# Patient Record
Sex: MALE | Race: BLACK OR AFRICAN AMERICAN | NOT HISPANIC OR LATINO
[De-identification: names, ages, dates, MRNs, and addresses within clinical notes are randomized per-mention and may not be internally consistent; named-entity substitution may affect disease eponyms.]

---

## 2017-06-12 ENCOUNTER — RX RENEWAL (OUTPATIENT)
Age: 72
End: 2017-06-12

## 2017-10-25 ENCOUNTER — RX RENEWAL (OUTPATIENT)
Age: 72
End: 2017-10-25

## 2019-08-26 ENCOUNTER — INPATIENT (INPATIENT)
Facility: HOSPITAL | Age: 74
LOS: 28 days | Discharge: HOME | DRG: 698 | End: 2019-09-24
Attending: GENERAL ACUTE CARE HOSPITAL | Admitting: GENERAL ACUTE CARE HOSPITAL
Payer: MEDICARE

## 2019-08-26 VITALS
HEIGHT: 74 IN | SYSTOLIC BLOOD PRESSURE: 220 MMHG | HEART RATE: 76 BPM | DIASTOLIC BLOOD PRESSURE: 94 MMHG | WEIGHT: 240.08 LBS | RESPIRATION RATE: 16 BRPM | TEMPERATURE: 98 F | OXYGEN SATURATION: 99 %

## 2019-08-26 DIAGNOSIS — Z94.0 KIDNEY TRANSPLANT STATUS: ICD-10-CM

## 2019-08-26 DIAGNOSIS — I10 ESSENTIAL (PRIMARY) HYPERTENSION: ICD-10-CM

## 2019-08-26 DIAGNOSIS — N17.9 ACUTE KIDNEY FAILURE, UNSPECIFIED: ICD-10-CM

## 2019-08-26 LAB
ALBUMIN SERPL ELPH-MCNC: 2.8 G/DL — LOW (ref 3.3–5)
ALP SERPL-CCNC: 80 U/L — SIGNIFICANT CHANGE UP (ref 40–120)
ALT FLD-CCNC: 10 U/L — SIGNIFICANT CHANGE UP (ref 10–45)
ANION GAP SERPL CALC-SCNC: 12 MMOL/L — SIGNIFICANT CHANGE UP (ref 5–17)
APPEARANCE UR: CLEAR — SIGNIFICANT CHANGE UP
APTT BLD: 32.6 SEC — SIGNIFICANT CHANGE UP (ref 27.5–36.3)
AST SERPL-CCNC: 14 U/L — SIGNIFICANT CHANGE UP (ref 10–40)
BACTERIA # UR AUTO: NEGATIVE — SIGNIFICANT CHANGE UP
BASE EXCESS BLDV CALC-SCNC: -5.8 MMOL/L — LOW (ref -2–2)
BASOPHILS # BLD AUTO: 0 K/UL — SIGNIFICANT CHANGE UP (ref 0–0.2)
BASOPHILS NFR BLD AUTO: 0.3 % — SIGNIFICANT CHANGE UP (ref 0–2)
BILIRUB SERPL-MCNC: 0.2 MG/DL — SIGNIFICANT CHANGE UP (ref 0.2–1.2)
BILIRUB UR-MCNC: NEGATIVE — SIGNIFICANT CHANGE UP
BUN SERPL-MCNC: 59 MG/DL — HIGH (ref 7–23)
CA-I SERPL-SCNC: 1.34 MMOL/L — HIGH (ref 1.12–1.3)
CALCIUM SERPL-MCNC: 9.7 MG/DL — SIGNIFICANT CHANGE UP (ref 8.4–10.5)
CHLORIDE BLDV-SCNC: 115 MMOL/L — HIGH (ref 96–108)
CHLORIDE SERPL-SCNC: 112 MMOL/L — HIGH (ref 96–108)
CO2 BLDV-SCNC: 21 MMOL/L — LOW (ref 22–30)
CO2 SERPL-SCNC: 18 MMOL/L — LOW (ref 22–31)
COLOR SPEC: SIGNIFICANT CHANGE UP
CREAT SERPL-MCNC: 7.02 MG/DL — HIGH (ref 0.5–1.3)
DIFF PNL FLD: ABNORMAL
EOSINOPHIL # BLD AUTO: 0.1 K/UL — SIGNIFICANT CHANGE UP (ref 0–0.5)
EOSINOPHIL NFR BLD AUTO: 2.3 % — SIGNIFICANT CHANGE UP (ref 0–6)
EPI CELLS # UR: 5 /HPF — SIGNIFICANT CHANGE UP
GAS PNL BLDV: 140 MMOL/L — SIGNIFICANT CHANGE UP (ref 135–145)
GAS PNL BLDV: SIGNIFICANT CHANGE UP
GAS PNL BLDV: SIGNIFICANT CHANGE UP
GLUCOSE BLDC GLUCOMTR-MCNC: 164 MG/DL — HIGH (ref 70–99)
GLUCOSE BLDC GLUCOMTR-MCNC: 184 MG/DL — HIGH (ref 70–99)
GLUCOSE BLDV-MCNC: 122 MG/DL — HIGH (ref 70–99)
GLUCOSE SERPL-MCNC: 126 MG/DL — HIGH (ref 70–99)
GLUCOSE UR QL: ABNORMAL
HCO3 BLDV-SCNC: 20 MMOL/L — LOW (ref 21–29)
HCT VFR BLD CALC: 27 % — LOW (ref 39–50)
HCT VFR BLDA CALC: 26 % — LOW (ref 39–50)
HGB BLD CALC-MCNC: 8.3 G/DL — LOW (ref 13–17)
HGB BLD-MCNC: 8.8 G/DL — LOW (ref 13–17)
HYALINE CASTS # UR AUTO: 4 /LPF — HIGH (ref 0–2)
INR BLD: 2.2 RATIO — HIGH (ref 0.88–1.16)
KETONES UR-MCNC: NEGATIVE — SIGNIFICANT CHANGE UP
LACTATE BLDV-MCNC: 1.1 MMOL/L — SIGNIFICANT CHANGE UP (ref 0.7–2)
LEUKOCYTE ESTERASE UR-ACNC: NEGATIVE — SIGNIFICANT CHANGE UP
LYMPHOCYTES # BLD AUTO: 0.8 K/UL — LOW (ref 1–3.3)
LYMPHOCYTES # BLD AUTO: 19 % — SIGNIFICANT CHANGE UP (ref 13–44)
MCHC RBC-ENTMCNC: 27.2 PG — SIGNIFICANT CHANGE UP (ref 27–34)
MCHC RBC-ENTMCNC: 32.6 GM/DL — SIGNIFICANT CHANGE UP (ref 32–36)
MCV RBC AUTO: 83.4 FL — SIGNIFICANT CHANGE UP (ref 80–100)
MONOCYTES # BLD AUTO: 0.4 K/UL — SIGNIFICANT CHANGE UP (ref 0–0.9)
MONOCYTES NFR BLD AUTO: 11 % — SIGNIFICANT CHANGE UP (ref 2–14)
NEUTROPHILS # BLD AUTO: 2.7 K/UL — SIGNIFICANT CHANGE UP (ref 1.8–7.4)
NEUTROPHILS NFR BLD AUTO: 67.3 % — SIGNIFICANT CHANGE UP (ref 43–77)
NITRITE UR-MCNC: NEGATIVE — SIGNIFICANT CHANGE UP
OB PNL STL: NEGATIVE — SIGNIFICANT CHANGE UP
PCO2 BLDV: 43 MMHG — SIGNIFICANT CHANGE UP (ref 35–50)
PH BLDV: 7.29 — LOW (ref 7.35–7.45)
PH UR: 6.5 — SIGNIFICANT CHANGE UP (ref 5–8)
PLATELET # BLD AUTO: 115 K/UL — LOW (ref 150–400)
PO2 BLDV: 33 MMHG — SIGNIFICANT CHANGE UP (ref 25–45)
POTASSIUM BLDV-SCNC: 4.7 MMOL/L — SIGNIFICANT CHANGE UP (ref 3.5–5.3)
POTASSIUM SERPL-MCNC: 4.8 MMOL/L — SIGNIFICANT CHANGE UP (ref 3.5–5.3)
POTASSIUM SERPL-SCNC: 4.8 MMOL/L — SIGNIFICANT CHANGE UP (ref 3.5–5.3)
PROT SERPL-MCNC: 6.2 G/DL — SIGNIFICANT CHANGE UP (ref 6–8.3)
PROT UR-MCNC: >600
PROTHROM AB SERPL-ACNC: 25.7 SEC — HIGH (ref 10–12.9)
RBC # BLD: 3.24 M/UL — LOW (ref 4.2–5.8)
RBC # FLD: 13.8 % — SIGNIFICANT CHANGE UP (ref 10.3–14.5)
RBC CASTS # UR COMP ASSIST: 13 /HPF — HIGH (ref 0–4)
SAO2 % BLDV: 54 % — LOW (ref 67–88)
SODIUM SERPL-SCNC: 142 MMOL/L — SIGNIFICANT CHANGE UP (ref 135–145)
SP GR SPEC: 1.02 — SIGNIFICANT CHANGE UP (ref 1.01–1.02)
TROPONIN T, HIGH SENSITIVITY RESULT: 195 NG/L — HIGH (ref 0–51)
TROPONIN T, HIGH SENSITIVITY RESULT: 206 NG/L — HIGH (ref 0–51)
UROBILINOGEN FLD QL: NEGATIVE — SIGNIFICANT CHANGE UP
WBC # BLD: 4 K/UL — SIGNIFICANT CHANGE UP (ref 3.8–10.5)
WBC # FLD AUTO: 4 K/UL — SIGNIFICANT CHANGE UP (ref 3.8–10.5)
WBC UR QL: 13 /HPF — HIGH (ref 0–5)

## 2019-08-26 PROCEDURE — 71045 X-RAY EXAM CHEST 1 VIEW: CPT | Mod: 26

## 2019-08-26 PROCEDURE — 76776 US EXAM K TRANSPL W/DOPPLER: CPT | Mod: 26,LT

## 2019-08-26 PROCEDURE — 99284 EMERGENCY DEPT VISIT MOD MDM: CPT

## 2019-08-26 PROCEDURE — 99222 1ST HOSP IP/OBS MODERATE 55: CPT | Mod: GC

## 2019-08-26 PROCEDURE — 93010 ELECTROCARDIOGRAM REPORT: CPT

## 2019-08-26 RX ORDER — SODIUM BICARBONATE 1 MEQ/ML
1 SYRINGE (ML) INTRAVENOUS
Qty: 0 | Refills: 0 | DISCHARGE

## 2019-08-26 RX ORDER — SODIUM BICARBONATE 1 MEQ/ML
650 SYRINGE (ML) INTRAVENOUS
Refills: 0 | Status: DISCONTINUED | OUTPATIENT
Start: 2019-08-26 | End: 2019-09-24

## 2019-08-26 RX ORDER — FUROSEMIDE 40 MG
80 TABLET ORAL ONCE
Refills: 0 | Status: COMPLETED | OUTPATIENT
Start: 2019-08-26 | End: 2019-08-26

## 2019-08-26 RX ORDER — LABETALOL HCL 100 MG
300 TABLET ORAL
Refills: 0 | Status: DISCONTINUED | OUTPATIENT
Start: 2019-08-26 | End: 2019-09-06

## 2019-08-26 RX ORDER — HYDRALAZINE HCL 50 MG
25 TABLET ORAL ONCE
Refills: 0 | Status: COMPLETED | OUTPATIENT
Start: 2019-08-26 | End: 2019-08-26

## 2019-08-26 RX ORDER — DEXTROSE 50 % IN WATER 50 %
25 SYRINGE (ML) INTRAVENOUS ONCE
Refills: 0 | Status: DISCONTINUED | OUTPATIENT
Start: 2019-08-26 | End: 2019-09-24

## 2019-08-26 RX ORDER — DEXTROSE 50 % IN WATER 50 %
12.5 SYRINGE (ML) INTRAVENOUS ONCE
Refills: 0 | Status: DISCONTINUED | OUTPATIENT
Start: 2019-08-26 | End: 2019-09-24

## 2019-08-26 RX ORDER — DEXTROSE 50 % IN WATER 50 %
15 SYRINGE (ML) INTRAVENOUS ONCE
Refills: 0 | Status: DISCONTINUED | OUTPATIENT
Start: 2019-08-26 | End: 2019-09-24

## 2019-08-26 RX ORDER — INSULIN GLARGINE 100 [IU]/ML
10 INJECTION, SOLUTION SUBCUTANEOUS AT BEDTIME
Refills: 0 | Status: DISCONTINUED | OUTPATIENT
Start: 2019-08-26 | End: 2019-09-24

## 2019-08-26 RX ORDER — TAMSULOSIN HYDROCHLORIDE 0.4 MG/1
0.4 CAPSULE ORAL AT BEDTIME
Refills: 0 | Status: DISCONTINUED | OUTPATIENT
Start: 2019-08-26 | End: 2019-09-24

## 2019-08-26 RX ORDER — OMEPRAZOLE 10 MG/1
1 CAPSULE, DELAYED RELEASE ORAL
Qty: 0 | Refills: 0 | DISCHARGE

## 2019-08-26 RX ORDER — DILTIAZEM HCL 120 MG
30 CAPSULE, EXT RELEASE 24 HR ORAL ONCE
Refills: 0 | Status: COMPLETED | OUTPATIENT
Start: 2019-08-26 | End: 2019-08-26

## 2019-08-26 RX ORDER — ATORVASTATIN CALCIUM 80 MG/1
40 TABLET, FILM COATED ORAL AT BEDTIME
Refills: 0 | Status: DISCONTINUED | OUTPATIENT
Start: 2019-08-26 | End: 2019-09-24

## 2019-08-26 RX ORDER — GLUCAGON INJECTION, SOLUTION 0.5 MG/.1ML
1 INJECTION, SOLUTION SUBCUTANEOUS ONCE
Refills: 0 | Status: DISCONTINUED | OUTPATIENT
Start: 2019-08-26 | End: 2019-09-24

## 2019-08-26 RX ORDER — SODIUM CHLORIDE 9 MG/ML
1000 INJECTION, SOLUTION INTRAVENOUS
Refills: 0 | Status: DISCONTINUED | OUTPATIENT
Start: 2019-08-26 | End: 2019-09-24

## 2019-08-26 RX ORDER — MAGNESIUM OXIDE 400 MG ORAL TABLET 241.3 MG
1 TABLET ORAL
Qty: 0 | Refills: 0 | DISCHARGE

## 2019-08-26 RX ORDER — TAMSULOSIN HYDROCHLORIDE 0.4 MG/1
1 CAPSULE ORAL
Qty: 0 | Refills: 0 | DISCHARGE

## 2019-08-26 RX ORDER — MYCOPHENOLIC ACID 180 MG/1
360 TABLET, DELAYED RELEASE ORAL
Refills: 0 | Status: DISCONTINUED | OUTPATIENT
Start: 2019-08-26 | End: 2019-09-06

## 2019-08-26 RX ORDER — INSULIN LISPRO 100/ML
VIAL (ML) SUBCUTANEOUS AT BEDTIME
Refills: 0 | Status: DISCONTINUED | OUTPATIENT
Start: 2019-08-26 | End: 2019-09-05

## 2019-08-26 RX ORDER — ALLOPURINOL 300 MG
100 TABLET ORAL DAILY
Refills: 0 | Status: DISCONTINUED | OUTPATIENT
Start: 2019-08-26 | End: 2019-09-24

## 2019-08-26 RX ORDER — LEVETIRACETAM 250 MG/1
1000 TABLET, FILM COATED ORAL
Refills: 0 | Status: DISCONTINUED | OUTPATIENT
Start: 2019-08-26 | End: 2019-09-03

## 2019-08-26 RX ORDER — INSULIN LISPRO 100/ML
VIAL (ML) SUBCUTANEOUS
Refills: 0 | Status: DISCONTINUED | OUTPATIENT
Start: 2019-08-26 | End: 2019-09-05

## 2019-08-26 RX ORDER — GABAPENTIN 400 MG/1
100 CAPSULE ORAL
Refills: 0 | Status: DISCONTINUED | OUTPATIENT
Start: 2019-08-26 | End: 2019-09-05

## 2019-08-26 RX ORDER — MIRABEGRON 50 MG/1
1 TABLET, EXTENDED RELEASE ORAL
Qty: 0 | Refills: 0 | DISCHARGE

## 2019-08-26 RX ADMIN — Medication 80 MILLIGRAM(S): at 20:22

## 2019-08-26 RX ADMIN — Medication 30 MILLIGRAM(S): at 20:45

## 2019-08-26 RX ADMIN — Medication 300 MILLIGRAM(S): at 22:38

## 2019-08-26 RX ADMIN — Medication 25 MILLIGRAM(S): at 23:42

## 2019-08-26 NOTE — ED PROVIDER NOTE - PMH
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  From iv drug abuse  HTN (hypertension)    IV drug abuse  former, cocaine. In recovery since ' 2000  Kidney transplant failure  dx: 2/2013  Kidney transplant recipient    Rectal abscess  2009  Renal transplant failure and rejection

## 2019-08-26 NOTE — ED ADULT NURSE NOTE - CHPI ED NUR SYMPTOMS NEG
no chills/no burning urination/no dysuria/no fever/no hematuria/no nausea/no vomiting/no abdominal distension

## 2019-08-26 NOTE — ED PROVIDER NOTE - CLINICAL SUMMARY MEDICAL DECISION MAKING FREE TEXT BOX
73 y/o male morbidly obese, multiple medical issues s/p renal transplant 1 year ago. presented with dyspnea and increased pedal edema. possibly worsening renal function tests. EKG normal sinus rhythm with nonspecific st changes and LVH. will obtain bloodwork, CXR, FOBT and possible admission to hospital with nephrology consult. ZR

## 2019-08-26 NOTE — ED ADULT NURSE NOTE - PSH
A-V fistula  Left, implanted (?)  H/O kidney transplant  may 2015  S/p cadaver renal transplant  2008  S/P partial colectomy  due to diverticulitis

## 2019-08-26 NOTE — ED PROVIDER NOTE - PROGRESS NOTE DETAILS
lab result Cr 7.02 (baseline ~2.0). paged nephrology for consult on admission to MICU. lab result Cr 7.02 (baseline ~2.0). paged nephrology for consult and they advised contacting Dr Reina transplant surgeon. spoke to nephrology fellow - was advised to call transplant surgeon on call. spoke to surgeon Dr Monty Boykin who advised calling back the nephrology fellow since the transplant was over 6 months ago. she advised calling Dr Reina. Then called Dr Reina and left message. spoke to nephrology fellow - was advised to call transplant surgeon on call. spoke to surgeon Dr Monty Boykin who advised calling back the nephrology fellow since the transplant was over 6 months ago. she advised calling Dr Reina. Then called Dr Reina and left message - no response. spoke to nephro fellow Dr Villa - advised diuretics will see patient. seen by Dr Villa - suspects KAMRON vs ESRD. recommended diuresis with 80mg lasix and urine output monitor with indwelling gutierrez.

## 2019-08-26 NOTE — H&P ADULT - ASSESSMENT
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   Pt just moved back to [place] from [place] .. reports that he has not been taking his meds for the past few days since " he might have misplaed them"     pt denies chest pain, syncope,fever, chills, cough, urinary symptoms.    in ED found  to have anemia   Nno acute changes on EKG   elevated CR and Trop 73 y/o male with PMHx of  ESRD s/p /p failed renal transplant 4 year ago and successful renal transplant 1 year ago,DM, HTN, cardiomyopathy 2/2 cocaine abuse, Hepatitis C, meningococcal meningitiss presenting with concerns about his kidney function, worsening SOB over the past 2 weeks and leg edema.   Pt just moved back to [place] from [place] .. reports that he has not been taking his meds for the past few days since " he might have misplaed them"     pt denies chest pain, syncope,fever, chills, cough, urinary symptoms.    in ED found  to have anemia   Nno acute changes on EKG   elevated CR and Trop    1- HTN urgency : sec to non compliance   restart meds and monitor BP     2- Renal failure : ? baseline   cont meds for renal transplant  : Can hold tacrolimus for now however can continue Myfortic/Prednisone for now as there is no sign of infection. Check Tacrolimus level in the AM 30 minutes before AM dose would be given.    3- DM:   check A1c   Fs     4- CM:  will check echo  and call cardio   pt reports he had a recent stress which was negative ?   lasix 80 for a possible element of CHF     5-  difficult ambulating :  no focal neuro deficits   neuro eval   consider imaging if no improvement 75 y/o male with PMHx of  ESRD s/p /p failed renal transplant 4 year ago and successful renal transplant 1 year ago,DM, HTN, cardiomyopathy 2/2 cocaine abuse, Hepatitis C, meningococcal meningitiss presenting with concerns about his kidney function, worsening SOB over the past 2 weeks and leg edema.   Pt just moved back to [place] from [place] .. reports that he has not been taking his meds for the past few days since " he might have misplaed them"     pt denies chest pain, syncope,fever, chills, cough, urinary symptoms.    in ED found  to have anemia   Nno acute changes on EKG   elevated CR and Trop    1- HTN urgency : sec to non compliance   restart meds and monitor BP     2- Renal failure : ? baseline   cont meds for renal transplant  : Can hold tacrolimus for now however can continue Myfortic/Prednisone for now as there is no sign of infection. Check Tacrolimus level in the AM 30 minutes before AM dose would be given.    3- DM:   check A1c   Fs     4- CM:  will check echo  and call cardio   pt reports he had a recent stress which was negative ?   lasix 80 for a possible element of CHF     5-  difficult ambulating :  no focal neuro deficits   neuro eval   consider imaging if no improvement       6- afib : hold AC   monitor H/H    7- hematochezia : per sister : on and off small amount of fresh blood in stool and some amount in urine but only small amounts   monitor H/H   transfuse if worsening Hb

## 2019-08-26 NOTE — H&P ADULT - NSICDXPASTSURGICALHX_GEN_ALL_CORE_FT
PAST SURGICAL HISTORY:  A-V fistula Left, implanted (?)    H/O kidney transplant may 2015    S/p cadaver renal transplant 2008    S/P partial colectomy due to diverticulitis

## 2019-08-26 NOTE — ED PROVIDER NOTE - OBJECTIVE STATEMENT
73 y/o male with PMHx of CKD, A fib, diabetes and CHF s/p failed renal transplant 4 year ago and successful renal transplant 1 year ago, presenting with concerns about his kidney function, worsening SOB over the past 2 weeks and leg edema. Pts wife also endorses pt has BRBPR and he is on coumadin. Pt has a left arm fistula. He denies chest pain, syncope, confusion, fever, chills, cough, urinary symptoms. 73 y/o male with PMHx of CKD, A fib, diabetes and CHF s/p failed renal transplant 4 year ago and successful renal transplant 1 year ago, presenting with concerns about his kidney function, worsening SOB over the past 2 weeks and leg edema. Last dialysis "years ago" and pt had transplant surgery at [facility].Pts wife also endorses pt has BRBPR and he is on coumadin. Pt has a left arm fistula. He denies chest pain, syncope, confusion, fever, chills, cough, urinary symptoms. 75 y/o male with PMHx of CKD, A fib, diabetes and CHF s/p failed renal transplant 4 year ago and successful renal transplant 1 year ago, presenting with concerns about his kidney function, worsening SOB over the past 2 weeks and leg edema. Last dialysis "years ago" and pt had transplant surgery at [facility].Pts wife also endorses pt has BRBPR and he is on coumadin. Pt has a left arm fistula. He denies chest pain, syncope, confusion, fever, chills, cough, urinary symptoms.Pt urinates 3-4 times a day "normal"

## 2019-08-26 NOTE — H&P ADULT - HISTORY OF PRESENT ILLNESS
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   Pt just moved back to [place] from [place] .. reports that he has not been taking his meds for the past few days since " he might have misplaed them"     pt denies chest pain, syncope,fever, chills, cough, urinary symptoms.    in ED found  to have anemia   Nno acute changes on EKG   elevated CR and Trop 75 y/o male with PMHx of  ESRD s/p /p failed renal transplant 4 year ago and successful renal transplant 1 year ago,DM, HTN, cardiomyopathy 2/2 cocaine abuse, Hepatitis C, meningococcal meningitiss presenting with concerns about his kidney function, worsening SOB over the past 2 weeks and leg edema.   Pt just moved back to [place] from [place] .. reports that he has not been taking his meds for the past few days since " he might have misplaed them"     pt denies chest pain, syncope,fever, chills, cough, urinary symptoms.    in ED found  to have anemia : ( per sister : on and off small amount of fresh blood in stool and some amount in urine but only small amounts)   Nno acute changes on EKG   elevated CR and Trop

## 2019-08-26 NOTE — H&P ADULT - NSICDXPASTMEDICALHX_GEN_ALL_CORE_FT
PAST MEDICAL HISTORY:  Anuria     BPH (Benign Prostatic Hyperplasia)     Cardiomyopathy likely cocaine related, no known MIs    Diabetes mellitus     Diverticulitis severe case leading to hemicolectomy, early 2000s    ESRD on dialysis patient is not sure what caused renal failure, likely diabetes related; had been on dialysis prior to transplant in 2008, recently failed, restarted on HD early 2013, through implanted Left arm fistula (Safa)    GERD (gastroesophageal reflux disease)     GERD (gastroesophageal reflux disease)     Hepatitis C dx: 90's.  From iv drug abuse    HTN (hypertension)     IV drug abuse former, cocaine. In recovery since ' 2000    Kidney transplant failure dx: 2/2013    Kidney transplant recipient     Rectal abscess 2009    Renal transplant failure and rejection

## 2019-08-26 NOTE — ED ADULT NURSE REASSESSMENT NOTE - NS ED NURSE REASSESS COMMENT FT1
placed indwelling urethral  cath using sterile technique. Second RN present to confirm sterility. Pt tolerated procedure well. Sterile specimen collected . Will cont to monitor.

## 2019-08-26 NOTE — ED PROVIDER NOTE - CARE PLAN
Principal Discharge DX:	Edema Principal Discharge DX:	KAMRON (acute kidney injury)  Secondary Diagnosis:	Renal transplant recipient  Secondary Diagnosis:	Troponin level elevated

## 2019-08-26 NOTE — ED ADULT NURSE NOTE - NSIMPLEMENTINTERV_GEN_ALL_ED
Implemented All Fall Risk Interventions:  [place] to call system. Call bell, personal items and telephone within reach. Instruct patient to call for assistance. Room bathroom lighting operational. Non-slip footwear when patient is off stretcher. Physically safe environment: no spills, clutter or unnecessary equipment. Stretcher in lowest position, wheels locked, appropriate side rails in place. Provide visual cue, wrist band, yellow gown, etc. Monitor gait and stability. Monitor for mental status changes and reorient to person, place, and time. Review medications for side effects contributing to fall risk. Reinforce activity limits and safety measures with patient and family.

## 2019-08-26 NOTE — CONSULT NOTE ADULT - SUBJECTIVE AND OBJECTIVE BOX
[facility] Division of Kidney Diseases & Hypertension  INITIAL CONSULT NOTE  [phone]--------------------------------------------------------------------------------  HPI: 74 year old male with history of ESRD s/p DDRT x2 (2008, 2015?)        PAST HISTORY  --------------------------------------------------------------------------------  PAST MEDICAL & SURGICAL HISTORY:  Kidney transplant recipient  Anuria  GERD (gastroesophageal reflux disease)  Kidney transplant failure: dx: 2/2013  Hepatitis C: dx: 90&#x27;s.  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, no known MIs  H/O kidney transplant: may 2015  A-V fistula: Left, implanted (?)  S/p cadaver renal transplant: 2008  S/P partial colectomy: due to diverticulitis    FAMILY HISTORY:  Family history of breast cancer in sister (Sibling): living at 92 yo  Family history of prostate cancer in father  Family history of lung cancer  Family history of hypertension in mother  Family history of diabetes mellitus: mother    PAST SOCIAL HISTORY:    ALLERGIES & MEDICATIONS  --------------------------------------------------------------------------------  Allergies    No Known Allergies    Intolerances      Standing Inpatient Medications  furosemide   Injectable 80 milliGRAM(s) IV Push once    PRN Inpatient Medications      REVIEW OF SYSTEMS  --------------------------------------------------------------------------------  Gen: No  fevers/chills, weakness  Skin: No rashes  Head/Eyes/Ears/Mouth: No headache; Normal hearing; Normal vision w/o blurriness;   Respiratory: No dyspnea, cough, wheezing, hemoptysis  CV: No chest pain,   GI: No abdominal pain, diarrhea, constipation, nausea, vomiting  : No increased frequency, dysuria, hematuria, nocturia  MSK: No joint pain/swelling;   Neuro: No dizziness/lightheadedness, weakness, seizures    All other systems were reviewed and are negative, except as noted.    VITALS/PHYSICAL EXAM  --------------------------------------------------------------------------------  T(C): 36.9 (08-26-19 @ 19:58), Max: 37 (08-26-19 @ 14:50)  HR: 81 (08-26-19 @ 19:58) (76 - 81)  BP: 184/109 (08-26-19 @ 19:58) (184/109 - 220/94)  RR: 18 (08-26-19 @ 19:58) (16 - 18)  SpO2: 98% (08-26-19 @ 19:58) (98% - 100%)  Wt(kg): --  Height (cm): 187.96 (08-26-19 @ 14:29)  Weight (kg): 108.9 (08-26-19 @ 14:29)  BMI (kg/m2): 30.8 (08-26-19 @ 14:29)  BSA (m2): 2.35 (08-26-19 @ 14:29)      Physical Exam:  	Gen: NAD, well-appearing  	HEENT: PERRL, supple neck  	Pulm: CTA B/L  	CV:  S1S2  	Abd: +BS, soft   	Ext: No B/L Lower ext edema  	Neuro: No focal deficits  	Skin: Warm, without rashes  	Vascular access:     LABS/STUDIES  --------------------------------------------------------------------------------              8.8    4.0   >-----------<  115      [08-26-19 @ 15:19]              27.0     142  |  112  |  59  ----------------------------<  126      [08-26-19 @ 15:19]  4.8   |  18  |  7.02        Ca     9.7     [08-26-19 @ 15:19]    TPro  6.2  /  Alb  2.8  /  TBili  0.2  /  DBili  x   /  AST  14  /  ALT  10  /  AlkPhos  80  [08-26-19 @ 15:19]    PT/INR: PT 25.7 , INR 2.20       [08-26-19 @ 15:19]  PTT: 32.6       [08-26-19 @ 15:19]      Creatinine Trend:  SCr 7.02 [08-26 @ 15:19]            ' [facility] Division of Kidney Diseases & Hypertension  INITIAL CONSULT NOTE  [phone]--------------------------------------------------------------------------------  HPI: 74 year old male with history of ESRD s/p DDRT x2 (2008, 2015?), DM, HTN, cardiomyopathy 2/2 cocaine abuse, Hepatitis C, 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 He states he is not sure what his baseline creatinine is but the last number he remembers is "9." He states he has been having some dyspnea with exertion but no chest pain, fevers, chills, N/V/D/C, abdominal pain. No other symptoms or complaints that the patient is complaining of. Of note, patient states that since he came up from [place] one week ago he has not been taking his medications appropriately and has been missing doses. Nephrology consulted for elevated creatinine.    Nephrologist from [place]:  Rashid Arredondo - [phone]        PAST HISTORY  --------------------------------------------------------------------------------  PAST MEDICAL & SURGICAL HISTORY:  Kidney transplant recipient  Anuria  GERD (gastroesophageal reflux disease)  Kidney transplant failure: dx: 2/2013  Hepatitis C: dx: 90&#x27;s.  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, no known MIs  H/O kidney transplant: may 2015  A-V fistula: Left, implanted (?)  S/p cadaver renal transplant: 2008  S/P partial colectomy: due to diverticulitis    FAMILY HISTORY:  Family history of breast cancer in sister (Sibling): living at 94 yo  Family history of prostate cancer in father  Family history of lung cancer  Family history of hypertension in mother  Family history of diabetes mellitus: mother    PAST SOCIAL HISTORY: No noted recent drug use.    ALLERGIES & MEDICATIONS  --------------------------------------------------------------------------------  Allergies    No Known Allergies    Intolerances      Standing Inpatient Medications  furosemide   Injectable 80 milliGRAM(s) IV Push once    PRN Inpatient Medications      REVIEW OF SYSTEMS  --------------------------------------------------------------------------------  Gen: No  fevers/chills, weakness  Skin: No rashes  Head/Eyes/Ears/Mouth: No headache; Normal hearing; Normal vision w/o blurriness;   Respiratory: Dyspnea on exertion.  CV: No chest pain,   GI: No abdominal pain, diarrhea, constipation, nausea, vomiting  : No increased frequency, dysuria, hematuria, nocturia  MSK: No joint pain/swelling;   Neuro: No dizziness/lightheadedness, weakness, seizures    All other systems were reviewed and are negative, except as noted.    VITALS/PHYSICAL EXAM  --------------------------------------------------------------------------------  T(C): 36.9 (08-26-19 @ 19:58), Max: 37 (08-26-19 @ 14:50)  HR: 81 (08-26-19 @ 19:58) (76 - 81)  BP: 184/109 (08-26-19 @ 19:58) (184/109 - 220/94)  RR: 18 (08-26-19 @ 19:58) (16 - 18)  SpO2: 98% (08-26-19 @ 19:58) (98% - 100%)  Wt(kg): --  Height (cm): 187.96 (08-26-19 @ 14:29)  Weight (kg): 108.9 (08-26-19 @ 14:29)  BMI (kg/m2): 30.8 (08-26-19 @ 14:29)  BSA (m2): 2.35 (08-26-19 @ 14:29)      Physical Exam:  	Gen: NAD, well-appearing  	HEENT: PERRL, supple neck  	Pulm: CTA B/L  	CV:  S1S2, no friction rub  	Abd: +BS, soft   	Ext: 3/4+ edema of LE  	Neuro: No focal deficits, no asterixis  	Skin: Warm, without rashes  	Vascular: No cyanosis              Access: LUE AVF +thrill +bruit    LABS/STUDIES  --------------------------------------------------------------------------------              8.8    4.0   >-----------<  115      [08-26-19 @ 15:19]              27.0     142  |  112  |  59  ----------------------------<  126      [08-26-19 @ 15:19]  4.8   |  18  |  7.02        Ca     9.7     [08-26-19 @ 15:19]    TPro  6.2  /  Alb  2.8  /  TBili  0.2  /  DBili  x   /  AST  14  /  ALT  10  /  AlkPhos  80  [08-26-19 @ 15:19]    PT/INR: PT 25.7 , INR 2.20       [08-26-19 @ 15:19]  PTT: 32.6       [08-26-19 @ 15:19]      Creatinine Trend:  SCr 7.02 [08-26 @ 15:19]            '

## 2019-08-26 NOTE — H&P ADULT - NSHPSOCIALHISTORY_GEN_ALL_CORE
remote smoking /ETOH and drug use ..last per pt 15 yrs ago   single , no partner at this time   has kids

## 2019-08-26 NOTE — H&P ADULT - NSICDXFAMILYHX_GEN_ALL_CORE_FT
FAMILY HISTORY:  Family history of diabetes mellitus, mother  Family history of hypertension in mother  Family history of lung cancer  Family history of prostate cancer in father    Sibling  Still living? Unknown  Family history of breast cancer in sister, Age at diagnosis: Age Unknown

## 2019-08-26 NOTE — CONSULT NOTE ADULT - ASSESSMENT
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 on CKD and LE swelling.

## 2019-08-27 LAB
ALBUMIN SERPL ELPH-MCNC: 2.6 G/DL — LOW (ref 3.3–5)
ALP SERPL-CCNC: 72 U/L — SIGNIFICANT CHANGE UP (ref 40–120)
ALT FLD-CCNC: 10 U/L — SIGNIFICANT CHANGE UP (ref 10–45)
ANION GAP SERPL CALC-SCNC: 12 MMOL/L — SIGNIFICANT CHANGE UP (ref 5–17)
AST SERPL-CCNC: 10 U/L — SIGNIFICANT CHANGE UP (ref 10–40)
BASOPHILS # BLD AUTO: 0.02 K/UL — SIGNIFICANT CHANGE UP (ref 0–0.2)
BASOPHILS NFR BLD AUTO: 0.5 % — SIGNIFICANT CHANGE UP (ref 0–2)
BILIRUB SERPL-MCNC: 0.1 MG/DL — LOW (ref 0.2–1.2)
BLD GP AB SCN SERPL QL: NEGATIVE — SIGNIFICANT CHANGE UP
BUN SERPL-MCNC: 59 MG/DL — HIGH (ref 7–23)
CALCIUM SERPL-MCNC: 9.5 MG/DL — SIGNIFICANT CHANGE UP (ref 8.4–10.5)
CHLORIDE SERPL-SCNC: 113 MMOL/L — HIGH (ref 96–108)
CO2 SERPL-SCNC: 17 MMOL/L — LOW (ref 22–31)
CREAT SERPL-MCNC: 7.34 MG/DL — HIGH (ref 0.5–1.3)
EOSINOPHIL # BLD AUTO: 0.09 K/UL — SIGNIFICANT CHANGE UP (ref 0–0.5)
EOSINOPHIL NFR BLD AUTO: 2.3 % — SIGNIFICANT CHANGE UP (ref 0–6)
FERRITIN SERPL-MCNC: 294 NG/ML — SIGNIFICANT CHANGE UP (ref 30–400)
GLUCOSE BLDC GLUCOMTR-MCNC: 137 MG/DL — HIGH (ref 70–99)
GLUCOSE BLDC GLUCOMTR-MCNC: 152 MG/DL — HIGH (ref 70–99)
GLUCOSE BLDC GLUCOMTR-MCNC: 159 MG/DL — HIGH (ref 70–99)
GLUCOSE BLDC GLUCOMTR-MCNC: 175 MG/DL — HIGH (ref 70–99)
GLUCOSE SERPL-MCNC: 147 MG/DL — HIGH (ref 70–99)
HBA1C BLD-MCNC: 5.6 % — SIGNIFICANT CHANGE UP (ref 4–5.6)
HBV SURFACE AB SER-ACNC: 19.4 MIU/ML — SIGNIFICANT CHANGE UP
HBV SURFACE AG SER-ACNC: SIGNIFICANT CHANGE UP
HCT VFR BLD CALC: 24.8 % — LOW (ref 39–50)
HCT VFR BLD CALC: 26.5 % — LOW (ref 39–50)
HCV AB S/CO SERPL IA: 10.44 S/CO — HIGH (ref 0–0.99)
HCV AB SERPL-IMP: REACTIVE
HGB BLD-MCNC: 7.5 G/DL — LOW (ref 13–17)
HGB BLD-MCNC: 8.4 G/DL — LOW (ref 13–17)
IMM GRANULOCYTES NFR BLD AUTO: 0.5 % — SIGNIFICANT CHANGE UP (ref 0–1.5)
IRON SATN MFR SERPL: 26 % — SIGNIFICANT CHANGE UP (ref 16–55)
IRON SATN MFR SERPL: 31 UG/DL — LOW (ref 45–165)
LYMPHOCYTES # BLD AUTO: 0.84 K/UL — LOW (ref 1–3.3)
LYMPHOCYTES # BLD AUTO: 21.9 % — SIGNIFICANT CHANGE UP (ref 13–44)
MCHC RBC-ENTMCNC: 25.9 PG — LOW (ref 27–34)
MCHC RBC-ENTMCNC: 26.6 PG — LOW (ref 27–34)
MCHC RBC-ENTMCNC: 30.2 GM/DL — LOW (ref 32–36)
MCHC RBC-ENTMCNC: 31.7 GM/DL — LOW (ref 32–36)
MCV RBC AUTO: 84 FL — SIGNIFICANT CHANGE UP (ref 80–100)
MCV RBC AUTO: 85.5 FL — SIGNIFICANT CHANGE UP (ref 80–100)
MONOCYTES # BLD AUTO: 0.49 K/UL — SIGNIFICANT CHANGE UP (ref 0–0.9)
MONOCYTES NFR BLD AUTO: 12.8 % — SIGNIFICANT CHANGE UP (ref 2–14)
NEUTROPHILS # BLD AUTO: 2.37 K/UL — SIGNIFICANT CHANGE UP (ref 1.8–7.4)
NEUTROPHILS NFR BLD AUTO: 62 % — SIGNIFICANT CHANGE UP (ref 43–77)
PHOSPHATE SERPL-MCNC: 5.3 MG/DL — HIGH (ref 2.5–4.5)
PHOSPHATE SERPL-MCNC: 5.4 MG/DL — HIGH (ref 2.5–4.5)
PLATELET # BLD AUTO: 111 K/UL — LOW (ref 150–400)
PLATELET # BLD AUTO: 119 K/UL — LOW (ref 150–400)
POTASSIUM SERPL-MCNC: 4.7 MMOL/L — SIGNIFICANT CHANGE UP (ref 3.5–5.3)
POTASSIUM SERPL-SCNC: 4.7 MMOL/L — SIGNIFICANT CHANGE UP (ref 3.5–5.3)
PROT SERPL-MCNC: 5.6 G/DL — LOW (ref 6–8.3)
PTH-INTACT FLD-MCNC: 1896 PG/ML — HIGH (ref 15–65)
RBC # BLD: 2.9 M/UL — LOW (ref 4.2–5.8)
RBC # BLD: 3.16 M/UL — LOW (ref 4.2–5.8)
RBC # FLD: 13.6 % — SIGNIFICANT CHANGE UP (ref 10.3–14.5)
RBC # FLD: 14.3 % — SIGNIFICANT CHANGE UP (ref 10.3–14.5)
RH IG SCN BLD-IMP: POSITIVE — SIGNIFICANT CHANGE UP
SODIUM SERPL-SCNC: 142 MMOL/L — SIGNIFICANT CHANGE UP (ref 135–145)
TACROLIMUS SERPL-MCNC: <2 NG/ML — SIGNIFICANT CHANGE UP
TIBC SERPL-MCNC: 121 UG/DL — LOW (ref 220–430)
UIBC SERPL-MCNC: 90 UG/DL — LOW (ref 110–370)
WBC # BLD: 3.83 K/UL — SIGNIFICANT CHANGE UP (ref 3.8–10.5)
WBC # BLD: 4.6 K/UL — SIGNIFICANT CHANGE UP (ref 3.8–10.5)
WBC # FLD AUTO: 3.83 K/UL — SIGNIFICANT CHANGE UP (ref 3.8–10.5)
WBC # FLD AUTO: 4.6 K/UL — SIGNIFICANT CHANGE UP (ref 3.8–10.5)

## 2019-08-27 PROCEDURE — 90935 HEMODIALYSIS ONE EVALUATION: CPT

## 2019-08-27 RX ORDER — DARBEPOETIN ALFA IN POLYSORBAT 200MCG/0.4
40 PEN INJECTOR (ML) SUBCUTANEOUS
Refills: 0 | Status: DISCONTINUED | OUTPATIENT
Start: 2019-08-27 | End: 2019-09-06

## 2019-08-27 RX ORDER — CHLORHEXIDINE GLUCONATE 213 G/1000ML
1 SOLUTION TOPICAL DAILY
Refills: 0 | Status: DISCONTINUED | OUTPATIENT
Start: 2019-08-27 | End: 2019-09-24

## 2019-08-27 RX ORDER — HYDRALAZINE HCL 50 MG
10 TABLET ORAL ONCE
Refills: 0 | Status: COMPLETED | OUTPATIENT
Start: 2019-08-27 | End: 2019-08-27

## 2019-08-27 RX ORDER — HYDRALAZINE HCL 50 MG
25 TABLET ORAL THREE TIMES A DAY
Refills: 0 | Status: DISCONTINUED | OUTPATIENT
Start: 2019-08-27 | End: 2019-08-28

## 2019-08-27 RX ADMIN — INSULIN GLARGINE 10 UNIT(S): 100 INJECTION, SOLUTION SUBCUTANEOUS at 00:06

## 2019-08-27 RX ADMIN — Medication 40 MICROGRAM(S): at 19:20

## 2019-08-27 RX ADMIN — INSULIN GLARGINE 10 UNIT(S): 100 INJECTION, SOLUTION SUBCUTANEOUS at 22:10

## 2019-08-27 RX ADMIN — Medication 100 MILLIGRAM(S): at 13:38

## 2019-08-27 RX ADMIN — GABAPENTIN 100 MILLIGRAM(S): 400 CAPSULE ORAL at 20:50

## 2019-08-27 RX ADMIN — Medication 300 MILLIGRAM(S): at 20:50

## 2019-08-27 RX ADMIN — Medication 1: at 13:39

## 2019-08-27 RX ADMIN — Medication 1: at 17:15

## 2019-08-27 RX ADMIN — MYCOPHENOLIC ACID 360 MILLIGRAM(S): 180 TABLET, DELAYED RELEASE ORAL at 05:34

## 2019-08-27 RX ADMIN — Medication 25 MILLIGRAM(S): at 21:11

## 2019-08-27 RX ADMIN — GABAPENTIN 100 MILLIGRAM(S): 400 CAPSULE ORAL at 05:34

## 2019-08-27 RX ADMIN — Medication 650 MILLIGRAM(S): at 05:34

## 2019-08-27 RX ADMIN — Medication 650 MILLIGRAM(S): at 20:50

## 2019-08-27 RX ADMIN — Medication 300 MILLIGRAM(S): at 05:34

## 2019-08-27 RX ADMIN — MYCOPHENOLIC ACID 360 MILLIGRAM(S): 180 TABLET, DELAYED RELEASE ORAL at 20:50

## 2019-08-27 RX ADMIN — LEVETIRACETAM 1000 MILLIGRAM(S): 250 TABLET, FILM COATED ORAL at 05:34

## 2019-08-27 RX ADMIN — Medication 5 MILLIGRAM(S): at 05:34

## 2019-08-27 RX ADMIN — CHLORHEXIDINE GLUCONATE 1 APPLICATION(S): 213 SOLUTION TOPICAL at 13:42

## 2019-08-27 RX ADMIN — Medication 1: at 00:06

## 2019-08-27 RX ADMIN — TAMSULOSIN HYDROCHLORIDE 0.4 MILLIGRAM(S): 0.4 CAPSULE ORAL at 20:53

## 2019-08-27 RX ADMIN — Medication 10 MILLIGRAM(S): at 14:19

## 2019-08-27 RX ADMIN — ATORVASTATIN CALCIUM 40 MILLIGRAM(S): 80 TABLET, FILM COATED ORAL at 21:12

## 2019-08-27 NOTE — PHYSICAL THERAPY INITIAL EVALUATION ADULT - PHYSICAL ASSIST/NONPHYSICAL ASSIST: STAND/SIT, REHAB EVAL
verbal cues/nonverbal cues (demo/gestures)/1 person assist Ears: no ear pain and no hearing problems.Nose: no nasal congestion and no nasal drainage.Mouth/Throat: no dysphagia, no hoarseness and no throat pain.Neck: no lumps, no pain, no stiffness and no swollen glands.

## 2019-08-27 NOTE — PROGRESS NOTE ADULT - ASSESSMENT
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   Pt just moved back to [place] from [place] .. reports that he has not been taking his meds for the past few days since " he might have misplaed them"     pt denies chest pain, syncope,fever, chills, cough, urinary symptoms.    in ED found  to have anemia   Nno acute changes on EKG   elevated CR and Trop    1- HTN urgency : sec to non compliance   restart meds and monitor BP     2- Renal failure : ? baseline   cont meds for renal transplant  : Can hold tacrolimus for now however can continue Myfortic/Prednisone for now as there is no sign of infection. Check Tacrolimus level in the AM 30 minutes before AM dose would be given.    3- DM:   check A1c   Fs     4- CM:  will check echo  and call cardio   pt reports he had a recent stress which was negative ?   lasix 80 for a possible element of CHF     5-  difficult ambulating :  no focal neuro deficits   neuro eval   consider imaging if no improvement       6- afib : hold AC   monitor H/H    7- hematochezia : per sister : on and off small amount of fresh blood in stool and some amount in urine but only small amounts   monitor H/H   transfuse if worsening Hb 75 y/o male with PMHx of  ESRD s/p /p failed renal transplant 4 year ago and successful renal transplant 1 year ago,DM, HTN, cardiomyopathy 2/2 cocaine abuse, Hepatitis C, meningococcal meningitiss presenting with concerns about his kidney function, worsening SOB over the past 2 weeks and leg edema.   Pt just moved back to [place] from [place] .. reports that he has not been taking his meds for the past few days since " he might have misplaed them"     pt denies chest pain, syncope,fever, chills, cough, urinary symptoms.    in ED found  to have anemia   Nno acute changes on EKG   elevated CR and Trop    1- HTN urgency :uncontrolled   adjust meds and monitor     2- Renal failure : ? baseline   cont meds for renal transplant  : Can hold tacrolimus for now however can continue Myfortic/Prednisone for now as there is no sign of infection. Check Tacrolimus level in the AM 30 minutes before AM dose would be given.  Plan for HD   cont gutierrez ..possible TOV in 24 -48 hours     3- DM:   A1c  5.6  Fs     4- CM: f/u  echo  and consult  cardio   pt reports he had a recent stress which was negative ?   cont lasix     5-  difficult ambulating :  no focal neuro deficits   neuro eval   consider imaging if no improvement       6- afib : hold AC .. consdier restarting   monitor H/H    7- hematochezia : per sister : on and off small amount of fresh blood in stool and some amount in urine but only small amounts   monitor H/H   transfuse if worsening Hb   consult GI

## 2019-08-27 NOTE — PHYSICAL THERAPY INITIAL EVALUATION ADULT - ADDITIONAL COMMENTS
As per pt, pt was recently staying w/ family in [place] lives in a private house and 5 steps to enter w/ UHR. PTA pt was independent w/ all mobility w/ RW/cane and ADLs.

## 2019-08-27 NOTE — PHYSICAL THERAPY INITIAL EVALUATION ADULT - PERTINENT HX OF CURRENT PROBLEM, REHAB EVAL
Pt is a 75 y/o male with PMHx of  ESRD s/p /p failed renal transplant 4 year ago and successful renal transplant 1 year ago,DM, HTN, cardiomyopathy 2/2 cocaine abuse, Hepatitis C, meningococcal meningitiss presenting with concerns about his kidney function, worsening SOB over the past 2 weeks and leg edema.

## 2019-08-27 NOTE — CHART NOTE - NSCHARTNOTEFT_GEN_A_CORE
I have seen the patient and reviewed dialysis prescription . Dialysis access is left forearm AV graft and has bruit. Dialysis prescription has been adjusted for optimized control of volume status, uremia and electrolytes. Management of additional metabolic abnormalities/anemia will continue to be addressed on follow up. HepB panel, phosphorus level and iPTH being sent.  Patient is explained the process of hemodialysis, indications and consent obtained.  Plan of care discussed with patient, nurse, from pr team and with hemodialysis team  Will follow

## 2019-08-27 NOTE — PROGRESS NOTE ADULT - SUBJECTIVE AND OBJECTIVE BOX
Patient is a 74y old  Male who presents with a chief complaint of                                                              INTERVAL HPI/OVERNIGHT EVENTS:    REVIEW OF SYSTEMS:     CONSTITUTIONAL: No weakness, fevers or chills  EYES/ENT: No visual changes , no ear ache   NECK: No pain or stiffness  RESPIRATORY: No cough, wheezing,  No shortness of breath  CARDIOVASCULAR: No chest pain or palpitations  GASTROINTESTINAL: No abdominal pain  . No nausea, vomiting, or hematemesis; No diarrhea or constipation. No melena or hematochezia.  GENITOURINARY: No dysuria, frequency or hematuria  NEUROLOGICAL: No numbness or weakness  SKIN: No itching, burning, rashes, or lesions                                                                                                                                                                                                                                                                                 Medications:  MEDICATIONS  (STANDING):  allopurinol 100 milliGRAM(s) Oral daily  atorvastatin 40 milliGRAM(s) Oral at bedtime  chlorhexidine 2% Cloths 1 Application(s) Topical daily  darbepoetin Injectable ViaL 40 MICROGram(s) IV Push every week  dextrose 5%. 1000 milliLiter(s) (50 mL/Hr) IV Continuous <Continuous>  dextrose 50% Injectable 12.5 Gram(s) IV Push once  dextrose 50% Injectable 25 Gram(s) IV Push once  dextrose 50% Injectable 25 Gram(s) IV Push once  gabapentin 100 milliGRAM(s) Oral two times a day  hydrALAZINE 25 milliGRAM(s) Oral three times a day  insulin glargine Injectable (LANTUS) 10 Unit(s) SubCutaneous at bedtime  insulin lispro (HumaLOG) corrective regimen sliding scale   SubCutaneous three times a day before meals  insulin lispro (HumaLOG) corrective regimen sliding scale   SubCutaneous at bedtime  labetalol 300 milliGRAM(s) Oral two times a day  levETIRAcetam 1000 milliGRAM(s) Oral two times a day  mycophenolic acid  milliGRAM(s) Oral two times a day  predniSONE   Tablet 5 milliGRAM(s) Oral daily  sodium bicarbonate 650 milliGRAM(s) Oral two times a day  tamsulosin 0.4 milliGRAM(s) Oral at bedtime    MEDICATIONS  (PRN):  dextrose 40% Gel 15 Gram(s) Oral once PRN Blood Glucose LESS THAN 70 milliGRAM(s)/deciliter  glucagon  Injectable 1 milliGRAM(s) IntraMuscular once PRN Glucose LESS THAN 70 milligrams/deciliter       Allergies    No Known Allergies    Intolerances      Vital Signs Last 24 Hrs  T(C): 36.6 (27 Aug 2019 18:15), Max: 36.8 (26 Aug 2019 21:51)  T(F): 97.9 (27 Aug 2019 18:15), Max: 98.3 (26 Aug 2019 21:51)  HR: 88 (27 Aug 2019 18:15) (75 - 88)  BP: 198/99 (27 Aug 2019 18:15) (182/87 - 222/91)  BP(mean): --  RR: 19 (27 Aug 2019 18:15) (17 - 20)  SpO2: 97% (27 Aug 2019 18:15) (96% - 100%)  CAPILLARY BLOOD GLUCOSE      POCT Blood Glucose.: 152 mg/dL (27 Aug 2019 17:11)  POCT Blood Glucose.: 159 mg/dL (27 Aug 2019 13:37)  POCT Blood Glucose.: 137 mg/dL (27 Aug 2019 08:13)  POCT Blood Glucose.: 184 mg/dL (26 Aug 2019 23:54)  POCT Blood Glucose.: 164 mg/dL (26 Aug 2019 22:08)       @ : @ 07:00  --------------------------------------------------------  IN: 480 mL / OUT: 1200 mL / NET: -720 mL     @ 07: @ 20:28  --------------------------------------------------------  IN: 875 mL / OUT: 675 mL / NET: 200 mL      Physical Exam:    Daily     Daily Weight in k.3 (27 Aug 2019 18:15)  General:  Well appearing, NAD, not cachetic  HEENT:  Nonicteric, PERRLA  CV:  RRR, S1S2   Lungs:  CTA B/L, no wheezes, rales, rhonchi  Abdomen:  Soft, non-tender, no distended, positive BS  Extremities:  2+ pulses, no c/c, no edema  Skin:  Warm and dry, no rashes  :  No gutierrez  Neuro:  AAOx3, non-focal, grossly intact                                                                                                                                                                                                                                                                                                LABS:                               8.4    4.6   )-----------( 111      ( 27 Aug 2019 12:17 )             26.5                          142  |  113<H>  |  59<H>  ----------------------------<  147<H>  4.7   |  17<L>  |  7.34<H>    Ca    9.5      27 Aug 2019 06:58  Phos  5.3         TPro  5.6<L>  /  Alb  2.6<L>  /  TBili  0.1<L>  /  DBili  x   /  AST  10  /  ALT  10  /  AlkPhos  72                         RADIOLOGY & ADDITIONAL TESTS         I personally reviewed: [  ]EKG   [  ]CXR    [  ] CT      A/P:         Discussed with :     Marga consultants' Notes   Time spent : Patient is a 74y old  Male who presents with a chief complaint of                                                              INTERVAL HPI/OVERNIGHT EVENTS:    REVIEW OF SYSTEMS:     CONSTITUTIONAL: No weakness, fevers or chills  EYES/ENT: No visual changes , no ear ache   NECK: No pain or stiffness  RESPIRATORY: No cough, wheezing,  No shortness of breath  CARDIOVASCULAR: No chest pain or palpitations  GASTROINTESTINAL: No abdominal pain  . No nausea, vomiting, or hematemesis; No diarrhea or constipation. No melena or hematochezia.  GENITOURINARY: No dysuria, frequency or hematuria  NEUROLOGICAL: No numbness or weakness                                                                                                                                                                                                                                                                                Medications:  MEDICATIONS  (STANDING):  allopurinol 100 milliGRAM(s) Oral daily  atorvastatin 40 milliGRAM(s) Oral at bedtime  chlorhexidine 2% Cloths 1 Application(s) Topical daily  darbepoetin Injectable ViaL 40 MICROGram(s) IV Push every week  dextrose 5%. 1000 milliLiter(s) (50 mL/Hr) IV Continuous <Continuous>  dextrose 50% Injectable 12.5 Gram(s) IV Push once  dextrose 50% Injectable 25 Gram(s) IV Push once  dextrose 50% Injectable 25 Gram(s) IV Push once  gabapentin 100 milliGRAM(s) Oral two times a day  hydrALAZINE 25 milliGRAM(s) Oral three times a day  insulin glargine Injectable (LANTUS) 10 Unit(s) SubCutaneous at bedtime  insulin lispro (HumaLOG) corrective regimen sliding scale   SubCutaneous three times a day before meals  insulin lispro (HumaLOG) corrective regimen sliding scale   SubCutaneous at bedtime  labetalol 300 milliGRAM(s) Oral two times a day  levETIRAcetam 1000 milliGRAM(s) Oral two times a day  mycophenolic acid  milliGRAM(s) Oral two times a day  predniSONE   Tablet 5 milliGRAM(s) Oral daily  sodium bicarbonate 650 milliGRAM(s) Oral two times a day  tamsulosin 0.4 milliGRAM(s) Oral at bedtime    MEDICATIONS  (PRN):  dextrose 40% Gel 15 Gram(s) Oral once PRN Blood Glucose LESS THAN 70 milliGRAM(s)/deciliter  glucagon  Injectable 1 milliGRAM(s) IntraMuscular once PRN Glucose LESS THAN 70 milligrams/deciliter       Allergies    No Known Allergies    Intolerances      Vital Signs Last 24 Hrs  T(C): 36.6 (27 Aug 2019 18:15), Max: 36.8 (26 Aug 2019 21:51)  T(F): 97.9 (27 Aug 2019 18:15), Max: 98.3 (26 Aug 2019 21:51)  HR: 88 (27 Aug 2019 18:15) (75 - 88)  BP: 198/99 (27 Aug 2019 18:15) (182/87 - 222/91)  BP(mean): --  RR: 19 (27 Aug 2019 18:15) (17 - 20)  SpO2: 97% (27 Aug 2019 18:15) (96% - 100%)  CAPILLARY BLOOD GLUCOSE      POCT Blood Glucose.: 152 mg/dL (27 Aug 2019 17:11)  POCT Blood Glucose.: 159 mg/dL (27 Aug 2019 13:37)  POCT Blood Glucose.: 137 mg/dL (27 Aug 2019 08:13)  POCT Blood Glucose.: 184 mg/dL (26 Aug 2019 23:54)  POCT Blood Glucose.: 164 mg/dL (26 Aug 2019 22:08)       @ 07: @ 07:00  --------------------------------------------------------  IN: 480 mL / OUT: 1200 mL / NET: -720 mL     @ 07: @ 20:28  --------------------------------------------------------  IN: 875 mL / OUT: 675 mL / NET: 200 mL      Physical Exam:    Daily     Daily Weight in k.3 (27 Aug 2019 18:15)  General:  NAD   HEENT:  Nonicteric, PERRLA  CV:  RRR, S1S2   Lungs: crackles   Abdomen:  Soft, non-tender, no distended, positive BS  Extremities:  edema  Neuro:  AAOx3, non-focal, grossly intact                                                                                                                                                                                                                                                                                                LABS:                               8.4    4.6   )-----------( 111      ( 27 Aug 2019 12:17 )             26.5                      08-27    142  |  113<H>  |  59<H>  ----------------------------<  147<H>  4.7   |  17<L>  |  7.34<H>    Ca    9.5      27 Aug 2019 06:58  Phos  5.3         TPro  5.6<L>  /  Alb  2.6<L>  /  TBili  0.1<L>  /  DBili  x   /  AST  10  /  ALT  10  /  AlkPhos  72

## 2019-08-27 NOTE — PHYSICAL THERAPY INITIAL EVALUATION ADULT - PLANNED THERAPY INTERVENTIONS, PT EVAL
balance training/bed mobility training/transfer training/GOAL: Pt will perform 5 stairs with or without U HR as needed within 3-4weeks./gait training

## 2019-08-28 DIAGNOSIS — K62.5 HEMORRHAGE OF ANUS AND RECTUM: ICD-10-CM

## 2019-08-28 DIAGNOSIS — D89.9 DISORDER INVOLVING THE IMMUNE MECHANISM, UNSPECIFIED: ICD-10-CM

## 2019-08-28 LAB
ANION GAP SERPL CALC-SCNC: 10 MMOL/L — SIGNIFICANT CHANGE UP (ref 5–17)
ANION GAP SERPL CALC-SCNC: 12 MMOL/L — SIGNIFICANT CHANGE UP (ref 5–17)
BUN SERPL-MCNC: 35 MG/DL — HIGH (ref 7–23)
BUN SERPL-MCNC: 46 MG/DL — HIGH (ref 7–23)
CALCIUM SERPL-MCNC: 9.1 MG/DL — SIGNIFICANT CHANGE UP (ref 8.4–10.5)
CALCIUM SERPL-MCNC: 9.5 MG/DL — SIGNIFICANT CHANGE UP (ref 8.4–10.5)
CALCIUM SERPL-MCNC: 9.8 MG/DL — SIGNIFICANT CHANGE UP (ref 8.4–10.5)
CHLORIDE SERPL-SCNC: 102 MMOL/L — SIGNIFICANT CHANGE UP (ref 96–108)
CHLORIDE SERPL-SCNC: 110 MMOL/L — HIGH (ref 96–108)
CO2 SERPL-SCNC: 21 MMOL/L — LOW (ref 22–31)
CO2 SERPL-SCNC: 25 MMOL/L — SIGNIFICANT CHANGE UP (ref 22–31)
CREAT SERPL-MCNC: 4.67 MG/DL — HIGH (ref 0.5–1.3)
CREAT SERPL-MCNC: 5.63 MG/DL — HIGH (ref 0.5–1.3)
GLUCOSE BLDC GLUCOMTR-MCNC: 111 MG/DL — HIGH (ref 70–99)
GLUCOSE BLDC GLUCOMTR-MCNC: 123 MG/DL — HIGH (ref 70–99)
GLUCOSE BLDC GLUCOMTR-MCNC: 140 MG/DL — HIGH (ref 70–99)
GLUCOSE BLDC GLUCOMTR-MCNC: 145 MG/DL — HIGH (ref 70–99)
GLUCOSE BLDC GLUCOMTR-MCNC: 155 MG/DL — HIGH (ref 70–99)
GLUCOSE SERPL-MCNC: 127 MG/DL — HIGH (ref 70–99)
GLUCOSE SERPL-MCNC: 128 MG/DL — HIGH (ref 70–99)
HCT VFR BLD CALC: 24.7 % — LOW (ref 39–50)
HCV RNA FLD QL NAA+PROBE: SIGNIFICANT CHANGE UP
HCV RNA SPEC QL PROBE+SIG AMP: SIGNIFICANT CHANGE UP
HGB BLD-MCNC: 8.2 G/DL — LOW (ref 13–17)
MAGNESIUM SERPL-MCNC: 1.4 MG/DL — LOW (ref 1.6–2.6)
MAGNESIUM SERPL-MCNC: 1.4 MG/DL — LOW (ref 1.6–2.6)
MCHC RBC-ENTMCNC: 27.4 PG — SIGNIFICANT CHANGE UP (ref 27–34)
MCHC RBC-ENTMCNC: 33 GM/DL — SIGNIFICANT CHANGE UP (ref 32–36)
MCV RBC AUTO: 83.2 FL — SIGNIFICANT CHANGE UP (ref 80–100)
PHOSPHATE SERPL-MCNC: 3.5 MG/DL — SIGNIFICANT CHANGE UP (ref 2.5–4.5)
PHOSPHATE SERPL-MCNC: 4 MG/DL — SIGNIFICANT CHANGE UP (ref 2.5–4.5)
PLATELET # BLD AUTO: 118 K/UL — LOW (ref 150–400)
POTASSIUM SERPL-MCNC: 3.9 MMOL/L — SIGNIFICANT CHANGE UP (ref 3.5–5.3)
POTASSIUM SERPL-MCNC: 4.3 MMOL/L — SIGNIFICANT CHANGE UP (ref 3.5–5.3)
POTASSIUM SERPL-SCNC: 3.9 MMOL/L — SIGNIFICANT CHANGE UP (ref 3.5–5.3)
POTASSIUM SERPL-SCNC: 4.3 MMOL/L — SIGNIFICANT CHANGE UP (ref 3.5–5.3)
RBC # BLD: 2.97 M/UL — LOW (ref 4.2–5.8)
RBC # FLD: 13.7 % — SIGNIFICANT CHANGE UP (ref 10.3–14.5)
SODIUM SERPL-SCNC: 137 MMOL/L — SIGNIFICANT CHANGE UP (ref 135–145)
SODIUM SERPL-SCNC: 143 MMOL/L — SIGNIFICANT CHANGE UP (ref 135–145)
TACROLIMUS SERPL-MCNC: <2 NG/ML — SIGNIFICANT CHANGE UP
WBC # BLD: 5.1 K/UL — SIGNIFICANT CHANGE UP (ref 3.8–10.5)
WBC # FLD AUTO: 5.1 K/UL — SIGNIFICANT CHANGE UP (ref 3.8–10.5)

## 2019-08-28 PROCEDURE — 93306 TTE W/DOPPLER COMPLETE: CPT | Mod: 26

## 2019-08-28 PROCEDURE — 93010 ELECTROCARDIOGRAM REPORT: CPT

## 2019-08-28 PROCEDURE — 99232 SBSQ HOSP IP/OBS MODERATE 35: CPT

## 2019-08-28 RX ORDER — HYDROCORTISONE 1 %
1 OINTMENT (GRAM) TOPICAL AT BEDTIME
Refills: 0 | Status: DISCONTINUED | OUTPATIENT
Start: 2019-08-28 | End: 2019-09-24

## 2019-08-28 RX ORDER — HYDRALAZINE HCL 50 MG
10 TABLET ORAL ONCE
Refills: 0 | Status: COMPLETED | OUTPATIENT
Start: 2019-08-28 | End: 2019-08-28

## 2019-08-28 RX ORDER — HYDRALAZINE HCL 50 MG
75 TABLET ORAL THREE TIMES A DAY
Refills: 0 | Status: DISCONTINUED | OUTPATIENT
Start: 2019-08-28 | End: 2019-08-29

## 2019-08-28 RX ORDER — MAGNESIUM SULFATE 500 MG/ML
2 VIAL (ML) INJECTION ONCE
Refills: 0 | Status: COMPLETED | OUTPATIENT
Start: 2019-08-28 | End: 2019-08-29

## 2019-08-28 RX ADMIN — Medication 300 MILLIGRAM(S): at 05:14

## 2019-08-28 RX ADMIN — Medication 10 MILLIGRAM(S): at 21:07

## 2019-08-28 RX ADMIN — Medication 25 MILLIGRAM(S): at 05:14

## 2019-08-28 RX ADMIN — Medication 100 MILLIGRAM(S): at 12:16

## 2019-08-28 RX ADMIN — ATORVASTATIN CALCIUM 40 MILLIGRAM(S): 80 TABLET, FILM COATED ORAL at 22:52

## 2019-08-28 RX ADMIN — Medication 10 MILLIGRAM(S): at 02:38

## 2019-08-28 RX ADMIN — INSULIN GLARGINE 10 UNIT(S): 100 INJECTION, SOLUTION SUBCUTANEOUS at 22:52

## 2019-08-28 RX ADMIN — TAMSULOSIN HYDROCHLORIDE 0.4 MILLIGRAM(S): 0.4 CAPSULE ORAL at 22:51

## 2019-08-28 RX ADMIN — Medication 5 MILLIGRAM(S): at 05:14

## 2019-08-28 RX ADMIN — Medication 300 MILLIGRAM(S): at 18:39

## 2019-08-28 RX ADMIN — Medication 1: at 12:15

## 2019-08-28 RX ADMIN — GABAPENTIN 100 MILLIGRAM(S): 400 CAPSULE ORAL at 18:39

## 2019-08-28 RX ADMIN — Medication 650 MILLIGRAM(S): at 18:39

## 2019-08-28 RX ADMIN — Medication 25 MILLIGRAM(S): at 15:00

## 2019-08-28 RX ADMIN — LEVETIRACETAM 1000 MILLIGRAM(S): 250 TABLET, FILM COATED ORAL at 18:39

## 2019-08-28 RX ADMIN — Medication 30 MILLILITER(S): at 20:24

## 2019-08-28 RX ADMIN — CHLORHEXIDINE GLUCONATE 1 APPLICATION(S): 213 SOLUTION TOPICAL at 12:16

## 2019-08-28 RX ADMIN — Medication 75 MILLIGRAM(S): at 22:52

## 2019-08-28 RX ADMIN — GABAPENTIN 100 MILLIGRAM(S): 400 CAPSULE ORAL at 05:14

## 2019-08-28 RX ADMIN — MYCOPHENOLIC ACID 360 MILLIGRAM(S): 180 TABLET, DELAYED RELEASE ORAL at 18:39

## 2019-08-28 RX ADMIN — LEVETIRACETAM 1000 MILLIGRAM(S): 250 TABLET, FILM COATED ORAL at 05:13

## 2019-08-28 RX ADMIN — MYCOPHENOLIC ACID 360 MILLIGRAM(S): 180 TABLET, DELAYED RELEASE ORAL at 05:14

## 2019-08-28 RX ADMIN — Medication 650 MILLIGRAM(S): at 05:15

## 2019-08-28 NOTE — CONSULT NOTE ADULT - SUBJECTIVE AND OBJECTIVE BOX
[facility]  Rishi Levin PA-C  [address] [place] [address]  [phone]      Chief Complaint:  Patient is a 74y old  Male who presents with a chief complaint of KAMRON over CKD in DDRT recipient, HTN urgency, fluid overload (28 Aug 2019 12:49)      HPI: 74 year old male with history of ESRD s/p DDRT x2 (, ?), DM, HTN, cardiomyopathy 2/2 cocaine abuse, Hepatitis C, 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 He states he has been having some dyspnea with exertion but no chest pain, fevers, chills, N/V/D/C, abdominal pain.  hes been having loose stool and family witnessed small amount of red blood within it  he states he had a colonoscopy 5 years ago (approximately) but does not recall where, he remembers having hemorrhoids     Allergies:  No Known Allergies      Medications:  allopurinol 100 milliGRAM(s) Oral daily  atorvastatin 40 milliGRAM(s) Oral at bedtime  chlorhexidine 2% Cloths 1 Application(s) Topical daily  darbepoetin Injectable ViaL 40 MICROGram(s) IV Push every week  dextrose 40% Gel 15 Gram(s) Oral once PRN  dextrose 5%. 1000 milliLiter(s) IV Continuous <Continuous>  dextrose 50% Injectable 12.5 Gram(s) IV Push once  dextrose 50% Injectable 25 Gram(s) IV Push once  dextrose 50% Injectable 25 Gram(s) IV Push once  gabapentin 100 milliGRAM(s) Oral two times a day  glucagon  Injectable 1 milliGRAM(s) IntraMuscular once PRN  hydrALAZINE 75 milliGRAM(s) Oral three times a day  insulin glargine Injectable (LANTUS) 10 Unit(s) SubCutaneous at bedtime  insulin lispro (HumaLOG) corrective regimen sliding scale   SubCutaneous three times a day before meals  insulin lispro (HumaLOG) corrective regimen sliding scale   SubCutaneous at bedtime  labetalol 300 milliGRAM(s) Oral two times a day  levETIRAcetam 1000 milliGRAM(s) Oral two times a day  mycophenolic acid  milliGRAM(s) Oral two times a day  predniSONE   Tablet 5 milliGRAM(s) Oral daily  sodium bicarbonate 650 milliGRAM(s) Oral two times a day  tamsulosin 0.4 milliGRAM(s) Oral at bedtime      PMHX/PSHX:  Kidney transplant recipient  Anuria  GERD (gastroesophageal reflux disease)  Kidney transplant failure  Hepatitis C  GERD (gastroesophageal reflux disease)  Renal transplant failure and rejection  Rectal abscess  IV drug abuse  HTN (hypertension)  Diverticulitis  ESRD on dialysis  Diabetes mellitus  BPH (Benign Prostatic Hyperplasia)  Cardiomyopathy  H/O kidney transplant  A-V fistula  S/p cadaver renal transplant  S/P partial colectomy      Family history:  Family history of breast cancer in sister (Sibling)  Family history of prostate cancer in father  Family history of lung cancer  Family history of hypertension in mother  Family history of diabetes mellitus      Social History:     ROS:     General:  No wt loss, fevers, chills, night sweats, fatigue,   Eyes:  Good vision, no reported pain  ENT:  No sore throat, pain, runny nose, dysphagia  CV:  No pain, palpitations, hypo/hypertension  Resp:  No dyspnea, cough, tachypnea, wheezing  GI:  No pain, No nausea, No vomiting, No diarrhea, No constipation, No weight loss, No fever, No pruritis, + rectal bleeding, No tarry stools, No dysphagia,  :  No pain, bleeding, incontinence, nocturia  Muscle:  No pain, weakness  Neuro:  No weakness, tingling, memory problems  Psych:  No fatigue, insomnia, mood problems, depression  Endocrine:  No polyuria, polydipsia, cold/heat intolerance  Heme:  No petechiae, ecchymosis, easy bruisability  Skin:  No rash, tattoos, scars, edema      PHYSICAL EXAM:   Vital Signs:  Vital Signs Last 24 Hrs  T(C): 36.8 (28 Aug 2019 15:00), Max: 37.4 (28 Aug 2019 05:00)  T(F): 98.2 (28 Aug 2019 15:00), Max: 99.3 (28 Aug 2019 05:00)  HR: 89 (28 Aug 2019 15:00) (77 - 97)  BP: 186/84 (28 Aug 2019 16:00) (169/83 - 198/99)  BP(mean): --  RR: 18 (28 Aug 2019 15:00) (18 - 20)  SpO2: 97% (28 Aug 2019 15:00) (93% - 98%)  Daily     Daily Weight in k.6 (28 Aug 2019 15:00)    GENERAL:  Appears stated age, well-groomed, well-nourished, no distress  HEENT:  NC/AT,  conjunctivae clear and pink, no thyromegaly, nodules, adenopathy, no JVD, sclera -anicteric  CHEST:  Full & symmetric excursion, no increased effort, breath sounds clear  HEART:  Regular rhythm, S1, S2, no murmur/rub/S3/S4, no abdominal bruit, no edema  ABDOMEN:  Soft, non-tender, non-distended, normoactive bowel sounds,  no masses ,no hepato-splenomegaly, no signs of chronic liver disease  EXTEREMITIES:  no cyanosis,clubbing or edema  SKIN:  No rash/erythema/ecchymoses/petechiae/wounds/abscess/warm/dry  NEURO:  Alert, oriented, no asterixis, no tremor, no encephalopathy    LABS:                        8.2    5.1   )-----------( 118      ( 28 Aug 2019 12:16 )             24.7         143  |  110<H>  |  46<H>  ----------------------------<  127<H>  4.3   |  21<L>  |  5.63<H>    Ca    9.5      28 Aug 2019 06:03  Phos  4.0       Mg     1.4         TPro  5.6<L>  /  Alb  2.6<L>  /  TBili  0.1<L>  /  DBili  x   /  AST  10  /  ALT  10  /  AlkPhos  72      LIVER FUNCTIONS - ( 27 Aug 2019 06:58 )  Alb: 2.6 g/dL / Pro: 5.6 g/dL / ALK PHOS: 72 U/L / ALT: 10 U/L / AST: 10 U/L / GGT: x             Urinalysis Basic - ( 26 Aug 2019 20:28 )    Color: Light Yellow / Appearance: Clear / S.017 / pH: x  Gluc: x / Ketone: Negative  / Bili: Negative / Urobili: Negative   Blood: x / Protein: >600 / Nitrite: Negative   Leuk Esterase: Negative / RBC: 13 /hpf / WBC 13 /HPF   Sq Epi: x / Non Sq Epi: 5 /hpf / Bacteria: Negative          Imaging:

## 2019-08-28 NOTE — PROGRESS NOTE ADULT - PROBLEM SELECTOR PLAN 1
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 admitted with fluid overload and HTN urgency . started on IHD on 8/27/19 . will do IHD again today - Qb/Qd 400/500, F160, 2K bath and target UF 1.5L

## 2019-08-28 NOTE — PROGRESS NOTE ADULT - PROBLEM SELECTOR PLAN 3
On Labetalol 300 mg po bid and Hydralazine 25 mg po tiD. If BP remains high , can increase Hydralazine to 50 mg po tid.

## 2019-08-28 NOTE — PROGRESS NOTE ADULT - SUBJECTIVE AND OBJECTIVE BOX
Patient is a 74y old  Male who presents with a chief complaint of KAMRON over CKD in DDRT recipient, HTN urgency, fluid overload (28 Aug 2019 12:49)                                                               INTERVAL HPI/OVERNIGHT EVENTS:    REVIEW OF SYSTEMS:     CONSTITUTIONAL: No weakness, fevers or chills  EYES/ENT: No visual changes , no ear ache   NECK: No pain or stiffness  RESPIRATORY: No cough, wheezing,  No shortness of breath  CARDIOVASCULAR: No chest pain or palpitations  GASTROINTESTINAL: No abdominal pain  . No nausea, vomiting, or hematemesis; No diarrhea or constipation. No melena or hematochezia.  GENITOURINARY: No dysuria, frequency or hematuria  NEUROLOGICAL: No numbness or weakness                                                                                                                                                                                                                                                                              Medications:  MEDICATIONS  (STANDING):  allopurinol 100 milliGRAM(s) Oral daily  atorvastatin 40 milliGRAM(s) Oral at bedtime  chlorhexidine 2% Cloths 1 Application(s) Topical daily  darbepoetin Injectable ViaL 40 MICROGram(s) IV Push every week  dextrose 5%. 1000 milliLiter(s) (50 mL/Hr) IV Continuous <Continuous>  dextrose 50% Injectable 12.5 Gram(s) IV Push once  dextrose 50% Injectable 25 Gram(s) IV Push once  dextrose 50% Injectable 25 Gram(s) IV Push once  gabapentin 100 milliGRAM(s) Oral two times a day  hydrALAZINE 25 milliGRAM(s) Oral three times a day  insulin glargine Injectable (LANTUS) 10 Unit(s) SubCutaneous at bedtime  insulin lispro (HumaLOG) corrective regimen sliding scale   SubCutaneous three times a day before meals  insulin lispro (HumaLOG) corrective regimen sliding scale   SubCutaneous at bedtime  labetalol 300 milliGRAM(s) Oral two times a day  levETIRAcetam 1000 milliGRAM(s) Oral two times a day  mycophenolic acid  milliGRAM(s) Oral two times a day  predniSONE   Tablet 5 milliGRAM(s) Oral daily  sodium bicarbonate 650 milliGRAM(s) Oral two times a day  tamsulosin 0.4 milliGRAM(s) Oral at bedtime    MEDICATIONS  (PRN):  dextrose 40% Gel 15 Gram(s) Oral once PRN Blood Glucose LESS THAN 70 milliGRAM(s)/deciliter  glucagon  Injectable 1 milliGRAM(s) IntraMuscular once PRN Glucose LESS THAN 70 milligrams/deciliter       Allergies    No Known Allergies    Intolerances      Vital Signs Last 24 Hrs  T(C): 36.8 (28 Aug 2019 15:00), Max: 37.4 (28 Aug 2019 05:00)  T(F): 98.2 (28 Aug 2019 15:00), Max: 99.3 (28 Aug 2019 05:00)  HR: 89 (28 Aug 2019 15:00) (77 - 97)  BP: 186/84 (28 Aug 2019 16:00) (169/83 - 198/99)  BP(mean): --  RR: 18 (28 Aug 2019 15:00) (18 - 20)  SpO2: 97% (28 Aug 2019 15:00) (93% - 98%)  CAPILLARY BLOOD GLUCOSE      POCT Blood Glucose.: 155 mg/dL (28 Aug 2019 12:01)  POCT Blood Glucose.: 123 mg/dL (28 Aug 2019 08:53)  POCT Blood Glucose.: 175 mg/dL (27 Aug 2019 22:03)  POCT Blood Glucose.: 152 mg/dL (27 Aug 2019 17:11)       @ : @ 07:00  --------------------------------------------------------  IN: 1575 mL / OUT: 3075 mL / NET: -1500 mL     @ 07: @ 17:00  --------------------------------------------------------  IN: 670 mL / OUT: 1050 mL / NET: -380 mL      Physical Exam:    Daily     Daily Weight in k.6 (28 Aug 2019 15:00)  General:  NAD   HEENT:  Nonicteric, PERRLA  CV:  RRR, S1S2   Lungs:  crackles at bases   Abdomen:  Soft, non-tender, no distended, positive BS  Extremities: edema  :  gutierrez  Neuro:  AAOx3, non-focal, grossly intact                                                                                                                                                                                                                                                                                                LABS:                               8.2    5.1   )-----------( 118      ( 28 Aug 2019 12:16 )             24.7                      08-28    143  |  110<H>  |  46<H>  ----------------------------<  127<H>  4.3   |  21<L>  |  5.63<H>    Ca    9.5      28 Aug 2019 06:03  Phos  4.0       Mg     1.4         TPro  5.6<L>  /  Alb  2.6<L>  /  TBili  0.1<L>  /  DBili  x   /  AST  10  /  ALT  10  /  AlkPhos  72

## 2019-08-28 NOTE — PROGRESS NOTE ADULT - ASSESSMENT
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   Pt just moved back to [place] from [place] .. reports that he has not been taking his meds for the past few days since " he might have misplaed them"     pt denies chest pain, syncope,fever, chills, cough, urinary symptoms.    in ED found  to have anemia   Nno acute changes on EKG   elevated CR and Trop    1- HTN urgency :uncontrolled   incaease hydralazine     2- KAMRON  :   cont matt  underwent HD  second session today    f/u with renal     3- DM:   A1c  5.6  Fs     4- CM: f/u  echo  and consult  cardio   pt reports he had a recent stress which was negative ?   lasix     5-  difficult ambulating :  no focal neuro deficits   neuro eval   consider imaging if no improvement       6- afib : hold AC .. consdier restarting   monitor H/H    7- hematochezia : per sister : on and off small amount of fresh blood in stool and some amount in urine but only small amounts   monitor H/H   consult GI ... heme input.      8-  renal transplant : f/u with Transplant team   cont meds with MMF and steroids   off Tac

## 2019-08-28 NOTE — PROGRESS NOTE ADULT - SUBJECTIVE AND OBJECTIVE BOX
[facility] DIVISION OF KIDNEY DISEASES AND HYPERTENSION -- FOLLOW UP NOTE  --------------------------------------------------------------------------------  Chief Complaint: fluid overlaod    24 hour events/subjective:  Patient was seen and examined at bedside  Underwent dialysis yesterday with no issues.   Scheduled for another session today     PAST HISTORY  --------------------------------------------------------------------------------  No significant changes to PMH, PSH, FHx, SHx, unless otherwise noted    ALLERGIES & MEDICATIONS  --------------------------------------------------------------------------------  Allergies    No Known Allergies    Intolerances      Standing Inpatient Medications  allopurinol 100 milliGRAM(s) Oral daily  atorvastatin 40 milliGRAM(s) Oral at bedtime  chlorhexidine 2% Cloths 1 Application(s) Topical daily  darbepoetin Injectable ViaL 40 MICROGram(s) IV Push every week  dextrose 5%. 1000 milliLiter(s) IV Continuous <Continuous>  dextrose 50% Injectable 12.5 Gram(s) IV Push once  dextrose 50% Injectable 25 Gram(s) IV Push once  dextrose 50% Injectable 25 Gram(s) IV Push once  gabapentin 100 milliGRAM(s) Oral two times a day  hydrALAZINE 25 milliGRAM(s) Oral three times a day  insulin glargine Injectable (LANTUS) 10 Unit(s) SubCutaneous at bedtime  insulin lispro (HumaLOG) corrective regimen sliding scale   SubCutaneous three times a day before meals  insulin lispro (HumaLOG) corrective regimen sliding scale   SubCutaneous at bedtime  labetalol 300 milliGRAM(s) Oral two times a day  levETIRAcetam 1000 milliGRAM(s) Oral two times a day  mycophenolic acid  milliGRAM(s) Oral two times a day  predniSONE   Tablet 5 milliGRAM(s) Oral daily  sodium bicarbonate 650 milliGRAM(s) Oral two times a day  tamsulosin 0.4 milliGRAM(s) Oral at bedtime    PRN Inpatient Medications  dextrose 40% Gel 15 Gram(s) Oral once PRN  glucagon  Injectable 1 milliGRAM(s) IntraMuscular once PRN      REVIEW OF SYSTEMS  --------------------------------------------------------------------------------  Gen: No fatigue, fevers/chills, weakness  Skin: No rashes  Head/Eyes/Ears/Mouth: No headache;No sore throat  Respiratory: No dyspnea, cough,   CV: No chest pain, PND, orthopnea  GI: No abdominal pain, diarrhea, constipation, nausea, vomiting  Transplant: No pain  : No increased frequency, dysuria, hematuria, nocturia  MSK: No joint pain/swelling; no back pain; no edema  Neuro: No dizziness/lightheadedness, weakness, seizures, numbness, tingling  Psych: No significant nervousness, anxiety, stress, depression    All other systems were reviewed and are negative, except as noted.    VITALS/PHYSICAL EXAM  --------------------------------------------------------------------------------  T(C): 37.4 (08-28-19 @ 05:00), Max: 37.4 (08-28-19 @ 05:00)  HR: 84 (08-28-19 @ 07:31) (77 - 97)  BP: 169/90 (08-28-19 @ 07:31) (169/83 - 219/98)  RR: 20 (08-28-19 @ 07:31) (18 - 20)  SpO2: 93% (08-28-19 @ 07:31) (93% - 98%)  Wt(kg): --  Height (cm): 187.96 (08-26-19 @ 14:29)  Weight (kg): 108.9 (08-26-19 @ 14:29)  BMI (kg/m2): 30.8 (08-26-19 @ 14:29)  BSA (m2): 2.35 (08-26-19 @ 14:29)      08-27-19 @ 07:01  -  08-28-19 @ 07:00  --------------------------------------------------------  IN: 1575 mL / OUT: 3075 mL / NET: -1500 mL    08-28-19 @ 07:01  -  08-28-19 @ 12:50  --------------------------------------------------------  IN: 395 mL / OUT: 350 mL / NET: 45 mL      Physical Exam:  	Gen: NAD  	HEENT: PERRL, supple neck, clear oropharynx  	Pulm: CTA B/L  	CV: RRR, S1S2; no rub  	Back: No spinal or CVA tenderness; no sacral edema  	Abd: +BS, soft, nontender/nondistended                      Transplant: No tenderness, swelling  	: No suprapubic tenderness  	UE: Warm, FROM; no edema; no asterixis  	LE: Warm, FROM; 1-2+ edema  	Neuro: No focal deficits  	Psych: Normal affect and mood  	Skin: Warm, without rashes      LABS/STUDIES  --------------------------------------------------------------------------------              8.4    4.6   >-----------<  111      [08-27-19 @ 12:17]              26.5     143  |  110  |  46  ----------------------------<  127      [08-28-19 @ 06:03]  4.3   |  21  |  5.63        Ca     9.5     [08-28-19 @ 06:03]      Mg     1.4     [08-28-19 @ 06:03]      Phos  4.0     [08-28-19 @ 06:03]    TPro  5.6  /  Alb  2.6  /  TBili  0.1  /  DBili  x   /  AST  10  /  ALT  10  /  AlkPhos  72  [08-27-19 @ 06:58]    PT/INR: PT 25.7 , INR 2.20       [08-26-19 @ 15:19]  PTT: 32.6       [08-26-19 @ 15:19]      Creatinine Trend:  SCr 5.63 [08-28 @ 06:03]  SCr 7.34 [08-27 @ 06:58]  SCr 7.02 [08-26 @ 15:19]    Tacrolimus (), Serum: <2.0 ng/mL (08-28 @ 08:32)  Tacrolimus (), Serum: <2.0 ng/mL (08-27 @ 08:01)            Urinalysis - [08-26-19 @ 20:28]      Color Light Yellow / Appearance Clear / SG 1.017 / pH 6.5      Gluc 200 mg/dL / Ketone Negative  / Bili Negative / Urobili Negative       Blood Moderate / Protein >600 / Leuk Est Negative / Nitrite Negative      RBC 13 / WBC 13 / Hyaline 4 / Gran  / Sq Epi  / Non Sq Epi 5 / Bacteria Negative      Iron 31, TIBC 121, %sat 26      [08-27-19 @ 15:07]  Ferritin 294      [08-27-19 @ 15:07]  PTH -- (Ca 9.1)      [08-27-19 @ 15:07]   1896  HbA1c 5.6      [08-27-19 @ 09:03]    HBsAb 19.4      [08-27-19 @ 15:26]  HBsAg Nonreact      [08-27-19 @ 15:26]  HCV 10.44, Reactive      [08-27-19 @ 08:29]

## 2019-08-28 NOTE — PROGRESS NOTE ADULT - REASON FOR ADMISSION
KAMRON over CKD in DDRT recipient, HTN urgency, fluid overload LE swelling and HTN urgency. Nephrology consulted for  KAMRON in a DDRT recipient, now with failed allograft and CKD stage 5

## 2019-08-28 NOTE — CHART NOTE - NSCHARTNOTEFT_GEN_A_CORE
__ late note entry___ __ late note entry___  Upon chard review noticed Provider contact note from this morning: Pt with 6 beats WCT on tele Hydralazine given, I had not been notified of the arrhythmias on tele, I was notified of the BP of 194/86 raffy hydralazine IVP was ordered and administered,  Tele reviewed with tele tech pt with 6 beats WCT at 2:55 am, non- sustained.   BMP/ Mag/Phose ordered  Bedside EKG preformed: no acute changes   case d/w             ** night summary events**   pt with SBP of 204/ __  around HR 20:00, rest of VSS.   Pt examined at a bedside, NAD, non-toxic appearing        General: AA&Ox3  Mood and affect appropriate for situation; Judgment and insight normal; Normal attention span and concentration                                                                                                                                                                                                                     HEENT: Head normocephalic and atraumatic;                                                                                                                                                                                                                       Neck: Trachea midline;                        Cardiovascular: RRR, no murmurs, rubs, gallops, or clicks; No lifts, heaves, or thrills felt on palpation; Carotid artery pulsations normal; No jugular vein distention; Abdominal aorta without bruit; Abdominal aorta normal on palpation; Renal arteries without bruits;                                                         Respiratory: Respiratory effort unremarkable; Respiratory rate and pattern normal; Lungs clear to auscultation bilaterally;                                                                                                                                                                                         Gastrointestinal: Flat; Normal bowel sounds; Abdomen soft and non- tenderness;                                              Neurologic: grossly intact     73 y/o male with PMHx of  ESRD s/p /p failed renal transplant 4 year ago and successful renal transplant 1 year ago,DM, HTN, cardiomyopathy 2/2 cocaine abuse, Hepatitis C, meningococcal meningitiss presenting with concerns about his kidney function, worsening SOB over the past 2 weeks and leg edema.   - 10 mg hydralazine IVP ordered and administered  - cont to monitor  - will endorse to primary team in AM __ late note entry___  Upon chard review noticed Provider contact note from this morning: Pt with 6 beats WCT on tele Hydralazine given, I had not been notified of the arrhythmias on tele, I was notified of the BP of 194/86 raffy hydralazine IVP was ordered and administered,  Tele reviewed with tele tech pt with 6 beats WCT at 2:55 am, non- sustained.   BMP/ Mag/Phose ordered: mag at 1.4 supplementation ordered over night will re-draw in AM   Bedside EKG preformed: no acute changes, NSR HR at 90 bmp significant for L heart hypertrophy    case d/w  RN manager             ** night summary events**   pt with SBP of 204/ 92  around Hr 20:00, rest of VSS.   Pt examined at a bedside, NAD, non-toxic appearing        General: AA&Ox3  Mood and affect appropriate for situation; Judgment and insight normal; Normal attention span and concentration                                                                                                                                                                                                                     HEENT: Head normocephalic and atraumatic;                                                                                                                                                                                                                       Neck: Trachea midline;                        Cardiovascular: RRR, no murmurs, rubs, gallops, or clicks; No lifts, heaves, or thrills felt on palpation; Carotid artery pulsations normal; No jugular vein distention; Abdominal aorta without bruit; Abdominal aorta normal on palpation; Renal arteries without bruits;                                                           Respiratory: Respiratory effort unremarkable; Respiratory rate and pattern normal; Lungs clear to auscultation bilaterally;                                                                                                                                                                                     Gastrointestinal: Flat; Normal bowel sounds; Abdomen soft and non- tenderness;                                              Neurologic: grossly intact     73 y/o male with PMHx of  ESRD s/p /p failed renal transplant 4 year ago and successful renal transplant 1 year ago,DM, HTN, cardiomyopathy 2/2 cocaine abuse, Hepatitis C, meningococcal meningitiss presenting with concerns about his kidney function, worsening SOB over the past 2 weeks and leg edema.   - 10 mg hydralazine IVP ordered and administered BP s/p hydralazine at 171/82   - cont to monitor  - will endorse to primary team in AM __ late note entry___  Upon chard review noticed Provider contact note from this morning: Pt with 6 beats WCT on tele Hydralazine given, I had not been notified of the arrhythmias on tele, I was notified of the BP of 194/86 raffy hydralazine IVP was ordered and administered,  Tele reviewed with tele tech pt with 6 beats WCT at 2:55 am, non- sustained.   BMP/ Mag/Phose ordered: mag at 1.4 supplementation ordered over night will re-draw in AM   Bedside EKG preformed: no acute changes, NSR HR at 90 bmp significant for L heart hypertrophy   Cardiology eval pending    case d/w  RN manager             ** night summary events**   pt with SBP of 204/ 92  around Hr 20:00, rest of VSS.   Pt examined at a bedside, NAD, non-toxic appearing        General: AA&Ox3  Mood and affect appropriate for situation; Judgment and insight normal; Normal attention span and concentration                                                                                                                                                                                                                     HEENT: Head normocephalic and atraumatic;                                                                                                                                                                                                                       Neck: Trachea midline;                        Cardiovascular: RRR, no murmurs, rubs, gallops, or clicks; No lifts, heaves, or thrills felt on palpation; Carotid artery pulsations normal; No jugular vein distention; Abdominal aorta without bruit; Abdominal aorta normal on palpation; Renal arteries without bruits;                                                           Respiratory: Respiratory effort unremarkable; Respiratory rate and pattern normal; Lungs clear to auscultation bilaterally;                                                                                                                                                                                     Gastrointestinal: Flat; Normal bowel sounds; Abdomen soft and non- tenderness;                                              Neurologic: grossly intact     75 y/o male with PMHx of  ESRD s/p /p failed renal transplant 4 year ago and successful renal transplant 1 year ago,DM, HTN, cardiomyopathy 2/2 cocaine abuse, Hepatitis C, meningococcal meningitiss presenting with concerns about his kidney function, worsening SOB over the past 2 weeks and leg edema.   - 10 mg hydralazine IVP ordered and administered BP s/p hydralazine at 171/82   - cont to monitor  - will endorse to primary team in AM

## 2019-08-28 NOTE — PROGRESS NOTE ADULT - PROBLEM SELECTOR PLAN 2
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 now with KAMRON over advanced CKD , started on dialysis since 8/27/19 failed allograft likely secondary to non compliance. now with KAMRON over CKD stage 4 now started on dialysis since 8/27/19

## 2019-08-28 NOTE — PROGRESS NOTE ADULT - ASSESSMENT
74 year old male with history of ESRD s/p DDRT x2 (2008, 2015?), DM, HTN, cardiomyopathy 2/2 cocaine abuse, Hepatitis C, meningococcal meningitis 1 year ago, who presents to the hospital with 1 week of worsening LE edema and "kidney failure." Nephrology consulted for KAMRON on CKD, DDRT recipient , LE swelling and HTN urgency 74 year old male with history of ESRD s/p DDRT x2 (2008, 2015?), DM, HTN, cardiomyopathy 2/2 cocaine abuse, Hepatitis C, meningococcal meningitis 1 year ago, who presents to the hospital with 1 week of worsening LE edema and "kidney failure." Nephrology consulted for KAMRON in a DDRT recipient, now with failed allograft and CKD stage 5 who presents with LE swelling and HTN urgency

## 2019-08-29 DIAGNOSIS — Z71.89 OTHER SPECIFIED COUNSELING: ICD-10-CM

## 2019-08-29 DIAGNOSIS — I11.9 HYPERTENSIVE HEART DISEASE WITHOUT HEART FAILURE: ICD-10-CM

## 2019-08-29 DIAGNOSIS — I50.31 ACUTE DIASTOLIC (CONGESTIVE) HEART FAILURE: ICD-10-CM

## 2019-08-29 DIAGNOSIS — N19 UNSPECIFIED KIDNEY FAILURE: ICD-10-CM

## 2019-08-29 LAB
ANION GAP SERPL CALC-SCNC: 12 MMOL/L — SIGNIFICANT CHANGE UP (ref 5–17)
BUN SERPL-MCNC: 36 MG/DL — HIGH (ref 7–23)
CALCIUM SERPL-MCNC: 10.3 MG/DL — SIGNIFICANT CHANGE UP (ref 8.4–10.5)
CHLORIDE SERPL-SCNC: 103 MMOL/L — SIGNIFICANT CHANGE UP (ref 96–108)
CO2 SERPL-SCNC: 24 MMOL/L — SIGNIFICANT CHANGE UP (ref 22–31)
CREAT SERPL-MCNC: 5.08 MG/DL — HIGH (ref 0.5–1.3)
GLUCOSE BLDC GLUCOMTR-MCNC: 131 MG/DL — HIGH (ref 70–99)
GLUCOSE BLDC GLUCOMTR-MCNC: 145 MG/DL — HIGH (ref 70–99)
GLUCOSE BLDC GLUCOMTR-MCNC: 162 MG/DL — HIGH (ref 70–99)
GLUCOSE SERPL-MCNC: 120 MG/DL — HIGH (ref 70–99)
HCT VFR BLD CALC: 25.1 % — LOW (ref 39–50)
HGB BLD-MCNC: 8.3 G/DL — LOW (ref 13–17)
INR BLD: 1.27 RATIO — HIGH (ref 0.88–1.16)
MAGNESIUM SERPL-MCNC: 1.9 MG/DL — SIGNIFICANT CHANGE UP (ref 1.6–2.6)
MCHC RBC-ENTMCNC: 27.6 PG — SIGNIFICANT CHANGE UP (ref 27–34)
MCHC RBC-ENTMCNC: 33.1 GM/DL — SIGNIFICANT CHANGE UP (ref 32–36)
MCV RBC AUTO: 83.5 FL — SIGNIFICANT CHANGE UP (ref 80–100)
PHOSPHATE SERPL-MCNC: 4.3 MG/DL — SIGNIFICANT CHANGE UP (ref 2.5–4.5)
PLATELET # BLD AUTO: 130 K/UL — LOW (ref 150–400)
POTASSIUM SERPL-MCNC: 3.8 MMOL/L — SIGNIFICANT CHANGE UP (ref 3.5–5.3)
POTASSIUM SERPL-SCNC: 3.8 MMOL/L — SIGNIFICANT CHANGE UP (ref 3.5–5.3)
PROTHROM AB SERPL-ACNC: 14.4 SEC — HIGH (ref 10–13.1)
RBC # BLD: 3.01 M/UL — LOW (ref 4.2–5.8)
RBC # FLD: 13.9 % — SIGNIFICANT CHANGE UP (ref 10.3–14.5)
SODIUM SERPL-SCNC: 139 MMOL/L — SIGNIFICANT CHANGE UP (ref 135–145)
TACROLIMUS SERPL-MCNC: <2 NG/ML — SIGNIFICANT CHANGE UP
WBC # BLD: 5.1 K/UL — SIGNIFICANT CHANGE UP (ref 3.8–10.5)
WBC # FLD AUTO: 5.1 K/UL — SIGNIFICANT CHANGE UP (ref 3.8–10.5)

## 2019-08-29 RX ORDER — CARVEDILOL PHOSPHATE 80 MG/1
12.5 CAPSULE, EXTENDED RELEASE ORAL EVERY 12 HOURS
Refills: 0 | Status: DISCONTINUED | OUTPATIENT
Start: 2019-08-29 | End: 2019-09-01

## 2019-08-29 RX ORDER — HYDRALAZINE HCL 50 MG
100 TABLET ORAL THREE TIMES A DAY
Refills: 0 | Status: DISCONTINUED | OUTPATIENT
Start: 2019-08-29 | End: 2019-09-06

## 2019-08-29 RX ORDER — FUROSEMIDE 40 MG
60 TABLET ORAL
Refills: 0 | Status: DISCONTINUED | OUTPATIENT
Start: 2019-08-29 | End: 2019-09-16

## 2019-08-29 RX ADMIN — Medication 5 MILLIGRAM(S): at 05:57

## 2019-08-29 RX ADMIN — Medication 60 MILLIGRAM(S): at 17:14

## 2019-08-29 RX ADMIN — GABAPENTIN 100 MILLIGRAM(S): 400 CAPSULE ORAL at 17:15

## 2019-08-29 RX ADMIN — Medication 100 MILLIGRAM(S): at 21:25

## 2019-08-29 RX ADMIN — Medication 100 MILLIGRAM(S): at 15:13

## 2019-08-29 RX ADMIN — MYCOPHENOLIC ACID 360 MILLIGRAM(S): 180 TABLET, DELAYED RELEASE ORAL at 18:06

## 2019-08-29 RX ADMIN — LEVETIRACETAM 1000 MILLIGRAM(S): 250 TABLET, FILM COATED ORAL at 05:57

## 2019-08-29 RX ADMIN — Medication 300 MILLIGRAM(S): at 17:15

## 2019-08-29 RX ADMIN — INSULIN GLARGINE 10 UNIT(S): 100 INJECTION, SOLUTION SUBCUTANEOUS at 21:25

## 2019-08-29 RX ADMIN — Medication 650 MILLIGRAM(S): at 17:15

## 2019-08-29 RX ADMIN — GABAPENTIN 100 MILLIGRAM(S): 400 CAPSULE ORAL at 05:57

## 2019-08-29 RX ADMIN — TAMSULOSIN HYDROCHLORIDE 0.4 MILLIGRAM(S): 0.4 CAPSULE ORAL at 21:25

## 2019-08-29 RX ADMIN — CARVEDILOL PHOSPHATE 12.5 MILLIGRAM(S): 80 CAPSULE, EXTENDED RELEASE ORAL at 18:06

## 2019-08-29 RX ADMIN — Medication 1: at 15:12

## 2019-08-29 RX ADMIN — ATORVASTATIN CALCIUM 40 MILLIGRAM(S): 80 TABLET, FILM COATED ORAL at 21:25

## 2019-08-29 RX ADMIN — Medication 300 MILLIGRAM(S): at 05:57

## 2019-08-29 RX ADMIN — Medication 650 MILLIGRAM(S): at 05:57

## 2019-08-29 RX ADMIN — LEVETIRACETAM 1000 MILLIGRAM(S): 250 TABLET, FILM COATED ORAL at 17:15

## 2019-08-29 RX ADMIN — MYCOPHENOLIC ACID 360 MILLIGRAM(S): 180 TABLET, DELAYED RELEASE ORAL at 05:57

## 2019-08-29 RX ADMIN — CHLORHEXIDINE GLUCONATE 1 APPLICATION(S): 213 SOLUTION TOPICAL at 12:28

## 2019-08-29 RX ADMIN — Medication 100 MILLIGRAM(S): at 12:28

## 2019-08-29 RX ADMIN — Medication 75 MILLIGRAM(S): at 05:59

## 2019-08-29 RX ADMIN — Medication 50 GRAM(S): at 00:42

## 2019-08-29 NOTE — PROGRESS NOTE ADULT - SUBJECTIVE AND OBJECTIVE BOX
INTERVAL HPI/OVERNIGHT EVENTS:    Pt seen and examined. No new overnight event.  No N/V/D.  Tolerating diet.  last bm yesterday was brown  no further brbpr     MEDICATIONS  (STANDING):  allopurinol 100 milliGRAM(s) Oral daily  atorvastatin 40 milliGRAM(s) Oral at bedtime  chlorhexidine 2% Cloths 1 Application(s) Topical daily  darbepoetin Injectable ViaL 40 MICROGram(s) IV Push every week  dextrose 5%. 1000 milliLiter(s) (50 mL/Hr) IV Continuous <Continuous>  dextrose 50% Injectable 12.5 Gram(s) IV Push once  dextrose 50% Injectable 25 Gram(s) IV Push once  dextrose 50% Injectable 25 Gram(s) IV Push once  gabapentin 100 milliGRAM(s) Oral two times a day  hydrALAZINE 75 milliGRAM(s) Oral three times a day  hydrocortisone hemorrhoidal Suppository 1 Suppository(s) Rectal at bedtime  insulin glargine Injectable (LANTUS) 10 Unit(s) SubCutaneous at bedtime  insulin lispro (HumaLOG) corrective regimen sliding scale   SubCutaneous three times a day before meals  insulin lispro (HumaLOG) corrective regimen sliding scale   SubCutaneous at bedtime  labetalol 300 milliGRAM(s) Oral two times a day  levETIRAcetam 1000 milliGRAM(s) Oral two times a day  mycophenolic acid  milliGRAM(s) Oral two times a day  predniSONE   Tablet 5 milliGRAM(s) Oral daily  sodium bicarbonate 650 milliGRAM(s) Oral two times a day  tamsulosin 0.4 milliGRAM(s) Oral at bedtime    MEDICATIONS  (PRN):  aluminum hydroxide/magnesium hydroxide/simethicone Suspension 30 milliLiter(s) Oral every 6 hours PRN Dyspepsia  dextrose 40% Gel 15 Gram(s) Oral once PRN Blood Glucose LESS THAN 70 milliGRAM(s)/deciliter  glucagon  Injectable 1 milliGRAM(s) IntraMuscular once PRN Glucose LESS THAN 70 milligrams/deciliter      Allergies    No Known Allergies    Intolerances        Review of Systems:    General:  No wt loss, fevers, chills, night sweats,fatigue,   Eyes:  Good vision, no reported pain  ENT:  No sore throat, pain, runny nose, dysphagia  CV:  No pain, palpitations, hypo/hypertension  Resp:  No dyspnea, cough, tachypnea, wheezing  GI:  No pain, No nausea, No vomiting, No diarrhea, No constipation, No weight loss, No fever, No pruritis, No rectal bleeding, No melena, No dysphagia  :  No pain, bleeding, incontinence, nocturia  Muscle:  No pain, weakness  Neuro:  No weakness, tingling, memory problems  Psych:  No fatigue, insomnia, mood problems, depression  Endocrine:  No polyuria, polydypsia, cold/heat intolerance  Heme:  No petechiae, ecchymosis, easy bruisability  Skin:  No rash, tattoos, scars, edema      Vital Signs Last 24 Hrs  T(C): 37.2 (29 Aug 2019 05:01), Max: 37.2 (29 Aug 2019 05:01)  T(F): 98.9 (29 Aug 2019 05:01), Max: 98.9 (29 Aug 2019 05:01)  HR: 87 (29 Aug 2019 05:01) (86 - 97)  BP: 165/73 (29 Aug 2019 05:01) (165/73 - 204/92)  BP(mean): --  RR: 18 (29 Aug 2019 05:01) (18 - 18)  SpO2: 97% (29 Aug 2019 05:01) (96% - 98%)    PHYSICAL EXAM:    Constitutional: NAD, well-developed  HEENT: EOMI, throat clear  Neck: No LAD, supple  Respiratory: CTA and P  Cardiovascular: S1 and S2, RRR, no M  Gastrointestinal: BS+, soft, NT/ND, neg HSM,  Extremities: No peripheral edema, neg clubing, cyanosis  Vascular: 2+ peripheral pulses  Neurological: A/O x 3, no focal deficits  Psychiatric: Normal mood, normal affect  Skin: No rashes      LABS:                        8.3    5.1   )-----------( 130      ( 29 Aug 2019 06:50 )             25.1     08-29    139  |  103  |  36<H>  ----------------------------<  120<H>  3.8   |  24  |  5.08<H>    Ca    10.3      29 Aug 2019 06:50  Phos  4.3     08-29  Mg     1.9     08-29      PT/INR - ( 29 Aug 2019 09:19 )   PT: 14.4 sec;   INR: 1.27 ratio               RADIOLOGY & ADDITIONAL TESTS:

## 2019-08-29 NOTE — CONSULT NOTE ADULT - SUBJECTIVE AND OBJECTIVE BOX
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 He was also told by his sister that he had bleeding in the past. States that he had a colonoscopy within last 5 yrs. No significant bleeding, though with rectal bleeding for a long time. Hgb noted to be 8s, plts 110-130s. Denies seeing heme in the past.     PAST MEDICAL & SURGICAL HISTORY:  Kidney transplant recipient  Anuria  GERD (gastroesophageal reflux disease)  Kidney transplant failure: dx: 2/2013  Hepatitis C: dx: 90&#x27;s.  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, no known MIs  H/O kidney transplant: may 2015  A-V fistula: Left, implanted (?)  S/p cadaver renal transplant: 2008  S/P partial colectomy: due to diverticulitis      FAMILY HISTORY:  Family history of breast cancer in sister (Sibling): living at 92 yo  Family history of prostate cancer in father  Family history of lung cancer  Family history of hypertension in mother  Family history of diabetes mellitus: mother      Alochol: Denied  Smoking: Nonsmoker  Drug Use: Denied  Marital Status:         Allergies    No Known Allergies    Intolerances        MEDICATIONS  (STANDING):  allopurinol 100 milliGRAM(s) Oral daily  atorvastatin 40 milliGRAM(s) Oral at bedtime  carvedilol 12.5 milliGRAM(s) Oral every 12 hours  chlorhexidine 2% Cloths 1 Application(s) Topical daily  darbepoetin Injectable ViaL 40 MICROGram(s) IV Push every week  dextrose 5%. 1000 milliLiter(s) (50 mL/Hr) IV Continuous <Continuous>  dextrose 50% Injectable 12.5 Gram(s) IV Push once  dextrose 50% Injectable 25 Gram(s) IV Push once  dextrose 50% Injectable 25 Gram(s) IV Push once  furosemide   Injectable 60 milliGRAM(s) IV Push two times a day  gabapentin 100 milliGRAM(s) Oral two times a day  hydrALAZINE 100 milliGRAM(s) Oral three times a day  hydrocortisone hemorrhoidal Suppository 1 Suppository(s) Rectal at bedtime  insulin glargine Injectable (LANTUS) 10 Unit(s) SubCutaneous at bedtime  insulin lispro (HumaLOG) corrective regimen sliding scale   SubCutaneous three times a day before meals  insulin lispro (HumaLOG) corrective regimen sliding scale   SubCutaneous at bedtime  labetalol 300 milliGRAM(s) Oral two times a day  levETIRAcetam 1000 milliGRAM(s) Oral two times a day  mycophenolic acid  milliGRAM(s) Oral two times a day  predniSONE   Tablet 5 milliGRAM(s) Oral daily  sodium bicarbonate 650 milliGRAM(s) Oral two times a day  tamsulosin 0.4 milliGRAM(s) Oral at bedtime    MEDICATIONS  (PRN):  aluminum hydroxide/magnesium hydroxide/simethicone Suspension 30 milliLiter(s) Oral every 6 hours PRN Dyspepsia  dextrose 40% Gel 15 Gram(s) Oral once PRN Blood Glucose LESS THAN 70 milliGRAM(s)/deciliter  glucagon  Injectable 1 milliGRAM(s) IntraMuscular once PRN Glucose LESS THAN 70 milligrams/deciliter      ROS  No fever, sweats, chills  No epistaxis, HA, sore throat  No CP, SOB, cough, sputum  No n/v/d, abd pain, melena, hematochezia  + edema  No rash  No anxiety  No back pain, joint pain  No bleeding, bruising  No dysuria, hematuria    T(C): 37.2 (08-29-19 @ 05:01), Max: 37.2 (08-29-19 @ 05:01)  HR: 82 (08-29-19 @ 17:23) (82 - 97)  BP: 168/81 (08-29-19 @ 17:23) (165/73 - 204/92)  RR: 18 (08-29-19 @ 05:01) (18 - 18)  SpO2: 97% (08-29-19 @ 05:01) (96% - 97%)  Wt(kg): --    PE  NAD  Awake, alert  Anicteric, MMM  RRR  CTAB  Abd soft, NT, ND  1+ pitting edema b/l LE  No rash grossly  FROM                          8.3    5.1   )-----------( 130      ( 29 Aug 2019 06:50 )             25.1       08-29    139  |  103  |  36<H>  ----------------------------<  120<H>  3.8   |  24  |  5.08<H>    Ca    10.3      29 Aug 2019 06:50  Phos  4.3     08-29  Mg     1.9     08-29

## 2019-08-29 NOTE — PROGRESS NOTE ADULT - SUBJECTIVE AND OBJECTIVE BOX
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 No nausea, vomiting, or hematemesis; No diarrhea or constipation. No melena or hematochezia.  GENITOURINARY: No dysuria, frequency or hematuria  NEUROLOGICAL: No numbness or weakness                                                                                                                                                                                                                                                                                 Medications:  MEDICATIONS  (STANDING):  allopurinol 100 milliGRAM(s) Oral daily  atorvastatin 40 milliGRAM(s) Oral at bedtime  carvedilol 12.5 milliGRAM(s) Oral every 12 hours  chlorhexidine 2% Cloths 1 Application(s) Topical daily  darbepoetin Injectable ViaL 40 MICROGram(s) IV Push every week  dextrose 5%. 1000 milliLiter(s) (50 mL/Hr) IV Continuous <Continuous>  dextrose 50% Injectable 12.5 Gram(s) IV Push once  dextrose 50% Injectable 25 Gram(s) IV Push once  dextrose 50% Injectable 25 Gram(s) IV Push once  furosemide   Injectable 60 milliGRAM(s) IV Push two times a day  gabapentin 100 milliGRAM(s) Oral two times a day  hydrALAZINE 100 milliGRAM(s) Oral three times a day  hydrocortisone hemorrhoidal Suppository 1 Suppository(s) Rectal at bedtime  insulin glargine Injectable (LANTUS) 10 Unit(s) SubCutaneous at bedtime  insulin lispro (HumaLOG) corrective regimen sliding scale   SubCutaneous three times a day before meals  insulin lispro (HumaLOG) corrective regimen sliding scale   SubCutaneous at bedtime  labetalol 300 milliGRAM(s) Oral two times a day  levETIRAcetam 1000 milliGRAM(s) Oral two times a day  mycophenolic acid  milliGRAM(s) Oral two times a day  predniSONE   Tablet 5 milliGRAM(s) Oral daily  sodium bicarbonate 650 milliGRAM(s) Oral two times a day  tamsulosin 0.4 milliGRAM(s) Oral at bedtime    MEDICATIONS  (PRN):  aluminum hydroxide/magnesium hydroxide/simethicone Suspension 30 milliLiter(s) Oral every 6 hours PRN Dyspepsia  dextrose 40% Gel 15 Gram(s) Oral once PRN Blood Glucose LESS THAN 70 milliGRAM(s)/deciliter  glucagon  Injectable 1 milliGRAM(s) IntraMuscular once PRN Glucose LESS THAN 70 milligrams/deciliter       Allergies    No Known Allergies    Intolerances      Vital Signs Last 24 Hrs  T(C): 37.2 (29 Aug 2019 05:01), Max: 37.2 (29 Aug 2019 05:01)  T(F): 98.9 (29 Aug 2019 05:01), Max: 98.9 (29 Aug 2019 05:01)  HR: 83 (29 Aug 2019 18:08) (82 - 87)  BP: 146/80 (29 Aug 2019 18:08) (146/80 - 204/92)  BP(mean): --  RR: 18 (29 Aug 2019 05:01) (18 - 18)  SpO2: 97% (29 Aug 2019 05:01) (96% - 97%)  CAPILLARY BLOOD GLUCOSE      POCT Blood Glucose.: 162 mg/dL (29 Aug 2019 15:10)  POCT Blood Glucose.: 145 mg/dL (29 Aug 2019 10:38)  POCT Blood Glucose.: 111 mg/dL (28 Aug 2019 22:18)  POCT Blood Glucose.: 140 mg/dL (28 Aug 2019 20:21)      08-28 @ 07:01 - 08-29 @ 07:00  --------------------------------------------------------  IN: 1390 mL / OUT: 1725 mL / NET: -335 mL    08-29 @ 07:01 - 08-29 @ 20:02  --------------------------------------------------------  IN: 920 mL / OUT: 950 mL / NET: -30 mL      Physical Exam:    General: NAD   HEENT:  Nonicteric, PERRLA  CV:  RRR, S1S2   Lungs: crackles at bases   Abdomen:  Soft, non-tender, no distended, positive BS  Extremities:  edema  :  gutierrez  Neuro:  AAOx3, non-focal, grossly intact                                                                                                                                                                                                                                                                                                LABS:                               8.3    5.1   )-----------( 130      ( 29 Aug 2019 06:50 )             25.1                      08-29    139  |  103  |  36<H>  ----------------------------<  120<H>  3.8   |  24  |  5.08<H>    Ca    10.3      29 Aug 2019 06:50  Phos  4.3     08-29  Mg     1.9     08-29

## 2019-08-29 NOTE — PROGRESS NOTE ADULT - PROBLEM SELECTOR PLAN 1
diet as tolerated  cbc daily  suspect bleeding to be hemorrhoidal, now resolved    will do trial of anusol supp  if bleeding persist would consider colonoscopy however patient is reluctant.

## 2019-08-29 NOTE — CONSULT NOTE ADULT - PROBLEM SELECTOR RECOMMENDATION 9
diet as tolerated  cbc daily  suspect bleeding to be hemorrhoidal   will do trial of anusol supp  if bleeding persist would consider colonoscopy however patient is reluctant
Add lasix 60 mg IV BID   Cont with HD  Needs much better HR control. Add Coreg 12.5 mg bid
Patient with renal transplant. Can hold tacrolimus for now however can continue Myfortic/Prednisone for now as there is no sign of infection. Check Tacrolimus level in the AM 30 minutes before AM dose would be given. Renal transplant shows elevated resistive indices which could be some amount of rejection or chronic kidney disease. No hydronephrosis present. Will need to contact nephrology from [facility] in AM for collateral information.

## 2019-08-29 NOTE — CONSULT NOTE ADULT - ASSESSMENT
73 y/o male with PMHx of  ESRD s/p /p failed renal transplant 4 year ago and successful renal transplant 1 year ago,DM, HTN, cardiomyopathy 2/2 cocaine abuse, Hepatitis C, meningococcal meningitiss presenting with concerns about his kidney function, worsening SOB over the past 2 weeks and leg edema, consulted for anemia and thrombocytopenia    1. anemia -- ddx wide, may be ACD at least. Fe adequate  -- check B12, folate, SPEP, LEONARDO, hapto  -- FOBT neg  -- monitor for now, goal hgb >7  -- rectal bleeding, GI eval noted    2. thrombocytopenia -- most likely related to h/o HCV, s/p transplant  -- adequate  -- labs as above, also check TSH    3. h/o HCV, HCV ab pos but PCR neg    4. renal -- transplant eval noted, renal eval noted  -- started on HD    Plan and impression d/w pt and primary team, will follow, [phone]

## 2019-08-29 NOTE — CONSULT NOTE ADULT - ASSESSMENT
75 yo male with hypertensive heart disease, ESRD s/p renal transplant admitted with shortness of breath secondary to acute diastolic heart failure and renal failure.

## 2019-08-29 NOTE — CONSULT NOTE ADULT - PROBLEM SELECTOR RECOMMENDATION 2
KAMRON possibly CKD from fluid overload and possibly from medication non-compliance. Would give 80 mg Lasix to help stimulate urine output and help LE swelling. Place Mclaughlin catheter in patient. Would also obtain Echo, send BNP and urinalysis, urine lytes. Hold spironolactone, hold tacrolimus for now and obtain level in the AM, hold PPI. Avoid nephrotoxic agents, renally dose all medications, maintain MAP > 70 however avoid hypertension as well.
ON HD   nephrology follow up
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
## 2019-08-29 NOTE — CONSULT NOTE ADULT - PROBLEM SELECTOR RECOMMENDATION 3
Patient with hypertensive urgency. Can restart home BP medications, however would hold spironolactone for now. Continue to monitor.
Increase hydralazine 100 mg TID   cont with rest of meds

## 2019-08-29 NOTE — CONSULT NOTE ADULT - SUBJECTIVE AND OBJECTIVE BOX
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       PAST MEDICAL & SURGICAL HISTORY:  Kidney transplant recipient  Anuria  GERD (gastroesophageal reflux disease)  Kidney transplant failure: dx: 2/2013  Hepatitis C: dx: 90&#x27;s.  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, no known MIs  H/O kidney transplant: may 2015  A-V fistula: Left, implanted (?)  S/p cadaver renal transplant: 2008  S/P partial colectomy: due to diverticulitis          MEDICATIONS:  hydrALAZINE 75 milliGRAM(s) Oral three times a day  labetalol 300 milliGRAM(s) Oral two times a day  tamsulosin 0.4 milliGRAM(s) Oral at bedtime        gabapentin 100 milliGRAM(s) Oral two times a day  levETIRAcetam 1000 milliGRAM(s) Oral two times a day    aluminum hydroxide/magnesium hydroxide/simethicone Suspension 30 milliLiter(s) Oral every 6 hours PRN    allopurinol 100 milliGRAM(s) Oral daily  atorvastatin 40 milliGRAM(s) Oral at bedtime  dextrose 40% Gel 15 Gram(s) Oral once PRN  dextrose 50% Injectable 12.5 Gram(s) IV Push once  dextrose 50% Injectable 25 Gram(s) IV Push once  dextrose 50% Injectable 25 Gram(s) IV Push once  glucagon  Injectable 1 milliGRAM(s) IntraMuscular once PRN  insulin glargine Injectable (LANTUS) 10 Unit(s) SubCutaneous at bedtime  insulin lispro (HumaLOG) corrective regimen sliding scale   SubCutaneous three times a day before meals  insulin lispro (HumaLOG) corrective regimen sliding scale   SubCutaneous at bedtime  predniSONE   Tablet 5 milliGRAM(s) Oral daily    chlorhexidine 2% Cloths 1 Application(s) Topical daily  darbepoetin Injectable ViaL 40 MICROGram(s) IV Push every week  dextrose 5%. 1000 milliLiter(s) IV Continuous <Continuous>  hydrocortisone hemorrhoidal Suppository 1 Suppository(s) Rectal at bedtime  mycophenolic acid  milliGRAM(s) Oral two times a day  sodium bicarbonate 650 milliGRAM(s) Oral two times a day      FAMILY HISTORY:  Family history of breast cancer in sister (Sibling): living at 94 yo  Family history of prostate cancer in father  Family history of lung cancer  Family history of hypertension in mother  Family history of diabetes mellitus: mother      SOCIAL HISTORY:    [ ] Non-smoker  [ ] Smoker  [ ] Alcohol    Allergies    No Known Allergies    Intolerances    	    REVIEW OF SYSTEMS:  CONSTITUTIONAL: No fever, weight loss,+ fatigue  EYES: No eye pain, visual disturbances, or discharge  ENMT:  No difficulty hearing, tinnitus, vertigo; No sinus or throat pain  NECK: No pain or stiffness  RESPIRATORY: No cough, wheezing, chills or hemoptysis; +Shortness of Breath  CARDIOVASCULAR: No chest pain, palpitations, passing out, dizziness, or leg swelling  GASTROINTESTINAL: No abdominal or epigastric pain. No nausea, vomiting, or hematemesis; No diarrhea or constipation. No melena or hematochezia.  GENITOURINARY: No dysuria, frequency, hematuria, or incontinence  NEUROLOGICAL: No headaches, memory loss, loss of strength, numbness, or tremors  SKIN: No itching, burning, rashes, or lesions   LYMPH Nodes: No enlarged glands  ENDOCRINE: No heat or cold intolerance; No hair loss  MUSCULOSKELETAL: No joint pain or swelling; No muscle, back, or extremity pain  PSYCHIATRIC: No depression, anxiety, mood swings, or difficulty sleeping  HEME/LYMPH: No easy bruising, or bleeding gums  ALLERY AND IMMUNOLOGIC: No hives or eczema	    [ ] All others negative	  [ ] Unable to obtain    PHYSICAL EXAM:  T(C): 37.2 (08-29-19 @ 05:01), Max: 37.2 (08-29-19 @ 05:01)  HR: 87 (08-29-19 @ 05:01) (86 - 97)  BP: 165/73 (08-29-19 @ 05:01) (165/73 - 204/92)  RR: 18 (08-29-19 @ 05:01) (18 - 18)  SpO2: 97% (08-29-19 @ 05:01) (96% - 98%)  Wt(kg): --  I&O's Summary    28 Aug 2019 07:01  -  29 Aug 2019 07:00  --------------------------------------------------------  IN: 1390 mL / OUT: 1725 mL / NET: -335 mL        Appearance: Normal	  HEENT:   Normal oral mucosa, PERRL, EOMI	  Lymphatic: No lymphadenopathy  Cardiovascular: Normal S1 S2, No JVD,+ murmurs  Respiratory: Lungs clear to auscultation	  Psychiatry: A & O x 3, Mood & affect appropriate  Gastrointestinal:  Soft, Non-tender, + BS	  Skin: No rashes, No ecchymoses, No cyanosis	  Neurologic: Non-focal  Extremities: Normal range of motion, No clubbing, cyanosis + edema  Vascular: Peripheral pulses palpable 2+ bilaterally    TELEMETRY: 	    ECG:  	NSR, LVH, non specific stt changes     < from: Transthoracic Echocardiogram (08.28.19 @ 16:12) >    Patient name: YONI INGRAM  YOB: 1945   Age: 74 (M)   MR#: [id number]  Study Date: 8/28/2019  [facility]  Study quality: Technically fair  Referring Physician: Odalys Hwang MD  Blood Pressure:168/84 mmHg  Height: 188 cm  Weight: 109 kg  BSA: 2.4 m2  Heart Rate: 91 mmHg  ------------------------------------------------------------------------  PROCEDURE: Transthoracic echocardiogram with 2-D, M-Mode  and complete spectral and color flow Doppler.  INDICATION: Dyspnea, unspecified (R06.00)  ------------------------------------------------------------------------  Dimensions:    Normal Values:  LA:     4.7    2.0 - 4.0 cm  Ao:     3.2    2.0 - 3.8 cm  SEPTUM: 1.4    0.6 - 1.2 cm  PWT:    1.4    0.6 - 1.1 cm  LVIDd:  4.5    3.0 - 5.6 cm  LVIDs:  2.8    1.8 - 4.0 cm  Derived variables:  LVMI: 106 g/m2  RWT: 0.62  Fractional short: 38 %  EF (Visual Estimate): 65-70 %  EF (Teicholtz): 68 %  Doppler Peak Velocity (m/sec): PV=1.6  ------------------------------------------------------------------------  Observations:  Mitral Valve: Mitral annular calcification and calcified  mitral leaflets with decreased diastolic opening. Mild  mitral regurgitation.  Aortic Valve/Aorta:  Aortic Root: 3.2 cm.  Left Atrium: Normal left atrium.  LA volume index = 20  cc/m2.  Left Ventricle: Normal left ventricular systolic function  with mid-cavitary obliteration.  Moderate to severe  concentric left ventricular hypertrophy. Normal diastolic  function  Right Heart: Normal right atrium. Normal right ventricular  size and systolic function. Tricuspid valve not well  visualized. Minimal tricuspid regurgitation. Pulmonic valve  not well visualized. Turbulkent blood flow across the  pulmonary valve but no signficant gradient.  Pericardium/Pleura: Small pericardial effusion.  Hemodynamic: Estimated right atrial pressure is 8 mm Hg.  Estimated right ventricular systolic pressure equals 21 mm  Hg, assuming right atrial pressure equals 8 mm Hg,  consistent with normal pulmonary pressures.  ------------------------------------------------------------------------  Conclusions:  1. Mitral annular calcification and calcified mitral  leaflets with decreased diastolic opening.  2. Moderate to severe concentric left ventricular  hypertrophy.  3. Normal left ventricular systolic function with  mid-cavitary obliteration.  4. Normal right ventricular size and systolic function.  5. Small pericardial effusion.  *** Compared with echocardiogram of 9/11/2015, no  significant changes noted.  ------------------------------------------------------------------------  Confirmed on  8/29/2019 - 01:39:20 by Rosmery Botello M.D.  ------------------------------------------------------------------------    < end of copied text >    RADIOLOGY:  OTHER: 	  	  LABS:	 	    CARDIAC MARKERS:    Troponin T, High Sensitivity (08.26.19 @ 21:09)    Troponin T, High Sensitivity Result: 195: Rapid upward or downward changes in high-sensitivity troponin levels  suggest acute myocardial injury. Renal impairment may cause sustained  troponin elevations.  Normal: <6 - 14 ng/L  Indeterminate: 15-51 ng/L  Elevated: > 51 ng/L  See http://labs/test/TROPTHS on the [employer] for more  information ng/L                                  8.3    5.1   )-----------( 130      ( 29 Aug 2019 06:50 )             25.1     08-29    139  |  103  |  36<H>  ----------------------------<  120<H>  3.8   |  24  |  5.08<H>    Ca    10.3      29 Aug 2019 06:50  Phos  4.3     08-29  Mg     1.9     08-29      proBNP:   Lipid Profile:   HgA1c:   TSH: CHIEF COMPLAINT:    SOB     HISTORY OF PRESENT ILLNESS:  73 y/o male with PMHx of  ESRD s/p /p failed renal transplant 4 year ago and successful renal transplant 1 year ago, DM, HTN, cardiomyopathy 2/2 cocaine abuse, Hepatitis C, meningococcal meningitis initially presented with concerns about his kidney function, worsening SOB over the past 2 weeks and leg edema.       PAST MEDICAL & SURGICAL HISTORY:  Kidney transplant recipient  Anuria  GERD (gastroesophageal reflux disease)  Kidney transplant failure: dx: 2/2013  Hepatitis C: dx: 90&#x27;s.  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, no known MIs  H/O kidney transplant: may 2015  A-V fistula: Left, implanted (?)  S/p cadaver renal transplant: 2008  S/P partial colectomy: due to diverticulitis          MEDICATIONS:  hydrALAZINE 75 milliGRAM(s) Oral three times a day  labetalol 300 milliGRAM(s) Oral two times a day  tamsulosin 0.4 milliGRAM(s) Oral at bedtime        gabapentin 100 milliGRAM(s) Oral two times a day  levETIRAcetam 1000 milliGRAM(s) Oral two times a day    aluminum hydroxide/magnesium hydroxide/simethicone Suspension 30 milliLiter(s) Oral every 6 hours PRN    allopurinol 100 milliGRAM(s) Oral daily  atorvastatin 40 milliGRAM(s) Oral at bedtime  dextrose 40% Gel 15 Gram(s) Oral once PRN  dextrose 50% Injectable 12.5 Gram(s) IV Push once  dextrose 50% Injectable 25 Gram(s) IV Push once  dextrose 50% Injectable 25 Gram(s) IV Push once  glucagon  Injectable 1 milliGRAM(s) IntraMuscular once PRN  insulin glargine Injectable (LANTUS) 10 Unit(s) SubCutaneous at bedtime  insulin lispro (HumaLOG) corrective regimen sliding scale   SubCutaneous three times a day before meals  insulin lispro (HumaLOG) corrective regimen sliding scale   SubCutaneous at bedtime  predniSONE   Tablet 5 milliGRAM(s) Oral daily    chlorhexidine 2% Cloths 1 Application(s) Topical daily  darbepoetin Injectable ViaL 40 MICROGram(s) IV Push every week  dextrose 5%. 1000 milliLiter(s) IV Continuous <Continuous>  hydrocortisone hemorrhoidal Suppository 1 Suppository(s) Rectal at bedtime  mycophenolic acid  milliGRAM(s) Oral two times a day  sodium bicarbonate 650 milliGRAM(s) Oral two times a day      FAMILY HISTORY:  Family history of breast cancer in sister (Sibling): living at 92 yo  Family history of prostate cancer in father  Family history of lung cancer  Family history of hypertension in mother  Family history of diabetes mellitus: mother      SOCIAL HISTORY:    [ ] Non-smoker  [ ] Smoker  [ ] Alcohol    Allergies    No Known Allergies    Intolerances    	    REVIEW OF SYSTEMS:  CONSTITUTIONAL: No fever, weight loss,+ fatigue  EYES: No eye pain, visual disturbances, or discharge  ENMT:  No difficulty hearing, tinnitus, vertigo; No sinus or throat pain  NECK: No pain or stiffness  RESPIRATORY: No cough, wheezing, chills or hemoptysis; +Shortness of Breath  CARDIOVASCULAR: No chest pain, palpitations, passing out, dizziness, or leg swelling  GASTROINTESTINAL: No abdominal or epigastric pain. No nausea, vomiting, or hematemesis; No diarrhea or constipation. No melena or hematochezia.  GENITOURINARY: No dysuria, frequency, hematuria, or incontinence  NEUROLOGICAL: No headaches, memory loss, loss of strength, numbness, or tremors  SKIN: No itching, burning, rashes, or lesions   LYMPH Nodes: No enlarged glands  ENDOCRINE: No heat or cold intolerance; No hair loss  MUSCULOSKELETAL: No joint pain or swelling; No muscle, back, or extremity pain  PSYCHIATRIC: No depression, anxiety, mood swings, or difficulty sleeping  HEME/LYMPH: No easy bruising, or bleeding gums  ALLERY AND IMMUNOLOGIC: No hives or eczema	    [ ] All others negative	  [ ] Unable to obtain    PHYSICAL EXAM:  T(C): 37.2 (08-29-19 @ 05:01), Max: 37.2 (08-29-19 @ 05:01)  HR: 87 (08-29-19 @ 05:01) (86 - 97)  BP: 165/73 (08-29-19 @ 05:01) (165/73 - 204/92)  RR: 18 (08-29-19 @ 05:01) (18 - 18)  SpO2: 97% (08-29-19 @ 05:01) (96% - 98%)  Wt(kg): --  I&O's Summary    28 Aug 2019 07:01  -  29 Aug 2019 07:00  --------------------------------------------------------  IN: 1390 mL / OUT: 1725 mL / NET: -335 mL        Appearance: NAD  HEENT:   Normal oral mucosa, PERRL, EOMI	  Lymphatic: No lymphadenopathy  Cardiovascular: Normal S1 S2, No JVD,+ murmurs  Respiratory: +crackles   Psychiatry: A & O x 3, Mood & affect appropriate  Gastrointestinal:  Soft, Non-tender, + BS	  Skin: No rashes, No ecchymoses, No cyanosis	  Neurologic: Non-focal  Extremities: Normal range of motion, No clubbing, cyanosis + edema  Vascular: Peripheral pulses palpable 2+ bilaterally    TELEMETRY: 	    ECG:  	NSR, LVH, non specific stt changes     < from: Transthoracic Echocardiogram (08.28.19 @ 16:12) >    Patient name: YONI INGRAM  YOB: 1945   Age: 74 (M)   MR#: [id number]  Study Date: 8/28/2019  [facility] Di Varg[facility] VIC  Study quality: Technically fair  Referring Physician: Odalys Hwang MD  Blood Pressure:168/84 mmHg  Height: 188 cm  Weight: 109 kg  BSA: 2.4 m2  Heart Rate: 91 mmHg  ------------------------------------------------------------------------  PROCEDURE: Transthoracic echocardiogram with 2-D, M-Mode  and complete spectral and color flow Doppler.  INDICATION: Dyspnea, unspecified (R06.00)  ------------------------------------------------------------------------  Dimensions:    Normal Values:  LA:     4.7    2.0 - 4.0 cm  Ao:     3.2    2.0 - 3.8 cm  SEPTUM: 1.4    0.6 - 1.2 cm  PWT:    1.4    0.6 - 1.1 cm  LVIDd:  4.5    3.0 - 5.6 cm  LVIDs:  2.8    1.8 - 4.0 cm  Derived variables:  LVMI: 106 g/m2  RWT: 0.62  Fractional short: 38 %  EF (Visual Estimate): 65-70 %  EF (Teicholtz): 68 %  Doppler Peak Velocity (m/sec): PV=1.6  ------------------------------------------------------------------------  Observations:  Mitral Valve: Mitral annular calcification and calcified  mitral leaflets with decreased diastolic opening. Mild  mitral regurgitation.  Aortic Valve/Aorta:  Aortic Root: 3.2 cm.  Left Atrium: Normal left atrium.  LA volume index = 20  cc/m2.  Left Ventricle: Normal left ventricular systolic function  with mid-cavitary obliteration.  Moderate to severe  concentric left ventricular hypertrophy. Normal diastolic  function  Right Heart: Normal right atrium. Normal right ventricular  size and systolic function. Tricuspid valve not well  visualized. Minimal tricuspid regurgitation. Pulmonic valve  not well visualized. Turbulkent blood flow across the  pulmonary valve but no signficant gradient.  Pericardium/Pleura: Small pericardial effusion.  Hemodynamic: Estimated right atrial pressure is 8 mm Hg.  Estimated right ventricular systolic pressure equals 21 mm  Hg, assuming right atrial pressure equals 8 mm Hg,  consistent with normal pulmonary pressures.  ------------------------------------------------------------------------  Conclusions:  1. Mitral annular calcification and calcified mitral  leaflets with decreased diastolic opening.  2. Moderate to severe concentric left ventricular  hypertrophy.  3. Normal left ventricular systolic function with  mid-cavitary obliteration.  4. Normal right ventricular size and systolic function.  5. Small pericardial effusion.  *** Compared with echocardiogram of 9/11/2015, no  significant changes noted.  ------------------------------------------------------------------------  Confirmed on  8/29/2019 - 01:39:20 by Rosmery Botello M.D.  ------------------------------------------------------------------------    < end of copied text >    RADIOLOGY:  < from: Xray Chest 1 View AP/PA (08.26.19 @ 16:28) >    EXAM:  XR CHEST AP OR PA 1V                            PROCEDURE DATE:  08/26/2019            INTERPRETATION:  CLINICAL INFORMATION: Shortness of breath.    TECHNIQUE: Frontal radiograph of the chest.    COMPARISON: Chest x-ray from 9/10/2015, 2/4/2015    FINDINGS:    Clear lungs No pleural effusions. Cardiomediastinal silhouette is   unremarkable. No acute osseous pathology.      IMPRESSION:     Clear lungs.                RICKEY SNYDER M.D., RADIOLOGY RESIDENT  This document has been electronically signed.  DEVORAH HOLM M.D., ATTENDING RADIOLOGIST  This document has been electronically signed. Aug 26 2019  4:42PM                < end of copied text >    OTHER: 	  	  LABS:	 	    CARDIAC MARKERS:    Troponin T, High Sensitivity (08.26.19 @ 21:09)    Troponin T, High Sensitivity Result: 195: Rapid upward or downward changes in high-sensitivity troponin levels  suggest acute myocardial injury. Renal impairment may cause sustained  troponin elevations.  Normal: <6 - 14 ng/L  Indeterminate: 15-51 ng/L  Elevated: > 51 ng/L  See http://labs/test/[email or web address] on the [facility] intranet for more  information ng/L                                  8.3    5.1   )-----------( 130      ( 29 Aug 2019 06:50 )             25.1     08-29    139  |  103  |  36<H>  ----------------------------<  120<H>  3.8   |  24  |  5.08<H>    Ca    10.3      29 Aug 2019 06:50  Phos  4.3     08-29  Mg     1.9     08-29      proBNP:   Lipid Profile:   HgA1c:   TSH:

## 2019-08-29 NOTE — PROGRESS NOTE ADULT - ASSESSMENT
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   Pt just moved back to [place] from [place] .. reports that he has not been taking his meds for the past few days since " he might have misplaed them"     pt denies chest pain, syncope,fever, chills, cough, urinary symptoms.    in ED found  to have anemia   Nno acute changes on EKG   elevated CR and Trop    1- HTN urgency :uncontrolled   now improving      2- KAMRON  :   cont matt  underwent HD  second session today    f/u with renal     3- DM:   A1c  5.6  Fs     4- CM:   Echo :  < from: Transthoracic Echocardiogram (08.28.19 @ 16:12) >  1. Mitral annular calcification and calcified mitral  leaflets with decreased diastolic opening.  2. Moderate to severe concentric left ventricular  hypertrophy.  3. Normal left ventricular systolic function with  mid-cavitary obliteration.  4. Normal right ventricular size and systolic function.  5. Small pericardial effusion.      fluid overload seems to be more sec to renal failure and less cardiac ?  cont lasix for now   cont fluid removal with HD per renal      5-  difficult ambulating :  no focal neuro deficits   neuro eval appreciated   will check CT T/L   discussed with Dr. Lindsey       6- afib : consider  restarting  coumadin   f/u with cardio     7- hematochezia : per sister : on and off small amount of fresh blood in stool and some amount in urine but only small amounts   monitor H/H   consult GI .: appreciate input :  suspect bleeding to be hemorrhoidal, now resolved    will do trial of anusol supp  if bleeding persist would consider colonoscopy however patient is reluctant.    heme input: appreciated  : f/u w/u     8-  renal transplant : f/u with Transplant team   cont meds with MMF and steroids   off Tac

## 2019-08-30 LAB
ANION GAP SERPL CALC-SCNC: 14 MMOL/L — SIGNIFICANT CHANGE UP (ref 5–17)
ANION GAP SERPL CALC-SCNC: 14 MMOL/L — SIGNIFICANT CHANGE UP (ref 5–17)
BUN SERPL-MCNC: 42 MG/DL — HIGH (ref 7–23)
BUN SERPL-MCNC: 42 MG/DL — HIGH (ref 7–23)
CALCIUM SERPL-MCNC: 10 MG/DL — SIGNIFICANT CHANGE UP (ref 8.4–10.5)
CALCIUM SERPL-MCNC: 10.1 MG/DL — SIGNIFICANT CHANGE UP (ref 8.4–10.5)
CHLORIDE SERPL-SCNC: 105 MMOL/L — SIGNIFICANT CHANGE UP (ref 96–108)
CHLORIDE SERPL-SCNC: 105 MMOL/L — SIGNIFICANT CHANGE UP (ref 96–108)
CO2 SERPL-SCNC: 22 MMOL/L — SIGNIFICANT CHANGE UP (ref 22–31)
CO2 SERPL-SCNC: 23 MMOL/L — SIGNIFICANT CHANGE UP (ref 22–31)
CREAT SERPL-MCNC: 5.64 MG/DL — HIGH (ref 0.5–1.3)
CREAT SERPL-MCNC: 5.65 MG/DL — HIGH (ref 0.5–1.3)
FOLATE SERPL-MCNC: 5.1 NG/ML — SIGNIFICANT CHANGE UP
GLUCOSE BLDC GLUCOMTR-MCNC: 107 MG/DL — HIGH (ref 70–99)
GLUCOSE BLDC GLUCOMTR-MCNC: 143 MG/DL — HIGH (ref 70–99)
GLUCOSE BLDC GLUCOMTR-MCNC: 147 MG/DL — HIGH (ref 70–99)
GLUCOSE BLDC GLUCOMTR-MCNC: 185 MG/DL — HIGH (ref 70–99)
GLUCOSE SERPL-MCNC: 108 MG/DL — HIGH (ref 70–99)
GLUCOSE SERPL-MCNC: 119 MG/DL — HIGH (ref 70–99)
HAPTOGLOB SERPL-MCNC: 173 MG/DL — SIGNIFICANT CHANGE UP (ref 34–200)
HCT VFR BLD CALC: 26.4 % — LOW (ref 39–50)
HGB BLD-MCNC: 8.1 G/DL — LOW (ref 13–17)
INR BLD: 1.13 RATIO — SIGNIFICANT CHANGE UP (ref 0.88–1.16)
MAGNESIUM SERPL-MCNC: 1.8 MG/DL — SIGNIFICANT CHANGE UP (ref 1.6–2.6)
MAGNESIUM SERPL-MCNC: 2.2 MG/DL — SIGNIFICANT CHANGE UP (ref 1.6–2.6)
MCHC RBC-ENTMCNC: 25.9 PG — LOW (ref 27–34)
MCHC RBC-ENTMCNC: 30.7 GM/DL — LOW (ref 32–36)
MCV RBC AUTO: 84.4 FL — SIGNIFICANT CHANGE UP (ref 80–100)
PHOSPHATE SERPL-MCNC: 4.7 MG/DL — HIGH (ref 2.5–4.5)
PHOSPHATE SERPL-MCNC: 5 MG/DL — HIGH (ref 2.5–4.5)
PLATELET # BLD AUTO: 137 K/UL — LOW (ref 150–400)
POTASSIUM SERPL-MCNC: 4 MMOL/L — SIGNIFICANT CHANGE UP (ref 3.5–5.3)
POTASSIUM SERPL-MCNC: 4.3 MMOL/L — SIGNIFICANT CHANGE UP (ref 3.5–5.3)
POTASSIUM SERPL-SCNC: 4 MMOL/L — SIGNIFICANT CHANGE UP (ref 3.5–5.3)
POTASSIUM SERPL-SCNC: 4.3 MMOL/L — SIGNIFICANT CHANGE UP (ref 3.5–5.3)
PROT SERPL-MCNC: 5.4 G/DL — LOW (ref 6–8.3)
PROT SERPL-MCNC: 5.4 G/DL — LOW (ref 6–8.3)
PROTHROM AB SERPL-ACNC: 13.1 SEC — SIGNIFICANT CHANGE UP (ref 10–13.1)
RBC # BLD: 3.13 M/UL — LOW (ref 4.2–5.8)
RBC # FLD: 13.9 % — SIGNIFICANT CHANGE UP (ref 10.3–14.5)
SODIUM SERPL-SCNC: 141 MMOL/L — SIGNIFICANT CHANGE UP (ref 135–145)
SODIUM SERPL-SCNC: 142 MMOL/L — SIGNIFICANT CHANGE UP (ref 135–145)
TSH SERPL-MCNC: 5.33 UIU/ML — HIGH (ref 0.27–4.2)
TSH SERPL-MCNC: 5.45 UIU/ML — HIGH (ref 0.27–4.2)
VIT B12 SERPL-MCNC: 330 PG/ML — SIGNIFICANT CHANGE UP (ref 232–1245)
WBC # BLD: 4.6 K/UL — SIGNIFICANT CHANGE UP (ref 3.8–10.5)
WBC # FLD AUTO: 4.6 K/UL — SIGNIFICANT CHANGE UP (ref 3.8–10.5)

## 2019-08-30 PROCEDURE — 72131 CT LUMBAR SPINE W/O DYE: CPT | Mod: 26

## 2019-08-30 PROCEDURE — 72128 CT CHEST SPINE W/O DYE: CPT | Mod: 26

## 2019-08-30 PROCEDURE — 99232 SBSQ HOSP IP/OBS MODERATE 35: CPT

## 2019-08-30 PROCEDURE — 93010 ELECTROCARDIOGRAM REPORT: CPT

## 2019-08-30 RX ORDER — WARFARIN SODIUM 2.5 MG/1
7.5 TABLET ORAL ONCE
Refills: 0 | Status: COMPLETED | OUTPATIENT
Start: 2019-08-30 | End: 2019-08-30

## 2019-08-30 RX ORDER — MAGNESIUM SULFATE 500 MG/ML
2 VIAL (ML) INJECTION ONCE
Refills: 0 | Status: COMPLETED | OUTPATIENT
Start: 2019-08-30 | End: 2019-08-30

## 2019-08-30 RX ORDER — ACETAMINOPHEN 500 MG
650 TABLET ORAL ONCE
Refills: 0 | Status: COMPLETED | OUTPATIENT
Start: 2019-08-30 | End: 2019-08-30

## 2019-08-30 RX ADMIN — Medication 650 MILLIGRAM(S): at 22:30

## 2019-08-30 RX ADMIN — GABAPENTIN 100 MILLIGRAM(S): 400 CAPSULE ORAL at 05:05

## 2019-08-30 RX ADMIN — Medication 1: at 10:52

## 2019-08-30 RX ADMIN — ATORVASTATIN CALCIUM 40 MILLIGRAM(S): 80 TABLET, FILM COATED ORAL at 21:51

## 2019-08-30 RX ADMIN — Medication 60 MILLIGRAM(S): at 18:51

## 2019-08-30 RX ADMIN — Medication 300 MILLIGRAM(S): at 05:05

## 2019-08-30 RX ADMIN — Medication 100 MILLIGRAM(S): at 14:02

## 2019-08-30 RX ADMIN — CHLORHEXIDINE GLUCONATE 1 APPLICATION(S): 213 SOLUTION TOPICAL at 14:04

## 2019-08-30 RX ADMIN — Medication 650 MILLIGRAM(S): at 21:52

## 2019-08-30 RX ADMIN — Medication 300 MILLIGRAM(S): at 18:52

## 2019-08-30 RX ADMIN — GABAPENTIN 100 MILLIGRAM(S): 400 CAPSULE ORAL at 18:51

## 2019-08-30 RX ADMIN — INSULIN GLARGINE 10 UNIT(S): 100 INJECTION, SOLUTION SUBCUTANEOUS at 21:51

## 2019-08-30 RX ADMIN — MYCOPHENOLIC ACID 360 MILLIGRAM(S): 180 TABLET, DELAYED RELEASE ORAL at 18:52

## 2019-08-30 RX ADMIN — Medication 100 MILLIGRAM(S): at 05:05

## 2019-08-30 RX ADMIN — Medication 60 MILLIGRAM(S): at 05:05

## 2019-08-30 RX ADMIN — MYCOPHENOLIC ACID 360 MILLIGRAM(S): 180 TABLET, DELAYED RELEASE ORAL at 05:05

## 2019-08-30 RX ADMIN — LEVETIRACETAM 1000 MILLIGRAM(S): 250 TABLET, FILM COATED ORAL at 18:52

## 2019-08-30 RX ADMIN — Medication 650 MILLIGRAM(S): at 05:05

## 2019-08-30 RX ADMIN — Medication 50 GRAM(S): at 00:59

## 2019-08-30 RX ADMIN — TAMSULOSIN HYDROCHLORIDE 0.4 MILLIGRAM(S): 0.4 CAPSULE ORAL at 21:51

## 2019-08-30 RX ADMIN — LEVETIRACETAM 1000 MILLIGRAM(S): 250 TABLET, FILM COATED ORAL at 05:05

## 2019-08-30 RX ADMIN — Medication 100 MILLIGRAM(S): at 21:51

## 2019-08-30 RX ADMIN — CARVEDILOL PHOSPHATE 12.5 MILLIGRAM(S): 80 CAPSULE, EXTENDED RELEASE ORAL at 05:05

## 2019-08-30 RX ADMIN — CARVEDILOL PHOSPHATE 12.5 MILLIGRAM(S): 80 CAPSULE, EXTENDED RELEASE ORAL at 18:52

## 2019-08-30 RX ADMIN — WARFARIN SODIUM 7.5 MILLIGRAM(S): 2.5 TABLET ORAL at 21:51

## 2019-08-30 RX ADMIN — Medication 650 MILLIGRAM(S): at 18:52

## 2019-08-30 RX ADMIN — Medication 5 MILLIGRAM(S): at 05:05

## 2019-08-30 NOTE — PROGRESS NOTE ADULT - SUBJECTIVE AND OBJECTIVE BOX
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 No nausea, vomiting, or hematemesis; No diarrhea or constipation. No melena or hematochezia.  GENITOURINARY: No dysuria, frequency or hematuria  NEUROLOGICAL: No numbness or weakness                                                                                                                                                                                                                                                                          Medications:  MEDICATIONS  (STANDING):  allopurinol 100 milliGRAM(s) Oral daily  atorvastatin 40 milliGRAM(s) Oral at bedtime  carvedilol 12.5 milliGRAM(s) Oral every 12 hours  chlorhexidine 2% Cloths 1 Application(s) Topical daily  darbepoetin Injectable ViaL 40 MICROGram(s) IV Push every week  dextrose 5%. 1000 milliLiter(s) (50 mL/Hr) IV Continuous <Continuous>  dextrose 50% Injectable 12.5 Gram(s) IV Push once  dextrose 50% Injectable 25 Gram(s) IV Push once  dextrose 50% Injectable 25 Gram(s) IV Push once  furosemide   Injectable 60 milliGRAM(s) IV Push two times a day  gabapentin 100 milliGRAM(s) Oral two times a day  hydrALAZINE 100 milliGRAM(s) Oral three times a day  hydrocortisone hemorrhoidal Suppository 1 Suppository(s) Rectal at bedtime  insulin glargine Injectable (LANTUS) 10 Unit(s) SubCutaneous at bedtime  insulin lispro (HumaLOG) corrective regimen sliding scale   SubCutaneous three times a day before meals  insulin lispro (HumaLOG) corrective regimen sliding scale   SubCutaneous at bedtime  labetalol 300 milliGRAM(s) Oral two times a day  levETIRAcetam 1000 milliGRAM(s) Oral two times a day  mycophenolic acid  milliGRAM(s) Oral two times a day  predniSONE   Tablet 5 milliGRAM(s) Oral daily  sodium bicarbonate 650 milliGRAM(s) Oral two times a day  tamsulosin 0.4 milliGRAM(s) Oral at bedtime    MEDICATIONS  (PRN):  aluminum hydroxide/magnesium hydroxide/simethicone Suspension 30 milliLiter(s) Oral every 6 hours PRN Dyspepsia  dextrose 40% Gel 15 Gram(s) Oral once PRN Blood Glucose LESS THAN 70 milliGRAM(s)/deciliter  glucagon  Injectable 1 milliGRAM(s) IntraMuscular once PRN Glucose LESS THAN 70 milligrams/deciliter       Allergies    No Known Allergies    Intolerances      Vital Signs Last 24 Hrs  T(C): 37 (30 Aug 2019 04:59), Max: 37.2 (29 Aug 2019 20:45)  T(F): 98.6 (30 Aug 2019 04:59), Max: 98.9 (29 Aug 2019 20:45)  HR: 79 (30 Aug 2019 04:59) (78 - 84)  BP: 165/75 (30 Aug 2019 04:59) (146/80 - 170/83)  BP(mean): --  RR: 18 (30 Aug 2019 04:59) (18 - 18)  SpO2: 94% (30 Aug 2019 04:59) (94% - 96%)  CAPILLARY BLOOD GLUCOSE      POCT Blood Glucose.: 147 mg/dL (30 Aug 2019 14:01)  POCT Blood Glucose.: 185 mg/dL (30 Aug 2019 10:51)  POCT Blood Glucose.: 131 mg/dL (29 Aug 2019 20:55)  POCT Blood Glucose.: 162 mg/dL (29 Aug 2019 15:10)      08-29 @ 07:01  -  08-30 @ 07:00  --------------------------------------------------------  IN: 1760 mL / OUT: 2250 mL / NET: -490 mL      Physical Exam:    Daily     Daily   General:  NAD   HEENT:  Nonicteric, PERRLA  CV:  RRR, S1S2   Lungs:  CTA  Abdomen:  Soft, non-tender, no distended, positive BS  Extremities:  edema  :  gutierrez  Neuro:  AAOx3, non-focal, grossly intact                                                                                                                                                                                                                                                                                                LABS:                               8.1    4.6   )-----------( 137      ( 30 Aug 2019 06:52 )             26.4                      08-30    141  |  105  |  42<H>  ----------------------------<  108<H>  4.0   |  22  |  5.64<H>    Ca    10.1      30 Aug 2019 06:52  Phos  5.0     08-30  Mg     2.2     08-30    TPro  5.4<L>  /  Alb  x   /  TBili  x   /  DBili  x   /  AST  x   /  ALT  x   /  AlkPhos  x   08-30

## 2019-08-30 NOTE — PROGRESS NOTE ADULT - SUBJECTIVE AND OBJECTIVE BOX
INTERVAL HPI/OVERNIGHT EVENTS:    Pt seen and examined. No new overnight event.  No N/V/D.  Tolerating diet.  last bm yesterday was brown  no further brbpr     MEDICATIONS  (STANDING):  allopurinol 100 milliGRAM(s) Oral daily  atorvastatin 40 milliGRAM(s) Oral at bedtime  carvedilol 12.5 milliGRAM(s) Oral every 12 hours  chlorhexidine 2% Cloths 1 Application(s) Topical daily  darbepoetin Injectable ViaL 40 MICROGram(s) IV Push every week  dextrose 5%. 1000 milliLiter(s) (50 mL/Hr) IV Continuous <Continuous>  dextrose 50% Injectable 12.5 Gram(s) IV Push once  dextrose 50% Injectable 25 Gram(s) IV Push once  dextrose 50% Injectable 25 Gram(s) IV Push once  furosemide   Injectable 60 milliGRAM(s) IV Push two times a day  gabapentin 100 milliGRAM(s) Oral two times a day  hydrALAZINE 100 milliGRAM(s) Oral three times a day  hydrocortisone hemorrhoidal Suppository 1 Suppository(s) Rectal at bedtime  insulin glargine Injectable (LANTUS) 10 Unit(s) SubCutaneous at bedtime  insulin lispro (HumaLOG) corrective regimen sliding scale   SubCutaneous three times a day before meals  insulin lispro (HumaLOG) corrective regimen sliding scale   SubCutaneous at bedtime  labetalol 300 milliGRAM(s) Oral two times a day  levETIRAcetam 1000 milliGRAM(s) Oral two times a day  mycophenolic acid  milliGRAM(s) Oral two times a day  predniSONE   Tablet 5 milliGRAM(s) Oral daily  sodium bicarbonate 650 milliGRAM(s) Oral two times a day  tamsulosin 0.4 milliGRAM(s) Oral at bedtime    MEDICATIONS  (PRN):  aluminum hydroxide/magnesium hydroxide/simethicone Suspension 30 milliLiter(s) Oral every 6 hours PRN Dyspepsia  dextrose 40% Gel 15 Gram(s) Oral once PRN Blood Glucose LESS THAN 70 milliGRAM(s)/deciliter  glucagon  Injectable 1 milliGRAM(s) IntraMuscular once PRN Glucose LESS THAN 70 milligrams/deciliter      Allergies    No Known Allergies    Intolerances        Review of Systems:    General:  No wt loss, fevers, chills, night sweats,fatigue,   Eyes:  Good vision, no reported pain  ENT:  No sore throat, pain, runny nose, dysphagia  CV:  No pain, palpitations, hypo/hypertension  Resp:  No dyspnea, cough, tachypnea, wheezing  GI:  No pain, No nausea, No vomiting, No diarrhea, No constipation, No weight loss, No fever, No pruritis, No rectal bleeding, No melena, No dysphagia  :  No pain, bleeding, incontinence, nocturia  Muscle:  No pain, weakness  Neuro:  No weakness, tingling, memory problems  Psych:  No fatigue, insomnia, mood problems, depression  Endocrine:  No polyuria, polydypsia, cold/heat intolerance  Heme:  No petechiae, ecchymosis, easy bruisability  Skin:  No rash, tattoos, scars, edema      Vital Signs Last 24 Hrs  T(C): 37 (30 Aug 2019 04:59), Max: 37.2 (29 Aug 2019 20:45)  T(F): 98.6 (30 Aug 2019 04:59), Max: 98.9 (29 Aug 2019 20:45)  HR: 79 (30 Aug 2019 04:59) (78 - 84)  BP: 165/75 (30 Aug 2019 04:59) (146/80 - 170/83)  BP(mean): --  RR: 18 (30 Aug 2019 04:59) (18 - 18)  SpO2: 94% (30 Aug 2019 04:59) (94% - 96%)    PHYSICAL EXAM:    Constitutional: NAD, well-developed  HEENT: EOMI, throat clear  Neck: No LAD, supple  Respiratory: CTA and P  Cardiovascular: S1 and S2, RRR, no M  Gastrointestinal: BS+, soft, NT/ND, neg HSM,  Extremities: No peripheral edema, neg clubing, cyanosis  Vascular: 2+ peripheral pulses  Neurological: A/O x 3, no focal deficits  Psychiatric: Normal mood, normal affect  Skin: No rashes      LABS:                        8.1    4.6   )-----------( 137      ( 30 Aug 2019 06:52 )             26.4     08-30    141  |  105  |  42<H>  ----------------------------<  108<H>  4.0   |  22  |  5.64<H>    Ca    10.1      30 Aug 2019 06:52  Phos  5.0     08-30  Mg     2.2     08-30      PT/INR - ( 30 Aug 2019 08:31 )   PT: 13.1 sec;   INR: 1.13 ratio               RADIOLOGY & ADDITIONAL TESTS:

## 2019-08-30 NOTE — PROGRESS NOTE ADULT - SUBJECTIVE AND OBJECTIVE BOX
[facility] DIVISION OF KIDNEY DISEASES AND HYPERTENSION -- FOLLOW UP NOTE  --------------------------------------------------------------------------------  Chief Complaint: fluid overload, HTN urgency     24 hour events/subjective:  Patient was seen and examined at bedside  c/o of swelling in both LE   Scheduled for IHD today     PAST HISTORY  --------------------------------------------------------------------------------  No significant changes to PMH, PSH, FHx, SHx, unless otherwise noted    ALLERGIES & MEDICATIONS  --------------------------------------------------------------------------------  Allergies    No Known Allergies    Intolerances  Standing Inpatient Medications  allopurinol 100 milliGRAM(s) Oral daily  atorvastatin 40 milliGRAM(s) Oral at bedtime  carvedilol 12.5 milliGRAM(s) Oral every 12 hours  chlorhexidine 2% Cloths 1 Application(s) Topical daily  darbepoetin Injectable ViaL 40 MICROGram(s) IV Push every week  dextrose 5%. 1000 milliLiter(s) IV Continuous <Continuous>  dextrose 50% Injectable 12.5 Gram(s) IV Push once  dextrose 50% Injectable 25 Gram(s) IV Push once  dextrose 50% Injectable 25 Gram(s) IV Push once  furosemide   Injectable 60 milliGRAM(s) IV Push two times a day  gabapentin 100 milliGRAM(s) Oral two times a day  hydrALAZINE 100 milliGRAM(s) Oral three times a day  hydrocortisone hemorrhoidal Suppository 1 Suppository(s) Rectal at bedtime  insulin glargine Injectable (LANTUS) 10 Unit(s) SubCutaneous at bedtime  insulin lispro (HumaLOG) corrective regimen sliding scale   SubCutaneous three times a day before meals  insulin lispro (HumaLOG) corrective regimen sliding scale   SubCutaneous at bedtime  labetalol 300 milliGRAM(s) Oral two times a day  levETIRAcetam 1000 milliGRAM(s) Oral two times a day  mycophenolic acid  milliGRAM(s) Oral two times a day  predniSONE   Tablet 5 milliGRAM(s) Oral daily  sodium bicarbonate 650 milliGRAM(s) Oral two times a day  tamsulosin 0.4 milliGRAM(s) Oral at bedtime    PRN Inpatient Medications  aluminum hydroxide/magnesium hydroxide/simethicone Suspension 30 milliLiter(s) Oral every 6 hours PRN  dextrose 40% Gel 15 Gram(s) Oral once PRN  glucagon  Injectable 1 milliGRAM(s) IntraMuscular once PRN      REVIEW OF SYSTEMS  --------------------------------------------------------------------------------  Gen: No fatigue, fevers/chills, weakness  Skin: No rashes  Head/Eyes/Ears/Mouth: No headache;No sore throat  Respiratory: No dyspnea, cough,   CV: No chest pain, PND, orthopnea  GI: No abdominal pain, diarrhea, constipation, nausea, vomiting  Transplant: No pain  : No increased frequency, dysuria, hematuria, nocturia  MSK: No joint pain/swelling; no back pain; positive for edema in b/l LE   Neuro: No dizziness/lightheadedness, weakness, seizures, numbness, tingling  Psych: No significant nervousness, anxiety, stress, depression    All other systems were reviewed and are negative, except as noted.    VITALS/PHYSICAL EXAM  --------------------------------------------------------------------------------  T(C): 37 (08-30-19 @ 04:59), Max: 37.2 (08-29-19 @ 20:45)  HR: 79 (08-30-19 @ 04:59) (78 - 84)  BP: 165/75 (08-30-19 @ 04:59) (146/80 - 170/83)  RR: 18 (08-30-19 @ 04:59) (18 - 18)  SpO2: 94% (08-30-19 @ 04:59) (94% - 96%)  Wt(kg): --      08-29-19 @ 07:01  -  08-30-19 @ 07:00  --------------------------------------------------------  IN: 1760 mL / OUT: 2250 mL / NET: -490 mL      Physical Exam:  	Gen: NAD  	HEENT: PERRL, supple neck, clear oropharynx  	Pulm: CTA B/L  	CV: RRR, S1S2; no rub  	Back: No spinal or CVA tenderness; no sacral edema  	Abd: +BS, soft, nontender/nondistended                      Transplant: No tenderness, swelling  	: No suprapubic tenderness  	UE: Warm, FROM; no edema; no asterixis  	LE: Warm, FROM; no edema  	Neuro: No focal deficits  	Psych: Normal affect and mood  	Skin: Warm, without rashes      LABS/STUDIES  --------------------------------------------------------------------------------              8.1    4.6   >-----------<  137      [08-30-19 @ 06:52]              26.4     141  |  105  |  42  ----------------------------<  108      [08-30-19 @ 06:52]  4.0   |  22  |  5.64        Ca     10.1     [08-30-19 @ 06:52]      Mg     2.2     [08-30-19 @ 06:52]      Phos  5.0     [08-30-19 @ 06:52]    TPro  5.4  /  Alb  x   /  TBili  x   /  DBili  x   /  AST  x   /  ALT  x   /  AlkPhos  x   [08-30-19 @ 09:37]    PT/INR: PT 13.1 , INR 1.13       [08-30-19 @ 08:31]      Creatinine Trend:  SCr 5.64 [08-30 @ 06:52]  SCr 5.65 [08-29 @ 23:36]  SCr 5.08 [08-29 @ 06:50]  SCr 4.67 [08-28 @ 21:17]  SCr 5.63 [08-28 @ 06:03]    Tacrolimus (), Serum: <2.0 ng/mL (08-29 @ 07:53)  Tacrolimus (), Serum: <2.0 ng/mL (08-28 @ 08:32)  Tacrolimus (), Serum: <2.0 ng/mL (08-27 @ 08:01)          Urinalysis - [08-26-19 @ 20:28]      Color Light Yellow / Appearance Clear / SG 1.017 / pH 6.5      Gluc 200 mg/dL / Ketone Negative  / Bili Negative / Urobili Negative       Blood Moderate / Protein >600 / Leuk Est Negative / Nitrite Negative      RBC 13 / WBC 13 / Hyaline 4 / Gran  / Sq Epi  / Non Sq Epi 5 / Bacteria Negative      Iron 31, TIBC 121, %sat 26      [08-27-19 @ 15:07]  Ferritin 294      [08-27-19 @ 15:07]  PTH -- (Ca 9.1)      [08-27-19 @ 15:07]   1896  HbA1c 5.6      [08-27-19 @ 09:03]  TSH 5.45      [08-30-19 @ 09:41]    HBsAb 19.4      [08-27-19 @ 15:26]  HBsAg Nonreact      [08-27-19 @ 15:26]  HCV 10.44, Reactive      [08-27-19 @ 08:29]

## 2019-08-30 NOTE — PROGRESS NOTE ADULT - ASSESSMENT
73 yo male with hypertensive heart disease, ESRD s/p renal transplant admitted with shortness of breath secondary to acute diastolic heart failure and renal failure.

## 2019-08-30 NOTE — PROGRESS NOTE ADULT - PROBLEM SELECTOR PLAN 3
On Labetalol 300 mg po bid and Hydralazine 100 mg po tid. Med list reviewed. He is also on Coreg 12.5 mg po bid , not sure why he is on 2 beta blockers ?

## 2019-08-30 NOTE — PROGRESS NOTE ADULT - PROBLEM SELECTOR PLAN 2
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 now with KAMRON over advanced CKD , started on dialysis since 8/27/19. will do IHD today. Continue Lasix 80 mg IV bid for optimization of volume status. 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 now with KAMRON over CKD stage 5  , now started on dialysis since 8/27/19. will do IHD today. Continue Lasix 80 mg IV bid for optimization of volume status.

## 2019-08-30 NOTE — PROGRESS NOTE ADULT - PROBLEM SELECTOR PLAN 1
diet as tolerated  cbc daily  suspect bleeding to be hemorrhoidal, now resolved    will do trial of anusol supp  if bleeding persist would consider colonoscopy however patient is reluctant

## 2019-08-30 NOTE — PROGRESS NOTE ADULT - ASSESSMENT
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   Pt just moved back to [place] from [place] .. reports that he has not been taking his meds for the past few days since " he might have misplaed them"     pt denies chest pain, syncope,fever, chills, cough, urinary symptoms.    in ED found  to have anemia   Nno acute changes on EKG   elevated CR and Trop    1- HTN urgency : now improving  bp    2- KAMRON  :   cont gutierrez  d/w   : pt will likely end up on HD in intermediate and likely long term given transplant graft failure   off tacro,  cont cellcept and sterioods       3- DM:   A1c  5.6  Fs     4- acute diastolic CHF sec to  HTN urgency and renal failure   Echo :  < from: Transthoracic Echocardiogram (08.28.19 @ 16:12) >  1. Mitral annular calcification and calcified mitral  leaflets with decreased diastolic opening.  2. Moderate to severe concentric left ventricular  hypertrophy.  3. Normal left ventricular systolic function with  mid-cavitary obliteration.  4. Normal right ventricular size and systolic function.  5. Small pericardial effusion.    cont lasix for now   cont fluid removal with HD per renal      5-  difficult ambulating :  no focal neuro deficits   neuro eval appreciated   will check CT T/L   discussed with Dr. Lindsey       6- afib : consider  restarting  coumadin  d/w  : will start and mnitor H/h   7- hematochezia : per sister : on and off small amount of fresh blood in stool and some amount in urine but only small amounts   monitor H/H   consult GI .: appreciate input :  suspect bleeding to be hemorrhoidal, now resolved     trial of anusol supp  if bleeding persist would consider colonoscopy however patient is reluctant.    heme input: appreciated    8-  renal transplant : f/u with Transplant team   cont meds with MMF and steroids   off Tac

## 2019-08-30 NOTE — PROGRESS NOTE ADULT - SUBJECTIVE AND OBJECTIVE BOX
Subjective: Patient seen and examined. No new events except as noted.     REVIEW OF SYSTEMS:    CONSTITUTIONAL: No weakness, fevers or chills  EYES/ENT: No visual changes;  No vertigo or throat pain   NECK: No pain or stiffness  RESPIRATORY: No cough, wheezing, hemoptysis; No shortness of breath  CARDIOVASCULAR: No chest pain or palpitations  GASTROINTESTINAL: No abdominal or epigastric pain. No nausea, vomiting, or hematemesis; No diarrhea or constipation. No melena or hematochezia.  GENITOURINARY: No dysuria, frequency or hematuria  NEUROLOGICAL: No numbness or weakness  SKIN: No itching, burning, rashes, or lesions   All other review of systems is negative unless indicated above.    MEDICATIONS:  MEDICATIONS  (STANDING):  allopurinol 100 milliGRAM(s) Oral daily  atorvastatin 40 milliGRAM(s) Oral at bedtime  carvedilol 12.5 milliGRAM(s) Oral every 12 hours  chlorhexidine 2% Cloths 1 Application(s) Topical daily  darbepoetin Injectable ViaL 40 MICROGram(s) IV Push every week  dextrose 5%. 1000 milliLiter(s) (50 mL/Hr) IV Continuous <Continuous>  dextrose 50% Injectable 12.5 Gram(s) IV Push once  dextrose 50% Injectable 25 Gram(s) IV Push once  dextrose 50% Injectable 25 Gram(s) IV Push once  furosemide   Injectable 60 milliGRAM(s) IV Push two times a day  gabapentin 100 milliGRAM(s) Oral two times a day  hydrALAZINE 100 milliGRAM(s) Oral three times a day  hydrocortisone hemorrhoidal Suppository 1 Suppository(s) Rectal at bedtime  insulin glargine Injectable (LANTUS) 10 Unit(s) SubCutaneous at bedtime  insulin lispro (HumaLOG) corrective regimen sliding scale   SubCutaneous three times a day before meals  insulin lispro (HumaLOG) corrective regimen sliding scale   SubCutaneous at bedtime  labetalol 300 milliGRAM(s) Oral two times a day  levETIRAcetam 1000 milliGRAM(s) Oral two times a day  mycophenolic acid  milliGRAM(s) Oral two times a day  predniSONE   Tablet 5 milliGRAM(s) Oral daily  sodium bicarbonate 650 milliGRAM(s) Oral two times a day  tamsulosin 0.4 milliGRAM(s) Oral at bedtime      PHYSICAL EXAM:  T(C): 37 (08-30-19 @ 04:59), Max: 37.2 (08-29-19 @ 20:45)  HR: 79 (08-30-19 @ 04:59) (78 - 84)  BP: 165/75 (08-30-19 @ 04:59) (146/80 - 170/83)  RR: 18 (08-30-19 @ 04:59) (18 - 18)  SpO2: 94% (08-30-19 @ 04:59) (94% - 96%)  Wt(kg): --  I&O's Summary    29 Aug 2019 07:01  -  30 Aug 2019 07:00  --------------------------------------------------------  IN: 1760 mL / OUT: 2250 mL / NET: -490 mL          Appearance: Normal	  HEENT:   Normal oral mucosa, PERRL, EOMI	  Lymphatic: No lymphadenopathy , no edema  Cardiovascular: Normal S1 S2, No JVD, No murmurs , Peripheral pulses palpable 2+ bilaterally  Respiratory: Lungs clear to auscultation, normal effort 	  Gastrointestinal:  Soft, Non-tender, + BS	  Skin: No rashes, No ecchymoses, No cyanosis, warm to touch  Musculoskeletal: Normal range of motion, normal strength  Psychiatry:  Mood & affect appropriate  Ext: No edema      LABS:    CARDIAC MARKERS:                                8.1    4.6   )-----------( 137      ( 30 Aug 2019 06:52 )             26.4     08-30    141  |  105  |  42<H>  ----------------------------<  108<H>  4.0   |  22  |  5.64<H>    Ca    10.1      30 Aug 2019 06:52  Phos  5.0     08-30  Mg     2.2     08-30    TPro  5.4<L>  /  Alb  x   /  TBili  x   /  DBili  x   /  AST  x   /  ALT  x   /  AlkPhos  x   08-30    proBNP:   Lipid Profile:   HgA1c:   TSH: Thyroid Stimulating Hormone, Serum: 5.45 uIU/mL (08-30 @ 09:41)  Thyroid Stimulating Hormone, Serum: 5.33 uIU/mL (08-30 @ 09:39)              TELEMETRY: 	    ECG:  	  RADIOLOGY:   DIAGNOSTIC TESTING:  [ ] Echocardiogram:  [ ]  Catheterization:  [ ] Stress Test:    OTHER:

## 2019-08-30 NOTE — PROGRESS NOTE ADULT - SUBJECTIVE AND OBJECTIVE BOX
Pt is seen and examined in dialysis  pt is awake and lying in bed   no complaints  no bleeding  platelets slightly up      PAST MEDICAL & SURGICAL HISTORY:  Kidney transplant recipient  Anuria  GERD (gastroesophageal reflux disease)  Kidney transplant failure: dx: 2/2013  Hepatitis C: dx: 90&#x27;s.  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 1000 milliLiter(s) (50 mL/Hr) IV Continuous <Continuous>  dextrose 50% Injectable 12.5 Gram(s) IV Push once  dextrose 50% Injectable 25 Gram(s) IV Push once  dextrose 50% Injectable 25 Gram(s) IV Push once  furosemide   Injectable 60 milliGRAM(s) IV Push two times a day  gabapentin 100 milliGRAM(s) Oral two times a day  hydrALAZINE 100 milliGRAM(s) Oral three times a day  hydrocortisone hemorrhoidal Suppository 1 Suppository(s) Rectal at bedtime  insulin glargine Injectable (LANTUS) 10 Unit(s) SubCutaneous at bedtime  insulin lispro (HumaLOG) corrective regimen sliding scale   SubCutaneous three times a day before meals  insulin lispro (HumaLOG) corrective regimen sliding scale   SubCutaneous at bedtime  labetalol 300 milliGRAM(s) Oral two times a day  levETIRAcetam 1000 milliGRAM(s) Oral two times a day  mycophenolic acid  milliGRAM(s) Oral two times a day  predniSONE   Tablet 5 milliGRAM(s) Oral daily  sodium bicarbonate 650 milliGRAM(s) Oral two times a day  tamsulosin 0.4 milliGRAM(s) Oral at bedtime  warfarin 7.5 milliGRAM(s) Oral once    MEDICATIONS  (PRN):  aluminum hydroxide/magnesium hydroxide/simethicone Suspension 30 milliLiter(s) Oral every 6 hours PRN Dyspepsia  dextrose 40% Gel 15 Gram(s) Oral once PRN Blood Glucose LESS THAN 70 milliGRAM(s)/deciliter  glucagon  Injectable 1 milliGRAM(s) IntraMuscular once PRN Glucose LESS THAN 70 milligrams/deciliter      Allergies    No Known Allergies    Intolerances        Vital Signs Last 24 Hrs  T(C): 37.2 (30 Aug 2019 14:46), Max: 37.2 (29 Aug 2019 20:45)  T(F): 98.9 (30 Aug 2019 14:46), Max: 98.9 (29 Aug 2019 20:45)  HR: 73 (30 Aug 2019 14:46) (73 - 83)  BP: 156/78 (30 Aug 2019 14:46) (146/80 - 168/81)  BP(mean): --  RR: 18 (30 Aug 2019 14:46) (18 - 18)  SpO2: 96% (30 Aug 2019 14:46) (94% - 96%)    PHYSICAL EXAM  General: adult in NAD  HEENT: clear oropharynx, anicteric sclera, pink conjunctiva  Neck: supple  CV: normal S1/S2 with no murmur rubs or gallops  Lungs: positive air movement b/l ant lungs,clear to auscultation, no wheezes, no rales  Abdomen: soft non-tender non-distended, no hepatosplenomegaly  Ext: no clubbing cyanosis or edema     LABS:                          8.1    4.6   )-----------( 137      ( 30 Aug 2019 06:52 )             26.4     Serial CBC's  08-30 @ 06:52  Hct-26.4 / Hgb-8.1 / Plat-137 / RBC-3.13 / WBC-4.6          Serial CBC's  08-29 @ 06:50  Hct-25.1 / Hgb-8.3 / Plat-130 / RBC-3.01 / WBC-5.1            08-30    141  |  105  |  42<H>  ----------------------------<  108<H>  4.0   |  22  |  5.64<H>    Ca    10.1      30 Aug 2019 06:52  Phos  5.0     08-30  Mg     2.2     08-30    TPro  5.4<L>  /  Alb  x   /  TBili  x   /  DBili  x   /  AST  x   /  ALT  x   /  AlkPhos  x   08-30      PT/INR - ( 30 Aug 2019 08:31 )   PT: 13.1 sec;   INR: 1.13 ratio             Vitamin B12, Serum: 330 pg/mL (08-30 @ 09:41)  Folate, Serum: 5.1 ng/mL (08-30 @ 09:41)            RADIOLOGY & ADDITIONAL STUDIES:

## 2019-08-30 NOTE — PROGRESS NOTE ADULT - ASSESSMENT
1. Anemia sec to CKD    -- H/H Stable, Low 8s  -- low normal  B12, folate. Would give B12 1000 mcg IM x 1 and PO FA 1mg QD  -- No hemolysis  -- Iron studies c/w Anemia of Chronic Inflammation  -- FOBT neg  -- transfuse prn hgb <7  -- rectal bleeding, GI suspects hemrhds  -- darbepoietin w HD    2. thrombocytopenia     -- platelets slightly higher  -- most likely related to h/o HCV, ? Mycophenolate contributing  -- adequate counts would not change meds   -- monitor for now    Gloria Yu MD  [phone]

## 2019-08-30 NOTE — PROGRESS NOTE ADULT - PROBLEM SELECTOR PLAN 1
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 admitted with fluid overload and HTN urgency . started on IHD on 8/27/19 .last session was 8/28/19 .  will do IHD again today - Qb/Qd 400/500, F160, 2K bath and target UF 2L.

## 2019-08-30 NOTE — CHART NOTE - NSCHARTNOTEFT_GEN_A_CORE
** night summary events**  Pt with 5 beats PAT with  on tele, not sustained, pt back to NSR  HR win 70s-80s on tele. VSS  pt examined at a bedside, NAD, nontoxic appearing,   pt denies cp, palpitations, weakness, dizziness, abd pain n/v/c/d, HA, back pain     GEN: NAD, non-toxic appearing  CV: RRR  resp: good air entry b/l  abd: soft, ND, NT,bs +  MS: + peripheral edema  neuro: grossly intact     73 y/o male with PMHx of  ESRD s/p /p failed renal transplant 4 year ago and successful renal transplant 1 year ago,DM, HTN, cardiomyopathy 2/2 cocaine abuse, Hepatitis C, meningococcal meningitiss presenting with concerns about his kidney function, worsening SOB over the past 2 weeks and leg edema.   pt now with 5 beats PTA over night pt asymptomatic, VSS, pt asymptomatic. back to NSR on tele   - BMP, Mag, phose drawn, Mag at 1.7 supplemented overnight  - bedside EKG: no acute changes   # cont to monitor  # cont tele  # will endorse to primary team in AM

## 2019-08-30 NOTE — PROGRESS NOTE ADULT - REASON FOR ADMISSION
KAMRON over CKD in DDRT recipient, HTN urgency, fluid overload LE swelling and HTN urgency  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

## 2019-08-31 LAB
ALBUMIN SERPL ELPH-MCNC: 3.1 G/DL — LOW (ref 3.3–5)
ALP SERPL-CCNC: 72 U/L — SIGNIFICANT CHANGE UP (ref 40–120)
ALT FLD-CCNC: 9 U/L — LOW (ref 10–45)
ANION GAP SERPL CALC-SCNC: 13 MMOL/L — SIGNIFICANT CHANGE UP (ref 5–17)
ANION GAP SERPL CALC-SCNC: 15 MMOL/L — SIGNIFICANT CHANGE UP (ref 5–17)
APTT BLD: 27.5 SEC — SIGNIFICANT CHANGE UP (ref 27.5–36.3)
APTT BLD: 27.7 SEC — SIGNIFICANT CHANGE UP (ref 27.5–36.3)
APTT BLD: 84.6 SEC — HIGH (ref 27.5–36.3)
AST SERPL-CCNC: 10 U/L — SIGNIFICANT CHANGE UP (ref 10–40)
BILIRUB SERPL-MCNC: 0.3 MG/DL — SIGNIFICANT CHANGE UP (ref 0.2–1.2)
BUN SERPL-MCNC: 31 MG/DL — HIGH (ref 7–23)
BUN SERPL-MCNC: 33 MG/DL — HIGH (ref 7–23)
CALCIUM SERPL-MCNC: 9.3 MG/DL — SIGNIFICANT CHANGE UP (ref 8.4–10.5)
CALCIUM SERPL-MCNC: 9.9 MG/DL — SIGNIFICANT CHANGE UP (ref 8.4–10.5)
CHLORIDE SERPL-SCNC: 100 MMOL/L — SIGNIFICANT CHANGE UP (ref 96–108)
CHLORIDE SERPL-SCNC: 99 MMOL/L — SIGNIFICANT CHANGE UP (ref 96–108)
CO2 SERPL-SCNC: 23 MMOL/L — SIGNIFICANT CHANGE UP (ref 22–31)
CO2 SERPL-SCNC: 25 MMOL/L — SIGNIFICANT CHANGE UP (ref 22–31)
CREAT SERPL-MCNC: 4.7 MG/DL — HIGH (ref 0.5–1.3)
CREAT SERPL-MCNC: 4.95 MG/DL — HIGH (ref 0.5–1.3)
GLUCOSE BLDC GLUCOMTR-MCNC: 115 MG/DL — HIGH (ref 70–99)
GLUCOSE BLDC GLUCOMTR-MCNC: 128 MG/DL — HIGH (ref 70–99)
GLUCOSE BLDC GLUCOMTR-MCNC: 152 MG/DL — HIGH (ref 70–99)
GLUCOSE BLDC GLUCOMTR-MCNC: 185 MG/DL — HIGH (ref 70–99)
GLUCOSE SERPL-MCNC: 106 MG/DL — HIGH (ref 70–99)
GLUCOSE SERPL-MCNC: 149 MG/DL — HIGH (ref 70–99)
HCT VFR BLD CALC: 25.2 % — LOW (ref 39–50)
HCT VFR BLD CALC: 25.8 % — LOW (ref 39–50)
HCT VFR BLD CALC: 27.7 % — LOW (ref 39–50)
HGB BLD-MCNC: 7.7 G/DL — LOW (ref 13–17)
HGB BLD-MCNC: 8.5 G/DL — LOW (ref 13–17)
HGB BLD-MCNC: 8.9 G/DL — LOW (ref 13–17)
INR BLD: 1.1 RATIO — SIGNIFICANT CHANGE UP (ref 0.88–1.16)
INR BLD: 1.13 RATIO — SIGNIFICANT CHANGE UP (ref 0.88–1.16)
MCHC RBC-ENTMCNC: 26.2 PG — LOW (ref 27–34)
MCHC RBC-ENTMCNC: 26.8 PG — LOW (ref 27–34)
MCHC RBC-ENTMCNC: 27.8 PG — SIGNIFICANT CHANGE UP (ref 27–34)
MCHC RBC-ENTMCNC: 30.6 GM/DL — LOW (ref 32–36)
MCHC RBC-ENTMCNC: 32 GM/DL — SIGNIFICANT CHANGE UP (ref 32–36)
MCHC RBC-ENTMCNC: 32.9 GM/DL — SIGNIFICANT CHANGE UP (ref 32–36)
MCV RBC AUTO: 84 FL — SIGNIFICANT CHANGE UP (ref 80–100)
MCV RBC AUTO: 84.3 FL — SIGNIFICANT CHANGE UP (ref 80–100)
MCV RBC AUTO: 85.7 FL — SIGNIFICANT CHANGE UP (ref 80–100)
PLATELET # BLD AUTO: 146 K/UL — LOW (ref 150–400)
PLATELET # BLD AUTO: 156 K/UL — SIGNIFICANT CHANGE UP (ref 150–400)
PLATELET # BLD AUTO: 160 K/UL — SIGNIFICANT CHANGE UP (ref 150–400)
POTASSIUM SERPL-MCNC: 3.6 MMOL/L — SIGNIFICANT CHANGE UP (ref 3.5–5.3)
POTASSIUM SERPL-MCNC: 4 MMOL/L — SIGNIFICANT CHANGE UP (ref 3.5–5.3)
POTASSIUM SERPL-SCNC: 3.6 MMOL/L — SIGNIFICANT CHANGE UP (ref 3.5–5.3)
POTASSIUM SERPL-SCNC: 4 MMOL/L — SIGNIFICANT CHANGE UP (ref 3.5–5.3)
PROT SERPL-MCNC: 6.1 G/DL — SIGNIFICANT CHANGE UP (ref 6–8.3)
PROTHROM AB SERPL-ACNC: 12.6 SEC — SIGNIFICANT CHANGE UP (ref 10–13.1)
PROTHROM AB SERPL-ACNC: 12.9 SEC — SIGNIFICANT CHANGE UP (ref 10–12.9)
RBC # BLD: 2.94 M/UL — LOW (ref 4.2–5.8)
RBC # BLD: 3.06 M/UL — LOW (ref 4.2–5.8)
RBC # BLD: 3.3 M/UL — LOW (ref 4.2–5.8)
RBC # FLD: 13.7 % — SIGNIFICANT CHANGE UP (ref 10.3–14.5)
RBC # FLD: 13.7 % — SIGNIFICANT CHANGE UP (ref 10.3–14.5)
RBC # FLD: 14.6 % — HIGH (ref 10.3–14.5)
SODIUM SERPL-SCNC: 136 MMOL/L — SIGNIFICANT CHANGE UP (ref 135–145)
SODIUM SERPL-SCNC: 139 MMOL/L — SIGNIFICANT CHANGE UP (ref 135–145)
WBC # BLD: 3.93 K/UL — SIGNIFICANT CHANGE UP (ref 3.8–10.5)
WBC # BLD: 5 K/UL — SIGNIFICANT CHANGE UP (ref 3.8–10.5)
WBC # BLD: 6.8 K/UL — SIGNIFICANT CHANGE UP (ref 3.8–10.5)
WBC # FLD AUTO: 3.93 K/UL — SIGNIFICANT CHANGE UP (ref 3.8–10.5)
WBC # FLD AUTO: 5 K/UL — SIGNIFICANT CHANGE UP (ref 3.8–10.5)
WBC # FLD AUTO: 6.8 K/UL — SIGNIFICANT CHANGE UP (ref 3.8–10.5)

## 2019-08-31 PROCEDURE — 70450 CT HEAD/BRAIN W/O DYE: CPT | Mod: 26

## 2019-08-31 RX ORDER — HEPARIN SODIUM 5000 [USP'U]/ML
4000 INJECTION INTRAVENOUS; SUBCUTANEOUS EVERY 6 HOURS
Refills: 0 | Status: DISCONTINUED | OUTPATIENT
Start: 2019-08-31 | End: 2019-09-04

## 2019-08-31 RX ORDER — FOLIC ACID 0.8 MG
1 TABLET ORAL DAILY
Refills: 0 | Status: DISCONTINUED | OUTPATIENT
Start: 2019-08-31 | End: 2019-09-24

## 2019-08-31 RX ORDER — HEPARIN SODIUM 5000 [USP'U]/ML
9000 INJECTION INTRAVENOUS; SUBCUTANEOUS ONCE
Refills: 0 | Status: COMPLETED | OUTPATIENT
Start: 2019-08-31 | End: 2019-08-31

## 2019-08-31 RX ORDER — HEPARIN SODIUM 5000 [USP'U]/ML
9000 INJECTION INTRAVENOUS; SUBCUTANEOUS EVERY 6 HOURS
Refills: 0 | Status: DISCONTINUED | OUTPATIENT
Start: 2019-08-31 | End: 2019-09-04

## 2019-08-31 RX ORDER — HEPARIN SODIUM 5000 [USP'U]/ML
INJECTION INTRAVENOUS; SUBCUTANEOUS
Qty: 25000 | Refills: 0 | Status: DISCONTINUED | OUTPATIENT
Start: 2019-08-31 | End: 2019-09-04

## 2019-08-31 RX ORDER — PREGABALIN 225 MG/1
1000 CAPSULE ORAL ONCE
Refills: 0 | Status: COMPLETED | OUTPATIENT
Start: 2019-08-31 | End: 2019-09-01

## 2019-08-31 RX ORDER — WARFARIN SODIUM 2.5 MG/1
7.5 TABLET ORAL ONCE
Refills: 0 | Status: COMPLETED | OUTPATIENT
Start: 2019-08-31 | End: 2019-08-31

## 2019-08-31 RX ADMIN — CARVEDILOL PHOSPHATE 12.5 MILLIGRAM(S): 80 CAPSULE, EXTENDED RELEASE ORAL at 06:33

## 2019-08-31 RX ADMIN — LEVETIRACETAM 1000 MILLIGRAM(S): 250 TABLET, FILM COATED ORAL at 06:34

## 2019-08-31 RX ADMIN — Medication 100 MILLIGRAM(S): at 06:33

## 2019-08-31 RX ADMIN — TAMSULOSIN HYDROCHLORIDE 0.4 MILLIGRAM(S): 0.4 CAPSULE ORAL at 22:19

## 2019-08-31 RX ADMIN — Medication 60 MILLIGRAM(S): at 06:33

## 2019-08-31 RX ADMIN — LEVETIRACETAM 1000 MILLIGRAM(S): 250 TABLET, FILM COATED ORAL at 17:37

## 2019-08-31 RX ADMIN — Medication 100 MILLIGRAM(S): at 14:32

## 2019-08-31 RX ADMIN — Medication 300 MILLIGRAM(S): at 06:34

## 2019-08-31 RX ADMIN — GABAPENTIN 100 MILLIGRAM(S): 400 CAPSULE ORAL at 17:38

## 2019-08-31 RX ADMIN — Medication 650 MILLIGRAM(S): at 17:36

## 2019-08-31 RX ADMIN — Medication 1: at 09:26

## 2019-08-31 RX ADMIN — Medication 100 MILLIGRAM(S): at 14:33

## 2019-08-31 RX ADMIN — GABAPENTIN 100 MILLIGRAM(S): 400 CAPSULE ORAL at 06:34

## 2019-08-31 RX ADMIN — MYCOPHENOLIC ACID 360 MILLIGRAM(S): 180 TABLET, DELAYED RELEASE ORAL at 17:39

## 2019-08-31 RX ADMIN — Medication 60 MILLIGRAM(S): at 17:35

## 2019-08-31 RX ADMIN — MYCOPHENOLIC ACID 360 MILLIGRAM(S): 180 TABLET, DELAYED RELEASE ORAL at 06:34

## 2019-08-31 RX ADMIN — CHLORHEXIDINE GLUCONATE 1 APPLICATION(S): 213 SOLUTION TOPICAL at 12:17

## 2019-08-31 RX ADMIN — CARVEDILOL PHOSPHATE 12.5 MILLIGRAM(S): 80 CAPSULE, EXTENDED RELEASE ORAL at 17:38

## 2019-08-31 RX ADMIN — HEPARIN SODIUM 1900 UNIT(S)/HR: 5000 INJECTION INTRAVENOUS; SUBCUTANEOUS at 18:35

## 2019-08-31 RX ADMIN — Medication 300 MILLIGRAM(S): at 17:38

## 2019-08-31 RX ADMIN — WARFARIN SODIUM 7.5 MILLIGRAM(S): 2.5 TABLET ORAL at 22:19

## 2019-08-31 RX ADMIN — Medication 30 MILLILITER(S): at 20:47

## 2019-08-31 RX ADMIN — Medication 100 MILLIGRAM(S): at 22:19

## 2019-08-31 RX ADMIN — Medication 5 MILLIGRAM(S): at 06:34

## 2019-08-31 RX ADMIN — HEPARIN SODIUM 9000 UNIT(S): 5000 INJECTION INTRAVENOUS; SUBCUTANEOUS at 11:00

## 2019-08-31 RX ADMIN — Medication 1: at 14:32

## 2019-08-31 RX ADMIN — HEPARIN SODIUM 1900 UNIT(S)/HR: 5000 INJECTION INTRAVENOUS; SUBCUTANEOUS at 10:56

## 2019-08-31 RX ADMIN — INSULIN GLARGINE 10 UNIT(S): 100 INJECTION, SOLUTION SUBCUTANEOUS at 22:20

## 2019-08-31 RX ADMIN — Medication 650 MILLIGRAM(S): at 06:34

## 2019-08-31 RX ADMIN — Medication 1 MILLIGRAM(S): at 17:38

## 2019-08-31 RX ADMIN — ATORVASTATIN CALCIUM 40 MILLIGRAM(S): 80 TABLET, FILM COATED ORAL at 22:19

## 2019-08-31 NOTE — PROGRESS NOTE ADULT - ASSESSMENT
1. Anemia sec to CKD    -- H/H in low 8s, slightly lower today  -- low normal  B12, folate. Would give B12 1000 mcg IM x 1 and PO FA 1mg QD  -- f/u MMA  -- No hemolysis  -- Iron studies c/w Anemia of Chronic Inflammation  -- FOBT neg  -- transfuse prn hgb <7  -- rectal bleeding, GI suspects hemrhds  -- darbepoietin w HD    2. thrombocytopenia     -- platelets nml today  -- most likely related to h/o HCV, ? Mycophenolate contributing  -- adequate counts would not change meds   -- monitor for now    3. LE edema -- pt's family was concerned about this but he is already on AC for afib, may be related to fluid overload and ESRD   -- mgmt per primary team and specialists, dopplers would not change mgmt    4. ? TIA -- per primary team and neuro    D/w pt, family by phone, and NP. Will follow, [phone]

## 2019-08-31 NOTE — PROVIDER CONTACT NOTE (CHANGE IN STATUS NOTIFICATION) - RECOMMENDATIONS
CODE STROKE called; Neurology came ordered CT Head w/o contrast. CBCs drawn. BMP drawn. Will continue to monitor.

## 2019-08-31 NOTE — PROGRESS NOTE ADULT - ASSESSMENT
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   Pt just moved back to [place] from [place] .. reports that he has not been taking his meds for the past few days since " he might have misplaed them"     pt denies chest pain, syncope,fever, chills, cough, urinary symptoms.    in ED found  to have anemia   Nno acute changes on EKG   elevated CR and Trop    1- HTN urgency : now improving  bp    2- KAMRON  :   cont gutierrez  d/w   : pt will likely end up on HD in intermediate and likely long term given transplant graft failure   off tacro,  cont cellcept and sterioods       3- DM:   A1c  5.6  Fs     4- acute diastolic CHF sec to  HTN urgency and renal failure   Echo :  < from: Transthoracic Echocardiogram (08.28.19 @ 16:12) >  1. Mitral annular calcification and calcified mitral  leaflets with decreased diastolic opening.  2. Moderate to severe concentric left ventricular  hypertrophy.  3. Normal left ventricular systolic function with  mid-cavitary obliteration.  4. Normal right ventricular size and systolic function.  5. Small pericardial effusion.    cont lasix for now   cont fluid removal with HD per renal      5-  difficult ambulating :  no focal neuro deficits   neuro eval appreciated   will check CT T/L   discussed with Dr. Lindsey       6- afib : cont AC with heaprin and coumadin     7- hematochezia : per sister : on and off small amount of fresh blood in stool and some amount in urine but only small amounts   monitor H/H   consult GI .: appreciate input :  suspect bleeding to be hemorrhoidal, now resolved     trial of anusol supp  if bleeding persist would consider colonoscopy however patient is reluctant.    heme input: appreciated        8-  renal transplant : f/u with Transplant team   cont meds with MMF and steroids   off Tac     9- TIA :  unable to perform MRI   CT no acute changes on CT   repeat   fu with neuro   AC restarted given hx of afib

## 2019-08-31 NOTE — PROVIDER CONTACT NOTE (CHANGE IN STATUS NOTIFICATION) - SITUATION
Patient became very lethargic and started to demonstrated slurred speech. Patient cannot express his thoughts properly.

## 2019-08-31 NOTE — CHART NOTE - NSCHARTNOTEFT_GEN_A_CORE
Pt S/P code stroke with Negative CT head and Neuro Dx of TIA with spontaneous resolution of neurological deficits back to patient's baseline over the course of about 1 hour.  Pt with PAF (currently in NSR on tele monitoring) Received Coumadin 7.5mg last night for an INR of 1.13. Today's INR result pending. No previous Heparin gtt to coumadin bridge due to recent GIB which GI-Dr Mitchell indicates is likely from hemorrhoidal bleeding. Pt declining colonoscopy at this time.  D/W Dr Hwang today's event with TIA in addition to PAF and yesterdays subtherapeutic INR. With consideration of risks vs benefits with recent GIB will start Heparin gtt for full anticoagulation bridge until therapeutic INR of 2-3 is achieved for stroke prevention.   D/W Dr Patricio Clancy from the [facility] team plan to start full A/C bridge with Heparin gtt, cleared for full A/C with Heparin bridge from Neurology standpoint.

## 2019-08-31 NOTE — PROGRESS NOTE ADULT - SUBJECTIVE AND OBJECTIVE BOX
Pt seen, no new complaints. Events today noted    MEDICATIONS  (STANDING):  allopurinol 100 milliGRAM(s) Oral daily  atorvastatin 40 milliGRAM(s) Oral at bedtime  carvedilol 12.5 milliGRAM(s) Oral every 12 hours  chlorhexidine 2% Cloths 1 Application(s) Topical daily  cyanocobalamin Injectable 1000 MICROGram(s) IntraMuscular once  darbepoetin Injectable ViaL 40 MICROGram(s) IV Push every week  dextrose 5%. 1000 milliLiter(s) (50 mL/Hr) IV Continuous <Continuous>  dextrose 50% Injectable 12.5 Gram(s) IV Push once  dextrose 50% Injectable 25 Gram(s) IV Push once  dextrose 50% Injectable 25 Gram(s) IV Push once  folic acid 1 milliGRAM(s) Oral daily  furosemide   Injectable 60 milliGRAM(s) IV Push two times a day  gabapentin 100 milliGRAM(s) Oral two times a day  heparin  Infusion.  Unit(s)/Hr (19 mL/Hr) IV Continuous <Continuous>  hydrALAZINE 100 milliGRAM(s) Oral three times a day  hydrocortisone hemorrhoidal Suppository 1 Suppository(s) Rectal at bedtime  insulin glargine Injectable (LANTUS) 10 Unit(s) SubCutaneous at bedtime  insulin lispro (HumaLOG) corrective regimen sliding scale   SubCutaneous three times a day before meals  insulin lispro (HumaLOG) corrective regimen sliding scale   SubCutaneous at bedtime  labetalol 300 milliGRAM(s) Oral two times a day  levETIRAcetam 1000 milliGRAM(s) Oral two times a day  mycophenolic acid  milliGRAM(s) Oral two times a day  predniSONE   Tablet 5 milliGRAM(s) Oral daily  sodium bicarbonate 650 milliGRAM(s) Oral two times a day  tamsulosin 0.4 milliGRAM(s) Oral at bedtime    MEDICATIONS  (PRN):  aluminum hydroxide/magnesium hydroxide/simethicone Suspension 30 milliLiter(s) Oral every 6 hours PRN Dyspepsia  dextrose 40% Gel 15 Gram(s) Oral once PRN Blood Glucose LESS THAN 70 milliGRAM(s)/deciliter  glucagon  Injectable 1 milliGRAM(s) IntraMuscular once PRN Glucose LESS THAN 70 milligrams/deciliter  heparin  Injectable 9000 Unit(s) IV Push every 6 hours PRN For aPTT less than 40  heparin  Injectable 4000 Unit(s) IV Push every 6 hours PRN For aPTT between 40 - 57      ROS  No fever, sweats, chills  No epistaxis, HA, sore throat  No CP, SOB, cough, sputum  No n/v/d, abd pain, melena, hematochezia  + edema  No rash  No anxiety  No back pain, joint pain  No bleeding, bruising  No dysuria, hematuria    Vital Signs Last 24 Hrs  T(C): 36.6 (31 Aug 2019 13:55), Max: 37.1 (30 Aug 2019 18:22)  T(F): 97.8 (31 Aug 2019 13:55), Max: 98.8 (30 Aug 2019 21:07)  HR: 84 (31 Aug 2019 13:55) (67 - 84)  BP: 152/65 (31 Aug 2019 13:55) (110/63 - 174/90)  BP(mean): --  RR: 20 (31 Aug 2019 13:55) (18 - 20)  SpO2: 97% (31 Aug 2019 13:55) (95% - 98%)    PE  NAD  Awake, alert  Anicteric, MMM  RRR  CTAB ant chest  Abd soft, NT, ND  2+ pitting edema b/l LE  No rash grossly  FROM                          7.7    3.93  )-----------( 160      ( 31 Aug 2019 10:17 )             25.2       08-31    139  |  99  |  33<H>  ----------------------------<  149<H>  4.0   |  25  |  4.95<H>    Ca    9.9      31 Aug 2019 10:17  Phos  5.0     08-30  Mg     2.2     08-30    TPro  6.1  /  Alb  3.1<L>  /  TBili  0.3  /  DBili  x   /  AST  10  /  ALT  9<L>  /  AlkPhos  72  08-31

## 2019-08-31 NOTE — PROGRESS NOTE ADULT - SUBJECTIVE AND OBJECTIVE BOX
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 (30 Aug 2019 12:22)                                                               INTERVAL HPI/OVERNIGHT EVENTS:    REVIEW OF SYSTEMS:     CONSTITUTIONAL: No weakness, fevers or chills  EYES/ENT: No visual changes , no ear ache   NECK: No pain or stiffness  RESPIRATORY: No cough, wheezing,  No shortness of breath  CARDIOVASCULAR: No chest pain or palpitations  GASTROINTESTINAL: No abdominal pain  . No nausea, vomiting, or hematemesis; No diarrhea or constipation. No melena or hematochezia.  GENITOURINARY: No dysuria, frequency or hematuria  NEUROLOGICAL: No numbness or weakness  SKIN: No itching, burning, rashes, or lesions                                                                                                                                                                                                                                                                                 Medications:  MEDICATIONS  (STANDING):  allopurinol 100 milliGRAM(s) Oral daily  atorvastatin 40 milliGRAM(s) Oral at bedtime  carvedilol 12.5 milliGRAM(s) Oral every 12 hours  chlorhexidine 2% Cloths 1 Application(s) Topical daily  cyanocobalamin Injectable 1000 MICROGram(s) IntraMuscular once  darbepoetin Injectable ViaL 40 MICROGram(s) IV Push every week  dextrose 5%. 1000 milliLiter(s) (50 mL/Hr) IV Continuous <Continuous>  dextrose 50% Injectable 12.5 Gram(s) IV Push once  dextrose 50% Injectable 25 Gram(s) IV Push once  dextrose 50% Injectable 25 Gram(s) IV Push once  folic acid 1 milliGRAM(s) Oral daily  furosemide   Injectable 60 milliGRAM(s) IV Push two times a day  gabapentin 100 milliGRAM(s) Oral two times a day  heparin  Infusion.  Unit(s)/Hr (19 mL/Hr) IV Continuous <Continuous>  hydrALAZINE 100 milliGRAM(s) Oral three times a day  hydrocortisone hemorrhoidal Suppository 1 Suppository(s) Rectal at bedtime  insulin glargine Injectable (LANTUS) 10 Unit(s) SubCutaneous at bedtime  insulin lispro (HumaLOG) corrective regimen sliding scale   SubCutaneous three times a day before meals  insulin lispro (HumaLOG) corrective regimen sliding scale   SubCutaneous at bedtime  labetalol 300 milliGRAM(s) Oral two times a day  levETIRAcetam 1000 milliGRAM(s) Oral two times a day  mycophenolic acid  milliGRAM(s) Oral two times a day  predniSONE   Tablet 5 milliGRAM(s) Oral daily  sodium bicarbonate 650 milliGRAM(s) Oral two times a day  tamsulosin 0.4 milliGRAM(s) Oral at bedtime    MEDICATIONS  (PRN):  aluminum hydroxide/magnesium hydroxide/simethicone Suspension 30 milliLiter(s) Oral every 6 hours PRN Dyspepsia  dextrose 40% Gel 15 Gram(s) Oral once PRN Blood Glucose LESS THAN 70 milliGRAM(s)/deciliter  glucagon  Injectable 1 milliGRAM(s) IntraMuscular once PRN Glucose LESS THAN 70 milligrams/deciliter  heparin  Injectable 9000 Unit(s) IV Push every 6 hours PRN For aPTT less than 40  heparin  Injectable 4000 Unit(s) IV Push every 6 hours PRN For aPTT between 40 - 57       Allergies    No Known Allergies    Intolerances      Vital Signs Last 24 Hrs  T(C): 37.3 (31 Aug 2019 20:47), Max: 37.3 (31 Aug 2019 20:47)  T(F): 99.1 (31 Aug 2019 20:47), Max: 99.1 (31 Aug 2019 20:47)  HR: 80 (31 Aug 2019 20:47) (67 - 85)  BP: 158/72 (31 Aug 2019 20:47) (110/63 - 158/72)  BP(mean): --  RR: 19 (31 Aug 2019 20:47) (18 - 20)  SpO2: 93% (31 Aug 2019 20:47) (93% - 97%)  CAPILLARY BLOOD GLUCOSE      POCT Blood Glucose.: 115 mg/dL (31 Aug 2019 21:37)  POCT Blood Glucose.: 128 mg/dL (31 Aug 2019 18:30)  POCT Blood Glucose.: 152 mg/dL (31 Aug 2019 14:19)  POCT Blood Glucose.: 185 mg/dL (31 Aug 2019 08:28)      08-30 @ 07:01 - 08-31 @ 07:00  --------------------------------------------------------  IN: 0 mL / OUT: 3100 mL / NET: -3100 mL    08-31 @ 07:01 - 08-31 @ 22:40  --------------------------------------------------------  IN: 120 mL / OUT: 1750 mL / NET: -1630 mL      Physical Exam:    Daily     Daily   General:  Well appearing, NAD, not cachetic  HEENT:  Nonicteric, PERRLA  CV:  RRR, S1S2   Lungs:  CTA B/L, no wheezes, rales, rhonchi  Abdomen:  Soft, non-tender, no distended, positive BS  Extremities:  2+ pulses, no c/c, no edema  Skin:  Warm and dry, no rashes  :  No gutierrez  Neuro:  AAOx3, non-focal, grossly intact                                                                                                                                                                                                                                                                                                LABS:                               8.5    5.0   )-----------( 146      ( 31 Aug 2019 17:24 )             25.8                      08-31    139  |  99  |  33<H>  ----------------------------<  149<H>  4.0   |  25  |  4.95<H>    Ca    9.9      31 Aug 2019 10:17  Phos  5.0     08-30  Mg     2.2     08-30    TPro  6.1  /  Alb  3.1<L>  /  TBili  0.3  /  DBili  x   /  AST  10  /  ALT  9<L>  /  AlkPhos  72  08-31                       RADIOLOGY & ADDITIONAL TESTS         I personally reviewed: [  ]EKG   [  ]CXR    [  ] CT      A/P:         Discussed with :     Marga consultants' Notes   Time spent : Patient is a 74y old  Male who presents with a chief complaint of LE swelling and HTN urgency  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 (30 Aug 2019 12:22)                                                               INTERVAL HPI/OVERNIGHT EVENTS:  events noted   code emeterio called   now he is back at hisbaseline     REVIEW OF SYSTEMS:     CONSTITUTIONAL: generalized weakness   RESPIRATORY: No cough, wheezing,  No shortness of breath  CARDIOVASCULAR: No chest pain or palpitations  GASTROINTESTINAL: No abdominal pain  . No nausea, vomiting, or hematemesis; No diarrhea or constipation. No melena or hematochezia.  GENITOURINARY: No dysuria, frequency or hematuria  NEUROLOGICAL: No numbness or weakness  no dysarthria                                                                                                                                                                                                                                                                                Medications:  MEDICATIONS  (STANDING):  allopurinol 100 milliGRAM(s) Oral daily  atorvastatin 40 milliGRAM(s) Oral at bedtime  carvedilol 12.5 milliGRAM(s) Oral every 12 hours  chlorhexidine 2% Cloths 1 Application(s) Topical daily  cyanocobalamin Injectable 1000 MICROGram(s) IntraMuscular once  darbepoetin Injectable ViaL 40 MICROGram(s) IV Push every week  dextrose 5%. 1000 milliLiter(s) (50 mL/Hr) IV Continuous <Continuous>  dextrose 50% Injectable 12.5 Gram(s) IV Push once  dextrose 50% Injectable 25 Gram(s) IV Push once  dextrose 50% Injectable 25 Gram(s) IV Push once  folic acid 1 milliGRAM(s) Oral daily  furosemide   Injectable 60 milliGRAM(s) IV Push two times a day  gabapentin 100 milliGRAM(s) Oral two times a day  heparin  Infusion.  Unit(s)/Hr (19 mL/Hr) IV Continuous <Continuous>  hydrALAZINE 100 milliGRAM(s) Oral three times a day  hydrocortisone hemorrhoidal Suppository 1 Suppository(s) Rectal at bedtime  insulin glargine Injectable (LANTUS) 10 Unit(s) SubCutaneous at bedtime  insulin lispro (HumaLOG) corrective regimen sliding scale   SubCutaneous three times a day before meals  insulin lispro (HumaLOG) corrective regimen sliding scale   SubCutaneous at bedtime  labetalol 300 milliGRAM(s) Oral two times a day  levETIRAcetam 1000 milliGRAM(s) Oral two times a day  mycophenolic acid  milliGRAM(s) Oral two times a day  predniSONE   Tablet 5 milliGRAM(s) Oral daily  sodium bicarbonate 650 milliGRAM(s) Oral two times a day  tamsulosin 0.4 milliGRAM(s) Oral at bedtime    MEDICATIONS  (PRN):  aluminum hydroxide/magnesium hydroxide/simethicone Suspension 30 milliLiter(s) Oral every 6 hours PRN Dyspepsia  dextrose 40% Gel 15 Gram(s) Oral once PRN Blood Glucose LESS THAN 70 milliGRAM(s)/deciliter  glucagon  Injectable 1 milliGRAM(s) IntraMuscular once PRN Glucose LESS THAN 70 milligrams/deciliter  heparin  Injectable 9000 Unit(s) IV Push every 6 hours PRN For aPTT less than 40  heparin  Injectable 4000 Unit(s) IV Push every 6 hours PRN For aPTT between 40 - 57       Allergies    No Known Allergies    Intolerances      Vital Signs Last 24 Hrs  T(C): 37.3 (31 Aug 2019 20:47), Max: 37.3 (31 Aug 2019 20:47)  T(F): 99.1 (31 Aug 2019 20:47), Max: 99.1 (31 Aug 2019 20:47)  HR: 80 (31 Aug 2019 20:47) (67 - 85)  BP: 158/72 (31 Aug 2019 20:47) (110/63 - 158/72)  BP(mean): --  RR: 19 (31 Aug 2019 20:47) (18 - 20)  SpO2: 93% (31 Aug 2019 20:47) (93% - 97%)  CAPILLARY BLOOD GLUCOSE      POCT Blood Glucose.: 115 mg/dL (31 Aug 2019 21:37)  POCT Blood Glucose.: 128 mg/dL (31 Aug 2019 18:30)  POCT Blood Glucose.: 152 mg/dL (31 Aug 2019 14:19)  POCT Blood Glucose.: 185 mg/dL (31 Aug 2019 08:28)      08-30 @ 07:01 - 08-31 @ 07:00  --------------------------------------------------------  IN: 0 mL / OUT: 3100 mL / NET: -3100 mL    08-31 @ 07:01 - 08-31 @ 22:40  --------------------------------------------------------  IN: 120 mL / OUT: 1750 mL / NET: -1630 mL      Physical Exam:   General:  NAD   HEENT:  Nonicteric, PERRLA  CV:  RRR, S1S2   Lungs:  decreased BS at abses   Abdomen:  Soft, non-tender, no distended, positive BS  Extremities:  edema  Skin:  Warm and dry, no rashes  :   matt  Neuro:  alert and oriented   no appreciable facial droop   4/5 all         LABS:                               8.5    5.0   )-----------( 146      ( 31 Aug 2019 17:24 )             25.8                      08-31    139  |  99  |  33<H>  ----------------------------<  149<H>  4.0   |  25  |  4.95<H>    Ca    9.9      31 Aug 2019 10:17  Phos  5.0     08-30  Mg     2.2     08-30    TPro  6.1  /  Alb  3.1<L>  /  TBili  0.3  /  DBili  x   /  AST  10  /  ALT  9<L>  /  AlkPhos  72  08-31

## 2019-09-01 DIAGNOSIS — I48.91 UNSPECIFIED ATRIAL FIBRILLATION: ICD-10-CM

## 2019-09-01 LAB
% ALBUMIN: 49.8 % — SIGNIFICANT CHANGE UP
% ALPHA 1: 5.6 % — SIGNIFICANT CHANGE UP
% ALPHA 2: 16.9 % — SIGNIFICANT CHANGE UP
% BETA: 8.3 % — SIGNIFICANT CHANGE UP
% GAMMA: 19.4 % — SIGNIFICANT CHANGE UP
% M SPIKE: 14.8 % — SIGNIFICANT CHANGE UP
ALBUMIN SERPL ELPH-MCNC: 2.7 G/DL — LOW (ref 3.6–5.5)
ALBUMIN/GLOB SERPL ELPH: 1 RATIO — SIGNIFICANT CHANGE UP
ALPHA1 GLOB SERPL ELPH-MCNC: 0.3 G/DL — SIGNIFICANT CHANGE UP (ref 0.1–0.4)
ALPHA2 GLOB SERPL ELPH-MCNC: 0.9 G/DL — SIGNIFICANT CHANGE UP (ref 0.5–1)
ANION GAP SERPL CALC-SCNC: 15 MMOL/L — SIGNIFICANT CHANGE UP (ref 5–17)
APTT BLD: 63.8 SEC — HIGH (ref 27.5–36.3)
APTT BLD: 66.3 SEC — HIGH (ref 27.5–36.3)
B-GLOBULIN SERPL ELPH-MCNC: 0.4 G/DL — LOW (ref 0.5–1)
BUN SERPL-MCNC: 43 MG/DL — HIGH (ref 7–23)
CALCIUM SERPL-MCNC: 9.9 MG/DL — SIGNIFICANT CHANGE UP (ref 8.4–10.5)
CHLORIDE SERPL-SCNC: 99 MMOL/L — SIGNIFICANT CHANGE UP (ref 96–108)
CO2 SERPL-SCNC: 23 MMOL/L — SIGNIFICANT CHANGE UP (ref 22–31)
CREAT SERPL-MCNC: 5.73 MG/DL — HIGH (ref 0.5–1.3)
GAMMA GLOBULIN: 1 G/DL — SIGNIFICANT CHANGE UP (ref 0.6–1.6)
GLUCOSE BLDC GLUCOMTR-MCNC: 125 MG/DL — HIGH (ref 70–99)
GLUCOSE BLDC GLUCOMTR-MCNC: 129 MG/DL — HIGH (ref 70–99)
GLUCOSE BLDC GLUCOMTR-MCNC: 139 MG/DL — HIGH (ref 70–99)
GLUCOSE BLDC GLUCOMTR-MCNC: 146 MG/DL — HIGH (ref 70–99)
GLUCOSE SERPL-MCNC: 155 MG/DL — HIGH (ref 70–99)
HCT VFR BLD CALC: 26.2 % — LOW (ref 39–50)
HGB BLD-MCNC: 8 G/DL — LOW (ref 13–17)
INR BLD: 1.26 RATIO — HIGH (ref 0.88–1.16)
INTERPRETATION SERPL IFE-IMP: SIGNIFICANT CHANGE UP
M-SPIKE: 0.8 G/DL — HIGH (ref 0–0)
MAGNESIUM SERPL-MCNC: 2 MG/DL — SIGNIFICANT CHANGE UP (ref 1.6–2.6)
MCHC RBC-ENTMCNC: 26.5 PG — LOW (ref 27–34)
MCHC RBC-ENTMCNC: 30.5 GM/DL — LOW (ref 32–36)
MCV RBC AUTO: 86.8 FL — SIGNIFICANT CHANGE UP (ref 80–100)
PLATELET # BLD AUTO: 146 K/UL — LOW (ref 150–400)
POTASSIUM SERPL-MCNC: 3.6 MMOL/L — SIGNIFICANT CHANGE UP (ref 3.5–5.3)
POTASSIUM SERPL-SCNC: 3.6 MMOL/L — SIGNIFICANT CHANGE UP (ref 3.5–5.3)
PROT PATTERN SERPL ELPH-IMP: SIGNIFICANT CHANGE UP
PROTHROM AB SERPL-ACNC: 14.6 SEC — HIGH (ref 10–13.1)
RBC # BLD: 3.02 M/UL — LOW (ref 4.2–5.8)
RBC # FLD: 14.4 % — SIGNIFICANT CHANGE UP (ref 10.3–14.5)
SODIUM SERPL-SCNC: 137 MMOL/L — SIGNIFICANT CHANGE UP (ref 135–145)
WBC # BLD: 4.21 K/UL — SIGNIFICANT CHANGE UP (ref 3.8–10.5)
WBC # FLD AUTO: 4.21 K/UL — SIGNIFICANT CHANGE UP (ref 3.8–10.5)

## 2019-09-01 PROCEDURE — 99233 SBSQ HOSP IP/OBS HIGH 50: CPT | Mod: GC

## 2019-09-01 PROCEDURE — 99232 SBSQ HOSP IP/OBS MODERATE 35: CPT

## 2019-09-01 PROCEDURE — 93970 EXTREMITY STUDY: CPT | Mod: 26

## 2019-09-01 RX ORDER — WARFARIN SODIUM 2.5 MG/1
7.5 TABLET ORAL ONCE
Refills: 0 | Status: COMPLETED | OUTPATIENT
Start: 2019-09-01 | End: 2019-09-01

## 2019-09-01 RX ORDER — CARVEDILOL PHOSPHATE 80 MG/1
25 CAPSULE, EXTENDED RELEASE ORAL EVERY 12 HOURS
Refills: 0 | Status: DISCONTINUED | OUTPATIENT
Start: 2019-09-01 | End: 2019-09-06

## 2019-09-01 RX ADMIN — Medication 60 MILLIGRAM(S): at 06:23

## 2019-09-01 RX ADMIN — WARFARIN SODIUM 7.5 MILLIGRAM(S): 2.5 TABLET ORAL at 21:11

## 2019-09-01 RX ADMIN — Medication 650 MILLIGRAM(S): at 06:23

## 2019-09-01 RX ADMIN — CHLORHEXIDINE GLUCONATE 1 APPLICATION(S): 213 SOLUTION TOPICAL at 11:31

## 2019-09-01 RX ADMIN — HEPARIN SODIUM 1900 UNIT(S)/HR: 5000 INJECTION INTRAVENOUS; SUBCUTANEOUS at 00:59

## 2019-09-01 RX ADMIN — Medication 300 MILLIGRAM(S): at 06:23

## 2019-09-01 RX ADMIN — Medication 60 MILLIGRAM(S): at 17:38

## 2019-09-01 RX ADMIN — GABAPENTIN 100 MILLIGRAM(S): 400 CAPSULE ORAL at 06:23

## 2019-09-01 RX ADMIN — PREGABALIN 1000 MICROGRAM(S): 225 CAPSULE ORAL at 06:25

## 2019-09-01 RX ADMIN — Medication 300 MILLIGRAM(S): at 17:41

## 2019-09-01 RX ADMIN — MYCOPHENOLIC ACID 360 MILLIGRAM(S): 180 TABLET, DELAYED RELEASE ORAL at 17:43

## 2019-09-01 RX ADMIN — Medication 100 MILLIGRAM(S): at 11:28

## 2019-09-01 RX ADMIN — Medication 100 MILLIGRAM(S): at 13:16

## 2019-09-01 RX ADMIN — Medication 100 MILLIGRAM(S): at 21:12

## 2019-09-01 RX ADMIN — TAMSULOSIN HYDROCHLORIDE 0.4 MILLIGRAM(S): 0.4 CAPSULE ORAL at 21:11

## 2019-09-01 RX ADMIN — Medication 650 MILLIGRAM(S): at 17:44

## 2019-09-01 RX ADMIN — CARVEDILOL PHOSPHATE 25 MILLIGRAM(S): 80 CAPSULE, EXTENDED RELEASE ORAL at 17:48

## 2019-09-01 RX ADMIN — LEVETIRACETAM 1000 MILLIGRAM(S): 250 TABLET, FILM COATED ORAL at 06:24

## 2019-09-01 RX ADMIN — Medication 100 MILLIGRAM(S): at 06:23

## 2019-09-01 RX ADMIN — ATORVASTATIN CALCIUM 40 MILLIGRAM(S): 80 TABLET, FILM COATED ORAL at 21:11

## 2019-09-01 RX ADMIN — INSULIN GLARGINE 10 UNIT(S): 100 INJECTION, SOLUTION SUBCUTANEOUS at 22:09

## 2019-09-01 RX ADMIN — Medication 1 MILLIGRAM(S): at 11:28

## 2019-09-01 RX ADMIN — MYCOPHENOLIC ACID 360 MILLIGRAM(S): 180 TABLET, DELAYED RELEASE ORAL at 06:23

## 2019-09-01 RX ADMIN — GABAPENTIN 100 MILLIGRAM(S): 400 CAPSULE ORAL at 17:39

## 2019-09-01 RX ADMIN — Medication 5 MILLIGRAM(S): at 06:23

## 2019-09-01 RX ADMIN — CARVEDILOL PHOSPHATE 12.5 MILLIGRAM(S): 80 CAPSULE, EXTENDED RELEASE ORAL at 06:23

## 2019-09-01 RX ADMIN — LEVETIRACETAM 1000 MILLIGRAM(S): 250 TABLET, FILM COATED ORAL at 17:42

## 2019-09-01 NOTE — PROGRESS NOTE ADULT - ASSESSMENT
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

## 2019-09-01 NOTE — PROGRESS NOTE ADULT - ATTENDING COMMENTS
Feeling improved.  no recurrence of neurologic symptoms  1.  Renal failure--failed transplant.  HD requiring.  Minimize any HD hypotension to avoid exacerbating TIA  2.  Immunosuppression, renal transplant--decreasing overall regimen and eliminating CNI.  No fever, graft tenderness  3.  TIA--resolved.  Acknowledged some vague symptoms preceding event by several weeks.  Full neuro w/u, anticoagulation heparin and hold GI evaluation

## 2019-09-01 NOTE — PROGRESS NOTE ADULT - ASSESSMENT
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   Pt just moved back to [place] from [place] .. reports that he has not been taking his meds for the past few days since " he might have misplaed them"     pt denies chest pain, syncope,fever, chills, cough, urinary symptoms.    in ED found  to have anemia   Nno acute changes on EKG   elevated CR and Trop    1- HTN urgency : now improving  bp    2- KAMRON  :   cont gutierrez  d/w   : pt will likely end up on HD in intermediate and likely long term given transplant graft failure   off tacro,  cont cellcept and sterioods       3- DM:   A1c  5.6  Fs     4- acute diastolic CHF sec to  HTN urgency and renal failure   Echo :  < from: Transthoracic Echocardiogram (08.28.19 @ 16:12) >  1. Mitral annular calcification and calcified mitral  leaflets with decreased diastolic opening.  2. Moderate to severe concentric left ventricular  hypertrophy.  3. Normal left ventricular systolic function with  mid-cavitary obliteration.  4. Normal right ventricular size and systolic function.  5. Small pericardial effusion.    cont lasix for now   cont fluid removal with HD per renal      5-  difficult ambulating :  no focal neuro deficits   neuro eval appreciated   will check CT T/L   discussed with Dr. Lindsey       6- afib : consider  restarting  coumadin  d/w  : will start and mnitor H/h   7- hematochezia : per sister : on and off small amount of fresh blood in stool and some amount in urine but only small amounts   monitor H/H   consult GI .: appreciate input :  suspect bleeding to be hemorrhoidal, now resolved     trial of anusol supp  if bleeding persist would consider colonoscopy however patient is reluctant.    heme input: appreciated    8-  renal transplant : f/u with Transplant team   cont meds with MMF and steroids   off Tac 73 y/o male with PMHx of  ESRD s/p /p failed renal transplant 4 year ago and successful renal transplant 1 year ago,DM, HTN, cardiomyopathy 2/2 cocaine abuse, Hepatitis C, meningococcal meningitiss presenting with concerns about his kidney function, worsening SOB over the past 2 weeks and leg edema.   Pt just moved back to [place] from [place] .. reports that he has not been taking his meds for the past few days since " he might have misplaed them"     pt denies chest pain, syncope,fever, chills, cough, urinary symptoms.    in ED found  to have anemia   Nno acute changes on EKG   elevated CR and Trop    1- HTN urgency : now improving  bp    2- KAMRON  :   cont gutierrez  d/w   : pt will likely end up on HD in intermediate and likely long term given transplant graft failure   off tacro,  cont cellcept and sterioods       3- DM:   A1c  5.6  Fs     4- acute diastolic CHF sec to  HTN urgency and renal failure   Echo :  < from: Transthoracic Echocardiogram (08.28.19 @ 16:12) >  1. Mitral annular calcification and calcified mitral  leaflets with decreased diastolic opening.  2. Moderate to severe concentric left ventricular  hypertrophy.  3. Normal left ventricular systolic function with  mid-cavitary obliteration.  4. Normal right ventricular size and systolic function.  5. Small pericardial effusion.    cont lasix for now   cont fluid removal with HD per renal      5-  difficult ambulating :  no focal neuro deficits   neuro eval appreciated    CT T/L   < from: CT Thoracic Spine No Cont (08.30.19 @ 10:06) >    Old right L1 transverse process fracture. Bilateral lysis   defects at L5 as seen on the prior 5/26/2013      6- afib : cont AC with heaprin and coumadin     7- hematochezia : per sister : on and off small amount of fresh blood in stool and some amount in urine but only small amounts   monitor H/H   consult GI .: appreciate input :  suspect bleeding to be hemorrhoidal, now resolved     trial of anusol supp  if bleeding persist would consider colonoscopy however patient is reluctant.    heme input: appreciated        8-  renal transplant : f/u with Transplant team   cont meds with MMF and steroids   off Tac     9- TIA :  unable to perform MRI   CT no acute changes on CT   repeat   fu with neuro   AC restarted given hx of afib

## 2019-09-01 NOTE — PROGRESS NOTE ADULT - PROBLEM SELECTOR PLAN 1
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 admitted with fluid overload and HTN urgency . started on IHD on 8/27/19 .last session was 8/28/19 . Pan for HD tomorrow.- Qb/Qd 400/500, F160, 2K bath and target UF 2L.

## 2019-09-01 NOTE — PROGRESS NOTE ADULT - SUBJECTIVE AND OBJECTIVE BOX
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 (30 Aug 2019 12:22)                                                               INTERVAL HPI/OVERNIGHT EVENTS:    REVIEW OF SYSTEMS:     CONSTITUTIONAL: No weakness, fevers or chills  EYES/ENT: No visual changes , no ear ache   NECK: No pain or stiffness  RESPIRATORY: No cough, wheezing,  No shortness of breath  CARDIOVASCULAR: No chest pain or palpitations  GASTROINTESTINAL: No abdominal pain  . No nausea, vomiting, or hematemesis; No diarrhea or constipation. No melena or hematochezia.  GENITOURINARY: No dysuria, frequency or hematuria  NEUROLOGICAL: No numbness or weakness  SKIN: No itching, burning, rashes, or lesions                                                                                                                                                                                                                                                                                 Medications:  MEDICATIONS  (STANDING):  allopurinol 100 milliGRAM(s) Oral daily  atorvastatin 40 milliGRAM(s) Oral at bedtime  carvedilol 25 milliGRAM(s) Oral every 12 hours  chlorhexidine 2% Cloths 1 Application(s) Topical daily  darbepoetin Injectable ViaL 40 MICROGram(s) IV Push every week  dextrose 5%. 1000 milliLiter(s) (50 mL/Hr) IV Continuous <Continuous>  dextrose 50% Injectable 12.5 Gram(s) IV Push once  dextrose 50% Injectable 25 Gram(s) IV Push once  dextrose 50% Injectable 25 Gram(s) IV Push once  folic acid 1 milliGRAM(s) Oral daily  furosemide   Injectable 60 milliGRAM(s) IV Push two times a day  gabapentin 100 milliGRAM(s) Oral two times a day  heparin  Infusion.  Unit(s)/Hr (19 mL/Hr) IV Continuous <Continuous>  hydrALAZINE 100 milliGRAM(s) Oral three times a day  hydrocortisone hemorrhoidal Suppository 1 Suppository(s) Rectal at bedtime  insulin glargine Injectable (LANTUS) 10 Unit(s) SubCutaneous at bedtime  insulin lispro (HumaLOG) corrective regimen sliding scale   SubCutaneous three times a day before meals  insulin lispro (HumaLOG) corrective regimen sliding scale   SubCutaneous at bedtime  labetalol 300 milliGRAM(s) Oral two times a day  levETIRAcetam 1000 milliGRAM(s) Oral two times a day  mycophenolic acid  milliGRAM(s) Oral two times a day  predniSONE   Tablet 5 milliGRAM(s) Oral daily  sodium bicarbonate 650 milliGRAM(s) Oral two times a day  tamsulosin 0.4 milliGRAM(s) Oral at bedtime    MEDICATIONS  (PRN):  aluminum hydroxide/magnesium hydroxide/simethicone Suspension 30 milliLiter(s) Oral every 6 hours PRN Dyspepsia  dextrose 40% Gel 15 Gram(s) Oral once PRN Blood Glucose LESS THAN 70 milliGRAM(s)/deciliter  glucagon  Injectable 1 milliGRAM(s) IntraMuscular once PRN Glucose LESS THAN 70 milligrams/deciliter  heparin  Injectable 9000 Unit(s) IV Push every 6 hours PRN For aPTT less than 40  heparin  Injectable 4000 Unit(s) IV Push every 6 hours PRN For aPTT between 40 - 57       Allergies    No Known Allergies    Intolerances      Vital Signs Last 24 Hrs  T(C): 36.9 (01 Sep 2019 21:02), Max: 37.1 (01 Sep 2019 13:09)  T(F): 98.4 (01 Sep 2019 21:02), Max: 98.7 (01 Sep 2019 13:09)  HR: 70 (01 Sep 2019 21:02) (70 - 80)  BP: 158/75 (01 Sep 2019 21:02) (152/68 - 167/79)  BP(mean): --  RR: 18 (01 Sep 2019 21:02) (16 - 18)  SpO2: 99% (01 Sep 2019 21:02) (95% - 99%)  CAPILLARY BLOOD GLUCOSE      POCT Blood Glucose.: 129 mg/dL (01 Sep 2019 21:50)  POCT Blood Glucose.: 125 mg/dL (01 Sep 2019 17:48)  POCT Blood Glucose.: 146 mg/dL (01 Sep 2019 13:08)  POCT Blood Glucose.: 139 mg/dL (01 Sep 2019 09:01)      08-31 @ 07:01 - 09-01 @ 07:00  --------------------------------------------------------  IN: 120 mL / OUT: 2325 mL / NET: -2205 mL    09-01 @ 07:01 - 09-01 @ 23:39  --------------------------------------------------------  IN: 480 mL / OUT: 950 mL / NET: -470 mL      Physical Exam:    Daily     Daily   General:  Well appearing, NAD, not cachetic  HEENT:  Nonicteric, PERRLA  CV:  RRR, S1S2   Lungs:  CTA B/L, no wheezes, rales, rhonchi  Abdomen:  Soft, non-tender, no distended, positive BS  Extremities:  2+ pulses, no c/c, no edema  Skin:  Warm and dry, no rashes  :  No gutierrez  Neuro:  AAOx3, non-focal, grossly intact                                                                                                                                                                                                                                                                                                LABS:                               8.0    4.21  )-----------( 146      ( 01 Sep 2019 11:21 )             26.2                      09-01    137  |  99  |  43<H>  ----------------------------<  155<H>  3.6   |  23  |  5.73<H>    Ca    9.9      01 Sep 2019 07:08  Mg     2.0     09-01    TPro  6.1  /  Alb  3.1<L>  /  TBili  0.3  /  DBili  x   /  AST  10  /  ALT  9<L>  /  AlkPhos  72  08-31                       RADIOLOGY & ADDITIONAL TESTS         I personally reviewed: [  ]EKG   [  ]CXR    [  ] CT      A/P:         Discussed with :     Marga consultants' Notes   Time spent : Patient is a 74y old  Male who presents with a chief complaint of LE swelling and HTN urgency  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 (30 Aug 2019 12:22)                                                               INTERVAL HPI/OVERNIGHT EVENTS:    REVIEW OF SYSTEMS:     CONSTITUTIONAL: No weakness, fevers or chills  RESPIRATORY: No cough, wheezing,  No shortness of breath  CARDIOVASCULAR: No chest pain or palpitations  GASTROINTESTINAL: No abdominal pain  . No nausea, vomiting, or hematemesis; No diarrhea or constipation. No melena or hematochezia.  GENITOURINARY: No dysuria, frequency or hematuria  NEUROLOGICAL: No numbness or weakness                                                                                                                                                                                                                                                                                Medications:  MEDICATIONS  (STANDING):  allopurinol 100 milliGRAM(s) Oral daily  atorvastatin 40 milliGRAM(s) Oral at bedtime  carvedilol 25 milliGRAM(s) Oral every 12 hours  chlorhexidine 2% Cloths 1 Application(s) Topical daily  darbepoetin Injectable ViaL 40 MICROGram(s) IV Push every week  dextrose 5%. 1000 milliLiter(s) (50 mL/Hr) IV Continuous <Continuous>  dextrose 50% Injectable 12.5 Gram(s) IV Push once  dextrose 50% Injectable 25 Gram(s) IV Push once  dextrose 50% Injectable 25 Gram(s) IV Push once  folic acid 1 milliGRAM(s) Oral daily  furosemide   Injectable 60 milliGRAM(s) IV Push two times a day  gabapentin 100 milliGRAM(s) Oral two times a day  heparin  Infusion.  Unit(s)/Hr (19 mL/Hr) IV Continuous <Continuous>  hydrALAZINE 100 milliGRAM(s) Oral three times a day  hydrocortisone hemorrhoidal Suppository 1 Suppository(s) Rectal at bedtime  insulin glargine Injectable (LANTUS) 10 Unit(s) SubCutaneous at bedtime  insulin lispro (HumaLOG) corrective regimen sliding scale   SubCutaneous three times a day before meals  insulin lispro (HumaLOG) corrective regimen sliding scale   SubCutaneous at bedtime  labetalol 300 milliGRAM(s) Oral two times a day  levETIRAcetam 1000 milliGRAM(s) Oral two times a day  mycophenolic acid  milliGRAM(s) Oral two times a day  predniSONE   Tablet 5 milliGRAM(s) Oral daily  sodium bicarbonate 650 milliGRAM(s) Oral two times a day  tamsulosin 0.4 milliGRAM(s) Oral at bedtime    MEDICATIONS  (PRN):  aluminum hydroxide/magnesium hydroxide/simethicone Suspension 30 milliLiter(s) Oral every 6 hours PRN Dyspepsia  dextrose 40% Gel 15 Gram(s) Oral once PRN Blood Glucose LESS THAN 70 milliGRAM(s)/deciliter  glucagon  Injectable 1 milliGRAM(s) IntraMuscular once PRN Glucose LESS THAN 70 milligrams/deciliter  heparin  Injectable 9000 Unit(s) IV Push every 6 hours PRN For aPTT less than 40  heparin  Injectable 4000 Unit(s) IV Push every 6 hours PRN For aPTT between 40 - 57       Allergies    No Known Allergies    Intolerances      Vital Signs Last 24 Hrs  T(C): 36.9 (01 Sep 2019 21:02), Max: 37.1 (01 Sep 2019 13:09)  T(F): 98.4 (01 Sep 2019 21:02), Max: 98.7 (01 Sep 2019 13:09)  HR: 70 (01 Sep 2019 21:02) (70 - 80)  BP: 158/75 (01 Sep 2019 21:02) (152/68 - 167/79)  BP(mean): --  RR: 18 (01 Sep 2019 21:02) (16 - 18)  SpO2: 99% (01 Sep 2019 21:02) (95% - 99%)  CAPILLARY BLOOD GLUCOSE      POCT Blood Glucose.: 129 mg/dL (01 Sep 2019 21:50)  POCT Blood Glucose.: 125 mg/dL (01 Sep 2019 17:48)  POCT Blood Glucose.: 146 mg/dL (01 Sep 2019 13:08)  POCT Blood Glucose.: 139 mg/dL (01 Sep 2019 09:01)      08-31 @ 07:01 - 09-01 @ 07:00  --------------------------------------------------------  IN: 120 mL / OUT: 2325 mL / NET: -2205 mL    09-01 @ 07:01 - 09-01 @ 23:39  --------------------------------------------------------  IN: 480 mL / OUT: 950 mL / NET: -470 mL      Physical Exam:     General:NAD   HEENT:  Nonicteric, PERRLA  CV:  RRR, S1S2   Lungs:  poor effort   Abdomen:  Soft, non-tender, no distended, positive BS  Extremities:  edema  :   gutierrez  Neuro:  AAOx3, non-focal, grossly intact                                                                                                                                                                                                                                                                                                LABS:                               8.0    4.21  )-----------( 146      ( 01 Sep 2019 11:21 )             26.2                      09-01    137  |  99  |  43<H>  ----------------------------<  155<H>  3.6   |  23  |  5.73<H>    Ca    9.9      01 Sep 2019 07:08  Mg     2.0     09-01    TPro  6.1  /  Alb  3.1<L>  /  TBili  0.3  /  DBili  x   /  AST  10  /  ALT  9<L>  /  AlkPhos  72  08-31

## 2019-09-01 NOTE — PROGRESS NOTE ADULT - SUBJECTIVE AND OBJECTIVE BOX
[facility] Division of Kidney Diseases & Hypertension  FOLLOW UP NOTE  [phone]--------------------------------------------------------------------------------  Chief Complaint:Acute renal failure      24 hour events/subjective: Patient seen at bedside. Patient had code Stroke called yesterday and CT scan showed no ischemia or hemorrhage and focal deficits resolved. Patient after that stated he felt better and has not had any other issues since then. Patients labs and vital signs reviewed. Vital signs stable. Labs grossly stable.         PAST HISTORY  --------------------------------------------------------------------------------  No significant changes to PMH, PSH, FHx, SHx, unless otherwise noted    ALLERGIES & MEDICATIONS  --------------------------------------------------------------------------------  Allergies    No Known Allergies    Intolerances      Standing Inpatient Medications  allopurinol 100 milliGRAM(s) Oral daily  atorvastatin 40 milliGRAM(s) Oral at bedtime  carvedilol 25 milliGRAM(s) Oral every 12 hours  chlorhexidine 2% Cloths 1 Application(s) Topical daily  darbepoetin Injectable ViaL 40 MICROGram(s) IV Push every week  dextrose 5%. 1000 milliLiter(s) IV Continuous <Continuous>  dextrose 50% Injectable 12.5 Gram(s) IV Push once  dextrose 50% Injectable 25 Gram(s) IV Push once  dextrose 50% Injectable 25 Gram(s) IV Push once  folic acid 1 milliGRAM(s) Oral daily  furosemide   Injectable 60 milliGRAM(s) IV Push two times a day  gabapentin 100 milliGRAM(s) Oral two times a day  heparin  Infusion.  Unit(s)/Hr IV Continuous <Continuous>  hydrALAZINE 100 milliGRAM(s) Oral three times a day  hydrocortisone hemorrhoidal Suppository 1 Suppository(s) Rectal at bedtime  insulin glargine Injectable (LANTUS) 10 Unit(s) SubCutaneous at bedtime  insulin lispro (HumaLOG) corrective regimen sliding scale   SubCutaneous three times a day before meals  insulin lispro (HumaLOG) corrective regimen sliding scale   SubCutaneous at bedtime  labetalol 300 milliGRAM(s) Oral two times a day  levETIRAcetam 1000 milliGRAM(s) Oral two times a day  mycophenolic acid  milliGRAM(s) Oral two times a day  predniSONE   Tablet 5 milliGRAM(s) Oral daily  sodium bicarbonate 650 milliGRAM(s) Oral two times a day  tamsulosin 0.4 milliGRAM(s) Oral at bedtime    PRN Inpatient Medications  aluminum hydroxide/magnesium hydroxide/simethicone Suspension 30 milliLiter(s) Oral every 6 hours PRN  dextrose 40% Gel 15 Gram(s) Oral once PRN  glucagon  Injectable 1 milliGRAM(s) IntraMuscular once PRN  heparin  Injectable 9000 Unit(s) IV Push every 6 hours PRN  heparin  Injectable 4000 Unit(s) IV Push every 6 hours PRN      REVIEW OF SYSTEMS  --------------------------------------------------------------------------------  Gen: No  fevers/chills  Skin: No rashes  Head/Eyes/Ears/Mouth: No headache; Normal hearing; Normal vision w/o blurriness  Respiratory: No dyspnea, cough, wheezing, hemoptysis  CV: No chest pain, PND, orthopnea  GI: No abdominal pain, diarrhea, constipation, nausea, vomiting  : No increased frequency, dysuria, hematuria, nocturia  MSK: No joint pain/swelling; no back pain; no edema  Neuro: No dizziness/lightheadedness, weakness, seizures, numbness, tingling      All other systems were reviewed and are negative, except as noted.    VITALS/PHYSICAL EXAM  --------------------------------------------------------------------------------  T(C): 36.9 (09-01-19 @ 21:02), Max: 37.1 (09-01-19 @ 13:09)  HR: 70 (09-01-19 @ 21:02) (70 - 80)  BP: 158/75 (09-01-19 @ 21:02) (152/68 - 167/79)  RR: 18 (09-01-19 @ 21:02) (16 - 18)  SpO2: 99% (09-01-19 @ 21:02) (95% - 99%)  Wt(kg): --        08-31-19 @ 07:01  -  09-01-19 @ 07:00  --------------------------------------------------------  IN: 120 mL / OUT: 2325 mL / NET: -2205 mL    09-01-19 @ 07:01  -  09-02-19 @ 01:03  --------------------------------------------------------  IN: 480 mL / OUT: 950 mL / NET: -470 mL      Physical Exam:  	Gen: NAD  	HEENT: PERRL, supple neck, clear oropharynx  	Pulm: CTA B/L  	CV: RRR, S1S2; no rub  	Back: No spinal or CVA tenderness; no sacral edema  	Abd: +BS, soft, nontender/nondistended                      Transplant: No tenderness, swelling  	: No suprapubic tenderness  	UE: Warm, FROM; no edema; no asterixis  	LE: Warm, FROM; no edema  	Neuro: No focal deficits  	Psych: Normal affect and mood  	Skin: Warm, without rashes    LABS/STUDIES  --------------------------------------------------------------------------------              8.0    4.21  >-----------<  146      [09-01-19 @ 11:21]              26.2     137  |  99  |  43  ----------------------------<  155      [09-01-19 @ 07:08]  3.6   |  23  |  5.73        Ca     9.9     [09-01-19 @ 07:08]      Mg     2.0     [09-01-19 @ 07:08]    TPro  6.1  /  Alb  3.1  /  TBili  0.3  /  DBili  x   /  AST  10  /  ALT  9   /  AlkPhos  72  [08-31-19 @ 10:17]    PT/INR: PT 14.6 , INR 1.26       [09-01-19 @ 11:18]  PTT: 66.3       [09-01-19 @ 11:18]      Creatinine Trend:  SCr 5.73 [09-01 @ 07:08]  SCr 4.95 [08-31 @ 10:17]  SCr 4.70 [08-31 @ 06:26]  SCr 5.64 [08-30 @ 06:52]  SCr 5.65 [08-29 @ 23:36]    Urinalysis - [08-26-19 @ 20:28]      Color Light Yellow / Appearance Clear / SG 1.017 / pH 6.5      Gluc 200 mg/dL / Ketone Negative  / Bili Negative / Urobili Negative       Blood Moderate / Protein >600 / Leuk Est Negative / Nitrite Negative      RBC 13 / WBC 13 / Hyaline 4 / Gran  / Sq Epi  / Non Sq Epi 5 / Bacteria Negative      Iron 31, TIBC 121, %sat 26      [08-27-19 @ 15:07]  Ferritin 294      [08-27-19 @ 15:07]  PTH -- (Ca 9.1)      [08-27-19 @ 15:07]   1896  HbA1c 5.6      [08-27-19 @ 09:03]  TSH 5.45      [08-30-19 @ 09:41]    HBsAb 19.4      [08-27-19 @ 15:26]  HBsAg Nonreact      [08-27-19 @ 15:26]  HCV 10.44, Reactive      [08-27-19 @ 08:29]    Immunofixation Serum:   IgG Lambda Band Identified    Reference Range: None Detected      [08-30-19 @ 09:37]  SPEP Interpretation: Gamma-Migrating Paraprotein Identified      [08-30-19 @ 09:37]

## 2019-09-01 NOTE — PROGRESS NOTE ADULT - PROBLEM SELECTOR PLAN 2
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 now with KAMRON over CKD stage 5  , now started on dialysis since 8/27/19. Plan for HD tomorrow. Continue Lasix 80 mg IV bid for optimization of volume status.

## 2019-09-01 NOTE — PROGRESS NOTE ADULT - SUBJECTIVE AND OBJECTIVE BOX
Subjective: Patient seen and examined. No new events except as noted.   episode of PAT with HR up to 135 x 11sec this AM; he was sleeping at the time of the episode and asymptomatic.      REVIEW OF SYSTEMS:    CONSTITUTIONAL:+ weakness, fevers or chills  EYES/ENT: No visual changes;  No vertigo or throat pain   NECK: No pain or stiffness  RESPIRATORY: No cough, wheezing, hemoptysis; No shortness of breath  CARDIOVASCULAR: No chest pain or palpitations  GASTROINTESTINAL: No abdominal or epigastric pain. No nausea, vomiting, or hematemesis; No diarrhea or constipation. No melena or hematochezia.  GENITOURINARY: No dysuria, frequency or hematuria  NEUROLOGICAL: No numbness or weakness  SKIN: No itching, burning, rashes, or lesions   All other review of systems is negative unless indicated above.    MEDICATIONS:  MEDICATIONS  (STANDING):  allopurinol 100 milliGRAM(s) Oral daily  atorvastatin 40 milliGRAM(s) Oral at bedtime  carvedilol 12.5 milliGRAM(s) Oral every 12 hours  chlorhexidine 2% Cloths 1 Application(s) Topical daily  darbepoetin Injectable ViaL 40 MICROGram(s) IV Push every week  dextrose 5%. 1000 milliLiter(s) (50 mL/Hr) IV Continuous <Continuous>  dextrose 50% Injectable 12.5 Gram(s) IV Push once  dextrose 50% Injectable 25 Gram(s) IV Push once  dextrose 50% Injectable 25 Gram(s) IV Push once  folic acid 1 milliGRAM(s) Oral daily  furosemide   Injectable 60 milliGRAM(s) IV Push two times a day  gabapentin 100 milliGRAM(s) Oral two times a day  heparin  Infusion.  Unit(s)/Hr (19 mL/Hr) IV Continuous <Continuous>  hydrALAZINE 100 milliGRAM(s) Oral three times a day  hydrocortisone hemorrhoidal Suppository 1 Suppository(s) Rectal at bedtime  insulin glargine Injectable (LANTUS) 10 Unit(s) SubCutaneous at bedtime  insulin lispro (HumaLOG) corrective regimen sliding scale   SubCutaneous three times a day before meals  insulin lispro (HumaLOG) corrective regimen sliding scale   SubCutaneous at bedtime  labetalol 300 milliGRAM(s) Oral two times a day  levETIRAcetam 1000 milliGRAM(s) Oral two times a day  mycophenolic acid  milliGRAM(s) Oral two times a day  predniSONE   Tablet 5 milliGRAM(s) Oral daily  sodium bicarbonate 650 milliGRAM(s) Oral two times a day  tamsulosin 0.4 milliGRAM(s) Oral at bedtime      PHYSICAL EXAM:  T(C): 36.8 (09-01-19 @ 05:37), Max: 37.3 (08-31-19 @ 20:47)  HR: 78 (09-01-19 @ 05:37) (78 - 85)  BP: 152/68 (09-01-19 @ 05:37) (152/65 - 158/72)  RR: 18 (09-01-19 @ 05:37) (18 - 20)  SpO2: 95% (09-01-19 @ 05:37) (93% - 97%)  Wt(kg): --  I&O's Summary    31 Aug 2019 07:01  -  01 Sep 2019 07:00  --------------------------------------------------------  IN: 120 mL / OUT: 2325 mL / NET: -2205 mL    01 Sep 2019 07:01  -  01 Sep 2019 11:45  --------------------------------------------------------  IN: 120 mL / OUT: 0 mL / NET: 120 mL          Appearance: NAD  HEENT:   Dry oral mucosa, PERRL, EOMI	  Lymphatic: No lymphadenopathy , no edema  Cardiovascular: Normal S1 S2, No JVD, No murmurs , Peripheral pulses palpable 2+ bilaterally  Respiratory: Lungs clear to auscultation, normal effort 	  Gastrointestinal:  Soft, Non-tender, + BS	  Skin: No rashes, No ecchymoses, No cyanosis, warm to touch  Musculoskeletal: Normal range of motion, normal strength  Psychiatry:  Mood & affect appropriate  Ext: No edema      LABS:    CARDIAC MARKERS:        Prothrombin Time and INR, Plasma in AM (08.31.19 @ 10:17)    Prothrombin Time, Plasma: 12.9: Effective October 30th, 2018 the reference range for PT has changed. sec    INR: 1.13: RECOMMENDED RANGES FOR THERAPEUTIC INR:    2.0-3.0 for most medical and surgical thromboembolic states    2.0-3.0 for atrial fibrillation    2.0-3.0 for bileaflet mechanical valve in aortic position    2.5-3.5 for mechanical heart valves   Chest 2004;[id number]  The presence of direct thrombin inhibitors (argatroban, refludan)  may falsely increase results. ratio                            8.0    4.21  )-----------( 146      ( 01 Sep 2019 11:21 )             26.2     09-01    137  |  99  |  43<H>  ----------------------------<  155<H>  3.6   |  23  |  5.73<H>    Ca    9.9      01 Sep 2019 07:08  Mg     2.0     09-01    TPro  6.1  /  Alb  3.1<L>  /  TBili  0.3  /  DBili  x   /  AST  10  /  ALT  9<L>  /  AlkPhos  72  08-31    proBNP:   Lipid Profile:   HgA1c:   TSH:             TELEMETRY: 	    ECG:  	  RADIOLOGY:   DIAGNOSTIC TESTING:  [ ] Echocardiogram:  [ ]  Catheterization:  [ ] Stress Test:    OTHER:

## 2019-09-01 NOTE — CHART NOTE - NSCHARTNOTEFT_GEN_A_CORE
CC: PAT on telemetry      HPI:  Called by RN to evaluate patient for episode of PAT with HR up to 135 x 11sec this AM; he was sleeping at the time of the episode and asymptomatic. Patient seen and assessed at bedside, NAD, alert and awake. He denied headache, dizziness, CP, SOB, palpitations, abdominal  pain, or N/V/D.        ROS:  CONSTITUTIONAL:  No fever, chills, rigors  CARDIOVASCULAR:  No chest pain or palpitations  RESPIRATORY:   No SOB, cough, wheezing  GASTROINTESTINAL:  No abd pain, N/V/D  NEUROLOGIC:  No HA, visual disturbances          PAST MEDICAL & SURGICAL HISTORY:  Kidney transplant recipient  Anuria  GERD (gastroesophageal reflux disease)  Kidney transplant failure: dx: 2/2013  Hepatitis C: dx: 90&#x27;s.  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, no known MIs  H/O kidney transplant: may 2015  A-V fistula: Left, implanted (?)  S/p cadaver renal transplant: 2008  S/P partial colectomy: due to diverticulitis  Troponin level elevated  Renal transplant recipient        Vital Signs Last 24 Hrs  T(C): 36.8 (01 Sep 2019 05:37), Max: 37.3 (31 Aug 2019 20:47)  T(F): 98.3 (01 Sep 2019 05:37), Max: 99.1 (31 Aug 2019 20:47)  HR: 78 (01 Sep 2019 05:37) (67 - 85)  BP: 152/68 (01 Sep 2019 05:37) (110/63 - 158/72)  RR: 18 (01 Sep 2019 05:37) (18 - 20)  SpO2: 95% (01 Sep 2019 05:37) (93% - 97%)      Physical Exam:  General: WN/WD, NAD, AOx3, nontoxic appearing  Head:  NC/AT  CV: RRR, S1S2   Respiratory: CTA B/L, nonlabored  MSK: (+) 1+ BLLE edema, + peripheral pulses, FROM all 4 extremity  Skin: (+) warm, dry   Psych: Appropriate affect       Labs:                        8.5    5.0   )-----------( 146      ( 31 Aug 2019 17:24 )             25.8     08-31    139  |  99  |  33<H>  ----------------------------<  149<H>  4.0   |  25  |  4.95<H>    Ca    9.9      31 Aug 2019 10:17  Phos  5.0     08-30  Mg     2.2     08-30    TPro  6.1  /  Alb  3.1<L>  /  TBili  0.3  /  DBili  x   /  AST  10  /  ALT  9<L>  /  AlkPhos  72  08-31              Radiology:  < from: Xray Chest 1 View AP/PA (08.26.19 @ 16:28) >  IMPRESSION:   Clear lungs.  < end of copied text >          Assessment & Plan:  73 y/o male with PMHx of  ESRD s/p /p failed renal transplant 4 year ago and successful renal transplant 1 year ago,DM, HTN, cardiomyopathy 2/2 cocaine abuse, Hepatitis C, meningococcal meningitiss presenting with concerns about his kidney function, worsening SOB over the past 2 weeks and leg edema.   Patient now noted with episode of PAT x11 seconds on telemetry while sleeping; he was asymptomatic when awoken. Patient noted with prior episode of PAT on 8/30.       #PAT  -Patient asymptomatic and without complaints  -BMP and Mg ordered for AM, vital signs hemodynamically stable  -Keep Mg >2.0, K >4.0  -Continue with Coreg 12.5mg PO BID; consider uptitration if persistent ectopy  -Continue to monitor on telemetry  -Cardiology is following patient  -Will continue to closely monitor patient/vitals  -Primary Team to follow up in AM, attending to follow       Mihai Cleaning PA-C  Dept of Medicine  [id number]

## 2019-09-02 DIAGNOSIS — N18.6 END STAGE RENAL DISEASE: ICD-10-CM

## 2019-09-02 DIAGNOSIS — N25.81 SECONDARY HYPERPARATHYROIDISM OF RENAL ORIGIN: ICD-10-CM

## 2019-09-02 LAB
24R-OH-CALCIDIOL SERPL-MCNC: <4 NG/ML — LOW (ref 30–80)
ANION GAP SERPL CALC-SCNC: 16 MMOL/L — SIGNIFICANT CHANGE UP (ref 5–17)
APTT BLD: 66.3 SEC — HIGH (ref 27.5–36.3)
BUN SERPL-MCNC: 51 MG/DL — HIGH (ref 7–23)
CALCIUM SERPL-MCNC: 10.2 MG/DL — SIGNIFICANT CHANGE UP (ref 8.4–10.5)
CALCIUM SERPL-MCNC: 9.7 MG/DL — SIGNIFICANT CHANGE UP (ref 8.4–10.5)
CHLORIDE SERPL-SCNC: 99 MMOL/L — SIGNIFICANT CHANGE UP (ref 96–108)
CO2 SERPL-SCNC: 22 MMOL/L — SIGNIFICANT CHANGE UP (ref 22–31)
CREAT SERPL-MCNC: 6.69 MG/DL — HIGH (ref 0.5–1.3)
FERRITIN SERPL-MCNC: 260 NG/ML — SIGNIFICANT CHANGE UP (ref 30–400)
GLUCOSE BLDC GLUCOMTR-MCNC: 122 MG/DL — HIGH (ref 70–99)
GLUCOSE BLDC GLUCOMTR-MCNC: 123 MG/DL — HIGH (ref 70–99)
GLUCOSE BLDC GLUCOMTR-MCNC: 124 MG/DL — HIGH (ref 70–99)
GLUCOSE BLDC GLUCOMTR-MCNC: 131 MG/DL — HIGH (ref 70–99)
GLUCOSE SERPL-MCNC: 129 MG/DL — HIGH (ref 70–99)
HCT VFR BLD CALC: 25.9 % — LOW (ref 39–50)
HGB BLD-MCNC: 7.8 G/DL — LOW (ref 13–17)
INR BLD: 1.4 RATIO — HIGH (ref 0.88–1.16)
IRON SATN MFR SERPL: 28 % — SIGNIFICANT CHANGE UP (ref 16–55)
IRON SATN MFR SERPL: 45 UG/DL — SIGNIFICANT CHANGE UP (ref 45–165)
MCHC RBC-ENTMCNC: 25.8 PG — LOW (ref 27–34)
MCHC RBC-ENTMCNC: 30.1 GM/DL — LOW (ref 32–36)
MCV RBC AUTO: 85.8 FL — SIGNIFICANT CHANGE UP (ref 80–100)
PLATELET # BLD AUTO: 163 K/UL — SIGNIFICANT CHANGE UP (ref 150–400)
POTASSIUM SERPL-MCNC: 3.9 MMOL/L — SIGNIFICANT CHANGE UP (ref 3.5–5.3)
POTASSIUM SERPL-SCNC: 3.9 MMOL/L — SIGNIFICANT CHANGE UP (ref 3.5–5.3)
PROTHROM AB SERPL-ACNC: 16.1 SEC — HIGH (ref 10–13.1)
PTH-INTACT FLD-MCNC: 1231 PG/ML — HIGH (ref 15–65)
RBC # BLD: 3.02 M/UL — LOW (ref 4.2–5.8)
RBC # FLD: 14.6 % — HIGH (ref 10.3–14.5)
SODIUM SERPL-SCNC: 137 MMOL/L — SIGNIFICANT CHANGE UP (ref 135–145)
TIBC SERPL-MCNC: 159 UG/DL — LOW (ref 220–430)
UIBC SERPL-MCNC: 114 UG/DL — SIGNIFICANT CHANGE UP (ref 110–370)
WBC # BLD: 4.64 K/UL — SIGNIFICANT CHANGE UP (ref 3.8–10.5)
WBC # FLD AUTO: 4.64 K/UL — SIGNIFICANT CHANGE UP (ref 3.8–10.5)

## 2019-09-02 PROCEDURE — 90935 HEMODIALYSIS ONE EVALUATION: CPT | Mod: GC

## 2019-09-02 RX ORDER — ACETAMINOPHEN 500 MG
650 TABLET ORAL ONCE
Refills: 0 | Status: COMPLETED | OUTPATIENT
Start: 2019-09-02 | End: 2019-09-02

## 2019-09-02 RX ORDER — CINACALCET 30 MG/1
30 TABLET, FILM COATED ORAL DAILY
Refills: 0 | Status: DISCONTINUED | OUTPATIENT
Start: 2019-09-02 | End: 2019-09-24

## 2019-09-02 RX ORDER — WARFARIN SODIUM 2.5 MG/1
7.5 TABLET ORAL ONCE
Refills: 0 | Status: COMPLETED | OUTPATIENT
Start: 2019-09-02 | End: 2019-09-02

## 2019-09-02 RX ADMIN — Medication 100 MILLIGRAM(S): at 15:23

## 2019-09-02 RX ADMIN — Medication 100 MILLIGRAM(S): at 05:28

## 2019-09-02 RX ADMIN — Medication 100 MILLIGRAM(S): at 15:31

## 2019-09-02 RX ADMIN — Medication 5 MILLIGRAM(S): at 05:28

## 2019-09-02 RX ADMIN — Medication 650 MILLIGRAM(S): at 05:28

## 2019-09-02 RX ADMIN — GABAPENTIN 100 MILLIGRAM(S): 400 CAPSULE ORAL at 18:44

## 2019-09-02 RX ADMIN — Medication 300 MILLIGRAM(S): at 18:45

## 2019-09-02 RX ADMIN — CHLORHEXIDINE GLUCONATE 1 APPLICATION(S): 213 SOLUTION TOPICAL at 10:31

## 2019-09-02 RX ADMIN — CARVEDILOL PHOSPHATE 25 MILLIGRAM(S): 80 CAPSULE, EXTENDED RELEASE ORAL at 18:44

## 2019-09-02 RX ADMIN — LEVETIRACETAM 1000 MILLIGRAM(S): 250 TABLET, FILM COATED ORAL at 18:44

## 2019-09-02 RX ADMIN — ATORVASTATIN CALCIUM 40 MILLIGRAM(S): 80 TABLET, FILM COATED ORAL at 21:56

## 2019-09-02 RX ADMIN — Medication 60 MILLIGRAM(S): at 18:45

## 2019-09-02 RX ADMIN — Medication 650 MILLIGRAM(S): at 18:44

## 2019-09-02 RX ADMIN — Medication 100 MILLIGRAM(S): at 21:57

## 2019-09-02 RX ADMIN — Medication 650 MILLIGRAM(S): at 18:46

## 2019-09-02 RX ADMIN — CINACALCET 30 MILLIGRAM(S): 30 TABLET, FILM COATED ORAL at 15:24

## 2019-09-02 RX ADMIN — WARFARIN SODIUM 7.5 MILLIGRAM(S): 2.5 TABLET ORAL at 21:57

## 2019-09-02 RX ADMIN — Medication 1 MILLIGRAM(S): at 15:23

## 2019-09-02 RX ADMIN — Medication 60 MILLIGRAM(S): at 05:29

## 2019-09-02 RX ADMIN — INSULIN GLARGINE 10 UNIT(S): 100 INJECTION, SOLUTION SUBCUTANEOUS at 21:59

## 2019-09-02 RX ADMIN — Medication 300 MILLIGRAM(S): at 06:46

## 2019-09-02 RX ADMIN — TAMSULOSIN HYDROCHLORIDE 0.4 MILLIGRAM(S): 0.4 CAPSULE ORAL at 21:57

## 2019-09-02 RX ADMIN — CARVEDILOL PHOSPHATE 25 MILLIGRAM(S): 80 CAPSULE, EXTENDED RELEASE ORAL at 05:28

## 2019-09-02 RX ADMIN — HEPARIN SODIUM 1900 UNIT(S)/HR: 5000 INJECTION INTRAVENOUS; SUBCUTANEOUS at 10:04

## 2019-09-02 RX ADMIN — LEVETIRACETAM 1000 MILLIGRAM(S): 250 TABLET, FILM COATED ORAL at 06:46

## 2019-09-02 RX ADMIN — MYCOPHENOLIC ACID 360 MILLIGRAM(S): 180 TABLET, DELAYED RELEASE ORAL at 18:43

## 2019-09-02 RX ADMIN — MYCOPHENOLIC ACID 360 MILLIGRAM(S): 180 TABLET, DELAYED RELEASE ORAL at 05:28

## 2019-09-02 RX ADMIN — GABAPENTIN 100 MILLIGRAM(S): 400 CAPSULE ORAL at 05:28

## 2019-09-02 NOTE — PROGRESS NOTE ADULT - ASSESSMENT
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   Pt just moved back to [place] from [place] .. reports that he has not been taking his meds for the past few days since " he might have misplaed them"     pt denies chest pain, syncope,fever, chills, cough, urinary symptoms.    in ED found  to have anemia   Nno acute changes on EKG   elevated CR and Trop    1- HTN urgency : now improving  bp    2- KAMRON  :   attempt TOV   d/w   : pt will likely end up on HD in intermediate and likely long term given transplant graft failure   off tacro,  cont cellcept and sterioods       3- DM:   A1c  5.6  Fs     4- acute diastolic CHF sec to  HTN urgency and renal failure   Echo :  < from: Transthoracic Echocardiogram (08.28.19 @ 16:12) >  1. Mitral annular calcification and calcified mitral  leaflets with decreased diastolic opening.  2. Moderate to severe concentric left ventricular  hypertrophy.  3. Normal left ventricular systolic function with  mid-cavitary obliteration.  4. Normal right ventricular size and systolic function.  5. Small pericardial effusion.    cont lasix for now   cont fluid removal with HD per renal      5-  difficult ambulating :  no focal neuro deficits   neuro eval appreciated    CT T/L   < from: CT Thoracic Spine No Cont (08.30.19 @ 10:06) >    Old right L1 transverse process fracture. Bilateral lysis   defects at L5 as seen on the prior 5/26/2013      6- afib : cont AC with heaprin and coumadin     7- hematochezia : per sister : on and off small amount of fresh blood in stool and some amount in urine but only small amounts   monitor H/H   consult GI .: appreciate input :  suspect bleeding to be hemorrhoidal, now resolved     trial of anusol supp  if bleeding persist would consider colonoscopy however patient is reluctant.    heme input: appreciated        8-  renal transplant : f/u with Transplant team   cont meds with MMF and steroids   off Tac     9- TIA :  unable to perform MRI   CT no acute changes on CT   repeat   fu with neuro   AC restarted given hx of afib

## 2019-09-02 NOTE — PROGRESS NOTE ADULT - SUBJECTIVE AND OBJECTIVE BOX
Subjective: Patient seen and examined. No new events except as noted.     REVIEW OF SYSTEMS:    CONSTITUTIONAL:+ weakness, fevers or chills  EYES/ENT: No visual changes;  No vertigo or throat pain   NECK: No pain or stiffness  RESPIRATORY: No cough, wheezing, hemoptysis; No shortness of breath  CARDIOVASCULAR: No chest pain or palpitations  GASTROINTESTINAL: No abdominal or epigastric pain. No nausea, vomiting, or hematemesis; No diarrhea or constipation. No melena or hematochezia.  GENITOURINARY: No dysuria, frequency or hematuria  NEUROLOGICAL: No numbness or weakness  SKIN: No itching, burning, rashes, or lesions   All other review of systems is negative unless indicated above.    MEDICATIONS:  MEDICATIONS  (STANDING):  allopurinol 100 milliGRAM(s) Oral daily  atorvastatin 40 milliGRAM(s) Oral at bedtime  carvedilol 25 milliGRAM(s) Oral every 12 hours  chlorhexidine 2% Cloths 1 Application(s) Topical daily  cinacalcet 30 milliGRAM(s) Oral daily  darbepoetin Injectable ViaL 40 MICROGram(s) IV Push every week  dextrose 5%. 1000 milliLiter(s) (50 mL/Hr) IV Continuous <Continuous>  dextrose 50% Injectable 12.5 Gram(s) IV Push once  dextrose 50% Injectable 25 Gram(s) IV Push once  dextrose 50% Injectable 25 Gram(s) IV Push once  folic acid 1 milliGRAM(s) Oral daily  furosemide   Injectable 60 milliGRAM(s) IV Push two times a day  gabapentin 100 milliGRAM(s) Oral two times a day  heparin  Infusion.  Unit(s)/Hr (19 mL/Hr) IV Continuous <Continuous>  hydrALAZINE 100 milliGRAM(s) Oral three times a day  hydrocortisone hemorrhoidal Suppository 1 Suppository(s) Rectal at bedtime  insulin glargine Injectable (LANTUS) 10 Unit(s) SubCutaneous at bedtime  insulin lispro (HumaLOG) corrective regimen sliding scale   SubCutaneous three times a day before meals  insulin lispro (HumaLOG) corrective regimen sliding scale   SubCutaneous at bedtime  labetalol 300 milliGRAM(s) Oral two times a day  levETIRAcetam 1000 milliGRAM(s) Oral two times a day  mycophenolic acid  milliGRAM(s) Oral two times a day  predniSONE   Tablet 5 milliGRAM(s) Oral daily  sodium bicarbonate 650 milliGRAM(s) Oral two times a day  tamsulosin 0.4 milliGRAM(s) Oral at bedtime      PHYSICAL EXAM:  T(C): 36.6 (09-02-19 @ 11:00), Max: 37.1 (09-01-19 @ 13:09)  HR: 80 (09-02-19 @ 11:00) (70 - 80)  BP: 131/68 (09-02-19 @ 11:00) (131/68 - 167/79)  RR: 18 (09-02-19 @ 11:00) (16 - 18)  SpO2: 96% (09-02-19 @ 11:00) (95% - 99%)  Wt(kg): --  I&O's Summary    01 Sep 2019 07:01  -  02 Sep 2019 07:00  --------------------------------------------------------  IN: 840 mL / OUT: 1550 mL / NET: -710 mL        Appearance: NAD  HEENT:   Dry oral mucosa, PERRL, EOMI	  Lymphatic: No lymphadenopathy , no edema  Cardiovascular: Normal S1 S2, No JVD, No murmurs , Peripheral pulses palpable 2+ bilaterally  Respiratory: Lungs clear to auscultation, normal effort 	  Gastrointestinal:  Soft, Non-tender, + BS	  Skin: No rashes, No ecchymoses, No cyanosis, warm to touch  Musculoskeletal: Normal range of motion, normal strength  Psychiatry:  Mood & affect appropriate  Ext: No edema  +gutierrez     LABS:    CARDIAC MARKERS:                                7.8    4.64  )-----------( 163      ( 02 Sep 2019 09:01 )             25.9     09-02    137  |  99  |  51<H>  ----------------------------<  129<H>  3.9   |  22  |  6.69<H>    Ca    10.2      02 Sep 2019 07:02  Mg     2.0     09-01      proBNP:   Lipid Profile:   HgA1c:   TSH:             TELEMETRY: 	    ECG:  	  RADIOLOGY:   DIAGNOSTIC TESTING:  [ ] Echocardiogram:  [ ]  Catheterization:  [ ] Stress Test:    OTHER:

## 2019-09-02 NOTE — PROGRESS NOTE ADULT - PROBLEM SELECTOR PLAN 6
On aranesp 40mcg but might need to increase to 60mcg if no change in hgb by tomorrow  Check iron studies as might require IV venofer if Fe sats <20% for darbo to work well

## 2019-09-02 NOTE — PROGRESS NOTE ADULT - SUBJECTIVE AND OBJECTIVE BOX
seen while in HD earlier today.    Has no new complaints    allopurinol 100 milliGRAM(s) Oral daily  aluminum hydroxide/magnesium hydroxide/simethicone Suspension 30 milliLiter(s) Oral every 6 hours PRN  atorvastatin 40 milliGRAM(s) Oral at bedtime  carvedilol 25 milliGRAM(s) Oral every 12 hours  chlorhexidine 2% Cloths 1 Application(s) Topical daily  cinacalcet 30 milliGRAM(s) Oral daily  darbepoetin Injectable ViaL 40 MICROGram(s) IV Push every week  dextrose 40% Gel 15 Gram(s) Oral once PRN  dextrose 5%. 1000 milliLiter(s) IV Continuous <Continuous>  dextrose 50% Injectable 12.5 Gram(s) IV Push once  dextrose 50% Injectable 25 Gram(s) IV Push once  dextrose 50% Injectable 25 Gram(s) IV Push once  folic acid 1 milliGRAM(s) Oral daily  furosemide   Injectable 60 milliGRAM(s) IV Push two times a day  gabapentin 100 milliGRAM(s) Oral two times a day  glucagon  Injectable 1 milliGRAM(s) IntraMuscular once PRN  heparin  Infusion.  Unit(s)/Hr IV Continuous <Continuous>  heparin  Injectable 9000 Unit(s) IV Push every 6 hours PRN  heparin  Injectable 4000 Unit(s) IV Push every 6 hours PRN  hydrALAZINE 100 milliGRAM(s) Oral three times a day  hydrocortisone hemorrhoidal Suppository 1 Suppository(s) Rectal at bedtime  insulin glargine Injectable (LANTUS) 10 Unit(s) SubCutaneous at bedtime  insulin lispro (HumaLOG) corrective regimen sliding scale   SubCutaneous three times a day before meals  insulin lispro (HumaLOG) corrective regimen sliding scale   SubCutaneous at bedtime  labetalol 300 milliGRAM(s) Oral two times a day  levETIRAcetam 1000 milliGRAM(s) Oral two times a day  mycophenolic acid  milliGRAM(s) Oral two times a day  predniSONE   Tablet 5 milliGRAM(s) Oral daily  sodium bicarbonate 650 milliGRAM(s) Oral two times a day  tamsulosin 0.4 milliGRAM(s) Oral at bedtime  warfarin 7.5 milliGRAM(s) Oral once      No Known Allergies      ROS otherwise negative     T(C): 37.9 (09-02-19 @ 21:06), Max: 39.5 (09-02-19 @ 18:06)  HR: 93 (09-02-19 @ 21:06) (71 - 105)  BP: 163/67 (09-02-19 @ 21:06) (131/68 - 184/79)  RR: 18 (09-02-19 @ 21:06) (18 - 18)  SpO2: 94% (09-02-19 @ 21:06) (93% - 98%)  PHYSICAL EXAM  Gen:  laying in HD chair, nad  H:  anicteric, eomi  CV:  RRR, S1, S2  Lungs:  CTA b/l  Ab soft/nt/nd  Ext:  mild LE edema b/l                          7.8    4.64  )-----------( 163      ( 02 Sep 2019 09:01 )             25.9                         8.0    4.21  )-----------( 146      ( 01 Sep 2019 11:21 )             26.2                         8.5    5.0   )-----------( 146      ( 31 Aug 2019 17:24 )             25.8     09-02    137  |  99  |  51<H>  ----------------------------<  129<H>  3.9   |  22  |  6.69<H>    Ca    10.2      02 Sep 2019 07:02  Mg     2.0     09-01 9/1/19 US doppler   IMPRESSION:     Age-indeterminate bilateral deep vein thrombosis above the knee.    Immunofixation, Serum:   IgG Lambda Band Identified    Reference Range: None Detected (08.30.19 @ 09:37)    Protein Electrophoresis, Serum (08.30.19 @ 09:37)    Protein Total, Serum: 5.4 g/dL    Total Protein, Serum: 5.4 g/dL    Albumin, Serum: 2.7 g/dL    Alpha 1: 0.3 g/dL    Alpha 2: 0.9 g/dL    Beta Globulin: 0.4 g/dL    Gamma Globulin: 1.0 g/dL    M-Bishop: 0.8 g/dL    % Albumin: 49.8 %    % Alpha 1: 5.6 %    % Alpha 2: 16.9 %    % Beta: 8.3 %    % Gamma: 19.4 %    % M Bishop: 14.8 %    Albumin/Globulin Ratio: 1.0 Ratio    Serum Protein Electrophoresis Interp: Gamma-Migrating Paraprotein Identified

## 2019-09-02 NOTE — PROGRESS NOTE ADULT - ASSESSMENT
74 year old male with history of ESRD s/p DDRT x2 (2008, 2015?), DM, HTN, cardiomyopathy 2/2 cocaine abuse, Hepatitis C, meningococcal meningitis 1 year ago, who presents to the hospital with 1 week of worsening LE edema and "kidney failure." Nephrology consulted for  KAMRON in a DDRT recipient, now with failed allograft and CKD stage 5 who presents with LE swelling and HTN urgency, now ESRD

## 2019-09-02 NOTE — PROGRESS NOTE ADULT - PROBLEM SELECTOR PLAN 2
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 now with KAMRON over CKD stage 5  , now started on dialysis since 8/27/19. Plan for HD tomorrow. Continue Lasix 60 mg IV bid for optimization of volume status.

## 2019-09-02 NOTE — PROGRESS NOTE ADULT - ASSESSMENT
1. Anemia sec to CKD    -- H/H in 7-8s, slightly lower today, likely hemodilutional prior to scheduled HD  -- low normal  B12, folate. 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  SPEP showing Gamma-Migrating Paraprotein.   Will check serum free light chains to further evaluate.      2. thrombocytopenia     -- platelets nml today  -- most likely related to h/o HCV, ? Mycophenolate contributing  -- adequate counts would not change meds   -- monitor for now    3. LE edema --   -noted age indeterminate DVT b/l LE above knee; provoked by acute illness and immobilizaiton;  no indication for hypercoag w/u   --pt had been on and off AC in past for Afib  --would maintain heparin drip and bridge to coumadin for goal INR 2-3      Philippe Burks MD  Hematology/Oncology  Cell:  [phone]  Office Phone: [phone]  Office Fax:  [phone] [address] [id number]

## 2019-09-02 NOTE — PROGRESS NOTE ADULT - SUBJECTIVE AND OBJECTIVE BOX
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 (30 Aug 2019 12:22)                                                               INTERVAL HPI/OVERNIGHT EVENTS:    REVIEW OF SYSTEMS:     CONSTITUTIONAL: No weakness, fevers or chills  EYES/ENT: No visual changes , no ear ache   NECK: No pain or stiffness  RESPIRATORY: No cough, wheezing,  No shortness of breath  CARDIOVASCULAR: No chest pain or palpitations  GASTROINTESTINAL: No abdominal pain  . No nausea, vomiting, or hematemesis; No diarrhea or constipation. No melena or hematochezia.  GENITOURINARY: No dysuria, frequency or hematuria  NEUROLOGICAL: No numbness or weakness  SKIN: No itching, burning, rashes, or lesions                                                                                                                                                                                                                                                                                 Medications:  MEDICATIONS  (STANDING):  allopurinol 100 milliGRAM(s) Oral daily  atorvastatin 40 milliGRAM(s) Oral at bedtime  carvedilol 25 milliGRAM(s) Oral every 12 hours  chlorhexidine 2% Cloths 1 Application(s) Topical daily  cinacalcet 30 milliGRAM(s) Oral daily  darbepoetin Injectable ViaL 40 MICROGram(s) IV Push every week  dextrose 5%. 1000 milliLiter(s) (50 mL/Hr) IV Continuous <Continuous>  dextrose 50% Injectable 12.5 Gram(s) IV Push once  dextrose 50% Injectable 25 Gram(s) IV Push once  dextrose 50% Injectable 25 Gram(s) IV Push once  folic acid 1 milliGRAM(s) Oral daily  furosemide   Injectable 60 milliGRAM(s) IV Push two times a day  gabapentin 100 milliGRAM(s) Oral two times a day  heparin  Infusion.  Unit(s)/Hr (19 mL/Hr) IV Continuous <Continuous>  hydrALAZINE 100 milliGRAM(s) Oral three times a day  hydrocortisone hemorrhoidal Suppository 1 Suppository(s) Rectal at bedtime  insulin glargine Injectable (LANTUS) 10 Unit(s) SubCutaneous at bedtime  insulin lispro (HumaLOG) corrective regimen sliding scale   SubCutaneous three times a day before meals  insulin lispro (HumaLOG) corrective regimen sliding scale   SubCutaneous at bedtime  labetalol 300 milliGRAM(s) Oral two times a day  levETIRAcetam 1000 milliGRAM(s) Oral two times a day  mycophenolic acid  milliGRAM(s) Oral two times a day  predniSONE   Tablet 5 milliGRAM(s) Oral daily  sodium bicarbonate 650 milliGRAM(s) Oral two times a day  tamsulosin 0.4 milliGRAM(s) Oral at bedtime    MEDICATIONS  (PRN):  aluminum hydroxide/magnesium hydroxide/simethicone Suspension 30 milliLiter(s) Oral every 6 hours PRN Dyspepsia  dextrose 40% Gel 15 Gram(s) Oral once PRN Blood Glucose LESS THAN 70 milliGRAM(s)/deciliter  glucagon  Injectable 1 milliGRAM(s) IntraMuscular once PRN Glucose LESS THAN 70 milligrams/deciliter  heparin  Injectable 9000 Unit(s) IV Push every 6 hours PRN For aPTT less than 40  heparin  Injectable 4000 Unit(s) IV Push every 6 hours PRN For aPTT between 40 - 57       Allergies    No Known Allergies    Intolerances      Vital Signs Last 24 Hrs  T(C): 37.4 (02 Sep 2019 22:18), Max: 39.5 (02 Sep 2019 18:06)  T(F): 99.3 (02 Sep 2019 22:18), Max: 103.1 (02 Sep 2019 18:06)  HR: 93 (02 Sep 2019 21:06) (71 - 105)  BP: 163/67 (02 Sep 2019 21:06) (131/68 - 184/79)  BP(mean): --  RR: 18 (02 Sep 2019 21:06) (18 - 18)  SpO2: 94% (02 Sep 2019 21:06) (93% - 98%)  CAPILLARY BLOOD GLUCOSE      POCT Blood Glucose.: 122 mg/dL (02 Sep 2019 21:40)  POCT Blood Glucose.: 124 mg/dL (02 Sep 2019 18:01)  POCT Blood Glucose.: 123 mg/dL (02 Sep 2019 14:32)  POCT Blood Glucose.: 131 mg/dL (02 Sep 2019 09:58)       @ 07:  -   @ 07:00  --------------------------------------------------------  IN: 840 mL / OUT: 1550 mL / NET: -710 mL     @ 07: @ 22:20  --------------------------------------------------------  IN: 808 mL / OUT: 1950 mL / NET: -1142 mL      Physical Exam:    Daily     Daily Weight in k (02 Sep 2019 14:30)  General:  Well appearing, NAD, not cachetic  HEENT:  Nonicteric, PERRLA  CV:  RRR, S1S2   Lungs:  CTA B/L, no wheezes, rales, rhonchi  Abdomen:  Soft, non-tender, no distended, positive BS  Extremities:  2+ pulses, no c/c, no edema  Skin:  Warm and dry, no rashes  :  No gutierrez  Neuro:  AAOx3, non-focal, grossly intact                                                                                                                                                                                                                                                                                                LABS:                               7.8    4.64  )-----------( 163      ( 02 Sep 2019 09:01 )             25.9                      -02    137  |  99  |  51<H>  ----------------------------<  129<H>  3.9   |  22  |  6.69<H>    Ca    10.2      02 Sep 2019 07:02  Mg     2.0                              RADIOLOGY & ADDITIONAL TESTS         I personally reviewed: [  ]EKG   [  ]CXR    [  ] CT      A/P:         Discussed with :     Marga consultants' Notes   Time spent : Patient is a 74y old  Male who presents with a chief complaint of LE swelling and HTN urgency  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 (30 Aug 2019 12:22)                                                               INTERVAL HPI/OVERNIGHT EVENTS:  seen earlier today   no complaints  seems at baseline     REVIEW OF SYSTEMS:     CONSTITUTIONAL: No weakness, fevers or chills  EYES/ENT: No visual changes , no ear ache   NECK: No pain or stiffness  RESPIRATORY: No cough, wheezing,  No shortness of breath  CARDIOVASCULAR: No chest pain or palpitations  GASTROINTESTINAL: No abdominal pain  . No nausea, vomiting, or hematemesis; No diarrhea or constipation. No melena or hematochezia.  GENITOURINARY: No dysuria, frequency or hematuria  NEUROLOGICAL: No numbness or weakness                                                                                                                                                                                                                                                                                   Medications:  MEDICATIONS  (STANDING):  allopurinol 100 milliGRAM(s) Oral daily  atorvastatin 40 milliGRAM(s) Oral at bedtime  carvedilol 25 milliGRAM(s) Oral every 12 hours  chlorhexidine 2% Cloths 1 Application(s) Topical daily  cinacalcet 30 milliGRAM(s) Oral daily  darbepoetin Injectable ViaL 40 MICROGram(s) IV Push every week  dextrose 5%. 1000 milliLiter(s) (50 mL/Hr) IV Continuous <Continuous>  dextrose 50% Injectable 12.5 Gram(s) IV Push once  dextrose 50% Injectable 25 Gram(s) IV Push once  dextrose 50% Injectable 25 Gram(s) IV Push once  folic acid 1 milliGRAM(s) Oral daily  furosemide   Injectable 60 milliGRAM(s) IV Push two times a day  gabapentin 100 milliGRAM(s) Oral two times a day  heparin  Infusion.  Unit(s)/Hr (19 mL/Hr) IV Continuous <Continuous>  hydrALAZINE 100 milliGRAM(s) Oral three times a day  hydrocortisone hemorrhoidal Suppository 1 Suppository(s) Rectal at bedtime  insulin glargine Injectable (LANTUS) 10 Unit(s) SubCutaneous at bedtime  insulin lispro (HumaLOG) corrective regimen sliding scale   SubCutaneous three times a day before meals  insulin lispro (HumaLOG) corrective regimen sliding scale   SubCutaneous at bedtime  labetalol 300 milliGRAM(s) Oral two times a day  levETIRAcetam 1000 milliGRAM(s) Oral two times a day  mycophenolic acid  milliGRAM(s) Oral two times a day  predniSONE   Tablet 5 milliGRAM(s) Oral daily  sodium bicarbonate 650 milliGRAM(s) Oral two times a day  tamsulosin 0.4 milliGRAM(s) Oral at bedtime    MEDICATIONS  (PRN):  aluminum hydroxide/magnesium hydroxide/simethicone Suspension 30 milliLiter(s) Oral every 6 hours PRN Dyspepsia  dextrose 40% Gel 15 Gram(s) Oral once PRN Blood Glucose LESS THAN 70 milliGRAM(s)/deciliter  glucagon  Injectable 1 milliGRAM(s) IntraMuscular once PRN Glucose LESS THAN 70 milligrams/deciliter  heparin  Injectable 9000 Unit(s) IV Push every 6 hours PRN For aPTT less than 40  heparin  Injectable 4000 Unit(s) IV Push every 6 hours PRN For aPTT between 40 - 57       Allergies    No Known Allergies    Intolerances      Vital Signs Last 24 Hrs  T(C): 37.4 (02 Sep 2019 22:18), Max: 39.5 (02 Sep 2019 18:06)  T(F): 99.3 (02 Sep 2019 22:18), Max: 103.1 (02 Sep 2019 18:06)  HR: 93 (02 Sep 2019 21:06) (71 - 105)  BP: 163/67 (02 Sep 2019 21:06) (131/68 - 184/79)  BP(mean): --  RR: 18 (02 Sep 2019 21:06) (18 - 18)  SpO2: 94% (02 Sep 2019 21:06) (93% - 98%)  CAPILLARY BLOOD GLUCOSE      POCT Blood Glucose.: 122 mg/dL (02 Sep 2019 21:40)  POCT Blood Glucose.: 124 mg/dL (02 Sep 2019 18:01)  POCT Blood Glucose.: 123 mg/dL (02 Sep 2019 14:32)  POCT Blood Glucose.: 131 mg/dL (02 Sep 2019 09:58)       @ 07:  -   @ 07:00  --------------------------------------------------------  IN: 840 mL / OUT: 1550 mL / NET: -710 mL     @ 07:  -   @ 22:20  --------------------------------------------------------  IN: 808 mL / OUT: 1950 mL / NET: -1142 mL      Physical Exam:    Daily     Daily Weight in k (02 Sep 2019 14:30)  General:  NAD  HEENT:  Nonicteric, PERRLA  CV:  RRR, S1S2   Lungs:  CTA B/L, no wheezes, rales, rhonchi  Abdomen:  Soft, non-tender, no distended, positive BS  Extremities:  edema    :  matt  Neuro:  grossly NF                                                                                                                                                                                                     LABS:                               7.8    4.64  )-----------( 163      ( 02 Sep 2019 09:01 )             25.9                      09-02    137  |  99  |  51<H>  ----------------------------<  129<H>  3.9   |  22  |  6.69<H>    Ca    10.2      02 Sep 2019 07:02  Mg     2.0     09-01

## 2019-09-02 NOTE — PROGRESS NOTE ADULT - PROBLEM SELECTOR PLAN 1
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 admitted with fluid overload and HTN urgency . started on IHD on 8/27/19 .last session was 8/28/19 . Plan for HD today, 3 hours- Qb/Qd 400/500, F160, 2K bath and target UF 2L.  Cont diuresis as well

## 2019-09-02 NOTE — PROGRESS NOTE ADULT - SUBJECTIVE AND OBJECTIVE BOX
[facility] DIVISION OF KIDNEY DISEASES AND HYPERTENSION -- FOLLOW UP NOTE  --------------------------------------------------------------------------------  Chief Complaint:  ESRD now post DDRTx  24 hour events/subjective:        PAST HISTORY  --------------------------------------------------------------------------------  No significant changes to PMH, PSH, FHx, SHx, unless otherwise noted    ALLERGIES & MEDICATIONS  --------------------------------------------------------------------------------  Allergies    No Known Allergies    Intolerances      Standing Inpatient Medications  allopurinol 100 milliGRAM(s) Oral daily  atorvastatin 40 milliGRAM(s) Oral at bedtime  carvedilol 25 milliGRAM(s) Oral every 12 hours  chlorhexidine 2% Cloths 1 Application(s) Topical daily  darbepoetin Injectable ViaL 40 MICROGram(s) IV Push every week  dextrose 5%. 1000 milliLiter(s) IV Continuous <Continuous>  dextrose 50% Injectable 12.5 Gram(s) IV Push once  dextrose 50% Injectable 25 Gram(s) IV Push once  dextrose 50% Injectable 25 Gram(s) IV Push once  folic acid 1 milliGRAM(s) Oral daily  furosemide   Injectable 60 milliGRAM(s) IV Push two times a day  gabapentin 100 milliGRAM(s) Oral two times a day  heparin  Infusion.  Unit(s)/Hr IV Continuous <Continuous>  hydrALAZINE 100 milliGRAM(s) Oral three times a day  hydrocortisone hemorrhoidal Suppository 1 Suppository(s) Rectal at bedtime  insulin glargine Injectable (LANTUS) 10 Unit(s) SubCutaneous at bedtime  insulin lispro (HumaLOG) corrective regimen sliding scale   SubCutaneous three times a day before meals  insulin lispro (HumaLOG) corrective regimen sliding scale   SubCutaneous at bedtime  labetalol 300 milliGRAM(s) Oral two times a day  levETIRAcetam 1000 milliGRAM(s) Oral two times a day  mycophenolic acid  milliGRAM(s) Oral two times a day  predniSONE   Tablet 5 milliGRAM(s) Oral daily  sodium bicarbonate 650 milliGRAM(s) Oral two times a day  tamsulosin 0.4 milliGRAM(s) Oral at bedtime    PRN Inpatient Medications  aluminum hydroxide/magnesium hydroxide/simethicone Suspension 30 milliLiter(s) Oral every 6 hours PRN  dextrose 40% Gel 15 Gram(s) Oral once PRN  glucagon  Injectable 1 milliGRAM(s) IntraMuscular once PRN  heparin  Injectable 9000 Unit(s) IV Push every 6 hours PRN  heparin  Injectable 4000 Unit(s) IV Push every 6 hours PRN      Gen: No weight changes, fatigue, fevers/chills, weakness  Head/Eyes/Ears/Mouth: No headache; Normal hearing; Normal vision    Respiratory: No dyspnea, cough, wheezing, hemoptysis  CV: No chest pain, PND, orthopnea  GI: No abdominal pain, diarrhea, constipation, nausea, vomiting, melena, hematochezia  : No increased frequency, dysuria, hematuria, nocturia  MSK: No joint pain/swelling; no back pain; no edema   Heme: No easy bruising or bleeding  All other systems were reviewed and are negative, except as noted.      VITALS/PHYSICAL EXAM  --------------------------------------------------------------------------------  T(C): 36.8 (09-02-19 @ 04:42), Max: 37.1 (09-01-19 @ 13:09)  HR: 72 (09-02-19 @ 06:45) (70 - 80)  BP: 141/61 (09-02-19 @ 06:45) (136/83 - 167/79)  RR: 18 (09-02-19 @ 04:42) (16 - 18)  SpO2: 96% (09-02-19 @ 04:42) (95% - 99%)  Wt(kg): --        09-01-19 @ 07:01  -  09-02-19 @ 07:00  --------------------------------------------------------  IN: 840 mL / OUT: 1550 mL / NET: -710 mL    PHYSICAL EXAM: vital signs as above  in no apparent distress  Neck: Supple, no JVD,    Lungs: no rhonchi, no wheeze, no crackles  CVS: S1 S2 no M/R/G  Abdomen: no tenderness, no organomegaly, BS present  Neuro: Grossly intact  Skin: warm, dry  Ext: no cyanosis or clubbing, no edema  Access: L VF + thrill + bruit      LABS/STUDIES  --------------------------------------------------------------------------------              7.8    4.64  >-----------<  163      [09-02-19 @ 09:01]              25.9     137  |  99  |  51  ----------------------------<  129      [09-02-19 @ 07:02]  3.9   |  22  |  6.69        Ca     10.2     [09-02-19 @ 07:02]      Mg     2.0     [09-01-19 @ 07:08]    TPro  6.1  /  Alb  3.1  /  TBili  0.3  /  DBili  x   /  AST  10  /  ALT  9   /  AlkPhos  72  [08-31-19 @ 10:17]    PT/INR: PT 16.1 , INR 1.40       [09-02-19 @ 09:05]  PTT: 66.3       [09-02-19 @ 09:05]      Creatinine Trend:  SCr 6.69 [09-02 @ 07:02]  SCr 5.73 [09-01 @ 07:08]  SCr 4.95 [08-31 @ 10:17]  SCr 4.70 [08-31 @ 06:26]  SCr 5.64 [08-30 @ 06:52]    Urinalysis - [08-26-19 @ 20:28]      Color Light Yellow / Appearance Clear / SG 1.017 / pH 6.5      Gluc 200 mg/dL / Ketone Negative  / Bili Negative / Urobili Negative       Blood Moderate / Protein >600 / Leuk Est Negative / Nitrite Negative      RBC 13 / WBC 13 / Hyaline 4 / Gran  / Sq Epi  / Non Sq Epi 5 / Bacteria Negative      Iron 31, TIBC 121, %sat 26      [08-27-19 @ 15:07]  Ferritin 294      [08-27-19 @ 15:07]  PTH -- (Ca 9.1)      [08-27-19 @ 15:07]   1896  HbA1c 5.6      [08-27-19 @ 09:03]  TSH 5.45      [08-30-19 @ 09:41]    HBsAb 19.4      [08-27-19 @ 15:26]  HBsAg Nonreact      [08-27-19 @ 15:26]  HCV 10.44, Reactive      [08-27-19 @ 08:29]    Immunofixation Serum:   IgG Lambda Band Identified    Reference Range: None Detected      [08-30-19 @ 09:37]  SPEP Interpretation: Gamma-Migrating Paraprotein Identified      [08-30-19 @ 09:37]

## 2019-09-03 LAB
ANION GAP SERPL CALC-SCNC: 15 MMOL/L — SIGNIFICANT CHANGE UP (ref 5–17)
APPEARANCE UR: ABNORMAL
APTT BLD: 129.9 SEC — CRITICAL HIGH (ref 27.5–36.3)
APTT BLD: 51.1 SEC — HIGH (ref 27.5–36.3)
BACTERIA # UR AUTO: ABNORMAL
BILIRUB UR-MCNC: NEGATIVE — SIGNIFICANT CHANGE UP
BUN SERPL-MCNC: 36 MG/DL — HIGH (ref 7–23)
C DIFF BY PCR RESULT: SIGNIFICANT CHANGE UP
C DIFF GDH STL QL: SIGNIFICANT CHANGE UP
C DIFF GDH STL QL: SIGNIFICANT CHANGE UP
C DIFF TOX GENS STL QL NAA+PROBE: SIGNIFICANT CHANGE UP
CALCIUM SERPL-MCNC: 9.4 MG/DL — SIGNIFICANT CHANGE UP (ref 8.4–10.5)
CHLORIDE SERPL-SCNC: 99 MMOL/L — SIGNIFICANT CHANGE UP (ref 96–108)
CO2 SERPL-SCNC: 25 MMOL/L — SIGNIFICANT CHANGE UP (ref 22–31)
COLOR SPEC: ABNORMAL
CREAT SERPL-MCNC: 5.3 MG/DL — HIGH (ref 0.5–1.3)
DIFF PNL FLD: ABNORMAL
E COLI DNA BLD POS QL NAA+NON-PROBE: SIGNIFICANT CHANGE UP
EPI CELLS # UR: 10 /HPF — HIGH (ref 0–5)
GLUCOSE BLDC GLUCOMTR-MCNC: 123 MG/DL — HIGH (ref 70–99)
GLUCOSE BLDC GLUCOMTR-MCNC: 129 MG/DL — HIGH (ref 70–99)
GLUCOSE BLDC GLUCOMTR-MCNC: 140 MG/DL — HIGH (ref 70–99)
GLUCOSE BLDC GLUCOMTR-MCNC: 89 MG/DL — SIGNIFICANT CHANGE UP (ref 70–99)
GLUCOSE SERPL-MCNC: 124 MG/DL — HIGH (ref 70–99)
GLUCOSE UR QL: NEGATIVE — SIGNIFICANT CHANGE UP
GRAM STN FLD: SIGNIFICANT CHANGE UP
HCT VFR BLD CALC: 27.2 % — LOW (ref 39–50)
HGB BLD-MCNC: 8.6 G/DL — LOW (ref 13–17)
HYALINE CASTS # UR AUTO: 3 /LPF — SIGNIFICANT CHANGE UP (ref 0–7)
INR BLD: 1.59 RATIO — HIGH (ref 0.88–1.16)
KAPPA LC SER QL IFE: 5.67 MG/DL — HIGH (ref 0.33–1.94)
KAPPA/LAMBDA FREE LIGHT CHAIN RATIO, SERUM: 0.36 RATIO — SIGNIFICANT CHANGE UP (ref 0.26–1.65)
KETONES UR-MCNC: NEGATIVE — SIGNIFICANT CHANGE UP
LAMBDA LC SER QL IFE: 15.56 MG/DL — HIGH (ref 0.57–2.63)
LEUKOCYTE ESTERASE UR-ACNC: ABNORMAL
MCHC RBC-ENTMCNC: 26.7 PG — LOW (ref 27–34)
MCHC RBC-ENTMCNC: 31.6 GM/DL — LOW (ref 32–36)
MCV RBC AUTO: 84.5 FL — SIGNIFICANT CHANGE UP (ref 80–100)
METHOD TYPE: SIGNIFICANT CHANGE UP
NITRITE UR-MCNC: NEGATIVE — SIGNIFICANT CHANGE UP
PH UR: 8 — SIGNIFICANT CHANGE UP (ref 5–8)
PLATELET # BLD AUTO: 165 K/UL — SIGNIFICANT CHANGE UP (ref 150–400)
POTASSIUM SERPL-MCNC: 4 MMOL/L — SIGNIFICANT CHANGE UP (ref 3.5–5.3)
POTASSIUM SERPL-SCNC: 4 MMOL/L — SIGNIFICANT CHANGE UP (ref 3.5–5.3)
PROT UR-MCNC: ABNORMAL
PROTHROM AB SERPL-ACNC: 18.6 SEC — HIGH (ref 10–13.1)
RAPID RVP RESULT: SIGNIFICANT CHANGE UP
RBC # BLD: 3.22 M/UL — LOW (ref 4.2–5.8)
RBC # FLD: 14.8 % — HIGH (ref 10.3–14.5)
RBC CASTS # UR COMP ASSIST: 277 /HPF — HIGH (ref 0–4)
SODIUM SERPL-SCNC: 139 MMOL/L — SIGNIFICANT CHANGE UP (ref 135–145)
SP GR SPEC: 1.01 — SIGNIFICANT CHANGE UP (ref 1.01–1.02)
SPECIMEN SOURCE: SIGNIFICANT CHANGE UP
UROBILINOGEN FLD QL: SIGNIFICANT CHANGE UP
VIT D25+D1,25 OH+D1,25 PNL SERPL-MCNC: <5 PG/ML — LOW (ref 19.9–79.3)
WBC # BLD: 8.37 K/UL — SIGNIFICANT CHANGE UP (ref 3.8–10.5)
WBC # FLD AUTO: 8.37 K/UL — SIGNIFICANT CHANGE UP (ref 3.8–10.5)
WBC UR QL: >720 /HPF — HIGH (ref 0–5)

## 2019-09-03 PROCEDURE — 70450 CT HEAD/BRAIN W/O DYE: CPT | Mod: 26

## 2019-09-03 PROCEDURE — 71045 X-RAY EXAM CHEST 1 VIEW: CPT | Mod: 26

## 2019-09-03 RX ORDER — ACETAMINOPHEN 500 MG
650 TABLET ORAL ONCE
Refills: 0 | Status: COMPLETED | OUTPATIENT
Start: 2019-09-03 | End: 2019-09-03

## 2019-09-03 RX ORDER — INSULIN GLARGINE 100 [IU]/ML
5 INJECTION, SOLUTION SUBCUTANEOUS ONCE
Refills: 0 | Status: COMPLETED | OUTPATIENT
Start: 2019-09-03 | End: 2019-09-03

## 2019-09-03 RX ORDER — WARFARIN SODIUM 2.5 MG/1
7.5 TABLET ORAL ONCE
Refills: 0 | Status: DISCONTINUED | OUTPATIENT
Start: 2019-09-03 | End: 2019-09-03

## 2019-09-03 RX ORDER — ACETAMINOPHEN 500 MG
1000 TABLET ORAL ONCE
Refills: 0 | Status: COMPLETED | OUTPATIENT
Start: 2019-09-03 | End: 2019-09-03

## 2019-09-03 RX ORDER — CEFEPIME 1 G/1
INJECTION, POWDER, FOR SOLUTION INTRAMUSCULAR; INTRAVENOUS
Refills: 0 | Status: DISCONTINUED | OUTPATIENT
Start: 2019-09-03 | End: 2019-09-04

## 2019-09-03 RX ORDER — VANCOMYCIN HCL 1 G
1000 VIAL (EA) INTRAVENOUS ONCE
Refills: 0 | Status: COMPLETED | OUTPATIENT
Start: 2019-09-03 | End: 2019-09-03

## 2019-09-03 RX ORDER — THIAMINE MONONITRATE (VIT B1) 100 MG
100 TABLET ORAL DAILY
Refills: 0 | Status: DISCONTINUED | OUTPATIENT
Start: 2019-09-03 | End: 2019-09-08

## 2019-09-03 RX ORDER — LEVETIRACETAM 250 MG/1
500 TABLET, FILM COATED ORAL
Refills: 0 | Status: DISCONTINUED | OUTPATIENT
Start: 2019-09-04 | End: 2019-09-08

## 2019-09-03 RX ORDER — CEFEPIME 1 G/1
1000 INJECTION, POWDER, FOR SOLUTION INTRAMUSCULAR; INTRAVENOUS ONCE
Refills: 0 | Status: COMPLETED | OUTPATIENT
Start: 2019-09-03 | End: 2019-09-03

## 2019-09-03 RX ORDER — ERGOCALCIFEROL 1.25 MG/1
50000 CAPSULE ORAL
Refills: 0 | Status: DISCONTINUED | OUTPATIENT
Start: 2019-09-03 | End: 2019-09-24

## 2019-09-03 RX ORDER — CEFEPIME 1 G/1
1000 INJECTION, POWDER, FOR SOLUTION INTRAMUSCULAR; INTRAVENOUS EVERY 24 HOURS
Refills: 0 | Status: DISCONTINUED | OUTPATIENT
Start: 2019-09-04 | End: 2019-09-04

## 2019-09-03 RX ORDER — ACETAMINOPHEN 500 MG
650 TABLET ORAL ONCE
Refills: 0 | Status: DISCONTINUED | OUTPATIENT
Start: 2019-09-03 | End: 2019-09-03

## 2019-09-03 RX ORDER — ACETAMINOPHEN 500 MG
650 TABLET ORAL EVERY 6 HOURS
Refills: 0 | Status: DISCONTINUED | OUTPATIENT
Start: 2019-09-03 | End: 2019-09-24

## 2019-09-03 RX ADMIN — Medication 300 MILLIGRAM(S): at 05:19

## 2019-09-03 RX ADMIN — Medication 650 MILLIGRAM(S): at 10:35

## 2019-09-03 RX ADMIN — CARVEDILOL PHOSPHATE 25 MILLIGRAM(S): 80 CAPSULE, EXTENDED RELEASE ORAL at 05:20

## 2019-09-03 RX ADMIN — CEFEPIME 100 MILLIGRAM(S): 1 INJECTION, POWDER, FOR SOLUTION INTRAMUSCULAR; INTRAVENOUS at 16:58

## 2019-09-03 RX ADMIN — Medication 100 MILLIGRAM(S): at 22:48

## 2019-09-03 RX ADMIN — HEPARIN SODIUM 4000 UNIT(S): 5000 INJECTION INTRAVENOUS; SUBCUTANEOUS at 10:22

## 2019-09-03 RX ADMIN — Medication 250 MILLIGRAM(S): at 18:12

## 2019-09-03 RX ADMIN — Medication 100 MILLIGRAM(S): at 05:20

## 2019-09-03 RX ADMIN — MYCOPHENOLIC ACID 360 MILLIGRAM(S): 180 TABLET, DELAYED RELEASE ORAL at 05:19

## 2019-09-03 RX ADMIN — Medication 60 MILLIGRAM(S): at 05:26

## 2019-09-03 RX ADMIN — GABAPENTIN 100 MILLIGRAM(S): 400 CAPSULE ORAL at 05:19

## 2019-09-03 RX ADMIN — Medication 650 MILLIGRAM(S): at 16:45

## 2019-09-03 RX ADMIN — Medication 650 MILLIGRAM(S): at 05:40

## 2019-09-03 RX ADMIN — Medication 650 MILLIGRAM(S): at 05:19

## 2019-09-03 RX ADMIN — Medication 400 MILLIGRAM(S): at 23:41

## 2019-09-03 RX ADMIN — HEPARIN SODIUM 1800 UNIT(S)/HR: 5000 INJECTION INTRAVENOUS; SUBCUTANEOUS at 18:13

## 2019-09-03 RX ADMIN — Medication 5 MILLIGRAM(S): at 05:19

## 2019-09-03 RX ADMIN — Medication 650 MILLIGRAM(S): at 04:40

## 2019-09-03 RX ADMIN — HEPARIN SODIUM 2100 UNIT(S)/HR: 5000 INJECTION INTRAVENOUS; SUBCUTANEOUS at 10:20

## 2019-09-03 RX ADMIN — HEPARIN SODIUM 0 UNIT(S)/HR: 5000 INJECTION INTRAVENOUS; SUBCUTANEOUS at 17:09

## 2019-09-03 RX ADMIN — Medication 60 MILLIGRAM(S): at 18:25

## 2019-09-03 RX ADMIN — TAMSULOSIN HYDROCHLORIDE 0.4 MILLIGRAM(S): 0.4 CAPSULE ORAL at 22:48

## 2019-09-03 RX ADMIN — INSULIN GLARGINE 10 UNIT(S): 100 INJECTION, SOLUTION SUBCUTANEOUS at 22:41

## 2019-09-03 RX ADMIN — LEVETIRACETAM 1000 MILLIGRAM(S): 250 TABLET, FILM COATED ORAL at 05:20

## 2019-09-03 RX ADMIN — ATORVASTATIN CALCIUM 40 MILLIGRAM(S): 80 TABLET, FILM COATED ORAL at 22:48

## 2019-09-03 NOTE — PROGRESS NOTE ADULT - SUBJECTIVE AND OBJECTIVE BOX
pt more sleepy today    had CT head this AM as reported below.    acetaminophen   Tablet .. 650 milliGRAM(s) Oral every 6 hours PRN  allopurinol 100 milliGRAM(s) Oral daily  aluminum hydroxide/magnesium hydroxide/simethicone Suspension 30 milliLiter(s) Oral every 6 hours PRN  atorvastatin 40 milliGRAM(s) Oral at bedtime  carvedilol 25 milliGRAM(s) Oral every 12 hours  chlorhexidine 2% Cloths 1 Application(s) Topical daily  cinacalcet 30 milliGRAM(s) Oral daily  darbepoetin Injectable ViaL 40 MICROGram(s) IV Push every week  dextrose 40% Gel 15 Gram(s) Oral once PRN  dextrose 5%. 1000 milliLiter(s) IV Continuous <Continuous>  dextrose 50% Injectable 12.5 Gram(s) IV Push once  dextrose 50% Injectable 25 Gram(s) IV Push once  dextrose 50% Injectable 25 Gram(s) IV Push once  ergocalciferol 61983 Unit(s) Oral every week  folic acid 1 milliGRAM(s) Oral daily  furosemide   Injectable 60 milliGRAM(s) IV Push two times a day  gabapentin 100 milliGRAM(s) Oral two times a day  glucagon  Injectable 1 milliGRAM(s) IntraMuscular once PRN  heparin  Infusion.  Unit(s)/Hr IV Continuous <Continuous>  heparin  Injectable 9000 Unit(s) IV Push every 6 hours PRN  heparin  Injectable 4000 Unit(s) IV Push every 6 hours PRN  hydrALAZINE 100 milliGRAM(s) Oral three times a day  hydrocortisone hemorrhoidal Suppository 1 Suppository(s) Rectal at bedtime  insulin glargine Injectable (LANTUS) 10 Unit(s) SubCutaneous at bedtime  insulin lispro (HumaLOG) corrective regimen sliding scale   SubCutaneous three times a day before meals  insulin lispro (HumaLOG) corrective regimen sliding scale   SubCutaneous at bedtime  labetalol 300 milliGRAM(s) Oral two times a day  levETIRAcetam 1000 milliGRAM(s) Oral two times a day  mycophenolic acid  milliGRAM(s) Oral two times a day  predniSONE   Tablet 5 milliGRAM(s) Oral daily  sodium bicarbonate 650 milliGRAM(s) Oral two times a day  tamsulosin 0.4 milliGRAM(s) Oral at bedtime  vancomycin  IVPB 1000 milliGRAM(s) IV Intermittent once  warfarin 7.5 milliGRAM(s) Oral once      No Known Allergies      ROS otherwise negative     T(C): 38.1 (09-03-19 @ 10:36), Max: 39.5 (09-02-19 @ 18:06)  HR: 81 (09-03-19 @ 10:36) (77 - 105)  BP: 124/61 (09-03-19 @ 10:36) (124/61 - 184/79)  RR: 20 (09-03-19 @ 10:36) (18 - 20)  SpO2: 94% (09-03-19 @ 10:36) (92% - 98%)  PHYSICAL EXAM  Gen:  laying in bed, lethargic, minimally responsive to verbal stimuli  CV:  RRR, S1, S2  Lungs:  CTA b/l  Ab soft/nt/nd  Ext:  no edema    Immunoglobulin Free Light Chains, Serum (09.03.19 @ 10:11)    Wyatt/Lambda Free Light Chain Ratio, Serum: 0.36 Ratio    LEONARDO Kappa: 5.67 mg/dL    LEONARDO Lambda: 15.56 mg/dL                          8.6    8.37  )-----------( 165      ( 03 Sep 2019 09:04 )             27.2                         7.8    4.64  )-----------( 163      ( 02 Sep 2019 09:01 )             25.9                         8.0    4.21  )-----------( 146      ( 01 Sep 2019 11:21 )             26.2   09-03    139  |  99  |  36<H>  ----------------------------<  124<H>  4.0   |  25  |  5.30<H>    Ca    9.4      03 Sep 2019 06:53    PT/INR - ( 03 Sep 2019 09:35 )   PT: 18.6 sec;   INR: 1.59 ratio         PTT - ( 03 Sep 2019 09:35 )  PTT:51.1 sec
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
## 2019-09-03 NOTE — DIETITIAN INITIAL EVALUATION ADULT. - PERTINENT LABORATORY DATA
Na 139 [135 - 145], K+ 4.0 [3.5 - 5.3], BUN 36 [7 - 23], Cr 5.30 [0.50 - 1.30],  [70 - 99], Phos --, Alk Phos --, AST --, ALT --, Mg --, Ca 9.4 [8.4 - 10.5], HbA1c 5.6% (8/27), POCT blood glucose (9/1-3) 122-146

## 2019-09-03 NOTE — DIETITIAN INITIAL EVALUATION ADULT. - PERTINENT MEDS FT
MEDICATIONS  (STANDING):  allopurinol 100 milliGRAM(s) Oral daily  atorvastatin 40 milliGRAM(s) Oral at bedtime  carvedilol 25 milliGRAM(s) Oral every 12 hours  cefepime   IVPB      cefepime   IVPB 1000 milliGRAM(s) IV Intermittent once  chlorhexidine 2% Cloths 1 Application(s) Topical daily  cinacalcet 30 milliGRAM(s) Oral daily  darbepoetin Injectable ViaL 40 MICROGram(s) IV Push every week  dextrose 5%. 1000 milliLiter(s) (50 mL/Hr) IV Continuous <Continuous>  dextrose 50% Injectable 12.5 Gram(s) IV Push once  dextrose 50% Injectable 25 Gram(s) IV Push once  dextrose 50% Injectable 25 Gram(s) IV Push once  ergocalciferol 08276 Unit(s) Oral every week  folic acid 1 milliGRAM(s) Oral daily  furosemide   Injectable 60 milliGRAM(s) IV Push two times a day  gabapentin 100 milliGRAM(s) Oral two times a day  heparin  Infusion.  Unit(s)/Hr (19 mL/Hr) IV Continuous <Continuous>  hydrALAZINE 100 milliGRAM(s) Oral three times a day  hydrocortisone hemorrhoidal Suppository 1 Suppository(s) Rectal at bedtime  insulin glargine Injectable (LANTUS) 10 Unit(s) SubCutaneous at bedtime  insulin lispro (HumaLOG) corrective regimen sliding scale   SubCutaneous three times a day before meals  insulin lispro (HumaLOG) corrective regimen sliding scale   SubCutaneous at bedtime  labetalol 300 milliGRAM(s) Oral two times a day  levETIRAcetam 1000 milliGRAM(s) Oral two times a day  mycophenolic acid  milliGRAM(s) Oral two times a day  predniSONE   Tablet 5 milliGRAM(s) Oral daily  sodium bicarbonate 650 milliGRAM(s) Oral two times a day  tamsulosin 0.4 milliGRAM(s) Oral at bedtime  vancomycin  IVPB 1000 milliGRAM(s) IV Intermittent once    MEDICATIONS  (PRN):  acetaminophen   Tablet .. 650 milliGRAM(s) Oral every 6 hours PRN Temp greater or equal to 38C (100.4F)  aluminum hydroxide/magnesium hydroxide/simethicone Suspension 30 milliLiter(s) Oral every 6 hours PRN Dyspepsia  dextrose 40% Gel 15 Gram(s) Oral once PRN Blood Glucose LESS THAN 70 milliGRAM(s)/deciliter  glucagon  Injectable 1 milliGRAM(s) IntraMuscular once PRN Glucose LESS THAN 70 milligrams/deciliter  heparin  Injectable 9000 Unit(s) IV Push every 6 hours PRN For aPTT less than 40  heparin  Injectable 4000 Unit(s) IV Push every 6 hours PRN For aPTT between 40 - 57

## 2019-09-03 NOTE — DIETITIAN INITIAL EVALUATION ADULT. - ENERGY NEEDS
Height: 74 inches, Weight: 218.2 pounds  BMI: 28 kg/m2 IBW: 190 pounds (+/-10%), %IBW: 115%  Pertinent Info: Per chart, 73 y/o male with PMH of T2DM, ESRD s/p DDRT 1 year ago, HTN, admitted with SOB x 2 weeks and not taking medication x 2 weeks, ADHF, HTN urgency and renal failure requiring HD, bilateral DVT started on Coumadin. Per NP, no SLP swallow evaluation for now, pending spinal tap tomorrow morning and plan to keep NPO for today. +3 bilateral ankles/feet edema, no pressure ulcers noted at this time.

## 2019-09-03 NOTE — DIETITIAN INITIAL EVALUATION ADULT. - OTHER INFO
Pt was eating well with % po intakes documented per nursing flowsheet. Pt became lethargic today, no responding to questions, made NPO per attending MD concern for aspiration pending ID clearance. No GI distress noted, +BM today per chart. NKFA per previous RD note. Pt's wt was documented as 200 pounds (7/21/2013), current wt is 218.2 pounds (9/2/2019). Per chart pt with diabetes, well controlled with insulin regimen and HD with HgbA1C @ 5.6%.

## 2019-09-03 NOTE — CONSULT NOTE ADULT - ASSESSMENT
73 yo male with history of A Fib, Hep C, DM, ESRD s/p renal transplant x 2, admitted 8/26 with progressive CKD and rectal bleeding.  He was on tacrolimus, myfortic, and 5 mg of prednisone along with prophylactic valtrex.  He has required HD and now is febrile.  No obvious source of infection.His CMV viral loads were negative as per prior transplant team.  He recently moved up from [place].  Suggest:  1.Vanco/cefepime-prefer CNS penetrating agents  2.Blood cultures  3.Consider CT of C/A/P  4.Serum cryptococcal antigen, Histo antigen of urine and blood,   5.Favor LP routine studies, ME PCR panel, routine , AFB, and fungal culture  6.Hep C viral load and Hep B serologies

## 2019-09-03 NOTE — CONSULT NOTE ADULT - SUBJECTIVE AND OBJECTIVE BOX
HPI:   Patient is a 74y male with a past history of DM,CKD,ex cocaine use,A fib,s/p renal transplant x 2, treated for pneumococcal  meningitis 2 years ago at [facility] who than lived in [place] x 1 year and is now in [place] with need for HD.He was admitted to [facility] on  with rectal bleeding, he also has required HD.Rectal bleed felt secondary to hemmorhoids.He has developed confusion along with fever, neurology has seen, head CT is negative.He is on valtrex as part of his regimen, he is not on any antifungals and the details of his meningitis  treatment not known.He was started on vanco and zosyn .He has DVT and has been on heparin, was going to be converted to coumadin.I have called his sister, she is not aware of details other than he had meningitis 2  years ago in [place].I have now received a phone call from transplant team in [place] and they report 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    REVIEW OF SYSTEMS:  All other review of systems negative (Comprehensive ROS): lethargic and poorly interactive    PAST MEDICAL & SURGICAL HISTORY:  Kidney transplant recipient  Anuria  GERD (gastroesophageal reflux disease)  Kidney transplant failure: dx: 2013  Hepatitis C: dx: 90&#x27;s.  From iv drug abuse  GERD (gastroesophageal reflux disease)  Renal transplant failure and rejection  Rectal abscess:   IV drug abuse: former, cocaine. 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 650 milliGRAM(s) Oral every 6 hours PRN  allopurinol 100 milliGRAM(s) Oral daily  aluminum hydroxide/magnesium hydroxide/simethicone Suspension 30 milliLiter(s) Oral every 6 hours PRN  atorvastatin 40 milliGRAM(s) Oral at bedtime  carvedilol 25 milliGRAM(s) Oral every 12 hours  chlorhexidine 2% Cloths 1 Application(s) Topical daily  cinacalcet 30 milliGRAM(s) Oral daily  darbepoetin Injectable ViaL 40 MICROGram(s) IV Push every week  dextrose 40% Gel 15 Gram(s) Oral once PRN  dextrose 5%. 1000 milliLiter(s) IV Continuous <Continuous>  dextrose 50% Injectable 12.5 Gram(s) IV Push once  dextrose 50% Injectable 25 Gram(s) IV Push once  dextrose 50% Injectable 25 Gram(s) IV Push once  ergocalciferol 12525 Unit(s) Oral every week  folic acid 1 milliGRAM(s) Oral daily  furosemide   Injectable 60 milliGRAM(s) IV Push two times a day  gabapentin 100 milliGRAM(s) Oral two times a day  glucagon  Injectable 1 milliGRAM(s) IntraMuscular once PRN  heparin  Infusion.  Unit(s)/Hr IV Continuous <Continuous>  heparin  Injectable 9000 Unit(s) IV Push every 6 hours PRN  heparin  Injectable 4000 Unit(s) IV Push every 6 hours PRN  hydrALAZINE 100 milliGRAM(s) Oral three times a day  hydrocortisone hemorrhoidal Suppository 1 Suppository(s) Rectal at bedtime  insulin glargine Injectable (LANTUS) 10 Unit(s) SubCutaneous at bedtime  insulin lispro (HumaLOG) corrective regimen sliding scale   SubCutaneous three times a day before meals  insulin lispro (HumaLOG) corrective regimen sliding scale   SubCutaneous at bedtime  labetalol 300 milliGRAM(s) Oral two times a day  levETIRAcetam 1000 milliGRAM(s) Oral two times a day  mycophenolic acid  milliGRAM(s) Oral two times a day  predniSONE   Tablet 5 milliGRAM(s) Oral daily  sodium bicarbonate 650 milliGRAM(s) Oral two times a day  tamsulosin 0.4 milliGRAM(s) Oral at bedtime  warfarin 7.5 milliGRAM(s) Oral once      FAMILY HISTORY:  Family history of breast cancer in sister (Sibling): living at 92 yo  Family history of prostate cancer in father  Family history of lung cancer  Family history of hypertension in mother  Family history of diabetes mellitus: mother      SOCIAL HISTORY:  Smoking:?     ETOH: ?    Drug Use: ex cocaine   Single     T(F): 100.6 (19 @ 10:36), Max: 103.1 (19 @ 18:06)  HR: 81 (19 @ 10:36)  BP: 124/61 (19 @ 10:36)  RR: 20 (19 @ 10:36)  SpO2: 94% (19 @ 10:36)  Wt(kg): --    PHYSICAL EXAM:  General: lethargic, no acute distress  Eyes:  anicteric, no conjunctival injection, no discharge  Oropharynx: no lesions or injection 	  Neck: supple, without adenopathy  Lungs: clear to auscultation  Heart: irregular rate and rhythm; no murmur, rubs or gallops  Abdomen: soft, nondistended, nontender, without mass or organomegaly  Skin: no lesions  Extremities: no clubbing, cyanosis, or edema  Neurologic: lethargic and poorly interactive, moves all extremities    LAB RESULTS:                        8.6    8.37  )-----------( 165      ( 03 Sep 2019 09:04 )             27.2         139  |  99  |  36<H>  ----------------------------<  124<H>  4.0   |  25  |  5.30<H>    Ca    9.4      03 Sep 2019 06:53        Urinalysis Basic - ( 02 Sep 2019 23:57 )    Color: Orange / Appearance: Turbid / S.014 / pH: x  Gluc: x / Ketone: Negative  / Bili: Negative / Urobili: <2 mg/dL   Blood: x / Protein: >600 mg/dL / Nitrite: Negative   Leuk Esterase: Large / RBC: 277 /HPF / WBC >720 /HPF   Sq Epi: x / Non Sq Epi: 10 /HPF / Bacteria: TNTC        MICROBIOLOGY:  RECENT CULTURES:        RADIOLOGY REVIEWED:  < from: CT Head No Cont (19 @ 12:01) >  INTERPRETATION:  INDICATIONS:  ESRD patient with A. fib, evaluate for   stroke    TECHNIQUE:  Serial axial images were obtained from the skull base to the   vertex without intravenous contrast.    COMPARISON EXAMINATION: 2019    FINDINGS:    VENTRICLES AND SULCI: Age-appropriate involutional changes  INTRA-AXIAL:  Microvascular ischemic changes involving the   periventricular and subcortical white matter.  EXTRA-AXIAL:  No mass or collection is seen.  VISUALIZED SINUSES: Left maxillary sinus mucosal disease  VISUALIZED MASTOIDS:  Clear.  CALVARIUM:  Normal.  MISCELLANEOUS: Metallic densities in the left posterior neck as described   on the prior study as well unchanged    IMPRESSION:  No change 2019. Involutional change and microvascular   ischemic disease    < end of copied text >  < from: [facility] Duplex Lower Ext Vein Scan, Bilat (19 @ 11:19) >  INTERPRETATION:  CLINICAL INFORMATION: Lower extremity edema and   swelling. New onset right leg heaviness for 2 weeks. Prior known DVT in   theleft leg.    COMPARISON: None available.    TECHNIQUE: Duplex sonography of the BILATERAL LOWER extremity veins with   color and spectral Doppler, with and without compression.      FINDINGS:    Partially occlusive DVT within the right femoral vein, popliteal vein and   PT trunk with decreased compressibility and decreased or absent flow.   Postthrombotic changes within the left distal common femoral vein,   femoral vein and popliteal vein with decreased compressibility and   decreased or absent flow.    IMPRESSION:     Age-indeterminate bilateral deep vein thrombosis above the knee.    These findings were discussed with JAVIER Payne on 2019 12:06 PM   with read back.    < end of copied text >  < from: CT Lumbar Spine No Cont (19 @ 10:13) >    IMPRESSION:  Old right L1 transverse process fracture. Bilateral lysis   defects at L5 as seen on the prior 2013    < end of copied text >  < from: Xray Chest 1 View AP/PA (19 @ 16:28) >  Clear lungs.    < end of copied text >

## 2019-09-03 NOTE — PROGRESS NOTE ADULT - SUBJECTIVE AND OBJECTIVE BOX
Patient is a 74y old  Male who presents with a chief complaint of CKD (03 Sep 2019 13:09)                                                               INTERVAL HPI/OVERNIGHT EVENTS:    REVIEW OF SYSTEMS:     CONSTITUTIONAL: No weakness, fevers or chills  RESPIRATORY: No cough, wheezing,  No shortness of breath  CARDIOVASCULAR: No chest pain or palpitations  GASTROINTESTINAL: No abdominal pain  . No nausea, vomiting, or hematemesis; No diarrhea or constipation. No melena or hematochezia.  GENITOURINARY: No dysuria, frequency or hematuria  NEUROLOGICAL: No numbness or weakness                                                                                                                                                                                                                                                                                 Medications:  MEDICATIONS  (STANDING):  allopurinol 100 milliGRAM(s) Oral daily  atorvastatin 40 milliGRAM(s) Oral at bedtime  carvedilol 25 milliGRAM(s) Oral every 12 hours  cefepime   IVPB      cefepime   IVPB 1000 milliGRAM(s) IV Intermittent once  chlorhexidine 2% Cloths 1 Application(s) Topical daily  cinacalcet 30 milliGRAM(s) Oral daily  darbepoetin Injectable ViaL 40 MICROGram(s) IV Push every week  dextrose 5%. 1000 milliLiter(s) (50 mL/Hr) IV Continuous <Continuous>  dextrose 50% Injectable 12.5 Gram(s) IV Push once  dextrose 50% Injectable 25 Gram(s) IV Push once  dextrose 50% Injectable 25 Gram(s) IV Push once  ergocalciferol 85552 Unit(s) Oral every week  folic acid 1 milliGRAM(s) Oral daily  furosemide   Injectable 60 milliGRAM(s) IV Push two times a day  gabapentin 100 milliGRAM(s) Oral two times a day  heparin  Infusion.  Unit(s)/Hr (19 mL/Hr) IV Continuous <Continuous>  hydrALAZINE 100 milliGRAM(s) Oral three times a day  hydrocortisone hemorrhoidal Suppository 1 Suppository(s) Rectal at bedtime  insulin glargine Injectable (LANTUS) 10 Unit(s) SubCutaneous at bedtime  insulin lispro (HumaLOG) corrective regimen sliding scale   SubCutaneous three times a day before meals  insulin lispro (HumaLOG) corrective regimen sliding scale   SubCutaneous at bedtime  labetalol 300 milliGRAM(s) Oral two times a day  levETIRAcetam 1000 milliGRAM(s) Oral two times a day  mycophenolic acid  milliGRAM(s) Oral two times a day  predniSONE   Tablet 5 milliGRAM(s) Oral daily  sodium bicarbonate 650 milliGRAM(s) Oral two times a day  tamsulosin 0.4 milliGRAM(s) Oral at bedtime  vancomycin  IVPB 1000 milliGRAM(s) IV Intermittent once    MEDICATIONS  (PRN):  acetaminophen   Tablet .. 650 milliGRAM(s) Oral every 6 hours PRN Temp greater or equal to 38C (100.4F)  aluminum hydroxide/magnesium hydroxide/simethicone Suspension 30 milliLiter(s) Oral every 6 hours PRN Dyspepsia  dextrose 40% Gel 15 Gram(s) Oral once PRN Blood Glucose LESS THAN 70 milliGRAM(s)/deciliter  glucagon  Injectable 1 milliGRAM(s) IntraMuscular once PRN Glucose LESS THAN 70 milligrams/deciliter  heparin  Injectable 9000 Unit(s) IV Push every 6 hours PRN For aPTT less than 40  heparin  Injectable 4000 Unit(s) IV Push every 6 hours PRN For aPTT between 40 - 57       Allergies    No Known Allergies    Intolerances      Vital Signs Last 24 Hrs  T(C): 36.9 (03 Sep 2019 14:11), Max: 39.5 (02 Sep 2019 18:06)  T(F): 98.5 (03 Sep 2019 14:11), Max: 103.1 (02 Sep 2019 18:06)  HR: 79 (03 Sep 2019 14:11) (79 - 105)  BP: 144/69 (03 Sep 2019 14:11) (124/61 - 184/79)  BP(mean): --  RR: 20 (03 Sep 2019 14:11) (18 - 20)  SpO2: 96% (03 Sep 2019 14:11) (92% - 96%)  CAPILLARY BLOOD GLUCOSE      POCT Blood Glucose.: 140 mg/dL (03 Sep 2019 14:22)  POCT Blood Glucose.: 123 mg/dL (03 Sep 2019 08:56)  POCT Blood Glucose.: 122 mg/dL (02 Sep 2019 21:40)  POCT Blood Glucose.: 124 mg/dL (02 Sep 2019 18:01)       @ 07:01  -   @ 07:00  --------------------------------------------------------  IN: 1048 mL / OUT: 1950 mL / NET: -902 mL      Physical Exam:    Daily     Daily Weight in k.7 (03 Sep 2019 14:12)  General: lethargic   HEENT:  Nonicteric, PERRLA  CV:  RRR, S1S2   Lungs:  poor effort otherwise CTAB  Abdomen:  Soft, non-tender, no distended, positive BS  Extremities:   edema  Neuro:  lethargic but arousable and follows commands           LABS:                               8.6    8.37  )-----------( 165      ( 03 Sep 2019 09:04 )             27.2                          139  |  99  |  36<H>  ----------------------------<  124<H>  4.0   |  25  |  5.30<H>    Ca    9.4      03 Sep 2019 06:53

## 2019-09-03 NOTE — PROGRESS NOTE ADULT - ASSESSMENT
1. Anemia sec to CKD    -- H/H in 7-8s, slightly lower today, likely hemodilutional prior to scheduled HD  -- low normal  B12, folate. 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  SPEP showing Gamma-Migrating Paraprotein.   free light chains showing elevated kappa level with normal kappa/lamda ratio.  This may be reflective of MGUS.  doubt clinically signficant myeloma given relatively low lambda level.  will need to monitor over time.  no indicated for BM bx at this time.      2. thrombocytopenia     -- platelets nml today  -- most likely related to h/o HCV, ? Mycophenolate contributing  -- adequate counts would not change meds   -- monitor for now    3. LE edema --   -noted age indeterminate DVT b/l LE above knee; provoked by acute illness and immobilizaiton;  no indication for hypercoag w/u   --pt had been on and off AC in past for Afib  --on heparin drip with bridge to coumadin for goal INR 2-3;  ID currently considering LP for new onset fevers, lethargy with history of cryptococcal meningitis.  Based on this, I would recommend holding Coumadin for now.  Ok to continue heparin drip  and hold 2 hours prior to lumbar puncture if this is planned.  If INR is > 1.6 and LP is urgently needed  we can administer Vitamin K 10 mg IV x 1 followed by repeat INR 4 hours later to assess whether repeat dosage is needed.    4.  Fevers  --ID w/u;  considering LP to r/o meningitis.      Philippe Burks MD  Hematology/Oncology  Cell:  [phone]  Office Phone: [phone]  Office Fax:  [phone] [address] [id number]

## 2019-09-03 NOTE — PROGRESS NOTE ADULT - ASSESSMENT
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   Pt just moved back to [place] from [place] .. reports that he has not been taking his meds for the past few days since " he might have misplaed them"     pt denies chest pain, syncope,fever, chills, cough, urinary symptoms.    in ED found  to have anemia   Nno acute changes on EKG   elevated CR and Trop    1- HTN urgency : now improving  bp    2- KAMRON  :   attempt TOV   d/w   : pt will likely end up on HD in intermediate and likely long term given transplant graft failure   off tacro,  cont cellcept and sterioods       3- DM:   A1c  5.6  Fs     4- acute diastolic CHF sec to  HTN urgency and renal failure   Echo :  < from: Transthoracic Echocardiogram (08.28.19 @ 16:12) >  1. Mitral annular calcification and calcified mitral  leaflets with decreased diastolic opening.  2. Moderate to severe concentric left ventricular  hypertrophy.  3. Normal left ventricular systolic function with  mid-cavitary obliteration.  4. Normal right ventricular size and systolic function.  5. Small pericardial effusion.    cont lasix for now   cont fluid removal with HD per renal      5-  difficult ambulating :  no focal neuro deficits   neuro eval appreciated    CT T/L   < from: CT Thoracic Spine No Cont (08.30.19 @ 10:06) >    Old right L1 transverse process fracture. Bilateral lysis   defects at L5 as seen on the prior 5/26/2013      6- afib :hold AC ..pt will need LP to r/o meningitis     7- hematochezia : per sister : on and off small amount of fresh blood in stool and some amount in urine but only small amounts   monitor H/H   consult GI .: appreciate input :  suspect bleeding to be hemorrhoidal, now resolved     trial of anusol supp  if bleeding persist would consider colonoscopy however patient is reluctant.    heme input: appreciated        8-  renal transplant : f/u with Transplant team   cont meds with MMF and steroids   off Tac     9- TIA :  unable to perform MRI   CT no acute changes on CT   repeat bnegative   fu with neuro     10 encephalopathy : pt had fever of 103 max within the past 24 hours   discussed with neuro/ ID and neuro radiology : will plan LP   hold AC   cont empiric Abx   will likely need CT C/A/P however will hold off for now

## 2019-09-03 NOTE — DIETITIAN INITIAL EVALUATION ADULT. - ADD RECOMMEND
1) Continue to monitor weight, lab values, and diet advancement. 2) Consider official SLP swallow evaluation if aspiration concern persists - discussed with NP. 3) RD remains available for diet education (renal/ Consistent Carbohydrate /Coumadin diet education) when medically feasible.

## 2019-09-03 NOTE — PROGRESS NOTE ADULT - SUBJECTIVE AND OBJECTIVE BOX
INTERVAL HPI/OVERNIGHT EVENTS:    pt seen and examined. He denies abdominal pain, n/v. Tolerating PO well.  no rectal bleeding     MEDICATIONS  (STANDING):  allopurinol 100 milliGRAM(s) Oral daily  atorvastatin 40 milliGRAM(s) Oral at bedtime  carvedilol 25 milliGRAM(s) Oral every 12 hours  chlorhexidine 2% Cloths 1 Application(s) Topical daily  cinacalcet 30 milliGRAM(s) Oral daily  darbepoetin Injectable ViaL 40 MICROGram(s) IV Push every week  dextrose 5%. 1000 milliLiter(s) (50 mL/Hr) IV Continuous <Continuous>  dextrose 50% Injectable 12.5 Gram(s) IV Push once  dextrose 50% Injectable 25 Gram(s) IV Push once  dextrose 50% Injectable 25 Gram(s) IV Push once  folic acid 1 milliGRAM(s) Oral daily  furosemide   Injectable 60 milliGRAM(s) IV Push two times a day  gabapentin 100 milliGRAM(s) Oral two times a day  heparin  Infusion.  Unit(s)/Hr (19 mL/Hr) IV Continuous <Continuous>  hydrALAZINE 100 milliGRAM(s) Oral three times a day  hydrocortisone hemorrhoidal Suppository 1 Suppository(s) Rectal at bedtime  insulin glargine Injectable (LANTUS) 10 Unit(s) SubCutaneous at bedtime  insulin lispro (HumaLOG) corrective regimen sliding scale   SubCutaneous three times a day before meals  insulin lispro (HumaLOG) corrective regimen sliding scale   SubCutaneous at bedtime  labetalol 300 milliGRAM(s) Oral two times a day  levETIRAcetam 1000 milliGRAM(s) Oral two times a day  mycophenolic acid  milliGRAM(s) Oral two times a day  predniSONE   Tablet 5 milliGRAM(s) Oral daily  sodium bicarbonate 650 milliGRAM(s) Oral two times a day  tamsulosin 0.4 milliGRAM(s) Oral at bedtime    MEDICATIONS  (PRN):  aluminum hydroxide/magnesium hydroxide/simethicone Suspension 30 milliLiter(s) Oral every 6 hours PRN Dyspepsia  dextrose 40% Gel 15 Gram(s) Oral once PRN Blood Glucose LESS THAN 70 milliGRAM(s)/deciliter  glucagon  Injectable 1 milliGRAM(s) IntraMuscular once PRN Glucose LESS THAN 70 milligrams/deciliter  heparin  Injectable 9000 Unit(s) IV Push every 6 hours PRN For aPTT less than 40  heparin  Injectable 4000 Unit(s) IV Push every 6 hours PRN For aPTT between 40 - 57      Allergies    No Known Allergies    Intolerances        Review of Systems:    General:  No wt loss, fevers, chills, night sweats,fatigue,   Eyes:  Good vision, no reported pain  ENT:  No sore throat, pain, runny nose, dysphagia  CV:  No pain, palpitations, hypo/hypertension  Resp:  No dyspnea, cough, tachypnea, wheezing  GI:  No pain, No nausea, No vomiting, No diarrhea, No constipation, No weight loss, No fever, No pruritis, No rectal bleeding, No melena, No dysphagia  :  No pain, bleeding, incontinence, nocturia  Muscle:  No pain, weakness  Neuro:  No weakness, tingling, memory problems  Psych:  No fatigue, insomnia, mood problems, depression  Endocrine:  No polyuria, polydypsia, cold/heat intolerance  Heme:  No petechiae, ecchymosis, easy bruisability  Skin:  No rash, tattoos, scars, edema      Vital Signs Last 24 Hrs  T(C): 37.6 (03 Sep 2019 06:34), Max: 39.5 (02 Sep 2019 18:06)  T(F): 99.6 (03 Sep 2019 06:34), Max: 103.1 (02 Sep 2019 18:06)  HR: 95 (03 Sep 2019 04:28) (77 - 105)  BP: 162/66 (03 Sep 2019 04:28) (131/68 - 184/79)  BP(mean): --  RR: 18 (03 Sep 2019 04:28) (18 - 18)  SpO2: 92% (03 Sep 2019 04:28) (92% - 98%)    PHYSICAL EXAM:    Constitutional: NAD, well-developed  HEENT: EOMI, throat clear  Neck: No LAD, supple  Respiratory: CTA and P  Cardiovascular: S1 and S2, RRR, no M  Gastrointestinal: BS+, soft, NT/ND, neg HSM,  Extremities: No peripheral edema, neg clubing, cyanosis  Vascular: 2+ peripheral pulses  Neurological: A/O x 3, no focal deficits  Psychiatric: Normal mood, normal affect  Skin: No rashes      LABS:                        8.6    8.37  )-----------( 165      ( 03 Sep 2019 09:04 )             27.2     09-03    139  |  99  |  36<H>  ----------------------------<  124<H>  4.0   |  25  |  5.30<H>    Ca    9.4      03 Sep 2019 06:53      PT/INR - ( 02 Sep 2019 09:05 )   PT: 16.1 sec;   INR: 1.40 ratio         PTT - ( 02 Sep 2019 09:05 )  PTT:66.3 sec  Urinalysis Basic - ( 02 Sep 2019 23:57 )    Color: Orange / Appearance: Turbid / S.014 / pH: x  Gluc: x / Ketone: Negative  / Bili: Negative / Urobili: <2 mg/dL   Blood: x / Protein: >600 mg/dL / Nitrite: Negative   Leuk Esterase: Large / RBC: 277 /HPF / WBC >720 /HPF   Sq Epi: x / Non Sq Epi: 10 /HPF / Bacteria: TNTC        RADIOLOGY & ADDITIONAL TESTS:

## 2019-09-03 NOTE — PROGRESS NOTE ADULT - SUBJECTIVE AND OBJECTIVE BOX
Subjective: Patient seen and examined. No new events except as noted.   resting comfortably     REVIEW OF SYSTEMS:    CONSTITUTIONAL:+ weakness, fevers or chills  EYES/ENT: No visual changes;  No vertigo or throat pain   NECK: No pain or stiffness  RESPIRATORY: No cough, wheezing, hemoptysis; No shortness of breath  CARDIOVASCULAR: No chest pain or palpitations  GASTROINTESTINAL: No abdominal or epigastric pain. No nausea, vomiting, or hematemesis; No diarrhea or constipation. No melena or hematochezia.  GENITOURINARY: No dysuria, frequency or hematuria  NEUROLOGICAL: No numbness or weakness  SKIN: No itching, burning, rashes, or lesions   All other review of systems is negative unless indicated above.    MEDICATIONS:  MEDICATIONS  (STANDING):  allopurinol 100 milliGRAM(s) Oral daily  atorvastatin 40 milliGRAM(s) Oral at bedtime  carvedilol 25 milliGRAM(s) Oral every 12 hours  chlorhexidine 2% Cloths 1 Application(s) Topical daily  cinacalcet 30 milliGRAM(s) Oral daily  darbepoetin Injectable ViaL 40 MICROGram(s) IV Push every week  dextrose 5%. 1000 milliLiter(s) (50 mL/Hr) IV Continuous <Continuous>  dextrose 50% Injectable 12.5 Gram(s) IV Push once  dextrose 50% Injectable 25 Gram(s) IV Push once  dextrose 50% Injectable 25 Gram(s) IV Push once  folic acid 1 milliGRAM(s) Oral daily  furosemide   Injectable 60 milliGRAM(s) IV Push two times a day  gabapentin 100 milliGRAM(s) Oral two times a day  heparin  Infusion.  Unit(s)/Hr (19 mL/Hr) IV Continuous <Continuous>  hydrALAZINE 100 milliGRAM(s) Oral three times a day  hydrocortisone hemorrhoidal Suppository 1 Suppository(s) Rectal at bedtime  insulin glargine Injectable (LANTUS) 10 Unit(s) SubCutaneous at bedtime  insulin lispro (HumaLOG) corrective regimen sliding scale   SubCutaneous three times a day before meals  insulin lispro (HumaLOG) corrective regimen sliding scale   SubCutaneous at bedtime  labetalol 300 milliGRAM(s) Oral two times a day  levETIRAcetam 1000 milliGRAM(s) Oral two times a day  mycophenolic acid  milliGRAM(s) Oral two times a day  predniSONE   Tablet 5 milliGRAM(s) Oral daily  sodium bicarbonate 650 milliGRAM(s) Oral two times a day  tamsulosin 0.4 milliGRAM(s) Oral at bedtime      PHYSICAL EXAM:  T(C): 37.6 (09-03-19 @ 06:34), Max: 39.5 (09-02-19 @ 18:06)  HR: 95 (09-03-19 @ 04:28) (77 - 105)  BP: 162/66 (09-03-19 @ 04:28) (131/68 - 184/79)  RR: 18 (09-03-19 @ 04:28) (18 - 18)  SpO2: 92% (09-03-19 @ 04:28) (92% - 98%)  Wt(kg): --  I&O's Summary    02 Sep 2019 07:01  -  03 Sep 2019 07:00  --------------------------------------------------------  IN: 1048 mL / OUT: 1950 mL / NET: -902 mL          Appearance: Normal	  HEENT:   Normal oral mucosa, PERRL, EOMI	  Lymphatic: No lymphadenopathy , no edema  Cardiovascular: Normal S1 S2, No JVD, No murmurs , Peripheral pulses palpable 2+ bilaterally  Respiratory: Lungs clear to auscultation, normal effort 	  Gastrointestinal:  Soft, Non-tender, + BS	  Skin: No rashes, No ecchymoses, No cyanosis, warm to touch  Musculoskeletal: Normal range of motion, normal strength  Psychiatry:  Mood & affect appropriate  Ext: No edema      LABS:    CARDIAC MARKERS:                                8.6    8.37  )-----------( 165      ( 03 Sep 2019 09:04 )             27.2     09-03    139  |  99  |  36<H>  ----------------------------<  124<H>  4.0   |  25  |  5.30<H>    Ca    9.4      03 Sep 2019 06:53      proBNP:   Lipid Profile:   HgA1c:   TSH:               TELEMETRY: 	    ECG:  	  RADIOLOGY:   DIAGNOSTIC TESTING:  [ ] Echocardiogram:  [ ]  Catheterization:  [ ] Stress Test:    OTHER:

## 2019-09-04 DIAGNOSIS — R41.82 ALTERED MENTAL STATUS, UNSPECIFIED: ICD-10-CM

## 2019-09-04 LAB
AMMONIA BLD-MCNC: 18 UMOL/L — SIGNIFICANT CHANGE UP (ref 11–55)
ANION GAP SERPL CALC-SCNC: 17 MMOL/L — SIGNIFICANT CHANGE UP (ref 5–17)
APTT BLD: 30.5 SEC — SIGNIFICANT CHANGE UP (ref 27.5–36.3)
APTT BLD: 61.4 SEC — HIGH (ref 27.5–36.3)
APTT BLD: 75.4 SEC — HIGH (ref 27.5–36.3)
BASOPHILS # BLD AUTO: 0.01 K/UL — SIGNIFICANT CHANGE UP (ref 0–0.2)
BASOPHILS NFR BLD AUTO: 0.1 % — SIGNIFICANT CHANGE UP (ref 0–2)
BUN SERPL-MCNC: 47 MG/DL — HIGH (ref 7–23)
CALCIUM SERPL-MCNC: 9.1 MG/DL — SIGNIFICANT CHANGE UP (ref 8.4–10.5)
CHLORIDE SERPL-SCNC: 96 MMOL/L — SIGNIFICANT CHANGE UP (ref 96–108)
CO2 SERPL-SCNC: 21 MMOL/L — LOW (ref 22–31)
CREAT SERPL-MCNC: 6.63 MG/DL — HIGH (ref 0.5–1.3)
CRYPTOC AG FLD QL: NEGATIVE — SIGNIFICANT CHANGE UP
EOSINOPHIL # BLD AUTO: 0.01 K/UL — SIGNIFICANT CHANGE UP (ref 0–0.5)
EOSINOPHIL NFR BLD AUTO: 0.1 % — SIGNIFICANT CHANGE UP (ref 0–6)
GAS PNL BLDA: SIGNIFICANT CHANGE UP
GLUCOSE BLDC GLUCOMTR-MCNC: 100 MG/DL — HIGH (ref 70–99)
GLUCOSE BLDC GLUCOMTR-MCNC: 111 MG/DL — HIGH (ref 70–99)
GLUCOSE BLDC GLUCOMTR-MCNC: 114 MG/DL — HIGH (ref 70–99)
GLUCOSE BLDC GLUCOMTR-MCNC: 118 MG/DL — HIGH (ref 70–99)
GLUCOSE BLDC GLUCOMTR-MCNC: 128 MG/DL — HIGH (ref 70–99)
GLUCOSE BLDC GLUCOMTR-MCNC: 132 MG/DL — HIGH (ref 70–99)
GLUCOSE BLDC GLUCOMTR-MCNC: 85 MG/DL — SIGNIFICANT CHANGE UP (ref 70–99)
GLUCOSE BLDC GLUCOMTR-MCNC: 95 MG/DL — SIGNIFICANT CHANGE UP (ref 70–99)
GLUCOSE SERPL-MCNC: 116 MG/DL — HIGH (ref 70–99)
HCT VFR BLD CALC: 27.9 % — LOW (ref 39–50)
HGB BLD-MCNC: 8.5 G/DL — LOW (ref 13–17)
IMM GRANULOCYTES NFR BLD AUTO: 1.3 % — SIGNIFICANT CHANGE UP (ref 0–1.5)
INR BLD: 1.62 RATIO — HIGH (ref 0.88–1.16)
INR BLD: 2.17 RATIO — HIGH (ref 0.88–1.16)
INR BLD: 2.2 RATIO — HIGH (ref 0.88–1.16)
LYMPHOCYTES # BLD AUTO: 0.76 K/UL — LOW (ref 1–3.3)
LYMPHOCYTES # BLD AUTO: 8.4 % — LOW (ref 13–44)
MCHC RBC-ENTMCNC: 25.7 PG — LOW (ref 27–34)
MCHC RBC-ENTMCNC: 30.5 GM/DL — LOW (ref 32–36)
MCV RBC AUTO: 84.3 FL — SIGNIFICANT CHANGE UP (ref 80–100)
MONOCYTES # BLD AUTO: 1.21 K/UL — HIGH (ref 0–0.9)
MONOCYTES NFR BLD AUTO: 13.4 % — SIGNIFICANT CHANGE UP (ref 2–14)
NEUTROPHILS # BLD AUTO: 6.91 K/UL — SIGNIFICANT CHANGE UP (ref 1.8–7.4)
NEUTROPHILS NFR BLD AUTO: 76.7 % — SIGNIFICANT CHANGE UP (ref 43–77)
PLATELET # BLD AUTO: 148 K/UL — LOW (ref 150–400)
POTASSIUM SERPL-MCNC: 4.2 MMOL/L — SIGNIFICANT CHANGE UP (ref 3.5–5.3)
POTASSIUM SERPL-SCNC: 4.2 MMOL/L — SIGNIFICANT CHANGE UP (ref 3.5–5.3)
PROTHROM AB SERPL-ACNC: 18.9 SEC — HIGH (ref 10–12.9)
PROTHROM AB SERPL-ACNC: 25.6 SEC — HIGH (ref 10–12.9)
PROTHROM AB SERPL-ACNC: 25.7 SEC — HIGH (ref 10–12.9)
RBC # BLD: 3.31 M/UL — LOW (ref 4.2–5.8)
RBC # FLD: 15 % — HIGH (ref 10.3–14.5)
SODIUM SERPL-SCNC: 134 MMOL/L — LOW (ref 135–145)
VANCOMYCIN TROUGH SERPL-MCNC: 11.2 UG/ML — SIGNIFICANT CHANGE UP (ref 10–20)
WBC # BLD: 9.02 K/UL — SIGNIFICANT CHANGE UP (ref 3.8–10.5)
WBC # FLD AUTO: 9.02 K/UL — SIGNIFICANT CHANGE UP (ref 3.8–10.5)

## 2019-09-04 PROCEDURE — 88108 CYTOPATH CONCENTRATE TECH: CPT | Mod: 26

## 2019-09-04 PROCEDURE — 99233 SBSQ HOSP IP/OBS HIGH 50: CPT | Mod: GC

## 2019-09-04 PROCEDURE — 95816 EEG AWAKE AND DROWSY: CPT | Mod: 26

## 2019-09-04 RX ORDER — ACETAMINOPHEN 500 MG
1000 TABLET ORAL ONCE
Refills: 0 | Status: COMPLETED | OUTPATIENT
Start: 2019-09-04 | End: 2019-09-04

## 2019-09-04 RX ORDER — PHYTONADIONE (VIT K1) 5 MG
5 TABLET ORAL ONCE
Refills: 0 | Status: COMPLETED | OUTPATIENT
Start: 2019-09-04 | End: 2019-09-04

## 2019-09-04 RX ORDER — MEROPENEM 1 G/30ML
500 INJECTION INTRAVENOUS EVERY 12 HOURS
Refills: 0 | Status: DISCONTINUED | OUTPATIENT
Start: 2019-09-04 | End: 2019-09-05

## 2019-09-04 RX ORDER — SODIUM CHLORIDE 9 MG/ML
1000 INJECTION, SOLUTION INTRAVENOUS
Refills: 0 | Status: DISCONTINUED | OUTPATIENT
Start: 2019-09-04 | End: 2019-09-11

## 2019-09-04 RX ADMIN — Medication 650 MILLIGRAM(S): at 05:08

## 2019-09-04 RX ADMIN — Medication 400 MILLIGRAM(S): at 05:21

## 2019-09-04 RX ADMIN — Medication 300 MILLIGRAM(S): at 05:09

## 2019-09-04 RX ADMIN — SODIUM CHLORIDE 50 MILLILITER(S): 9 INJECTION, SOLUTION INTRAVENOUS at 15:17

## 2019-09-04 RX ADMIN — Medication 101 MILLIGRAM(S): at 15:17

## 2019-09-04 RX ADMIN — LEVETIRACETAM 400 MILLIGRAM(S): 250 TABLET, FILM COATED ORAL at 05:09

## 2019-09-04 RX ADMIN — Medication 5 MILLIGRAM(S): at 05:08

## 2019-09-04 RX ADMIN — CHLORHEXIDINE GLUCONATE 1 APPLICATION(S): 213 SOLUTION TOPICAL at 14:42

## 2019-09-04 RX ADMIN — MEROPENEM 100 MILLIGRAM(S): 1 INJECTION INTRAVENOUS at 14:40

## 2019-09-04 RX ADMIN — Medication 1000 MILLIGRAM(S): at 06:37

## 2019-09-04 RX ADMIN — GABAPENTIN 100 MILLIGRAM(S): 400 CAPSULE ORAL at 05:08

## 2019-09-04 RX ADMIN — Medication 100 MILLIGRAM(S): at 05:09

## 2019-09-04 RX ADMIN — MYCOPHENOLIC ACID 360 MILLIGRAM(S): 180 TABLET, DELAYED RELEASE ORAL at 05:08

## 2019-09-04 RX ADMIN — Medication 101 MILLIGRAM(S): at 06:15

## 2019-09-04 RX ADMIN — Medication 40 MICROGRAM(S): at 17:29

## 2019-09-04 RX ADMIN — Medication 60 MILLIGRAM(S): at 05:10

## 2019-09-04 RX ADMIN — HEPARIN SODIUM 1800 UNIT(S)/HR: 5000 INJECTION INTRAVENOUS; SUBCUTANEOUS at 01:39

## 2019-09-04 RX ADMIN — Medication 100 MILLIGRAM(S): at 14:43

## 2019-09-04 RX ADMIN — CARVEDILOL PHOSPHATE 25 MILLIGRAM(S): 80 CAPSULE, EXTENDED RELEASE ORAL at 05:09

## 2019-09-04 RX ADMIN — Medication 1000 MILLIGRAM(S): at 01:00

## 2019-09-04 RX ADMIN — Medication 400 MILLIGRAM(S): at 22:57

## 2019-09-04 NOTE — PROGRESS NOTE ADULT - SUBJECTIVE AND OBJECTIVE BOX
[facility] Division of Kidney Diseases & Hypertension  FOLLOW UP NOTE  [phone]--------------------------------------------------------------------------------  Chief Complaint:Acute renal failure      24 hour events/subjective: No acute events overnight. Patient continues to have waxing and waning sensorium. Labs and vital signs reviewed. Vital signs show patient continues to be febrile. BP stable however patient slightly tachycardic.        PAST HISTORY  --------------------------------------------------------------------------------  No significant changes to PMH, PSH, FHx, SHx, unless otherwise noted    ALLERGIES & MEDICATIONS  --------------------------------------------------------------------------------  Allergies    No Known Allergies    Intolerances      Standing Inpatient Medications  allopurinol 100 milliGRAM(s) Oral daily  atorvastatin 40 milliGRAM(s) Oral at bedtime  carvedilol 25 milliGRAM(s) Oral every 12 hours  cinacalcet 30 milliGRAM(s) Oral daily  darbepoetin Injectable ViaL 40 MICROGram(s) IV Push every week  ergocalciferol 01995 Unit(s) Oral every week  folic acid 1 milliGRAM(s) Oral daily  furosemide   Injectable 60 milliGRAM(s) IV Push two times a day  gabapentin 100 milliGRAM(s) Oral two times a day  hydrALAZINE 100 milliGRAM(s) Oral three times a day  hydrocortisone hemorrhoidal Suppository 1 Suppository(s) Rectal at bedtime  insulin glargine Injectable (LANTUS) 10 Unit(s) SubCutaneous at bedtime  insulin lispro (HumaLOG) corrective regimen sliding scale   SubCutaneous three times a day before meals  insulin lispro (HumaLOG) corrective regimen sliding scale   SubCutaneous at bedtime  labetalol 300 milliGRAM(s) Oral two times a day  levETIRAcetam  IVPB 500 milliGRAM(s) IV Intermittent two times a day  meropenem  IVPB 500 milliGRAM(s) IV Intermittent every 12 hours  mycophenolic acid  milliGRAM(s) Oral two times a day  predniSONE   Tablet 5 milliGRAM(s) Oral daily  sodium bicarbonate 650 milliGRAM(s) Oral two times a day  tamsulosin 0.4 milliGRAM(s) Oral at bedtime  thiamine Injectable 100 milliGRAM(s) IV Push daily    PRN Inpatient Medications  acetaminophen   Tablet .. 650 milliGRAM(s) Oral every 6 hours PRN  aluminum hydroxide/magnesium hydroxide/simethicone Suspension 30 milliLiter(s) Oral every 6 hours PRN  dextrose 40% Gel 15 Gram(s) Oral once PRN  glucagon  Injectable 1 milliGRAM(s) IntraMuscular once PRN      REVIEW OF SYSTEMS: Unable to obtain 2/2 clinical condition.      VITALS/PHYSICAL EXAM  --------------------------------------------------------------------------------  T(C): 37.4 (09-04-19 @ 06:37), Max: 38.9 (09-03-19 @ 21:29)  HR: 103 (09-04-19 @ 04:38) (79 - 105)  BP: 144/66 (09-04-19 @ 04:38) (142/64 - 151/53)  RR: 18 (09-04-19 @ 04:38) (18 - 20)  SpO2: 93% (09-04-19 @ 04:38) (92% - 96%)  Wt(kg): --        09-03-19 @ 07:01  -  09-04-19 @ 07:00  --------------------------------------------------------  IN: 521 mL / OUT: 125 mL / NET: 396 mL      Physical Exam:  	Gen: NAD, waxing/waning sensorium  	HEENT: Anicteric  	Pulm: CTA B/L  	CV: RRR, S1S2;  	Abd: +BS, soft, nondistended              Extremities: no bilateral LE edema noted.               Neuro: Altered, lethargic  	Skin: Warm  	Vascular: No cyanosis              Access: AVF    LABS/STUDIES  --------------------------------------------------------------------------------              8.5    9.02  >-----------<  148      [09-04-19 @ 09:36]              27.9     134  |  96  |  47  ----------------------------<  116      [09-04-19 @ 04:16]  4.2   |  21  |  6.63        Ca     9.1     [09-04-19 @ 04:16]      PT/INR: PT 25.7 , INR 2.20       [09-04-19 @ 04:16]  PTT: 61.4       [09-04-19 @ 04:16]      Creatinine Trend:  SCr 6.63 [09-04 @ 04:16]  SCr 5.30 [09-03 @ 06:53]  SCr 6.69 [09-02 @ 07:02]  SCr 5.73 [09-01 @ 07:08]  SCr 4.95 [08-31 @ 10:17]    Urinalysis - [09-02-19 @ 23:57]      Color Orange / Appearance Turbid / SG 1.014 / pH 8.0      Gluc Negative / Ketone Negative  / Bili Negative / Urobili <2 mg/dL       Blood Moderate / Protein >600 mg/dL / Leuk Est Large / Nitrite Negative       / WBC >720 / Hyaline 3 / Gran  / Sq Epi  / Non Sq Epi 10 / Bacteria TNTC      Iron 45, TIBC 159, %sat 28      [09-02-19 @ 17:29]  Ferritin 260      [09-02-19 @ 17:29]  PTH -- (Ca 9.7)      [09-02-19 @ 17:29]   1231  Vitamin D (25OH) <4.0      [09-02-19 @ 17:28]  TSH 5.45      [08-30-19 @ 09:41]      Free Light Chains: kappa 5.67, lambda 15.56, ratio = 0.36      [09-03 @ 10:11]  Immunofixation Serum:   IgG Lambda Band Identified    Reference Range: None Detected      [08-30-19 @ 09:37]  SPEP Interpretation: Gamma-Migrating Paraprotein Identified      [08-30-19 @ 09:37]

## 2019-09-04 NOTE — PROGRESS NOTE ADULT - PROBLEM SELECTOR PLAN 6
With some hypercalcemia in 10 range despite dialysis and diuretics. check pth again. Start sensipar 30mg po qhs With some hypercalcemia in 10 range despite dialysis and diuretics. check pth again. Started sensipar 30mg po qhs

## 2019-09-04 NOTE — PROGRESS NOTE ADULT - ATTENDING COMMENTS
I have seen this patient with the fellow and agree with their assessment and plan. 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 now with KAMRON over CKD stage 5  , now started on dialysis since 8/27/19. Plan for HD today. Continue Lasix 60 mg IV bid for optimization of volume status.  But overall, change in MS is concerning, in addition, has asterexis and myoclonus, his BUN is not that high, ammonia should be checked( given rising INR despite being off anticoag) and liver function as well. Awaiting LP based ID workup. Post transplant can get opportunitistic infections such as AMARIS virus, and EBV associated PTLD on top of others sent by ID( crypto is a concern). Consider stopping gabapentin if not needed.  Meanwhile, continue MMF for now unless an opportunistic infection found.    Plan for HD today depending on timing of LP    Jennifer Orellana MD  Cell   Pager   Office  I have seen this patient with the fellow and agree with their assessment and plan. 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 now with KAMRON over CKD stage 5  , now started on dialysis since 8/27/19. Plan for HD today. Continue Lasix 60 mg IV bid for optimization of volume status.  But overall, change in MS is concerning, in addition, has asterexis and myoclonus, his BUN is not that high, ammonia should be checked( given rising INR despite being off anticoag) and liver function as well. Awaiting LP based ID workup. Post transplant can get opportunitistic infections such as AMARIS virus, and EBV associated PTLD on top of others sent by ID( crypto is a concern). Consider stopping gabapentin if not needed.  Meanwhile, continue MMF for now unless an opportunistic infection found.    In addition, his calcium was mildly high, sensipar started due to high PTH but he also has a high lambda and low kappa( usually in CKD and ESRD- the ratio is higher and now low)- the M spike is 0.8 and it's IgG lambda- could this be AL Amyloidosis systemic     Plan for HD today depending on timing of LP    Jennifer Orellana MD  Cell   Pager   Office  I have seen this patient with the fellow and agree with their assessment and plan. 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 now with KAMRON over CKD stage 5  , now started on dialysis since 8/27/19. Plan for HD today. Continue Lasix 60 mg IV bid for optimization of volume status.  But overall, change in MS is concerning, in addition, has asterexis and myoclonus, his BUN is not that high, ammonia should be checked( given rising INR despite being off anticoag) and liver function as well. Awaiting LP based ID workup. Post transplant can get opportunitistic infections such as AMARIS virus, and EBV associated PTLD on top of others sent by ID( crypto is a concern). Consider stopping gabapentin if not needed.  Meanwhile, continue MMF for now unless an opportunistic infection found.    In addition, his calcium was mildly high, sensipar started due to high PTH but he also has a high lambda and low kappa( usually in CKD and ESRD- the ratio is higher and now low)- the M spike is 0.8 and it's IgG lambda- could this be AL Amyloidosis systemic. Also, it was assumed that he lost his kidney due to rejection but could this be a paraprotein process?- may need a bone marrow and perhaps a renal transplant bx    Plan for HD today depending on timing of LP    Jennifer Orellana MD  Cell   Pager   Office

## 2019-09-04 NOTE — PROGRESS NOTE ADULT - PROBLEM SELECTOR PLAN 1
diet as tolerated  cbc daily  suspect bleeding to be hemorrhoidal, now resolved    will do trial of anusol supp  if bleeding resumes would consider colonoscopy however patient is reluctant

## 2019-09-04 NOTE — PROGRESS NOTE ADULT - ASSESSMENT
1. Anemia sec to CKD    -- H/H in 7-8s, stable  -- low normal  B12, folate. B12 1000 mcg IM x 1 given 8/31;  and c/w  PO FA 1mg QD  -- f/u MMA  -- No hemolysis  -- Iron studies c/w Anemia of Chronic Inflammation  -- FOBT neg  -- transfuse prn hgb <7  -- rectal bleeding, GI suspects hemrhds  -- darbepoietin w HD    2. thrombocytopenia     -- platelets almost nml today  -- most likely related to h/o HCV, ? Mycophenolate contributing  -- adequate counts would not change meds   -- monitor for now    3. LE edema   -noted age indeterminate DVT b/l LE above knee; provoked by acute illness and immobilizaiton;  no indication for hypercoag w/u   --pt had been on and off AC in past for Afib  --on heparin drip with bridge to coumadin for goal INR 2-3;  ID currently considering LP for new onset fevers, lethargy with history of cryptococcal meningitis.  Based on this, I would recommend holding Coumadin for now.  Ok to continue heparin drip  and hold 2 hours prior to lumbar puncture if this is planned.    -- reversing PT with vit K 10mg IV total today  -- holding hep and coumadin    4.  Fevers  --ID w/u;  considering LP to r/o meningitis.    -- shaking/shivering. Repeat vitals no fever. D/w PA who will come and evaluate pt    5. MGUS -- reviewed previous records from [place] from 3/2017. Noted to have 5% plasma cells. No congo red staining done. Immunoglobulins were nml, free kappa 43, free lambda 85, free k/l ratio nml. Overall, low suspicion that he has progressed to MM given low M spike and nml k/l ratio.   -- check immunoglobulins    6. ? amyloidosis -- not sure if BMBx would be helpful though it's possible. Can also consider a fat pad bx. Renal bx is possible but may be invasive.   -- will defer decision to bx kidney to renal  -- consider fat pad bx  -- hold off on BMBx for now with coagulopathy and sepsis    Plan and impression d/w renal and Dr Hwang earlier before noting pt to shiver/shake. Called PA to eval pt for shaking and shivering. Pls clal with questions, [phone]

## 2019-09-04 NOTE — PROGRESS NOTE ADULT - ASSESSMENT
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   Pt just moved back to [place] from [place] .. reports that he has not been taking his meds for the past few days since " he might have misplaed them"     pt denies chest pain, syncope,fever, chills, cough, urinary symptoms.    in ED found  to have anemia   Nno acute changes on EKG   elevated CR and Trop    1- HTN urgency : now improving  bp    2- KAMRON  :   attempt TOV   d/w   : pt will likely end up on HD in intermediate and likely long term given transplant graft failure   off tacro,  cont cellcept and sterioods       3- DM:   A1c  5.6  Fs     4- acute diastolic CHF sec to  HTN urgency and renal failure   Echo :  < from: Transthoracic Echocardiogram (08.28.19 @ 16:12) >  1. Mitral annular calcification and calcified mitral  leaflets with decreased diastolic opening.  2. Moderate to severe concentric left ventricular  hypertrophy.  3. Normal left ventricular systolic function with  mid-cavitary obliteration.  4. Normal right ventricular size and systolic function.  5. Small pericardial effusion.    cont fluid removal with HD per renal      5-  difficult ambulating :  no focal neuro deficits   neuro eval appreciated    CT T/L   < from: CT Thoracic Spine No Cont (08.30.19 @ 10:06) >    Old right L1 transverse process fracture. Bilateral lysis   defects at L5 as seen on the prior 5/26/2013      6- afib :hold AC ..holding for now for bx /LP     7- hematochezia : per sister : on and off small amount of fresh blood in stool and some amount in urine but only small amounts   monitor H/H   consult GI .: appreciate input :  suspect bleeding to be hemorrhoidal, now resolved     trial of anusol supp  if bleeding persist would consider colonoscopy however patient is reluctant.    heme input: appreciated        8-  renal transplant : f/u with Transplant team   cont meds with MMF and steroids   off Tac     9- TIA :  unable to perform MRI   CT no acute changes on CT   repeat bnegative   fu with neuro     10 encephalopathy :  etiology unclear however pt with fever and will need to r/o CNA infection   LP today   blood culture positive for GNR /Ecoli ..  ? source :  urine was not sent   will likely need CT C/A/P however will hold off for now   CTH with contrast   low threshold for ICU consult     11- paraprotienmeia : d/w heme and renal   will need to consider amyloidosis .. doubt MM   possible kidney bx       12 hypercalcemia : monitor for now 73 y/o male with PMHx of  ESRD s/p /p failed renal transplant 4 year ago and successful renal transplant 1 year ago,DM, HTN, cardiomyopathy 2/2 cocaine abuse, Hepatitis C, meningococcal meningitiss presenting with concerns about his kidney function, worsening SOB over the past 2 weeks and leg edema.   Pt just moved back to [place] from [place] .. reports that he has not been taking his meds for the past few days since " he might have misplaed them"     pt denies chest pain, syncope,fever, chills, cough, urinary symptoms.    in ED found  to have anemia   Nno acute changes on EKG   elevated CR and Trop    1- HTN urgency : now improving  bp    2- KAMRON  :   attempt TOV   d/w   : pt will likely end up on HD in intermediate and likely long term given transplant graft failure   off tacro,  cont cellcept and sterioods       3- DM:   A1c  5.6  Fs     4- acute diastolic CHF sec to  HTN urgency and renal failure   Echo :  < from: Transthoracic Echocardiogram (08.28.19 @ 16:12) >  1. Mitral annular calcification and calcified mitral  leaflets with decreased diastolic opening.  2. Moderate to severe concentric left ventricular  hypertrophy.  3. Normal left ventricular systolic function with  mid-cavitary obliteration.  4. Normal right ventricular size and systolic function.  5. Small pericardial effusion.    cont fluid removal with HD per renal      5-  difficult ambulating :  no focal neuro deficits   neuro eval appreciated    CT T/L   < from: CT Thoracic Spine No Cont (08.30.19 @ 10:06) >    Old right L1 transverse process fracture. Bilateral lysis   defects at L5 as seen on the prior 5/26/2013      6- afib :hold AC ..pt will need LP to r/o meningitis     7- hematochezia : per sister : on and off small amount of fresh blood in stool and some amount in urine but only small amounts   monitor H/H   consult GI .: appreciate input :  suspect bleeding to be hemorrhoidal, now resolved     trial of anusol supp  if bleeding persist would consider colonoscopy however patient is reluctant.    heme input: appreciated        8-  renal transplant : f/u with Transplant team   cont meds with MMF and steroids   off Tac     9- TIA :  unable to perform MRI   CT no acute changes on CT   repeat bnegative   fu with neuro     10 encephalopathy :  etiology unclear however pt with fever and will need to r/o CNA infection   d/w with DR. Brief : cont abx  awaiting LP   will likely need CT C/A/P however will hold off for now   CTH with contrast   low threshold for ICU consult     11- paraprotienmeia : d/w heme and renal   will need to consider amyloidosis .. doubt MM   possible kidney bx       12 hypercalcemia : monitor for now

## 2019-09-04 NOTE — CHART NOTE - NSCHARTNOTEFT_GEN_A_CORE
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   Pt just moved back to [place] from [place] .. reports that he has not been taking his meds for the past few days since " he might have misplaed them      Chart reviewed with JAVIER Camara     Patient has RRT this am for AMS with following recommendations:   Upon arrival to RRT, patient hemodynamically stable and saturating well on RA. Not significantly withdrawing to pain. ABG drawn and unremarkable. Advised team to obtain CT head. Patient has been AOx1 per primary RN at bedside. Given that patient is stable, endorsed to primary team to obtain CT head and follow up with Neurology.    PER HEME/ONC NOTE 16:01  Pt seen, not very interactive, shaking/shivering, eyes closed. Asked how he is and what's going on, said he felt cold. He was able to open his eyes half way, not following instructions much though otherwise.    ICU Vital Signs Last 24 Hrs  T(C): 37.4 (04 Sep 2019 16:26), Max: 38.9 (03 Sep 2019 21:29)  T(F): 99.4 (04 Sep 2019 16:26), Max: 102 (03 Sep 2019 21:29)  HR: 89 (04 Sep 2019 16:07) (83 - 105)  BP: 155/70 (04 Sep 2019 16:07) (138/68 - 155/70)  RR: 18 (04 Sep 2019 16:07) (18 - 18)  SpO2: 95% (04 Sep 2019 16:07) (92% - 95%)    Reviewed case with Jennifer Orellana MD  Cell   Patient awaiting HD with concerns for possible fever   Requesting recommendations on if HD appropriate    Per Dr. Orellana patient is ok for HD today       GIL Camara seen and examined patient.         Will continue to monitor with internal medicine team       Shira Burger NP HPI  73 y/o male with PMHx of  ESRD s/p /p failed renal transplant 4 year ago and successful renal transplant 1 year ago,DM, HTN, cardiomyopathy 2/2 cocaine abuse, Hepatitis C, meningococcal meningitiss presenting with concerns about his kidney function, worsening SOB over the past 2 weeks and leg edema.   Pt just moved back to [place] from [place] .. reports that he has not been taking his meds for the past few days since " he might have misplaed them      Chart reviewed with JAVIER Camara     Patient has RRT this am for AMS with following recommendations:   Upon arrival to RRT, patient hemodynamically stable and saturating well on RA. Not significantly withdrawing to pain. ABG drawn and unremarkable. Advised team to obtain CT head. Patient has been AOx1 per primary RN at bedside. Given that patient is stable, endorsed to primary team to obtain CT head and follow up with Neurology.    PER HEME/ONC NOTE 16:01  Pt seen, not very interactive, shaking/shivering, eyes closed. Asked how he is and what's going on, said he felt cold. He was able to open his eyes half way, not following instructions much though otherwise.    ICU Vital Signs Last 24 Hrs  T(C): 37.4 (04 Sep 2019 16:26), Max: 38.9 (03 Sep 2019 21:29)  T(F): 99.4 (04 Sep 2019 16:26), Max: 102 (03 Sep 2019 21:29)  HR: 89 (04 Sep 2019 16:07) (83 - 105)  BP: 155/70 (04 Sep 2019 16:07) (138/68 - 155/70)  RR: 18 (04 Sep 2019 16:07) (18 - 18)  SpO2: 95% (04 Sep 2019 16:07) (92% - 95%)    Reviewed case with Jennifer Orellana MD  Cell   Patient awaiting HD with concerns for possible fever   Requesting recommendations on if HD appropriate    Per Dr. Orellana patient is ok for HD today       GIL Camara seen and examined patient.     Patient alert, cachetic. Able to follow very simple commands, like "open your eyes".   Shivering noted, repeated vitals with rectal temp with no fever as of yet, but temp is up-trending  Otherwise benign physical exam, soft abd +BS, no tenderness, lungs: CTA, heart: RRR  Dr. Hwang made aware of finding, recommending to order urgent Head CT with IV contrast  Dr. Orellana, Nephrologist, made aware of the plan for CT head with contrast and okay to proceed    Pt to have HD today and CT head with IV contrast ordered as urgent    Will continue to monitor with internal medicine team       Shira Burger NP HPI  73 y/o male with PMHx of  ESRD s/p /p failed renal transplant 4 year ago and successful renal transplant 1 year ago,DM, HTN, cardiomyopathy 2/2 cocaine abuse, Hepatitis C, meningococcal meningitiss presenting with concerns about his kidney function, worsening SOB over the past 2 weeks and leg edema.   Pt just moved back to [place] .. reports that he has not been taking his meds for the past few days since " he might have misplaed them      Chart reviewed with GIL Camara     Patient has RRT this am for AMS with following recommendations:   Upon arrival to RRT, patient hemodynamically stable and saturating well on RA. Not significantly withdrawing to pain. ABG drawn and unremarkable. Advised team to obtain CT head. Patient has been AOx1 per primary RN at bedside. Given that patient is stable, endorsed to primary team to obtain CT head and follow up with Neurology.    PER HEME/ONC NOTE 16:01  Pt seen, not very interactive, shaking/shivering, eyes closed. Asked how he is and what's going on, said he felt cold. He was able to open his eyes half way, not following instructions much though otherwise.    ICU Vital Signs Last 24 Hrs  T(C): 37.4 (04 Sep 2019 16:26), Max: 38.9 (03 Sep 2019 21:29)  T(F): 99.4 (04 Sep 2019 16:26), Max: 102 (03 Sep 2019 21:29)  HR: 89 (04 Sep 2019 16:07) (83 - 105)  BP: 155/70 (04 Sep 2019 16:07) (138/68 - 155/70)  RR: 18 (04 Sep 2019 16:07) (18 - 18)  SpO2: 95% (04 Sep 2019 16:07) (92% - 95%)    Reviewed case with Jennifer Orellana MD  Cell   Patient awaiting HD with concerns for possible fever   Requesting recommendations on if HD appropriate    Per Dr. Orellana patient is ok for HD today       GIL Camara seen and examined patient.     Patient alert, cachetic. Able to follow very simple commands, like "open your eyes".   Shivering noted, repeated vitals with rectal temp with no fever as of yet, but temp is up-trending  Otherwise benign physical exam, soft abd +BS, no tenderness, lungs: CTA, heart: RRR  Dr. Hwang made aware of finding, recommending to order urgent Head CT with IV contrast  Dr. Orellana, Nephrologist, made aware of the plan for CT head with contrast and okay to proceed    Pt to have HD today and CT head with IV contrast ordered as urgent    Will continue to monitor with internal medicine team       Shira Burger NP and Hoa Frazier PA-C HPI  75 y/o male with PMHx of  ESRD s/p /p failed renal transplant 4 year ago and successful renal transplant 1 year ago,DM, HTN, cardiomyopathy 2/2 cocaine abuse, Hepatitis C, meningococcal meningitiss presenting with concerns about his kidney function, worsening SOB over the past 2 weeks and leg edema.   Pt just moved back to [place] from [place] .. reports that he has not been taking his meds for the past few days since " he might have misplaed them      Chart reviewed with GIL Camara     Patient has RRT this am for AMS with following recommendations:   Upon arrival to RRT, patient hemodynamically stable and saturating well on RA. Not significantly withdrawing to pain. ABG drawn and unremarkable. Advised team to obtain CT head. Patient has been AOx1 per primary RN at bedside. Given that patient is stable, endorsed to primary team to obtain CT head and follow up with Neurology.    PER HEME/ONC NOTE 16:01  Pt seen, not very interactive, shaking/shivering, eyes closed. Asked how he is and what's going on, said he felt cold. He was able to open his eyes half way, not following instructions much though otherwise.    ICU Vital Signs Last 24 Hrs  T(C): 37.4 (04 Sep 2019 16:26), Max: 38.9 (03 Sep 2019 21:29)  T(F): 99.4 (04 Sep 2019 16:26), Max: 102 (03 Sep 2019 21:29)  HR: 89 (04 Sep 2019 16:07) (83 - 105)  BP: 155/70 (04 Sep 2019 16:07) (138/68 - 155/70)  RR: 18 (04 Sep 2019 16:07) (18 - 18)  SpO2: 95% (04 Sep 2019 16:07) (92% - 95%)    Reviewed case with Jennifer Orellana MD  Cell   Patient awaiting HD with concerns for possible fever   Requesting recommendations on if HD appropriate    Per Dr. Orellana patient is ok for HD today       GIL Camara seen and examined patient.     Patient alert, cachetic. Able to follow very simple commands, like "open your eyes".   Shivering noted, repeated vitals with rectal temp with no fever as of yet, but temp is up-trending  Physical exam: soft abd +BS, no tenderness, lungs: CTA, heart: RRR, Unilateral edema on RUE  Dr. Hwang made aware of finding, recommending to order urgent Head CT with IV contrast  Dr. Orellana, Nephrologist, made aware of the plan for CT head with contrast and okay to proceed    Pt to have HD today and CT head with IV contrast ordered as urgent  US RUE to r/o DVT    Will continue to monitor with internal medicine team     Shira Burger NP and Hoa Frazier PA-C

## 2019-09-04 NOTE — PROGRESS NOTE ADULT - SUBJECTIVE AND OBJECTIVE BOX
INTERVAL HPI/OVERNIGHT EVENTS:    rapid response called earlier for AMS    pt seen and examined  lethargic  no reported rectal bleeding as per RN    MEDICATIONS  (STANDING):  allopurinol 100 milliGRAM(s) Oral daily  atorvastatin 40 milliGRAM(s) Oral at bedtime  carvedilol 25 milliGRAM(s) Oral every 12 hours  chlorhexidine 2% Cloths 1 Application(s) Topical daily  cinacalcet 30 milliGRAM(s) Oral daily  darbepoetin Injectable ViaL 40 MICROGram(s) IV Push every week  dextrose 5%. 1000 milliLiter(s) (50 mL/Hr) IV Continuous <Continuous>  dextrose 50% Injectable 12.5 Gram(s) IV Push once  dextrose 50% Injectable 25 Gram(s) IV Push once  dextrose 50% Injectable 25 Gram(s) IV Push once  ergocalciferol 46947 Unit(s) Oral every week  folic acid 1 milliGRAM(s) Oral daily  furosemide   Injectable 60 milliGRAM(s) IV Push two times a day  gabapentin 100 milliGRAM(s) Oral two times a day  hydrALAZINE 100 milliGRAM(s) Oral three times a day  hydrocortisone hemorrhoidal Suppository 1 Suppository(s) Rectal at bedtime  insulin glargine Injectable (LANTUS) 10 Unit(s) SubCutaneous at bedtime  insulin lispro (HumaLOG) corrective regimen sliding scale   SubCutaneous three times a day before meals  insulin lispro (HumaLOG) corrective regimen sliding scale   SubCutaneous at bedtime  labetalol 300 milliGRAM(s) Oral two times a day  levETIRAcetam  IVPB 500 milliGRAM(s) IV Intermittent two times a day  meropenem  IVPB 500 milliGRAM(s) IV Intermittent every 12 hours  mycophenolic acid  milliGRAM(s) Oral two times a day  predniSONE   Tablet 5 milliGRAM(s) Oral daily  sodium bicarbonate 650 milliGRAM(s) Oral two times a day  tamsulosin 0.4 milliGRAM(s) Oral at bedtime  thiamine Injectable 100 milliGRAM(s) IV Push daily    MEDICATIONS  (PRN):  acetaminophen   Tablet .. 650 milliGRAM(s) Oral every 6 hours PRN Temp greater or equal to 38C (100.4F)  aluminum hydroxide/magnesium hydroxide/simethicone Suspension 30 milliLiter(s) Oral every 6 hours PRN Dyspepsia  dextrose 40% Gel 15 Gram(s) Oral once PRN Blood Glucose LESS THAN 70 milliGRAM(s)/deciliter  glucagon  Injectable 1 milliGRAM(s) IntraMuscular once PRN Glucose LESS THAN 70 milligrams/deciliter      Allergies    No Known Allergies    Intolerances        Review of Systems:    unable to obtain    Vital Signs Last 24 Hrs  T(C): 37.4 (04 Sep 2019 06:37), Max: 38.9 (03 Sep 2019 21:29)  T(F): 99.4 (04 Sep 2019 06:37), Max: 102 (03 Sep 2019 21:29)  HR: 103 (04 Sep 2019 04:38) (79 - 105)  BP: 144/66 (04 Sep 2019 04:38) (142/64 - 151/53)  BP(mean): --  RR: 18 (04 Sep 2019 04:38) (18 - 20)  SpO2: 93% (04 Sep 2019 04:38) (92% - 96%)    PHYSICAL EXAM:    Constitutional: NAD, lethargic  HEENT: EOMI, throat clear  Neck: No LAD, supple  Respiratory: CTA and P  Cardiovascular: S1 and S2, RRR, no M  Gastrointestinal: BS+, soft, NT/ND, neg HSM,  Extremities: No peripheral edema, neg clubing, cyanosis  Vascular: 2+ peripheral pulses  Neurological: A/O x O, lethargic   Psychiatric: Normal mood, normal affect  Skin: No rashes      LABS:                        8.5    9.02  )-----------( 148      ( 04 Sep 2019 09:36 )             27.9     09-04    134<L>  |  96  |  47<H>  ----------------------------<  116<H>  4.2   |  21<L>  |  6.63<H>    Ca    9.1      04 Sep 2019 04:16      PT/INR - ( 04 Sep 2019 04:16 )   PT: 25.7 sec;   INR: 2.20 ratio         PTT - ( 04 Sep 2019 04:16 )  PTT:61.4 sec  Urinalysis Basic - ( 02 Sep 2019 23:57 )    Color: Orange / Appearance: Turbid / S.014 / pH: x  Gluc: x / Ketone: Negative  / Bili: Negative / Urobili: <2 mg/dL   Blood: x / Protein: >600 mg/dL / Nitrite: Negative   Leuk Esterase: Large / RBC: 277 /HPF / WBC >720 /HPF   Sq Epi: x / Non Sq Epi: 10 /HPF / Bacteria: TNTC        RADIOLOGY & ADDITIONAL TESTS:
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
## 2019-09-04 NOTE — CONSULT NOTE ADULT - SUBJECTIVE AND OBJECTIVE BOX
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 Pt w/ complicated hospital course. Of note, pt w/ rapid response today due to altered mental status, labs and vitals at the time wnl. Pt still altered at beside per RN. General surgery consulted for fat pad biopsy for possible amyloidosis.         ***      PAST MEDICAL & SURGICAL HISTORY:  Kidney transplant recipient  Anuria  GERD (gastroesophageal reflux disease)  Kidney transplant failure: dx: 2013  Hepatitis C: dx: 90&#x27;s.  From iv drug abuse  GERD (gastroesophageal reflux disease)  Renal transplant failure and rejection  Rectal abscess:   IV drug abuse: former, cocaine. 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, no known MIs  H/O kidney transplant: may 2015  A-V fistula: Left, implanted (?)  S/p cadaver renal transplant:   S/P partial colectomy: due to diverticulitis    FAMILY HISTORY:  Family history of breast cancer in sister (Sibling): living at 92 yo  Family history of prostate cancer in father  Family history of lung cancer  Family history of hypertension in mother  Family history of diabetes mellitus: mother  [] Family history not pertinent as reviewed with the patient and family    SOCIAL HISTORY:  ***    ALLERGIES: No Known Allergies      HOME MEDICATIONS: ***    CURRENT MEDICATIONS  MEDICATIONS (STANDING): allopurinol 100 milliGRAM(s) Oral daily  atorvastatin 40 milliGRAM(s) Oral at bedtime  carvedilol 25 milliGRAM(s) Oral every 12 hours  cinacalcet 30 milliGRAM(s) Oral daily  darbepoetin Injectable ViaL 40 MICROGram(s) IV Push every week  dextrose 5%. 1000 milliLiter(s) IV Continuous <Continuous>  dextrose 5%. 1000 milliLiter(s) IV Continuous <Continuous>  dextrose 50% Injectable 12.5 Gram(s) IV Push once  dextrose 50% Injectable 25 Gram(s) IV Push once  dextrose 50% Injectable 25 Gram(s) IV Push once  ergocalciferol 68591 Unit(s) Oral every week  folic acid 1 milliGRAM(s) Oral daily  furosemide   Injectable 60 milliGRAM(s) IV Push two times a day  gabapentin 100 milliGRAM(s) Oral two times a day  hydrALAZINE 100 milliGRAM(s) Oral three times a day  insulin glargine Injectable (LANTUS) 10 Unit(s) SubCutaneous at bedtime  insulin lispro (HumaLOG) corrective regimen sliding scale   SubCutaneous three times a day before meals  insulin lispro (HumaLOG) corrective regimen sliding scale   SubCutaneous at bedtime  labetalol 300 milliGRAM(s) Oral two times a day  levETIRAcetam  IVPB 500 milliGRAM(s) IV Intermittent two times a day  meropenem  IVPB 500 milliGRAM(s) IV Intermittent every 12 hours  mycophenolic acid  milliGRAM(s) Oral two times a day  predniSONE   Tablet 5 milliGRAM(s) Oral daily  sodium bicarbonate 650 milliGRAM(s) Oral two times a day  tamsulosin 0.4 milliGRAM(s) Oral at bedtime  thiamine Injectable 100 milliGRAM(s) IV Push daily    MEDICATIONS (PRN):acetaminophen   Tablet .. 650 milliGRAM(s) Oral every 6 hours PRN Temp greater or equal to 38C (100.4F)  aluminum hydroxide/magnesium hydroxide/simethicone Suspension 30 milliLiter(s) Oral every 6 hours PRN Dyspepsia  dextrose 40% Gel 15 Gram(s) Oral once PRN Blood Glucose LESS THAN 70 milliGRAM(s)/deciliter  glucagon  Injectable 1 milliGRAM(s) IntraMuscular once PRN Glucose LESS THAN 70 milligrams/deciliter    --------------------------------------------------------------------------------------------    Vitals:   T(C): 37.4 (19 @ 16:26), Max: 38.9 (19 @ 21:29)  HR: 89 (19 @ 16:07) (83 - 105)  BP: 155/70 (19 @ 16:07) (138/68 - 155/70)  RR: 18 (19 @ 16:07) (18 - 18)  SpO2: 95% (19 @ 16:07) (92% - 95%)  CAPILLARY BLOOD GLUCOSE      POCT Blood Glucose.: 132 mg/dL (04 Sep 2019 12:19)  POCT Blood Glucose.: 118 mg/dL (04 Sep 2019 06:56)  POCT Blood Glucose.: 114 mg/dL (04 Sep 2019 06:25)  POCT Blood Glucose.: 128 mg/dL (04 Sep 2019 00:16)  POCT Blood Glucose.: 129 mg/dL (03 Sep 2019 21:44)       @ 07:01  -  09 @ 07:00  --------------------------------------------------------  IN:    heparin  Infusion.: 221 mL    IV PiggyBack: 300 mL  Total IN: 521 mL    OUT:    Voided: 125 mL  Total OUT: 125 mL    Total NET: 396 mL              PHYSICAL EXAM: ***  General: NAD, lying in bed, confused  Neuro: no focal deficits, confused, altered  HEENT: NC/AT, EOMI  Neck: Soft, supple  Cardio: RRR, nml S1/S2  Resp: Good effort, CTA b/l  Thorax: No chest wall tenderness  GI/Abd: Soft, NT/ND, no rebound/guarding, no masses palpated, no hernias, lower midline scar  Vascular: All 4 extremities warm.  Skin: Intact, no breakdown  Musculoskeletal: All 4 extremities moving spontaneously, no limitations  --------------------------------------------------------------------------------------------    LABS  CBC ( @ 09:36)                              8.5<L>                         9.02    )----------------(  148<L>     --    % Neutrophils, --    % Lymphocytes, ANC: --                                  27.9<L>  CBC ( @ 09:04)                              8.6<L>                         8.37    )----------------(  165        --    % Neutrophils, --    % Lymphocytes, ANC: --                                  27.2<L>    BMP ( @ 04:16)             134<L>  |  96      |  47<H> 		Ca++ --      Ca 9.1                ---------------------------------( 116<H>		Mg --                 4.2     |  21<L>   |  6.63<H>			Ph --      BMP ( @ 06:53)             139     |  99      |  36<H> 		Ca++ --      Ca 9.4                ---------------------------------( 124<H>		Mg --                 4.0     |  25      |  5.30<H>			Ph --          Coags ( @ 13:42)  aPTT -- / INR 2.17<H> / PT 25.6<H>  Coags ( 04:16)  aPTT 61.4<H> / INR 2.20<H> / PT 25.7<H>      ABG (09-04 @ 07:30)     7.48<H> / 32 / 78 / 24 / 1.1 / 96%     Lactate:        --------------------------------------------------------------------------------------------    MICROBIOLOGY  Urinalysis ( @ 23:57):     Color: Orange<!> / Appearance: Turbid<!> / S.014 / pH: 8.0 / Gluc: Negative / Ketones: Negative / Bili: Negative / Urobili: <2 mg/dL / Protein :>600 mg/dL<!> / Nitrites: Negative / Leuk.Est: Large<!> / RBC: 277<H> / WBC: >720<H> / Sq Epi:  / Non Sq Epi: 10<H> / Bacteria TNTC<!>       -> .Blood Culture ( @ 10:14)       Growth in aerobic bottle: Gram Negative Rods    Blood Culture PCR    Growth in aerobic bottle: Gram Negative Rods  "Due to technical problems, Proteus sp. will Not be reported as part of  the BCID panel until further notice"  ***Blood Panel PCR results on this specimen are available  approximately 3 hours after the Gram stain result.***  Gram stain, PCR, and/or culture results may not always  correspond due to difference in methodologies.  ************************************************************  This PCR assay was performed using [employer].  The following targets are tested for: Enterococcus,  vancomycin resistant enterococci, Listeria monocytogenes,  coagulase negative staphylococci, S. aureus,  methicillin resistant S. aureus, Streptococcus agalactiae  (Group B), S. pneumoniae, S. pyogenes (Group A),  Acinetobacter baumannii, Enterobacter cloacae, E. coli,  Klebsiella oxytoca, K. pneumoniae, Proteus sp.,  Serratia marcescens, Haemophilus influenzae,  Neisseria meningitidis, Pseudomonas aeruginosa, Candida  albicans, C. glabrata, C krusei, C parapsilosis,  C. tropicalis and the KPC resistance gene.      --------------------------------------------------------------------------------------------    IMAGING    < from: CT Head No Cont (19 @ 12:01) >  COMPARISON EXAMINATION: 2019    FINDINGS:    VENTRICLES AND SULCI: Age-appropriate involutional changes  INTRA-AXIAL:  Microvascular ischemic changes involving the   periventricular and subcortical white matter.  EXTRA-AXIAL:  No mass or collection is seen.  VISUALIZED SINUSES: Left maxillary sinus mucosal disease  VISUALIZED MASTOIDS:  Clear.  CALVARIUM:  Normal.  MISCELLANEOUS: Metallic densities in the left posterior neck as described   on the prior study as well unchanged    IMPRESSION:  No change 2019. Involutional change and microvascular   ischemic disease    < end of copied text >      --------------------------------------------------------------------------------------------

## 2019-09-04 NOTE — PROGRESS NOTE ADULT - ASSESSMENT
73 yo male with history of A Fib, Hep C, DM, ESRD s/p renal transplant x 2, admitted 8/26 with progressive CKD and rectal bleeding.  He was on tacrolimus, myfortic, and 5 mg of prednisone along with prophylactic valtrex.  He has required HD and now is febrile.  No obvious source of infection.His CMV viral loads were negative as per prior transplant team.  He recently moved up from [place].  His Hep B surface antibody is positive, Hep C RNA is negative  He now is known to have GNR in blood, primary focus not clear, typically GI or  origin  While I would still consider LP the bacteremia in addition to worsening renal failure likely producing a toxic metabolic encephalopathy  Suggest:  1.Await crypt and histo serologies  2.CMV viral load, after resulted will decide on starting CMV prophylaxis  3.Will substitute  meropenem for cefepime for ESBL coverage pending ID of isolate  4.Consider LP, routine studies, ME PCR,crypt antigen, routine, fungal, and AFB cultures  5.Consider CT of A/P ,  "GNR" bacteremia  6.Will hold vanco and antifungal coverage with GNR in blood  7.GI PCR panel and culture if diarrhea returns

## 2019-09-04 NOTE — PROGRESS NOTE ADULT - SUBJECTIVE AND OBJECTIVE BOX
Patient is a 74y old  Male who presents with a chief complaint of r/o meningitis (05 Sep 2019 16:57)                                                               INTERVAL HPI/OVERNIGHT EVENTS:    REVIEW OF SYSTEMS:    lethaargic   does respond to some questions and knows where he is however does not fully open eyes                                                                                                                                                                                                                                                              Medications:  MEDICATIONS  (STANDING):  allopurinol 100 milliGRAM(s) Oral daily  atorvastatin 40 milliGRAM(s) Oral at bedtime  carvedilol 25 milliGRAM(s) Oral every 12 hours  cefTRIAXone   IVPB 1000 milliGRAM(s) IV Intermittent every 24 hours  chlorhexidine 2% Cloths 1 Application(s) Topical daily  cinacalcet 30 milliGRAM(s) Oral daily  darbepoetin Injectable ViaL 40 MICROGram(s) IV Push every week  desmopressin IVPB 20 MICROGram(s) IV Intermittent once  dextrose 5%. 1000 milliLiter(s) (50 mL/Hr) IV Continuous <Continuous>  dextrose 5%. 1000 milliLiter(s) (50 mL/Hr) IV Continuous <Continuous>  dextrose 50% Injectable 12.5 Gram(s) IV Push once  dextrose 50% Injectable 25 Gram(s) IV Push once  dextrose 50% Injectable 25 Gram(s) IV Push once  ergocalciferol 41188 Unit(s) Oral every week  folic acid 1 milliGRAM(s) Oral daily  furosemide   Injectable 60 milliGRAM(s) IV Push two times a day  hydrALAZINE 100 milliGRAM(s) Oral three times a day  hydrocortisone hemorrhoidal Suppository 1 Suppository(s) Rectal at bedtime  insulin glargine Injectable (LANTUS) 10 Unit(s) SubCutaneous at bedtime  insulin lispro (HumaLOG) corrective regimen sliding scale   SubCutaneous every 6 hours  labetalol 300 milliGRAM(s) Oral two times a day  levETIRAcetam  IVPB 500 milliGRAM(s) IV Intermittent two times a day  mycophenolic acid  milliGRAM(s) Oral two times a day  predniSONE   Tablet 5 milliGRAM(s) Oral daily  sodium bicarbonate 650 milliGRAM(s) Oral two times a day  tamsulosin 0.4 milliGRAM(s) Oral at bedtime  thiamine Injectable 100 milliGRAM(s) IV Push daily    MEDICATIONS  (PRN):  acetaminophen   Tablet .. 650 milliGRAM(s) Oral every 6 hours PRN Temp greater or equal to 38C (100.4F)  aluminum hydroxide/magnesium hydroxide/simethicone Suspension 30 milliLiter(s) Oral every 6 hours PRN Dyspepsia  dextrose 40% Gel 15 Gram(s) Oral once PRN Blood Glucose LESS THAN 70 milliGRAM(s)/deciliter  glucagon  Injectable 1 milliGRAM(s) IntraMuscular once PRN Glucose LESS THAN 70 milligrams/deciliter       Allergies    No Known Allergies    Intolerances      Vital Signs Last 24 Hrs  T(C): 37.6 (05 Sep 2019 17:12), Max: 39.2 (04 Sep 2019 22:14)  T(F): 99.6 (05 Sep 2019 17:12), Max: 102.6 (04 Sep 2019 22:14)  HR: 88 (05 Sep 2019 17:11) (72 - 112)  BP: 170/75 (05 Sep 2019 17:11) (155/78 - 170/75)  BP(mean): --  RR: 18 (05 Sep 2019 17:11) (18 - 20)  SpO2: 98% (05 Sep 2019 17:11) (93% - 98%)  CAPILLARY BLOOD GLUCOSE      POCT Blood Glucose.: 133 mg/dL (05 Sep 2019 14:29)  POCT Blood Glucose.: 117 mg/dL (05 Sep 2019 05:52)  POCT Blood Glucose.: 104 mg/dL (05 Sep 2019 01:47)  POCT Blood Glucose.: 100 mg/dL (04 Sep 2019 23:05)  POCT Blood Glucose.: 95 mg/dL (04 Sep 2019 22:06)  POCT Blood Glucose.: 85 mg/dL (04 Sep 2019 20:41)       @ 07:01  -   @ 07:00  --------------------------------------------------------  IN: 1750 mL / OUT: 1500 mL / NET: 250 mL      Physical Exam:    Daily Weight in k.3 (04 Sep 2019 19:50)  General: NAD   HEENT:  Nonicteric, PERRLA  CV:  RRR, S1S2   Lungs:  CTA B/L, no wheezes, rales, rhonchi  Abdomen:  Soft, non-tender, no distended, positive BS  Extremities:  , no edema  Skin:  Warm and dry, no rashes  :  No matt  Neuro:  lethargic  arousbale however falls right back to sleep         LABS:                               8.5    7.62  )-----------( 122      ( 05 Sep 2019 08:20 )             27.9                          136  |  95<L>  |  37<H>  ----------------------------<  117<H>  4.4   |  21<L>  |  5.38<H>    Ca    10.2      05 Sep 2019 05:50    TPro  6.1  /  Alb  2.7<L>  /  TBili  x   /  DBili  x   /  AST  x   /  ALT  x   /  AlkPhos  x    Patient is a 74y old  Male who presents with a chief complaint of r/o meningitis (04 Sep 2019 16:57)                                                               INTERVAL HPI/OVERNIGHT EVENTS:    REVIEW OF SYSTEMS:    lethargic  arousable but falls back to sleep                                                                                                                                                                                                                                                                            Medications:  MEDICATIONS  (STANDING):  allopurinol 100 milliGRAM(s) Oral daily  atorvastatin 40 milliGRAM(s) Oral at bedtime  carvedilol 25 milliGRAM(s) Oral every 12 hours  chlorhexidine 2% Cloths 1 Application(s) Topical daily  cinacalcet 30 milliGRAM(s) Oral daily  darbepoetin Injectable ViaL 40 MICROGram(s) IV Push every week  dextrose 5%. 1000 milliLiter(s) (50 mL/Hr) IV Continuous <Continuous>  dextrose 5%. 1000 milliLiter(s) (50 mL/Hr) IV Continuous <Continuous>  dextrose 50% Injectable 12.5 Gram(s) IV Push once  dextrose 50% Injectable 25 Gram(s) IV Push once  dextrose 50% Injectable 25 Gram(s) IV Push once  ergocalciferol 93353 Unit(s) Oral every week  folic acid 1 milliGRAM(s) Oral daily  furosemide   Injectable 60 milliGRAM(s) IV Push two times a day  gabapentin 100 milliGRAM(s) Oral two times a day  hydrALAZINE 100 milliGRAM(s) Oral three times a day  hydrocortisone hemorrhoidal Suppository 1 Suppository(s) Rectal at bedtime  insulin glargine Injectable (LANTUS) 10 Unit(s) SubCutaneous at bedtime  insulin lispro (HumaLOG) corrective regimen sliding scale   SubCutaneous three times a day before meals  insulin lispro (HumaLOG) corrective regimen sliding scale   SubCutaneous at bedtime  labetalol 300 milliGRAM(s) Oral two times a day  levETIRAcetam  IVPB 500 milliGRAM(s) IV Intermittent two times a day  meropenem  IVPB 500 milliGRAM(s) IV Intermittent every 12 hours  mycophenolic acid  milliGRAM(s) Oral two times a day  predniSONE   Tablet 5 milliGRAM(s) Oral daily  sodium bicarbonate 650 milliGRAM(s) Oral two times a day  tamsulosin 0.4 milliGRAM(s) Oral at bedtime  thiamine Injectable 100 milliGRAM(s) IV Push daily    MEDICATIONS  (PRN):  acetaminophen   Tablet .. 650 milliGRAM(s) Oral every 6 hours PRN Temp greater or equal to 38C (100.4F)  aluminum hydroxide/magnesium hydroxide/simethicone Suspension 30 milliLiter(s) Oral every 6 hours PRN Dyspepsia  dextrose 40% Gel 15 Gram(s) Oral once PRN Blood Glucose LESS THAN 70 milliGRAM(s)/deciliter  glucagon  Injectable 1 milliGRAM(s) IntraMuscular once PRN Glucose LESS THAN 70 milligrams/deciliter       Allergies    No Known Allergies    Intolerances      Vital Signs Last 24 Hrs  T(C): 37.1 (04 Sep 2019 13:00), Max: 38.9 (03 Sep 2019 21:29)  T(F): 98.7 (04 Sep 2019 13:00), Max: 102 (03 Sep 2019 21:29)  HR: 83 (04 Sep 2019 13:00) (83 - 105)  BP: 138/68 (04 Sep 2019 13:00) (138/68 - 151/53)  BP(mean): --  RR: 18 (04 Sep 2019 13:00) (18 - 18)  SpO2: 95% (04 Sep 2019 13:00) (92% - 95%)  Physical Exam:    Daily     Daily Weight in k.3 (04 Sep 2019 19:50)      General:  lethargic nad   HEENT:  Nonicteric, PERRLA  CV:  RRR, S1S2   Lungs:  CTA   Abdomen:  Soft, non-tender, no distended, positive BS  Extremities:  edema  Neuro: open eyes partiually and answers few questions and falls right back to sleep   moving all extremities                                                                                                                                                                                                                                                                                 LABS:                                 8.5    9.02  )-----------( 148      ( 04 Sep 2019 09:36 )             27.9       09-04    134<L>  |  96  |  47<H>  ----------------------------<  116<H>  4.2   |  21<L>  |  6.63<H>

## 2019-09-04 NOTE — PROGRESS NOTE ADULT - PROBLEM SELECTOR PLAN 1
Patient with waxing/waning sensorium concerning for central nervous process. Will have LP done today with send outs for AMARIS/EBV/CMV/PB19 as well as regular workup. Will continue to dialyze with minimal UF. Patient with waxing/waning sensorium concerning for central nervous process. Will have LP done today with send outs for AMARIS/EBV/CMV/PB19 as well as regular workup. Will continue to dialyze with minimal UF given on immunosuppresion meds.

## 2019-09-04 NOTE — PROGRESS NOTE ADULT - PROBLEM SELECTOR PLAN 2
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 now with KAMRON over CKD stage 5  , now started on dialysis since 8/27/19. Plan for HD today. Continue Lasix 60 mg IV bid for optimization of volume status.

## 2019-09-04 NOTE — PROGRESS NOTE ADULT - SUBJECTIVE AND OBJECTIVE BOX
Subjective: Patient seen and examined.   pt was seen this am   Events were noted     REVIEW OF SYSTEMS:    CONSTITUTIONAL: + weakness, fevers or chills  EYES/ENT: No visual changes;  No vertigo or throat pain   NECK: No pain or stiffness  RESPIRATORY: No cough, wheezing, hemoptysis; No shortness of breath  CARDIOVASCULAR: No chest pain or palpitations  GASTROINTESTINAL: No abdominal or epigastric pain. No nausea, vomiting, or hematemesis; No diarrhea or constipation. No melena or hematochezia.  GENITOURINARY: No dysuria, frequency or hematuria  NEUROLOGICAL: No numbness or weakness  SKIN: No itching, burning, rashes, or lesions   All other review of systems is negative unless indicated above.    MEDICATIONS:  MEDICATIONS  (STANDING):  allopurinol 100 milliGRAM(s) Oral daily  atorvastatin 40 milliGRAM(s) Oral at bedtime  carvedilol 25 milliGRAM(s) Oral every 12 hours  chlorhexidine 2% Cloths 1 Application(s) Topical daily  cinacalcet 30 milliGRAM(s) Oral daily  darbepoetin Injectable ViaL 40 MICROGram(s) IV Push every week  dextrose 5%. 1000 milliLiter(s) (50 mL/Hr) IV Continuous <Continuous>  dextrose 5%. 1000 milliLiter(s) (50 mL/Hr) IV Continuous <Continuous>  dextrose 50% Injectable 12.5 Gram(s) IV Push once  dextrose 50% Injectable 25 Gram(s) IV Push once  dextrose 50% Injectable 25 Gram(s) IV Push once  ergocalciferol 54005 Unit(s) Oral every week  folic acid 1 milliGRAM(s) Oral daily  furosemide   Injectable 60 milliGRAM(s) IV Push two times a day  gabapentin 100 milliGRAM(s) Oral two times a day  hydrALAZINE 100 milliGRAM(s) Oral three times a day  hydrocortisone hemorrhoidal Suppository 1 Suppository(s) Rectal at bedtime  insulin glargine Injectable (LANTUS) 10 Unit(s) SubCutaneous at bedtime  insulin lispro (HumaLOG) corrective regimen sliding scale   SubCutaneous three times a day before meals  insulin lispro (HumaLOG) corrective regimen sliding scale   SubCutaneous at bedtime  labetalol 300 milliGRAM(s) Oral two times a day  levETIRAcetam  IVPB 500 milliGRAM(s) IV Intermittent two times a day  meropenem  IVPB 500 milliGRAM(s) IV Intermittent every 12 hours  mycophenolic acid  milliGRAM(s) Oral two times a day  predniSONE   Tablet 5 milliGRAM(s) Oral daily  sodium bicarbonate 650 milliGRAM(s) Oral two times a day  tamsulosin 0.4 milliGRAM(s) Oral at bedtime  thiamine Injectable 100 milliGRAM(s) IV Push daily      PHYSICAL EXAM:  T(C): 37.7 (09-04-19 @ 17:00), Max: 38.9 (09-03-19 @ 21:29)  HR: 92 (09-04-19 @ 17:00) (83 - 105)  BP: 156/70 (09-04-19 @ 17:00) (138/68 - 156/70)  RR: 20 (09-04-19 @ 17:00) (18 - 20)  SpO2: 99% (09-04-19 @ 17:00) (92% - 99%)  Wt(kg): --  I&O's Summary    03 Sep 2019 07:01  -  04 Sep 2019 07:00  --------------------------------------------------------  IN: 521 mL / OUT: 125 mL / NET: 396 mL        Appearance: NAD lethargic at times confused   HEENT:   Normal oral mucosa, PERRL, EOMI	  Lymphatic: No lymphadenopathy , no edema  Cardiovascular: Normal S1 S2, No JVD, No murmurs , Peripheral pulses palpable 2+ bilaterally  Respiratory: Lungs clear to auscultation, normal effort 	  Gastrointestinal:  Soft, Non-tender, + BS	  Skin: No rashes, No ecchymoses, No cyanosis, warm to touch  Musculoskeletal: Normal range of motion, normal strength  Psychiatry:  at times confused   Ext: No edema      LABS:    CARDIAC MARKERS:                                8.5    9.02  )-----------( 148      ( 04 Sep 2019 09:36 )             27.9     09-04    134<L>  |  96  |  47<H>  ----------------------------<  116<H>  4.2   |  21<L>  |  6.63<H>    Ca    9.1      04 Sep 2019 04:16      proBNP:   Lipid Profile:   HgA1c:   TSH:       Prothrombin Time and INR, Plasma (09.04.19 @ 13:42)    Prothrombin Time, Plasma: 25.6: Effective October 30th, 2018 the reference range for PT has changed. sec    INR: 2.17: RECOMMENDED RANGES FOR THERAPEUTIC INR:    2.0-3.0 for most medical and surgical thromboembolic states    2.0-3.0 for atrial fibrillation    2.0-3.0 for bileaflet mechanical valve in aortic position    2.5-3.5 for mechanical heart valves   Chest 2004;[id number]  The presence of direct thrombin inhibitors (argatroban, refludan)  may falsely increase results. ratio          TELEMETRY: 	    ECG:  	  RADIOLOGY: < from: CT Head No Cont (09.03.19 @ 12:01) >    EXAM:  CT BRAIN                            PROCEDURE DATE:  09/03/2019            INTERPRETATION:  INDICATIONS:  ESRD patient with A. fib, evaluate for   stroke    TECHNIQUE:  Serial axial images were obtained from the skull base to the   vertex without intravenous contrast.    COMPARISON EXAMINATION: 8/31/2019    FINDINGS:    VENTRICLES AND SULCI: Age-appropriate involutional changes  INTRA-AXIAL:  Microvascular ischemic changes involving the   periventricular and subcortical white matter.  EXTRA-AXIAL:  No mass or collection is seen.  VISUALIZED SINUSES: Left maxillary sinus mucosal disease  VISUALIZED MASTOIDS:  Clear.  CALVARIUM:  Normal.  MISCELLANEOUS: Metallic densities in the left posterior neck as described   on the prior study as well unchanged    IMPRESSION:  No change 8/31/2019. Involutional change and microvascular   ischemic disease                    ONDINA JOY M.D., ATTENDING RADIOLOGIST  This document has been electronically signed. Sep  3 2019 12:18PM                < end of copied text >    DIAGNOSTIC TESTING:  [ ] Echocardiogram:  [ ]  Catheterization:  [ ] Stress Test:    OTHER:

## 2019-09-04 NOTE — CHART NOTE - NSCHARTNOTEFT_GEN_A_CORE
D/w the patient case with his long time Nephrologist in [place].  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  He also had M spike as high as 1.3gm and he has had 10gm of proteinuria.   His hypercalcemia was worked up there and thought to be mostly related to PTH disease and he was not a parathyroidectomy candidate.    Given above information, still concern for systemic amyloidosis as can be seen with MGUS  Will d/w with heme and see if any other testing is needed further or if repeat marrow is required    Jennifer Orellana MD  Cell   Pager   Office

## 2019-09-04 NOTE — CONSULT NOTE ADULT - ASSESSMENT
ASSESSMENT: 73 y/o male with PMHx of  ESRD s/p /p failed renal transplant 4 year ago and successful renal transplant 1 year ago,DM, HTN, cardiomyopathy 2/2 cocaine abuse, Hepatitis C, meningococcal meningitiss presenting with concerns about his kidney function, worsening SOB over the past 2 weeks and leg edema. Surgery consulted for fat pad biopsy to r/o amyloidosis.     PLAN:  ***  - tentatively added to schedule for Friday - if pt not optimized will reschedule  - will need medical, cardiac optimization prior to surgery  - f/u results of rapid response this am  - care per primary team    Discussed w/ Dr. Clancy  [facility], [id number] ASSESSMENT: 75 y/o male with PMHx of  ESRD s/p /p failed renal transplant 4 year ago and successful renal transplant 1 year ago,DM, HTN, cardiomyopathy 2/2 cocaine abuse, Hepatitis C, meningococcal meningitiss presenting with concerns about his kidney function, worsening SOB over the past 2 weeks and leg edema. Surgery consulted for fat pad biopsy to r/o amyloidosis.     PLAN:  ***  - tentatively added to schedule for Friday - if pt not optimized will reschedule  - will need medical, cardiac optimization prior to surgery  - f/u results of rapid response this am - plan for repeat head CT  - care per primary team    Discussed w/ Dr. Clancy  [facility], [id number] ASSESSMENT: 73 y/o male with PMHx of  ESRD s/p /p failed renal transplant 4 year ago and successful renal transplant 1 year ago,DM, HTN, cardiomyopathy 2/2 cocaine abuse, Hepatitis C, meningococcal meningitiss presenting with concerns about his kidney function, worsening SOB over the past 2 weeks and leg edema. Surgery consulted for fat pad biopsy to r/o amyloidosis.     PLAN:  ***  - tentatively added to schedule for Friday - if pt not optimized will reschedule  - will need medical, cardiac optimization prior to surgery  - would hold hep gtt/coumadin until after bx if possible  - f/u results of rapid response this am - plan for repeat head CT  - care per primary team    Discussed w/ Dr. ALLYSSA Clancy  [facility], [id number]

## 2019-09-04 NOTE — RAPID RESPONSE TEAM SUMMARY - NSSITUATIONBACKGROUNDRRT_GEN_ALL_CORE
73 y/o male with PMHx of  ESRD s/p /p failed renal transplant 4 year ago and successful renal transplant 1 year ago,DM, HTN, cardiomyopathy 2/2 cocaine abuse, Hepatitis C, meningococcal meningitis presenting with concerns about his kidney function, worsening SOB over the past 2 weeks and leg edema. RRT called for AMS. Upon arrival to RRT, patient hemodynamically stable and saturating well on RA. Not significantly withdrawing to pain. ABG drawn and unremarkable. Advised team to obtain CT head. Patient has been AOx1 per primary RN at bedside. Given that patient is stable, endorsed to primary team to obtain CT head and follow up with Neurology. Unlikely seizure activity as patient with no lactate, and on hand drop test, patient did not allow arm to hit his face. 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

## 2019-09-04 NOTE — PROGRESS NOTE ADULT - SUBJECTIVE AND OBJECTIVE BOX
Pt seen, not very interactive, shaking/shivering, eyes closed. Asked how he is and what's going on, said he felt cold. He was able to open his eyes half way, not following instructions much though otherwise.    MEDICATIONS  (STANDING):  allopurinol 100 milliGRAM(s) Oral daily  atorvastatin 40 milliGRAM(s) Oral at bedtime  carvedilol 25 milliGRAM(s) Oral every 12 hours  chlorhexidine 2% Cloths 1 Application(s) Topical daily  cinacalcet 30 milliGRAM(s) Oral daily  darbepoetin Injectable ViaL 40 MICROGram(s) IV Push every week  dextrose 5%. 1000 milliLiter(s) (50 mL/Hr) IV Continuous <Continuous>  dextrose 5%. 1000 milliLiter(s) (50 mL/Hr) IV Continuous <Continuous>  dextrose 50% Injectable 12.5 Gram(s) IV Push once  dextrose 50% Injectable 25 Gram(s) IV Push once  dextrose 50% Injectable 25 Gram(s) IV Push once  ergocalciferol 53513 Unit(s) Oral every week  folic acid 1 milliGRAM(s) Oral daily  furosemide   Injectable 60 milliGRAM(s) IV Push two times a day  gabapentin 100 milliGRAM(s) Oral two times a day  hydrALAZINE 100 milliGRAM(s) Oral three times a day  hydrocortisone hemorrhoidal Suppository 1 Suppository(s) Rectal at bedtime  insulin glargine Injectable (LANTUS) 10 Unit(s) SubCutaneous at bedtime  insulin lispro (HumaLOG) corrective regimen sliding scale   SubCutaneous three times a day before meals  insulin lispro (HumaLOG) corrective regimen sliding scale   SubCutaneous at bedtime  labetalol 300 milliGRAM(s) Oral two times a day  levETIRAcetam  IVPB 500 milliGRAM(s) IV Intermittent two times a day  meropenem  IVPB 500 milliGRAM(s) IV Intermittent every 12 hours  mycophenolic acid  milliGRAM(s) Oral two times a day  predniSONE   Tablet 5 milliGRAM(s) Oral daily  sodium bicarbonate 650 milliGRAM(s) Oral two times a day  tamsulosin 0.4 milliGRAM(s) Oral at bedtime  thiamine Injectable 100 milliGRAM(s) IV Push daily    MEDICATIONS  (PRN):  acetaminophen   Tablet .. 650 milliGRAM(s) Oral every 6 hours PRN Temp greater or equal to 38C (100.4F)  aluminum hydroxide/magnesium hydroxide/simethicone Suspension 30 milliLiter(s) Oral every 6 hours PRN Dyspepsia  dextrose 40% Gel 15 Gram(s) Oral once PRN Blood Glucose LESS THAN 70 milliGRAM(s)/deciliter  glucagon  Injectable 1 milliGRAM(s) IntraMuscular once PRN Glucose LESS THAN 70 milligrams/deciliter      ROS  as above, UTO otherwise    Vital Signs Last 24 Hrs  T(C): 37.1 (04 Sep 2019 13:00), Max: 38.9 (03 Sep 2019 21:29)  T(F): 98.7 (04 Sep 2019 13:00), Max: 102 (03 Sep 2019 21:29)  HR: 83 (04 Sep 2019 13:00) (83 - 105)  BP: 138/68 (04 Sep 2019 13:00) (138/68 - 151/53)  BP(mean): --  RR: 18 (04 Sep 2019 13:00) (18 - 18)  SpO2: 95% (04 Sep 2019 13:00) (92% - 95%)    PE  NAD  as above, limited 2/2 participation                          8.5    9.02  )-----------( 148      ( 04 Sep 2019 09:36 )             27.9       09-04    134<L>  |  96  |  47<H>  ----------------------------<  116<H>  4.2   |  21<L>  |  6.63<H>    Ca    9.1      04 Sep 2019 04:16

## 2019-09-05 ENCOUNTER — RESULT REVIEW (OUTPATIENT)
Age: 74
End: 2019-09-05

## 2019-09-05 DIAGNOSIS — R78.81 BACTEREMIA: ICD-10-CM

## 2019-09-05 LAB
% ALBUMIN: 45 % — SIGNIFICANT CHANGE UP
% ALPHA 1: 8.7 % — SIGNIFICANT CHANGE UP
% ALPHA 2: 17.6 % — SIGNIFICANT CHANGE UP
% BETA: 10 % — SIGNIFICANT CHANGE UP
% GAMMA: 18.7 % — SIGNIFICANT CHANGE UP
% M SPIKE: 14.3 % — SIGNIFICANT CHANGE UP
-  AMIKACIN: SIGNIFICANT CHANGE UP
-  AMPICILLIN/SULBACTAM: SIGNIFICANT CHANGE UP
-  AMPICILLIN: SIGNIFICANT CHANGE UP
-  AZTREONAM: SIGNIFICANT CHANGE UP
-  CEFAZOLIN: SIGNIFICANT CHANGE UP
-  CEFEPIME: SIGNIFICANT CHANGE UP
-  CEFOXITIN: SIGNIFICANT CHANGE UP
-  CEFTRIAXONE: SIGNIFICANT CHANGE UP
-  CIPROFLOXACIN: SIGNIFICANT CHANGE UP
-  ERTAPENEM: SIGNIFICANT CHANGE UP
-  GENTAMICIN: SIGNIFICANT CHANGE UP
-  IMIPENEM: SIGNIFICANT CHANGE UP
-  LEVOFLOXACIN: SIGNIFICANT CHANGE UP
-  MEROPENEM: SIGNIFICANT CHANGE UP
-  PIPERACILLIN/TAZOBACTAM: SIGNIFICANT CHANGE UP
-  TOBRAMYCIN: SIGNIFICANT CHANGE UP
-  TRIMETHOPRIM/SULFAMETHOXAZOLE: SIGNIFICANT CHANGE UP
ALBUMIN SERPL ELPH-MCNC: 2.7 G/DL — LOW (ref 3.6–5.5)
ALBUMIN/GLOB SERPL ELPH: 0.8 RATIO — SIGNIFICANT CHANGE UP
ALPHA1 GLOB SERPL ELPH-MCNC: 0.5 G/DL — HIGH (ref 0.1–0.4)
ALPHA2 GLOB SERPL ELPH-MCNC: 1.1 G/DL — HIGH (ref 0.5–1)
ANION GAP SERPL CALC-SCNC: 20 MMOL/L — HIGH (ref 5–17)
APPEARANCE CSF: CLEAR — SIGNIFICANT CHANGE UP
APPEARANCE CSF: CLEAR — SIGNIFICANT CHANGE UP
APPEARANCE SPUN FLD: COLORLESS — SIGNIFICANT CHANGE UP
APPEARANCE SPUN FLD: COLORLESS — SIGNIFICANT CHANGE UP
APTT BLD: 25.5 SEC — LOW (ref 27.5–36.3)
B-GLOBULIN SERPL ELPH-MCNC: 0.6 G/DL — SIGNIFICANT CHANGE UP (ref 0.5–1)
BUN SERPL-MCNC: 37 MG/DL — HIGH (ref 7–23)
CALCIUM SERPL-MCNC: 10.2 MG/DL — SIGNIFICANT CHANGE UP (ref 8.4–10.5)
CHLORIDE SERPL-SCNC: 95 MMOL/L — LOW (ref 96–108)
CO2 SERPL-SCNC: 21 MMOL/L — LOW (ref 22–31)
COLOR CSF: SIGNIFICANT CHANGE UP
COLOR CSF: SIGNIFICANT CHANGE UP
CREAT SERPL-MCNC: 5.38 MG/DL — HIGH (ref 0.5–1.3)
CSF PCR RESULT: SIGNIFICANT CHANGE UP
CULTURE RESULTS: SIGNIFICANT CHANGE UP
GAMMA GLOBULIN: 1.1 G/DL — SIGNIFICANT CHANGE UP (ref 0.6–1.6)
GLUCOSE BLDC GLUCOMTR-MCNC: 104 MG/DL — HIGH (ref 70–99)
GLUCOSE BLDC GLUCOMTR-MCNC: 117 MG/DL — HIGH (ref 70–99)
GLUCOSE BLDC GLUCOMTR-MCNC: 117 MG/DL — HIGH (ref 70–99)
GLUCOSE BLDC GLUCOMTR-MCNC: 125 MG/DL — HIGH (ref 70–99)
GLUCOSE BLDC GLUCOMTR-MCNC: 133 MG/DL — HIGH (ref 70–99)
GLUCOSE CSF-MCNC: 67 MG/DL — SIGNIFICANT CHANGE UP (ref 40–70)
GLUCOSE SERPL-MCNC: 117 MG/DL — HIGH (ref 70–99)
GRAM STN FLD: SIGNIFICANT CHANGE UP
HCT VFR BLD CALC: 27.9 % — LOW (ref 39–50)
HGB BLD-MCNC: 8.5 G/DL — LOW (ref 13–17)
HOMOCYSTEINE LEVEL: 20.1 UMOL/L — HIGH
IGA FLD-MCNC: 91 MG/DL — SIGNIFICANT CHANGE UP (ref 84–499)
IGG FLD-MCNC: 1333 MG/DL — SIGNIFICANT CHANGE UP (ref 610–1660)
IGM SERPL-MCNC: 33 MG/DL — LOW (ref 35–242)
INR BLD: 1.41 RATIO — HIGH (ref 0.88–1.16)
INTERPRETATION SERPL IFE-IMP: SIGNIFICANT CHANGE UP
INTERPRETATION SERPL IFE-IMP: SIGNIFICANT CHANGE UP
KAPPA LC SER QL IFE: 5.95 MG/DL — HIGH (ref 0.33–1.94)
KAPPA/LAMBDA FREE LIGHT CHAIN RATIO, SERUM: 0.37 RATIO — SIGNIFICANT CHANGE UP (ref 0.26–1.65)
LAMBDA LC SER QL IFE: 16.19 MG/DL — HIGH (ref 0.57–2.63)
LDH CSF L TO P-CCNC: 29 U/L — SIGNIFICANT CHANGE UP
LDH FLD-CCNC: 29 U/L — SIGNIFICANT CHANGE UP
LYMPHOCYTES # CSF: 80 % — SIGNIFICANT CHANGE UP (ref 40–80)
M-SPIKE: 0.9 G/DL — HIGH (ref 0–0)
MCHC RBC-ENTMCNC: 26.3 PG — LOW (ref 27–34)
MCHC RBC-ENTMCNC: 30.5 GM/DL — LOW (ref 32–36)
MCV RBC AUTO: 86.4 FL — SIGNIFICANT CHANGE UP (ref 80–100)
METHOD TYPE: SIGNIFICANT CHANGE UP
METHYLMALONIC ACID LEVEL: 438 NMOL/L — HIGH (ref 87–318)
MONOS+MACROS NFR CSF: 20 % — SIGNIFICANT CHANGE UP (ref 15–45)
NEUTROPHILS # CSF: 0 % — SIGNIFICANT CHANGE UP (ref 0–6)
NEUTROPHILS # CSF: SIGNIFICANT CHANGE UP (ref 0–6)
NRBC NFR CSF: 1 /UL — SIGNIFICANT CHANGE UP (ref 0–5)
NRBC NFR CSF: <1 — SIGNIFICANT CHANGE UP (ref 0–5)
ORGANISM # SPEC MICROSCOPIC CNT: SIGNIFICANT CHANGE UP
PLATELET # BLD AUTO: 122 K/UL — LOW (ref 150–400)
POTASSIUM SERPL-MCNC: 4.4 MMOL/L — SIGNIFICANT CHANGE UP (ref 3.5–5.3)
POTASSIUM SERPL-SCNC: 4.4 MMOL/L — SIGNIFICANT CHANGE UP (ref 3.5–5.3)
PROT CSF-MCNC: 126 MG/DL — HIGH (ref 15–45)
PROT PATTERN SERPL ELPH-IMP: SIGNIFICANT CHANGE UP
PROT SERPL-MCNC: 6.1 G/DL — SIGNIFICANT CHANGE UP (ref 6–8.3)
PROT SERPL-MCNC: 6.1 G/DL — SIGNIFICANT CHANGE UP (ref 6–8.3)
PROTHROM AB SERPL-ACNC: 16.4 SEC — HIGH (ref 10–12.9)
RBC # BLD: 3.23 M/UL — LOW (ref 4.2–5.8)
RBC # CSF: 0 /UL — SIGNIFICANT CHANGE UP (ref 0–0)
RBC # CSF: 1 /UL — HIGH (ref 0–0)
RBC # FLD: 14.6 % — HIGH (ref 10.3–14.5)
SODIUM SERPL-SCNC: 136 MMOL/L — SIGNIFICANT CHANGE UP (ref 135–145)
SPECIMEN SOURCE: SIGNIFICANT CHANGE UP
SPECIMEN SOURCE: SIGNIFICANT CHANGE UP
TUBE TYPE: SIGNIFICANT CHANGE UP
TUBE TYPE: SIGNIFICANT CHANGE UP
WBC # BLD: 7.62 K/UL — SIGNIFICANT CHANGE UP (ref 3.8–10.5)
WBC # FLD AUTO: 7.62 K/UL — SIGNIFICANT CHANGE UP (ref 3.8–10.5)

## 2019-09-05 PROCEDURE — 77003 FLUOROGUIDE FOR SPINE INJECT: CPT | Mod: 26

## 2019-09-05 PROCEDURE — 99232 SBSQ HOSP IP/OBS MODERATE 35: CPT

## 2019-09-05 PROCEDURE — 62270 DX LMBR SPI PNXR: CPT

## 2019-09-05 PROCEDURE — 70460 CT HEAD/BRAIN W/DYE: CPT | Mod: 26

## 2019-09-05 PROCEDURE — 95951: CPT | Mod: 26

## 2019-09-05 PROCEDURE — 88108 CYTOPATH CONCENTRATE TECH: CPT | Mod: 26

## 2019-09-05 RX ORDER — DESMOPRESSIN ACETATE 0.1 MG/1
20 TABLET ORAL ONCE
Refills: 0 | Status: COMPLETED | OUTPATIENT
Start: 2019-09-05 | End: 2019-09-05

## 2019-09-05 RX ORDER — CEFTRIAXONE 500 MG/1
1000 INJECTION, POWDER, FOR SOLUTION INTRAMUSCULAR; INTRAVENOUS EVERY 24 HOURS
Refills: 0 | Status: DISCONTINUED | OUTPATIENT
Start: 2019-09-05 | End: 2019-09-11

## 2019-09-05 RX ORDER — INSULIN LISPRO 100/ML
VIAL (ML) SUBCUTANEOUS EVERY 6 HOURS
Refills: 0 | Status: DISCONTINUED | OUTPATIENT
Start: 2019-09-05 | End: 2019-09-14

## 2019-09-05 RX ORDER — DESMOPRESSIN ACETATE 0.1 MG/1
20 TABLET ORAL ONCE
Refills: 0 | Status: DISCONTINUED | OUTPATIENT
Start: 2019-09-05 | End: 2019-09-05

## 2019-09-05 RX ADMIN — Medication 60 MILLIGRAM(S): at 14:19

## 2019-09-05 RX ADMIN — ATORVASTATIN CALCIUM 40 MILLIGRAM(S): 80 TABLET, FILM COATED ORAL at 22:59

## 2019-09-05 RX ADMIN — LEVETIRACETAM 400 MILLIGRAM(S): 250 TABLET, FILM COATED ORAL at 00:54

## 2019-09-05 RX ADMIN — LEVETIRACETAM 400 MILLIGRAM(S): 250 TABLET, FILM COATED ORAL at 14:17

## 2019-09-05 RX ADMIN — SODIUM CHLORIDE 50 MILLILITER(S): 9 INJECTION, SOLUTION INTRAVENOUS at 05:57

## 2019-09-05 RX ADMIN — INSULIN GLARGINE 10 UNIT(S): 100 INJECTION, SOLUTION SUBCUTANEOUS at 23:00

## 2019-09-05 RX ADMIN — Medication 100 MILLIGRAM(S): at 22:59

## 2019-09-05 RX ADMIN — MEROPENEM 100 MILLIGRAM(S): 1 INJECTION INTRAVENOUS at 01:21

## 2019-09-05 RX ADMIN — Medication 1 SUPPOSITORY(S): at 23:00

## 2019-09-05 RX ADMIN — Medication 100 MILLIGRAM(S): at 14:18

## 2019-09-05 RX ADMIN — DESMOPRESSIN ACETATE 220 MICROGRAM(S): 0.1 TABLET ORAL at 11:00

## 2019-09-05 RX ADMIN — CEFTRIAXONE 100 MILLIGRAM(S): 500 INJECTION, POWDER, FOR SOLUTION INTRAMUSCULAR; INTRAVENOUS at 17:58

## 2019-09-05 RX ADMIN — Medication 1 SUPPOSITORY(S): at 00:55

## 2019-09-05 RX ADMIN — TAMSULOSIN HYDROCHLORIDE 0.4 MILLIGRAM(S): 0.4 CAPSULE ORAL at 23:00

## 2019-09-05 RX ADMIN — Medication 60 MILLIGRAM(S): at 00:54

## 2019-09-05 NOTE — PROGRESS NOTE ADULT - SUBJECTIVE AND OBJECTIVE BOX
Status post lumbar puncture at the _L3-L4__  level utilizing a _20 G__ spinal needle.      _16__cc of _clear__ cerebral spinal fluid was obtained.      No immediate complications.

## 2019-09-05 NOTE — PROGRESS NOTE ADULT - SUBJECTIVE AND OBJECTIVE BOX
CC: f/u for E Coli bacteremia    Patient reports: he remains confused, not following commands,poorly interactive and not cooperative    REVIEW OF SYSTEMS:  All other review of systems negative (Comprehensive ROS)    Antimicrobials Day #  :day 3  meropenem  IVPB 500 milliGRAM(s) IV Intermittent every 12 hours    Other Medications Reviewed    T(F): 99.6 (09-05-19 @ 14:35), Max: 102.6 (09-04-19 @ 22:14)  HR: 91 (09-05-19 @ 14:35)  BP: 160/81 (09-05-19 @ 14:35)  RR: 20 (09-05-19 @ 14:35)  SpO2: 93% (09-05-19 @ 14:35)  Wt(kg): --    PHYSICAL EXAM:  General: lethargic,, no acute distress.  Eyes:  anicteric, no conjunctival injection, no discharge  Oropharynx: no lesions or injection 	  Neck: supple, without adenopathy  Lungs: clear to auscultation, poor inspiratory effert  Heart: regular rate and rhythm; no murmur, rubs or gallops  Abdomen: soft, nondistended, nontender, without mass or organomegaly  Skin: no lesions  Extremities: no clubbing, cyanosis, + edema  Neurologic: confused but moves extremities    LAB RESULTS:                        8.5    7.62  )-----------( 122      ( 05 Sep 2019 08:20 )             27.9     09-05    136  |  95<L>  |  37<H>  ----------------------------<  117<H>  4.4   |  21<L>  |  5.38<H>    Ca    10.2      05 Sep 2019 05:50    TPro  6.1  /  Alb  2.7<L>  /  TBili  x   /  DBili  x   /  AST  x   /  ALT  x   /  AlkPhos  x   09-04    LIVER FUNCTIONS - ( 04 Sep 2019 23:44 )  Alb: 2.7 g/dL / Pro: 6.1 g/dL / ALK PHOS: x     / ALT: x     / AST: x     / GGT: x             MICROBIOLOGY:  RECENT CULTURES:  09-03 @ 10:14 .Blood Blood Culture PCR  Escherichia coli    Growth in aerobic bottle: Escherichia coli        Growth in aerobic bottle: Gram Negative Rods        RADIOLOGY REVIEWED:  < from: Xray Chest 1 View- PORTABLE-Urgent (09.03.19 @ 12:36) >  IMPRESSION:  Clear lungs    < end of copied text >

## 2019-09-05 NOTE — PROGRESS NOTE ADULT - SUBJECTIVE AND OBJECTIVE BOX
Status post lumbar puncture at the L3/L4 level utilizing a 20g spinal needle.      20cc of clear  cerebral spinal fluid was obtained.    No immediate complications.

## 2019-09-05 NOTE — PROGRESS NOTE ADULT - PROBLEM SELECTOR PLAN 2
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 now with KAMRON over CKD stage 5  ,now started on dialysis since 8/27/19. Plan for HD today. Continue Lasix 60 mg IV bid for optimization of volume status. Cont dailysis and may even need it daily.  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   Plan for renal bx soon ( after his LP today perhaps or tomorrow)  Plan for fat pad on friday  Awaiting repeat marrow.

## 2019-09-05 NOTE — PROGRESS NOTE ADULT - SUBJECTIVE AND OBJECTIVE BOX
INTERVAL HPI/OVERNIGHT EVENTS:    pt seen and examined  remains lethargic/confused   no reported rectal bleeding as per RN    MEDICATIONS  (STANDING):  allopurinol 100 milliGRAM(s) Oral daily  atorvastatin 40 milliGRAM(s) Oral at bedtime  carvedilol 25 milliGRAM(s) Oral every 12 hours  chlorhexidine 2% Cloths 1 Application(s) Topical daily  cinacalcet 30 milliGRAM(s) Oral daily  darbepoetin Injectable ViaL 40 MICROGram(s) IV Push every week  dextrose 5%. 1000 milliLiter(s) (50 mL/Hr) IV Continuous <Continuous>  dextrose 5%. 1000 milliLiter(s) (50 mL/Hr) IV Continuous <Continuous>  dextrose 50% Injectable 12.5 Gram(s) IV Push once  dextrose 50% Injectable 25 Gram(s) IV Push once  dextrose 50% Injectable 25 Gram(s) IV Push once  ergocalciferol 91904 Unit(s) Oral every week  folic acid 1 milliGRAM(s) Oral daily  furosemide   Injectable 60 milliGRAM(s) IV Push two times a day  hydrALAZINE 100 milliGRAM(s) Oral three times a day  hydrocortisone hemorrhoidal Suppository 1 Suppository(s) Rectal at bedtime  insulin glargine Injectable (LANTUS) 10 Unit(s) SubCutaneous at bedtime  insulin lispro (HumaLOG) corrective regimen sliding scale   SubCutaneous every 6 hours  labetalol 300 milliGRAM(s) Oral two times a day  levETIRAcetam  IVPB 500 milliGRAM(s) IV Intermittent two times a day  meropenem  IVPB 500 milliGRAM(s) IV Intermittent every 12 hours  mycophenolic acid  milliGRAM(s) Oral two times a day  predniSONE   Tablet 5 milliGRAM(s) Oral daily  sodium bicarbonate 650 milliGRAM(s) Oral two times a day  tamsulosin 0.4 milliGRAM(s) Oral at bedtime  thiamine Injectable 100 milliGRAM(s) IV Push daily    MEDICATIONS  (PRN):  acetaminophen   Tablet .. 650 milliGRAM(s) Oral every 6 hours PRN Temp greater or equal to 38C (100.4F)  aluminum hydroxide/magnesium hydroxide/simethicone Suspension 30 milliLiter(s) Oral every 6 hours PRN Dyspepsia  dextrose 40% Gel 15 Gram(s) Oral once PRN Blood Glucose LESS THAN 70 milliGRAM(s)/deciliter  glucagon  Injectable 1 milliGRAM(s) IntraMuscular once PRN Glucose LESS THAN 70 milligrams/deciliter      Allergies    No Known Allergies    Intolerances          Review of Systems:    unable to obtain    Vital Signs Last 24 Hrs  T(C): 36.8 (05 Sep 2019 10:00), Max: 39.2 (04 Sep 2019 22:14)  T(F): 98.2 (05 Sep 2019 10:00), Max: 102.6 (04 Sep 2019 22:14)  HR: 72 (05 Sep 2019 10:00) (72 - 112)  BP: 166/66 (05 Sep 2019 10:00) (138/68 - 166/66)  BP(mean): --  RR: 18 (05 Sep 2019 10:00) (18 - 20)  SpO2: 97% (05 Sep 2019 10:00) (95% - 99%)    PHYSICAL EXAM:    Constitutional: NAD, lethargic  HEENT: EOMI, throat clear  Neck: No LAD, supple  Respiratory: CTA and P  Cardiovascular: S1 and S2, RRR, no M  Gastrointestinal: BS+, soft, NT/ND, neg HSM,  Extremities: No peripheral edema, neg clubbing, cyanosis  Vascular: 2+ peripheral pulses  Neurological: A/O x O, lethargic   Psychiatric: Normal mood, normal affect  Skin: No rashes      LABS:                        8.5    7.62  )-----------( 122      ( 05 Sep 2019 08:20 )             27.9     09-05    136  |  95<L>  |  37<H>  ----------------------------<  117<H>  4.4   |  21<L>  |  5.38<H>    Ca    10.2      05 Sep 2019 05:50    TPro  6.1  /  Alb  2.7<L>  /  TBili  x   /  DBili  x   /  AST  x   /  ALT  x   /  AlkPhos  x   09-04    PT/INR - ( 05 Sep 2019 05:50 )   PT: 16.4 sec;   INR: 1.41 ratio         PTT - ( 05 Sep 2019 05:50 )  PTT:25.5 sec      RADIOLOGY & ADDITIONAL TESTS:

## 2019-09-05 NOTE — PROGRESS NOTE ADULT - PROBLEM SELECTOR PLAN 3
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 admitted with fluid overload and HTN urgency . Last renal bx was 2017, presumed rejection, [place] red was not done. Repeating renal transplant bx for ruling out amyloidosis as proteinuria is significant at 10gm and not sure if a true cause was found in d.w with his primary nephr at [place].
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
## 2019-09-05 NOTE — CHART NOTE - NSCHARTNOTEFT_GEN_A_CORE
Per Surgery team sister wants to hold off with fat pad Bx - Sx rescheduled for next week  Head CT with contrast on hold due to no #20 IV access - IV team to attempt IV access.  Dr. Hwang aware and OK to hold off Head CT for now    [id number]

## 2019-09-05 NOTE — CHART NOTE - NSCHARTNOTEFT_GEN_A_CORE
Discussed w/ pt sister regarding fat pad biopsy tomorrow.   Pt sister does not wish to have fat pad biopsy done at this time - she would like to wait for further results to return before proceeding with this biopsy. Will attempt to schedule for early next week and discuss w/ sister.     [facility] [id number]

## 2019-09-05 NOTE — PROGRESS NOTE ADULT - ASSESSMENT
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   Pt just moved back to [place] from [place] .. reports that he has not been taking his meds for the past few days since " he might have misplaed them"     pt denies chest pain, syncope,fever, chills, cough, urinary symptoms.    in ED found  to have anemia   Nno acute changes on EKG   elevated CR and Trop    1- HTN urgency : now improving  bp    2- KAMRON  :   attempt TOV   d/w   : pt will likely end up on HD in intermediate and likely long term given transplant graft failure   off tacro,  cont cellcept and sterioods       3- DM:   A1c  5.6  Fs     4- acute diastolic CHF sec to  HTN urgency and renal failure   Echo :  < from: Transthoracic Echocardiogram (08.28.19 @ 16:12) >  1. Mitral annular calcification and calcified mitral  leaflets with decreased diastolic opening.  2. Moderate to severe concentric left ventricular  hypertrophy.  3. Normal left ventricular systolic function with  mid-cavitary obliteration.  4. Normal right ventricular size and systolic function.  5. Small pericardial effusion.    cont fluid removal with HD per renal      5-  difficult ambulating :  no focal neuro deficits   neuro eval appreciated    CT T/L   < from: CT Thoracic Spine No Cont (08.30.19 @ 10:06) >    Old right L1 transverse process fracture. Bilateral lysis   defects at L5 as seen on the prior 5/26/2013      6- afib :hold AC ..holding for now for bx /LP     7- hematochezia : per sister : on and off small amount of fresh blood in stool and some amount in urine but only small amounts   monitor H/H   consult GI .: appreciate input :  suspect bleeding to be hemorrhoidal, now resolved     trial of anusol supp  if bleeding persist would consider colonoscopy however patient is reluctant.    heme input: appreciated        8-  renal transplant : f/u with Transplant team   cont meds with MMF and steroids   off Tac     9- TIA :  unable to perform MRI   CT no acute changes on CT   repeat bnegative   fu with neuro     10 encephalopathy :  etiology unclear however pt with fever and will need to r/o CNA infection   LP today   blood culture positive for GNR /Ecoli ..  ? source :  urine was not sent   will likely need CT C/A/P however will hold off for now   CTH with contrast   low threshold for ICU consult     11- paraprotienmeia : d/w heme and renal   will need to consider amyloidosis .. doubt MM   possible kidney bx       12 hypercalcemia : monitor for now

## 2019-09-05 NOTE — PROGRESS NOTE ADULT - ASSESSMENT
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   Pt just moved back to [place] from [place] .. reports that he has not been taking his meds for the past few days since " he might have misplaed them"     pt denies chest pain, syncope,fever, chills, cough, urinary symptoms.    in ED found  to have anemia   Nno acute changes on EKG   elevated CR and Trop    1- HTN urgency : now improving  bp    2- KAMRON  :   attempt TOV   d/w   : pt will likely end up on HD in intermediate and likely long term given transplant graft failure   off tacro,  cont cellcept and sterioods       3- DM:   A1c  5.6  Fs     4- acute diastolic CHF sec to  HTN urgency and renal failure   Echo :  < from: Transthoracic Echocardiogram (08.28.19 @ 16:12) >  1. Mitral annular calcification and calcified mitral  leaflets with decreased diastolic opening.  2. Moderate to severe concentric left ventricular  hypertrophy.  3. Normal left ventricular systolic function with  mid-cavitary obliteration.  4. Normal right ventricular size and systolic function.  5. Small pericardial effusion.    cont fluid removal with HD per renal      5-  difficult ambulating :  no focal neuro deficits   neuro eval appreciated    CT T/L   < from: CT Thoracic Spine No Cont (08.30.19 @ 10:06) >    Old right L1 transverse process fracture. Bilateral lysis   defects at L5 as seen on the prior 5/26/2013      6- afib :hold AC ..pt will need LP to r/o meningitis     7- hematochezia : per sister : on and off small amount of fresh blood in stool and some amount in urine but only small amounts   monitor H/H   consult GI .: appreciate input :  suspect bleeding to be hemorrhoidal, now resolved     trial of anusol supp  if bleeding persist would consider colonoscopy however patient is reluctant.    heme input: appreciated        8-  renal transplant : f/u with Transplant team   cont meds with MMF and steroids   off Tac     9- TIA :  unable to perform MRI   CT no acute changes on CT   repeat bnegative   fu with neuro     10 encephalopathy :  etiology unclear however pt with fever and will need to r/o CNA infection   d/w with DR. Brief : cont abx  awaiting LP   will likely need CT C/A/P however will hold off for now   CTH with contrast   low threshold for ICU consult     11- paraprotienmeia : d/w heme and renal   will need to consider amyloidosis .. doubt MM   possible kidney bx       12 hypercalcemia : monitor for now

## 2019-09-05 NOTE — PROGRESS NOTE ADULT - SUBJECTIVE AND OBJECTIVE BOX
Patient is a 74y old  Male who presents with a chief complaint of r/o meningitis (04 Sep 2019 16:57)                                                               INTERVAL HPI/OVERNIGHT EVENTS:    REVIEW OF SYSTEMS:    lethargic  arousable but falls back to sleep                                                                                                                                                                                                                                                                            Medications:  MEDICATIONS  (STANDING):  allopurinol 100 milliGRAM(s) Oral daily  atorvastatin 40 milliGRAM(s) Oral at bedtime  carvedilol 25 milliGRAM(s) Oral every 12 hours  chlorhexidine 2% Cloths 1 Application(s) Topical daily  cinacalcet 30 milliGRAM(s) Oral daily  darbepoetin Injectable ViaL 40 MICROGram(s) IV Push every week  dextrose 5%. 1000 milliLiter(s) (50 mL/Hr) IV Continuous <Continuous>  dextrose 5%. 1000 milliLiter(s) (50 mL/Hr) IV Continuous <Continuous>  dextrose 50% Injectable 12.5 Gram(s) IV Push once  dextrose 50% Injectable 25 Gram(s) IV Push once  dextrose 50% Injectable 25 Gram(s) IV Push once  ergocalciferol 47370 Unit(s) Oral every week  folic acid 1 milliGRAM(s) Oral daily  furosemide   Injectable 60 milliGRAM(s) IV Push two times a day  gabapentin 100 milliGRAM(s) Oral two times a day  hydrALAZINE 100 milliGRAM(s) Oral three times a day  hydrocortisone hemorrhoidal Suppository 1 Suppository(s) Rectal at bedtime  insulin glargine Injectable (LANTUS) 10 Unit(s) SubCutaneous at bedtime  insulin lispro (HumaLOG) corrective regimen sliding scale   SubCutaneous three times a day before meals  insulin lispro (HumaLOG) corrective regimen sliding scale   SubCutaneous at bedtime  labetalol 300 milliGRAM(s) Oral two times a day  levETIRAcetam  IVPB 500 milliGRAM(s) IV Intermittent two times a day  meropenem  IVPB 500 milliGRAM(s) IV Intermittent every 12 hours  mycophenolic acid  milliGRAM(s) Oral two times a day  predniSONE   Tablet 5 milliGRAM(s) Oral daily  sodium bicarbonate 650 milliGRAM(s) Oral two times a day  tamsulosin 0.4 milliGRAM(s) Oral at bedtime  thiamine Injectable 100 milliGRAM(s) IV Push daily    MEDICATIONS  (PRN):  acetaminophen   Tablet .. 650 milliGRAM(s) Oral every 6 hours PRN Temp greater or equal to 38C (100.4F)  aluminum hydroxide/magnesium hydroxide/simethicone Suspension 30 milliLiter(s) Oral every 6 hours PRN Dyspepsia  dextrose 40% Gel 15 Gram(s) Oral once PRN Blood Glucose LESS THAN 70 milliGRAM(s)/deciliter  glucagon  Injectable 1 milliGRAM(s) IntraMuscular once PRN Glucose LESS THAN 70 milligrams/deciliter       Allergies    No Known Allergies    Intolerances      Vital Signs Last 24 Hrs  T(C): 37.1 (04 Sep 2019 13:00), Max: 38.9 (03 Sep 2019 21:29)  T(F): 98.7 (04 Sep 2019 13:00), Max: 102 (03 Sep 2019 21:29)  HR: 83 (04 Sep 2019 13:00) (83 - 105)  BP: 138/68 (04 Sep 2019 13:00) (138/68 - 151/53)  BP(mean): --  RR: 18 (04 Sep 2019 13:00) (18 - 18)  SpO2: 95% (04 Sep 2019 13:00) (92% - 95%)  Physical Exam:    Daily     Daily Weight in k.3 (04 Sep 2019 19:50)      General:  lethargic nad   HEENT:  Nonicteric, PERRLA  CV:  RRR, S1S2   Lungs:  CTA   Abdomen:  Soft, non-tender, no distended, positive BS  Extremities:  edema  Neuro: open eyes partiually and answers few questions and falls right back to sleep   moving all extremities                                                                                                                                                                                                                                                                                 LABS:                                 8.5    9.02  )-----------( 148      ( 04 Sep 2019 09:36 )             27.9       09-04    134<L>  |  96  |  47<H>  ----------------------------<  116<H>  4.2   |  21<L>  |  6.63<H>    Ca    9.1      04 Sep 2019 04:16      Discussed with :     Marga consultants' Notes   Time spent :

## 2019-09-05 NOTE — PROGRESS NOTE ADULT - SUBJECTIVE AND OBJECTIVE BOX
Patient is a 74y old  Male who presents with a chief complaint of r/o meningitis (05 Sep 2019 16:57)                                                               INTERVAL HPI/OVERNIGHT EVENTS:    REVIEW OF SYSTEMS:    lethaargic   does respond to some questions and knows where he is however does not fully open eyes                                                                                                                                                                                                                                                              Medications:  MEDICATIONS  (STANDING):  allopurinol 100 milliGRAM(s) Oral daily  atorvastatin 40 milliGRAM(s) Oral at bedtime  carvedilol 25 milliGRAM(s) Oral every 12 hours  cefTRIAXone   IVPB 1000 milliGRAM(s) IV Intermittent every 24 hours  chlorhexidine 2% Cloths 1 Application(s) Topical daily  cinacalcet 30 milliGRAM(s) Oral daily  darbepoetin Injectable ViaL 40 MICROGram(s) IV Push every week  desmopressin IVPB 20 MICROGram(s) IV Intermittent once  dextrose 5%. 1000 milliLiter(s) (50 mL/Hr) IV Continuous <Continuous>  dextrose 5%. 1000 milliLiter(s) (50 mL/Hr) IV Continuous <Continuous>  dextrose 50% Injectable 12.5 Gram(s) IV Push once  dextrose 50% Injectable 25 Gram(s) IV Push once  dextrose 50% Injectable 25 Gram(s) IV Push once  ergocalciferol 33449 Unit(s) Oral every week  folic acid 1 milliGRAM(s) Oral daily  furosemide   Injectable 60 milliGRAM(s) IV Push two times a day  hydrALAZINE 100 milliGRAM(s) Oral three times a day  hydrocortisone hemorrhoidal Suppository 1 Suppository(s) Rectal at bedtime  insulin glargine Injectable (LANTUS) 10 Unit(s) SubCutaneous at bedtime  insulin lispro (HumaLOG) corrective regimen sliding scale   SubCutaneous every 6 hours  labetalol 300 milliGRAM(s) Oral two times a day  levETIRAcetam  IVPB 500 milliGRAM(s) IV Intermittent two times a day  mycophenolic acid  milliGRAM(s) Oral two times a day  predniSONE   Tablet 5 milliGRAM(s) Oral daily  sodium bicarbonate 650 milliGRAM(s) Oral two times a day  tamsulosin 0.4 milliGRAM(s) Oral at bedtime  thiamine Injectable 100 milliGRAM(s) IV Push daily    MEDICATIONS  (PRN):  acetaminophen   Tablet .. 650 milliGRAM(s) Oral every 6 hours PRN Temp greater or equal to 38C (100.4F)  aluminum hydroxide/magnesium hydroxide/simethicone Suspension 30 milliLiter(s) Oral every 6 hours PRN Dyspepsia  dextrose 40% Gel 15 Gram(s) Oral once PRN Blood Glucose LESS THAN 70 milliGRAM(s)/deciliter  glucagon  Injectable 1 milliGRAM(s) IntraMuscular once PRN Glucose LESS THAN 70 milligrams/deciliter       Allergies    No Known Allergies    Intolerances      Vital Signs Last 24 Hrs  T(C): 37.6 (05 Sep 2019 17:12), Max: 39.2 (04 Sep 2019 22:14)  T(F): 99.6 (05 Sep 2019 17:12), Max: 102.6 (04 Sep 2019 22:14)  HR: 88 (05 Sep 2019 17:11) (72 - 112)  BP: 170/75 (05 Sep 2019 17:11) (155/78 - 170/75)  BP(mean): --  RR: 18 (05 Sep 2019 17:11) (18 - 20)  SpO2: 98% (05 Sep 2019 17:11) (93% - 98%)  CAPILLARY BLOOD GLUCOSE      POCT Blood Glucose.: 133 mg/dL (05 Sep 2019 14:29)  POCT Blood Glucose.: 117 mg/dL (05 Sep 2019 05:52)  POCT Blood Glucose.: 104 mg/dL (05 Sep 2019 01:47)  POCT Blood Glucose.: 100 mg/dL (04 Sep 2019 23:05)  POCT Blood Glucose.: 95 mg/dL (04 Sep 2019 22:06)  POCT Blood Glucose.: 85 mg/dL (04 Sep 2019 20:41)       @ 07:01  -   @ 07:00  --------------------------------------------------------  IN: 1750 mL / OUT: 1500 mL / NET: 250 mL      Physical Exam:    Daily Weight in k.3 (04 Sep 2019 19:50)  General: NAD   HEENT:  Nonicteric, PERRLA  CV:  RRR, S1S2   Lungs:  CTA B/L, no wheezes, rales, rhonchi  Abdomen:  Soft, non-tender, no distended, positive BS  Extremities:  , no edema  Skin:  Warm and dry, no rashes  :  No matt  Neuro:  lethargic  arousbale however falls right back to sleep         LABS:                               8.5    7.62  )-----------( 122      ( 05 Sep 2019 08:20 )             27.9                          136  |  95<L>  |  37<H>  ----------------------------<  117<H>  4.4   |  21<L>  |  5.38<H>    Ca    10.2      05 Sep 2019 05:50    TPro  6.1  /  Alb  2.7<L>  /  TBili  x   /  DBili  x   /  AST  x   /  ALT  x   /  AlkPhos  x

## 2019-09-05 NOTE — PROGRESS NOTE ADULT - SUBJECTIVE AND OBJECTIVE BOX
GENERAL SURGERY DAILY PROGRESS NOTE:     Subjective:  Pt seen and examined. No acute events overnight. Pt to have LP today and kidney biopsy per medicine team. Team still would like fat pad biopsy done. Pt still w/ AMS this am.     Objective:    MEDICATIONS  (STANDING):  allopurinol 100 milliGRAM(s) Oral daily  atorvastatin 40 milliGRAM(s) Oral at bedtime  carvedilol 25 milliGRAM(s) Oral every 12 hours  chlorhexidine 2% Cloths 1 Application(s) Topical daily  cinacalcet 30 milliGRAM(s) Oral daily  darbepoetin Injectable ViaL 40 MICROGram(s) IV Push every week  desmopressin IVPB 20 MICROGram(s) IV Intermittent once  dextrose 5%. 1000 milliLiter(s) (50 mL/Hr) IV Continuous <Continuous>  dextrose 5%. 1000 milliLiter(s) (50 mL/Hr) IV Continuous <Continuous>  dextrose 50% Injectable 12.5 Gram(s) IV Push once  dextrose 50% Injectable 25 Gram(s) IV Push once  dextrose 50% Injectable 25 Gram(s) IV Push once  ergocalciferol 02373 Unit(s) Oral every week  folic acid 1 milliGRAM(s) Oral daily  furosemide   Injectable 60 milliGRAM(s) IV Push two times a day  gabapentin 100 milliGRAM(s) Oral two times a day  hydrALAZINE 100 milliGRAM(s) Oral three times a day  hydrocortisone hemorrhoidal Suppository 1 Suppository(s) Rectal at bedtime  insulin glargine Injectable (LANTUS) 10 Unit(s) SubCutaneous at bedtime  insulin lispro (HumaLOG) corrective regimen sliding scale   SubCutaneous every 6 hours  labetalol 300 milliGRAM(s) Oral two times a day  levETIRAcetam  IVPB 500 milliGRAM(s) IV Intermittent two times a day  meropenem  IVPB 500 milliGRAM(s) IV Intermittent every 12 hours  mycophenolic acid  milliGRAM(s) Oral two times a day  predniSONE   Tablet 5 milliGRAM(s) Oral daily  sodium bicarbonate 650 milliGRAM(s) Oral two times a day  tamsulosin 0.4 milliGRAM(s) Oral at bedtime  thiamine Injectable 100 milliGRAM(s) IV Push daily    MEDICATIONS  (PRN):  acetaminophen   Tablet .. 650 milliGRAM(s) Oral every 6 hours PRN Temp greater or equal to 38C (100.4F)  aluminum hydroxide/magnesium hydroxide/simethicone Suspension 30 milliLiter(s) Oral every 6 hours PRN Dyspepsia  dextrose 40% Gel 15 Gram(s) Oral once PRN Blood Glucose LESS THAN 70 milliGRAM(s)/deciliter  glucagon  Injectable 1 milliGRAM(s) IntraMuscular once PRN Glucose LESS THAN 70 milligrams/deciliter      Vital Signs Last 24 Hrs  T(C): 37.3 (05 Sep 2019 00:50), Max: 39.2 (04 Sep 2019 22:14)  T(F): 99.2 (05 Sep 2019 00:50), Max: 102.6 (04 Sep 2019 22:14)  HR: 90 (05 Sep 2019 07:45) (83 - 112)  BP: 155/78 (05 Sep 2019 00:50) (138/68 - 161/73)  BP(mean): --  RR: 20 (05 Sep 2019 00:50) (18 - 20)  SpO2: 95% (05 Sep 2019 00:50) (95% - 99%)    I&O's Detail    04 Sep 2019 07:01  -  05 Sep 2019 07:00  --------------------------------------------------------  IN:    dextrose 5%.: 600 mL    Other: 800 mL    Solution: 300 mL    Solution: 50 mL  Total IN: 1750 mL    OUT:    Other: 1500 mL  Total OUT: 1500 mL    Total NET: 250 mL    PHYSICAL EXAM  NAD, altered, unresponsive, w/ EEG   Respirations nonlabored  Abdomen soft, nontender, nondistended  No guarding or rebound tenderness        Daily     Daily Weight in k.3 (04 Sep 2019 19:50)    LABS:                        8.5    7.62  )-----------( 122      ( 05 Sep 2019 08:20 )             27.9     09-05    136  |  95<L>  |  37<H>  ----------------------------<  117<H>  4.4   |  21<L>  |  5.38<H>    Ca    10.2      05 Sep 2019 05:50    TPro  6.1  /  Alb  x   /  TBili  x   /  DBili  x   /  AST  x   /  ALT  x   /  AlkPhos  x   09-04    PT/INR - ( 05 Sep 2019 05:50 )   PT: 16.4 sec;   INR: 1.41 ratio         PTT - ( 05 Sep 2019 05:50 )  PTT:25.5 sec      RADIOLOGY & ADDITIONAL STUDIES: GENERAL SURGERY DAILY PROGRESS NOTE:     Subjective:  Pt seen and examined. No acute events overnight. Pt to have LP today and kidney biopsy per medicine team. Team still would like fat pad biopsy done. Pt still w/ AMS this am. INR improved    Objective:    MEDICATIONS  (STANDING):  allopurinol 100 milliGRAM(s) Oral daily  atorvastatin 40 milliGRAM(s) Oral at bedtime  carvedilol 25 milliGRAM(s) Oral every 12 hours  chlorhexidine 2% Cloths 1 Application(s) Topical daily  cinacalcet 30 milliGRAM(s) Oral daily  darbepoetin Injectable ViaL 40 MICROGram(s) IV Push every week  desmopressin IVPB 20 MICROGram(s) IV Intermittent once  dextrose 5%. 1000 milliLiter(s) (50 mL/Hr) IV Continuous <Continuous>  dextrose 5%. 1000 milliLiter(s) (50 mL/Hr) IV Continuous <Continuous>  dextrose 50% Injectable 12.5 Gram(s) IV Push once  dextrose 50% Injectable 25 Gram(s) IV Push once  dextrose 50% Injectable 25 Gram(s) IV Push once  ergocalciferol 33150 Unit(s) Oral every week  folic acid 1 milliGRAM(s) Oral daily  furosemide   Injectable 60 milliGRAM(s) IV Push two times a day  gabapentin 100 milliGRAM(s) Oral two times a day  hydrALAZINE 100 milliGRAM(s) Oral three times a day  hydrocortisone hemorrhoidal Suppository 1 Suppository(s) Rectal at bedtime  insulin glargine Injectable (LANTUS) 10 Unit(s) SubCutaneous at bedtime  insulin lispro (HumaLOG) corrective regimen sliding scale   SubCutaneous every 6 hours  labetalol 300 milliGRAM(s) Oral two times a day  levETIRAcetam  IVPB 500 milliGRAM(s) IV Intermittent two times a day  meropenem  IVPB 500 milliGRAM(s) IV Intermittent every 12 hours  mycophenolic acid  milliGRAM(s) Oral two times a day  predniSONE   Tablet 5 milliGRAM(s) Oral daily  sodium bicarbonate 650 milliGRAM(s) Oral two times a day  tamsulosin 0.4 milliGRAM(s) Oral at bedtime  thiamine Injectable 100 milliGRAM(s) IV Push daily    MEDICATIONS  (PRN):  acetaminophen   Tablet .. 650 milliGRAM(s) Oral every 6 hours PRN Temp greater or equal to 38C (100.4F)  aluminum hydroxide/magnesium hydroxide/simethicone Suspension 30 milliLiter(s) Oral every 6 hours PRN Dyspepsia  dextrose 40% Gel 15 Gram(s) Oral once PRN Blood Glucose LESS THAN 70 milliGRAM(s)/deciliter  glucagon  Injectable 1 milliGRAM(s) IntraMuscular once PRN Glucose LESS THAN 70 milligrams/deciliter      Vital Signs Last 24 Hrs  T(C): 37.3 (05 Sep 2019 00:50), Max: 39.2 (04 Sep 2019 22:14)  T(F): 99.2 (05 Sep 2019 00:50), Max: 102.6 (04 Sep 2019 22:14)  HR: 90 (05 Sep 2019 07:45) (83 - 112)  BP: 155/78 (05 Sep 2019 00:50) (138/68 - 161/73)  BP(mean): --  RR: 20 (05 Sep 2019 00:50) (18 - 20)  SpO2: 95% (05 Sep 2019 00:50) (95% - 99%)    I&O's Detail    04 Sep 2019 07:01  -  05 Sep 2019 07:00  --------------------------------------------------------  IN:    dextrose 5%.: 600 mL    Other: 800 mL    Solution: 300 mL    Solution: 50 mL  Total IN: 1750 mL    OUT:    Other: 1500 mL  Total OUT: 1500 mL    Total NET: 250 mL    PHYSICAL EXAM  NAD, altered, unresponsive, w/ EEG   Respirations nonlabored  Abdomen soft, nontender, nondistended  No guarding or rebound tenderness        Daily     Daily Weight in k.3 (04 Sep 2019 19:50)    LABS:                        8.5    7.62  )-----------( 122      ( 05 Sep 2019 08:20 )             27.9     09-05    136  |  95<L>  |  37<H>  ----------------------------<  117<H>  4.4   |  21<L>  |  5.38<H>    Ca    10.2      05 Sep 2019 05:50    TPro  6.1  /  Alb  x   /  TBili  x   /  DBili  x   /  AST  x   /  ALT  x   /  AlkPhos  x   09-04    PT/INR - ( 05 Sep 2019 05:50 )   PT: 16.4 sec;   INR: 1.41 ratio         PTT - ( 05 Sep 2019 05:50 )  PTT:25.5 sec      RADIOLOGY & ADDITIONAL STUDIES:

## 2019-09-05 NOTE — PROGRESS NOTE ADULT - SUBJECTIVE AND OBJECTIVE BOX
Subjective: Patient seen and examined. No new events except as noted.   very lethargic and sleepy     REVIEW OF SYSTEMS:    CONSTITUTIONAL:+ weakness, fevers or chills  EYES/ENT: No visual changes;  No vertigo or throat pain   NECK: No pain or stiffness  RESPIRATORY: No cough, wheezing, hemoptysis; No shortness of breath  CARDIOVASCULAR: No chest pain or palpitations  GASTROINTESTINAL: No abdominal or epigastric pain. No nausea, vomiting, or hematemesis; No diarrhea or constipation. No melena or hematochezia.  GENITOURINARY: No dysuria, frequency or hematuria  NEUROLOGICAL: No numbness or weakness  SKIN: No itching, burning, rashes, or lesions   All other review of systems is negative unless indicated above.    MEDICATIONS:  MEDICATIONS  (STANDING):  allopurinol 100 milliGRAM(s) Oral daily  atorvastatin 40 milliGRAM(s) Oral at bedtime  carvedilol 25 milliGRAM(s) Oral every 12 hours  chlorhexidine 2% Cloths 1 Application(s) Topical daily  cinacalcet 30 milliGRAM(s) Oral daily  darbepoetin Injectable ViaL 40 MICROGram(s) IV Push every week  dextrose 5%. 1000 milliLiter(s) (50 mL/Hr) IV Continuous <Continuous>  dextrose 5%. 1000 milliLiter(s) (50 mL/Hr) IV Continuous <Continuous>  dextrose 50% Injectable 12.5 Gram(s) IV Push once  dextrose 50% Injectable 25 Gram(s) IV Push once  dextrose 50% Injectable 25 Gram(s) IV Push once  ergocalciferol 47566 Unit(s) Oral every week  folic acid 1 milliGRAM(s) Oral daily  furosemide   Injectable 60 milliGRAM(s) IV Push two times a day  gabapentin 100 milliGRAM(s) Oral two times a day  hydrALAZINE 100 milliGRAM(s) Oral three times a day  hydrocortisone hemorrhoidal Suppository 1 Suppository(s) Rectal at bedtime  insulin glargine Injectable (LANTUS) 10 Unit(s) SubCutaneous at bedtime  insulin lispro (HumaLOG) corrective regimen sliding scale   SubCutaneous every 6 hours  labetalol 300 milliGRAM(s) Oral two times a day  levETIRAcetam  IVPB 500 milliGRAM(s) IV Intermittent two times a day  meropenem  IVPB 500 milliGRAM(s) IV Intermittent every 12 hours  mycophenolic acid  milliGRAM(s) Oral two times a day  predniSONE   Tablet 5 milliGRAM(s) Oral daily  sodium bicarbonate 650 milliGRAM(s) Oral two times a day  tamsulosin 0.4 milliGRAM(s) Oral at bedtime  thiamine Injectable 100 milliGRAM(s) IV Push daily      PHYSICAL EXAM:  T(C): 37.3 (19 @ 00:50), Max: 39.2 (19 @ 22:14)  HR: 90 (19 @ 07:45) (83 - 112)  BP: 155/78 (19 @ 00:50) (138/68 - 161/73)  RR: 20 (19 @ 00:50) (18 - 20)  SpO2: 95% (19 @ 00:50) (95% - 99%)  Wt(kg): --  I&O's Summary    04 Sep 2019 07:01  -  05 Sep 2019 07:00  --------------------------------------------------------  IN: 1750 mL / OUT: 1500 mL / NET: 250 mL          Appearance: NAD lethargic at times confused   HEENT:   Normal oral mucosa, PERRL, EOMI	  Lymphatic: No lymphadenopathy , no edema  Cardiovascular: Normal S1 S2, No JVD, No murmurs , Peripheral pulses palpable 2+ bilaterally  Respiratory: Lungs clear to auscultation, normal effort 	  Gastrointestinal:  Soft, Non-tender, + BS	  Skin: No rashes, No ecchymoses, No cyanosis, warm to touch  Musculoskeletal: Normal range of motion, normal strength  Psychiatry:  at times confused   Ext: No edema          LABS:    CARDIAC MARKERS:                                8.5    7.62  )-----------( 122      ( 05 Sep 2019 08:20 )             27.9         136  |  95<L>  |  37<H>  ----------------------------<  117<H>  4.4   |  21<L>  |  5.38<H>    Ca    10.2      05 Sep 2019 05:50    TPro  6.1  /  Alb  x   /  TBili  x   /  DBili  x   /  AST  x   /  ALT  x   /  AlkPhos  x       proBNP:   Lipid Profile:   HgA1c:   TSH:       Prothrombin Time and INR, Plasma in AM (19 @ 05:50)    Prothrombin Time, Plasma: 16.4: Effective 2018 the reference range for PT has changed. sec    INR: 1.41: RECOMMENDED RANGES FOR THERAPEUTIC INR:    2.0-3.0 for most medical and surgical thromboembolic states    2.0-3.0 for atrial fibrillation    2.0-3.0 for bileaflet mechanical valve in aortic position    2.5-3.5 for mechanical heart valves   Chest 2004;126:S685-428  The presence of direct thrombin inhibitors (argatroban, refludan)  may falsely increase results. ratio      Culture - Blood (19 @ 10:14)    Specimen Source: .Blood    Culture Results:   No growth to date.    Culture - Blood (19 @ 10:14)    -  Escherichia coli: Detec    Gram Stain:   Growth in aerobic bottle: Gram Negative Rods    -  Amikacin: S <=16    -  Ampicillin: S <=8 These ampicillin results predict results for amoxicillin    -  Ampicillin/Sulbactam: S <=4/2 Enterobacter, Citrobacter, and Serratia may develop resistance during prolonged therapy (3-4 days)    -  Aztreonam: S <=4    -  Cefazolin: S <=2 Enterobacter, Citrobacter, and Serratia may develop resistance during prolonged therapy (3-4 days)    -  Cefepime: S <=2    -  Cefoxitin: S <=8    -  Ceftriaxone: S <=1 Enterobacter, Citrobacter, and Serratia may develop resistance during prolonged therapy    -  Ciprofloxacin: S <=1    -  Ertapenem: S <=0.5    -  Gentamicin: S <=2    -  Imipenem: S <=1    -  Levofloxacin: S <=2    -  Meropenem: S <=1    -  Piperacillin/Tazobactam: S <=8    -  Tobramycin: S <=2    -  Trimethoprim/Sulfamethoxazole: S <=2/38    Specimen Source: .Blood    Organism: Blood Culture PCR    Organism: Escherichia coli    Culture Results:   Growth in aerobic bottle: Escherichia coli  "Due to technical problems, Proteus sp. will Not be reported as part of  the BCID panel until further notice"  ***Blood Panel PCR results on this specimen are available  approximately 3 hours after the Gram stain result.***  Gram stain, PCR, and/or culture results may not always  correspond due to difference in methodologies.  ************************************************************  This PCR assay was performed using [employer].  The following targets are tested for: Enterococcus,  vancomycin resistant enterococci, Listeria monocytogenes,  coagulase negative staphylococci, S. aureus,  methicillin resistant S. aureus, Streptococcus agalactiae  (Group B), S. pneumoniae, S. pyogenes (Group A),  Acinetobacter baumannii, Enterobacter cloacae, E. coli,  Klebsiella oxytoca, K. pneumoniae, Proteus sp.,  Serratia marcescens, Haemophilus influenzae,  Neisseria meningitidis, Pseudomonas aeruginosa, Candida  albicans, C. glabrata, C krusei, C parapsilosis,  C. tropicalis and the KPC resistance gene.    Organism Identification: Blood Culture PCR  Escherichia coli    Method Type: PCR    Method Type: NANCY          TELEMETRY: 	    ECG:  	  RADIOLOGY:   DIAGNOSTIC TESTING:  [ ] Echocardiogram:  [ ]  Catheterization:  [ ] Stress Test:    OTHER:  EEG REPORT:   EEG Report:  · EEG Report		  [facility]PILEPS[facility] [place]   REPORT OF PROLONGED VIDEO EEG     [facility]: [address] [place] [address] , [place] [address], Ph#: [phone]  [facility]: [phone] [place] [address], Ph#: [phone]  Office: [address] [place] [address] Ph#: [phone]    Patient Name: YONI INGRAM  Age and : 74y ([birth year])  MRN #: [id number]  Location: [address]   Referring Physician: Odalys Hwang    Start Time:  19 23:40    End Time:  19 07:14   _____________________________________________________________  STUDY INFORMATION    EEG Recording Technique:  The patient underwent continuous Video-EEG monitoring, using Telemetry System hardware on the [employer] Digital System. EEG and video data were stored on a computer hard drive with important events saved in digital archive files. The material was reviewed by a physician (electroencephalographer / epileptologist) on a daily basis. Bishop and seizure detection algorithms were utilized and reviewed. An EEG Technician attended to the patient, and was available throughout daytime work hours.  The epilepsy center neurologist was available in person or on call 24-hours per day.    EEG Placement and Labeling of Electrodes:  The EEG was performed utilizing 20 channel referential EEG connections (coronal over temporal over parasagittal montage) using all standard 10-20 electrode placements with EKG, with additional electrodes placed in the inferior temporal region using the modified 10-10 montage electrode placements for elective admissions, or if deemed necessary. Recording was at a sampling rate of 256 samples per second per channel. Time synchronized digital video recording was done simultaneously with EEG recording. A low light infrared camera was used for low light recording.     _____________________________________________________________  HISTORY    Patient is a 74y old  Male who presents with a chief complaint of dialysis (04 Sep 2019 10:22)      PERTINENT MEDICATION:  MEDICATIONS  (STANDING):  allopurinol 100 milliGRAM(s) Oral daily  atorvastatin 40 milliGRAM(s) Oral at bedtime  carvedilol 25 milliGRAM(s) Oral every 12 hours  chlorhexidine 2% Cloths 1 Application(s) Topical daily  cinacalcet 30 milliGRAM(s) Oral daily  darbepoetin Injectable ViaL 40 MICROGram(s) IV Push every week  desmopressin IVPB 20 MICROGram(s) IV Intermittent once  dextrose 5%. 1000 milliLiter(s) (50 mL/Hr) IV Continuous <Continuous>  dextrose 5%. 1000 milliLiter(s) (50 mL/Hr) IV Continuous <Continuous>  dextrose 50% Injectable 12.5 Gram(s) IV Push once  dextrose 50% Injectable 25 Gram(s) IV Push once  dextrose 50% Injectable 25 Gram(s) IV Push once  ergocalciferol 79963 Unit(s) Oral every week  folic acid 1 milliGRAM(s) Oral daily  furosemide   Injectable 60 milliGRAM(s) IV Push two times a day  gabapentin 100 milliGRAM(s) Oral two times a day  hydrALAZINE 100 milliGRAM(s) Oral three times a day  hydrocortisone hemorrhoidal Suppository 1 Suppository(s) Rectal at bedtime  insulin glargine Injectable (LANTUS) 10 Unit(s) SubCutaneous at bedtime  insulin lispro (HumaLOG) corrective regimen sliding scale   SubCutaneous every 6 hours  labetalol 300 milliGRAM(s) Oral two times a day  levETIRAcetam  IVPB 500 milliGRAM(s) IV Intermittent two times a day  meropenem  IVPB 500 milliGRAM(s) IV Intermittent every 12 hours  mycophenolic acid  milliGRAM(s) Oral two times a day  predniSONE   Tablet 5 milliGRAM(s) Oral daily  sodium bicarbonate 650 milliGRAM(s) Oral two times a day  tamsulosin 0.4 milliGRAM(s) Oral at bedtime  thiamine Injectable 100 milliGRAM(s) IV Push daily    _____________________________________________________________  INTERPRETATION    Findings: The background was continuous, spontaneously variable and reactive. No posterior dominant rhythm seen.    Background Slowing:  Diffuse theta and polymorphic delta slowing.    Focal Slowing:   None were present.    Sleep Background:  Stage II sleep transients were not recorded.    Other Non-Epileptiform Findings:  None were present.    Interictal Epileptiform Activity:   None were present.    Events:  Clinical events: None recorded.  Seizures: None recorded.    Activation Procedures:   Hyperventilation was not performed.    Photic stimulation was not performed.     Artifacts:  Intermittent myogenic and movement artifacts were noted.    _____________________________________________________________  EEG SUMMARY/CLASSIFICATION  Abnormal EEG in an unresponsive patient.  - Moderate generalized slowing.    _____________________________________________________________  EEG IMPRESSION/CLINICAL CORRELATE  ***PRELIM REPORT  Abnormal EEG study.  1. Moderate nonspecific diffuse or multifocal cerebral dysfunction.   2. No epileptiform pattern or seizure seen.    _____________________________________________________________  Jovanny Dumont DO  Epilepsy Fellow, [facility]    China Maier MD  Attending Physician, [facility] [place]          Electronic Signatures:  China Maier)  (Signature Pending)  	Co-Signer: EEG REPORT  Jovanny Dumont)  (Signed 05-Sep-2019 10:53)  	Authored: EEG REPORT      Last Updated: 05-Sep-2019 10:53 by Jovanny Dumont)

## 2019-09-05 NOTE — PROGRESS NOTE ADULT - PROBLEM SELECTOR PLAN 1
Patient with waxing/waning sensorium concerning for central nervous process. Will have LP done today with send outs for AMARIS/EBV/CMV/PB19 as well as regular workup. Will continue to dialyze with minimal UF given on immunosuppresion meds.  Had HD yesterday, may need again today  Rule out CNS heme malignancy as well given elevated lambda chains

## 2019-09-05 NOTE — PROGRESS NOTE ADULT - SUBJECTIVE AND OBJECTIVE BOX
Pt seen, slightly more responsive today but still shivering when removing blanket for exam, on EEG monitoring    MEDICATIONS  (STANDING):  allopurinol 100 milliGRAM(s) Oral daily  atorvastatin 40 milliGRAM(s) Oral at bedtime  carvedilol 25 milliGRAM(s) Oral every 12 hours  cefTRIAXone   IVPB 1000 milliGRAM(s) IV Intermittent every 24 hours  chlorhexidine 2% Cloths 1 Application(s) Topical daily  cinacalcet 30 milliGRAM(s) Oral daily  darbepoetin Injectable ViaL 40 MICROGram(s) IV Push every week  desmopressin IVPB 20 MICROGram(s) IV Intermittent once  dextrose 5%. 1000 milliLiter(s) (50 mL/Hr) IV Continuous <Continuous>  dextrose 5%. 1000 milliLiter(s) (50 mL/Hr) IV Continuous <Continuous>  dextrose 50% Injectable 12.5 Gram(s) IV Push once  dextrose 50% Injectable 25 Gram(s) IV Push once  dextrose 50% Injectable 25 Gram(s) IV Push once  ergocalciferol 09830 Unit(s) Oral every week  folic acid 1 milliGRAM(s) Oral daily  furosemide   Injectable 60 milliGRAM(s) IV Push two times a day  hydrALAZINE 100 milliGRAM(s) Oral three times a day  hydrocortisone hemorrhoidal Suppository 1 Suppository(s) Rectal at bedtime  insulin glargine Injectable (LANTUS) 10 Unit(s) SubCutaneous at bedtime  insulin lispro (HumaLOG) corrective regimen sliding scale   SubCutaneous every 6 hours  labetalol 300 milliGRAM(s) Oral two times a day  levETIRAcetam  IVPB 500 milliGRAM(s) IV Intermittent two times a day  mycophenolic acid  milliGRAM(s) Oral two times a day  predniSONE   Tablet 5 milliGRAM(s) Oral daily  sodium bicarbonate 650 milliGRAM(s) Oral two times a day  tamsulosin 0.4 milliGRAM(s) Oral at bedtime  thiamine Injectable 100 milliGRAM(s) IV Push daily    MEDICATIONS  (PRN):  acetaminophen   Tablet .. 650 milliGRAM(s) Oral every 6 hours PRN Temp greater or equal to 38C (100.4F)  aluminum hydroxide/magnesium hydroxide/simethicone Suspension 30 milliLiter(s) Oral every 6 hours PRN Dyspepsia  dextrose 40% Gel 15 Gram(s) Oral once PRN Blood Glucose LESS THAN 70 milliGRAM(s)/deciliter  glucagon  Injectable 1 milliGRAM(s) IntraMuscular once PRN Glucose LESS THAN 70 milligrams/deciliter      ROS  feels cold, limited otherwise 2/2 participation    Vital Signs Last 24 Hrs  T(C): 37.6 (05 Sep 2019 14:35), Max: 39.2 (04 Sep 2019 22:14)  T(F): 99.6 (05 Sep 2019 14:35), Max: 102.6 (04 Sep 2019 22:14)  HR: 91 (05 Sep 2019 14:35) (72 - 112)  BP: 160/81 (05 Sep 2019 14:35) (155/70 - 166/66)  BP(mean): --  RR: 20 (05 Sep 2019 14:35) (18 - 20)  SpO2: 93% (05 Sep 2019 14:35) (93% - 99%)    PE  NAD  asleep, opens eyes when asked, does not interact much otherwise  RRR  CTAB ant chest, limited effort  abd benign  no edema  remainder of exam limited 2/2 participation                          8.5    7.62  )-----------( 122      ( 05 Sep 2019 08:20 )             27.9       09-05    136  |  95<L>  |  37<H>  ----------------------------<  117<H>  4.4   |  21<L>  |  5.38<H>    Ca    10.2      05 Sep 2019 05:50    TPro  6.1  /  Alb  2.7<L>  /  TBili  x   /  DBili  x   /  AST  x   /  ALT  x   /  AlkPhos  x   09-04

## 2019-09-05 NOTE — PROGRESS NOTE ADULT - ASSESSMENT
73 yo male with history of A Fib, Hep C, DM, ESRD s/p renal transplant x 2, admitted 8/26 with progressive CKD and rectal bleeding.  He was on tacrolimus, myfortic, and 5 mg of prednisone along with prophylactic valtrex.  He has required HD and now is febrile.  No obvious source of infection.His CMV viral loads were negative as per prior transplant team.  He recently moved up from [place].  His Hep B surface antibody is positive, Hep C RNA is negative  He now is known to have GNR in blood, primary focus not clear, typically GI or  origin  GNR is a sensitive E Coli  He is s/p LP, awaiting results  serum crypt antigen is negative  ? Toxic-Metabolic encephalopathy  Suggest:  1.will change to CTX given E Coli, full doses in renal failure  2.CMV viral load, after resulted will decide on starting CMV prophylaxis  3.Repeat blood cultures given daily fever  4S/P LP, routine studies, [place] PCR,crypt antigen, routine, fungal, and AFB cultures  5.Consider CT of A/P ,  "GNR" bacteremia of unclear primary focus  6.Will hold vanco and antifungal coverage with GNR in blood  7.No diarrhea today, concerns of gastroenteritis lessened

## 2019-09-05 NOTE — PROGRESS NOTE ADULT - SUBJECTIVE AND OBJECTIVE BOX
[facility] DIVISION OF KIDNEY DISEASES AND HYPERTENSION -- FOLLOW UP NOTE  --------------------------------------------------------------------------------  Chief Complaint: ESRD now    24 hour events/subjective:  change in MS worse  RRT called, for shivers, blood clx sent        PAST HISTORY  --------------------------------------------------------------------------------  No significant changes to PMH, PSH, FHx, SHx, unless otherwise noted    ALLERGIES & MEDICATIONS  --------------------------------------------------------------------------------  Allergies    No Known Allergies    Intolerances      Standing Inpatient Medications  allopurinol 100 milliGRAM(s) Oral daily  atorvastatin 40 milliGRAM(s) Oral at bedtime  carvedilol 25 milliGRAM(s) Oral every 12 hours  chlorhexidine 2% Cloths 1 Application(s) Topical daily  cinacalcet 30 milliGRAM(s) Oral daily  darbepoetin Injectable ViaL 40 MICROGram(s) IV Push every week  dextrose 5%. 1000 milliLiter(s) IV Continuous <Continuous>  dextrose 5%. 1000 milliLiter(s) IV Continuous <Continuous>  dextrose 50% Injectable 12.5 Gram(s) IV Push once  dextrose 50% Injectable 25 Gram(s) IV Push once  dextrose 50% Injectable 25 Gram(s) IV Push once  ergocalciferol 78649 Unit(s) Oral every week  folic acid 1 milliGRAM(s) Oral daily  furosemide   Injectable 60 milliGRAM(s) IV Push two times a day  gabapentin 100 milliGRAM(s) Oral two times a day  hydrALAZINE 100 milliGRAM(s) Oral three times a day  hydrocortisone hemorrhoidal Suppository 1 Suppository(s) Rectal at bedtime  insulin glargine Injectable (LANTUS) 10 Unit(s) SubCutaneous at bedtime  insulin lispro (HumaLOG) corrective regimen sliding scale   SubCutaneous every 6 hours  labetalol 300 milliGRAM(s) Oral two times a day  levETIRAcetam  IVPB 500 milliGRAM(s) IV Intermittent two times a day  meropenem  IVPB 500 milliGRAM(s) IV Intermittent every 12 hours  mycophenolic acid  milliGRAM(s) Oral two times a day  predniSONE   Tablet 5 milliGRAM(s) Oral daily  sodium bicarbonate 650 milliGRAM(s) Oral two times a day  tamsulosin 0.4 milliGRAM(s) Oral at bedtime  thiamine Injectable 100 milliGRAM(s) IV Push daily    PRN Inpatient Medications  acetaminophen   Tablet .. 650 milliGRAM(s) Oral every 6 hours PRN  aluminum hydroxide/magnesium hydroxide/simethicone Suspension 30 milliLiter(s) Oral every 6 hours PRN  dextrose 40% Gel 15 Gram(s) Oral once PRN  glucagon  Injectable 1 milliGRAM(s) IntraMuscular once PRN    ROS: unable to do    VITALS/PHYSICAL EXAM  --------------------------------------------------------------------------------  T(C): 36.8 (09-05-19 @ 10:00), Max: 39.2 (09-04-19 @ 22:14)  HR: 72 (09-05-19 @ 10:00) (72 - 112)  BP: 166/66 (09-05-19 @ 10:00) (138/68 - 166/66)  RR: 18 (09-05-19 @ 10:00) (18 - 20)  SpO2: 97% (09-05-19 @ 10:00) (95% - 99%)  Wt(kg): --        09-04-19 @ 07:01  -  09-05-19 @ 07:00  --------------------------------------------------------  IN: 1750 mL / OUT: 1500 mL / NET: 250 mL      PHYSICAL EXAM: vital signs as above  mod distress, mumbling, confused. altered.  Neck: Supple, no JVD,    Lungs: no rhonchi, no wheeze, no crackles  CVS: S1 S2 no M/R/G  Abdomen: distended  Neuro: not following commands  Skin: warm, dry  Ext: trace edema noted    LABS/STUDIES  --------------------------------------------------------------------------------              8.5    7.62  >-----------<  122      [09-05-19 @ 08:20]              27.9     136  |  95  |  37  ----------------------------<  117      [09-05-19 @ 05:50]  4.4   |  21  |  5.38        Ca     10.2     [09-05-19 @ 05:50]    TPro  6.1  /  Alb  x   /  TBili  x   /  DBili  x   /  AST  x   /  ALT  x   /  AlkPhos  x   [09-04-19 @ 23:44]    PT/INR: PT 16.4 , INR 1.41       [09-05-19 @ 05:50]  PTT: 25.5       [09-05-19 @ 05:50]      Creatinine Trend:  SCr 5.38 [09-05 @ 05:50]  SCr 6.63 [09-04 @ 04:16]  SCr 5.30 [09-03 @ 06:53]  SCr 6.69 [09-02 @ 07:02]  SCr 5.73 [09-01 @ 07:08]    Urinalysis - [09-02-19 @ 23:57]      Color Orange / Appearance Turbid / SG 1.014 / pH 8.0      Gluc Negative / Ketone Negative  / Bili Negative / Urobili <2 mg/dL       Blood Moderate / Protein >600 mg/dL / Leuk Est Large / Nitrite Negative       / WBC >720 / Hyaline 3 / Gran  / Sq Epi  / Non Sq Epi 10 / Bacteria TNTC      Iron 45, TIBC 159, %sat 28      [09-02-19 @ 17:29]  Ferritin 260      [09-02-19 @ 17:29]  PTH -- (Ca 9.7)      [09-02-19 @ 17:29]   1231  PTH -- (Ca 9.1)      [08-27-19 @ 15:07]   1896  Vitamin D (25OH) <4.0      [09-02-19 @ 17:28]  HbA1c 5.6      [08-27-19 @ 09:03]  TSH 5.45      [08-30-19 @ 09:41]    HBsAb 19.4      [08-27-19 @ 15:26]  HBsAg Nonreact      [08-27-19 @ 15:26]  HCV 10.44, Reactive      [08-27-19 @ 08:29]    Free Light Chains: kappa 5.67, lambda 15.56, ratio = 0.36      [09-03 @ 10:11]  Immunofixation Serum:   IgG Lambda Band Identified    Reference Range: None Detected      [08-30-19 @ 09:37]  SPEP Interpretation: Gamma-Migrating Paraprotein Identified      [08-30-19 @ 09:37]

## 2019-09-05 NOTE — PROGRESS NOTE ADULT - ASSESSMENT
1. Anemia sec to CKD    -- H/H in 7-8s, stable  -- low normal  B12, folate. B12 1000 mcg IM x 1 given 8/31;  and c/w  PO FA 1mg QD  -- f/u MMA  -- No hemolysis  -- Iron studies c/w Anemia of Chronic Inflammation  -- FOBT neg  -- transfuse prn hgb <7  -- rectal bleeding, GI suspects hemrhds  -- darbepoietin w HD    2. thrombocytopenia     -- platelets adequate, lower today, reactive and consumptive, abx   -- most likely related to h/o HCV, ? Mycophenolate contributing  -- adequate counts would not change meds   -- monitor for now    3. LE edema   --noted age indeterminate DVT b/l LE above knee; provoked by acute illness and immobilizaiton;  no indication for hypercoag w/u   --pt had been on and off AC in past for Afib  --on heparin drip with bridge to coumadin for goal INR 2-3;  ID currently considering LP for new onset fevers, lethargy with history of cryptococcal meningitis.  Restart AC after procedures are completed, would keep pt on heparin gtt for now given acute illness  -- s/p INR reversal with IV vit K    4.  Fevers  --ID w/u;  for LP to r/o meningitis.      5. MGUS -- reviewed previous records from [place] from 3/2017. Noted to have 5% plasma cells. No congo red staining done. Immunoglobulins were nml, free kappa 43, free lambda 85, free k/l ratio nml. Overall, low suspicion that he has progressed to MM given low M spike and nml k/l ratio.   -- immunoglobulins not elevated    6. ? amyloidosis -- not sure if BMBx would be helpful though it's possible. Can also consider a fat pad bx. Renal bx is possible but may be invasive.   -- will defer decision to bx kidney to renal  -- consider fat pad bx,, surg eval appreciated  -- hold off on BMBx for now with sepsis    Will follow, pls call with questions, [phone]

## 2019-09-05 NOTE — PROGRESS NOTE ADULT - ASSESSMENT
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  D/w the patient case with his long time Nephrologist in [place]. 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  He also had M spike as high as 1.3gm and he has had 10gm of proteinuria.   His hypercalcemia was worked up there and thought to be mostly related to PTH disease and he was not a parathyroidectomy candidate.  Since been here, worsening MS and overall confusion that is getting worse.

## 2019-09-05 NOTE — PROGRESS NOTE ADULT - PROBLEM SELECTOR PLAN 2
- unknown etiology  - ? related multifactorial metabolic encephalopathy   - plan for LP today and kidney biopsy per medicine team.   - surgery team called for fat pad biopsy

## 2019-09-05 NOTE — PROGRESS NOTE ADULT - PROBLEM SELECTOR PLAN 6
With some hypercalcemia in 10 range despite dialysis and diuretics. check pth again. Started sensipar 30mg po qhs

## 2019-09-05 NOTE — EEG REPORT - NS EEG TEXT BOX
[facility]   COMPREHENSIVE EPILEPSY CENTER   REPORT OF PROLONGED VIDEO EEG     [facility]: [address], [place] [address], Ph#: [phone]  [facility]: [id number], [place] [address], Ph#: [phone]  Office: [address] [place] [address], [facility] [address], [place] [address] Ph#: [phone]    Patient Name: YONI INGRAM  Age and : 74y ([birth year])  MRN #: [id number]  Location: [facility] 6MON 609   Referring Physician: Odalys Hwang    Start Time:  19 23:40    End Time:  19 07:14   _____________________________________________________________  STUDY INFORMATION    EEG Recording Technique:  The patient underwent continuous Video-EEG monitoring, using Telemetry System hardware on the XLTek Digital System. EEG and video data were stored on a computer hard drive with important events saved in digital archive files. The material was reviewed by a physician (electroencephalographer / epileptologist) on a daily basis. Bishop and seizure detection algorithms were utilized and reviewed. An EEG Technician attended to the patient, and was available throughout daytime work hours.  The epilepsy center neurologist was available in person or on call 24-hours per day.    EEG Placement and Labeling of Electrodes:  The EEG was performed utilizing 20 channel referential EEG connections (coronal over temporal over parasagittal montage) using all standard 10-20 electrode placements with EKG, with additional electrodes placed in the inferior temporal region using the modified 10-10 montage electrode placements for elective admissions, or if deemed necessary. Recording was at a sampling rate of 256 samples per second per channel. Time synchronized digital video recording was done simultaneously with EEG recording. A low light infrared camera was used for low light recording.     _____________________________________________________________  HISTORY    Patient is a 74y old  Male who presents with a chief complaint of dialysis (04 Sep 2019 10:22)      PERTINENT MEDICATION:  MEDICATIONS  (STANDING):  allopurinol 100 milliGRAM(s) Oral daily  atorvastatin 40 milliGRAM(s) Oral at bedtime  carvedilol 25 milliGRAM(s) Oral every 12 hours  chlorhexidine 2% Cloths 1 Application(s) Topical daily  cinacalcet 30 milliGRAM(s) Oral daily  darbepoetin Injectable ViaL 40 MICROGram(s) IV Push every week  desmopressin IVPB 20 MICROGram(s) IV Intermittent once  dextrose 5%. 1000 milliLiter(s) (50 mL/Hr) IV Continuous <Continuous>  dextrose 5%. 1000 milliLiter(s) (50 mL/Hr) IV Continuous <Continuous>  dextrose 50% Injectable 12.5 Gram(s) IV Push once  dextrose 50% Injectable 25 Gram(s) IV Push once  dextrose 50% Injectable 25 Gram(s) IV Push once  ergocalciferol 35740 Unit(s) Oral every week  folic acid 1 milliGRAM(s) Oral daily  furosemide   Injectable 60 milliGRAM(s) IV Push two times a day  gabapentin 100 milliGRAM(s) Oral two times a day  hydrALAZINE 100 milliGRAM(s) Oral three times a day  hydrocortisone hemorrhoidal Suppository 1 Suppository(s) Rectal at bedtime  insulin glargine Injectable (LANTUS) 10 Unit(s) SubCutaneous at bedtime  insulin lispro (HumaLOG) corrective regimen sliding scale   SubCutaneous every 6 hours  labetalol 300 milliGRAM(s) Oral two times a day  levETIRAcetam  IVPB 500 milliGRAM(s) IV Intermittent two times a day  meropenem  IVPB 500 milliGRAM(s) IV Intermittent every 12 hours  mycophenolic acid  milliGRAM(s) Oral two times a day  predniSONE   Tablet 5 milliGRAM(s) Oral daily  sodium bicarbonate 650 milliGRAM(s) Oral two times a day  tamsulosin 0.4 milliGRAM(s) Oral at bedtime  thiamine Injectable 100 milliGRAM(s) IV Push daily    _____________________________________________________________  INTERPRETATION    Findings: The background was continuous, spontaneously variable and reactive. No posterior dominant rhythm seen.    Background Slowing:  Diffuse theta and polymorphic delta slowing.    Focal Slowing:   None were present.    Sleep Background:  Stage II sleep transients were not recorded.    Other Non-Epileptiform Findings:  None were present.    Interictal Epileptiform Activity:   None were present.    Events:  Clinical events: None recorded.  Seizures: None recorded.    Activation Procedures:   Hyperventilation was not performed.    Photic stimulation was not performed.     Artifacts:  Intermittent myogenic and movement artifacts were noted.    _____________________________________________________________  EEG SUMMARY/CLASSIFICATION  Abnormal EEG in an unresponsive patient.  - Moderate generalized slowing.    _____________________________________________________________  EEG IMPRESSION/CLINICAL CORRELATE  ***PRELIM REPORT  Abnormal EEG study.  1. Moderate nonspecific diffuse or multifocal cerebral dysfunction.   2. No epileptiform pattern or seizure seen.    _____________________________________________________________  Jovanny Dumont DO  Epilepsy Fellow, [facility] [place]    China Maier MD  Attending Physician, [facility] [place] [facility]   COMPREHENSIVE EPILEPSY CENTER   REPORT OF PROLONGED VIDEO EEG     [facility]: [address], [place] [address], Ph#: [phone]  [facility]: [id number], [place] [address], Ph#: [phone]  Office: [address] [place] [address], [facility] [address], [place] [address] Ph#: [phone]    Patient Name: YONI INGRAM  Age and : 74y (49)  MRN #: [id number]  Location: [facility] 6MON 609   Referring Physician: Odalys Hwang    Start Time:  19 23:40  End Time:  19 07:14   _____________________________________________________________  STUDY INFORMATION    EEG Recording Technique:  The patient underwent continuous Video-EEG monitoring, using Telemetry System hardware on the XLTek Digital System. EEG and video data were stored on a computer hard drive with important events saved in digital archive files. The material was reviewed by a physician (electroencephalographer / epileptologist) on a daily basis. Bishop and seizure detection algorithms were utilized and reviewed. An EEG Technician attended to the patient, and was available throughout daytime work hours.  The epilepsy center neurologist was available in person or on call 24-hours per day.    EEG Placement and Labeling of Electrodes:  The EEG was performed utilizing 20 channel referential EEG connections (coronal over temporal over parasagittal montage) using all standard 10-20 electrode placements with EKG, with additional electrodes placed in the inferior temporal region using the modified 10-10 montage electrode placements for elective admissions, or if deemed necessary. Recording was at a sampling rate of 256 samples per second per channel. Time synchronized digital video recording was done simultaneously with EEG recording. A low light infrared camera was used for low light recording.     _____________________________________________________________  HISTORY    Patient is a 74y old  Male who presents with a chief complaint of dialysis (04 Sep 2019 10:22)      PERTINENT MEDICATION:  gabapentin 100 milliGRAM(s) Oral two times a day  levETIRAcetam  IVPB 500 milliGRAM(s) IV Intermittent two times a day    _____________________________________________________________  INTERPRETATION    Findings: The background was continuous, spontaneously variable and reactive. No posterior dominant rhythm seen.    Background Slowing:  Diffuse theta and polymorphic delta slowing.    Focal Slowing:   None were present.    Sleep Background:  Stage II sleep transients were not recorded.    Other Non-Epileptiform Findings:  None were present.    Interictal Epileptiform Activity:   None were present.    Events:  Clinical events: None recorded.  Seizures: None recorded.    Activation Procedures:   Hyperventilation was not performed.    Photic stimulation was not performed.     Artifacts:  Intermittent myogenic and movement artifacts were noted.    _____________________________________________________________  EEG SUMMARY/CLASSIFICATION  Abnormal EEG in an altered patient.  - Moderate generalized slowing.    _____________________________________________________________  EEG IMPRESSION/CLINICAL CORRELATE    Abnormal EEG study.  1. Moderate nonspecific diffuse or multifocal cerebral dysfunction.   2. No epileptiform pattern or seizure seen.    _____________________________________________________________  Jovanny Dumont DO  Epilepsy Fellow, [facility] [place]    China Maier MD  Attending Physician, [facility] [place]

## 2019-09-05 NOTE — PROGRESS NOTE ADULT - ASSESSMENT
ASSESSMENT: 75 y/o male with PMHx of  ESRD s/p /p failed renal transplant 4 year ago and successful renal transplant 1 year ago,DM, HTN, cardiomyopathy 2/2 cocaine abuse, Hepatitis C, meningococcal meningitiss presenting with concerns about his kidney function, worsening SOB over the past 2 weeks and leg edema. Surgery consulted for fat pad biopsy to r/o amyloidosis.     PLAN:    - discussed need for biopsy with primary team - would like done - informed team that pt will need to be optimized prior to procedure, NP to discuss w/ attending  - would hold hep gtt/coumadin until after bx if possible  - Pt to get LP today and kidney biopsy - will f/u results  - care per primary team    Discussed w/ Dr. ALLYSSA Clancy  [facility], [phone]

## 2019-09-06 LAB
ANION GAP SERPL CALC-SCNC: 18 MMOL/L — HIGH (ref 5–17)
APTT BLD: 27.7 SEC — SIGNIFICANT CHANGE UP (ref 27.5–36.3)
BLD GP AB SCN SERPL QL: NEGATIVE — SIGNIFICANT CHANGE UP
BUN SERPL-MCNC: 52 MG/DL — HIGH (ref 7–23)
CALCIUM SERPL-MCNC: 10.3 MG/DL — SIGNIFICANT CHANGE UP (ref 8.4–10.5)
CHLORIDE SERPL-SCNC: 94 MMOL/L — LOW (ref 96–108)
CO2 SERPL-SCNC: 23 MMOL/L — SIGNIFICANT CHANGE UP (ref 22–31)
CREAT SERPL-MCNC: 6.77 MG/DL — HIGH (ref 0.5–1.3)
CRYPTOC AG CSF-ACNC: NEGATIVE — SIGNIFICANT CHANGE UP
GLUCOSE BLDC GLUCOMTR-MCNC: 107 MG/DL — HIGH (ref 70–99)
GLUCOSE BLDC GLUCOMTR-MCNC: 117 MG/DL — HIGH (ref 70–99)
GLUCOSE BLDC GLUCOMTR-MCNC: 119 MG/DL — HIGH (ref 70–99)
GLUCOSE BLDC GLUCOMTR-MCNC: 119 MG/DL — HIGH (ref 70–99)
GLUCOSE BLDC GLUCOMTR-MCNC: 126 MG/DL — HIGH (ref 70–99)
GLUCOSE SERPL-MCNC: 124 MG/DL — HIGH (ref 70–99)
HCT VFR BLD CALC: 24.1 % — LOW (ref 39–50)
HCT VFR BLD CALC: 24.2 % — LOW (ref 39–50)
HGB BLD-MCNC: 7.4 G/DL — LOW (ref 13–17)
HGB BLD-MCNC: 7.6 G/DL — LOW (ref 13–17)
INR BLD: 1.15 RATIO — SIGNIFICANT CHANGE UP (ref 0.88–1.16)
MCHC RBC-ENTMCNC: 26.3 PG — LOW (ref 27–34)
MCHC RBC-ENTMCNC: 26.7 PG — LOW (ref 27–34)
MCHC RBC-ENTMCNC: 30.7 GM/DL — LOW (ref 32–36)
MCHC RBC-ENTMCNC: 31.4 GM/DL — LOW (ref 32–36)
MCV RBC AUTO: 85 FL — SIGNIFICANT CHANGE UP (ref 80–100)
MCV RBC AUTO: 85.8 FL — SIGNIFICANT CHANGE UP (ref 80–100)
NON-GYNECOLOGICAL CYTOLOGY STUDY: SIGNIFICANT CHANGE UP
PLATELET # BLD AUTO: 125 K/UL — LOW (ref 150–400)
PLATELET # BLD AUTO: 130 K/UL — LOW (ref 150–400)
POTASSIUM SERPL-MCNC: 3.7 MMOL/L — SIGNIFICANT CHANGE UP (ref 3.5–5.3)
POTASSIUM SERPL-SCNC: 3.7 MMOL/L — SIGNIFICANT CHANGE UP (ref 3.5–5.3)
PROTHROM AB SERPL-ACNC: 13.1 SEC — SIGNIFICANT CHANGE UP (ref 10–13.1)
RBC # BLD: 2.81 M/UL — LOW (ref 4.2–5.8)
RBC # BLD: 2.85 M/UL — LOW (ref 4.2–5.8)
RBC # FLD: 13.9 % — SIGNIFICANT CHANGE UP (ref 10.3–14.5)
RBC # FLD: 14.7 % — HIGH (ref 10.3–14.5)
RH IG SCN BLD-IMP: POSITIVE — SIGNIFICANT CHANGE UP
SODIUM SERPL-SCNC: 135 MMOL/L — SIGNIFICANT CHANGE UP (ref 135–145)
TM INTERPRETATION: SIGNIFICANT CHANGE UP
WBC # BLD: 6.54 K/UL — SIGNIFICANT CHANGE UP (ref 3.8–10.5)
WBC # BLD: 7 K/UL — SIGNIFICANT CHANGE UP (ref 3.8–10.5)
WBC # FLD AUTO: 6.54 K/UL — SIGNIFICANT CHANGE UP (ref 3.8–10.5)
WBC # FLD AUTO: 7 K/UL — SIGNIFICANT CHANGE UP (ref 3.8–10.5)

## 2019-09-06 PROCEDURE — 99232 SBSQ HOSP IP/OBS MODERATE 35: CPT | Mod: GC

## 2019-09-06 RX ORDER — HEPARIN SODIUM 5000 [USP'U]/ML
INJECTION INTRAVENOUS; SUBCUTANEOUS
Qty: 25000 | Refills: 0 | Status: DISCONTINUED | OUTPATIENT
Start: 2019-09-06 | End: 2019-09-10

## 2019-09-06 RX ORDER — HEPARIN SODIUM 5000 [USP'U]/ML
9000 INJECTION INTRAVENOUS; SUBCUTANEOUS EVERY 6 HOURS
Refills: 0 | Status: DISCONTINUED | OUTPATIENT
Start: 2019-09-06 | End: 2019-09-10

## 2019-09-06 RX ORDER — HEPARIN SODIUM 5000 [USP'U]/ML
4000 INJECTION INTRAVENOUS; SUBCUTANEOUS EVERY 6 HOURS
Refills: 0 | Status: DISCONTINUED | OUTPATIENT
Start: 2019-09-06 | End: 2019-09-10

## 2019-09-06 RX ORDER — HEPARIN SODIUM 5000 [USP'U]/ML
9000 INJECTION INTRAVENOUS; SUBCUTANEOUS ONCE
Refills: 0 | Status: COMPLETED | OUTPATIENT
Start: 2019-09-06 | End: 2019-09-07

## 2019-09-06 RX ORDER — MYCOPHENOLATE MOFETIL 250 MG/1
250 CAPSULE ORAL EVERY 12 HOURS
Refills: 0 | Status: DISCONTINUED | OUTPATIENT
Start: 2019-09-06 | End: 2019-09-08

## 2019-09-06 RX ORDER — LABETALOL HCL 100 MG
20 TABLET ORAL EVERY 6 HOURS
Refills: 0 | Status: DISCONTINUED | OUTPATIENT
Start: 2019-09-06 | End: 2019-09-08

## 2019-09-06 RX ORDER — HYDRALAZINE HCL 50 MG
10 TABLET ORAL EVERY 4 HOURS
Refills: 0 | Status: DISCONTINUED | OUTPATIENT
Start: 2019-09-06 | End: 2019-09-09

## 2019-09-06 RX ORDER — HYDROCORTISONE 20 MG
10 TABLET ORAL EVERY 12 HOURS
Refills: 0 | Status: DISCONTINUED | OUTPATIENT
Start: 2019-09-06 | End: 2019-09-09

## 2019-09-06 RX ORDER — DARBEPOETIN ALFA IN POLYSORBAT 200MCG/0.4
60 PEN INJECTOR (ML) SUBCUTANEOUS
Refills: 0 | Status: DISCONTINUED | OUTPATIENT
Start: 2019-09-11 | End: 2019-09-24

## 2019-09-06 RX ORDER — DARBEPOETIN ALFA IN POLYSORBAT 200MCG/0.4
40 PEN INJECTOR (ML) SUBCUTANEOUS
Refills: 0 | Status: DISCONTINUED | OUTPATIENT
Start: 2019-09-06 | End: 2019-09-06

## 2019-09-06 RX ADMIN — Medication 100 MILLIGRAM(S): at 05:29

## 2019-09-06 RX ADMIN — Medication 10 MILLIGRAM(S): at 14:50

## 2019-09-06 RX ADMIN — Medication 100 MILLIGRAM(S): at 14:50

## 2019-09-06 RX ADMIN — LEVETIRACETAM 400 MILLIGRAM(S): 250 TABLET, FILM COATED ORAL at 00:45

## 2019-09-06 RX ADMIN — Medication 300 MILLIGRAM(S): at 05:29

## 2019-09-06 RX ADMIN — Medication 60 MILLIGRAM(S): at 00:45

## 2019-09-06 RX ADMIN — Medication 20 MILLIGRAM(S): at 14:50

## 2019-09-06 RX ADMIN — INSULIN GLARGINE 10 UNIT(S): 100 INJECTION, SOLUTION SUBCUTANEOUS at 21:50

## 2019-09-06 RX ADMIN — LEVETIRACETAM 400 MILLIGRAM(S): 250 TABLET, FILM COATED ORAL at 16:45

## 2019-09-06 RX ADMIN — Medication 0: at 05:27

## 2019-09-06 RX ADMIN — CARVEDILOL PHOSPHATE 25 MILLIGRAM(S): 80 CAPSULE, EXTENDED RELEASE ORAL at 05:29

## 2019-09-06 RX ADMIN — Medication 60 MILLIGRAM(S): at 14:49

## 2019-09-06 RX ADMIN — Medication 650 MILLIGRAM(S): at 05:28

## 2019-09-06 RX ADMIN — Medication 5 MILLIGRAM(S): at 05:29

## 2019-09-06 NOTE — PROGRESS NOTE ADULT - PROBLEM SELECTOR PLAN 1
Patient with waxing/waning sensorium concerning for central nervous process. Will have LP done today with send outs for AMARIS/EBV/CMV/PB19 as well as regular workup. HD today with minimum fluid removal.  Rule out CNS heme malignancy as well given elevated lambda chains Patient with waxing/waning sensorium concerning for central nervous process. Will have LP done  on 9/5 with send outs for AMARIS/EBV/CMV/PB19 as well as regular workup. HD today with minimum fluid removal.  Rule out CNS heme malignancy as well given elevated lambda chains  So far Protein is high in CSF_ suggestive perhaps of viral, TB or malignancy.   Can decrease MMF dose by 50%

## 2019-09-06 NOTE — PROGRESS NOTE ADULT - SUBJECTIVE AND OBJECTIVE BOX
Pt seen, more alert today but still not very interactive    MEDICATIONS  (STANDING):  allopurinol 100 milliGRAM(s) Oral daily  atorvastatin 40 milliGRAM(s) Oral at bedtime  cefTRIAXone   IVPB 1000 milliGRAM(s) IV Intermittent every 24 hours  chlorhexidine 2% Cloths 1 Application(s) Topical daily  cinacalcet 30 milliGRAM(s) Oral daily  dextrose 5%. 1000 milliLiter(s) (50 mL/Hr) IV Continuous <Continuous>  dextrose 5%. 1000 milliLiter(s) (50 mL/Hr) IV Continuous <Continuous>  dextrose 50% Injectable 12.5 Gram(s) IV Push once  dextrose 50% Injectable 25 Gram(s) IV Push once  dextrose 50% Injectable 25 Gram(s) IV Push once  ergocalciferol 66308 Unit(s) Oral every week  folic acid 1 milliGRAM(s) Oral daily  furosemide   Injectable 60 milliGRAM(s) IV Push two times a day  heparin  Infusion.  Unit(s)/Hr (19 mL/Hr) IV Continuous <Continuous>  heparin  Injectable 9000 Unit(s) IV Push once  hydrocortisone hemorrhoidal Suppository 1 Suppository(s) Rectal at bedtime  hydrocortisone sodium succinate Injectable 10 milliGRAM(s) IV Push every 12 hours  insulin glargine Injectable (LANTUS) 10 Unit(s) SubCutaneous at bedtime  insulin lispro (HumaLOG) corrective regimen sliding scale   SubCutaneous every 6 hours  labetalol Injectable 20 milliGRAM(s) IV Push every 6 hours  levETIRAcetam  IVPB 500 milliGRAM(s) IV Intermittent two times a day  mycophenolate mofetil IVPB 250 milliGRAM(s) IV Intermittent every 12 hours  sodium bicarbonate 650 milliGRAM(s) Oral two times a day  tamsulosin 0.4 milliGRAM(s) Oral at bedtime  thiamine Injectable 100 milliGRAM(s) IV Push daily    MEDICATIONS  (PRN):  acetaminophen   Tablet .. 650 milliGRAM(s) Oral every 6 hours PRN Temp greater or equal to 38C (100.4F)  aluminum hydroxide/magnesium hydroxide/simethicone Suspension 30 milliLiter(s) Oral every 6 hours PRN Dyspepsia  dextrose 40% Gel 15 Gram(s) Oral once PRN Blood Glucose LESS THAN 70 milliGRAM(s)/deciliter  glucagon  Injectable 1 milliGRAM(s) IntraMuscular once PRN Glucose LESS THAN 70 milligrams/deciliter  heparin  Injectable 9000 Unit(s) IV Push every 6 hours PRN For aPTT less than 40  heparin  Injectable 4000 Unit(s) IV Push every 6 hours PRN For aPTT between 40 - 57  hydrALAZINE Injectable 10 milliGRAM(s) IV Push every 4 hours PRN For SBP > 160      ROS  no pain, limited 2/2 participation    Vital Signs Last 24 Hrs  T(C): 37.1 (06 Sep 2019 17:12), Max: 37.8 (05 Sep 2019 22:21)  T(F): 98.8 (06 Sep 2019 17:12), Max: 100 (05 Sep 2019 22:21)  HR: 79 (06 Sep 2019 17:12) (76 - 88)  BP: 192/77 (06 Sep 2019 17:12) (147/73 - 192/77)  BP(mean): --  RR: 18 (06 Sep 2019 17:12) (18 - 20)  SpO2: 93% (06 Sep 2019 17:12) (93% - 100%)    PE  NAD  asleep, easily arousable  RRR  CTAB but limited effort, ant chest  abd benign  no edema  remainder of exam limited 2/2 participation                          7.6    7.0   )-----------( 125      ( 06 Sep 2019 10:00 )             24.2       09-06    135  |  94<L>  |  52<H>  ----------------------------<  124<H>  3.7   |  23  |  6.77<H>    Ca    10.3      06 Sep 2019 05:13    TPro  6.1  /  Alb  2.7<L>  /  TBili  x   /  DBili  x   /  AST  x   /  ALT  x   /  AlkPhos  x   09-04

## 2019-09-06 NOTE — PROGRESS NOTE ADULT - ASSESSMENT
ASSESSMENT: 73 y/o male with PMHx of  ESRD s/p /p failed renal transplant 4 year ago and successful renal transplant 1 year ago,DM, HTN, cardiomyopathy 2/2 cocaine abuse, Hepatitis C, meningococcal meningitiss presenting with concerns about his kidney function, worsening SOB over the past 2 weeks and leg edema. Surgery consulted for fat pad biopsy to r/o amyloidosis.     PLAN:    - discussed fat pad biopsy with sister - sister would like to hold off for now. Will tentatively reschedule for early next week  - would hold hep gtt/coumadin until after bx if possible  - care per primary team    Discussed w/ Dr. ALLYSSA Clancy  [facility], [phone]

## 2019-09-06 NOTE — PROGRESS NOTE ADULT - ASSESSMENT
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   Pt just moved back to [place] from [place] .. reports that he has not been taking his meds for the past few days since " he might have misplaed them"     pt denies chest pain, syncope,fever, chills, cough, urinary symptoms.    in ED found  to have anemia   Nno acute changes on EKG   elevated CR and Trop    1- HTN urgency : now improving  bp    2- KAMRON  :   d/w   : pt will likely end up on HD in intermediate and likely long term given transplant graft failure   off tacro,  cont cellcept and sterioods   pt refused kidney bx       3- DM:   A1c  5.6  Fs     4- acute diastolic CHF sec to  HTN urgency and renal failure   Echo :  < from: Transthoracic Echocardiogram (08.28.19 @ 16:12) >  1. Mitral annular calcification and calcified mitral  leaflets with decreased diastolic opening.  2. Moderate to severe concentric left ventricular  hypertrophy.  3. Normal left ventricular systolic function with  mid-cavitary obliteration.  4. Normal right ventricular size and systolic function.  5. Small pericardial effusion.    cont fluid removal with HD per renal      5-  difficult ambulating :  no focal neuro deficits   neuro eval appreciated    CT T/L   < from: CT Thoracic Spine No Cont (08.30.19 @ 10:06) >    Old right L1 transverse process fracture. Bilateral lysis   defects at L5 as seen on the prior 5/26/2013      6- afib :hold AC ..holding for now for bx /LP     7- hematochezia : per sister : on and off small amount of fresh blood in stool and some amount in urine but only small amounts   monitor H/H   consult GI .: appreciate input :  suspect bleeding to be hemorrhoidal, now resolved     trial of anusol supp  if bleeding persist would consider colonoscopy however patient is reluctant.    heme input: appreciated        8-  renal transplant : f/u with Transplant team   cont meds with MMF and steroids   off Tac     9- TIA :  unable to perform MRI   CT no acute changes on CT   repeat bnegative   fu with neuro     10 encephalopathy : LP : huigh protien   f/u cultures /pending studies   blood culture positive for GNR /Ecoli ..  ? source :  urine was not sent   will likely need CT C/A/P however will hold off for now   CTH with contrast   low threshold for ICU consult     11- paraprotienmeia : d/w heme and renal   will need to consider amyloidosis .. doubt MM   holding on Fat pad bx for now       12 hypercalcemia : monitor for now

## 2019-09-06 NOTE — CHART NOTE - NSCHARTNOTEFT_GEN_A_CORE
Informed by RN of no current IV access  Reportedly, multiple attempts made at placement - unable  Dr Hwang informed - directed to order Midline  Once placed - to resume current IV medications including Heparin Gtt

## 2019-09-06 NOTE — PROGRESS NOTE ADULT - PROBLEM SELECTOR PLAN 4
cont meds, off labetalol now and just on coreg and hydralazine cont meds, off labetalol now and just on coreg and hydralazine  Might have to change all meds to IV

## 2019-09-06 NOTE — PROGRESS NOTE ADULT - ASSESSMENT
1. Anemia sec to CKD    -- H/H in 7-8s, stable  -- low normal  B12, folate. B12 1000 mcg IM x 1 given 8/31;  and c/w  PO FA 1mg QD  -- f/u MMA  -- No hemolysis  -- Iron studies c/w Anemia of Chronic Inflammation  -- FOBT neg  -- transfuse prn hgb <7  -- rectal bleeding, GI suspects hemrhds  -- darbepoietin w HD per renal    2. thrombocytopenia     -- platelets adequate, stable, reactive and consumptive, abx   -- most likely related to h/o HCV, ? Mycophenolate contributing  -- adequate counts would not change meds   -- monitor for now    3. LE edema   --noted age indeterminate DVT b/l LE above knee; provoked by acute illness and immobilizaiton;  no indication for hypercoag w/u   --pt had been on and off AC in past for Afib  --on heparin drip with bridge to coumadin for goal INR 2-3;  ID currently considering LP for new onset fevers, lethargy with history of cryptococcal meningitis.  Restart AC after procedures are completed, would keep pt on heparin gtt for now given acute illness  -- s/p INR reversal with IV vit K for LP    4.  Fevers  --ID w/u, s/p LP    5. MGUS -- reviewed previous records from [place] from 3/2017. Noted to have 5% plasma cells. No congo red staining done. Immunoglobulins were nml, free kappa 43, free lambda 85, free k/l ratio nml. Overall, low suspicion that he has progressed to MM given low M spike and nml k/l ratio.   -- immunoglobulins not elevated  -- hold BMBx for now    6. ? amyloidosis -- not sure if BMBx would be helpful though it's possible. Can also consider a fat pad bx. Renal bx is possible but may be invasive.   -- will defer decision to bx kidney to renal  -- consider fat pad bx,, surg eval appreciated, sister wanted to hold off for now   -- hold off on BMBx for now with sepsis    Will follow, pls call with questions, [phone]

## 2019-09-06 NOTE — PROGRESS NOTE ADULT - SUBJECTIVE AND OBJECTIVE BOX
Patient is a 74y old  Male who presents with a chief complaint of KAMRON (06 Sep 2019 12:13)                                                               INTERVAL HPI/OVERNIGHT EVENTS:    REVIEW OF SYSTEMS:    more alert today however still disoriented   otherwise denies CP/SOB                                                                                                                                                                                                                                                                                    Medications:  MEDICATIONS  (STANDING):  allopurinol 100 milliGRAM(s) Oral daily  atorvastatin 40 milliGRAM(s) Oral at bedtime  cefTRIAXone   IVPB 1000 milliGRAM(s) IV Intermittent every 24 hours  chlorhexidine 2% Cloths 1 Application(s) Topical daily  cinacalcet 30 milliGRAM(s) Oral daily  dextrose 5%. 1000 milliLiter(s) (50 mL/Hr) IV Continuous <Continuous>  dextrose 5%. 1000 milliLiter(s) (50 mL/Hr) IV Continuous <Continuous>  dextrose 50% Injectable 12.5 Gram(s) IV Push once  dextrose 50% Injectable 25 Gram(s) IV Push once  dextrose 50% Injectable 25 Gram(s) IV Push once  ergocalciferol 17313 Unit(s) Oral every week  folic acid 1 milliGRAM(s) Oral daily  furosemide   Injectable 60 milliGRAM(s) IV Push two times a day  heparin  Infusion.  Unit(s)/Hr (19 mL/Hr) IV Continuous <Continuous>  heparin  Injectable 9000 Unit(s) IV Push once  hydrocortisone hemorrhoidal Suppository 1 Suppository(s) Rectal at bedtime  hydrocortisone sodium succinate Injectable 10 milliGRAM(s) IV Push every 12 hours  insulin glargine Injectable (LANTUS) 10 Unit(s) SubCutaneous at bedtime  insulin lispro (HumaLOG) corrective regimen sliding scale   SubCutaneous every 6 hours  labetalol Injectable 20 milliGRAM(s) IV Push every 6 hours  levETIRAcetam  IVPB 500 milliGRAM(s) IV Intermittent two times a day  mycophenolate mofetil IVPB 250 milliGRAM(s) IV Intermittent every 12 hours  sodium bicarbonate 650 milliGRAM(s) Oral two times a day  tamsulosin 0.4 milliGRAM(s) Oral at bedtime  thiamine Injectable 100 milliGRAM(s) IV Push daily    MEDICATIONS  (PRN):  acetaminophen   Tablet .. 650 milliGRAM(s) Oral every 6 hours PRN Temp greater or equal to 38C (100.4F)  aluminum hydroxide/magnesium hydroxide/simethicone Suspension 30 milliLiter(s) Oral every 6 hours PRN Dyspepsia  dextrose 40% Gel 15 Gram(s) Oral once PRN Blood Glucose LESS THAN 70 milliGRAM(s)/deciliter  glucagon  Injectable 1 milliGRAM(s) IntraMuscular once PRN Glucose LESS THAN 70 milligrams/deciliter  heparin  Injectable 9000 Unit(s) IV Push every 6 hours PRN For aPTT less than 40  heparin  Injectable 4000 Unit(s) IV Push every 6 hours PRN For aPTT between 40 - 57  hydrALAZINE Injectable 10 milliGRAM(s) IV Push every 4 hours PRN For SBP > 160       Allergies    No Known Allergies    Intolerances      Vital Signs Last 24 Hrs  T(C): 36.7 (06 Sep 2019 09:15), Max: 37.8 (05 Sep 2019 22:21)  T(F): 98.1 (06 Sep 2019 09:15), Max: 100 (05 Sep 2019 22:21)  HR: 88 (06 Sep 2019 09:15) (85 - 91)  BP: 147/73 (06 Sep 2019 09:15) (147/73 - 172/73)  BP(mean): --  RR: 20 (06 Sep 2019 09:15) (18 - 20)  SpO2: 97% (06 Sep 2019 09:15) (93% - 100%)  CAPILLARY BLOOD GLUCOSE      POCT Blood Glucose.: 119 mg/dL (06 Sep 2019 05:24)  POCT Blood Glucose.: 126 mg/dL (06 Sep 2019 00:24)  POCT Blood Glucose.: 117 mg/dL (05 Sep 2019 22:14)  POCT Blood Glucose.: 125 mg/dL (05 Sep 2019 18:06)  POCT Blood Glucose.: 133 mg/dL (05 Sep 2019 14:29)       @ 07:01  -   @ 07:00  --------------------------------------------------------  IN: 700 mL / OUT: 0 mL / NET: 700 mL      Physical Exam:    Daily Weight in k (06 Sep 2019 09:15)  General: NAD   HEENT:  Nonicteric, PERRLA  CV:  RRR, S1S2   Lungs:  CTA  Abdomen:  Soft, non-tender, no distended, positive BS  Extremities: ankle  edema  Neuro:  more alert    disoriented but NF otherwise                                                                                                                                                                                                                                                                                           LABS:                               7.6    7.0   )-----------( 125      ( 06 Sep 2019 10:00 )             24.2                          135  |  94<L>  |  52<H>  ----------------------------<  124<H>  3.7   |  23  |  6.77<H>    Ca    10.3      06 Sep 2019 05:13    TPro  6.1  /  Alb  2.7<L>  /  TBili  x   /  DBili  x   /  AST  x   /  ALT  x   /  AlkPhos  x

## 2019-09-06 NOTE — PROGRESS NOTE ADULT - SUBJECTIVE AND OBJECTIVE BOX
Subjective: Patient seen and examined. No new events except as noted.   resting comfortably   sleepy   Fat pad biopsy rescheduled till next week     REVIEW OF SYSTEMS:    CONSTITUTIONAL:+ weakness, fevers or chills  EYES/ENT: No visual changes;  No vertigo or throat pain   NECK: No pain or stiffness  RESPIRATORY: No cough, wheezing, hemoptysis; No shortness of breath  CARDIOVASCULAR: No chest pain or palpitations  GASTROINTESTINAL: No abdominal or epigastric pain. No nausea, vomiting, or hematemesis; No diarrhea or constipation. No melena or hematochezia.  GENITOURINARY: No dysuria, frequency or hematuria  NEUROLOGICAL: No numbness or weakness  SKIN: No itching, burning, rashes, or lesions   All other review of systems is negative unless indicated above.    MEDICATIONS:  MEDICATIONS  (STANDING):  allopurinol 100 milliGRAM(s) Oral daily  atorvastatin 40 milliGRAM(s) Oral at bedtime  carvedilol 25 milliGRAM(s) Oral every 12 hours  cefTRIAXone   IVPB 1000 milliGRAM(s) IV Intermittent every 24 hours  chlorhexidine 2% Cloths 1 Application(s) Topical daily  cinacalcet 30 milliGRAM(s) Oral daily  darbepoetin Injectable ViaL 40 MICROGram(s) IV Push every week  dextrose 5%. 1000 milliLiter(s) (50 mL/Hr) IV Continuous <Continuous>  dextrose 5%. 1000 milliLiter(s) (50 mL/Hr) IV Continuous <Continuous>  dextrose 50% Injectable 12.5 Gram(s) IV Push once  dextrose 50% Injectable 25 Gram(s) IV Push once  dextrose 50% Injectable 25 Gram(s) IV Push once  ergocalciferol 19425 Unit(s) Oral every week  folic acid 1 milliGRAM(s) Oral daily  furosemide   Injectable 60 milliGRAM(s) IV Push two times a day  hydrALAZINE 100 milliGRAM(s) Oral three times a day  hydrocortisone hemorrhoidal Suppository 1 Suppository(s) Rectal at bedtime  insulin glargine Injectable (LANTUS) 10 Unit(s) SubCutaneous at bedtime  insulin lispro (HumaLOG) corrective regimen sliding scale   SubCutaneous every 6 hours  labetalol 300 milliGRAM(s) Oral two times a day  levETIRAcetam  IVPB 500 milliGRAM(s) IV Intermittent two times a day  mycophenolic acid  milliGRAM(s) Oral two times a day  predniSONE   Tablet 5 milliGRAM(s) Oral daily  sodium bicarbonate 650 milliGRAM(s) Oral two times a day  tamsulosin 0.4 milliGRAM(s) Oral at bedtime  thiamine Injectable 100 milliGRAM(s) IV Push daily      PHYSICAL EXAM:  T(C): 37.5 (09-06-19 @ 04:45), Max: 37.8 (09-05-19 @ 22:21)  HR: 87 (09-06-19 @ 04:45) (72 - 91)  BP: 172/73 (09-06-19 @ 04:45) (160/81 - 172/73)  RR: 18 (09-06-19 @ 04:45) (18 - 20)  SpO2: 94% (09-06-19 @ 04:45) (93% - 100%)  Wt(kg): --  I&O's Summary    05 Sep 2019 07:01  -  06 Sep 2019 07:00  --------------------------------------------------------  IN: 700 mL / OUT: 0 mL / NET: 700 mL          Appearance: Normal	  HEENT:   Normal oral mucosa, PERRL, EOMI	  Lymphatic: No lymphadenopathy , no edema  Cardiovascular: Normal S1 S2, No JVD, No murmurs , Peripheral pulses palpable 2+ bilaterally  Respiratory: Lungs clear to auscultation, normal effort 	  Gastrointestinal:  Soft, Non-tender, + BS	  Skin: No rashes, No ecchymoses, No cyanosis, warm to touch  Musculoskeletal: Normal range of motion, normal strength  Psychiatry:  Mood & affect appropriate  Ext: No edema      LABS:    CARDIAC MARKERS:      Prothrombin Time and INR, Plasma in AM (09.05.19 @ 05:50)    Prothrombin Time, Plasma: 16.4: Effective October 30th, 2018 the reference range for PT has changed. sec    INR: 1.41: RECOMMENDED RANGES FOR THERAPEUTIC INR:    2.0-3.0 for most medical and surgical thromboembolic states    2.0-3.0 for atrial fibrillation    2.0-3.0 for bileaflet mechanical valve in aortic position    2.5-3.5 for mechanical heart valves   Chest 2004;[id number]  The presence of direct thrombin inhibitors (argatroban, refludan)  may falsely increase results. ratio                              7.4    6.54  )-----------( 130      ( 06 Sep 2019 08:43 )             24.1     09-06    135  |  94<L>  |  52<H>  ----------------------------<  124<H>  3.7   |  23  |  6.77<H>    Ca    10.3      06 Sep 2019 05:13    TPro  6.1  /  Alb  2.7<L>  /  TBili  x   /  DBili  x   /  AST  x   /  ALT  x   /  AlkPhos  x   09-04    proBNP:   Lipid Profile:   HgA1c:   TSH:             TELEMETRY: 	    ECG:  	  RADIOLOGY:   DIAGNOSTIC TESTING:  [ ] Echocardiogram:  [ ]  Catheterization:  [ ] Stress Test:    OTHER:
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
## 2019-09-06 NOTE — PROGRESS NOTE ADULT - SUBJECTIVE AND OBJECTIVE BOX
CC: f/u for E Coli bacteremia    Patient reports: he is confused, he cannot give a history.He is receiving dialysis.He denies any headache or abdominal  pain.    REVIEW OF SYSTEMS:  All other review of systems negative (Comprehensive ROS): limited by encephalopathy    Antimicrobials Day #  :day 4  cefTRIAXone   IVPB 1000 milliGRAM(s) IV Intermittent every 24 hours    Other Medications Reviewed    T(F): 98.1 (09-06-19 @ 09:15), Max: 100 (09-05-19 @ 22:21)  HR: 88 (09-06-19 @ 09:15)  BP: 147/73 (09-06-19 @ 09:15)  RR: 20 (09-06-19 @ 09:15)  SpO2: 97% (09-06-19 @ 09:15)  Wt(kg): --    PHYSICAL EXAM:  General: lethargic, no acute distress  Eyes:  anicteric, no conjunctival injection, no discharge  Oropharynx: no lesions or injection 	  Neck: supple, without adenopathy  Lungs: clear to auscultation  Heart: irregular rate and rhythm; no murmur, rubs or gallops  Abdomen: soft, nondistended, nontender, without mass or organomegaly  Skin: no lesions  Extremities: no clubbing, cyanosis, +edema  Neurologic: confused  Let arm AVF    LAB RESULTS:                        7.6    7.0   )-----------( 125      ( 06 Sep 2019 10:00 )             24.2     09-06    135  |  94<L>  |  52<H>  ----------------------------<  124<H>  3.7   |  23  |  6.77<H>    Ca    10.3      06 Sep 2019 05:13    TPro  6.1  /  Alb  2.7<L>  /  TBili  x   /  DBili  x   /  AST  x   /  ALT  x   /  AlkPhos  x   09-04    LIVER FUNCTIONS - ( 04 Sep 2019 23:44 )  Alb: 2.7 g/dL / Pro: 6.1 g/dL / ALK PHOS: x     / ALT: x     / AST: x     / GGT: x             MICROBIOLOGY:  RECENT CULTURES:  09-05 @ 17:06 .CSF     Testing in progress    No polymorphonuclear cells seen  No organisms seen  by cytocentrifuge    09-03 @ 10:14 .Blood Blood Culture PCR  Escherichia coli    Growth in aerobic bottle: Escherichia coli      Growth in aerobic bottle: Gram Negative Rods    CSF protein 126, glucosr 67, no wbc     RADIOLOGY REVIEWED:  < from: CT Head w/ IV Cont (09.05.19 @ 21:56) >  IMPRESSION:    No interval change. No acute intracranial bleeding or shift. Mild chronic   microvascular ischemic changes. No abnormal intracranial enhancement.    < end of copied text >

## 2019-09-06 NOTE — PROGRESS NOTE ADULT - ATTENDING COMMENTS
I have seen this patient with the fellow and agree with their assessment and plan. In addition, d/w at length with NP Denise and plan of care  Overall, not improving much, seen on HD and still waxing and waning MS. Not sure if can protect airway. In addition, not sure he has capacity to refuse procedures. Consider ICU consult     Jennifer Orellana MD  Cell   Pager   Office     For weekend coverage, please call Dr. Amy Zamora( fellow) and Attending Dr Chas Musa

## 2019-09-06 NOTE — PROGRESS NOTE ADULT - PROBLEM SELECTOR PLAN 2
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 now with KAMRON over CKD stage 5  ,now started on dialysis since 8/27/19. Plan for HD today. Can HOLD Lasix. Cont dialysis and may even need it daily.  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 Patient refused renal biopsy and family would like to not pursue fat pad biopsy. 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 now with KAMRON over CKD stage 5  ,now started on dialysis since 8/27/19. Plan for HD today. Can HOLD Lasix. Cont dialysis and may even need it daily.  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 Patient refused transplant Renal biopsy and family would like to not pursue fat pad biopsy.  Will try to discuss with sister the importance of both

## 2019-09-06 NOTE — PROGRESS NOTE ADULT - SUBJECTIVE AND OBJECTIVE BOX
INTERVAL HPI/OVERNIGHT EVENTS:    pt seen and examined.  more alert today, responding to questions  not oriented   no reported rectal bleeding     MEDICATIONS  (STANDING):  allopurinol 100 milliGRAM(s) Oral daily  atorvastatin 40 milliGRAM(s) Oral at bedtime  cefTRIAXone   IVPB 1000 milliGRAM(s) IV Intermittent every 24 hours  chlorhexidine 2% Cloths 1 Application(s) Topical daily  cinacalcet 30 milliGRAM(s) Oral daily  dextrose 5%. 1000 milliLiter(s) (50 mL/Hr) IV Continuous <Continuous>  dextrose 5%. 1000 milliLiter(s) (50 mL/Hr) IV Continuous <Continuous>  dextrose 50% Injectable 12.5 Gram(s) IV Push once  dextrose 50% Injectable 25 Gram(s) IV Push once  dextrose 50% Injectable 25 Gram(s) IV Push once  ergocalciferol 27447 Unit(s) Oral every week  folic acid 1 milliGRAM(s) Oral daily  furosemide   Injectable 60 milliGRAM(s) IV Push two times a day  heparin  Infusion.  Unit(s)/Hr (19 mL/Hr) IV Continuous <Continuous>  heparin  Injectable 9000 Unit(s) IV Push once  hydrocortisone hemorrhoidal Suppository 1 Suppository(s) Rectal at bedtime  hydrocortisone sodium succinate Injectable 10 milliGRAM(s) IV Push every 12 hours  insulin glargine Injectable (LANTUS) 10 Unit(s) SubCutaneous at bedtime  insulin lispro (HumaLOG) corrective regimen sliding scale   SubCutaneous every 6 hours  labetalol Injectable 20 milliGRAM(s) IV Push every 6 hours  levETIRAcetam  IVPB 500 milliGRAM(s) IV Intermittent two times a day  mycophenolate mofetil IVPB 250 milliGRAM(s) IV Intermittent every 12 hours  sodium bicarbonate 650 milliGRAM(s) Oral two times a day  tamsulosin 0.4 milliGRAM(s) Oral at bedtime  thiamine Injectable 100 milliGRAM(s) IV Push daily    MEDICATIONS  (PRN):  acetaminophen   Tablet .. 650 milliGRAM(s) Oral every 6 hours PRN Temp greater or equal to 38C (100.4F)  aluminum hydroxide/magnesium hydroxide/simethicone Suspension 30 milliLiter(s) Oral every 6 hours PRN Dyspepsia  dextrose 40% Gel 15 Gram(s) Oral once PRN Blood Glucose LESS THAN 70 milliGRAM(s)/deciliter  glucagon  Injectable 1 milliGRAM(s) IntraMuscular once PRN Glucose LESS THAN 70 milligrams/deciliter  heparin  Injectable 9000 Unit(s) IV Push every 6 hours PRN For aPTT less than 40  heparin  Injectable 4000 Unit(s) IV Push every 6 hours PRN For aPTT between 40 - 57  hydrALAZINE Injectable 10 milliGRAM(s) IV Push every 4 hours PRN For SBP > 160      Allergies    No Known Allergies    Intolerances        Review of Systems:    General:  No wt loss, fevers, chills, night sweats,fatigue,   Eyes:  Good vision, no reported pain  ENT:  No sore throat, pain, runny nose, dysphagia  CV:  No pain, palpitations, hypo/hypertension  Resp:  No dyspnea, cough, tachypnea, wheezing  GI:  No pain, No nausea, No vomiting, No diarrhea, No constipation, No weight loss, No fever, No pruritis, No rectal bleeding, No melena, No dysphagia  :  No pain, bleeding, incontinence, nocturia  Muscle:  No pain, weakness  Neuro:  No weakness, tingling, memory problems  Psych:  No fatigue, insomnia, mood problems, depression  Endocrine:  No polyuria, polydypsia, cold/heat intolerance  Heme:  No petechiae, ecchymosis, easy bruisability  Skin:  No rash, tattoos, scars, edema      Vital Signs Last 24 Hrs  T(C): 36.7 (06 Sep 2019 14:29), Max: 37.8 (05 Sep 2019 22:21)  T(F): 98 (06 Sep 2019 14:29), Max: 100 (05 Sep 2019 22:21)  HR: 76 (06 Sep 2019 14:29) (76 - 88)  BP: 183/74 (06 Sep 2019 14:29) (147/73 - 183/74)  BP(mean): --  RR: 20 (06 Sep 2019 14:29) (18 - 20)  SpO2: 96% (06 Sep 2019 14:29) (93% - 100%)    PHYSICAL EXAM:    Constitutional: NAD, well-developed  HEENT: EOMI, throat clear  Neck: No LAD, supple  Respiratory: CTA and P  Cardiovascular: S1 and S2, RRR, no M  Gastrointestinal: BS+, soft, NT/ND, neg HSM,  Extremities: No peripheral edema, neg clubing, cyanosis  Vascular: 2+ peripheral pulses  Neurological: A/O x 1  Psychiatric: Normal mood, normal affect  Skin: No rashes      LABS:                        7.6    7.0   )-----------( 125      ( 06 Sep 2019 10:00 )             24.2     09-06    135  |  94<L>  |  52<H>  ----------------------------<  124<H>  3.7   |  23  |  6.77<H>    Ca    10.3      06 Sep 2019 05:13    TPro  6.1  /  Alb  2.7<L>  /  TBili  x   /  DBili  x   /  AST  x   /  ALT  x   /  AlkPhos  x   09-04    PT/INR - ( 06 Sep 2019 10:03 )   PT: 13.1 sec;   INR: 1.15 ratio         PTT - ( 06 Sep 2019 10:03 )  PTT:27.7 sec      RADIOLOGY & ADDITIONAL TESTS:

## 2019-09-06 NOTE — PROGRESS NOTE ADULT - PROBLEM SELECTOR PLAN 3
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 admitted with fluid overload and HTN urgency . Last renal bx was 2017, presumed rejection, congo red was not done. Repeating renal transplant bx for ruling out amyloidosis as proteinuria is significant at 10gm and not sure if a true cause was found in d.w with [place] primary nephr at [place]. 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 admitted with fluid overload and HTN urgency . Last renal bx was 2017, presumed rejection, congo red was not done. Repeating renal transplant bx for ruling out amyloidosis as proteinuria is significant at 10gm and not sure if a true cause was found in d.w with his primary nephr at [place].  Change all meds to IV given not able to take PO  Change Myfortic 360mg BID to cellcept 250mg IV bid( lowered dose)  Change prednisone to IV form for 5mg dose

## 2019-09-06 NOTE — PROGRESS NOTE ADULT - ASSESSMENT
73 yo male with history of A Fib, Hep C, DM, ESRD s/p renal transplant x 2, admitted 8/26 with progressive CKD and rectal bleeding.  He was on tacrolimus, myfortic, and 5 mg of prednisone along with prophylactic valtrex.  He has required HD and now is febrile.  No obvious source of infection.His CMV viral loads were negative as per prior transplant team.  He recently moved up from [place].  His Hep B surface antibody is positive, Hep C RNA is negative  He now is known to have GNR in blood, primary focus not clear, typically GI or  origin  GNR is a sensitive E Coli  He is s/p LP, no cells,protein 126, glucose 67, ME PCR panel is negative  serum crypt antigen is negative, histo antigens are pending  ? Toxic-Metabolic encephalopathy  Fever is moderating, no evidence of acute CNS infection although AMARIS virus PCR ordered by nephrology  Repeat blood cultures are pending  Suggest:  1.will continue CTX given E Coli in blood ,full doses in renal failure  2.CMV viral load pending,  after resulted will decide on starting CMV prophylaxis  5.Consider CT of A/P ,  "GNR" bacteremia of unclear primary focus

## 2019-09-06 NOTE — PROGRESS NOTE ADULT - SUBJECTIVE AND OBJECTIVE BOX
GENERAL SURGERY DAILY PROGRESS NOTE:     Subjective:  Pt seen and examined. No acute events overnight. After discussion with sister will hold off on fat pad biopsy, will tentatively reschedule for next week.     Objective:    MEDICATIONS  (STANDING):  allopurinol 100 milliGRAM(s) Oral daily  atorvastatin 40 milliGRAM(s) Oral at bedtime  carvedilol 25 milliGRAM(s) Oral every 12 hours  cefTRIAXone   IVPB 1000 milliGRAM(s) IV Intermittent every 24 hours  chlorhexidine 2% Cloths 1 Application(s) Topical daily  cinacalcet 30 milliGRAM(s) Oral daily  darbepoetin Injectable ViaL 40 MICROGram(s) IV Push every week  dextrose 5%. 1000 milliLiter(s) (50 mL/Hr) IV Continuous <Continuous>  dextrose 5%. 1000 milliLiter(s) (50 mL/Hr) IV Continuous <Continuous>  dextrose 50% Injectable 12.5 Gram(s) IV Push once  dextrose 50% Injectable 25 Gram(s) IV Push once  dextrose 50% Injectable 25 Gram(s) IV Push once  ergocalciferol 67362 Unit(s) Oral every week  folic acid 1 milliGRAM(s) Oral daily  furosemide   Injectable 60 milliGRAM(s) IV Push two times a day  hydrALAZINE 100 milliGRAM(s) Oral three times a day  hydrocortisone hemorrhoidal Suppository 1 Suppository(s) Rectal at bedtime  insulin glargine Injectable (LANTUS) 10 Unit(s) SubCutaneous at bedtime  insulin lispro (HumaLOG) corrective regimen sliding scale   SubCutaneous every 6 hours  labetalol 300 milliGRAM(s) Oral two times a day  levETIRAcetam  IVPB 500 milliGRAM(s) IV Intermittent two times a day  mycophenolic acid  milliGRAM(s) Oral two times a day  predniSONE   Tablet 5 milliGRAM(s) Oral daily  sodium bicarbonate 650 milliGRAM(s) Oral two times a day  tamsulosin 0.4 milliGRAM(s) Oral at bedtime  thiamine Injectable 100 milliGRAM(s) IV Push daily    MEDICATIONS  (PRN):  acetaminophen   Tablet .. 650 milliGRAM(s) Oral every 6 hours PRN Temp greater or equal to 38C (100.4F)  aluminum hydroxide/magnesium hydroxide/simethicone Suspension 30 milliLiter(s) Oral every 6 hours PRN Dyspepsia  dextrose 40% Gel 15 Gram(s) Oral once PRN Blood Glucose LESS THAN 70 milliGRAM(s)/deciliter  glucagon  Injectable 1 milliGRAM(s) IntraMuscular once PRN Glucose LESS THAN 70 milligrams/deciliter      Vital Signs Last 24 Hrs  T(C): 37.5 (06 Sep 2019 04:45), Max: 37.8 (05 Sep 2019 22:21)  T(F): 99.5 (06 Sep 2019 04:45), Max: 100 (05 Sep 2019 22:21)  HR: 87 (06 Sep 2019 04:45) (72 - 91)  BP: 172/73 (06 Sep 2019 04:45) (160/81 - 172/73)  BP(mean): --  RR: 18 (06 Sep 2019 04:45) (18 - 20)  SpO2: 94% (06 Sep 2019 04:45) (93% - 100%)    I&O's Detail    05 Sep 2019 07:01  -  06 Sep 2019 07:00  --------------------------------------------------------  IN:    dextrose 5%.: 600 mL    Solution: 100 mL  Total IN: 700 mL    OUT:  Total OUT: 0 mL    Total NET: 700 mL    PHYSICAL EXAM  NAD  Respirations nonlabored  Abdomen soft, nontender, nondistended  No guarding or rebound tenderness        Daily     Daily     LABS:                        8.5    7.62  )-----------( 122      ( 05 Sep 2019 08:20 )             27.9     09-06    135  |  94<L>  |  52<H>  ----------------------------<  124<H>  3.7   |  23  |  6.77<H>    Ca    10.3      06 Sep 2019 05:13    TPro  6.1  /  Alb  2.7<L>  /  TBili  x   /  DBili  x   /  AST  x   /  ALT  x   /  AlkPhos  x   09-04    PT/INR - ( 05 Sep 2019 05:50 )   PT: 16.4 sec;   INR: 1.41 ratio         PTT - ( 05 Sep 2019 05:50 )  PTT:25.5 sec      RADIOLOGY & ADDITIONAL STUDIES:

## 2019-09-07 LAB
ANION GAP SERPL CALC-SCNC: 18 MMOL/L — HIGH (ref 5–17)
APTT BLD: 45.3 SEC — HIGH (ref 27.5–36.3)
APTT BLD: 57.7 SEC — HIGH (ref 27.5–36.3)
BUN SERPL-MCNC: 35 MG/DL — HIGH (ref 7–23)
CALCIUM SERPL-MCNC: 9.6 MG/DL — SIGNIFICANT CHANGE UP (ref 8.4–10.5)
CHLORIDE SERPL-SCNC: 99 MMOL/L — SIGNIFICANT CHANGE UP (ref 96–108)
CMV DNA # UR NAA DL=200: SIGNIFICANT CHANGE UP IU/ML
CO2 SERPL-SCNC: 22 MMOL/L — SIGNIFICANT CHANGE UP (ref 22–31)
CREAT SERPL-MCNC: 4.79 MG/DL — HIGH (ref 0.5–1.3)
GLUCOSE BLDC GLUCOMTR-MCNC: 107 MG/DL — HIGH (ref 70–99)
GLUCOSE BLDC GLUCOMTR-MCNC: 110 MG/DL — HIGH (ref 70–99)
GLUCOSE BLDC GLUCOMTR-MCNC: 117 MG/DL — HIGH (ref 70–99)
GLUCOSE BLDC GLUCOMTR-MCNC: 130 MG/DL — HIGH (ref 70–99)
GLUCOSE BLDC GLUCOMTR-MCNC: 133 MG/DL — HIGH (ref 70–99)
GLUCOSE BLDC GLUCOMTR-MCNC: 160 MG/DL — HIGH (ref 70–99)
GLUCOSE SERPL-MCNC: 158 MG/DL — HIGH (ref 70–99)
HCT VFR BLD CALC: 26.1 % — LOW (ref 39–50)
HGB BLD-MCNC: 8.7 G/DL — LOW (ref 13–17)
JCPYV DNA # CSF NAA+PROBE: SIGNIFICANT CHANGE UP COPIES/ML
MCHC RBC-ENTMCNC: 28.6 PG — SIGNIFICANT CHANGE UP (ref 27–34)
MCHC RBC-ENTMCNC: 33.4 GM/DL — SIGNIFICANT CHANGE UP (ref 32–36)
MCV RBC AUTO: 85.6 FL — SIGNIFICANT CHANGE UP (ref 80–100)
PLATELET # BLD AUTO: 129 K/UL — LOW (ref 150–400)
POTASSIUM SERPL-MCNC: 3.4 MMOL/L — LOW (ref 3.5–5.3)
POTASSIUM SERPL-SCNC: 3.4 MMOL/L — LOW (ref 3.5–5.3)
RBC # BLD: 3.05 M/UL — LOW (ref 4.2–5.8)
RBC # FLD: 13.9 % — SIGNIFICANT CHANGE UP (ref 10.3–14.5)
SODIUM SERPL-SCNC: 139 MMOL/L — SIGNIFICANT CHANGE UP (ref 135–145)
VDRL CSF-TITR: NEGATIVE — SIGNIFICANT CHANGE UP
WBC # BLD: 6.2 K/UL — SIGNIFICANT CHANGE UP (ref 3.8–10.5)
WBC # FLD AUTO: 6.2 K/UL — SIGNIFICANT CHANGE UP (ref 3.8–10.5)

## 2019-09-07 PROCEDURE — 93010 ELECTROCARDIOGRAM REPORT: CPT

## 2019-09-07 PROCEDURE — 93971 EXTREMITY STUDY: CPT | Mod: 26

## 2019-09-07 PROCEDURE — 74019 RADEX ABDOMEN 2 VIEWS: CPT | Mod: 26

## 2019-09-07 RX ORDER — METOPROLOL TARTRATE 50 MG
5 TABLET ORAL ONCE
Refills: 0 | Status: DISCONTINUED | OUTPATIENT
Start: 2019-09-07 | End: 2019-09-07

## 2019-09-07 RX ORDER — METOPROLOL TARTRATE 50 MG
5 TABLET ORAL ONCE
Refills: 0 | Status: COMPLETED | OUTPATIENT
Start: 2019-09-07 | End: 2019-09-07

## 2019-09-07 RX ADMIN — Medication 100 MILLIGRAM(S): at 11:47

## 2019-09-07 RX ADMIN — Medication 20 MILLIGRAM(S): at 11:46

## 2019-09-07 RX ADMIN — Medication 10 MILLIGRAM(S): at 17:11

## 2019-09-07 RX ADMIN — LEVETIRACETAM 400 MILLIGRAM(S): 250 TABLET, FILM COATED ORAL at 11:45

## 2019-09-07 RX ADMIN — Medication 10 MILLIGRAM(S): at 06:32

## 2019-09-07 RX ADMIN — Medication 10 MILLIGRAM(S): at 01:13

## 2019-09-07 RX ADMIN — HEPARIN SODIUM 4000 UNIT(S): 5000 INJECTION INTRAVENOUS; SUBCUTANEOUS at 12:13

## 2019-09-07 RX ADMIN — Medication 10 MILLIGRAM(S): at 11:46

## 2019-09-07 RX ADMIN — CEFTRIAXONE 100 MILLIGRAM(S): 500 INJECTION, POWDER, FOR SOLUTION INTRAMUSCULAR; INTRAVENOUS at 17:10

## 2019-09-07 RX ADMIN — Medication 20 MILLIGRAM(S): at 01:14

## 2019-09-07 RX ADMIN — HEPARIN SODIUM 2100 UNIT(S)/HR: 5000 INJECTION INTRAVENOUS; SUBCUTANEOUS at 12:12

## 2019-09-07 RX ADMIN — HEPARIN SODIUM 2100 UNIT(S)/HR: 5000 INJECTION INTRAVENOUS; SUBCUTANEOUS at 18:42

## 2019-09-07 RX ADMIN — SODIUM CHLORIDE 50 MILLILITER(S): 9 INJECTION, SOLUTION INTRAVENOUS at 22:25

## 2019-09-07 RX ADMIN — LEVETIRACETAM 400 MILLIGRAM(S): 250 TABLET, FILM COATED ORAL at 01:31

## 2019-09-07 RX ADMIN — HEPARIN SODIUM 9000 UNIT(S): 5000 INJECTION INTRAVENOUS; SUBCUTANEOUS at 05:18

## 2019-09-07 RX ADMIN — Medication 10 MILLIGRAM(S): at 15:50

## 2019-09-07 RX ADMIN — Medication 60 MILLIGRAM(S): at 11:46

## 2019-09-07 RX ADMIN — SODIUM CHLORIDE 50 MILLILITER(S): 9 INJECTION, SOLUTION INTRAVENOUS at 11:45

## 2019-09-07 RX ADMIN — CEFTRIAXONE 100 MILLIGRAM(S): 500 INJECTION, POWDER, FOR SOLUTION INTRAMUSCULAR; INTRAVENOUS at 02:36

## 2019-09-07 RX ADMIN — HEPARIN SODIUM 1900 UNIT(S)/HR: 5000 INJECTION INTRAVENOUS; SUBCUTANEOUS at 05:16

## 2019-09-07 RX ADMIN — Medication 20 MILLIGRAM(S): at 06:33

## 2019-09-07 RX ADMIN — Medication 1: at 12:28

## 2019-09-07 RX ADMIN — INSULIN GLARGINE 10 UNIT(S): 100 INJECTION, SOLUTION SUBCUTANEOUS at 22:25

## 2019-09-07 RX ADMIN — MYCOPHENOLATE MOFETIL 20.84 MILLIGRAM(S): 250 CAPSULE ORAL at 18:05

## 2019-09-07 RX ADMIN — Medication 20 MILLIGRAM(S): at 17:18

## 2019-09-07 RX ADMIN — MYCOPHENOLATE MOFETIL 20.84 MILLIGRAM(S): 250 CAPSULE ORAL at 03:29

## 2019-09-07 RX ADMIN — Medication 5 MILLIGRAM(S): at 18:34

## 2019-09-07 RX ADMIN — Medication 60 MILLIGRAM(S): at 01:15

## 2019-09-07 NOTE — PROGRESS NOTE ADULT - SUBJECTIVE AND OBJECTIVE BOX
Mr. Liao is comfortable in bed  Denies any nausea, vomiting, fever or chills  Denies chest pain, or shortness of breath  Denies abdominal pain.    Vital Signs Last 24 Hrs  T(C): 37.2 (07 Sep 2019 04:14), Max: 37.4 (06 Sep 2019 21:26)  T(F): 98.9 (07 Sep 2019 04:14), Max: 99.4 (06 Sep 2019 21:26)  HR: 85 (07 Sep 2019 04:14) (76 - 88)  BP: 178/79 (07 Sep 2019 04:14) (147/81 - 192/77)  BP(mean): --  RR: 18 (07 Sep 2019 04:14) (18 - 20)  SpO2: 93% (07 Sep 2019 04:14) (90% - 99%)      On exam he is awake, and alert  He is breathing comfortably on room air  There is no scleral icterus  He is grossly moving extremities  His abdomen is soft, not tender and not distended. He has well healed surgical scars.    74 year old man with worsening kidney function, SOB, and lower extremity edema. Surgery consulted for fat pad bx to rule out amyloidosis  1. Continue supportive care per primary team  2. Patient was initially scheduled for fat pad biopsy two days ago (Thursday 9/5), but family wanted to hold off, will tentatively plan to reschedule fat pad biopsy next week.
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
## 2019-09-07 NOTE — RAPID RESPONSE TEAM SUMMARY - NSADDTLFINDINGSRRT_GEN_ALL_CORE
Initially AMS ; as BP better controlled pt MS improved. Pt communicating with staff and answering questions appropriately.   Post tx 's.   Lungs clear on ascultation .   Nausea resolved.  d/w primary team . to follow with cardiology  regarding BP management.

## 2019-09-07 NOTE — PROGRESS NOTE ADULT - ASSESSMENT
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   Pt just moved back to [place] from [place] .. reports that he has not been taking his meds for the past few days since " he might have misplaed them"     pt denies chest pain, syncope,fever, chills, cough, urinary symptoms.    in ED found  to have anemia   Nno acute changes on EKG   elevated CR and Trop    1- HTN urgency : now improving  bp    2- KAMRON  :   d/w   : pt will likely end up on HD in intermediate and likely long term given transplant graft failure   off tacro,  cont cellcept and sterioods   pt refused kidney bx       3- DM:   A1c  5.6  Fs     4- acute diastolic CHF sec to  HTN urgency and renal failure   Echo :  < from: Transthoracic Echocardiogram (08.28.19 @ 16:12) >  1. Mitral annular calcification and calcified mitral  leaflets with decreased diastolic opening.  2. Moderate to severe concentric left ventricular  hypertrophy.  3. Normal left ventricular systolic function with  mid-cavitary obliteration.  4. Normal right ventricular size and systolic function.  5. Small pericardial effusion.    cont fluid removal with HD per renal      5-  difficult ambulating :  no focal neuro deficits   neuro eval appreciated    CT T/L   < from: CT Thoracic Spine No Cont (08.30.19 @ 10:06) >    Old right L1 transverse process fracture. Bilateral lysis   defects at L5 as seen on the prior 5/26/2013      6- afib :hold AC ..holding for now for bx /LP     7- hematochezia : per sister : on and off small amount of fresh blood in stool and some amount in urine but only small amounts   monitor H/H   consult GI .: appreciate input :  suspect bleeding to be hemorrhoidal, now resolved     trial of anusol supp  if bleeding persist would consider colonoscopy however patient is reluctant.    heme input: appreciated        8-  renal transplant : f/u with Transplant team   cont meds with MMF and steroids   off Tac     9- TIA :  unable to perform MRI   CT no acute changes on CT   repeat bnegative   fu with neuro     10 encephalopathy : LP : huigh protien   f/u cultures /pending studies   blood culture positive for GNR /Ecoli ..  ? source :  urine was not sent   will likely need CT C/A/P however will hold off for now   CTH with contrast   low threshold for ICU consult     11- paraprotienmeia : d/w heme and renal   will need to consider amyloidosis .. doubt MM   holding on Fat pad bx for now       12 hypercalcemia : monitor for now 73 y/o male with PMHx of  ESRD s/p /p failed renal transplant 4 year ago and successful renal transplant 1 year ago,DM, HTN, cardiomyopathy 2/2 cocaine abuse, Hepatitis C, meningococcal meningitiss presenting with concerns about his kidney function, worsening SOB over the past 2 weeks and leg edema.   Pt just moved back to [place] from [place] .. reports that he has not been taking his meds for the past few days since " he might have misplaed them"     pt denies chest pain, syncope,fever, chills, cough, urinary symptoms.    in ED found  to have anemia   Nno acute changes on EKG   elevated CR and Trop    1- HTN urgency :not well controlled   cont meds   and adjust accordingly     2- KAMRON  :   d/w   : pt will likely end up on HD in intermediate and likely long term given transplant graft failure   off tacro,  cont cellcept and sterioods   pt refused kidney bx       3- DM:   A1c  5.6  Fs     4- acute diastolic CHF sec to  HTN urgency and renal failure   Echo :  < from: Transthoracic Echocardiogram (08.28.19 @ 16:12) >  1. Mitral annular calcification and calcified mitral  leaflets with decreased diastolic opening.  2. Moderate to severe concentric left ventricular  hypertrophy.  3. Normal left ventricular systolic function with  mid-cavitary obliteration.  4. Normal right ventricular size and systolic function.  5. Small pericardial effusion.    cont fluid removal with HD per renal      5-  difficult ambulating :  no focal neuro deficits   neuro eval appreciated    CT T/L   < from: CT Thoracic Spine No Cont (08.30.19 @ 10:06) >    Old right L1 transverse process fracture. Bilateral lysis   defects at L5 as seen on the prior 5/26/2013      6- afib :hold AC ..holding for now for bx /LP     7- hematochezia : per sister : on and off small amount of fresh blood in stool and some amount in urine but only small amounts   monitor H/H   consult GI .: appreciate input :  suspect bleeding to be hemorrhoidal, now resolved     trial of anusol supp  if bleeding persist would consider colonoscopy however patient is reluctant.    heme input: appreciated        8-  renal transplant : f/u with Transplant team   cont meds with MMF and steroids   off Tac   planned Bx however pt refused     9- TIA :  unable to perform MRI   CT no acute changes on CT   repeat bnegative   fu with neuro     10 encephalopathy : LP : high protien   blood culture positive for GNR /Ecoli ..  ? source :  urine was not sent   CT c/a/p   CTH with contrast : neg for acute pathology   low threshold for ICU consult     11- paraprotienmeia : d/w heme and renal   will need to consider amyloidosis .. doubt MM   holding on Fat pad bx for now       12 hypercalcemia : monitor for now     13- abd discomfort : will chedck Xray   check PVR and if >200 will place gutierrez

## 2019-09-07 NOTE — PROGRESS NOTE ADULT - SUBJECTIVE AND OBJECTIVE BOX
Patient is a 74y old  Male who presents with a chief complaint of KAMRON (07 Sep 2019 07:42)                                                               INTERVAL HPI/OVERNIGHT EVENTS:    REVIEW OF SYSTEMS:     CONSTITUTIONAL: No weakness, fevers or chills  EYES/ENT: No visual changes , no ear ache   NECK: No pain or stiffness  RESPIRATORY: No cough, wheezing,  No shortness of breath  CARDIOVASCULAR: No chest pain or palpitations  GASTROINTESTINAL: No abdominal pain  . No nausea, vomiting, or hematemesis; No diarrhea or constipation. No melena or hematochezia.  GENITOURINARY: No dysuria, frequency or hematuria  NEUROLOGICAL: No numbness or weakness  SKIN: No itching, burning, rashes, or lesions                                                                                                                                                                                                                                                                                 Medications:  MEDICATIONS  (STANDING):  allopurinol 100 milliGRAM(s) Oral daily  atorvastatin 40 milliGRAM(s) Oral at bedtime  cefTRIAXone   IVPB 1000 milliGRAM(s) IV Intermittent every 24 hours  chlorhexidine 2% Cloths 1 Application(s) Topical daily  cinacalcet 30 milliGRAM(s) Oral daily  dextrose 5%. 1000 milliLiter(s) (50 mL/Hr) IV Continuous <Continuous>  dextrose 5%. 1000 milliLiter(s) (50 mL/Hr) IV Continuous <Continuous>  dextrose 50% Injectable 12.5 Gram(s) IV Push once  dextrose 50% Injectable 25 Gram(s) IV Push once  dextrose 50% Injectable 25 Gram(s) IV Push once  ergocalciferol 92614 Unit(s) Oral every week  folic acid 1 milliGRAM(s) Oral daily  furosemide   Injectable 60 milliGRAM(s) IV Push two times a day  heparin  Infusion.  Unit(s)/Hr (19 mL/Hr) IV Continuous <Continuous>  hydrocortisone hemorrhoidal Suppository 1 Suppository(s) Rectal at bedtime  hydrocortisone sodium succinate Injectable 10 milliGRAM(s) IV Push every 12 hours  insulin glargine Injectable (LANTUS) 10 Unit(s) SubCutaneous at bedtime  insulin lispro (HumaLOG) corrective regimen sliding scale   SubCutaneous every 6 hours  labetalol Injectable 20 milliGRAM(s) IV Push every 6 hours  levETIRAcetam  IVPB 500 milliGRAM(s) IV Intermittent two times a day  mycophenolate mofetil IVPB 250 milliGRAM(s) IV Intermittent every 12 hours  sodium bicarbonate 650 milliGRAM(s) Oral two times a day  tamsulosin 0.4 milliGRAM(s) Oral at bedtime  thiamine Injectable 100 milliGRAM(s) IV Push daily    MEDICATIONS  (PRN):  acetaminophen   Tablet .. 650 milliGRAM(s) Oral every 6 hours PRN Temp greater or equal to 38C (100.4F)  aluminum hydroxide/magnesium hydroxide/simethicone Suspension 30 milliLiter(s) Oral every 6 hours PRN Dyspepsia  dextrose 40% Gel 15 Gram(s) Oral once PRN Blood Glucose LESS THAN 70 milliGRAM(s)/deciliter  glucagon  Injectable 1 milliGRAM(s) IntraMuscular once PRN Glucose LESS THAN 70 milligrams/deciliter  heparin  Injectable 9000 Unit(s) IV Push every 6 hours PRN For aPTT less than 40  heparin  Injectable 4000 Unit(s) IV Push every 6 hours PRN For aPTT between 40 - 57  hydrALAZINE Injectable 10 milliGRAM(s) IV Push every 4 hours PRN For SBP > 160       Allergies    No Known Allergies    Intolerances      Vital Signs Last 24 Hrs  T(C): 37.1 (07 Sep 2019 21:03), Max: 37.2 (07 Sep 2019 04:14)  T(F): 98.8 (07 Sep 2019 21:03), Max: 98.9 (07 Sep 2019 04:14)  HR: 81 (07 Sep 2019 21:03) (81 - 88)  BP: 136/62 (07 Sep 2019 21:03) (136/62 - 191/83)  BP(mean): --  RR: 19 (07 Sep 2019 21:03) (17 - 19)  SpO2: 97% (07 Sep 2019 21:03) (90% - 97%)  CAPILLARY BLOOD GLUCOSE      POCT Blood Glucose.: 117 mg/dL (07 Sep 2019 19:23)  POCT Blood Glucose.: 130 mg/dL (07 Sep 2019 18:26)  POCT Blood Glucose.: 160 mg/dL (07 Sep 2019 12:21)  POCT Blood Glucose.: 110 mg/dL (07 Sep 2019 06:46)  POCT Blood Glucose.: 107 mg/dL (07 Sep 2019 00:04)      09-06 @ 07:01  -  09-07 @ 07:00  --------------------------------------------------------  IN: 0 mL / OUT: 1150 mL / NET: -1150 mL    09-07 @ 07:01  -  09-07 @ 22:09  --------------------------------------------------------  IN: 921 mL / OUT: 0 mL / NET: 921 mL      Physical Exam:    Daily     Daily   General:  Well appearing, NAD, not cachetic  HEENT:  Nonicteric, PERRLA  CV:  RRR, S1S2   Lungs:  CTA B/L, no wheezes, rales, rhonchi  Abdomen:  Soft, non-tender, no distended, positive BS  Extremities:  2+ pulses, no c/c, no edema  Skin:  Warm and dry, no rashes  :  No gutierrez  Neuro:  AAOx3, non-focal, grossly intact                                                                                                                                                                                                                                                                                                LABS:                               8.7    6.2   )-----------( 129      ( 07 Sep 2019 11:42 )             26.1                      09-07    139  |  99  |  35<H>  ----------------------------<  158<H>  3.4<L>   |  22  |  4.79<H>    Ca    9.6      07 Sep 2019 11:42                         RADIOLOGY & ADDITIONAL TESTS         I personally reviewed: [  ]EKG   [  ]CXR    [  ] CT      A/P:         Discussed with :     Marga consultants' Notes   Time spent : Patient is a 74y old  Male who presents with a chief complaint of KAMRON (07 Sep 2019 07:42)                                                               INTERVAL HPI/OVERNIGHT EVENTS:    REVIEW OF SYSTEMS:     lethargic   but arousbale    unreliable rOS but reports abd discomfort                                                                                                                                                                                                                                                                                Medications:  MEDICATIONS  (STANDING):  allopurinol 100 milliGRAM(s) Oral daily  atorvastatin 40 milliGRAM(s) Oral at bedtime  cefTRIAXone   IVPB 1000 milliGRAM(s) IV Intermittent every 24 hours  chlorhexidine 2% Cloths 1 Application(s) Topical daily  cinacalcet 30 milliGRAM(s) Oral daily  dextrose 5%. 1000 milliLiter(s) (50 mL/Hr) IV Continuous <Continuous>  dextrose 5%. 1000 milliLiter(s) (50 mL/Hr) IV Continuous <Continuous>  dextrose 50% Injectable 12.5 Gram(s) IV Push once  dextrose 50% Injectable 25 Gram(s) IV Push once  dextrose 50% Injectable 25 Gram(s) IV Push once  ergocalciferol 90577 Unit(s) Oral every week  folic acid 1 milliGRAM(s) Oral daily  furosemide   Injectable 60 milliGRAM(s) IV Push two times a day  heparin  Infusion.  Unit(s)/Hr (19 mL/Hr) IV Continuous <Continuous>  hydrocortisone hemorrhoidal Suppository 1 Suppository(s) Rectal at bedtime  hydrocortisone sodium succinate Injectable 10 milliGRAM(s) IV Push every 12 hours  insulin glargine Injectable (LANTUS) 10 Unit(s) SubCutaneous at bedtime  insulin lispro (HumaLOG) corrective regimen sliding scale   SubCutaneous every 6 hours  labetalol Injectable 20 milliGRAM(s) IV Push every 6 hours  levETIRAcetam  IVPB 500 milliGRAM(s) IV Intermittent two times a day  mycophenolate mofetil IVPB 250 milliGRAM(s) IV Intermittent every 12 hours  sodium bicarbonate 650 milliGRAM(s) Oral two times a day  tamsulosin 0.4 milliGRAM(s) Oral at bedtime  thiamine Injectable 100 milliGRAM(s) IV Push daily    MEDICATIONS  (PRN):  acetaminophen   Tablet .. 650 milliGRAM(s) Oral every 6 hours PRN Temp greater or equal to 38C (100.4F)  aluminum hydroxide/magnesium hydroxide/simethicone Suspension 30 milliLiter(s) Oral every 6 hours PRN Dyspepsia  dextrose 40% Gel 15 Gram(s) Oral once PRN Blood Glucose LESS THAN 70 milliGRAM(s)/deciliter  glucagon  Injectable 1 milliGRAM(s) IntraMuscular once PRN Glucose LESS THAN 70 milligrams/deciliter  heparin  Injectable 9000 Unit(s) IV Push every 6 hours PRN For aPTT less than 40  heparin  Injectable 4000 Unit(s) IV Push every 6 hours PRN For aPTT between 40 - 57  hydrALAZINE Injectable 10 milliGRAM(s) IV Push every 4 hours PRN For SBP > 160       Allergies    No Known Allergies    Intolerances      Vital Signs Last 24 Hrs  T(C): 37.1 (07 Sep 2019 21:03), Max: 37.2 (07 Sep 2019 04:14)  T(F): 98.8 (07 Sep 2019 21:03), Max: 98.9 (07 Sep 2019 04:14)  HR: 81 (07 Sep 2019 21:03) (81 - 88)  BP: 136/62 (07 Sep 2019 21:03) (136/62 - 191/83)  BP(mean): --  RR: 19 (07 Sep 2019 21:03) (17 - 19)  SpO2: 97% (07 Sep 2019 21:03) (90% - 97%)  CAPILLARY BLOOD GLUCOSE      POCT Blood Glucose.: 117 mg/dL (07 Sep 2019 19:23)  POCT Blood Glucose.: 130 mg/dL (07 Sep 2019 18:26)  POCT Blood Glucose.: 160 mg/dL (07 Sep 2019 12:21)  POCT Blood Glucose.: 110 mg/dL (07 Sep 2019 06:46)  POCT Blood Glucose.: 107 mg/dL (07 Sep 2019 00:04)      09-06 @ 07:01 - 09-07 @ 07:00  --------------------------------------------------------  IN: 0 mL / OUT: 1150 mL / NET: -1150 mL    09-07 @ 07:01  -  09-07 @ 22:09  --------------------------------------------------------  IN: 921 mL / OUT: 0 mL / NET: 921 mL      Physical Exam:    General:  NAD   HEENT:  Nonicteric, PERRLA  CV:  RRR, S1S2   Lungs:  poor effort toherwise CTA   Abdomen:  Soft,mild tenderness   Extremities:  2+ pulses, no c/c, no edema  Neuro:  AAOx3, non-focal, grossly intact                                                                                                                                                                                                                                                                                                LABS:                               8.7    6.2   )-----------( 129      ( 07 Sep 2019 11:42 )             26.1                      09-07    139  |  99  |  35<H>  ----------------------------<  158<H>  3.4<L>   |  22  |  4.79<H>    Ca    9.6      07 Sep 2019 11:42

## 2019-09-07 NOTE — RAPID RESPONSE TEAM SUMMARY - NSMEDICATIONSRRT_GEN_ALL_CORE
Labetalol 10 mg IVP x 2  Labetalol 20 mg IVP x 1   Hydralazine 10 mg IVP x 3   Zofran 4 mg IVP x 1   Lasix 60 mg IVP x 1

## 2019-09-07 NOTE — PROGRESS NOTE ADULT - ASSESSMENT
73 yo male with history of A Fib, Hep C, DM, ESRD s/p renal transplant x 2, admitted 8/26 with progressive CKD and rectal bleeding.  He was on tacrolimus, myfortic, and 5 mg of prednisone along with prophylactic valtrex.  He has required HD and developed fever 9/2  No obvious source of infection.His CMV viral loads were negative as per prior transplant team.  He recently moved up from [place].  His Hep B surface antibody is positive, Hep C RNA is negative  He now is known to have GNR in blood, primary focus not clear, typically GI or  origin  GNR is a sensitive E Coli  He is s/p LP, no cells,protein 126, glucose 67, ME PCR panel is negative  serum crypt antigen is negative, histo antigens are pending  ? Toxic-Metabolic encephalopathy  Fever is moderating, no evidence of acute CNS infection although AMARIS virus PCR ordered by nephrology  Repeat blood cultures are negative so far  Amyloid and underlying LP disorder being evaluated  He is clinically responding to antibiotics  Suggest:  1.will continue CTX given E Coli in blood ,full doses in renal failure  2.CMV viral load pending,  after resulted will decide on starting CMV prophylaxis  3.Consider CT of A/P ,  "GNR" bacteremia of unclear primary focus

## 2019-09-07 NOTE — PROGRESS NOTE ADULT - SUBJECTIVE AND OBJECTIVE BOX
CC: f/u for E coli bacteremia    Patient reports: he is still confused but more interactive, he is able to answer simple questions.No abdominal pain.    REVIEW OF SYSTEMS:  All other review of systems negative (Comprehensive ROS)    Antimicrobials Day #  :day 5  cefTRIAXone   IVPB 1000 milliGRAM(s) IV Intermittent every 24 hours    Other Medications Reviewed    T(F): 98.9 (09-07-19 @ 04:14), Max: 99.4 (09-06-19 @ 21:26)  HR: 85 (09-07-19 @ 04:14)  BP: 178/79 (09-07-19 @ 04:14)  RR: 18 (09-07-19 @ 04:14)  SpO2: 93% (09-07-19 @ 04:14)  Wt(kg): --    PHYSICAL EXAM:  General: alert, no acute distress  Eyes:  anicteric, no conjunctival injection, no discharge  Oropharynx: no lesions or injection 	  Neck: supple, without adenopathy  Lungs: clear to auscultation  Heart: irregular rate and rhythm; no murmur, rubs or gallops  Abdomen: soft, nondistended, nontender, without mass or organomegaly  Skin: no lesions  Extremities: no clubbing, cyanosis, + edema  Neurologic: alert, confused, moves all extremities    LAB RESULTS:                        7.6    7.0   )-----------( 125      ( 06 Sep 2019 10:00 )             24.2     09-06    135  |  94<L>  |  52<H>  ----------------------------<  124<H>  3.7   |  23  |  6.77<H>    Ca    10.3      06 Sep 2019 05:13          MICROBIOLOGY:  RECENT CULTURES:  09-05 @ 17:06 .CSF     No growth    No polymorphonuclear cells seen  No organisms seen  by cytocentrifuge    09-05 @ 14:42 .Blood     No growth to date.      09-03 @ 10:14 .Blood Blood Culture PCR  Escherichia coli    Growth in aerobic bottle: Escherichia coli  ),      Growth in aerobic bottle: Gram Negative Rods        RADIOLOGY REVIEWED:    < from: CT Head w/ IV Cont (09.05.19 @ 21:56) >  IMPRESSION:    No interval change. No acute intracranial bleeding or shift. Mild chronic   microvascular ischemic changes. No abnormal intracranial enhancement.    < end of copied text >

## 2019-09-07 NOTE — PROGRESS NOTE ADULT - SUBJECTIVE AND OBJECTIVE BOX
INTERVAL HPI/OVERNIGHT EVENTS:  - No acute events overnight.     MEDICATIONS  (STANDING):  allopurinol 100 milliGRAM(s) Oral daily  atorvastatin 40 milliGRAM(s) Oral at bedtime  cefTRIAXone   IVPB 1000 milliGRAM(s) IV Intermittent every 24 hours  chlorhexidine 2% Cloths 1 Application(s) Topical daily  cinacalcet 30 milliGRAM(s) Oral daily  dextrose 5%. 1000 milliLiter(s) (50 mL/Hr) IV Continuous <Continuous>  dextrose 5%. 1000 milliLiter(s) (50 mL/Hr) IV Continuous <Continuous>  dextrose 50% Injectable 12.5 Gram(s) IV Push once  dextrose 50% Injectable 25 Gram(s) IV Push once  dextrose 50% Injectable 25 Gram(s) IV Push once  ergocalciferol 66661 Unit(s) Oral every week  folic acid 1 milliGRAM(s) Oral daily  furosemide   Injectable 60 milliGRAM(s) IV Push two times a day  heparin  Infusion.  Unit(s)/Hr (19 mL/Hr) IV Continuous <Continuous>  hydrocortisone hemorrhoidal Suppository 1 Suppository(s) Rectal at bedtime  hydrocortisone sodium succinate Injectable 10 milliGRAM(s) IV Push every 12 hours  insulin glargine Injectable (LANTUS) 10 Unit(s) SubCutaneous at bedtime  insulin lispro (HumaLOG) corrective regimen sliding scale   SubCutaneous every 6 hours  labetalol Injectable 20 milliGRAM(s) IV Push every 6 hours  levETIRAcetam  IVPB 500 milliGRAM(s) IV Intermittent two times a day  mycophenolate mofetil IVPB 250 milliGRAM(s) IV Intermittent every 12 hours  sodium bicarbonate 650 milliGRAM(s) Oral two times a day  tamsulosin 0.4 milliGRAM(s) Oral at bedtime  thiamine Injectable 100 milliGRAM(s) IV Push daily    MEDICATIONS  (PRN):  acetaminophen   Tablet .. 650 milliGRAM(s) Oral every 6 hours PRN Temp greater or equal to 38C (100.4F)  aluminum hydroxide/magnesium hydroxide/simethicone Suspension 30 milliLiter(s) Oral every 6 hours PRN Dyspepsia  dextrose 40% Gel 15 Gram(s) Oral once PRN Blood Glucose LESS THAN 70 milliGRAM(s)/deciliter  glucagon  Injectable 1 milliGRAM(s) IntraMuscular once PRN Glucose LESS THAN 70 milligrams/deciliter  heparin  Injectable 9000 Unit(s) IV Push every 6 hours PRN For aPTT less than 40  heparin  Injectable 4000 Unit(s) IV Push every 6 hours PRN For aPTT between 40 - 57  hydrALAZINE Injectable 10 milliGRAM(s) IV Push every 4 hours PRN For SBP > 160      Allergies    No Known Allergies    Intolerances        Review of Systems:    General:  No wt loss, fevers, chills, night sweats, fatigue   Eyes:  Good vision, no reported pain  ENT:  No sore throat, pain, runny nose, dysphagia  CV:  No pain, palpitations, hypo/hypertension  Resp:  No dyspnea, cough, tachypnea, wheezing  GI:  No pain, No nausea, No vomiting, No diarrhea, No constipation, No weight loss, No fever, No pruritis, No rectal bleeding, No melena, No dysphagia  :  No pain, bleeding, incontinence, nocturia  Muscle:  No pain, weakness  Neuro:  No weakness, tingling, memory problems  Psych:  No fatigue, insomnia, mood problems, depression  Endocrine:  No polyuria, polydypsia, cold/heat intolerance  Heme:  No petechiae, ecchymosis, easy bruisability  Skin:  No rash, tattoos, scars, edema      Vital Signs Last 24 Hrs  T(C): 37.2 (07 Sep 2019 04:14), Max: 37.4 (06 Sep 2019 21:26)  T(F): 98.9 (07 Sep 2019 04:14), Max: 99.4 (06 Sep 2019 21:26)  HR: 85 (07 Sep 2019 04:14) (76 - 88)  BP: 178/79 (07 Sep 2019 04:14) (147/81 - 192/77)  BP(mean): --  RR: 18 (07 Sep 2019 04:14) (18 - 20)  SpO2: 93% (07 Sep 2019 04:14) (90% - 99%)    PHYSICAL EXAM:    Constitutional: NAD  HEENT: EOMI, throat clear  Neck: No LAD, supple  Respiratory: CTA and P  Cardiovascular: S1 and S2, RRR, no M  Gastrointestinal: BS+, soft, NT/ND, neg HSM,  Extremities: No peripheral edema, neg clubbing, cyanosis  Vascular: 2+ peripheral pulses  Neurological: A/O x 3, no focal deficits  Psychiatric: Normal mood, normal affect  Skin: No rashes      LABS:                        7.6    7.0   )-----------( 125      ( 06 Sep 2019 10:00 )             24.2     09-06    135  |  94<L>  |  52<H>  ----------------------------<  124<H>  3.7   |  23  |  6.77<H>    Ca    10.3      06 Sep 2019 05:13      PT/INR - ( 06 Sep 2019 10:03 )   PT: 13.1 sec;   INR: 1.15 ratio         PTT - ( 06 Sep 2019 10:03 )  PTT:27.7 sec      RADIOLOGY & ADDITIONAL TESTS:

## 2019-09-07 NOTE — PROGRESS NOTE ADULT - SUBJECTIVE AND OBJECTIVE BOX
Subjective: Patient seen and examined. Events noted   Hypertensive   Appears confused     REVIEW OF SYSTEMS:    CONSTITUTIONAL: + weakness, fevers or chills  EYES/ENT: No visual changes;  No vertigo or throat pain   NECK: No pain or stiffness  RESPIRATORY: No cough, wheezing, hemoptysis; No shortness of breath  CARDIOVASCULAR: No chest pain or palpitations  GASTROINTESTINAL: No abdominal or epigastric pain. No nausea, vomiting, or hematemesis; No diarrhea or constipation. No melena or hematochezia.  GENITOURINARY: No dysuria, frequency or hematuria  NEUROLOGICAL: No numbness or weakness  SKIN: No itching, burning, rashes, or lesions   All other review of systems is negative unless indicated above.    MEDICATIONS:  MEDICATIONS  (STANDING):  allopurinol 100 milliGRAM(s) Oral daily  atorvastatin 40 milliGRAM(s) Oral at bedtime  cefTRIAXone   IVPB 1000 milliGRAM(s) IV Intermittent every 24 hours  chlorhexidine 2% Cloths 1 Application(s) Topical daily  cinacalcet 30 milliGRAM(s) Oral daily  dextrose 5%. 1000 milliLiter(s) (50 mL/Hr) IV Continuous <Continuous>  dextrose 5%. 1000 milliLiter(s) (50 mL/Hr) IV Continuous <Continuous>  dextrose 50% Injectable 12.5 Gram(s) IV Push once  dextrose 50% Injectable 25 Gram(s) IV Push once  dextrose 50% Injectable 25 Gram(s) IV Push once  ergocalciferol 30183 Unit(s) Oral every week  folic acid 1 milliGRAM(s) Oral daily  furosemide   Injectable 60 milliGRAM(s) IV Push two times a day  heparin  Infusion.  Unit(s)/Hr (19 mL/Hr) IV Continuous <Continuous>  hydrocortisone hemorrhoidal Suppository 1 Suppository(s) Rectal at bedtime  hydrocortisone sodium succinate Injectable 10 milliGRAM(s) IV Push every 12 hours  insulin glargine Injectable (LANTUS) 10 Unit(s) SubCutaneous at bedtime  insulin lispro (HumaLOG) corrective regimen sliding scale   SubCutaneous every 6 hours  labetalol Injectable 20 milliGRAM(s) IV Push every 6 hours  levETIRAcetam  IVPB 500 milliGRAM(s) IV Intermittent two times a day  mycophenolate mofetil IVPB 250 milliGRAM(s) IV Intermittent every 12 hours  sodium bicarbonate 650 milliGRAM(s) Oral two times a day  tamsulosin 0.4 milliGRAM(s) Oral at bedtime  thiamine Injectable 100 milliGRAM(s) IV Push daily      PHYSICAL EXAM:  T(C): 37.1 (09-07-19 @ 21:03), Max: 37.4 (09-06-19 @ 21:26)  HR: 81 (09-07-19 @ 21:03) (81 - 88)  BP: 136/62 (09-07-19 @ 21:03) (136/62 - 191/83)  RR: 19 (09-07-19 @ 21:03) (17 - 19)  SpO2: 97% (09-07-19 @ 21:03) (90% - 97%)  Wt(kg): --  I&O's Summary    06 Sep 2019 07:01  -  07 Sep 2019 07:00  --------------------------------------------------------  IN: 0 mL / OUT: 1150 mL / NET: -1150 mL    07 Sep 2019 07:01  -  07 Sep 2019 21:20  --------------------------------------------------------  IN: 921 mL / OUT: 0 mL / NET: 921 mL          Appearance: NAD lethargic at times confused   HEENT:   Normal oral mucosa, PERRL, EOMI	  Lymphatic: No lymphadenopathy , no edema  Cardiovascular: Normal S1 S2, No JVD, No murmurs , Peripheral pulses palpable 2+ bilaterally  Respiratory: Lungs clear to auscultation, normal effort 	  Gastrointestinal:  Soft, Non-tender, + BS	  Skin: No rashes, No ecchymoses, No cyanosis, warm to touch  Musculoskeletal: Normal range of motion, normal strength  Psychiatry:  at times confused   Ext: No edema      LABS:    CARDIAC MARKERS:                                8.7    6.2   )-----------( 129      ( 07 Sep 2019 11:42 )             26.1     09-07    139  |  99  |  35<H>  ----------------------------<  158<H>  3.4<L>   |  22  |  4.79<H>    Ca    9.6      07 Sep 2019 11:42      proBNP:   Lipid Profile:   HgA1c:   TSH:             TELEMETRY: 	    ECG:  	  RADIOLOGY:   DIAGNOSTIC TESTING:  [ ] Echocardiogram:  [ ]  Catheterization:  [ ] Stress Test:    OTHER:

## 2019-09-07 NOTE — RAPID RESPONSE TEAM SUMMARY - NSOTHERINTERVENTIONSRRT_GEN_ALL_CORE
FS x1 . C/w FS monitoring   aspiration precaution   neuro check   c/w telemetry monitoring   strict I&os

## 2019-09-07 NOTE — RAPID RESPONSE TEAM SUMMARY - NSSITUATIONBACKGROUNDRRT_GEN_ALL_CORE
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 S/p HD tx 9/8/19 . 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 S/p HD tx 9/8/19 . Hospital stay c/b worsening AMS, nausea, unable to tolerate PO intake, poor BP control as unable to take PO meds changed to IV. RRT called for hypertensive emergency / 110 w/ nausea, AMS .

## 2019-09-08 LAB
ANION GAP SERPL CALC-SCNC: 15 MMOL/L — SIGNIFICANT CHANGE UP (ref 5–17)
APTT BLD: 48.4 SEC — HIGH (ref 27.5–36.3)
APTT BLD: 61.1 SEC — HIGH (ref 27.5–36.3)
APTT BLD: 78.8 SEC — HIGH (ref 27.5–36.3)
BUN SERPL-MCNC: 47 MG/DL — HIGH (ref 7–23)
CALCIUM SERPL-MCNC: 10.5 MG/DL — SIGNIFICANT CHANGE UP (ref 8.4–10.5)
CHLORIDE SERPL-SCNC: 97 MMOL/L — SIGNIFICANT CHANGE UP (ref 96–108)
CO2 SERPL-SCNC: 24 MMOL/L — SIGNIFICANT CHANGE UP (ref 22–31)
CREAT SERPL-MCNC: 5.75 MG/DL — HIGH (ref 0.5–1.3)
CULTURE RESULTS: SIGNIFICANT CHANGE UP
CULTURE RESULTS: SIGNIFICANT CHANGE UP
GLUCOSE BLDC GLUCOMTR-MCNC: 112 MG/DL — HIGH (ref 70–99)
GLUCOSE BLDC GLUCOMTR-MCNC: 130 MG/DL — HIGH (ref 70–99)
GLUCOSE BLDC GLUCOMTR-MCNC: 134 MG/DL — HIGH (ref 70–99)
GLUCOSE BLDC GLUCOMTR-MCNC: 135 MG/DL — HIGH (ref 70–99)
GLUCOSE BLDC GLUCOMTR-MCNC: 163 MG/DL — HIGH (ref 70–99)
GLUCOSE SERPL-MCNC: 134 MG/DL — HIGH (ref 70–99)
HCT VFR BLD CALC: 27.8 % — LOW (ref 39–50)
HGB BLD-MCNC: 8.7 G/DL — LOW (ref 13–17)
MCHC RBC-ENTMCNC: 27.2 PG — SIGNIFICANT CHANGE UP (ref 27–34)
MCHC RBC-ENTMCNC: 31.3 GM/DL — LOW (ref 32–36)
MCV RBC AUTO: 86.9 FL — SIGNIFICANT CHANGE UP (ref 80–100)
PLATELET # BLD AUTO: 173 K/UL — SIGNIFICANT CHANGE UP (ref 150–400)
POTASSIUM SERPL-MCNC: 3.4 MMOL/L — LOW (ref 3.5–5.3)
POTASSIUM SERPL-SCNC: 3.4 MMOL/L — LOW (ref 3.5–5.3)
RBC # BLD: 3.2 M/UL — LOW (ref 4.2–5.8)
RBC # FLD: 14.6 % — HIGH (ref 10.3–14.5)
SODIUM SERPL-SCNC: 136 MMOL/L — SIGNIFICANT CHANGE UP (ref 135–145)
SPECIMEN SOURCE: SIGNIFICANT CHANGE UP
SPECIMEN SOURCE: SIGNIFICANT CHANGE UP
WBC # BLD: 6.72 K/UL — SIGNIFICANT CHANGE UP (ref 3.8–10.5)
WBC # FLD AUTO: 6.72 K/UL — SIGNIFICANT CHANGE UP (ref 3.8–10.5)

## 2019-09-08 PROCEDURE — 99232 SBSQ HOSP IP/OBS MODERATE 35: CPT

## 2019-09-08 RX ORDER — LEVETIRACETAM 250 MG/1
1000 TABLET, FILM COATED ORAL
Refills: 0 | Status: DISCONTINUED | OUTPATIENT
Start: 2019-09-08 | End: 2019-09-24

## 2019-09-08 RX ORDER — PREGABALIN 225 MG/1
1000 CAPSULE ORAL
Refills: 0 | Status: COMPLETED | OUTPATIENT
Start: 2019-09-08 | End: 2019-09-22

## 2019-09-08 RX ORDER — POTASSIUM CHLORIDE 20 MEQ
10 PACKET (EA) ORAL
Refills: 0 | Status: COMPLETED | OUTPATIENT
Start: 2019-09-08 | End: 2019-09-08

## 2019-09-08 RX ORDER — LABETALOL HCL 100 MG
300 TABLET ORAL
Refills: 0 | Status: DISCONTINUED | OUTPATIENT
Start: 2019-09-08 | End: 2019-09-24

## 2019-09-08 RX ORDER — THIAMINE MONONITRATE (VIT B1) 100 MG
100 TABLET ORAL DAILY
Refills: 0 | Status: DISCONTINUED | OUTPATIENT
Start: 2019-09-08 | End: 2019-09-24

## 2019-09-08 RX ORDER — MYCOPHENOLIC ACID 180 MG/1
360 TABLET, DELAYED RELEASE ORAL
Refills: 0 | Status: DISCONTINUED | OUTPATIENT
Start: 2019-09-08 | End: 2019-09-09

## 2019-09-08 RX ORDER — LEVETIRACETAM 250 MG/1
500 TABLET, FILM COATED ORAL
Refills: 0 | Status: DISCONTINUED | OUTPATIENT
Start: 2019-09-08 | End: 2019-09-08

## 2019-09-08 RX ADMIN — CEFTRIAXONE 100 MILLIGRAM(S): 500 INJECTION, POWDER, FOR SOLUTION INTRAMUSCULAR; INTRAVENOUS at 23:45

## 2019-09-08 RX ADMIN — LEVETIRACETAM 1000 MILLIGRAM(S): 250 TABLET, FILM COATED ORAL at 18:20

## 2019-09-08 RX ADMIN — Medication 20 MILLIGRAM(S): at 06:20

## 2019-09-08 RX ADMIN — Medication 100 MILLIGRAM(S): at 12:10

## 2019-09-08 RX ADMIN — Medication 100 MILLIGRAM(S): at 12:18

## 2019-09-08 RX ADMIN — CHLORHEXIDINE GLUCONATE 1 APPLICATION(S): 213 SOLUTION TOPICAL at 12:09

## 2019-09-08 RX ADMIN — Medication 300 MILLIGRAM(S): at 18:15

## 2019-09-08 RX ADMIN — Medication 60 MILLIGRAM(S): at 12:10

## 2019-09-08 RX ADMIN — HEPARIN SODIUM 2300 UNIT(S)/HR: 5000 INJECTION INTRAVENOUS; SUBCUTANEOUS at 08:04

## 2019-09-08 RX ADMIN — Medication 650 MILLIGRAM(S): at 18:15

## 2019-09-08 RX ADMIN — Medication 100 MILLIEQUIVALENT(S): at 18:01

## 2019-09-08 RX ADMIN — PREGABALIN 1000 MICROGRAM(S): 225 CAPSULE ORAL at 15:53

## 2019-09-08 RX ADMIN — LEVETIRACETAM 400 MILLIGRAM(S): 250 TABLET, FILM COATED ORAL at 00:30

## 2019-09-08 RX ADMIN — HEPARIN SODIUM 4000 UNIT(S): 5000 INJECTION INTRAVENOUS; SUBCUTANEOUS at 01:16

## 2019-09-08 RX ADMIN — CINACALCET 30 MILLIGRAM(S): 30 TABLET, FILM COATED ORAL at 12:10

## 2019-09-08 RX ADMIN — HEPARIN SODIUM 2300 UNIT(S)/HR: 5000 INJECTION INTRAVENOUS; SUBCUTANEOUS at 01:13

## 2019-09-08 RX ADMIN — MYCOPHENOLIC ACID 360 MILLIGRAM(S): 180 TABLET, DELAYED RELEASE ORAL at 11:07

## 2019-09-08 RX ADMIN — ATORVASTATIN CALCIUM 40 MILLIGRAM(S): 80 TABLET, FILM COATED ORAL at 23:44

## 2019-09-08 RX ADMIN — Medication 100 MILLIEQUIVALENT(S): at 20:43

## 2019-09-08 RX ADMIN — Medication 20 MILLIGRAM(S): at 01:09

## 2019-09-08 RX ADMIN — Medication 1 MILLIGRAM(S): at 12:10

## 2019-09-08 RX ADMIN — HEPARIN SODIUM 2300 UNIT(S)/HR: 5000 INJECTION INTRAVENOUS; SUBCUTANEOUS at 15:51

## 2019-09-08 RX ADMIN — Medication 10 MILLIGRAM(S): at 06:24

## 2019-09-08 RX ADMIN — Medication 10 MILLIGRAM(S): at 18:14

## 2019-09-08 RX ADMIN — Medication 1 PATCH: at 12:09

## 2019-09-08 RX ADMIN — TAMSULOSIN HYDROCHLORIDE 0.4 MILLIGRAM(S): 0.4 CAPSULE ORAL at 23:45

## 2019-09-08 RX ADMIN — Medication 10 MILLIGRAM(S): at 06:20

## 2019-09-08 RX ADMIN — INSULIN GLARGINE 10 UNIT(S): 100 INJECTION, SOLUTION SUBCUTANEOUS at 23:57

## 2019-09-08 NOTE — PROGRESS NOTE ADULT - PROBLEM SELECTOR PLAN 1
diet as tolerated  cbc daily; stable now  suspect bleeding to be hemorrhoidal, now resolved    keep stool soft  no indication for colonoscopy

## 2019-09-08 NOTE — PROGRESS NOTE ADULT - SUBJECTIVE AND OBJECTIVE BOX
[facility] Division of Kidney Diseases & Hypertension  FOLLOW UP NOTE  --------------------------------------------------------------------------------  Chief Complaint:    24 hour events/subjective:    less lethargic today   able to answer questions but not appropriately        PAST HISTORY  --------------------------------------------------------------------------------  No significant changes to PMH, PSH, FHx, SHx, unless otherwise noted    ALLERGIES & MEDICATIONS  --------------------------------------------------------------------------------  Allergies    No Known Allergies    Intolerances      Standing Inpatient Medications  allopurinol 100 milliGRAM(s) Oral daily  atorvastatin 40 milliGRAM(s) Oral at bedtime  cefTRIAXone   IVPB 1000 milliGRAM(s) IV Intermittent every 24 hours  chlorhexidine 2% Cloths 1 Application(s) Topical daily  cinacalcet 30 milliGRAM(s) Oral daily  cyanocobalamin Injectable 1000 MICROGram(s) IntraMuscular <User Schedule>  dextrose 5%. 1000 milliLiter(s) IV Continuous <Continuous>  dextrose 5%. 1000 milliLiter(s) IV Continuous <Continuous>  dextrose 50% Injectable 12.5 Gram(s) IV Push once  dextrose 50% Injectable 25 Gram(s) IV Push once  dextrose 50% Injectable 25 Gram(s) IV Push once  ergocalciferol 14522 Unit(s) Oral every week  folic acid 1 milliGRAM(s) Oral daily  furosemide   Injectable 60 milliGRAM(s) IV Push two times a day  heparin  Infusion.  Unit(s)/Hr IV Continuous <Continuous>  hydrocortisone hemorrhoidal Suppository 1 Suppository(s) Rectal at bedtime  hydrocortisone sodium succinate Injectable 10 milliGRAM(s) IV Push every 12 hours  insulin glargine Injectable (LANTUS) 10 Unit(s) SubCutaneous at bedtime  insulin lispro (HumaLOG) corrective regimen sliding scale   SubCutaneous every 6 hours  labetalol 300 milliGRAM(s) Oral two times a day  levETIRAcetam 1000 milliGRAM(s) Oral two times a day  mycophenolic acid  milliGRAM(s) Oral two times a day  potassium chloride  10 mEq/100 mL IVPB 10 milliEquivalent(s) IV Intermittent every 1 hour  sodium bicarbonate 650 milliGRAM(s) Oral two times a day  tamsulosin 0.4 milliGRAM(s) Oral at bedtime  thiamine 100 milliGRAM(s) Oral daily    PRN Inpatient Medications  acetaminophen   Tablet .. 650 milliGRAM(s) Oral every 6 hours PRN  aluminum hydroxide/magnesium hydroxide/simethicone Suspension 30 milliLiter(s) Oral every 6 hours PRN  dextrose 40% Gel 15 Gram(s) Oral once PRN  glucagon  Injectable 1 milliGRAM(s) IntraMuscular once PRN  heparin  Injectable 9000 Unit(s) IV Push every 6 hours PRN  heparin  Injectable 4000 Unit(s) IV Push every 6 hours PRN  hydrALAZINE Injectable 10 milliGRAM(s) IV Push every 4 hours PRN    VITALS/PHYSICAL EXAM  --------------------------------------------------------------------------------  T(C): 36.8 (09-08-19 @ 14:13), Max: 37.1 (09-07-19 @ 21:03)  HR: 76 (09-08-19 @ 14:13) (75 - 84)  BP: 153/86 (09-08-19 @ 14:13) (136/62 - 169/87)  RR: 18 (09-08-19 @ 14:13) (18 - 19)  SpO2: 96% (09-08-19 @ 14:13) (95% - 97%)  Wt(kg): --        09-07-19 @ 07:01  -  09-08-19 @ 07:00  --------------------------------------------------------  IN: 1885 mL / OUT: 0 mL / NET: 1885 mL    09-08-19 @ 07:01  -  09-08-19 @ 17:16  --------------------------------------------------------  IN: 60 mL / OUT: 300 mL / NET: -240 mL      Physical Exam:  	Gen: NAD  	HEENT supple neck, clear oropharynx  	Pulm: CTA B/L  	CV: RRR, S1S2; no rub  	Abd: +BS, soft, nontender/nondistended  	: No suprapubic tenderness  	LE: Warm, no edema  	  LABS/STUDIES  --------------------------------------------------------------------------------              8.7    6.72  >-----------<  173      [09-08-19 @ 09:36]              27.8     136  |  97  |  47  ----------------------------<  134      [09-08-19 @ 07:01]  3.4   |  24  |  5.75        Ca     10.5     [09-08-19 @ 07:01]        PTT: 78.8       [09-08-19 @ 14:01]      Creatinine Trend:  SCr 5.75 [09-08 @ 07:01]  SCr 4.79 [09-07 @ 11:42]  SCr 6.77 [09-06 @ 05:13]  SCr 5.38 [09-05 @ 05:50]  SCr 6.63 [09-04 @ 04:16]    Urinalysis - [09-02-19 @ 23:57]      Color Orange / Appearance Turbid / SG 1.014 / pH 8.0      Gluc Negative / Ketone Negative  / Bili Negative / Urobili <2 mg/dL       Blood Moderate / Protein >600 mg/dL / Leuk Est Large / Nitrite Negative       / WBC >720 / Hyaline 3 / Gran  / Sq Epi  / Non Sq Epi 10 / Bacteria TNTC      Iron 45, TIBC 159, %sat 28      [09-02-19 @ 17:29]  Ferritin 260      [09-02-19 @ 17:29]  PTH -- (Ca 9.7)      [09-02-19 @ 17:29]   1231  Vitamin D (25OH) <4.0      [09-02-19 @ 17:28]      Free Light Chains: kappa 5.95, lambda 16.19, ratio = 0.37      [09-04 @ 23:44]  Immunofixation Serum: IgG Lambda Band Identified    Reference Range: None Detected      [09-04-19 @ 23:44]  SPEP Interpretation: Gamma Migrating Paraprotein Identified      [09-04-19 @ 23:44]

## 2019-09-08 NOTE — PROGRESS NOTE ADULT - ATTENDING COMMENTS
DDRT with CKD 5 now ESRD. For dialysis tomorrow.   Fevers- ecoli bacteremia- likely source urine. Improving with antibiotics. no evidence of opportunistic infection as of now. CSF CMV PCR/ AMARIS negative.   once acute issues resolved, will need eval for renal amyloid although at this point he does not seem like a good IS candidate.

## 2019-09-08 NOTE — PROGRESS NOTE ADULT - SUBJECTIVE AND OBJECTIVE BOX
INTERVAL HPI/OVERNIGHT EVENTS:  - Pt with RRT yesterday 2/2 AMS and hypertension.  Given labetalol 10 mg IVP x 2, labetalol 20 mg IVP x 1 , Hydralazine 10 mg IVP x 3 , Zofran 4 mg IVP x 1 , Lasix 60 mg IVP x 1; resolved  - no other events overnight      MEDICATIONS  (STANDING):  allopurinol 100 milliGRAM(s) Oral daily  atorvastatin 40 milliGRAM(s) Oral at bedtime  cefTRIAXone   IVPB 1000 milliGRAM(s) IV Intermittent every 24 hours  chlorhexidine 2% Cloths 1 Application(s) Topical daily  cinacalcet 30 milliGRAM(s) Oral daily  dextrose 5%. 1000 milliLiter(s) (50 mL/Hr) IV Continuous <Continuous>  dextrose 5%. 1000 milliLiter(s) (50 mL/Hr) IV Continuous <Continuous>  dextrose 50% Injectable 12.5 Gram(s) IV Push once  dextrose 50% Injectable 25 Gram(s) IV Push once  dextrose 50% Injectable 25 Gram(s) IV Push once  ergocalciferol 90852 Unit(s) Oral every week  folic acid 1 milliGRAM(s) Oral daily  furosemide   Injectable 60 milliGRAM(s) IV Push two times a day  heparin  Infusion.  Unit(s)/Hr (19 mL/Hr) IV Continuous <Continuous>  hydrocortisone hemorrhoidal Suppository 1 Suppository(s) Rectal at bedtime  hydrocortisone sodium succinate Injectable 10 milliGRAM(s) IV Push every 12 hours  insulin glargine Injectable (LANTUS) 10 Unit(s) SubCutaneous at bedtime  insulin lispro (HumaLOG) corrective regimen sliding scale   SubCutaneous every 6 hours  labetalol Injectable 20 milliGRAM(s) IV Push every 6 hours  levETIRAcetam  IVPB 500 milliGRAM(s) IV Intermittent two times a day  mycophenolate mofetil IVPB 250 milliGRAM(s) IV Intermittent every 12 hours  sodium bicarbonate 650 milliGRAM(s) Oral two times a day  tamsulosin 0.4 milliGRAM(s) Oral at bedtime  thiamine Injectable 100 milliGRAM(s) IV Push daily    MEDICATIONS  (PRN):  acetaminophen   Tablet .. 650 milliGRAM(s) Oral every 6 hours PRN Temp greater or equal to 38C (100.4F)  aluminum hydroxide/magnesium hydroxide/simethicone Suspension 30 milliLiter(s) Oral every 6 hours PRN Dyspepsia  dextrose 40% Gel 15 Gram(s) Oral once PRN Blood Glucose LESS THAN 70 milliGRAM(s)/deciliter  glucagon  Injectable 1 milliGRAM(s) IntraMuscular once PRN Glucose LESS THAN 70 milligrams/deciliter  heparin  Injectable 9000 Unit(s) IV Push every 6 hours PRN For aPTT less than 40  heparin  Injectable 4000 Unit(s) IV Push every 6 hours PRN For aPTT between 40 - 57  hydrALAZINE Injectable 10 milliGRAM(s) IV Push every 4 hours PRN For SBP > 160      Allergies    No Known Allergies    Intolerances        Review of Systems:    General:  No wt loss, fevers, chills, night sweats, fatigue   Eyes:  Good vision, no reported pain  ENT:  No sore throat, pain, runny nose, dysphagia  CV:  No pain, palpitations, hypo/hypertension  Resp:  No dyspnea, cough, tachypnea, wheezing  GI:  No pain, No nausea, No vomiting, No diarrhea, No constipation, No weight loss, No fever, No pruritis, No rectal bleeding, No melena, No dysphagia  :  No pain, bleeding, incontinence, nocturia  Muscle:  No pain, weakness  Neuro:  No weakness, tingling, memory problems  Psych:  No fatigue, insomnia, mood problems, depression  Endocrine:  No polyuria, polydypsia, cold/heat intolerance  Heme:  No petechiae, ecchymosis, easy bruisability  Skin:  No rash, tattoos, scars, edema      Vital Signs Last 24 Hrs  T(C): 36.9 (08 Sep 2019 06:09), Max: 37.1 (07 Sep 2019 21:03)  T(F): 98.4 (08 Sep 2019 06:09), Max: 98.8 (07 Sep 2019 21:03)  HR: 82 (08 Sep 2019 06:09) (81 - 88)  BP: 167/74 (08 Sep 2019 06:09) (136/62 - 191/83)  BP(mean): --  RR: 18 (08 Sep 2019 06:09) (17 - 19)  SpO2: 96% (08 Sep 2019 06:09) (95% - 97%)    PHYSICAL EXAM:    Constitutional: NAD  HEENT: EOMI, throat clear  Neck: No LAD, supple  Respiratory: CTA and P  Cardiovascular: S1 and S2, RRR, no M  Gastrointestinal: BS+, soft, NT/ND, neg HSM,  Extremities: No peripheral edema, neg clubbing, cyanosis  Vascular: 2+ peripheral pulses  Neurological: A/O x 3, no focal deficits  Psychiatric: Normal mood, normal affect  Skin: No rashes      LABS:                        8.7    6.72  )-----------( 173      ( 08 Sep 2019 09:36 )             27.8     09-08    136  |  97  |  47<H>  ----------------------------<  134<H>  3.4<L>   |  24  |  5.75<H>    Ca    10.5      08 Sep 2019 07:01      PT/INR - ( 06 Sep 2019 10:03 )   PT: 13.1 sec;   INR: 1.15 ratio         PTT - ( 08 Sep 2019 07:24 )  PTT:61.1 sec      RADIOLOGY & ADDITIONAL TESTS:

## 2019-09-08 NOTE — PROGRESS NOTE ADULT - SUBJECTIVE AND OBJECTIVE BOX
Patient is a 74y old  Male who presents with a chief complaint of KAMRON (08 Sep 2019 08:23)                                                               INTERVAL HPI/OVERNIGHT EVENTS:    REVIEW OF SYSTEMS:     CONSTITUTIONAL::  unrleaiable   but denies HA , cp , SOB ,abd pain ,CP                                                                                                                                                                                                                                                                               Medications:  MEDICATIONS  (STANDING):  allopurinol 100 milliGRAM(s) Oral daily  atorvastatin 40 milliGRAM(s) Oral at bedtime  cefTRIAXone   IVPB 1000 milliGRAM(s) IV Intermittent every 24 hours  chlorhexidine 2% Cloths 1 Application(s) Topical daily  cinacalcet 30 milliGRAM(s) Oral daily  cyanocobalamin Injectable 1000 MICROGram(s) IntraMuscular <User Schedule>  dextrose 5%. 1000 milliLiter(s) (50 mL/Hr) IV Continuous <Continuous>  dextrose 5%. 1000 milliLiter(s) (50 mL/Hr) IV Continuous <Continuous>  dextrose 50% Injectable 12.5 Gram(s) IV Push once  dextrose 50% Injectable 25 Gram(s) IV Push once  dextrose 50% Injectable 25 Gram(s) IV Push once  ergocalciferol 57818 Unit(s) Oral every week  folic acid 1 milliGRAM(s) Oral daily  furosemide   Injectable 60 milliGRAM(s) IV Push two times a day  heparin  Infusion.  Unit(s)/Hr (19 mL/Hr) IV Continuous <Continuous>  hydrocortisone hemorrhoidal Suppository 1 Suppository(s) Rectal at bedtime  hydrocortisone sodium succinate Injectable 10 milliGRAM(s) IV Push every 12 hours  insulin glargine Injectable (LANTUS) 10 Unit(s) SubCutaneous at bedtime  insulin lispro (HumaLOG) corrective regimen sliding scale   SubCutaneous every 6 hours  labetalol 300 milliGRAM(s) Oral two times a day  levETIRAcetam 1000 milliGRAM(s) Oral two times a day  mycophenolic acid  milliGRAM(s) Oral two times a day  sodium bicarbonate 650 milliGRAM(s) Oral two times a day  tamsulosin 0.4 milliGRAM(s) Oral at bedtime  thiamine 100 milliGRAM(s) Oral daily    MEDICATIONS  (PRN):  acetaminophen   Tablet .. 650 milliGRAM(s) Oral every 6 hours PRN Temp greater or equal to 38C (100.4F)  aluminum hydroxide/magnesium hydroxide/simethicone Suspension 30 milliLiter(s) Oral every 6 hours PRN Dyspepsia  dextrose 40% Gel 15 Gram(s) Oral once PRN Blood Glucose LESS THAN 70 milliGRAM(s)/deciliter  glucagon  Injectable 1 milliGRAM(s) IntraMuscular once PRN Glucose LESS THAN 70 milligrams/deciliter  heparin  Injectable 9000 Unit(s) IV Push every 6 hours PRN For aPTT less than 40  heparin  Injectable 4000 Unit(s) IV Push every 6 hours PRN For aPTT between 40 - 57  hydrALAZINE Injectable 10 milliGRAM(s) IV Push every 4 hours PRN For SBP > 160       Allergies    No Known Allergies    Intolerances      Vital Signs Last 24 Hrs  T(C): 36.8 (08 Sep 2019 14:13), Max: 37.1 (07 Sep 2019 21:03)  T(F): 98.3 (08 Sep 2019 14:13), Max: 98.8 (07 Sep 2019 21:03)  HR: 76 (08 Sep 2019 14:13) (75 - 85)  BP: 153/86 (08 Sep 2019 14:13) (136/62 - 186/87)  BP(mean): --  RR: 18 (08 Sep 2019 14:13) (17 - 19)  SpO2: 96% (08 Sep 2019 14:13) (95% - 97%)  CAPILLARY BLOOD GLUCOSE      POCT Blood Glucose.: 163 mg/dL (08 Sep 2019 12:20)  POCT Blood Glucose.: 134 mg/dL (08 Sep 2019 06:45)  POCT Blood Glucose.: 135 mg/dL (08 Sep 2019 00:46)  POCT Blood Glucose.: 133 mg/dL (07 Sep 2019 22:12)  POCT Blood Glucose.: 117 mg/dL (07 Sep 2019 19:23)  POCT Blood Glucose.: 130 mg/dL (07 Sep 2019 18:26)      09-07 @ 07:01  -  09-08 @ 07:00  --------------------------------------------------------  IN: 1885 mL / OUT: 0 mL / NET: 1885 mL    09-08 @ 07:01 - 09-08 @ 14:35  --------------------------------------------------------  IN: 60 mL / OUT: 300 mL / NET: -240 mL      Physical Exam:    General: NAD   HEENT:  Nonicteric, PERRLA  CV:  RRR, S1S2   Lungs:  CTA B/L, no wheezes, rales, rhonchi  Abdomen:  Soft, non-tender, no distended, positive BS  Extremities:  2+ pulses, no c/c, no edema  Neuro:  lethargic but arousable   moving all extremites                                                                                                                                                                                                                                                                                          LABS:                               8.7    6.72  )-----------( 173      ( 08 Sep 2019 09:36 )             27.8                      09-08    136  |  97  |  47<H>  ----------------------------<  134<H>  3.4<L>   |  24  |  5.75<H>    Ca    10.5      08 Sep 2019 07:01                         RADIOLOGY & ADDITIONAL TESTS         I personally reviewed: [  ]EKG   [  ]CXR    [  ] CT      A/P:         Discussed with :     Marga consultants' Notes   Time spent :

## 2019-09-08 NOTE — PROGRESS NOTE ADULT - ASSESSMENT
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   Pt just moved back to [place] from [place] .. reports that he has not been taking his meds for the past few days since " he might have misplaed them"     pt denies chest pain, syncope,fever, chills, cough, urinary symptoms.    in ED found  to have anemia   Nno acute changes on EKG   elevated CR and Trop    1- HTN urgency :not well controlled   cont meds   and adjust accordingly     2- KAMRON  :   d/w   : pt will likely end up on HD in intermediate and likely long term given transplant graft failure   off tacro,  cont cellcept and sterioods   pt refused kidney bx       3- DM:   A1c  5.6  Fs     4- acute diastolic CHF sec to  HTN urgency and renal failure   Echo :  < from: Transthoracic Echocardiogram (08.28.19 @ 16:12) >  1. Mitral annular calcification and calcified mitral  leaflets with decreased diastolic opening.  2. Moderate to severe concentric left ventricular  hypertrophy.  3. Normal left ventricular systolic function with  mid-cavitary obliteration.  4. Normal right ventricular size and systolic function.  5. Small pericardial effusion.    cont fluid removal with HD per renal      5-  difficult ambulating :  no focal neuro deficits   neuro eval appreciated    CT T/L   < from: CT Thoracic Spine No Cont (08.30.19 @ 10:06) >    Old right L1 transverse process fracture. Bilateral lysis   defects at L5 as seen on the prior 5/26/2013      6- afib :hold AC ..holding for now for bx /LP     7- hematochezia : per sister : on and off small amount of fresh blood in stool and some amount in urine but only small amounts   monitor H/H   consult GI .: appreciate input :  suspect bleeding to be hemorrhoidal, now resolved     trial of anusol supp  if bleeding persist would consider colonoscopy however patient is reluctant.    heme input: appreciated        8-  renal transplant : f/u with Transplant team   cont meds with MMF and steroids   off Tac   planned Bx however pt refused     9- TIA :  unable to perform MRI   CT no acute changes on CT   repeat bnegative   fu with neuro     10 encephalopathy : LP : high protien   blood culture positive for GNR /Ecoli ..  ? source :  urine was not sent   CT c/a/p   CTH with contrast : neg for acute pathology   low threshold for ICU consult     11- paraprotienmeia : d/w heme and renal   will need to consider amyloidosis .. doubt MM   holding on Fat pad bx for now       12 hypercalcemia : monitor for now     13- abd discomfort : will chedck Xray   check PVR and if >200 will place gutierrez

## 2019-09-08 NOTE — PROGRESS NOTE ADULT - PROBLEM SELECTOR PLAN 2
- unknown etiology  - ? related multifactorial metabolic encephalopathy   - surgery team called for fat pad biopsy

## 2019-09-08 NOTE — PROGRESS NOTE ADULT - SUBJECTIVE AND OBJECTIVE BOX
no new complaints  overnight events noted    acetaminophen   Tablet .. 650 milliGRAM(s) Oral every 6 hours PRN  allopurinol 100 milliGRAM(s) Oral daily  aluminum hydroxide/magnesium hydroxide/simethicone Suspension 30 milliLiter(s) Oral every 6 hours PRN  atorvastatin 40 milliGRAM(s) Oral at bedtime  cefTRIAXone   IVPB 1000 milliGRAM(s) IV Intermittent every 24 hours  chlorhexidine 2% Cloths 1 Application(s) Topical daily  cinacalcet 30 milliGRAM(s) Oral daily  cloNIDine Patch 0.1 mG/24Hr(s) 1 patch Transdermal once  dextrose 40% Gel 15 Gram(s) Oral once PRN  dextrose 5%. 1000 milliLiter(s) IV Continuous <Continuous>  dextrose 5%. 1000 milliLiter(s) IV Continuous <Continuous>  dextrose 50% Injectable 12.5 Gram(s) IV Push once  dextrose 50% Injectable 25 Gram(s) IV Push once  dextrose 50% Injectable 25 Gram(s) IV Push once  ergocalciferol 43398 Unit(s) Oral every week  folic acid 1 milliGRAM(s) Oral daily  furosemide   Injectable 60 milliGRAM(s) IV Push two times a day  glucagon  Injectable 1 milliGRAM(s) IntraMuscular once PRN  heparin  Infusion.  Unit(s)/Hr IV Continuous <Continuous>  heparin  Injectable 9000 Unit(s) IV Push every 6 hours PRN  heparin  Injectable 4000 Unit(s) IV Push every 6 hours PRN  hydrALAZINE Injectable 10 milliGRAM(s) IV Push every 4 hours PRN  hydrocortisone hemorrhoidal Suppository 1 Suppository(s) Rectal at bedtime  hydrocortisone sodium succinate Injectable 10 milliGRAM(s) IV Push every 12 hours  insulin glargine Injectable (LANTUS) 10 Unit(s) SubCutaneous at bedtime  insulin lispro (HumaLOG) corrective regimen sliding scale   SubCutaneous every 6 hours  labetalol 300 milliGRAM(s) Oral two times a day  levETIRAcetam 1000 milliGRAM(s) Oral two times a day  mycophenolic acid  milliGRAM(s) Oral two times a day  sodium bicarbonate 650 milliGRAM(s) Oral two times a day  tamsulosin 0.4 milliGRAM(s) Oral at bedtime  thiamine 100 milliGRAM(s) Oral daily      No Known Allergies      ROS otherwise negative but limited due to lack or participatin.    T(C): 36.9 (09-08-19 @ 06:09), Max: 37.1 (09-07-19 @ 21:03)  HR: 82 (09-08-19 @ 06:09) (81 - 88)  BP: 167/74 (09-08-19 @ 06:09) (136/62 - 187/84)  RR: 18 (09-08-19 @ 06:09) (17 - 19)  SpO2: 96% (09-08-19 @ 06:09) (95% - 97%)  PHYSICAL EXAM  Gen:  laying in bed, lethargic;  A and O x 2 (person, place)  H:  anicteric, eomi  CV:  RRR, S1, S2  Lungs:  CTA b/l  Ab soft/nt/nd  Ext:  no edema                          8.7    6.72  )-----------( 173      ( 08 Sep 2019 09:36 )             27.8                         8.7    6.2   )-----------( 129      ( 07 Sep 2019 11:42 )             26.1                         7.6    7.0   )-----------( 125      ( 06 Sep 2019 10:00 )             24.2   09-08    136  |  97  |  47<H>  ----------------------------<  134<H>  3.4<L>   |  24  |  5.75<H>    Ca    10.5      08 Sep 2019 07:01    Methylmal Acid &amp; Homocysteine (08.31.19 @ 21:22)    Methylmalonic Acid Level: 438:     Homocysteine Level: 20.1:

## 2019-09-08 NOTE — PROGRESS NOTE ADULT - SUBJECTIVE AND OBJECTIVE BOX
CC: f/u for E coli bacteremia    Patient reports: he is still confused, not very cooperative. He denies any headache of specific complaints    REVIEW OF SYSTEMS:  All other review of systems negative (Comprehensive ROS)    Antimicrobials Day #  :day 6  cefTRIAXone   IVPB 1000 milliGRAM(s) IV Intermittent every 24 hours    Other Medications Reviewed    T(F): 98.4 (09-08-19 @ 06:09), Max: 98.8 (09-07-19 @ 21:03)  HR: 82 (09-08-19 @ 06:09)  BP: 167/74 (09-08-19 @ 06:09)  RR: 18 (09-08-19 @ 06:09)  SpO2: 96% (09-08-19 @ 06:09)  Wt(kg): --    PHYSICAL EXAM:  General: alert, no acute distress  Eyes:  anicteric, no conjunctival injection, no discharge  Oropharynx: no lesions or injection 	  Neck: supple, without adenopathy  Lungs: clear to auscultation  Heart: irregular rate and rhythm; no murmur, rubs or gallops  Abdomen: soft, nondistended, nontender, without mass or organomegaly  Skin: no lesions  Extremities: no clubbing, cyanosis, + edema  Neurologic: alert, confused, moves all extremities  Left arm AVF  LAB RESULTS:                        8.7    6.2   )-----------( 129      ( 07 Sep 2019 11:42 )             26.1     09-08    136  |  97  |  47<H>  ----------------------------<  134<H>  3.4<L>   |  24  |  5.75<H>    Ca    10.5      08 Sep 2019 07:01      CMV PCR negative    MICROBIOLOGY:  RECENT CULTURES:  09-05 @ 17:06 .CSF     No growth    No polymorphonuclear cells seen  No organisms seen  by cytocentrifuge    09-05 @ 14:42 .Blood     No growth to date.      09-03 @ 10:14 .Blood Blood Culture PCR  Escherichia coli    Growth in aerobic bottle: Escherichia coli      Growth in aerobic bottle: Gram Negative Rods        RADIOLOGY REVIEWED:

## 2019-09-08 NOTE — PROGRESS NOTE ADULT - SUBJECTIVE AND OBJECTIVE BOX
Subjective: Patient seen and examined. No new events except as noted.   has been hypertensive again   Confused   REVIEW OF SYSTEMS:  Unable to obtain       MEDICATIONS:  MEDICATIONS  (STANDING):  allopurinol 100 milliGRAM(s) Oral daily  atorvastatin 40 milliGRAM(s) Oral at bedtime  cefTRIAXone   IVPB 1000 milliGRAM(s) IV Intermittent every 24 hours  chlorhexidine 2% Cloths 1 Application(s) Topical daily  cinacalcet 30 milliGRAM(s) Oral daily  dextrose 5%. 1000 milliLiter(s) (50 mL/Hr) IV Continuous <Continuous>  dextrose 5%. 1000 milliLiter(s) (50 mL/Hr) IV Continuous <Continuous>  dextrose 50% Injectable 12.5 Gram(s) IV Push once  dextrose 50% Injectable 25 Gram(s) IV Push once  dextrose 50% Injectable 25 Gram(s) IV Push once  ergocalciferol 14362 Unit(s) Oral every week  folic acid 1 milliGRAM(s) Oral daily  furosemide   Injectable 60 milliGRAM(s) IV Push two times a day  heparin  Infusion.  Unit(s)/Hr (19 mL/Hr) IV Continuous <Continuous>  hydrocortisone hemorrhoidal Suppository 1 Suppository(s) Rectal at bedtime  hydrocortisone sodium succinate Injectable 10 milliGRAM(s) IV Push every 12 hours  insulin glargine Injectable (LANTUS) 10 Unit(s) SubCutaneous at bedtime  insulin lispro (HumaLOG) corrective regimen sliding scale   SubCutaneous every 6 hours  labetalol Injectable 20 milliGRAM(s) IV Push every 6 hours  levETIRAcetam  IVPB 500 milliGRAM(s) IV Intermittent two times a day  mycophenolate mofetil IVPB 250 milliGRAM(s) IV Intermittent every 12 hours  sodium bicarbonate 650 milliGRAM(s) Oral two times a day  tamsulosin 0.4 milliGRAM(s) Oral at bedtime  thiamine Injectable 100 milliGRAM(s) IV Push daily      PHYSICAL EXAM:  T(C): 36.9 (09-08-19 @ 06:09), Max: 37.1 (09-07-19 @ 21:03)  HR: 82 (09-08-19 @ 06:09) (81 - 88)  BP: 167/74 (09-08-19 @ 06:09) (136/62 - 191/83)  RR: 18 (09-08-19 @ 06:09) (17 - 19)  SpO2: 96% (09-08-19 @ 06:09) (95% - 97%)  Wt(kg): --  I&O's Summary    07 Sep 2019 07:01  -  08 Sep 2019 07:00  --------------------------------------------------------  IN: 1885 mL / OUT: 0 mL / NET: 1885 mL            Appearance: NAD lethargic at times confused   HEENT:   Normal oral mucosa, PERRL, EOMI	  Lymphatic: No lymphadenopathy , no edema  Cardiovascular: Normal S1 S2, No JVD, No murmurs , Peripheral pulses palpable 2+ bilaterally  Respiratory: Lungs clear to auscultation, normal effort 	  Gastrointestinal:  Soft, Non-tender, + BS	  Skin: No rashes, No ecchymoses, No cyanosis, warm to touch  Musculoskeletal: Normal range of motion, normal strength  Psychiatry:  at times confused   Ext: No edema      LABS:    CARDIAC MARKERS:                                8.7    6.72  )-----------( 173      ( 08 Sep 2019 09:36 )             27.8     09-08    136  |  97  |  47<H>  ----------------------------<  134<H>  3.4<L>   |  24  |  5.75<H>    Ca    10.5      08 Sep 2019 07:01      proBNP:   Lipid Profile:   HgA1c:   TSH:             TELEMETRY: 	    ECG:  	  RADIOLOGY:   DIAGNOSTIC TESTING:  [ ] Echocardiogram:  [ ]  Catheterization:  [ ] Stress Test:    OTHER:

## 2019-09-08 NOTE — PROGRESS NOTE ADULT - ASSESSMENT
1. Anemia sec to CKD    -- H/H in 7-8s, stable;   MMA and Homocysteine confirms B12 deficiency +/- folate def  -- B12 1000 mcg IM x 1 given 8/31;  will order B12 1000 mcg IM weekly for 3 further doses then continue monthly;  PO FA 1mg QD;    -- No hemolysis  -- Iron studies c/w Anemia of Chronic Inflammation  -- FOBT neg  -- transfuse prn hgb <7  -- rectal bleeding, GI suspects hemrhds  -- darbepoietin w HD per renal    2. thrombocytopenia, improved    -- platelets adequate, stable, reactive and consumptive, abx   -- most likely related to h/o HCV, ? Mycophenolate contributing  -- monitor for now    3. LE edema   --noted age indeterminate DVT b/l LE above knee; provoked by acute illness and immobilizaiton;  no indication for hypercoag w/u   --pt had been on and off AC in past for Afib  --on heparin drip.  Hold Coumadin bridge until after fat pad biopsy     4.  Fevers  --ID w/u, s/p LP;  on empric abx for GNR bacteremia.    5. MGUS -- reviewed previous records from [place] from 3/2017. Noted to have 5% plasma cells. No congo red staining done. Immunoglobulins were nml, free kappa 43, free lambda 85, free k/l ratio nml. Overall, low suspicion that he has progressed to MM given low M spike and nml k/l ratio.   -- immunoglobulins not elevated  -- hold BMBx for now;  fat pad bx pending.        Philippe Burks MD  Hematology/Oncology  Cell:  [phone]  Office Phone: [phone]  Office Fax:  [phone] [address] [place] [address]

## 2019-09-08 NOTE — PROGRESS NOTE ADULT - SUBJECTIVE AND OBJECTIVE BOX
Mr. Liao is comfortable, denies any nausea, vomiting, fever or chills  HE denies any abdominal pain  Denies any nausea or vomiting  Denies shortness of breath, or chest pain    Vital Signs Last 24 Hrs  T(C): 36.7 (08 Sep 2019 12:05), Max: 37.1 (07 Sep 2019 21:03)  T(F): 98.1 (08 Sep 2019 12:05), Max: 98.8 (07 Sep 2019 21:03)  HR: 75 (08 Sep 2019 12:05) (75 - 85)  BP: 169/87 (08 Sep 2019 12:05) (136/62 - 186/87)  BP(mean): --  RR: 18 (08 Sep 2019 12:05) (17 - 19)  SpO2: 95% (08 Sep 2019 12:05) (95% - 97%)                          8.7    6.72  )-----------( 173      ( 08 Sep 2019 09:36 )             27.8     On exam: he is awake  He is breathing comfortably on room air  He is moving his extremities  His abdomen is soft, not tender and not distended.   His has well healed scars from prior surgeries    74 year old man with worsening kidney function, SOB, and lower extremity edema. Surgery consulted for fat pad bx to rule out amyloidosis  1. Continue supportive care per primary team  2. Patient was initially scheduled for fat pad biopsy two days ago (Thursday 9/5), but family wanted to hold off, will tentatively plan to reschedule fat pad biopsy next week.

## 2019-09-08 NOTE — PROGRESS NOTE ADULT - ASSESSMENT
73 yo male with history of A Fib, Hep C, DM, ESRD s/p renal transplant x 2, admitted 8/26 with progressive CKD and rectal bleeding.  He was on tacrolimus, myfortic, and 5 mg of prednisone along with prophylactic valtrex.  He has required HD and developed fever 9/2  No obvious source of infection.His CMV viral loads were negative as per prior transplant team.  He recently moved up from [place].  His Hep B surface antibody is positive, Hep C RNA is negative  He now is known to have GNR in blood, primary focus not clear, typically GI or  origin  GNR is a sensitive E Coli  He is s/p LP, no cells,protein 126, glucose 67, ME PCR panel is negative  serum crypt antigen is negative, histo antigens are pending  ? Toxic-Metabolic encephalopathy  Fever is moderating, no evidence of acute CNS infection although AMARIS virus PCR ordered by nephrology  Repeat blood cultures are negative so far  Amyloid and underlying LP disorder being evaluated  He is clinically responding to antibiotics  CMV PCR negative, CSF cytology negative  Will consider adding antiviral prophylaxis, I believe he was on this in past  Suggest:  1.will continue CTX given E Coli in blood ,full doses in renal failure  2.Consider CT of A/P ,  "GNR" bacteremia of unclear primary focus  3.? renaly dosed valganciclovir

## 2019-09-09 LAB
APTT BLD: 98.6 SEC — HIGH (ref 27.5–36.3)
BLD GP AB SCN SERPL QL: NEGATIVE — SIGNIFICANT CHANGE UP
EBV PCR: SIGNIFICANT CHANGE UP IU/ML
GLUCOSE BLDC GLUCOMTR-MCNC: 119 MG/DL — HIGH (ref 70–99)
GLUCOSE BLDC GLUCOMTR-MCNC: 130 MG/DL — HIGH (ref 70–99)
GLUCOSE BLDC GLUCOMTR-MCNC: 137 MG/DL — HIGH (ref 70–99)
GLUCOSE BLDC GLUCOMTR-MCNC: 148 MG/DL — HIGH (ref 70–99)
H CAPSUL AG SER IA-MCNC: SIGNIFICANT CHANGE UP
H CAPSUL AG SPEC-ACNC: SIGNIFICANT CHANGE UP
H CAPSUL AG UR QL IA: SIGNIFICANT CHANGE UP
HCT VFR BLD CALC: 27.5 % — LOW (ref 39–50)
HGB BLD-MCNC: 8.4 G/DL — LOW (ref 13–17)
MCHC RBC-ENTMCNC: 26.4 PG — LOW (ref 27–34)
MCHC RBC-ENTMCNC: 30.5 GM/DL — LOW (ref 32–36)
MCV RBC AUTO: 86.5 FL — SIGNIFICANT CHANGE UP (ref 80–100)
PLATELET # BLD AUTO: 165 K/UL — SIGNIFICANT CHANGE UP (ref 150–400)
RBC # BLD: 3.18 M/UL — LOW (ref 4.2–5.8)
RBC # FLD: 14.5 % — SIGNIFICANT CHANGE UP (ref 10.3–14.5)
RH IG SCN BLD-IMP: POSITIVE — SIGNIFICANT CHANGE UP
WBC # BLD: 7.86 K/UL — SIGNIFICANT CHANGE UP (ref 3.8–10.5)
WBC # FLD AUTO: 7.86 K/UL — SIGNIFICANT CHANGE UP (ref 3.8–10.5)
WNV IGG CSF IA-ACNC: NEGATIVE — SIGNIFICANT CHANGE UP
WNV IGM CSF IA-ACNC: NEGATIVE — SIGNIFICANT CHANGE UP

## 2019-09-09 PROCEDURE — 93010 ELECTROCARDIOGRAM REPORT: CPT

## 2019-09-09 PROCEDURE — 74176 CT ABD & PELVIS W/O CONTRAST: CPT | Mod: 26

## 2019-09-09 PROCEDURE — 99232 SBSQ HOSP IP/OBS MODERATE 35: CPT | Mod: GC

## 2019-09-09 RX ORDER — INSULIN GLARGINE 100 [IU]/ML
5 INJECTION, SOLUTION SUBCUTANEOUS ONCE
Refills: 0 | Status: COMPLETED | OUTPATIENT
Start: 2019-09-09 | End: 2019-09-09

## 2019-09-09 RX ORDER — HYDRALAZINE HCL 50 MG
50 TABLET ORAL THREE TIMES A DAY
Refills: 0 | Status: DISCONTINUED | OUTPATIENT
Start: 2019-09-09 | End: 2019-09-09

## 2019-09-09 RX ORDER — HYDRALAZINE HCL 50 MG
100 TABLET ORAL THREE TIMES A DAY
Refills: 0 | Status: DISCONTINUED | OUTPATIENT
Start: 2019-09-09 | End: 2019-09-24

## 2019-09-09 RX ORDER — MYCOPHENOLATE MOFETIL 250 MG/1
500 CAPSULE ORAL EVERY 12 HOURS
Refills: 0 | Status: DISCONTINUED | OUTPATIENT
Start: 2019-09-09 | End: 2019-09-10

## 2019-09-09 RX ADMIN — Medication 100 MILLIEQUIVALENT(S): at 00:39

## 2019-09-09 RX ADMIN — Medication 100 MILLIGRAM(S): at 14:26

## 2019-09-09 RX ADMIN — Medication 650 MILLIGRAM(S): at 17:31

## 2019-09-09 RX ADMIN — Medication 100 MILLIGRAM(S): at 14:27

## 2019-09-09 RX ADMIN — Medication 60 MILLIGRAM(S): at 14:30

## 2019-09-09 RX ADMIN — Medication 10 MILLIGRAM(S): at 17:30

## 2019-09-09 RX ADMIN — ATORVASTATIN CALCIUM 40 MILLIGRAM(S): 80 TABLET, FILM COATED ORAL at 21:06

## 2019-09-09 RX ADMIN — LEVETIRACETAM 1000 MILLIGRAM(S): 250 TABLET, FILM COATED ORAL at 06:48

## 2019-09-09 RX ADMIN — Medication 650 MILLIGRAM(S): at 06:49

## 2019-09-09 RX ADMIN — TAMSULOSIN HYDROCHLORIDE 0.4 MILLIGRAM(S): 0.4 CAPSULE ORAL at 21:06

## 2019-09-09 RX ADMIN — SODIUM CHLORIDE 50 MILLILITER(S): 9 INJECTION, SOLUTION INTRAVENOUS at 23:22

## 2019-09-09 RX ADMIN — CEFTRIAXONE 100 MILLIGRAM(S): 500 INJECTION, POWDER, FOR SOLUTION INTRAMUSCULAR; INTRAVENOUS at 18:47

## 2019-09-09 RX ADMIN — Medication 10 MILLIGRAM(S): at 17:36

## 2019-09-09 RX ADMIN — CINACALCET 30 MILLIGRAM(S): 30 TABLET, FILM COATED ORAL at 14:26

## 2019-09-09 RX ADMIN — Medication 1 PATCH: at 06:49

## 2019-09-09 RX ADMIN — Medication 60 MILLIGRAM(S): at 00:38

## 2019-09-09 RX ADMIN — LEVETIRACETAM 1000 MILLIGRAM(S): 250 TABLET, FILM COATED ORAL at 17:31

## 2019-09-09 RX ADMIN — Medication 300 MILLIGRAM(S): at 06:48

## 2019-09-09 RX ADMIN — CHLORHEXIDINE GLUCONATE 1 APPLICATION(S): 213 SOLUTION TOPICAL at 14:28

## 2019-09-09 RX ADMIN — MYCOPHENOLATE MOFETIL 500 MILLIGRAM(S): 250 CAPSULE ORAL at 18:46

## 2019-09-09 RX ADMIN — MYCOPHENOLATE MOFETIL 500 MILLIGRAM(S): 250 CAPSULE ORAL at 01:14

## 2019-09-09 RX ADMIN — Medication 1 SUPPOSITORY(S): at 21:06

## 2019-09-09 RX ADMIN — Medication 300 MILLIGRAM(S): at 17:31

## 2019-09-09 RX ADMIN — Medication 1 MILLIGRAM(S): at 14:28

## 2019-09-09 RX ADMIN — Medication 10 MILLIGRAM(S): at 06:49

## 2019-09-09 RX ADMIN — Medication 5 MILLIGRAM(S): at 21:05

## 2019-09-09 RX ADMIN — Medication 100 MILLIGRAM(S): at 21:05

## 2019-09-09 RX ADMIN — Medication 1 PATCH: at 01:17

## 2019-09-09 RX ADMIN — HEPARIN SODIUM 2300 UNIT(S)/HR: 5000 INJECTION INTRAVENOUS; SUBCUTANEOUS at 16:57

## 2019-09-09 RX ADMIN — Medication 1 PATCH: at 19:35

## 2019-09-09 NOTE — SWALLOW BEDSIDE ASSESSMENT ADULT - ASR SWALLOW ASPIRATION MONITOR
Monitor for s/s aspiration/laryngeal penetration. If noted:  D/C p.o. intake, provide non-oral nutrition/hydration/meds, and contact this service @ [phone]/change of breathing pattern/fever/pneumonia/throat clearing/upper respiratory infection/cough/gurgly voice

## 2019-09-09 NOTE — SWALLOW BEDSIDE ASSESSMENT ADULT - MODE OF PRESENTATION
spoon/fed by clinician fed by clinician/spoon fed by clinician fed by clinician/straw cup/fed by clinician/straw
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
## 2019-09-09 NOTE — SWALLOW BEDSIDE ASSESSMENT ADULT - SWALLOW EVAL: RECOMMENDED FEEDING/EATING TECHNIQUES
maintain upright posture during/after eating for 30 mins/oral hygiene/crush medication (when feasible)/position upright (90 degrees)

## 2019-09-09 NOTE — PROGRESS NOTE ADULT - SUBJECTIVE AND OBJECTIVE BOX
Subjective: Patient seen and examined. No new events except as noted.   feels ok   weak   REVIEW OF SYSTEMS:    CONSTITUTIONAL: + weakness, fevers or chills  EYES/ENT: No visual changes;  No vertigo or throat pain   NECK: No pain or stiffness  RESPIRATORY: No cough, wheezing, hemoptysis; No shortness of breath  CARDIOVASCULAR: No chest pain or palpitations  GASTROINTESTINAL: No abdominal or epigastric pain. No nausea, vomiting, or hematemesis; No diarrhea or constipation. No melena or hematochezia.  GENITOURINARY: No dysuria, frequency or hematuria  NEUROLOGICAL: No numbness or weakness  SKIN: No itching, burning, rashes, or lesions   All other review of systems is negative unless indicated above.    MEDICATIONS:  MEDICATIONS  (STANDING):  allopurinol 100 milliGRAM(s) Oral daily  atorvastatin 40 milliGRAM(s) Oral at bedtime  cefTRIAXone   IVPB 1000 milliGRAM(s) IV Intermittent every 24 hours  chlorhexidine 2% Cloths 1 Application(s) Topical daily  cinacalcet 30 milliGRAM(s) Oral daily  cyanocobalamin Injectable 1000 MICROGram(s) IntraMuscular <User Schedule>  dextrose 5%. 1000 milliLiter(s) (50 mL/Hr) IV Continuous <Continuous>  dextrose 5%. 1000 milliLiter(s) (50 mL/Hr) IV Continuous <Continuous>  dextrose 50% Injectable 12.5 Gram(s) IV Push once  dextrose 50% Injectable 25 Gram(s) IV Push once  dextrose 50% Injectable 25 Gram(s) IV Push once  ergocalciferol 53883 Unit(s) Oral every week  folic acid 1 milliGRAM(s) Oral daily  furosemide   Injectable 60 milliGRAM(s) IV Push two times a day  heparin  Infusion.  Unit(s)/Hr (19 mL/Hr) IV Continuous <Continuous>  hydrALAZINE 50 milliGRAM(s) Oral three times a day  hydrocortisone hemorrhoidal Suppository 1 Suppository(s) Rectal at bedtime  insulin glargine Injectable (LANTUS) 10 Unit(s) SubCutaneous at bedtime  insulin lispro (HumaLOG) corrective regimen sliding scale   SubCutaneous every 6 hours  labetalol 300 milliGRAM(s) Oral two times a day  levETIRAcetam 1000 milliGRAM(s) Oral two times a day  mycophenolate mofetil Suspension 500 milliGRAM(s) Oral every 12 hours  predniSONE   Tablet 5 milliGRAM(s) Oral daily  sodium bicarbonate 650 milliGRAM(s) Oral two times a day  tamsulosin 0.4 milliGRAM(s) Oral at bedtime  thiamine 100 milliGRAM(s) Oral daily      PHYSICAL EXAM:  T(C): 36.9 (09-09-19 @ 13:00), Max: 37.1 (09-09-19 @ 12:11)  HR: 74 (09-09-19 @ 13:00) (70 - 74)  BP: 172/81 (09-09-19 @ 13:00) (157/77 - 176/81)  RR: 18 (09-09-19 @ 13:00) (17 - 18)  SpO2: 94% (09-09-19 @ 13:00) (94% - 98%)  Wt(kg): --  I&O's Summary    08 Sep 2019 07:01  -  09 Sep 2019 07:00  --------------------------------------------------------  IN: 2062 mL / OUT: 300 mL / NET: 1762 mL    09 Sep 2019 07:01  -  09 Sep 2019 20:21  --------------------------------------------------------  IN: 0 mL / OUT: 2250 mL / NET: -2250 mL            Appearance: NAD sleepy   HEENT:  dry oral mucosa, PERRL, EOMI	  Lymphatic: No lymphadenopathy , no edema  Cardiovascular: Normal S1 S2, No JVD, No murmurs , Peripheral pulses palpable 2+ bilaterally  Respiratory: Lungs clear to auscultation, normal effort 	  Gastrointestinal:  Soft, Non-tender, + BS	  Skin: No rashes, No ecchymoses, No cyanosis, warm to touch  Musculoskeletal: Normal range of motion, normal strength  Psychiatry:  at times confused   Ext: No edema    LABS:    CARDIAC MARKERS:                                8.4    7.86  )-----------( 165      ( 09 Sep 2019 10:58 )             27.5     09-08    136  |  97  |  47<H>  ----------------------------<  134<H>  3.4<L>   |  24  |  5.75<H>    Ca    10.5      08 Sep 2019 07:01      proBNP:   Lipid Profile:   HgA1c:   TSH:             TELEMETRY: 	    ECG:  	  RADIOLOGY:   DIAGNOSTIC TESTING:  [ ] Echocardiogram:  [ ]  Catheterization:  [ ] Stress Test:    OTHER:
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
## 2019-09-09 NOTE — PROGRESS NOTE ADULT - SUBJECTIVE AND OBJECTIVE BOX
PRE OPERATIVE NOTE    Pre-op Diagnosis: Fat Pad  Procedure: Fat Pad biopsy  Surgeon: Franklyn Clancy                          8.4    7.86  )-----------( 165      ( 09 Sep 2019 10:58 )             27.5     09-08    136  |  97  |  47<H>  ----------------------------<  134<H>  3.4<L>   |  24  |  5.75<H>    Ca    10.5      08 Sep 2019 07:01        PTT - ( 09 Sep 2019 15:45 )  PTT:98.6 sec    CAPILLARY BLOOD GLUCOSE      POCT Blood Glucose.: 130 mg/dL (09 Sep 2019 12:51)      Type & Screen: pending  CXR: IMPRESSION:  Clear lungs.9/3  EKG: pending PRE OPERATIVE NOTE    Pre-op Diagnosis: Fat Pad  Procedure: Fat Pad biopsy  Surgeon: Franklyn Clancy                          8.4    7.86  )-----------( 165      ( 09 Sep 2019 10:58 )             27.5     09-08    136  |  97  |  47<H>  ----------------------------<  134<H>  3.4<L>   |  24  |  5.75<H>    Ca    10.5      08 Sep 2019 07:01        PTT - ( 09 Sep 2019 15:45 )  PTT:98.6 sec    CAPILLARY BLOOD GLUCOSE      POCT Blood Glucose.: 130 mg/dL (09 Sep 2019 12:51)      Type & Screen: pending  CXR: IMPRESSION: Clear lungs.9/3  EKG: pending  UA: pending

## 2019-09-09 NOTE — PROGRESS NOTE ADULT - ASSESSMENT
1. Anemia sec to CKD    -- H/H in 7-8s, stable;   MMA and Homocysteine confirms B12 deficiency +/- folate def  -- B12 1000 mcg IM x 1 given 8/31;  will order B12 1000 mcg IM weekly for 3 further doses then continue monthly;  PO FA 1mg QD;    -- No hemolysis  -- Iron studies c/w Anemia of Chronic Inflammation  -- FOBT neg  -- transfuse prn hgb <7  -- rectal bleeding, GI suspects hemrhds  -- darbepoietin w HD per renal    2. thrombocytopenia, resolved    -- reactive and consumptive, abx   -- most likely related to h/o HCV, ? Mycophenolate contributing  -- monitor for now    3. LE edema   --noted age indeterminate DVT b/l LE above knee; provoked by acute illness and immobilizaiton;  no indication for hypercoag w/u   --pt had been on and off AC in past for Afib  --on heparin drip.  Hold Coumadin bridge until after fat pad biopsy     4.  Fevers  --ID w/u, s/p LP;  on empric abx for GNR bacteremia.    5. MGUS -- reviewed previous records from [facility] from 3/2017. Noted to have 5% plasma cells. No congo red staining done. Immunoglobulins were nml, free kappa 43, free lambda 85, free k/l ratio nml. Overall, low suspicion that he has progressed to MM given low M spike and nml k/l ratio.   -- immunoglobulins not elevated  -- hold BMBx for now;  fat pad bx pending.      Will follow, [phone]

## 2019-09-09 NOTE — SWALLOW BEDSIDE ASSESSMENT ADULT - SWALLOW EVAL: DIAGNOSIS
Pt presents with evidence of an oropharyngeal dysphagia characterized by delayed oral transit time on hard solid food, delayed pharyngeal swallow on  nectar thick and thin fluid and s/s suggestive of laryngeal penetration/aspiration on thin liquid.

## 2019-09-09 NOTE — PROGRESS NOTE ADULT - PROBLEM SELECTOR PLAN 2
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 now with KAMRON over CKD stage 5  ,now started on dialysis since 8/27/19. Plan for HD today. Can HOLD Lasix. Cont dialysis and may even need it daily.  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 Patient should still have renal biopsy done however will hold off on Fat pad biopsy.

## 2019-09-09 NOTE — PROGRESS NOTE ADULT - ASSESSMENT
74 year old male, consulted for fat pad biopsy.    - OR planning for biopsy; previous surgery canceled in setting of multiple acute concerns  - care per primary team  - will follow up with team and family regarding timing of procedure    --SATNAM Davis, [phone]

## 2019-09-09 NOTE — SWALLOW BEDSIDE ASSESSMENT ADULT - SLP PERTINENT HISTORY OF CURRENT PROBLEM
H&P: 75 y/o male with PMHx of  ESRD s/p /p failed renal transplant 4 year ago and successful renal transplant 1 year ago,DM, HTN, cardiomyopathy 2/2 cocaine abuse, Hepatitis C, meningococcal meningitiss presenting with concerns about his kidney function, worsening SOB over the past 2 weeks and leg edema.  Found to have anemia in ED. Renal following re: KAMRON, renal transplant. KAMRON possibly CKD from fluid overload and possibly from medication non-compliance. Started on dialysis.

## 2019-09-09 NOTE — PROGRESS NOTE ADULT - PROBLEM SELECTOR PLAN 1
Patient with waxing/waning sensorium concerning for central nervous process. Today patient is more alert and awake with dialysis. Patient should have speech and swallow done if mental status continues to improve so he can eat.

## 2019-09-09 NOTE — PROVIDER CONTACT NOTE (MEDICATION) - ASSESSMENT
Pt V/S stable see flowsheet and only able to take meds crushed in apple sauce, pending speech and swallow. A&OX2-3.

## 2019-09-09 NOTE — PROGRESS NOTE ADULT - SUBJECTIVE AND OBJECTIVE BOX
[facility] Division of Kidney Diseases & Hypertension  FOLLOW UP NOTE  [phone]--------------------------------------------------------------------------------  Chief Complaint:Acute renal failure      24 hour events/subjective: No acute events overnight. Patient seen in dialysis. More alert and oriented to self and place. Patient states he feels fine without any concerns. Labs and vital signs reviewed. Vital signs stable. Patient remains afebrile. Labs stable on dialysis        PAST HISTORY  --------------------------------------------------------------------------------  No significant changes to PMH, PSH, FHx, SHx, unless otherwise noted    ALLERGIES & MEDICATIONS  --------------------------------------------------------------------------------  Allergies    No Known Allergies    Intolerances      Standing Inpatient Medications  allopurinol 100 milliGRAM(s) Oral daily  atorvastatin 40 milliGRAM(s) Oral at bedtime  cefTRIAXone   IVPB 1000 milliGRAM(s) IV Intermittent every 24 hours  chlorhexidine 2% Cloths 1 Application(s) Topical daily  cinacalcet 30 milliGRAM(s) Oral daily  cyanocobalamin Injectable 1000 MICROGram(s) IntraMuscular <User Schedule>  ergocalciferol 82638 Unit(s) Oral every week  folic acid 1 milliGRAM(s) Oral daily  furosemide   Injectable 60 milliGRAM(s) IV Push two times a day  heparin  Infusion.  Unit(s)/Hr IV Continuous <Continuous>  hydrocortisone hemorrhoidal Suppository 1 Suppository(s) Rectal at bedtime  hydrocortisone sodium succinate Injectable 10 milliGRAM(s) IV Push every 12 hours  insulin glargine Injectable (LANTUS) 10 Unit(s) SubCutaneous at bedtime  insulin lispro (HumaLOG) corrective regimen sliding scale   SubCutaneous every 6 hours  labetalol 300 milliGRAM(s) Oral two times a day  levETIRAcetam 1000 milliGRAM(s) Oral two times a day  mycophenolate mofetil Suspension 500 milliGRAM(s) Oral every 12 hours  sodium bicarbonate 650 milliGRAM(s) Oral two times a day  tamsulosin 0.4 milliGRAM(s) Oral at bedtime  thiamine 100 milliGRAM(s) Oral daily    PRN Inpatient Medications  acetaminophen   Tablet .. 650 milliGRAM(s) Oral every 6 hours PRN  aluminum hydroxide/magnesium hydroxide/simethicone Suspension 30 milliLiter(s) Oral every 6 hours PRN  dextrose 40% Gel 15 Gram(s) Oral once PRN  glucagon  Injectable 1 milliGRAM(s) IntraMuscular once PRN  heparin  Injectable 9000 Unit(s) IV Push every 6 hours PRN  heparin  Injectable 4000 Unit(s) IV Push every 6 hours PRN  hydrALAZINE Injectable 10 milliGRAM(s) IV Push every 4 hours PRN      REVIEW OF SYSTEMS  --------------------------------------------------------------------------------  Gen: No  fevers/chills  Skin: No rashes  Head/Eyes/Ears/Mouth: No headache; Normal hearing; Normal vision w/o blurriness  Respiratory: No dyspnea, cough, wheezing, hemoptysis  CV: No chest pain, PND, orthopnea  GI: No abdominal pain, diarrhea, constipation, nausea, vomiting  : No increased frequency, dysuria, hematuria, nocturia  MSK: No joint pain/swelling; no back pain; no edema  Neuro: No dizziness/lightheadedness, weakness, seizures, numbness, tingling      All other systems were reviewed and are negative, except as noted.    VITALS/PHYSICAL EXAM  --------------------------------------------------------------------------------  T(C): 36.9 (09-09-19 @ 13:00), Max: 37.1 (09-09-19 @ 12:11)  HR: 74 (09-09-19 @ 13:00) (70 - 76)  BP: 172/81 (09-09-19 @ 13:00) (153/86 - 176/81)  RR: 18 (09-09-19 @ 13:00) (17 - 18)  SpO2: 94% (09-09-19 @ 13:00) (94% - 98%)  Wt(kg): --        09-08-19 @ 07:01  -  09-09-19 @ 07:00  --------------------------------------------------------  IN: 2062 mL / OUT: 300 mL / NET: 1762 mL    09-09-19 @ 07:01  -  09-09-19 @ 13:50  --------------------------------------------------------  IN: 0 mL / OUT: 2000 mL / NET: -2000 mL      Physical Exam:  	Gen: NAD  	HEENT supple neck, clear oropharynx  	Pulm: CTA B/L  	CV: RRR, S1S2; no rub  	Abd: +BS, soft, nontender/nondistended  	: No suprapubic tenderness  	LE: Warm, no edema    LABS/STUDIES  --------------------------------------------------------------------------------              8.4    7.86  >-----------<  165      [09-09-19 @ 10:58]              27.5     136  |  97  |  47  ----------------------------<  134      [09-08-19 @ 07:01]  3.4   |  24  |  5.75        Ca     10.5     [09-08-19 @ 07:01]        PTT: 78.8       [09-08-19 @ 14:01]      Creatinine Trend:  SCr 5.75 [09-08 @ 07:01]  SCr 4.79 [09-07 @ 11:42]  SCr 6.77 [09-06 @ 05:13]  SCr 5.38 [09-05 @ 05:50]  SCr 6.63 [09-04 @ 04:16]    Urinalysis - [09-02-19 @ 23:57]      Color Orange / Appearance Turbid / SG 1.014 / pH 8.0      Gluc Negative / Ketone Negative  / Bili Negative / Urobili <2 mg/dL       Blood Moderate / Protein >600 mg/dL / Leuk Est Large / Nitrite Negative       / WBC >720 / Hyaline 3 / Gran  / Sq Epi  / Non Sq Epi 10 / Bacteria TNTC      Iron 45, TIBC 159, %sat 28      [09-02-19 @ 17:29]  Ferritin 260      [09-02-19 @ 17:29]  PTH -- (Ca 9.7)      [09-02-19 @ 17:29]   1231  Vitamin D (25OH) <4.0      [09-02-19 @ 17:28]      Free Light Chains: kappa 5.95, lambda 16.19, ratio = 0.37      [09-04 @ 23:44]  Immunofixation Serum: IgG Lambda Band Identified    Reference Range: None Detected      [09-04-19 @ 23:44]  SPEP Interpretation: Gamma Migrating Paraprotein Identified      [09-04-19 @ 23:44]

## 2019-09-09 NOTE — CHART NOTE - NSCHARTNOTEFT_GEN_A_CORE
Nutrition Follow Up Note  Patient seen for: Chart reviewed and events noted. Pt seen today for NPO status > 4 days.  Per chart, 75 y/o male with PMH of T2DM, ESRD s/p DDRT 1 year ago, HTN, admitted with SOB x 2 weeks and not taking medication x 2 weeks, ADHF, HTN urgency and renal failure requiring HD, bilateral DVT started on Coumadin. Pt is pending SLP swallow evaluation today, seen by SLP therapist at time of visit.     Source: NP, EMR    Diet : NPO since 9/3     Patient reports: no acute GI distress noted, +BM     PO intake :     Source for PO intake:     Enteral /Parenteral Nutrition:       Daily Weight in k.9 (), Weight in k.4 (), Weight in k (), Weight in k (), Weight in k.3 (), Weight in k (), Weight in k.7 ()  % Weight Change 218 pounds - 195.9 pounds post HD today - 22 pounds loss - actual wt loss + fluid shift from HD    Pertinent Medications: MEDICATIONS  (STANDING):  allopurinol 100 milliGRAM(s) Oral daily  atorvastatin 40 milliGRAM(s) Oral at bedtime  cefTRIAXone   IVPB 1000 milliGRAM(s) IV Intermittent every 24 hours  chlorhexidine 2% Cloths 1 Application(s) Topical daily  cinacalcet 30 milliGRAM(s) Oral daily  cyanocobalamin Injectable 1000 MICROGram(s) IntraMuscular <User Schedule>  dextrose 5%. 1000 milliLiter(s) (50 mL/Hr) IV Continuous <Continuous>  dextrose 5%. 1000 milliLiter(s) (50 mL/Hr) IV Continuous <Continuous>  dextrose 50% Injectable 12.5 Gram(s) IV Push once  dextrose 50% Injectable 25 Gram(s) IV Push once  dextrose 50% Injectable 25 Gram(s) IV Push once  ergocalciferol 79121 Unit(s) Oral every week  folic acid 1 milliGRAM(s) Oral daily  furosemide   Injectable 60 milliGRAM(s) IV Push two times a day  heparin  Infusion.  Unit(s)/Hr (19 mL/Hr) IV Continuous <Continuous>  hydrocortisone hemorrhoidal Suppository 1 Suppository(s) Rectal at bedtime  hydrocortisone sodium succinate Injectable 10 milliGRAM(s) IV Push every 12 hours  insulin glargine Injectable (LANTUS) 10 Unit(s) SubCutaneous at bedtime  insulin lispro (HumaLOG) corrective regimen sliding scale   SubCutaneous every 6 hours  labetalol 300 milliGRAM(s) Oral two times a day  levETIRAcetam 1000 milliGRAM(s) Oral two times a day  mycophenolate mofetil Suspension 500 milliGRAM(s) Oral every 12 hours  sodium bicarbonate 650 milliGRAM(s) Oral two times a day  tamsulosin 0.4 milliGRAM(s) Oral at bedtime  thiamine 100 milliGRAM(s) Oral daily    MEDICATIONS  (PRN):  acetaminophen   Tablet .. 650 milliGRAM(s) Oral every 6 hours PRN Temp greater or equal to 38C (100.4F)  aluminum hydroxide/magnesium hydroxide/simethicone Suspension 30 milliLiter(s) Oral every 6 hours PRN Dyspepsia  dextrose 40% Gel 15 Gram(s) Oral once PRN Blood Glucose LESS THAN 70 milliGRAM(s)/deciliter  glucagon  Injectable 1 milliGRAM(s) IntraMuscular once PRN Glucose LESS THAN 70 milligrams/deciliter  heparin  Injectable 9000 Unit(s) IV Push every 6 hours PRN For aPTT less than 40  heparin  Injectable 4000 Unit(s) IV Push every 6 hours PRN For aPTT between 40 - 57  hydrALAZINE Injectable 10 milliGRAM(s) IV Push every 4 hours PRN For SBP > 160      Finger Sticks:  POCT Blood Glucose.: 130 mg/dL ( @ 12:51)  POCT Blood Glucose.: 119 mg/dL ( @ 06:33)  POCT Blood Glucose.: 130 mg/dL ( @ 23:48)  POCT Blood Glucose.: 112 mg/dL ( @ 18:11)      Skin per nursing documentation: no pressure injury  Edema: + 1 bilateral ankles and feet    Estimated Needs:   [x ] no change since previous assessment  [ ] recalculated:     Previous Nutrition Diagnosis: inadequate energy intakes  Nutrition Diagnosis is: ongoing     New Nutrition Diagnosis: moderate acute malnutrition  Related to: changes in mental status and swallowing difficulty   As evidenced by: Pt was lethargic, has been NPO since 9/3, s/p SLP swallow evaluation pending results     Interventions:     Recommend  1) Consider Consistent Carbohydrate diet with Nepro 1 x day in consistency recommended by SLP swallow evaluation.   2) Continue to monitor renal indices to see if modification is necessary.  3) RD remains available.     Monitoring and Evaluation:     Continue to monitor Nutritional intake, Tolerance to diet prescription, weights, labs, skin integrity    RD remains available upon request and will follow up per protocol

## 2019-09-09 NOTE — PROVIDER CONTACT NOTE (MEDICATION) - BACKGROUND
Pt admitted for htn urgency, hematochezia, anemia. PMH of kidney transplant, anuria, GERD and type 2 DM.

## 2019-09-09 NOTE — PROGRESS NOTE ADULT - SUBJECTIVE AND OBJECTIVE BOX
CC: f/u for  E coli bacteremia    Patient reports no complaints    REVIEW OF SYSTEMS:  Confused & unable to provide history, but cooperative to exam     Antimicrobials Day #  : 7  cefTRIAXone   IVPB 1000 milliGRAM(s) IV Intermittent every 24 hours    Other Medications Reviewed    T(F): 98.5 (09-09-19 @ 13:00), Max: 98.8 (09-09-19 @ 12:11)  HR: 74 (09-09-19 @ 13:00)  BP: 172/81 (09-09-19 @ 13:00)  RR: 18 (09-09-19 @ 13:00)  SpO2: 94% (09-09-19 @ 13:00)  Wt(kg): --    PHYSICAL EXAM:  General: no acute distress  Eyes:  anicteric, no conjunctival injection, no discharge  Neck: supple  Lungs: clear to auscultation  Heart: regular rate and rhythm; no murmur  Abdomen: soft, nondistended, nontender  Skin: no lesions  Extremities: no clubbing or cyanosis. Bilateral edema. Left forearm AVF   Neurologic: awake, confused &, moves all extremities    LAB RESULTS:                        8.4    7.86  )-----------( 165      ( 09 Sep 2019 10:58 )             27.5     09-08    136  |  97  |  47<H>  ----------------------------<  134<H>  3.4<L>   |  24  |  5.75<H>    Ca    10.5      08 Sep 2019 07:01          MICROBIOLOGY:  RECENT CULTURES:  09-05 @ 17:06 .CSF     No growth at 3 days.    No polymorphonuclear cells seen  No organisms seen  by cytocentrifuge    09-05 @ 14:42 .Blood     No growth to date.          RADIOLOGY REVIEWED:

## 2019-09-09 NOTE — PROGRESS NOTE ADULT - ATTENDING COMMENTS
I have seen this patient with the fellow and agree with their assessment and plan.     Jennifer Orellana MD  Cell   Pager   Office

## 2019-09-09 NOTE — PROGRESS NOTE ADULT - ASSESSMENT
73 yo male with history of A Fib, Hep C, DM, ESRD s/p renal transplant x 2, on tacrolimus, myfortic, and 5 mg of prednisone along with prophylactic valtrex.  His CMV viral loads were negative as per prior transplant team.  Admitted on 8/26 with progressive CKD and rectal bleeding.  Required HD and developed fever 9/2  His Hep B surface antibody is positive, Hep C RNA is negative  Now found to have E coli in blood, primary focus not clear, typically GI or  origin  S/p LP for AMS, no cells, protein 126, glucose 67, ME PCR panel is negative, CMV PCR negative, CSF cytology negative  serum crypt & histo antigens are negative  ? Toxic-Metabolic encephalopathy  Fever resolved  Repeat blood cultures are negative so far  Amyloid and underlying LP disorder being evaluated  Tacrolimus & Myfortic has been stopped, still on Cellcept & IV steroids  Will consider adding antiviral prophylaxis, I believe he was on this in past    Suggest:  1.will continue CTX given E Coli in blood, full doses in renal failure  2.Consider CT of A/P ,  "GNR" bacteremia of unclear primary focus  3.? renaly dosed valganciclovir

## 2019-09-09 NOTE — SWALLOW BEDSIDE ASSESSMENT ADULT - SLP GENERAL OBSERVATIONS
Pt found in bed, sleeping upon encounter. Pt awakens to verbal stimulation. Pt cooperative and following directions for  evaluation purposes. Requires some re-direction to task, becomes confused at times. A&O to name and place.

## 2019-09-09 NOTE — CHART NOTE - NSCHARTNOTEFT_GEN_A_CORE
Upon Nutritional Assessment by the Registered Dietitian your patient was determined to meet criteria / has evidence of the following diagnosis/diagnoses:          [ ]  Mild Protein Calorie Malnutrition        [x ]  Moderate Protein Calorie Malnutrition        [ ] Severe Protein Calorie Malnutrition        [ ] Unspecified Protein Calorie Malnutrition        [ ] Underweight / BMI <19        [ ] Morbid Obesity / BMI > 40      Findings as based on:  [x ] Comprehensive nutrition assessment   [ ] Nutrition Focused Physical Exam  [x ] Other:  Pt was lethargic, has been NPO since 9/3, s/p SLP swallow evaluation pending results      Nutrition Plan/Recommendations:  Recommend Consistent Carbohydrate diet with Nepro 1 x day in consistency recommended by SLP swallow evaluation.         PROVIDER Section:     By signing this assessment you are acknowledging and agree with the diagnosis/diagnoses assigned by the Registered Dietitian    Comments:

## 2019-09-09 NOTE — PROGRESS NOTE ADULT - SUBJECTIVE AND OBJECTIVE BOX
INTERVAL HPI/OVERNIGHT EVENTS:    Patient alert and interactive, denies abdominal pain, n/v    MEDICATIONS  (STANDING):  allopurinol 100 milliGRAM(s) Oral daily  atorvastatin 40 milliGRAM(s) Oral at bedtime  cefTRIAXone   IVPB 1000 milliGRAM(s) IV Intermittent every 24 hours  chlorhexidine 2% Cloths 1 Application(s) Topical daily  cinacalcet 30 milliGRAM(s) Oral daily  cyanocobalamin Injectable 1000 MICROGram(s) IntraMuscular <User Schedule>  dextrose 5%. 1000 milliLiter(s) (50 mL/Hr) IV Continuous <Continuous>  dextrose 5%. 1000 milliLiter(s) (50 mL/Hr) IV Continuous <Continuous>  dextrose 50% Injectable 12.5 Gram(s) IV Push once  dextrose 50% Injectable 25 Gram(s) IV Push once  dextrose 50% Injectable 25 Gram(s) IV Push once  ergocalciferol 23738 Unit(s) Oral every week  folic acid 1 milliGRAM(s) Oral daily  furosemide   Injectable 60 milliGRAM(s) IV Push two times a day  heparin  Infusion.  Unit(s)/Hr (19 mL/Hr) IV Continuous <Continuous>  hydrocortisone hemorrhoidal Suppository 1 Suppository(s) Rectal at bedtime  hydrocortisone sodium succinate Injectable 10 milliGRAM(s) IV Push every 12 hours  insulin glargine Injectable (LANTUS) 10 Unit(s) SubCutaneous at bedtime  insulin lispro (HumaLOG) corrective regimen sliding scale   SubCutaneous every 6 hours  labetalol 300 milliGRAM(s) Oral two times a day  levETIRAcetam 1000 milliGRAM(s) Oral two times a day  mycophenolate mofetil Suspension 500 milliGRAM(s) Oral every 12 hours  sodium bicarbonate 650 milliGRAM(s) Oral two times a day  tamsulosin 0.4 milliGRAM(s) Oral at bedtime  thiamine 100 milliGRAM(s) Oral daily    MEDICATIONS  (PRN):  acetaminophen   Tablet .. 650 milliGRAM(s) Oral every 6 hours PRN Temp greater or equal to 38C (100.4F)  aluminum hydroxide/magnesium hydroxide/simethicone Suspension 30 milliLiter(s) Oral every 6 hours PRN Dyspepsia  dextrose 40% Gel 15 Gram(s) Oral once PRN Blood Glucose LESS THAN 70 milliGRAM(s)/deciliter  glucagon  Injectable 1 milliGRAM(s) IntraMuscular once PRN Glucose LESS THAN 70 milligrams/deciliter  heparin  Injectable 9000 Unit(s) IV Push every 6 hours PRN For aPTT less than 40  heparin  Injectable 4000 Unit(s) IV Push every 6 hours PRN For aPTT between 40 - 57  hydrALAZINE Injectable 10 milliGRAM(s) IV Push every 4 hours PRN For SBP > 160      Allergies    No Known Allergies    Intolerances        Review of Systems:    General:  No wt loss, fevers, chills, night sweats,fatigue,   Eyes:  Good vision, no reported pain  ENT:  No sore throat, pain, runny nose, dysphagia  CV:  No pain, palpitations, hypo/hypertension  Resp:  No dyspnea, cough, tachypnea, wheezing  GI:  No pain, No nausea, No vomiting, No diarrhea, No constipation, No weight loss, No fever, No pruritis, No rectal bleeding, No melena, No dysphagia  :  No pain, bleeding, incontinence, nocturia  Muscle:  No pain, weakness  Neuro:  No weakness, tingling, memory problems  Psych:  No fatigue, insomnia, mood problems, depression  Endocrine:  No polyuria, polydypsia, cold/heat intolerance  Heme:  No petechiae, ecchymosis, easy bruisability  Skin:  No rash, tattoos, scars, edema      Vital Signs Last 24 Hrs  T(C): 36.9 (09 Sep 2019 13:00), Max: 37.1 (09 Sep 2019 12:11)  T(F): 98.5 (09 Sep 2019 13:00), Max: 98.8 (09 Sep 2019 12:11)  HR: 74 (09 Sep 2019 13:00) (70 - 74)  BP: 172/81 (09 Sep 2019 13:00) (157/77 - 176/81)  BP(mean): --  RR: 18 (09 Sep 2019 13:00) (17 - 18)  SpO2: 94% (09 Sep 2019 13:00) (94% - 98%)    PHYSICAL EXAM:    Constitutional: NAD, well-developed  HEENT: EOMI, throat clear  Neck: No LAD, supple  Respiratory: CTA and P  Cardiovascular: S1 and S2, RRR, no M  Gastrointestinal: BS+, soft, NT/ND, neg HSM,  Extremities: No peripheral edema, neg clubing, cyanosis  Vascular: 2+ peripheral pulses  Neurological: A/O x 3, no focal deficits  Psychiatric: Normal mood, normal affect  Skin: No rashes      LABS:                        8.4    7.86  )-----------( 165      ( 09 Sep 2019 10:58 )             27.5     09-08    136  |  97  |  47<H>  ----------------------------<  134<H>  3.4<L>   |  24  |  5.75<H>    Ca    10.5      08 Sep 2019 07:01      PTT - ( 08 Sep 2019 14:01 )  PTT:78.8 sec      RADIOLOGY & ADDITIONAL TESTS:

## 2019-09-09 NOTE — PROGRESS NOTE ADULT - SUBJECTIVE AND OBJECTIVE BOX
Patient is a 74y old  Male who presents with a chief complaint of KAMRON (08 Sep 2019 08:23)                                                               INTERVAL HPI/OVERNIGHT EVENTS:    REVIEW OF SYSTEMS:     CONSTITUTIONAL: No weakness, fevers or chills  RESPIRATORY: No cough, wheezing,  No shortness of breath  CARDIOVASCULAR: No chest pain or palpitations  GASTROINTESTINAL: No abdominal pain  . No nausea, vomiting, or hematemesis; No diarrhea or constipation. No melena or hematochezia.  GENITOURINARY: No dysuria, frequency or hematuria  NEUROLOGICAL: No numbness or weakness                                                                                                                                                                                                                                                                                   Medications:  MEDICATIONS  (STANDING):  allopurinol 100 milliGRAM(s) Oral daily  atorvastatin 40 milliGRAM(s) Oral at bedtime  cefTRIAXone   IVPB 1000 milliGRAM(s) IV Intermittent every 24 hours  chlorhexidine 2% Cloths 1 Application(s) Topical daily  cinacalcet 30 milliGRAM(s) Oral daily  cyanocobalamin Injectable 1000 MICROGram(s) IntraMuscular <User Schedule>  dextrose 5%. 1000 milliLiter(s) (50 mL/Hr) IV Continuous <Continuous>  dextrose 5%. 1000 milliLiter(s) (50 mL/Hr) IV Continuous <Continuous>  dextrose 50% Injectable 12.5 Gram(s) IV Push once  dextrose 50% Injectable 25 Gram(s) IV Push once  dextrose 50% Injectable 25 Gram(s) IV Push once  ergocalciferol 15905 Unit(s) Oral every week  folic acid 1 milliGRAM(s) Oral daily  furosemide   Injectable 60 milliGRAM(s) IV Push two times a day  heparin  Infusion.  Unit(s)/Hr (19 mL/Hr) IV Continuous <Continuous>  hydrocortisone hemorrhoidal Suppository 1 Suppository(s) Rectal at bedtime  hydrocortisone sodium succinate Injectable 10 milliGRAM(s) IV Push every 12 hours  insulin glargine Injectable (LANTUS) 10 Unit(s) SubCutaneous at bedtime  insulin lispro (HumaLOG) corrective regimen sliding scale   SubCutaneous every 6 hours  labetalol 300 milliGRAM(s) Oral two times a day  levETIRAcetam 1000 milliGRAM(s) Oral two times a day  mycophenolate mofetil Suspension 500 milliGRAM(s) Oral every 12 hours  sodium bicarbonate 650 milliGRAM(s) Oral two times a day  tamsulosin 0.4 milliGRAM(s) Oral at bedtime  thiamine 100 milliGRAM(s) Oral daily    MEDICATIONS  (PRN):  acetaminophen   Tablet .. 650 milliGRAM(s) Oral every 6 hours PRN Temp greater or equal to 38C (100.4F)  aluminum hydroxide/magnesium hydroxide/simethicone Suspension 30 milliLiter(s) Oral every 6 hours PRN Dyspepsia  dextrose 40% Gel 15 Gram(s) Oral once PRN Blood Glucose LESS THAN 70 milliGRAM(s)/deciliter  glucagon  Injectable 1 milliGRAM(s) IntraMuscular once PRN Glucose LESS THAN 70 milligrams/deciliter  heparin  Injectable 9000 Unit(s) IV Push every 6 hours PRN For aPTT less than 40  heparin  Injectable 4000 Unit(s) IV Push every 6 hours PRN For aPTT between 40 - 57  hydrALAZINE Injectable 10 milliGRAM(s) IV Push every 4 hours PRN For SBP > 160       Allergies    No Known Allergies    Intolerances      Vital Signs Last 24 Hrs  T(C): 36.9 (09 Sep 2019 13:00), Max: 37.1 (09 Sep 2019 12:11)  T(F): 98.5 (09 Sep 2019 13:00), Max: 98.8 (09 Sep 2019 12:11)  HR: 74 (09 Sep 2019 13:00) (70 - 74)  BP: 172/81 (09 Sep 2019 13:00) (157/77 - 176/81)  BP(mean): --  RR: 18 (09 Sep 2019 13:00) (17 - 18)  SpO2: 94% (09 Sep 2019 13:00) (94% - 98%)  CAPILLARY BLOOD GLUCOSE      POCT Blood Glucose.: 130 mg/dL (09 Sep 2019 12:51)  POCT Blood Glucose.: 119 mg/dL (09 Sep 2019 06:33)  POCT Blood Glucose.: 130 mg/dL (08 Sep 2019 23:48)  POCT Blood Glucose.: 112 mg/dL (08 Sep 2019 18:11)       @ 07:  -   @ 07:00  --------------------------------------------------------  IN: 2062 mL / OUT: 300 mL / NET: 1762 mL     @ 07: @ 17:41  --------------------------------------------------------  IN: 0 mL / OUT: 2250 mL / NET: -2250 mL      Physical Exam:    Daily     Daily Weight in k.9 (09 Sep 2019 12:11)  General: NAD   HEENT:  Nonicteric, PERRLA  CV:  RRR, S1S2   Lungs:  CTA B/L, no wheezes, rales, rhonchi  Abdomen:  Soft, non-tender, no distended, positive BS  Extremities:  2+ pulses, no c/c, no edema  Skin:  Warm and dry, no rashes  :  No matt  Neuro:  more alert   follows commands    moving all extremities                                                                                                                                                                                                                                                                 LABS:                               8.4    7.86  )-----------( 165      ( 09 Sep 2019 10:58 )             27.5                          136  |  97  |  47<H>  ----------------------------<  134<H>  3.4<L>   |  24  |  5.75<H>    Ca    10.5      08 Sep 2019 07:01

## 2019-09-09 NOTE — SWALLOW BEDSIDE ASSESSMENT ADULT - PHARYNGEAL PHASE
Within functional limits Delayed pharyngeal swallow Delayed pharyngeal swallow/Throat clear post oral intake/Wet vocal quality post oral intake

## 2019-09-09 NOTE — PROGRESS NOTE ADULT - ASSESSMENT
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   Pt just moved back to [place] from [place] .. reports that he has not been taking his meds for the past few days since " he might have misplaed them"     pt denies chest pain, syncope,fever, chills, cough, urinary symptoms.    in ED found  to have anemia   Nno acute changes on EKG   elevated CR and Trop    1- HTN urgency :   not well controlled    adjust meds  accordingly     2- KAMRON  :   d/w   : pt will likely end up on HD in intermediate and likely long term given transplant graft failure   off tacro,  cont cellcept and sterioods   D/W  : planned bx       3- DM:   A1c  5.6  Fs     4- acute diastolic CHF sec to  HTN urgency and renal failure   Echo :  < from: Transthoracic Echocardiogram (08.28.19 @ 16:12) >  1. Mitral annular calcification and calcified mitral  leaflets with decreased diastolic opening.  2. Moderate to severe concentric left ventricular  hypertrophy.  3. Normal left ventricular systolic function with  mid-cavitary obliteration.  4. Normal right ventricular size and systolic function.  5. Small pericardial effusion.    cont fluid removal with HD per renal      5-  difficult ambulating :  no focal neuro deficits   neuro eval appreciated    CT T/L   < from: CT Thoracic Spine No Cont (08.30.19 @ 10:06) >    Old right L1 transverse process fracture. Bilateral lysis   defects at L5 as seen on the prior 5/26/2013      6- afib :hold AC ..holding for now for bx /LP     7- hematochezia : per sister : on and off small amount of fresh blood in stool and some amount in urine but only small amounts   monitor H/H   consult GI .: appreciate input :  suspect bleeding to be hemorrhoidal, now resolved     trial of anusol supp  if bleeding persist would consider colonoscopy however patient is reluctant.    heme input: appreciated        8-  renal transplant : f/u with Transplant team   cont meds with MMF and steroids   off Tac   planned Bx however pt refused     9- TIA :  unable to perform MRI   CT no acute changes on CT   repeat negative   fu with neuro     10 encephalopathy : LP : high protien   blood culture positive for GNR /Ecoli ..  ? source :  urine was not sent   CT c/a/p   CTH with contrast : neg for acute pathology       11- paraprotienmeia : d/w heme and renal   will need to consider amyloidosis .. doubt MM   holding on Fat pad bx for now       12 hypercalcemia : monitor for now     13- abd discomfort : improved today     check CT abd    check PVR and if >200 will place gutierrez

## 2019-09-09 NOTE — PROGRESS NOTE ADULT - PROBLEM SELECTOR PLAN 3
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 admitted with fluid overload and HTN urgency . Last renal bx was 2017, presumed rejection, congo red was not done. Repeating renal transplant bx for ruling out amyloidosis as proteinuria is significant at 10gm and not sure if a true cause was found in d.w with his primary nephr at [place].  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 admitted with fluid overload and HTN urgency . Last renal bx was 2017, presumed rejection, congo red was not done. Repeating renal transplant bx for ruling out amyloidosis as proteinuria is significant at 10gm and not sure if a true cause was found in d.w with his primary nephr at [place].  Change all meds to IV given not able to take PO  Myfortic 360mg BID to cellcept 250mg IV bid( lowered dose)  Prednisone 5 mg IV  If back on a diet, please change to myfortic 180mg BID and prednisone 5mg for now

## 2019-09-09 NOTE — PROGRESS NOTE ADULT - SUBJECTIVE AND OBJECTIVE BOX
Pt seen, no complaints, more alert today    MEDICATIONS  (STANDING):  allopurinol 100 milliGRAM(s) Oral daily  atorvastatin 40 milliGRAM(s) Oral at bedtime  cefTRIAXone   IVPB 1000 milliGRAM(s) IV Intermittent every 24 hours  chlorhexidine 2% Cloths 1 Application(s) Topical daily  cinacalcet 30 milliGRAM(s) Oral daily  cyanocobalamin Injectable 1000 MICROGram(s) IntraMuscular <User Schedule>  dextrose 5%. 1000 milliLiter(s) (50 mL/Hr) IV Continuous <Continuous>  dextrose 5%. 1000 milliLiter(s) (50 mL/Hr) IV Continuous <Continuous>  dextrose 50% Injectable 12.5 Gram(s) IV Push once  dextrose 50% Injectable 25 Gram(s) IV Push once  dextrose 50% Injectable 25 Gram(s) IV Push once  ergocalciferol 48248 Unit(s) Oral every week  folic acid 1 milliGRAM(s) Oral daily  furosemide   Injectable 60 milliGRAM(s) IV Push two times a day  heparin  Infusion.  Unit(s)/Hr (19 mL/Hr) IV Continuous <Continuous>  hydrocortisone hemorrhoidal Suppository 1 Suppository(s) Rectal at bedtime  hydrocortisone sodium succinate Injectable 10 milliGRAM(s) IV Push every 12 hours  insulin glargine Injectable (LANTUS) 10 Unit(s) SubCutaneous at bedtime  insulin lispro (HumaLOG) corrective regimen sliding scale   SubCutaneous every 6 hours  labetalol 300 milliGRAM(s) Oral two times a day  levETIRAcetam 1000 milliGRAM(s) Oral two times a day  mycophenolate mofetil Suspension 500 milliGRAM(s) Oral every 12 hours  sodium bicarbonate 650 milliGRAM(s) Oral two times a day  tamsulosin 0.4 milliGRAM(s) Oral at bedtime  thiamine 100 milliGRAM(s) Oral daily    MEDICATIONS  (PRN):  acetaminophen   Tablet .. 650 milliGRAM(s) Oral every 6 hours PRN Temp greater or equal to 38C (100.4F)  aluminum hydroxide/magnesium hydroxide/simethicone Suspension 30 milliLiter(s) Oral every 6 hours PRN Dyspepsia  dextrose 40% Gel 15 Gram(s) Oral once PRN Blood Glucose LESS THAN 70 milliGRAM(s)/deciliter  glucagon  Injectable 1 milliGRAM(s) IntraMuscular once PRN Glucose LESS THAN 70 milligrams/deciliter  heparin  Injectable 9000 Unit(s) IV Push every 6 hours PRN For aPTT less than 40  heparin  Injectable 4000 Unit(s) IV Push every 6 hours PRN For aPTT between 40 - 57  hydrALAZINE Injectable 10 milliGRAM(s) IV Push every 4 hours PRN For SBP > 160      ROS  no pain, limited 2/2 participation    Vital Signs Last 24 Hrs  T(C): 36.9 (09 Sep 2019 13:00), Max: 37.1 (09 Sep 2019 12:11)  T(F): 98.5 (09 Sep 2019 13:00), Max: 98.8 (09 Sep 2019 12:11)  HR: 74 (09 Sep 2019 13:00) (70 - 74)  BP: 172/81 (09 Sep 2019 13:00) (157/77 - 176/81)  BP(mean): --  RR: 18 (09 Sep 2019 13:00) (17 - 18)  SpO2: 94% (09 Sep 2019 13:00) (94% - 98%)    PE  NAD  Awake, alert, oriented to place, knows it's year 19 but then says 1920  Anicteric, MMM  RRR  CTAB ant chest, limited effort  Abd soft, NT, ND  No c/c/e  remainder of exam limited 2/2 participation                          8.4    7.86  )-----------( 165      ( 09 Sep 2019 10:58 )             27.5       09-08    136  |  97  |  47<H>  ----------------------------<  134<H>  3.4<L>   |  24  |  5.75<H>    Ca    10.5      08 Sep 2019 07:01

## 2019-09-09 NOTE — PROGRESS NOTE ADULT - SUBJECTIVE AND OBJECTIVE BOX
S: Patient alert and interactive, denies nausea, emesis, chest pain, SOB.    O: Vital Signs  T(C): 36.9 (09-09 @ 05:00), Max: 36.9 (09-09 @ 05:00)  HR: 73 (09-09 @ 05:00) (70 - 76)  BP: 176/81 (09-09 @ 05:00) (153/86 - 176/81)  RR: 18 (09-09 @ 05:00) (17 - 18)  SpO2: 96% (09-09 @ 05:00) (95% - 97%)  09-08-19 @ 07:01  -  09-09-19 @ 07:00  --------------------------------------------------------  IN: 1061 mL / OUT: 300 mL / NET: 761 mL      General: NAD  Resp: airway patent, respirations unlabored  Abdomen: soft, nontender, nondistended  Extremities: no edema, moves all extremities spontaneously  Skin: warm, dry, appropriate color                          8.7    6.72  )-----------( 173      ( 08 Sep 2019 09:36 )             27.8   09-08    136  |  97  |  47<H>  ----------------------------<  134<H>  3.4<L>   |  24  |  5.75<H>    Ca    10.5      08 Sep 2019 07:01

## 2019-09-10 ENCOUNTER — RESULT REVIEW (OUTPATIENT)
Age: 74
End: 2019-09-10

## 2019-09-10 LAB
ANION GAP SERPL CALC-SCNC: 12 MMOL/L — SIGNIFICANT CHANGE UP (ref 5–17)
ANION GAP SERPL CALC-SCNC: 16 MMOL/L — SIGNIFICANT CHANGE UP (ref 5–17)
APPEARANCE UR: ABNORMAL
APTT BLD: 65.1 SEC — HIGH (ref 27.5–36.3)
B BURGDOR DNA SPEC QL NAA+PROBE: NEGATIVE — SIGNIFICANT CHANGE UP
BACTERIA # UR AUTO: NEGATIVE — SIGNIFICANT CHANGE UP
BILIRUB UR-MCNC: NEGATIVE — SIGNIFICANT CHANGE UP
BUN SERPL-MCNC: 26 MG/DL — HIGH (ref 7–23)
BUN SERPL-MCNC: 27 MG/DL — HIGH (ref 7–23)
CALCIUM SERPL-MCNC: 9.1 MG/DL — SIGNIFICANT CHANGE UP (ref 8.4–10.5)
CALCIUM SERPL-MCNC: 9.2 MG/DL — SIGNIFICANT CHANGE UP (ref 8.4–10.5)
CHLORIDE SERPL-SCNC: 89 MMOL/L — LOW (ref 96–108)
CHLORIDE SERPL-SCNC: 90 MMOL/L — LOW (ref 96–108)
CO2 SERPL-SCNC: 25 MMOL/L — SIGNIFICANT CHANGE UP (ref 22–31)
CO2 SERPL-SCNC: 26 MMOL/L — SIGNIFICANT CHANGE UP (ref 22–31)
COLOR SPEC: SIGNIFICANT CHANGE UP
CREAT SERPL-MCNC: 4.1 MG/DL — HIGH (ref 0.5–1.3)
CREAT SERPL-MCNC: 4.32 MG/DL — HIGH (ref 0.5–1.3)
CULTURE RESULTS: SIGNIFICANT CHANGE UP
CULTURE RESULTS: SIGNIFICANT CHANGE UP
DIFF PNL FLD: SIGNIFICANT CHANGE UP
EPI CELLS # UR: 2 /HPF — SIGNIFICANT CHANGE UP (ref 0–5)
GLUCOSE BLDC GLUCOMTR-MCNC: 135 MG/DL — HIGH (ref 70–99)
GLUCOSE BLDC GLUCOMTR-MCNC: 137 MG/DL — HIGH (ref 70–99)
GLUCOSE BLDC GLUCOMTR-MCNC: 155 MG/DL — HIGH (ref 70–99)
GLUCOSE BLDC GLUCOMTR-MCNC: 173 MG/DL — HIGH (ref 70–99)
GLUCOSE SERPL-MCNC: 145 MG/DL — HIGH (ref 70–99)
GLUCOSE SERPL-MCNC: 158 MG/DL — HIGH (ref 70–99)
GLUCOSE UR QL: ABNORMAL
HCT VFR BLD CALC: 30.7 % — LOW (ref 39–50)
HGB BLD-MCNC: 9.4 G/DL — LOW (ref 13–17)
HYALINE CASTS # UR AUTO: 0 /LPF — SIGNIFICANT CHANGE UP (ref 0–7)
INR BLD: 1.3 RATIO — HIGH (ref 0.88–1.16)
KETONES UR-MCNC: NEGATIVE — SIGNIFICANT CHANGE UP
LEUKOCYTE ESTERASE UR-ACNC: ABNORMAL
MAGNESIUM SERPL-MCNC: 1.7 MG/DL — SIGNIFICANT CHANGE UP (ref 1.6–2.6)
MCHC RBC-ENTMCNC: 26.2 PG — LOW (ref 27–34)
MCHC RBC-ENTMCNC: 30.6 GM/DL — LOW (ref 32–36)
MCV RBC AUTO: 85.5 FL — SIGNIFICANT CHANGE UP (ref 80–100)
NITRITE UR-MCNC: NEGATIVE — SIGNIFICANT CHANGE UP
PH UR: 8 — SIGNIFICANT CHANGE UP (ref 5–8)
PHOSPHATE SERPL-MCNC: 4.7 MG/DL — HIGH (ref 2.5–4.5)
PLATELET # BLD AUTO: 164 K/UL — SIGNIFICANT CHANGE UP (ref 150–400)
POTASSIUM SERPL-MCNC: 3.3 MMOL/L — LOW (ref 3.5–5.3)
POTASSIUM SERPL-MCNC: 3.8 MMOL/L — SIGNIFICANT CHANGE UP (ref 3.5–5.3)
POTASSIUM SERPL-SCNC: 3.3 MMOL/L — LOW (ref 3.5–5.3)
POTASSIUM SERPL-SCNC: 3.8 MMOL/L — SIGNIFICANT CHANGE UP (ref 3.5–5.3)
PROT UR-MCNC: ABNORMAL
PROTHROM AB SERPL-ACNC: 14.8 SEC — HIGH (ref 10–13.1)
RBC # BLD: 3.59 M/UL — LOW (ref 4.2–5.8)
RBC # FLD: 14.6 % — HIGH (ref 10.3–14.5)
RBC CASTS # UR COMP ASSIST: 2 /HPF — SIGNIFICANT CHANGE UP (ref 0–4)
SODIUM SERPL-SCNC: 127 MMOL/L — LOW (ref 135–145)
SODIUM SERPL-SCNC: 131 MMOL/L — LOW (ref 135–145)
SP GR SPEC: 1.01 — SIGNIFICANT CHANGE UP (ref 1.01–1.02)
SPECIMEN SOURCE: SIGNIFICANT CHANGE UP
SPECIMEN SOURCE: SIGNIFICANT CHANGE UP
UROBILINOGEN FLD QL: SIGNIFICANT CHANGE UP
WBC # BLD: 9.23 K/UL — SIGNIFICANT CHANGE UP (ref 3.8–10.5)
WBC # FLD AUTO: 9.23 K/UL — SIGNIFICANT CHANGE UP (ref 3.8–10.5)
WBC UR QL: 77 /HPF — HIGH (ref 0–5)

## 2019-09-10 PROCEDURE — 88350 IMFLUOR EA ADDL 1ANTB STN PX: CPT | Mod: 26

## 2019-09-10 PROCEDURE — 88342 IMHCHEM/IMCYTCHM 1ST ANTB: CPT | Mod: 26

## 2019-09-10 PROCEDURE — 88348 ELECTRON MICROSCOPY DX: CPT | Mod: 26

## 2019-09-10 PROCEDURE — 88313 SPECIAL STAINS GROUP 2: CPT | Mod: 26

## 2019-09-10 PROCEDURE — 50200 RENAL BIOPSY PERQ: CPT | Mod: LT

## 2019-09-10 PROCEDURE — 88346 IMFLUOR 1ST 1ANTB STAIN PX: CPT | Mod: 26

## 2019-09-10 PROCEDURE — 88312 SPECIAL STAINS GROUP 1: CPT | Mod: 26

## 2019-09-10 PROCEDURE — 76942 ECHO GUIDE FOR BIOPSY: CPT | Mod: 26

## 2019-09-10 PROCEDURE — 99232 SBSQ HOSP IP/OBS MODERATE 35: CPT | Mod: GC

## 2019-09-10 PROCEDURE — 88305 TISSUE EXAM BY PATHOLOGIST: CPT | Mod: 26

## 2019-09-10 RX ORDER — POTASSIUM CHLORIDE 20 MEQ
40 PACKET (EA) ORAL ONCE
Refills: 0 | Status: COMPLETED | OUTPATIENT
Start: 2019-09-10 | End: 2019-09-10

## 2019-09-10 RX ORDER — DESMOPRESSIN ACETATE 0.1 MG/1
20 TABLET ORAL ONCE
Refills: 0 | Status: COMPLETED | OUTPATIENT
Start: 2019-09-10 | End: 2019-09-10

## 2019-09-10 RX ORDER — MYCOPHENOLATE MOFETIL 250 MG/1
250 CAPSULE ORAL
Refills: 0 | Status: DISCONTINUED | OUTPATIENT
Start: 2019-09-10 | End: 2019-09-24

## 2019-09-10 RX ADMIN — ATORVASTATIN CALCIUM 40 MILLIGRAM(S): 80 TABLET, FILM COATED ORAL at 21:52

## 2019-09-10 RX ADMIN — Medication 40 MILLIEQUIVALENT(S): at 05:01

## 2019-09-10 RX ADMIN — Medication 60 MILLIGRAM(S): at 17:18

## 2019-09-10 RX ADMIN — DESMOPRESSIN ACETATE 220 MICROGRAM(S): 0.1 TABLET ORAL at 12:35

## 2019-09-10 RX ADMIN — CINACALCET 30 MILLIGRAM(S): 30 TABLET, FILM COATED ORAL at 17:17

## 2019-09-10 RX ADMIN — Medication 1 PATCH: at 08:00

## 2019-09-10 RX ADMIN — Medication 650 MILLIGRAM(S): at 05:03

## 2019-09-10 RX ADMIN — Medication 1 PATCH: at 19:08

## 2019-09-10 RX ADMIN — Medication 100 MILLIGRAM(S): at 17:18

## 2019-09-10 RX ADMIN — Medication 650 MILLIGRAM(S): at 17:20

## 2019-09-10 RX ADMIN — Medication 100 MILLIGRAM(S): at 17:17

## 2019-09-10 RX ADMIN — Medication 300 MILLIGRAM(S): at 17:17

## 2019-09-10 RX ADMIN — INSULIN GLARGINE 5 UNIT(S): 100 INJECTION, SOLUTION SUBCUTANEOUS at 01:05

## 2019-09-10 RX ADMIN — TAMSULOSIN HYDROCHLORIDE 0.4 MILLIGRAM(S): 0.4 CAPSULE ORAL at 21:53

## 2019-09-10 RX ADMIN — Medication 1: at 05:34

## 2019-09-10 RX ADMIN — Medication 1 MILLIGRAM(S): at 17:17

## 2019-09-10 RX ADMIN — Medication 60 MILLIGRAM(S): at 01:06

## 2019-09-10 RX ADMIN — LEVETIRACETAM 1000 MILLIGRAM(S): 250 TABLET, FILM COATED ORAL at 05:01

## 2019-09-10 RX ADMIN — MYCOPHENOLATE MOFETIL 250 MILLIGRAM(S): 250 CAPSULE ORAL at 17:22

## 2019-09-10 RX ADMIN — Medication 100 MILLIGRAM(S): at 21:53

## 2019-09-10 RX ADMIN — Medication 300 MILLIGRAM(S): at 05:02

## 2019-09-10 RX ADMIN — CEFTRIAXONE 100 MILLIGRAM(S): 500 INJECTION, POWDER, FOR SOLUTION INTRAMUSCULAR; INTRAVENOUS at 17:19

## 2019-09-10 RX ADMIN — MYCOPHENOLATE MOFETIL 500 MILLIGRAM(S): 250 CAPSULE ORAL at 05:02

## 2019-09-10 RX ADMIN — LEVETIRACETAM 1000 MILLIGRAM(S): 250 TABLET, FILM COATED ORAL at 17:18

## 2019-09-10 RX ADMIN — INSULIN GLARGINE 10 UNIT(S): 100 INJECTION, SOLUTION SUBCUTANEOUS at 21:55

## 2019-09-10 RX ADMIN — Medication 100 MILLIGRAM(S): at 17:16

## 2019-09-10 RX ADMIN — Medication 5 MILLIGRAM(S): at 05:04

## 2019-09-10 RX ADMIN — ERGOCALCIFEROL 50000 UNIT(S): 1.25 CAPSULE ORAL at 17:16

## 2019-09-10 RX ADMIN — Medication 100 MILLIGRAM(S): at 05:02

## 2019-09-10 NOTE — PROGRESS NOTE ADULT - PROBLEM SELECTOR PLAN 1
Patient with waxing/waning sensorium concerning for central nervous process. Today patient is more alert and following commands. Continue dialysis, next session tomorrow. No source found except perhaps ?infection for which he is getting abx.

## 2019-09-10 NOTE — PROGRESS NOTE ADULT - PROBLEM SELECTOR PLAN 2
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 now with KAMRON over CKD stage 5  ,now started on dialysis since 8/27/19. Plan for HD today. Back on diuretics. dialysis and may even need it daily.  Patient should still have renal biopsy done however will hold off on Fat pad biopsy to rule out amyloidosis

## 2019-09-10 NOTE — PROGRESS NOTE ADULT - ASSESSMENT
1. 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 thrombocytopenia, resolved    -- reactive and consumptive, abx   -- most likely related to h/o HCV, ? Mycophenolate contributing  -- monitor for now    3. LE edema   --noted age indeterminate DVT b/l LE above knee; provoked by acute illness and immobilizaiton;  no indication for hypercoag w/u   --pt had been on and off AC in past for Afib  --now off heparin drip s/p kidney bx .  Hold Coumadin bridge until after fat pad biopsy (if pursued)    4.  Fevers  --ID w/u, s/p LP;  on empric abx for Ecoli bacteremia.    5. MGUS -- reviewed previous records from [place] from 3/2017. Noted to have 5% plasma cells. No congo red staining done. Immunoglobulins were nml, free kappa 43, free lambda 85, free k/l ratio nml. Overall, low suspicion that he has progressed to MM given low M spike and nml k/l ratio.   -- immunoglobulins not elevated  -- hold BMBx for now;   Pt refusing fat pad bx until after results of kidney bx are read      Philippe Burks MD  Hematology/Oncology  Cell:  [phone]  Office Phone: [phone]  Office Fax:  [phone]  [facility] [place] [address]

## 2019-09-10 NOTE — PROGRESS NOTE ADULT - SUBJECTIVE AND OBJECTIVE BOX
INTERVAL HPI/OVERNIGHT EVENTS:    pt seen and examined.   scant brbpr in stool this morning  pt denies abdominal pain, n/v.     MEDICATIONS  (STANDING):  allopurinol 100 milliGRAM(s) Oral daily  atorvastatin 40 milliGRAM(s) Oral at bedtime  cefTRIAXone   IVPB 1000 milliGRAM(s) IV Intermittent every 24 hours  chlorhexidine 2% Cloths 1 Application(s) Topical daily  cinacalcet 30 milliGRAM(s) Oral daily  cyanocobalamin Injectable 1000 MICROGram(s) IntraMuscular <User Schedule>  dextrose 5%. 1000 milliLiter(s) (50 mL/Hr) IV Continuous <Continuous>  dextrose 5%. 1000 milliLiter(s) (50 mL/Hr) IV Continuous <Continuous>  dextrose 50% Injectable 12.5 Gram(s) IV Push once  dextrose 50% Injectable 25 Gram(s) IV Push once  dextrose 50% Injectable 25 Gram(s) IV Push once  ergocalciferol 35951 Unit(s) Oral every week  folic acid 1 milliGRAM(s) Oral daily  furosemide   Injectable 60 milliGRAM(s) IV Push two times a day  hydrALAZINE 100 milliGRAM(s) Oral three times a day  hydrocortisone hemorrhoidal Suppository 1 Suppository(s) Rectal at bedtime  insulin glargine Injectable (LANTUS) 10 Unit(s) SubCutaneous at bedtime  insulin lispro (HumaLOG) corrective regimen sliding scale   SubCutaneous every 6 hours  labetalol 300 milliGRAM(s) Oral two times a day  levETIRAcetam 1000 milliGRAM(s) Oral two times a day  mycophenolate mofetil Suspension 500 milliGRAM(s) Oral every 12 hours  predniSONE   Tablet 5 milliGRAM(s) Oral daily  sodium bicarbonate 650 milliGRAM(s) Oral two times a day  tamsulosin 0.4 milliGRAM(s) Oral at bedtime  thiamine 100 milliGRAM(s) Oral daily    MEDICATIONS  (PRN):  acetaminophen   Tablet .. 650 milliGRAM(s) Oral every 6 hours PRN Temp greater or equal to 38C (100.4F)  aluminum hydroxide/magnesium hydroxide/simethicone Suspension 30 milliLiter(s) Oral every 6 hours PRN Dyspepsia  dextrose 40% Gel 15 Gram(s) Oral once PRN Blood Glucose LESS THAN 70 milliGRAM(s)/deciliter  glucagon  Injectable 1 milliGRAM(s) IntraMuscular once PRN Glucose LESS THAN 70 milligrams/deciliter      Allergies    No Known Allergies    Intolerances        Review of Systems:    General:  No wt loss, fevers, chills, night sweats,fatigue,   Eyes:  Good vision, no reported pain  ENT:  No sore throat, pain, runny nose, dysphagia  CV:  No pain, palpitations, hypo/hypertension  Resp:  No dyspnea, cough, tachypnea, wheezing  GI:  No pain, No nausea, No vomiting, No diarrhea, No constipation, No weight loss, No fever, No pruritis, No rectal bleeding, No melena, No dysphagia  :  No pain, bleeding, incontinence, nocturia  Muscle:  No pain, weakness  Neuro:  No weakness, tingling, memory problems  Psych:  No fatigue, insomnia, mood problems, depression  Endocrine:  No polyuria, polydypsia, cold/heat intolerance  Heme:  No petechiae, ecchymosis, easy bruisability  Skin:  No rash, tattoos, scars, edema      Vital Signs Last 24 Hrs  T(C): 37.1 (10 Sep 2019 05:12), Max: 37.2 (09 Sep 2019 21:00)  T(F): 98.8 (10 Sep 2019 05:12), Max: 99 (09 Sep 2019 21:00)  HR: 79 (10 Sep 2019 05:12) (62 - 79)  BP: 156/79 (10 Sep 2019 05:12) (141/56 - 156/79)  BP(mean): --  RR: 18 (10 Sep 2019 05:12) (18 - 18)  SpO2: 97% (10 Sep 2019 05:12) (94% - 97%)    PHYSICAL EXAM:    Constitutional: NAD, well-developed  HEENT: EOMI, throat clear  Neck: No LAD, supple  Respiratory: CTA and P  Cardiovascular: S1 and S2, RRR, no M  Gastrointestinal: BS+, soft, NT/ND, neg HSM,  Extremities: No peripheral edema, neg clubing, cyanosis  Vascular: 2+ peripheral pulses  Neurological: A/O x 3, no focal deficits  Psychiatric: Normal mood, normal affect  Skin: No rashes      LABS:                        9.4    9.23  )-----------( 164      ( 10 Sep 2019 09:25 )             30.7     09-10    131<L>  |  90<L>  |  27<H>  ----------------------------<  145<H>  3.8   |  25  |  4.32<H>    Ca    9.1      10 Sep 2019 07:10  Phos  4.7     09-10  Mg     1.7     09-10      PT/INR - ( 10 Sep 2019 08:33 )   PT: 14.8 sec;   INR: 1.30 ratio         PTT - ( 10 Sep 2019 08:33 )  PTT:65.1 sec      RADIOLOGY & ADDITIONAL TESTS:

## 2019-09-10 NOTE — PROGRESS NOTE ADULT - PROBLEM SELECTOR PLAN 3
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 admitted with fluid overload and HTN urgency . Last renal bx was 2017, presumed rejection, congo red was not done. Repeating renal transplant bx for ruling out amyloidosis as proteinuria is significant at 10gm and not sure if a true cause was found in d.w with his primary nephr at [place].  Change MMF to 250mg PO BID and pred at 5mg for now

## 2019-09-10 NOTE — PROGRESS NOTE ADULT - SUBJECTIVE AND OBJECTIVE BOX
[facility] DIVISION OF KIDNEY DISEASES AND HYPERTENSION -- PROGRESS NOTE  --------------------------------------------------------------------------------  Chief Complaint: ESRD/Ongoing hemodialysis requirement    24 hour events/subjective:  doing well, more better MS now      PAST HISTORY  --------------------------------------------------------------------------------  No significant changes to PMH, PSH, FHx, SHx, unless otherwise noted    ALLERGIES & MEDICATIONS  --------------------------------------------------------------------------------  Allergies    No Known Allergies    Intolerances      Standing Inpatient Medications  allopurinol 100 milliGRAM(s) Oral daily  atorvastatin 40 milliGRAM(s) Oral at bedtime  cefTRIAXone   IVPB 1000 milliGRAM(s) IV Intermittent every 24 hours  chlorhexidine 2% Cloths 1 Application(s) Topical daily  cinacalcet 30 milliGRAM(s) Oral daily  cyanocobalamin Injectable 1000 MICROGram(s) IntraMuscular <User Schedule>  dextrose 5%. 1000 milliLiter(s) IV Continuous <Continuous>  dextrose 5%. 1000 milliLiter(s) IV Continuous <Continuous>  dextrose 50% Injectable 12.5 Gram(s) IV Push once  dextrose 50% Injectable 25 Gram(s) IV Push once  dextrose 50% Injectable 25 Gram(s) IV Push once  ergocalciferol 52999 Unit(s) Oral every week  folic acid 1 milliGRAM(s) Oral daily  furosemide   Injectable 60 milliGRAM(s) IV Push two times a day  hydrALAZINE 100 milliGRAM(s) Oral three times a day  hydrocortisone hemorrhoidal Suppository 1 Suppository(s) Rectal at bedtime  insulin glargine Injectable (LANTUS) 10 Unit(s) SubCutaneous at bedtime  insulin lispro (HumaLOG) corrective regimen sliding scale   SubCutaneous every 6 hours  labetalol 300 milliGRAM(s) Oral two times a day  levETIRAcetam 1000 milliGRAM(s) Oral two times a day  mycophenolate mofetil Suspension 500 milliGRAM(s) Oral every 12 hours  predniSONE   Tablet 5 milliGRAM(s) Oral daily  sodium bicarbonate 650 milliGRAM(s) Oral two times a day  tamsulosin 0.4 milliGRAM(s) Oral at bedtime  thiamine 100 milliGRAM(s) Oral daily    PRN Inpatient Medications  acetaminophen   Tablet .. 650 milliGRAM(s) Oral every 6 hours PRN  aluminum hydroxide/magnesium hydroxide/simethicone Suspension 30 milliLiter(s) Oral every 6 hours PRN  dextrose 40% Gel 15 Gram(s) Oral once PRN  glucagon  Injectable 1 milliGRAM(s) IntraMuscular once PRN      REVIEW OF SYSTEMS  --------------------------------------------------------------------------------  Gen: No weight changes, fatigue, fevers/chills, weakness  Skin: No rashes  Head/Eyes/Ears/Mouth: No headache; Normal hearing; Normal vision w/o blurriness; No sinus pain/discomfort, sore throat  Respiratory: No dyspnea, cough, wheezing, hemoptysis  CV: No chest pain, PND, orthopnea  GI: No abdominal pain, diarrhea, constipation, nausea, vomiting, melena, hematochezia  : No increased frequency, dysuria, hematuria, nocturia  MSK: No joint pain/swelling; no back pain; no edema  Neuro: No dizziness/lightheadedness, weakness, seizures, numbness, tingling  Heme: No easy bruising or bleeding  Endo: No heat/cold intolerance  Psych: No significant nervousness, anxiety, stress, depression    All other systems were reviewed and are negative, except as noted.    VITALS/PHYSICAL EXAM  --------------------------------------------------------------------------------  T(C): 37.1 (09-10-19 @ 05:12), Max: 37.2 (09-09-19 @ 21:00)  HR: 79 (09-10-19 @ 05:12) (62 - 79)  BP: 156/79 (09-10-19 @ 05:12) (141/56 - 156/79)  RR: 18 (09-10-19 @ 05:12) (18 - 18)  SpO2: 97% (09-10-19 @ 05:12) (94% - 97%)  Wt(kg): --        09-09-19 @ 07:01  -  09-10-19 @ 07:00  --------------------------------------------------------  IN: 1466 mL / OUT: 2250 mL / NET: -784 mL    09-10-19 @ 07:01  -  09-10-19 @ 14:35  --------------------------------------------------------  IN: 0 mL / OUT: 150 mL / NET: -150 mL      PHYSICAL EXAM: vital signs as above  in no apparent distress  Neck: Supple, no JVD,    Lungs: no rhonchi, no wheeze, no crackles  CVS: S1 S2 no M/R/G  Abdomen: no tenderness, no organomegaly, BS present  Ext: no cyanosis or clubbing, no edema      LABS/STUDIES  --------------------------------------------------------------------------------              9.4    9.23  >-----------<  164      [09-10-19 @ 09:25]              30.7     131  |  90  |  27  ----------------------------<  145      [09-10-19 @ 07:10]  3.8   |  25  |  4.32        Ca     9.1     [09-10-19 @ 07:10]      Mg     1.7     [09-10-19 @ 07:10]      Phos  4.7     [09-10-19 @ 07:10]      PT/INR: PT 14.8 , INR 1.30       [09-10-19 @ 08:33]  PTT: 65.1       [09-10-19 @ 08:33]      Iron 45, TIBC 159, %sat 28      [09-02-19 @ 17:29]  Ferritin 260      [09-02-19 @ 17:29]  PTH -- (Ca 9.7)      [09-02-19 @ 17:29]   1231  PTH -- (Ca 9.1)      [08-27-19 @ 15:07]   1896  Vitamin D (25OH) <4.0      [09-02-19 @ 17:28]  HbA1c 5.6      [08-27-19 @ 09:03]  TSH 5.45      [08-30-19 @ 09:41]    HBsAb 19.4      [08-27-19 @ 15:26]  HBsAg Nonreact      [08-27-19 @ 15:26]  HCV 10.44, Reactive      [08-27-19 @ 08:29]

## 2019-09-10 NOTE — PROGRESS NOTE ADULT - SUBJECTIVE AND OBJECTIVE BOX
Subjective: Patient seen and examined. No new events except as noted.   appears comfortable   confused     REVIEW OF SYSTEMS:  Unable to obtain       MEDICATIONS:  MEDICATIONS  (STANDING):  allopurinol 100 milliGRAM(s) Oral daily  atorvastatin 40 milliGRAM(s) Oral at bedtime  cefTRIAXone   IVPB 1000 milliGRAM(s) IV Intermittent every 24 hours  chlorhexidine 2% Cloths 1 Application(s) Topical daily  cinacalcet 30 milliGRAM(s) Oral daily  cyanocobalamin Injectable 1000 MICROGram(s) IntraMuscular <User Schedule>  desmopressin IVPB 20 MICROGram(s) IV Intermittent once  dextrose 5%. 1000 milliLiter(s) (50 mL/Hr) IV Continuous <Continuous>  dextrose 5%. 1000 milliLiter(s) (50 mL/Hr) IV Continuous <Continuous>  dextrose 50% Injectable 12.5 Gram(s) IV Push once  dextrose 50% Injectable 25 Gram(s) IV Push once  dextrose 50% Injectable 25 Gram(s) IV Push once  ergocalciferol 71847 Unit(s) Oral every week  folic acid 1 milliGRAM(s) Oral daily  furosemide   Injectable 60 milliGRAM(s) IV Push two times a day  hydrALAZINE 100 milliGRAM(s) Oral three times a day  hydrocortisone hemorrhoidal Suppository 1 Suppository(s) Rectal at bedtime  insulin glargine Injectable (LANTUS) 10 Unit(s) SubCutaneous at bedtime  insulin lispro (HumaLOG) corrective regimen sliding scale   SubCutaneous every 6 hours  labetalol 300 milliGRAM(s) Oral two times a day  levETIRAcetam 1000 milliGRAM(s) Oral two times a day  mycophenolate mofetil Suspension 500 milliGRAM(s) Oral every 12 hours  predniSONE   Tablet 5 milliGRAM(s) Oral daily  sodium bicarbonate 650 milliGRAM(s) Oral two times a day  tamsulosin 0.4 milliGRAM(s) Oral at bedtime  thiamine 100 milliGRAM(s) Oral daily      PHYSICAL EXAM:  T(C): 37.1 (09-10-19 @ 05:12), Max: 37.2 (09-09-19 @ 21:00)  HR: 79 (09-10-19 @ 05:12) (62 - 79)  BP: 156/79 (09-10-19 @ 05:12) (141/56 - 172/81)  RR: 18 (09-10-19 @ 05:12) (17 - 18)  SpO2: 97% (09-10-19 @ 05:12) (94% - 98%)  Wt(kg): --  I&O's Summary    09 Sep 2019 07:01  -  10 Sep 2019 07:00  --------------------------------------------------------  IN: 1466 mL / OUT: 2250 mL / NET: -784 mL              Appearance: NAD sleepy   HEENT:  dry oral mucosa, PERRL, EOMI	  Lymphatic: No lymphadenopathy , no edema  Cardiovascular: Normal S1 S2, No JVD, No murmurs , Peripheral pulses palpable 2+ bilaterally  Respiratory: Lungs clear to auscultation, normal effort 	  Gastrointestinal:  Soft, Non-tender, + BS	  Skin: No rashes, No ecchymoses, No cyanosis, warm to touch  Musculoskeletal: Normal range of motion, normal strength  Psychiatry:  at times confused   Ext: No edema      LABS:    CARDIAC MARKERS:                                9.4    9.23  )-----------( 164      ( 10 Sep 2019 09:25 )             30.7     09-10    131<L>  |  90<L>  |  27<H>  ----------------------------<  145<H>  3.8   |  25  |  4.32<H>    Ca    9.1      10 Sep 2019 07:10  Phos  4.7     09-10  Mg     1.7     09-10      proBNP:   Lipid Profile:   HgA1c:   TSH:             TELEMETRY: 	    ECG:  	  RADIOLOGY:   DIAGNOSTIC TESTING:  [ ] Echocardiogram:  [ ]  Catheterization:  [ ] Stress Test:    OTHER:

## 2019-09-10 NOTE — CHART NOTE - NSCHARTNOTEFT_GEN_A_CORE
Scheduled for abdominal fat pad biopsy today, canceled as patient's sister prefers to wait for results of renal biopsy prior to further interventions.    No surgical intervention planned at this time. Please contact [facility] ([phone]) with further questions or concerns.    --SATNAM Davis, PGY-5 Scheduled for abdominal fat pad biopsy today, canceled as patient's sister prefers to wait for results of renal biopsy prior to further interventions.    No surgical intervention planned at this time. Please contact [facility] ([id number]) with further questions or concerns.    --SATNAM Davis, PGY-5    pt seen and examined with chief resident.  agree with above.  d/w pt and med team.  will signoff, reconsult prn.

## 2019-09-10 NOTE — PROVIDER CONTACT NOTE (OTHER) - BACKGROUND
pt admitted with SOB, lower extremity edema, HTN urgency, hx of renal transplant (failure), DM, ESRD on dialysis

## 2019-09-10 NOTE — PROGRESS NOTE ADULT - SUBJECTIVE AND OBJECTIVE BOX
Patient is a 74y old  Male who presents with a chief complaint of KAMRON (08 Sep 2019 08:23)                                                               INTERVAL HPI/OVERNIGHT EVENTS:    REVIEW OF SYSTEMS:     CONSTITUTIONAL: No weakness, fevers or chills  RESPIRATORY: No cough, wheezing,  No shortness of breath  CARDIOVASCULAR: No chest pain or palpitations  GASTROINTESTINAL: No abdominal pain  . No nausea, vomiting, or hematemesis; No diarrhea or constipation. No melena or hematochezia.  GENITOURINARY: No dysuria, frequency or hematuria  NEUROLOGICAL: No numbness or weakness                                                                                                                                                                                                                                                                                 Medications:  MEDICATIONS  (STANDING):  allopurinol 100 milliGRAM(s) Oral daily  atorvastatin 40 milliGRAM(s) Oral at bedtime  cefTRIAXone   IVPB 1000 milliGRAM(s) IV Intermittent every 24 hours  chlorhexidine 2% Cloths 1 Application(s) Topical daily  cinacalcet 30 milliGRAM(s) Oral daily  cyanocobalamin Injectable 1000 MICROGram(s) IntraMuscular <User Schedule>  dextrose 5%. 1000 milliLiter(s) (50 mL/Hr) IV Continuous <Continuous>  dextrose 5%. 1000 milliLiter(s) (50 mL/Hr) IV Continuous <Continuous>  dextrose 50% Injectable 12.5 Gram(s) IV Push once  dextrose 50% Injectable 25 Gram(s) IV Push once  dextrose 50% Injectable 25 Gram(s) IV Push once  ergocalciferol 51740 Unit(s) Oral every week  folic acid 1 milliGRAM(s) Oral daily  furosemide   Injectable 60 milliGRAM(s) IV Push two times a day  hydrALAZINE 100 milliGRAM(s) Oral three times a day  hydrocortisone hemorrhoidal Suppository 1 Suppository(s) Rectal at bedtime  insulin glargine Injectable (LANTUS) 10 Unit(s) SubCutaneous at bedtime  insulin lispro (HumaLOG) corrective regimen sliding scale   SubCutaneous every 6 hours  labetalol 300 milliGRAM(s) Oral two times a day  levETIRAcetam 1000 milliGRAM(s) Oral two times a day  mycophenolate mofetil 250 milliGRAM(s) Oral two times a day  predniSONE   Tablet 5 milliGRAM(s) Oral daily  sodium bicarbonate 650 milliGRAM(s) Oral two times a day  tamsulosin 0.4 milliGRAM(s) Oral at bedtime  thiamine 100 milliGRAM(s) Oral daily    MEDICATIONS  (PRN):  acetaminophen   Tablet .. 650 milliGRAM(s) Oral every 6 hours PRN Temp greater or equal to 38C (100.4F)  aluminum hydroxide/magnesium hydroxide/simethicone Suspension 30 milliLiter(s) Oral every 6 hours PRN Dyspepsia  dextrose 40% Gel 15 Gram(s) Oral once PRN Blood Glucose LESS THAN 70 milliGRAM(s)/deciliter  glucagon  Injectable 1 milliGRAM(s) IntraMuscular once PRN Glucose LESS THAN 70 milligrams/deciliter       Allergies    No Known Allergies    Intolerances      Vital Signs Last 24 Hrs  T(C): 37 (10 Sep 2019 17:00), Max: 37.2 (09 Sep 2019 21:00)  T(F): 98.6 (10 Sep 2019 17:00), Max: 99 (09 Sep 2019 21:00)  HR: 76 (10 Sep 2019 17:00) (62 - 79)  BP: 170/80 (10 Sep 2019 17:00) (141/56 - 170/80)  BP(mean): --  RR: 18 (10 Sep 2019 17:00) (18 - 18)  SpO2: 97% (10 Sep 2019 17:00) (94% - 97%)  CAPILLARY BLOOD GLUCOSE      POCT Blood Glucose.: 135 mg/dL (10 Sep 2019 16:40)  POCT Blood Glucose.: 155 mg/dL (10 Sep 2019 05:27)  POCT Blood Glucose.: 173 mg/dL (10 Sep 2019 01:04)  POCT Blood Glucose.: 148 mg/dL (09 Sep 2019 23:29)      09-09 @ 07:01  -  09-10 @ 07:00  --------------------------------------------------------  IN: 1466 mL / OUT: 2250 mL / NET: -784 mL    09-10 @ 07:01  -  09-10 @ 18:48  --------------------------------------------------------  IN: 100 mL / OUT: 150 mL / NET: -50 mL      Physical Exam:    General: NAD   HEENT:  Nonicteric, PERRLA  CV:  RRR, S1S2   Lungs:  CTA B/L, no wheezes, rales, rhonchi  Abdomen:  Soft, non-tender, no distended, positive BS  Extremities:  no edema  Neuro:  more alert   NF                                                                                                                                                                                                                                                                                  LABS:                               9.4    9.23  )-----------( 164      ( 10 Sep 2019 09:25 )             30.7                      09-10    131<L>  |  90<L>  |  27<H>  ----------------------------<  145<H>  3.8   |  25  |  4.32<H>    Ca    9.1      10 Sep 2019 07:10  Phos  4.7     09-10  Mg     1.7     09-10

## 2019-09-10 NOTE — PROGRESS NOTE ADULT - ASSESSMENT
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   Pt just moved back to [place] from [place] .. reports that he has not been taking his meds for the past few days since " he might have misplaed them"     pt denies chest pain, syncope,fever, chills, cough, urinary symptoms.    in ED found  to have anemia   Nno acute changes on EKG   elevated CR and Trop    1- HTN urgency :   cont meds     still not well contorlled .. monitor and adjust meds accordingly     2- KAMRON  :   d/w   : pt will likely end up on HD in intermediate and likely long term given transplant graft failure   off tacro,  cont cellcept and sterioods   f/u renal bx and hold off on  fat pad bx for now       3- DM:   A1c  5.6  Fs     4- acute diastolic CHF sec to  HTN urgency and renal failure   Echo :  < from: Transthoracic Echocardiogram (08.28.19 @ 16:12) >  1. Mitral annular calcification and calcified mitral  leaflets with decreased diastolic opening.  2. Moderate to severe concentric left ventricular  hypertrophy.  3. Normal left ventricular systolic function with  mid-cavitary obliteration.  4. Normal right ventricular size and systolic function.  5. Small pericardial effusion.    cont fluid removal with HD per renal      5-  difficult ambulating :  no focal neuro deficits   neuro eval appreciated    CT T/L   < from: CT Thoracic Spine No Cont (08.30.19 @ 10:06) >    Old right L1 transverse process fracture. Bilateral lysis   defects at L5 as seen on the prior 5/26/2013      6- afib :  restart AC     7- hematochezia : per sister : on and off small amount of fresh blood in stool and some amount in urine but only small amounts   monitor H/H   consult GI .: appreciate input :  suspect bleeding to be hemorrhoidal, now resolved     trial of anusol supp  if bleeding persist would consider colonoscopy however patient is reluctant.    heme input: appreciated        8-  renal transplant : f/u with Transplant team   cont meds with MMF and steroids   off Tac   s/p bx today         9- TIA :  unable to perform MRI   CT no acute changes on CT   repeat negative   fu with neuro     10 encephalopathy :   CTH with contrast : neg for acute pathology   LP : high protien but culture negative in CSF   blood culture positive for GNR /Ecoli likely urinary source   CT : < from: CT Abdomen and Pelvis No Cont (09.09.19 @ 22:26) >    Layering stones in a thick-walled urinary bladder with perivesicular   inflammatory change and a left lower quadrant transplant kidney with a   nonobstructing 1.1 cm lower pole stone and moderate perinephric fat   stranding. No hydronephrosis. Differential includes urinary tract   infection.    Cont abx and f/u with ID   willc onsutl urology   repeat Bladder scan           11- paraprotienmeia : d/w heme and renal   will need to consider amyloidosis .. doubt MM   holding on Fat pad bx for now       12 hypercalcemia : monitor for now     13- abd discomfort : improved today     check CT abd    check PVR and if >200 will place gutierrez

## 2019-09-10 NOTE — PROGRESS NOTE ADULT - SUBJECTIVE AND OBJECTIVE BOX
no new complaint  denies back pain    acetaminophen   Tablet .. 650 milliGRAM(s) Oral every 6 hours PRN  allopurinol 100 milliGRAM(s) Oral daily  aluminum hydroxide/magnesium hydroxide/simethicone Suspension 30 milliLiter(s) Oral every 6 hours PRN  atorvastatin 40 milliGRAM(s) Oral at bedtime  cefTRIAXone   IVPB 1000 milliGRAM(s) IV Intermittent every 24 hours  chlorhexidine 2% Cloths 1 Application(s) Topical daily  cinacalcet 30 milliGRAM(s) Oral daily  cyanocobalamin Injectable 1000 MICROGram(s) IntraMuscular <User Schedule>  dextrose 40% Gel 15 Gram(s) Oral once PRN  dextrose 5%. 1000 milliLiter(s) IV Continuous <Continuous>  dextrose 5%. 1000 milliLiter(s) IV Continuous <Continuous>  dextrose 50% Injectable 12.5 Gram(s) IV Push once  dextrose 50% Injectable 25 Gram(s) IV Push once  dextrose 50% Injectable 25 Gram(s) IV Push once  ergocalciferol 25212 Unit(s) Oral every week  folic acid 1 milliGRAM(s) Oral daily  furosemide   Injectable 60 milliGRAM(s) IV Push two times a day  glucagon  Injectable 1 milliGRAM(s) IntraMuscular once PRN  hydrALAZINE 100 milliGRAM(s) Oral three times a day  hydrocortisone hemorrhoidal Suppository 1 Suppository(s) Rectal at bedtime  insulin glargine Injectable (LANTUS) 10 Unit(s) SubCutaneous at bedtime  insulin lispro (HumaLOG) corrective regimen sliding scale   SubCutaneous every 6 hours  labetalol 300 milliGRAM(s) Oral two times a day  levETIRAcetam 1000 milliGRAM(s) Oral two times a day  mycophenolate mofetil 250 milliGRAM(s) Oral two times a day  predniSONE   Tablet 5 milliGRAM(s) Oral daily  sodium bicarbonate 650 milliGRAM(s) Oral two times a day  tamsulosin 0.4 milliGRAM(s) Oral at bedtime  thiamine 100 milliGRAM(s) Oral daily      No Known Allergies      ROS otherwise negative     T(C): 37 (09-10-19 @ 17:00), Max: 37.2 (09-09-19 @ 21:00)  HR: 76 (09-10-19 @ 17:00) (62 - 79)  BP: 170/80 (09-10-19 @ 17:00) (141/56 - 170/80)  RR: 18 (09-10-19 @ 17:00) (18 - 18)  SpO2: 97% (09-10-19 @ 17:00) (94% - 97%)  PHYSICAL EXAM  Gen:  laying in bed, nad  H:  anicteric, eomi  CV:  RRR, S1, S2  Lungs:  CTA b/l  Ab soft/nt/nd  Ext:  no edema                        9.4    9.23  )-----------( 164      ( 10 Sep 2019 09:25 )             30.7                         8.4    7.86  )-----------( 165      ( 09 Sep 2019 10:58 )             27.5                         8.7    6.72  )-----------( 173      ( 08 Sep 2019 09:36 )             27.8   09-10    131<L>  |  90<L>  |  27<H>  ----------------------------<  145<H>  3.8   |  25  |  4.32<H>    Ca    9.1      10 Sep 2019 07:10  Phos  4.7     09-10  Mg     1.7     09-10
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
## 2019-09-10 NOTE — PROGRESS NOTE ADULT - PROBLEM SELECTOR PLAN 1
diet as tolerated  cbc daily; stable now  suspect bleeding to be hemorrhoidal  keep stool soft  no indication for colonoscopy

## 2019-09-11 LAB
ANION GAP SERPL CALC-SCNC: 15 MMOL/L — SIGNIFICANT CHANGE UP (ref 5–17)
APTT BLD: 26.3 SEC — LOW (ref 27.5–36.3)
BLD GP AB SCN SERPL QL: NEGATIVE — SIGNIFICANT CHANGE UP
BUN SERPL-MCNC: 34 MG/DL — HIGH (ref 7–23)
CALCIUM SERPL-MCNC: 9.1 MG/DL — SIGNIFICANT CHANGE UP (ref 8.4–10.5)
CHLORIDE SERPL-SCNC: 94 MMOL/L — LOW (ref 96–108)
CO2 SERPL-SCNC: 26 MMOL/L — SIGNIFICANT CHANGE UP (ref 22–31)
CREAT SERPL-MCNC: 5.76 MG/DL — HIGH (ref 0.5–1.3)
GLUCOSE BLDC GLUCOMTR-MCNC: 105 MG/DL — HIGH (ref 70–99)
GLUCOSE BLDC GLUCOMTR-MCNC: 106 MG/DL — HIGH (ref 70–99)
GLUCOSE BLDC GLUCOMTR-MCNC: 123 MG/DL — HIGH (ref 70–99)
GLUCOSE BLDC GLUCOMTR-MCNC: 95 MG/DL — SIGNIFICANT CHANGE UP (ref 70–99)
GLUCOSE SERPL-MCNC: 126 MG/DL — HIGH (ref 70–99)
HCT VFR BLD CALC: 26.8 % — LOW (ref 39–50)
HCT VFR BLD CALC: 27.4 % — LOW (ref 39–50)
HGB BLD-MCNC: 8.5 G/DL — LOW (ref 13–17)
HGB BLD-MCNC: 9 G/DL — LOW (ref 13–17)
INR BLD: 1.29 RATIO — HIGH (ref 0.88–1.16)
MCHC RBC-ENTMCNC: 26.9 PG — LOW (ref 27–34)
MCHC RBC-ENTMCNC: 27.8 PG — SIGNIFICANT CHANGE UP (ref 27–34)
MCHC RBC-ENTMCNC: 31.7 GM/DL — LOW (ref 32–36)
MCHC RBC-ENTMCNC: 32.8 GM/DL — SIGNIFICANT CHANGE UP (ref 32–36)
MCV RBC AUTO: 84.7 FL — SIGNIFICANT CHANGE UP (ref 80–100)
MCV RBC AUTO: 84.9 FL — SIGNIFICANT CHANGE UP (ref 80–100)
PLATELET # BLD AUTO: 144 K/UL — LOW (ref 150–400)
PLATELET # BLD AUTO: 159 K/UL — SIGNIFICANT CHANGE UP (ref 150–400)
POTASSIUM SERPL-MCNC: 3.4 MMOL/L — LOW (ref 3.5–5.3)
POTASSIUM SERPL-SCNC: 3.4 MMOL/L — LOW (ref 3.5–5.3)
PROTHROM AB SERPL-ACNC: 14.8 SEC — HIGH (ref 10–12.9)
RBC # BLD: 3.16 M/UL — LOW (ref 4.2–5.8)
RBC # BLD: 3.23 M/UL — LOW (ref 4.2–5.8)
RBC # FLD: 13.9 % — SIGNIFICANT CHANGE UP (ref 10.3–14.5)
RBC # FLD: 14 % — SIGNIFICANT CHANGE UP (ref 10.3–14.5)
RH IG SCN BLD-IMP: POSITIVE — SIGNIFICANT CHANGE UP
SODIUM SERPL-SCNC: 135 MMOL/L — SIGNIFICANT CHANGE UP (ref 135–145)
WBC # BLD: 8.8 K/UL — SIGNIFICANT CHANGE UP (ref 3.8–10.5)
WBC # BLD: 9.3 K/UL — SIGNIFICANT CHANGE UP (ref 3.8–10.5)
WBC # FLD AUTO: 8.8 K/UL — SIGNIFICANT CHANGE UP (ref 3.8–10.5)
WBC # FLD AUTO: 9.3 K/UL — SIGNIFICANT CHANGE UP (ref 3.8–10.5)

## 2019-09-11 PROCEDURE — 74176 CT ABD & PELVIS W/O CONTRAST: CPT | Mod: 26

## 2019-09-11 PROCEDURE — 99232 SBSQ HOSP IP/OBS MODERATE 35: CPT | Mod: GC

## 2019-09-11 RX ORDER — WARFARIN SODIUM 2.5 MG/1
7.5 TABLET ORAL ONCE
Refills: 0 | Status: COMPLETED | OUTPATIENT
Start: 2019-09-11 | End: 2019-09-11

## 2019-09-11 RX ORDER — CEFTRIAXONE 500 MG/1
1000 INJECTION, POWDER, FOR SOLUTION INTRAMUSCULAR; INTRAVENOUS EVERY 24 HOURS
Refills: 0 | Status: DISCONTINUED | OUTPATIENT
Start: 2019-09-11 | End: 2019-09-13

## 2019-09-11 RX ORDER — HEPARIN SODIUM 5000 [USP'U]/ML
4000 INJECTION INTRAVENOUS; SUBCUTANEOUS EVERY 6 HOURS
Refills: 0 | Status: DISCONTINUED | OUTPATIENT
Start: 2019-09-11 | End: 2019-09-11

## 2019-09-11 RX ORDER — HEPARIN SODIUM 5000 [USP'U]/ML
9000 INJECTION INTRAVENOUS; SUBCUTANEOUS EVERY 6 HOURS
Refills: 0 | Status: DISCONTINUED | OUTPATIENT
Start: 2019-09-11 | End: 2019-09-11

## 2019-09-11 RX ORDER — HEPARIN SODIUM 5000 [USP'U]/ML
INJECTION INTRAVENOUS; SUBCUTANEOUS
Qty: 25000 | Refills: 0 | Status: DISCONTINUED | OUTPATIENT
Start: 2019-09-11 | End: 2019-09-11

## 2019-09-11 RX ORDER — HEPARIN SODIUM 5000 [USP'U]/ML
INJECTION INTRAVENOUS; SUBCUTANEOUS
Qty: 25000 | Refills: 0 | Status: DISCONTINUED | OUTPATIENT
Start: 2019-09-11 | End: 2019-09-14

## 2019-09-11 RX ADMIN — Medication 1 PATCH: at 07:44

## 2019-09-11 RX ADMIN — MYCOPHENOLATE MOFETIL 250 MILLIGRAM(S): 250 CAPSULE ORAL at 18:06

## 2019-09-11 RX ADMIN — LEVETIRACETAM 1000 MILLIGRAM(S): 250 TABLET, FILM COATED ORAL at 18:05

## 2019-09-11 RX ADMIN — Medication 100 MILLIGRAM(S): at 06:28

## 2019-09-11 RX ADMIN — Medication 1 PATCH: at 19:13

## 2019-09-11 RX ADMIN — WARFARIN SODIUM 7.5 MILLIGRAM(S): 2.5 TABLET ORAL at 22:27

## 2019-09-11 RX ADMIN — Medication 100 MILLIGRAM(S): at 13:11

## 2019-09-11 RX ADMIN — Medication 300 MILLIGRAM(S): at 18:05

## 2019-09-11 RX ADMIN — LEVETIRACETAM 1000 MILLIGRAM(S): 250 TABLET, FILM COATED ORAL at 06:28

## 2019-09-11 RX ADMIN — Medication 60 MILLIGRAM(S): at 13:10

## 2019-09-11 RX ADMIN — TAMSULOSIN HYDROCHLORIDE 0.4 MILLIGRAM(S): 0.4 CAPSULE ORAL at 22:27

## 2019-09-11 RX ADMIN — Medication 1 MILLIGRAM(S): at 13:12

## 2019-09-11 RX ADMIN — INSULIN GLARGINE 10 UNIT(S): 100 INJECTION, SOLUTION SUBCUTANEOUS at 22:28

## 2019-09-11 RX ADMIN — Medication 100 MILLIGRAM(S): at 22:28

## 2019-09-11 RX ADMIN — Medication 100 MILLIGRAM(S): at 13:14

## 2019-09-11 RX ADMIN — HEPARIN SODIUM 1900 UNIT(S)/HR: 5000 INJECTION INTRAVENOUS; SUBCUTANEOUS at 20:38

## 2019-09-11 RX ADMIN — Medication 5 MILLIGRAM(S): at 06:28

## 2019-09-11 RX ADMIN — Medication 60 MILLIGRAM(S): at 02:00

## 2019-09-11 RX ADMIN — ATORVASTATIN CALCIUM 40 MILLIGRAM(S): 80 TABLET, FILM COATED ORAL at 22:28

## 2019-09-11 RX ADMIN — CINACALCET 30 MILLIGRAM(S): 30 TABLET, FILM COATED ORAL at 13:11

## 2019-09-11 RX ADMIN — Medication 650 MILLIGRAM(S): at 06:27

## 2019-09-11 RX ADMIN — Medication 650 MILLIGRAM(S): at 18:06

## 2019-09-11 RX ADMIN — Medication 60 MICROGRAM(S): at 08:58

## 2019-09-11 RX ADMIN — Medication 300 MILLIGRAM(S): at 06:28

## 2019-09-11 RX ADMIN — CEFTRIAXONE 100 MILLIGRAM(S): 500 INJECTION, POWDER, FOR SOLUTION INTRAMUSCULAR; INTRAVENOUS at 13:17

## 2019-09-11 RX ADMIN — MYCOPHENOLATE MOFETIL 250 MILLIGRAM(S): 250 CAPSULE ORAL at 06:28

## 2019-09-11 NOTE — PROGRESS NOTE ADULT - SUBJECTIVE AND OBJECTIVE BOX
Patient is a 74y old  Male who presents with a chief complaint of KAMRON (08 Sep 2019 08:23)                                                               INTERVAL HPI/OVERNIGHT EVENTS:    REVIEW OF SYSTEMS:     CONSTITUTIONAL: No weakness, fevers or chills  RESPIRATORY: No cough, wheezing,  No shortness of breath  CARDIOVASCULAR: No chest pain or palpitations  GASTROINTESTINAL: No abdominal pain  . No nausea, vomiting  GENITOURINARY: No dysuria, frequency or hematuria  NEUROLOGICAL: No numbness or weakness                                                                                                                                                                                                                                                                                 Medications:  MEDICATIONS  (STANDING):  allopurinol 100 milliGRAM(s) Oral daily  atorvastatin 40 milliGRAM(s) Oral at bedtime  cefTRIAXone   IVPB 1000 milliGRAM(s) IV Intermittent every 24 hours  chlorhexidine 2% Cloths 1 Application(s) Topical daily  cinacalcet 30 milliGRAM(s) Oral daily  cyanocobalamin Injectable 1000 MICROGram(s) IntraMuscular <User Schedule>  darbepoetin Injectable ViaL 60 MICROGram(s) IV Push every week  dextrose 5%. 1000 milliLiter(s) (50 mL/Hr) IV Continuous <Continuous>  dextrose 50% Injectable 12.5 Gram(s) IV Push once  dextrose 50% Injectable 25 Gram(s) IV Push once  dextrose 50% Injectable 25 Gram(s) IV Push once  ergocalciferol 57141 Unit(s) Oral every week  folic acid 1 milliGRAM(s) Oral daily  furosemide   Injectable 60 milliGRAM(s) IV Push two times a day  hydrALAZINE 100 milliGRAM(s) Oral three times a day  hydrocortisone hemorrhoidal Suppository 1 Suppository(s) Rectal at bedtime  insulin glargine Injectable (LANTUS) 10 Unit(s) SubCutaneous at bedtime  insulin lispro (HumaLOG) corrective regimen sliding scale   SubCutaneous every 6 hours  labetalol 300 milliGRAM(s) Oral two times a day  levETIRAcetam 1000 milliGRAM(s) Oral two times a day  mycophenolate mofetil 250 milliGRAM(s) Oral two times a day  predniSONE   Tablet 5 milliGRAM(s) Oral daily  sodium bicarbonate 650 milliGRAM(s) Oral two times a day  tamsulosin 0.4 milliGRAM(s) Oral at bedtime  thiamine 100 milliGRAM(s) Oral daily    MEDICATIONS  (PRN):  acetaminophen   Tablet .. 650 milliGRAM(s) Oral every 6 hours PRN Temp greater or equal to 38C (100.4F)  aluminum hydroxide/magnesium hydroxide/simethicone Suspension 30 milliLiter(s) Oral every 6 hours PRN Dyspepsia  dextrose 40% Gel 15 Gram(s) Oral once PRN Blood Glucose LESS THAN 70 milliGRAM(s)/deciliter  glucagon  Injectable 1 milliGRAM(s) IntraMuscular once PRN Glucose LESS THAN 70 milligrams/deciliter       Allergies    No Known Allergies    Intolerances      Vital Signs Last 24 Hrs  T(C): 37.1 (11 Sep 2019 13:58), Max: 37.3 (11 Sep 2019 06:20)  T(F): 98.8 (11 Sep 2019 13:58), Max: 99.1 (11 Sep 2019 06:20)  HR: 82 (11 Sep 2019 13:58) (76 - 82)  BP: 123/53 (11 Sep 2019 13:58) (118/61 - 170/80)  BP(mean): --  RR: 18 (11 Sep 2019 13:58) (17 - 18)  SpO2: 95% (11 Sep 2019 13:58) (93% - 98%)  CAPILLARY BLOOD GLUCOSE      POCT Blood Glucose.: 106 mg/dL (11 Sep 2019 13:05)  POCT Blood Glucose.: 123 mg/dL (11 Sep 2019 06:24)  POCT Blood Glucose.: 137 mg/dL (10 Sep 2019 21:25)  POCT Blood Glucose.: 135 mg/dL (10 Sep 2019 16:40)      09-10 @ 07: @ 07:00  --------------------------------------------------------  IN: 220 mL / OUT: 350 mL / NET: -130 mL     @ 07: @ 16:13  --------------------------------------------------------  IN: 0 mL / OUT: 1270 mL / NET: -1270 mL      Physical Exam:    Daily     Daily Weight in k (11 Sep 2019 12:25)  General:  Well appearing, NAD, not cachetic  HEENT:  Nonicteric, PERRLA  CV:  RRR, S1S2   Lungs:  CTA B/L, no wheezes, rales, rhonchi  Abdomen:  Soft  tender LLQ   Extremities:  2+ pulses, no c/c, no edema  Skin:  Warm and dry, no rashes  :  No gutierrez  Neuro:  NF                                                                                                                                                                                                                                                                                        LABS:                               8.5    9.3   )-----------( 159      ( 11 Sep 2019 09:20 )             26.8                      09-11    135  |  94<L>  |  34<H>  ----------------------------<  126<H>  3.4<L>   |  26  |  5.76<H>    Ca    9.1      11 Sep 2019 09:20  Phos  4.7     09-10  Mg     1.7     09-10

## 2019-09-11 NOTE — PROGRESS NOTE ADULT - SUBJECTIVE AND OBJECTIVE BOX
INTERVAL HPI/OVERNIGHT EVENTS:    pt seen and examined in HD  no complaints  no further rectal bleeding as per RN    MEDICATIONS  (STANDING):  allopurinol 100 milliGRAM(s) Oral daily  atorvastatin 40 milliGRAM(s) Oral at bedtime  cefTRIAXone   IVPB 1000 milliGRAM(s) IV Intermittent every 24 hours  chlorhexidine 2% Cloths 1 Application(s) Topical daily  cinacalcet 30 milliGRAM(s) Oral daily  cyanocobalamin Injectable 1000 MICROGram(s) IntraMuscular <User Schedule>  darbepoetin Injectable ViaL 60 MICROGram(s) IV Push every week  dextrose 5%. 1000 milliLiter(s) (50 mL/Hr) IV Continuous <Continuous>  dextrose 5%. 1000 milliLiter(s) (50 mL/Hr) IV Continuous <Continuous>  dextrose 50% Injectable 12.5 Gram(s) IV Push once  dextrose 50% Injectable 25 Gram(s) IV Push once  dextrose 50% Injectable 25 Gram(s) IV Push once  ergocalciferol 72236 Unit(s) Oral every week  folic acid 1 milliGRAM(s) Oral daily  furosemide   Injectable 60 milliGRAM(s) IV Push two times a day  hydrALAZINE 100 milliGRAM(s) Oral three times a day  hydrocortisone hemorrhoidal Suppository 1 Suppository(s) Rectal at bedtime  insulin glargine Injectable (LANTUS) 10 Unit(s) SubCutaneous at bedtime  insulin lispro (HumaLOG) corrective regimen sliding scale   SubCutaneous every 6 hours  labetalol 300 milliGRAM(s) Oral two times a day  levETIRAcetam 1000 milliGRAM(s) Oral two times a day  mycophenolate mofetil 250 milliGRAM(s) Oral two times a day  predniSONE   Tablet 5 milliGRAM(s) Oral daily  sodium bicarbonate 650 milliGRAM(s) Oral two times a day  tamsulosin 0.4 milliGRAM(s) Oral at bedtime  thiamine 100 milliGRAM(s) Oral daily    MEDICATIONS  (PRN):  acetaminophen   Tablet .. 650 milliGRAM(s) Oral every 6 hours PRN Temp greater or equal to 38C (100.4F)  aluminum hydroxide/magnesium hydroxide/simethicone Suspension 30 milliLiter(s) Oral every 6 hours PRN Dyspepsia  dextrose 40% Gel 15 Gram(s) Oral once PRN Blood Glucose LESS THAN 70 milliGRAM(s)/deciliter  glucagon  Injectable 1 milliGRAM(s) IntraMuscular once PRN Glucose LESS THAN 70 milligrams/deciliter      Allergies    No Known Allergies    Intolerances        Review of Systems:    General:  No wt loss, fevers, chills, night sweats,fatigue,   Eyes:  Good vision, no reported pain  ENT:  No sore throat, pain, runny nose, dysphagia  CV:  No pain, palpitations, hypo/hypertension  Resp:  No dyspnea, cough, tachypnea, wheezing  GI:  No pain, No nausea, No vomiting, No diarrhea, No constipation, No weight loss, No fever, No pruritis, No rectal bleeding, No melena, No dysphagia  :  No pain, bleeding, incontinence, nocturia  Muscle:  No pain, weakness  Neuro:  No weakness, tingling, memory problems  Psych:  No fatigue, insomnia, mood problems, depression  Endocrine:  No polyuria, polydypsia, cold/heat intolerance  Heme:  No petechiae, ecchymosis, easy bruisability  Skin:  No rash, tattoos, scars, edema      Vital Signs Last 24 Hrs  T(C): 36.9 (11 Sep 2019 12:54), Max: 37.3 (11 Sep 2019 06:20)  T(F): 98.4 (11 Sep 2019 12:54), Max: 99.1 (11 Sep 2019 06:20)  HR: 80 (11 Sep 2019 12:54) (76 - 82)  BP: 134/62 (11 Sep 2019 12:54) (118/61 - 170/80)  BP(mean): --  RR: 18 (11 Sep 2019 12:54) (17 - 18)  SpO2: 98% (11 Sep 2019 12:54) (93% - 98%)    PHYSICAL EXAM:    Constitutional: NAD, well-developed  HEENT: EOMI, throat clear  Neck: No LAD, supple  Respiratory: CTA and P  Cardiovascular: S1 and S2, RRR, no M  Gastrointestinal: BS+, soft, NT/ND, neg HSM,  Extremities: No peripheral edema, neg clubing, cyanosis  Vascular: 2+ peripheral pulses  Neurological: A/O x 1-2, no focal deficits  Psychiatric: Normal mood, normal affect  Skin: No rashes      LABS:                        8.5    9.3   )-----------( 159      ( 11 Sep 2019 09:20 )             26.8     09-11    135  |  94<L>  |  34<H>  ----------------------------<  126<H>  3.4<L>   |  26  |  5.76<H>    Ca    9.1      11 Sep 2019 09:20  Phos  4.7     09-10  Mg     1.7     09-10      PT/INR - ( 11 Sep 2019 09:20 )   PT: 14.8 sec;   INR: 1.29 ratio         PTT - ( 11 Sep 2019 09:20 )  PTT:26.3 sec  Urinalysis Basic - ( 10 Sep 2019 10:27 )    Color: Light Yellow / Appearance: Slightly Turbid / S.012 / pH: x  Gluc: x / Ketone: Negative  / Bili: Negative / Urobili: <2 mg/dL   Blood: x / Protein: 300 mg/dL / Nitrite: Negative   Leuk Esterase: Moderate / RBC: 2 /HPF / WBC 77 /HPF   Sq Epi: x / Non Sq Epi: 2 /HPF / Bacteria: Negative        RADIOLOGY & ADDITIONAL TESTS:

## 2019-09-11 NOTE — PROGRESS NOTE ADULT - SUBJECTIVE AND OBJECTIVE BOX
[facility] DIVISION OF KIDNEY DISEASES AND HYPERTENSION -- FOLLOW UP NOTE  --------------------------------------------------------------------------------  Chief Complaint:      24 hour events/subjective:  improved MS      PAST HISTORY  --------------------------------------------------------------------------------  No significant changes to PMH, PSH, FHx, SHx, unless otherwise noted    ALLERGIES & MEDICATIONS  --------------------------------------------------------------------------------  Allergies    No Known Allergies    Intolerances      Standing Inpatient Medications  allopurinol 100 milliGRAM(s) Oral daily  atorvastatin 40 milliGRAM(s) Oral at bedtime  cefTRIAXone   IVPB 1000 milliGRAM(s) IV Intermittent every 24 hours  chlorhexidine 2% Cloths 1 Application(s) Topical daily  cinacalcet 30 milliGRAM(s) Oral daily  cyanocobalamin Injectable 1000 MICROGram(s) IntraMuscular <User Schedule>  darbepoetin Injectable ViaL 60 MICROGram(s) IV Push every week  dextrose 5%. 1000 milliLiter(s) IV Continuous <Continuous>  dextrose 5%. 1000 milliLiter(s) IV Continuous <Continuous>  dextrose 50% Injectable 12.5 Gram(s) IV Push once  dextrose 50% Injectable 25 Gram(s) IV Push once  dextrose 50% Injectable 25 Gram(s) IV Push once  ergocalciferol 10049 Unit(s) Oral every week  folic acid 1 milliGRAM(s) Oral daily  furosemide   Injectable 60 milliGRAM(s) IV Push two times a day  heparin  Infusion.  Unit(s)/Hr IV Continuous <Continuous>  hydrALAZINE 100 milliGRAM(s) Oral three times a day  hydrocortisone hemorrhoidal Suppository 1 Suppository(s) Rectal at bedtime  insulin glargine Injectable (LANTUS) 10 Unit(s) SubCutaneous at bedtime  insulin lispro (HumaLOG) corrective regimen sliding scale   SubCutaneous every 6 hours  labetalol 300 milliGRAM(s) Oral two times a day  levETIRAcetam 1000 milliGRAM(s) Oral two times a day  mycophenolate mofetil 250 milliGRAM(s) Oral two times a day  predniSONE   Tablet 5 milliGRAM(s) Oral daily  sodium bicarbonate 650 milliGRAM(s) Oral two times a day  tamsulosin 0.4 milliGRAM(s) Oral at bedtime  thiamine 100 milliGRAM(s) Oral daily    PRN Inpatient Medications  acetaminophen   Tablet .. 650 milliGRAM(s) Oral every 6 hours PRN  aluminum hydroxide/magnesium hydroxide/simethicone Suspension 30 milliLiter(s) Oral every 6 hours PRN  dextrose 40% Gel 15 Gram(s) Oral once PRN  glucagon  Injectable 1 milliGRAM(s) IntraMuscular once PRN  heparin  Injectable 9000 Unit(s) IV Push every 6 hours PRN  heparin  Injectable 4000 Unit(s) IV Push every 6 hours PRN      REVIEW OF SYSTEMS  Gen: No weight changes, fatigue, fevers/chills, weakness  Head/Eyes/Ears/Mouth: No headache; Normal hearing; Normal vision    Respiratory: No dyspnea, cough, wheezing, hemoptysis  CV: No chest pain, PND, orthopnea  GI: No abdominal pain, diarrhea, constipation, nausea, vomiting, melena, hematochezia  : No increased frequency, dysuria, hematuria, nocturia  MSK: No joint pain/swelling; no back pain; no edema   Heme: No easy bruising or bleeding  All other systems were reviewed and are negative, except as noted.      VITALS/PHYSICAL EXAM  --------------------------------------------------------------------------------  T(C): 36.9 (09-11-19 @ 08:40), Max: 37.3 (09-11-19 @ 06:20)  HR: 76 (09-11-19 @ 08:40) (76 - 82)  BP: 149/73 (09-11-19 @ 08:40) (149/73 - 170/80)  RR: 17 (09-11-19 @ 08:40) (17 - 18)  SpO2: 96% (09-11-19 @ 08:40) (93% - 98%)  Wt(kg): --        09-10-19 @ 07:01  -  09-11-19 @ 07:00  --------------------------------------------------------  IN: 220 mL / OUT: 350 mL / NET: -130 mL    09-11-19 @ 07:01  -  09-11-19 @ 12:12  --------------------------------------------------------  IN: 0 mL / OUT: 250 mL / NET: -250 mL      PHYSICAL EXAM: vital signs as above  in no apparent distress  Neck: Supple, no JVD,    Lungs: no rhonchi, no wheeze, no crackles  CVS: S1 S2 no M/R/G  Abdomen: no tenderness, no organomegaly, BS present  Neuro: Grossly intact  Skin: warm, dry  Ext: no cyanosis or clubbing, no edema  Access: L AVf - good [facility]      LABS/STUDIES  --------------------------------------------------------------------------------              8.5    9.3   >-----------<  159      [09-11-19 @ 09:20]              26.8     135  |  94  |  34  ----------------------------<  126      [09-11-19 @ 09:20]  3.4   |  26  |  5.76        Ca     9.1     [09-11-19 @ 09:20]      Mg     1.7     [09-10-19 @ 07:10]      Phos  4.7     [09-10-19 @ 07:10]      PT/INR: PT 14.8 , INR 1.29       [09-11-19 @ 09:20]  PTT: 26.3       [09-11-19 @ 09:20]      Creatinine Trend:  SCr 5.76 [09-11 @ 09:20]  SCr 4.32 [09-10 @ 07:10]  SCr 4.10 [09-10 @ 01:11]  SCr 5.75 [09-08 @ 07:01]  SCr 4.79 [09-07 @ 11:42]    Urinalysis - [09-10-19 @ 10:27]      Color Light Yellow / Appearance Slightly Turbid / SG 1.012 / pH 8.0      Gluc 100 mg/dL / Ketone Negative  / Bili Negative / Urobili <2 mg/dL       Blood Trace / Protein 300 mg/dL / Leuk Est Moderate / Nitrite Negative      RBC 2 / WBC 77 / Hyaline 0 / Gran  / Sq Epi  / Non Sq Epi 2 / Bacteria Negative      Iron 45, TIBC 159, %sat 28      [09-02-19 @ 17:29]  Ferritin 260      [09-02-19 @ 17:29]  PTH -- (Ca 9.7)      [09-02-19 @ 17:29]   1231  PTH -- (Ca 9.1)      [08-27-19 @ 15:07]   1896  Vitamin D (25OH) <4.0      [09-02-19 @ 17:28]  HbA1c 5.6      [08-27-19 @ 09:03]  TSH 5.45      [08-30-19 @ 09:41]    HBsAb 19.4      [08-27-19 @ 15:26]  HBsAg Nonreact      [08-27-19 @ 15:26]  HCV 10.44, Reactive      [08-27-19 @ 08:29]    Free Light Chains: kappa 5.95, lambda 16.19, ratio = 0.37      [09-04 @ 23:44]  Immunofixation Serum: IgG Lambda Band Identified    Reference Range: None Detected      [09-04-19 @ 23:44]  SPEP Interpretation: Gamma Migrating Paraprotein Identified      [09-04-19 @ 23:44]

## 2019-09-11 NOTE — PROGRESS NOTE ADULT - ASSESSMENT
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   Pt just moved back to [place] from [place] .. reports that he has not been taking his meds for the past few days since " he might have misplaed them"     pt denies chest pain, syncope,fever, chills, cough, urinary symptoms.    in ED found  to have anemia   Nno acute changes on EKG   elevated CR and Trop    1- HTN urgency :   cont meds     still not well contorlled .. monitor and adjust meds accordingly     2- KAMRON  :   d/w   : pt will likely end up on HD in intermediate and likely long term given transplant graft failure   off tacro,  cont cellcept and sterioods   f/u renal bx and hold off on  fat pad bx for now       3- DM:   A1c  5.6  Fs     4- acute diastolic CHF sec to  HTN urgency and renal failure   Echo :  < from: Transthoracic Echocardiogram (08.28.19 @ 16:12) >  1. Mitral annular calcification and calcified mitral  leaflets with decreased diastolic opening.  2. Moderate to severe concentric left ventricular  hypertrophy.  3. Normal left ventricular systolic function with  mid-cavitary obliteration.  4. Normal right ventricular size and systolic function.  5. Small pericardial effusion.    cont fluid removal with HD per renal      5-  difficult ambulating :  no focal neuro deficits   neuro eval appreciated    CT T/L   < from: CT Thoracic Spine No Cont (08.30.19 @ 10:06) >    Old right L1 transverse process fracture. Bilateral lysis   defects at L5 as seen on the prior 5/26/2013      6- afib :  holding AC given LLQ pain ..f/u CT abd   r/o bleed     7- hematochezia : per sister : on and off small amount of fresh blood in stool and some amount in urine but only small amounts   monitor H/H   consult GI .: appreciate input :  suspect bleeding to be hemorrhoidal, now resolved     trial of anusol supp  if bleeding persist would consider colonoscopy however patient is reluctant.    heme input: appreciated        8-  renal transplant : f/u with Transplant team   cont meds with MMF and steroids   off Tac   s/p bx : f/u results          9- TIA :  unable to perform MRI   CT no acute changes on CT   repeat negative   fu with neuro     10 encephalopathy :   CTH with contrast : neg for acute pathology   LP : high protien but culture negative in CSF   blood culture positive for GNR /Ecoli likely urinary source   CT : < from: CT Abdomen and Pelvis No Cont (09.09.19 @ 22:26) >    Layering stones in a thick-walled urinary bladder with perivesicular   inflammatory change and a left lower quadrant transplant kidney with a   nonobstructing 1.1 cm lower pole stone and moderate perinephric fat   stranding. No hydronephrosis. Differential includes urinary tract   infection.    discussed with ID : will cont 10 day course of abx   consult urology re findings of CT    repeat Bladder scan           11- paraprotienmeia : d/w heme and renal   will need to consider amyloidosis .. doubt MM   holding on Fat pad bx for now       12 hypercalcemia : monitor for now     13- abd discomfort : improved today   CT abd as above   check PVR and if >200 will place gutierrez
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
## 2019-09-11 NOTE — PROGRESS NOTE ADULT - SUBJECTIVE AND OBJECTIVE BOX
Subjective: Patient seen and examined. No new events except as noted.   appears comfortable       REVIEW OF SYSTEMS:    CONSTITUTIONAL: +o weakness, fevers or chills  EYES/ENT: No visual changes;  No vertigo or throat pain   NECK: No pain or stiffness  RESPIRATORY: No cough, wheezing, hemoptysis; No shortness of breath  CARDIOVASCULAR: No chest pain or palpitations  GASTROINTESTINAL: No abdominal or epigastric pain. No nausea, vomiting, or hematemesis; No diarrhea or constipation. No melena or hematochezia.  GENITOURINARY: No dysuria, frequency or hematuria  NEUROLOGICAL: No numbness or weakness  SKIN: No itching, burning, rashes, or lesions   All other review of systems is negative unless indicated above.    MEDICATIONS:  MEDICATIONS  (STANDING):  allopurinol 100 milliGRAM(s) Oral daily  atorvastatin 40 milliGRAM(s) Oral at bedtime  cefTRIAXone   IVPB 1000 milliGRAM(s) IV Intermittent every 24 hours  chlorhexidine 2% Cloths 1 Application(s) Topical daily  cinacalcet 30 milliGRAM(s) Oral daily  cyanocobalamin Injectable 1000 MICROGram(s) IntraMuscular <User Schedule>  darbepoetin Injectable ViaL 60 MICROGram(s) IV Push every week  dextrose 5%. 1000 milliLiter(s) (50 mL/Hr) IV Continuous <Continuous>  dextrose 50% Injectable 12.5 Gram(s) IV Push once  dextrose 50% Injectable 25 Gram(s) IV Push once  dextrose 50% Injectable 25 Gram(s) IV Push once  ergocalciferol 17632 Unit(s) Oral every week  folic acid 1 milliGRAM(s) Oral daily  furosemide   Injectable 60 milliGRAM(s) IV Push two times a day  heparin  Infusion.  Unit(s)/Hr (19 mL/Hr) IV Continuous <Continuous>  hydrALAZINE 100 milliGRAM(s) Oral three times a day  hydrocortisone hemorrhoidal Suppository 1 Suppository(s) Rectal at bedtime  insulin glargine Injectable (LANTUS) 10 Unit(s) SubCutaneous at bedtime  insulin lispro (HumaLOG) corrective regimen sliding scale   SubCutaneous every 6 hours  labetalol 300 milliGRAM(s) Oral two times a day  levETIRAcetam 1000 milliGRAM(s) Oral two times a day  mycophenolate mofetil 250 milliGRAM(s) Oral two times a day  predniSONE   Tablet 5 milliGRAM(s) Oral daily  sodium bicarbonate 650 milliGRAM(s) Oral two times a day  tamsulosin 0.4 milliGRAM(s) Oral at bedtime  thiamine 100 milliGRAM(s) Oral daily  warfarin 7.5 milliGRAM(s) Oral once      PHYSICAL EXAM:  T(C): 37.1 (09-11-19 @ 13:58), Max: 37.3 (09-11-19 @ 06:20)  HR: 85 (09-11-19 @ 18:02) (76 - 85)  BP: 136/61 (09-11-19 @ 18:02) (118/61 - 168/74)  RR: 18 (09-11-19 @ 13:58) (17 - 18)  SpO2: 98% (09-11-19 @ 18:02) (93% - 98%)  Wt(kg): --  I&O's Summary    10 Sep 2019 07:01  -  11 Sep 2019 07:00  --------------------------------------------------------  IN: 220 mL / OUT: 350 mL / NET: -130 mL    11 Sep 2019 07:01  -  11 Sep 2019 19:23  --------------------------------------------------------  IN: 200 mL / OUT: 1270 mL / NET: -1070 mL        Appearance: NAD sleepy   HEENT:  dry oral mucosa, PERRL, EOMI	  Lymphatic: No lymphadenopathy  Cardiovascular: Normal S1 S2, No JVD, No murmurs , Peripheral pulses palpable 2+ bilaterally  Respiratory: Lungs clear to auscultation, normal effort 	  Gastrointestinal:  Soft, Non-tender, + BS	  Skin: No rashes, No ecchymoses, No cyanosis, warm to touch  Musculoskeletal: Normal range of motion, normal strength  Psychiatry:  at times confused   Ext: trace  edema    LABS:    CARDIAC MARKERS:                                9.0    8.8   )-----------( 144      ( 11 Sep 2019 16:45 )             27.4     09-11    135  |  94<L>  |  34<H>  ----------------------------<  126<H>  3.4<L>   |  26  |  5.76<H>    Ca    9.1      11 Sep 2019 09:20  Phos  4.7     09-10  Mg     1.7     09-10      proBNP:   Lipid Profile:   HgA1c:   TSH:     0          TELEMETRY: 	    ECG:  	  RADIOLOGY:   DIAGNOSTIC TESTING:  [ ] Echocardiogram:  [ ]  Catheterization:  [ ] Stress Test:    OTHER:

## 2019-09-11 NOTE — PROGRESS NOTE ADULT - ASSESSMENT
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  D/w the patient case with his long time Nephrologist in [place]. 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  He also had M spike as high as 1.3gm and he has had 10gm of proteinuria.   His hypercalcemia was worked up there and thought to be mostly related to PTH disease and he was not a parathyroidectomy candidate.  Since been here, worsening MS has improved

## 2019-09-11 NOTE — PROGRESS NOTE ADULT - PROBLEM SELECTOR PLAN 2
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 now with KAMRON over CKD stage 5  ,now started on dialysis since 8/27/19. Plan for HD today. Back on diuretics. dialysis and may even need it daily.  Renal bx confirmed chronic transplant glomerulopathy and no signs of paraprotein disease- no amyloidosis. Final report pending  Change lasix to PO

## 2019-09-11 NOTE — PROGRESS NOTE ADULT - SUBJECTIVE AND OBJECTIVE BOX
CC: f/u for E coli bacteremia    Patient reports nothing    REVIEW OF SYSTEMS:  Limited - makes no attempt to communicate. Denies being in pain. Nurse reports pain only when renal biopsy site is palpated.     Antimicrobials Day #  : 9/10  cefTRIAXone   IVPB 1000 milliGRAM(s) IV Intermittent every 24 hours    Other Medications Reviewed    T(F): 98.4 (19 @ 12:54), Max: 99.1 (19 @ 06:20)  HR: 80 (19 @ 12:54)  BP: 134/62 (19 @ 12:54)  RR: 18 (19 @ 12:54)  SpO2: 98% (19 @ 12:54)  Wt(kg): --    PHYSICAL EXAM:  General: no acute distress  Eyes:  anicteric, no conjunctival injection, no discharge  Neck: supple  Lungs: clear to auscultation  Heart: regular rate and rhythm; no murmur  Abdomen: soft, nondistended, nontender  Skin: no lesions  Extremities: no clubbing or cyanosis. Bilateral edema. Left forearm AVF   Neurologic: awake, confused &, moves all extremities      LAB RESULTS:                        8.5    9.3   )-----------( 159      ( 11 Sep 2019 09:20 )             26.8     09-11    135  |  94<L>  |  34<H>  ----------------------------<  126<H>  3.4<L>   |  26  |  5.76<H>    Ca    9.1      11 Sep 2019 09:20  Phos  4.7     -10  Mg     1.7     09-10        Urinalysis Basic - ( 10 Sep 2019 10:27 )    Color: Light Yellow / Appearance: Slightly Turbid / S.012 / pH: x  Gluc: x / Ketone: Negative  / Bili: Negative / Urobili: <2 mg/dL   Blood: x / Protein: 300 mg/dL / Nitrite: Negative   Leuk Esterase: Moderate / RBC: 2 /HPF / WBC 77 /HPF   Sq Epi: x / Non Sq Epi: 2 /HPF / Bacteria: Negative      MICROBIOLOGY:  RECENT CULTURES:      RADIOLOGY REVIEWED:

## 2019-09-11 NOTE — PROGRESS NOTE ADULT - PROBLEM SELECTOR PLAN 2
- ? related multifactorial metabolic encephalopathy   - await results of renal bx   - surgery team called for fat pad biopsy, which will be considered after renal bx results are back as per family request

## 2019-09-11 NOTE — PROGRESS NOTE ADULT - ASSESSMENT
75 yo male with history of A Fib, Hep C, DM, ESRD s/p renal transplant x 2, on tacrolimus, myfortic, and 5 mg of prednisone along with prophylactic valtrex.  His CMV viral loads were negative as per prior transplant team.  Admitted on 8/26 with progressive CKD and rectal bleeding.  Required HD and developed fever 9/2  His Hep B surface antibody is positive, Hep C RNA is negative  Found to have E coli in blood, primary focus not clear, typically GI or  origin  S/p LP for AMS, no cells, protein 126, glucose 67, ME PCR panel is negative, CMV PCR negative, CSF cytology negative  serum crypt & histo antigens are negative  ? Toxic-Metabolic encephalopathy  Fever resolved  Repeat blood cultures are negative  Tacrolimus & Myfortic has been stopped, on Cellcept & IV steroids  Ct A & P resulted as layering stones in a thick-walled urinary bladder with perivesicular inflammatory change and a left lower quadrant transplant kidney with a nonobstructing 1.1 cm lower pole stone and moderate perinephric fat stranding. No hydronephrosis. Differential includes urinary tract infection.  Suspect  focus - likely a stone might have passed    Suggest:  Continue CTX for another 24 hrs  Consider urology input given above CT results

## 2019-09-11 NOTE — CHART NOTE - NSCHARTNOTEFT_GEN_A_CORE
Pos tenderness LLQ over Bx site; CT abd/pel no contrast to asses for bleeding; if neg ok to restart hep drip - no bolus- and dose coumadin tonight at pt's home dose

## 2019-09-11 NOTE — PROGRESS NOTE ADULT - PROBLEM SELECTOR PLAN 3
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 admitted with fluid overload and HTN urgency . Last renal bx was 2017, presumed rejection, congo red was not done. Repeating renal transplant bx for ruling out amyloidosis as proteinuria is significant at 10gm and not sure if a true cause was found in d.w with his primary nephr at [facility].  Cont MMF and pred for now

## 2019-09-12 LAB
ANION GAP SERPL CALC-SCNC: 17 MMOL/L — SIGNIFICANT CHANGE UP (ref 5–17)
APTT BLD: 48 SEC — HIGH (ref 27.5–36.3)
APTT BLD: 66.4 SEC — HIGH (ref 27.5–36.3)
BUN SERPL-MCNC: 23 MG/DL — SIGNIFICANT CHANGE UP (ref 7–23)
CALCIUM SERPL-MCNC: 8.6 MG/DL — SIGNIFICANT CHANGE UP (ref 8.4–10.5)
CHLORIDE SERPL-SCNC: 95 MMOL/L — LOW (ref 96–108)
CO2 SERPL-SCNC: 25 MMOL/L — SIGNIFICANT CHANGE UP (ref 22–31)
CREAT SERPL-MCNC: 4.78 MG/DL — HIGH (ref 0.5–1.3)
GLUCOSE BLDC GLUCOMTR-MCNC: 109 MG/DL — HIGH (ref 70–99)
GLUCOSE BLDC GLUCOMTR-MCNC: 114 MG/DL — HIGH (ref 70–99)
GLUCOSE BLDC GLUCOMTR-MCNC: 119 MG/DL — HIGH (ref 70–99)
GLUCOSE BLDC GLUCOMTR-MCNC: 130 MG/DL — HIGH (ref 70–99)
GLUCOSE SERPL-MCNC: 106 MG/DL — HIGH (ref 70–99)
H CAPSUL AG SER IA-MCNC: SIGNIFICANT CHANGE UP
HCT VFR BLD CALC: 27.7 % — LOW (ref 39–50)
HCT VFR BLD CALC: 29.4 % — LOW (ref 39–50)
HGB BLD-MCNC: 8.8 G/DL — LOW (ref 13–17)
HGB BLD-MCNC: 8.9 G/DL — LOW (ref 13–17)
INR BLD: 1.32 RATIO — HIGH (ref 0.88–1.16)
LEVETIRACETAM SERPL-MCNC: 45.7 MCG/ML — SIGNIFICANT CHANGE UP (ref 12–46)
MCHC RBC-ENTMCNC: 26.8 PG — LOW (ref 27–34)
MCHC RBC-ENTMCNC: 27.4 PG — SIGNIFICANT CHANGE UP (ref 27–34)
MCHC RBC-ENTMCNC: 29.9 GM/DL — LOW (ref 32–36)
MCHC RBC-ENTMCNC: 32 GM/DL — SIGNIFICANT CHANGE UP (ref 32–36)
MCV RBC AUTO: 85.5 FL — SIGNIFICANT CHANGE UP (ref 80–100)
MCV RBC AUTO: 89.6 FL — SIGNIFICANT CHANGE UP (ref 80–100)
PLATELET # BLD AUTO: 149 K/UL — LOW (ref 150–400)
PLATELET # BLD AUTO: 183 K/UL — SIGNIFICANT CHANGE UP (ref 150–400)
POTASSIUM SERPL-MCNC: 3.8 MMOL/L — SIGNIFICANT CHANGE UP (ref 3.5–5.3)
POTASSIUM SERPL-SCNC: 3.8 MMOL/L — SIGNIFICANT CHANGE UP (ref 3.5–5.3)
PROTHROM AB SERPL-ACNC: 15.1 SEC — HIGH (ref 10–13.1)
RBC # BLD: 3.24 M/UL — LOW (ref 4.2–5.8)
RBC # BLD: 3.28 M/UL — LOW (ref 4.2–5.8)
RBC # FLD: 14.3 % — SIGNIFICANT CHANGE UP (ref 10.3–14.5)
RBC # FLD: 14.8 % — HIGH (ref 10.3–14.5)
SODIUM SERPL-SCNC: 137 MMOL/L — SIGNIFICANT CHANGE UP (ref 135–145)
SURGICAL PATHOLOGY STUDY: SIGNIFICANT CHANGE UP
WBC # BLD: 8.9 K/UL — SIGNIFICANT CHANGE UP (ref 3.8–10.5)
WBC # BLD: 9.75 K/UL — SIGNIFICANT CHANGE UP (ref 3.8–10.5)
WBC # FLD AUTO: 8.9 K/UL — SIGNIFICANT CHANGE UP (ref 3.8–10.5)
WBC # FLD AUTO: 9.75 K/UL — SIGNIFICANT CHANGE UP (ref 3.8–10.5)

## 2019-09-12 PROCEDURE — 99222 1ST HOSP IP/OBS MODERATE 55: CPT

## 2019-09-12 PROCEDURE — 99232 SBSQ HOSP IP/OBS MODERATE 35: CPT | Mod: GC

## 2019-09-12 RX ORDER — WARFARIN SODIUM 2.5 MG/1
7.5 TABLET ORAL ONCE
Refills: 0 | Status: COMPLETED | OUTPATIENT
Start: 2019-09-12 | End: 2019-09-12

## 2019-09-12 RX ADMIN — HEPARIN SODIUM 2100 UNIT(S)/HR: 5000 INJECTION INTRAVENOUS; SUBCUTANEOUS at 02:40

## 2019-09-12 RX ADMIN — Medication 60 MILLIGRAM(S): at 23:02

## 2019-09-12 RX ADMIN — Medication 5 MILLIGRAM(S): at 06:18

## 2019-09-12 RX ADMIN — Medication 60 MILLIGRAM(S): at 00:40

## 2019-09-12 RX ADMIN — Medication 60 MILLIGRAM(S): at 11:49

## 2019-09-12 RX ADMIN — Medication 1 PATCH: at 07:36

## 2019-09-12 RX ADMIN — INSULIN GLARGINE 10 UNIT(S): 100 INJECTION, SOLUTION SUBCUTANEOUS at 22:54

## 2019-09-12 RX ADMIN — CINACALCET 30 MILLIGRAM(S): 30 TABLET, FILM COATED ORAL at 12:26

## 2019-09-12 RX ADMIN — Medication 1 MILLIGRAM(S): at 11:50

## 2019-09-12 RX ADMIN — Medication 650 MILLIGRAM(S): at 06:18

## 2019-09-12 RX ADMIN — LEVETIRACETAM 1000 MILLIGRAM(S): 250 TABLET, FILM COATED ORAL at 06:18

## 2019-09-12 RX ADMIN — WARFARIN SODIUM 7.5 MILLIGRAM(S): 2.5 TABLET ORAL at 23:02

## 2019-09-12 RX ADMIN — MYCOPHENOLATE MOFETIL 250 MILLIGRAM(S): 250 CAPSULE ORAL at 06:17

## 2019-09-12 RX ADMIN — MYCOPHENOLATE MOFETIL 250 MILLIGRAM(S): 250 CAPSULE ORAL at 17:01

## 2019-09-12 RX ADMIN — Medication 300 MILLIGRAM(S): at 06:18

## 2019-09-12 RX ADMIN — LEVETIRACETAM 1000 MILLIGRAM(S): 250 TABLET, FILM COATED ORAL at 17:01

## 2019-09-12 RX ADMIN — Medication 1 PATCH: at 18:04

## 2019-09-12 RX ADMIN — Medication 100 MILLIGRAM(S): at 11:49

## 2019-09-12 RX ADMIN — Medication 100 MILLIGRAM(S): at 22:55

## 2019-09-12 RX ADMIN — Medication 100 MILLIGRAM(S): at 11:50

## 2019-09-12 RX ADMIN — ATORVASTATIN CALCIUM 40 MILLIGRAM(S): 80 TABLET, FILM COATED ORAL at 22:55

## 2019-09-12 RX ADMIN — Medication 650 MILLIGRAM(S): at 17:01

## 2019-09-12 RX ADMIN — CHLORHEXIDINE GLUCONATE 1 APPLICATION(S): 213 SOLUTION TOPICAL at 11:51

## 2019-09-12 RX ADMIN — Medication 100 MILLIGRAM(S): at 06:18

## 2019-09-12 RX ADMIN — Medication 300 MILLIGRAM(S): at 17:02

## 2019-09-12 RX ADMIN — TAMSULOSIN HYDROCHLORIDE 0.4 MILLIGRAM(S): 0.4 CAPSULE ORAL at 22:56

## 2019-09-12 RX ADMIN — CEFTRIAXONE 100 MILLIGRAM(S): 500 INJECTION, POWDER, FOR SOLUTION INTRAMUSCULAR; INTRAVENOUS at 15:13

## 2019-09-12 RX ADMIN — Medication 100 MILLIGRAM(S): at 15:15

## 2019-09-12 NOTE — PROGRESS NOTE ADULT - SUBJECTIVE AND OBJECTIVE BOX
Patient is a 74y old  Male who presents with a chief complaint of KAMRON (12 Sep 2019 10:58)                                                               INTERVAL HPI/OVERNIGHT EVENTS:    REVIEW OF SYSTEMS:     CONSTITUTIONAL: No weakness, fevers or chills  RESPIRATORY: No cough, wheezing,  No shortness of breath  CARDIOVASCULAR: No chest pain or palpitations  GASTROINTESTINAL: No abdominal pain  . No nausea, vomiting, or hematemesis; No diarrhea or constipation. No melena or hematochezia.  GENITOURINARY: No dysuria, frequency or hematuria  NEUROLOGICAL: No numbness or weakness                                                                                                                                                                                                                                                                               Medications:  MEDICATIONS  (STANDING):  allopurinol 100 milliGRAM(s) Oral daily  atorvastatin 40 milliGRAM(s) Oral at bedtime  cefTRIAXone   IVPB 1000 milliGRAM(s) IV Intermittent every 24 hours  chlorhexidine 2% Cloths 1 Application(s) Topical daily  cinacalcet 30 milliGRAM(s) Oral daily  cyanocobalamin Injectable 1000 MICROGram(s) IntraMuscular <User Schedule>  darbepoetin Injectable ViaL 60 MICROGram(s) IV Push every week  dextrose 5%. 1000 milliLiter(s) (50 mL/Hr) IV Continuous <Continuous>  dextrose 50% Injectable 12.5 Gram(s) IV Push once  dextrose 50% Injectable 25 Gram(s) IV Push once  dextrose 50% Injectable 25 Gram(s) IV Push once  ergocalciferol 14355 Unit(s) Oral every week  folic acid 1 milliGRAM(s) Oral daily  furosemide   Injectable 60 milliGRAM(s) IV Push two times a day  heparin  Infusion.  Unit(s)/Hr (19 mL/Hr) IV Continuous <Continuous>  hydrALAZINE 100 milliGRAM(s) Oral three times a day  hydrocortisone hemorrhoidal Suppository 1 Suppository(s) Rectal at bedtime  insulin glargine Injectable (LANTUS) 10 Unit(s) SubCutaneous at bedtime  insulin lispro (HumaLOG) corrective regimen sliding scale   SubCutaneous every 6 hours  labetalol 300 milliGRAM(s) Oral two times a day  levETIRAcetam 1000 milliGRAM(s) Oral two times a day  mycophenolate mofetil 250 milliGRAM(s) Oral two times a day  predniSONE   Tablet 5 milliGRAM(s) Oral daily  sodium bicarbonate 650 milliGRAM(s) Oral two times a day  tamsulosin 0.4 milliGRAM(s) Oral at bedtime  thiamine 100 milliGRAM(s) Oral daily  warfarin 7.5 milliGRAM(s) Oral once    MEDICATIONS  (PRN):  acetaminophen   Tablet .. 650 milliGRAM(s) Oral every 6 hours PRN Temp greater or equal to 38C (100.4F)  aluminum hydroxide/magnesium hydroxide/simethicone Suspension 30 milliLiter(s) Oral every 6 hours PRN Dyspepsia  dextrose 40% Gel 15 Gram(s) Oral once PRN Blood Glucose LESS THAN 70 milliGRAM(s)/deciliter  glucagon  Injectable 1 milliGRAM(s) IntraMuscular once PRN Glucose LESS THAN 70 milligrams/deciliter       Allergies    No Known Allergies    Intolerances      Vital Signs Last 24 Hrs  T(C): 36.9 (12 Sep 2019 14:14), Max: 37.1 (11 Sep 2019 21:27)  T(F): 98.5 (12 Sep 2019 14:14), Max: 98.8 (11 Sep 2019 21:27)  HR: 72 (12 Sep 2019 14:14) (72 - 85)  BP: 157/72 (12 Sep 2019 14:14) (136/61 - 157/72)  BP(mean): --  RR: 18 (12 Sep 2019 14:14) (18 - 18)  SpO2: 96% (12 Sep 2019 14:14) (96% - 98%)  CAPILLARY BLOOD GLUCOSE      POCT Blood Glucose.: 130 mg/dL (12 Sep 2019 12:24)  POCT Blood Glucose.: 109 mg/dL (12 Sep 2019 08:55)  POCT Blood Glucose.: 95 mg/dL (11 Sep 2019 22:16)  POCT Blood Glucose.: 105 mg/dL (11 Sep 2019 18:15)      09-11 @ 07:01 - 09-12 @ 07:00  --------------------------------------------------------  IN: 200 mL / OUT: 1270 mL / NET: -1070 mL    09-12 @ 07:01 - 09-12 @ 17:37  --------------------------------------------------------  IN: 769 mL / OUT: 200 mL / NET: 569 mL      Physical Exam:    General:  NAD   HEENT:  Nonicteric, PERRLA  CV:  RRR, S1S2   Lungs:  CTA B/L, no wheezes, rales, rhonchi  Abdomen:  Soft, non-tender, no distended, positive BS  Extremities:  2+ pulses, no c/c, no edema  Skin:  Warm and dry, no rashes  :  No gutierrez  Neuro:  AAOx3, non-focal, grossly intact                                                                                                                                                                                                                                                                                                LABS:                               8.8    9.75  )-----------( 183      ( 12 Sep 2019 13:50 )             29.4                      09-12    137  |  95<L>  |  23  ----------------------------<  106<H>  3.8   |  25  |  4.78<H>    Ca    8.6      12 Sep 2019 08:46                         RADIOLOGY & ADDITIONAL TESTS         I personally reviewed: [  ]EKG   [  ]CXR    [  ] CT      A/P:

## 2019-09-12 NOTE — CHART NOTE - NSCHARTNOTEFT_GEN_A_CORE
Nutrition Follow Up Note  Patient seen for: Malnutrition follow up. Chart reviewed and events noted.  Per chart, 75 y/o male with PMH of T2DM, ESRD s/p DDRT 1 year ago, HTN, admitted with SOB x 2 weeks and not taking medication x 2 weeks, ADHF, HTN urgency and renal failure requiring HD. SLP swallow evaluation recommends Dysphagia 3 [place] consistency diet. Pt eating lunch at time of visit with sister at bedside.    Source: EMR, patient, sister, PCA, RN    Diet : Consistent Carbohydrate Dysphagia 3 [place] consistency with Nepro 1 x day    Patient reports: no GI distress, +BM      PO intake : pt with fair appetite, observed pt having lunch at time of visit, good po intakes noted. Per RN, pt had some eggs this morning. Pt tried the Nepro, does not like vanilla flavor.      Source for PO intake: patient, sister, RN and PCA     Enteral /Parenteral Nutrition:       Daily Weight in k (), Weight in k (), Weight in k.9 (-), Weight in k.4 (), Weight in k (-), Weight in k ()  % Weight Change- wt loss noted 2/2 prolonged NPO status 2/2 change in mental status    Pertinent Medications: MEDICATIONS  (STANDING):  allopurinol 100 milliGRAM(s) Oral daily  atorvastatin 40 milliGRAM(s) Oral at bedtime  cefTRIAXone   IVPB 1000 milliGRAM(s) IV Intermittent every 24 hours  chlorhexidine 2% Cloths 1 Application(s) Topical daily  cinacalcet 30 milliGRAM(s) Oral daily  cyanocobalamin Injectable 1000 MICROGram(s) IntraMuscular <User Schedule>  darbepoetin Injectable ViaL 60 MICROGram(s) IV Push every week  dextrose 5%. 1000 milliLiter(s) (50 mL/Hr) IV Continuous <Continuous>  dextrose 50% Injectable 12.5 Gram(s) IV Push once  dextrose 50% Injectable 25 Gram(s) IV Push once  dextrose 50% Injectable 25 Gram(s) IV Push once  ergocalciferol 40459 Unit(s) Oral every week  folic acid 1 milliGRAM(s) Oral daily  furosemide   Injectable 60 milliGRAM(s) IV Push two times a day  heparin  Infusion.  Unit(s)/Hr (19 mL/Hr) IV Continuous <Continuous>  hydrALAZINE 100 milliGRAM(s) Oral three times a day  hydrocortisone hemorrhoidal Suppository 1 Suppository(s) Rectal at bedtime  insulin glargine Injectable (LANTUS) 10 Unit(s) SubCutaneous at bedtime  insulin lispro (HumaLOG) corrective regimen sliding scale   SubCutaneous every 6 hours  labetalol 300 milliGRAM(s) Oral two times a day  levETIRAcetam 1000 milliGRAM(s) Oral two times a day  mycophenolate mofetil 250 milliGRAM(s) Oral two times a day  predniSONE   Tablet 5 milliGRAM(s) Oral daily  sodium bicarbonate 650 milliGRAM(s) Oral two times a day  tamsulosin 0.4 milliGRAM(s) Oral at bedtime  thiamine 100 milliGRAM(s) Oral daily    MEDICATIONS  (PRN):  acetaminophen   Tablet .. 650 milliGRAM(s) Oral every 6 hours PRN Temp greater or equal to 38C (100.4F)  aluminum hydroxide/magnesium hydroxide/simethicone Suspension 30 milliLiter(s) Oral every 6 hours PRN Dyspepsia  dextrose 40% Gel 15 Gram(s) Oral once PRN Blood Glucose LESS THAN 70 milliGRAM(s)/deciliter  glucagon  Injectable 1 milliGRAM(s) IntraMuscular once PRN Glucose LESS THAN 70 milligrams/deciliter     @ 08:46: Na 137, BUN 23, Cr 4.78<H>, <H>, K+ 3.8, Phos --, Mg --, Alk Phos --, ALT/SGPT --, AST/SGOT --, HbA1c --    Finger Sticks:  POCT Blood Glucose.: 130 mg/dL ( @ 12:24)  POCT Blood Glucose.: 109 mg/dL ( @ 08:55)  POCT Blood Glucose.: 95 mg/dL ( @ 22:16)  POCT Blood Glucose.: 105 mg/dL ( @ 18:15)      Skin per nursing documentation: no pressure injuries  Edema: no edema    Estimated Needs:   [ x] no change since previous assessment  [ ] recalculated:     Previous Nutrition Diagnosis: moderate malnutrition   Nutrition Diagnosis is: ongoing - being addressed with current diet and supplement order    New Nutrition Diagnosis: n/a     Interventions:     Recommend  1) Continue to monitor weight, lab values, and diet tolerance.   2) Encouraged pt to increase po intake of meals as tolerated and discussed importance of adequate nutrition intake.   3) Food preferences and flavor preference of supplement obtained and will be honored.   4) RD contact information provided.     Monitoring and Evaluation:     Continue to monitor Nutritional intake, Tolerance to diet prescription, weights, labs, skin integrity    RD remains available upon request and will follow up per protocol

## 2019-09-12 NOTE — PROGRESS NOTE ADULT - SUBJECTIVE AND OBJECTIVE BOX
INTERVAL HPI/OVERNIGHT EVENTS:    pt seen and examined  no complaints  no further rectal bleeding as per RN, last bm was last night which was brown/loose    MEDICATIONS  (STANDING):  allopurinol 100 milliGRAM(s) Oral daily  atorvastatin 40 milliGRAM(s) Oral at bedtime  cefTRIAXone   IVPB 1000 milliGRAM(s) IV Intermittent every 24 hours  chlorhexidine 2% Cloths 1 Application(s) Topical daily  cinacalcet 30 milliGRAM(s) Oral daily  cyanocobalamin Injectable 1000 MICROGram(s) IntraMuscular <User Schedule>  darbepoetin Injectable ViaL 60 MICROGram(s) IV Push every week  dextrose 5%. 1000 milliLiter(s) (50 mL/Hr) IV Continuous <Continuous>  dextrose 50% Injectable 12.5 Gram(s) IV Push once  dextrose 50% Injectable 25 Gram(s) IV Push once  dextrose 50% Injectable 25 Gram(s) IV Push once  ergocalciferol 17662 Unit(s) Oral every week  folic acid 1 milliGRAM(s) Oral daily  furosemide   Injectable 60 milliGRAM(s) IV Push two times a day  heparin  Infusion.  Unit(s)/Hr (19 mL/Hr) IV Continuous <Continuous>  hydrALAZINE 100 milliGRAM(s) Oral three times a day  hydrocortisone hemorrhoidal Suppository 1 Suppository(s) Rectal at bedtime  insulin glargine Injectable (LANTUS) 10 Unit(s) SubCutaneous at bedtime  insulin lispro (HumaLOG) corrective regimen sliding scale   SubCutaneous every 6 hours  labetalol 300 milliGRAM(s) Oral two times a day  levETIRAcetam 1000 milliGRAM(s) Oral two times a day  mycophenolate mofetil 250 milliGRAM(s) Oral two times a day  predniSONE   Tablet 5 milliGRAM(s) Oral daily  sodium bicarbonate 650 milliGRAM(s) Oral two times a day  tamsulosin 0.4 milliGRAM(s) Oral at bedtime  thiamine 100 milliGRAM(s) Oral daily    MEDICATIONS  (PRN):  acetaminophen   Tablet .. 650 milliGRAM(s) Oral every 6 hours PRN Temp greater or equal to 38C (100.4F)  aluminum hydroxide/magnesium hydroxide/simethicone Suspension 30 milliLiter(s) Oral every 6 hours PRN Dyspepsia  dextrose 40% Gel 15 Gram(s) Oral once PRN Blood Glucose LESS THAN 70 milliGRAM(s)/deciliter  glucagon  Injectable 1 milliGRAM(s) IntraMuscular once PRN Glucose LESS THAN 70 milligrams/deciliter      Allergies    No Known Allergies    Intolerances        Review of Systems:    General:  No wt loss, fevers, chills, night sweats,fatigue,   Eyes:  Good vision, no reported pain  ENT:  No sore throat, pain, runny nose, dysphagia  CV:  No pain, palpitations, hypo/hypertension  Resp:  No dyspnea, cough, tachypnea, wheezing  GI:  No pain, No nausea, No vomiting, No diarrhea, No constipation, No weight loss, No fever, No pruritis, No rectal bleeding, No melena, No dysphagia  :  No pain, bleeding, incontinence, nocturia  Muscle:  No pain, weakness  Neuro:  No weakness, tingling, memory problems  Psych:  No fatigue, insomnia, mood problems, depression  Endocrine:  No polyuria, polydypsia, cold/heat intolerance  Heme:  No petechiae, ecchymosis, easy bruisability  Skin:  No rash, tattoos, scars, edema      Vital Signs Last 24 Hrs  T(C): 36.9 (12 Sep 2019 04:58), Max: 37.1 (11 Sep 2019 13:58)  T(F): 98.4 (12 Sep 2019 04:58), Max: 98.8 (11 Sep 2019 13:58)  HR: 77 (12 Sep 2019 04:58) (76 - 85)  BP: 152/62 (12 Sep 2019 04:58) (118/61 - 153/64)  BP(mean): --  RR: 18 (12 Sep 2019 04:58) (18 - 18)  SpO2: 97% (12 Sep 2019 04:58) (95% - 98%)    PHYSICAL EXAM:    Constitutional: NAD, well-developed  HEENT: EOMI, throat clear  Neck: No LAD, supple  Respiratory: CTA and P  Cardiovascular: S1 and S2, RRR, no M  Gastrointestinal: BS+, soft, NT/ND, neg HSM,  Extremities: No peripheral edema, neg clubing, cyanosis  Vascular: 2+ peripheral pulses  Neurological: A/O x 1-2, no focal deficits  Psychiatric: Normal mood, normal affect  Skin: No rashes      LABS:                        8.9    8.9   )-----------( 149      ( 12 Sep 2019 01:54 )             27.7     09-12    137  |  95<L>  |  23  ----------------------------<  106<H>  3.8   |  25  |  4.78<H>    Ca    8.6      12 Sep 2019 08:46      PT/INR - ( 11 Sep 2019 09:20 )   PT: 14.8 sec;   INR: 1.29 ratio         PTT - ( 12 Sep 2019 01:54 )  PTT:48.0 sec  Urinalysis Basic - ( 10 Sep 2019 10:27 )    Color: Light Yellow / Appearance: Slightly Turbid / S.012 / pH: x  Gluc: x / Ketone: Negative  / Bili: Negative / Urobili: <2 mg/dL   Blood: x / Protein: 300 mg/dL / Nitrite: Negative   Leuk Esterase: Moderate / RBC: 2 /HPF / WBC 77 /HPF   Sq Epi: x / Non Sq Epi: 2 /HPF / Bacteria: Negative        RADIOLOGY & ADDITIONAL TESTS:

## 2019-09-12 NOTE — CONSULT NOTE ADULT - SUBJECTIVE AND OBJECTIVE BOX
UROLOGY CONSULT NOTE    HPI:  This is a 74y old Male with PMHx of failed renal transplant x2 ( and ), DM, GERD, HTN, IV drug use, DM, Hep C who was incidentally found to have bladder stones on CT scan.  The patient was admitted after having shortness of breath attributed to HTN urgency and KAMRON.  He denies history of  disease, dysuria, hematuria, nocturia, flank pain, fevers, chills, N/V, SOB, CP.  He is s/p recent kidney biopsy.  Currently without acute complaints.      PAST MEDICAL HISTORY    Kidney transplant recipient  Anuria  Kidney transplant failure  Hepatitis C  GERD (gastroesophageal reflux disease)  Renal transplant failure and rejection  Rectal abscess  IV drug abuse  HTN (hypertension)  Diverticulitis  ESRD on dialysis  Diabetes mellitus  BPH (Benign Prostatic Hyperplasia)  Cardiomyopathy      PAST SURGICAL HISTORY    H/O kidney transplant x2  A-V fistula  S/p cadaver renal transplant  S/P partial colectomy      FAMILY HISTORY    Family history of breast cancer in sister (Sibling)  Family history of prostate cancer in father  Family history of lung cancer  Family history of hypertension in mother  Family history of diabetes mellitus      SOCIAL HISTORY    History of IV drug use  History of former tobacco usage    HOME MEDICATIONS    allopurinol 100 mg oral tablet: 1 tab(s) orally once a day (27 Aug 2019 11:11)  Flomax 0.4 mg oral capsule: 2 cap(s) orally once a day (27 Aug 2019 11:11)  gabapentin 300 mg oral capsule: 1 cap(s) orally 2 times a day (27 Aug 2019 11:11)  HumaLO to 8 unit(s) subcutaneous 2 times a day (before meals) as per sliding scale (27 Aug 2019 11:11)  labetalol 300 mg oral tablet: 1 tab(s) orally 2 times a day (27 Aug 2019 11:11)  Lantus 100 units/mL subcutaneous solution: 14 to 16 unit(s) subcutaneous once a day (at bedtime) (27 Aug 2019 11:11)  levETIRAcetam 1000 mg oral tablet: 1 tab(s) orally 2 times a day (27 Aug 2019 11:11)  magnesium oxide: 400 milligram(s) orally once a day (27 Aug 2019 11:11)  Myfortic 180 mg oral delayed release tablet: 2 tab(s) orally 2 times a day (27 Aug 2019 11:11)  NIFEdipine 60 mg oral tablet, extended release: 1 tab(s) orally 2 times a day (27 Aug 2019 11:11)  OXcarbazepine 300 mg oral tablet: 1 tab(s) orally 2 times a day (27 Aug 2019 11:11)  pantoprazole 40 mg oral delayed release tablet: 1 tab(s) orally once a day (27 Aug 2019 11:11)  predniSONE 5 mg oral tablet: 1 tab(s) orally once a day (27 Aug 2019 11:11)  rosuvastatin 10 mg oral tablet: 1 tab(s) orally once a day (27 Aug 2019 11:11)  sodium bicarbonate 650 mg oral tablet: 2 tab(s) orally 2 times a day (27 Aug 2019 11:11)  tacrolimus 1 mg oral capsule: 2 cap(s) orally 2 times a day (27 Aug 2019 11:11)  torsemide 100 mg oral tablet: 0.5 tab(s) orally 2 times a day (27 Aug 2019 11:11)  warfarin 7.5 mg oral tablet: 1 tab(s) orally once a day as per INR (27 Aug 2019 11:11)      DRUG ALLERGIES    NKDA    REVIEW OF SYSTEMS: Pertinent positives and negatives as stated in HPI, otherwise negative      VITAL SIGNS    T(F): 98.5, Max: 98.5 (19 @ 14:14)  HR: 72  BP: 157/72  RR: 18  SpO2: 96%        11 Sep 2019 07:  -  12 Sep 2019 07:00  --------------------------------------------------------  IN: 200 mL / OUT: 1270 mL / NET: -1070 mL    12 Sep 2019 07:  -  12 Sep 2019 15:16  --------------------------------------------------------  IN: 84 mL / OUT: 0 mL / NET: 84 mL        PHYSICAL EXAM    Gen: Well groomed, well dressed, well nourished  Abd: Soft, NT/ND  : Uncircumcised, no lesions.  No discharge or blood at urethral meatus.  Testes descended bilaterally.  EDGARDO: prostate smooth, nontender, 20gms  Ext: No edema present b/l      LABS:                        8.8    9.75  )-----------( 183               29.4     137  |  95  |  23  ----------------------------<  106  3.8   |  25  |  4.78    Ca    8.6      PT: 15.1 sec;   INR: 1.32 ratio  PTT:48.0 sec        Urine culture: N/A    Blood culture: no growth    IMAGING:    CT: No transplant kidney subcapsular or perinephric hematoma.     Nonobstructive lower pole transplant renal calculus.     Mild bladder wall thickening and perivesicular fat stranding, unchanged and   possibly representing cystitis.   Dependent bladder calculi. UROLOGY CONSULT NOTE    HPI:  This is a 74y old Male with PMHx of failed renal transplant x2 (2013 and now in 2018), DM, GERD, HTN, IV drug use, DM, Hep C, cardiomyopathy who was incidentally found to have bladder stones on CT scan.  The patient was admitted after having shortness of breath attributed to HTN urgency and KAMRON due to failed kidney transplant.  He denies history of  disease, dysuria, hematuria, nocturia, flank pain, fevers, chills, N/V, SOB, CP.  He is s/p recent kidney biopsy.  Currently without acute complaints.      PAST MEDICAL HISTORY    Kidney transplant recipient  Anuria  Kidney transplant failure  Hepatitis C  GERD (gastroesophageal reflux disease)  Renal transplant failure and rejection  Rectal abscess  IV drug abuse  HTN (hypertension)  Diverticulitis  ESRD on dialysis  Diabetes mellitus  BPH (Benign Prostatic Hyperplasia)  Cardiomyopathy      PAST SURGICAL HISTORY    H/O kidney transplant x2  A-V fistula  S/p cadaver renal transplant  S/P partial colectomy      FAMILY HISTORY    Family history of breast cancer in sister (Sibling)  Family history of prostate cancer in father  Family history of lung cancer  Family history of hypertension in mother  Family history of diabetes mellitus      SOCIAL HISTORY    History of IV drug use  History of former tobacco usage    HOME MEDICATIONS    allopurinol 100 mg oral tablet: 1 tab(s) orally once a day (27 Aug 2019 11:11)  Flomax 0.4 mg oral capsule: 2 cap(s) orally once a day (27 Aug 2019 11:11)  gabapentin 300 mg oral capsule: 1 cap(s) orally 2 times a day (27 Aug 2019 11:11)  HumaLO to 8 unit(s) subcutaneous 2 times a day (before meals) as per sliding scale (27 Aug 2019 11:11)  labetalol 300 mg oral tablet: 1 tab(s) orally 2 times a day (27 Aug 2019 11:11)  Lantus 100 units/mL subcutaneous solution: 14 to 16 unit(s) subcutaneous once a day (at bedtime) (27 Aug 2019 11:11)  levETIRAcetam 1000 mg oral tablet: 1 tab(s) orally 2 times a day (27 Aug 2019 11:11)  magnesium oxide: 400 milligram(s) orally once a day (27 Aug 2019 11:11)  Myfortic 180 mg oral delayed release tablet: 2 tab(s) orally 2 times a day (27 Aug 2019 11:11)  NIFEdipine 60 mg oral tablet, extended release: 1 tab(s) orally 2 times a day (27 Aug 2019 11:11)  OXcarbazepine 300 mg oral tablet: 1 tab(s) orally 2 times a day (27 Aug 2019 11:11)  pantoprazole 40 mg oral delayed release tablet: 1 tab(s) orally once a day (27 Aug 2019 11:11)  predniSONE 5 mg oral tablet: 1 tab(s) orally once a day (27 Aug 2019 11:11)  rosuvastatin 10 mg oral tablet: 1 tab(s) orally once a day (27 Aug 2019 11:11)  sodium bicarbonate 650 mg oral tablet: 2 tab(s) orally 2 times a day (27 Aug 2019 11:11)  tacrolimus 1 mg oral capsule: 2 cap(s) orally 2 times a day (27 Aug 2019 11:11)  torsemide 100 mg oral tablet: 0.5 tab(s) orally 2 times a day (27 Aug 2019 11:11)  warfarin 7.5 mg oral tablet: 1 tab(s) orally once a day as per INR (27 Aug 2019 11:11)      DRUG ALLERGIES    NKDA    REVIEW OF SYSTEMS: Pertinent positives and negatives as stated in HPI, otherwise negative      VITAL SIGNS    T(F): 98.5, Max: 98.5 (19 @ 14:14)  HR: 72  BP: 157/72  RR: 18  SpO2: 96%        11 Sep 2019 07:  -  12 Sep 2019 07:00  --------------------------------------------------------  IN: 200 mL / OUT: 1270 mL / NET: -1070 mL    12 Sep 2019 07:  -  12 Sep 2019 15:16  --------------------------------------------------------  IN: 84 mL / OUT: 0 mL / NET: 84 mL        PHYSICAL EXAM    Gen: Well groomed, well dressed, well nourished  Abd: Soft, NT/ND  : Uncircumcised, no lesions.  No discharge or blood at urethral meatus.  Testes descended bilaterally.  EDGARDO: prostate smooth, nontender, 20gms  Ext: No edema present b/l      LABS:                        8.8    9.75  )-----------( 183               29.4     137  |  95  |  23  ----------------------------<  106  3.8   |  25  |  4.78    Ca    8.6      PT: 15.1 sec;   INR: 1.32 ratio  PTT:48.0 sec        Urine culture: N/A    Blood culture: no growth    IMAGING:    CT: No transplant kidney subcapsular or perinephric hematoma.     Nonobstructive lower pole transplant renal calculus.     Mild bladder wall thickening and perivesicular fat stranding, unchanged and   possibly representing cystitis.   Dependent bladder calculi.
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
## 2019-09-12 NOTE — PROVIDER CONTACT NOTE (OTHER) - SITUATION
awaiting pt PTT from lab to adjust heparin infusion- pt currently on heparin infusion @ 21ml/hr. Called lab & core lab- they are sending ptt to manager & expediting lab result

## 2019-09-12 NOTE — PROGRESS NOTE ADULT - SUBJECTIVE AND OBJECTIVE BOX
Subjective: Patient seen and examined. No new events except as noted.     REVIEW OF SYSTEMS:    CONSTITUTIONAL: No weakness, fevers or chills  EYES/ENT: No visual changes;  No vertigo or throat pain   NECK: No pain or stiffness  RESPIRATORY: No cough, wheezing, hemoptysis; No shortness of breath  CARDIOVASCULAR: No chest pain or palpitations  GASTROINTESTINAL: No abdominal or epigastric pain. No nausea, vomiting, or hematemesis; No diarrhea or constipation. No melena or hematochezia.  GENITOURINARY: No dysuria, frequency or hematuria  NEUROLOGICAL: No numbness or weakness  SKIN: No itching, burning, rashes, or lesions   All other review of systems is negative unless indicated above.    MEDICATIONS:  MEDICATIONS  (STANDING):  allopurinol 100 milliGRAM(s) Oral daily  atorvastatin 40 milliGRAM(s) Oral at bedtime  cefTRIAXone   IVPB 1000 milliGRAM(s) IV Intermittent every 24 hours  chlorhexidine 2% Cloths 1 Application(s) Topical daily  cinacalcet 30 milliGRAM(s) Oral daily  cyanocobalamin Injectable 1000 MICROGram(s) IntraMuscular <User Schedule>  darbepoetin Injectable ViaL 60 MICROGram(s) IV Push every week  dextrose 5%. 1000 milliLiter(s) (50 mL/Hr) IV Continuous <Continuous>  dextrose 50% Injectable 12.5 Gram(s) IV Push once  dextrose 50% Injectable 25 Gram(s) IV Push once  dextrose 50% Injectable 25 Gram(s) IV Push once  ergocalciferol 93269 Unit(s) Oral every week  folic acid 1 milliGRAM(s) Oral daily  furosemide   Injectable 60 milliGRAM(s) IV Push two times a day  heparin  Infusion.  Unit(s)/Hr (19 mL/Hr) IV Continuous <Continuous>  hydrALAZINE 100 milliGRAM(s) Oral three times a day  hydrocortisone hemorrhoidal Suppository 1 Suppository(s) Rectal at bedtime  insulin glargine Injectable (LANTUS) 10 Unit(s) SubCutaneous at bedtime  insulin lispro (HumaLOG) corrective regimen sliding scale   SubCutaneous every 6 hours  labetalol 300 milliGRAM(s) Oral two times a day  levETIRAcetam 1000 milliGRAM(s) Oral two times a day  mycophenolate mofetil 250 milliGRAM(s) Oral two times a day  predniSONE   Tablet 5 milliGRAM(s) Oral daily  sodium bicarbonate 650 milliGRAM(s) Oral two times a day  tamsulosin 0.4 milliGRAM(s) Oral at bedtime  thiamine 100 milliGRAM(s) Oral daily      PHYSICAL EXAM:  T(C): 36.9 (09-12-19 @ 04:58), Max: 37.1 (09-11-19 @ 13:58)  HR: 77 (09-12-19 @ 04:58) (76 - 85)  BP: 152/62 (09-12-19 @ 04:58) (118/61 - 153/64)  RR: 18 (09-12-19 @ 04:58) (18 - 18)  SpO2: 97% (09-12-19 @ 04:58) (95% - 98%)  Wt(kg): --  I&O's Summary    11 Sep 2019 07:01  -  12 Sep 2019 07:00  --------------------------------------------------------  IN: 200 mL / OUT: 1270 mL / NET: -1070 mL    12 Sep 2019 07:01  -  12 Sep 2019 12:20  --------------------------------------------------------  IN: 84 mL / OUT: 0 mL / NET: 84 mL      e: NAD sleepy   HEENT:  dry oral mucosa, PERRL, EOMI	  Lymphatic: No lymphadenopathy  Cardiovascular: Normal S1 S2, No JVD, No murmurs , Peripheral pulses palpable 2+ bilaterally  Respiratory: Lungs clear to auscultation, normal effort 	  Gastrointestinal:  Soft, Non-tender, + BS	  Skin: No rashes, No ecchymoses, No cyanosis, warm to touch  Musculoskeletal: Normal range of motion, normal strength  Psychiatry:  at times confused   Ext: trace  edema      LABS:    CARDIAC MARKERS:                                8.9    8.9   )-----------( 149      ( 12 Sep 2019 01:54 )             27.7     09-12    137  |  95<L>  |  23  ----------------------------<  106<H>  3.8   |  25  |  4.78<H>    Ca    8.6      12 Sep 2019 08:46      proBNP:   Lipid Profile:   HgA1c:   TSH:     0          TELEMETRY: 	    ECG:  	  RADIOLOGY:   DIAGNOSTIC TESTING:  [ ] Echocardiogram:  [ ]  Catheterization:  [ ] Stress Test:    OTHER:

## 2019-09-12 NOTE — PROGRESS NOTE ADULT - SUBJECTIVE AND OBJECTIVE BOX
CC: f/u for E coli bacteremia    REVIEW OF SYSTEMS:  All other review of systems negative     Antimicrobials Day #  : 10/10  cefTRIAXone   IVPB 1000 milliGRAM(s) IV Intermittent every 24 hours    Other Medications Reviewed    T(F): 98.4 (09-12-19 @ 04:58), Max: 98.8 (09-11-19 @ 13:58)  HR: 77 (09-12-19 @ 04:58)  BP: 152/62 (09-12-19 @ 04:58)  RR: 18 (09-12-19 @ 04:58)  SpO2: 97% (09-12-19 @ 04:58)  Wt(kg): --    PHYSICAL EXAM:  General: no acute distress  Eyes:  anicteric, no conjunctival injection, no discharge  Neck: supple  Lungs: clear to auscultation  Heart: regular rate and rhythm; no murmur  Abdomen: soft, nondistended, nontender  Skin: no lesions  Extremities: no clubbing or cyanosis. Bilateral edema. Left forearm AVF   Neurologic: awake, confused &, moves all extremities    LAB RESULTS:                        8.9    8.9   )-----------( 149      ( 12 Sep 2019 01:54 )             27.7     09-12    137  |  95<L>  |  23  ----------------------------<  106<H>  3.8   |  25  |  4.78<H>    Ca    8.6      12 Sep 2019 08:46    MICROBIOLOGY:  RECENT CULTURES:      RADIOLOGY REVIEWED:

## 2019-09-12 NOTE — PROGRESS NOTE ADULT - ASSESSMENT
73 yo male with history of A Fib, Hep C, DM, ESRD s/p renal transplant x 2, was on tacrolimus, myfortic, and prednisone along with prophylactic valtrex.  His CMV viral loads were negative, Hep B surface antibody is positive & Hep C RNA negative.  Admitted on 8/26 with progressive CKD and rectal bleeding.  Required HD and developed fever 9/2  Found to have E coli bacteremia of likely  origin  S/p LP for AMS, ME PCR panel negative, CMV PCR negative, CSF cytology negative & cx negative  serum crypt & histo antigens are negative  ? Toxic-Metabolic encephalopathy  Repeat blood cultures are negative  Ct A & P resulted as layering stones in a thick-walled urinary bladder with perivesicular inflammatory change and a left lower quadrant transplant kidney with a nonobstructing 1.1 cm lower pole stone and moderate perinephric fat stranding. No hydronephrosis. Differential includes urinary tract infection.  Suspect  focus - likely a stone might have passed  Now on HD - for transplant failure, off of tacrolimus. But on cellcept & steroids as per transplant team    Suggest:  Stop Ceftriaxone today - D # 10 of antibiotics  Consider urology input given above Ct A & P results

## 2019-09-12 NOTE — PROGRESS NOTE ADULT - PROBLEM SELECTOR PLAN 2
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 now with KAMRON over CKD stage 5  ,now started on dialysis since 8/27/19. Last HD on 9/11, next tomorrow Back on diuretics. Renal bx confirmed chronic transplant glomerulopathy and no signs of paraprotein disease- no amyloidosis. Final report pending

## 2019-09-12 NOTE — PROGRESS NOTE ADULT - PROBLEM SELECTOR PLAN 3
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 admitted with fluid overload and HTN urgency . Last renal bx was 2017, presumed rejection, congo red was not done. Repeating renal transplant bx for ruling out amyloidosis as proteinuria is significant at 10gm and not sure if a true cause was found in d.w with his primary nephr at [place].  Cont MMF and pred for now  No bleeding at CT scan done

## 2019-09-12 NOTE — CONSULT NOTE ADULT - ATTENDING COMMENTS
Patient seen and examined. Agree with assessment and plan.  Check PVR
74 year old male with history of ESRD s/p DDRT x2 (2008, 2015) now with failed allograft with KAMRON and HTN urgency  Plan: Will start the patient on hemodialysis due to concerns for worsening fluid overload.    IHD today with Qb/Qd 400/500 on F160 , 2K bath and target UF 1L .   Rest agree with the plan mentioned above in fellows note

## 2019-09-12 NOTE — PROGRESS NOTE ADULT - SUBJECTIVE AND OBJECTIVE BOX
Pt seen/examined at bedside with no current complaints      MEDICATIONS  (STANDING):  allopurinol 100 milliGRAM(s) Oral daily  atorvastatin 40 milliGRAM(s) Oral at bedtime  cefTRIAXone   IVPB 1000 milliGRAM(s) IV Intermittent every 24 hours  chlorhexidine 2% Cloths 1 Application(s) Topical daily  cinacalcet 30 milliGRAM(s) Oral daily  cyanocobalamin Injectable 1000 MICROGram(s) IntraMuscular <User Schedule>  darbepoetin Injectable ViaL 60 MICROGram(s) IV Push every week  dextrose 5%. 1000 milliLiter(s) (50 mL/Hr) IV Continuous <Continuous>  dextrose 50% Injectable 12.5 Gram(s) IV Push once  dextrose 50% Injectable 25 Gram(s) IV Push once  dextrose 50% Injectable 25 Gram(s) IV Push once  ergocalciferol 75036 Unit(s) Oral every week  folic acid 1 milliGRAM(s) Oral daily  furosemide   Injectable 60 milliGRAM(s) IV Push two times a day  heparin  Infusion.  Unit(s)/Hr (19 mL/Hr) IV Continuous <Continuous>  hydrALAZINE 100 milliGRAM(s) Oral three times a day  hydrocortisone hemorrhoidal Suppository 1 Suppository(s) Rectal at bedtime  insulin glargine Injectable (LANTUS) 10 Unit(s) SubCutaneous at bedtime  insulin lispro (HumaLOG) corrective regimen sliding scale   SubCutaneous every 6 hours  labetalol 300 milliGRAM(s) Oral two times a day  levETIRAcetam 1000 milliGRAM(s) Oral two times a day  mycophenolate mofetil 250 milliGRAM(s) Oral two times a day  predniSONE   Tablet 5 milliGRAM(s) Oral daily  sodium bicarbonate 650 milliGRAM(s) Oral two times a day  tamsulosin 0.4 milliGRAM(s) Oral at bedtime  thiamine 100 milliGRAM(s) Oral daily    MEDICATIONS  (PRN):  acetaminophen   Tablet .. 650 milliGRAM(s) Oral every 6 hours PRN Temp greater or equal to 38C (100.4F)  aluminum hydroxide/magnesium hydroxide/simethicone Suspension 30 milliLiter(s) Oral every 6 hours PRN Dyspepsia  dextrose 40% Gel 15 Gram(s) Oral once PRN Blood Glucose LESS THAN 70 milliGRAM(s)/deciliter  glucagon  Injectable 1 milliGRAM(s) IntraMuscular once PRN Glucose LESS THAN 70 milligrams/deciliter      ROS  No fever, sweats, chills  No epistaxis, HA, sore throat  No CP, SOB, cough, sputum  No n/v/d, abd pain, melena, hematochezia  No edema  No rash  No anxiety  No back pain, joint pain  No bleeding, bruising  No dysuria, hematuria    Vital Signs Last 24 Hrs  T(C): 36.9 (12 Sep 2019 04:58), Max: 37.1 (11 Sep 2019 13:58)  T(F): 98.4 (12 Sep 2019 04:58), Max: 98.8 (11 Sep 2019 13:58)  HR: 77 (12 Sep 2019 04:58) (76 - 85)  BP: 152/62 (12 Sep 2019 04:58) (118/61 - 153/64)  BP(mean): --  RR: 18 (12 Sep 2019 04:58) (18 - 18)  SpO2: 97% (12 Sep 2019 04:58) (95% - 98%)    PE  NAD  Awake, alert  Anicteric, MMM  RRR  CTAB  Abd soft, NT, ND  No c/c/e  No rash grossly  FROM                          8.9    8.9   )-----------( 149      ( 12 Sep 2019 01:54 )             27.7       09-12    137  |  95<L>  |  23  ----------------------------<  106<H>  3.8   |  25  |  4.78<H>    Ca    8.6      12 Sep 2019 08:46

## 2019-09-12 NOTE — CONSULT NOTE ADULT - ASSESSMENT
74 year old male with bladder stones    -check PVR, and place gutierrez catheter if PVR>300cc  -antibiotic plan per ID  - 74 year old male with bladder stones    -check PVR, and place gutierrez catheter if PVR>300cc  -antibiotic plan per ID  -  -case d/w Dr Puente 74 year old male with hx BPH, ESRD, DM now found to have bladder stones    -check PVR, and place gutierrez catheter if PVR>200cc  -antibiotic plan per ID  -no intervention required on bladder stone at this time  -case d/w Dr Puente

## 2019-09-12 NOTE — PROGRESS NOTE ADULT - PROBLEM SELECTOR PLAN 2
- surgery team called for fat pad biopsy, which will be considered after renal bx results are back as per family request  - s/p renal bx, await results  - CT brain negative for acute pathology  - s/p LP with high protien but culture negative in CSF

## 2019-09-12 NOTE — PROVIDER CONTACT NOTE (OTHER) - BACKGROUND
pt admitted w/ acute renal failure- hx of 2 renal transplants, PMH hep c, IV drug abuse, anuria, GERD, DM

## 2019-09-12 NOTE — PROGRESS NOTE ADULT - ASSESSMENT
Assessment and Plan:   · Assessment		  1. Anemia sec to CKD/B12/folate deficiency  -- B12 1000 mcg IM x 1 given 8/31;  B12 1000 mcg IM weekly for 3 further doses then continue monthly;  PO FA 1mg QD;    -- No hemolysis  -- Iron studies c/w Anemia of Chronic Inflammation  -- FOBT neg  -- transfuse prn hgb <7  -- rectal bleeding, GI suspects hemrhds  -- darbepoietin w HD per renal    2. thrombocytopenia, resolved    -- reactive and consumptive, abx   -- most likely related to h/o HCV, ? Mycophenolate contributing  -- monitor for now    3. LE edema   --noted age indeterminate DVT b/l LE above knee; provoked by acute illness and immobilizaiton;  no indication for hypercoag w/u   --pt had been on and off AC in past for Afib  --c/w heparin gtt for now given ongoing procedures    4.  Fevers  --ID w/u, s/p LP;  on empric abx for Ecoli bacteremia.    5. MGUS -- reviewed previous records from [place] from 3/2017. Noted to have 5% plasma cells. No congo red staining done. Immunoglobulins were nml, free kappa 43, free lambda 85, free k/l ratio nml. Overall, low suspicion that he has progressed to MM given low M spike and nml k/l ratio.   -- immunoglobulins not elevated  -- hold BMBx for now;   Pt refusing fat pad bx until after results of kidney bx are read    Thank you for the courtesy of this consultation and we will continue to follow.    Lj Moon MD  [facility]  Cell: [phone]

## 2019-09-12 NOTE — PROGRESS NOTE ADULT - ASSESSMENT
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   Pt just moved back to [place] from [place] .. reports that he has not been taking his meds for the past few days since " he might have misplaed them"     pt denies chest pain, syncope,fever, chills, cough, urinary symptoms.    in ED found  to have anemia   Nno acute changes on EKG   elevated CR and Trop    1- HTN urgency :   cont meds         2-KAMRON  : bx : transplant glomurlosclerosis /interstitial fibrosis   no amyloidosis   off tacro,  cont cellcept and steriods  f/u with nephro     3- DM:   A1c  5.6  Fs     4- acute HF sec to  HTN urgency and renal failure   Echo :  < from: Transthoracic Echocardiogram (08.28.19 @ 16:12) >  1. Mitral annular calcification and calcified mitral  leaflets with decreased diastolic opening.  2. Moderate to severe concentric left ventricular  hypertrophy.  3. Normal left ventricular systolic function with  mid-cavitary obliteration.  4. Normal right ventricular size and systolic function.  5. Small pericardial effusion.    cont fluid removal with HD per renal      5-  difficult ambulating :  no focal neuro deficits   neuro eval appreciated    CT T/L   < from: CT Thoracic Spine No Cont (08.30.19 @ 10:06) >    Old right L1 transverse process fracture. Bilateral lysis   defects at L5 as seen on the prior 5/26/2013      6- afib :  restart AC       7- hematochezia : per sister : on and off small amount of fresh blood in stool and some amount in urine but only small amounts   monitor H/H   consult GI .: appreciate input :  suspect bleeding to be hemorrhoidal, now resolved     trial of anusol supp  if bleeding persist would consider colonoscopy however patient is reluctant.    heme input: appreciated        8-  renal transplant : f/u with Transplant team   cont meds with MMF and steroids   off Tac   s/p bx : f/u results          9- TIA :  unable to perform MRI   CT no acute changes on CT   repeat negative   fu with neuro     10 encephalopathy :   CTH with contrast : neg for acute pathology   LP : high protien but culture negative in CSF   blood culture positive for GNR /Ecoli likely urinary source   CT : < from: CT Abdomen and Pelvis No Cont (09.09.19 @ 22:26) >    Layering stones in a thick-walled urinary bladder with perivesicular   inflammatory change and a left lower quadrant transplant kidney with a   nonobstructing 1.1 cm lower pole stone and moderate perinephric fat   stranding. No hydronephrosis. Differential includes urinary tract   infection.    discussed with ID : will cont 10 day course of abx   consult urology re findings of CT        11- paraprotienmeia : d/w heme and renal   will need to consider amyloidosis .. doubt MM   holding on Fat pad bx for now       12 hypercalcemia : monitor for now     13- abd discomfort : improved today   CT abd as above   check PVR and if >200 will place gutierrez

## 2019-09-13 ENCOUNTER — RESULT REVIEW (OUTPATIENT)
Age: 74
End: 2019-09-13

## 2019-09-13 DIAGNOSIS — N40.1 BENIGN PROSTATIC HYPERPLASIA WITH LOWER URINARY TRACT SYMPTOMS: ICD-10-CM

## 2019-09-13 LAB
ANION GAP SERPL CALC-SCNC: 17 MMOL/L — SIGNIFICANT CHANGE UP (ref 5–17)
APTT BLD: 37.3 SEC — HIGH (ref 27.5–36.3)
APTT BLD: 66.6 SEC — HIGH (ref 27.5–36.3)
APTT BLD: 87.2 SEC — HIGH (ref 27.5–36.3)
BUN SERPL-MCNC: 30 MG/DL — HIGH (ref 7–23)
C3 SERPL-MCNC: 100 MG/DL — SIGNIFICANT CHANGE UP (ref 81–157)
C4 SERPL-MCNC: 15 MG/DL — SIGNIFICANT CHANGE UP (ref 13–39)
CALCIUM SERPL-MCNC: 8.8 MG/DL — SIGNIFICANT CHANGE UP (ref 8.4–10.5)
CHLORIDE SERPL-SCNC: 92 MMOL/L — LOW (ref 96–108)
CO2 SERPL-SCNC: 24 MMOL/L — SIGNIFICANT CHANGE UP (ref 22–31)
CREAT SERPL-MCNC: 6 MG/DL — HIGH (ref 0.5–1.3)
GLUCOSE BLDC GLUCOMTR-MCNC: 114 MG/DL — HIGH (ref 70–99)
GLUCOSE BLDC GLUCOMTR-MCNC: 115 MG/DL — HIGH (ref 70–99)
GLUCOSE BLDC GLUCOMTR-MCNC: 120 MG/DL — HIGH (ref 70–99)
GLUCOSE BLDC GLUCOMTR-MCNC: 144 MG/DL — HIGH (ref 70–99)
GLUCOSE SERPL-MCNC: 109 MG/DL — HIGH (ref 70–99)
HAPTOGLOB SERPL-MCNC: 326 MG/DL — HIGH (ref 34–200)
HCT VFR BLD CALC: 27.5 % — LOW (ref 39–50)
HGB BLD-MCNC: 8.4 G/DL — LOW (ref 13–17)
INR BLD: 1.43 RATIO — HIGH (ref 0.88–1.16)
LDH SERPL L TO P-CCNC: 167 U/L — SIGNIFICANT CHANGE UP (ref 50–242)
MCHC RBC-ENTMCNC: 26.2 PG — LOW (ref 27–34)
MCHC RBC-ENTMCNC: 30.5 GM/DL — LOW (ref 32–36)
MCV RBC AUTO: 85.7 FL — SIGNIFICANT CHANGE UP (ref 80–100)
PLATELET # BLD AUTO: 187 K/UL — SIGNIFICANT CHANGE UP (ref 150–400)
POTASSIUM SERPL-MCNC: 3.4 MMOL/L — LOW (ref 3.5–5.3)
POTASSIUM SERPL-SCNC: 3.4 MMOL/L — LOW (ref 3.5–5.3)
PROTHROM AB SERPL-ACNC: 16.6 SEC — HIGH (ref 10–13.1)
RBC # BLD: 3.21 M/UL — LOW (ref 4.2–5.8)
RBC # FLD: 14.8 % — HIGH (ref 10.3–14.5)
SODIUM SERPL-SCNC: 133 MMOL/L — LOW (ref 135–145)
WBC # BLD: 8.91 K/UL — SIGNIFICANT CHANGE UP (ref 3.8–10.5)
WBC # FLD AUTO: 8.91 K/UL — SIGNIFICANT CHANGE UP (ref 3.8–10.5)

## 2019-09-13 PROCEDURE — 90935 HEMODIALYSIS ONE EVALUATION: CPT | Mod: GC

## 2019-09-13 PROCEDURE — 88112 CYTOPATH CELL ENHANCE TECH: CPT | Mod: 26

## 2019-09-13 RX ORDER — LACTOBACILLUS ACIDOPHILUS 100MM CELL
1 CAPSULE ORAL
Refills: 0 | Status: DISCONTINUED | OUTPATIENT
Start: 2019-09-13 | End: 2019-09-24

## 2019-09-13 RX ADMIN — Medication 60 MILLIGRAM(S): at 23:25

## 2019-09-13 RX ADMIN — Medication 60 MILLIGRAM(S): at 13:14

## 2019-09-13 RX ADMIN — Medication 100 MILLIGRAM(S): at 05:40

## 2019-09-13 RX ADMIN — TAMSULOSIN HYDROCHLORIDE 0.4 MILLIGRAM(S): 0.4 CAPSULE ORAL at 21:26

## 2019-09-13 RX ADMIN — Medication 5 MILLIGRAM(S): at 05:41

## 2019-09-13 RX ADMIN — CHLORHEXIDINE GLUCONATE 1 APPLICATION(S): 213 SOLUTION TOPICAL at 13:13

## 2019-09-13 RX ADMIN — Medication 100 MILLIGRAM(S): at 13:13

## 2019-09-13 RX ADMIN — Medication 1 TABLET(S): at 17:50

## 2019-09-13 RX ADMIN — Medication 100 MILLIGRAM(S): at 21:26

## 2019-09-13 RX ADMIN — Medication 300 MILLIGRAM(S): at 17:48

## 2019-09-13 RX ADMIN — MYCOPHENOLATE MOFETIL 250 MILLIGRAM(S): 250 CAPSULE ORAL at 05:41

## 2019-09-13 RX ADMIN — LEVETIRACETAM 1000 MILLIGRAM(S): 250 TABLET, FILM COATED ORAL at 17:48

## 2019-09-13 RX ADMIN — LEVETIRACETAM 1000 MILLIGRAM(S): 250 TABLET, FILM COATED ORAL at 05:43

## 2019-09-13 RX ADMIN — HEPARIN SODIUM 2100 UNIT(S)/HR: 5000 INJECTION INTRAVENOUS; SUBCUTANEOUS at 18:21

## 2019-09-13 RX ADMIN — Medication 650 MILLIGRAM(S): at 17:48

## 2019-09-13 RX ADMIN — MYCOPHENOLATE MOFETIL 250 MILLIGRAM(S): 250 CAPSULE ORAL at 17:48

## 2019-09-13 RX ADMIN — CINACALCET 30 MILLIGRAM(S): 30 TABLET, FILM COATED ORAL at 13:13

## 2019-09-13 RX ADMIN — ATORVASTATIN CALCIUM 40 MILLIGRAM(S): 80 TABLET, FILM COATED ORAL at 21:26

## 2019-09-13 RX ADMIN — Medication 650 MILLIGRAM(S): at 05:41

## 2019-09-13 RX ADMIN — Medication 1 PATCH: at 18:23

## 2019-09-13 RX ADMIN — INSULIN GLARGINE 10 UNIT(S): 100 INJECTION, SOLUTION SUBCUTANEOUS at 21:30

## 2019-09-13 RX ADMIN — Medication 1 MILLIGRAM(S): at 13:13

## 2019-09-13 RX ADMIN — Medication 300 MILLIGRAM(S): at 05:40

## 2019-09-13 RX ADMIN — Medication 1 PATCH: at 07:00

## 2019-09-13 NOTE — PROGRESS NOTE ADULT - ATTENDING COMMENTS
I have seen this patient with the fellow and agree with their assessment and plan. 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 The kidney is also has a lot of fibrosis and almost end stage but has lambda predominance on IF. There are no deposits but there is endothelial injury and suggestive of non thrombotic variant of TMA.  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 complement disorders can do it but less likely as lambda positive here. Tacrolimus can do this as well but off tacrolimus for sometime now. 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  https://www.ncbi.nlm.nih.gov/pubmed/69161315 is a good reference ( largest series of TMA with MGUS) we had published with [facility].  Would suggest sending all aHUS labs to [facility]( NP aware and knows what to order)- all factors and complement cascade information  Doubt TPE will be helpful at this stage as likely MGUS related. Bone marrow and possible clonal treatment best option to treat non renal disease and not sure if will come off dialysis but can prevent further newer transplant complications if he ever got another transplant.    Seen on HD, tolerated well. Sister aware and heme aware    Jennifer Orellana MD  Cell   Pager   Office     For weekend coverage, please call Dr. Trae Garcia ( fellow) and Attending Dr Jeancarlos Reina

## 2019-09-13 NOTE — CONSULT NOTE ADULT - ASSESSMENT
patient with bladder stone and bph     hematuria and wbc with proteinuria related to his renal disease    measure pvr and consider flomax  ct abd and pelvis without iv contrast    urine culture and cytology for hematuria  additional white and management of hematuria and stone can be done as outpatient

## 2019-09-13 NOTE — PROGRESS NOTE ADULT - SUBJECTIVE AND OBJECTIVE BOX
INTERVAL HPI/OVERNIGHT EVENTS:    pt seen and examined  no complaints  no further rectal bleeding as per RN    MEDICATIONS  (STANDING):  allopurinol 100 milliGRAM(s) Oral daily  atorvastatin 40 milliGRAM(s) Oral at bedtime  chlorhexidine 2% Cloths 1 Application(s) Topical daily  cinacalcet 30 milliGRAM(s) Oral daily  cyanocobalamin Injectable 1000 MICROGram(s) IntraMuscular <User Schedule>  darbepoetin Injectable ViaL 60 MICROGram(s) IV Push every week  dextrose 5%. 1000 milliLiter(s) (50 mL/Hr) IV Continuous <Continuous>  dextrose 50% Injectable 12.5 Gram(s) IV Push once  dextrose 50% Injectable 25 Gram(s) IV Push once  dextrose 50% Injectable 25 Gram(s) IV Push once  ergocalciferol 71043 Unit(s) Oral every week  folic acid 1 milliGRAM(s) Oral daily  furosemide   Injectable 60 milliGRAM(s) IV Push two times a day  heparin  Infusion.  Unit(s)/Hr (19 mL/Hr) IV Continuous <Continuous>  hydrALAZINE 100 milliGRAM(s) Oral three times a day  hydrocortisone hemorrhoidal Suppository 1 Suppository(s) Rectal at bedtime  insulin glargine Injectable (LANTUS) 10 Unit(s) SubCutaneous at bedtime  insulin lispro (HumaLOG) corrective regimen sliding scale   SubCutaneous every 6 hours  labetalol 300 milliGRAM(s) Oral two times a day  lactobacillus acidophilus 1 Tablet(s) Oral two times a day  levETIRAcetam 1000 milliGRAM(s) Oral two times a day  mycophenolate mofetil 250 milliGRAM(s) Oral two times a day  predniSONE   Tablet 5 milliGRAM(s) Oral daily  sodium bicarbonate 650 milliGRAM(s) Oral two times a day  tamsulosin 0.4 milliGRAM(s) Oral at bedtime  thiamine 100 milliGRAM(s) Oral daily    MEDICATIONS  (PRN):  acetaminophen   Tablet .. 650 milliGRAM(s) Oral every 6 hours PRN Temp greater or equal to 38C (100.4F)  aluminum hydroxide/magnesium hydroxide/simethicone Suspension 30 milliLiter(s) Oral every 6 hours PRN Dyspepsia  dextrose 40% Gel 15 Gram(s) Oral once PRN Blood Glucose LESS THAN 70 milliGRAM(s)/deciliter  glucagon  Injectable 1 milliGRAM(s) IntraMuscular once PRN Glucose LESS THAN 70 milligrams/deciliter      Allergies    No Known Allergies    Intolerances        Review of Systems:    General:  No wt loss, fevers, chills, night sweats,fatigue,   Eyes:  Good vision, no reported pain  ENT:  No sore throat, pain, runny nose, dysphagia  CV:  No pain, palpitations, hypo/hypertension  Resp:  No dyspnea, cough, tachypnea, wheezing  GI:  No pain, No nausea, No vomiting, No diarrhea, No constipation, No weight loss, No fever, No pruritis, No rectal bleeding, No melena, No dysphagia  :  No pain, bleeding, incontinence, nocturia  Muscle:  No pain, weakness  Neuro:  No weakness, tingling, memory problems  Psych:  No fatigue, insomnia, mood problems, depression  Endocrine:  No polyuria, polydypsia, cold/heat intolerance  Heme:  No petechiae, ecchymosis, easy bruisability  Skin:  No rash, tattoos, scars, edema      Vital Signs Last 24 Hrs  T(C): 36.8 (13 Sep 2019 13:00), Max: 36.9 (12 Sep 2019 14:14)  T(F): 98.3 (13 Sep 2019 13:00), Max: 98.5 (12 Sep 2019 14:14)  HR: 76 (13 Sep 2019 13:00) (72 - 78)  BP: 154/71 (13 Sep 2019 13:00) (130/77 - 157/72)  BP(mean): --  RR: 18 (13 Sep 2019 13:00) (18 - 18)  SpO2: 96% (13 Sep 2019 13:00) (96% - 99%)    PHYSICAL EXAM:    Constitutional: NAD, well-developed  HEENT: EOMI, throat clear  Neck: No LAD, supple  Respiratory: CTA and P  Cardiovascular: S1 and S2, RRR, no M  Gastrointestinal: BS+, soft, NT/ND, neg HSM,  Extremities: No peripheral edema, neg clubing, cyanosis  Vascular: 2+ peripheral pulses  Neurological: A/O x 3, no focal deficits  Psychiatric: Normal mood, normal affect  Skin: No rashes      LABS:                        8.4    8.91  )-----------( 187      ( 13 Sep 2019 10:18 )             27.5     09-13    133<L>  |  92<L>  |  30<H>  ----------------------------<  109<H>  3.4<L>   |  24  |  6.00<H>    Ca    8.8      13 Sep 2019 06:48      PT/INR - ( 13 Sep 2019 09:44 )   PT: 16.6 sec;   INR: 1.43 ratio         PTT - ( 13 Sep 2019 09:44 )  PTT:66.6 sec      RADIOLOGY & ADDITIONAL TESTS:

## 2019-09-13 NOTE — PROGRESS NOTE ADULT - PROBLEM SELECTOR PLAN 2
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 now with KAMRON over CKD stage 5  ,now started on dialysis since 8/27/19. Last HD on 9/11, next tomorrow Back on diuretics. Renal bx confirmed chronic transplant glomerulopathy and no signs of paraprotein disease- no amyloidosis. Final report pending failed allograft likely secondary to non compliance. now with KAMRON over CKD stage 5  ,now started on dialysis since 8/27/19. Last HD on 9/11, next tomorrow Back on diuretics. Renal bx shows chronic TMA which could be recurrence of disease that caused original kidney damage as well as subsequent renal transplant failure. Would recommend getting bone marrow biopsy to assess for M percentage. 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 now with KAMRON over CKD stage 5  ,now started on dialysis since 8/27/19. Last HD on 9/11, next tomorrow Back on diuretics. Renal bx shows chronic TMA which could be recurrence of disease that caused original kidney damage as well as subsequent renal transplant failure. Would recommend getting bone marrow biopsy to assess for plasma cell percentage.

## 2019-09-13 NOTE — PROGRESS NOTE ADULT - ASSESSMENT
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   Pt just moved back to [place] from [place] .. reports that he has not been taking his meds for the past few days since " he might have misplaed them"     pt denies chest pain, syncope,fever, chills, cough, urinary symptoms.    in ED found  to have anemia   Nno acute changes on EKG   elevated CR and Trop    1- HTN urgency :   cont meds ...     2-KAMRON  : bx : transplant glomurlosclerosis /interstitial fibrosis ... discussed w/ Dr. 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  f/u with heme and nephrology   will  need a bone marrow bx if he agrees    no amyloidosis   off tacro,  cont cellcept and steriods..       3- DM:   A1c  5.6  Fs     4- acute HF sec to  HTN urgency and renal failure   Echo :  < from: Transthoracic Echocardiogram (08.28.19 @ 16:12) >  1. Mitral annular calcification and calcified mitral  leaflets with decreased diastolic opening.  2. Moderate to severe concentric left ventricular  hypertrophy.  3. Normal left ventricular systolic function with  mid-cavitary obliteration.  4. Normal right ventricular size and systolic function.  5. Small pericardial effusion.  cont fluid removal with HD per renal      5-  difficult ambulating :  no focal neuro deficits   neuro eval appreciated    CT T/L   < from: CT Thoracic Spine No Cont (08.30.19 @ 10:06) >    Old right L1 transverse process fracture. Bilateral lysis   defects at L5 as seen on the prior 5/26/2013      6- afib : hold coumadin for Bone marrow bx if pt agrees     7- hematochezia : per sister : on and off small amount of fresh blood in stool and some amount in urine but only small amounts   monitor H/H   consult GI .: appreciate input :  suspect bleeding to be hemorrhoidal, now resolved     trial of anusol supp  if bleeding persist would consider colonoscopy however patient is reluctant.    heme input: appreciated  .    8-  renal transplant : f/u with Transplant team   cont meds with MMF and steroids   off Tac   s/p bx : as above       9- TIA :  unable to perform MRI   CT no acute changes on CT   repeat negative   fu with neuro     10 encephalopathy :   CTH with contrast : neg for acute pathology   LP : high protien but culture negative in CSF   blood culture positive for GNR /Ecoli likely urinary source   CT : < from: CT Abdomen and Pelvis No Cont (09.09.19 @ 22:26) >    Layering stones in a thick-walled urinary bladder with perivesicular   inflammatory change and a left lower quadrant transplant kidney with a   nonobstructing 1.1 cm lower pole stone and moderate perinephric fat   stranding. No hydronephrosis. Differential includes urinary tract   infection.    discussed with ID : will cont 10 day course of abx   consult urology re findings of CT        11- paraprotienmeia :   will need Bone marrow bx ... pt is not agreeable at this time .. called sisters and gave update... they will speak to pt and attempt to convince him   d/w Dr. Cross   D/wnephrology       12 hypercalcemia : monitor for now     13- abd discomfort : improved today   CT abd as above   check PVR and if >200 will place gutierrez .. discussed with Dr. Goldberg

## 2019-09-13 NOTE — PROGRESS NOTE ADULT - SUBJECTIVE AND OBJECTIVE BOX
Patient is a 74y old  Male who presents with a chief complaint of renal failure  kidney biopsy (13 Sep 2019 10:30)                                                               INTERVAL HPI/OVERNIGHT EVENTS:    REVIEW OF SYSTEMS:     CONSTITUTIONAL: No weakness, fevers or chills  RESPIRATORY: No cough, wheezing,  No shortness of breath  CARDIOVASCULAR: No chest pain or palpitations  GASTROINTESTINAL: No abdominal pain  . No nausea, vomiting, or hematemesis; No diarrhea or constipation. No melena or hematochezia.  GENITOURINARY: No dysuria, frequency or hematuria  NEUROLOGICAL: No numbness or weakness                                                                                                                                                                                                                                                                               Medications:  MEDICATIONS  (STANDING):  allopurinol 100 milliGRAM(s) Oral daily  atorvastatin 40 milliGRAM(s) Oral at bedtime  chlorhexidine 2% Cloths 1 Application(s) Topical daily  cinacalcet 30 milliGRAM(s) Oral daily  cyanocobalamin Injectable 1000 MICROGram(s) IntraMuscular <User Schedule>  darbepoetin Injectable ViaL 60 MICROGram(s) IV Push every week  dextrose 5%. 1000 milliLiter(s) (50 mL/Hr) IV Continuous <Continuous>  dextrose 50% Injectable 12.5 Gram(s) IV Push once  dextrose 50% Injectable 25 Gram(s) IV Push once  dextrose 50% Injectable 25 Gram(s) IV Push once  ergocalciferol 51889 Unit(s) Oral every week  folic acid 1 milliGRAM(s) Oral daily  furosemide   Injectable 60 milliGRAM(s) IV Push two times a day  heparin  Infusion.  Unit(s)/Hr (19 mL/Hr) IV Continuous <Continuous>  hydrALAZINE 100 milliGRAM(s) Oral three times a day  hydrocortisone hemorrhoidal Suppository 1 Suppository(s) Rectal at bedtime  insulin glargine Injectable (LANTUS) 10 Unit(s) SubCutaneous at bedtime  insulin lispro (HumaLOG) corrective regimen sliding scale   SubCutaneous every 6 hours  labetalol 300 milliGRAM(s) Oral two times a day  lactobacillus acidophilus 1 Tablet(s) Oral two times a day  levETIRAcetam 1000 milliGRAM(s) Oral two times a day  mycophenolate mofetil 250 milliGRAM(s) Oral two times a day  predniSONE   Tablet 5 milliGRAM(s) Oral daily  sodium bicarbonate 650 milliGRAM(s) Oral two times a day  tamsulosin 0.4 milliGRAM(s) Oral at bedtime  thiamine 100 milliGRAM(s) Oral daily    MEDICATIONS  (PRN):  acetaminophen   Tablet .. 650 milliGRAM(s) Oral every 6 hours PRN Temp greater or equal to 38C (100.4F)  aluminum hydroxide/magnesium hydroxide/simethicone Suspension 30 milliLiter(s) Oral every 6 hours PRN Dyspepsia  dextrose 40% Gel 15 Gram(s) Oral once PRN Blood Glucose LESS THAN 70 milliGRAM(s)/deciliter  glucagon  Injectable 1 milliGRAM(s) IntraMuscular once PRN Glucose LESS THAN 70 milligrams/deciliter       Allergies    No Known Allergies    Intolerances      Vital Signs Last 24 Hrs  T(C): 36.8 (13 Sep 2019 13:00), Max: 36.9 (12 Sep 2019 14:14)  T(F): 98.3 (13 Sep 2019 13:00), Max: 98.5 (12 Sep 2019 14:14)  HR: 76 (13 Sep 2019 13:00) (72 - 78)  BP: 154/71 (13 Sep 2019 13:00) (130/77 - 157/72)  BP(mean): --  RR: 18 (13 Sep 2019 13:00) (18 - 18)  SpO2: 96% (13 Sep 2019 13:00) (96% - 99%)  CAPILLARY BLOOD GLUCOSE      POCT Blood Glucose.: 115 mg/dL (13 Sep 2019 13:04)  POCT Blood Glucose.: 114 mg/dL (13 Sep 2019 07:42)  POCT Blood Glucose.: 119 mg/dL (12 Sep 2019 22:03)  POCT Blood Glucose.: 114 mg/dL (12 Sep 2019 17:55)       @ : @ 07:00  --------------------------------------------------------  IN: 1209 mL / OUT: 300 mL / NET: 909 mL     @ 07: @ 13:52  --------------------------------------------------------  IN: 720 mL / OUT: 1750 mL / NET: -1030 mL      Physical Exam:    Daily Weight in k.6 (13 Sep 2019 12:35)  General:  NAD  HEENT:  Nonicteric, PERRLA  CV:  RRR, S1S2   Lungs:  CTA B  Abdomen:  Soft, non-tender, no distended, positive BS  Extremities:  2+ pulses, no c/c, no edema  Skin:  Warm and dry, no rashes  :  No matt  Neuro:  AAOx3, non-focal, grossly intact                                                                                                                                                                                                                                                                                                LABS:                               8.4    8.91  )-----------( 187      ( 13 Sep 2019 10:18 )             27.5                      09-13    133<L>  |  92<L>  |  30<H>  ----------------------------<  109<H>  3.4<L>   |  24  |  6.00<H>    Ca    8.8      13 Sep 2019 06:48

## 2019-09-13 NOTE — PROGRESS NOTE ADULT - PROBLEM SELECTOR PLAN 2
- surgery team called for fat pad biopsy, which will be considered after renal bx results are back as per family request  - s/p renal bx, await results  - CT brain negative for acute pathology  - s/p LP with high protein but culture negative in CSF

## 2019-09-13 NOTE — PROVIDER CONTACT NOTE (OTHER) - BACKGROUND
Pt admitted for HTN urgency, hematochechezia. PMH of kidney renal transplant, diabetes, HTN, on HD. Pt admitted for HTN urgency, hematochezia. PMH of kidney renal transplant, diabetes, HTN, on HD.

## 2019-09-13 NOTE — PROGRESS NOTE ADULT - SUBJECTIVE AND OBJECTIVE BOX
Patient seen/examined at bedside, sister at bedside  no current complaints      MEDICATIONS  (STANDING):  allopurinol 100 milliGRAM(s) Oral daily  atorvastatin 40 milliGRAM(s) Oral at bedtime  chlorhexidine 2% Cloths 1 Application(s) Topical daily  cinacalcet 30 milliGRAM(s) Oral daily  cyanocobalamin Injectable 1000 MICROGram(s) IntraMuscular <User Schedule>  darbepoetin Injectable ViaL 60 MICROGram(s) IV Push every week  dextrose 5%. 1000 milliLiter(s) (50 mL/Hr) IV Continuous <Continuous>  dextrose 50% Injectable 12.5 Gram(s) IV Push once  dextrose 50% Injectable 25 Gram(s) IV Push once  dextrose 50% Injectable 25 Gram(s) IV Push once  ergocalciferol 34620 Unit(s) Oral every week  folic acid 1 milliGRAM(s) Oral daily  furosemide   Injectable 60 milliGRAM(s) IV Push two times a day  heparin  Infusion.  Unit(s)/Hr (19 mL/Hr) IV Continuous <Continuous>  hydrALAZINE 100 milliGRAM(s) Oral three times a day  hydrocortisone hemorrhoidal Suppository 1 Suppository(s) Rectal at bedtime  insulin glargine Injectable (LANTUS) 10 Unit(s) SubCutaneous at bedtime  insulin lispro (HumaLOG) corrective regimen sliding scale   SubCutaneous every 6 hours  labetalol 300 milliGRAM(s) Oral two times a day  lactobacillus acidophilus 1 Tablet(s) Oral two times a day  levETIRAcetam 1000 milliGRAM(s) Oral two times a day  mycophenolate mofetil 250 milliGRAM(s) Oral two times a day  predniSONE   Tablet 5 milliGRAM(s) Oral daily  sodium bicarbonate 650 milliGRAM(s) Oral two times a day  tamsulosin 0.4 milliGRAM(s) Oral at bedtime  thiamine 100 milliGRAM(s) Oral daily    MEDICATIONS  (PRN):  acetaminophen   Tablet .. 650 milliGRAM(s) Oral every 6 hours PRN Temp greater or equal to 38C (100.4F)  aluminum hydroxide/magnesium hydroxide/simethicone Suspension 30 milliLiter(s) Oral every 6 hours PRN Dyspepsia  dextrose 40% Gel 15 Gram(s) Oral once PRN Blood Glucose LESS THAN 70 milliGRAM(s)/deciliter  glucagon  Injectable 1 milliGRAM(s) IntraMuscular once PRN Glucose LESS THAN 70 milligrams/deciliter      ROS  No fever, sweats, chills  No epistaxis, HA, sore throat  No CP, SOB, cough, sputum  No n/v/d, abd pain, melena, hematochezia  No edema  No rash  No anxiety  No back pain, joint pain  No bleeding, bruising  No dysuria, hematuria    Vital Signs Last 24 Hrs  T(C): 36.8 (13 Sep 2019 17:46), Max: 36.9 (12 Sep 2019 21:49)  T(F): 98.3 (13 Sep 2019 17:46), Max: 98.4 (12 Sep 2019 21:49)  HR: 74 (13 Sep 2019 17:46) (74 - 78)  BP: 166/85 (13 Sep 2019 17:46) (130/77 - 166/85)  BP(mean): --  RR: 18 (13 Sep 2019 17:46) (18 - 18)  SpO2: 98% (13 Sep 2019 17:46) (96% - 99%)    PE  NAD  Awake, alert  Anicteric, MMM  RRR  CTAB  Abd soft, NT, ND  No c/c/e  No rash grossly  FROM                          8.4    8.91  )-----------( 187      ( 13 Sep 2019 10:18 )             27.5       09-13    133<L>  |  92<L>  |  30<H>  ----------------------------<  109<H>  3.4<L>   |  24  |  6.00<H>    Ca    8.8      13 Sep 2019 06:48

## 2019-09-13 NOTE — CONSULT NOTE ADULT - SUBJECTIVE AND OBJECTIVE BOX
HPI  Patient is a 74y old Male with PMHx of failed renal transplant x2 (2013 and now in 2018), DM, GERD, HTN, IV drug use, DM, Hep C, cardiomyopathy who was incidentally found to have bladder stones on CT scan.  The patient was admitted after having shortness of breath attributed to HTN urgency and KAMRON due to failed kidney transplant.  He denies history of  disease, dysuria, hematuria, nocturia, flank pain, fevers, chills, N/V, SOB, CP.  He is s/p recent kidney biopsy. He deneis dysuria, hematuria and does report to occasional nocturia frequency and incomplete bladder emptying.  He denies any prior uti or gu evaluation    PAST MEDICAL & SURGICAL HISTORY:  Kidney transplant recipient  Anuria  GERD (gastroesophageal reflux disease)  Kidney transplant failure: dx: 2/2013  Hepatitis C: dx: 90&#x27;s.  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, no known MIs  H/O kidney transplant: may 2015  A-V fistula: Left, implanted (?)  S/p cadaver renal transplant: 2008  S/P partial colectomy: due to diverticulitis      MEDICATIONS  (STANDING):  allopurinol 100 milliGRAM(s) Oral daily  atorvastatin 40 milliGRAM(s) Oral at bedtime  cefTRIAXone   IVPB 1000 milliGRAM(s) IV Intermittent every 24 hours  chlorhexidine 2% Cloths 1 Application(s) Topical daily  cinacalcet 30 milliGRAM(s) Oral daily  cyanocobalamin Injectable 1000 MICROGram(s) IntraMuscular <User Schedule>  darbepoetin Injectable ViaL 60 MICROGram(s) IV Push every week  dextrose 5%. 1000 milliLiter(s) (50 mL/Hr) IV Continuous <Continuous>  dextrose 50% Injectable 12.5 Gram(s) IV Push once  dextrose 50% Injectable 25 Gram(s) IV Push once  dextrose 50% Injectable 25 Gram(s) IV Push once  ergocalciferol 72936 Unit(s) Oral every week  folic acid 1 milliGRAM(s) Oral daily  furosemide   Injectable 60 milliGRAM(s) IV Push two times a day  heparin  Infusion.  Unit(s)/Hr (19 mL/Hr) IV Continuous <Continuous>  hydrALAZINE 100 milliGRAM(s) Oral three times a day  hydrocortisone hemorrhoidal Suppository 1 Suppository(s) Rectal at bedtime  insulin glargine Injectable (LANTUS) 10 Unit(s) SubCutaneous at bedtime  insulin lispro (HumaLOG) corrective regimen sliding scale   SubCutaneous every 6 hours  labetalol 300 milliGRAM(s) Oral two times a day  lactobacillus acidophilus 1 Tablet(s) Oral two times a day  levETIRAcetam 1000 milliGRAM(s) Oral two times a day  mycophenolate mofetil 250 milliGRAM(s) Oral two times a day  predniSONE   Tablet 5 milliGRAM(s) Oral daily  sodium bicarbonate 650 milliGRAM(s) Oral two times a day  tamsulosin 0.4 milliGRAM(s) Oral at bedtime  thiamine 100 milliGRAM(s) Oral daily    MEDICATIONS  (PRN):  acetaminophen   Tablet .. 650 milliGRAM(s) Oral every 6 hours PRN Temp greater or equal to 38C (100.4F)  aluminum hydroxide/magnesium hydroxide/simethicone Suspension 30 milliLiter(s) Oral every 6 hours PRN Dyspepsia  dextrose 40% Gel 15 Gram(s) Oral once PRN Blood Glucose LESS THAN 70 milliGRAM(s)/deciliter  glucagon  Injectable 1 milliGRAM(s) IntraMuscular once PRN Glucose LESS THAN 70 milligrams/deciliter      FAMILY HISTORY:  Family history of breast cancer in sister (Sibling): living at 92 yo  Family history of prostate cancer in father  Family history of lung cancer  Family history of hypertension in mother  Family history of diabetes mellitus: mother      Allergies    No Known Allergies    SOCIAL HISTORY:  former [employer]    REVIEW OF SYSTEMS: gen weak  heent neg neck neg cv neg resp neg  gi neg gu per hpi msk neg neuro neg  psych neg all neg endo neg skin neg     Physical Exam  Vital signs  T(C): 36.7 (09-13-19 @ 09:35), Max: 36.9 (09-12-19 @ 14:14)  HR: 75 (09-13-19 @ 09:35)  BP: 132/71 (09-13-19 @ 09:35)  SpO2: 97% (09-13-19 @ 09:35)    Gen:  WDWN male in nad   heent ncat neck symm supple cor reg chest clear abd wound cdi no chau guarding  gu no mass  neuro non focal psych a and o x3    LABS:      09-13 @ 06:48    WBC --    / Hct --    / SCr 6.00     09-12 @ 13:50    WBC 9.75  / Hct 29.4  / SCr --       09-13    133<L>  |  92<L>  |  30<H>  ----------------------------<  109<H>  3.4<L>   |  24  |  6.00<H>    Ca    8.8      13 Sep 2019 06:48      PT/INR - ( 13 Sep 2019 09:44 )   PT: 16.6 sec;   INR: 1.43 ratio         PTT - ( 13 Sep 2019 09:44 )  PTT:66.6 sec

## 2019-09-13 NOTE — PROGRESS NOTE ADULT - ASSESSMENT
· Assessment		    1. Anemia sec to CKD/B12/folate deficiency  -- B12 1000 mcg IM x 1 given 8/31;  B12 1000 mcg IM weekly for 3 further doses then continue monthly;  PO FA 1mg QD;    -- No hemolysis  -- Iron studies c/w Anemia of Chronic Inflammation  -- FOBT neg  -- transfuse prn hgb <7  -- rectal bleeding, GI suspects hemrhds  -- darbepoietin w HD per renal    2. thrombocytopenia, resolved    -- reactive and consumptive, abx   -- most likely related to h/o HCV, ? Mycophenolate contributing  -- monitor for now    3. LE edema   --noted age indeterminate DVT b/l LE above knee; provoked by acute illness and immobilizaiton;  no indication for hypercoag w/u   --pt had been on and off AC in past for Afib  --c/w heparin gtt for now given ongoing procedures    4.  Fevers  --ID w/u, s/p LP;  on empric abx for Ecoli bacteremia.    5. MGUS -- reviewed previous records from [place] from 3/2017. Noted to have 5% plasma cells. No congo red staining done. Immunoglobulins were nml, free kappa 43, free lambda 85, free k/l ratio nml. Overall, low suspicion that he has progressed to MM given low M spike and nml k/l ratio.   -- immunoglobulins not elevated  --d/w renal today and and renal bx results c/w TMA- possibly from MGUS? will arrange for bone marrow biopsy tomorrow in the AM- discussed with patient and sisters in detail    Thank you for the courtesy of this consultation and we will continue to follow.    Lj Moon MD  [facility]  Cell: [phone] Medications/Respiratory

## 2019-09-13 NOTE — PROGRESS NOTE ADULT - SUBJECTIVE AND OBJECTIVE BOX
[facility] Division of Kidney Diseases & Hypertension  FOLLOW UP NOTE  [phone]--------------------------------------------------------------------------------  Chief Complaint:Acute renal failure      24 hour events/subjective: No acute events. Patient seen in dialysis. Biopsy reviewed, shows chronic TMA. Patient will require bone marrow biopsy to assess for cells. Labs and vital signs reviewed. Vital signs stable. Labs stable without gross electrolyte abnormalities.        PAST HISTORY  --------------------------------------------------------------------------------  No significant changes to PMH, PSH, FHx, SHx, unless otherwise noted    ALLERGIES & MEDICATIONS  --------------------------------------------------------------------------------  Allergies    No Known Allergies    Intolerances      Standing Inpatient Medications  allopurinol 100 milliGRAM(s) Oral daily  atorvastatin 40 milliGRAM(s) Oral at bedtime  chlorhexidine 2% Cloths 1 Application(s) Topical daily  cinacalcet 30 milliGRAM(s) Oral daily  cyanocobalamin Injectable 1000 MICROGram(s) IntraMuscular <User Schedule>  darbepoetin Injectable ViaL 60 MICROGram(s) IV Push every week  ergocalciferol 28155 Unit(s) Oral every week  folic acid 1 milliGRAM(s) Oral daily  furosemide   Injectable 60 milliGRAM(s) IV Push two times a day  heparin  Infusion.  Unit(s)/Hr IV Continuous <Continuous>  hydrALAZINE 100 milliGRAM(s) Oral three times a day  hydrocortisone hemorrhoidal Suppository 1 Suppository(s) Rectal at bedtime  insulin glargine Injectable (LANTUS) 10 Unit(s) SubCutaneous at bedtime  insulin lispro (HumaLOG) corrective regimen sliding scale   SubCutaneous every 6 hours  labetalol 300 milliGRAM(s) Oral two times a day  lactobacillus acidophilus 1 Tablet(s) Oral two times a day  levETIRAcetam 1000 milliGRAM(s) Oral two times a day  mycophenolate mofetil 250 milliGRAM(s) Oral two times a day  predniSONE   Tablet 5 milliGRAM(s) Oral daily  sodium bicarbonate 650 milliGRAM(s) Oral two times a day  tamsulosin 0.4 milliGRAM(s) Oral at bedtime  thiamine 100 milliGRAM(s) Oral daily    PRN Inpatient Medications  acetaminophen   Tablet .. 650 milliGRAM(s) Oral every 6 hours PRN  aluminum hydroxide/magnesium hydroxide/simethicone Suspension 30 milliLiter(s) Oral every 6 hours PRN  dextrose 40% Gel 15 Gram(s) Oral once PRN  glucagon  Injectable 1 milliGRAM(s) IntraMuscular once PRN      REVIEW OF SYSTEMS  --------------------------------------------------------------------------------  Gen: No  fevers/chills  Skin: No rashes  Head/Eyes/Ears/Mouth: No headache; Normal hearing; Normal vision w/o blurriness  Respiratory: No dyspnea, cough, wheezing, hemoptysis  CV: No chest pain, PND, orthopnea  GI: No abdominal pain, diarrhea, constipation, nausea, vomiting  : No increased frequency, dysuria, hematuria, nocturia  MSK: No joint pain/swelling; no back pain; no edema  Neuro: No dizziness/lightheadedness, weakness, seizures, numbness, tingling      All other systems were reviewed and are negative, except as noted.    VITALS/PHYSICAL EXAM  --------------------------------------------------------------------------------  T(C): 36.7 (09-13-19 @ 09:35), Max: 36.9 (09-12-19 @ 14:14)  HR: 75 (09-13-19 @ 09:35) (72 - 78)  BP: 132/71 (09-13-19 @ 09:35) (132/71 - 157/72)  RR: 18 (09-13-19 @ 09:35) (18 - 18)  SpO2: 97% (09-13-19 @ 09:35) (96% - 99%)  Wt(kg): --        09-12-19 @ 07:01  -  09-13-19 @ 07:00  --------------------------------------------------------  IN: 1209 mL / OUT: 300 mL / NET: 909 mL      Physical Exam:  General: in no apparent distress  Neck: Supple, no JVD,    Lungs: no rhonchi, no wheeze, no crackles  CVS: S1 S2 no M/R/G  Abdomen: no tenderness, no organomegaly, BS present  Neuro: Grossly intact  Skin: warm, dry  Ext: no cyanosis or clubbing, no edema  Access: AVF + thrill    LABS/STUDIES  --------------------------------------------------------------------------------              8.4    8.91  >-----------<  187      [09-13-19 @ 10:18]              27.5     133  |  92  |  30  ----------------------------<  109      [09-13-19 @ 06:48]  3.4   |  24  |  6.00        Ca     8.8     [09-13-19 @ 06:48]      PT/INR: PT 16.6 , INR 1.43       [09-13-19 @ 09:44]  PTT: 66.6       [09-13-19 @ 09:44]      Creatinine Trend:  SCr 6.00 [09-13 @ 06:48]  SCr 4.78 [09-12 @ 08:46]  SCr 5.76 [09-11 @ 09:20]  SCr 4.32 [09-10 @ 07:10]  SCr 4.10 [09-10 @ 01:11]    Urinalysis - [09-10-19 @ 10:27]      Color Light Yellow / Appearance Slightly Turbid / SG 1.012 / pH 8.0      Gluc 100 mg/dL / Ketone Negative  / Bili Negative / Urobili <2 mg/dL       Blood Trace / Protein 300 mg/dL / Leuk Est Moderate / Nitrite Negative      RBC 2 / WBC 77 / Hyaline 0 / Gran  / Sq Epi  / Non Sq Epi 2 / Bacteria Negative

## 2019-09-14 ENCOUNTER — RESULT REVIEW (OUTPATIENT)
Age: 74
End: 2019-09-14

## 2019-09-14 LAB
ANION GAP SERPL CALC-SCNC: 14 MMOL/L — SIGNIFICANT CHANGE UP (ref 5–17)
APTT BLD: 31.6 SEC — SIGNIFICANT CHANGE UP (ref 27.5–36.3)
APTT BLD: 78.6 SEC — HIGH (ref 27.5–36.3)
BUN SERPL-MCNC: 20 MG/DL — SIGNIFICANT CHANGE UP (ref 7–23)
CALCIUM SERPL-MCNC: 9 MG/DL — SIGNIFICANT CHANGE UP (ref 8.4–10.5)
CHLORIDE SERPL-SCNC: 98 MMOL/L — SIGNIFICANT CHANGE UP (ref 96–108)
CO2 SERPL-SCNC: 28 MMOL/L — SIGNIFICANT CHANGE UP (ref 22–31)
CREAT SERPL-MCNC: 4.63 MG/DL — HIGH (ref 0.5–1.3)
CULTURE RESULTS: SIGNIFICANT CHANGE UP
GLUCOSE BLDC GLUCOMTR-MCNC: 108 MG/DL — HIGH (ref 70–99)
GLUCOSE BLDC GLUCOMTR-MCNC: 110 MG/DL — HIGH (ref 70–99)
GLUCOSE BLDC GLUCOMTR-MCNC: 128 MG/DL — HIGH (ref 70–99)
GLUCOSE BLDC GLUCOMTR-MCNC: 96 MG/DL — SIGNIFICANT CHANGE UP (ref 70–99)
GLUCOSE SERPL-MCNC: 102 MG/DL — HIGH (ref 70–99)
HCT VFR BLD CALC: 27.1 % — LOW (ref 39–50)
HCT VFR BLD CALC: 28 % — LOW (ref 39–50)
HGB BLD-MCNC: 8.4 G/DL — LOW (ref 13–17)
HGB BLD-MCNC: 8.7 G/DL — LOW (ref 13–17)
INR BLD: 1.59 RATIO — HIGH (ref 0.88–1.16)
MCHC RBC-ENTMCNC: 26.3 PG — LOW (ref 27–34)
MCHC RBC-ENTMCNC: 27.2 PG — SIGNIFICANT CHANGE UP (ref 27–34)
MCHC RBC-ENTMCNC: 30 GM/DL — LOW (ref 32–36)
MCHC RBC-ENTMCNC: 32 GM/DL — SIGNIFICANT CHANGE UP (ref 32–36)
MCV RBC AUTO: 85 FL — SIGNIFICANT CHANGE UP (ref 80–100)
MCV RBC AUTO: 87.8 FL — SIGNIFICANT CHANGE UP (ref 80–100)
PLATELET # BLD AUTO: 186 K/UL — SIGNIFICANT CHANGE UP (ref 150–400)
PLATELET # BLD AUTO: 198 K/UL — SIGNIFICANT CHANGE UP (ref 150–400)
POTASSIUM SERPL-MCNC: 3.8 MMOL/L — SIGNIFICANT CHANGE UP (ref 3.5–5.3)
POTASSIUM SERPL-SCNC: 3.8 MMOL/L — SIGNIFICANT CHANGE UP (ref 3.5–5.3)
PROTHROM AB SERPL-ACNC: 18.4 SEC — HIGH (ref 10–13.1)
RBC # BLD: 3.19 M/UL — LOW (ref 4.2–5.8)
RBC # BLD: 3.19 M/UL — LOW (ref 4.2–5.8)
RBC # FLD: 14.3 % — SIGNIFICANT CHANGE UP (ref 10.3–14.5)
RBC # FLD: 14.8 % — HIGH (ref 10.3–14.5)
SODIUM SERPL-SCNC: 140 MMOL/L — SIGNIFICANT CHANGE UP (ref 135–145)
SPECIMEN SOURCE: SIGNIFICANT CHANGE UP
WBC # BLD: 7.8 K/UL — SIGNIFICANT CHANGE UP (ref 3.8–10.5)
WBC # BLD: 8.01 K/UL — SIGNIFICANT CHANGE UP (ref 3.8–10.5)
WBC # FLD AUTO: 7.8 K/UL — SIGNIFICANT CHANGE UP (ref 3.8–10.5)
WBC # FLD AUTO: 8.01 K/UL — SIGNIFICANT CHANGE UP (ref 3.8–10.5)

## 2019-09-14 PROCEDURE — 88313 SPECIAL STAINS GROUP 2: CPT | Mod: 26

## 2019-09-14 PROCEDURE — 99232 SBSQ HOSP IP/OBS MODERATE 35: CPT | Mod: GC

## 2019-09-14 PROCEDURE — 85097 BONE MARROW INTERPRETATION: CPT

## 2019-09-14 PROCEDURE — 88305 TISSUE EXAM BY PATHOLOGIST: CPT | Mod: 26

## 2019-09-14 RX ORDER — INSULIN LISPRO 100/ML
VIAL (ML) SUBCUTANEOUS
Refills: 0 | Status: DISCONTINUED | OUTPATIENT
Start: 2019-09-14 | End: 2019-09-24

## 2019-09-14 RX ORDER — ACETAMINOPHEN 500 MG
650 TABLET ORAL ONCE
Refills: 0 | Status: COMPLETED | OUTPATIENT
Start: 2019-09-14 | End: 2019-09-14

## 2019-09-14 RX ORDER — HEPARIN SODIUM 5000 [USP'U]/ML
9000 INJECTION INTRAVENOUS; SUBCUTANEOUS EVERY 6 HOURS
Refills: 0 | Status: DISCONTINUED | OUTPATIENT
Start: 2019-09-14 | End: 2019-09-16

## 2019-09-14 RX ORDER — HEPARIN SODIUM 5000 [USP'U]/ML
4000 INJECTION INTRAVENOUS; SUBCUTANEOUS EVERY 6 HOURS
Refills: 0 | Status: DISCONTINUED | OUTPATIENT
Start: 2019-09-14 | End: 2019-09-16

## 2019-09-14 RX ORDER — INSULIN LISPRO 100/ML
VIAL (ML) SUBCUTANEOUS AT BEDTIME
Refills: 0 | Status: DISCONTINUED | OUTPATIENT
Start: 2019-09-14 | End: 2019-09-24

## 2019-09-14 RX ORDER — HEPARIN SODIUM 5000 [USP'U]/ML
INJECTION INTRAVENOUS; SUBCUTANEOUS
Qty: 25000 | Refills: 0 | Status: DISCONTINUED | OUTPATIENT
Start: 2019-09-14 | End: 2019-09-16

## 2019-09-14 RX ORDER — WARFARIN SODIUM 2.5 MG/1
7.5 TABLET ORAL ONCE
Refills: 0 | Status: COMPLETED | OUTPATIENT
Start: 2019-09-14 | End: 2019-09-14

## 2019-09-14 RX ADMIN — WARFARIN SODIUM 7.5 MILLIGRAM(S): 2.5 TABLET ORAL at 22:15

## 2019-09-14 RX ADMIN — Medication 100 MILLIGRAM(S): at 13:43

## 2019-09-14 RX ADMIN — CHLORHEXIDINE GLUCONATE 1 APPLICATION(S): 213 SOLUTION TOPICAL at 13:44

## 2019-09-14 RX ADMIN — HEPARIN SODIUM 1900 UNIT(S)/HR: 5000 INJECTION INTRAVENOUS; SUBCUTANEOUS at 22:14

## 2019-09-14 RX ADMIN — Medication 650 MILLIGRAM(S): at 06:11

## 2019-09-14 RX ADMIN — ATORVASTATIN CALCIUM 40 MILLIGRAM(S): 80 TABLET, FILM COATED ORAL at 22:17

## 2019-09-14 RX ADMIN — HEPARIN SODIUM 1900 UNIT(S)/HR: 5000 INJECTION INTRAVENOUS; SUBCUTANEOUS at 15:32

## 2019-09-14 RX ADMIN — Medication 60 MILLIGRAM(S): at 13:43

## 2019-09-14 RX ADMIN — Medication 100 MILLIGRAM(S): at 06:11

## 2019-09-14 RX ADMIN — TAMSULOSIN HYDROCHLORIDE 0.4 MILLIGRAM(S): 0.4 CAPSULE ORAL at 22:17

## 2019-09-14 RX ADMIN — Medication 650 MILLIGRAM(S): at 17:58

## 2019-09-14 RX ADMIN — LEVETIRACETAM 1000 MILLIGRAM(S): 250 TABLET, FILM COATED ORAL at 17:58

## 2019-09-14 RX ADMIN — Medication 650 MILLIGRAM(S): at 21:30

## 2019-09-14 RX ADMIN — INSULIN GLARGINE 10 UNIT(S): 100 INJECTION, SOLUTION SUBCUTANEOUS at 22:18

## 2019-09-14 RX ADMIN — Medication 100 MILLIGRAM(S): at 22:17

## 2019-09-14 RX ADMIN — MYCOPHENOLATE MOFETIL 250 MILLIGRAM(S): 250 CAPSULE ORAL at 17:58

## 2019-09-14 RX ADMIN — CINACALCET 30 MILLIGRAM(S): 30 TABLET, FILM COATED ORAL at 13:43

## 2019-09-14 RX ADMIN — Medication 300 MILLIGRAM(S): at 06:11

## 2019-09-14 RX ADMIN — LEVETIRACETAM 1000 MILLIGRAM(S): 250 TABLET, FILM COATED ORAL at 06:11

## 2019-09-14 RX ADMIN — Medication 1 PATCH: at 10:23

## 2019-09-14 RX ADMIN — Medication 300 MILLIGRAM(S): at 17:58

## 2019-09-14 RX ADMIN — Medication 1 PATCH: at 19:50

## 2019-09-14 RX ADMIN — Medication 650 MILLIGRAM(S): at 20:31

## 2019-09-14 RX ADMIN — Medication 1 TABLET(S): at 17:58

## 2019-09-14 RX ADMIN — Medication 5 MILLIGRAM(S): at 06:11

## 2019-09-14 RX ADMIN — Medication 100 MILLIGRAM(S): at 13:42

## 2019-09-14 RX ADMIN — Medication 1 MILLIGRAM(S): at 13:43

## 2019-09-14 RX ADMIN — Medication 1 TABLET(S): at 06:55

## 2019-09-14 RX ADMIN — MYCOPHENOLATE MOFETIL 250 MILLIGRAM(S): 250 CAPSULE ORAL at 06:11

## 2019-09-14 RX ADMIN — Medication 650 MILLIGRAM(S): at 11:30

## 2019-09-14 RX ADMIN — Medication 650 MILLIGRAM(S): at 10:18

## 2019-09-14 NOTE — PROGRESS NOTE ADULT - SUBJECTIVE AND OBJECTIVE BOX
[facility] DIVISION OF KIDNEY DISEASES AND HYPERTENSION -- FOLLOW UP NOTE  --------------------------------------------------------------------------------  Chief Complaint:Acute renal failure      24 hour events/subjective: No acute events. Patient examined at bed side, having BM biopsy today, Denies cp/sob/n/v/abdominal pain no event during HD.         PAST HISTORY  --------------------------------------------------------------------------------  No significant changes to PMH, PSH, FHx, SHx, unless otherwise noted    ALLERGIES & MEDICATIONS  --------------------------------------------------------------------------------  Allergies    No Known Allergies    Intolerances      Standing Inpatient Medications  acetaminophen   Tablet .. 650 milliGRAM(s) Oral once  allopurinol 100 milliGRAM(s) Oral daily  atorvastatin 40 milliGRAM(s) Oral at bedtime  chlorhexidine 2% Cloths 1 Application(s) Topical daily  cinacalcet 30 milliGRAM(s) Oral daily  cyanocobalamin Injectable 1000 MICROGram(s) IntraMuscular <User Schedule>  darbepoetin Injectable ViaL 60 MICROGram(s) IV Push every week  dextrose 5%. 1000 milliLiter(s) IV Continuous <Continuous>  dextrose 50% Injectable 12.5 Gram(s) IV Push once  dextrose 50% Injectable 25 Gram(s) IV Push once  dextrose 50% Injectable 25 Gram(s) IV Push once  ergocalciferol 82335 Unit(s) Oral every week  folic acid 1 milliGRAM(s) Oral daily  furosemide   Injectable 60 milliGRAM(s) IV Push two times a day  heparin  Infusion.  Unit(s)/Hr IV Continuous <Continuous>  hydrALAZINE 100 milliGRAM(s) Oral three times a day  hydrocortisone hemorrhoidal Suppository 1 Suppository(s) Rectal at bedtime  insulin glargine Injectable (LANTUS) 10 Unit(s) SubCutaneous at bedtime  insulin lispro (HumaLOG) corrective regimen sliding scale   SubCutaneous every 6 hours  labetalol 300 milliGRAM(s) Oral two times a day  lactobacillus acidophilus 1 Tablet(s) Oral two times a day  levETIRAcetam 1000 milliGRAM(s) Oral two times a day  mycophenolate mofetil 250 milliGRAM(s) Oral two times a day  predniSONE   Tablet 5 milliGRAM(s) Oral daily  sodium bicarbonate 650 milliGRAM(s) Oral two times a day  tamsulosin 0.4 milliGRAM(s) Oral at bedtime  thiamine 100 milliGRAM(s) Oral daily    PRN Inpatient Medications  acetaminophen   Tablet .. 650 milliGRAM(s) Oral every 6 hours PRN  aluminum hydroxide/magnesium hydroxide/simethicone Suspension 30 milliLiter(s) Oral every 6 hours PRN  dextrose 40% Gel 15 Gram(s) Oral once PRN  glucagon  Injectable 1 milliGRAM(s) IntraMuscular once PRN      REVIEW OF SYSTEMS  --------------------------------------------------------------------------------  Gen: No  fevers/chills  Respiratory: No dyspnea, cough, wheezing, hemoptysis  CV: No chest pain, PND, orthopnea  GI: No abdominal pain, diarrhea, constipation, nausea, vomiting  MSK:  no edema  Neuro: No dizziness/lightheadedness,       All other systems were reviewed and are negative, except as noted.    VITALS/PHYSICAL EXAM  --------------------------------------------------------------------------------  T(C): 37.1 (09-14-19 @ 06:14), Max: 37.1 (09-13-19 @ 21:02)  HR: 71 (09-14-19 @ 06:14) (71 - 76)  BP: 158/71 (09-14-19 @ 06:14) (130/77 - 166/85)  RR: 18 (09-14-19 @ 06:14) (18 - 18)  SpO2: 96% (09-14-19 @ 06:14) (96% - 98%)  Wt(kg): --        09-13-19 @ 07:01  -  09-14-19 @ 07:00  --------------------------------------------------------  IN: 1236 mL / OUT: 2225 mL / NET: -989 mL      Physical Exam:    General: in no apparent distress  Neck: Supple, no JVD,    Lungs: no rhonchi, no wheeze, no crackles  CVS: S1 S2 no M/R/G  Abdomen: no tenderness, no organomegaly, BS present  Neuro: Grossly intact  Skin: warm, dry  Ext: no cyanosis or clubbing, no edema  Access: AVF + thrill    LABS/STUDIES  --------------------------------------------------------------------------------              8.4    8.91  >-----------<  187      [09-13-19 @ 10:18]              27.5     140  |  98  |  20  ----------------------------<  102      [09-14-19 @ 06:38]  3.8   |  28  |  4.63        Ca     9.0     [09-14-19 @ 06:38]      PT/INR: PT 16.6 , INR 1.43       [09-13-19 @ 09:44]  PTT: 87.2       [09-13-19 @ 17:49]          [09-13-19 @ 17:49]    Creatinine Trend:  SCr 4.63 [09-14 @ 06:38]  SCr 6.00 [09-13 @ 06:48]  SCr 4.78 [09-12 @ 08:46]  SCr 5.76 [09-11 @ 09:20]  SCr 4.32 [09-10 @ 07:10]

## 2019-09-14 NOTE — PROGRESS NOTE ADULT - ASSESSMENT
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   Pt just moved back to [place] from [place] .. reports that he has not been taking his meds for the past few days since " he might have misplaed them"     pt denies chest pain, syncope,fever, chills, cough, urinary symptoms.    in ED found  to have anemia   Nno acute changes on EKG   elevated CR and Trop    1- HTN urgency :   cont meds ...     2-KAMRON  : bx : transplant glomurlosclerosis /interstitial fibrosis ... discussed w/ Dr. 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  f/u with heme and nephrology   f/u bone marrow bx : done today    no amyloidosis   off tacro,  cont cellcept and steriods..       3- DM:   A1c  5.6  Fs     4- acute HF sec to  HTN urgency and renal failure   Echo :  < from: Transthoracic Echocardiogram (08.28.19 @ 16:12) >  1. Mitral annular calcification and calcified mitral  leaflets with decreased diastolic opening.  2. Moderate to severe concentric left ventricular  hypertrophy.  3. Normal left ventricular systolic function with  mid-cavitary obliteration.  4. Normal right ventricular size and systolic function.  5. Small pericardial effusion.  cont fluid removal with HD per renal      5-  difficult ambulating :  no focal neuro deficits   neuro eval appreciated    CT T/L   < from: CT Thoracic Spine No Cont (08.30.19 @ 10:06) >    Old right L1 transverse process fracture. Bilateral lysis   defects at L5 as seen on the prior 5/26/2013      6- afib : reestart AC     7- hematochezia : per sister : on and off small amount of fresh blood in stool and some amount in urine but only small amounts   monitor H/H   consult GI .: appreciate input :  suspect bleeding to be hemorrhoidal, now resolved     trial of anusol supp  if bleeding persist would consider colonoscopy however patient is reluctant.    heme input: appreciated  .    8-  renal transplant : f/u with Transplant team   cont meds with MMF and steroids   off Tac   s/p bx : as above       9- TIA :  unable to perform MRI   CT no acute changes on CT   repeat negative   fu with neuro     10 encephalopathy :   CTH with contrast : neg for acute pathology   LP : high protien but culture negative in CSF   blood culture positive for GNR /Ecoli likely urinary source   CT : < from: CT Abdomen and Pelvis No Cont (09.09.19 @ 22:26) >    Layering stones in a thick-walled urinary bladder with perivesicular   inflammatory change and a left lower quadrant transplant kidney with a   nonobstructing 1.1 cm lower pole stone and moderate perinephric fat   stranding. No hydronephrosis. Differential includes urinary tract   infection.    comleted course of abx   f/u with ID and urology       11- paraprotienmeia :   f/u bone marrow bx : done today    12 hypercalcemia : monitor for now     13- abd discomfort : improved today   CT abd as above   check PVR and if >200 will place gutierrez .. discussed with Dr. Goldberg

## 2019-09-14 NOTE — PROGRESS NOTE ADULT - SUBJECTIVE AND OBJECTIVE BOX
Pt seen/examined prior to bone marrow biopsy- comfortable      MEDICATIONS  (STANDING):  allopurinol 100 milliGRAM(s) Oral daily  atorvastatin 40 milliGRAM(s) Oral at bedtime  chlorhexidine 2% Cloths 1 Application(s) Topical daily  cinacalcet 30 milliGRAM(s) Oral daily  cyanocobalamin Injectable 1000 MICROGram(s) IntraMuscular <User Schedule>  darbepoetin Injectable ViaL 60 MICROGram(s) IV Push every week  dextrose 5%. 1000 milliLiter(s) (50 mL/Hr) IV Continuous <Continuous>  dextrose 50% Injectable 12.5 Gram(s) IV Push once  dextrose 50% Injectable 25 Gram(s) IV Push once  dextrose 50% Injectable 25 Gram(s) IV Push once  ergocalciferol 33948 Unit(s) Oral every week  folic acid 1 milliGRAM(s) Oral daily  furosemide   Injectable 60 milliGRAM(s) IV Push two times a day  heparin  Infusion.  Unit(s)/Hr (19 mL/Hr) IV Continuous <Continuous>  hydrALAZINE 100 milliGRAM(s) Oral three times a day  hydrocortisone hemorrhoidal Suppository 1 Suppository(s) Rectal at bedtime  insulin glargine Injectable (LANTUS) 10 Unit(s) SubCutaneous at bedtime  insulin lispro (HumaLOG) corrective regimen sliding scale   SubCutaneous every 6 hours  labetalol 300 milliGRAM(s) Oral two times a day  lactobacillus acidophilus 1 Tablet(s) Oral two times a day  levETIRAcetam 1000 milliGRAM(s) Oral two times a day  mycophenolate mofetil 250 milliGRAM(s) Oral two times a day  predniSONE   Tablet 5 milliGRAM(s) Oral daily  sodium bicarbonate 650 milliGRAM(s) Oral two times a day  tamsulosin 0.4 milliGRAM(s) Oral at bedtime  thiamine 100 milliGRAM(s) Oral daily    MEDICATIONS  (PRN):  acetaminophen   Tablet .. 650 milliGRAM(s) Oral every 6 hours PRN Temp greater or equal to 38C (100.4F)  aluminum hydroxide/magnesium hydroxide/simethicone Suspension 30 milliLiter(s) Oral every 6 hours PRN Dyspepsia  dextrose 40% Gel 15 Gram(s) Oral once PRN Blood Glucose LESS THAN 70 milliGRAM(s)/deciliter  glucagon  Injectable 1 milliGRAM(s) IntraMuscular once PRN Glucose LESS THAN 70 milligrams/deciliter      ROS  No fever, sweats, chills  No epistaxis, HA, sore throat  No CP, SOB, cough, sputum  No n/v/d, abd pain, melena, hematochezia  No edema  No rash  No anxiety  No back pain, joint pain  No bleeding, bruising  No dysuria, hematuria    Vital Signs Last 24 Hrs  T(C): 36.9 (14 Sep 2019 10:14), Max: 37.1 (13 Sep 2019 21:02)  T(F): 98.4 (14 Sep 2019 10:14), Max: 98.8 (14 Sep 2019 06:14)  HR: 73 (14 Sep 2019 10:14) (71 - 76)  BP: 139/73 (14 Sep 2019 10:14) (130/77 - 166/85)  BP(mean): --  RR: 18 (14 Sep 2019 10:14) (18 - 18)  SpO2: 96% (14 Sep 2019 10:14) (96% - 98%)    PE  NAD  Awake, alert  Anicteric, MMM  RRR  CTAB  Abd soft, NT, ND  No c/c/e  No rash grossly  FROM                          8.4    8.01  )-----------( 198      ( 14 Sep 2019 10:37 )             28.0       09-14    140  |  98  |  20  ----------------------------<  102<H>  3.8   |  28  |  4.63<H>    Ca    9.0      14 Sep 2019 06:38

## 2019-09-14 NOTE — PROGRESS NOTE ADULT - PROBLEM SELECTOR PLAN 2
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 now with KAMRON over CKD stage 5  ,now started on dialysis since 8/27/19. Last HD on 9/11, next tomorrow Back on diuretics. Renal bx shows chronic TMA which could be recurrence of disease that caused original kidney damage as well as subsequent renal transplant failure.   BM biopsy today, f/u results, further tx decision as per Hemonc.  Had HD yesterday, no need for HD today.

## 2019-09-14 NOTE — PROGRESS NOTE ADULT - SUBJECTIVE AND OBJECTIVE BOX
CC: f/u for  E coli bacteremia - treated     Patient reports no complaints     REVIEW OF SYSTEMS:  All other review of systems negative (Comprehensive ROS)    Antimicrobials Day #  : Off of antibiotics    Other Medications Reviewed    T(F): 98.4 (09-14-19 @ 10:14), Max: 98.8 (09-14-19 @ 06:14)  HR: 73 (09-14-19 @ 10:14)  BP: 139/73 (09-14-19 @ 10:14)  RR: 18 (09-14-19 @ 10:14)  SpO2: 96% (09-14-19 @ 10:14)  Wt(kg): --    PHYSICAL EXAM:  General: alert, no acute distress  Eyes:  anicteric, no conjunctival injection, no discharge  Neck: supple  Lungs: clear to auscultation  Heart: regular rate and rhythm; no murmurs  Abdomen: soft, nondistended, nontender, without mass or organomegaly  Extremities: no clubbing or cyanosis. Trace edema. Left forearm AVF   Neurologic: alert, oriented, moves all extremities    LAB RESULTS:                        8.4    8.01  )-----------( 198      ( 14 Sep 2019 10:37 )             28.0     09-14    140  |  98  |  20  ----------------------------<  102<H>  3.8   |  28  |  4.63<H>    Ca    9.0      14 Sep 2019 06:38      MICROBIOLOGY:  RECENT CULTURES:        RADIOLOGY REVIEWED:

## 2019-09-14 NOTE — PROGRESS NOTE ADULT - SUBJECTIVE AND OBJECTIVE BOX
Subjective: Patient seen and examined. No new events except as noted.     REVIEW OF SYSTEMS:    CONSTITUTIONAL: + weakness, fevers or chills  EYES/ENT: No visual changes;  No vertigo or throat pain   NECK: No pain or stiffness  RESPIRATORY: No cough, wheezing, hemoptysis; No shortness of breath  CARDIOVASCULAR: No chest pain or palpitations  GASTROINTESTINAL: No abdominal or epigastric pain. No nausea, vomiting, or hematemesis; No diarrhea or constipation. No melena or hematochezia.  GENITOURINARY: No dysuria, frequency or hematuria  NEUROLOGICAL: No numbness or weakness  SKIN: No itching, burning, rashes, or lesions   All other review of systems is negative unless indicated above.    MEDICATIONS:  MEDICATIONS  (STANDING):  allopurinol 100 milliGRAM(s) Oral daily  atorvastatin 40 milliGRAM(s) Oral at bedtime  chlorhexidine 2% Cloths 1 Application(s) Topical daily  cinacalcet 30 milliGRAM(s) Oral daily  cyanocobalamin Injectable 1000 MICROGram(s) IntraMuscular <User Schedule>  darbepoetin Injectable ViaL 60 MICROGram(s) IV Push every week  dextrose 5%. 1000 milliLiter(s) (50 mL/Hr) IV Continuous <Continuous>  dextrose 50% Injectable 12.5 Gram(s) IV Push once  dextrose 50% Injectable 25 Gram(s) IV Push once  dextrose 50% Injectable 25 Gram(s) IV Push once  ergocalciferol 34205 Unit(s) Oral every week  folic acid 1 milliGRAM(s) Oral daily  furosemide   Injectable 60 milliGRAM(s) IV Push two times a day  heparin  Infusion.  Unit(s)/Hr (19 mL/Hr) IV Continuous <Continuous>  hydrALAZINE 100 milliGRAM(s) Oral three times a day  hydrocortisone hemorrhoidal Suppository 1 Suppository(s) Rectal at bedtime  insulin glargine Injectable (LANTUS) 10 Unit(s) SubCutaneous at bedtime  insulin lispro (HumaLOG) corrective regimen sliding scale   SubCutaneous three times a day with meals  insulin lispro (HumaLOG) corrective regimen sliding scale   SubCutaneous at bedtime  labetalol 300 milliGRAM(s) Oral two times a day  lactobacillus acidophilus 1 Tablet(s) Oral two times a day  levETIRAcetam 1000 milliGRAM(s) Oral two times a day  mycophenolate mofetil 250 milliGRAM(s) Oral two times a day  predniSONE   Tablet 5 milliGRAM(s) Oral daily  sodium bicarbonate 650 milliGRAM(s) Oral two times a day  tamsulosin 0.4 milliGRAM(s) Oral at bedtime  thiamine 100 milliGRAM(s) Oral daily      PHYSICAL EXAM:  T(C): 37 (09-14-19 @ 13:30), Max: 37.1 (09-14-19 @ 06:14)  HR: 73 (09-14-19 @ 13:30) (71 - 73)  BP: 147/68 (09-14-19 @ 13:30) (139/73 - 158/71)  RR: 18 (09-14-19 @ 13:30) (18 - 18)  SpO2: 95% (09-14-19 @ 13:30) (95% - 96%)  Wt(kg): --  I&O's Summary    13 Sep 2019 07:01  -  14 Sep 2019 07:00  --------------------------------------------------------  IN: 1236 mL / OUT: 2225 mL / NET: -989 mL    14 Sep 2019 07:01  -  14 Sep 2019 21:08  --------------------------------------------------------  IN: 316 mL / OUT: 275 mL / NET: 41 mL          Appearance: Normal	  HEENT:   Normal oral mucosa, PERRL, EOMI	  Lymphatic: No lymphadenopathy , no edema  Cardiovascular: Normal S1 S2, No JVD, + murmurs , Peripheral pulses palpable 2+ bilaterally  Respiratory: Lungs clear to auscultation, normal effort 	  Gastrointestinal:  Soft, Non-tender, + BS	  Skin: No rashes, No ecchymoses, No cyanosis, warm to touch  Musculoskeletal: Normal range of motion, normal strength  Psychiatry:  Mood & affect appropriate  Ext: No edema      LABS:    CARDIAC MARKERS:                                8.4    8.01  )-----------( 198      ( 14 Sep 2019 10:37 )             28.0     09-14    140  |  98  |  20  ----------------------------<  102<H>  3.8   |  28  |  4.63<H>    Ca    9.0      14 Sep 2019 06:38      proBNP:   Lipid Profile:   HgA1c:   TSH:             TELEMETRY: 	    ECG:  	  RADIOLOGY:   DIAGNOSTIC TESTING:  [ ] Echocardiogram:  [ ]  Catheterization:  [ ] Stress Test:    OTHER:

## 2019-09-14 NOTE — PROGRESS NOTE ADULT - ATTENDING COMMENTS
Pt seen and examined  Feels ok  Now s/p BM biopsy  Still urinating  F.u BM biopsy results  May need another transplant kidney biopsy  Cont MMF and pred for immunosuppression.  Next dialysis Monday

## 2019-09-14 NOTE — PROGRESS NOTE ADULT - ASSESSMENT
73 yo male with history of A Fib, Hep C, DM, ESRD s/p renal transplant x 2, was on tacrolimus, myfortic, and prednisone along with prophylactic valtrex.  His CMV viral loads were negative, Hep B surface antibody is positive & Hep C RNA negative.  Admitted on 8/26 with progressive CKD and rectal bleeding.  Required HD and developed fever 9/2  Found to have E coli bacteremia of likely  origin  S/p LP for AMS, ME PCR panel negative, CMV PCR negative, CSF cytology negative & cx negative  serum crypt & histo antigens are negative  ? Toxic-Metabolic encephalopathy  Repeat blood cultures are negative  Ct A & P resulted as layering stones in a thick-walled urinary bladder with perivesicular inflammatory change and a left lower quadrant transplant kidney with a nonobstructing 1.1 cm lower pole stone and moderate perinephric fat stranding. No hydronephrosis. Differential includes urinary tract infection.  Suspect  focus - likely a stone might have passed  Now on HD - for transplant failure, off of tacrolimus. But on cellcept & steroids as per transplant team  Urology input noted - recommended further w/up as outpatient  Completed 10 days of antibiotics - no suspicion for uncontrolled infection at this time   S/p bone marrow biopsy today     Suggest:  Stable off of antibiotics  Patient's sister updated over the phone as requested - no suspicion for any uncontrolled infection at this time  Will no longer follow the patient actively, please reconsult with questions or if the status changes, thanks

## 2019-09-14 NOTE — PROGRESS NOTE ADULT - SUBJECTIVE AND OBJECTIVE BOX
Patient is a 74y old  Male who presents with a chief complaint of Bone Marrow Procedure Note (14 Sep 2019 10:49)                                                               INTERVAL HPI/OVERNIGHT EVENTS:    REVIEW OF SYSTEMS:     CONSTITUTIONAL: No weakness, fevers or chills  RESPIRATORY: No cough, wheezing,  No shortness of breath  CARDIOVASCULAR: No chest pain or palpitations  GASTROINTESTINAL: No abdominal pain  . No nausea, vomiting, or hematemesis; No diarrhea or constipation. No melena or hematochezia.  GENITOURINARY: No dysuria, frequency or hematuria  NEUROLOGICAL: No numbness or weakness                                                                                                                                                                                                                                                                                 Medications:  MEDICATIONS  (STANDING):  allopurinol 100 milliGRAM(s) Oral daily  atorvastatin 40 milliGRAM(s) Oral at bedtime  chlorhexidine 2% Cloths 1 Application(s) Topical daily  cinacalcet 30 milliGRAM(s) Oral daily  cyanocobalamin Injectable 1000 MICROGram(s) IntraMuscular <User Schedule>  darbepoetin Injectable ViaL 60 MICROGram(s) IV Push every week  dextrose 5%. 1000 milliLiter(s) (50 mL/Hr) IV Continuous <Continuous>  dextrose 50% Injectable 12.5 Gram(s) IV Push once  dextrose 50% Injectable 25 Gram(s) IV Push once  dextrose 50% Injectable 25 Gram(s) IV Push once  ergocalciferol 21262 Unit(s) Oral every week  folic acid 1 milliGRAM(s) Oral daily  furosemide   Injectable 60 milliGRAM(s) IV Push two times a day  heparin  Infusion.  Unit(s)/Hr (19 mL/Hr) IV Continuous <Continuous>  hydrALAZINE 100 milliGRAM(s) Oral three times a day  hydrocortisone hemorrhoidal Suppository 1 Suppository(s) Rectal at bedtime  insulin glargine Injectable (LANTUS) 10 Unit(s) SubCutaneous at bedtime  insulin lispro (HumaLOG) corrective regimen sliding scale   SubCutaneous three times a day with meals  insulin lispro (HumaLOG) corrective regimen sliding scale   SubCutaneous at bedtime  labetalol 300 milliGRAM(s) Oral two times a day  lactobacillus acidophilus 1 Tablet(s) Oral two times a day  levETIRAcetam 1000 milliGRAM(s) Oral two times a day  mycophenolate mofetil 250 milliGRAM(s) Oral two times a day  predniSONE   Tablet 5 milliGRAM(s) Oral daily  sodium bicarbonate 650 milliGRAM(s) Oral two times a day  tamsulosin 0.4 milliGRAM(s) Oral at bedtime  thiamine 100 milliGRAM(s) Oral daily    MEDICATIONS  (PRN):  acetaminophen   Tablet .. 650 milliGRAM(s) Oral every 6 hours PRN Temp greater or equal to 38C (100.4F)  aluminum hydroxide/magnesium hydroxide/simethicone Suspension 30 milliLiter(s) Oral every 6 hours PRN Dyspepsia  dextrose 40% Gel 15 Gram(s) Oral once PRN Blood Glucose LESS THAN 70 milliGRAM(s)/deciliter  glucagon  Injectable 1 milliGRAM(s) IntraMuscular once PRN Glucose LESS THAN 70 milligrams/deciliter  heparin  Injectable 9000 Unit(s) IV Push every 6 hours PRN For aPTT less than 40  heparin  Injectable 4000 Unit(s) IV Push every 6 hours PRN For aPTT between 40 - 57       Allergies    No Known Allergies    Intolerances      Vital Signs Last 24 Hrs  T(C): 37.2 (14 Sep 2019 21:26), Max: 37.2 (14 Sep 2019 21:26)  T(F): 99 (14 Sep 2019 21:26), Max: 99 (14 Sep 2019 21:26)  HR: 71 (14 Sep 2019 21:26) (71 - 73)  BP: 151/70 (14 Sep 2019 21:26) (139/73 - 158/71)  BP(mean): --  RR: 18 (14 Sep 2019 21:26) (18 - 18)  SpO2: 97% (14 Sep 2019 21:26) (95% - 97%)  CAPILLARY BLOOD GLUCOSE      POCT Blood Glucose.: 128 mg/dL (14 Sep 2019 21:32)  POCT Blood Glucose.: 96 mg/dL (14 Sep 2019 17:43)  POCT Blood Glucose.: 110 mg/dL (14 Sep 2019 12:27)  POCT Blood Glucose.: 108 mg/dL (14 Sep 2019 08:09)      09-13 @ 07:01 - 09-14 @ 07:00  --------------------------------------------------------  IN: 1236 mL / OUT: 2225 mL / NET: -989 mL    09-14 @ 07:01 - 09-14 @ 22:38  --------------------------------------------------------  IN: 316 mL / OUT: 275 mL / NET: 41 mL      Physical Exam:    General:  NAD   HEENT:  Nonicteric, PERRLA  CV:  RRR, S1S2   Lungs:  CTA   Abdomen:  Soft, non-tender, no distended, positive BS  Extremities: no  edema  Skin:  Warm and dry, no rashes  :  No gutierrez  Neuro:  AAOx3, non-focal, grossly intact                                                                                                                                                                                                                                                                                                LABS:                               8.7    7.8   )-----------( 186      ( 14 Sep 2019 21:37 )             27.1                      09-14    140  |  98  |  20  ----------------------------<  102<H>  3.8   |  28  |  4.63<H>    Ca    9.0      14 Sep 2019 06:38

## 2019-09-14 NOTE — PROGRESS NOTE ADULT - ASSESSMENT
Assessment and Plan:   · Assessment		      		    1. Anemia sec to CKD/B12/folate deficiency  -- B12 1000 mcg IM x 1 given 8/31;  B12 1000 mcg IM weekly for 3 further doses then continue monthly;  PO FA 1mg QD;    -- Iron studies c/w Anemia of Chronic Inflammation/kidney disease- aranesp as per renal team  --Transfuse for Hgb<*    2. thrombocytopenia, resolved    -- reactive and consumptive, abx   -- most likely related to h/o HCV, ? Mycophenolate contributing  -- monitor for now    3. LE edema   --noted age indeterminate DVT b/l LE above knee; provoked by acute illness and immobilizaiton;  no indication for hypercoag w/u   --pt had been on and off AC in past for Afib  --c/w heparin gtt for now given ongoing procedures    4.  Fevers  --ID w/u, s/p LP;  on empric abx for Ecoli bacteremia.    5. MGUS -- reviewed previous records from [facility] from 3/2017. Noted to have 5% plasma cells. No congo red staining done. Immunoglobulins were nml, free kappa 43, free lambda 85, free k/l ratio nml. Overall, low suspicion that he has progressed to MM given low M spike and nml k/l ratio.   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

## 2019-09-15 LAB
ANION GAP SERPL CALC-SCNC: 18 MMOL/L — HIGH (ref 5–17)
APTT BLD: 65.8 SEC — HIGH (ref 27.5–36.3)
APTT BLD: 74.4 SEC — HIGH (ref 27.5–36.3)
BUN SERPL-MCNC: 28 MG/DL — HIGH (ref 7–23)
CALCIUM SERPL-MCNC: 8.4 MG/DL — SIGNIFICANT CHANGE UP (ref 8.4–10.5)
CHLORIDE SERPL-SCNC: 96 MMOL/L — SIGNIFICANT CHANGE UP (ref 96–108)
CO2 SERPL-SCNC: 25 MMOL/L — SIGNIFICANT CHANGE UP (ref 22–31)
CREAT SERPL-MCNC: 5.37 MG/DL — HIGH (ref 0.5–1.3)
GLUCOSE BLDC GLUCOMTR-MCNC: 112 MG/DL — HIGH (ref 70–99)
GLUCOSE BLDC GLUCOMTR-MCNC: 114 MG/DL — HIGH (ref 70–99)
GLUCOSE BLDC GLUCOMTR-MCNC: 154 MG/DL — HIGH (ref 70–99)
GLUCOSE BLDC GLUCOMTR-MCNC: 91 MG/DL — SIGNIFICANT CHANGE UP (ref 70–99)
GLUCOSE SERPL-MCNC: 95 MG/DL — SIGNIFICANT CHANGE UP (ref 70–99)
HCT VFR BLD CALC: 27.2 % — LOW (ref 39–50)
HGB BLD-MCNC: 8.3 G/DL — LOW (ref 13–17)
INR BLD: 1.65 RATIO — HIGH (ref 0.88–1.16)
MCHC RBC-ENTMCNC: 27.1 PG — SIGNIFICANT CHANGE UP (ref 27–34)
MCHC RBC-ENTMCNC: 30.5 GM/DL — LOW (ref 32–36)
MCV RBC AUTO: 88.9 FL — SIGNIFICANT CHANGE UP (ref 80–100)
PLATELET # BLD AUTO: 200 K/UL — SIGNIFICANT CHANGE UP (ref 150–400)
POTASSIUM SERPL-MCNC: 3.7 MMOL/L — SIGNIFICANT CHANGE UP (ref 3.5–5.3)
POTASSIUM SERPL-SCNC: 3.7 MMOL/L — SIGNIFICANT CHANGE UP (ref 3.5–5.3)
PROTHROM AB SERPL-ACNC: 19.3 SEC — HIGH (ref 10–13.1)
RBC # BLD: 3.06 M/UL — LOW (ref 4.2–5.8)
RBC # FLD: 15 % — HIGH (ref 10.3–14.5)
SODIUM SERPL-SCNC: 139 MMOL/L — SIGNIFICANT CHANGE UP (ref 135–145)
WBC # BLD: 7.4 K/UL — SIGNIFICANT CHANGE UP (ref 3.8–10.5)
WBC # FLD AUTO: 7.4 K/UL — SIGNIFICANT CHANGE UP (ref 3.8–10.5)

## 2019-09-15 RX ORDER — WARFARIN SODIUM 2.5 MG/1
7.5 TABLET ORAL ONCE
Refills: 0 | Status: COMPLETED | OUTPATIENT
Start: 2019-09-15 | End: 2019-09-15

## 2019-09-15 RX ADMIN — Medication 60 MILLIGRAM(S): at 11:18

## 2019-09-15 RX ADMIN — CINACALCET 30 MILLIGRAM(S): 30 TABLET, FILM COATED ORAL at 11:18

## 2019-09-15 RX ADMIN — Medication 1 PATCH: at 11:36

## 2019-09-15 RX ADMIN — Medication 1 TABLET(S): at 05:06

## 2019-09-15 RX ADMIN — Medication 1: at 13:34

## 2019-09-15 RX ADMIN — TAMSULOSIN HYDROCHLORIDE 0.4 MILLIGRAM(S): 0.4 CAPSULE ORAL at 22:47

## 2019-09-15 RX ADMIN — Medication 100 MILLIGRAM(S): at 21:37

## 2019-09-15 RX ADMIN — Medication 300 MILLIGRAM(S): at 05:06

## 2019-09-15 RX ADMIN — WARFARIN SODIUM 7.5 MILLIGRAM(S): 2.5 TABLET ORAL at 22:48

## 2019-09-15 RX ADMIN — Medication 100 MILLIGRAM(S): at 11:20

## 2019-09-15 RX ADMIN — Medication 1 TABLET(S): at 17:45

## 2019-09-15 RX ADMIN — ATORVASTATIN CALCIUM 40 MILLIGRAM(S): 80 TABLET, FILM COATED ORAL at 21:33

## 2019-09-15 RX ADMIN — Medication 650 MILLIGRAM(S): at 05:06

## 2019-09-15 RX ADMIN — INSULIN GLARGINE 10 UNIT(S): 100 INJECTION, SOLUTION SUBCUTANEOUS at 22:40

## 2019-09-15 RX ADMIN — Medication 100 MILLIGRAM(S): at 11:18

## 2019-09-15 RX ADMIN — CHLORHEXIDINE GLUCONATE 1 APPLICATION(S): 213 SOLUTION TOPICAL at 11:18

## 2019-09-15 RX ADMIN — Medication 100 MILLIGRAM(S): at 05:06

## 2019-09-15 RX ADMIN — PREGABALIN 1000 MICROGRAM(S): 225 CAPSULE ORAL at 13:33

## 2019-09-15 RX ADMIN — MYCOPHENOLATE MOFETIL 250 MILLIGRAM(S): 250 CAPSULE ORAL at 05:06

## 2019-09-15 RX ADMIN — HEPARIN SODIUM 1900 UNIT(S)/HR: 5000 INJECTION INTRAVENOUS; SUBCUTANEOUS at 06:46

## 2019-09-15 RX ADMIN — LEVETIRACETAM 1000 MILLIGRAM(S): 250 TABLET, FILM COATED ORAL at 17:44

## 2019-09-15 RX ADMIN — LEVETIRACETAM 1000 MILLIGRAM(S): 250 TABLET, FILM COATED ORAL at 05:06

## 2019-09-15 RX ADMIN — Medication 1 MILLIGRAM(S): at 11:18

## 2019-09-15 RX ADMIN — Medication 100 MILLIGRAM(S): at 13:38

## 2019-09-15 RX ADMIN — Medication 1 PATCH: at 07:58

## 2019-09-15 RX ADMIN — Medication 60 MILLIGRAM(S): at 00:43

## 2019-09-15 RX ADMIN — Medication 5 MILLIGRAM(S): at 05:06

## 2019-09-15 RX ADMIN — MYCOPHENOLATE MOFETIL 250 MILLIGRAM(S): 250 CAPSULE ORAL at 17:45

## 2019-09-15 RX ADMIN — Medication 650 MILLIGRAM(S): at 17:44

## 2019-09-15 RX ADMIN — Medication 300 MILLIGRAM(S): at 17:45

## 2019-09-15 NOTE — PROGRESS NOTE ADULT - ASSESSMENT
Assessment and Plan:   · Assessment		      		    1. Anemia sec to CKD/B12/folate deficiency/MGUS  -- s/p 3 weekly B12 1000 mcg IM injections- last 9/14;  B12 1000 mcg IM weekly for 1 more dose then continue monthly;  PO FA 1mg QD;    -- Iron studies c/w Anemia of Chronic Inflammation/kidney disease- aranesp as per renal team  --Transfuse for Hgb<7    2. thrombocytopenia, resolved    -- reactive and consumptive, abx   -- most likely related to h/o HCV, ? Mycophenolate contributing  -- monitor for now    3. LE edema   --noted age indeterminate DVT b/l LE above knee; provoked by acute illness and immobilizaiton;  no indication for hypercoag w/u   --pt had been on and off AC in past for Afib  --c/w heparin gtt for now given ongoing procedures    4.  Fevers  --ID w/u, s/p LP;  on empric abx for Ecoli bacteremia.    5. MGUS -- reviewed previous records from [facility] from 3/2017. Noted to have 5% plasma cells. No congo red staining done. Immunoglobulins were nml, free kappa 43, free lambda 85, free k/l ratio nml. Overall, low suspicion that he has progressed to MM given low M spike and nml k/l ratio.   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

## 2019-09-15 NOTE — PROGRESS NOTE ADULT - SUBJECTIVE AND OBJECTIVE BOX
Patient is a 74y old  Male who presents with a chief complaint of renal failure (15 Sep 2019 09:54)                                                               INTERVAL HPI/OVERNIGHT EVENTS:    REVIEW OF SYSTEMS:     CONSTITUTIONAL: No weakness, fevers or chills  EYES/ENT: No visual changes , no ear ache   NECK: No pain or stiffness  RESPIRATORY: No cough, wheezing,  No shortness of breath  CARDIOVASCULAR: No chest pain or palpitations  GASTROINTESTINAL: No abdominal pain  . No nausea, vomiting, or hematemesis; No diarrhea or constipation. No melena or hematochezia.  GENITOURINARY: No dysuria, frequency or hematuria  NEUROLOGICAL: No numbness or weakness  SKIN: No itching, burning, rashes, or lesions                                                                                                                                                                                                                                                                                 Medications:  MEDICATIONS  (STANDING):  allopurinol 100 milliGRAM(s) Oral daily  atorvastatin 40 milliGRAM(s) Oral at bedtime  chlorhexidine 2% Cloths 1 Application(s) Topical daily  cinacalcet 30 milliGRAM(s) Oral daily  cyanocobalamin Injectable 1000 MICROGram(s) IntraMuscular <User Schedule>  darbepoetin Injectable ViaL 60 MICROGram(s) IV Push every week  dextrose 5%. 1000 milliLiter(s) (50 mL/Hr) IV Continuous <Continuous>  dextrose 50% Injectable 12.5 Gram(s) IV Push once  dextrose 50% Injectable 25 Gram(s) IV Push once  dextrose 50% Injectable 25 Gram(s) IV Push once  ergocalciferol 57437 Unit(s) Oral every week  folic acid 1 milliGRAM(s) Oral daily  furosemide   Injectable 60 milliGRAM(s) IV Push two times a day  heparin  Infusion.  Unit(s)/Hr (19 mL/Hr) IV Continuous <Continuous>  hydrALAZINE 100 milliGRAM(s) Oral three times a day  hydrocortisone hemorrhoidal Suppository 1 Suppository(s) Rectal at bedtime  insulin glargine Injectable (LANTUS) 10 Unit(s) SubCutaneous at bedtime  insulin lispro (HumaLOG) corrective regimen sliding scale   SubCutaneous three times a day with meals  insulin lispro (HumaLOG) corrective regimen sliding scale   SubCutaneous at bedtime  labetalol 300 milliGRAM(s) Oral two times a day  lactobacillus acidophilus 1 Tablet(s) Oral two times a day  levETIRAcetam 1000 milliGRAM(s) Oral two times a day  mycophenolate mofetil 250 milliGRAM(s) Oral two times a day  predniSONE   Tablet 5 milliGRAM(s) Oral daily  sodium bicarbonate 650 milliGRAM(s) Oral two times a day  tamsulosin 0.4 milliGRAM(s) Oral at bedtime  thiamine 100 milliGRAM(s) Oral daily  warfarin 7.5 milliGRAM(s) Oral once    MEDICATIONS  (PRN):  acetaminophen   Tablet .. 650 milliGRAM(s) Oral every 6 hours PRN Temp greater or equal to 38C (100.4F)  aluminum hydroxide/magnesium hydroxide/simethicone Suspension 30 milliLiter(s) Oral every 6 hours PRN Dyspepsia  dextrose 40% Gel 15 Gram(s) Oral once PRN Blood Glucose LESS THAN 70 milliGRAM(s)/deciliter  glucagon  Injectable 1 milliGRAM(s) IntraMuscular once PRN Glucose LESS THAN 70 milligrams/deciliter  heparin  Injectable 9000 Unit(s) IV Push every 6 hours PRN For aPTT less than 40  heparin  Injectable 4000 Unit(s) IV Push every 6 hours PRN For aPTT between 40 - 57       Allergies    No Known Allergies    Intolerances      Vital Signs Last 24 Hrs  T(C): 37.2 (15 Sep 2019 20:57), Max: 37.2 (14 Sep 2019 21:26)  T(F): 99 (15 Sep 2019 20:57), Max: 99 (14 Sep 2019 21:26)  HR: 72 (15 Sep 2019 20:57) (69 - 72)  BP: 145/69 (15 Sep 2019 20:57) (132/75 - 165/76)  BP(mean): --  RR: 18 (15 Sep 2019 20:57) (18 - 18)  SpO2: 97% (15 Sep 2019 20:57) (95% - 97%)  CAPILLARY BLOOD GLUCOSE      POCT Blood Glucose.: 91 mg/dL (15 Sep 2019 19:37)  POCT Blood Glucose.: 154 mg/dL (15 Sep 2019 12:46)  POCT Blood Glucose.: 112 mg/dL (15 Sep 2019 09:28)  POCT Blood Glucose.: 128 mg/dL (14 Sep 2019 21:32)      09-14 @ 07:01  -  09-15 @ 07:00  --------------------------------------------------------  IN: 506 mL / OUT: 275 mL / NET: 231 mL    09-15 @ 07:01  -  09-15 @ 21:19  --------------------------------------------------------  IN: 688 mL / OUT: 0 mL / NET: 688 mL      Physical Exam:    Daily     Daily   General:  Well appearing, NAD, not cachetic  HEENT:  Nonicteric, PERRLA  CV:  RRR, S1S2   Lungs:  CTA B/L, no wheezes, rales, rhonchi  Abdomen:  Soft, non-tender, no distended, positive BS  Extremities:  2+ pulses, no c/c, no edema  Skin:  Warm and dry, no rashes  :  No gutierrez  Neuro:  AAOx3, non-focal, grossly intact                                                                                                                                                                                                                                                                                                LABS:                               8.3    7.40  )-----------( 200      ( 15 Sep 2019 08:56 )             27.2                      09-15    139  |  96  |  28<H>  ----------------------------<  95  3.7   |  25  |  5.37<H>    Ca    8.4      15 Sep 2019 06:03                         RADIOLOGY & ADDITIONAL TESTS         I personally reviewed: [  ]EKG   [  ]CXR    [  ] CT      A/P:         Discussed with :     [facility] consultants' Notes   Time spent :

## 2019-09-15 NOTE — PROGRESS NOTE ADULT - SUBJECTIVE AND OBJECTIVE BOX
Pt seen/examined at bedside- comfortable, in good spirits today    MEDICATIONS  (STANDING):  allopurinol 100 milliGRAM(s) Oral daily  atorvastatin 40 milliGRAM(s) Oral at bedtime  chlorhexidine 2% Cloths 1 Application(s) Topical daily  cinacalcet 30 milliGRAM(s) Oral daily  cyanocobalamin Injectable 1000 MICROGram(s) IntraMuscular <User Schedule>  darbepoetin Injectable ViaL 60 MICROGram(s) IV Push every week  dextrose 5%. 1000 milliLiter(s) (50 mL/Hr) IV Continuous <Continuous>  dextrose 50% Injectable 12.5 Gram(s) IV Push once  dextrose 50% Injectable 25 Gram(s) IV Push once  dextrose 50% Injectable 25 Gram(s) IV Push once  ergocalciferol 55194 Unit(s) Oral every week  folic acid 1 milliGRAM(s) Oral daily  furosemide   Injectable 60 milliGRAM(s) IV Push two times a day  heparin  Infusion.  Unit(s)/Hr (19 mL/Hr) IV Continuous <Continuous>  hydrALAZINE 100 milliGRAM(s) Oral three times a day  hydrocortisone hemorrhoidal Suppository 1 Suppository(s) Rectal at bedtime  insulin glargine Injectable (LANTUS) 10 Unit(s) SubCutaneous at bedtime  insulin lispro (HumaLOG) corrective regimen sliding scale   SubCutaneous three times a day with meals  insulin lispro (HumaLOG) corrective regimen sliding scale   SubCutaneous at bedtime  labetalol 300 milliGRAM(s) Oral two times a day  lactobacillus acidophilus 1 Tablet(s) Oral two times a day  levETIRAcetam 1000 milliGRAM(s) Oral two times a day  mycophenolate mofetil 250 milliGRAM(s) Oral two times a day  predniSONE   Tablet 5 milliGRAM(s) Oral daily  sodium bicarbonate 650 milliGRAM(s) Oral two times a day  tamsulosin 0.4 milliGRAM(s) Oral at bedtime  thiamine 100 milliGRAM(s) Oral daily    MEDICATIONS  (PRN):  acetaminophen   Tablet .. 650 milliGRAM(s) Oral every 6 hours PRN Temp greater or equal to 38C (100.4F)  aluminum hydroxide/magnesium hydroxide/simethicone Suspension 30 milliLiter(s) Oral every 6 hours PRN Dyspepsia  dextrose 40% Gel 15 Gram(s) Oral once PRN Blood Glucose LESS THAN 70 milliGRAM(s)/deciliter  glucagon  Injectable 1 milliGRAM(s) IntraMuscular once PRN Glucose LESS THAN 70 milligrams/deciliter  heparin  Injectable 9000 Unit(s) IV Push every 6 hours PRN For aPTT less than 40  heparin  Injectable 4000 Unit(s) IV Push every 6 hours PRN For aPTT between 40 - 57      ROS  No fever, sweats, chills  No epistaxis, HA, sore throat  No CP, SOB, cough, sputum  No n/v/d, abd pain, melena, hematochezia  No edema  No rash  No anxiety  No back pain, joint pain  No bleeding, bruising  No dysuria, hematuria    Vital Signs Last 24 Hrs  T(C): 36.9 (15 Sep 2019 05:02), Max: 37.2 (14 Sep 2019 21:26)  T(F): 98.4 (15 Sep 2019 05:02), Max: 99 (14 Sep 2019 21:26)  HR: 69 (15 Sep 2019 05:02) (69 - 73)  BP: 165/76 (15 Sep 2019 05:02) (139/73 - 165/76)  BP(mean): --  RR: 18 (15 Sep 2019 05:02) (18 - 18)  SpO2: 95% (15 Sep 2019 05:02) (95% - 97%)    PE  NAD  Awake, alert  Anicteric, MMM  RRR  CTAB  Abd soft, NT, ND  No c/c/e  No rash grossly  FROM                          8.3    7.40  )-----------( 200      ( 15 Sep 2019 08:56 )             27.2       09-15    139  |  96  |  28<H>  ----------------------------<  95  3.7   |  25  |  5.37<H>    Ca    8.4      15 Sep 2019 06:03

## 2019-09-15 NOTE — PROGRESS NOTE ADULT - SUBJECTIVE AND OBJECTIVE BOX
Subjective: Patient seen and examined. No new events except as noted.     REVIEW OF SYSTEMS:    CONSTITUTIONAL: + weakness, fevers or chills  EYES/ENT: No visual changes;  No vertigo or throat pain   NECK: No pain or stiffness  RESPIRATORY: No cough, wheezing, hemoptysis; No shortness of breath  CARDIOVASCULAR: No chest pain or palpitations  GASTROINTESTINAL: No abdominal or epigastric pain. No nausea, vomiting, or hematemesis; No diarrhea or constipation. No melena or hematochezia.  GENITOURINARY: No dysuria, frequency or hematuria  NEUROLOGICAL: No numbness or weakness  SKIN: No itching, burning, rashes, or lesions   All other review of systems is negative unless indicated above.    MEDICATIONS:  MEDICATIONS  (STANDING):  allopurinol 100 milliGRAM(s) Oral daily  atorvastatin 40 milliGRAM(s) Oral at bedtime  chlorhexidine 2% Cloths 1 Application(s) Topical daily  cinacalcet 30 milliGRAM(s) Oral daily  cyanocobalamin Injectable 1000 MICROGram(s) IntraMuscular <User Schedule>  darbepoetin Injectable ViaL 60 MICROGram(s) IV Push every week  dextrose 5%. 1000 milliLiter(s) (50 mL/Hr) IV Continuous <Continuous>  dextrose 50% Injectable 12.5 Gram(s) IV Push once  dextrose 50% Injectable 25 Gram(s) IV Push once  dextrose 50% Injectable 25 Gram(s) IV Push once  ergocalciferol 34915 Unit(s) Oral every week  folic acid 1 milliGRAM(s) Oral daily  furosemide   Injectable 60 milliGRAM(s) IV Push two times a day  heparin  Infusion.  Unit(s)/Hr (19 mL/Hr) IV Continuous <Continuous>  hydrALAZINE 100 milliGRAM(s) Oral three times a day  hydrocortisone hemorrhoidal Suppository 1 Suppository(s) Rectal at bedtime  insulin glargine Injectable (LANTUS) 10 Unit(s) SubCutaneous at bedtime  insulin lispro (HumaLOG) corrective regimen sliding scale   SubCutaneous three times a day with meals  insulin lispro (HumaLOG) corrective regimen sliding scale   SubCutaneous at bedtime  labetalol 300 milliGRAM(s) Oral two times a day  lactobacillus acidophilus 1 Tablet(s) Oral two times a day  levETIRAcetam 1000 milliGRAM(s) Oral two times a day  mycophenolate mofetil 250 milliGRAM(s) Oral two times a day  predniSONE   Tablet 5 milliGRAM(s) Oral daily  sodium bicarbonate 650 milliGRAM(s) Oral two times a day  tamsulosin 0.4 milliGRAM(s) Oral at bedtime  thiamine 100 milliGRAM(s) Oral daily      PHYSICAL EXAM:  T(C): 36.9 (09-15-19 @ 05:02), Max: 37.2 (09-14-19 @ 21:26)  HR: 69 (09-15-19 @ 05:02) (69 - 73)  BP: 165/76 (09-15-19 @ 05:02) (147/68 - 165/76)  RR: 18 (09-15-19 @ 05:02) (18 - 18)  SpO2: 95% (09-15-19 @ 05:02) (95% - 97%)  Wt(kg): --  I&O's Summary    14 Sep 2019 07:01  -  15 Sep 2019 07:00  --------------------------------------------------------  IN: 506 mL / OUT: 275 mL / NET: 231 mL          Appearance: NAD	  HEENT:   Normal oral mucosa, PERRL, EOMI	  Lymphatic: No lymphadenopathy , no edema  Cardiovascular: Normal S1 S2, No JVD, No murmurs , Peripheral pulses palpable 2+ bilaterally  Respiratory: Lungs clear to auscultation, normal effort 	  Gastrointestinal:  Soft, Non-tender, + BS	  Skin: No rashes, No ecchymoses, No cyanosis, warm to touch  Musculoskeletal: Normal range of motion, normal strength  Psychiatry:  Sleepy   Ext: No edema      LABS:    CARDIAC MARKERS:                                8.3    7.40  )-----------( 200      ( 15 Sep 2019 08:56 )             27.2     09-15    139  |  96  |  28<H>  ----------------------------<  95  3.7   |  25  |  5.37<H>    Ca    8.4      15 Sep 2019 06:03      proBNP:   Lipid Profile:   HgA1c:   TSH:             TELEMETRY: 	    ECG:  	  RADIOLOGY:   DIAGNOSTIC TESTING:  [ ] Echocardiogram:  [ ]  Catheterization:  [ ] Stress Test:    OTHER:

## 2019-09-16 LAB
APTT BLD: 172.3 SEC — CRITICAL HIGH (ref 27.5–36.3)
GLUCOSE BLDC GLUCOMTR-MCNC: 103 MG/DL — HIGH (ref 70–99)
GLUCOSE BLDC GLUCOMTR-MCNC: 118 MG/DL — HIGH (ref 70–99)
GLUCOSE BLDC GLUCOMTR-MCNC: 121 MG/DL — HIGH (ref 70–99)
GLUCOSE BLDC GLUCOMTR-MCNC: 134 MG/DL — HIGH (ref 70–99)
GLUCOSE BLDC GLUCOMTR-MCNC: 92 MG/DL — SIGNIFICANT CHANGE UP (ref 70–99)
GLUCOSE BLDC GLUCOMTR-MCNC: 94 MG/DL — SIGNIFICANT CHANGE UP (ref 70–99)
HCT VFR BLD CALC: 27.2 % — LOW (ref 39–50)
HEMATOPATHOLOGY REPORT: SIGNIFICANT CHANGE UP
HGB BLD-MCNC: 8.4 G/DL — LOW (ref 13–17)
INR BLD: 2.36 RATIO — HIGH (ref 0.88–1.16)
MCHC RBC-ENTMCNC: 26.2 PG — LOW (ref 27–34)
MCHC RBC-ENTMCNC: 30.9 GM/DL — LOW (ref 32–36)
MCV RBC AUTO: 84.7 FL — SIGNIFICANT CHANGE UP (ref 80–100)
PLATELET # BLD AUTO: 198 K/UL — SIGNIFICANT CHANGE UP (ref 150–400)
PROTHROM AB SERPL-ACNC: 27.6 SEC — HIGH (ref 10–13.1)
RBC # BLD: 3.21 M/UL — LOW (ref 4.2–5.8)
RBC # FLD: 15.2 % — HIGH (ref 10.3–14.5)
WBC # BLD: 7.08 K/UL — SIGNIFICANT CHANGE UP (ref 3.8–10.5)
WBC # FLD AUTO: 7.08 K/UL — SIGNIFICANT CHANGE UP (ref 3.8–10.5)

## 2019-09-16 PROCEDURE — 99232 SBSQ HOSP IP/OBS MODERATE 35: CPT

## 2019-09-16 RX ORDER — FUROSEMIDE 40 MG
60 TABLET ORAL
Refills: 0 | Status: DISCONTINUED | OUTPATIENT
Start: 2019-09-16 | End: 2019-09-24

## 2019-09-16 RX ORDER — ACETAMINOPHEN 500 MG
1000 TABLET ORAL ONCE
Refills: 0 | Status: DISCONTINUED | OUTPATIENT
Start: 2019-09-16 | End: 2019-09-16

## 2019-09-16 RX ORDER — APIXABAN 2.5 MG/1
2.5 TABLET, FILM COATED ORAL
Refills: 0 | Status: DISCONTINUED | OUTPATIENT
Start: 2019-09-16 | End: 2019-09-24

## 2019-09-16 RX ADMIN — LEVETIRACETAM 1000 MILLIGRAM(S): 250 TABLET, FILM COATED ORAL at 17:36

## 2019-09-16 RX ADMIN — Medication 60 MILLIGRAM(S): at 17:35

## 2019-09-16 RX ADMIN — CHLORHEXIDINE GLUCONATE 1 APPLICATION(S): 213 SOLUTION TOPICAL at 13:20

## 2019-09-16 RX ADMIN — Medication 100 MILLIGRAM(S): at 13:18

## 2019-09-16 RX ADMIN — Medication 5 MILLIGRAM(S): at 05:15

## 2019-09-16 RX ADMIN — Medication 30 MILLILITER(S): at 17:35

## 2019-09-16 RX ADMIN — Medication 100 MILLIGRAM(S): at 22:04

## 2019-09-16 RX ADMIN — APIXABAN 2.5 MILLIGRAM(S): 2.5 TABLET, FILM COATED ORAL at 17:38

## 2019-09-16 RX ADMIN — Medication 100 MILLIGRAM(S): at 13:19

## 2019-09-16 RX ADMIN — Medication 1 TABLET(S): at 17:36

## 2019-09-16 RX ADMIN — Medication 1 TABLET(S): at 05:15

## 2019-09-16 RX ADMIN — Medication 650 MILLIGRAM(S): at 05:15

## 2019-09-16 RX ADMIN — Medication 60 MILLIGRAM(S): at 01:09

## 2019-09-16 RX ADMIN — Medication 60 MILLIGRAM(S): at 13:17

## 2019-09-16 RX ADMIN — Medication 650 MILLIGRAM(S): at 17:36

## 2019-09-16 RX ADMIN — TAMSULOSIN HYDROCHLORIDE 0.4 MILLIGRAM(S): 0.4 CAPSULE ORAL at 22:04

## 2019-09-16 RX ADMIN — MYCOPHENOLATE MOFETIL 250 MILLIGRAM(S): 250 CAPSULE ORAL at 05:15

## 2019-09-16 RX ADMIN — MYCOPHENOLATE MOFETIL 250 MILLIGRAM(S): 250 CAPSULE ORAL at 17:36

## 2019-09-16 RX ADMIN — Medication 300 MILLIGRAM(S): at 17:56

## 2019-09-16 RX ADMIN — Medication 1 MILLIGRAM(S): at 13:20

## 2019-09-16 RX ADMIN — ATORVASTATIN CALCIUM 40 MILLIGRAM(S): 80 TABLET, FILM COATED ORAL at 22:04

## 2019-09-16 RX ADMIN — CINACALCET 30 MILLIGRAM(S): 30 TABLET, FILM COATED ORAL at 13:19

## 2019-09-16 RX ADMIN — LEVETIRACETAM 1000 MILLIGRAM(S): 250 TABLET, FILM COATED ORAL at 05:15

## 2019-09-16 NOTE — PROGRESS NOTE ADULT - PROBLEM SELECTOR PLAN 3
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 admitted with fluid overload and HTN urgency . Last renal bx was 2017, presumed rejection, congo red was not done.

## 2019-09-16 NOTE — PROGRESS NOTE ADULT - SUBJECTIVE AND OBJECTIVE BOX
Patient is a 74y old  Male who presents with a chief complaint of renal failure (16 Sep 2019 15:05)                                                               INTERVAL HPI/OVERNIGHT EVENTS:    REVIEW OF SYSTEMS:     CONSTITUTIONAL: No weakness, fevers or chills  RESPIRATORY: No cough, wheezing,  No shortness of breath  CARDIOVASCULAR: No chest pain or palpitations  GASTROINTESTINAL: No abdominal pain  . No nausea, vomiting, or hematemesis; No diarrhea or constipation. No melena or hematochezia.  GENITOURINARY: No dysuria, frequency or hematuria  NEUROLOGICAL: No numbness or weakness                                                                                                                                                                                                                                                                             Medications:  MEDICATIONS  (STANDING):  allopurinol 100 milliGRAM(s) Oral daily  apixaban 2.5 milliGRAM(s) Oral two times a day  atorvastatin 40 milliGRAM(s) Oral at bedtime  chlorhexidine 2% Cloths 1 Application(s) Topical daily  cinacalcet 30 milliGRAM(s) Oral daily  cyanocobalamin Injectable 1000 MICROGram(s) IntraMuscular <User Schedule>  darbepoetin Injectable ViaL 60 MICROGram(s) IV Push every week  dextrose 5%. 1000 milliLiter(s) (50 mL/Hr) IV Continuous <Continuous>  dextrose 50% Injectable 12.5 Gram(s) IV Push once  dextrose 50% Injectable 25 Gram(s) IV Push once  dextrose 50% Injectable 25 Gram(s) IV Push once  ergocalciferol 67740 Unit(s) Oral every week  folic acid 1 milliGRAM(s) Oral daily  furosemide   Injectable 60 milliGRAM(s) IV Push two times a day  hydrALAZINE 100 milliGRAM(s) Oral three times a day  hydrocortisone hemorrhoidal Suppository 1 Suppository(s) Rectal at bedtime  insulin glargine Injectable (LANTUS) 10 Unit(s) SubCutaneous at bedtime  insulin lispro (HumaLOG) corrective regimen sliding scale   SubCutaneous three times a day with meals  insulin lispro (HumaLOG) corrective regimen sliding scale   SubCutaneous at bedtime  labetalol 300 milliGRAM(s) Oral two times a day  lactobacillus acidophilus 1 Tablet(s) Oral two times a day  levETIRAcetam 1000 milliGRAM(s) Oral two times a day  mycophenolate mofetil 250 milliGRAM(s) Oral two times a day  predniSONE   Tablet 5 milliGRAM(s) Oral daily  sodium bicarbonate 650 milliGRAM(s) Oral two times a day  tamsulosin 0.4 milliGRAM(s) Oral at bedtime  thiamine 100 milliGRAM(s) Oral daily    MEDICATIONS  (PRN):  acetaminophen   Tablet .. 650 milliGRAM(s) Oral every 6 hours PRN Temp greater or equal to 38C (100.4F)  aluminum hydroxide/magnesium hydroxide/simethicone Suspension 30 milliLiter(s) Oral every 6 hours PRN Dyspepsia  dextrose 40% Gel 15 Gram(s) Oral once PRN Blood Glucose LESS THAN 70 milliGRAM(s)/deciliter  glucagon  Injectable 1 milliGRAM(s) IntraMuscular once PRN Glucose LESS THAN 70 milligrams/deciliter       Allergies    No Known Allergies    Intolerances      Vital Signs Last 24 Hrs  T(C): 36.7 (16 Sep 2019 14:29), Max: 37.2 (15 Sep 2019 17:31)  T(F): 98.1 (16 Sep 2019 14:29), Max: 99 (15 Sep 2019 20:57)  HR: 96 (16 Sep 2019 14:29) (69 - 96)  BP: 102/53 (16 Sep 2019 14:29) (102/53 - 175/84)  BP(mean): --  RR: 18 (16 Sep 2019 14:29) (18 - 20)  SpO2: 97% (16 Sep 2019 14:29) (96% - 99%)  CAPILLARY BLOOD GLUCOSE      POCT Blood Glucose.: 134 mg/dL (16 Sep 2019 14:57)  POCT Blood Glucose.: 103 mg/dL (16 Sep 2019 13:08)  POCT Blood Glucose.: 118 mg/dL (16 Sep 2019 08:58)  POCT Blood Glucose.: 114 mg/dL (15 Sep 2019 21:41)  POCT Blood Glucose.: 91 mg/dL (15 Sep 2019 19:37)      09-15 @ 07:  -   @ 07:00  --------------------------------------------------------  IN: 916 mL / OUT: 250 mL / NET: 666 mL     @ 07: @ 16:40  --------------------------------------------------------  IN: 0 mL / OUT: 1000 mL / NET: -1000 mL      Physical Exam:    Daily Weight in k (16 Sep 2019 12:45)  General:  NAD   HEENT:  Nonicteric, PERRLA  CV:  RRR, S1S2   Lungs:  CTA B/L, no wheezes, rales, rhonchi  Abdomen:  Soft, non-tender, no distended, positive BS  Extremities:  2+ pulses, no c/c, no edema  :  No gutierrez  Neuro:  AAOx3, grossly NF                                                                                                                                                                                                                                                              LABS:                               8.4    7.08  )-----------( 198      ( 16 Sep 2019 13:10 )             27.2                      09-15    139  |  96  |  28<H>  ----------------------------<  95  3.7   |  25  |  5.37<H>    Ca    8.4      15 Sep 2019 06:03

## 2019-09-16 NOTE — PROVIDER CONTACT NOTE (OTHER) - ACTION/TREATMENT ORDERED:
will continue to monitor
PA notified and aware. will come see patient and will add Mg to morning labs.
will supplement pt
As per NP, will order IV Tylenol. Will continue to monitor.
Continue to monitor notify if change in symptoms. JAVIER Henry aware.
Continue to monitor notify if change in symptoms. PA Bhupendra aware.
NP will come to assess pt and clot
Provider made aware. IV hydralazine ordered. Continue to monitor pt.
call lab to expedite lab result for ptt and continue to adjust heparin infusion based on heparin nomogram
continue to monitor pt
ok to give labetalol- tell overnight RN to reassess BP & HR after 7:30PM

## 2019-09-16 NOTE — PROGRESS NOTE ADULT - ASSESSMENT
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   Pt just moved back to [place] from [place] .. reports that he has not been taking his meds for the past few days since " he might have misplaed them"     pt denies chest pain, syncope,fever, chills, cough, urinary symptoms.    in ED found  to have anemia   Nno acute changes on EKG   elevated CR and Trop    1- HTN urgency :   cont meds ...     2-KAMRON  : bx : transplant glomurlosclerosis /interstitial fibrosis ... discussed w/ Dr. 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  f/u with heme and nephrology   f/u bone marrow bx   no amyloidosis   off tacro,  cont cellcept and steriods..       3- DM:   A1c  5.6  Fs     4- acute HF sec to  HTN urgency and renal failure   Echo :  < from: Transthoracic Echocardiogram (08.28.19 @ 16:12) >  1. Mitral annular calcification and calcified mitral  leaflets with decreased diastolic opening.  2. Moderate to severe concentric left ventricular  hypertrophy.  3. Normal left ventricular systolic function with  mid-cavitary obliteration.  4. Normal right ventricular size and systolic function.  5. Small pericardial effusion.  cont fluid removal with HD per renal      5-  difficult ambulating :  no focal neuro deficits   neuro eval appreciated    CT T/L   < from: CT Thoracic Spine No Cont (08.30.19 @ 10:06) >    Old right L1 transverse process fracture. Bilateral lysis   defects at L5 as seen on the prior 5/26/2013      6- afib : reestart AC : change to eliquis  : discussed w Dr. Dumont     7- hematochezia : per sister : on and off small amount of fresh blood in stool and some amount in urine but only small amounts   monitor H/H   consult GI .: appreciate input :  suspect bleeding to be hemorrhoidal, now resolved     trial of anusol supp  if bleeding persist would consider colonoscopy however patient is reluctant.    heme input: appreciated  .    8-  renal transplant : f/u with Transplant team   cont meds with MMF and steroids   off Tac   s/p bx : as above       9- TIA :  unable to perform MRI   CT no acute changes on CT   repeat negative   fu with neuro     10 encephalopathy :   CTH with contrast : neg for acute pathology   LP : high protien but culture negative in CSF   blood culture positive for GNR /Ecoli likely urinary source   CT : < from: CT Abdomen and Pelvis No Cont (09.09.19 @ 22:26) >    Layering stones in a thick-walled urinary bladder with perivesicular   inflammatory change and a left lower quadrant transplant kidney with a   nonobstructing 1.1 cm lower pole stone and moderate perinephric fat   stranding. No hydronephrosis. Differential includes urinary tract   infection.    comleted course of abx   f/u with ID and urology       11- paraprotienmeia :   f/u bone marrow bx : done today    12 hypercalcemia : monitor for now     13- abd discomfort : improved today   CT abd as above   check PVR and if >200 will place gutierrez .. discussed with Dr. Goldberg

## 2019-09-16 NOTE — PROGRESS NOTE ADULT - SUBJECTIVE AND OBJECTIVE BOX
Pt seen/examined at bedside, comfortable      MEDICATIONS  (STANDING):  allopurinol 100 milliGRAM(s) Oral daily  apixaban 2.5 milliGRAM(s) Oral two times a day  atorvastatin 40 milliGRAM(s) Oral at bedtime  chlorhexidine 2% Cloths 1 Application(s) Topical daily  cinacalcet 30 milliGRAM(s) Oral daily  cyanocobalamin Injectable 1000 MICROGram(s) IntraMuscular <User Schedule>  darbepoetin Injectable ViaL 60 MICROGram(s) IV Push every week  dextrose 5%. 1000 milliLiter(s) (50 mL/Hr) IV Continuous <Continuous>  dextrose 50% Injectable 12.5 Gram(s) IV Push once  dextrose 50% Injectable 25 Gram(s) IV Push once  dextrose 50% Injectable 25 Gram(s) IV Push once  ergocalciferol 02904 Unit(s) Oral every week  folic acid 1 milliGRAM(s) Oral daily  furosemide   Injectable 60 milliGRAM(s) IV Push two times a day  heparin  Infusion.  Unit(s)/Hr (19 mL/Hr) IV Continuous <Continuous>  hydrALAZINE 100 milliGRAM(s) Oral three times a day  hydrocortisone hemorrhoidal Suppository 1 Suppository(s) Rectal at bedtime  insulin glargine Injectable (LANTUS) 10 Unit(s) SubCutaneous at bedtime  insulin lispro (HumaLOG) corrective regimen sliding scale   SubCutaneous three times a day with meals  insulin lispro (HumaLOG) corrective regimen sliding scale   SubCutaneous at bedtime  labetalol 300 milliGRAM(s) Oral two times a day  lactobacillus acidophilus 1 Tablet(s) Oral two times a day  levETIRAcetam 1000 milliGRAM(s) Oral two times a day  mycophenolate mofetil 250 milliGRAM(s) Oral two times a day  predniSONE   Tablet 5 milliGRAM(s) Oral daily  sodium bicarbonate 650 milliGRAM(s) Oral two times a day  tamsulosin 0.4 milliGRAM(s) Oral at bedtime  thiamine 100 milliGRAM(s) Oral daily    MEDICATIONS  (PRN):  acetaminophen   Tablet .. 650 milliGRAM(s) Oral every 6 hours PRN Temp greater or equal to 38C (100.4F)  aluminum hydroxide/magnesium hydroxide/simethicone Suspension 30 milliLiter(s) Oral every 6 hours PRN Dyspepsia  dextrose 40% Gel 15 Gram(s) Oral once PRN Blood Glucose LESS THAN 70 milliGRAM(s)/deciliter  glucagon  Injectable 1 milliGRAM(s) IntraMuscular once PRN Glucose LESS THAN 70 milligrams/deciliter  heparin  Injectable 9000 Unit(s) IV Push every 6 hours PRN For aPTT less than 40  heparin  Injectable 4000 Unit(s) IV Push every 6 hours PRN For aPTT between 40 - 57      ROS  No fever, sweats, chills  No epistaxis, HA, sore throat  No CP, SOB, cough, sputum  No n/v/d, abd pain, melena, hematochezia  No edema  No rash  No anxiety  No back pain, joint pain  No bleeding, bruising  No dysuria, hematuria    Vital Signs Last 24 Hrs  T(C): 36.7 (16 Sep 2019 14:29), Max: 37.2 (15 Sep 2019 17:31)  T(F): 98.1 (16 Sep 2019 14:29), Max: 99 (15 Sep 2019 20:57)  HR: 96 (16 Sep 2019 14:29) (69 - 96)  BP: 102/53 (16 Sep 2019 14:29) (102/53 - 175/84)  BP(mean): --  RR: 18 (16 Sep 2019 14:29) (18 - 20)  SpO2: 97% (16 Sep 2019 14:29) (96% - 99%)    PE  NAD  Awake, alert  Anicteric, MMM  RRR  CTAB  Abd soft, NT, ND  No c/c/e  No rash grossly  FROM                          8.4    7.08  )-----------( 198      ( 16 Sep 2019 13:10 )             27.2       09-15    139  |  96  |  28<H>  ----------------------------<  95  3.7   |  25  |  5.37<H>    Ca    8.4      15 Sep 2019 06:03

## 2019-09-16 NOTE — PROGRESS NOTE ADULT - SUBJECTIVE AND OBJECTIVE BOX
INTERVAL HPI/OVERNIGHT EVENTS:    pt seen and examined  no complaints  no further rectal bleeding as per RN, last bm was last night which was brown/loose    MEDICATIONS  (STANDING):  allopurinol 100 milliGRAM(s) Oral daily  atorvastatin 40 milliGRAM(s) Oral at bedtime  chlorhexidine 2% Cloths 1 Application(s) Topical daily  cinacalcet 30 milliGRAM(s) Oral daily  cyanocobalamin Injectable 1000 MICROGram(s) IntraMuscular <User Schedule>  darbepoetin Injectable ViaL 60 MICROGram(s) IV Push every week  dextrose 5%. 1000 milliLiter(s) (50 mL/Hr) IV Continuous <Continuous>  dextrose 50% Injectable 12.5 Gram(s) IV Push once  dextrose 50% Injectable 25 Gram(s) IV Push once  dextrose 50% Injectable 25 Gram(s) IV Push once  ergocalciferol 08201 Unit(s) Oral every week  folic acid 1 milliGRAM(s) Oral daily  furosemide   Injectable 60 milliGRAM(s) IV Push two times a day  heparin  Infusion.  Unit(s)/Hr (19 mL/Hr) IV Continuous <Continuous>  hydrALAZINE 100 milliGRAM(s) Oral three times a day  hydrocortisone hemorrhoidal Suppository 1 Suppository(s) Rectal at bedtime  insulin glargine Injectable (LANTUS) 10 Unit(s) SubCutaneous at bedtime  insulin lispro (HumaLOG) corrective regimen sliding scale   SubCutaneous three times a day with meals  insulin lispro (HumaLOG) corrective regimen sliding scale   SubCutaneous at bedtime  labetalol 300 milliGRAM(s) Oral two times a day  lactobacillus acidophilus 1 Tablet(s) Oral two times a day  levETIRAcetam 1000 milliGRAM(s) Oral two times a day  mycophenolate mofetil 250 milliGRAM(s) Oral two times a day  predniSONE   Tablet 5 milliGRAM(s) Oral daily  sodium bicarbonate 650 milliGRAM(s) Oral two times a day  tamsulosin 0.4 milliGRAM(s) Oral at bedtime  thiamine 100 milliGRAM(s) Oral daily    MEDICATIONS  (PRN):  acetaminophen   Tablet .. 650 milliGRAM(s) Oral every 6 hours PRN Temp greater or equal to 38C (100.4F)  aluminum hydroxide/magnesium hydroxide/simethicone Suspension 30 milliLiter(s) Oral every 6 hours PRN Dyspepsia  dextrose 40% Gel 15 Gram(s) Oral once PRN Blood Glucose LESS THAN 70 milliGRAM(s)/deciliter  glucagon  Injectable 1 milliGRAM(s) IntraMuscular once PRN Glucose LESS THAN 70 milligrams/deciliter  heparin  Injectable 9000 Unit(s) IV Push every 6 hours PRN For aPTT less than 40  heparin  Injectable 4000 Unit(s) IV Push every 6 hours PRN For aPTT between 40 - 57      Allergies    No Known Allergies    Intolerances        Review of Systems:    General:  No wt loss, fevers, chills, night sweats,fatigue,   Eyes:  Good vision, no reported pain  ENT:  No sore throat, pain, runny nose, dysphagia  CV:  No pain, palpitations, hypo/hypertension  Resp:  No dyspnea, cough, tachypnea, wheezing  GI:  No pain, No nausea, No vomiting, No diarrhea, No constipation, No weight loss, No fever, No pruritis, No rectal bleeding, No melena, No dysphagia  :  No pain, bleeding, incontinence, nocturia  Muscle:  No pain, weakness  Neuro:  No weakness, tingling, memory problems  Psych:  No fatigue, insomnia, mood problems, depression  Endocrine:  No polyuria, polydypsia, cold/heat intolerance  Heme:  No petechiae, ecchymosis, easy bruisability  Skin:  No rash, tattoos, scars, edema      Vital Signs Last 24 Hrs  T(C): 36 (16 Sep 2019 05:00), Max: 37.2 (15 Sep 2019 17:31)  T(F): 96.8 (16 Sep 2019 05:00), Max: 99 (15 Sep 2019 20:57)  HR: 72 (16 Sep 2019 05:00) (69 - 72)  BP: 133/60 (16 Sep 2019 05:00) (132/75 - 160/74)  BP(mean): --  RR: 18 (16 Sep 2019 05:00) (18 - 18)  SpO2: 97% (16 Sep 2019 05:00) (95% - 97%)    PHYSICAL EXAM:    Constitutional: NAD, well-developed  HEENT: EOMI, throat clear  Neck: No LAD, supple  Respiratory: CTA and P  Cardiovascular: S1 and S2, RRR, no M  Gastrointestinal: BS+, soft, NT/ND, neg HSM,  Extremities: No peripheral edema, neg clubing, cyanosis  Vascular: 2+ peripheral pulses  Neurological: A/O x 1-2, no focal deficits  Psychiatric: Normal mood, normal affect  Skin: No rashes      LABS:                        8.3    7.40  )-----------( 200      ( 15 Sep 2019 08:56 )             27.2     09-15    139  |  96  |  28<H>  ----------------------------<  95  3.7   |  25  |  5.37<H>    Ca    8.4      15 Sep 2019 06:03      PT/INR - ( 15 Sep 2019 09:01 )   PT: 19.3 sec;   INR: 1.65 ratio         PTT - ( 15 Sep 2019 09:01 )  PTT:65.8 sec      RADIOLOGY & ADDITIONAL TESTS:

## 2019-09-16 NOTE — PROGRESS NOTE ADULT - ASSESSMENT
Assessment and Plan:   · Assessment		      		    1. Anemia sec to CKD/B12/folate deficiency/MGUS  -- s/p 3 weekly B12 1000 mcg IM injections- last 9/14;  B12 1000 mcg IM weekly for 1 more dose then continue monthly;  PO FA 1mg QD;    -- Iron studies c/w Anemia of Chronic Inflammation/kidney disease- aranesp as per renal team  --Transfuse for Hgb<7    2. Worsening renal failure  -renal concerned about renal bx results (TMA) and possible connection to MGUS  -s/p bone marrow biopsy on 9/14- will f/u results (preliminary showing no evidence of plasma cell neoplasm/amyloid)  -f/u renal recommendations and interpretation of renal bx findings    3 thrombocytopenia, resolved    -- reactive and consumptive, abx   -- most likely related to h/o HCV, ? Mycophenolate contributing  -- monitor for now    4. LE edema   --noted age indeterminate DVT b/l LE above knee; provoked by acute illness and immobilizaiton;  no indication for hypercoag w/u   --pt had been on and off AC in past for Afib  --c/w heparin gtt for now given ongoing procedures       Thank you for the courtesy of this consultation and we will continue to follow.      Lj Moon MD  [facility]  Cell: [phone]

## 2019-09-16 NOTE — PROVIDER CONTACT NOTE (OTHER) - ASSESSMENT
pt was assessed and appears asymptomatic. VS was taken and charted on flow sheet
/68 HR 78. Pt was asleep at the time. Asymptomatic.
PT a&oX3- able to make needs known, VS as per flowsheet
Pt A&O x4. No complaints of SOB, headaches, chest pain or palpitations. BP: 194/86, HR: 89. O2 sat: 97%.
Pt A&OX4, V/S stable. Pt asymptomatic no c/o of chest pain or SOB. Providing incontinence care at the time.
Pt A&OX4, V/S stable. Pt asymptomatic no c/o of chest pain.
Pt A&OxUTA d/t nonverbal @ this time. RRT called on 9/4/19 for lethargy and unresponsiveness. VS as charted. Rectal T: 102.6. Pt NPO.
pt VS as per flowsheet, A&Ox4
pt asymptomatic, pt A&Ox3-4, VS as per flowsheet
pt hx of wattery BMS negative c dif, blood clot noted in stool, pt denies any pain, no other s/s bleeding noted
pt is A/O x 2. Alert to person and time. VS as charted
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
## 2019-09-16 NOTE — PROGRESS NOTE ADULT - PROBLEM SELECTOR PLAN 2
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 now with KAMRON on CKD stage 5  ,now started on dialysis since 8/27/19. renal biopsy shows chronic TMA. 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 Continue MMF/ Pred for now. awaiting repeat BM biopsy

## 2019-09-16 NOTE — PROGRESS NOTE ADULT - SUBJECTIVE AND OBJECTIVE BOX
Subjective: Patient seen and examined. No new events except as noted.     REVIEW OF SYSTEMS:    CONSTITUTIONAL: +weakness, fevers or chills  EYES/ENT: No visual changes;  No vertigo or throat pain   NECK: No pain or stiffness  RESPIRATORY: No cough, wheezing, hemoptysis; No shortness of breath  CARDIOVASCULAR: No chest pain or palpitations  GASTROINTESTINAL: No abdominal or epigastric pain. No nausea, vomiting, or hematemesis; No diarrhea or constipation. No melena or hematochezia.  GENITOURINARY: No dysuria, frequency or hematuria  NEUROLOGICAL: No numbness or weakness  SKIN: No itching, burning, rashes, or lesions   All other review of systems is negative unless indicated above.    MEDICATIONS:  MEDICATIONS  (STANDING):  allopurinol 100 milliGRAM(s) Oral daily  atorvastatin 40 milliGRAM(s) Oral at bedtime  chlorhexidine 2% Cloths 1 Application(s) Topical daily  cinacalcet 30 milliGRAM(s) Oral daily  cyanocobalamin Injectable 1000 MICROGram(s) IntraMuscular <User Schedule>  darbepoetin Injectable ViaL 60 MICROGram(s) IV Push every week  dextrose 5%. 1000 milliLiter(s) (50 mL/Hr) IV Continuous <Continuous>  dextrose 50% Injectable 12.5 Gram(s) IV Push once  dextrose 50% Injectable 25 Gram(s) IV Push once  dextrose 50% Injectable 25 Gram(s) IV Push once  ergocalciferol 44675 Unit(s) Oral every week  folic acid 1 milliGRAM(s) Oral daily  furosemide   Injectable 60 milliGRAM(s) IV Push two times a day  heparin  Infusion.  Unit(s)/Hr (19 mL/Hr) IV Continuous <Continuous>  hydrALAZINE 100 milliGRAM(s) Oral three times a day  hydrocortisone hemorrhoidal Suppository 1 Suppository(s) Rectal at bedtime  insulin glargine Injectable (LANTUS) 10 Unit(s) SubCutaneous at bedtime  insulin lispro (HumaLOG) corrective regimen sliding scale   SubCutaneous three times a day with meals  insulin lispro (HumaLOG) corrective regimen sliding scale   SubCutaneous at bedtime  labetalol 300 milliGRAM(s) Oral two times a day  lactobacillus acidophilus 1 Tablet(s) Oral two times a day  levETIRAcetam 1000 milliGRAM(s) Oral two times a day  mycophenolate mofetil 250 milliGRAM(s) Oral two times a day  predniSONE   Tablet 5 milliGRAM(s) Oral daily  sodium bicarbonate 650 milliGRAM(s) Oral two times a day  tamsulosin 0.4 milliGRAM(s) Oral at bedtime  thiamine 100 milliGRAM(s) Oral daily      PHYSICAL EXAM:  T(C): 36 (09-16-19 @ 05:00), Max: 37.2 (09-15-19 @ 17:31)  HR: 72 (09-16-19 @ 05:00) (69 - 72)  BP: 133/60 (09-16-19 @ 05:00) (132/75 - 160/74)  RR: 18 (09-16-19 @ 05:00) (18 - 18)  SpO2: 97% (09-16-19 @ 05:00) (95% - 97%)  Wt(kg): --  I&O's Summary    15 Sep 2019 07:01  -  16 Sep 2019 07:00  --------------------------------------------------------  IN: 916 mL / OUT: 250 mL / NET: 666 mL              Appearance: NAD	  HEENT:   Normal oral mucosa, PERRL, EOMI	  Lymphatic: No lymphadenopathy , no edema  Cardiovascular: Normal S1 S2, No JVD, No murmurs , Peripheral pulses palpable 2+ bilaterally  Respiratory: Lungs clear to auscultation, normal effort 	  Gastrointestinal:  Soft, Non-tender, + BS	  Skin: No rashes, No ecchymoses, No cyanosis, warm to touch  Musculoskeletal: Normal range of motion, normal strength  Psychiatry:  Sleepy   Ext: No edema    LABS:    CARDIAC MARKERS:                                8.3    7.40  )-----------( 200      ( 15 Sep 2019 08:56 )             27.2     09-15    139  |  96  |  28<H>  ----------------------------<  95  3.7   |  25  |  5.37<H>    Ca    8.4      15 Sep 2019 06:03      proBNP:   Lipid Profile:   HgA1c:   TSH:             TELEMETRY: 	    ECG:  	  RADIOLOGY:   DIAGNOSTIC TESTING:  [ ] Echocardiogram:  [ ]  Catheterization:  [ ] Stress Test:    OTHER:

## 2019-09-16 NOTE — PROGRESS NOTE ADULT - PROBLEM SELECTOR PLAN 2
- surgery team called for fat pad biopsy, which will be considered after renal bx results are back as per family request  - s/p renal bx, await results  - CT brain negative for acute pathology  - s/p LP with high protein but culture negative in CSF  - s/p bone marrow bx, await results

## 2019-09-16 NOTE — PROGRESS NOTE ADULT - SUBJECTIVE AND OBJECTIVE BOX
[facility] Division of Kidney Diseases & Hypertension  FOLLOW UP NOTE  --------------------------------------------------------------------------------  Chief Complaint:    24 hour events/subjective:    no interim events       PAST HISTORY  --------------------------------------------------------------------------------  No significant changes to PMH, PSH, FHx, SHx, unless otherwise noted    ALLERGIES & MEDICATIONS  --------------------------------------------------------------------------------  Allergies    No Known Allergies    Intolerances      Standing Inpatient Medications  allopurinol 100 milliGRAM(s) Oral daily  apixaban 2.5 milliGRAM(s) Oral two times a day  atorvastatin 40 milliGRAM(s) Oral at bedtime  chlorhexidine 2% Cloths 1 Application(s) Topical daily  cinacalcet 30 milliGRAM(s) Oral daily  cyanocobalamin Injectable 1000 MICROGram(s) IntraMuscular <User Schedule>  darbepoetin Injectable ViaL 60 MICROGram(s) IV Push every week  dextrose 5%. 1000 milliLiter(s) IV Continuous <Continuous>  dextrose 50% Injectable 12.5 Gram(s) IV Push once  dextrose 50% Injectable 25 Gram(s) IV Push once  dextrose 50% Injectable 25 Gram(s) IV Push once  ergocalciferol 27986 Unit(s) Oral every week  folic acid 1 milliGRAM(s) Oral daily  furosemide   Injectable 60 milliGRAM(s) IV Push two times a day  hydrALAZINE 100 milliGRAM(s) Oral three times a day  hydrocortisone hemorrhoidal Suppository 1 Suppository(s) Rectal at bedtime  insulin glargine Injectable (LANTUS) 10 Unit(s) SubCutaneous at bedtime  insulin lispro (HumaLOG) corrective regimen sliding scale   SubCutaneous three times a day with meals  insulin lispro (HumaLOG) corrective regimen sliding scale   SubCutaneous at bedtime  labetalol 300 milliGRAM(s) Oral two times a day  lactobacillus acidophilus 1 Tablet(s) Oral two times a day  levETIRAcetam 1000 milliGRAM(s) Oral two times a day  mycophenolate mofetil 250 milliGRAM(s) Oral two times a day  predniSONE   Tablet 5 milliGRAM(s) Oral daily  sodium bicarbonate 650 milliGRAM(s) Oral two times a day  tamsulosin 0.4 milliGRAM(s) Oral at bedtime  thiamine 100 milliGRAM(s) Oral daily    PRN Inpatient Medications  acetaminophen   Tablet .. 650 milliGRAM(s) Oral every 6 hours PRN  aluminum hydroxide/magnesium hydroxide/simethicone Suspension 30 milliLiter(s) Oral every 6 hours PRN  dextrose 40% Gel 15 Gram(s) Oral once PRN  glucagon  Injectable 1 milliGRAM(s) IntraMuscular once PRN    VITALS/PHYSICAL EXAM  --------------------------------------------------------------------------------  T(C): 36.7 (09-16-19 @ 14:29), Max: 37.2 (09-15-19 @ 17:31)  HR: 96 (09-16-19 @ 14:29) (69 - 96)  BP: 102/53 (09-16-19 @ 14:29) (102/53 - 175/84)  RR: 18 (09-16-19 @ 14:29) (18 - 20)  SpO2: 97% (09-16-19 @ 14:29) (96% - 99%)  Wt(kg): --        09-15-19 @ 07:01  -  09-16-19 @ 07:00  --------------------------------------------------------  IN: 916 mL / OUT: 250 mL / NET: 666 mL    09-16-19 @ 07:01  -  09-16-19 @ 16:17  --------------------------------------------------------  IN: 0 mL / OUT: 1000 mL / NET: -1000 mL      Physical Exam:  	Gen: NAD, well-appearing  	HEENT: supple neck, clear oropharynx  	Pulm: CTA B/L  	CV: RRR, S1S2;   	Back: No spinal or CVA tenderness; no sacral edema  	Abd: +BS, soft, nontender/nondistended  	UE: Warm, no edema; no asterixis  	LE: Warm, no edema  	  LABS/STUDIES  --------------------------------------------------------------------------------              8.4    7.08  >-----------<  198      [09-16-19 @ 13:10]              27.2     139  |  96  |  28  ----------------------------<  95      [09-15-19 @ 06:03]  3.7   |  25  |  5.37        Ca     8.4     [09-15-19 @ 06:03]      PT/INR: PT 27.6 , INR 2.36       [09-16-19 @ 13:06]  PTT: 172.3      [09-16-19 @ 13:06]      Creatinine Trend:  SCr 5.37 [09-15 @ 06:03]  SCr 4.63 [09-14 @ 06:38]  SCr 6.00 [09-13 @ 06:48]  SCr 4.78 [09-12 @ 08:46]  SCr 5.76 [09-11 @ 09:20]    Urinalysis - [09-10-19 @ 10:27]      Color Light Yellow / Appearance Slightly Turbid / SG 1.012 / pH 8.0      Gluc 100 mg/dL / Ketone Negative  / Bili Negative / Urobili <2 mg/dL       Blood Trace / Protein 300 mg/dL / Leuk Est Moderate / Nitrite Negative      RBC 2 / WBC 77 / Hyaline 0 / Gran  / Sq Epi  / Non Sq Epi 2 / Bacteria Negative          C3 Complement 100      [09-13-19 @ 20:13]  C4 Complement 15      [09-13-19 @ 20:13]

## 2019-09-17 LAB
ANION GAP SERPL CALC-SCNC: 16 MMOL/L — SIGNIFICANT CHANGE UP (ref 5–17)
BUN SERPL-MCNC: 21 MG/DL — SIGNIFICANT CHANGE UP (ref 7–23)
CALCIUM SERPL-MCNC: 8.8 MG/DL — SIGNIFICANT CHANGE UP (ref 8.4–10.5)
CHLORIDE SERPL-SCNC: 93 MMOL/L — LOW (ref 96–108)
CO2 SERPL-SCNC: 27 MMOL/L — SIGNIFICANT CHANGE UP (ref 22–31)
CREAT SERPL-MCNC: 4.71 MG/DL — HIGH (ref 0.5–1.3)
GLUCOSE BLDC GLUCOMTR-MCNC: 103 MG/DL — HIGH (ref 70–99)
GLUCOSE BLDC GLUCOMTR-MCNC: 110 MG/DL — HIGH (ref 70–99)
GLUCOSE BLDC GLUCOMTR-MCNC: 116 MG/DL — HIGH (ref 70–99)
GLUCOSE BLDC GLUCOMTR-MCNC: 126 MG/DL — HIGH (ref 70–99)
GLUCOSE SERPL-MCNC: 133 MG/DL — HIGH (ref 70–99)
HCT VFR BLD CALC: 29.7 % — LOW (ref 39–50)
HGB BLD-MCNC: 9.1 G/DL — LOW (ref 13–17)
MAGNESIUM SERPL-MCNC: 1.8 MG/DL — SIGNIFICANT CHANGE UP (ref 1.6–2.6)
MCHC RBC-ENTMCNC: 27.2 PG — SIGNIFICANT CHANGE UP (ref 27–34)
MCHC RBC-ENTMCNC: 30.6 GM/DL — LOW (ref 32–36)
MCV RBC AUTO: 88.9 FL — SIGNIFICANT CHANGE UP (ref 80–100)
NON-GYNECOLOGICAL CYTOLOGY STUDY: SIGNIFICANT CHANGE UP
PLATELET # BLD AUTO: 201 K/UL — SIGNIFICANT CHANGE UP (ref 150–400)
POTASSIUM SERPL-MCNC: 4.2 MMOL/L — SIGNIFICANT CHANGE UP (ref 3.5–5.3)
POTASSIUM SERPL-SCNC: 4.2 MMOL/L — SIGNIFICANT CHANGE UP (ref 3.5–5.3)
RBC # BLD: 3.34 M/UL — LOW (ref 4.2–5.8)
RBC # FLD: 15.3 % — HIGH (ref 10.3–14.5)
SODIUM SERPL-SCNC: 136 MMOL/L — SIGNIFICANT CHANGE UP (ref 135–145)
WBC # BLD: 6.62 K/UL — SIGNIFICANT CHANGE UP (ref 3.8–10.5)
WBC # FLD AUTO: 6.62 K/UL — SIGNIFICANT CHANGE UP (ref 3.8–10.5)

## 2019-09-17 RX ORDER — CARVEDILOL PHOSPHATE 80 MG/1
6.25 CAPSULE, EXTENDED RELEASE ORAL EVERY 12 HOURS
Refills: 0 | Status: DISCONTINUED | OUTPATIENT
Start: 2019-09-17 | End: 2019-09-24

## 2019-09-17 RX ADMIN — APIXABAN 2.5 MILLIGRAM(S): 2.5 TABLET, FILM COATED ORAL at 05:30

## 2019-09-17 RX ADMIN — Medication 650 MILLIGRAM(S): at 05:32

## 2019-09-17 RX ADMIN — TAMSULOSIN HYDROCHLORIDE 0.4 MILLIGRAM(S): 0.4 CAPSULE ORAL at 21:43

## 2019-09-17 RX ADMIN — Medication 650 MILLIGRAM(S): at 18:20

## 2019-09-17 RX ADMIN — ATORVASTATIN CALCIUM 40 MILLIGRAM(S): 80 TABLET, FILM COATED ORAL at 21:44

## 2019-09-17 RX ADMIN — INSULIN GLARGINE 10 UNIT(S): 100 INJECTION, SOLUTION SUBCUTANEOUS at 22:30

## 2019-09-17 RX ADMIN — Medication 5 MILLIGRAM(S): at 05:31

## 2019-09-17 RX ADMIN — Medication 60 MILLIGRAM(S): at 18:20

## 2019-09-17 RX ADMIN — LEVETIRACETAM 1000 MILLIGRAM(S): 250 TABLET, FILM COATED ORAL at 18:19

## 2019-09-17 RX ADMIN — APIXABAN 2.5 MILLIGRAM(S): 2.5 TABLET, FILM COATED ORAL at 18:20

## 2019-09-17 RX ADMIN — CINACALCET 30 MILLIGRAM(S): 30 TABLET, FILM COATED ORAL at 11:42

## 2019-09-17 RX ADMIN — CARVEDILOL PHOSPHATE 6.25 MILLIGRAM(S): 80 CAPSULE, EXTENDED RELEASE ORAL at 11:42

## 2019-09-17 RX ADMIN — MYCOPHENOLATE MOFETIL 250 MILLIGRAM(S): 250 CAPSULE ORAL at 05:31

## 2019-09-17 RX ADMIN — Medication 300 MILLIGRAM(S): at 05:30

## 2019-09-17 RX ADMIN — INSULIN GLARGINE 10 UNIT(S): 100 INJECTION, SOLUTION SUBCUTANEOUS at 00:21

## 2019-09-17 RX ADMIN — Medication 100 MILLIGRAM(S): at 11:42

## 2019-09-17 RX ADMIN — Medication 100 MILLIGRAM(S): at 21:44

## 2019-09-17 RX ADMIN — Medication 300 MILLIGRAM(S): at 18:20

## 2019-09-17 RX ADMIN — Medication 60 MILLIGRAM(S): at 05:30

## 2019-09-17 RX ADMIN — LEVETIRACETAM 1000 MILLIGRAM(S): 250 TABLET, FILM COATED ORAL at 05:32

## 2019-09-17 RX ADMIN — CARVEDILOL PHOSPHATE 6.25 MILLIGRAM(S): 80 CAPSULE, EXTENDED RELEASE ORAL at 22:00

## 2019-09-17 RX ADMIN — Medication 100 MILLIGRAM(S): at 14:30

## 2019-09-17 RX ADMIN — Medication 1 TABLET(S): at 18:19

## 2019-09-17 RX ADMIN — Medication 100 MILLIGRAM(S): at 05:31

## 2019-09-17 RX ADMIN — MYCOPHENOLATE MOFETIL 250 MILLIGRAM(S): 250 CAPSULE ORAL at 18:20

## 2019-09-17 RX ADMIN — ERGOCALCIFEROL 50000 UNIT(S): 1.25 CAPSULE ORAL at 11:42

## 2019-09-17 RX ADMIN — Medication 1 MILLIGRAM(S): at 11:42

## 2019-09-17 RX ADMIN — CHLORHEXIDINE GLUCONATE 1 APPLICATION(S): 213 SOLUTION TOPICAL at 11:43

## 2019-09-17 RX ADMIN — Medication 1 TABLET(S): at 05:31

## 2019-09-17 NOTE — PROGRESS NOTE ADULT - SUBJECTIVE AND OBJECTIVE BOX
INTERVAL HPI/OVERNIGHT EVENTS:    pt seen and examined  he denies abdominal pain, n/v. Tolerating PO   no reported rectal bleeding as per RN      MEDICATIONS  (STANDING):  allopurinol 100 milliGRAM(s) Oral daily  apixaban 2.5 milliGRAM(s) Oral two times a day  atorvastatin 40 milliGRAM(s) Oral at bedtime  chlorhexidine 2% Cloths 1 Application(s) Topical daily  cinacalcet 30 milliGRAM(s) Oral daily  cyanocobalamin Injectable 1000 MICROGram(s) IntraMuscular <User Schedule>  darbepoetin Injectable ViaL 60 MICROGram(s) IV Push every week  dextrose 5%. 1000 milliLiter(s) (50 mL/Hr) IV Continuous <Continuous>  dextrose 50% Injectable 12.5 Gram(s) IV Push once  dextrose 50% Injectable 25 Gram(s) IV Push once  dextrose 50% Injectable 25 Gram(s) IV Push once  ergocalciferol 88706 Unit(s) Oral every week  folic acid 1 milliGRAM(s) Oral daily  furosemide    Tablet 60 milliGRAM(s) Oral two times a day  hydrALAZINE 100 milliGRAM(s) Oral three times a day  hydrocortisone hemorrhoidal Suppository 1 Suppository(s) Rectal at bedtime  insulin glargine Injectable (LANTUS) 10 Unit(s) SubCutaneous at bedtime  insulin lispro (HumaLOG) corrective regimen sliding scale   SubCutaneous three times a day with meals  insulin lispro (HumaLOG) corrective regimen sliding scale   SubCutaneous at bedtime  labetalol 300 milliGRAM(s) Oral two times a day  lactobacillus acidophilus 1 Tablet(s) Oral two times a day  levETIRAcetam 1000 milliGRAM(s) Oral two times a day  mycophenolate mofetil 250 milliGRAM(s) Oral two times a day  predniSONE   Tablet 5 milliGRAM(s) Oral daily  sodium bicarbonate 650 milliGRAM(s) Oral two times a day  tamsulosin 0.4 milliGRAM(s) Oral at bedtime  thiamine 100 milliGRAM(s) Oral daily    MEDICATIONS  (PRN):  acetaminophen   Tablet .. 650 milliGRAM(s) Oral every 6 hours PRN Temp greater or equal to 38C (100.4F)  aluminum hydroxide/magnesium hydroxide/simethicone Suspension 30 milliLiter(s) Oral every 6 hours PRN Dyspepsia  dextrose 40% Gel 15 Gram(s) Oral once PRN Blood Glucose LESS THAN 70 milliGRAM(s)/deciliter  glucagon  Injectable 1 milliGRAM(s) IntraMuscular once PRN Glucose LESS THAN 70 milligrams/deciliter      Allergies    No Known Allergies    Intolerances        Review of Systems:    General:  No wt loss, fevers, chills, night sweats,fatigue,   Eyes:  Good vision, no reported pain  ENT:  No sore throat, pain, runny nose, dysphagia  CV:  No pain, palpitations, hypo/hypertension  Resp:  No dyspnea, cough, tachypnea, wheezing  GI:  No pain, No nausea, No vomiting, No diarrhea, No constipation, No weight loss, No fever, No pruritis, No rectal bleeding, No melena, No dysphagia  :  No pain, bleeding, incontinence, nocturia  Muscle:  No pain, weakness  Neuro:  No weakness, tingling, memory problems  Psych:  No fatigue, insomnia, mood problems, depression  Endocrine:  No polyuria, polydypsia, cold/heat intolerance  Heme:  No petechiae, ecchymosis, easy bruisability  Skin:  No rash, tattoos, scars, edema      Vital Signs Last 24 Hrs  T(C): 37.1 (17 Sep 2019 05:26), Max: 37.1 (17 Sep 2019 05:26)  T(F): 98.7 (17 Sep 2019 05:26), Max: 98.7 (17 Sep 2019 05:26)  HR: 73 (17 Sep 2019 05:26) (72 - 96)  BP: 151/73 (17 Sep 2019 05:26) (102/53 - 171/88)  BP(mean): --  RR: 18 (17 Sep 2019 05:26) (18 - 20)  SpO2: 97% (17 Sep 2019 05:26) (95% - 99%)    PHYSICAL EXAM:    Constitutional: NAD, well-developed  HEENT: EOMI, throat clear  Neck: No LAD, supple  Respiratory: CTA and P  Cardiovascular: S1 and S2, RRR, no M  Gastrointestinal: BS+, soft, NT/ND, neg HSM,  Extremities: No peripheral edema, neg clubing, cyanosis  Vascular: 2+ peripheral pulses  Neurological: A/O x 1-2, no focal deficits  Psychiatric: Normal mood, normal affect  Skin: No rashes        LABS:                        8.4    7.08  )-----------( 198      ( 16 Sep 2019 13:10 )             27.2           PT/INR - ( 16 Sep 2019 13:06 )   PT: 27.6 sec;   INR: 2.36 ratio         PTT - ( 16 Sep 2019 13:06 )  PTT:172.3 sec      RADIOLOGY & ADDITIONAL TESTS:
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
## 2019-09-17 NOTE — PROGRESS NOTE ADULT - SUBJECTIVE AND OBJECTIVE BOX
Patient is a 74y old  Male who presents with a chief complaint of renal failure (16 Sep 2019 15:05)                                                               INTERVAL HPI/OVERNIGHT EVENTS:    REVIEW OF SYSTEMS:     CONSTITUTIONAL: No weakness, fevers or chills  EYES/ENT: No visual changes , no ear ache   NECK: No pain or stiffness  RESPIRATORY: No cough, wheezing,  No shortness of breath  CARDIOVASCULAR: No chest pain or palpitations  GASTROINTESTINAL: No abdominal pain  . No nausea, vomiting, or hematemesis; No diarrhea or constipation. No melena or hematochezia.  GENITOURINARY: No dysuria, frequency or hematuria  NEUROLOGICAL: No numbness or weakness  SKIN: No itching, burning, rashes, or lesions                                                                                                                                                                                                                                                                                 Medications:  MEDICATIONS  (STANDING):  allopurinol 100 milliGRAM(s) Oral daily  apixaban 2.5 milliGRAM(s) Oral two times a day  atorvastatin 40 milliGRAM(s) Oral at bedtime  carvedilol 6.25 milliGRAM(s) Oral every 12 hours  chlorhexidine 2% Cloths 1 Application(s) Topical daily  cinacalcet 30 milliGRAM(s) Oral daily  cyanocobalamin Injectable 1000 MICROGram(s) IntraMuscular <User Schedule>  darbepoetin Injectable ViaL 60 MICROGram(s) IV Push every week  dextrose 5%. 1000 milliLiter(s) (50 mL/Hr) IV Continuous <Continuous>  dextrose 50% Injectable 12.5 Gram(s) IV Push once  dextrose 50% Injectable 25 Gram(s) IV Push once  dextrose 50% Injectable 25 Gram(s) IV Push once  ergocalciferol 99593 Unit(s) Oral every week  folic acid 1 milliGRAM(s) Oral daily  furosemide    Tablet 60 milliGRAM(s) Oral two times a day  hydrALAZINE 100 milliGRAM(s) Oral three times a day  hydrocortisone hemorrhoidal Suppository 1 Suppository(s) Rectal at bedtime  insulin glargine Injectable (LANTUS) 10 Unit(s) SubCutaneous at bedtime  insulin lispro (HumaLOG) corrective regimen sliding scale   SubCutaneous three times a day with meals  insulin lispro (HumaLOG) corrective regimen sliding scale   SubCutaneous at bedtime  labetalol 300 milliGRAM(s) Oral two times a day  lactobacillus acidophilus 1 Tablet(s) Oral two times a day  levETIRAcetam 1000 milliGRAM(s) Oral two times a day  mycophenolate mofetil 250 milliGRAM(s) Oral two times a day  predniSONE   Tablet 5 milliGRAM(s) Oral daily  sodium bicarbonate 650 milliGRAM(s) Oral two times a day  tamsulosin 0.4 milliGRAM(s) Oral at bedtime  thiamine 100 milliGRAM(s) Oral daily    MEDICATIONS  (PRN):  acetaminophen   Tablet .. 650 milliGRAM(s) Oral every 6 hours PRN Temp greater or equal to 38C (100.4F)  aluminum hydroxide/magnesium hydroxide/simethicone Suspension 30 milliLiter(s) Oral every 6 hours PRN Dyspepsia  dextrose 40% Gel 15 Gram(s) Oral once PRN Blood Glucose LESS THAN 70 milliGRAM(s)/deciliter  glucagon  Injectable 1 milliGRAM(s) IntraMuscular once PRN Glucose LESS THAN 70 milligrams/deciliter       Allergies    No Known Allergies    Intolerances      Vital Signs Last 24 Hrs  T(C): 36.6 (17 Sep 2019 14:45), Max: 37.2 (17 Sep 2019 09:54)  T(F): 97.9 (17 Sep 2019 14:45), Max: 98.9 (17 Sep 2019 09:54)  HR: 63 (17 Sep 2019 14:45) (63 - 79)  BP: 133/81 (17 Sep 2019 14:45) (133/81 - 154/79)  BP(mean): --  RR: 18 (17 Sep 2019 14:45) (18 - 18)  SpO2: 96% (17 Sep 2019 14:45) (95% - 99%)  CAPILLARY BLOOD GLUCOSE      POCT Blood Glucose.: 126 mg/dL (17 Sep 2019 13:35)  POCT Blood Glucose.: 103 mg/dL (17 Sep 2019 07:50)  POCT Blood Glucose.: 121 mg/dL (16 Sep 2019 23:54)  POCT Blood Glucose.: 92 mg/dL (16 Sep 2019 22:10)  POCT Blood Glucose.: 94 mg/dL (16 Sep 2019 17:40)      09-16 @ 07:01  -  09-17 @ 07:00  --------------------------------------------------------  IN: 480 mL / OUT: 1100 mL / NET: -620 mL      Physical Exam:    Daily     Daily   General:  Well appearing, NAD, not cachetic  HEENT:  Nonicteric, PERRLA  CV:  RRR, S1S2   Lungs:  CTA B/L, no wheezes, rales, rhonchi  Abdomen:  Soft, non-tender, no distended, positive BS  Extremities:  2+ pulses, no c/c, no edema  Skin:  Warm and dry, no rashes  :  No gutierrez  Neuro:  AAOx3, non-focal, grossly intact                                                                                                                                                                                                                                                                                                LABS:                               8.4    7.08  )-----------( 198      ( 16 Sep 2019 13:10 )             27.2                      09-17    136  |  93<L>  |  21  ----------------------------<  133<H>  4.2   |  27  |  4.71<H>    Ca    8.8      17 Sep 2019 13:20  Mg     1.8     09-17                         RADIOLOGY & ADDITIONAL TESTS         I personally reviewed: [  ]EKG   [  ]CXR    [  ] CT      A/P:         Discussed with :     Marga consultants' Notes   Time spent :

## 2019-09-17 NOTE — PROGRESS NOTE ADULT - SUBJECTIVE AND OBJECTIVE BOX
no new complaints    acetaminophen   Tablet .. 650 milliGRAM(s) Oral every 6 hours PRN  allopurinol 100 milliGRAM(s) Oral daily  aluminum hydroxide/magnesium hydroxide/simethicone Suspension 30 milliLiter(s) Oral every 6 hours PRN  apixaban 2.5 milliGRAM(s) Oral two times a day  atorvastatin 40 milliGRAM(s) Oral at bedtime  carvedilol 6.25 milliGRAM(s) Oral every 12 hours  chlorhexidine 2% Cloths 1 Application(s) Topical daily  cinacalcet 30 milliGRAM(s) Oral daily  cyanocobalamin Injectable 1000 MICROGram(s) IntraMuscular <User Schedule>  darbepoetin Injectable ViaL 60 MICROGram(s) IV Push every week  dextrose 40% Gel 15 Gram(s) Oral once PRN  dextrose 5%. 1000 milliLiter(s) IV Continuous <Continuous>  dextrose 50% Injectable 12.5 Gram(s) IV Push once  dextrose 50% Injectable 25 Gram(s) IV Push once  dextrose 50% Injectable 25 Gram(s) IV Push once  ergocalciferol 22031 Unit(s) Oral every week  folic acid 1 milliGRAM(s) Oral daily  furosemide    Tablet 60 milliGRAM(s) Oral two times a day  glucagon  Injectable 1 milliGRAM(s) IntraMuscular once PRN  hydrALAZINE 100 milliGRAM(s) Oral three times a day  hydrocortisone hemorrhoidal Suppository 1 Suppository(s) Rectal at bedtime  insulin glargine Injectable (LANTUS) 10 Unit(s) SubCutaneous at bedtime  insulin lispro (HumaLOG) corrective regimen sliding scale   SubCutaneous three times a day with meals  insulin lispro (HumaLOG) corrective regimen sliding scale   SubCutaneous at bedtime  labetalol 300 milliGRAM(s) Oral two times a day  lactobacillus acidophilus 1 Tablet(s) Oral two times a day  levETIRAcetam 1000 milliGRAM(s) Oral two times a day  mycophenolate mofetil 250 milliGRAM(s) Oral two times a day  predniSONE   Tablet 5 milliGRAM(s) Oral daily  sodium bicarbonate 650 milliGRAM(s) Oral two times a day  tamsulosin 0.4 milliGRAM(s) Oral at bedtime  thiamine 100 milliGRAM(s) Oral daily      No Known Allergies      ROS otherwise negative         T(C): 37.2 (09-17-19 @ 09:54), Max: 37.2 (09-17-19 @ 09:54)  HR: 68 (09-17-19 @ 09:54) (68 - 96)  BP: 136/83 (09-17-19 @ 09:54) (102/53 - 171/88)  RR: 18 (09-17-19 @ 09:54) (18 - 20)  SpO2: 97% (09-17-19 @ 09:54) (95% - 99%)  PHYSICAL EXAM  Gen:  laying in bed, nad  H:  anicteric, eomi  CV:  RRR, S1, S2  Lungs:  CTA b/l  Ab soft/nt/nd  Ext:  no edema                          8.4    7.08  )-----------( 198      ( 16 Sep 2019 13:10 )             27.2                         8.3    7.40  )-----------( 200      ( 15 Sep 2019 08:56 )             27.2                         8.7    7.8   )-----------( 186      ( 14 Sep 2019 21:37 )             27.1     Lactate Dehydrogenase, Serum: 167 U/L (09.13.19 @ 17:49)    Haptoglobin, Serum: 326 mg/dL (09.13.19 @ 20:13)    Hematopathology Report:   ACCESSION No:  [id number]    YONI INGRAM                         2        Hematopathology Report          Final Diagnosis  1, 2. Bone marrow biopsy and bone marrow aspirate  - Mild hypercellularity with renal osteodystrophy,  trilineage hematopoiesis with maturation, mild megakaryocytosis  (normal morphology), and slightly increased iron stores    See note and description.    Diagnostic note:  Dr. Moon was notified of the diagnosis on 09/16/19.    Microscopic description:  1. Biopsy:  Sections of bone marrow biopsy mild hypercellularity  (50-80% cellularity), trilineage hematopoiesis with maturation,  normal M:E ratio, slightly increased megakaryocytes with normal  morphology, and iron stores slightly increased.    2. Touch prep:  Cellular spicules are present, adequate for  interpretation.  Maturing and mature myeloid and erythroid  elements are present with normal M:E ratio (3.5:1).  Megakaryocytes appear normal in number and morphology. There is  no increase in plasma cells.    Bone Marrow Aspirate Differential: (100 Cells).  Type            %    Normal*  Blast                0%   0-3  Neutrophil and  Precursors        68%  33-63  Eosinophil           3%   1-5  Basophil        0%   0-1  Pronormoblast        1%   0-2  Normoblast           19%  15-25  Monocyte        0%   0-2  Lymphocyte           7%   10-15  Plasma cell          2%   0-1  *Adult Range    Comment  Iron stain (examined to evaluate for iron stores; see microscopic  description) and Giemsa stain (shows appropriate  staining pattern) are performed and evaluated on block(s): 1A    Verified by: Elizabeth Marks  (Electronic Signature)  Reported on: 09/16/19 15:07 EDT, [address] [place] [address]  [id number]  _________________________________________________________________              YONI INGRAM                         2        Hematopathology Report          Clinical History  history of MGUS ( bone marrow two years ago)    Specimen(s) Submitted  1     Bone marrow biopsy  2     Bone marrow touch prep    Gross Description  1. The specimen is received in bouin's fixative and the specimen  container is labeled: Bone marrow biopsy.  It consists of a 1.2 x  0.2 cm bone marrow core without blood clot.  Entirely submitted.  One cassette    2. One Steward-Giemsa stained bone marrow touch prep is submitted  .    In addition to other data that may appear on the specimen  container, the label has been inspected to confirm the presence  of the patient's name and date of birth.  Saray Louis 09/14/2019 10:22 (09.14.19 @ 10:10)

## 2019-09-17 NOTE — PROGRESS NOTE ADULT - ASSESSMENT
1. Anemia sec to CKD/B12/folate deficiency/MGUS  -- s/p 3 weekly B12 1000 mcg IM injections- last 9/14;  B12 1000 mcg IM weekly for 1 more dose then continue monthly;  PO FA 1mg QD;    -- Iron studies c/w Anemia of Chronic Inflammation/kidney disease- aranesp as per renal team  --Transfuse for Hgb<7    2. Worsening renal failure  -renal concerned about renal bx results (TMA) and possible connection to MGUS  -s/p bone marrow biopsy on 9/14-  There is no evidence of increased plasma cells. Case d/w Dr. Marks from Hematopathology.  [place] red stain is pending;  No suspicion for amyloid disease.    -I suspect TMA noted in kidney biopsy is related to previous Tacrolimus use.      3 thrombocytopenia, resolved    -- reactive and consumptive, abx   -- most likely related to h/o HCV, ? Mycophenolate contributing  -- monitor for now    4. LE edema   --noted age indeterminate DVT b/l LE above knee; provoked by acute illness and immobilizaiton;  no indication for hypercoag w/u   --pt had been on and off AC in past for Afib  --now on apixaban.    Philippe Burks MD  Hematology/Oncology  Cell:  [phone]  Office Phone: [phone]  Office Fax:  [phone] [address] [place] [address]

## 2019-09-17 NOTE — PROGRESS NOTE ADULT - SUBJECTIVE AND OBJECTIVE BOX
Subjective: Patient seen and examined. No new events except as noted.   WCT on tele   feels ok   REVIEW OF SYSTEMS:    CONSTITUTIONAL: No weakness, fevers or chills  EYES/ENT: No visual changes;  No vertigo or throat pain   NECK: No pain or stiffness  RESPIRATORY: No cough, wheezing, hemoptysis; No shortness of breath  CARDIOVASCULAR: No chest pain or palpitations  GASTROINTESTINAL: No abdominal or epigastric pain. No nausea, vomiting, or hematemesis; No diarrhea or constipation. No melena or hematochezia.  GENITOURINARY: No dysuria, frequency or hematuria  NEUROLOGICAL: No numbness or weakness  SKIN: No itching, burning, rashes, or lesions   All other review of systems is negative unless indicated above.    MEDICATIONS:  MEDICATIONS  (STANDING):  allopurinol 100 milliGRAM(s) Oral daily  apixaban 2.5 milliGRAM(s) Oral two times a day  atorvastatin 40 milliGRAM(s) Oral at bedtime  carvedilol 6.25 milliGRAM(s) Oral every 12 hours  chlorhexidine 2% Cloths 1 Application(s) Topical daily  cinacalcet 30 milliGRAM(s) Oral daily  cyanocobalamin Injectable 1000 MICROGram(s) IntraMuscular <User Schedule>  darbepoetin Injectable ViaL 60 MICROGram(s) IV Push every week  dextrose 5%. 1000 milliLiter(s) (50 mL/Hr) IV Continuous <Continuous>  dextrose 50% Injectable 12.5 Gram(s) IV Push once  dextrose 50% Injectable 25 Gram(s) IV Push once  dextrose 50% Injectable 25 Gram(s) IV Push once  ergocalciferol 52919 Unit(s) Oral every week  folic acid 1 milliGRAM(s) Oral daily  furosemide    Tablet 60 milliGRAM(s) Oral two times a day  hydrALAZINE 100 milliGRAM(s) Oral three times a day  hydrocortisone hemorrhoidal Suppository 1 Suppository(s) Rectal at bedtime  insulin glargine Injectable (LANTUS) 10 Unit(s) SubCutaneous at bedtime  insulin lispro (HumaLOG) corrective regimen sliding scale   SubCutaneous three times a day with meals  insulin lispro (HumaLOG) corrective regimen sliding scale   SubCutaneous at bedtime  labetalol 300 milliGRAM(s) Oral two times a day  lactobacillus acidophilus 1 Tablet(s) Oral two times a day  levETIRAcetam 1000 milliGRAM(s) Oral two times a day  mycophenolate mofetil 250 milliGRAM(s) Oral two times a day  predniSONE   Tablet 5 milliGRAM(s) Oral daily  sodium bicarbonate 650 milliGRAM(s) Oral two times a day  tamsulosin 0.4 milliGRAM(s) Oral at bedtime  thiamine 100 milliGRAM(s) Oral daily      PHYSICAL EXAM:  T(C): 37.2 (09-17-19 @ 09:54), Max: 37.2 (09-17-19 @ 09:54)  HR: 68 (09-17-19 @ 09:54) (68 - 96)  BP: 136/83 (09-17-19 @ 09:54) (102/53 - 171/88)  RR: 18 (09-17-19 @ 09:54) (18 - 20)  SpO2: 97% (09-17-19 @ 09:54) (95% - 99%)  Wt(kg): --  I&O's Summary    16 Sep 2019 07:01  -  17 Sep 2019 07:00  --------------------------------------------------------  IN: 480 mL / OUT: 1100 mL / NET: -620 mL          Appearance: Normal	  HEENT:   Normal oral mucosa, PERRL, EOMI	  Lymphatic: No lymphadenopathy , no edema  Cardiovascular: Normal S1 S2, No JVD, No murmurs , Peripheral pulses palpable 2+ bilaterally  Respiratory: Lungs clear to auscultation, normal effort 	  Gastrointestinal:  Soft, Non-tender, + BS	  Skin: No rashes, No ecchymoses, No cyanosis, warm to touch  Musculoskeletal: Normal range of motion, normal strength  Psychiatry:  Mood & affect appropriate  Ext: No edema      LABS:    CARDIAC MARKERS:                                8.4    7.08  )-----------( 198      ( 16 Sep 2019 13:10 )             27.2           proBNP:   Lipid Profile:   HgA1c:   TSH:             TELEMETRY: 	SR, WCT     ECG:  	  RADIOLOGY:   DIAGNOSTIC TESTING:  [ ] Echocardiogram:  [ ]  Catheterization:  [ ] Stress Test:    OTHER:

## 2019-09-17 NOTE — CHART NOTE - NSCHARTNOTEFT_GEN_A_CORE
Nutrition Follow Up Note  Patient seen for: Malnutrition follow up. Chart reviewed and events noted.  Per chart, 75 y/o male with PMH of T2DM, ESRD s/p DDRT 1 year ago, HTN, admitted with SOB x 2 weeks and not taking medication x 2 weeks, ADHF, HTN urgency and renal failure requiring HD, s/p bone marrow bx (), HD yesterday.    Source: patient    Diet :  Consistent Carbohydrate Dysphagia 3 Nectar consistency with Nepro 1 x day    Patient reports: no GI distress, +BM 2 days ago     PO intake : Pt reports fair to good appetite, had [place] bowl with steak and grains today, which pt enjoyed     Source for PO intake: patient, EMR reviewed     Enteral /Parenteral Nutrition:       Daily Weight in k (), Weight in k (), Weight in k.6 (), Weight in k.6 (), Weight in k (), Weight in k ()  % Weight Change - wt trends noted    Pertinent Medications: MEDICATIONS  (STANDING):  allopurinol 100 milliGRAM(s) Oral daily  apixaban 2.5 milliGRAM(s) Oral two times a day  atorvastatin 40 milliGRAM(s) Oral at bedtime  carvedilol 6.25 milliGRAM(s) Oral every 12 hours  chlorhexidine 2% Cloths 1 Application(s) Topical daily  cinacalcet 30 milliGRAM(s) Oral daily  cyanocobalamin Injectable 1000 MICROGram(s) IntraMuscular <User Schedule>  darbepoetin Injectable ViaL 60 MICROGram(s) IV Push every week  dextrose 5%. 1000 milliLiter(s) (50 mL/Hr) IV Continuous <Continuous>  dextrose 50% Injectable 12.5 Gram(s) IV Push once  dextrose 50% Injectable 25 Gram(s) IV Push once  dextrose 50% Injectable 25 Gram(s) IV Push once  ergocalciferol 69892 Unit(s) Oral every week  folic acid 1 milliGRAM(s) Oral daily  furosemide    Tablet 60 milliGRAM(s) Oral two times a day  hydrALAZINE 100 milliGRAM(s) Oral three times a day  hydrocortisone hemorrhoidal Suppository 1 Suppository(s) Rectal at bedtime  insulin glargine Injectable (LANTUS) 10 Unit(s) SubCutaneous at bedtime  insulin lispro (HumaLOG) corrective regimen sliding scale   SubCutaneous three times a day with meals  insulin lispro (HumaLOG) corrective regimen sliding scale   SubCutaneous at bedtime  labetalol 300 milliGRAM(s) Oral two times a day  lactobacillus acidophilus 1 Tablet(s) Oral two times a day  levETIRAcetam 1000 milliGRAM(s) Oral two times a day  mycophenolate mofetil 250 milliGRAM(s) Oral two times a day  predniSONE   Tablet 5 milliGRAM(s) Oral daily  sodium bicarbonate 650 milliGRAM(s) Oral two times a day  tamsulosin 0.4 milliGRAM(s) Oral at bedtime  thiamine 100 milliGRAM(s) Oral daily    MEDICATIONS  (PRN):  acetaminophen   Tablet .. 650 milliGRAM(s) Oral every 6 hours PRN Temp greater or equal to 38C (100.4F)  aluminum hydroxide/magnesium hydroxide/simethicone Suspension 30 milliLiter(s) Oral every 6 hours PRN Dyspepsia  dextrose 40% Gel 15 Gram(s) Oral once PRN Blood Glucose LESS THAN 70 milliGRAM(s)/deciliter  glucagon  Injectable 1 milliGRAM(s) IntraMuscular once PRN Glucose LESS THAN 70 milligrams/deciliter     @ 13:20: Na 136, BUN 21, Cr 4.71<H>, <H>, K+ 4.2, Phos --, Mg 1.8, Alk Phos --, ALT/SGPT --, AST/SGOT --, HbA1c --    Finger Sticks:  POCT Blood Glucose.: 126 mg/dL ( @ 13:35)  POCT Blood Glucose.: 103 mg/dL ( @ 07:50)  POCT Blood Glucose.: 121 mg/dL ( @ 23:54)  POCT Blood Glucose.: 92 mg/dL ( @ 22:10)  POCT Blood Glucose.: 94 mg/dL ( @ 17:40)      Skin per nursing documentation: no pressure injury  Edema: no edema    Estimated Needs:   [x ] no change since previous assessment  [ ] recalculated:     Previous Nutrition Diagnosis: moderate malnutrition  Nutrition Diagnosis is: being addressed with tolerance of po diet and supplements    New Nutrition Diagnosis: n/a  Related to:    As evidenced by:      Interventions:     Recommend  1) Continue to monitor weight, lab values, and diet tolerance.   2) Encouraged pt to increase po intake of meals as tolerated and discussed importance of adequate nutrition intake.   3) RD remains available.    Monitoring and Evaluation:     Continue to monitor Nutritional intake, Tolerance to diet prescription, weights, labs, skin integrity    RD remains available upon request and will follow up per protocol

## 2019-09-18 LAB
ANION GAP SERPL CALC-SCNC: 15 MMOL/L — SIGNIFICANT CHANGE UP (ref 5–17)
BUN SERPL-MCNC: 26 MG/DL — HIGH (ref 7–23)
CALCIUM SERPL-MCNC: 9.1 MG/DL — SIGNIFICANT CHANGE UP (ref 8.4–10.5)
CHLORIDE SERPL-SCNC: 96 MMOL/L — SIGNIFICANT CHANGE UP (ref 96–108)
CO2 SERPL-SCNC: 27 MMOL/L — SIGNIFICANT CHANGE UP (ref 22–31)
CREAT SERPL-MCNC: 5.94 MG/DL — HIGH (ref 0.5–1.3)
GLUCOSE BLDC GLUCOMTR-MCNC: 104 MG/DL — HIGH (ref 70–99)
GLUCOSE BLDC GLUCOMTR-MCNC: 119 MG/DL — HIGH (ref 70–99)
GLUCOSE BLDC GLUCOMTR-MCNC: 121 MG/DL — HIGH (ref 70–99)
GLUCOSE BLDC GLUCOMTR-MCNC: 121 MG/DL — HIGH (ref 70–99)
GLUCOSE SERPL-MCNC: 161 MG/DL — HIGH (ref 70–99)
HCT VFR BLD CALC: 28.8 % — LOW (ref 39–50)
HGB BLD-MCNC: 9 G/DL — LOW (ref 13–17)
MCHC RBC-ENTMCNC: 26.6 PG — LOW (ref 27–34)
MCHC RBC-ENTMCNC: 31.2 GM/DL — LOW (ref 32–36)
MCV RBC AUTO: 85.4 FL — SIGNIFICANT CHANGE UP (ref 80–100)
PLATELET # BLD AUTO: 178 K/UL — SIGNIFICANT CHANGE UP (ref 150–400)
POTASSIUM SERPL-MCNC: 3.9 MMOL/L — SIGNIFICANT CHANGE UP (ref 3.5–5.3)
POTASSIUM SERPL-SCNC: 3.9 MMOL/L — SIGNIFICANT CHANGE UP (ref 3.5–5.3)
RBC # BLD: 3.38 M/UL — LOW (ref 4.2–5.8)
RBC # FLD: 15.2 % — HIGH (ref 10.3–14.5)
SODIUM SERPL-SCNC: 138 MMOL/L — SIGNIFICANT CHANGE UP (ref 135–145)
WBC # BLD: 6.1 K/UL — SIGNIFICANT CHANGE UP (ref 3.8–10.5)
WBC # FLD AUTO: 6.1 K/UL — SIGNIFICANT CHANGE UP (ref 3.8–10.5)

## 2019-09-18 PROCEDURE — 99232 SBSQ HOSP IP/OBS MODERATE 35: CPT | Mod: GC

## 2019-09-18 RX ADMIN — INSULIN GLARGINE 10 UNIT(S): 100 INJECTION, SOLUTION SUBCUTANEOUS at 22:31

## 2019-09-18 RX ADMIN — Medication 1 MILLIGRAM(S): at 12:46

## 2019-09-18 RX ADMIN — CHLORHEXIDINE GLUCONATE 1 APPLICATION(S): 213 SOLUTION TOPICAL at 12:46

## 2019-09-18 RX ADMIN — Medication 60 MICROGRAM(S): at 11:13

## 2019-09-18 RX ADMIN — Medication 300 MILLIGRAM(S): at 17:04

## 2019-09-18 RX ADMIN — Medication 650 MILLIGRAM(S): at 05:02

## 2019-09-18 RX ADMIN — ATORVASTATIN CALCIUM 40 MILLIGRAM(S): 80 TABLET, FILM COATED ORAL at 23:29

## 2019-09-18 RX ADMIN — TAMSULOSIN HYDROCHLORIDE 0.4 MILLIGRAM(S): 0.4 CAPSULE ORAL at 23:30

## 2019-09-18 RX ADMIN — Medication 5 MILLIGRAM(S): at 05:02

## 2019-09-18 RX ADMIN — CARVEDILOL PHOSPHATE 6.25 MILLIGRAM(S): 80 CAPSULE, EXTENDED RELEASE ORAL at 23:29

## 2019-09-18 RX ADMIN — Medication 60 MILLIGRAM(S): at 17:05

## 2019-09-18 RX ADMIN — LEVETIRACETAM 1000 MILLIGRAM(S): 250 TABLET, FILM COATED ORAL at 05:02

## 2019-09-18 RX ADMIN — CARVEDILOL PHOSPHATE 6.25 MILLIGRAM(S): 80 CAPSULE, EXTENDED RELEASE ORAL at 12:46

## 2019-09-18 RX ADMIN — MYCOPHENOLATE MOFETIL 250 MILLIGRAM(S): 250 CAPSULE ORAL at 17:04

## 2019-09-18 RX ADMIN — APIXABAN 2.5 MILLIGRAM(S): 2.5 TABLET, FILM COATED ORAL at 17:04

## 2019-09-18 RX ADMIN — Medication 650 MILLIGRAM(S): at 17:04

## 2019-09-18 RX ADMIN — Medication 100 MILLIGRAM(S): at 12:46

## 2019-09-18 RX ADMIN — Medication 100 MILLIGRAM(S): at 23:30

## 2019-09-18 RX ADMIN — CINACALCET 30 MILLIGRAM(S): 30 TABLET, FILM COATED ORAL at 12:46

## 2019-09-18 RX ADMIN — Medication 1 TABLET(S): at 17:04

## 2019-09-18 RX ADMIN — Medication 100 MILLIGRAM(S): at 14:17

## 2019-09-18 RX ADMIN — LEVETIRACETAM 1000 MILLIGRAM(S): 250 TABLET, FILM COATED ORAL at 17:05

## 2019-09-18 RX ADMIN — Medication 1 TABLET(S): at 05:02

## 2019-09-18 RX ADMIN — Medication 100 MILLIGRAM(S): at 17:04

## 2019-09-18 RX ADMIN — MYCOPHENOLATE MOFETIL 250 MILLIGRAM(S): 250 CAPSULE ORAL at 05:01

## 2019-09-18 RX ADMIN — APIXABAN 2.5 MILLIGRAM(S): 2.5 TABLET, FILM COATED ORAL at 05:02

## 2019-09-18 NOTE — PROGRESS NOTE ADULT - SUBJECTIVE AND OBJECTIVE BOX
Pt seen, feeling fine, not very interactive    MEDICATIONS  (STANDING):  allopurinol 100 milliGRAM(s) Oral daily  apixaban 2.5 milliGRAM(s) Oral two times a day  atorvastatin 40 milliGRAM(s) Oral at bedtime  carvedilol 6.25 milliGRAM(s) Oral every 12 hours  chlorhexidine 2% Cloths 1 Application(s) Topical daily  cinacalcet 30 milliGRAM(s) Oral daily  cyanocobalamin Injectable 1000 MICROGram(s) IntraMuscular <User Schedule>  darbepoetin Injectable ViaL 60 MICROGram(s) IV Push every week  dextrose 5%. 1000 milliLiter(s) (50 mL/Hr) IV Continuous <Continuous>  dextrose 50% Injectable 12.5 Gram(s) IV Push once  dextrose 50% Injectable 25 Gram(s) IV Push once  dextrose 50% Injectable 25 Gram(s) IV Push once  ergocalciferol 46163 Unit(s) Oral every week  folic acid 1 milliGRAM(s) Oral daily  furosemide    Tablet 60 milliGRAM(s) Oral two times a day  hydrALAZINE 100 milliGRAM(s) Oral three times a day  hydrocortisone hemorrhoidal Suppository 1 Suppository(s) Rectal at bedtime  insulin glargine Injectable (LANTUS) 10 Unit(s) SubCutaneous at bedtime  insulin lispro (HumaLOG) corrective regimen sliding scale   SubCutaneous three times a day with meals  insulin lispro (HumaLOG) corrective regimen sliding scale   SubCutaneous at bedtime  labetalol 300 milliGRAM(s) Oral two times a day  lactobacillus acidophilus 1 Tablet(s) Oral two times a day  levETIRAcetam 1000 milliGRAM(s) Oral two times a day  mycophenolate mofetil 250 milliGRAM(s) Oral two times a day  predniSONE   Tablet 5 milliGRAM(s) Oral daily  sodium bicarbonate 650 milliGRAM(s) Oral two times a day  tamsulosin 0.4 milliGRAM(s) Oral at bedtime  thiamine 100 milliGRAM(s) Oral daily    MEDICATIONS  (PRN):  acetaminophen   Tablet .. 650 milliGRAM(s) Oral every 6 hours PRN Temp greater or equal to 38C (100.4F)  aluminum hydroxide/magnesium hydroxide/simethicone Suspension 30 milliLiter(s) Oral every 6 hours PRN Dyspepsia  dextrose 40% Gel 15 Gram(s) Oral once PRN Blood Glucose LESS THAN 70 milliGRAM(s)/deciliter  glucagon  Injectable 1 milliGRAM(s) IntraMuscular once PRN Glucose LESS THAN 70 milligrams/deciliter      ROS  UTO fully, no pain    Vital Signs Last 24 Hrs  T(C): 36.7 (18 Sep 2019 13:01), Max: 37.2 (17 Sep 2019 21:03)  T(F): 98 (18 Sep 2019 13:01), Max: 98.9 (17 Sep 2019 21:03)  HR: 82 (18 Sep 2019 17:15) (72 - 82)  BP: 148/72 (18 Sep 2019 13:01) (125/64 - 148/72)  BP(mean): --  RR: 18 (18 Sep 2019 13:01) (18 - 18)  SpO2: 94% (18 Sep 2019 17:15) (94% - 99%)    PE  NAD  asleep, easily arousable  RRR  CTAB ant chest, limited effort  Abd soft, NT, ND  + edema  remainder of exam limited 2/2 participation                          9.0    6.1   )-----------( 178      ( 18 Sep 2019 10:13 )             28.8       09-18    138  |  96  |  26<H>  ----------------------------<  161<H>  3.9   |  27  |  5.94<H>    Ca    9.1      18 Sep 2019 10:13  Mg     1.8     09-17

## 2019-09-18 NOTE — PROGRESS NOTE ADULT - PROBLEM SELECTOR PLAN 2
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 now with KAMRON on CKD stage 5  ,now started on dialysis since 8/27/19. renal biopsy shows chronic TMA. previous BM had 6% plasma cells. repeat BM with no increase in plasma cells. regardless, I think recovery of renal function is going to be poor. sent for atypical HUS panel- unlikely to be positive. check for Vit B12/Folate.

## 2019-09-18 NOTE — PROGRESS NOTE ADULT - ASSESSMENT
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   Pt just moved back to [place] from [place] .. reports that he has not been taking his meds for the past few days since " he might have misplaed them"     pt denies chest pain, syncope,fever, chills, cough, urinary symptoms.    in ED found  to have anemia   Nno acute changes on EKG   elevated CR and Trop    1- HTN urgency :   cont meds ...     2-KAMRON  : bx : transplant glomurlosclerosis /interstitial fibrosis ... discussed w/ Dr. 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  f/u with heme and nephrology   f/u bone marrow bx   no amyloidosis   off tacro,  cont cellcept and steriods..       3- DM:   A1c  5.6  Fs     4- acute HF sec to  HTN urgency and renal failure   Echo :  < from: Transthoracic Echocardiogram (08.28.19 @ 16:12) >  1. Mitral annular calcification and calcified mitral  leaflets with decreased diastolic opening.  2. Moderate to severe concentric left ventricular  hypertrophy.  3. Normal left ventricular systolic function with  mid-cavitary obliteration.  4. Normal right ventricular size and systolic function.  5. Small pericardial effusion.  cont fluid removal with HD per renal      5-  difficult ambulating :  no focal neuro deficits   neuro eval appreciated    CT T/L   < from: CT Thoracic Spine No Cont (08.30.19 @ 10:06) >    Old right L1 transverse process fracture. Bilateral lysis   defects at L5 as seen on the prior 5/26/2013      6- afib : reestart AC : change to eliquis  : discussed w Dr. Dumont     7- hematochezia : per sister : on and off small amount of fresh blood in stool and some amount in urine but only small amounts   monitor H/H   consult GI .: appreciate input :  suspect bleeding to be hemorrhoidal, now resolved     trial of anusol supp  if bleeding persist would consider colonoscopy however patient is reluctant.    heme input: appreciated  .    8-  renal transplant : f/u with Transplant team   cont meds with MMF and steroids   off Tac   s/p bx : as above       9- TIA :  unable to perform MRI   CT no acute changes on CT   repeat negative   fu with neuro     10 encephalopathy :   CTH with contrast : neg for acute pathology   LP : high protien but culture negative in CSF   blood culture positive for GNR /Ecoli likely urinary source   CT : < from: CT Abdomen and Pelvis No Cont (09.09.19 @ 22:26) >    Layering stones in a thick-walled urinary bladder with perivesicular   inflammatory change and a left lower quadrant transplant kidney with a   nonobstructing 1.1 cm lower pole stone and moderate perinephric fat   stranding. No hydronephrosis. Differential includes urinary tract   infection.    comleted course of abx   f/u with ID and urology       11- paraprotienmeia :   f/u bone marrow bx : done today    12 hypercalcemia : monitor for now     13- abd discomfort : improved today   CT abd as above   check PVR and if >200 will place gutierrez .. discussed with Dr. Goldberg

## 2019-09-18 NOTE — PROGRESS NOTE ADULT - SUBJECTIVE AND OBJECTIVE BOX
Patient is a 74y old  Male who presents with a chief complaint of renal failure (16 Sep 2019 15:05)                                                               INTERVAL HPI/OVERNIGHT EVENTS:    REVIEW OF SYSTEMS:     CONSTITUTIONAL: No weakness, fevers or chills  EYES/ENT: No visual changes , no ear ache   NECK: No pain or stiffness  RESPIRATORY: No cough, wheezing,  No shortness of breath  CARDIOVASCULAR: No chest pain or palpitations  GASTROINTESTINAL: No abdominal pain  . No nausea, vomiting, or hematemesis; No diarrhea or constipation. No melena or hematochezia.  GENITOURINARY: No dysuria, frequency or hematuria  NEUROLOGICAL: No numbness or weakness  SKIN: No itching, burning, rashes, or lesions                                                                                                                                                                                                                                                                                 Medications:  MEDICATIONS  (STANDING):  allopurinol 100 milliGRAM(s) Oral daily  apixaban 2.5 milliGRAM(s) Oral two times a day  atorvastatin 40 milliGRAM(s) Oral at bedtime  carvedilol 6.25 milliGRAM(s) Oral every 12 hours  chlorhexidine 2% Cloths 1 Application(s) Topical daily  cinacalcet 30 milliGRAM(s) Oral daily  cyanocobalamin Injectable 1000 MICROGram(s) IntraMuscular <User Schedule>  darbepoetin Injectable ViaL 60 MICROGram(s) IV Push every week  dextrose 5%. 1000 milliLiter(s) (50 mL/Hr) IV Continuous <Continuous>  dextrose 50% Injectable 12.5 Gram(s) IV Push once  dextrose 50% Injectable 25 Gram(s) IV Push once  dextrose 50% Injectable 25 Gram(s) IV Push once  ergocalciferol 68447 Unit(s) Oral every week  folic acid 1 milliGRAM(s) Oral daily  furosemide    Tablet 60 milliGRAM(s) Oral two times a day  hydrALAZINE 100 milliGRAM(s) Oral three times a day  hydrocortisone hemorrhoidal Suppository 1 Suppository(s) Rectal at bedtime  insulin glargine Injectable (LANTUS) 10 Unit(s) SubCutaneous at bedtime  insulin lispro (HumaLOG) corrective regimen sliding scale   SubCutaneous three times a day with meals  insulin lispro (HumaLOG) corrective regimen sliding scale   SubCutaneous at bedtime  labetalol 300 milliGRAM(s) Oral two times a day  lactobacillus acidophilus 1 Tablet(s) Oral two times a day  levETIRAcetam 1000 milliGRAM(s) Oral two times a day  mycophenolate mofetil 250 milliGRAM(s) Oral two times a day  predniSONE   Tablet 5 milliGRAM(s) Oral daily  sodium bicarbonate 650 milliGRAM(s) Oral two times a day  tamsulosin 0.4 milliGRAM(s) Oral at bedtime  thiamine 100 milliGRAM(s) Oral daily    MEDICATIONS  (PRN):  acetaminophen   Tablet .. 650 milliGRAM(s) Oral every 6 hours PRN Temp greater or equal to 38C (100.4F)  aluminum hydroxide/magnesium hydroxide/simethicone Suspension 30 milliLiter(s) Oral every 6 hours PRN Dyspepsia  dextrose 40% Gel 15 Gram(s) Oral once PRN Blood Glucose LESS THAN 70 milliGRAM(s)/deciliter  glucagon  Injectable 1 milliGRAM(s) IntraMuscular once PRN Glucose LESS THAN 70 milligrams/deciliter       Allergies    No Known Allergies    Intolerances      Vital Signs Last 24 Hrs  T(C): 36.7 (18 Sep 2019 13:01), Max: 37.2 (17 Sep 2019 21:03)  T(F): 98 (18 Sep 2019 13:01), Max: 98.9 (17 Sep 2019 21:03)  HR: 82 (18 Sep 2019 17:15) (72 - 82)  BP: 148/72 (18 Sep 2019 13:01) (125/64 - 148/72)  BP(mean): --  RR: 18 (18 Sep 2019 13:01) (18 - 18)  SpO2: 94% (18 Sep 2019 17:15) (94% - 99%)  CAPILLARY BLOOD GLUCOSE      POCT Blood Glucose.: 119 mg/dL (18 Sep 2019 17:32)  POCT Blood Glucose.: 104 mg/dL (18 Sep 2019 13:27)  POCT Blood Glucose.: 121 mg/dL (18 Sep 2019 08:31)  POCT Blood Glucose.: 116 mg/dL (17 Sep 2019 22:18)       @ 07:  -   @ 07:00  --------------------------------------------------------  IN: 60 mL / OUT: 0 mL / NET: 60 mL     @ 07:01  -   @ 19:12  --------------------------------------------------------  IN: 1380 mL / OUT: 2060 mL / NET: -680 mL      Physical Exam:    Daily     Daily Weight in k (18 Sep 2019 12:00)  General:  Well appearing, NAD, not cachetic  HEENT:  Nonicteric, PERRLA  CV:  RRR, S1S2   Lungs:  CTA B/L, no wheezes, rales, rhonchi  Abdomen:  Soft, non-tender, no distended, positive BS  Extremities:  2+ pulses, no c/c, no edema  Skin:  Warm and dry, no rashes  :  No gutierrez  Neuro:  AAOx3, non-focal, grossly intact                                                                                                                                                                                                                                                                                                LABS:                               9.0    6.1   )-----------( 178      ( 18 Sep 2019 10:13 )             28.8                          138  |  96  |  26<H>  ----------------------------<  161<H>  3.9   |  27  |  5.94<H>    Ca    9.1      18 Sep 2019 10:13  Mg     1.8                              RADIOLOGY & ADDITIONAL TESTS         I personally reviewed: [  ]EKG   [  ]CXR    [  ] CT      A/P:         Discussed with :     Marga consultants' Notes   Time spent :

## 2019-09-18 NOTE — PROGRESS NOTE ADULT - SUBJECTIVE AND OBJECTIVE BOX
[facility] DIVISION OF KIDNEY DISEASES AND HYPERTENSION -- HEMODIALYSIS NOTE  --------------------------------------------------------------------------------  Chief Complaint: ESRD/Ongoing hemodialysis requirement    24 hour events/subjective:    seen on dialysis  feels well        PAST HISTORY  --------------------------------------------------------------------------------  No significant changes to PMH, PSH, FHx, SHx, unless otherwise noted    ALLERGIES & MEDICATIONS  --------------------------------------------------------------------------------  Allergies    No Known Allergies    Intolerances      Standing Inpatient Medications  allopurinol 100 milliGRAM(s) Oral daily  apixaban 2.5 milliGRAM(s) Oral two times a day  atorvastatin 40 milliGRAM(s) Oral at bedtime  carvedilol 6.25 milliGRAM(s) Oral every 12 hours  chlorhexidine 2% Cloths 1 Application(s) Topical daily  cinacalcet 30 milliGRAM(s) Oral daily  cyanocobalamin Injectable 1000 MICROGram(s) IntraMuscular <User Schedule>  darbepoetin Injectable ViaL 60 MICROGram(s) IV Push every week  dextrose 5%. 1000 milliLiter(s) IV Continuous <Continuous>  dextrose 50% Injectable 12.5 Gram(s) IV Push once  dextrose 50% Injectable 25 Gram(s) IV Push once  dextrose 50% Injectable 25 Gram(s) IV Push once  ergocalciferol 31930 Unit(s) Oral every week  folic acid 1 milliGRAM(s) Oral daily  furosemide    Tablet 60 milliGRAM(s) Oral two times a day  hydrALAZINE 100 milliGRAM(s) Oral three times a day  hydrocortisone hemorrhoidal Suppository 1 Suppository(s) Rectal at bedtime  insulin glargine Injectable (LANTUS) 10 Unit(s) SubCutaneous at bedtime  insulin lispro (HumaLOG) corrective regimen sliding scale   SubCutaneous three times a day with meals  insulin lispro (HumaLOG) corrective regimen sliding scale   SubCutaneous at bedtime  labetalol 300 milliGRAM(s) Oral two times a day  lactobacillus acidophilus 1 Tablet(s) Oral two times a day  levETIRAcetam 1000 milliGRAM(s) Oral two times a day  mycophenolate mofetil 250 milliGRAM(s) Oral two times a day  predniSONE   Tablet 5 milliGRAM(s) Oral daily  sodium bicarbonate 650 milliGRAM(s) Oral two times a day  tamsulosin 0.4 milliGRAM(s) Oral at bedtime  thiamine 100 milliGRAM(s) Oral daily    PRN Inpatient Medications  acetaminophen   Tablet .. 650 milliGRAM(s) Oral every 6 hours PRN  aluminum hydroxide/magnesium hydroxide/simethicone Suspension 30 milliLiter(s) Oral every 6 hours PRN  dextrose 40% Gel 15 Gram(s) Oral once PRN  glucagon  Injectable 1 milliGRAM(s) IntraMuscular once PRN      REVIEW OF SYSTEMS  --------------------------------------------------------------------------------  Gen: No weight changes, fatigue, fevers/chills, weakness  Skin: No rashes  Head/Eyes/Ears/Mouth: No headache; Normal hearing; Normal vision w/o blurriness; No sinus pain/discomfort, sore throat  Respiratory: No dyspnea, cough, wheezing, hemoptysis  CV: No chest pain, PND, orthopnea  GI: No abdominal pain, diarrhea, constipation, nausea, vomiting, melena, hematochezia  : No increased frequency, dysuria, hematuria, nocturia  MSK: No joint pain/swelling; no back pain; no edema  Neuro: No dizziness/lightheadedness, weakness, seizures, numbness, tingling  Heme: No easy bruising or bleeding  Endo: No heat/cold intolerance  Psych: No significant nervousness, anxiety, stress, depression    All other systems were reviewed and are negative, except as noted.    VITALS/PHYSICAL EXAM  --------------------------------------------------------------------------------  T(C): 36.7 (09-18-19 @ 13:01), Max: 37.2 (09-17-19 @ 21:03)  HR: 76 (09-18-19 @ 13:01) (72 - 81)  BP: 148/72 (09-18-19 @ 13:01) (125/64 - 152/60)  RR: 18 (09-18-19 @ 13:01) (18 - 18)  SpO2: 99% (09-18-19 @ 13:01) (94% - 99%)  Wt(kg): --        09-17-19 @ 07:01  -  09-18-19 @ 07:00  --------------------------------------------------------  IN: 60 mL / OUT: 0 mL / NET: 60 mL    09-18-19 @ 07:01  -  09-18-19 @ 16:00  --------------------------------------------------------  IN: 1380 mL / OUT: 2010 mL / NET: -630 mL      Physical Exam:  	Gen: NAD, well-appearing  	HEENT: PERRL, supple neck, clear oropharynx  	Pulm: CTA B/L  	CV: RRR, S1S2; no rub  	Back: No spinal or CVA tenderness; no sacral edema  	Abd: +BS, soft, nontender/nondistended  	: No suprapubic tenderness  	UE: Warm,       LABS/STUDIES  --------------------------------------------------------------------------------              9.0    6.1   >-----------<  178      [09-18-19 @ 10:13]              28.8     138  |  96  |  26  ----------------------------<  161      [09-18-19 @ 10:13]  3.9   |  27  |  5.94        Ca     9.1     [09-18-19 @ 10:13]      Mg     1.8     [09-17-19 @ 13:20]            Iron 45, TIBC 159, %sat 28      [09-02-19 @ 17:29]  Ferritin 260      [09-02-19 @ 17:29]  PTH -- (Ca 9.7)      [09-02-19 @ 17:29]   1231  PTH -- (Ca 9.1)      [08-27-19 @ 15:07]   1896  Vitamin D (25OH) <4.0      [09-02-19 @ 17:28]  HbA1c 5.6      [08-27-19 @ 09:03]  TSH 5.45      [08-30-19 @ 09:41]    HBsAb 19.4      [08-27-19 @ 15:26]  HBsAg Nonreact      [08-27-19 @ 15:26]  HCV 10.44, Reactive      [08-27-19 @ 08:29]

## 2019-09-18 NOTE — PROGRESS NOTE ADULT - SUBJECTIVE AND OBJECTIVE BOX
INTERVAL HPI/OVERNIGHT EVENTS:    pt seen and examined in HD  No new overnight event.  No N/V/D.  Tolerating diet.  No further rectal bleeding  h/h remains stable     MEDICATIONS  (STANDING):  allopurinol 100 milliGRAM(s) Oral daily  apixaban 2.5 milliGRAM(s) Oral two times a day  atorvastatin 40 milliGRAM(s) Oral at bedtime  carvedilol 6.25 milliGRAM(s) Oral every 12 hours  chlorhexidine 2% Cloths 1 Application(s) Topical daily  cinacalcet 30 milliGRAM(s) Oral daily  cyanocobalamin Injectable 1000 MICROGram(s) IntraMuscular <User Schedule>  darbepoetin Injectable ViaL 60 MICROGram(s) IV Push every week  dextrose 5%. 1000 milliLiter(s) (50 mL/Hr) IV Continuous <Continuous>  dextrose 50% Injectable 12.5 Gram(s) IV Push once  dextrose 50% Injectable 25 Gram(s) IV Push once  dextrose 50% Injectable 25 Gram(s) IV Push once  ergocalciferol 53602 Unit(s) Oral every week  folic acid 1 milliGRAM(s) Oral daily  furosemide    Tablet 60 milliGRAM(s) Oral two times a day  hydrALAZINE 100 milliGRAM(s) Oral three times a day  hydrocortisone hemorrhoidal Suppository 1 Suppository(s) Rectal at bedtime  insulin glargine Injectable (LANTUS) 10 Unit(s) SubCutaneous at bedtime  insulin lispro (HumaLOG) corrective regimen sliding scale   SubCutaneous three times a day with meals  insulin lispro (HumaLOG) corrective regimen sliding scale   SubCutaneous at bedtime  labetalol 300 milliGRAM(s) Oral two times a day  lactobacillus acidophilus 1 Tablet(s) Oral two times a day  levETIRAcetam 1000 milliGRAM(s) Oral two times a day  mycophenolate mofetil 250 milliGRAM(s) Oral two times a day  predniSONE   Tablet 5 milliGRAM(s) Oral daily  sodium bicarbonate 650 milliGRAM(s) Oral two times a day  tamsulosin 0.4 milliGRAM(s) Oral at bedtime  thiamine 100 milliGRAM(s) Oral daily    MEDICATIONS  (PRN):  acetaminophen   Tablet .. 650 milliGRAM(s) Oral every 6 hours PRN Temp greater or equal to 38C (100.4F)  aluminum hydroxide/magnesium hydroxide/simethicone Suspension 30 milliLiter(s) Oral every 6 hours PRN Dyspepsia  dextrose 40% Gel 15 Gram(s) Oral once PRN Blood Glucose LESS THAN 70 milliGRAM(s)/deciliter  glucagon  Injectable 1 milliGRAM(s) IntraMuscular once PRN Glucose LESS THAN 70 milligrams/deciliter      Allergies    No Known Allergies    Intolerances        Review of Systems:    General:  No wt loss, fevers, chills, night sweats,fatigue,   Eyes:  Good vision, no reported pain  ENT:  No sore throat, pain, runny nose, dysphagia  CV:  No pain, palpitations, hypo/hypertension  Resp:  No dyspnea, cough, tachypnea, wheezing  GI:  No pain, No nausea, No vomiting, No diarrhea, No constipation, No weight loss, No fever, No pruritis, No rectal bleeding, No melena, No dysphagia  :  No pain, bleeding, incontinence, nocturia  Muscle:  No pain, weakness  Neuro:  No weakness, tingling, memory problems  Psych:  No fatigue, insomnia, mood problems, depression  Endocrine:  No polyuria, polydypsia, cold/heat intolerance  Heme:  No petechiae, ecchymosis, easy bruisability  Skin:  No rash, tattoos, scars, edema      Vital Signs Last 24 Hrs  T(C): 36.6 (18 Sep 2019 12:00), Max: 37.2 (17 Sep 2019 21:03)  T(F): 97.8 (18 Sep 2019 12:00), Max: 98.9 (17 Sep 2019 21:03)  HR: 78 (18 Sep 2019 12:45) (63 - 81)  BP: 126/71 (18 Sep 2019 12:45) (125/64 - 152/60)  BP(mean): --  RR: 18 (18 Sep 2019 12:00) (18 - 18)  SpO2: 97% (18 Sep 2019 12:00) (94% - 98%)    PHYSICAL EXAM:    Constitutional: NAD, well-developed  HEENT: EOMI, throat clear  Neck: No LAD, supple  Respiratory: CTA and P  Cardiovascular: S1 and S2, RRR, no M  Gastrointestinal: BS+, soft, NT/ND, neg HSM,  Extremities: No peripheral edema, neg clubing, cyanosis  Vascular: 2+ peripheral pulses  Neurological: A/O x 3, no focal deficits  Psychiatric: Normal mood, normal affect  Skin: No rashes      LABS:                        9.0    6.1   )-----------( 178      ( 18 Sep 2019 10:13 )             28.8     09-18    138  |  96  |  26<H>  ----------------------------<  161<H>  3.9   |  27  |  5.94<H>    Ca    9.1      18 Sep 2019 10:13  Mg     1.8     09-17            RADIOLOGY & ADDITIONAL TESTS:

## 2019-09-18 NOTE — PROGRESS NOTE ADULT - PROBLEM SELECTOR PLAN 2
- surgery team called for fat pad biopsy, which will be considered after renal bx results are back as per family request  - s/p renal bx  - CT brain negative for acute pathology  - s/p LP with high protein but culture negative in CSF  - s/p bone marrow bx

## 2019-09-18 NOTE — PROGRESS NOTE ADULT - ASSESSMENT
1. Anemia sec to CKD/B12/folate deficiency/MGUS  -- s/p 3 weekly B12 1000 mcg IM injections- last 9/14;  B12 1000 mcg IM weekly for 1 more dose then continue monthly;  PO FA 1mg QD;    -- Iron studies c/w Anemia of Chronic Inflammation/kidney disease- aranesp as per renal team  --Transfuse for Hgb<7    2. Worsening renal failure  -renal concerned about renal bx results (TMA) and possible connection to MGUS  -s/p bone marrow biopsy on 9/14-  There is no evidence of increased plasma cells. Case d/w Dr. Marks from Hematopathology.  [place] red stain is pending;  No suspicion for amyloid disease.    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      3 thrombocytopenia, resolved  -- reactive and consumptive, abx   -- most likely related to h/o HCV, ? Mycophenolate contributing  -- monitor for now    4. LE edema   --noted age indeterminate DVT b/l LE above knee; provoked by acute illness and immobilizaiton;  no indication for hypercoag w/u   --pt had been on and off AC in past for Afib  --now on apixaban.    Will follow, [phone]

## 2019-09-19 LAB
ANION GAP SERPL CALC-SCNC: 15 MMOL/L — SIGNIFICANT CHANGE UP (ref 5–17)
BUN SERPL-MCNC: 17 MG/DL — SIGNIFICANT CHANGE UP (ref 7–23)
CALCIUM SERPL-MCNC: 9.1 MG/DL — SIGNIFICANT CHANGE UP (ref 8.4–10.5)
CHLORIDE SERPL-SCNC: 95 MMOL/L — LOW (ref 96–108)
CO2 SERPL-SCNC: 27 MMOL/L — SIGNIFICANT CHANGE UP (ref 22–31)
CREAT SERPL-MCNC: 4.52 MG/DL — HIGH (ref 0.5–1.3)
GLUCOSE BLDC GLUCOMTR-MCNC: 107 MG/DL — HIGH (ref 70–99)
GLUCOSE BLDC GLUCOMTR-MCNC: 111 MG/DL — HIGH (ref 70–99)
GLUCOSE BLDC GLUCOMTR-MCNC: 114 MG/DL — HIGH (ref 70–99)
GLUCOSE BLDC GLUCOMTR-MCNC: 139 MG/DL — HIGH (ref 70–99)
GLUCOSE SERPL-MCNC: 112 MG/DL — HIGH (ref 70–99)
POTASSIUM SERPL-MCNC: 3.3 MMOL/L — LOW (ref 3.5–5.3)
POTASSIUM SERPL-SCNC: 3.3 MMOL/L — LOW (ref 3.5–5.3)
SODIUM SERPL-SCNC: 137 MMOL/L — SIGNIFICANT CHANGE UP (ref 135–145)

## 2019-09-19 RX ADMIN — Medication 100 MILLIGRAM(S): at 16:09

## 2019-09-19 RX ADMIN — Medication 300 MILLIGRAM(S): at 05:58

## 2019-09-19 RX ADMIN — CARVEDILOL PHOSPHATE 6.25 MILLIGRAM(S): 80 CAPSULE, EXTENDED RELEASE ORAL at 22:43

## 2019-09-19 RX ADMIN — Medication 100 MILLIGRAM(S): at 16:10

## 2019-09-19 RX ADMIN — CARVEDILOL PHOSPHATE 6.25 MILLIGRAM(S): 80 CAPSULE, EXTENDED RELEASE ORAL at 16:10

## 2019-09-19 RX ADMIN — MYCOPHENOLATE MOFETIL 250 MILLIGRAM(S): 250 CAPSULE ORAL at 05:58

## 2019-09-19 RX ADMIN — Medication 5 MILLIGRAM(S): at 05:58

## 2019-09-19 RX ADMIN — Medication 1 TABLET(S): at 05:58

## 2019-09-19 RX ADMIN — APIXABAN 2.5 MILLIGRAM(S): 2.5 TABLET, FILM COATED ORAL at 17:53

## 2019-09-19 RX ADMIN — Medication 60 MILLIGRAM(S): at 05:57

## 2019-09-19 RX ADMIN — Medication 100 MILLIGRAM(S): at 21:07

## 2019-09-19 RX ADMIN — TAMSULOSIN HYDROCHLORIDE 0.4 MILLIGRAM(S): 0.4 CAPSULE ORAL at 21:07

## 2019-09-19 RX ADMIN — Medication 650 MILLIGRAM(S): at 17:55

## 2019-09-19 RX ADMIN — Medication 300 MILLIGRAM(S): at 17:53

## 2019-09-19 RX ADMIN — CINACALCET 30 MILLIGRAM(S): 30 TABLET, FILM COATED ORAL at 16:10

## 2019-09-19 RX ADMIN — INSULIN GLARGINE 10 UNIT(S): 100 INJECTION, SOLUTION SUBCUTANEOUS at 22:43

## 2019-09-19 RX ADMIN — APIXABAN 2.5 MILLIGRAM(S): 2.5 TABLET, FILM COATED ORAL at 05:57

## 2019-09-19 RX ADMIN — ATORVASTATIN CALCIUM 40 MILLIGRAM(S): 80 TABLET, FILM COATED ORAL at 21:07

## 2019-09-19 RX ADMIN — Medication 100 MILLIGRAM(S): at 05:58

## 2019-09-19 RX ADMIN — LEVETIRACETAM 1000 MILLIGRAM(S): 250 TABLET, FILM COATED ORAL at 17:53

## 2019-09-19 RX ADMIN — Medication 1 TABLET(S): at 17:53

## 2019-09-19 RX ADMIN — LEVETIRACETAM 1000 MILLIGRAM(S): 250 TABLET, FILM COATED ORAL at 05:58

## 2019-09-19 RX ADMIN — MYCOPHENOLATE MOFETIL 250 MILLIGRAM(S): 250 CAPSULE ORAL at 17:53

## 2019-09-19 RX ADMIN — Medication 60 MILLIGRAM(S): at 17:53

## 2019-09-19 RX ADMIN — Medication 1 MILLIGRAM(S): at 16:10

## 2019-09-19 RX ADMIN — Medication 650 MILLIGRAM(S): at 05:59

## 2019-09-19 NOTE — CONSULT NOTE ADULT - ASSESSMENT
74 year old male with onychomycosis, bilateral feet; ingrowing toe nails, b/l feet  -Aseptic debridement of ingrowing, elongated nails x 10  -Pain relieved s/p debridement  -DM pedal education  -Z floats while in bed  -No other acute podiatric issues at this time  -Thank you for the consultation  -Please re-consult as needed    [phone] pager

## 2019-09-19 NOTE — PROGRESS NOTE ADULT - SUBJECTIVE AND OBJECTIVE BOX
Subjective: Patient seen and examined. No new events except as noted.     REVIEW OF SYSTEMS:    CONSTITUTIONAL:+ weakness, fevers or chills  EYES/ENT: No visual changes;  No vertigo or throat pain   NECK: No pain or stiffness  RESPIRATORY: No cough, wheezing, hemoptysis; No shortness of breath  CARDIOVASCULAR: No chest pain or palpitations  GASTROINTESTINAL: No abdominal or epigastric pain. No nausea, vomiting, or hematemesis; No diarrhea or constipation. No melena or hematochezia.  GENITOURINARY: No dysuria, frequency or hematuria  NEUROLOGICAL: No numbness or weakness  SKIN: No itching, burning, rashes, or lesions   All other review of systems is negative unless indicated above.    MEDICATIONS:  MEDICATIONS  (STANDING):  allopurinol 100 milliGRAM(s) Oral daily  apixaban 2.5 milliGRAM(s) Oral two times a day  atorvastatin 40 milliGRAM(s) Oral at bedtime  carvedilol 6.25 milliGRAM(s) Oral every 12 hours  chlorhexidine 2% Cloths 1 Application(s) Topical daily  cinacalcet 30 milliGRAM(s) Oral daily  cyanocobalamin Injectable 1000 MICROGram(s) IntraMuscular <User Schedule>  darbepoetin Injectable ViaL 60 MICROGram(s) IV Push every week  dextrose 5%. 1000 milliLiter(s) (50 mL/Hr) IV Continuous <Continuous>  dextrose 50% Injectable 12.5 Gram(s) IV Push once  dextrose 50% Injectable 25 Gram(s) IV Push once  dextrose 50% Injectable 25 Gram(s) IV Push once  ergocalciferol 73232 Unit(s) Oral every week  folic acid 1 milliGRAM(s) Oral daily  furosemide    Tablet 60 milliGRAM(s) Oral two times a day  hydrALAZINE 100 milliGRAM(s) Oral three times a day  hydrocortisone hemorrhoidal Suppository 1 Suppository(s) Rectal at bedtime  insulin glargine Injectable (LANTUS) 10 Unit(s) SubCutaneous at bedtime  insulin lispro (HumaLOG) corrective regimen sliding scale   SubCutaneous three times a day with meals  insulin lispro (HumaLOG) corrective regimen sliding scale   SubCutaneous at bedtime  labetalol 300 milliGRAM(s) Oral two times a day  lactobacillus acidophilus 1 Tablet(s) Oral two times a day  levETIRAcetam 1000 milliGRAM(s) Oral two times a day  mycophenolate mofetil 250 milliGRAM(s) Oral two times a day  predniSONE   Tablet 5 milliGRAM(s) Oral daily  sodium bicarbonate 650 milliGRAM(s) Oral two times a day  tamsulosin 0.4 milliGRAM(s) Oral at bedtime  thiamine 100 milliGRAM(s) Oral daily      PHYSICAL EXAM:  T(C): 37 (09-19-19 @ 03:57), Max: 37 (09-18-19 @ 20:05)  HR: 74 (09-19-19 @ 05:55) (72 - 82)  BP: 146/67 (09-19-19 @ 05:55) (126/71 - 148/72)  RR: 18 (09-19-19 @ 05:55) (18 - 18)  SpO2: 97% (09-19-19 @ 05:55) (94% - 99%)  Wt(kg): --  I&O's Summary    18 Sep 2019 07:01  -  19 Sep 2019 07:00  --------------------------------------------------------  IN: 1380 mL / OUT: 2060 mL / NET: -680 mL          Appearance: NAD	  HEENT:   Dry oral mucosa, PERRL, EOMI	  Lymphatic: No lymphadenopathy , no edema  Cardiovascular: Normal S1 S2, No JVD, No murmurs , Peripheral pulses palpable 2+ bilaterally  Respiratory: Lungs clear to auscultation, normal effort 	  Gastrointestinal:  Soft, Non-tender, + BS	  Skin: No rashes, No ecchymoses, No cyanosis, warm to touch  Musculoskeletal: Normal range of motion, normal strength  Psychiatry:  Mood & affect appropriate  Ext: No edema      LABS:    CARDIAC MARKERS:                                9.0    6.1   )-----------( 178      ( 18 Sep 2019 10:13 )             28.8     09-19    137  |  95<L>  |  17  ----------------------------<  112<H>  3.3<L>   |  27  |  4.52<H>    Ca    9.1      19 Sep 2019 05:34  Mg     1.8     09-17      proBNP:   Lipid Profile:   HgA1c:   TSH:             TELEMETRY: 	SR    ECG:  	  RADIOLOGY:   DIAGNOSTIC TESTING:  [ ] Echocardiogram:  [ ]  Catheterization:  [ ] Stress Test:    OTHER:

## 2019-09-19 NOTE — CONSULT NOTE ADULT - CONSULT REQUESTED DATE/TIME
03-Sep-2019 13:14
04-Sep-2019 16:50
13-Sep-2019
19-Sep-2019
26-Aug-2019 20:20
28-Aug-2019 17:38
28-Aug-2019 17:49
29-Aug-2019 08:26
29-Aug-2019 13:32
12-Sep-2019

## 2019-09-19 NOTE — PROGRESS NOTE ADULT - SUBJECTIVE AND OBJECTIVE BOX
Patient is a 74y old  Male who presents with a chief complaint of renal failure (19 Sep 2019 14:06)                                                               INTERVAL HPI/OVERNIGHT EVENTS:    REVIEW OF SYSTEMS:     CONSTITUTIONAL: No weakness, fevers or chills  RESPIRATORY: No cough, wheezing,  No shortness of breath  CARDIOVASCULAR: No chest pain or palpitations  GASTROINTESTINAL: No abdominal pain  . No nausea, vomiting, or hematemesis; No diarrhea or constipation. No melena or hematochezia.  GENITOURINARY: No dysuria, frequency or hematuria  NEUROLOGICAL: No numbness or weakness                                                                                                                                                                                                                                                                                  Medications:  MEDICATIONS  (STANDING):  allopurinol 100 milliGRAM(s) Oral daily  apixaban 2.5 milliGRAM(s) Oral two times a day  atorvastatin 40 milliGRAM(s) Oral at bedtime  carvedilol 6.25 milliGRAM(s) Oral every 12 hours  chlorhexidine 2% Cloths 1 Application(s) Topical daily  cinacalcet 30 milliGRAM(s) Oral daily  cyanocobalamin Injectable 1000 MICROGram(s) IntraMuscular <User Schedule>  darbepoetin Injectable ViaL 60 MICROGram(s) IV Push every week  dextrose 5%. 1000 milliLiter(s) (50 mL/Hr) IV Continuous <Continuous>  dextrose 50% Injectable 12.5 Gram(s) IV Push once  dextrose 50% Injectable 25 Gram(s) IV Push once  dextrose 50% Injectable 25 Gram(s) IV Push once  ergocalciferol 52491 Unit(s) Oral every week  folic acid 1 milliGRAM(s) Oral daily  furosemide    Tablet 60 milliGRAM(s) Oral two times a day  hydrALAZINE 100 milliGRAM(s) Oral three times a day  hydrocortisone hemorrhoidal Suppository 1 Suppository(s) Rectal at bedtime  insulin glargine Injectable (LANTUS) 10 Unit(s) SubCutaneous at bedtime  insulin lispro (HumaLOG) corrective regimen sliding scale   SubCutaneous three times a day with meals  insulin lispro (HumaLOG) corrective regimen sliding scale   SubCutaneous at bedtime  labetalol 300 milliGRAM(s) Oral two times a day  lactobacillus acidophilus 1 Tablet(s) Oral two times a day  levETIRAcetam 1000 milliGRAM(s) Oral two times a day  mycophenolate mofetil 250 milliGRAM(s) Oral two times a day  predniSONE   Tablet 5 milliGRAM(s) Oral daily  sodium bicarbonate 650 milliGRAM(s) Oral two times a day  tamsulosin 0.4 milliGRAM(s) Oral at bedtime  thiamine 100 milliGRAM(s) Oral daily    MEDICATIONS  (PRN):  acetaminophen   Tablet .. 650 milliGRAM(s) Oral every 6 hours PRN Temp greater or equal to 38C (100.4F)  aluminum hydroxide/magnesium hydroxide/simethicone Suspension 30 milliLiter(s) Oral every 6 hours PRN Dyspepsia  dextrose 40% Gel 15 Gram(s) Oral once PRN Blood Glucose LESS THAN 70 milliGRAM(s)/deciliter  glucagon  Injectable 1 milliGRAM(s) IntraMuscular once PRN Glucose LESS THAN 70 milligrams/deciliter       Allergies    No Known Allergies    Intolerances      Vital Signs Last 24 Hrs  T(C): 36.8 (19 Sep 2019 13:02), Max: 37 (18 Sep 2019 20:05)  T(F): 98.2 (19 Sep 2019 13:02), Max: 98.6 (18 Sep 2019 20:05)  HR: 75 (19 Sep 2019 13:02) (72 - 75)  BP: 139/73 (19 Sep 2019 13:02) (127/58 - 146/67)  BP(mean): --  RR: 18 (19 Sep 2019 13:02) (18 - 18)  SpO2: 97% (19 Sep 2019 13:02) (97% - 98%)  CAPILLARY BLOOD GLUCOSE      POCT Blood Glucose.: 111 mg/dL (19 Sep 2019 17:32)  POCT Blood Glucose.: 139 mg/dL (19 Sep 2019 13:05)  POCT Blood Glucose.: 114 mg/dL (19 Sep 2019 08:55)  POCT Blood Glucose.: 121 mg/dL (18 Sep 2019 21:53)       @ 07:  -   @ 07:00  --------------------------------------------------------  IN: 1380 mL / OUT: 2060 mL / NET: -680 mL     @ 07: @ 18:07  --------------------------------------------------------  IN: 480 mL / OUT: 0 mL / NET: 480 mL      Physical Exam:    Daily     Daily Weight in k (19 Sep 2019 03:57)  General: NAD   HEENT:  Nonicteric, PERRLA  CV:  RRR, S1S2   Lungs:  CTA B/L, no wheezes, rales, rhonchi  Abdomen:  Soft, non-tender, no distended, positive BS  Extremities:  2+ pulses, no c/c, no edema    :  No gutierrez  Neuro:  AAOx3, non-focal, grossly intact                                                                                                                                                                                                                                                                                                LABS:                               9.0    6.1   )-----------( 178      ( 18 Sep 2019 10:13 )             28.8                      -    137  |  95<L>  |  17  ----------------------------<  112<H>  3.3<L>   |  27  |  4.52<H>    Ca    9.1      19 Sep 2019 05:34
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
## 2019-09-19 NOTE — CONSULT NOTE ADULT - PROVIDER SPECIALTY LIST ADULT
Cardiology
Gastroenterology
Heme/Onc
Infectious Disease
Nephrology
Neurology
Podiatry
Surgery
Urology
Urology

## 2019-09-19 NOTE — CONSULT NOTE ADULT - SUBJECTIVE AND OBJECTIVE BOX
HPI:  73 y/o male with PMHx of  ESRD s/p /p failed renal transplant 4 year ago and successful renal transplant 1 year ago,DM, HTN, cardiomyopathy 2/2 cocaine abuse, Hepatitis C, meningococcal meningitiss presenting with concerns about his kidney function, worsening SOB over the past 2 weeks and leg edema.   Pt just moved back to [place] from [place] .. reports that he has not been taking his meds for the past few days since " he might have misplaed them"     pt denies chest pain, syncope,fever, chills, cough, urinary symptoms.    in ED found  to have anemia : ( per sister : on and off small amount of fresh blood in stool and some amount in urine but only small amounts)   Nno acute changes on EKG   elevated CR and Trop (26 Aug 2019 21:56)    Patient is a 74y old  Male who presents with a chief complaint of renal failure (16 Sep 2019 15:05)    Allergies    No Known Allergies    Intolerances      Vital Signs Last 24 Hrs  T(C): 37 (19 Sep 2019 03:57), Max: 37 (18 Sep 2019 20:05)  T(F): 98.6 (19 Sep 2019 03:57), Max: 98.6 (18 Sep 2019 20:05)  HR: 74 (19 Sep 2019 05:55) (72 - 82)  BP: 146/67 (19 Sep 2019 05:55) (126/65 - 148/72)  BP(mean): --  RR: 18 (19 Sep 2019 05:55) (18 - 18)  SpO2: 97% (19 Sep 2019 05:55) (94% - 99%)                        9.0    6.1   )-----------( 178      ( 18 Sep 2019 10:13 )             28.8     09-19    137  |  95<L>  |  17  ----------------------------<  112<H>  3.3<L>   |  27  |  4.52<H>    Ca    9.1      19 Sep 2019 05:34  Mg     1.8     09-17      CAPILLARY BLOOD GLUCOSE      POCT Blood Glucose.: 121 mg/dL (18 Sep 2019 21:53)    MEDICATIONS  (STANDING):  allopurinol 100 milliGRAM(s) Oral daily  apixaban 2.5 milliGRAM(s) Oral two times a day  atorvastatin 40 milliGRAM(s) Oral at bedtime  carvedilol 6.25 milliGRAM(s) Oral every 12 hours  chlorhexidine 2% Cloths 1 Application(s) Topical daily  cinacalcet 30 milliGRAM(s) Oral daily  cyanocobalamin Injectable 1000 MICROGram(s) IntraMuscular <User Schedule>  darbepoetin Injectable ViaL 60 MICROGram(s) IV Push every week  dextrose 5%. 1000 milliLiter(s) (50 mL/Hr) IV Continuous <Continuous>  dextrose 50% Injectable 12.5 Gram(s) IV Push once  dextrose 50% Injectable 25 Gram(s) IV Push once  dextrose 50% Injectable 25 Gram(s) IV Push once  ergocalciferol 79975 Unit(s) Oral every week  folic acid 1 milliGRAM(s) Oral daily  furosemide    Tablet 60 milliGRAM(s) Oral two times a day  hydrALAZINE 100 milliGRAM(s) Oral three times a day  hydrocortisone hemorrhoidal Suppository 1 Suppository(s) Rectal at bedtime  insulin glargine Injectable (LANTUS) 10 Unit(s) SubCutaneous at bedtime  insulin lispro (HumaLOG) corrective regimen sliding scale   SubCutaneous three times a day with meals  insulin lispro (HumaLOG) corrective regimen sliding scale   SubCutaneous at bedtime  labetalol 300 milliGRAM(s) Oral two times a day  lactobacillus acidophilus 1 Tablet(s) Oral two times a day  levETIRAcetam 1000 milliGRAM(s) Oral two times a day  mycophenolate mofetil 250 milliGRAM(s) Oral two times a day  predniSONE   Tablet 5 milliGRAM(s) Oral daily  sodium bicarbonate 650 milliGRAM(s) Oral two times a day  tamsulosin 0.4 milliGRAM(s) Oral at bedtime  thiamine 100 milliGRAM(s) Oral daily    MEDICATIONS  (PRN):  acetaminophen   Tablet .. 650 milliGRAM(s) Oral every 6 hours PRN Temp greater or equal to 38C (100.4F)  aluminum hydroxide/magnesium hydroxide/simethicone Suspension 30 milliLiter(s) Oral every 6 hours PRN Dyspepsia  dextrose 40% Gel 15 Gram(s) Oral once PRN Blood Glucose LESS THAN 70 milliGRAM(s)/deciliter  glucagon  Injectable 1 milliGRAM(s) IntraMuscular once PRN Glucose LESS THAN 70 milligrams/deciliter    PAST MEDICAL & SURGICAL HISTORY:  Kidney transplant recipient  Anuria  GERD (gastroesophageal reflux disease)  Kidney transplant failure: dx: 2/2013  Hepatitis C: dx: 90&#x27;s.  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, no known MIs  H/O kidney transplant: may 2015  A-V fistula: Left, implanted (?)  S/p cadaver renal transplant: 2008  S/P partial colectomy: due to diverticulitis        MERLENE:  Ingrowing, elongated, thickened discolored toe nails x 10  Pain on palpation to hallux medial and lateral boarders  Pedal pulses palp 2/4 bilaterally  No open lesions or local signs of infection s/p debridement  b/l pes planus

## 2019-09-19 NOTE — PROGRESS NOTE ADULT - SUBJECTIVE AND OBJECTIVE BOX
INTERVAL HPI/OVERNIGHT EVENTS:    pt seen and examined   No new overnight event.  No N/V/D.  Tolerating diet.  No further rectal bleeding  h/h remains stable     MEDICATIONS  (STANDING):  allopurinol 100 milliGRAM(s) Oral daily  apixaban 2.5 milliGRAM(s) Oral two times a day  atorvastatin 40 milliGRAM(s) Oral at bedtime  carvedilol 6.25 milliGRAM(s) Oral every 12 hours  chlorhexidine 2% Cloths 1 Application(s) Topical daily  cinacalcet 30 milliGRAM(s) Oral daily  cyanocobalamin Injectable 1000 MICROGram(s) IntraMuscular <User Schedule>  darbepoetin Injectable ViaL 60 MICROGram(s) IV Push every week  dextrose 5%. 1000 milliLiter(s) (50 mL/Hr) IV Continuous <Continuous>  dextrose 50% Injectable 12.5 Gram(s) IV Push once  dextrose 50% Injectable 25 Gram(s) IV Push once  dextrose 50% Injectable 25 Gram(s) IV Push once  ergocalciferol 15274 Unit(s) Oral every week  folic acid 1 milliGRAM(s) Oral daily  furosemide    Tablet 60 milliGRAM(s) Oral two times a day  hydrALAZINE 100 milliGRAM(s) Oral three times a day  hydrocortisone hemorrhoidal Suppository 1 Suppository(s) Rectal at bedtime  insulin glargine Injectable (LANTUS) 10 Unit(s) SubCutaneous at bedtime  insulin lispro (HumaLOG) corrective regimen sliding scale   SubCutaneous three times a day with meals  insulin lispro (HumaLOG) corrective regimen sliding scale   SubCutaneous at bedtime  labetalol 300 milliGRAM(s) Oral two times a day  lactobacillus acidophilus 1 Tablet(s) Oral two times a day  levETIRAcetam 1000 milliGRAM(s) Oral two times a day  mycophenolate mofetil 250 milliGRAM(s) Oral two times a day  predniSONE   Tablet 5 milliGRAM(s) Oral daily  sodium bicarbonate 650 milliGRAM(s) Oral two times a day  tamsulosin 0.4 milliGRAM(s) Oral at bedtime  thiamine 100 milliGRAM(s) Oral daily    MEDICATIONS  (PRN):  acetaminophen   Tablet .. 650 milliGRAM(s) Oral every 6 hours PRN Temp greater or equal to 38C (100.4F)  aluminum hydroxide/magnesium hydroxide/simethicone Suspension 30 milliLiter(s) Oral every 6 hours PRN Dyspepsia  dextrose 40% Gel 15 Gram(s) Oral once PRN Blood Glucose LESS THAN 70 milliGRAM(s)/deciliter  glucagon  Injectable 1 milliGRAM(s) IntraMuscular once PRN Glucose LESS THAN 70 milligrams/deciliter      Allergies    No Known Allergies    Intolerances        Review of Systems:    General:  No wt loss, fevers, chills, night sweats,fatigue,   Eyes:  Good vision, no reported pain  ENT:  No sore throat, pain, runny nose, dysphagia  CV:  No pain, palpitations, hypo/hypertension  Resp:  No dyspnea, cough, tachypnea, wheezing  GI:  No pain, No nausea, No vomiting, No diarrhea, No constipation, No weight loss, No fever, No pruritis, No rectal bleeding, No melena, No dysphagia  :  No pain, bleeding, incontinence, nocturia  Muscle:  No pain, weakness  Neuro:  No weakness, tingling, memory problems  Psych:  No fatigue, insomnia, mood problems, depression  Endocrine:  No polyuria, polydypsia, cold/heat intolerance  Heme:  No petechiae, ecchymosis, easy bruisability  Skin:  No rash, tattoos, scars, edema      Vital Signs Last 24 Hrs  T(C): 37 (19 Sep 2019 03:57), Max: 37 (18 Sep 2019 20:05)  T(F): 98.6 (19 Sep 2019 03:57), Max: 98.6 (18 Sep 2019 20:05)  HR: 74 (19 Sep 2019 05:55) (72 - 82)  BP: 146/67 (19 Sep 2019 05:55) (127/58 - 146/67)  BP(mean): --  RR: 18 (19 Sep 2019 05:55) (18 - 18)  SpO2: 97% (19 Sep 2019 05:55) (94% - 98%)    PHYSICAL EXAM:    Constitutional: NAD, well-developed  HEENT: EOMI, throat clear  Neck: No LAD, supple  Respiratory: CTA and P  Cardiovascular: S1 and S2, RRR, no M  Gastrointestinal: BS+, soft, NT/ND, neg HSM,  Extremities: No peripheral edema, neg clubing, cyanosis  Vascular: 2+ peripheral pulses  Neurological: A/O x 3, no focal deficits  Psychiatric: Normal mood, normal affect  Skin: No rashes      LABS:                        9.0    6.1   )-----------( 178      ( 18 Sep 2019 10:13 )             28.8     09-19    137  |  95<L>  |  17  ----------------------------<  112<H>  3.3<L>   |  27  |  4.52<H>    Ca    9.1      19 Sep 2019 05:34  Mg     1.8     09-17            RADIOLOGY & ADDITIONAL TESTS:

## 2019-09-19 NOTE — PROGRESS NOTE ADULT - SUBJECTIVE AND OBJECTIVE BOX
Pt seen/examined at bedside- appears well today      MEDICATIONS  (STANDING):  allopurinol 100 milliGRAM(s) Oral daily  apixaban 2.5 milliGRAM(s) Oral two times a day  atorvastatin 40 milliGRAM(s) Oral at bedtime  carvedilol 6.25 milliGRAM(s) Oral every 12 hours  chlorhexidine 2% Cloths 1 Application(s) Topical daily  cinacalcet 30 milliGRAM(s) Oral daily  cyanocobalamin Injectable 1000 MICROGram(s) IntraMuscular <User Schedule>  darbepoetin Injectable ViaL 60 MICROGram(s) IV Push every week  dextrose 5%. 1000 milliLiter(s) (50 mL/Hr) IV Continuous <Continuous>  dextrose 50% Injectable 12.5 Gram(s) IV Push once  dextrose 50% Injectable 25 Gram(s) IV Push once  dextrose 50% Injectable 25 Gram(s) IV Push once  ergocalciferol 30456 Unit(s) Oral every week  folic acid 1 milliGRAM(s) Oral daily  furosemide    Tablet 60 milliGRAM(s) Oral two times a day  hydrALAZINE 100 milliGRAM(s) Oral three times a day  hydrocortisone hemorrhoidal Suppository 1 Suppository(s) Rectal at bedtime  insulin glargine Injectable (LANTUS) 10 Unit(s) SubCutaneous at bedtime  insulin lispro (HumaLOG) corrective regimen sliding scale   SubCutaneous three times a day with meals  insulin lispro (HumaLOG) corrective regimen sliding scale   SubCutaneous at bedtime  labetalol 300 milliGRAM(s) Oral two times a day  lactobacillus acidophilus 1 Tablet(s) Oral two times a day  levETIRAcetam 1000 milliGRAM(s) Oral two times a day  mycophenolate mofetil 250 milliGRAM(s) Oral two times a day  predniSONE   Tablet 5 milliGRAM(s) Oral daily  sodium bicarbonate 650 milliGRAM(s) Oral two times a day  tamsulosin 0.4 milliGRAM(s) Oral at bedtime  thiamine 100 milliGRAM(s) Oral daily    MEDICATIONS  (PRN):  acetaminophen   Tablet .. 650 milliGRAM(s) Oral every 6 hours PRN Temp greater or equal to 38C (100.4F)  aluminum hydroxide/magnesium hydroxide/simethicone Suspension 30 milliLiter(s) Oral every 6 hours PRN Dyspepsia  dextrose 40% Gel 15 Gram(s) Oral once PRN Blood Glucose LESS THAN 70 milliGRAM(s)/deciliter  glucagon  Injectable 1 milliGRAM(s) IntraMuscular once PRN Glucose LESS THAN 70 milligrams/deciliter      ROS  No fever, sweats, chills  No epistaxis, HA, sore throat  No CP, SOB, cough, sputum  No n/v/d, abd pain, melena, hematochezia  No edema  No rash  No anxiety  No back pain, joint pain  No bleeding, bruising  No dysuria, hematuria    Vital Signs Last 24 Hrs  T(C): 37 (19 Sep 2019 03:57), Max: 37 (18 Sep 2019 20:05)  T(F): 98.6 (19 Sep 2019 03:57), Max: 98.6 (18 Sep 2019 20:05)  HR: 74 (19 Sep 2019 05:55) (72 - 82)  BP: 146/67 (19 Sep 2019 05:55) (127/58 - 146/67)  BP(mean): --  RR: 18 (19 Sep 2019 05:55) (18 - 18)  SpO2: 97% (19 Sep 2019 05:55) (94% - 98%)    PE  NAD  Awake, alert  Anicteric, MMM  RRR  CTAB  Abd soft, NT, ND  No c/c/e  No rash grossly  FROM                          9.0    6.1   )-----------( 178      ( 18 Sep 2019 10:13 )             28.8       09-19    137  |  95<L>  |  17  ----------------------------<  112<H>  3.3<L>   |  27  |  4.52<H>    Ca    9.1      19 Sep 2019 05:34

## 2019-09-19 NOTE — PROGRESS NOTE ADULT - ASSESSMENT
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   Pt just moved back to [place] from [place] .. reports that he has not been taking his meds for the past few days since " he might have misplaed them"     pt denies chest pain, syncope,fever, chills, cough, urinary symptoms.    in ED found  to have anemia   Nno acute changes on EKG   elevated CR and Trop    1- HTN urgency :   cont meds ...     2-KAMRON  : bx : transplant glomurlosclerosis /interstitial fibrosis ... off tacro,  cont cellcept and steriods..   BM bx : no evidence of increased plasma cells       3- DM:   A1c  5.6  Fs     4- acute HF sec to  HTN urgency and renal failure   Echo :  < from: Transthoracic Echocardiogram (08.28.19 @ 16:12) >  1. Mitral annular calcification and calcified mitral  leaflets with decreased diastolic opening.  2. Moderate to severe concentric left ventricular  hypertrophy.  3. Normal left ventricular systolic function with  mid-cavitary obliteration.  4. Normal right ventricular size and systolic function.  5. Small pericardial effusion.  cont fluid removal with HD per renal      5-  difficult ambulating :  no focal neuro deficits   neuro eval appreciated    CT T/L   < from: CT Thoracic Spine No Cont (08.30.19 @ 10:06) >    Old right L1 transverse process fracture. Bilateral lysis   defects at L5 as seen on the prior 5/26/2013      6- afib : reestart AC : change to eliquis  : discussed w Dr. Dumont     7- hematochezia : per sister : on and off small amount of fresh blood in stool and some amount in urine but only small amounts   monitor H/H   consult GI .: appreciate input :  suspect bleeding to be hemorrhoidal, now resolved     trial of anusol supp  if bleeding persist would consider colonoscopy however patient is reluctant.    heme input: appreciated  .    8-  renal transplant : f/u with Transplant team   cont meds with MMF and steroids   off Tac   s/p bx : as above       9- TIA :  unable to perform MRI   CT no acute changes on CT   repeat negative   fu with neuro     10 encephalopathy :  now much improved    CTH with contrast : neg for acute pathology   LP : high protien but culture negative in CSF   blood culture positive for GNR /Ecoli likely urinary source   CT : < from: CT Abdomen and Pelvis No Cont (09.09.19 @ 22:26) >    Layering stones in a thick-walled urinary bladder with perivesicular   inflammatory change and a left lower quadrant transplant kidney with a   nonobstructing 1.1 cm lower pole stone and moderate perinephric fat   stranding. No hydronephrosis. Differential includes urinary tract   infection.    comleted course of abx   f/u with ID and urology       11- paraprotienmeia :   f/u bone marrow bx : no increased in plasma cells     12 hypercalcemia : monitor for now     13- abd discomfort : improved today   CT abd as above   check PVR and if >200 will place gutierrez .. discussed with Dr. Goldberg

## 2019-09-19 NOTE — PROGRESS NOTE ADULT - ASSESSMENT
Assessment and Plan:   · Assessment		  1. Anemia sec to CKD/B12/folate deficiency/MGUS  -- s/p 3 weekly B12 1000 mcg IM injections- last 9/14;  B12 1000 mcg IM weekly for 1 more dose then continue monthly;  PO FA 1mg QD;    -- Iron studies c/w Anemia of Chronic Inflammation/kidney disease- aranesp as per renal team  --Transfuse for Hgb<7    2. Worsening renal failure  -renal concerned about renal bx results (TMA) and possible connection to MGUS  -s/p bone marrow biopsy on 9/14-  There is no evidence of increased plasma cells. Case d/w Dr. Marks from Hematopathology.  [place] red stain is pending;  No suspicion for amyloid disease.    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      3 thrombocytopenia, resolved  -- reactive and consumptive, abx   -- most likely related to h/o HCV, ? Mycophenolate contributing  -- monitor for now    4. LE edema   --noted age indeterminate DVT b/l LE above knee; provoked by acute illness and immobilizaiton;  no indication for hypercoag w/u   --pt had been on and off AC in past for Afib  --now on apixaban.    Thank you for the courtesy of this consultation and we will continue to follow.    Lj Moon MD  [facility]  Cell: [phone]

## 2019-09-20 LAB
ANION GAP SERPL CALC-SCNC: 14 MMOL/L — SIGNIFICANT CHANGE UP (ref 5–17)
BUN SERPL-MCNC: 26 MG/DL — HIGH (ref 7–23)
CALCIUM SERPL-MCNC: 9.4 MG/DL — SIGNIFICANT CHANGE UP (ref 8.4–10.5)
CHLORIDE SERPL-SCNC: 96 MMOL/L — SIGNIFICANT CHANGE UP (ref 96–108)
CO2 SERPL-SCNC: 28 MMOL/L — SIGNIFICANT CHANGE UP (ref 22–31)
CREAT SERPL-MCNC: 6.28 MG/DL — HIGH (ref 0.5–1.3)
GLUCOSE BLDC GLUCOMTR-MCNC: 102 MG/DL — HIGH (ref 70–99)
GLUCOSE BLDC GLUCOMTR-MCNC: 102 MG/DL — HIGH (ref 70–99)
GLUCOSE BLDC GLUCOMTR-MCNC: 107 MG/DL — HIGH (ref 70–99)
GLUCOSE BLDC GLUCOMTR-MCNC: 138 MG/DL — HIGH (ref 70–99)
GLUCOSE BLDC GLUCOMTR-MCNC: 94 MG/DL — SIGNIFICANT CHANGE UP (ref 70–99)
GLUCOSE SERPL-MCNC: 120 MG/DL — HIGH (ref 70–99)
HCT VFR BLD CALC: 29.3 % — LOW (ref 39–50)
HGB BLD-MCNC: 9.3 G/DL — LOW (ref 13–17)
MAGNESIUM SERPL-MCNC: 2 MG/DL — SIGNIFICANT CHANGE UP (ref 1.6–2.6)
MCHC RBC-ENTMCNC: 27.1 PG — SIGNIFICANT CHANGE UP (ref 27–34)
MCHC RBC-ENTMCNC: 31.8 GM/DL — LOW (ref 32–36)
MCV RBC AUTO: 85.4 FL — SIGNIFICANT CHANGE UP (ref 80–100)
PLATELET # BLD AUTO: 172 K/UL — SIGNIFICANT CHANGE UP (ref 150–400)
POTASSIUM SERPL-MCNC: 3.6 MMOL/L — SIGNIFICANT CHANGE UP (ref 3.5–5.3)
POTASSIUM SERPL-SCNC: 3.6 MMOL/L — SIGNIFICANT CHANGE UP (ref 3.5–5.3)
RBC # BLD: 3.43 M/UL — LOW (ref 4.2–5.8)
RBC # FLD: 15.4 % — HIGH (ref 10.3–14.5)
SODIUM SERPL-SCNC: 138 MMOL/L — SIGNIFICANT CHANGE UP (ref 135–145)
WBC # BLD: 6.3 K/UL — SIGNIFICANT CHANGE UP (ref 3.8–10.5)
WBC # FLD AUTO: 6.3 K/UL — SIGNIFICANT CHANGE UP (ref 3.8–10.5)

## 2019-09-20 PROCEDURE — 99232 SBSQ HOSP IP/OBS MODERATE 35: CPT | Mod: GC

## 2019-09-20 RX ADMIN — TAMSULOSIN HYDROCHLORIDE 0.4 MILLIGRAM(S): 0.4 CAPSULE ORAL at 22:21

## 2019-09-20 RX ADMIN — Medication 1 MILLIGRAM(S): at 13:28

## 2019-09-20 RX ADMIN — Medication 5 MILLIGRAM(S): at 06:02

## 2019-09-20 RX ADMIN — Medication 100 MILLIGRAM(S): at 13:28

## 2019-09-20 RX ADMIN — APIXABAN 2.5 MILLIGRAM(S): 2.5 TABLET, FILM COATED ORAL at 06:02

## 2019-09-20 RX ADMIN — MYCOPHENOLATE MOFETIL 250 MILLIGRAM(S): 250 CAPSULE ORAL at 17:15

## 2019-09-20 RX ADMIN — MYCOPHENOLATE MOFETIL 250 MILLIGRAM(S): 250 CAPSULE ORAL at 06:02

## 2019-09-20 RX ADMIN — CHLORHEXIDINE GLUCONATE 1 APPLICATION(S): 213 SOLUTION TOPICAL at 18:26

## 2019-09-20 RX ADMIN — Medication 1 TABLET(S): at 06:02

## 2019-09-20 RX ADMIN — Medication 100 MILLIGRAM(S): at 13:27

## 2019-09-20 RX ADMIN — Medication 1 TABLET(S): at 17:16

## 2019-09-20 RX ADMIN — LEVETIRACETAM 1000 MILLIGRAM(S): 250 TABLET, FILM COATED ORAL at 06:02

## 2019-09-20 RX ADMIN — Medication 650 MILLIGRAM(S): at 17:15

## 2019-09-20 RX ADMIN — ATORVASTATIN CALCIUM 40 MILLIGRAM(S): 80 TABLET, FILM COATED ORAL at 22:25

## 2019-09-20 RX ADMIN — Medication 300 MILLIGRAM(S): at 17:15

## 2019-09-20 RX ADMIN — CARVEDILOL PHOSPHATE 6.25 MILLIGRAM(S): 80 CAPSULE, EXTENDED RELEASE ORAL at 13:29

## 2019-09-20 RX ADMIN — Medication 100 MILLIGRAM(S): at 22:21

## 2019-09-20 RX ADMIN — CINACALCET 30 MILLIGRAM(S): 30 TABLET, FILM COATED ORAL at 13:27

## 2019-09-20 RX ADMIN — INSULIN GLARGINE 10 UNIT(S): 100 INJECTION, SOLUTION SUBCUTANEOUS at 23:15

## 2019-09-20 RX ADMIN — APIXABAN 2.5 MILLIGRAM(S): 2.5 TABLET, FILM COATED ORAL at 17:15

## 2019-09-20 RX ADMIN — CARVEDILOL PHOSPHATE 6.25 MILLIGRAM(S): 80 CAPSULE, EXTENDED RELEASE ORAL at 22:21

## 2019-09-20 RX ADMIN — LEVETIRACETAM 1000 MILLIGRAM(S): 250 TABLET, FILM COATED ORAL at 17:15

## 2019-09-20 RX ADMIN — Medication 650 MILLIGRAM(S): at 06:02

## 2019-09-20 NOTE — PROGRESS NOTE ADULT - PROBLEM SELECTOR PLAN 2
Failed allograft likely secondary to non compliance. now with KAMRON over CKD stage 5  ,now started on dialysis since 8/27/19. Last HD on 9/18. Back on diuretics. Renal bx shows chronic TMA which could be recurrence of disease that caused original kidney damage as well as subsequent renal transplant failure. Bone marrow shows no plasma cell proliferation.

## 2019-09-20 NOTE — PROGRESS NOTE ADULT - SUBJECTIVE AND OBJECTIVE BOX
Patient is a 74y old  Male who presents with a chief complaint of renal failure (20 Sep 2019 13:00)                                                               INTERVAL HPI/OVERNIGHT EVENTS:    REVIEW OF SYSTEMS:     CONSTITUTIONAL: No weakness, fevers or chills  EYES/ENT: No visual changes , no ear ache   NECK: No pain or stiffness  RESPIRATORY: No cough, wheezing,  No shortness of breath  CARDIOVASCULAR: No chest pain or palpitations  GASTROINTESTINAL: No abdominal pain  . No nausea, vomiting, or hematemesis; No diarrhea or constipation. No melena or hematochezia.  GENITOURINARY: No dysuria, frequency or hematuria  NEUROLOGICAL: No numbness or weakness  SKIN: No itching, burning, rashes, or lesions                                                                                                                                                                                                                                                                                 Medications:  MEDICATIONS  (STANDING):  allopurinol 100 milliGRAM(s) Oral daily  apixaban 2.5 milliGRAM(s) Oral two times a day  atorvastatin 40 milliGRAM(s) Oral at bedtime  carvedilol 6.25 milliGRAM(s) Oral every 12 hours  chlorhexidine 2% Cloths 1 Application(s) Topical daily  cinacalcet 30 milliGRAM(s) Oral daily  cyanocobalamin Injectable 1000 MICROGram(s) IntraMuscular <User Schedule>  darbepoetin Injectable ViaL 60 MICROGram(s) IV Push every week  dextrose 5%. 1000 milliLiter(s) (50 mL/Hr) IV Continuous <Continuous>  dextrose 50% Injectable 12.5 Gram(s) IV Push once  dextrose 50% Injectable 25 Gram(s) IV Push once  dextrose 50% Injectable 25 Gram(s) IV Push once  ergocalciferol 36889 Unit(s) Oral every week  folic acid 1 milliGRAM(s) Oral daily  furosemide    Tablet 60 milliGRAM(s) Oral two times a day  hydrALAZINE 100 milliGRAM(s) Oral three times a day  hydrocortisone hemorrhoidal Suppository 1 Suppository(s) Rectal at bedtime  insulin glargine Injectable (LANTUS) 10 Unit(s) SubCutaneous at bedtime  insulin lispro (HumaLOG) corrective regimen sliding scale   SubCutaneous three times a day with meals  insulin lispro (HumaLOG) corrective regimen sliding scale   SubCutaneous at bedtime  labetalol 300 milliGRAM(s) Oral two times a day  lactobacillus acidophilus 1 Tablet(s) Oral two times a day  levETIRAcetam 1000 milliGRAM(s) Oral two times a day  mycophenolate mofetil 250 milliGRAM(s) Oral two times a day  predniSONE   Tablet 5 milliGRAM(s) Oral daily  sodium bicarbonate 650 milliGRAM(s) Oral two times a day  tamsulosin 0.4 milliGRAM(s) Oral at bedtime  thiamine 100 milliGRAM(s) Oral daily    MEDICATIONS  (PRN):  acetaminophen   Tablet .. 650 milliGRAM(s) Oral every 6 hours PRN Temp greater or equal to 38C (100.4F)  aluminum hydroxide/magnesium hydroxide/simethicone Suspension 30 milliLiter(s) Oral every 6 hours PRN Dyspepsia  dextrose 40% Gel 15 Gram(s) Oral once PRN Blood Glucose LESS THAN 70 milliGRAM(s)/deciliter  glucagon  Injectable 1 milliGRAM(s) IntraMuscular once PRN Glucose LESS THAN 70 milligrams/deciliter       Allergies    No Known Allergies    Intolerances      Vital Signs Last 24 Hrs  T(C): 36.9 (20 Sep 2019 13:27), Max: 36.9 (20 Sep 2019 09:10)  T(F): 98.5 (20 Sep 2019 13:27), Max: 98.5 (20 Sep 2019 13:27)  HR: 74 (20 Sep 2019 13:27) (71 - 79)  BP: 153/77 (20 Sep 2019 13:27) (119/52 - 153/77)  BP(mean): --  RR: 18 (20 Sep 2019 13:27) (17 - 18)  SpO2: 98% (20 Sep 2019 13:27) (96% - 98%)  CAPILLARY BLOOD GLUCOSE      POCT Blood Glucose.: 102 mg/dL (20 Sep 2019 13:18)  POCT Blood Glucose.: 102 mg/dL (20 Sep 2019 08:44)  POCT Blood Glucose.: 107 mg/dL (19 Sep 2019 22:04)  POCT Blood Glucose.: 111 mg/dL (19 Sep 2019 17:32)       @ 07:  -   @ 07:00  --------------------------------------------------------  IN: 480 mL / OUT: 0 mL / NET: 480 mL     @ 07: @ 14:48  --------------------------------------------------------  IN: 800 mL / OUT: 1800 mL / NET: -1000 mL      Physical Exam:    Daily     Daily Weight in k.4 (20 Sep 2019 12:10)  General:  NAD   HEENT:  Nonicteric, PERRLA  CV:  RRR, S1S2   Lungs:  CTA B/L, no wheezes, rales, rhonchi  Abdomen:  Soft, non-tender, no distended, positive BS  Extremities:  no edema  Neuro:  AAOx3, non-focal, grossly intact                                                                                                                                                                                                                                                                                                LABS:                               9.3    6.3   )-----------( 172      ( 20 Sep 2019 09:35 )             29.3                          138  |  96  |  26<H>  ----------------------------<  120<H>  3.6   |  28  |  6.28<H>    Ca    9.4      20 Sep 2019 09:35  Mg     2.0                              Time spent :

## 2019-09-20 NOTE — PROGRESS NOTE ADULT - PROBLEM SELECTOR PLAN 3
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 admitted with fluid overload and HTN urgency . Last renal bx was 2017, presumed rejection, [place] red was not done.   Cont MMF and pred for now

## 2019-09-20 NOTE — PROGRESS NOTE ADULT - ASSESSMENT
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   Pt just moved back to [place] from [place] .. reports that he has not been taking his meds for the past few days since " he might have misplaed them"     pt denies chest pain, syncope,fever, chills, cough, urinary symptoms.    in ED found  to have anemia   Nno acute changes on EKG   elevated CR and Trop    1- HTN urgency :   cont meds ...     2-KAMRON  : bx : transplant glomurlosclerosis /interstitial fibrosis ... off tacro,  cont cellcept and steriods..   BM bx : no evidence of increased plasma cells       3- DM:   A1c  5.6  Fs     4- acute HF sec to  HTN urgency and renal failure   Echo :  < from: Transthoracic Echocardiogram (08.28.19 @ 16:12) >  1. Mitral annular calcification and calcified mitral  leaflets with decreased diastolic opening.  2. Moderate to severe concentric left ventricular  hypertrophy.  3. Normal left ventricular systolic function with  mid-cavitary obliteration.  4. Normal right ventricular size and systolic function.  5. Small pericardial effusion.  cont fluid removal with HD per renal      5-  difficult ambulating :  no focal neuro deficits   neuro eval appreciated    CT T/L   < from: CT Thoracic Spine No Cont (08.30.19 @ 10:06) >    Old right L1 transverse process fracture. Bilateral lysis   defects at L5 as seen on the prior 5/26/2013      6- afib : reestart AC : change to eliquis  : discussed w Dr. Dumont     7- hematochezia : per sister : on and off small amount of fresh blood in stool and some amount in urine but only small amounts   monitor H/H   consult GI .: appreciate input :  suspect bleeding to be hemorrhoidal, now resolved     trial of anusol supp  if bleeding persist would consider colonoscopy however patient is reluctant.    heme input: appreciated  .    8-  renal transplant : f/u with Transplant team   cont meds with MMF and steroids   off Tac   s/p bx : as above       9- TIA :  unable to perform MRI   CT no acute changes on CT   repeat negative   fu with neuro     10 encephalopathy :  now much improved    CTH with contrast : neg for acute pathology   LP : high protien but culture negative in CSF   blood culture positive for GNR /Ecoli likely urinary source   CT : < from: CT Abdomen and Pelvis No Cont (09.09.19 @ 22:26) >    Layering stones in a thick-walled urinary bladder with perivesicular   inflammatory change and a left lower quadrant transplant kidney with a   nonobstructing 1.1 cm lower pole stone and moderate perinephric fat   stranding. No hydronephrosis. Differential includes urinary tract   infection.    comleted course of abx   f/u with ID and urology       11- paraprotienmeia :    bone marrow bx : no increased in plasma cells     12 hypercalcemia : monitor for now     13- abd discomfort : improved today   CT abd as above   check PVR and if >200 will place gutierrez .. discussed with Dr. Goldberg

## 2019-09-20 NOTE — PROGRESS NOTE ADULT - SUBJECTIVE AND OBJECTIVE BOX
Pt seen/examined and comfortable      MEDICATIONS  (STANDING):  allopurinol 100 milliGRAM(s) Oral daily  apixaban 2.5 milliGRAM(s) Oral two times a day  atorvastatin 40 milliGRAM(s) Oral at bedtime  carvedilol 6.25 milliGRAM(s) Oral every 12 hours  chlorhexidine 2% Cloths 1 Application(s) Topical daily  cinacalcet 30 milliGRAM(s) Oral daily  cyanocobalamin Injectable 1000 MICROGram(s) IntraMuscular <User Schedule>  darbepoetin Injectable ViaL 60 MICROGram(s) IV Push every week  dextrose 5%. 1000 milliLiter(s) (50 mL/Hr) IV Continuous <Continuous>  dextrose 50% Injectable 12.5 Gram(s) IV Push once  dextrose 50% Injectable 25 Gram(s) IV Push once  dextrose 50% Injectable 25 Gram(s) IV Push once  ergocalciferol 62116 Unit(s) Oral every week  folic acid 1 milliGRAM(s) Oral daily  furosemide    Tablet 60 milliGRAM(s) Oral two times a day  hydrALAZINE 100 milliGRAM(s) Oral three times a day  hydrocortisone hemorrhoidal Suppository 1 Suppository(s) Rectal at bedtime  insulin glargine Injectable (LANTUS) 10 Unit(s) SubCutaneous at bedtime  insulin lispro (HumaLOG) corrective regimen sliding scale   SubCutaneous three times a day with meals  insulin lispro (HumaLOG) corrective regimen sliding scale   SubCutaneous at bedtime  labetalol 300 milliGRAM(s) Oral two times a day  lactobacillus acidophilus 1 Tablet(s) Oral two times a day  levETIRAcetam 1000 milliGRAM(s) Oral two times a day  mycophenolate mofetil 250 milliGRAM(s) Oral two times a day  predniSONE   Tablet 5 milliGRAM(s) Oral daily  sodium bicarbonate 650 milliGRAM(s) Oral two times a day  tamsulosin 0.4 milliGRAM(s) Oral at bedtime  thiamine 100 milliGRAM(s) Oral daily    MEDICATIONS  (PRN):  acetaminophen   Tablet .. 650 milliGRAM(s) Oral every 6 hours PRN Temp greater or equal to 38C (100.4F)  aluminum hydroxide/magnesium hydroxide/simethicone Suspension 30 milliLiter(s) Oral every 6 hours PRN Dyspepsia  dextrose 40% Gel 15 Gram(s) Oral once PRN Blood Glucose LESS THAN 70 milliGRAM(s)/deciliter  glucagon  Injectable 1 milliGRAM(s) IntraMuscular once PRN Glucose LESS THAN 70 milligrams/deciliter      ROS  No fever, sweats, chills  No epistaxis, HA, sore throat  No CP, SOB, cough, sputum  No n/v/d, abd pain, melena, hematochezia  No edema  No rash  No anxiety  No back pain, joint pain  No bleeding, bruising  No dysuria, hematuria    Vital Signs Last 24 Hrs  T(C): 36.9 (20 Sep 2019 09:10), Max: 36.9 (20 Sep 2019 09:10)  T(F): 98.4 (20 Sep 2019 09:10), Max: 98.4 (20 Sep 2019 09:10)  HR: 74 (20 Sep 2019 09:10) (71 - 79)  BP: 135/73 (20 Sep 2019 09:10) (119/52 - 139/73)  BP(mean): --  RR: 17 (20 Sep 2019 09:10) (17 - 18)  SpO2: 98% (20 Sep 2019 09:10) (96% - 98%)    PE  NAD  Awake, alert  Anicteric, MMM  RRR  CTAB  Abd soft, NT, ND  No c/c/e  No rash grossly  FROM                          9.3    6.3   )-----------( 172      ( 20 Sep 2019 09:35 )             29.3       09-20    138  |  96  |  26<H>  ----------------------------<  120<H>  3.6   |  28  |  6.28<H>    Ca    9.4      20 Sep 2019 09:35  Mg     2.0     09-20

## 2019-09-20 NOTE — PROGRESS NOTE ADULT - ATTENDING COMMENTS
h/o renal transplant, now ESRD on dialysis> renal biopsy this admission showed TMA- MPGN pattern of injury ( could have been original disease and hence recurrence each transplant). The kidney is also has a lot of fibrosis and almost end stage but has lambda predominance on IF. There are no deposits but there is endothelial injury and suggestive of non thrombotic variant of TMA.  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 complement disorders can do it but less likely as lambda positive here. Tacrolimus can be associated with this too. repeat BM biopsy is NEGATIVE.     https://www.ncbi.nlm.nih.gov/pubmed/27906788 is a good reference ( largest series of TMA with MGUS) we had published with [facility].    aHUS panel has been sent to [facility].     For now, continue dialysis MWF.

## 2019-09-20 NOTE — PROGRESS NOTE ADULT - SUBJECTIVE AND OBJECTIVE BOX
Subjective: Patient seen and examined. No new events except as noted.     REVIEW OF SYSTEMS:    CONSTITUTIONAL: No weakness, fevers or chills  EYES/ENT: No visual changes;  No vertigo or throat pain   NECK: No pain or stiffness  RESPIRATORY: No cough, wheezing, hemoptysis; No shortness of breath  CARDIOVASCULAR: No chest pain or palpitations  GASTROINTESTINAL: No abdominal or epigastric pain. No nausea, vomiting, or hematemesis; No diarrhea or constipation. No melena or hematochezia.  GENITOURINARY: No dysuria, frequency or hematuria  NEUROLOGICAL: No numbness or weakness  SKIN: No itching, burning, rashes, or lesions   All other review of systems is negative unless indicated above.    MEDICATIONS:  MEDICATIONS  (STANDING):  allopurinol 100 milliGRAM(s) Oral daily  apixaban 2.5 milliGRAM(s) Oral two times a day  atorvastatin 40 milliGRAM(s) Oral at bedtime  carvedilol 6.25 milliGRAM(s) Oral every 12 hours  chlorhexidine 2% Cloths 1 Application(s) Topical daily  cinacalcet 30 milliGRAM(s) Oral daily  cyanocobalamin Injectable 1000 MICROGram(s) IntraMuscular <User Schedule>  darbepoetin Injectable ViaL 60 MICROGram(s) IV Push every week  dextrose 5%. 1000 milliLiter(s) (50 mL/Hr) IV Continuous <Continuous>  dextrose 50% Injectable 12.5 Gram(s) IV Push once  dextrose 50% Injectable 25 Gram(s) IV Push once  dextrose 50% Injectable 25 Gram(s) IV Push once  ergocalciferol 12730 Unit(s) Oral every week  folic acid 1 milliGRAM(s) Oral daily  furosemide    Tablet 60 milliGRAM(s) Oral two times a day  hydrALAZINE 100 milliGRAM(s) Oral three times a day  hydrocortisone hemorrhoidal Suppository 1 Suppository(s) Rectal at bedtime  insulin glargine Injectable (LANTUS) 10 Unit(s) SubCutaneous at bedtime  insulin lispro (HumaLOG) corrective regimen sliding scale   SubCutaneous three times a day with meals  insulin lispro (HumaLOG) corrective regimen sliding scale   SubCutaneous at bedtime  labetalol 300 milliGRAM(s) Oral two times a day  lactobacillus acidophilus 1 Tablet(s) Oral two times a day  levETIRAcetam 1000 milliGRAM(s) Oral two times a day  mycophenolate mofetil 250 milliGRAM(s) Oral two times a day  predniSONE   Tablet 5 milliGRAM(s) Oral daily  sodium bicarbonate 650 milliGRAM(s) Oral two times a day  tamsulosin 0.4 milliGRAM(s) Oral at bedtime  thiamine 100 milliGRAM(s) Oral daily      PHYSICAL EXAM:  T(C): 36.9 (09-20-19 @ 09:10), Max: 36.9 (09-20-19 @ 09:10)  HR: 74 (09-20-19 @ 09:10) (71 - 79)  BP: 135/73 (09-20-19 @ 09:10) (119/52 - 139/73)  RR: 17 (09-20-19 @ 09:10) (17 - 18)  SpO2: 98% (09-20-19 @ 09:10) (96% - 98%)  Wt(kg): --  I&O's Summary    19 Sep 2019 07:01  -  20 Sep 2019 07:00  --------------------------------------------------------  IN: 480 mL / OUT: 0 mL / NET: 480 mL          Appearance: NAD  HEENT:   Normal oral mucosa, PERRL, EOMI	  Lymphatic: No lymphadenopathy , no edema  Cardiovascular: Normal S1 S2, No JVD, No murmurs , Peripheral pulses palpable 2+ bilaterally  Respiratory: Lungs clear to auscultation, normal effort 	  Gastrointestinal:  Soft, Non-tender, + BS	  Skin: No rashes, No ecchymoses, No cyanosis, warm to touch  Musculoskeletal: Normal range of motion, normal strength  Psychiatry:  Mood & affect appropriate  Ext: No edema      LABS:    CARDIAC MARKERS:                                9.3    6.3   )-----------( 172      ( 20 Sep 2019 09:35 )             29.3     09-20    138  |  96  |  26<H>  ----------------------------<  120<H>  3.6   |  28  |  6.28<H>    Ca    9.4      20 Sep 2019 09:35  Mg     2.0     09-20      proBNP:   Lipid Profile:   HgA1c:   TSH:             TELEMETRY: 	    ECG:  	  RADIOLOGY:   DIAGNOSTIC TESTING:  [ ] Echocardiogram:  [ ]  Catheterization:  [ ] Stress Test:    OTHER:

## 2019-09-20 NOTE — PROGRESS NOTE ADULT - SUBJECTIVE AND OBJECTIVE BOX
[facility] Division of Kidney Diseases & Hypertension  FOLLOW UP NOTE  [phone]--------------------------------------------------------------------------------  Chief Complaint:Acute renal failure      24 hour events/subjective: No acute events overnight. Patient resting comfortably in bed. Labs and vitals reviewed. Grossly stable. For dialysis today.        PAST HISTORY  --------------------------------------------------------------------------------  No significant changes to PMH, PSH, FHx, SHx, unless otherwise noted    ALLERGIES & MEDICATIONS  --------------------------------------------------------------------------------  Allergies    No Known Allergies    Intolerances      Standing Inpatient Medications  allopurinol 100 milliGRAM(s) Oral daily  apixaban 2.5 milliGRAM(s) Oral two times a day  atorvastatin 40 milliGRAM(s) Oral at bedtime  carvedilol 6.25 milliGRAM(s) Oral every 12 hours  cinacalcet 30 milliGRAM(s) Oral daily  cyanocobalamin Injectable 1000 MICROGram(s) IntraMuscular <User Schedule>  darbepoetin Injectable ViaL 60 MICROGram(s) IV Push every week  ergocalciferol 20100 Unit(s) Oral every week  folic acid 1 milliGRAM(s) Oral daily  furosemide    Tablet 60 milliGRAM(s) Oral two times a day  hydrALAZINE 100 milliGRAM(s) Oral three times a day  hydrocortisone hemorrhoidal Suppository 1 Suppository(s) Rectal at bedtime  insulin glargine Injectable (LANTUS) 10 Unit(s) SubCutaneous at bedtime  insulin lispro (HumaLOG) corrective regimen sliding scale   SubCutaneous three times a day with meals  insulin lispro (HumaLOG) corrective regimen sliding scale   SubCutaneous at bedtime  labetalol 300 milliGRAM(s) Oral two times a day  lactobacillus acidophilus 1 Tablet(s) Oral two times a day  levETIRAcetam 1000 milliGRAM(s) Oral two times a day  mycophenolate mofetil 250 milliGRAM(s) Oral two times a day  predniSONE   Tablet 5 milliGRAM(s) Oral daily  sodium bicarbonate 650 milliGRAM(s) Oral two times a day  tamsulosin 0.4 milliGRAM(s) Oral at bedtime  thiamine 100 milliGRAM(s) Oral daily    PRN Inpatient Medications  acetaminophen   Tablet .. 650 milliGRAM(s) Oral every 6 hours PRN  aluminum hydroxide/magnesium hydroxide/simethicone Suspension 30 milliLiter(s) Oral every 6 hours PRN  dextrose 40% Gel 15 Gram(s) Oral once PRN  glucagon  Injectable 1 milliGRAM(s) IntraMuscular once PRN      REVIEW OF SYSTEMS  --------------------------------------------------------------------------------  Gen: No  fevers/chills  Skin: No rashes  Head/Eyes/Ears/Mouth: No headache; Normal hearing; Normal vision w/o blurriness  Respiratory: No dyspnea, cough, wheezing, hemoptysis  CV: No chest pain, PND, orthopnea  GI: No abdominal pain, diarrhea, constipation, nausea, vomiting  : No increased frequency, dysuria, hematuria, nocturia  MSK: No joint pain/swelling; no back pain; no edema  Neuro: No dizziness/lightheadedness, weakness, seizures, numbness, tingling      All other systems were reviewed and are negative, except as noted.    VITALS/PHYSICAL EXAM  --------------------------------------------------------------------------------  T(C): 36.5 (09-20-19 @ 04:12), Max: 36.8 (09-19-19 @ 13:02)  HR: 71 (09-20-19 @ 06:00) (71 - 79)  BP: 139/66 (09-20-19 @ 06:00) (119/52 - 139/73)  RR: 18 (09-20-19 @ 06:00) (18 - 18)  SpO2: 98% (09-20-19 @ 06:00) (96% - 98%)  Wt(kg): --        09-19-19 @ 07:01  -  09-20-19 @ 07:00  --------------------------------------------------------  IN: 480 mL / OUT: 0 mL / NET: 480 mL      Physical Exam:  General: in no apparent distress  Neck: Supple, no JVD,    Lungs: no rhonchi, no wheeze, no crackles  CVS: S1 S2 no M/R/G  Abdomen: no tenderness, no organomegaly, BS present  Neuro: Grossly intact  Skin: warm, dry  Ext: no cyanosis or clubbing, no edema  Access: AVF + thrill    LABS/STUDIES  --------------------------------------------------------------------------------              9.0    6.1   >-----------<  178      [09-18-19 @ 10:13]              28.8     137  |  95  |  17  ----------------------------<  112      [09-19-19 @ 05:34]  3.3   |  27  |  4.52        Ca     9.1     [09-19-19 @ 05:34]            Creatinine Trend:  SCr 4.52 [09-19 @ 05:34]  SCr 5.94 [09-18 @ 10:13]  SCr 4.71 [09-17 @ 13:20]  SCr 5.37 [09-15 @ 06:03]  SCr 4.63 [09-14 @ 06:38]            C3 Complement 100      [09-13-19 @ 20:13]  C4 Complement 15      [09-13-19 @ 20:13]

## 2019-09-20 NOTE — PROGRESS NOTE ADULT - SUBJECTIVE AND OBJECTIVE BOX
INTERVAL HPI/OVERNIGHT EVENTS:    pt seen and examined in HD  No new overnight event.  No N/V/D.  Tolerating diet.  No further rectal bleeding  h/h remains stable     MEDICATIONS  (STANDING):  allopurinol 100 milliGRAM(s) Oral daily  apixaban 2.5 milliGRAM(s) Oral two times a day  atorvastatin 40 milliGRAM(s) Oral at bedtime  carvedilol 6.25 milliGRAM(s) Oral every 12 hours  chlorhexidine 2% Cloths 1 Application(s) Topical daily  cinacalcet 30 milliGRAM(s) Oral daily  cyanocobalamin Injectable 1000 MICROGram(s) IntraMuscular <User Schedule>  darbepoetin Injectable ViaL 60 MICROGram(s) IV Push every week  dextrose 5%. 1000 milliLiter(s) (50 mL/Hr) IV Continuous <Continuous>  dextrose 50% Injectable 12.5 Gram(s) IV Push once  dextrose 50% Injectable 25 Gram(s) IV Push once  dextrose 50% Injectable 25 Gram(s) IV Push once  ergocalciferol 40386 Unit(s) Oral every week  folic acid 1 milliGRAM(s) Oral daily  furosemide    Tablet 60 milliGRAM(s) Oral two times a day  hydrALAZINE 100 milliGRAM(s) Oral three times a day  hydrocortisone hemorrhoidal Suppository 1 Suppository(s) Rectal at bedtime  insulin glargine Injectable (LANTUS) 10 Unit(s) SubCutaneous at bedtime  insulin lispro (HumaLOG) corrective regimen sliding scale   SubCutaneous three times a day with meals  insulin lispro (HumaLOG) corrective regimen sliding scale   SubCutaneous at bedtime  labetalol 300 milliGRAM(s) Oral two times a day  lactobacillus acidophilus 1 Tablet(s) Oral two times a day  levETIRAcetam 1000 milliGRAM(s) Oral two times a day  mycophenolate mofetil 250 milliGRAM(s) Oral two times a day  predniSONE   Tablet 5 milliGRAM(s) Oral daily  sodium bicarbonate 650 milliGRAM(s) Oral two times a day  tamsulosin 0.4 milliGRAM(s) Oral at bedtime  thiamine 100 milliGRAM(s) Oral daily    MEDICATIONS  (PRN):  acetaminophen   Tablet .. 650 milliGRAM(s) Oral every 6 hours PRN Temp greater or equal to 38C (100.4F)  aluminum hydroxide/magnesium hydroxide/simethicone Suspension 30 milliLiter(s) Oral every 6 hours PRN Dyspepsia  dextrose 40% Gel 15 Gram(s) Oral once PRN Blood Glucose LESS THAN 70 milliGRAM(s)/deciliter  glucagon  Injectable 1 milliGRAM(s) IntraMuscular once PRN Glucose LESS THAN 70 milligrams/deciliter      Allergies    No Known Allergies    Intolerances        Review of Systems:    General:  No wt loss, fevers, chills, night sweats,fatigue,   Eyes:  Good vision, no reported pain  ENT:  No sore throat, pain, runny nose, dysphagia  CV:  No pain, palpitations, hypo/hypertension  Resp:  No dyspnea, cough, tachypnea, wheezing  GI:  No pain, No nausea, No vomiting, No diarrhea, No constipation, No weight loss, No fever, No pruritis, No rectal bleeding, No melena, No dysphagia  :  No pain, bleeding, incontinence, nocturia  Muscle:  No pain, weakness  Neuro:  No weakness, tingling, memory problems  Psych:  No fatigue, insomnia, mood problems, depression  Endocrine:  No polyuria, polydypsia, cold/heat intolerance  Heme:  No petechiae, ecchymosis, easy bruisability  Skin:  No rash, tattoos, scars, edema      Vital Signs Last 24 Hrs  T(C): 36.9 (20 Sep 2019 09:10), Max: 36.9 (20 Sep 2019 09:10)  T(F): 98.4 (20 Sep 2019 09:10), Max: 98.4 (20 Sep 2019 09:10)  HR: 74 (20 Sep 2019 09:10) (71 - 79)  BP: 135/73 (20 Sep 2019 09:10) (119/52 - 139/73)  BP(mean): --  RR: 17 (20 Sep 2019 09:10) (17 - 18)  SpO2: 98% (20 Sep 2019 09:10) (96% - 98%)    PHYSICAL EXAM:    Constitutional: NAD, well-developed  HEENT: EOMI, throat clear  Neck: No LAD, supple  Respiratory: CTA and P  Cardiovascular: S1 and S2, RRR, no M  Gastrointestinal: BS+, soft, NT/ND, neg HSM,  Extremities: No peripheral edema, neg clubing, cyanosis  Vascular: 2+ peripheral pulses  Neurological: A/O x 3, no focal deficits  Psychiatric: Normal mood, normal affect  Skin: No rashes      LABS:                        9.3    6.3   )-----------( 172      ( 20 Sep 2019 09:35 )             29.3     09-20    138  |  96  |  26<H>  ----------------------------<  120<H>  3.6   |  28  |  6.28<H>    Ca    9.4      20 Sep 2019 09:35  Mg     2.0     09-20            RADIOLOGY & ADDITIONAL TESTS:

## 2019-09-20 NOTE — PROGRESS NOTE ADULT - ASSESSMENT
Assessment and Plan:   · Assessment		  1. Anemia sec to CKD/B12/folate deficiency/MGUS  -- s/p 3 weekly B12 1000 mcg IM injections- last 9/14;  B12 1000 mcg IM weekly for 1 more dose then continue monthly;  PO FA 1mg QD;    -- Iron studies c/w Anemia of Chronic Inflammation/kidney disease- aranesp as per renal team  -aranesp as per renal  --Transfuse for Hgb<7    2. Worsening renal failure  -renal concerned about renal bx results (TMA) and possible connection to MGUS  -s/p bone marrow biopsy on 9/14-  There is no evidence of increased plasma cells. Case d/w Dr. Marks from Hematopathology.  [place] red stain is pending;  No suspicion for amyloid disease.    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      3 thrombocytopenia, resolved  -- reactive and consumptive, abx   -- most likely related to h/o HCV, ? Mycophenolate contributing  -- monitor for now    4. LE edema   --noted age indeterminate DVT b/l LE above knee; provoked by acute illness and immobilizaiton;  no indication for hypercoag w/u   --pt had been on and off AC in past for Afib  --now on apixaban.    Thank you for the courtesy of this consultation and we will continue to follow.    Lj Moon MD  [facility]  Cell: [phone]

## 2019-09-21 LAB
ANION GAP SERPL CALC-SCNC: 15 MMOL/L — SIGNIFICANT CHANGE UP (ref 5–17)
BUN SERPL-MCNC: 21 MG/DL — SIGNIFICANT CHANGE UP (ref 7–23)
CALCIUM SERPL-MCNC: 9.3 MG/DL — SIGNIFICANT CHANGE UP (ref 8.4–10.5)
CHLORIDE SERPL-SCNC: 94 MMOL/L — LOW (ref 96–108)
CO2 SERPL-SCNC: 29 MMOL/L — SIGNIFICANT CHANGE UP (ref 22–31)
CREAT SERPL-MCNC: 5.44 MG/DL — HIGH (ref 0.5–1.3)
GLUCOSE BLDC GLUCOMTR-MCNC: 113 MG/DL — HIGH (ref 70–99)
GLUCOSE BLDC GLUCOMTR-MCNC: 118 MG/DL — HIGH (ref 70–99)
GLUCOSE BLDC GLUCOMTR-MCNC: 127 MG/DL — HIGH (ref 70–99)
GLUCOSE BLDC GLUCOMTR-MCNC: 87 MG/DL — SIGNIFICANT CHANGE UP (ref 70–99)
GLUCOSE SERPL-MCNC: 148 MG/DL — HIGH (ref 70–99)
HAV IGM SER-ACNC: SIGNIFICANT CHANGE UP
HBV CORE IGM SER-ACNC: SIGNIFICANT CHANGE UP
HBV SURFACE AG SER-ACNC: SIGNIFICANT CHANGE UP
HCT VFR BLD CALC: 34 % — LOW (ref 39–50)
HCV AB S/CO SERPL IA: 9.77 S/CO — HIGH (ref 0–0.99)
HCV AB SERPL-IMP: REACTIVE
HGB BLD-MCNC: 10.5 G/DL — LOW (ref 13–17)
MCHC RBC-ENTMCNC: 26.5 PG — LOW (ref 27–34)
MCHC RBC-ENTMCNC: 30.7 GM/DL — LOW (ref 32–36)
MCV RBC AUTO: 86.4 FL — SIGNIFICANT CHANGE UP (ref 80–100)
PLATELET # BLD AUTO: 194 K/UL — SIGNIFICANT CHANGE UP (ref 150–400)
POTASSIUM SERPL-MCNC: 4 MMOL/L — SIGNIFICANT CHANGE UP (ref 3.5–5.3)
POTASSIUM SERPL-SCNC: 4 MMOL/L — SIGNIFICANT CHANGE UP (ref 3.5–5.3)
RBC # BLD: 3.94 M/UL — LOW (ref 4.2–5.8)
RBC # FLD: 15.4 % — HIGH (ref 10.3–14.5)
SODIUM SERPL-SCNC: 138 MMOL/L — SIGNIFICANT CHANGE UP (ref 135–145)
WBC # BLD: 7.8 K/UL — SIGNIFICANT CHANGE UP (ref 3.8–10.5)
WBC # FLD AUTO: 7.8 K/UL — SIGNIFICANT CHANGE UP (ref 3.8–10.5)

## 2019-09-21 RX ORDER — SENNA PLUS 8.6 MG/1
2 TABLET ORAL AT BEDTIME
Refills: 0 | Status: DISCONTINUED | OUTPATIENT
Start: 2019-09-21 | End: 2019-09-24

## 2019-09-21 RX ORDER — POLYETHYLENE GLYCOL 3350 17 G/17G
17 POWDER, FOR SOLUTION ORAL DAILY
Refills: 0 | Status: DISCONTINUED | OUTPATIENT
Start: 2019-09-21 | End: 2019-09-24

## 2019-09-21 RX ORDER — DOCUSATE SODIUM 100 MG
100 CAPSULE ORAL
Refills: 0 | Status: DISCONTINUED | OUTPATIENT
Start: 2019-09-21 | End: 2019-09-24

## 2019-09-21 RX ADMIN — APIXABAN 2.5 MILLIGRAM(S): 2.5 TABLET, FILM COATED ORAL at 17:08

## 2019-09-21 RX ADMIN — CINACALCET 30 MILLIGRAM(S): 30 TABLET, FILM COATED ORAL at 12:40

## 2019-09-21 RX ADMIN — Medication 300 MILLIGRAM(S): at 06:02

## 2019-09-21 RX ADMIN — Medication 5 MILLIGRAM(S): at 06:03

## 2019-09-21 RX ADMIN — CARVEDILOL PHOSPHATE 6.25 MILLIGRAM(S): 80 CAPSULE, EXTENDED RELEASE ORAL at 22:03

## 2019-09-21 RX ADMIN — Medication 100 MILLIGRAM(S): at 06:02

## 2019-09-21 RX ADMIN — CHLORHEXIDINE GLUCONATE 1 APPLICATION(S): 213 SOLUTION TOPICAL at 12:41

## 2019-09-21 RX ADMIN — Medication 100 MILLIGRAM(S): at 12:42

## 2019-09-21 RX ADMIN — Medication 1 TABLET(S): at 06:03

## 2019-09-21 RX ADMIN — LEVETIRACETAM 1000 MILLIGRAM(S): 250 TABLET, FILM COATED ORAL at 06:03

## 2019-09-21 RX ADMIN — Medication 60 MILLIGRAM(S): at 17:10

## 2019-09-21 RX ADMIN — Medication 100 MILLIGRAM(S): at 12:41

## 2019-09-21 RX ADMIN — TAMSULOSIN HYDROCHLORIDE 0.4 MILLIGRAM(S): 0.4 CAPSULE ORAL at 21:27

## 2019-09-21 RX ADMIN — Medication 1 MILLIGRAM(S): at 12:41

## 2019-09-21 RX ADMIN — Medication 100 MILLIGRAM(S): at 21:27

## 2019-09-21 RX ADMIN — MYCOPHENOLATE MOFETIL 250 MILLIGRAM(S): 250 CAPSULE ORAL at 17:09

## 2019-09-21 RX ADMIN — LEVETIRACETAM 1000 MILLIGRAM(S): 250 TABLET, FILM COATED ORAL at 17:09

## 2019-09-21 RX ADMIN — Medication 1 TABLET(S): at 17:10

## 2019-09-21 RX ADMIN — Medication 100 MILLIGRAM(S): at 17:11

## 2019-09-21 RX ADMIN — INSULIN GLARGINE 10 UNIT(S): 100 INJECTION, SOLUTION SUBCUTANEOUS at 22:02

## 2019-09-21 RX ADMIN — Medication 650 MILLIGRAM(S): at 06:03

## 2019-09-21 RX ADMIN — Medication 60 MILLIGRAM(S): at 06:02

## 2019-09-21 RX ADMIN — Medication 650 MILLIGRAM(S): at 17:09

## 2019-09-21 RX ADMIN — Medication 300 MILLIGRAM(S): at 17:08

## 2019-09-21 RX ADMIN — CARVEDILOL PHOSPHATE 6.25 MILLIGRAM(S): 80 CAPSULE, EXTENDED RELEASE ORAL at 12:42

## 2019-09-21 RX ADMIN — Medication 100 MILLIGRAM(S): at 17:07

## 2019-09-21 RX ADMIN — MYCOPHENOLATE MOFETIL 250 MILLIGRAM(S): 250 CAPSULE ORAL at 06:03

## 2019-09-21 RX ADMIN — APIXABAN 2.5 MILLIGRAM(S): 2.5 TABLET, FILM COATED ORAL at 06:02

## 2019-09-21 RX ADMIN — ATORVASTATIN CALCIUM 40 MILLIGRAM(S): 80 TABLET, FILM COATED ORAL at 21:26

## 2019-09-21 NOTE — PROGRESS NOTE ADULT - SUBJECTIVE AND OBJECTIVE BOX
Pt seen, feeling fine, no complaints    MEDICATIONS  (STANDING):  allopurinol 100 milliGRAM(s) Oral daily  apixaban 2.5 milliGRAM(s) Oral two times a day  atorvastatin 40 milliGRAM(s) Oral at bedtime  carvedilol 6.25 milliGRAM(s) Oral every 12 hours  chlorhexidine 2% Cloths 1 Application(s) Topical daily  cinacalcet 30 milliGRAM(s) Oral daily  cyanocobalamin Injectable 1000 MICROGram(s) IntraMuscular <User Schedule>  darbepoetin Injectable ViaL 60 MICROGram(s) IV Push every week  dextrose 5%. 1000 milliLiter(s) (50 mL/Hr) IV Continuous <Continuous>  dextrose 50% Injectable 12.5 Gram(s) IV Push once  dextrose 50% Injectable 25 Gram(s) IV Push once  dextrose 50% Injectable 25 Gram(s) IV Push once  docusate sodium 100 milliGRAM(s) Oral two times a day  ergocalciferol 78155 Unit(s) Oral every week  folic acid 1 milliGRAM(s) Oral daily  furosemide    Tablet 60 milliGRAM(s) Oral two times a day  hydrALAZINE 100 milliGRAM(s) Oral three times a day  hydrocortisone hemorrhoidal Suppository 1 Suppository(s) Rectal at bedtime  insulin glargine Injectable (LANTUS) 10 Unit(s) SubCutaneous at bedtime  insulin lispro (HumaLOG) corrective regimen sliding scale   SubCutaneous three times a day with meals  insulin lispro (HumaLOG) corrective regimen sliding scale   SubCutaneous at bedtime  labetalol 300 milliGRAM(s) Oral two times a day  lactobacillus acidophilus 1 Tablet(s) Oral two times a day  levETIRAcetam 1000 milliGRAM(s) Oral two times a day  mycophenolate mofetil 250 milliGRAM(s) Oral two times a day  polyethylene glycol 3350 17 Gram(s) Oral daily  predniSONE   Tablet 5 milliGRAM(s) Oral daily  senna 2 Tablet(s) Oral at bedtime  sodium bicarbonate 650 milliGRAM(s) Oral two times a day  tamsulosin 0.4 milliGRAM(s) Oral at bedtime  thiamine 100 milliGRAM(s) Oral daily    MEDICATIONS  (PRN):  acetaminophen   Tablet .. 650 milliGRAM(s) Oral every 6 hours PRN Temp greater or equal to 38C (100.4F)  aluminum hydroxide/magnesium hydroxide/simethicone Suspension 30 milliLiter(s) Oral every 6 hours PRN Dyspepsia  dextrose 40% Gel 15 Gram(s) Oral once PRN Blood Glucose LESS THAN 70 milliGRAM(s)/deciliter  glucagon  Injectable 1 milliGRAM(s) IntraMuscular once PRN Glucose LESS THAN 70 milligrams/deciliter      ROS  no fever, no pain, no bleeding, no SOB, limited 2/2 participation though, pleasant but not very conversant    Vital Signs Last 24 Hrs  T(C): 36.7 (21 Sep 2019 12:22), Max: 37.1 (20 Sep 2019 20:52)  T(F): 98 (21 Sep 2019 12:22), Max: 98.7 (20 Sep 2019 20:52)  HR: 80 (21 Sep 2019 12:22) (71 - 80)  BP: 113/62 (21 Sep 2019 12:22) (113/62 - 145/71)  BP(mean): --  RR: 18 (21 Sep 2019 12:22) (18 - 18)  SpO2: 97% (21 Sep 2019 12:22) (96% - 99%)    PE  NAD  Awake, alert  Anicteric, MMM  RRR  CTAB  Abd soft, NT, ND  No c/c/e  No rash grossly  FROM                          10.5   7.8   )-----------( 194      ( 21 Sep 2019 10:31 )             34.0       09-21    138  |  94<L>  |  21  ----------------------------<  148<H>  4.0   |  29  |  5.44<H>    Ca    9.3      21 Sep 2019 10:31  Mg     2.0     09-20

## 2019-09-21 NOTE — PROGRESS NOTE ADULT - SUBJECTIVE AND OBJECTIVE BOX
Patient is a 74y old  Male who presents with a chief complaint of renal failure (20 Sep 2019 13:00)                                                               INTERVAL HPI/OVERNIGHT EVENTS:    REVIEW OF SYSTEMS:     CONSTITUTIONAL: No weakness, fevers or chills  EYES/ENT: No visual changes , no ear ache   NECK: No pain or stiffness  RESPIRATORY: No cough, wheezing,  No shortness of breath  CARDIOVASCULAR: No chest pain or palpitations  GASTROINTESTINAL: No abdominal pain  . No nausea, vomiting, or hematemesis; No diarrhea or constipation. No melena or hematochezia.  GENITOURINARY: No dysuria, frequency or hematuria  NEUROLOGICAL: No numbness or weakness                                                                                                                                                                                                                                                                                 Medications:  MEDICATIONS  (STANDING):  allopurinol 100 milliGRAM(s) Oral daily  apixaban 2.5 milliGRAM(s) Oral two times a day  atorvastatin 40 milliGRAM(s) Oral at bedtime  carvedilol 6.25 milliGRAM(s) Oral every 12 hours  chlorhexidine 2% Cloths 1 Application(s) Topical daily  cinacalcet 30 milliGRAM(s) Oral daily  cyanocobalamin Injectable 1000 MICROGram(s) IntraMuscular <User Schedule>  darbepoetin Injectable ViaL 60 MICROGram(s) IV Push every week  dextrose 5%. 1000 milliLiter(s) (50 mL/Hr) IV Continuous <Continuous>  dextrose 50% Injectable 12.5 Gram(s) IV Push once  dextrose 50% Injectable 25 Gram(s) IV Push once  dextrose 50% Injectable 25 Gram(s) IV Push once  docusate sodium 100 milliGRAM(s) Oral two times a day  ergocalciferol 32115 Unit(s) Oral every week  folic acid 1 milliGRAM(s) Oral daily  furosemide    Tablet 60 milliGRAM(s) Oral two times a day  hydrALAZINE 100 milliGRAM(s) Oral three times a day  hydrocortisone hemorrhoidal Suppository 1 Suppository(s) Rectal at bedtime  insulin glargine Injectable (LANTUS) 10 Unit(s) SubCutaneous at bedtime  insulin lispro (HumaLOG) corrective regimen sliding scale   SubCutaneous three times a day with meals  insulin lispro (HumaLOG) corrective regimen sliding scale   SubCutaneous at bedtime  labetalol 300 milliGRAM(s) Oral two times a day  lactobacillus acidophilus 1 Tablet(s) Oral two times a day  levETIRAcetam 1000 milliGRAM(s) Oral two times a day  mycophenolate mofetil 250 milliGRAM(s) Oral two times a day  polyethylene glycol 3350 17 Gram(s) Oral daily  predniSONE   Tablet 5 milliGRAM(s) Oral daily  senna 2 Tablet(s) Oral at bedtime  sodium bicarbonate 650 milliGRAM(s) Oral two times a day  tamsulosin 0.4 milliGRAM(s) Oral at bedtime  thiamine 100 milliGRAM(s) Oral daily    MEDICATIONS  (PRN):  acetaminophen   Tablet .. 650 milliGRAM(s) Oral every 6 hours PRN Temp greater or equal to 38C (100.4F)  aluminum hydroxide/magnesium hydroxide/simethicone Suspension 30 milliLiter(s) Oral every 6 hours PRN Dyspepsia  dextrose 40% Gel 15 Gram(s) Oral once PRN Blood Glucose LESS THAN 70 milliGRAM(s)/deciliter  glucagon  Injectable 1 milliGRAM(s) IntraMuscular once PRN Glucose LESS THAN 70 milligrams/deciliter       Allergies    No Known Allergies    Intolerances      Vital Signs Last 24 Hrs  T(C): 36.6 (21 Sep 2019 20:36), Max: 36.9 (21 Sep 2019 03:43)  T(F): 97.9 (21 Sep 2019 20:36), Max: 98.4 (21 Sep 2019 03:43)  HR: 82 (21 Sep 2019 20:36) (73 - 82)  BP: 126/68 (21 Sep 2019 20:36) (113/62 - 145/71)  BP(mean): --  RR: 18 (21 Sep 2019 20:36) (18 - 18)  SpO2: 98% (21 Sep 2019 20:36) (97% - 99%)  CAPILLARY BLOOD GLUCOSE      POCT Blood Glucose.: 113 mg/dL (21 Sep 2019 21:51)  POCT Blood Glucose.: 87 mg/dL (21 Sep 2019 18:15)  POCT Blood Glucose.: 127 mg/dL (21 Sep 2019 13:17)  POCT Blood Glucose.: 118 mg/dL (21 Sep 2019 09:08)  POCT Blood Glucose.: 138 mg/dL (20 Sep 2019 23:14)       @ 07:01  -   @ 07:00  --------------------------------------------------------  IN: 850 mL / OUT: 1800 mL / NET: -950 mL     @ 07:01  -   @ 22:52  --------------------------------------------------------  IN: 240 mL / OUT: 0 mL / NET: 240 mL      Physical Exam:    Daily Weight in k.7 (21 Sep 2019 03:43)  General:  NAD   HEENT:  Nonicteric, PERRLA  CV:  RRR, S1S2   Lungs:  CTA B/L, no wheezes, rales, rhonchi  Abdomen:  Soft, non-tender, no distended, positive BS  Extremities:  2+ pulses, no c/c, no edema  Skin:  Warm and dry, no rashes  :  No gutierrez  Neuro:  AAOx3, non-focal, grossly intact                                                                                                                                                                                                                                                                                                LABS:                               10.5   7.8   )-----------( 194      ( 21 Sep 2019 10:31 )             34.0                          138  |  94<L>  |  21  ----------------------------<  148<H>  4.0   |  29  |  5.44<H>    Ca    9.3      21 Sep 2019 10:31  Mg     2.0

## 2019-09-21 NOTE — PROGRESS NOTE ADULT - ASSESSMENT
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   Pt just moved back to [place] from [place] .. reports that he has not been taking his meds for the past few days since " he might have misplaed them"     pt denies chest pain, syncope,fever, chills, cough, urinary symptoms.    in ED found  to have anemia   Nno acute changes on EKG   elevated CR and Trop    1- HTN urgency :   cont meds ...     2-KAMRON  : bx : transplant glomurlosclerosis /interstitial fibrosis ... off tacro,  cont cellcept and steriods..   BM bx : no evidence of increased plasma cells       3- DM:   A1c  5.6  Fs     4- acute HF sec to  HTN urgency and renal failure   Echo :  < from: Transthoracic Echocardiogram (08.28.19 @ 16:12) >  1. Mitral annular calcification and calcified mitral  leaflets with decreased diastolic opening.  2. Moderate to severe concentric left ventricular  hypertrophy.  3. Normal left ventricular systolic function with  mid-cavitary obliteration.  4. Normal right ventricular size and systolic function.  5. Small pericardial effusion.  cont fluid removal with HD per renal      5-  difficult ambulating :  no focal neuro deficits   neuro eval appreciated    CT T/L   < from: CT Thoracic Spine No Cont (08.30.19 @ 10:06) >    Old right L1 transverse process fracture. Bilateral lysis   defects at L5 as seen on the prior 5/26/2013      6- afib : reestart AC : change to eliquis  : discussed w Dr. Dumont     7- hematochezia : per sister : on and off small amount of fresh blood in stool and some amount in urine but only small amounts   monitor H/H   consult GI .: appreciate input :  suspect bleeding to be hemorrhoidal, now resolved     trial of anusol supp  if bleeding persist would consider colonoscopy however patient is reluctant.    heme input: appreciated  .    8-  renal transplant : f/u with Transplant team   cont meds with MMF and steroids   off Tac   s/p bx : as above       9- TIA :  unable to perform MRI   CT no acute changes on CT   repeat negative   fu with neuro     10 encephalopathy :  now much improved    CTH with contrast : neg for acute pathology   LP : high protien but culture negative in CSF   blood culture positive for GNR /Ecoli likely urinary source   CT : < from: CT Abdomen and Pelvis No Cont (09.09.19 @ 22:26) >    Layering stones in a thick-walled urinary bladder with perivesicular   inflammatory change and a left lower quadrant transplant kidney with a   nonobstructing 1.1 cm lower pole stone and moderate perinephric fat   stranding. No hydronephrosis. Differential includes urinary tract   infection.    comleted course of abx   f/u with ID and urology       11- paraprotienmeia :    bone marrow bx : no increased in plasma cells     12 hypercalcemia : monitor for now     13- abd discomfort : improved today   CT abd as above   check PVR and if >200 will place gutierrez .. discussed with Dr. Goldberg

## 2019-09-21 NOTE — PROGRESS NOTE ADULT - SUBJECTIVE AND OBJECTIVE BOX
Subjective: Patient seen and examined. No new events except as noted.   No cp or sob     REVIEW OF SYSTEMS:    CONSTITUTIONAL: + weakness, fevers or chills  EYES/ENT: No visual changes;  No vertigo or throat pain   NECK: No pain or stiffness  RESPIRATORY: No cough, wheezing, hemoptysis; No shortness of breath  CARDIOVASCULAR: No chest pain or palpitations  GASTROINTESTINAL: No abdominal or epigastric pain. No nausea, vomiting, or hematemesis; No diarrhea or constipation. No melena or hematochezia.  GENITOURINARY: No dysuria, frequency or hematuria  NEUROLOGICAL: No numbness or weakness  SKIN: No itching, burning, rashes, or lesions   All other review of systems is negative unless indicated above.    MEDICATIONS:  MEDICATIONS  (STANDING):  allopurinol 100 milliGRAM(s) Oral daily  apixaban 2.5 milliGRAM(s) Oral two times a day  atorvastatin 40 milliGRAM(s) Oral at bedtime  carvedilol 6.25 milliGRAM(s) Oral every 12 hours  chlorhexidine 2% Cloths 1 Application(s) Topical daily  cinacalcet 30 milliGRAM(s) Oral daily  cyanocobalamin Injectable 1000 MICROGram(s) IntraMuscular <User Schedule>  darbepoetin Injectable ViaL 60 MICROGram(s) IV Push every week  dextrose 5%. 1000 milliLiter(s) (50 mL/Hr) IV Continuous <Continuous>  dextrose 50% Injectable 12.5 Gram(s) IV Push once  dextrose 50% Injectable 25 Gram(s) IV Push once  dextrose 50% Injectable 25 Gram(s) IV Push once  docusate sodium 100 milliGRAM(s) Oral two times a day  ergocalciferol 83590 Unit(s) Oral every week  folic acid 1 milliGRAM(s) Oral daily  furosemide    Tablet 60 milliGRAM(s) Oral two times a day  hydrALAZINE 100 milliGRAM(s) Oral three times a day  hydrocortisone hemorrhoidal Suppository 1 Suppository(s) Rectal at bedtime  insulin glargine Injectable (LANTUS) 10 Unit(s) SubCutaneous at bedtime  insulin lispro (HumaLOG) corrective regimen sliding scale   SubCutaneous three times a day with meals  insulin lispro (HumaLOG) corrective regimen sliding scale   SubCutaneous at bedtime  labetalol 300 milliGRAM(s) Oral two times a day  lactobacillus acidophilus 1 Tablet(s) Oral two times a day  levETIRAcetam 1000 milliGRAM(s) Oral two times a day  mycophenolate mofetil 250 milliGRAM(s) Oral two times a day  polyethylene glycol 3350 17 Gram(s) Oral daily  predniSONE   Tablet 5 milliGRAM(s) Oral daily  senna 2 Tablet(s) Oral at bedtime  sodium bicarbonate 650 milliGRAM(s) Oral two times a day  tamsulosin 0.4 milliGRAM(s) Oral at bedtime  thiamine 100 milliGRAM(s) Oral daily      PHYSICAL EXAM:  T(C): 36.6 (09-21-19 @ 20:36), Max: 36.9 (09-21-19 @ 03:43)  HR: 82 (09-21-19 @ 20:36) (73 - 82)  BP: 126/68 (09-21-19 @ 20:36) (113/62 - 145/71)  RR: 18 (09-21-19 @ 20:36) (18 - 18)  SpO2: 98% (09-21-19 @ 20:36) (96% - 99%)  Wt(kg): --  I&O's Summary    20 Sep 2019 07:01  -  21 Sep 2019 07:00  --------------------------------------------------------  IN: 850 mL / OUT: 1800 mL / NET: -950 mL    21 Sep 2019 07:01  -  21 Sep 2019 21:07  --------------------------------------------------------  IN: 240 mL / OUT: 0 mL / NET: 240 mL          Appearance: NAD	  HEENT:   Normal oral mucosa, PERRL, EOMI	  Lymphatic: No lymphadenopathy , no edema  Cardiovascular: Normal S1 S2, No JVD, No murmurs , Peripheral pulses palpable 2+ bilaterally  Respiratory: Lungs clear to auscultation, normal effort 	  Gastrointestinal:  Soft, Non-tender, + BS	  Skin: No rashes, No ecchymoses, No cyanosis, warm to touch  Musculoskeletal: Normal range of motion, normal strength  Psychiatry:  Mood & affect appropriate  Ext: No edema      LABS:    CARDIAC MARKERS:                                10.5   7.8   )-----------( 194      ( 21 Sep 2019 10:31 )             34.0     09-21    138  |  94<L>  |  21  ----------------------------<  148<H>  4.0   |  29  |  5.44<H>    Ca    9.3      21 Sep 2019 10:31  Mg     2.0     09-20      proBNP:   Lipid Profile:   HgA1c:   TSH:             TELEMETRY: 	SR    ECG:  	  RADIOLOGY:   DIAGNOSTIC TESTING:  [ ] Echocardiogram:  [ ]  Catheterization:  [ ] Stress Test:    OTHER:

## 2019-09-21 NOTE — PROGRESS NOTE ADULT - ASSESSMENT
1. Anemia sec to CKD/B12/folate deficiency/MGUS  -- s/p 3 weekly B12 1000 mcg IM injections- last 9/14;  B12 1000 mcg IM weekly for 1 more dose then continue monthly;  PO FA 1mg QD;    -- Iron studies c/w Anemia of Chronic Inflammation/kidney disease- aranesp as per renal team  -aranesp as per renal  --Transfuse for Hgb<7    2. Worsening renal failure  -renal concerned about renal bx results (TMA) and possible connection to MGUS  -s/p bone marrow biopsy on 9/14-  There is no evidence of increased plasma cells. Case d/w Dr. Marks from Hematopathology.  [place] red stain is pending;  No suspicion for amyloid disease.    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      3 thrombocytopenia, resolved  -- reactive and consumptive, abx   -- most likely related to h/o HCV, ? Mycophenolate contributing  -- monitor for now    4. LE edema   --noted age indeterminate DVT b/l LE above knee; provoked by acute illness and immobilizaiton;  no indication for hypercoag w/u   --pt had been on and off AC in past for Afib  --now on apixaban.    Will follow, [phone]

## 2019-09-21 NOTE — PROGRESS NOTE ADULT - SUBJECTIVE AND OBJECTIVE BOX
GASTROENTEROLOGY    Patient seen and examined at bedside  Awake, alert, responsive  He denies nausea/vomiting  Tolerating diet  No further rectal bleeding        MEDICATIONS  (STANDING):  allopurinol 100 milliGRAM(s) Oral daily  apixaban 2.5 milliGRAM(s) Oral two times a day  atorvastatin 40 milliGRAM(s) Oral at bedtime  carvedilol 6.25 milliGRAM(s) Oral every 12 hours  chlorhexidine 2% Cloths 1 Application(s) Topical daily  cinacalcet 30 milliGRAM(s) Oral daily  cyanocobalamin Injectable 1000 MICROGram(s) IntraMuscular <User Schedule>  darbepoetin Injectable ViaL 60 MICROGram(s) IV Push every week  dextrose 5%. 1000 milliLiter(s) (50 mL/Hr) IV Continuous <Continuous>  dextrose 50% Injectable 12.5 Gram(s) IV Push once  dextrose 50% Injectable 25 Gram(s) IV Push once  dextrose 50% Injectable 25 Gram(s) IV Push once  ergocalciferol 21573 Unit(s) Oral every week  folic acid 1 milliGRAM(s) Oral daily  furosemide    Tablet 60 milliGRAM(s) Oral two times a day  hydrALAZINE 100 milliGRAM(s) Oral three times a day  hydrocortisone hemorrhoidal Suppository 1 Suppository(s) Rectal at bedtime  insulin glargine Injectable (LANTUS) 10 Unit(s) SubCutaneous at bedtime  insulin lispro (HumaLOG) corrective regimen sliding scale   SubCutaneous three times a day with meals  insulin lispro (HumaLOG) corrective regimen sliding scale   SubCutaneous at bedtime  labetalol 300 milliGRAM(s) Oral two times a day  lactobacillus acidophilus 1 Tablet(s) Oral two times a day  levETIRAcetam 1000 milliGRAM(s) Oral two times a day  mycophenolate mofetil 250 milliGRAM(s) Oral two times a day  predniSONE   Tablet 5 milliGRAM(s) Oral daily  sodium bicarbonate 650 milliGRAM(s) Oral two times a day  tamsulosin 0.4 milliGRAM(s) Oral at bedtime  thiamine 100 milliGRAM(s) Oral daily        T(F): 98.4 (09-21-19 @ 03:43), Max: 98.7 (09-20-19 @ 20:52)  HR: 75 (09-21-19 @ 06:00) (71 - 75)  BP: 114/65 (09-21-19 @ 06:00) (114/65 - 153/77)  RR: 18 (09-21-19 @ 06:00) (17 - 18)  SpO2: 99% (09-21-19 @ 06:00) (96% - 99%)  Wt(kg): --    PHYSICAL EXAM  GENERAL:   NAD  HEENT:  NC/AT, no JVD, sclera-anicteric  CHEST:  clear to ascultation bilaterally, respirations nonlabored  HEART:  +S1+S2   ABDOMEN:  Soft, non-tender, non-distended, + bowel sounds  EXTREMITIES:  no cyanosis, clubbing or edema  NEURO:  Alert, oriented  SKIN:  No rash/warm/dry      LABS:    09-21    138  |  94<L>  |  21  ----------------------------<  148<H>  4.0   |  29  |  5.44<H>    Ca    9.3      21 Sep 2019 10:31  Mg     2.0     09-20          I&O's Detail    20 Sep 2019 07:01  -  21 Sep 2019 07:00  --------------------------------------------------------  IN:    Oral Fluid: 50 mL    Other: 800 mL  Total IN: 850 mL    OUT:    Other: 1800 mL  Total OUT: 1800 mL    Total NET: -950 mL      21 Sep 2019 07:01  -  21 Sep 2019 11:37  --------------------------------------------------------  IN:    Oral Fluid: 120 mL  Total IN: 120 mL    OUT:  Total OUT: 0 mL    Total NET: 120 mL

## 2019-09-21 NOTE — PROGRESS NOTE ADULT - PROBLEM SELECTOR PLAN 1
diet as tolerated  cbc daily;  hgb/hct has been stable  suspect bleeding to be hemorrhoidal  keep stool soft  no indication for colonoscopy

## 2019-09-22 LAB
GLUCOSE BLDC GLUCOMTR-MCNC: 106 MG/DL — HIGH (ref 70–99)
GLUCOSE BLDC GLUCOMTR-MCNC: 115 MG/DL — HIGH (ref 70–99)
GLUCOSE BLDC GLUCOMTR-MCNC: 117 MG/DL — HIGH (ref 70–99)
GLUCOSE BLDC GLUCOMTR-MCNC: 95 MG/DL — SIGNIFICANT CHANGE UP (ref 70–99)

## 2019-09-22 RX ADMIN — INSULIN GLARGINE 10 UNIT(S): 100 INJECTION, SOLUTION SUBCUTANEOUS at 22:48

## 2019-09-22 RX ADMIN — Medication 650 MILLIGRAM(S): at 18:33

## 2019-09-22 RX ADMIN — Medication 60 MILLIGRAM(S): at 06:33

## 2019-09-22 RX ADMIN — Medication 650 MILLIGRAM(S): at 06:34

## 2019-09-22 RX ADMIN — Medication 100 MILLIGRAM(S): at 21:34

## 2019-09-22 RX ADMIN — LEVETIRACETAM 1000 MILLIGRAM(S): 250 TABLET, FILM COATED ORAL at 18:32

## 2019-09-22 RX ADMIN — LEVETIRACETAM 1000 MILLIGRAM(S): 250 TABLET, FILM COATED ORAL at 06:35

## 2019-09-22 RX ADMIN — CINACALCET 30 MILLIGRAM(S): 30 TABLET, FILM COATED ORAL at 12:24

## 2019-09-22 RX ADMIN — Medication 100 MILLIGRAM(S): at 06:33

## 2019-09-22 RX ADMIN — Medication 1 TABLET(S): at 18:28

## 2019-09-22 RX ADMIN — Medication 100 MILLIGRAM(S): at 13:12

## 2019-09-22 RX ADMIN — APIXABAN 2.5 MILLIGRAM(S): 2.5 TABLET, FILM COATED ORAL at 06:33

## 2019-09-22 RX ADMIN — Medication 300 MILLIGRAM(S): at 06:33

## 2019-09-22 RX ADMIN — MYCOPHENOLATE MOFETIL 250 MILLIGRAM(S): 250 CAPSULE ORAL at 06:35

## 2019-09-22 RX ADMIN — CARVEDILOL PHOSPHATE 6.25 MILLIGRAM(S): 80 CAPSULE, EXTENDED RELEASE ORAL at 12:24

## 2019-09-22 RX ADMIN — Medication 1 MILLIGRAM(S): at 12:24

## 2019-09-22 RX ADMIN — Medication 1 TABLET(S): at 06:34

## 2019-09-22 RX ADMIN — Medication 300 MILLIGRAM(S): at 18:29

## 2019-09-22 RX ADMIN — Medication 100 MILLIGRAM(S): at 12:25

## 2019-09-22 RX ADMIN — Medication 60 MILLIGRAM(S): at 18:32

## 2019-09-22 RX ADMIN — Medication 100 MILLIGRAM(S): at 12:23

## 2019-09-22 RX ADMIN — CARVEDILOL PHOSPHATE 6.25 MILLIGRAM(S): 80 CAPSULE, EXTENDED RELEASE ORAL at 22:49

## 2019-09-22 RX ADMIN — MYCOPHENOLATE MOFETIL 250 MILLIGRAM(S): 250 CAPSULE ORAL at 18:30

## 2019-09-22 RX ADMIN — APIXABAN 2.5 MILLIGRAM(S): 2.5 TABLET, FILM COATED ORAL at 18:31

## 2019-09-22 RX ADMIN — ATORVASTATIN CALCIUM 40 MILLIGRAM(S): 80 TABLET, FILM COATED ORAL at 21:34

## 2019-09-22 RX ADMIN — PREGABALIN 1000 MICROGRAM(S): 225 CAPSULE ORAL at 12:25

## 2019-09-22 RX ADMIN — Medication 5 MILLIGRAM(S): at 06:33

## 2019-09-22 RX ADMIN — TAMSULOSIN HYDROCHLORIDE 0.4 MILLIGRAM(S): 0.4 CAPSULE ORAL at 21:34

## 2019-09-22 NOTE — PROGRESS NOTE ADULT - SUBJECTIVE AND OBJECTIVE BOX
GASTROENTEROLOGY    Patient seen and examined at bedside  No complaints offered  He denied nausea/vomiting, abdominal pain  Reportedly having brown stool  No further rectal bleeding    MEDICATIONS  (STANDING):  allopurinol 100 milliGRAM(s) Oral daily  apixaban 2.5 milliGRAM(s) Oral two times a day  atorvastatin 40 milliGRAM(s) Oral at bedtime  carvedilol 6.25 milliGRAM(s) Oral every 12 hours  chlorhexidine 2% Cloths 1 Application(s) Topical daily  cinacalcet 30 milliGRAM(s) Oral daily  darbepoetin Injectable ViaL 60 MICROGram(s) IV Push every week  dextrose 5%. 1000 milliLiter(s) (50 mL/Hr) IV Continuous <Continuous>  dextrose 50% Injectable 12.5 Gram(s) IV Push once  dextrose 50% Injectable 25 Gram(s) IV Push once  dextrose 50% Injectable 25 Gram(s) IV Push once  docusate sodium 100 milliGRAM(s) Oral two times a day  ergocalciferol 56914 Unit(s) Oral every week  folic acid 1 milliGRAM(s) Oral daily  furosemide    Tablet 60 milliGRAM(s) Oral two times a day  hydrALAZINE 100 milliGRAM(s) Oral three times a day  hydrocortisone hemorrhoidal Suppository 1 Suppository(s) Rectal at bedtime  insulin glargine Injectable (LANTUS) 10 Unit(s) SubCutaneous at bedtime  insulin lispro (HumaLOG) corrective regimen sliding scale   SubCutaneous three times a day with meals  insulin lispro (HumaLOG) corrective regimen sliding scale   SubCutaneous at bedtime  labetalol 300 milliGRAM(s) Oral two times a day  lactobacillus acidophilus 1 Tablet(s) Oral two times a day  levETIRAcetam 1000 milliGRAM(s) Oral two times a day  mycophenolate mofetil 250 milliGRAM(s) Oral two times a day  polyethylene glycol 3350 17 Gram(s) Oral daily  predniSONE   Tablet 5 milliGRAM(s) Oral daily  senna 2 Tablet(s) Oral at bedtime  sodium bicarbonate 650 milliGRAM(s) Oral two times a day  tamsulosin 0.4 milliGRAM(s) Oral at bedtime  thiamine 100 milliGRAM(s) Oral daily        T(F): 97.9 (09-22-19 @ 04:10), Max: 97.9 (09-21-19 @ 20:36)  HR: 81 (09-22-19 @ 04:10) (81 - 82)  BP: 136/68 (09-22-19 @ 04:10) (126/68 - 136/68)  RR: 17 (09-22-19 @ 04:10) (17 - 18)  SpO2: 97% (09-22-19 @ 04:10) (97% - 98%)  Wt(kg): --    PHYSICAL EXAM  GENERAL:   NAD  HEENT:  NC/AT, no JVD, sclera-anicteric  CHEST:  clear to ascultation bilaterally, respirations nonlabored  HEART:  +S1+S2   ABDOMEN:  Soft, non-tender, non-distended, + bowel sounds, no masses  EXTREMITIES:  no cyanosis, clubbing or edema  NEURO:  Alert, oriented  SKIN:  No rash/warm/dry      LABS:    09-21    138  |  94<L>  |  21  ----------------------------<  148<H>  4.0   |  29  |  5.44<H>    Ca    9.3      21 Sep 2019 10:31          I&O's Detail    21 Sep 2019 07:01  -  22 Sep 2019 07:00  --------------------------------------------------------  IN:    Oral Fluid: 240 mL  Total IN: 240 mL    OUT:  Total OUT: 0 mL    Total NET: 240 mL

## 2019-09-22 NOTE — PROGRESS NOTE ADULT - ASSESSMENT
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   Pt just moved back to [place] from [place] .. reports that he has not been taking his meds for the past few days since " he might have misplaed them"     pt denies chest pain, syncope,fever, chills, cough, urinary symptoms.    in ED found  to have anemia   Nno acute changes on EKG   elevated CR and Trop    1- HTN urgency :   cont meds ...     2-KAMRON  : bx : transplant glomurlosclerosis /interstitial fibrosis ... off tacro,  cont cellcept and steriods..   BM bx : no evidence of increased plasma cells       3- DM:   A1c  5.6  Fs     4- acute HF sec to  HTN urgency and renal failure   Echo :  < from: Transthoracic Echocardiogram (08.28.19 @ 16:12) >  1. Mitral annular calcification and calcified mitral  leaflets with decreased diastolic opening.  2. Moderate to severe concentric left ventricular  hypertrophy.  3. Normal left ventricular systolic function with  mid-cavitary obliteration.  4. Normal right ventricular size and systolic function.  5. Small pericardial effusion.  cont fluid removal with HD per renal      5-  difficult ambulating :  no focal neuro deficits   neuro eval appreciated    CT T/L   < from: CT Thoracic Spine No Cont (08.30.19 @ 10:06) >    Old right L1 transverse process fracture. Bilateral lysis   defects at L5 as seen on the prior 5/26/2013      6- afib : reestart AC : change to eliquis  : discussed w Dr. Dumont     7- hematochezia : per sister : on and off small amount of fresh blood in stool and some amount in urine but only small amounts   monitor H/H   consult GI .: appreciate input :  suspect bleeding to be hemorrhoidal, now resolved     trial of anusol supp  if bleeding persist would consider colonoscopy however patient is reluctant.    heme input: appreciated  .    8-  renal transplant : f/u with Transplant team   cont meds with MMF and steroids   off Tac   s/p bx : as above       9- TIA :  unable to perform MRI   CT no acute changes on CT   repeat negative   fu with neuro     10 encephalopathy :  now much improved    CTH with contrast : neg for acute pathology   LP : high protien but culture negative in CSF   blood culture positive for GNR /Ecoli likely urinary source   CT : < from: CT Abdomen and Pelvis No Cont (09.09.19 @ 22:26) >    Layering stones in a thick-walled urinary bladder with perivesicular   inflammatory change and a left lower quadrant transplant kidney with a   nonobstructing 1.1 cm lower pole stone and moderate perinephric fat   stranding. No hydronephrosis. Differential includes urinary tract   infection.    comleted course of abx   f/u with ID and urology       11- paraprotienmeia :    bone marrow bx : no increased in plasma cells     12 hypercalcemia : monitor for now     13- abd discomfort : improved today   CT abd as above   check PVR and if >200 will place gutierrez .. discussed with Dr. Goldberg 73 y/o male with PMHx of  ESRD s/p /p failed renal transplant 4 year ago and successful renal transplant 1 year ago,DM, HTN, cardiomyopathy 2/2 cocaine abuse, Hepatitis C, meningococcal meningitiss presenting with concerns about his kidney function, worsening SOB over the past 2 weeks and leg edema.   Pt just moved back to [place] from [place] .. reports that he has not been taking his meds for the past few days since " he might have misplaed them"     pt denies chest pain, syncope,fever, chills, cough, urinary symptoms.    in ED found  to have anemia   Nno acute changes on EKG   elevated CR and Trop    1- HTN urgency :   cont meds ...     2-KAMRON  : bx : transplant glomurlosclerosis /interstitial fibrosis ... off tacro,    BM bx : no evidence of increased plasma cells   f/u [place] red staining       3- DM:   A1c  5.6  Fs     4- acute HF sec to  HTN urgency and renal failure   Echo :  < from: Transthoracic Echocardiogram (08.28.19 @ 16:12) >  1. Mitral annular calcification and calcified mitral  leaflets with decreased diastolic opening.  2. Moderate to severe concentric left ventricular  hypertrophy.  3. Normal left ventricular systolic function with  mid-cavitary obliteration.  4. Normal right ventricular size and systolic function.  5. Small pericardial effusion.  cont fluid removal with HD per renal      5-  difficult ambulating :  no focal neuro deficits   neuro eval appreciated    CT T/L   < from: CT Thoracic Spine No Cont (08.30.19 @ 10:06) >    Old right L1 transverse process fracture. Bilateral lysis   defects at L5 as seen on the prior 5/26/2013      6- afib :  on eliquis  : discussed w Dr. Dumont     7- hematochezia : per sister : on and off small amount of fresh blood in stool and some amount in urine but only small amounts   monitor H/H   consult GI .: appreciate input :  suspect bleeding to be hemorrhoidal, now resolved     trial of anusol supp  if bleeding persist would consider colonoscopy however patient is reluctant.    heme input: appreciated  .    8-  renal transplant : f/u with Transplant team   cont meds with MMF and steroids   off Tac   s/p bx : as above       9- TIA :  unable to perform MRI   CT no acute changes on CT   repeat negative       10 encephalopathy :  now much improved    CTH with contrast : neg for acute pathology   LP : high protien but culture negative in CSF   blood culture positive for GNR /Ecoli likely urinary source   CT : < from: CT Abdomen and Pelvis No Cont (09.09.19 @ 22:26) >    Layering stones in a thick-walled urinary bladder with perivesicular   inflammatory change and a left lower quadrant transplant kidney with a   nonobstructing 1.1 cm lower pole stone and moderate perinephric fat   stranding. No hydronephrosis. Differential includes urinary tract   infection.    comleted course of abx   f/u with ID and urology       11- paraprotienmeia :    bone marrow bx : no increased in plasma cells     12 hypercalcemia : monitor for now     13- abd discomfort : improved '  CT abd as above      /fu urology

## 2019-09-22 NOTE — PROGRESS NOTE ADULT - SUBJECTIVE AND OBJECTIVE BOX
Subjective: Patient seen and examined. No new events except as noted.     REVIEW OF SYSTEMS:    CONSTITUTIONAL: No weakness, fevers or chills  EYES/ENT: No visual changes;  No vertigo or throat pain   NECK: No pain or stiffness  RESPIRATORY: No cough, wheezing, hemoptysis; No shortness of breath  CARDIOVASCULAR: No chest pain or palpitations  GASTROINTESTINAL: No abdominal or epigastric pain. No nausea, vomiting, or hematemesis; No diarrhea or constipation. No melena or hematochezia.  GENITOURINARY: No dysuria, frequency or hematuria  NEUROLOGICAL: No numbness or weakness  SKIN: No itching, burning, rashes, or lesions   All other review of systems is negative unless indicated above.    MEDICATIONS:  MEDICATIONS  (STANDING):  allopurinol 100 milliGRAM(s) Oral daily  apixaban 2.5 milliGRAM(s) Oral two times a day  atorvastatin 40 milliGRAM(s) Oral at bedtime  carvedilol 6.25 milliGRAM(s) Oral every 12 hours  chlorhexidine 2% Cloths 1 Application(s) Topical daily  cinacalcet 30 milliGRAM(s) Oral daily  cyanocobalamin Injectable 1000 MICROGram(s) IntraMuscular <User Schedule>  darbepoetin Injectable ViaL 60 MICROGram(s) IV Push every week  dextrose 5%. 1000 milliLiter(s) (50 mL/Hr) IV Continuous <Continuous>  dextrose 50% Injectable 12.5 Gram(s) IV Push once  dextrose 50% Injectable 25 Gram(s) IV Push once  dextrose 50% Injectable 25 Gram(s) IV Push once  docusate sodium 100 milliGRAM(s) Oral two times a day  ergocalciferol 83157 Unit(s) Oral every week  folic acid 1 milliGRAM(s) Oral daily  furosemide    Tablet 60 milliGRAM(s) Oral two times a day  hydrALAZINE 100 milliGRAM(s) Oral three times a day  hydrocortisone hemorrhoidal Suppository 1 Suppository(s) Rectal at bedtime  insulin glargine Injectable (LANTUS) 10 Unit(s) SubCutaneous at bedtime  insulin lispro (HumaLOG) corrective regimen sliding scale   SubCutaneous three times a day with meals  insulin lispro (HumaLOG) corrective regimen sliding scale   SubCutaneous at bedtime  labetalol 300 milliGRAM(s) Oral two times a day  lactobacillus acidophilus 1 Tablet(s) Oral two times a day  levETIRAcetam 1000 milliGRAM(s) Oral two times a day  mycophenolate mofetil 250 milliGRAM(s) Oral two times a day  polyethylene glycol 3350 17 Gram(s) Oral daily  predniSONE   Tablet 5 milliGRAM(s) Oral daily  senna 2 Tablet(s) Oral at bedtime  sodium bicarbonate 650 milliGRAM(s) Oral two times a day  tamsulosin 0.4 milliGRAM(s) Oral at bedtime  thiamine 100 milliGRAM(s) Oral daily      PHYSICAL EXAM:  T(C): 36.6 (09-22-19 @ 04:10), Max: 36.7 (09-21-19 @ 12:22)  HR: 81 (09-22-19 @ 04:10) (80 - 82)  BP: 136/68 (09-22-19 @ 04:10) (113/62 - 136/68)  RR: 17 (09-22-19 @ 04:10) (17 - 18)  SpO2: 97% (09-22-19 @ 04:10) (97% - 98%)  Wt(kg): --  I&O's Summary    21 Sep 2019 07:01  -  22 Sep 2019 07:00  --------------------------------------------------------  IN: 240 mL / OUT: 0 mL / NET: 240 mL          Appearance: Normal	  HEENT:   Normal oral mucosa, PERRL, EOMI	  Lymphatic: No lymphadenopathy , no edema  Cardiovascular: Normal S1 S2, No JVD, No murmurs , Peripheral pulses palpable 2+ bilaterally  Respiratory: Lungs clear to auscultation, normal effort 	  Gastrointestinal:  Soft, Non-tender, + BS	  Skin: No rashes, No ecchymoses, No cyanosis, warm to touch  Musculoskeletal: Normal range of motion, normal strength  Psychiatry:  Mood & affect appropriate  Ext: No edema      LABS:    CARDIAC MARKERS:                                10.5   7.8   )-----------( 194      ( 21 Sep 2019 10:31 )             34.0     09-21    138  |  94<L>  |  21  ----------------------------<  148<H>  4.0   |  29  |  5.44<H>    Ca    9.3      21 Sep 2019 10:31      proBNP:   Lipid Profile:   HgA1c:   TSH:             TELEMETRY: SR	    ECG:  	  RADIOLOGY:   DIAGNOSTIC TESTING:  [ ] Echocardiogram:  [ ]  Catheterization:  [ ] Stress Test:    OTHER:

## 2019-09-23 ENCOUNTER — TRANSCRIPTION ENCOUNTER (OUTPATIENT)
Age: 74
End: 2019-09-23

## 2019-09-23 DIAGNOSIS — D64.9 ANEMIA, UNSPECIFIED: ICD-10-CM

## 2019-09-23 LAB
ALBUMIN SERPL ELPH-MCNC: 3.7 G/DL — SIGNIFICANT CHANGE UP (ref 3.3–5)
ALP SERPL-CCNC: 84 U/L — SIGNIFICANT CHANGE UP (ref 40–120)
ALT FLD-CCNC: 6 U/L — LOW (ref 10–45)
ANION GAP SERPL CALC-SCNC: 22 MMOL/L — HIGH (ref 5–17)
AST SERPL-CCNC: 11 U/L — SIGNIFICANT CHANGE UP (ref 10–40)
BILIRUB SERPL-MCNC: 0.6 MG/DL — SIGNIFICANT CHANGE UP (ref 0.2–1.2)
BUN SERPL-MCNC: 42 MG/DL — HIGH (ref 7–23)
CALCIUM SERPL-MCNC: 9.3 MG/DL — SIGNIFICANT CHANGE UP (ref 8.4–10.5)
CHLORIDE SERPL-SCNC: 95 MMOL/L — LOW (ref 96–108)
CO2 SERPL-SCNC: 23 MMOL/L — SIGNIFICANT CHANGE UP (ref 22–31)
CREAT SERPL-MCNC: 8.73 MG/DL — HIGH (ref 0.5–1.3)
GLUCOSE BLDC GLUCOMTR-MCNC: 117 MG/DL — HIGH (ref 70–99)
GLUCOSE BLDC GLUCOMTR-MCNC: 94 MG/DL — SIGNIFICANT CHANGE UP (ref 70–99)
GLUCOSE BLDC GLUCOMTR-MCNC: 96 MG/DL — SIGNIFICANT CHANGE UP (ref 70–99)
GLUCOSE BLDC GLUCOMTR-MCNC: 98 MG/DL — SIGNIFICANT CHANGE UP (ref 70–99)
GLUCOSE SERPL-MCNC: 100 MG/DL — HIGH (ref 70–99)
HCV RNA FLD QL NAA+PROBE: SIGNIFICANT CHANGE UP
HCV RNA SPEC QL PROBE+SIG AMP: SIGNIFICANT CHANGE UP
POTASSIUM SERPL-MCNC: 4.1 MMOL/L — SIGNIFICANT CHANGE UP (ref 3.5–5.3)
POTASSIUM SERPL-SCNC: 4.1 MMOL/L — SIGNIFICANT CHANGE UP (ref 3.5–5.3)
PROT SERPL-MCNC: 7.7 G/DL — SIGNIFICANT CHANGE UP (ref 6–8.3)
SODIUM SERPL-SCNC: 140 MMOL/L — SIGNIFICANT CHANGE UP (ref 135–145)

## 2019-09-23 PROCEDURE — 99232 SBSQ HOSP IP/OBS MODERATE 35: CPT | Mod: GC

## 2019-09-23 RX ADMIN — ATORVASTATIN CALCIUM 40 MILLIGRAM(S): 80 TABLET, FILM COATED ORAL at 21:08

## 2019-09-23 RX ADMIN — Medication 100 MILLIGRAM(S): at 10:42

## 2019-09-23 RX ADMIN — Medication 100 MILLIGRAM(S): at 21:07

## 2019-09-23 RX ADMIN — Medication 60 MILLIGRAM(S): at 21:07

## 2019-09-23 RX ADMIN — MYCOPHENOLATE MOFETIL 250 MILLIGRAM(S): 250 CAPSULE ORAL at 21:08

## 2019-09-23 RX ADMIN — LEVETIRACETAM 1000 MILLIGRAM(S): 250 TABLET, FILM COATED ORAL at 05:26

## 2019-09-23 RX ADMIN — INSULIN GLARGINE 10 UNIT(S): 100 INJECTION, SOLUTION SUBCUTANEOUS at 22:00

## 2019-09-23 RX ADMIN — APIXABAN 2.5 MILLIGRAM(S): 2.5 TABLET, FILM COATED ORAL at 21:07

## 2019-09-23 RX ADMIN — CHLORHEXIDINE GLUCONATE 1 APPLICATION(S): 213 SOLUTION TOPICAL at 10:42

## 2019-09-23 RX ADMIN — LEVETIRACETAM 1000 MILLIGRAM(S): 250 TABLET, FILM COATED ORAL at 21:07

## 2019-09-23 RX ADMIN — Medication 300 MILLIGRAM(S): at 21:08

## 2019-09-23 RX ADMIN — CARVEDILOL PHOSPHATE 6.25 MILLIGRAM(S): 80 CAPSULE, EXTENDED RELEASE ORAL at 10:42

## 2019-09-23 RX ADMIN — Medication 300 MILLIGRAM(S): at 05:26

## 2019-09-23 RX ADMIN — Medication 1 TABLET(S): at 21:08

## 2019-09-23 RX ADMIN — Medication 100 MILLIGRAM(S): at 05:26

## 2019-09-23 RX ADMIN — CARVEDILOL PHOSPHATE 6.25 MILLIGRAM(S): 80 CAPSULE, EXTENDED RELEASE ORAL at 22:21

## 2019-09-23 RX ADMIN — Medication 1 TABLET(S): at 05:25

## 2019-09-23 RX ADMIN — Medication 60 MILLIGRAM(S): at 05:26

## 2019-09-23 RX ADMIN — SENNA PLUS 2 TABLET(S): 8.6 TABLET ORAL at 21:08

## 2019-09-23 RX ADMIN — Medication 100 MILLIGRAM(S): at 12:36

## 2019-09-23 RX ADMIN — TAMSULOSIN HYDROCHLORIDE 0.4 MILLIGRAM(S): 0.4 CAPSULE ORAL at 21:08

## 2019-09-23 RX ADMIN — Medication 650 MILLIGRAM(S): at 05:26

## 2019-09-23 RX ADMIN — MYCOPHENOLATE MOFETIL 250 MILLIGRAM(S): 250 CAPSULE ORAL at 05:26

## 2019-09-23 RX ADMIN — Medication 1 MILLIGRAM(S): at 10:42

## 2019-09-23 RX ADMIN — CINACALCET 30 MILLIGRAM(S): 30 TABLET, FILM COATED ORAL at 10:42

## 2019-09-23 RX ADMIN — Medication 5 MILLIGRAM(S): at 05:26

## 2019-09-23 RX ADMIN — APIXABAN 2.5 MILLIGRAM(S): 2.5 TABLET, FILM COATED ORAL at 05:26

## 2019-09-23 RX ADMIN — Medication 650 MILLIGRAM(S): at 21:07

## 2019-09-23 RX ADMIN — POLYETHYLENE GLYCOL 3350 17 GRAM(S): 17 POWDER, FOR SOLUTION ORAL at 10:42

## 2019-09-23 NOTE — DISCHARGE NOTE PROVIDER - CARE PROVIDER_API CALL
Delon Mitchell (DO)  Gastroenterology; Internal Medicine  [address] [place] [address]  Phone: [phone]  Fax: [phone]  Follow Up Time:     Mary Carmen Musa)  Internal Medicine; Nephrology  [address] [place] [address]  Phone: [phone]  Fax: [phone]  Follow Up Time:     Lj Moon)  Internal Medicine  [address] [facility], [address]  [place] [address]  Phone: [phone]  Fax: [phone]  Follow Up Time:     Jamal Dumont (DO)  Cardiology; Internal Medicine  [address] [facility], [address]  [place] [address]  Phone: [phone]  Fax: [phone]  Follow Up Time:

## 2019-09-23 NOTE — PROGRESS NOTE ADULT - SUBJECTIVE AND OBJECTIVE BOX
Subjective: Patient seen and examined. No new events except as noted.     REVIEW OF SYSTEMS:    CONSTITUTIONAL: No weakness, fevers or chills  EYES/ENT: No visual changes;  No vertigo or throat pain   NECK: No pain or stiffness  RESPIRATORY: No cough, wheezing, hemoptysis; No shortness of breath  CARDIOVASCULAR: No chest pain or palpitations  GASTROINTESTINAL: No abdominal or epigastric pain. No nausea, vomiting, or hematemesis; No diarrhea or constipation. No melena or hematochezia.  GENITOURINARY: No dysuria, frequency or hematuria  NEUROLOGICAL: No numbness or weakness  SKIN: No itching, burning, rashes, or lesions   All other review of systems is negative unless indicated above.    MEDICATIONS:  MEDICATIONS  (STANDING):  allopurinol 100 milliGRAM(s) Oral daily  apixaban 2.5 milliGRAM(s) Oral two times a day  atorvastatin 40 milliGRAM(s) Oral at bedtime  carvedilol 6.25 milliGRAM(s) Oral every 12 hours  chlorhexidine 2% Cloths 1 Application(s) Topical daily  cinacalcet 30 milliGRAM(s) Oral daily  darbepoetin Injectable ViaL 60 MICROGram(s) IV Push every week  dextrose 5%. 1000 milliLiter(s) (50 mL/Hr) IV Continuous <Continuous>  dextrose 50% Injectable 12.5 Gram(s) IV Push once  dextrose 50% Injectable 25 Gram(s) IV Push once  dextrose 50% Injectable 25 Gram(s) IV Push once  docusate sodium 100 milliGRAM(s) Oral two times a day  ergocalciferol 13912 Unit(s) Oral every week  folic acid 1 milliGRAM(s) Oral daily  furosemide    Tablet 60 milliGRAM(s) Oral two times a day  hydrALAZINE 100 milliGRAM(s) Oral three times a day  hydrocortisone hemorrhoidal Suppository 1 Suppository(s) Rectal at bedtime  insulin glargine Injectable (LANTUS) 10 Unit(s) SubCutaneous at bedtime  insulin lispro (HumaLOG) corrective regimen sliding scale   SubCutaneous three times a day with meals  insulin lispro (HumaLOG) corrective regimen sliding scale   SubCutaneous at bedtime  labetalol 300 milliGRAM(s) Oral two times a day  lactobacillus acidophilus 1 Tablet(s) Oral two times a day  levETIRAcetam 1000 milliGRAM(s) Oral two times a day  mycophenolate mofetil 250 milliGRAM(s) Oral two times a day  polyethylene glycol 3350 17 Gram(s) Oral daily  predniSONE   Tablet 5 milliGRAM(s) Oral daily  senna 2 Tablet(s) Oral at bedtime  sodium bicarbonate 650 milliGRAM(s) Oral two times a day  tamsulosin 0.4 milliGRAM(s) Oral at bedtime  thiamine 100 milliGRAM(s) Oral daily      PHYSICAL EXAM:  T(C): 36.8 (09-23-19 @ 16:05), Max: 36.8 (09-23-19 @ 11:33)  HR: 76 (09-23-19 @ 16:05) (70 - 83)  BP: 151/83 (09-23-19 @ 16:05) (122/62 - 151/83)  RR: 18 (09-23-19 @ 16:05) (18 - 18)  SpO2: 98% (09-23-19 @ 16:05) (97% - 98%)  Wt(kg): --  I&O's Summary    23 Sep 2019 07:01  -  23 Sep 2019 18:24  --------------------------------------------------------  IN: 720 mL / OUT: 525 mL / NET: 195 mL          Appearance: Normal	  HEENT:   Normal oral mucosa, PERRL, EOMI	  Lymphatic: No lymphadenopathy , no edema  Cardiovascular: Normal S1 S2, No JVD, No murmurs , Peripheral pulses palpable 2+ bilaterally  Respiratory: Lungs clear to auscultation, normal effort 	  Gastrointestinal:  Soft, Non-tender, + BS	  Skin: No rashes, No ecchymoses, No cyanosis, warm to touch  Musculoskeletal: Normal range of motion, normal strength  Psychiatry:  Mood & affect appropriate  Ext: No edema      LABS:    CARDIAC MARKERS:            09-23    140  |  95<L>  |  42<H>  ----------------------------<  100<H>  4.1   |  23  |  8.73<H>    Ca    9.3      23 Sep 2019 16:49    TPro  7.7  /  Alb  3.7  /  TBili  0.6  /  DBili  x   /  AST  11  /  ALT  6<L>  /  AlkPhos  84  09-23    proBNP:   Lipid Profile:   HgA1c:   TSH:             TELEMETRY: 	  SR  ECG:  	  RADIOLOGY:   DIAGNOSTIC TESTING:  [ ] Echocardiogram:  [ ]  Catheterization:  [ ] Stress Test:    OTHER:

## 2019-09-23 NOTE — PROGRESS NOTE ADULT - SUBJECTIVE AND OBJECTIVE BOX
[facility] Division of Kidney Diseases & Hypertension  FOLLOW UP NOTE  [phone]--------------------------------------------------------------------------------  Chief Complaint:Acute renal failure      24 hour events/subjective: No acute events overnight. Patient resting comfortably in bed. Labs and vital signs reviewed. Stable. For dialysis today.        PAST HISTORY  --------------------------------------------------------------------------------  No significant changes to PMH, PSH, FHx, SHx, unless otherwise noted    ALLERGIES & MEDICATIONS  --------------------------------------------------------------------------------  Allergies    No Known Allergies    Intolerances      Standing Inpatient Medications  allopurinol 100 milliGRAM(s) Oral daily  apixaban 2.5 milliGRAM(s) Oral two times a day  atorvastatin 40 milliGRAM(s) Oral at bedtime  carvedilol 6.25 milliGRAM(s) Oral every 12 hours  cinacalcet 30 milliGRAM(s) Oral daily  darbepoetin Injectable ViaL 60 MICROGram(s) IV Push every week  docusate sodium 100 milliGRAM(s) Oral two times a day  ergocalciferol 82821 Unit(s) Oral every week  folic acid 1 milliGRAM(s) Oral daily  furosemide    Tablet 60 milliGRAM(s) Oral two times a day  hydrALAZINE 100 milliGRAM(s) Oral three times a day  hydrocortisone hemorrhoidal Suppository 1 Suppository(s) Rectal at bedtime  insulin glargine Injectable (LANTUS) 10 Unit(s) SubCutaneous at bedtime  insulin lispro (HumaLOG) corrective regimen sliding scale   SubCutaneous three times a day with meals  insulin lispro (HumaLOG) corrective regimen sliding scale   SubCutaneous at bedtime  labetalol 300 milliGRAM(s) Oral two times a day  lactobacillus acidophilus 1 Tablet(s) Oral two times a day  levETIRAcetam 1000 milliGRAM(s) Oral two times a day  mycophenolate mofetil 250 milliGRAM(s) Oral two times a day  polyethylene glycol 3350 17 Gram(s) Oral daily  predniSONE   Tablet 5 milliGRAM(s) Oral daily  senna 2 Tablet(s) Oral at bedtime  sodium bicarbonate 650 milliGRAM(s) Oral two times a day  tamsulosin 0.4 milliGRAM(s) Oral at bedtime  thiamine 100 milliGRAM(s) Oral daily    PRN Inpatient Medications  acetaminophen   Tablet .. 650 milliGRAM(s) Oral every 6 hours PRN  aluminum hydroxide/magnesium hydroxide/simethicone Suspension 30 milliLiter(s) Oral every 6 hours PRN  dextrose 40% Gel 15 Gram(s) Oral once PRN  glucagon  Injectable 1 milliGRAM(s) IntraMuscular once PRN      REVIEW OF SYSTEMS  --------------------------------------------------------------------------------  Gen: No  fevers/chills  Skin: No rashes  Head/Eyes/Ears/Mouth: No headache; Normal hearing; Normal vision w/o blurriness  Respiratory: No dyspnea, cough, wheezing, hemoptysis  CV: No chest pain, PND, orthopnea  GI: No abdominal pain, diarrhea, constipation, nausea, vomiting  : No increased frequency, dysuria, hematuria, nocturia  MSK: No joint pain/swelling; no back pain; no edema  Neuro: No dizziness/lightheadedness, weakness, seizures, numbness, tingling      All other systems were reviewed and are negative, except as noted.    VITALS/PHYSICAL EXAM  --------------------------------------------------------------------------------  T(C): 36.7 (09-23-19 @ 15:33), Max: 36.8 (09-23-19 @ 11:33)  HR: 80 (09-23-19 @ 15:33) (70 - 83)  BP: 141/73 (09-23-19 @ 15:33) (122/62 - 148/73)  RR: 18 (09-23-19 @ 15:33) (18 - 18)  SpO2: 98% (09-23-19 @ 15:33) (97% - 98%)  Wt(kg): --        09-23-19 @ 07:01  -  09-23-19 @ 15:44  --------------------------------------------------------  IN: 600 mL / OUT: 525 mL / NET: 75 mL      Physical Exam:  General: in no apparent distress  Neck: Supple, no JVD,    Lungs: no rhonchi, no wheeze, no crackles  CVS: S1 S2 no M/R/G  Abdomen: no tenderness, no organomegaly, BS present  Neuro: Grossly intact  Skin: warm, dry  Ext: no cyanosis or clubbing, no edema  Access: AVF + thrill    LABS/STUDIES  --------------------------------------------------------------------------------                Creatinine Trend:  SCr 5.44 [09-21 @ 10:31]  SCr 6.28 [09-20 @ 09:35]  SCr 4.52 [09-19 @ 05:34]  SCr 5.94 [09-18 @ 10:13]  SCr 4.71 [09-17 @ 13:20]          HBsAg Nonreact      [09-21-19 @ 11:58]  HCV 9.77, Reactive      [09-21-19 @ 11:58]    '

## 2019-09-23 NOTE — PROGRESS NOTE ADULT - SUBJECTIVE AND OBJECTIVE BOX
INTERVAL HPI/OVERNIGHT EVENTS:    Patient seen and examined at bedside  No complaints offered  He denied nausea/vomiting, abdominal pain  Reportedly having brown stool  No further rectal bleeding    MEDICATIONS  (STANDING):  allopurinol 100 milliGRAM(s) Oral daily  apixaban 2.5 milliGRAM(s) Oral two times a day  atorvastatin 40 milliGRAM(s) Oral at bedtime  carvedilol 6.25 milliGRAM(s) Oral every 12 hours  chlorhexidine 2% Cloths 1 Application(s) Topical daily  cinacalcet 30 milliGRAM(s) Oral daily  darbepoetin Injectable ViaL 60 MICROGram(s) IV Push every week  dextrose 5%. 1000 milliLiter(s) (50 mL/Hr) IV Continuous <Continuous>  dextrose 50% Injectable 12.5 Gram(s) IV Push once  dextrose 50% Injectable 25 Gram(s) IV Push once  dextrose 50% Injectable 25 Gram(s) IV Push once  docusate sodium 100 milliGRAM(s) Oral two times a day  ergocalciferol 71871 Unit(s) Oral every week  folic acid 1 milliGRAM(s) Oral daily  furosemide    Tablet 60 milliGRAM(s) Oral two times a day  hydrALAZINE 100 milliGRAM(s) Oral three times a day  hydrocortisone hemorrhoidal Suppository 1 Suppository(s) Rectal at bedtime  insulin glargine Injectable (LANTUS) 10 Unit(s) SubCutaneous at bedtime  insulin lispro (HumaLOG) corrective regimen sliding scale   SubCutaneous three times a day with meals  insulin lispro (HumaLOG) corrective regimen sliding scale   SubCutaneous at bedtime  labetalol 300 milliGRAM(s) Oral two times a day  lactobacillus acidophilus 1 Tablet(s) Oral two times a day  levETIRAcetam 1000 milliGRAM(s) Oral two times a day  mycophenolate mofetil 250 milliGRAM(s) Oral two times a day  polyethylene glycol 3350 17 Gram(s) Oral daily  predniSONE   Tablet 5 milliGRAM(s) Oral daily  senna 2 Tablet(s) Oral at bedtime  sodium bicarbonate 650 milliGRAM(s) Oral two times a day  tamsulosin 0.4 milliGRAM(s) Oral at bedtime  thiamine 100 milliGRAM(s) Oral daily    MEDICATIONS  (PRN):  acetaminophen   Tablet .. 650 milliGRAM(s) Oral every 6 hours PRN Temp greater or equal to 38C (100.4F)  aluminum hydroxide/magnesium hydroxide/simethicone Suspension 30 milliLiter(s) Oral every 6 hours PRN Dyspepsia  dextrose 40% Gel 15 Gram(s) Oral once PRN Blood Glucose LESS THAN 70 milliGRAM(s)/deciliter  glucagon  Injectable 1 milliGRAM(s) IntraMuscular once PRN Glucose LESS THAN 70 milligrams/deciliter      Allergies    No Known Allergies    Intolerances        Review of Systems:    General:  No wt loss, fevers, chills, night sweats,fatigue,   Eyes:  Good vision, no reported pain  ENT:  No sore throat, pain, runny nose, dysphagia  CV:  No pain, palpitations, hypo/hypertension  Resp:  No dyspnea, cough, tachypnea, wheezing  GI:  No pain, No nausea, No vomiting, No diarrhea, No constipation, No weight loss, No fever, No pruritis, No rectal bleeding, No melena, No dysphagia  :  No pain, bleeding, incontinence, nocturia  Muscle:  No pain, weakness  Neuro:  No weakness, tingling, memory problems  Psych:  No fatigue, insomnia, mood problems, depression  Endocrine:  No polyuria, polydypsia, cold/heat intolerance  Heme:  No petechiae, ecchymosis, easy bruisability  Skin:  No rash, tattoos, scars, edema      Vital Signs Last 24 Hrs  T(C): 36.8 (23 Sep 2019 11:33), Max: 36.8 (23 Sep 2019 11:33)  T(F): 98.2 (23 Sep 2019 11:33), Max: 98.2 (23 Sep 2019 11:33)  HR: 76 (23 Sep 2019 11:33) (70 - 83)  BP: 142/72 (23 Sep 2019 11:33) (122/62 - 148/73)  BP(mean): --  RR: 18 (23 Sep 2019 11:33) (18 - 18)  SpO2: 98% (23 Sep 2019 11:33) (97% - 98%)    PHYSICAL EXAM:    Constitutional: NAD, well-developed  HEENT: EOMI, throat clear  Neck: No LAD, supple  Respiratory: CTA and P  Cardiovascular: S1 and S2, RRR, no M  Gastrointestinal: BS+, soft, NT/ND, neg HSM,  Extremities: No peripheral edema, neg clubing, cyanosis  Vascular: 2+ peripheral pulses  Neurological: A/O x 1-2, no focal deficits  Psychiatric: Normal mood, normal affect  Skin: No rashes      LABS:                RADIOLOGY & ADDITIONAL TESTS:

## 2019-09-23 NOTE — PROGRESS NOTE ADULT - PROBLEM SELECTOR PROBLEM 4
HTN (hypertension)
HTN (hypertension)
Hypertensive heart disease
HTN (hypertension)
HTN (hypertension)
Hypertensive heart disease
ACP (advance care planning)
ACP (advance care planning)
HTN (hypertension)
HTN (hypertension)
Hypertensive heart disease
HTN (hypertension)
Hypertensive heart disease
Immunosuppression
HTN (hypertension)
Hypertensive heart disease
Hypertensive heart disease

## 2019-09-23 NOTE — PROGRESS NOTE ADULT - PROBLEM SELECTOR PROBLEM 1
Acute diastolic heart failure
Rectal bleed
Acute diastolic heart failure
Altered mental status
Rectal bleed
Renal transplant recipient
Acute diastolic heart failure
Altered mental status
Renal transplant recipient
Altered mental status
Rectal bleed
Acute diastolic heart failure
Rectal bleed
Acute diastolic heart failure
Acute diastolic heart failure
Altered mental status

## 2019-09-23 NOTE — PROVIDER CONTACT NOTE (CRITICAL VALUE NOTIFICATION) - PERSON GIVING RESULT:
Ana María Perez from core lab
Melisa Kirkpatrick/ Lizbeth
[facility]
Are[facility]

## 2019-09-23 NOTE — PROGRESS NOTE ADULT - SUBJECTIVE AND OBJECTIVE BOX
Patient is a 74y old  Male who presents with a chief complaint of renal failure (20 Sep 2019 13:00)                                                               INTERVAL HPI/OVERNIGHT EVENTS:    REVIEW OF SYSTEMS:     CONSTITUTIONAL: No weakness, fevers or chills  EYES/ENT: No visual changes , no ear ache   NECK: No pain or stiffness  RESPIRATORY: No cough, wheezing,  No shortness of breath  CARDIOVASCULAR: No chest pain or palpitations  GASTROINTESTINAL: No abdominal pain  . No nausea, vomiting, or hematemesis; No diarrhea or constipation. No melena or hematochezia.  GENITOURINARY: No dysuria, frequency or hematuria  NEUROLOGICAL: No numbness or weakness                                                                                                                                                                                                                                                                                   Medications:  MEDICATIONS  (STANDING):  allopurinol 100 milliGRAM(s) Oral daily  apixaban 2.5 milliGRAM(s) Oral two times a day  atorvastatin 40 milliGRAM(s) Oral at bedtime  carvedilol 6.25 milliGRAM(s) Oral every 12 hours  chlorhexidine 2% Cloths 1 Application(s) Topical daily  cinacalcet 30 milliGRAM(s) Oral daily  darbepoetin Injectable ViaL 60 MICROGram(s) IV Push every week  dextrose 5%. 1000 milliLiter(s) (50 mL/Hr) IV Continuous <Continuous>  dextrose 50% Injectable 12.5 Gram(s) IV Push once  dextrose 50% Injectable 25 Gram(s) IV Push once  dextrose 50% Injectable 25 Gram(s) IV Push once  docusate sodium 100 milliGRAM(s) Oral two times a day  ergocalciferol 40135 Unit(s) Oral every week  folic acid 1 milliGRAM(s) Oral daily  furosemide    Tablet 60 milliGRAM(s) Oral two times a day  hydrALAZINE 100 milliGRAM(s) Oral three times a day  hydrocortisone hemorrhoidal Suppository 1 Suppository(s) Rectal at bedtime  insulin glargine Injectable (LANTUS) 10 Unit(s) SubCutaneous at bedtime  insulin lispro (HumaLOG) corrective regimen sliding scale   SubCutaneous three times a day with meals  insulin lispro (HumaLOG) corrective regimen sliding scale   SubCutaneous at bedtime  labetalol 300 milliGRAM(s) Oral two times a day  lactobacillus acidophilus 1 Tablet(s) Oral two times a day  levETIRAcetam 1000 milliGRAM(s) Oral two times a day  mycophenolate mofetil 250 milliGRAM(s) Oral two times a day  polyethylene glycol 3350 17 Gram(s) Oral daily  predniSONE   Tablet 5 milliGRAM(s) Oral daily  senna 2 Tablet(s) Oral at bedtime  sodium bicarbonate 650 milliGRAM(s) Oral two times a day  tamsulosin 0.4 milliGRAM(s) Oral at bedtime  thiamine 100 milliGRAM(s) Oral daily    MEDICATIONS  (PRN):  acetaminophen   Tablet .. 650 milliGRAM(s) Oral every 6 hours PRN Temp greater or equal to 38C (100.4F)  aluminum hydroxide/magnesium hydroxide/simethicone Suspension 30 milliLiter(s) Oral every 6 hours PRN Dyspepsia  dextrose 40% Gel 15 Gram(s) Oral once PRN Blood Glucose LESS THAN 70 milliGRAM(s)/deciliter  glucagon  Injectable 1 milliGRAM(s) IntraMuscular once PRN Glucose LESS THAN 70 milligrams/deciliter       Allergies    No Known Allergies    Intolerances      Vital Signs Last 24 Hrs  T(C): 36.7 (23 Sep 2019 15:33), Max: 36.8 (23 Sep 2019 11:33)  T(F): 98 (23 Sep 2019 15:33), Max: 98.2 (23 Sep 2019 11:33)  HR: 80 (23 Sep 2019 15:33) (70 - 83)  BP: 141/73 (23 Sep 2019 15:33) (122/62 - 148/73)  BP(mean): --  RR: 18 (23 Sep 2019 15:33) (18 - 18)  SpO2: 98% (23 Sep 2019 15:33) (97% - 98%)  CAPILLARY BLOOD GLUCOSE      POCT Blood Glucose.: 117 mg/dL (23 Sep 2019 12:54)  POCT Blood Glucose.: 96 mg/dL (23 Sep 2019 08:44)  POCT Blood Glucose.: 95 mg/dL (22 Sep 2019 22:22)  POCT Blood Glucose.: 106 mg/dL (22 Sep 2019 17:37)      09-23 @ 07:01  -  09-23 @ 16:25  --------------------------------------------------------  IN: 600 mL / OUT: 525 mL / NET: 75 mL      Physical Exam:    Daily     Daily   General: NAD   HEENT:  Nonicteric, PERRLA  CV:  RRR, S1S2   Lungs:  CTA B  Abdomen:  Soft, non-tender, no distended, positive BS  Extremities:  2+ pulses, no c/c, no edema  Neuro:  AAOx3, non-focal, grossly intact                                                                                                                                                                                                                                                                                                LABS:

## 2019-09-23 NOTE — DISCHARGE NOTE PROVIDER - NSDCCPCAREPLAN_GEN_ALL_CORE_FT
PRINCIPAL DISCHARGE DIAGNOSIS  Diagnosis: Acute diastolic heart failure  Assessment and Plan of Treatment: Weigh yourself daily.  If you gain 3lbs in 3 days, or 5lbs in a week call your Health Care Provider.  Do not eat or drink foods containing more than 2000mg of salt (sodium) in your diet every day.  Call your Health Care Provider if you have any swelling or increased swelling in your feet, ankles, and/or stomach.  Take all of your medication as directed.  If you become dizzy call your Health Care Provider.        SECONDARY DISCHARGE DIAGNOSES  Diagnosis: Afib  Assessment and Plan of Treatment: Atrial fibrillation is the most common heart rhythm problem & has the risk of stroke & heart attack  It helps if you control your blood pressure, not drink more than 1-2 alcohol drinks per day, cut down on caffeine, getting treatment for over active thyroid gland, & getting exercise  Call your doctor if you feel your heart racing or beating unusually, chest tightness or pain, lightheaded, faint, shortness of breath especially with exercise  It is important to take your heart medication as prescribed  You may be on anticoagulation which is very important to take as directed       Diagnosis: Hypertensive heart disease  Assessment and Plan of Treatment: Follow up with your medical doctor to establish long term blood pressure treatment goals.      Diagnosis: Anemia  Assessment and Plan of Treatment: Symptoms to report, bleeding, palpitations, fatigue, pale skin, cold skin, dizziness. Take medications as ordered by PCP      Diagnosis: Renal transplant recipient  Assessment and Plan of Treatment: c/w Mycophenolate 250 mg BID   C/W Prednisone   please follow up with Nephrologist DR Musa   for MEMO garcia

## 2019-09-23 NOTE — PROGRESS NOTE ADULT - NSHPATTENDINGPLANDISCUSS_GEN_ALL_CORE
pt, np
pt , np , called GI /Neuro /Heme and cardio
pt, np , renal and neuro
ID , renal and np
pt,  np , nephrology
pt, np , nephjrology and cardio
pt, sister at bedside , sister on phone on two occasions , Renal , neuro and urology
sister,  np , renal and ID , heme
pt,  np
med, transplant teams
Med
Medicine NP
Med
Med
Medicine NP
med Np Denise
Med and Heme
Medicine NP
Medicine

## 2019-09-23 NOTE — DISCHARGE NOTE PROVIDER - PROVIDER TOKENS
PROVIDER:[TOKEN:[[id number]]],PROVIDER:[TOKEN:[[id number]]],PROVIDER:[TOKEN:[[id number]]],PROVIDER:[TOKEN:[[id number]]]

## 2019-09-23 NOTE — PROGRESS NOTE ADULT - PROBLEM SELECTOR PROBLEM 2
Altered mental status
KAMRON (acute kidney injury)
Altered mental status
Renal failure
Altered mental status
Altered mental status
ACP (advance care planning)
Altered mental status
Anemia
KAMRON (acute kidney injury)
Renal failure
Renal failure
KAMRON (acute kidney injury)
Renal failure
KAMRON (acute kidney injury)
Altered mental status
Renal failure
KAMRON (acute kidney injury)
KAMRON (acute kidney injury)

## 2019-09-23 NOTE — DISCHARGE NOTE PROVIDER - HOSPITAL COURSE
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     Hospital stay complicated with Episodes of R afib in the setting of   cystitis , completed a course of abx . Rate controlled with Labetalol and coreg     Euvolemic on Po lasix and Hd     will be discharged to Rehab 75 y/o male with PMHx of  ESRD s/p /p failed renal transplant 4 year ago and successful renal transplant 1 year ago,DM, HTN, cardiomyopathy 2/2 cocaine abuse, Hepatitis C, meningococcal meningitiss presenting with concerns about his kidney function, worsening SOB over the past 2 weeks and leg edema.     Hospital stay complicated with Episodes of R afib in the setting of   cystitis , completed a course of abx . Rate controlled with Labetalol and coreg     Euvolemic on Po lasix and Hd     Noted to have Anemia  seen by GI and  Hem s/p Bone marrow Biopsy no suspicion for Amyloid disease  , Remaind HD stable and  with stable Hb     will be discharged to Rehab 75 y/o male with PMHx of  ESRD s/p /p failed renal transplant 4 year ago and successful renal transplant 1 year ago, DM, HTN, cardiomyopathy 2/2 cocaine abuse, Hepatitis C, meningococcal meningitiss presenting with concerns about his kidney function, worsening SOB over the past 2 weeks and leg edema.     Hospital stay complicated with Episodes of R afib in the setting of   cystitis , completed a course of abx . Rate controlled with Labetalol and coreg     Euvolemic on Po lasix and Hd     Noted to have Anemia  seen by GI and  Hem s/p Bone marrow Biopsy no suspicion for Amyloid disease  , Remaind HD stable and  with stable Hb     Pt is stable and cleared for discharged to Rehab

## 2019-09-23 NOTE — PROGRESS NOTE ADULT - PROBLEM SELECTOR PROBLEM 6
Bacteremia
Bacteremia
Secondary hyperparathyroidism of renal origin
Bacteremia
Secondary hyperparathyroidism of renal origin
Secondary hyperparathyroidism of renal origin
Bacteremia
Secondary hyperparathyroidism of renal origin
Anemia due to chronic kidney disease, on chronic dialysis

## 2019-09-23 NOTE — PROGRESS NOTE ADULT - PROBLEM SELECTOR PROBLEM 7
Anemia due to chronic kidney disease, on chronic dialysis

## 2019-09-23 NOTE — PROGRESS NOTE ADULT - ASSESSMENT
Assessment and Plan:   · Assessment		  1. Anemia sec to CKD/B12/folate deficiency/MGUS  -- should have completed 4 weekly doses of B12, now transition to B12 1000 mcg q monthly, cont folic acid  -- Iron studies c/w Anemia of Chronic Inflammation/kidney disease- aranesp as per renal team  -aranesp as per renal  --Transfuse for Hgb<7    2. Worsening renal failure  -renal concerned about renal bx results (TMA) and possible connection to MGUS  -s/p bone marrow biopsy on 9/14-  There is no evidence of increased plasma cells. Case d/w Dr. Marks from Hematopathology.  [place] red stain is pending;  No suspicion for amyloid disease.    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      3 thrombocytopenia, resolved  -- reactive and consumptive, abx   -- most likely related to h/o HCV, ? Mycophenolate contributing  -- monitor for now    4. LE edema   --noted age indeterminate DVT b/l LE above knee; provoked by acute illness and immobilizaiton;  no indication for hypercoag w/u   --pt had been on and off AC in past for Afib  --now on apixaban.    Thank you for the courtesy of this consultation and we will continue to follow.    Lj Moon MD  [facility]  Cell: [phone]

## 2019-09-23 NOTE — PROVIDER CONTACT NOTE (CRITICAL VALUE NOTIFICATION) - ASSESSMENT
Pt A&Ox4, VS as charted, denies pain, no fever, n/v/d,
Patient is A&ox4, VVS. Denies any pain/discomfort
asymptomatic, no bleeding noted
pt A&Ox3-4, BS as per flowsheet

## 2019-09-23 NOTE — PROGRESS NOTE ADULT - PROBLEM SELECTOR PLAN 7
Aranesp to 60 weekly. fe studies good.
Increasing Aranesp to 60 weekly. fe studies good. Getting blood in dialysis today.
Increasing Aranesp to 60 weekly. fe studies good. Getting blood in dialysis today.
Aranesp to 60 weekly. fe studies good.
On aranesp 40mcg but might need to increase to 60mcg if no change in hgb by tomorrow  Check iron studies as might require IV venofer if Fe sats <20% for darbo to work well
On aranesp 40mcg but might need to increase to 60mcg if no change in hgb by tomorrow. fe studies good.
Increasing Aranesp to 60 weekly. fe studies good. Getting blood in dialysis today.

## 2019-09-23 NOTE — PROVIDER CONTACT NOTE (CRITICAL VALUE NOTIFICATION) - ACTION/TREATMENT ORDERED:
no interventions at this time, continue to monitor.
GIL Oneil aware. Will continue to monitor patient.
Heparin stop for 1 h, will restart gtt at 18cc/h per Heparin Nomogram protocol
d/c heparin infusion @16:00, continue to monitor pt

## 2019-09-23 NOTE — PROVIDER CONTACT NOTE (CRITICAL VALUE NOTIFICATION) - BACKGROUND
Patient admitted for Acute renal failure
Pt on Hep gtt at 21cc/h for LE DVT  Pt admitted with ARF,   Pt with Hx: Kidney Tx, HTN, Pt on HD with L AVF, Hep C, GERD, Afib
pt admitted w/ acute renal failure, PMH hep c, 2x kidney transplant failed
P/w HTN urgency, hematochezia, anemia, Diagnosis: ARF PMH: Kidney transplant, Hep C, IV drub abuse, BPH, DM,

## 2019-09-23 NOTE — PROVIDER CONTACT NOTE (CRITICAL VALUE NOTIFICATION) - RECOMMENDATIONS
as per NP order, will continue to monitor.
PA to review pt chart?
as per otoniel walker
f/u Heparin nomogram protocol

## 2019-09-23 NOTE — PROGRESS NOTE ADULT - SUBJECTIVE AND OBJECTIVE BOX
Pt seen/examined and comfortable  No overt bleeding      MEDICATIONS  (STANDING):  allopurinol 100 milliGRAM(s) Oral daily  apixaban 2.5 milliGRAM(s) Oral two times a day  atorvastatin 40 milliGRAM(s) Oral at bedtime  carvedilol 6.25 milliGRAM(s) Oral every 12 hours  chlorhexidine 2% Cloths 1 Application(s) Topical daily  cinacalcet 30 milliGRAM(s) Oral daily  darbepoetin Injectable ViaL 60 MICROGram(s) IV Push every week  dextrose 5%. 1000 milliLiter(s) (50 mL/Hr) IV Continuous <Continuous>  dextrose 50% Injectable 12.5 Gram(s) IV Push once  dextrose 50% Injectable 25 Gram(s) IV Push once  dextrose 50% Injectable 25 Gram(s) IV Push once  docusate sodium 100 milliGRAM(s) Oral two times a day  ergocalciferol 46261 Unit(s) Oral every week  folic acid 1 milliGRAM(s) Oral daily  furosemide    Tablet 60 milliGRAM(s) Oral two times a day  hydrALAZINE 100 milliGRAM(s) Oral three times a day  hydrocortisone hemorrhoidal Suppository 1 Suppository(s) Rectal at bedtime  insulin glargine Injectable (LANTUS) 10 Unit(s) SubCutaneous at bedtime  insulin lispro (HumaLOG) corrective regimen sliding scale   SubCutaneous three times a day with meals  insulin lispro (HumaLOG) corrective regimen sliding scale   SubCutaneous at bedtime  labetalol 300 milliGRAM(s) Oral two times a day  lactobacillus acidophilus 1 Tablet(s) Oral two times a day  levETIRAcetam 1000 milliGRAM(s) Oral two times a day  mycophenolate mofetil 250 milliGRAM(s) Oral two times a day  polyethylene glycol 3350 17 Gram(s) Oral daily  predniSONE   Tablet 5 milliGRAM(s) Oral daily  senna 2 Tablet(s) Oral at bedtime  sodium bicarbonate 650 milliGRAM(s) Oral two times a day  tamsulosin 0.4 milliGRAM(s) Oral at bedtime  thiamine 100 milliGRAM(s) Oral daily    MEDICATIONS  (PRN):  acetaminophen   Tablet .. 650 milliGRAM(s) Oral every 6 hours PRN Temp greater or equal to 38C (100.4F)  aluminum hydroxide/magnesium hydroxide/simethicone Suspension 30 milliLiter(s) Oral every 6 hours PRN Dyspepsia  dextrose 40% Gel 15 Gram(s) Oral once PRN Blood Glucose LESS THAN 70 milliGRAM(s)/deciliter  glucagon  Injectable 1 milliGRAM(s) IntraMuscular once PRN Glucose LESS THAN 70 milligrams/deciliter      ROS  No fever, sweats, chills  No epistaxis, HA, sore throat  No CP, SOB, cough, sputum  No n/v/d, abd pain, melena, hematochezia  No edema  No rash  No anxiety  No back pain, joint pain  No bleeding, bruising  No dysuria, hematuria    Vital Signs Last 24 Hrs  T(C): 36.8 (23 Sep 2019 11:33), Max: 36.8 (22 Sep 2019 12:38)  T(F): 98.2 (23 Sep 2019 11:33), Max: 98.3 (22 Sep 2019 12:38)  HR: 76 (23 Sep 2019 11:33) (70 - 83)  BP: 142/72 (23 Sep 2019 11:33) (122/62 - 148/73)  BP(mean): --  RR: 18 (23 Sep 2019 11:33) (18 - 18)  SpO2: 98% (23 Sep 2019 11:33) (97% - 98%)    PE  NAD  Awake, alert  Anicteric, MMM  RRR  CTAB  Abd soft, NT, ND  No c/c/e  No rash grossly  FROM

## 2019-09-23 NOTE — PROGRESS NOTE ADULT - PROBLEM SELECTOR PLAN 2
- s/p bone marrow biopsy on 9/14-  There is no evidence of increased plasma cells. No suspicion for amyloid disease.  -  Iron studies c/w Anemia of Chronic Inflammation/kidney disease- aranesp as per renal team  - aranesp as per renal

## 2019-09-23 NOTE — PROGRESS NOTE ADULT - PROBLEM SELECTOR PROBLEM 3
Renal transplant recipient
Renal failure
HTN (hypertension)
Renal transplant recipient
Renal failure
Renal transplant recipient
HTN (hypertension)
Renal failure
Renal transplant recipient
HTN (hypertension)
Renal failure
Renal transplant recipient
HTN (hypertension)
Renal transplant recipient
HTN (hypertension)
HTN (hypertension)

## 2019-09-23 NOTE — CHART NOTE - NSCHARTNOTEFT_GEN_A_CORE
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    Source: Patient [X]    Family [ ]     other [x]; Medical record; RN present at bedside     Pt reports fair appetite and PO intake due to food dislike. Noted untouched lunch tray at bedside and 50% intake of Nepro Shake. Pt states disliking Nepro- offered other nutritional supplementation, pt agreed to try Glucerna, spoke to NP. Reports tolerating dysphagia 3, soft and nectar thickened consistencies. Pt denies nausea, vomiting, diarrhea, or constipation, last BM today (). Provided recommendations to optimize PO and protein intake and obtained food preferences.      Diet: Dysphagia 3, soft + nectar thickened liquids + Consistent Carbohydrate with snack + renal + Nepro 1x daily (425 kcals, 19 g protein).     Enteral /Parenteral Nutrition: n/a    Current Weight: Weight in k.6 (), Weight in k.4 (), Weight in k (), Weight in k (), Weight in k.6 (), Weight in k.6 (), Weight in k (), Weight in k () -?accuracy of weight fluctuations due to fluid shifts as pt with edema and on HD, will continue to monitor.    % Weight Change: n/a    Pertinent Medications: MEDICATIONS  (STANDING):  allopurinol 100 milliGRAM(s) Oral daily  apixaban 2.5 milliGRAM(s) Oral two times a day  atorvastatin 40 milliGRAM(s) Oral at bedtime  carvedilol 6.25 milliGRAM(s) Oral every 12 hours  chlorhexidine 2% Cloths 1 Application(s) Topical daily  cinacalcet 30 milliGRAM(s) Oral daily  darbepoetin Injectable ViaL 60 MICROGram(s) IV Push every week  dextrose 5%. 1000 milliLiter(s) (50 mL/Hr) IV Continuous <Continuous>  dextrose 50% Injectable 12.5 Gram(s) IV Push once  dextrose 50% Injectable 25 Gram(s) IV Push once  dextrose 50% Injectable 25 Gram(s) IV Push once  docusate sodium 100 milliGRAM(s) Oral two times a day  ergocalciferol 00386 Unit(s) Oral every week  folic acid 1 milliGRAM(s) Oral daily  furosemide    Tablet 60 milliGRAM(s) Oral two times a day  hydrALAZINE 100 milliGRAM(s) Oral three times a day  hydrocortisone hemorrhoidal Suppository 1 Suppository(s) Rectal at bedtime  insulin glargine Injectable (LANTUS) 10 Unit(s) SubCutaneous at bedtime  insulin lispro (HumaLOG) corrective regimen sliding scale   SubCutaneous three times a day with meals  insulin lispro (HumaLOG) corrective regimen sliding scale   SubCutaneous at bedtime  labetalol 300 milliGRAM(s) Oral two times a day  lactobacillus acidophilus 1 Tablet(s) Oral two times a day  levETIRAcetam 1000 milliGRAM(s) Oral two times a day  mycophenolate mofetil 250 milliGRAM(s) Oral two times a day  polyethylene glycol 3350 17 Gram(s) Oral daily  predniSONE   Tablet 5 milliGRAM(s) Oral daily  senna 2 Tablet(s) Oral at bedtime  sodium bicarbonate 650 milliGRAM(s) Oral two times a day  tamsulosin 0.4 milliGRAM(s) Oral at bedtime  thiamine 100 milliGRAM(s) Oral daily    MEDICATIONS  (PRN):  acetaminophen   Tablet .. 650 milliGRAM(s) Oral every 6 hours PRN Temp greater or equal to 38C (100.4F)  aluminum hydroxide/magnesium hydroxide/simethicone Suspension 30 milliLiter(s) Oral every 6 hours PRN Dyspepsia  dextrose 40% Gel 15 Gram(s) Oral once PRN Blood Glucose LESS THAN 70 milliGRAM(s)/deciliter  glucagon  Injectable 1 milliGRAM(s) IntraMuscular once PRN Glucose LESS THAN 70 milligrams/deciliter    Pertinent Labs: () Glu 148 mg/dL<H> Cr  5.44 mg/dL<H>  Finger sticks: () 96 - 117 () 95 - 117    () [id number] 5.6 %    Skin: no noted pressure injuries as per documentation.   Noted +1 ene. foot edema as per flow sheets (previously + 1 ene. ankle and +2 ene. foot).     Estimated Needs:   [x] no change since previous assessment  [ ] recalculated:     Previous Nutrition Diagnosis: [x] Malnutrition; moderate      Nutrition Diagnosis is [x] ongoing - being addressed with PO intake encouragement and nutritional supplement.     New Nutrition Diagnosis: [x] not applicable    Interventions:     1. Recommend continue dysphagia 3, soft + nectar thickened liquids + Consistent Carbohydrate with snack + renal diet. Defer diet/fluid consistencies to medical team/SLP recommendations. Will continue to monitor and adjust as needed.   2. Recommend change Nepro to Glucerna Shake 240mls 2x daily (440kcals, 20g protein) to optimize kcal and protein intake. Spoke to NP.   3. Encourage PO intake and obtain food preferences (obtained at this time).  4. Provided recommendations to optimize PO and protein intake, recommended small frequent meals by ordering nutrient-dense snacks and leaving food away from tray for later consumption during the day or between meals, to start with protein, and sips of supplement throughout the day; reviewed foods with protein and menu order procedures in hospital. Pt amenable for recommendations.   5. Continue to obtain weights to identify changes if any.     Monitoring and Evaluation:     [x] PO intake [x] Tolerance to diet prescription [x] weights [x] follow up per protocol    RD remains available.  Meredith Cash, MS, RDN, #[phone]

## 2019-09-23 NOTE — DISCHARGE NOTE NURSING/CASE MANAGEMENT/SOCIAL WORK - PATIENT PORTAL LINK FT
You can access the FollowMyHealth Patient Portal offered by Health system by registering at the following website: [email or web address]. By joining [facility]’s FollowMyHealth portal, you will also be able to view your health information using other applications (apps) compatible with our system.

## 2019-09-23 NOTE — PROGRESS NOTE ADULT - PROBLEM SELECTOR PROBLEM 5
Afib
Immunosuppression
Afib
Immunosuppression
Afib
Afib
Immunosuppression
Immunosuppression
Afib
Immunosuppression
Secondary hyperparathyroidism of renal origin
Immunosuppression
Afib

## 2019-09-23 NOTE — DISCHARGE NOTE NURSING/CASE MANAGEMENT/SOCIAL WORK - NSDCCRNAME_GEN_ALL_CORE_FT
[facility] at [address] [place] [address] via [facility] Leonor Casiano ([phone]) at 9:00pm [facility] at [address] [place] [address] via [facility] Leonor Casiano ([phone]) at 10:00am

## 2019-09-23 NOTE — PROGRESS NOTE ADULT - PROBLEM SELECTOR PLAN 5
On coumadin  Monitor Hgb  coreg 25 bid
On heparin gtt   coreg 25 bid
Failed allograft, now on dialysis  Tacrolimus was d/c . Will continue MMF and Prednisone for now.
Failed allograft, now on dialysis  Tacrolimus was d/c . Will continue MMF and Prednisone for now.
INR therapeutic   Monitor Hgb  coreg 25 bid
Failed allograft, now on dialysis  Tacrolimus was d/c . Will continue MMF and Prednisone for now.
On heparin gtt   coreg 25 bid
Failed allograft, now on dialysis  Tacrolimus was d/c . Will continue MMF and Prednisone for now.
Failed allograft, now on dialysis  Tacrolimus was d/c . Will continue MMF and Prednisone for now. Might have to dc MMF if concern for infection and or cancer.
Agree witH eliquis   coreg 25 bid
Cont eliquis   coreg 25 bid
Failed allograft, now on dialysis  Tacrolimus was d/c . Will continue MMF and Prednisone for now.
Failed allograft, now on dialysis  Tacrolimus was d/c . Will continue MMF and Prednisone for now. Might have to dc MMF if concern for infection and or cancer.
Off Coumadin now due to worsening anemia  coreg 25 bid
On Heparin gtt   Monitor Hgb  Increase coreg 25 bid
On Heparin gtt   Monitor Hgb  coreg 25 bid
On Heparin gtt   Monitor Hgb  coreg 25 bid
On heparin gtt   coreg 25 bid
With some hypercalcemia in 10 range despite dialysis and diuretics. check pth again. Start sensipar 30mg po qhs
Failed allograft, now on dialysis  Tacrolimus was d/c . Will continue MMF and Prednisone for now. Might have to dc MMF if concern for infection and or cancer.

## 2019-09-23 NOTE — PROGRESS NOTE ADULT - ASSESSMENT
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   Pt just moved back to [place] from [place] .. reports that he has not been taking his meds for the past few days since " he might have misplaed them"     pt denies chest pain, syncope,fever, chills, cough, urinary symptoms.    in ED found  to have anemia   Nno acute changes on EKG   elevated CR and Trop    1- HTN urgency :   cont meds ... iimproved     2-KAMRON  : bx : transplant glomurlosclerosis /interstitial fibrosis ... off tacro... TMA noted on renal bx .. Etiolgoy ??   a variation of HUS ???    w/u pending in [place]..f/u with nephrology   f/u [place] red staining   BM bx : no evidence of increased plasma cells         3- DM:   A1c  5.6  Fs     4- acute HF sec to  HTN urgency and renal failure   Echo :  < from: Transthoracic Echocardiogram (08.28.19 @ 16:12) >  1. Mitral annular calcification and calcified mitral  leaflets with decreased diastolic opening.  2. Moderate to severe concentric left ventricular  hypertrophy.  3. Normal left ventricular systolic function with  mid-cavitary obliteration.  4. Normal right ventricular size and systolic function.  5. Small pericardial effusion.  cont fluid removal with HD per renal      5-  difficult ambulating :  no focal neuro deficits   neuro eval appreciated    CT T/L   < from: CT Thoracic Spine No Cont (08.30.19 @ 10:06) >    Old right L1 transverse process fracture. Bilateral lysis   defects at L5 as seen on the prior 5/26/2013      6- afib :  on eliquis  : discussed w Dr. Dumont     7- hematochezia : per sister : on and off small amount of fresh blood in stool and some amount in urine but only small amounts   monitor H/H   consult GI .: appreciate input :  suspect bleeding to be hemorrhoidal, now resolved     trial of anusol supp  if bleeding persist would consider colonoscopy however patient is reluctant.    heme input: appreciated  .    8-  renal transplant : f/u with Transplant team   cont meds with MMF and steroids   off Tac   s/p bx : as above       9- TIA :  unable to perform MRI   CT no acute changes on CT   repeat negative       10 encephalopathy :  now much improved    CTH with contrast : neg for acute pathology   LP : high protien but culture negative in CSF   blood culture positive for GNR /Ecoli likely urinary source   CT : < from: CT Abdomen and Pelvis No Cont (09.09.19 @ 22:26) >    Layering stones in a thick-walled urinary bladder with perivesicular   inflammatory change and a left lower quadrant transplant kidney with a   nonobstructing 1.1 cm lower pole stone and moderate perinephric fat   stranding. No hydronephrosis. Differential includes urinary tract   infection.    completed course of abx for E coli bactermia       11- paraprotienmeia :    bone marrow bx : no increased in plasma cells     12 hypercalcemia : monitor for now     13- abd discomfort : improved '  CT abd as above    /fu urology re : bladder stones

## 2019-09-24 VITALS
RESPIRATION RATE: 18 BRPM | SYSTOLIC BLOOD PRESSURE: 110 MMHG | HEART RATE: 80 BPM | TEMPERATURE: 99 F | OXYGEN SATURATION: 97 % | WEIGHT: 172.18 LBS | DIASTOLIC BLOOD PRESSURE: 65 MMHG

## 2019-09-24 LAB — GLUCOSE BLDC GLUCOMTR-MCNC: 111 MG/DL — HIGH (ref 70–99)

## 2019-09-24 RX ORDER — SODIUM BICARBONATE 1 MEQ/ML
2 SYRINGE (ML) INTRAVENOUS
Qty: 0 | Refills: 0 | DISCHARGE

## 2019-09-24 RX ORDER — MAGNESIUM OXIDE 400 MG ORAL TABLET 241.3 MG
400 TABLET ORAL
Qty: 0 | Refills: 0 | DISCHARGE

## 2019-09-24 RX ORDER — POLYETHYLENE GLYCOL 3350 17 G/17G
17 POWDER, FOR SOLUTION ORAL
Qty: 0 | Refills: 0 | DISCHARGE
Start: 2019-09-24

## 2019-09-24 RX ORDER — INSULIN LISPRO 100/ML
6 VIAL (ML) SUBCUTANEOUS
Qty: 0 | Refills: 0 | DISCHARGE

## 2019-09-24 RX ORDER — PANTOPRAZOLE SODIUM 20 MG/1
1 TABLET, DELAYED RELEASE ORAL
Qty: 0 | Refills: 0 | DISCHARGE

## 2019-09-24 RX ORDER — DOCUSATE SODIUM 100 MG
1 CAPSULE ORAL
Qty: 0 | Refills: 0 | DISCHARGE
Start: 2019-09-24

## 2019-09-24 RX ORDER — ACETAMINOPHEN 500 MG
2 TABLET ORAL
Qty: 0 | Refills: 0 | DISCHARGE
Start: 2019-09-24

## 2019-09-24 RX ORDER — GABAPENTIN 400 MG/1
1 CAPSULE ORAL
Qty: 0 | Refills: 0 | DISCHARGE

## 2019-09-24 RX ORDER — ROSUVASTATIN CALCIUM 5 MG/1
1 TABLET ORAL
Qty: 0 | Refills: 0 | DISCHARGE

## 2019-09-24 RX ORDER — LEVETIRACETAM 250 MG/1
1 TABLET, FILM COATED ORAL
Qty: 0 | Refills: 0 | DISCHARGE

## 2019-09-24 RX ORDER — WARFARIN SODIUM 2.5 MG/1
1 TABLET ORAL
Qty: 0 | Refills: 0 | DISCHARGE

## 2019-09-24 RX ORDER — ALLOPURINOL 300 MG
1 TABLET ORAL
Qty: 0 | Refills: 0 | DISCHARGE

## 2019-09-24 RX ORDER — ERGOCALCIFEROL 1.25 MG/1
1 CAPSULE ORAL
Qty: 0 | Refills: 0 | DISCHARGE
Start: 2019-09-24

## 2019-09-24 RX ORDER — INSULIN GLARGINE 100 [IU]/ML
10 INJECTION, SOLUTION SUBCUTANEOUS
Qty: 0 | Refills: 0 | DISCHARGE
Start: 2019-09-24

## 2019-09-24 RX ORDER — INSULIN GLARGINE 100 [IU]/ML
14 INJECTION, SOLUTION SUBCUTANEOUS
Qty: 0 | Refills: 0 | DISCHARGE

## 2019-09-24 RX ORDER — INSULIN LISPRO 100/ML
0 VIAL (ML) SUBCUTANEOUS
Qty: 0 | Refills: 0 | DISCHARGE
Start: 2019-09-24

## 2019-09-24 RX ORDER — SENNA PLUS 8.6 MG/1
2 TABLET ORAL
Qty: 0 | Refills: 0 | DISCHARGE
Start: 2019-09-24

## 2019-09-24 RX ORDER — LABETALOL HCL 100 MG
1 TABLET ORAL
Qty: 0 | Refills: 0 | DISCHARGE

## 2019-09-24 RX ORDER — NIFEDIPINE 30 MG
1 TABLET, EXTENDED RELEASE 24 HR ORAL
Qty: 0 | Refills: 0 | DISCHARGE

## 2019-09-24 RX ORDER — DARBEPOETIN ALFA IN POLYSORBAT 200MCG/0.4
60 PEN INJECTOR (ML) SUBCUTANEOUS
Qty: 0 | Refills: 0 | DISCHARGE
Start: 2019-09-24

## 2019-09-24 RX ORDER — OXCARBAZEPINE 300 MG/1
1 TABLET, FILM COATED ORAL
Qty: 0 | Refills: 0 | DISCHARGE

## 2019-09-24 RX ORDER — TAMSULOSIN HYDROCHLORIDE 0.4 MG/1
2 CAPSULE ORAL
Qty: 0 | Refills: 0 | DISCHARGE

## 2019-09-24 RX ORDER — LABETALOL HCL 100 MG
1 TABLET ORAL
Qty: 0 | Refills: 0 | DISCHARGE
Start: 2019-09-24

## 2019-09-24 RX ORDER — TACROLIMUS 5 MG/1
2 CAPSULE ORAL
Qty: 0 | Refills: 0 | DISCHARGE

## 2019-09-24 RX ORDER — MYCOPHENOLIC ACID 180 MG/1
2 TABLET, DELAYED RELEASE ORAL
Qty: 0 | Refills: 0 | DISCHARGE

## 2019-09-24 RX ADMIN — Medication 650 MILLIGRAM(S): at 05:06

## 2019-09-24 RX ADMIN — Medication 1 MILLIGRAM(S): at 09:22

## 2019-09-24 RX ADMIN — Medication 60 MILLIGRAM(S): at 05:06

## 2019-09-24 RX ADMIN — Medication 100 MILLIGRAM(S): at 09:23

## 2019-09-24 RX ADMIN — MYCOPHENOLATE MOFETIL 250 MILLIGRAM(S): 250 CAPSULE ORAL at 05:06

## 2019-09-24 RX ADMIN — Medication 100 MILLIGRAM(S): at 05:06

## 2019-09-24 RX ADMIN — CHLORHEXIDINE GLUCONATE 1 APPLICATION(S): 213 SOLUTION TOPICAL at 09:26

## 2019-09-24 RX ADMIN — LEVETIRACETAM 1000 MILLIGRAM(S): 250 TABLET, FILM COATED ORAL at 05:06

## 2019-09-24 RX ADMIN — Medication 1 TABLET(S): at 05:06

## 2019-09-24 RX ADMIN — ERGOCALCIFEROL 50000 UNIT(S): 1.25 CAPSULE ORAL at 09:22

## 2019-09-24 RX ADMIN — APIXABAN 2.5 MILLIGRAM(S): 2.5 TABLET, FILM COATED ORAL at 05:06

## 2019-09-24 RX ADMIN — CINACALCET 30 MILLIGRAM(S): 30 TABLET, FILM COATED ORAL at 09:23

## 2019-09-24 RX ADMIN — Medication 5 MILLIGRAM(S): at 05:06

## 2019-09-24 RX ADMIN — Medication 100 MILLIGRAM(S): at 09:22

## 2019-09-24 RX ADMIN — Medication 300 MILLIGRAM(S): at 05:06

## 2019-09-24 NOTE — PROGRESS NOTE ADULT - SUBJECTIVE AND OBJECTIVE BOX
Patient is a 74y old  Male who presents with a chief complaint of hypertensive urgency (23 Sep 2019 13:49)                                                               INTERVAL HPI/OVERNIGHT EVENTS:    REVIEW OF SYSTEMS:     CONSTITUTIONAL: No weakness, fevers or chills  EYES/ENT: No visual changes , no ear ache   NECK: No pain or stiffness  RESPIRATORY: No cough, wheezing,  No shortness of breath  CARDIOVASCULAR: No chest pain or palpitations  GASTROINTESTINAL: No abdominal pain  . No nausea, vomiting, or hematemesis; No diarrhea or constipation. No melena or hematochezia.  GENITOURINARY: No dysuria, frequency or hematuria  NEUROLOGICAL: No numbness or weakness  SKIN: No itching, burning, rashes, or lesions                                                                                                                                                                                                                                                                                 Medications:  MEDICATIONS  (STANDING):  allopurinol 100 milliGRAM(s) Oral daily  apixaban 2.5 milliGRAM(s) Oral two times a day  atorvastatin 40 milliGRAM(s) Oral at bedtime  carvedilol 6.25 milliGRAM(s) Oral every 12 hours  chlorhexidine 2% Cloths 1 Application(s) Topical daily  cinacalcet 30 milliGRAM(s) Oral daily  darbepoetin Injectable ViaL 60 MICROGram(s) IV Push every week  dextrose 5%. 1000 milliLiter(s) (50 mL/Hr) IV Continuous <Continuous>  dextrose 50% Injectable 12.5 Gram(s) IV Push once  dextrose 50% Injectable 25 Gram(s) IV Push once  dextrose 50% Injectable 25 Gram(s) IV Push once  docusate sodium 100 milliGRAM(s) Oral two times a day  ergocalciferol 73952 Unit(s) Oral every week  folic acid 1 milliGRAM(s) Oral daily  furosemide    Tablet 60 milliGRAM(s) Oral two times a day  hydrALAZINE 100 milliGRAM(s) Oral three times a day  hydrocortisone hemorrhoidal Suppository 1 Suppository(s) Rectal at bedtime  insulin glargine Injectable (LANTUS) 10 Unit(s) SubCutaneous at bedtime  insulin lispro (HumaLOG) corrective regimen sliding scale   SubCutaneous three times a day with meals  insulin lispro (HumaLOG) corrective regimen sliding scale   SubCutaneous at bedtime  labetalol 300 milliGRAM(s) Oral two times a day  lactobacillus acidophilus 1 Tablet(s) Oral two times a day  levETIRAcetam 1000 milliGRAM(s) Oral two times a day  mycophenolate mofetil 250 milliGRAM(s) Oral two times a day  polyethylene glycol 3350 17 Gram(s) Oral daily  predniSONE   Tablet 5 milliGRAM(s) Oral daily  senna 2 Tablet(s) Oral at bedtime  sodium bicarbonate 650 milliGRAM(s) Oral two times a day  tamsulosin 0.4 milliGRAM(s) Oral at bedtime  thiamine 100 milliGRAM(s) Oral daily    MEDICATIONS  (PRN):  acetaminophen   Tablet .. 650 milliGRAM(s) Oral every 6 hours PRN Temp greater or equal to 38C (100.4F)  aluminum hydroxide/magnesium hydroxide/simethicone Suspension 30 milliLiter(s) Oral every 6 hours PRN Dyspepsia  dextrose 40% Gel 15 Gram(s) Oral once PRN Blood Glucose LESS THAN 70 milliGRAM(s)/deciliter  glucagon  Injectable 1 milliGRAM(s) IntraMuscular once PRN Glucose LESS THAN 70 milligrams/deciliter       Allergies    No Known Allergies    Intolerances      Vital Signs Last 24 Hrs  T(C): 37 (24 Sep 2019 04:50), Max: 37 (24 Sep 2019 04:50)  T(F): 98.6 (24 Sep 2019 04:50), Max: 98.6 (24 Sep 2019 04:50)  HR: 80 (24 Sep 2019 04:50) (72 - 82)  BP: 110/65 (24 Sep 2019 04:50) (110/65 - 151/83)  BP(mean): --  RR: 18 (24 Sep 2019 04:50) (18 - 18)  SpO2: 97% (24 Sep 2019 04:50) (97% - 98%)  CAPILLARY BLOOD GLUCOSE      POCT Blood Glucose.: 111 mg/dL (24 Sep 2019 08:57)  POCT Blood Glucose.: 98 mg/dL (23 Sep 2019 21:42)  POCT Blood Glucose.: 94 mg/dL (23 Sep 2019 20:36)       @ 07:  -   @ 07:00  --------------------------------------------------------  IN: 1520 mL / OUT: 2625 mL / NET: -1105 mL     @ 07: @ 16:00  --------------------------------------------------------  IN: 180 mL / OUT: 0 mL / NET: 180 mL      Physical Exam:    Daily     Daily Weight in k.1 (24 Sep 2019 04:50)  General:  Well appearing, NAD, not cachetic  HEENT:  Nonicteric, PERRLA  CV:  RRR, S1S2   Lungs:  CTA B/L, no wheezes, rales, rhonchi  Abdomen:  Soft, non-tender, no distended, positive BS  Extremities:  2+ pulses, no c/c, no edema  Skin:  Warm and dry, no rashes  :  No gutierrez  Neuro:  AAOx3, non-focal, grossly intact                                                                                                                                                                                                                                                                                                LABS:                                140  |  95<L>  |  42<H>  ----------------------------<  100<H>  4.1   |  23  |  8.73<H>    Ca    9.3      23 Sep 2019 16:49    TPro  7.7  /  Alb  3.7  /  TBili  0.6  /  DBili  x   /  AST  11  /  ALT  6<L>  /  AlkPhos  84                         RADIOLOGY & ADDITIONAL TESTS         I personally reviewed: [  ]EKG   [  ]CXR    [  ] CT      A/P:         Discussed with :     Marga consultants' Notes   Time spent :

## 2019-09-24 NOTE — PROGRESS NOTE ADULT - PROVIDER SPECIALTY LIST ADULT
Cardiology
Gastroenterology
Heme/Onc
Infectious Disease
Internal Medicine
Nephrology
Neurology
Radiology
Surgery
Transplant Medicine
Heme/Onc
Infectious Disease
Internal Medicine
Internal Medicine
Neurology
Heme/Onc
Internal Medicine
Heme/Onc
Internal Medicine
Gastroenterology
Internal Medicine
Internal Medicine
Cardiology
Cardiology
Gastroenterology
Transplant Medicine
Gastroenterology
Nephrology
Gastroenterology
Gastroenterology
Cardiology
Cardiology
Nephrology
Cardiology
Nephrology

## 2019-09-24 NOTE — PROGRESS NOTE ADULT - ASSESSMENT
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   Pt just moved back to [place] from [place] .. reports that he has not been taking his meds for the past few days since " he might have misplaed them"     pt denies chest pain, syncope,fever, chills, cough, urinary symptoms.    in ED found  to have anemia   Nno acute changes on EKG   elevated CR and Trop    1- HTN urgency :   cont meds ... iimproved     2-KAMRON  : bx : transplant glomurlosclerosis /interstitial fibrosis ... off tacro... TMA noted on renal bx .. Etiolgoy ??   a variation of HUS ???    w/u pending in [place]..f/u with nephrology   f/u congo red staining   BM bx : no evidence of increased plasma cells         3- DM:   A1c  5.6  Fs     4- acute HF sec to  HTN urgency and renal failure   Echo :  < from: Transthoracic Echocardiogram (08.28.19 @ 16:12) >  1. Mitral annular calcification and calcified mitral  leaflets with decreased diastolic opening.  2. Moderate to severe concentric left ventricular  hypertrophy.  3. Normal left ventricular systolic function with  mid-cavitary obliteration.  4. Normal right ventricular size and systolic function.  5. Small pericardial effusion.  cont fluid removal with HD per renal      5-  difficult ambulating :  no focal neuro deficits   neuro eval appreciated    CT T/L   < from: CT Thoracic Spine No Cont (08.30.19 @ 10:06) >    Old right L1 transverse process fracture. Bilateral lysis   defects at L5 as seen on the prior 5/26/2013      6- afib :  on eliquis  : discussed w Dr. Dumont     7- hematochezia : per sister : on and off small amount of fresh blood in stool and some amount in urine but only small amounts   monitor H/H   consult GI .: appreciate input :  suspect bleeding to be hemorrhoidal, now resolved     trial of anusol supp  if bleeding persist would consider colonoscopy however patient is reluctant.    heme input: appreciated  .    8-  renal transplant : f/u with Transplant team   cont meds with MMF and steroids   off Tac   s/p bx : as above       9- TIA :  unable to perform MRI   CT no acute changes on CT   repeat negative       10 encephalopathy :  now much improved    CTH with contrast : neg for acute pathology   LP : high protien but culture negative in CSF   blood culture positive for GNR /Ecoli likely urinary source   CT : < from: CT Abdomen and Pelvis No Cont (09.09.19 @ 22:26) >    Layering stones in a thick-walled urinary bladder with perivesicular   inflammatory change and a left lower quadrant transplant kidney with a   nonobstructing 1.1 cm lower pole stone and moderate perinephric fat   stranding. No hydronephrosis. Differential includes urinary tract   infection.    completed course of abx for E coli bactermia       11- paraprotienmeia :    bone marrow bx : no increased in plasma cells     12 hypercalcemia : monitor for now     13- abd discomfort : improved '  CT abd as above    /fu urology re : bladder stones       discussed w NP and reviewed meds  pt will need to f/u with renal , heme and cardio as o/p

## 2019-10-05 LAB
CULTURE RESULTS: SIGNIFICANT CHANGE UP
SPECIMEN SOURCE: SIGNIFICANT CHANGE UP

## 2019-10-10 LAB
MISCELLANEOUS TEST NAME: SIGNIFICANT CHANGE UP
MISCELLANEOUS, NORMALS: SIGNIFICANT CHANGE UP
MISCELLANEOUS, RESULT: SIGNIFICANT CHANGE UP

## 2019-10-13 PROCEDURE — 80197 ASSAY OF TACROLIMUS: CPT

## 2019-10-13 PROCEDURE — 82330 ASSAY OF CALCIUM: CPT

## 2019-10-13 PROCEDURE — 82140 ASSAY OF AMMONIA: CPT

## 2019-10-13 PROCEDURE — 82607 VITAMIN B-12: CPT

## 2019-10-13 PROCEDURE — 82962 GLUCOSE BLOOD TEST: CPT

## 2019-10-13 PROCEDURE — 86803 HEPATITIS C AB TEST: CPT

## 2019-10-13 PROCEDURE — 99261: CPT

## 2019-10-13 PROCEDURE — 84132 ASSAY OF SERUM POTASSIUM: CPT

## 2019-10-13 PROCEDURE — 70450 CT HEAD/BRAIN W/O DYE: CPT

## 2019-10-13 PROCEDURE — 87102 FUNGUS ISOLATION CULTURE: CPT

## 2019-10-13 PROCEDURE — 83540 ASSAY OF IRON: CPT

## 2019-10-13 PROCEDURE — 83921 ORGANIC ACID SINGLE QUANT: CPT

## 2019-10-13 PROCEDURE — 88346 IMFLUOR 1ST 1ANTB STAIN PX: CPT

## 2019-10-13 PROCEDURE — 93971 EXTREMITY STUDY: CPT

## 2019-10-13 PROCEDURE — 83615 LACTATE (LD) (LDH) ENZYME: CPT

## 2019-10-13 PROCEDURE — 83735 ASSAY OF MAGNESIUM: CPT

## 2019-10-13 PROCEDURE — 84295 ASSAY OF SERUM SODIUM: CPT

## 2019-10-13 PROCEDURE — 86850 RBC ANTIBODY SCREEN: CPT

## 2019-10-13 PROCEDURE — 62270 DX LMBR SPI PNXR: CPT

## 2019-10-13 PROCEDURE — 88312 SPECIAL STAINS GROUP 1: CPT

## 2019-10-13 PROCEDURE — 87186 SC STD MICRODIL/AGAR DIL: CPT

## 2019-10-13 PROCEDURE — 82803 BLOOD GASES ANY COMBINATION: CPT

## 2019-10-13 PROCEDURE — 82272 OCCULT BLD FECES 1-3 TESTS: CPT

## 2019-10-13 PROCEDURE — 82746 ASSAY OF FOLIC ACID SERUM: CPT

## 2019-10-13 PROCEDURE — 87040 BLOOD CULTURE FOR BACTERIA: CPT

## 2019-10-13 PROCEDURE — 87483 CNS DNA AMP PROBE TYPE 12-25: CPT

## 2019-10-13 PROCEDURE — 87476 LYME DIS DNA AMP PROBE: CPT

## 2019-10-13 PROCEDURE — 84157 ASSAY OF PROTEIN OTHER: CPT

## 2019-10-13 PROCEDURE — 87798 DETECT AGENT NOS DNA AMP: CPT

## 2019-10-13 PROCEDURE — 82306 VITAMIN D 25 HYDROXY: CPT

## 2019-10-13 PROCEDURE — 83550 IRON BINDING TEST: CPT

## 2019-10-13 PROCEDURE — 95951: CPT

## 2019-10-13 PROCEDURE — 50200 RENAL BIOPSY PERQ: CPT

## 2019-10-13 PROCEDURE — 88313 SPECIAL STAINS GROUP 2: CPT

## 2019-10-13 PROCEDURE — 72131 CT LUMBAR SPINE W/O DYE: CPT

## 2019-10-13 PROCEDURE — 80048 BASIC METABOLIC PNL TOTAL CA: CPT

## 2019-10-13 PROCEDURE — 88348 ELECTRON MICROSCOPY DX: CPT

## 2019-10-13 PROCEDURE — 87324 CLOSTRIDIUM AG IA: CPT

## 2019-10-13 PROCEDURE — 80177 DRUG SCRN QUAN LEVETIRACETAM: CPT

## 2019-10-13 PROCEDURE — 77003 FLUOROGUIDE FOR SPINE INJECT: CPT

## 2019-10-13 PROCEDURE — 85027 COMPLETE CBC AUTOMATED: CPT

## 2019-10-13 PROCEDURE — 84484 ASSAY OF TROPONIN QUANT: CPT

## 2019-10-13 PROCEDURE — 72128 CT CHEST SPINE W/O DYE: CPT

## 2019-10-13 PROCEDURE — 85097 BONE MARROW INTERPRETATION: CPT

## 2019-10-13 PROCEDURE — 76942 ECHO GUIDE FOR BIOPSY: CPT

## 2019-10-13 PROCEDURE — 86403 PARTICLE AGGLUT ANTBDY SCRN: CPT

## 2019-10-13 PROCEDURE — 87799 DETECT AGENT NOS DNA QUANT: CPT

## 2019-10-13 PROCEDURE — 93970 EXTREMITY STUDY: CPT

## 2019-10-13 PROCEDURE — 83605 ASSAY OF LACTIC ACID: CPT

## 2019-10-13 PROCEDURE — 83010 ASSAY OF HAPTOGLOBIN QUANT: CPT

## 2019-10-13 PROCEDURE — 86789 WEST NILE VIRUS ANTIBODY: CPT

## 2019-10-13 PROCEDURE — 82945 GLUCOSE OTHER FLUID: CPT

## 2019-10-13 PROCEDURE — 83970 ASSAY OF PARATHORMONE: CPT

## 2019-10-13 PROCEDURE — 85610 PROTHROMBIN TIME: CPT

## 2019-10-13 PROCEDURE — 87581 M.PNEUMON DNA AMP PROBE: CPT

## 2019-10-13 PROCEDURE — 88112 CYTOPATH CELL ENHANCE TECH: CPT

## 2019-10-13 PROCEDURE — 93005 ELECTROCARDIOGRAM TRACING: CPT

## 2019-10-13 PROCEDURE — 87340 HEPATITIS B SURFACE AG IA: CPT

## 2019-10-13 PROCEDURE — 74019 RADEX ABDOMEN 2 VIEWS: CPT

## 2019-10-13 PROCEDURE — 84165 PROTEIN E-PHORESIS SERUM: CPT

## 2019-10-13 PROCEDURE — 87521 HEPATITIS C PROBE&RVRS TRNSC: CPT

## 2019-10-13 PROCEDURE — 97161 PT EVAL LOW COMPLEX 20 MIN: CPT

## 2019-10-13 PROCEDURE — 51702 INSERT TEMP BLADDER CATH: CPT

## 2019-10-13 PROCEDURE — 85730 THROMBOPLASTIN TIME PARTIAL: CPT

## 2019-10-13 PROCEDURE — 80053 COMPREHEN METABOLIC PANEL: CPT

## 2019-10-13 PROCEDURE — 84100 ASSAY OF PHOSPHORUS: CPT

## 2019-10-13 PROCEDURE — 92610 EVALUATE SWALLOWING FUNCTION: CPT

## 2019-10-13 PROCEDURE — 93306 TTE W/DOPPLER COMPLETE: CPT

## 2019-10-13 PROCEDURE — P9016: CPT

## 2019-10-13 PROCEDURE — 87205 SMEAR GRAM STAIN: CPT

## 2019-10-13 PROCEDURE — 74176 CT ABD & PELVIS W/O CONTRAST: CPT

## 2019-10-13 PROCEDURE — 87150 DNA/RNA AMPLIFIED PROBE: CPT

## 2019-10-13 PROCEDURE — 86922 COMPATIBILITY TEST ANTIGLOB: CPT

## 2019-10-13 PROCEDURE — 87449 NOS EACH ORGANISM AG IA: CPT

## 2019-10-13 PROCEDURE — 76776 US EXAM K TRANSPL W/DOPPLER: CPT

## 2019-10-13 PROCEDURE — 82652 VIT D 1 25-DIHYDROXY: CPT

## 2019-10-13 PROCEDURE — 83090 ASSAY OF HOMOCYSTEINE: CPT

## 2019-10-13 PROCEDURE — 87385 HISTOPLASMA CAPSUL AG IA: CPT

## 2019-10-13 PROCEDURE — 86706 HEP B SURFACE ANTIBODY: CPT

## 2019-10-13 PROCEDURE — 86900 BLOOD TYPING SEROLOGIC ABO: CPT

## 2019-10-13 PROCEDURE — 82947 ASSAY GLUCOSE BLOOD QUANT: CPT

## 2019-10-13 PROCEDURE — 83521 IG LIGHT CHAINS FREE EACH: CPT

## 2019-10-13 PROCEDURE — 86788 WEST NILE VIRUS AB IGM: CPT

## 2019-10-13 PROCEDURE — 82728 ASSAY OF FERRITIN: CPT

## 2019-10-13 PROCEDURE — 99285 EMERGENCY DEPT VISIT HI MDM: CPT | Mod: 25

## 2019-10-13 PROCEDURE — 87493 C DIFF AMPLIFIED PROBE: CPT

## 2019-10-13 PROCEDURE — 87633 RESP VIRUS 12-25 TARGETS: CPT

## 2019-10-13 PROCEDURE — 36430 TRANSFUSION BLD/BLD COMPNT: CPT

## 2019-10-13 PROCEDURE — 87486 CHLMYD PNEUM DNA AMP PROBE: CPT

## 2019-10-13 PROCEDURE — 81001 URINALYSIS AUTO W/SCOPE: CPT

## 2019-10-13 PROCEDURE — 95816 EEG AWAKE AND DROWSY: CPT

## 2019-10-13 PROCEDURE — 87086 URINE CULTURE/COLONY COUNT: CPT

## 2019-10-13 PROCEDURE — 86334 IMMUNOFIX E-PHORESIS SERUM: CPT

## 2019-10-13 PROCEDURE — 88108 CYTOPATH CONCENTRATE TECH: CPT

## 2019-10-13 PROCEDURE — 84155 ASSAY OF PROTEIN SERUM: CPT

## 2019-10-13 PROCEDURE — 86901 BLOOD TYPING SEROLOGIC RH(D): CPT

## 2019-10-13 PROCEDURE — 80202 ASSAY OF VANCOMYCIN: CPT

## 2019-10-13 PROCEDURE — 88350 IMFLUOR EA ADDL 1ANTB STN PX: CPT

## 2019-10-13 PROCEDURE — 70460 CT HEAD/BRAIN W/DYE: CPT

## 2019-10-13 PROCEDURE — 89051 BODY FLUID CELL COUNT: CPT

## 2019-10-13 PROCEDURE — 82435 ASSAY OF BLOOD CHLORIDE: CPT

## 2019-10-13 PROCEDURE — 71045 X-RAY EXAM CHEST 1 VIEW: CPT

## 2019-10-13 PROCEDURE — 80074 ACUTE HEPATITIS PANEL: CPT

## 2019-10-13 PROCEDURE — 84443 ASSAY THYROID STIM HORMONE: CPT

## 2019-10-13 PROCEDURE — 85014 HEMATOCRIT: CPT

## 2019-10-13 PROCEDURE — 97530 THERAPEUTIC ACTIVITIES: CPT

## 2019-10-13 PROCEDURE — 97116 GAIT TRAINING THERAPY: CPT

## 2019-10-13 PROCEDURE — 86592 SYPHILIS TEST NON-TREP QUAL: CPT

## 2019-10-13 PROCEDURE — 82310 ASSAY OF CALCIUM: CPT

## 2019-10-13 PROCEDURE — 97110 THERAPEUTIC EXERCISES: CPT

## 2019-10-13 PROCEDURE — 88342 IMHCHEM/IMCYTCHM 1ST ANTB: CPT

## 2019-10-13 PROCEDURE — 87070 CULTURE OTHR SPECIMN AEROBIC: CPT

## 2019-10-13 PROCEDURE — 88305 TISSUE EXAM BY PATHOLOGIST: CPT

## 2019-10-13 PROCEDURE — 86160 COMPLEMENT ANTIGEN: CPT

## 2019-10-13 PROCEDURE — 83036 HEMOGLOBIN GLYCOSYLATED A1C: CPT

## 2019-11-19 RX ORDER — PIPERACILLIN AND TAZOBACTAM 4; .5 G/20ML; G/20ML
3.38 INJECTION, POWDER, LYOPHILIZED, FOR SOLUTION INTRAVENOUS
Qty: 0 | Refills: 0 | DISCHARGE
Start: 2019-11-19 | End: 2019-11-28

## 2019-11-20 ENCOUNTER — INPATIENT (INPATIENT)
Facility: HOSPITAL | Age: 74
LOS: 45 days | DRG: 853 | End: 2020-01-05
Attending: SPECIALIST | Admitting: INTERNAL MEDICINE
Payer: MEDICARE

## 2019-11-20 VITALS
OXYGEN SATURATION: 99 % | SYSTOLIC BLOOD PRESSURE: 112 MMHG | HEART RATE: 110 BPM | HEIGHT: 72 IN | TEMPERATURE: 98 F | RESPIRATION RATE: 24 BRPM | WEIGHT: 149.91 LBS | DIASTOLIC BLOOD PRESSURE: 55 MMHG

## 2019-11-20 DIAGNOSIS — N18.6 END STAGE RENAL DISEASE: ICD-10-CM

## 2019-11-20 DIAGNOSIS — N39.0 URINARY TRACT INFECTION, SITE NOT SPECIFIED: ICD-10-CM

## 2019-11-20 DIAGNOSIS — Z94.0 KIDNEY TRANSPLANT STATUS: ICD-10-CM

## 2019-11-20 DIAGNOSIS — E83.52 HYPERCALCEMIA: ICD-10-CM

## 2019-11-20 DIAGNOSIS — A41.9 SEPSIS, UNSPECIFIED ORGANISM: ICD-10-CM

## 2019-11-20 DIAGNOSIS — E11.9 TYPE 2 DIABETES MELLITUS WITHOUT COMPLICATIONS: ICD-10-CM

## 2019-11-20 LAB
ALBUMIN SERPL ELPH-MCNC: 2.9 G/DL — LOW (ref 3.3–5)
ALP SERPL-CCNC: 95 U/L — SIGNIFICANT CHANGE UP (ref 40–120)
ALT FLD-CCNC: 16 U/L — SIGNIFICANT CHANGE UP (ref 10–45)
ANION GAP SERPL CALC-SCNC: 23 MMOL/L — HIGH (ref 5–17)
ANISOCYTOSIS BLD QL: SLIGHT — SIGNIFICANT CHANGE UP
APPEARANCE UR: ABNORMAL
APTT BLD: 30 SEC — SIGNIFICANT CHANGE UP (ref 27.5–36.3)
AST SERPL-CCNC: 33 U/L — SIGNIFICANT CHANGE UP (ref 10–40)
BACTERIA # UR AUTO: ABNORMAL
BASE EXCESS BLDV CALC-SCNC: 3.7 MMOL/L — HIGH (ref -2–2)
BASOPHILS # BLD AUTO: 0 K/UL — SIGNIFICANT CHANGE UP (ref 0–0.2)
BASOPHILS NFR BLD AUTO: 0 % — SIGNIFICANT CHANGE UP (ref 0–2)
BILIRUB SERPL-MCNC: 0.5 MG/DL — SIGNIFICANT CHANGE UP (ref 0.2–1.2)
BILIRUB UR-MCNC: NEGATIVE — SIGNIFICANT CHANGE UP
BUN SERPL-MCNC: 48 MG/DL — HIGH (ref 7–23)
CA-I BLD-SCNC: 1.65 MMOL/L — CRITICAL HIGH (ref 1.12–1.3)
CA-I SERPL-SCNC: 1.71 MMOL/L — CRITICAL HIGH (ref 1.12–1.3)
CALCIUM SERPL-MCNC: 13.3 MG/DL — CRITICAL HIGH (ref 8.4–10.5)
CHLORIDE BLDV-SCNC: 99 MMOL/L — SIGNIFICANT CHANGE UP (ref 96–108)
CHLORIDE SERPL-SCNC: 93 MMOL/L — LOW (ref 96–108)
CO2 BLDV-SCNC: 27 MMOL/L — SIGNIFICANT CHANGE UP (ref 22–30)
CO2 SERPL-SCNC: 24 MMOL/L — SIGNIFICANT CHANGE UP (ref 22–31)
COLOR SPEC: SIGNIFICANT CHANGE UP
CREAT SERPL-MCNC: 7.13 MG/DL — HIGH (ref 0.5–1.3)
DACRYOCYTES BLD QL SMEAR: SLIGHT — SIGNIFICANT CHANGE UP
DIFF PNL FLD: ABNORMAL
EOSINOPHIL # BLD AUTO: 0.12 K/UL — SIGNIFICANT CHANGE UP (ref 0–0.5)
EOSINOPHIL NFR BLD AUTO: 0.9 % — SIGNIFICANT CHANGE UP (ref 0–6)
EPI CELLS # UR: 0 — SIGNIFICANT CHANGE UP
GAS PNL BLDV: 139 MMOL/L — SIGNIFICANT CHANGE UP (ref 135–145)
GAS PNL BLDV: SIGNIFICANT CHANGE UP
GLUCOSE BLDC GLUCOMTR-MCNC: 108 MG/DL — HIGH (ref 70–99)
GLUCOSE BLDV-MCNC: 85 MG/DL — SIGNIFICANT CHANGE UP (ref 70–99)
GLUCOSE SERPL-MCNC: 85 MG/DL — SIGNIFICANT CHANGE UP (ref 70–99)
GLUCOSE UR QL: NEGATIVE — SIGNIFICANT CHANGE UP
HCO3 BLDV-SCNC: 26 MMOL/L — SIGNIFICANT CHANGE UP (ref 21–29)
HCT VFR BLD CALC: 33.1 % — LOW (ref 39–50)
HCT VFR BLDA CALC: 33 % — LOW (ref 39–50)
HGB BLD CALC-MCNC: 10.8 G/DL — LOW (ref 13–17)
HGB BLD-MCNC: 10.3 G/DL — LOW (ref 13–17)
HYALINE CASTS # UR AUTO: 0 /LPF — SIGNIFICANT CHANGE UP (ref 0–7)
HYPOCHROMIA BLD QL: SLIGHT — SIGNIFICANT CHANGE UP
INR BLD: 1.3 RATIO — HIGH (ref 0.88–1.16)
KETONES UR-MCNC: SIGNIFICANT CHANGE UP
LACTATE BLDV-MCNC: 3.5 MMOL/L — HIGH (ref 0.7–2)
LEUKOCYTE ESTERASE UR-ACNC: ABNORMAL
LYMPHOCYTES # BLD AUTO: 1.56 K/UL — SIGNIFICANT CHANGE UP (ref 1–3.3)
LYMPHOCYTES # BLD AUTO: 12.2 % — LOW (ref 13–44)
MACROCYTES BLD QL: SLIGHT — SIGNIFICANT CHANGE UP
MANUAL SMEAR VERIFICATION: SIGNIFICANT CHANGE UP
MCHC RBC-ENTMCNC: 25.6 PG — LOW (ref 27–34)
MCHC RBC-ENTMCNC: 31.1 GM/DL — LOW (ref 32–36)
MCV RBC AUTO: 82.3 FL — SIGNIFICANT CHANGE UP (ref 80–100)
MONOCYTES # BLD AUTO: 1.56 K/UL — HIGH (ref 0–0.9)
MONOCYTES NFR BLD AUTO: 12.2 % — SIGNIFICANT CHANGE UP (ref 2–14)
NEUTROPHILS # BLD AUTO: 9 K/UL — HIGH (ref 1.8–7.4)
NEUTROPHILS NFR BLD AUTO: 70.4 % — SIGNIFICANT CHANGE UP (ref 43–77)
NITRITE UR-MCNC: NEGATIVE — SIGNIFICANT CHANGE UP
OTHER CELLS CSF MANUAL: 2 ML/DL — LOW (ref 18–22)
PCO2 BLDV: 32 MMHG — LOW (ref 35–50)
PH BLDV: 7.52 — HIGH (ref 7.35–7.45)
PH UR: 6.5 — SIGNIFICANT CHANGE UP (ref 5–8)
PLAT MORPH BLD: ABNORMAL
PLATELET # BLD AUTO: 249 K/UL — SIGNIFICANT CHANGE UP (ref 150–400)
PO2 BLDV: <20 MMHG — LOW (ref 25–45)
POIKILOCYTOSIS BLD QL AUTO: SLIGHT — SIGNIFICANT CHANGE UP
POLYCHROMASIA BLD QL SMEAR: SLIGHT — SIGNIFICANT CHANGE UP
POTASSIUM BLDV-SCNC: 4.5 MMOL/L — SIGNIFICANT CHANGE UP (ref 3.5–5.3)
POTASSIUM SERPL-MCNC: 4.8 MMOL/L — SIGNIFICANT CHANGE UP (ref 3.5–5.3)
POTASSIUM SERPL-SCNC: 4.8 MMOL/L — SIGNIFICANT CHANGE UP (ref 3.5–5.3)
PROT SERPL-MCNC: 7.6 G/DL — SIGNIFICANT CHANGE UP (ref 6–8.3)
PROT UR-MCNC: >600
PROTHROM AB SERPL-ACNC: 15.1 SEC — HIGH (ref 10–12.9)
PTH-INTACT FLD-MCNC: 299 PG/ML — HIGH (ref 15–65)
RAPID RVP RESULT: SIGNIFICANT CHANGE UP
RBC # BLD: 4.02 M/UL — LOW (ref 4.2–5.8)
RBC # FLD: 15.4 % — HIGH (ref 10.3–14.5)
RBC BLD AUTO: ABNORMAL
RBC CASTS # UR COMP ASSIST: 2 /HPF — SIGNIFICANT CHANGE UP (ref 0–4)
SAO2 % BLDV: 14 % — LOW (ref 67–88)
SCHISTOCYTES BLD QL AUTO: SLIGHT — SIGNIFICANT CHANGE UP
SODIUM SERPL-SCNC: 140 MMOL/L — SIGNIFICANT CHANGE UP (ref 135–145)
SP GR SPEC: 1.02 — SIGNIFICANT CHANGE UP (ref 1.01–1.02)
UROBILINOGEN FLD QL: NEGATIVE — SIGNIFICANT CHANGE UP
VARIANT LYMPHS # BLD: 4.3 % — SIGNIFICANT CHANGE UP (ref 0–6)
WBC # BLD: 12.78 K/UL — HIGH (ref 3.8–10.5)
WBC # FLD AUTO: 12.78 K/UL — HIGH (ref 3.8–10.5)
WBC UR QL: >50

## 2019-11-20 PROCEDURE — 99285 EMERGENCY DEPT VISIT HI MDM: CPT

## 2019-11-20 PROCEDURE — 93010 ELECTROCARDIOGRAM REPORT: CPT

## 2019-11-20 PROCEDURE — 99223 1ST HOSP IP/OBS HIGH 75: CPT | Mod: GC

## 2019-11-20 PROCEDURE — 71045 X-RAY EXAM CHEST 1 VIEW: CPT | Mod: 26

## 2019-11-20 PROCEDURE — 70450 CT HEAD/BRAIN W/O DYE: CPT | Mod: 26

## 2019-11-20 RX ORDER — ESCITALOPRAM OXALATE 10 MG/1
1 TABLET, FILM COATED ORAL
Qty: 0 | Refills: 0 | DISCHARGE

## 2019-11-20 RX ORDER — MIDODRINE HYDROCHLORIDE 2.5 MG/1
10 TABLET ORAL THREE TIMES A DAY
Refills: 0 | Status: DISCONTINUED | OUTPATIENT
Start: 2019-11-20 | End: 2019-11-26

## 2019-11-20 RX ORDER — SODIUM CHLORIDE 9 MG/ML
1000 INJECTION INTRAMUSCULAR; INTRAVENOUS; SUBCUTANEOUS ONCE
Refills: 0 | Status: COMPLETED | OUTPATIENT
Start: 2019-11-20 | End: 2019-11-20

## 2019-11-20 RX ORDER — ACYCLOVIR SODIUM 500 MG
340 VIAL (EA) INTRAVENOUS ONCE
Refills: 0 | Status: COMPLETED | OUTPATIENT
Start: 2019-11-20 | End: 2019-11-20

## 2019-11-20 RX ORDER — MEROPENEM 1 G/30ML
500 INJECTION INTRAVENOUS EVERY 24 HOURS
Refills: 0 | Status: DISCONTINUED | OUTPATIENT
Start: 2019-11-21 | End: 2019-11-25

## 2019-11-20 RX ORDER — SEVELAMER CARBONATE 2400 MG/1
800 POWDER, FOR SUSPENSION ORAL
Refills: 0 | Status: DISCONTINUED | OUTPATIENT
Start: 2019-11-20 | End: 2019-11-26

## 2019-11-20 RX ORDER — ONDANSETRON 8 MG/1
1 TABLET, FILM COATED ORAL
Qty: 0 | Refills: 0 | DISCHARGE

## 2019-11-20 RX ORDER — LEVETIRACETAM 250 MG/1
1000 TABLET, FILM COATED ORAL
Refills: 0 | Status: DISCONTINUED | OUTPATIENT
Start: 2019-11-20 | End: 2019-11-20

## 2019-11-20 RX ORDER — LACTOBACILLUS ACIDOPHILUS 100MM CELL
1 CAPSULE ORAL
Qty: 0 | Refills: 0 | DISCHARGE

## 2019-11-20 RX ORDER — LEVETIRACETAM 250 MG/1
1 TABLET, FILM COATED ORAL
Qty: 0 | Refills: 0 | DISCHARGE

## 2019-11-20 RX ORDER — MEROPENEM 1 G/30ML
500 INJECTION INTRAVENOUS ONCE
Refills: 0 | Status: COMPLETED | OUTPATIENT
Start: 2019-11-20 | End: 2019-11-20

## 2019-11-20 RX ORDER — ALLOPURINOL 300 MG
1 TABLET ORAL
Qty: 0 | Refills: 0 | DISCHARGE

## 2019-11-20 RX ORDER — THIAMINE MONONITRATE (VIT B1) 100 MG
1 TABLET ORAL
Qty: 0 | Refills: 0 | DISCHARGE

## 2019-11-20 RX ORDER — ACETAMINOPHEN 500 MG
650 TABLET ORAL ONCE
Refills: 0 | Status: COMPLETED | OUTPATIENT
Start: 2019-11-20 | End: 2019-11-20

## 2019-11-20 RX ORDER — MYCOPHENOLATE MOFETIL 250 MG/1
1 CAPSULE ORAL
Qty: 0 | Refills: 0 | DISCHARGE

## 2019-11-20 RX ORDER — SEVELAMER CARBONATE 2400 MG/1
1 POWDER, FOR SUSPENSION ORAL
Qty: 0 | Refills: 0 | DISCHARGE

## 2019-11-20 RX ORDER — ATORVASTATIN CALCIUM 80 MG/1
1 TABLET, FILM COATED ORAL
Qty: 0 | Refills: 0 | DISCHARGE

## 2019-11-20 RX ORDER — TAMSULOSIN HYDROCHLORIDE 0.4 MG/1
1 CAPSULE ORAL
Qty: 0 | Refills: 0 | DISCHARGE

## 2019-11-20 RX ORDER — APIXABAN 2.5 MG/1
1 TABLET, FILM COATED ORAL
Qty: 0 | Refills: 0 | DISCHARGE

## 2019-11-20 RX ORDER — MYCOPHENOLATE MOFETIL 250 MG/1
250 CAPSULE ORAL
Refills: 0 | Status: DISCONTINUED | OUTPATIENT
Start: 2019-11-20 | End: 2019-11-26

## 2019-11-20 RX ORDER — MIDODRINE HYDROCHLORIDE 2.5 MG/1
1 TABLET ORAL
Qty: 0 | Refills: 0 | DISCHARGE

## 2019-11-20 RX ORDER — HYDROCORTISONE 1 %
1 OINTMENT (GRAM) TOPICAL
Qty: 0 | Refills: 0 | DISCHARGE

## 2019-11-20 RX ORDER — ESCITALOPRAM OXALATE 10 MG/1
20 TABLET, FILM COATED ORAL DAILY
Refills: 0 | Status: DISCONTINUED | OUTPATIENT
Start: 2019-11-20 | End: 2019-11-26

## 2019-11-20 RX ORDER — LOPERAMIDE HCL 2 MG
1 TABLET ORAL
Qty: 0 | Refills: 0 | DISCHARGE

## 2019-11-20 RX ORDER — PIPERACILLIN AND TAZOBACTAM 4; .5 G/20ML; G/20ML
3.38 INJECTION, POWDER, LYOPHILIZED, FOR SOLUTION INTRAVENOUS ONCE
Refills: 0 | Status: COMPLETED | OUTPATIENT
Start: 2019-11-20 | End: 2019-11-20

## 2019-11-20 RX ORDER — RANITIDINE HYDROCHLORIDE 150 MG/1
1 TABLET, FILM COATED ORAL
Qty: 0 | Refills: 0 | DISCHARGE

## 2019-11-20 RX ORDER — SODIUM BICARBONATE 1 MEQ/ML
1 SYRINGE (ML) INTRAVENOUS
Qty: 0 | Refills: 0 | DISCHARGE

## 2019-11-20 RX ORDER — LEVETIRACETAM 250 MG/1
1000 TABLET, FILM COATED ORAL EVERY 12 HOURS
Refills: 0 | Status: DISCONTINUED | OUTPATIENT
Start: 2019-11-20 | End: 2019-12-03

## 2019-11-20 RX ORDER — CARVEDILOL PHOSPHATE 80 MG/1
1 CAPSULE, EXTENDED RELEASE ORAL
Qty: 0 | Refills: 0 | DISCHARGE

## 2019-11-20 RX ORDER — FUROSEMIDE 40 MG
3 TABLET ORAL
Qty: 0 | Refills: 0 | DISCHARGE

## 2019-11-20 RX ORDER — MEROPENEM 1 G/30ML
INJECTION INTRAVENOUS
Refills: 0 | Status: DISCONTINUED | OUTPATIENT
Start: 2019-11-20 | End: 2019-11-25

## 2019-11-20 RX ORDER — SODIUM BICARBONATE 1 MEQ/ML
650 SYRINGE (ML) INTRAVENOUS
Refills: 0 | Status: DISCONTINUED | OUTPATIENT
Start: 2019-11-20 | End: 2019-11-22

## 2019-11-20 RX ORDER — LIDOCAINE 4 G/100G
0 CREAM TOPICAL
Qty: 0 | Refills: 0 | DISCHARGE

## 2019-11-20 RX ORDER — HYDRALAZINE HCL 50 MG
1 TABLET ORAL
Qty: 0 | Refills: 0 | DISCHARGE

## 2019-11-20 RX ORDER — CYCLOBENZAPRINE HYDROCHLORIDE 10 MG/1
5 TABLET, FILM COATED ORAL
Refills: 0 | Status: DISCONTINUED | OUTPATIENT
Start: 2019-11-20 | End: 2019-11-26

## 2019-11-20 RX ORDER — APIXABAN 2.5 MG/1
2.5 TABLET, FILM COATED ORAL
Refills: 0 | Status: DISCONTINUED | OUTPATIENT
Start: 2019-11-20 | End: 2019-11-21

## 2019-11-20 RX ORDER — INSULIN LISPRO 100/ML
0 VIAL (ML) SUBCUTANEOUS
Qty: 0 | Refills: 0 | DISCHARGE

## 2019-11-20 RX ORDER — ACYCLOVIR SODIUM 500 MG
VIAL (EA) INTRAVENOUS
Refills: 0 | Status: DISCONTINUED | OUTPATIENT
Start: 2019-11-20 | End: 2019-11-22

## 2019-11-20 RX ORDER — ACETAMINOPHEN 500 MG
3 TABLET ORAL
Qty: 0 | Refills: 0 | DISCHARGE

## 2019-11-20 RX ORDER — ATORVASTATIN CALCIUM 80 MG/1
40 TABLET, FILM COATED ORAL AT BEDTIME
Refills: 0 | Status: DISCONTINUED | OUTPATIENT
Start: 2019-11-20 | End: 2019-11-26

## 2019-11-20 RX ORDER — FOLIC ACID 0.8 MG
1 TABLET ORAL
Qty: 0 | Refills: 0 | DISCHARGE

## 2019-11-20 RX ORDER — ACYCLOVIR SODIUM 500 MG
340 VIAL (EA) INTRAVENOUS EVERY 24 HOURS
Refills: 0 | Status: DISCONTINUED | OUTPATIENT
Start: 2019-11-21 | End: 2019-11-22

## 2019-11-20 RX ORDER — TAMSULOSIN HYDROCHLORIDE 0.4 MG/1
0.4 CAPSULE ORAL AT BEDTIME
Refills: 0 | Status: DISCONTINUED | OUTPATIENT
Start: 2019-11-20 | End: 2019-11-22

## 2019-11-20 RX ORDER — VANCOMYCIN HCL 1 G
1000 VIAL (EA) INTRAVENOUS ONCE
Refills: 0 | Status: COMPLETED | OUTPATIENT
Start: 2019-11-20 | End: 2019-11-20

## 2019-11-20 RX ORDER — IPRATROPIUM/ALBUTEROL SULFATE 18-103MCG
3 AEROSOL WITH ADAPTER (GRAM) INHALATION
Qty: 0 | Refills: 0 | DISCHARGE

## 2019-11-20 RX ORDER — CYCLOBENZAPRINE HYDROCHLORIDE 10 MG/1
1 TABLET, FILM COATED ORAL
Qty: 0 | Refills: 0 | DISCHARGE

## 2019-11-20 RX ORDER — CINACALCET 30 MG/1
1 TABLET, FILM COATED ORAL
Qty: 0 | Refills: 0 | DISCHARGE

## 2019-11-20 RX ORDER — CARVEDILOL PHOSPHATE 80 MG/1
6.25 CAPSULE, EXTENDED RELEASE ORAL EVERY 12 HOURS
Refills: 0 | Status: DISCONTINUED | OUTPATIENT
Start: 2019-11-20 | End: 2019-11-22

## 2019-11-20 RX ADMIN — Medication 106.8 MILLIGRAM(S): at 20:05

## 2019-11-20 RX ADMIN — SODIUM CHLORIDE 1000 MILLILITER(S): 9 INJECTION INTRAMUSCULAR; INTRAVENOUS; SUBCUTANEOUS at 14:41

## 2019-11-20 RX ADMIN — Medication 250 MILLIGRAM(S): at 15:40

## 2019-11-20 RX ADMIN — PIPERACILLIN AND TAZOBACTAM 3.38 GRAM(S): 4; .5 INJECTION, POWDER, LYOPHILIZED, FOR SOLUTION INTRAVENOUS at 15:40

## 2019-11-20 RX ADMIN — Medication 650 MILLIGRAM(S): at 14:43

## 2019-11-20 RX ADMIN — PIPERACILLIN AND TAZOBACTAM 200 GRAM(S): 4; .5 INJECTION, POWDER, LYOPHILIZED, FOR SOLUTION INTRAVENOUS at 14:42

## 2019-11-20 RX ADMIN — MEROPENEM 100 MILLIGRAM(S): 1 INJECTION INTRAVENOUS at 19:28

## 2019-11-20 RX ADMIN — LEVETIRACETAM 400 MILLIGRAM(S): 250 TABLET, FILM COATED ORAL at 21:20

## 2019-11-20 RX ADMIN — Medication 650 MILLIGRAM(S): at 15:13

## 2019-11-20 NOTE — H&P ADULT - HISTORY OF PRESENT ILLNESS
74M PMHx of ESRD s/p failed renal transplant x2, HCV, DM, and meningococcal meningitis 2 years ago was sent in to the ED from HD for AMS and low BPs. He is unable to provide any information at this time. His sister is at the bedside and reports that he is typically able to carry a conversation and walk a small amount. She is not aware if he had been having fevers at the nursing home, and believes that his last HD was on Tuesday.

## 2019-11-20 NOTE — CONSULT NOTE ADULT - ASSESSMENT
74M PMHx of ESRD s/p failed renal transplant x2, HCV, DM, and meningococcal meningitis 2 years ago was sent in to the ED from HD for AMS and low BPs, MICU consulted due to concern that the patient may need urgent HD    Patient does not have acidosis, electrolyte abnormalities, toxic ingestion, uremia, or other indications for urgent HD at this time per MICU however should this be a concern recommend a formal nephrology consult.   Workup for hypercalcemia  +UA and meets sepsis criteria; treat with antibiotics  AMS: CT head negative, advise LP to be performed to look for infectious cause. Empiric antibiotics for meningitis coverage should be started.     Disposition: to be discussed with MICU attending.     Bandar Noel M.D.  Internal Medicine PGY-3  Pager: [phone]/ LILIBETH [phone] 74M PMHx of ESRD s/p failed renal transplant x2, HCV, DM, and meningococcal meningitis 2 years ago was sent in to the ED from HD for AMS and low BPs, MICU consulted due to concern that the patient may need urgent HD    Patient does not have acidosis, electrolyte abnormalities, toxic ingestion, significant uremia, or other indications for urgent HD at this time per MICU. However formal nephrology consult is recommend, as patient is ESRD on HD.   Workup for hypercalcemia  +UA and meets sepsis criteria; treat with antibiotics  AMS: CT head negative, advise LP to be performed to look for infectious cause. Empiric antibiotics for meningitis coverage should be started.     Disposition: to be discussed with MICU attending.     Bandar Noel M.D.  Internal Medicine PGY-3  Pager: [phone]/ LIALLYSSA [phone] 74M PMHx of ESRD s/p failed renal transplant x2, HCV, DM, and meningococcal meningitis 2 years ago was sent in to the ED from HD for AMS and low BPs, MICU consulted due to concern that the patient may need urgent HD    Patient does not have acidosis, electrolyte abnormalities, toxic ingestion, significant uremia, or other indications for urgent HD at this time per MICU. However formal nephrology consult is recommend, as patient is ESRD on HD.   Workup for hypercalcemia: immunofixation, SPEP, UPEP, malignancy workup. Treat with saline and calcitonin.   +UA and meets sepsis criteria; treat with antibiotics  AMS: CT head negative, advise LP to be performed to look for infectious cause. Empiric antibiotics for meningitis coverage should be started.     Not a MICU candidate at this time, however please reconsult should clinical condition worsen.     Bandar Noel M.D.  Internal Medicine PGY-3  Pager: [phone]/ LILIBETH [phone] 74M PMHx of ESRD s/p failed renal transplant x2, HCV, DM, and meningococcal meningitis 2 years ago was sent in to the ED from HD for AMS and low BPs, MICU consulted due to concern that the patient may need urgent HD    -Patient does not have acidosis, untreatable electrolyte abnormalities, toxic ingestion, significant uremia, or other indications for urgent HD at this time per MICU. However formal nephrology consult is recommend, as patient is ESRD on HD.   -Workup for hypercalcemia: immunofixation, SPEP, UPEP, malignancy workup. Treat with saline and calcitonin.   -+UA and meets sepsis criteria; treat with antibiotics  -AMS: CT head shows new chronic strokes over last 2 months - needs workup for embolic phenomena  -advise LP to be performed to look for infectious cause. Empiric antibiotics for meningitis coverage should be started.   -evaluate firmness in lower abdomen  -constipation? mass?  -check utox  -avoid hypoglycemia  -if pt is on chronic steroids be sure to continue them  -aspiration precautions    Not a MICU candidate at this time, however please reconsult should clinical condition worsen.     Bandar Noel M.D.  Internal Medicine PGY-3  Pager: [phone]/ LILIBETH [phone]

## 2019-11-20 NOTE — CONSULT NOTE ADULT - ASSESSMENT
AMS / fever   appears to be sepsis mediated   fu with neurology   fu with ID  on anbx  LP as per ID rule out meningitis     HTN  monitor BP for now    History of cocaine induced CM  normal EF on last echo  BB on hold given sepsis   resume once deemed safe     PAF  in sinus   a/c on hold given high bleed risk in setting of sepsis and need for LP

## 2019-11-20 NOTE — ED PROVIDER NOTE - PROGRESS NOTE DETAILS
spoke to Dr. James and made him aware of recommendations of ID attending for the need of LP to r.o meningitis. Dr. James to follow.

## 2019-11-20 NOTE — ED ADULT NURSE REASSESSMENT NOTE - NS ED NURSE REASSESS COMMENT FT1
1430: pt's US guided IV in R bicep appears infiltrated. MD Duong confirmed placement and no infiltration using US.     1840: Pt's US guided IV in R bicep infiltrated. 2 RNs attempted placement for another IV without success. JAVIER Fritz made aware that meropenem and acyclovir are pending due to this issue. He suggested IV team be called.

## 2019-11-20 NOTE — ED ADULT NURSE REASSESSMENT NOTE - NS ED NURSE REASSESS COMMENT FT1
Report received from NIA Castellanos. Patient has incoherent speech. Unable to answer questions. Patient breathing spontaneous with bilateral clear breath sounds, palpable pulses, skin normal for ethnicity. Patient responding to pain. Calm at this time. Skin has multiple spots of redness and sacral stage 2 sore. Awaiting CT xray at this time. Patient antibiotics held due to infiltrated line. MD aware.

## 2019-11-20 NOTE — CHART NOTE - NSCHARTNOTEFT_GEN_A_CORE
ESE INGRAM  74y Male  Patient is a 74y old  Male who presents with a chief complaint of ams (20 Nov 2019 18:01)    Event Summary:  Requested by ID-Dr. Damon, and subsequently by medicine attending-Dr. James, to consult Neurology for urgent LP. Patient presented to ED for AMS, noted to be lethargic and with rigid neck as per ID evaluation, in the setting of fever, highly suspicious for meningitis.  -Consulted Neurology Resident-Dr. Webb (#[id number]), who recommends that since patient is physically in the ED area holding for a medicine bed to defer LP to be done by ED staff. However, patient is currently admitted to medicine and management is synonymous to patients who are physically located on the medicine units.   -Escalated to Medicine attending, Dr. James who further discussed the need for urgent LP with Neurology. As per Neuro team, recommends we treat empirically or have the ED providers perform LP prior to physical placement of patient on medicine unit bed (at this time, ED staff refusing to perform LP as patient is admitted and is holding in ED for a bed).  -Plan to treat with Meropenem 500mg Q24H & Acyclovir 340mg Q24H (both renally dosed) as discussed with ID-Dr. Damon. MICU to be consulted.    Nathaniel Saintus [facility]  #[phone]

## 2019-11-20 NOTE — ED ADULT NURSE REASSESSMENT NOTE - NS ED NURSE REASSESS COMMENT FT1
Pt diaper changed and pt repositioned. Awaiting bed upstairs. Per MDs, pt is droplet precautions to r/o meningitis.

## 2019-11-20 NOTE — ED ADULT NURSE REASSESSMENT NOTE - NS ED NURSE REASSESS COMMENT FT1
Patient sister at bedside. Asked to speak with someone regardless her brothers care. GIL Gray aware.

## 2019-11-20 NOTE — CONSULT NOTE ADULT - ASSESSMENT
74M PMHx of ESRD s/p failed renal transplant x2, HCV, DM, and meningococcal meningitis 2 years ago was sent in to the ED from HD for AMS and low BPs. The patient is febrile, encephalopathic 74M PMHx of ESRD s/p failed renal transplant x2, HCV, DM, and meningococcal meningitis 2 years ago was sent in to the ED from HD for AMS and low BPs. The patient is febrile, encephalopathic, with nuchal rigidity and leukocytosis. Considering he is currently on Cellcept and has had meningitis in the past, he is very high risk for meningitis at this time.     Fevers with leukocytosis and encephalopathy  - Perform an LP - send cell counts, protein, glucose, CSF PCR panel, gram stain, culture, fungal culture, AFB smears   - Start meropenem 500mg IV q 24 hours  - Redose vancomycin post HD  - Start acyclovir 340mg IV q24 hours  - Follow up blood cultures, urine cultures  - Maintain droplet precautions  - Monitor for fevers  - Trend WBCs    Shira Lew, PGY-4  Infectious Disease Fellow   Pager: [phone]  After 5pm/weekends: [phone]

## 2019-11-20 NOTE — H&P ADULT - NSHPPHYSICALEXAM_GEN_ALL_CORE
General: frail, confused   Neurology: A&Ox0,  neck rigid   Respiratory: CTA B/L  CV: RRR, S1S2, no murmurs, rubs or gallops  Abdominal: Soft, NT, ND +BS, Last BM  Extremities: No edema

## 2019-11-20 NOTE — CHART NOTE - NSCHARTNOTEFT_GEN_A_CORE
Received call from medicine attending, Dr. James regarding ID's concern for meningitis w/ requests for an urgent Lumbar Puncture. I advised Dr. James that if the concern for meningitis is high, then patient should be treated empirically. An alternative option would be to have the ED staff perform the LP prior to leaving the ED (patient still in ED at time of phone call). ED staff refusing LP as patient already admitted. Dr. James also refusing to perform LP.     Recommendations:  [] If concerned for meningoencephalitis, treat empirically w/ Vancomycin, Ampicillin, and Acyclovir.   [] Follow up ID recs  [] Neurology to see patient in the AM Received call from medicine attending, Dr. James regarding ID's concern for meningitis w/ requests for an urgent Lumbar Puncture. I advised Dr. James that if the concern for meningitis is high, then patient should be treated empirically. An alternative option would be to have the ED staff perform the LP prior to leaving the ED (patient still in ED at time of phone call). ED staff refusing LP as patient already admitted. Dr. James also refusing to perform LP.     Recommendations:  [] If concerned for meningoencephalitis, treat empirically w/ Vancomycin, Ampicillin, and Acyclovir.   [] Follow up ID recs  [] Consider holding Eliquis and starting drip for treatment of DVT as patient may need future LP   [] Neurology to see patient in the AM

## 2019-11-20 NOTE — CONSULT NOTE ADULT - SUBJECTIVE AND OBJECTIVE BOX
CHIEF COMPLAINT:     HPI:  74M PMHx of ESRD s/p failed renal transplant x2, HCV, DM, and meningococcal meningitis 2 years ago was sent in to the ED from HD for AMS and low BPs.    Patient unable to provide history. Last HD was Tuesday.     FAMILY HISTORY:  Family history of breast cancer in sister (Sibling): living at 92 yo  Family history of prostate cancer in father  Family history of lung cancer  Family history of hypertension in mother  Family history of diabetes mellitus: mother      SOCIAL HISTORY:  Smoking: __ packs x ___ years  EtOH Use:  Marital Status:  Occupation:  Recent Travel:  Country of Birth:  Advance Directives:    Allergies    No Known Allergies    OBJECTIVE:  ICU Vital Signs Last 24 Hrs  T(C): 38.2 (2019 17:36), Max: 38.3 (2019 13:23)  T(F): 100.7 (2019 17:36), Max: 101 (2019 13:23)  HR: 117 (2019 17:36) (110 - 117)  BP: 133/68 (2019 17:36) (112/55 - 163/82)  RR: 20 (2019 17:36) (20 - 24)  SpO2: 95% (2019 17:36) (95% - 100%)        CAPILLARY BLOOD GLUCOSE      POCT Blood Glucose.: 83 mg/dL (2019 13:04)      PHYSICAL EXAM:  GENERAL: NAD, well-developed  HEAD:  Atraumatic, Normocephalic  EYES: EOMI, PERRLA, conjunctiva and sclera clear  NECK: Supple, No JVD  CHEST/LUNG: Clear to auscultation bilaterally; No wheeze  HEART: Regular rate and rhythm; No murmurs, rubs, or gallops  ABDOMEN: Soft, Nontender, Nondistended; Bowel sounds present  EXTREMITIES:  2+ Peripheral Pulses, No clubbing, cyanosis, or edema  PSYCH: AAOx3  NEUROLOGY: non-focal  SKIN: No rashes or lesions    HOSPITAL MEDICATIONS:  MEDICATIONS  (STANDING):  acyclovir IVPB      acyclovir IVPB 340 milliGRAM(s) IV Intermittent once  meropenem  IVPB 500 milliGRAM(s) IV Intermittent once  meropenem  IVPB        MEDICATIONS  (PRN):      LABS:                        10.3   12.78 )-----------( 249      ( 2019 15:25 )             33.1     11-20    140  |  93<L>  |  48<H>  ----------------------------<  85  4.8   |  24  |  7.13<H>    Ca    13.3<HH>      2019 13:22    TPro  7.6  /  Alb  2.9<L>  /  TBili  0.5  /  DBili  x   /  AST  33  /  ALT  16  /  AlkPhos  95      PT/INR - ( 2019 15:25 )   PT: 15.1 sec;   INR: 1.30 ratio         PTT - ( 2019 15:25 )  PTT:30.0 sec  Urinalysis Basic - ( 2019 14:29 )    Color: Light Yellow / Appearance: Turbid / S.025 / pH: x  Gluc: x / Ketone: Trace  / Bili: Negative / Urobili: Negative   Blood: x / Protein: >600 / Nitrite: Negative   Leuk Esterase: Large / RBC: 2 /hpf / WBC >50   Sq Epi: x / Non Sq Epi: 0 / Bacteria: Many    Venous Blood Gas:   @ 13:22  7.52/32/<20//14  VBG Lactate: 3.5 CHIEF COMPLAINT:     HPI:  74M PMHx of ESRD s/p failed renal transplant x2, HCV, DM, and meningococcal meningitis 2 years ago was sent in to the ED from HD for AMS and low BPs.    Patient unable to provide history. Last HD was Tuesday.     PAST MEDICAL & SURGICAL HISTORY:  Kidney transplant recipient  Anuria  GERD (gastroesophageal reflux disease)  Kidney transplant failure: dx: 2013  Hepatitis C: dx: 90&#x27;s.  From iv drug abuse  GERD (gastroesophageal reflux disease)  Renal transplant failure and rejection  Rectal abscess:   IV drug abuse: former, cocaine. 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, no known MIs  H/O kidney transplant: may 2015  A-V fistula: Left, implanted (?)  S/p cadaver renal transplant:   S/P partial colectomy: due to diverticulitis    FAMILY HISTORY:  Family history of breast cancer in sister (Sibling): living at 94 yo  Family history of prostate cancer in father  Family history of lung cancer  Family history of hypertension in mother  Family history of diabetes mellitus: mother      SOCIAL HISTORY:  Smoking: __ packs x ___ years  EtOH Use:  Marital Status:  Occupation:  Recent Travel:  Country of Birth:  Advance Directives:    Allergies    No Known Allergies    OBJECTIVE:  ICU Vital Signs Last 24 Hrs  T(C): 38.2 (2019 17:36), Max: 38.3 (2019 13:23)  T(F): 100.7 (2019 17:36), Max: 101 (2019 13:23)  HR: 117 (2019 17:36) (110 - 117)  BP: 133/68 (2019 17:36) (112/55 - 163/82)  RR: 20 (2019 17:36) (20 - 24)  SpO2: 95% (2019 17:36) (95% - 100%)        CAPILLARY BLOOD GLUCOSE      POCT Blood Glucose.: 83 mg/dL (2019 13:04)      PHYSICAL EXAM:  GENERAL: NAD, well-developed  HEAD:  Atraumatic, Normocephalic  EYES: EOMI, PERRLA, conjunctiva and sclera clear  NECK: Supple, No JVD  CHEST/LUNG: Clear to auscultation bilaterally; No wheeze  HEART: Regular rate and rhythm; No murmurs, rubs, or gallops  ABDOMEN: Soft, Nontender, Nondistended; Bowel sounds present  EXTREMITIES:  2+ Peripheral Pulses, No clubbing, cyanosis, or edema  PSYCH: AAOx3  NEUROLOGY: non-focal  SKIN: No rashes or lesions    HOSPITAL MEDICATIONS:  MEDICATIONS  (STANDING):  acyclovir IVPB      acyclovir IVPB 340 milliGRAM(s) IV Intermittent once  meropenem  IVPB 500 milliGRAM(s) IV Intermittent once  meropenem  IVPB        MEDICATIONS  (PRN):      LABS:                        10.3   12.78 )-----------( 249      ( 2019 15:25 )             33.1     11    140  |  93<L>  |  48<H>  ----------------------------<  85  4.8   |  24  |  7.13<H>    Ca    13.3<HH>      2019 13:22    TPro  7.6  /  Alb  2.9<L>  /  TBili  0.5  /  DBili  x   /  AST  33  /  ALT  16  /  AlkPhos  95  11-20    PT/INR - ( 2019 15:25 )   PT: 15.1 sec;   INR: 1.30 ratio         PTT - ( 2019 15:25 )  PTT:30.0 sec  Urinalysis Basic - ( 2019 14:29 )    Color: Light Yellow / Appearance: Turbid / S.025 / pH: x  Gluc: x / Ketone: Trace  / Bili: Negative / Urobili: Negative   Blood: x / Protein: >600 / Nitrite: Negative   Leuk Esterase: Large / RBC: 2 /hpf / WBC >50   Sq Epi: x / Non Sq Epi: 0 / Bacteria: Many    Venous Blood Gas:   @ 13:22  7.52/32/<20/26/14  VBG Lactate: 3.5 CHIEF COMPLAINT:     HPI:  74M PMHx of ESRD s/p failed renal transplant x2, HCV, DM, and meningococcal meningitis 2 years ago was sent in to the ED from HD for AMS and low BPs.    Patient unable to provide history. Last HD was Tuesday.     PAST MEDICAL & SURGICAL HISTORY:  Kidney transplant recipient  Anuria  GERD (gastroesophageal reflux disease)  Kidney transplant failure: dx: 2013  Hepatitis C: dx: 90&#x27;s.  From iv drug abuse  GERD (gastroesophageal reflux disease)  Renal transplant failure and rejection  Rectal abscess:   IV drug abuse: former, cocaine. 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, no known MIs  H/O kidney transplant: may 2015  A-V fistula: Left, implanted (?)  S/p cadaver renal transplant:   S/P partial colectomy: due to diverticulitis    FAMILY HISTORY:  Family history of breast cancer in sister (Sibling): living at 94 yo  Family history of prostate cancer in father  Family history of lung cancer  Family history of hypertension in mother  Family history of diabetes mellitus: mother      SOCIAL HISTORY:  Smoking: __ packs x ___ years  EtOH Use:  Marital Status:  Occupation:  Recent Travel:  Country of Birth:  Advance Directives:    Allergies    No Known Allergies    OBJECTIVE:  ICU Vital Signs Last 24 Hrs  T(C): 38.2 (2019 17:36), Max: 38.3 (2019 13:23)  T(F): 100.7 (2019 17:36), Max: 101 (2019 13:23)  HR: 117 (2019 17:36) (110 - 117)  BP: 133/68 (2019 17:36) (112/55 - 163/82)  RR: 20 (2019 17:36) (20 - 24)  SpO2: 95% (2019 17:36) (95% - 100%)        CAPILLARY BLOOD GLUCOSE      POCT Blood Glucose.: 83 mg/dL (2019 13:04)      PHYSICAL EXAM:  GENERAL: AO x0, minimally verbal, chronically ill appearing male  HEAD:  Atraumatic, Normocephalic  EYES: EOMI, PERRLA, conjunctiva and sclera clear  NECK: Supple, No JVD  CHEST/LUNG: Clear to auscultation bilaterally; No wheeze  HEART: Regular rate and rhythm; No murmurs, rubs, or gallops  ABDOMEN: Soft, Nontender, Nondistended; Bowel sounds present  EXTREMITIES:  2+ Peripheral Pulses, No clubbing, cyanosis, or edema  SKIN: No rashes or lesions, dry skin       HOSPITAL MEDICATIONS:  MEDICATIONS  (STANDING):  acyclovir IVPB      acyclovir IVPB 340 milliGRAM(s) IV Intermittent once  meropenem  IVPB 500 milliGRAM(s) IV Intermittent once  meropenem  IVPB        MEDICATIONS  (PRN):      LABS:                        10.3   12.78 )-----------( 249      ( 2019 15:25 )             33.1     11    140  |  93<L>  |  48<H>  ----------------------------<  85  4.8   |  24  |  7.13<H>    Ca    13.3<HH>      2019 13:22    TPro  7.6  /  Alb  2.9<L>  /  TBili  0.5  /  DBili  x   /  AST  33  /  ALT  16  /  AlkPhos  95  11-20    PT/INR - ( 2019 15:25 )   PT: 15.1 sec;   INR: 1.30 ratio         PTT - ( 2019 15:25 )  PTT:30.0 sec  Urinalysis Basic - ( 2019 14:29 )    Color: Light Yellow / Appearance: Turbid / S.025 / pH: x  Gluc: x / Ketone: Trace  / Bili: Negative / Urobili: Negative   Blood: x / Protein: >600 / Nitrite: Negative   Leuk Esterase: Large / RBC: 2 /hpf / WBC >50   Sq Epi: x / Non Sq Epi: 0 / Bacteria: Many    Venous Blood Gas:   @ 13:22  7.52/32/<20/26/14  VBG Lactate: 3.5 CHIEF COMPLAINT: altered mental status    HPI:  74M PMHx of ESRD s/p failed renal transplant x2, HCV, DM, and meningococcal meningitis 2 years ago was sent in to the ED from HD for AMS and low BPs.    Patient unable to provide history. Last HD was Tuesday.     PAST MEDICAL & SURGICAL HISTORY:  Kidney transplant recipient  Anuria  GERD (gastroesophageal reflux disease)  Kidney transplant failure: dx: 2013  Hepatitis C: dx: 90&#x27;s.  From iv drug abuse  GERD (gastroesophageal reflux disease)  Renal transplant failure and rejection  Rectal abscess:   IV drug abuse: former, cocaine. 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, no known MIs  H/O kidney transplant: may 2015  A-V fistula: Left, implanted (?)  S/p cadaver renal transplant:   S/P partial colectomy: due to diverticulitis    FAMILY HISTORY:  Family history of breast cancer in sister (Sibling): living at 92 yo  Family history of prostate cancer in father  Family history of lung cancer  Family history of hypertension in mother  Family history of diabetes mellitus: mother      SOCIAL HISTORY:  unable to obtain due to encephalopathy    ROS:  unable to obtain due to encephalopathy    Allergies  No Known Allergies    OBJECTIVE:  ICU Vital Signs Last 24 Hrs  T(C): 38.2 (2019 17:36), Max: 38.3 (2019 13:23)  T(F): 100.7 (2019 17:36), Max: 101 (2019 13:23)  HR: 117 (2019 17:36) (110 - 117)  BP: 133/68 (2019 17:36) (112/55 - 163/82)  RR: 20 (2019 17:36) (20 - 24)  SpO2: 95% (2019 17:36) (95% - 100%)    CAPILLARY BLOOD GLUCOSE  POCT Blood Glucose.: 83 mg/dL (2019 13:04)    PHYSICAL EXAM:  GENERAL: AO x0, minimally verbal, chronically ill appearing male  HEAD:  Atraumatic, Normocephalic  EYES: EOMI, PERRLA, conjunctiva and sclera clear  NECK: Supple, No JVD  CHEST/LUNG: Clear to auscultation bilaterally; No wheeze  HEART: Regular rate and rhythm; No murmurs, rubs, or gallops  ABDOMEN: Soft, Nontender, Nondistended; Bowel sounds present; firm lower abd in suprapubic/LLQ area  EXTREMITIES:  2+ Peripheral Pulses, No clubbing, cyanosis, or edema  SKIN: No rashes or lesions, dry skin       HOSPITAL MEDICATIONS:  MEDICATIONS  (STANDING):  acyclovir IVPB      acyclovir IVPB 340 milliGRAM(s) IV Intermittent once  meropenem  IVPB 500 milliGRAM(s) IV Intermittent once  meropenem  IVPB        LABS:                        10.3   12.78 )-----------( 249      ( 2019 15:25 )             33.1         140  |  93<L>  |  48<H>  ----------------------------<  85  4.8   |  24  |  7.13<H>    Ca    13.3<HH>      2019 13:22    TPro  7.6  /  Alb  2.9<L>  /  TBili  0.5  /  DBili  x   /  AST  33  /  ALT  16  /  AlkPhos  95      PT/INR - ( 2019 15:25 )   PT: 15.1 sec;   INR: 1.30 ratio         PTT - ( 2019 15:25 )  PTT:30.0 sec  Urinalysis Basic - ( 2019 14:29 )    Color: Light Yellow / Appearance: Turbid / S.025 / pH: x  Gluc: x / Ketone: Trace  / Bili: Negative / Urobili: Negative   Blood: x / Protein: >600 / Nitrite: Negative   Leuk Esterase: Large / RBC: 2 /hpf / WBC >50   Sq Epi: x / Non Sq Epi: 0 / Bacteria: Many    Venous Blood Gas:   @ 13:22  7.52/32/<20/26/14  VBG Lactate: 3.5

## 2019-11-20 NOTE — H&P ADULT - NSHPLABSRESULTS_GEN_ALL_CORE
Lab Results:  CBC  CBC Full  -  ( 2019 15:25 )  WBC Count : 12.78 K/uL  RBC Count : 4.02 M/uL  Hemoglobin : 10.3 g/dL  Hematocrit : 33.1 %  Platelet Count - Automated : 249 K/uL  Mean Cell Volume : 82.3 fl  Mean Cell Hemoglobin : 25.6 pg  Mean Cell Hemoglobin Concentration : 31.1 gm/dL  Auto Neutrophil # : 9.00 K/uL  Auto Lymphocyte # : 1.56 K/uL  Auto Monocyte # : 1.56 K/uL  Auto Eosinophil # : 0.12 K/uL  Auto Basophil # : 0.00 K/uL  Auto Neutrophil % : 70.4 %  Auto Lymphocyte % : 12.2 %  Auto Monocyte % : 12.2 %  Auto Eosinophil % : 0.9 %  Auto Basophil % : 0.0 %    .		Differential:	[] Automated		[] Manual  Chemistry                        10.3    )-----------( 249      ( 2019 15:25 )             33.1     11-20    140  |  93<L>  |  48<H>  ----------------------------<  85  4.8   |  24  |  7.13<H>    Ca    13.3<HH>      2019 13:22    TPro  7.6  /  Alb  2.9<L>  /  TBili  0.5  /  DBili  x   /  AST  33  /  ALT  16  /  AlkPhos  95  11-20    LIVER FUNCTIONS - ( 2019 13:22 )  Alb: 2.9 g/dL / Pro: 7.6 g/dL / ALK PHOS: 95 U/L / ALT: 16 U/L / AST: 33 U/L / GGT: x           PT/INR - ( 2019 15:25 )   PT: 15.1 sec;   INR: 1.30 ratio         PTT - ( 2019 15:25 )  PTT:30.0 sec  Urinalysis Basic - ( 2019 14:29 )    Color: Light Yellow / Appearance: Turbid / S.025 / pH: x  Gluc: x / Ketone: Trace  / Bili: Negative / Urobili: Negative   Blood: x / Protein: >600 / Nitrite: Negative   Leuk Esterase: Large / RBC: 2 /hpf / WBC >50   Sq Epi: x / Non Sq Epi: 0 / Bacteria: Many            MICROBIOLOGY/CULTURES:      RADIOLOGY RESULTS: reviewed

## 2019-11-20 NOTE — ED ADULT NURSE NOTE - OBJECTIVE STATEMENT
73y/o male presented to the ED via EMS from [facility]. unknown baseline mental status. PMH- ESRD, on HD, M/W/F left upper arm fistula, HTN, DM1, KAMRON, hep C, cardiomyopathy. kidney transplant recipient. As per nursing facility patient became increasingly agitated and restless yesterday. Nursing Home states that patient did not receive Dialysis today due to low BP 92/54, and increasing altered mental status.     Upon arrival to ED patient awake, alert, not following commands, A&Ox0, speech uncomprehendable. Left sided facial droop noted, left upper extremity weak 4/5, left lower extremity weak 2/5. Code stroke called at 1301. , RR 22, Rectal Temp 101.F FS 83. Patient taken to CT.

## 2019-11-20 NOTE — ED PROVIDER NOTE - ATTENDING CONTRIBUTION TO CARE
74yr M hx of afib (on ?apixaban) , htn, dm, cardiomyiopathy, hep C, meningitis, on keppra presented from facility with AMS. reported unable to be dialized today (on HD, failed renal Tx), and reports of hypotension. at baseline reported aaox2, but now not following commands. moving upper ext, and toes, but labored breathing, and intermittently following with eyes.  stroke alert was called, despite not knowning his last normal.  IV access established, finger stick 84.   pt is found to have fever, and lactic acidosis. will do IV fluids for hypercalcemia.   concern for sepsis, will start broad spectrum antibiotics.

## 2019-11-20 NOTE — CONSULT NOTE ADULT - ASSESSMENT
75yo gentleman (handedness unknown) who presents to the facility on the afternoon of 11/20/19 from nursing home regarding complaints of altered mental status that has persisted since at least 11/19/19. 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 History unable to be obtained directly from patient given clinical condition. As per nursing supervisor, patient has been suffering from altered mental status since yesterday with notable worsening this morning marked with restlessness along with unable to follow commands. Patient initially was supposed to attend HD today but was rejected due to possessing a low blood pressure (90/40s). No family or friends at bedside to directly relay history to me. Chart accompanying patient on arrival demonstrates patient taking 2.5mg of Eliquis bid for nonvalvular Atrial fibrillation as well as Keppra for presumed seizure disorder. Code Stroke called on arrival, NIHSS of 16. BP of 112/55. CT Head Noncontrast negative for acute intracranial abnormality. Neurology consulted via Code Stroke. Patient appears toxic and is currently tachycardic. Patient does possess upgoing toes and a noted L facial droop (chronicity unknown). CTA deferred given ESRD status and currently low suspicion for LVO worthy of intervention given patients baseline status (A&Ox2, reported MRS of 4?) and clinical presentation. Patient may benefit from further investigations    Impression: Altered Mental Status 2/2 Toxic Metabolic Encephalopathy    Plan:    Continue to investigate toxic/metabolic/infectious insult  Supportive care as per primary team (IVF, ? of Abx initiation)  Consider MRI Brain w/o, MRA Neck w/, Head w/o  Consider LP if source of fever remains elusive  R EEG if V EEG unavailable  C/w home Keppra dose  C/w Eliquis 2.5mg bid po for A. fib  Seizure and fall precautions        To be discussed w/ Neurology Attending 73yo gentleman (handedness unknown) who presents to the facility on the afternoon of 11/20/19 from nursing home regarding complaints of altered mental status that has persisted since at least 11/19/19. 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 History unable to be obtained directly from patient given clinical condition. As per nursing supervisor, patient has been suffering from altered mental status since yesterday with notable worsening this morning marked with restlessness along with unable to follow commands. Patient initially was supposed to attend HD today but was rejected due to possessing a low blood pressure (90/40s). No family or friends at bedside to directly relay history to me. Chart accompanying patient on arrival demonstrates patient taking 2.5mg of Eliquis bid for nonvalvular Atrial fibrillation as well as Keppra for presumed seizure disorder. Code Stroke called on arrival, NIHSS of 16. BP of 112/55. CT Head Noncontrast negative for acute intracranial abnormality. Neurology consulted via Code Stroke. Patient appears toxic and is currently tachycardic. Patient does possess upgoing toes and a noted L facial droop (chronicity unknown). CTA deferred given ESRD status and currently low suspicion for LVO worthy of intervention given patients baseline status (A&Ox2, reported MRS of 4?) and clinical presentation. TPA deferred as last known normal not clearly defined. Patient may benefit from further investigations    Impression: Altered Mental Status 2/2 Toxic Metabolic Encephalopathy vs Provoked Seizure vs Distal R RENETTA infarct (unlikely)    Plan:    Continue to investigate toxic/metabolic/infectious insult  Obtain CBC w/ diff  Supportive care as per primary team (IVF, ? of Abx initiation)  Consider MRI Brain w/o, MRA Neck w/, Head w/o  Consider LP if source of fever remains elusive  R EEG if V EEG unavailable  C/w home Keppra dose  C/w Eliquis 2.5mg bid po for A. fib  Seizure and fall precautions        To be discussed w/ Neurology Attending

## 2019-11-20 NOTE — CHART NOTE - NSCHARTNOTEFT_GEN_A_CORE
MICU Attending Note    Asked to comment on need for emergent HD.  Currently pt with stable vitals, oxygenation, no concerning acidemia or electrolyte values (besides Ca).  Ca is elevated but pt awaits further workup and medical tx for hypercalcemia.  No need for emergent HD at this time.  No need for MICU monitoring at time of HD due to stable respiratory status and no hyperkalemia or EKG changes.  Communicated to primary team.

## 2019-11-20 NOTE — CONSULT NOTE ADULT - SUBJECTIVE AND OBJECTIVE BOX
Patient is a 74y Male whom presented to the hospital with esrd on hd     PAST MEDICAL & SURGICAL HISTORY:  Kidney transplant recipient  Anuria  GERD (gastroesophageal reflux disease)  Kidney transplant failure: dx: 2013  Hepatitis C: dx: 90&#x27;s.  From iv drug abuse  GERD (gastroesophageal reflux disease)  Renal transplant failure and rejection  Rectal abscess:   IV drug abuse: former, cocaine. 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 10 milliGRAM(s) Oral three times a day  mycophenolate mofetil 250 milliGRAM(s) Oral two times a day  predniSONE   Tablet 5 milliGRAM(s) Oral daily  sevelamer carbonate 800 milliGRAM(s) Oral three times a day with meals  sodium bicarbonate 650 milliGRAM(s) Oral two times a day  tamsulosin 0.4 milliGRAM(s) Oral at bedtime      Allergies    No Known Allergies    Intolerances        SOCIAL HISTORY:  Denies ETOh,Smoking,     FAMILY HISTORY:  Family history of breast cancer in sister (Sibling): living at 92 yo  Family history of prostate cancer in father  Family history of lung cancer  Family history of hypertension in mother  Family history of diabetes mellitus: mother      REVIEW OF SYSTEMS:    unbable to obtained     VITAL:  T(C): , Max: 38.3 (19 @ 13:23)  T(F): , Max: 101 (19 @ 13:23)  HR: 104 (19 @ 19:32)  BP: 118/66 (19 @ 19:32)  BP(mean): --  RR: 16 (19 @ 19:32)  SpO2: 98% (19 @ 19:32)  Wt(kg): --    I and O's:    Height (cm): 182.88 ( @ 12:46)  Weight (kg): 68 ( @ 12:46)  BMI (kg/m2): 20.3 ( @ 12:46)  BSA (m2): 1.88 ( @ 12:46)    PHYSICAL EXAM:    Constitutional: lethargic   HEENT: conjunctive   clear   Neck:  No JVD  Respiratory: decrease bs b/l   Cardiovascular: S1 and S2  Gastrointestinal: BS+, soft, NT/ND  Extremities: No peripheral edema  Neurological:  lethargic   Skin: dry   Access: pos fistula     LABS:                        10.3   12.78 )-----------( 249      ( 2019 15:25 )             33.1     11    140  |  93<L>  |  48<H>  ----------------------------<  85  4.8   |  24  |  7.13<H>    Ca    13.3<HH>      2019 13:22    TPro  7.6  /  Alb  2.9<L>  /  TBili  0.5  /  DBili  x   /  AST  33  /  ALT  16  /  AlkPhos  95        Urine Studies:  Urinalysis Basic - ( 2019 14:29 )    Color: Light Yellow / Appearance: Turbid / S.025 / pH: x  Gluc: x / Ketone: Trace  / Bili: Negative / Urobili: Negative   Blood: x / Protein: >600 / Nitrite: Negative   Leuk Esterase: Large / RBC: 2 /hpf / WBC >50   Sq Epi: x / Non Sq Epi: 0 / Bacteria: Many            RADIOLOGY & ADDITIONAL STUDIES:

## 2019-11-20 NOTE — ED PROVIDER NOTE - OBJECTIVE STATEMENT
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 as per nursing supervisor- pt ams since yesterday worsening this am with restlessness along with unable to follow commands. Did not get HD today due to BP 90/40s

## 2019-11-20 NOTE — CONSULT NOTE ADULT - ASSESSMENT
74M PMHx of ESRD s/p failed renal transplant x2, HCV, DM, and meningococcal meningitis 2 years ago was sent in to the ED from HD for AMS and low BPs. He is unable to provide any information at this time. His sister is at the bedside and reports that he is typically able to carry a conversation and walk a small amount. She is not aware if he had been having fevers at the nursing home, and believes that his last HD was on Tuesday. (20 Nov 2019 18:01)

## 2019-11-20 NOTE — CONSULT NOTE ADULT - SUBJECTIVE AND OBJECTIVE BOX
INFECTIOUS DISEASE SERVICE INITIAL CONSULTATION NOTE    HPI:  74M PMHx of ESRD s/p failed renal transplant x2, HCV, DM, and meningococcal meningitis 2 years ago was sent in to the ED from HD for AMS and low BPs.    PAST MEDICAL & SURGICAL HISTORY:  Kidney transplant recipient  Anuria  GERD (gastroesophageal reflux disease)  Kidney transplant failure: dx: 2013  Hepatitis C: dx: 90&#x27;s.  From iv drug abuse  GERD (gastroesophageal reflux disease)  Renal transplant failure and rejection  Rectal abscess:   IV drug abuse: former, cocaine. 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, no known MIs  H/O kidney transplant: may 2015  A-V fistula: Left, implanted (?)  S/p cadaver renal transplant:   S/P partial colectomy: due to diverticulitis      REVIEW OF SYSTEMS:  Constitutional: no weakness, no fevers, no chills  Dermatologic: no rash  Respiratory: no SOB, no cough  Cardiovascular: no chest pain, no palpitations  Gastrointestinal: no nausea, no vomiting, no diarrhea  Genitourinary: no dysuria, no urinary frequency, no hematuria, no urinary retention  Musculoskeletal:	no weakness, no joint swelling/pain  Neurological: no focal weakness or numbness  Endocrine: no polyuria, no polydipsia    ACTIVE ANTIMICROBIAL/ANTIBIOTIC MEDICATIONS:      OTHER MEDICATIONS:      ALLERGIES:  Allergies    No Known Allergies    Intolerances        SOCIAL HISTORY: Currently living in NH in [place]    FAMILY HISTORY:  Family history of breast cancer in sister (Sibling): living at 92 yo  Family history of prostate cancer in father  Family history of lung cancer  Family history of hypertension in mother  Family history of diabetes mellitus: mother      VITAL SIGNS:  ICU Vital Signs Last 24 Hrs  T(C): 38.3 (2019 14:40), Max: 38.3 (2019 13:23)  T(F): 100.9 (2019 14:40), Max: 101 (2019 13:23)  HR: 116 (2019 14:40) (110 - 116)  BP: 152/134 (2019 14:40) (112/55 - 163/82)  BP(mean): --  ABP: --  ABP(mean): --  RR: 20 (2019 14:40) (20 - 24)  SpO2: 98% (2019 14:40) (98% - 100%)      PHYSICAL EXAM:  Constitutional: WDWN  Head: NC/AT  Eyes: PERRLA, EOMI; anicteric slcera  ENMT: no rhinorrhea; no sinus tenderness on palpation; no oropharyngeal lesions, erythema, or exudates	  Neck: supple; no JVD or LAD  Respiratory: CTA B/L  Cardiovascular: +S1/S2, RRR; no appreciable murmurs  Gastrointestinal: soft, NT/ND; +BSx4, no HSM  Extremities: WWP; no clubbing, cyanosis, or edema  Vascular: 2+ radial, DP/PT pulses B/L  Dermatologic: skin warm and dry; no visible rashes or lesions  Neurologic: AAOx3; no focal deficits    LABS:                        10.3   12.78 )-----------( 249      ( 2019 15:25 )             33.1     11-20    140  |  93<L>  |  48<H>  ----------------------------<  85  4.8   |  24  |  7.13<H>    Ca    13.3<HH>      2019 13:22    TPro  7.6  /  Alb  2.9<L>  /  TBili  0.5  /  DBili  x   /  AST  33  /  ALT  16  /  AlkPhos  95  11-20    PT/INR - ( 2019 15:25 )   PT: 15.1 sec;   INR: 1.30 ratio         PTT - ( 2019 15:25 )  PTT:30.0 sec  Urinalysis Basic - ( 2019 14:29 )    Color: Light Yellow / Appearance: Turbid / S.025 / pH: x  Gluc: x / Ketone: Trace  / Bili: Negative / Urobili: Negative   Blood: x / Protein: >600 / Nitrite: Negative   Leuk Esterase: Large / RBC: 2 /hpf / WBC >50   Sq Epi: x / Non Sq Epi: 0 / Bacteria: Many        MICROBIOLOGY:  Blood cultures pending    RADIOLOGY & ADDITIONAL STUDIES: INFECTIOUS DISEASE SERVICE INITIAL CONSULTATION NOTE    HPI:  74M PMHx of ESRD s/p failed renal transplant x2, HCV, DM, and meningococcal meningitis 2 years ago was sent in to the ED from HD for AMS and low BPs. He is unable to provide any information at this time. His sister is at the bedside and reports that he is typically able to carry a conversation and walk a small amount. She is not aware if he had been having fevers at the nursing home, and believes that his last HD was on Tuesday.   ID consulted for fevers.     PAST MEDICAL & SURGICAL HISTORY:  Kidney transplant recipient  Anuria  GERD (gastroesophageal reflux disease)  Kidney transplant failure: dx: 2013  Hepatitis C: dx: 90&#x27;s.  From iv drug abuse  GERD (gastroesophageal reflux disease)  Renal transplant failure and rejection  Rectal abscess:   IV drug abuse: former, cocaine. 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     FAMILY HISTORY:  Family history of breast cancer in sister (Sibling): living at 94 yo  Family history of prostate cancer in father  Family history of lung cancer  Family history of hypertension in mother  Family history of diabetes mellitus: mother      VITAL SIGNS:  ICU Vital Signs Last 24 Hrs  T(C): 38.3 (2019 14:40), Max: 38.3 (2019 13:23)  T(F): 100.9 (2019 14:40), Max: 101 (2019 13:23)  HR: 116 (2019 14:40) (110 - 116)  BP: 152/134 (2019 14:40) (112/55 - 163/82)  BP(mean): --  ABP: --  ABP(mean): --  RR: 20 (2019 14:40) (20 - 24)  SpO2: 98% (2019 14:40) (98% - 100%)      PHYSICAL EXAM:  Constitutional: Chronically ill appearing  Head: NC/AT  Eyes: anicteric sclera  ENMT: poor oral hygiene, gurgling    Neck: rigid  Respiratory: Crackles at bases  Cardiovascular: +S1/S2, RRR; no appreciable murmurs  Gastrointestinal: soft, NT/ND; +BSx4, large midline incision well healed   Extremities: WWP; no clubbing, cyanosis, or edema  Vascular: LUE AVF  Dermatologic: R great toe callous without erythema or drainage  Neurologic: unable to participate in neuro exam    LABS:                        10.3   12.78 )-----------( 249      ( 2019 15:25 )             33.1     11-    140  |  93<L>  |  48<H>  ----------------------------<  85  4.8   |  24  |  7.13<H>    Ca    13.3<HH>      2019 13:22    TPro  7.6  /  Alb  2.9<L>  /  TBili  0.5  /  DBili  x   /  AST  33  /  ALT  16  /  AlkPhos  95  11-20    PT/INR - ( 2019 15:25 )   PT: 15.1 sec;   INR: 1.30 ratio         PTT - ( 2019 15:25 )  PTT:30.0 sec  Urinalysis Basic - ( 2019 14:29 )    Color: Light Yellow / Appearance: Turbid / S.025 / pH: x  Gluc: x / Ketone: Trace  / Bili: Negative / Urobili: Negative   Blood: x / Protein: >600 / Nitrite: Negative   Leuk Esterase: Large / RBC: 2 /hpf / WBC >50   Sq Epi: x / Non Sq Epi: 0 / Bacteria: Many        MICROBIOLOGY:  Blood cultures pending    RADIOLOGY & ADDITIONAL STUDIES:    < from: CT Brain Stroke Protocol (19 @ 13:28) >  EXAM:  CT BRAIN STROKE PROTOCOL                            PROCEDURE DATE:  2019            INTERPRETATION:  Noncontrast CT of the brain.    CLINICAL INDICATION:  Altered mental status and decreased alertness    TECHNIQUE : Axial CT scanning of the brain was obtained from the skull   base to the vertex without the administration of intravenous contrast.      COMPARISON: Brain CT dated 9/3/2019    FINDINGS:  No acute hemorrhage, mass effect or extra-axial collections   are present. A rightfrontal lucency is unchanged compared with the prior   study. A microvascular ischemic changes again noted. Lucencies are noted   within the corona radiata bilaterally which was not clearly present on   the prior study and are consistent with infarcts of indeterminate age.    No osseous abnormalities are identified. The orbits are not remarkable in   appearance. Right lens enucleation is noted.     The visualized paranasal sinuses and tympanomastoid cavities are free of   acute disease.    Multiple metallic fragments are again noted along the left posterior   lateral aspect of the neck are possibly representing shrapnel canal.        Impression:    No acute hemorrhage. Scattered white matter infarcts of indeterminate age   representing an interval change compared with 9/3/2019.    The results of this examination were discussed with Dr. Rahman from   stroke neurology at 1:30 PM on 2019                        CHERY JEFFERSON M.D., ATTENDING RADIOLOGIST  This document has been electronically signed. 2019  1:41PM    < end of copied text >    < from: Xray Chest 1 View AP/PA (19 @ 15:02) >    EXAM:  XR CHEST AP OR PA 1V                            PROCEDURE DATE:  2019            INTERPRETATION:  HISTORY: Sepsis    TECHNIQUE: A single AP view of the chest was obtained.    COMPARISON: 9/3/2019    FINDINGS:  The cardiac silhouette isnormal in size. There are no focal   consolidations or pleural effusions. The hilar and mediastinal structures   appear unremarkable. The osseous structures are intact.    IMPRESSION: Clear lungs.                    ALYSSA JACKSON M.D., ATTENDING RADIOLOGIST  This document has been electronically signed. 2019  3:12PM        < end of copied text >

## 2019-11-20 NOTE — ED ADULT NURSE REASSESSMENT NOTE - NS ED NURSE REASSESS COMMENT FT1
Report  received from NIA piper, RN. no call back from IV team. 24G short placed in pts RUE. IV abx infusing Report  received from NIA piper, RN. no call back from IV team. 24G short placed in pts RUE. IV abx infusing. second page to IV team placed

## 2019-11-20 NOTE — CONSULT NOTE ADULT - SUBJECTIVE AND OBJECTIVE BOX
CHIEF COMPLAINT:Patient is a 74y old  Male who presents with a chief complaint of     HISTORY OF PRESENT ILLNESS:    74 male with history as below  ESRD s/p failed renal transplant x2, HCV, DM, and meningococcal meningitis 2 years ago, HTN, cocaine induced CM,  was sent in to the ED from HD for AMS and low BPs   Due to altered mental status, subjective information were not able to be obtained from the patient. History was obtained, to the extent possible, from review of the chart and collateral sources of information.     PAST MEDICAL & SURGICAL HISTORY:  Kidney transplant recipient  Anuria  GERD (gastroesophageal reflux disease)  Kidney transplant failure: dx: 2/2013  Hepatitis C: dx: 90&#x27;s.  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, no known MIs  H/O kidney transplant: may 2015  A-V fistula: Left, implanted (?)  S/p cadaver renal transplant: 2008  S/P partial colectomy: due to diverticulitis          MEDICATIONS:    acyclovir IVPB      acyclovir IVPB 340 milliGRAM(s) IV Intermittent once  meropenem  IVPB 500 milliGRAM(s) IV Intermittent once  meropenem  IVPB        FAMILY HISTORY:  Family history of breast cancer in sister (Sibling): living at 94 yo  Family history of prostate cancer in father  Family history of lung cancer  Family history of hypertension in mother  Family history of diabetes mellitus: mother      Non-contributory    SOCIAL HISTORY:    No tobacco, drugs or etoh    Allergies    No Known Allergies    Intolerances    	    REVIEW OF SYSTEMS:  as above  The rest of the 14 points ROS reviewed and except above they are unremarkable.        PHYSICAL EXAM:  T(C): 38.3 (11-20-19 @ 14:40), Max: 38.3 (11-20-19 @ 13:23)  HR: 116 (11-20-19 @ 14:40) (110 - 116)  BP: 152/134 (11-20-19 @ 14:40) (112/55 - 163/82)  RR: 20 (11-20-19 @ 14:40) (20 - 24)  SpO2: 98% (11-20-19 @ 14:40) (98% - 100%)  Wt(kg): --  I&O's Summary    JVP: Normal  Neck: supple  Lung: clear   CV: S1 S2 , Murmur:  Abd: soft  Ext: No edema  neuro: Awake , confused , aphasic   Psych: flat affect  Skin: normal      LABS/DATA:    TELEMETRY: 	    ECG:  	   	  CARDIAC MARKERS:      < from: Transthoracic Echocardiogram (08.28.19 @ 16:12) >  ------------------------------------------------------------------------  Conclusions:  1. Mitral annular calcification and calcified mitral  leaflets with decreased diastolic opening.  2. Moderate to severe concentric left ventricular  hypertrophy.  3. Normal left ventricular systolic function with  mid-cavitary obliteration.  4. Normal right ventricular size and systolic function.  5. Small pericardial effusion.  *** Compared with echocardiogram of 9/11/2015, no  significant changes noted.  ------------------------------------------------------------------------  Confirmed on  8/29/2019 - 01:39:20 by Rosmery Botello M.D.  ------------------------------------------------------------------------    < end of copied text >                                  10.3   12.78 )-----------( 249      ( 20 Nov 2019 15:25 )             33.1     11-20    140  |  93<L>  |  48<H>  ----------------------------<  85  4.8   |  24  |  7.13<H>    Ca    13.3<HH>      20 Nov 2019 13:22    TPro  7.6  /  Alb  2.9<L>  /  TBili  0.5  /  DBili  x   /  AST  33  /  ALT  16  /  AlkPhos  95  11-20    proBNP:   Lipid Profile:   HgA1c:   TSH:

## 2019-11-20 NOTE — ED ADULT NURSE REASSESSMENT NOTE - NS ED NURSE REASSESS COMMENT FT1
Unable to perform dysphagia screen on patient due to AMS. Patient unable to follow commands, severe dysarthria, A&Ox0 at this time.

## 2019-11-20 NOTE — CONSULT NOTE ADULT - SUBJECTIVE AND OBJECTIVE BOX
HPI:    Patient is a 75yo gentleman (handedness unknown) who presents to the facility on the afternoon of 11/20/19 from nursing home regarding complaints of altered mental status that has persisted since at least 11/19/19. 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 History unable to be obtained directly from patient given clinical condition. As per nursing supervisor, patient has been suffering from altered mental status since yesterday with notable worsening this morning marked with restlessness along with unable to follow commands. Patient initially was supposed to attend HD today but was rejected due to possessing a low blood pressure (90/40s). No family or friends at bedside to directly relay history to me. Chart accompanying patient on arrival demonstrates patient taking 2.5mg of Eliquis bid for nonvalvular Atrial fibrillation as well as Keppra for presumed seizure disorder. Code Stroke called on arrival, NIHSS of 16. BP of 112/55. CT Head Noncontrast negative for acute intracranial abnormality. Neurology consulted via Code Stroke      (Stroke only)  NIHSS: 16   ICH: 0      A 10-system ROS was performed and is negative except for those items noted above and/or in the HPI.    PAST MEDICAL & SURGICAL HISTORY:  Kidney transplant recipient  Anuria  GERD (gastroesophageal reflux disease)  Kidney transplant failure: dx: 2/2013  Hepatitis C: dx: 90&#x27;s.  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, no known MIs  H/O kidney transplant: may 2015  A-V fistula: Left, implanted (?)  S/p cadaver renal transplant: 2008  S/P partial colectomy: due to diverticulitis    FAMILY HISTORY:  Family history of breast cancer in sister (Sibling): living at 94 yo  Family history of prostate cancer in father  Family history of lung cancer  Family history of hypertension in mother  Family history of diabetes mellitus: mother      MEDICATIONS (HOME):  Home Medications:  acetaminophen 325 mg oral tablet: 2 tab(s) orally every 6 hours, As needed, Temp greater or equal to 38C (100.4F) (24 Sep 2019 09:31)  allopurinol 100 mg oral tablet: 1 tab(s) orally once a day (24 Sep 2019 09:31)  aluminum hydroxide-magnesium hydroxide 200 mg-200 mg/5 mL oral suspension: 30 milliliter(s) orally every 6 hours, As needed, Dyspepsia (24 Sep 2019 09:31)  apixaban 2.5 mg oral tablet: 1 tab(s) orally 2 times a day (24 Sep 2019 09:31)  atorvastatin 40 mg oral tablet: 1 tab(s) orally once a day (at bedtime) (24 Sep 2019 09:31)  carvedilol 6.25 mg oral tablet: 1 tab(s) orally every 12 hours (24 Sep 2019 09:31)  cinacalcet 30 mg oral tablet: 1 tab(s) orally once a day (24 Sep 2019 09:31)  darbepoetin tan 25 mcg/mL injectable solution: 60 microgram(s) injectable once a week (24 Sep 2019 09:31)  docusate sodium 100 mg oral capsule: 1 cap(s) orally 2 times a day (24 Sep 2019 09:31)  ergocalciferol 50,000 intl units (1.25 mg) oral capsule: 1 cap(s) orally once a week (24 Sep 2019 09:35)  folic acid 1 mg oral tablet: 1 tab(s) orally once a day (24 Sep 2019 09:31)  furosemide 20 mg oral tablet: 3 tab(s) orally 2 times a day (24 Sep 2019 09:31)  hydrALAZINE 100 mg oral tablet: 1 tab(s) orally 3 times a day (24 Sep 2019 09:31)  hydrocortisone 25 mg rectal suppository: 1 suppository(ies) rectal once a day (at bedtime) (24 Sep 2019 09:31)  insulin glargine: 10 unit(s) subcutaneous once a day (at bedtime) (24 Sep 2019 09:45)  insulin lispro (concentrated) 200 units/mL subcutaneous solution:   sliding scale subcutaneous   0 Unit(s) if Glucose 0 - 250  1 Unit(s) if Glucose 251 - 300  2 Unit(s) if Glucose 301 - 350  3 Unit(s) if Glucose 351 - 400  4 Unit(s) if Glucose Greater Than 400 (24 Sep 2019 09:45)  insulin lispro (concentrated) 200 units/mL subcutaneous solution: Sliding scale  subcutaneous   1 Unit(s) if Glucose 151 - 200  2 Unit(s) if Glucose 201 - 250  3 Unit(s) if Glucose 251 - 300  4 Unit(s) if Glucose 301 - 350  5 Unit(s) if Glucose 351 - 400  6 Unit(s) if Glucose Greater Than 400 (24 Sep 2019 09:45)  labetalol 300 mg oral tablet: 1 tab(s) orally 2 times a day (24 Sep 2019 09:31)  lactobacillus acidophilus oral capsule: 1 tab(s) orally 2 times a day (24 Sep 2019 09:31)  levETIRAcetam 1000 mg oral tablet: 1 tab(s) orally 2 times a day (24 Sep 2019 09:31)  mycophenolate mofetil 250 mg oral capsule: 1 cap(s) orally 2 times a day (24 Sep 2019 09:31)  polyethylene glycol 3350 oral powder for reconstitution: 17 gram(s) orally once a day (24 Sep 2019 09:31)  predniSONE 5 mg oral tablet: 1 tab(s) orally once a day (24 Sep 2019 09:31)  senna oral tablet: 2 tab(s) orally once a day (at bedtime) (24 Sep 2019 09:31)  sodium bicarbonate 650 mg oral tablet: 1 tab(s) orally 2 times a day (24 Sep 2019 09:31)  tamsulosin 0.4 mg oral capsule: 1 cap(s) orally once a day (at bedtime) (24 Sep 2019 09:31)  thiamine 100 mg oral tablet: 1 tab(s) orally once a day (24 Sep 2019 09:31)    MEDICATIONS  (STANDING):  acetaminophen  Suppository .. 650 milliGRAM(s) Rectal once  piperacillin/tazobactam IVPB. 3.375 Gram(s) IV Intermittent Once  sodium chloride 0.9% Bolus 1000 milliLiter(s) IV Bolus once  vancomycin  IVPB 1000 milliGRAM(s) IV Intermittent once    MEDICATIONS  (PRN):    ALLERGIES/INTOLERANCES:  Allergies  No Known Allergies    Intolerances    VITALS & EXAMINATION:  Vital Signs Last 24 Hrs  T(C): 36.6 (20 Nov 2019 12:46), Max: 36.6 (20 Nov 2019 12:46)  T(F): 97.9 (20 Nov 2019 12:46), Max: 97.9 (20 Nov 2019 12:46)  HR: 110 (20 Nov 2019 12:46) (110 - 110)  BP: 112/55 (20 Nov 2019 12:46) (112/55 - 112/55)  BP(mean): --  RR: 24 (20 Nov 2019 12:46) (24 - 24)  SpO2: 99% (20 Nov 2019 12:46) (99% - 99%)    General:    Neurological (>12):  MS: Awake, alert, not demonstrating orientation to person, place, situation, time.  Agitated affect. Does not follows all commands.    Language: Speech is not clear, nonfluent with no repetition or observable comprehension  CNs: PERRLA (R = 2mm, L = 2mm). Blink to threat intact bilaterally. Gaze orthophoric, tracks face past midline BL. no nystagmus, no diplopia.  L facial droop noted, full eye closure strength b/l. Hearing grossly normal (rubbing fingers) b/l. Symmetric palate elevation in midline. Gag reflex deferred. Head turning & shoulder shrug intact b/l.     Motor: Diminished muscle bulk in LE & mildly increased tone proximally in UE. No noticeable tremor or seizure. Moves UE against gravity spontaneously, Some effort against gravity in RLE, No effort in LLE, wiggles toes BL     Sensation: Intact to LT/PP/Temp/Vibration/Position b/l throughout.       Reflexes:              Biceps(C5)       BR(C6)     Triceps(C7)               Patellar(L4)    Achilles(S1)    Plantar Resp  R	3	          3	             1		        2		    2		Up   L	3	          3	             1		        2		    2		Up         Gait: deferred    LABORATORY:  CBC   Chem 11-20    140  |  93<L>  |  48<H>  ----------------------------<  85  4.8   |  24  |  7.13<H>    Ca    13.3<HH>      20 Nov 2019 13:22    TPro  7.6  /  Alb  2.9<L>  /  TBili  0.5  /  DBili  x   /  AST  33  /  ALT  16  /  AlkPhos  95  11-20    LFTs LIVER FUNCTIONS - ( 20 Nov 2019 13:22 )  Alb: 2.9 g/dL / Pro: 7.6 g/dL / ALK PHOS: 95 U/L / ALT: 16 U/L / AST: 33 U/L / GGT: x           Radiology (XR, CT, MR, U/S, TTE/GANESH):    < from: CT Brain Stroke Protocol (11.20.19 @ 13:28) >  Impression:    No acute hemorrhage. Scattered white matter infarcts of indeterminate age   representing an interval change compared with 9/3/2019.    < end of copied text >

## 2019-11-20 NOTE — ED ADULT NURSE NOTE - NSIMPLEMENTINTERV_GEN_ALL_ED
Implemented All Fall with Harm Risk Interventions:  [place] to call system. Call bell, personal items and telephone within reach. Instruct patient to call for assistance. Room bathroom lighting operational. Non-slip footwear when patient is off stretcher. Physically safe environment: no spills, clutter or unnecessary equipment. Stretcher in lowest position, wheels locked, appropriate side rails in place. Provide visual cue, wrist band, yellow gown, etc. Monitor gait and stability. Monitor for mental status changes and reorient to person, place, and time. Review medications for side effects contributing to fall risk. Reinforce activity limits and safety measures with patient and family. Provide visual clues: red socks.

## 2019-11-20 NOTE — ED PROVIDER NOTE - SEVERE SEPSIS ALERT DETAILS
patient is suspected to have sepsis. treatment initiated. IV fluids resuscitation will be limited due to ESRD.

## 2019-11-20 NOTE — H&P ADULT - PROBLEM SELECTOR PLAN 1
with metabolic encephalopathy POA  cannot ro meningitis  called neuro and er to do an LP but was deferred by them  ID and ICU fu appreciated  will empirically treat for meninigitis

## 2019-11-20 NOTE — ED PROVIDER NOTE - PHYSICAL EXAMINATION
Gen: unable to follow commands, moving upper ext and b/l feet; not lifting legs. axox0  Skin: No rashes or lesions  HEENT: NC/AT, PERRLA, MMM  Resp: unlabored CTAB  Cardiac: rrr s1s2, no murmurs, rubs or gallops  GI: +distended +BS, Soft, NT  Ext: no pedal edema, FROM in all extremities  Neuro: no focal deficits

## 2019-11-21 LAB
ALBUMIN SERPL ELPH-MCNC: 2.6 G/DL — LOW (ref 3.3–5)
ALP SERPL-CCNC: 88 U/L — SIGNIFICANT CHANGE UP (ref 40–120)
ALT FLD-CCNC: 19 U/L — SIGNIFICANT CHANGE UP (ref 10–45)
ANION GAP SERPL CALC-SCNC: 18 MMOL/L — HIGH (ref 5–17)
APTT BLD: 34.4 SEC — SIGNIFICANT CHANGE UP (ref 27.5–36.3)
AST SERPL-CCNC: 36 U/L — SIGNIFICANT CHANGE UP (ref 10–40)
BASE EXCESS BLDV CALC-SCNC: 4.4 MMOL/L — HIGH (ref -2–2)
BILIRUB SERPL-MCNC: 0.3 MG/DL — SIGNIFICANT CHANGE UP (ref 0.2–1.2)
BUN SERPL-MCNC: 65 MG/DL — HIGH (ref 7–23)
CALCIUM SERPL-MCNC: 12.6 MG/DL — HIGH (ref 8.4–10.5)
CALCIUM SERPL-MCNC: 13.5 MG/DL — CRITICAL HIGH (ref 8.4–10.5)
CHLORIDE SERPL-SCNC: 94 MMOL/L — LOW (ref 96–108)
CO2 BLDV-SCNC: 32 MMOL/L — HIGH (ref 22–30)
CO2 SERPL-SCNC: 27 MMOL/L — SIGNIFICANT CHANGE UP (ref 22–31)
CREAT SERPL-MCNC: 8.28 MG/DL — HIGH (ref 0.5–1.3)
GLUCOSE BLDC GLUCOMTR-MCNC: 101 MG/DL — HIGH (ref 70–99)
GLUCOSE BLDC GLUCOMTR-MCNC: 112 MG/DL — HIGH (ref 70–99)
GLUCOSE BLDC GLUCOMTR-MCNC: 84 MG/DL — SIGNIFICANT CHANGE UP (ref 70–99)
GLUCOSE BLDC GLUCOMTR-MCNC: 92 MG/DL — SIGNIFICANT CHANGE UP (ref 70–99)
GLUCOSE SERPL-MCNC: 97 MG/DL — SIGNIFICANT CHANGE UP (ref 70–99)
HCO3 BLDV-SCNC: 31 MMOL/L — HIGH (ref 21–29)
HCT VFR BLD CALC: 34.4 % — LOW (ref 39–50)
HGB BLD-MCNC: 9.9 G/DL — LOW (ref 13–17)
LACTATE SERPL-SCNC: 0.9 MMOL/L — SIGNIFICANT CHANGE UP (ref 0.7–2)
MCHC RBC-ENTMCNC: 25 PG — LOW (ref 27–34)
MCHC RBC-ENTMCNC: 28.8 GM/DL — LOW (ref 32–36)
MCV RBC AUTO: 86.9 FL — SIGNIFICANT CHANGE UP (ref 80–100)
NRBC # BLD: 0 /100 WBCS — SIGNIFICANT CHANGE UP (ref 0–0)
PCO2 BLDV: 58 MMHG — HIGH (ref 35–50)
PH BLDV: 7.34 — LOW (ref 7.35–7.45)
PLATELET # BLD AUTO: 221 K/UL — SIGNIFICANT CHANGE UP (ref 150–400)
PO2 BLDV: 41 MMHG — SIGNIFICANT CHANGE UP (ref 25–45)
POTASSIUM SERPL-MCNC: 5.1 MMOL/L — SIGNIFICANT CHANGE UP (ref 3.5–5.3)
POTASSIUM SERPL-SCNC: 5.1 MMOL/L — SIGNIFICANT CHANGE UP (ref 3.5–5.3)
PROT SERPL-MCNC: 6.9 G/DL — SIGNIFICANT CHANGE UP (ref 6–8.3)
RBC # BLD: 3.96 M/UL — LOW (ref 4.2–5.8)
RBC # FLD: 15.7 % — HIGH (ref 10.3–14.5)
SAO2 % BLDV: 65 % — LOW (ref 67–88)
SODIUM SERPL-SCNC: 139 MMOL/L — SIGNIFICANT CHANGE UP (ref 135–145)
WBC # BLD: 11.47 K/UL — HIGH (ref 3.8–10.5)
WBC # FLD AUTO: 11.47 K/UL — HIGH (ref 3.8–10.5)

## 2019-11-21 PROCEDURE — 36556 INSERT NON-TUNNEL CV CATH: CPT

## 2019-11-21 PROCEDURE — 77001 FLUOROGUIDE FOR VEIN DEVICE: CPT | Mod: 26

## 2019-11-21 PROCEDURE — 76937 US GUIDE VASCULAR ACCESS: CPT | Mod: 26

## 2019-11-21 PROCEDURE — 99223 1ST HOSP IP/OBS HIGH 75: CPT | Mod: GC

## 2019-11-21 PROCEDURE — 99233 SBSQ HOSP IP/OBS HIGH 50: CPT

## 2019-11-21 RX ORDER — GLUCAGON INJECTION, SOLUTION 0.5 MG/.1ML
1 INJECTION, SOLUTION SUBCUTANEOUS ONCE
Refills: 0 | Status: DISCONTINUED | OUTPATIENT
Start: 2019-11-21 | End: 2019-11-26

## 2019-11-21 RX ORDER — INSULIN LISPRO 100/ML
VIAL (ML) SUBCUTANEOUS EVERY 6 HOURS
Refills: 0 | Status: DISCONTINUED | OUTPATIENT
Start: 2019-11-21 | End: 2019-12-09

## 2019-11-21 RX ORDER — HEPARIN SODIUM 5000 [USP'U]/ML
2500 INJECTION INTRAVENOUS; SUBCUTANEOUS EVERY 6 HOURS
Refills: 0 | Status: DISCONTINUED | OUTPATIENT
Start: 2019-11-21 | End: 2019-11-21

## 2019-11-21 RX ORDER — DIPHENHYDRAMINE HCL 50 MG
25 CAPSULE ORAL ONCE
Refills: 0 | Status: COMPLETED | OUTPATIENT
Start: 2019-11-21 | End: 2019-11-21

## 2019-11-21 RX ORDER — DEXTROSE 50 % IN WATER 50 %
25 SYRINGE (ML) INTRAVENOUS ONCE
Refills: 0 | Status: DISCONTINUED | OUTPATIENT
Start: 2019-11-21 | End: 2019-11-26

## 2019-11-21 RX ORDER — AMPHOTERICIN B 50 MG/12.5ML
200 INJECTION, POWDER, LYOPHILIZED, FOR SOLUTION INTRAVENOUS ONCE
Refills: 0 | Status: COMPLETED | OUTPATIENT
Start: 2019-11-21 | End: 2019-11-21

## 2019-11-21 RX ORDER — SODIUM CHLORIDE 9 MG/ML
1000 INJECTION, SOLUTION INTRAVENOUS
Refills: 0 | Status: DISCONTINUED | OUTPATIENT
Start: 2019-11-21 | End: 2019-11-26

## 2019-11-21 RX ORDER — DEXTROSE 50 % IN WATER 50 %
12.5 SYRINGE (ML) INTRAVENOUS ONCE
Refills: 0 | Status: DISCONTINUED | OUTPATIENT
Start: 2019-11-21 | End: 2019-11-26

## 2019-11-21 RX ORDER — HEPARIN SODIUM 5000 [USP'U]/ML
INJECTION INTRAVENOUS; SUBCUTANEOUS
Qty: 25000 | Refills: 0 | Status: DISCONTINUED | OUTPATIENT
Start: 2019-11-21 | End: 2019-11-21

## 2019-11-21 RX ORDER — VANCOMYCIN HCL 1 G
500 VIAL (EA) INTRAVENOUS ONCE
Refills: 0 | Status: COMPLETED | OUTPATIENT
Start: 2019-11-21 | End: 2019-11-22

## 2019-11-21 RX ORDER — HEPARIN SODIUM 5000 [USP'U]/ML
5500 INJECTION INTRAVENOUS; SUBCUTANEOUS EVERY 6 HOURS
Refills: 0 | Status: DISCONTINUED | OUTPATIENT
Start: 2019-11-21 | End: 2019-11-21

## 2019-11-21 RX ORDER — ACETAMINOPHEN 500 MG
650 TABLET ORAL ONCE
Refills: 0 | Status: COMPLETED | OUTPATIENT
Start: 2019-11-21 | End: 2019-11-21

## 2019-11-21 RX ORDER — DEXTROSE 50 % IN WATER 50 %
15 SYRINGE (ML) INTRAVENOUS ONCE
Refills: 0 | Status: DISCONTINUED | OUTPATIENT
Start: 2019-11-21 | End: 2019-11-26

## 2019-11-21 RX ADMIN — LEVETIRACETAM 400 MILLIGRAM(S): 250 TABLET, FILM COATED ORAL at 06:23

## 2019-11-21 RX ADMIN — HEPARIN SODIUM 1200 UNIT(S)/HR: 5000 INJECTION INTRAVENOUS; SUBCUTANEOUS at 09:35

## 2019-11-21 RX ADMIN — MEROPENEM 100 MILLIGRAM(S): 1 INJECTION INTRAVENOUS at 16:38

## 2019-11-21 RX ADMIN — Medication 25 MILLIGRAM(S): at 13:33

## 2019-11-21 RX ADMIN — Medication 650 MILLIGRAM(S): at 13:34

## 2019-11-21 RX ADMIN — AMPHOTERICIN B 100 MILLIGRAM(S): 50 INJECTION, POWDER, LYOPHILIZED, FOR SOLUTION INTRAVENOUS at 13:11

## 2019-11-21 NOTE — PROGRESS NOTE ADULT - SUBJECTIVE AND OBJECTIVE BOX
Patient is a 74y old  Male who presents with a chief complaint of ams (2019 18:22)      INTERVAL HPI/OVERNIGHT EVENTS:  T(C): 36.4 (19 @ 15:09), Max: 36.7 (19 @ 19:32)  HR: 97 (19 @ 15:09) (86 - 104)  BP: 121/91 (19 @ 15:09) (114/74 - 137/79)  RR: 22 (19 @ 15:09) (16 - 22)  SpO2: 99% (19 @ 15:09) (97% - 100%)  Wt(kg): --  I&O's Summary    2019 07:01  -  2019 07:00  --------------------------------------------------------  IN: 0 mL / OUT: 0 mL / NET: 0 mL        LABS:                        10.3   12.78 )-----------( 249      ( 2019 15:25 )             33.1     11-20    140  |  93<L>  |  48<H>  ----------------------------<  85  4.8   |  24  |  7.13<H>    Ca    13.3<HH>      2019 13:22    TPro  7.6  /  Alb  2.9<L>  /  TBili  0.5  /  DBili  x   /  AST  33  /  ALT  16  /  AlkPhos  95  11-20    PT/INR - ( 2019 15:25 )   PT: 15.1 sec;   INR: 1.30 ratio         PTT - ( 2019 15:25 )  PTT:30.0 sec  Urinalysis Basic - ( 2019 14:29 )    Color: Light Yellow / Appearance: Turbid / S.025 / pH: x  Gluc: x / Ketone: Trace  / Bili: Negative / Urobili: Negative   Blood: x / Protein: >600 / Nitrite: Negative   Leuk Esterase: Large / RBC: 2 /hpf / WBC >50   Sq Epi: x / Non Sq Epi: 0 / Bacteria: Many      CAPILLARY BLOOD GLUCOSE      POCT Blood Glucose.: 101 mg/dL (2019 18:37)  POCT Blood Glucose.: 84 mg/dL (2019 17:28)  POCT Blood Glucose.: 112 mg/dL (2019 12:31)  POCT Blood Glucose.: 92 mg/dL (2019 06:37)  POCT Blood Glucose.: 108 mg/dL (2019 19:58)        Urinalysis Basic - ( 2019 14:29 )    Color: Light Yellow / Appearance: Turbid / S.025 / pH: x  Gluc: x / Ketone: Trace  / Bili: Negative / Urobili: Negative   Blood: x / Protein: >600 / Nitrite: Negative   Leuk Esterase: Large / RBC: 2 /hpf / WBC >50   Sq Epi: x / Non Sq Epi: 0 / Bacteria: Many        MEDICATIONS  (STANDING):  acyclovir IVPB 340 milliGRAM(s) IV Intermittent every 24 hours  acyclovir IVPB      atorvastatin 40 milliGRAM(s) Oral at bedtime  carvedilol 6.25 milliGRAM(s) Oral every 12 hours  cyclobenzaprine 5 milliGRAM(s) Oral four times a day  dextrose 5%. 1000 milliLiter(s) (50 mL/Hr) IV Continuous <Continuous>  dextrose 50% Injectable 12.5 Gram(s) IV Push once  dextrose 50% Injectable 25 Gram(s) IV Push once  dextrose 50% Injectable 25 Gram(s) IV Push once  escitalopram 20 milliGRAM(s) Oral daily  insulin lispro (HumaLOG) corrective regimen sliding scale   SubCutaneous every 6 hours  levETIRAcetam  IVPB 1000 milliGRAM(s) IV Intermittent every 12 hours  meropenem  IVPB 500 milliGRAM(s) IV Intermittent every 24 hours  meropenem  IVPB      midodrine. 10 milliGRAM(s) Oral three times a day  mycophenolate mofetil 250 milliGRAM(s) Oral two times a day  predniSONE   Tablet 5 milliGRAM(s) Oral daily  sevelamer carbonate 800 milliGRAM(s) Oral three times a day with meals  sodium bicarbonate 650 milliGRAM(s) Oral two times a day  tamsulosin 0.4 milliGRAM(s) Oral at bedtime  vancomycin  IVPB 500 milliGRAM(s) IV Intermittent once    MEDICATIONS  (PRN):  dextrose 40% Gel 15 Gram(s) Oral once PRN Blood Glucose LESS THAN 70 milliGRAM(s)/deciliter  glucagon  Injectable 1 milliGRAM(s) IntraMuscular once PRN Glucose LESS THAN 70 milligrams/deciliter          PHYSICAL EXAM:  GENERAL: frail, obtunded  CHEST/LUNG: Clear to percussion bilaterally; No rales, rhonchi, wheezing, or rubs  HEART: Regular rate and rhythm; No murmurs, rubs, or gallops  ABDOMEN: Soft, Nontender, Nondistended; Bowel sounds present  EXTREMITIES: no edema     Care Discussed with Consultants/Other Providers [ ] YES  [ ] NO

## 2019-11-21 NOTE — PROGRESS NOTE ADULT - PROBLEM SELECTOR PLAN 5
Admit for septic workup and ID evaluation,send blood and urine cx,serial lactate levels,monitor vitals closley,ivfs hydration,monitor urine output and renal profile,iv abx as per id cons.

## 2019-11-21 NOTE — PHYSICAL THERAPY INITIAL EVALUATION ADULT - MODALITIES TREATMENT COMMENTS
PAtient with 8mdq2ovk2.2cm stage II pressure injury overlying coccyx; Dermis visible and appears moist and pink; +Incontinent of BM with soiled diaper encountered; patient cleansed revealing perirectal IAD

## 2019-11-21 NOTE — CHART NOTE - NSCHARTNOTEFT_GEN_A_CORE
74M PMHx of ESRD s/p failed renal transplant x2, HCV, DM, and meningococcal meningitis 2 years ago was sent in to the ED from HD for AMS and low BPs. He is unable to provide any information at this time. His sister is at the bedside and reports that he is typically able to carry a conversation and walk a small amount. She is not aware if he had been having fevers at the nursing home, and believes that his last HD was on Tuesday. (20 Nov 2019 18:01)  Admitted with acute encephalopathy, r/o meningitis. Id, MICU , renal and neuro was consulted in ER  Recommended LP by ID, and dialysis was ordered for last night by Renal  Some how LP was not done in ER. MICU declined. As per dialysis manager dialysis was not done due to isolation  and they called logistic last night to transfer the patient to a room suitable to attach dialysis machine  Neuro declined saying patient was on apixaban (did not receive any since admission), needs to wait x 5 days   before doing LP. Contacted neuro radiology, agreed to do tomorrow. Recommended to stop heparin at 4 am  on the day of LP. Finally patient received a dialysis bed in  tower, but unable to do dialysis because fistula was clotted  as per dialysis nurse, renal was informed by dialysis nurse. Called vascular for emergency Shiley placement  but deferred to IR. DR James made aware. Contacted IR by me and Dr James and requested for a shiley placement as early as possible, said they can only do it around 7 pm. Heparin is on hold now.   Patient remains lethargic, unable to follow commands, mumbles at times  DC isolation as recommended by ID    Physical Exam:  General: Lethargic  Neurology: Unable to follow commands  Respiratory: CTA B/L  CV: RRR, S1S2,   Abdominal: Soft, NT, ND , (+) BS  MSK: No edema,     74M PMHx of ESRD s/p failed renal transplant x2, HCV, DM, and meningococcal meningitis 2 years ago was sent in to the ED from HD for AMS and low BPs. He is unable to provide any information at this time. His sister is at the bedside and reports that he is typically able to carry a conversation and walk a small amount. She is not aware if he had been having fevers at the nursing home, and believes that his last HD was on Tuesday. (20 Nov 2019 18:01)Admitted with Acute encephalopathy, r/o meningitis  ---cont Abx  ---Dialysis after shiley placement  ----F/u labs  ----LP tomorrow  ----Neuro check Q 4 hr  ----Vital Q 4 hr  Velia Jain NP  [phone]

## 2019-11-21 NOTE — PROVIDER CONTACT NOTE (OTHER) - BACKGROUND
74 male with history as below  ESRD s/p failed renal transplant x2 (2008/ 2015) (on Cellcept) HCV, DMT2, and meningococcal meningitis 2 years ago, HTN, cocaine induced CM,  PAF (Eliquis),

## 2019-11-21 NOTE — PROGRESS NOTE ADULT - ASSESSMENT
74M PMHx of ESRD s/p failed renal transplant x2, HCV, DM, and meningococcal meningitis 2 years ago was sent in to the ED from HD for AMS and low BPs. He is unable to provide any information at this time. His sister is at the bedside and reports that he is typically able to carry a conversation and walk a small amount. She is not aware if he had been having fevers at the nursing home, and believes that his last HD was on Tuesday.     Problem/Plan - 1:  ·  Problem: Sepsis.  Plan: with metabolic encephalopathy POA  cannot ro meningitis  called neuro and er to do an LP but was deferred by them, to be done by neuroradiology in am   ID and ICU fu appreciated  will empirically treat for meninigitis.     Problem/Plan - 2:  ·  Problem: Kidney transplant recipient.  Plan: on HD  case [facility] renal attendfing  HD tonight.  after shilley  AVF clotted     Problem/Plan - 3:  ·  Problem: Hypercalcemia.  Plan: likely sec to missed dialysis  cannot ro maligancy  will monitor level after HD.     Prognosis guarded  case [facility] housestaff, ID, Renal and IR

## 2019-11-21 NOTE — PHYSICAL THERAPY INITIAL EVALUATION ADULT - PERTINENT HX OF CURRENT PROBLEM, REHAB EVAL
74M PMHx of ESRD s/p failed renal transplant x2, HCV, DM, and meningococcal meningitis 2 years ago was sent in to the ED from HD for AMS and low BPs.

## 2019-11-21 NOTE — PROCEDURE NOTE - NSPOSTPRCRAD_GEN_A_CORE
central line located in the superior vena cava/post-procedure radiography performed/depth of insertion/line adjusted to depth of insertion/no pneumothorax

## 2019-11-21 NOTE — PHYSICAL THERAPY INITIAL EVALUATION ADULT - ADDITIONAL COMMENTS
As per 8/2019 documentation, pt was recently staying w/ family in [place] lives in a private house and 5 steps to enter w/ UHR. PTA pt was independent w/ all mobility w/ RW/cane and ADLs. As per 8/2019 documentation, pt was recently staying w/ family in [place] lives in a private house and 5 steps to enter w/ UHR. PTA pt was independent w/ all mobility w/ RW/cane and ADLs.  As per SW note, pt is a resident at a LTC facility and transferred via doris lift.

## 2019-11-21 NOTE — PROVIDER CONTACT NOTE (OTHER) - SITUATION
Pt lethargic and unable to follow commands. Pt responsive to stimuli, but unable to swallow crushed medication.

## 2019-11-21 NOTE — PROGRESS NOTE ADULT - SUBJECTIVE AND OBJECTIVE BOX
Patient is a 74y Male whom presented to the hospital with esrd on hd     PAST MEDICAL & SURGICAL HISTORY:  Kidney transplant recipient  Anuria  GERD (gastroesophageal reflux disease)  Kidney transplant failure: dx: 2/2013  Hepatitis C: dx: 90&#x27;s.  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 10 milliGRAM(s) Oral three times a day  mycophenolate mofetil 250 milliGRAM(s) Oral two times a day  predniSONE   Tablet 5 milliGRAM(s) Oral daily  sevelamer carbonate 800 milliGRAM(s) Oral three times a day with meals  sodium bicarbonate 650 milliGRAM(s) Oral two times a day  tamsulosin 0.4 milliGRAM(s) Oral at bedtime      Allergies    No Known Allergies    Intolerances        SOCIAL HISTORY:  Denies ETOh,Smoking,     FAMILY HISTORY:  Family history of breast cancer in sister (Sibling): living at 94 yo  Family history of prostate cancer in father  Family history of lung cancer  Family history of hypertension in mother  Family history of diabetes mellitus: mother      REVIEW OF SYSTEMS:    unbable to obtained     VITAL:  T(C): , Max: 38.3 (11-20-19 @ 13:23)  T(F): , Max: 101 (11-20-19 @ 13:23)  HR: 104 (11-20-19 @ 19:32)  BP: 118/66 (11-20-19 @ 19:32)  BP(mean): --  RR: 16 (11-20-19 @ 19:32)  SpO2: 98% (11-20-19 @ 19:32)  Wt(kg): --    I and O's:    Height (cm): 182.88 (11-20 @ 12:46)  Weight (kg): 68 (11-20 @ 12:46)  BMI (kg/m2): 20.3 (11-20 @ 12:46)  BSA (m2): 1.88 (11-20 @ 12:46)    PHYSICAL EXAM:    Constitutional: lethargic   HEENT: conjunctive   clear   Neck:  No JVD  Respiratory: decrease bs b/l   Cardiovascular: S1 and S2  Gastrointestinal: BS+, soft, NT/ND  Extremities: No peripheral edema  Neurological:  lethargic   Skin: dry   Access: pos fistula

## 2019-11-21 NOTE — PROGRESS NOTE ADULT - SUBJECTIVE AND OBJECTIVE BOX
Interventional Radiology Pre-Procedure Note    This is a 74y Male with KAMRON on CKD referred to IR for placement of a non-tunnelled dialysis catheter for hemodialysis.     Procedure: Non-tunnelled dialysis catheter    Diagnosis/Indication: Patient is a 74y old  Male who presents with a chief complaint of ams (21 Nov 2019 16:17)    PAST MEDICAL & SURGICAL HISTORY:  Kidney transplant recipient  Anuria  GERD (gastroesophageal reflux disease)  Kidney transplant failure: dx: 2/2013  Hepatitis C: dx: 90&#x27;s.  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, no known MIs  H/O kidney transplant: may 2015  A-V fistula: Left, implanted (?)  S/p cadaver renal transplant: 2008  S/P partial colectomy: due to diverticulitis     Male    Allergies: No Known Allergies      LABS:  CBC Full  -  ( 20 Nov 2019 15:25 )  WBC Count : 12.78 K/uL  RBC Count : 4.02 M/uL  Hemoglobin : 10.3 g/dL  Hematocrit : 33.1 %  Platelet Count - Automated : 249 K/uL    11-20    140  |  93<L>  |  48<H>  ----------------------------<  85  4.8   |  24  |  7.13<H>    Ca    13.3<HH>      20 Nov 2019 13:22    TPro  7.6  /  Alb  2.9<L>  /  TBili  0.5  /  DBili  x   /  AST  33  /  ALT  16  /  AlkPhos  95  11-20    PT/INR - ( 20 Nov 2019 15:25 )   PT: 15.1 sec;   INR: 1.30 ratio      PTT - ( 20 Nov 2019 15:25 )  PTT:30.0 sec    Plan:  -Right IJ non-tunnelled dialysis catheter insertion.  -Procedure/ risks/ benefits were explained, informed consent obtained from patient, verbalizes understanding.

## 2019-11-21 NOTE — CHART NOTE - NSCHARTNOTEFT_GEN_A_CORE
DC Eliquis as per neuro recommendation, planning for LP today  Ordered heparin drip for now. Dr James made aware, agreed with  the above plan.   Velia Jain NP  [phone]

## 2019-11-21 NOTE — PROGRESS NOTE ADULT - SUBJECTIVE AND OBJECTIVE BOX
Subjective: Patient seen and examined. No new events except as noted.     SUBJECTIVE/ROS:  ROS limited       MEDICATIONS:  MEDICATIONS  (STANDING):  acyclovir IVPB 340 milliGRAM(s) IV Intermittent every 24 hours  acyclovir IVPB      atorvastatin 40 milliGRAM(s) Oral at bedtime  carvedilol 6.25 milliGRAM(s) Oral every 12 hours  cyclobenzaprine 5 milliGRAM(s) Oral four times a day  dextrose 5%. 1000 milliLiter(s) (50 mL/Hr) IV Continuous <Continuous>  dextrose 50% Injectable 12.5 Gram(s) IV Push once  dextrose 50% Injectable 25 Gram(s) IV Push once  dextrose 50% Injectable 25 Gram(s) IV Push once  escitalopram 20 milliGRAM(s) Oral daily  heparin  Infusion.  Unit(s)/Hr (12 mL/Hr) IV Continuous <Continuous>  insulin lispro (HumaLOG) corrective regimen sliding scale   SubCutaneous every 6 hours  levETIRAcetam  IVPB 1000 milliGRAM(s) IV Intermittent every 12 hours  meropenem  IVPB 500 milliGRAM(s) IV Intermittent every 24 hours  meropenem  IVPB      midodrine. 10 milliGRAM(s) Oral three times a day  mycophenolate mofetil 250 milliGRAM(s) Oral two times a day  predniSONE   Tablet 5 milliGRAM(s) Oral daily  sevelamer carbonate 800 milliGRAM(s) Oral three times a day with meals  sodium bicarbonate 650 milliGRAM(s) Oral two times a day  tamsulosin 0.4 milliGRAM(s) Oral at bedtime      PHYSICAL EXAM:  T(C): 36.4 (11-21-19 @ 08:31), Max: 38.3 (11-20-19 @ 13:23)  HR: 88 (11-21-19 @ 08:31) (86 - 117)  BP: 114/74 (11-21-19 @ 08:31) (112/55 - 163/82)  RR: 18 (11-21-19 @ 08:31) (16 - 24)  SpO2: 99% (11-21-19 @ 08:31) (95% - 100%)  Wt(kg): --  I&O's Summary    20 Nov 2019 07:01  -  21 Nov 2019 07:00  --------------------------------------------------------  IN: 0 mL / OUT: 0 mL / NET: 0 mL      Height (cm): 182.88 (11-20 @ 12:46)  Weight (kg): 68 (11-20 @ 12:46)  BMI (kg/m2): 20.3 (11-20 @ 12:46)  BSA (m2): 1.88 (11-20 @ 12:46)      JVP: Normal  Neck: supple  Lung: clear   CV: S1 S2 , Murmur:  Abd: soft  Ext: No edema  neuro: Awake / confused   Psych: flat affect  Skin: normal``    LABS/DATA:    CARDIAC MARKERS:                                10.3   12.78 )-----------( 249      ( 20 Nov 2019 15:25 )             33.1     11-20    140  |  93<L>  |  48<H>  ----------------------------<  85  4.8   |  24  |  7.13<H>    Ca    13.3<HH>      20 Nov 2019 13:22    TPro  7.6  /  Alb  2.9<L>  /  TBili  0.5  /  DBili  x   /  AST  33  /  ALT  16  /  AlkPhos  95  11-20    proBNP:   Lipid Profile:   HgA1c:   TSH:     TELE:  EKG:

## 2019-11-21 NOTE — PROGRESS NOTE ADULT - ASSESSMENT
74M PMHx of ESRD s/p failed renal transplant x2, HCV, DM, and meningococcal meningitis 2 years ago was sent in to the ED from HD for AMS and low BPs.  patient when seen unable to provide history  lethargic  has rigid neck  Exam with no abd tenderness  basal crackles  sacral wound no purulence no discharge  Left arm AVF no tenderness  UA with pyuria  CXR clear  labs as above   CT head with multiple strokkes of indeterminate age -changed since 9/3    Dds ? sepsis ? from systemic infection ? CNS infection cannot be entirely ruled out since he is immunocompromised  Rec:  1) LP  2) follow blood, urine Cx  May need further imaging if no other focus  3) Correct Ca-will defer to primary team and renal  4) continue  Empiric CNS infection coverage with vanco x 1(redose if HD) meropenem 500 MG iv q 24 and acyclovir 340 mg IV q 24  5) Droplet precautions X 24 hrs from abx (or Dc if if LP negative)   6) Monitor for s/s any other foci  7) will need echo if blood Cx positive    Will tailor plan for ID issues  per course,results.Will defer to primary team on management of other issues.  case d/w Med NP   Will Follow.  Beeper [id number]-gnke/afterhours/No response-[id number]. 74M PMHx of ESRD s/p failed renal transplant x2, HCV, DM, and meningococcal meningitis 2 years ago was sent in to the ED from HD for AMS and low BPs.  patient when seen unable to provide history  lethargic  has rigid neck  Exam with no abd tenderness  basal crackles  sacral wound no purulence no discharge  Left arm AVF no tenderness  UA with pyuria  CXR clear  labs as above   CT head with multiple strokkes of indeterminate age -changed since 9/3    Dds ? sepsis ? from systemic infection ? CNS infection cannot be entirely ruled out since he is immunocompromised  Rec:  1) LP  2) follow blood, urine Cx  May need further imaging if no other focus  If LP delayed would add ambisome and alsos end crypt antigen in serum  Rec MRI brain   3) Correct Ca-will defer to primary team and renal  4) continue  Empiric CNS infection coverage with vanco x 1(redose if HD) meropenem 500 MG iv q 24 and acyclovir 340 mg IV q 24  5) Droplet precautions X 24 hrs from abx (or Dc if if LP negative)   6) Monitor for s/s any other foci  7) will need echo if blood Cx positive    Will tailor plan for ID issues  per course,results.Will defer to primary team on management of other issues.  case d/w Med NP , Dr James  Will Follow.  Beeper [id number]-yddz/afterhours/No response-[id number].

## 2019-11-21 NOTE — PROGRESS NOTE ADULT - ASSESSMENT
AMS / fever   appears to be sepsis mediated   fu with neurology   fu with ID  on anbx  plan for LP     HTN  monitor BP for now    History of cocaine induced CM  normal EF on last echo  cont BB     PAF  in sinus   a/c on hold given high bleed risk in setting of sepsis and need for LP   may cont heparin prior to LP     Hypercalcemia  plan for HD as per renal

## 2019-11-21 NOTE — PROGRESS NOTE ADULT - SUBJECTIVE AND OBJECTIVE BOX
Patient is a 74y old  Male who presents with a chief complaint of ams (21 Nov 2019 08:56)    Being followed by ID for fever, altered mental status    Interval history:pt opens eyes but does not follow commands  no observed cough or diarrhea  No other acute events      ROS:  Not obtainable       Antimicrobials:    acyclovir IVPB 340 milliGRAM(s) IV Intermittent every 24 hours  acyclovir IVPB      meropenem  IVPB 500 milliGRAM(s) IV Intermittent every 24 hours  meropenem  IVPB      vanco X 1 yesterday     other medications reviewed    Vital Signs Last 24 Hrs  T(C): 36.4 (11-21-19 @ 08:31), Max: 38.3 (11-20-19 @ 13:23)  T(F): 97.6 (11-21-19 @ 08:31), Max: 101 (11-20-19 @ 13:23)  HR: 88 (11-21-19 @ 08:31) (86 - 117)  BP: 114/74 (11-21-19 @ 08:31) (112/55 - 163/82)  BP(mean): --  RR: 18 (11-21-19 @ 08:31) (16 - 24)  SpO2: 99% (11-21-19 @ 08:31) (95% - 100%)    Physical Exam:    Constitutional comfortable    HEENT PERRLA ,No pallor or icterus        Neck rigidity     Chest Good AE,CTA    CVS RRR S1 S2 WNl No murmur or rub or gallop    Abd soft BS normal No tenderness RLQ transplant kidney no tenderness    Ext left arm AVF no erythema or tenderness    IV site no erythema tenderness or discharge    Joints no swelling or LOM    CNS no verbal response-opens eyes-does not follow commands    Lab Data:                          10.3   12.78 )-----------( 249      ( 20 Nov 2019 15:25 )             33.1   WBC Count: 12.78 (11-20-19 @ 15:25)      11-20    140  |  93<L>  |  48<H>  ----------------------------<  85  4.8   |  24  |  7.13<H>    Ca    13.3<HH>      20 Nov 2019 13:22    TPro  7.6  /  Alb  2.9<L>  /  TBili  0.5  /  DBili  x   /  AST  33  /  ALT  16  /  AlkPhos  95  11-20          Calcium.: 12.6: Test Repeated mg/dL (11.20.19 @ 22:59)    Urinalysis + Microscopic Examination (11.20.19 @ 14:29)    Specific Gravity: 1.025    Urine Appearance: Turbid    Urobilinogen: Negative    Ketone - Urine: Trace    Bilirubin: Negative    Color: Light Yellow    Glucose Qualitative, Urine: Negative    Leukocyte Esterase Concentration: Large    Nitrite: Negative    Protein, Urine: >600    pH Urine: 6.5    Blood, Urine: Moderate    Red Blood Cell - Urine: 2 /hpf    White Blood Cell - Urine: >50    Epithelial Cells: 0    Hyaline Casts: 0 /LPF    Bacteria: Many                  < from: Xray Chest 1 View AP/PA (11.20.19 @ 15:02) >  IMPRESSION: Clear lungs.      < end of copied text >    < from: CT Brain Stroke Protocol (11.20.19 @ 13:28) >    Impression:    No acute hemorrhage. Scattered white matter infarcts of indeterminate age   representing an interval change compared with 9/3/2019.    The results of this examination were discussed with Dr. Rahman from   stroke neurology at 1:30 PM on 11/20/2019              < end of copied text >

## 2019-11-21 NOTE — PHYSICAL THERAPY INITIAL EVALUATION ADULT - PRECAUTIONS/LIMITATIONS, REHAB EVAL
cardiac precautions/fall precautions 11/26: RRT called for hypotensive, AMS, lethargy - transferred to MICU. started on levophed, prop. vanc x1. meropenem, intubated, extubated 11/29/cardiac precautions/fall precautions

## 2019-11-22 ENCOUNTER — RESULT REVIEW (OUTPATIENT)
Age: 74
End: 2019-11-22

## 2019-11-22 DIAGNOSIS — E21.3 HYPERPARATHYROIDISM, UNSPECIFIED: ICD-10-CM

## 2019-11-22 DIAGNOSIS — I10 ESSENTIAL (PRIMARY) HYPERTENSION: ICD-10-CM

## 2019-11-22 LAB
ALBUMIN SERPL ELPH-MCNC: 2.5 G/DL — LOW (ref 3.3–5)
ALP SERPL-CCNC: 84 U/L — SIGNIFICANT CHANGE UP (ref 40–120)
ALT FLD-CCNC: 18 U/L — SIGNIFICANT CHANGE UP (ref 10–45)
ANION GAP SERPL CALC-SCNC: 16 MMOL/L — SIGNIFICANT CHANGE UP (ref 5–17)
APPEARANCE CSF: CLEAR — SIGNIFICANT CHANGE UP
AST SERPL-CCNC: 27 U/L — SIGNIFICANT CHANGE UP (ref 10–40)
BILIRUB SERPL-MCNC: 0.3 MG/DL — SIGNIFICANT CHANGE UP (ref 0.2–1.2)
BUN SERPL-MCNC: 27 MG/DL — HIGH (ref 7–23)
CALCIUM SERPL-MCNC: 12.1 MG/DL — HIGH (ref 8.4–10.5)
CHLORIDE SERPL-SCNC: 94 MMOL/L — LOW (ref 96–108)
CO2 SERPL-SCNC: 28 MMOL/L — SIGNIFICANT CHANGE UP (ref 22–31)
COLOR CSF: SIGNIFICANT CHANGE UP
CREAT SERPL-MCNC: 4.58 MG/DL — HIGH (ref 0.5–1.3)
CSF PCR RESULT: SIGNIFICANT CHANGE UP
GLUCOSE BLDC GLUCOMTR-MCNC: 109 MG/DL — HIGH (ref 70–99)
GLUCOSE BLDC GLUCOMTR-MCNC: 75 MG/DL — SIGNIFICANT CHANGE UP (ref 70–99)
GLUCOSE BLDC GLUCOMTR-MCNC: 82 MG/DL — SIGNIFICANT CHANGE UP (ref 70–99)
GLUCOSE BLDC GLUCOMTR-MCNC: 87 MG/DL — SIGNIFICANT CHANGE UP (ref 70–99)
GLUCOSE BLDC GLUCOMTR-MCNC: 96 MG/DL — SIGNIFICANT CHANGE UP (ref 70–99)
GLUCOSE BLDC GLUCOMTR-MCNC: 99 MG/DL — SIGNIFICANT CHANGE UP (ref 70–99)
GLUCOSE CSF-MCNC: 54 MG/DL — SIGNIFICANT CHANGE UP (ref 40–70)
GLUCOSE SERPL-MCNC: 122 MG/DL — HIGH (ref 70–99)
GRAM STN FLD: SIGNIFICANT CHANGE UP
HBA1C BLD-MCNC: 5.5 % — SIGNIFICANT CHANGE UP (ref 4–5.6)
HCT VFR BLD CALC: 31.4 % — LOW (ref 39–50)
HCT VFR BLD CALC: 31.6 % — LOW (ref 39–50)
HGB BLD-MCNC: 9.1 G/DL — LOW (ref 13–17)
HGB BLD-MCNC: 9.4 G/DL — LOW (ref 13–17)
LACTATE SERPL-SCNC: 1 MMOL/L — SIGNIFICANT CHANGE UP (ref 0.7–2)
LDH CSF L TO P-CCNC: 24 U/L — SIGNIFICANT CHANGE UP
LDH FLD-CCNC: 24 U/L — SIGNIFICANT CHANGE UP
LYMPHOCYTES # CSF: 62 % — SIGNIFICANT CHANGE UP (ref 40–80)
MAGNESIUM SERPL-MCNC: 2.1 MG/DL — SIGNIFICANT CHANGE UP (ref 1.6–2.6)
MCHC RBC-ENTMCNC: 25.4 PG — LOW (ref 27–34)
MCHC RBC-ENTMCNC: 26.2 PG — LOW (ref 27–34)
MCHC RBC-ENTMCNC: 28.8 GM/DL — LOW (ref 32–36)
MCHC RBC-ENTMCNC: 29.9 GM/DL — LOW (ref 32–36)
MCV RBC AUTO: 87.5 FL — SIGNIFICANT CHANGE UP (ref 80–100)
MCV RBC AUTO: 88.3 FL — SIGNIFICANT CHANGE UP (ref 80–100)
MONOS+MACROS NFR CSF: 5 % — LOW (ref 15–45)
NEUTROPHILS # CSF: 33 % — HIGH (ref 0–6)
NRBC # BLD: 0 /100 WBCS — SIGNIFICANT CHANGE UP (ref 0–0)
NRBC NFR CSF: 1 /UL — SIGNIFICANT CHANGE UP (ref 0–5)
PHOSPHATE SERPL-MCNC: 4.6 MG/DL — HIGH (ref 2.5–4.5)
PLATELET # BLD AUTO: 185 K/UL — SIGNIFICANT CHANGE UP (ref 150–400)
PLATELET # BLD AUTO: 203 K/UL — SIGNIFICANT CHANGE UP (ref 150–400)
POTASSIUM SERPL-MCNC: 3.9 MMOL/L — SIGNIFICANT CHANGE UP (ref 3.5–5.3)
POTASSIUM SERPL-SCNC: 3.9 MMOL/L — SIGNIFICANT CHANGE UP (ref 3.5–5.3)
PROT SERPL-MCNC: 6.7 G/DL — SIGNIFICANT CHANGE UP (ref 6–8.3)
RBC # BLD: 3.58 M/UL — LOW (ref 4.2–5.8)
RBC # BLD: 3.59 M/UL — LOW (ref 4.2–5.8)
RBC # CSF: 28 /UL — HIGH (ref 0–0)
RBC # FLD: 15.9 % — HIGH (ref 10.3–14.5)
RBC # FLD: 15.9 % — HIGH (ref 10.3–14.5)
SODIUM SERPL-SCNC: 138 MMOL/L — SIGNIFICANT CHANGE UP (ref 135–145)
SPECIMEN SOURCE: SIGNIFICANT CHANGE UP
TUBE TYPE: SIGNIFICANT CHANGE UP
WBC # BLD: 12.14 K/UL — HIGH (ref 3.8–10.5)
WBC # BLD: 12.72 K/UL — HIGH (ref 3.8–10.5)
WBC # FLD AUTO: 12.14 K/UL — HIGH (ref 3.8–10.5)
WBC # FLD AUTO: 12.72 K/UL — HIGH (ref 3.8–10.5)

## 2019-11-22 PROCEDURE — 62270 DX LMBR SPI PNXR: CPT

## 2019-11-22 PROCEDURE — 77003 FLUOROGUIDE FOR SPINE INJECT: CPT | Mod: 26

## 2019-11-22 PROCEDURE — 99233 SBSQ HOSP IP/OBS HIGH 50: CPT

## 2019-11-22 PROCEDURE — 88108 CYTOPATH CONCENTRATE TECH: CPT | Mod: 26

## 2019-11-22 PROCEDURE — 95816 EEG AWAKE AND DROWSY: CPT | Mod: 26

## 2019-11-22 RX ORDER — AMPHOTERICIN B 50 MG/12.5ML
200 INJECTION, POWDER, LYOPHILIZED, FOR SOLUTION INTRAVENOUS DAILY
Refills: 0 | Status: DISCONTINUED | OUTPATIENT
Start: 2019-11-22 | End: 2019-11-22

## 2019-11-22 RX ORDER — DIPHENHYDRAMINE HCL 50 MG
25 CAPSULE ORAL EVERY 4 HOURS
Refills: 0 | Status: DISCONTINUED | OUTPATIENT
Start: 2019-11-22 | End: 2019-11-22

## 2019-11-22 RX ORDER — ACETAMINOPHEN 500 MG
650 TABLET ORAL ONCE
Refills: 0 | Status: DISCONTINUED | OUTPATIENT
Start: 2019-11-22 | End: 2019-11-22

## 2019-11-22 RX ORDER — AMPHOTERICIN B 50 MG/12.5ML
200 INJECTION, POWDER, LYOPHILIZED, FOR SOLUTION INTRAVENOUS ONCE
Refills: 0 | Status: DISCONTINUED | OUTPATIENT
Start: 2019-11-22 | End: 2019-11-22

## 2019-11-22 RX ORDER — DEXTROSE 10 % IN WATER 10 %
1000 INTRAVENOUS SOLUTION INTRAVENOUS
Refills: 0 | Status: DISCONTINUED | OUTPATIENT
Start: 2019-11-22 | End: 2019-11-25

## 2019-11-22 RX ADMIN — LEVETIRACETAM 400 MILLIGRAM(S): 250 TABLET, FILM COATED ORAL at 02:10

## 2019-11-22 RX ADMIN — Medication 30 MILLILITER(S): at 06:57

## 2019-11-22 RX ADMIN — LEVETIRACETAM 400 MILLIGRAM(S): 250 TABLET, FILM COATED ORAL at 12:46

## 2019-11-22 RX ADMIN — Medication 100 MILLIGRAM(S): at 03:33

## 2019-11-22 RX ADMIN — Medication 106.8 MILLIGRAM(S): at 04:23

## 2019-11-22 RX ADMIN — MEROPENEM 100 MILLIGRAM(S): 1 INJECTION INTRAVENOUS at 19:04

## 2019-11-22 NOTE — PROGRESS NOTE ADULT - SUBJECTIVE AND OBJECTIVE BOX
Indication: r/o meningitis    Patient presents for fluoroscopically guided lumbar puncture and intrathecal chemotherapy administration.      Risks and benefits were discussed with the patient and/or patient health care proxy.  Risks discussed included bleeding, infection, nerve damage, and headache. Indication: r/o meningitis    Patient presents for fluoroscopically guided lumbar puncture.       Risks and benefits were discussed with the patient's health care proxy.  Risks discussed included bleeding, infection, nerve damage, and headache.

## 2019-11-22 NOTE — PROGRESS NOTE ADULT - PROBLEM SELECTOR PLAN 1
Excess fluids and waste products will be removed from your blood; your electrolytes will be balanced; your blood pressure will be controlled.  plan for hd as order

## 2019-11-22 NOTE — CONSULT NOTE ADULT - SUBJECTIVE AND OBJECTIVE BOX
HPI:  74M PMHx of ESRD s/p failed renal transplant x2, HCV, DM, and meningococcal meningitis 2 years ago was sent in to the ED from HD for AMS and low BPs. He is unable to provide any information at this time. His sister is at the bedside and reports that he is typically able to carry a conversation and walk a small amount. She is not aware if he had been having fevers at the nursing home, and believes that his last HD was on Tuesday. (20 Nov 2019 18:01)      PAST MEDICAL & SURGICAL HISTORY:  Kidney transplant recipient  Anuria  GERD (gastroesophageal reflux disease)  Kidney transplant failure: dx: 2/2013  Hepatitis C: dx: 90&#x27;s.  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, no known MIs  H/O kidney transplant: may 2015  A-V fistula: Left, implanted (?)  S/p cadaver renal transplant: 2008  S/P partial colectomy: due to diverticulitis      FAMILY HISTORY:  Family history of breast cancer in sister (Sibling): living at 92 yo  Family history of prostate cancer in father  Family history of lung cancer  Family history of hypertension in mother  Family history of diabetes mellitus: mother      Social History:    Outpatient Medications:    MEDICATIONS  (STANDING):  acyclovir IVPB 340 milliGRAM(s) IV Intermittent every 24 hours  acyclovir IVPB      amphotericin B  liposome  IVPB 200 milliGRAM(s) IV Intermittent daily  atorvastatin 40 milliGRAM(s) Oral at bedtime  carvedilol 6.25 milliGRAM(s) Oral every 12 hours  cyclobenzaprine 5 milliGRAM(s) Oral four times a day  dextrose 10%. 1000 milliLiter(s) (30 mL/Hr) IV Continuous <Continuous>  dextrose 5%. 1000 milliLiter(s) (50 mL/Hr) IV Continuous <Continuous>  dextrose 50% Injectable 12.5 Gram(s) IV Push once  dextrose 50% Injectable 25 Gram(s) IV Push once  dextrose 50% Injectable 25 Gram(s) IV Push once  escitalopram 20 milliGRAM(s) Oral daily  insulin lispro (HumaLOG) corrective regimen sliding scale   SubCutaneous every 6 hours  levETIRAcetam  IVPB 1000 milliGRAM(s) IV Intermittent every 12 hours  meropenem  IVPB 500 milliGRAM(s) IV Intermittent every 24 hours  meropenem  IVPB      midodrine. 10 milliGRAM(s) Oral three times a day  mycophenolate mofetil 250 milliGRAM(s) Oral two times a day  predniSONE   Tablet 5 milliGRAM(s) Oral daily  sevelamer carbonate 800 milliGRAM(s) Oral three times a day with meals  sodium bicarbonate 650 milliGRAM(s) Oral two times a day  tamsulosin 0.4 milliGRAM(s) Oral at bedtime    MEDICATIONS  (PRN):  dextrose 40% Gel 15 Gram(s) Oral once PRN Blood Glucose LESS THAN 70 milliGRAM(s)/deciliter  glucagon  Injectable 1 milliGRAM(s) IntraMuscular once PRN Glucose LESS THAN 70 milligrams/deciliter      Allergies    No Known Allergies    Intolerances      Review of Systems:  Constitutional: No fever, no chills  Eyes: No blurry vision  Neuro: No tremors  HEENT: No pain, no neck swelling  Cardiovascular: No chest pain, no palpitations  Respiratory: Has SOB, no cough  GI: No nausea, vomiting, abdominal pain  : No dysuria  Skin: no rash  MSK: Has leg swelling.  Psych: no depression  Endocrine: no polyuria, polydipsia    ALL OTHER SYSTEMS REVIEWED AND NEGATIVE    UNABLE TO OBTAIN    PHYSICAL EXAM:  VITALS: T(C): 36.4 (11-22-19 @ 12:47)  T(F): 97.5 (11-22-19 @ 12:47), Max: 98.1 (11-22-19 @ 04:59)  HR: 104 (11-22-19 @ 12:47) (89 - 111)  BP: 106/65 (11-22-19 @ 12:47) (97/60 - 121/91)  RR:  (18 - 22)  SpO2:  (97% - 100%)  Wt(kg): --  GENERAL: NAD, well-groomed, well-developed  EYES: No proptosis, no lid lag  HEENT:  Atraumatic, Normocephalic  THYROID: Normal size, no palpable nodules  RESPIRATORY: Clear to auscultation bilaterally; No rales, rhonchi, wheezing  CARDIOVASCULAR: Si S2, No murmurs;  GI: Soft, non distended, normal bowel sounds  SKIN: Dry, intact, No rashes or lesions  MUSCULOSKELETAL: Has BL lower extremity edema.  NEURO:  no tremor, sensation decreased in feet BL,    POCT Blood Glucose.: 109 mg/dL (11-22-19 @ 12:40)  POCT Blood Glucose.: 87 mg/dL (11-22-19 @ 06:31)  POCT Blood Glucose.: 75 mg/dL (11-22-19 @ 02:05)  POCT Blood Glucose.: 82 mg/dL (11-21-19 @ 20:36)  POCT Blood Glucose.: 101 mg/dL (11-21-19 @ 18:37)  POCT Blood Glucose.: 84 mg/dL (11-21-19 @ 17:28)  POCT Blood Glucose.: 112 mg/dL (11-21-19 @ 12:31)  POCT Blood Glucose.: 92 mg/dL (11-21-19 @ 06:37)  POCT Blood Glucose.: 108 mg/dL (11-20-19 @ 19:58)  POCT Blood Glucose.: 83 mg/dL (11-20-19 @ 13:04)                            9.1    12.72 )-----------( 203      ( 22 Nov 2019 10:42 )             31.6       11-21    139  |  94<L>  |  65<H>  ----------------------------<  97  5.1   |  27  |  8.28<H>    EGFR if : 7<L>  EGFR if non : 6<L>    Ca    13.5<HH>      11-21  Mg     2.1     11-22  Phos  4.6     11-22    TPro  6.9  /  Alb  2.6<L>  /  TBili  0.3  /  DBili  x   /  AST  36  /  ALT  19  /  AlkPhos  88  11-21      Thyroid Function Tests:      Hemoglobin A1C, Whole Blood: 5.5 % [4.0 - 5.6] (11-22-19 @ 10:42)  Hemoglobin A1C, Whole Blood: 5.6 % [4.0 - 5.6] (08-27-19 @ 09:03)          Radiology:

## 2019-11-22 NOTE — PROGRESS NOTE ADULT - SUBJECTIVE AND OBJECTIVE BOX
Patient is a 74y old  Male who presents with a chief complaint of ams (2019 11:16)      SUBJECTIVE / OVERNIGHT EVENTS:     MEDICATIONS  (STANDING):  acyclovir IVPB 340 milliGRAM(s) IV Intermittent every 24 hours  acyclovir IVPB      amphotericin B  liposome  IVPB 200 milliGRAM(s) IV Intermittent daily  atorvastatin 40 milliGRAM(s) Oral at bedtime  carvedilol 6.25 milliGRAM(s) Oral every 12 hours  cyclobenzaprine 5 milliGRAM(s) Oral four times a day  dextrose 10%. 1000 milliLiter(s) (30 mL/Hr) IV Continuous <Continuous>  dextrose 5%. 1000 milliLiter(s) (50 mL/Hr) IV Continuous <Continuous>  dextrose 50% Injectable 12.5 Gram(s) IV Push once  dextrose 50% Injectable 25 Gram(s) IV Push once  dextrose 50% Injectable 25 Gram(s) IV Push once  escitalopram 20 milliGRAM(s) Oral daily  insulin lispro (HumaLOG) corrective regimen sliding scale   SubCutaneous every 6 hours  levETIRAcetam  IVPB 1000 milliGRAM(s) IV Intermittent every 12 hours  meropenem  IVPB 500 milliGRAM(s) IV Intermittent every 24 hours  meropenem  IVPB      midodrine. 10 milliGRAM(s) Oral three times a day  mycophenolate mofetil 250 milliGRAM(s) Oral two times a day  predniSONE   Tablet 5 milliGRAM(s) Oral daily  sevelamer carbonate 800 milliGRAM(s) Oral three times a day with meals  sodium bicarbonate 650 milliGRAM(s) Oral two times a day  tamsulosin 0.4 milliGRAM(s) Oral at bedtime    MEDICATIONS  (PRN):  dextrose 40% Gel 15 Gram(s) Oral once PRN Blood Glucose LESS THAN 70 milliGRAM(s)/deciliter  glucagon  Injectable 1 milliGRAM(s) IntraMuscular once PRN Glucose LESS THAN 70 milligrams/deciliter      Vital Signs Last 24 Hrs  T(C): 36.4 (2019 12:47), Max: 36.7 (2019 01:15)  T(F): 97.5 (2019 12:47), Max: 98.1 (2019 04:59)  HR: 104 (2019 12:47) (89 - 111)  BP: 106/65 (2019 12:47) (97/60 - 121/91)  BP(mean): --  RR: 20 (2019 12:47) (18 - 22)  SpO2: 98% (2019 12:47) (97% - 100%)  CAPILLARY BLOOD GLUCOSE      POCT Blood Glucose.: 109 mg/dL (2019 12:40)  POCT Blood Glucose.: 87 mg/dL (2019 06:31)  POCT Blood Glucose.: 75 mg/dL (2019 02:05)  POCT Blood Glucose.: 82 mg/dL (2019 20:36)  POCT Blood Glucose.: 101 mg/dL (2019 18:37)  POCT Blood Glucose.: 84 mg/dL (2019 17:28)    I&O's Summary    2019 07:01  -  2019 07:00  --------------------------------------------------------  IN: 400 mL / OUT: 0 mL / NET: 400 mL        PHYSICAL EXAM:  GENERAL: AO x0, minimally verbal, chronically ill appearing male  HEAD:  Atraumatic, Normocephalic  EYES: EOMI, PERRLA, conjunctiva and sclera clear  NECK: Supple, No JVD  CHEST/LUNG: Clear to auscultation bilaterally; No wheeze  HEART: Regular rate and rhythm; No murmurs, rubs, or gallops  ABDOMEN: Soft, Nontender, Nondistended; Bowel sounds present; firm lower abd in suprapubic/LLQ area  EXTREMITIES:  2+ Peripheral Pulses, No clubbing, cyanosis, or edema  SKIN: No rashes or lesions, dry skin     LABS:                        9.1    12.72 )-----------( 203      ( 2019 10:42 )             31.6     11-21    139  |  94<L>  |  65<H>  ----------------------------<  97  5.1   |  27  |  8.28<H>    Ca    13.5<HH>      2019 22:08  Phos  4.6     11-22  Mg     2.1     11-22    TPro  6.9  /  Alb  2.6<L>  /  TBili  0.3  /  DBili  x   /  AST  36  /  ALT  19  /  AlkPhos  88      PT/INR - ( 2019 15:25 )   PT: 15.1 sec;   INR: 1.30 ratio         PTT - ( 2019 22:09 )  PTT:34.4 sec      Urinalysis Basic - ( 2019 14:29 )    Color: Light Yellow / Appearance: Turbid / S.025 / pH: x  Gluc: x / Ketone: Trace  / Bili: Negative / Urobili: Negative   Blood: x / Protein: >600 / Nitrite: Negative   Leuk Esterase: Large / RBC: 2 /hpf / WBC >50   Sq Epi: x / Non Sq Epi: 0 / Bacteria: Many        RADIOLOGY & ADDITIONAL TESTS:    Imaging Personally Reviewed:    Consultant(s) Notes Reviewed:      Care Discussed with Consultants/Other Providers:

## 2019-11-22 NOTE — CONSULT NOTE ADULT - PROBLEM SELECTOR RECOMMENDATION 2
Ca 13.5,   Labs suggestive of primary Hyperparathyroidism.  May start pamidronate for hypercalcemia management.  Suggest to FU outpatient for workup/PTH nuclear scan

## 2019-11-22 NOTE — EEG REPORT - NS EEG TEXT BOX
[facility]   COMPREHENSIVE EPILEPSY CENTER   REPORT OF ROUTINE VIDEO EEG     [facility]: [address] [place] [address] [address], [place] [address], Ph#: [phone]  [facility]: [address], [place] [address], Ph#: [phone]  [facility]: [address] [place] [address], Ph#: [phone]    Patient Name: ESE INGRAM  Age and : 74y ([birth year])  MRN #: [id number]  Location: [facility] 8MON [id number] [id number]  Referring Physician: Lissy James    Study Date: 19    _____________________________________________________________  TECHNICAL INFORMATION    Placement and Labeling of Electrodes:  The EEG was performed utilizing 20 channels referential EEG connections (coronal over temporal over parasagittal montage) using all standard 10-20 electrode placements with EKG.  Recording was at a sampling rate of 256 samples per second per channel.  Time synchronized digital video recording was done simultaneously with EEG recording.  A low light infrared camera was used for low light recording.  Bishop and seizure detection algorithms were utilized.    _____________________________________________________________  HISTORY    Patient is a 74y old  Male who presents with a chief complaint of ams (2019 14:18)      PERTINENT MEDICATION:  levETIRAcetam  IVPB 1000 milliGRAM(s) IV Intermittent every 12 hours    _____________________________________________________________  STUDY INTERPRETATION    Findings: The background was continuous, spontaneously variable and reactive. No posterior dominant rhythm seen.    Background Slowing:  Excess diffuse polymorphic delta slowing.    Focal Slowing:   None were present.    Sleep Background:  Drowsiness was characterized by fragmentation, attenuation, and slowing of the background activity.    Stage II sleep transients were not recorded.    Other Non-Epileptiform Findings:  None were present.    Interictal Epileptiform Activity:   None were present.    Events:  Clinical events: None recorded.  Seizures: None recorded.    Activation Procedures:   Hyperventilation was not performed.    Photic stimulation was not performed.     Artifacts:  Intermittent myogenic and movement artifacts were noted.    ECG:  The heart rate on single channel ECG was predominantly between 80-90 BPM.    _____________________________________________________________  EEG SUMMARY/CLASSIFICATION    Abnormal EEG in the awake, drowsy states.  - Moderate generalized slowing.    _____________________________________________________________  EEG IMPRESSION/CLINICAL CORRELATE    Abnormal EEG study.  1. Moderate nonspecific diffuse or multifocal cerebral dysfunction.   2. No epileptiform pattern or seizure seen.    _____________________________________________________________    China Maier MD  Attending Physician, [facility] [place]

## 2019-11-22 NOTE — PROGRESS NOTE ADULT - SUBJECTIVE AND OBJECTIVE BOX
Patient is a 74y old  Male who presents with a chief complaint of ams (22 Nov 2019 13:40)    Being followed by ID for AMS, fever ,renal failure     Interval history:opens eyes but no other response to stimuli  non verbal  s/p HD after R SC HD catheter insertion   No other acute events      ROS:  Not obtainable       Antimicrobials:    acyclovir IVPB 340 milliGRAM(s) IV Intermittent every 24 hours  acyclovir IVPB      amphotericin B  liposome  IVPB 200 milliGRAM(s) IV Intermittent daily  meropenem  IVPB 500 milliGRAM(s) IV Intermittent every 24 hours  meropenem  IVPB          other medications reviewed    Vital Signs Last 24 Hrs  T(C): 36.4 (11-22-19 @ 12:47), Max: 36.7 (11-22-19 @ 01:15)  T(F): 97.5 (11-22-19 @ 12:47), Max: 98.1 (11-22-19 @ 04:59)  HR: 104 (11-22-19 @ 12:47) (89 - 111)  BP: 106/65 (11-22-19 @ 12:47) (97/60 - 121/91)  BP(mean): --  RR: 20 (11-22-19 @ 12:47) (18 - 22)  SpO2: 98% (11-22-19 @ 12:47) (97% - 100%)    Physical Exam:  Constitutional comfortable    HEENT PERRLA ,No pallor or icterus    R Sc HD catheter no erythema o tenderness    Neck rigidity     Chest Good AE,CTA    CVS RRR S1 S2 WNl No murmur or rub or gallop    Abd soft BS normal No tenderness RLQ transplant kidney no tenderness    Ext left arm AVF no erythema or tenderness    IV site no erythema tenderness or discharge    Joints no swelling or LOM    CNS no verbal response-opens eyes-does not follow commands  Lab Data:                          9.1    12.72 )-----------( 203      ( 22 Nov 2019 10:42 )             31.6       11-21    139  |  94<L>  |  65<H>  ----------------------------<  97  5.1   |  27  |  8.28<H>    Ca    13.5<HH>      21 Nov 2019 22:08  Phos  4.6     11-22  Mg     2.1     11-22    TPro  6.9  /  Alb  2.6<L>  /  TBili  0.3  /  DBili  x   /  AST  36  /  ALT  19  /  AlkPhos  88  11-21        Culture - Urine (collected 20 Nov 2019 18:01)  Source: .Urine Clean Catch (Midstream)  Preliminary Report (22 Nov 2019 07:11):    >100,000 CFU/ml Gram Negative Rods    Culture - Blood (collected 20 Nov 2019 17:23)  Source: .Blood Blood-Peripheral  Preliminary Report (21 Nov 2019 18:01):    No growth to date.    Culture - Blood (collected 20 Nov 2019 17:20)  Source: .Blood Blood-Peripheral  Preliminary Report (21 Nov 2019 18:01):    No growth to date.            Cerebrospinal Fluid Cell Count-1 (11.22.19 @ 11:42)    CSF Color: No Color    CSF Appearance: Clear    CSF Lymphocytes: 62 %    CSF Segmented Neutrophils: 33: Differential is based on 21 cells counted after cytocentrifugation. %    CSF Monocytes/Macrophages: 5 %    Total Nucleated Cell Count, CSF: 1 /uL                Glucose, CSF (11.22.19 @ 11:42)    Glucose, CSF: 54 mg/dL    Lactate Dehydrogenase, CSF (11.22.19 @ 11:43)  < from: Xray Chest 1 View AP/PA (11.20.19 @ 15:02) >    IMPRESSION: Clear lungs.      < end of copied text >    Lactate Dehydrogenase, CSF: 24: Reference Ranges have NOT been established for CSF LDH.  The  has not determined the efficacy of this test when  performed on CSF specimens. The performance characteristics of this test  were determined by [facility]. U/L

## 2019-11-22 NOTE — PROGRESS NOTE ADULT - SUBJECTIVE AND OBJECTIVE BOX
Status post lumbar puncture at the _L3-L4__  level utilizing a __20G_ spinal needle.      _19__cc of _clear_ cerebral spinal fluid was obtained.    May resume Eliquis in 48hrs.    No immediate complications.

## 2019-11-22 NOTE — PROGRESS NOTE ADULT - ASSESSMENT
74M PMHx of ESRD s/p failed renal transplant x2, HCV, DM, and meningococcal meningitis 2 years ago was sent in to the ED from HD for AMS and low BPs. He is unable to provide any information at this time. His sister is at the bedside and reports that he is typically able to carry a conversation and walk a small amount. She is not aware if he had been having fevers at the nursing home, and believes that his last HD was on Tuesday.     Problem/Plan - 1:  ·  Problem: Sepsis.  Plan: with metabolic encephalopathy POA  cannot ro meningitis  for LP today  ID and ICU fu appreciated  will empirically treat for meninigitis.  - abx per ID     Problem/Plan - 2:  ·  Problem: Kidney transplant recipient.  Plan: on HD  HD   after shilley  AVF clotted     Problem/Plan - 3:  ·  Hyperparathyroidism.    - Ca 13.5,   - Labs suggestive of primary Hyperparathyroidism.  - May start pamidronate for hypercalcemia management.  - Suggest to FU outpatient for workup/PTH nuclear scan.

## 2019-11-22 NOTE — CONSULT NOTE ADULT - ASSESSMENT
Assessment  DM: A1C 5.5, was on insulin at home, blood sugars trending within acceptable range on Humalog correction scale coverage.  Hypercalcemia: Ca 13.5, , Primary Hyperparathyroidism.  Sepsis: Presumed meningitis, LP today, on medications, stable, monitored.  HTN: Controlled,  on antihypertensive medications.  ESRD: On hemodialysis, Monitor labs/BMP          Robi Gay MD  Cell: [phone]  Office: [phone] Assessment  DM: A1C 5.5, was on insulin at home, blood sugars trending within acceptable range on Humalog correction  scale coverage.  Hypercalcemia: Ca 13.5, , Primary Hyperparathyroidism.  Sepsis: Presumed meningitis, LP today, on medications, stable, monitored.  HTN: Controlled,  on antihypertensive medications.  ESRD: On hemodialysis, Monitor labs/BMP          Robi Gay MD  Cell: [phone]  Office: [phone]

## 2019-11-22 NOTE — PROGRESS NOTE ADULT - SUBJECTIVE AND OBJECTIVE BOX
Subjective: Patient seen and examined. No new events except as noted.     SUBJECTIVE/ROS:        MEDICATIONS:  MEDICATIONS  (STANDING):  acyclovir IVPB 340 milliGRAM(s) IV Intermittent every 24 hours  acyclovir IVPB      amphotericin B  liposome  IVPB 200 milliGRAM(s) IV Intermittent once  atorvastatin 40 milliGRAM(s) Oral at bedtime  carvedilol 6.25 milliGRAM(s) Oral every 12 hours  cyclobenzaprine 5 milliGRAM(s) Oral four times a day  dextrose 10%. 1000 milliLiter(s) (30 mL/Hr) IV Continuous <Continuous>  dextrose 5%. 1000 milliLiter(s) (50 mL/Hr) IV Continuous <Continuous>  dextrose 50% Injectable 12.5 Gram(s) IV Push once  dextrose 50% Injectable 25 Gram(s) IV Push once  dextrose 50% Injectable 25 Gram(s) IV Push once  escitalopram 20 milliGRAM(s) Oral daily  insulin lispro (HumaLOG) corrective regimen sliding scale   SubCutaneous every 6 hours  levETIRAcetam  IVPB 1000 milliGRAM(s) IV Intermittent every 12 hours  meropenem  IVPB 500 milliGRAM(s) IV Intermittent every 24 hours  meropenem  IVPB      midodrine. 10 milliGRAM(s) Oral three times a day  mycophenolate mofetil 250 milliGRAM(s) Oral two times a day  predniSONE   Tablet 5 milliGRAM(s) Oral daily  sevelamer carbonate 800 milliGRAM(s) Oral three times a day with meals  sodium bicarbonate 650 milliGRAM(s) Oral two times a day  tamsulosin 0.4 milliGRAM(s) Oral at bedtime      PHYSICAL EXAM:  T(C): 36.4 (11-22-19 @ 08:33), Max: 36.7 (11-22-19 @ 01:15)  HR: 105 (11-22-19 @ 08:33) (89 - 111)  BP: 101/67 (11-22-19 @ 08:33) (97/60 - 121/91)  RR: 18 (11-22-19 @ 08:33) (18 - 22)  SpO2: 97% (11-22-19 @ 08:33) (97% - 100%)  Wt(kg): --  I&O's Summary    21 Nov 2019 07:01  -  22 Nov 2019 07:00  --------------------------------------------------------  IN: 400 mL / OUT: 0 mL / NET: 400 mL            JVP: Normal  Neck: supple  Lung: clear   CV: S1 S2 , Murmur:  Abd: soft  Ext: No edema  neuro: Awake   Psych: flat affect  Skin: normal``    LABS/DATA:    CARDIAC MARKERS:                                9.9    11.47 )-----------( 221      ( 21 Nov 2019 22:08 )             34.4     11-21    139  |  94<L>  |  65<H>  ----------------------------<  97  5.1   |  27  |  8.28<H>    Ca    13.5<HH>      21 Nov 2019 22:08    TPro  6.9  /  Alb  2.6<L>  /  TBili  0.3  /  DBili  x   /  AST  36  /  ALT  19  /  AlkPhos  88  11-21    proBNP:   Lipid Profile:   HgA1c:   TSH:     TELE:  EKG:

## 2019-11-22 NOTE — PROGRESS NOTE ADULT - ASSESSMENT
HTN  monitor BP for now    History of cocaine induced CM  normal EF on last echo  cont BB     PAF  in sinus   a/c on hold given high bleed risk in setting of sepsis and need for LP   may cont heparin prior to LP     Hypercalcemia  as per renal

## 2019-11-22 NOTE — PROGRESS NOTE ADULT - ASSESSMENT
74M PMHx of ESRD s/p failed renal transplant x2, HCV, DM, and meningococcal meningitis 2 years ago was sent in to the ED from HD for AMS and low BPs.  lethargic  has rigid neck  Exam with no abd tenderness  basal crackles  sacral wound no purulence no discharge  Left arm AVF no tenderness  UA with pyuria, GNR bacteriuria-blood Cx negative   CXR clear  CT with strokes  LP NOT s/o meningitis  ?UTI though doubt his AMS is due to it  ? due to strokes  ? Due to hypercalcemia  Rec:  1) Monitor blood Cx, crypt antigen   2) Monitor clinically for any other foci  3) Continue meropenem  4) Can Dc ambisome and acyclovir  5) Follow MRI-Neurology follow up  6) will defer to primary team on hypercalcemia and other plan  Case d/w Med NP, Dr brown  prognosis guarded     Infectious Diseases Service will cover over weekend.  Please call [phone] if issues 74M PMHx of ESRD s/p failed renal transplant x2, HCV, DM, and meningococcal meningitis 2 years ago was sent in to the ED from HD for AMS and low BPs.  lethargic  has rigid neck  Exam with no abd tenderness  basal crackles  sacral wound no purulence no discharge  Left arm AVF no tenderness  UA with pyuria, GNR bacteriuria-blood Cx negative   CXR clear  CT with strokes  LP NOT s/o meningitis  ?UTI though doubt his AMS is due to it  ? due to strokes  ? Due to hypercalcemia  Rec:  1) Monitor blood Cx, crypt antigen ,CSF PCR   2) Monitor clinically for any other foci  3) Continue meropenem  4) Can Dc ambisome and acyclovir  5) Follow MRI-Neurology follow up  6) will defer to primary team on hypercalcemia and other plan  Case d/w Med NP, Dr brown  prognosis guarded     Infectious Diseases Service will cover over weekend.  Please call [phone] if issues

## 2019-11-22 NOTE — PROGRESS NOTE ADULT - SUBJECTIVE AND OBJECTIVE BOX
Patient is a 74y Male whom presented to the hospital with esrd on hd     PAST MEDICAL & SURGICAL HISTORY:  Kidney transplant recipient  Anuria  GERD (gastroesophageal reflux disease)  Kidney transplant failure: dx: 2/2013  Hepatitis C: dx: 90&#x27;s.  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 10 milliGRAM(s) Oral three times a day  mycophenolate mofetil 250 milliGRAM(s) Oral two times a day  predniSONE   Tablet 5 milliGRAM(s) Oral daily  sevelamer carbonate 800 milliGRAM(s) Oral three times a day with meals  sodium bicarbonate 650 milliGRAM(s) Oral two times a day  tamsulosin 0.4 milliGRAM(s) Oral at bedtime      Allergies    No Known Allergies    Intolerances        SOCIAL HISTORY:  Denies ETOh,Smoking,     FAMILY HISTORY:  Family history of breast cancer in sister (Sibling): living at 94 yo  Family history of prostate cancer in father  Family history of lung cancer  Family history of hypertension in mother  Family history of diabetes mellitus: mother      REVIEW OF SYSTEMS:    unbable to obtained                             9.4    12.14 )-----------( 185      ( 22 Nov 2019 14:32 )             31.4       CBC Full  -  ( 22 Nov 2019 14:32 )  WBC Count : 12.14 K/uL  RBC Count : 3.59 M/uL  Hemoglobin : 9.4 g/dL  Hematocrit : 31.4 %  Platelet Count - Automated : 185 K/uL  Mean Cell Volume : 87.5 fl  Mean Cell Hemoglobin : 26.2 pg  Mean Cell Hemoglobin Concentration : 29.9 gm/dL  Auto Neutrophil # : x  Auto Lymphocyte # : x  Auto Monocyte # : x  Auto Eosinophil # : x  Auto Basophil # : x  Auto Neutrophil % : x  Auto Lymphocyte % : x  Auto Monocyte % : x  Auto Eosinophil % : x  Auto Basophil % : x      11-22    138  |  94<L>  |  27<H>  ----------------------------<  122<H>  3.9   |  28  |  4.58<H>    Ca    12.1<H>      22 Nov 2019 14:35  Phos  4.6     11-22  Mg     2.1     11-22    TPro  6.7  /  Alb  2.5<L>  /  TBili  0.3  /  DBili  x   /  AST  27  /  ALT  18  /  AlkPhos  84  11-22      CAPILLARY BLOOD GLUCOSE      POCT Blood Glucose.: 109 mg/dL (22 Nov 2019 12:40)  POCT Blood Glucose.: 87 mg/dL (22 Nov 2019 06:31)  POCT Blood Glucose.: 75 mg/dL (22 Nov 2019 02:05)  POCT Blood Glucose.: 82 mg/dL (21 Nov 2019 20:36)  POCT Blood Glucose.: 101 mg/dL (21 Nov 2019 18:37)  POCT Blood Glucose.: 84 mg/dL (21 Nov 2019 17:28)      Vital Signs Last 24 Hrs  T(C): 36.6 (22 Nov 2019 14:15), Max: 36.7 (22 Nov 2019 01:15)  T(F): 97.8 (22 Nov 2019 14:15), Max: 98.1 (22 Nov 2019 04:59)  HR: 108 (22 Nov 2019 14:15) (89 - 111)  BP: 98/61 (22 Nov 2019 14:15) (97/60 - 114/66)  BP(mean): --  RR: 19 (22 Nov 2019 14:15) (18 - 20)  SpO2: 95% (22 Nov 2019 14:15) (95% - 100%)        PTT - ( 21 Nov 2019 22:09 )  PTT:34.4 sec      PHYSICAL EXAM:    Constitutional: lethargic   HEENT: conjunctive   clear   Neck:  No JVD  Respiratory: decrease bs b/l   Cardiovascular: S1 and S2  Gastrointestinal: BS+, soft, NT/ND  Extremities: No peripheral edema  Neurological:  lethargic   Skin: dry   Access: pos fistula

## 2019-11-23 LAB
-  AMIKACIN: SIGNIFICANT CHANGE UP
-  AMPICILLIN/SULBACTAM: SIGNIFICANT CHANGE UP
-  AMPICILLIN: SIGNIFICANT CHANGE UP
-  AZTREONAM: SIGNIFICANT CHANGE UP
-  CEFAZOLIN: SIGNIFICANT CHANGE UP
-  CEFEPIME: SIGNIFICANT CHANGE UP
-  CEFOXITIN: SIGNIFICANT CHANGE UP
-  CEFTRIAXONE: SIGNIFICANT CHANGE UP
-  CIPROFLOXACIN: SIGNIFICANT CHANGE UP
-  GENTAMICIN: SIGNIFICANT CHANGE UP
-  IMIPENEM: SIGNIFICANT CHANGE UP
-  LEVOFLOXACIN: SIGNIFICANT CHANGE UP
-  MEROPENEM: SIGNIFICANT CHANGE UP
-  NITROFURANTOIN: SIGNIFICANT CHANGE UP
-  PIPERACILLIN/TAZOBACTAM: SIGNIFICANT CHANGE UP
-  TIGECYCLINE: SIGNIFICANT CHANGE UP
-  TOBRAMYCIN: SIGNIFICANT CHANGE UP
-  TRIMETHOPRIM/SULFAMETHOXAZOLE: SIGNIFICANT CHANGE UP
CA-I BLD-SCNC: 1.52 MMOL/L — HIGH (ref 1.12–1.3)
CRYPTOC AG FLD QL: NEGATIVE — SIGNIFICANT CHANGE UP
CULTURE RESULTS: SIGNIFICANT CHANGE UP
GLUCOSE BLDC GLUCOMTR-MCNC: 112 MG/DL — HIGH (ref 70–99)
GLUCOSE BLDC GLUCOMTR-MCNC: 116 MG/DL — HIGH (ref 70–99)
GLUCOSE BLDC GLUCOMTR-MCNC: 117 MG/DL — HIGH (ref 70–99)
GLUCOSE BLDC GLUCOMTR-MCNC: 126 MG/DL — HIGH (ref 70–99)
HCT VFR BLD CALC: 36.8 % — LOW (ref 39–50)
HGB BLD-MCNC: 10.3 G/DL — LOW (ref 13–17)
MCHC RBC-ENTMCNC: 25.4 PG — LOW (ref 27–34)
MCHC RBC-ENTMCNC: 28 GM/DL — LOW (ref 32–36)
MCV RBC AUTO: 90.9 FL — SIGNIFICANT CHANGE UP (ref 80–100)
METHOD TYPE: SIGNIFICANT CHANGE UP
ORGANISM # SPEC MICROSCOPIC CNT: SIGNIFICANT CHANGE UP
ORGANISM # SPEC MICROSCOPIC CNT: SIGNIFICANT CHANGE UP
PLATELET # BLD AUTO: 151 K/UL — SIGNIFICANT CHANGE UP (ref 150–400)
RBC # BLD: 4.05 M/UL — LOW (ref 4.2–5.8)
RBC # FLD: 15.9 % — HIGH (ref 10.3–14.5)
SPECIMEN SOURCE: SIGNIFICANT CHANGE UP
WBC # BLD: 7.62 K/UL — SIGNIFICANT CHANGE UP (ref 3.8–10.5)
WBC # FLD AUTO: 7.62 K/UL — SIGNIFICANT CHANGE UP (ref 3.8–10.5)

## 2019-11-23 PROCEDURE — 99232 SBSQ HOSP IP/OBS MODERATE 35: CPT

## 2019-11-23 RX ORDER — PAMIDRONATE DISODIUM 9 MG/ML
30 INJECTION, SOLUTION INTRAVENOUS ONCE
Refills: 0 | Status: COMPLETED | OUTPATIENT
Start: 2019-11-23 | End: 2019-11-23

## 2019-11-23 RX ADMIN — ATORVASTATIN CALCIUM 40 MILLIGRAM(S): 80 TABLET, FILM COATED ORAL at 22:47

## 2019-11-23 RX ADMIN — LEVETIRACETAM 400 MILLIGRAM(S): 250 TABLET, FILM COATED ORAL at 01:06

## 2019-11-23 RX ADMIN — MEROPENEM 100 MILLIGRAM(S): 1 INJECTION INTRAVENOUS at 20:45

## 2019-11-23 RX ADMIN — PAMIDRONATE DISODIUM 65 MILLIGRAM(S): 9 INJECTION, SOLUTION INTRAVENOUS at 15:11

## 2019-11-23 RX ADMIN — Medication 30 MILLILITER(S): at 06:21

## 2019-11-23 RX ADMIN — LEVETIRACETAM 400 MILLIGRAM(S): 250 TABLET, FILM COATED ORAL at 14:40

## 2019-11-23 NOTE — PROGRESS NOTE ADULT - SUBJECTIVE AND OBJECTIVE BOX
Patient is a 74y old  Male who presents with a chief complaint of ams (23 Nov 2019 15:58)      INTERVAL HPI/OVERNIGHT EVENTS:  T(C): 36.7 (11-23-19 @ 14:49), Max: 36.9 (11-23-19 @ 00:59)  HR: 93 (11-23-19 @ 14:49) (91 - 117)  BP: 120/68 (11-23-19 @ 14:49) (103/60 - 120/68)  RR: 18 (11-23-19 @ 14:49) (18 - 19)  SpO2: 94% (11-23-19 @ 14:49) (93% - 100%)  Wt(kg): --  I&O's Summary    22 Nov 2019 07:01  -  23 Nov 2019 07:00  --------------------------------------------------------  IN: 460 mL / OUT: 0 mL / NET: 460 mL        LABS:                        10.3   7.62  )-----------( 151      ( 23 Nov 2019 12:30 )             36.8     11-22    138  |  94<L>  |  27<H>  ----------------------------<  122<H>  3.9   |  28  |  4.58<H>    Ca    12.1<H>      22 Nov 2019 14:35  Phos  4.6     11-22  Mg     2.1     11-22    TPro  6.7  /  Alb  2.5<L>  /  TBili  0.3  /  DBili  x   /  AST  27  /  ALT  18  /  AlkPhos  84  11-22    PTT - ( 21 Nov 2019 22:09 )  PTT:34.4 sec    CAPILLARY BLOOD GLUCOSE      POCT Blood Glucose.: 117 mg/dL (23 Nov 2019 12:19)  POCT Blood Glucose.: 126 mg/dL (23 Nov 2019 07:22)  POCT Blood Glucose.: 116 mg/dL (23 Nov 2019 00:47)  POCT Blood Glucose.: 96 mg/dL (22 Nov 2019 21:24)  POCT Blood Glucose.: 99 mg/dL (22 Nov 2019 18:55)            MEDICATIONS  (STANDING):  atorvastatin 40 milliGRAM(s) Oral at bedtime  cyclobenzaprine 5 milliGRAM(s) Oral four times a day  dextrose 10%. 1000 milliLiter(s) (30 mL/Hr) IV Continuous <Continuous>  dextrose 5%. 1000 milliLiter(s) (50 mL/Hr) IV Continuous <Continuous>  dextrose 50% Injectable 12.5 Gram(s) IV Push once  dextrose 50% Injectable 25 Gram(s) IV Push once  dextrose 50% Injectable 25 Gram(s) IV Push once  escitalopram 20 milliGRAM(s) Oral daily  insulin lispro (HumaLOG) corrective regimen sliding scale   SubCutaneous every 6 hours  levETIRAcetam  IVPB 1000 milliGRAM(s) IV Intermittent every 12 hours  meropenem  IVPB 500 milliGRAM(s) IV Intermittent every 24 hours  meropenem  IVPB      midodrine. 10 milliGRAM(s) Oral three times a day  mycophenolate mofetil 250 milliGRAM(s) Oral two times a day  predniSONE   Tablet 5 milliGRAM(s) Oral daily  sevelamer carbonate 800 milliGRAM(s) Oral three times a day with meals    MEDICATIONS  (PRN):  dextrose 40% Gel 15 Gram(s) Oral once PRN Blood Glucose LESS THAN 70 milliGRAM(s)/deciliter  glucagon  Injectable 1 milliGRAM(s) IntraMuscular once PRN Glucose LESS THAN 70 milligrams/deciliter          PHYSICAL EXAM:  GENERAL: frail  CHEST/LUNG: Clear to percussion bilaterally; No rales, rhonchi, wheezing, or rubs  HEART: Regular rate and rhythm; No murmurs, rubs, or gallops  ABDOMEN: Soft, Nontender, Nondistended; Bowel sounds present  EXTREMITIES:  no edema     Care Discussed with Consultants/Other Providers [ ] YES  [ ] NO

## 2019-11-23 NOTE — PROGRESS NOTE ADULT - ASSESSMENT
Assessment  DM: A1C 5.5, was on insulin at home, blood sugars trending within acceptable range on Humalog correction  scale coverage.  Hypercalcemia: Primary Hyperparathyroidism, calcium trending down but still high, he is lethargic.  Sepsis: Presumed meningitis, LP today, on medications, stable, monitored.  HTN: Controlled,  on antihypertensive medications.  ESRD: On hemodialysis, Monitor labs/BMP          Robi Gay MD  Cell: [phone]  Office: [phone]

## 2019-11-23 NOTE — PROGRESS NOTE ADULT - ASSESSMENT
74M PMHx of ESRD s/p failed renal transplant x2, HCV, DM, and meningococcal meningitis 2 years ago was sent in to the ED from HD for AMS and low BPs.  lethargic  has rigid neck  Exam with no abd tenderness  basal crackles  sacral wound no purulence no discharge  Left arm AVF no tenderness  UA with pyuria, GNR bacteriuria-blood Cx negative   CXR clear  CT with strokes  LP NOT s/o meningitis  ?UTI though doubt his AMS is due to it  ? due to strokes  ? Due to hypercalcemia  Rec:  1) Monitor blood Cx, crypt antigen ,CSF PCR - negative  2) Monitor clinically for any other foci  3) Continue meropenem, can narrow spectrum based on urine culture results  4) S/P ambisome and acyclovir  5) Follow MRI-Neurology follow up  6) will defer to primary team on hypercalcemia and other plan    prognosis guarded     Infectious Diseases Service will cover over weekend.  Please call [phone] if issues  Richard Brown MD  pager [phone]  office [phone]

## 2019-11-23 NOTE — PROGRESS NOTE ADULT - SUBJECTIVE AND OBJECTIVE BOX
Subjective: Patient seen and examined. No new events except as noted.     SUBJECTIVE/ROS:    Due to altered mental status, subjective information were not able to be obtained from the patient. History was obtained, to the extent possible, from review of the chart and collateral sources of information.     MEDICATIONS:  MEDICATIONS  (STANDING):  atorvastatin 40 milliGRAM(s) Oral at bedtime  cyclobenzaprine 5 milliGRAM(s) Oral four times a day  dextrose 10%. 1000 milliLiter(s) (30 mL/Hr) IV Continuous <Continuous>  dextrose 5%. 1000 milliLiter(s) (50 mL/Hr) IV Continuous <Continuous>  dextrose 50% Injectable 12.5 Gram(s) IV Push once  dextrose 50% Injectable 25 Gram(s) IV Push once  dextrose 50% Injectable 25 Gram(s) IV Push once  escitalopram 20 milliGRAM(s) Oral daily  insulin lispro (HumaLOG) corrective regimen sliding scale   SubCutaneous every 6 hours  levETIRAcetam  IVPB 1000 milliGRAM(s) IV Intermittent every 12 hours  meropenem  IVPB 500 milliGRAM(s) IV Intermittent every 24 hours  meropenem  IVPB      midodrine. 10 milliGRAM(s) Oral three times a day  mycophenolate mofetil 250 milliGRAM(s) Oral two times a day  predniSONE   Tablet 5 milliGRAM(s) Oral daily  sevelamer carbonate 800 milliGRAM(s) Oral three times a day with meals      PHYSICAL EXAM:  T(C): 36.7 (11-23-19 @ 04:47), Max: 36.9 (11-23-19 @ 00:59)  HR: 95 (11-23-19 @ 04:47) (95 - 117)  BP: 107/69 (11-23-19 @ 04:47) (98/61 - 119/67)  RR: 18 (11-23-19 @ 04:47) (18 - 20)  SpO2: 100% (11-23-19 @ 04:47) (95% - 100%)  Wt(kg): --  I&O's Summary    22 Nov 2019 07:01  -  23 Nov 2019 07:00  --------------------------------------------------------  IN: 460 mL / OUT: 0 mL / NET: 460 mL            JVP: Normal  Neck: supple  Lung: clear   CV: S1 S2 , Murmur:  Abd: soft  ext no edema   Psych: flat affect  Skin: normal``    LABS/DATA:    CARDIAC MARKERS:                                9.4    12.14 )-----------( 185      ( 22 Nov 2019 14:32 )             31.4     11-22    138  |  94<L>  |  27<H>  ----------------------------<  122<H>  3.9   |  28  |  4.58<H>    Ca    12.1<H>      22 Nov 2019 14:35  Phos  4.6     11-22  Mg     2.1     11-22    TPro  6.7  /  Alb  2.5<L>  /  TBili  0.3  /  DBili  x   /  AST  27  /  ALT  18  /  AlkPhos  84  11-22    proBNP:   Lipid Profile:   HgA1c: Hemoglobin A1C, Whole Blood: 5.5 % (11-22 @ 10:42)    TSH:     TELE:  EKG:

## 2019-11-23 NOTE — PROGRESS NOTE ADULT - SUBJECTIVE AND OBJECTIVE BOX
Patient is a 74y Male whom presented to the hospital with esrd on hd     PAST MEDICAL & SURGICAL HISTORY:  Kidney transplant recipient  Anuria  GERD (gastroesophageal reflux disease)  Kidney transplant failure: dx: 2/2013  Hepatitis C: dx: 90&#x27;s.  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 10 milliGRAM(s) Oral three times a day  mycophenolate mofetil 250 milliGRAM(s) Oral two times a day  predniSONE   Tablet 5 milliGRAM(s) Oral daily  sevelamer carbonate 800 milliGRAM(s) Oral three times a day with meals  sodium bicarbonate 650 milliGRAM(s) Oral two times a day  tamsulosin 0.4 milliGRAM(s) Oral at bedtime      Allergies    No Known Allergies    Intolerances        SOCIAL HISTORY:  Denies ETOh,Smoking,     FAMILY HISTORY:  Family history of breast cancer in sister (Sibling): living at 94 yo  Family history of prostate cancer in father  Family history of lung cancer  Family history of hypertension in mother  Family history of diabetes mellitus: mother      REVIEW OF SYSTEMS:    unbable to obtained                                            10.3   7.62  )-----------( 151      ( 23 Nov 2019 12:30 )             36.8       CBC Full  -  ( 23 Nov 2019 12:30 )  WBC Count : 7.62 K/uL  RBC Count : 4.05 M/uL  Hemoglobin : 10.3 g/dL  Hematocrit : 36.8 %  Platelet Count - Automated : 151 K/uL  Mean Cell Volume : 90.9 fl  Mean Cell Hemoglobin : 25.4 pg  Mean Cell Hemoglobin Concentration : 28.0 gm/dL  Auto Neutrophil # : x  Auto Lymphocyte # : x  Auto Monocyte # : x  Auto Eosinophil # : x  Auto Basophil # : x  Auto Neutrophil % : x  Auto Lymphocyte % : x  Auto Monocyte % : x  Auto Eosinophil % : x  Auto Basophil % : x      11-22    138  |  94<L>  |  27<H>  ----------------------------<  122<H>  3.9   |  28  |  4.58<H>    Ca    12.1<H>      22 Nov 2019 14:35  Phos  4.6     11-22  Mg     2.1     11-22    TPro  6.7  /  Alb  2.5<L>  /  TBili  0.3  /  DBili  x   /  AST  27  /  ALT  18  /  AlkPhos  84  11-22      CAPILLARY BLOOD GLUCOSE      POCT Blood Glucose.: 112 mg/dL (23 Nov 2019 18:15)  POCT Blood Glucose.: 117 mg/dL (23 Nov 2019 12:19)  POCT Blood Glucose.: 126 mg/dL (23 Nov 2019 07:22)  POCT Blood Glucose.: 116 mg/dL (23 Nov 2019 00:47)  POCT Blood Glucose.: 96 mg/dL (22 Nov 2019 21:24)      Vital Signs Last 24 Hrs  T(C): 36.7 (23 Nov 2019 14:49), Max: 36.9 (23 Nov 2019 00:59)  T(F): 98.1 (23 Nov 2019 14:49), Max: 98.5 (23 Nov 2019 00:59)  HR: 93 (23 Nov 2019 14:49) (91 - 104)  BP: 120/68 (23 Nov 2019 14:49) (103/60 - 120/68)  BP(mean): --  RR: 18 (23 Nov 2019 14:49) (18 - 18)  SpO2: 94% (23 Nov 2019 14:49) (93% - 100%)        PTT - ( 21 Nov 2019 22:09 )  PTT:34.4 sec  PHYSICAL EXAM:    Constitutional: lethargic   HEENT: conjunctive   clear   Neck:  No JVD  Respiratory: decrease bs b/l   Cardiovascular: S1 and S2  Gastrointestinal: BS+, soft, NT/ND  Extremities: No peripheral edema  Neurological:  lethargic   Skin: dry   Access: pos fistula

## 2019-11-23 NOTE — PROGRESS NOTE ADULT - SUBJECTIVE AND OBJECTIVE BOX
Chief complaint  Patient is a 74y old  Male who presents with a chief complaint of ams (23 Nov 2019 07:50)   Review of systems  Patient in bed, looks comfortable, no fever, no hypoglycemia.    Labs and Fingersticks  CAPILLARY BLOOD GLUCOSE      POCT Blood Glucose.: 117 mg/dL (23 Nov 2019 12:19)  POCT Blood Glucose.: 126 mg/dL (23 Nov 2019 07:22)  POCT Blood Glucose.: 116 mg/dL (23 Nov 2019 00:47)  POCT Blood Glucose.: 96 mg/dL (22 Nov 2019 21:24)  POCT Blood Glucose.: 99 mg/dL (22 Nov 2019 18:55)      Anion Gap, Serum: 16 (11-22 @ 14:35)  Anion Gap, Serum: 18 <H> (11-21 @ 22:08)    Hemoglobin A1C, Whole Blood: 5.5 (11-22 @ 10:42)    Calcium, Total Serum: 12.1 <H> (11-22 @ 14:35)  Calcium, Total Serum: 13.5 <HH> (11-21 @ 22:08)  Albumin, Serum: 2.5 <L> (11-22 @ 14:35)  Albumin, Serum: 2.6 <L> (11-21 @ 22:08)    Alanine Aminotransferase (ALT/SGPT): 18 (11-22 @ 14:35)  Alanine Aminotransferase (ALT/SGPT): 19 (11-21 @ 22:08)  Alkaline Phosphatase, Serum: 84 (11-22 @ 14:35)  Alkaline Phosphatase, Serum: 88 (11-21 @ 22:08)  Aspartate Aminotransferase (AST/SGOT): 27 (11-22 @ 14:35)  Aspartate Aminotransferase (AST/SGOT): 36 (11-21 @ 22:08)        11-22    138  |  94<L>  |  27<H>  ----------------------------<  122<H>  3.9   |  28  |  4.58<H>    Ca    12.1<H>      22 Nov 2019 14:35  Phos  4.6     11-22  Mg     2.1     11-22    TPro  6.7  /  Alb  2.5<L>  /  TBili  0.3  /  DBili  x   /  AST  27  /  ALT  18  /  AlkPhos  84  11-22                        10.3   7.62  )-----------( 151      ( 23 Nov 2019 12:30 )             36.8     Medications  MEDICATIONS  (STANDING):  atorvastatin 40 milliGRAM(s) Oral at bedtime  cyclobenzaprine 5 milliGRAM(s) Oral four times a day  dextrose 10%. 1000 milliLiter(s) (30 mL/Hr) IV Continuous <Continuous>  dextrose 5%. 1000 milliLiter(s) (50 mL/Hr) IV Continuous <Continuous>  dextrose 50% Injectable 12.5 Gram(s) IV Push once  dextrose 50% Injectable 25 Gram(s) IV Push once  dextrose 50% Injectable 25 Gram(s) IV Push once  escitalopram 20 milliGRAM(s) Oral daily  insulin lispro (HumaLOG) corrective regimen sliding scale   SubCutaneous every 6 hours  levETIRAcetam  IVPB 1000 milliGRAM(s) IV Intermittent every 12 hours  meropenem  IVPB 500 milliGRAM(s) IV Intermittent every 24 hours  meropenem  IVPB      midodrine. 10 milliGRAM(s) Oral three times a day  mycophenolate mofetil 250 milliGRAM(s) Oral two times a day  predniSONE   Tablet 5 milliGRAM(s) Oral daily  sevelamer carbonate 800 milliGRAM(s) Oral three times a day with meals      Physical Exam  Culture - CSF with Gram Stain (collected 11-22-19 @ 17:14)  Source: .CSF  Gram Stain (11-22-19 @ 18:32):    No polymorphonuclear cells seen    No organisms seen    by cytocentrifuge  Preliminary Report (11-23-19 @ 08:04):    No growth      General: Patient comfortable in bed  Vital Signs Last 12 Hrs  T(F): 98.1 (11-23-19 @ 14:49), Max: 98.1 (11-23-19 @ 14:49)  HR: 93 (11-23-19 @ 14:49) (91 - 95)  BP: 120/68 (11-23-19 @ 14:49) (107/69 - 120/68)  BP(mean): --  RR: 18 (11-23-19 @ 14:49) (18 - 18)  SpO2: 94% (11-23-19 @ 14:49) (93% - 100%)  Neck: No palpable thyroid nodules.  CVS: S1S2, No murmurs  Respiratory: No wheezing, no crepitations  GI: Abdomen soft, bowel sounds positive  Musculoskeletal:  edema lower extremities.   Skin: No skin rashes, no ecchymosis    Diagnostics

## 2019-11-23 NOTE — PROGRESS NOTE ADULT - ASSESSMENT
HTN  stable     History of cocaine induced CM  normal EF on last echo  cont BB     PAF  in sinus   a/c on hold given high bleed risk in setting of sepsis and need for LP   may cont heparin prior to LP     Hypercalcemia  as per renal     Bacteruria   anbx  fu with ID

## 2019-11-23 NOTE — PROGRESS NOTE ADULT - ASSESSMENT
74M PMHx of ESRD s/p failed renal transplant x2, HCV, DM, and meningococcal meningitis 2 years ago was sent in to the ED from HD for AMS and low BPs. He is unable to provide any information at this time. His sister is at the bedside and reports that he is typically able to carry a conversation and walk a small amount. She is not aware if he had been having fevers at the nursing home, and believes that his last HD was on Tuesday.     Problem/Plan - 1:  ·  Problem: Sepsis.  Plan: with metabolic encephalopathy POA  meningitis ruled out   ID and ICU fu appreciated  abx per ID   neuro following     Problem/Plan - 2:  ·  Problem: Kidney transplant recipient.  Plan: on HD  cw HD   AVF clotted     Problem/Plan - 3:  ·  Hyperparathyroidism.    - Labs suggestive of primary Hyperparathyroidism.  - started pamidronate for hypercalcemia management.  - endo following

## 2019-11-23 NOTE — PROGRESS NOTE ADULT - SUBJECTIVE AND OBJECTIVE BOX
Patient is a 74y old  Male who presents with a chief complaint of ams (23 Nov 2019 07:00)    Being followed by ID for Altered mental status    Interval history: Afebrile  No acute events      ROS:  Awake, not communicative      Antimicrobials:    meropenem  IVPB 500 milliGRAM(s) IV Intermittent every 24 hours  meropenem  IVPB          Vital Signs Last 24 Hrs  T(C): 36.7 (11-23-19 @ 04:47), Max: 36.9 (11-23-19 @ 00:59)  T(F): 98 (11-23-19 @ 04:47), Max: 98.5 (11-23-19 @ 00:59)  HR: 95 (11-23-19 @ 04:47) (95 - 117)  BP: 107/69 (11-23-19 @ 04:47) (98/61 - 119/67)  BP(mean): --  RR: 18 (11-23-19 @ 04:47) (18 - 20)  SpO2: 100% (11-23-19 @ 04:47) (95% - 100%)    Physical Exam:    Constitutional well preserved, NAD    HEENT EOMI, No pallor or icterus    Neck no LN    Chest Clear to auscultation    CVS  S1 S2 WNl No murmur or rub or gallop    Abd soft BS normal No tenderness no masses    Ext No cyanosis clubbing or edema    IV site no erythema tenderness or discharge    Joints no swelling or LOM    CNS Arousable, grossly non-focal    Lab Data:                          9.4    12.14 )-----------( 185      ( 22 Nov 2019 14:32 )             31.4       11-22    138  |  94<L>  |  27<H>  ----------------------------<  122<H>  3.9   |  28  |  4.58<H>    Ca    12.1<H>      22 Nov 2019 14:35  Phos  4.6     11-22  Mg     2.1     11-22    TPro  6.7  /  Alb  2.5<L>  /  TBili  0.3  /  DBili  x   /  AST  27  /  ALT  18  /  AlkPhos  84  11-22        .CSF  11-22-19 --  --    No polymorphonuclear cells seen  No organisms seen  by cytocentrifuge      .Urine Clean Catch (Midstream)  11-20-19   >100,000 CFU/ml Gram Negative Rods  --  --      .Blood Blood-Peripheral  11-20-19   No growth to date.  --  --      .Blood Blood-Peripheral  11-20-19   No growth to date.  --  --      < from: Xray Chest 1 View AP/PA (11.20.19 @ 15:02) >  EXAM:  XR CHEST AP OR PA 1V                            PROCEDURE DATE:  11/20/2019            INTERPRETATION:  HISTORY: Sepsis    TECHNIQUE: A single AP view of the chest was obtained.    COMPARISON: 9/3/2019    FINDINGS:  The cardiac silhouette isnormal in size. There are no focal   consolidations or pleural effusions. The hilar and mediastinal structures   appear unremarkable. The osseous structures are intact.    IMPRESSION: Clear lungs.    < end of copied text >

## 2019-11-24 LAB
ANION GAP SERPL CALC-SCNC: 14 MMOL/L — SIGNIFICANT CHANGE UP (ref 5–17)
BUN SERPL-MCNC: 12 MG/DL — SIGNIFICANT CHANGE UP (ref 7–23)
CA-I BLD-SCNC: 1.45 MMOL/L — HIGH (ref 1.12–1.3)
CALCIUM SERPL-MCNC: 11 MG/DL — HIGH (ref 8.4–10.5)
CHLORIDE SERPL-SCNC: 96 MMOL/L — SIGNIFICANT CHANGE UP (ref 96–108)
CO2 SERPL-SCNC: 26 MMOL/L — SIGNIFICANT CHANGE UP (ref 22–31)
CREAT SERPL-MCNC: 2.57 MG/DL — HIGH (ref 0.5–1.3)
GLUCOSE BLDC GLUCOMTR-MCNC: 109 MG/DL — HIGH (ref 70–99)
GLUCOSE BLDC GLUCOMTR-MCNC: 112 MG/DL — HIGH (ref 70–99)
GLUCOSE BLDC GLUCOMTR-MCNC: 118 MG/DL — HIGH (ref 70–99)
GLUCOSE BLDC GLUCOMTR-MCNC: 120 MG/DL — HIGH (ref 70–99)
GLUCOSE SERPL-MCNC: 145 MG/DL — HIGH (ref 70–99)
HCT VFR BLD CALC: 34.4 % — LOW (ref 39–50)
HGB BLD-MCNC: 10 G/DL — LOW (ref 13–17)
MCHC RBC-ENTMCNC: 25.7 PG — LOW (ref 27–34)
MCHC RBC-ENTMCNC: 29.1 GM/DL — LOW (ref 32–36)
MCV RBC AUTO: 88.4 FL — SIGNIFICANT CHANGE UP (ref 80–100)
PLATELET # BLD AUTO: 164 K/UL — SIGNIFICANT CHANGE UP (ref 150–400)
POTASSIUM SERPL-MCNC: 3.7 MMOL/L — SIGNIFICANT CHANGE UP (ref 3.5–5.3)
POTASSIUM SERPL-SCNC: 3.7 MMOL/L — SIGNIFICANT CHANGE UP (ref 3.5–5.3)
RBC # BLD: 3.89 M/UL — LOW (ref 4.2–5.8)
RBC # FLD: 15.9 % — HIGH (ref 10.3–14.5)
SODIUM SERPL-SCNC: 136 MMOL/L — SIGNIFICANT CHANGE UP (ref 135–145)
WBC # BLD: 10.93 K/UL — HIGH (ref 3.8–10.5)
WBC # FLD AUTO: 10.93 K/UL — HIGH (ref 3.8–10.5)

## 2019-11-24 RX ORDER — THIAMINE MONONITRATE (VIT B1) 100 MG
500 TABLET ORAL ONCE
Refills: 0 | Status: COMPLETED | OUTPATIENT
Start: 2019-11-24 | End: 2019-11-24

## 2019-11-24 RX ORDER — THIAMINE MONONITRATE (VIT B1) 100 MG
100 TABLET ORAL DAILY
Refills: 0 | Status: DISCONTINUED | OUTPATIENT
Start: 2019-11-24 | End: 2019-11-26

## 2019-11-24 RX ADMIN — LEVETIRACETAM 400 MILLIGRAM(S): 250 TABLET, FILM COATED ORAL at 02:30

## 2019-11-24 RX ADMIN — Medication 105 MILLIGRAM(S): at 20:06

## 2019-11-24 RX ADMIN — MEROPENEM 100 MILLIGRAM(S): 1 INJECTION INTRAVENOUS at 17:06

## 2019-11-24 RX ADMIN — LEVETIRACETAM 400 MILLIGRAM(S): 250 TABLET, FILM COATED ORAL at 14:25

## 2019-11-24 NOTE — PROGRESS NOTE ADULT - SUBJECTIVE AND OBJECTIVE BOX
Subjective: Patient seen and examined. No new events except as noted.     SUBJECTIVE/ROS:        MEDICATIONS:  MEDICATIONS  (STANDING):  atorvastatin 40 milliGRAM(s) Oral at bedtime  cyclobenzaprine 5 milliGRAM(s) Oral four times a day  dextrose 10%. 1000 milliLiter(s) (30 mL/Hr) IV Continuous <Continuous>  dextrose 5%. 1000 milliLiter(s) (50 mL/Hr) IV Continuous <Continuous>  dextrose 50% Injectable 12.5 Gram(s) IV Push once  dextrose 50% Injectable 25 Gram(s) IV Push once  dextrose 50% Injectable 25 Gram(s) IV Push once  escitalopram 20 milliGRAM(s) Oral daily  insulin lispro (HumaLOG) corrective regimen sliding scale   SubCutaneous every 6 hours  levETIRAcetam  IVPB 1000 milliGRAM(s) IV Intermittent every 12 hours  meropenem  IVPB 500 milliGRAM(s) IV Intermittent every 24 hours  meropenem  IVPB      midodrine. 10 milliGRAM(s) Oral three times a day  mycophenolate mofetil 250 milliGRAM(s) Oral two times a day  predniSONE   Tablet 5 milliGRAM(s) Oral daily  sevelamer carbonate 800 milliGRAM(s) Oral three times a day with meals      PHYSICAL EXAM:  T(C): 36.5 (11-24-19 @ 08:50), Max: 37.1 (11-23-19 @ 23:54)  HR: 100 (11-24-19 @ 08:50) (91 - 111)  BP: 134/78 (11-24-19 @ 08:50) (94/55 - 134/78)  RR: 18 (11-24-19 @ 08:50) (16 - 18)  SpO2: 96% (11-24-19 @ 08:50) (93% - 100%)  Wt(kg): --  I&O's Summary    23 Nov 2019 07:01  -  24 Nov 2019 07:00  --------------------------------------------------------  IN: 610 mL / OUT: 0 mL / NET: 610 mL            JVP: Normal  Neck: supple  Lung: clear   CV: S1 S2 , Murmur:  Abd: soft  Ext: No edema  neuro: Awake   Psych: flat affect  Skin: normal``    LABS/DATA:    CARDIAC MARKERS:                                10.3   7.62  )-----------( 151      ( 23 Nov 2019 12:30 )             36.8     11-22    138  |  94<L>  |  27<H>  ----------------------------<  122<H>  3.9   |  28  |  4.58<H>    Ca    12.1<H>      22 Nov 2019 14:35    TPro  6.7  /  Alb  2.5<L>  /  TBili  0.3  /  DBili  x   /  AST  27  /  ALT  18  /  AlkPhos  84  11-22    proBNP:   Lipid Profile:   HgA1c:   TSH:     TELE:  EKG:

## 2019-11-24 NOTE — PROGRESS NOTE ADULT - ASSESSMENT
Assessment  DM: A1C 5.5, was on insulin at home, blood sugars trending within acceptable range on Humalog correction scale coverage, comfortable in bed.  Hypercalcemia: Primary Hyperparathyroidism, calcium trending down but still high, he is lethargic.  Sepsis: Presumed meningitis, LP today, on medications, stable, monitored.  HTN: Controlled,  on antihypertensive medications.  ESRD: On hemodialysis, Monitor labs/BMP          Robi Gay MD  Cell: [phone]  Office: [phone]

## 2019-11-24 NOTE — PROGRESS NOTE ADULT - SUBJECTIVE AND OBJECTIVE BOX
Patient is a 74y Male whom presented to the hospital with esrd on hd   pt seen and examined   PAST MEDICAL & SURGICAL HISTORY:  Kidney transplant recipient  Anuria  GERD (gastroesophageal reflux disease)  Kidney transplant failure: dx: 2/2013  Hepatitis C: dx: 90&#x27;s.  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 10 milliGRAM(s) Oral three times a day  mycophenolate mofetil 250 milliGRAM(s) Oral two times a day  predniSONE   Tablet 5 milliGRAM(s) Oral daily  sevelamer carbonate 800 milliGRAM(s) Oral three times a day with meals  sodium bicarbonate 650 milliGRAM(s) Oral two times a day  tamsulosin 0.4 milliGRAM(s) Oral at bedtime      Allergies    No Known Allergies    Intolerances        SOCIAL HISTORY:  Denies ETOh,Smoking,     FAMILY HISTORY:  Family history of breast cancer in sister (Sibling): living at 92 yo  Family history of prostate cancer in father  Family history of lung cancer  Family history of hypertension in mother  Family history of diabetes mellitus: mother      REVIEW OF SYSTEMS:    unbable to obtained                           10.0   10.93 )-----------( 164      ( 24 Nov 2019 11:29 )             34.4       CBC Full  -  ( 24 Nov 2019 11:29 )  WBC Count : 10.93 K/uL  RBC Count : 3.89 M/uL  Hemoglobin : 10.0 g/dL  Hematocrit : 34.4 %  Platelet Count - Automated : 164 K/uL  Mean Cell Volume : 88.4 fl  Mean Cell Hemoglobin : 25.7 pg  Mean Cell Hemoglobin Concentration : 29.1 gm/dL  Auto Neutrophil # : x  Auto Lymphocyte # : x  Auto Monocyte # : x  Auto Eosinophil # : x  Auto Basophil # : x  Auto Neutrophil % : x  Auto Lymphocyte % : x  Auto Monocyte % : x  Auto Eosinophil % : x  Auto Basophil % : x      11-24    136  |  96  |  12  ----------------------------<  145<H>  3.7   |  26  |  2.57<H>    Ca    11.0<H>      24 Nov 2019 09:51    TPro  6.7  /  Alb  2.5<L>  /  TBili  0.3  /  DBili  x   /  AST  27  /  ALT  18  /  AlkPhos  84  11-22      CAPILLARY BLOOD GLUCOSE      POCT Blood Glucose.: 118 mg/dL (24 Nov 2019 12:32)  POCT Blood Glucose.: 109 mg/dL (24 Nov 2019 06:08)  POCT Blood Glucose.: 112 mg/dL (24 Nov 2019 00:24)  POCT Blood Glucose.: 112 mg/dL (23 Nov 2019 18:15)      Vital Signs Last 24 Hrs  T(C): 36.5 (24 Nov 2019 08:50), Max: 37.1 (23 Nov 2019 23:54)  T(F): 97.7 (24 Nov 2019 08:50), Max: 98.8 (23 Nov 2019 23:54)  HR: 100 (24 Nov 2019 08:50) (93 - 111)  BP: 134/78 (24 Nov 2019 08:50) (94/55 - 134/78)  BP(mean): --  RR: 18 (24 Nov 2019 08:50) (16 - 18)  SpO2: 96% (24 Nov 2019 08:50) (94% - 100%)              PTT - ( 21 Nov 2019 22:09 )  PTT:34.4 sec  PHYSICAL EXAM:    Constitutional: lethargic   HEENT: conjunctive   clear   Neck:  No JVD  Respiratory: decrease bs b/l   Cardiovascular: S1 and S2  Gastrointestinal: BS+, soft, NT/ND  Extremities: No peripheral edema  Neurological:  lethargic   Skin: dry   Access: pos fistula

## 2019-11-24 NOTE — PROGRESS NOTE ADULT - ASSESSMENT
HTN  stable   on midodrine for autonomic dysfunction     History of cocaine induced CM  normal EF on last echo  cont BB     PAF  in sinus   a/c once deemed safe     Hypercalcemia  as per renal     Bacteruria   anbx  fu with ID

## 2019-11-24 NOTE — PROGRESS NOTE ADULT - SUBJECTIVE AND OBJECTIVE BOX
[facility]      Seen earlier today, and examined.  - Today, patient is without complaints.           *****MEDICATIONS: Current medication reviewed and documented.    MEDICATIONS  (STANDING):  atorvastatin 40 milliGRAM(s) Oral at bedtime  cyclobenzaprine 5 milliGRAM(s) Oral four times a day  dextrose 10%. 1000 milliLiter(s) (30 mL/Hr) IV Continuous <Continuous>  dextrose 5%. 1000 milliLiter(s) (50 mL/Hr) IV Continuous <Continuous>  dextrose 50% Injectable 12.5 Gram(s) IV Push once  dextrose 50% Injectable 25 Gram(s) IV Push once  dextrose 50% Injectable 25 Gram(s) IV Push once  escitalopram 20 milliGRAM(s) Oral daily  insulin lispro (HumaLOG) corrective regimen sliding scale   SubCutaneous every 6 hours  levETIRAcetam  IVPB 1000 milliGRAM(s) IV Intermittent every 12 hours  meropenem  IVPB 500 milliGRAM(s) IV Intermittent every 24 hours  meropenem  IVPB      midodrine. 10 milliGRAM(s) Oral three times a day  mycophenolate mofetil 250 milliGRAM(s) Oral two times a day  predniSONE   Tablet 5 milliGRAM(s) Oral daily  sevelamer carbonate 800 milliGRAM(s) Oral three times a day with meals    MEDICATIONS  (PRN):  dextrose 40% Gel 15 Gram(s) Oral once PRN Blood Glucose LESS THAN 70 milliGRAM(s)/deciliter  glucagon  Injectable 1 milliGRAM(s) IntraMuscular once PRN Glucose LESS THAN 70 milligrams/deciliter          ***** VITAL SIGNS:  T(F): 97.7 (11-24-19 @ 08:50), Max: 98.8 (11-23-19 @ 23:54)  HR: 100 (11-24-19 @ 08:50) (91 - 111)  BP: 134/78 (11-24-19 @ 08:50) (94/55 - 134/78)  RR: 18 (11-24-19 @ 08:50) (16 - 18)  SpO2: 96% (11-24-19 @ 08:50) (93% - 100%)  Wt(kg): --  ,   I&O's Summary    23 Nov 2019 07:01  -  24 Nov 2019 07:00  --------------------------------------------------------  IN: 610 mL / OUT: 0 mL / NET: 610 mL             *****PHYSICAL EXAM:     opens eyes to voice  can  follow simple commands  commands, grimaces to noxious stimuli, able to  my hands, wiggles toes  and legs, hyperreflexic in the legs with bilateral cross adductors, bilateral upgoing toes.     Gait not assessed.          *****LAB AND IMAGING:                        10.3   7.62  )-----------( 151      ( 23 Nov 2019 12:30 )             36.8               11-22    138  |  94<L>  |  27<H>  ----------------------------<  122<H>  3.9   |  28  |  4.58<H>    Ca    12.1<H>      22 Nov 2019 14:35    TPro  6.7  /  Alb  2.5<L>  /  TBili  0.3  /  DBili  x   /  AST  27  /  ALT  18  /  AlkPhos  84  11-22           1. Moderate nonspecific diffuse or multifocal cerebral dysfunction.   2. No epileptiform pattern or seizure seen.                [All pertinent recent Imaging/Reports reviewed]           *****A S S E S S M E N T   A N D   P L A N :    75 y/o male with PMHx of  ESRD s/p /p failed renal transplant 4 year ago and successful renal transplant 1 year ago, DM, HTN, 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   On keppra for presumed seizure disorder, but no clear history.    Pt is encephalopathic on exam,. opens eyes to voice  can  follow simple commands  commands, grimaces to noxious stimuli, able to  my hands, wiggles toes  and legs, hyperreflexic in the legs with bilateral cross adductors, bilateral upgoing toes.     seen initially by house neurology, following up now,   lp done not consistent with meningitis.  Defer to ID     MRI brain , no contrast due to renal dysfunction  Routine EEG  without any seizures  AMS likely related to hypercalcemia, renal dysfunction , poor nutritional intake.   thiamine 500 ivpb daily x 3 days.    correct ca   Thank you for allowing me to participate in the care of this patient. Please do not hesitate to call me if you have any  questions.        ________________  Shira Lindsey MD  [facility] ([facility]  [phone]      33 minutes spent on total encounter; more than 50 % of the visit was  spent counseling about plan of care, compliance to diet/exercise and medication regimen and or  coordinating care by the attending physician.      It is advised that stroke patients follow up with JAVIER Vaughan @ [phone] in 1- 2 weeks.   Others please follow up with Dr. Michael Nissenbaum [phone]

## 2019-11-24 NOTE — PROGRESS NOTE ADULT - SUBJECTIVE AND OBJECTIVE BOX
Chief complaint  Patient is a 74y old  Male who presents with a chief complaint of ams (24 Nov 2019 16:19)   Review of systems  Patient in bed, looks comfortable, no fever, no hypoglycemia.    Labs and Fingersticks  CAPILLARY BLOOD GLUCOSE      POCT Blood Glucose.: 120 mg/dL (24 Nov 2019 17:50)  POCT Blood Glucose.: 118 mg/dL (24 Nov 2019 12:32)  POCT Blood Glucose.: 109 mg/dL (24 Nov 2019 06:08)  POCT Blood Glucose.: 112 mg/dL (24 Nov 2019 00:24)      Anion Gap, Serum: 14 (11-24 @ 09:51)      Calcium, Total Serum: 11.0 <H> (11-24 @ 09:51)          11-24    136  |  96  |  12  ----------------------------<  145<H>  3.7   |  26  |  2.57<H>    Ca    11.0<H>      24 Nov 2019 09:51                          10.0   10.93 )-----------( 164      ( 24 Nov 2019 11:29 )             34.4     Medications  MEDICATIONS  (STANDING):  atorvastatin 40 milliGRAM(s) Oral at bedtime  cyclobenzaprine 5 milliGRAM(s) Oral four times a day  dextrose 10%. 1000 milliLiter(s) (30 mL/Hr) IV Continuous <Continuous>  dextrose 5%. 1000 milliLiter(s) (50 mL/Hr) IV Continuous <Continuous>  dextrose 50% Injectable 12.5 Gram(s) IV Push once  dextrose 50% Injectable 25 Gram(s) IV Push once  dextrose 50% Injectable 25 Gram(s) IV Push once  escitalopram 20 milliGRAM(s) Oral daily  insulin lispro (HumaLOG) corrective regimen sliding scale   SubCutaneous every 6 hours  levETIRAcetam  IVPB 1000 milliGRAM(s) IV Intermittent every 12 hours  meropenem  IVPB 500 milliGRAM(s) IV Intermittent every 24 hours  meropenem  IVPB      midodrine. 10 milliGRAM(s) Oral three times a day  mycophenolate mofetil 250 milliGRAM(s) Oral two times a day  predniSONE   Tablet 5 milliGRAM(s) Oral daily  sevelamer carbonate 800 milliGRAM(s) Oral three times a day with meals  thiamine 100 milliGRAM(s) Oral daily      Physical Exam  General: Patient comfortable in bed  Vital Signs Last 12 Hrs  T(F): 98.7 (11-24-19 @ 21:34), Max: 99.3 (11-24-19 @ 13:36)  HR: 119 (11-24-19 @ 21:34) (109 - 119)  BP: 119/68 (11-24-19 @ 21:34) (119/68 - 133/74)  BP(mean): --  RR: 20 (11-24-19 @ 21:34) (18 - 20)  SpO2: 93% (11-24-19 @ 21:34) (93% - 99%)  Neck: No palpable thyroid nodules.  CVS: S1S2, No murmurs  Respiratory: No wheezing, no crepitations  GI: Abdomen soft, bowel sounds positive  Musculoskeletal:  edema lower extremities.   Skin: No skin rashes, no ecchymosis    Diagnostics

## 2019-11-24 NOTE — PROGRESS NOTE ADULT - SUBJECTIVE AND OBJECTIVE BOX
Patient is a 74y old  Male who presents with a chief complaint of ams (24 Nov 2019 12:37)      INTERVAL HPI/OVERNIGHT EVENTS:  T(C): 37.4 (11-24-19 @ 13:36), Max: 37.4 (11-24-19 @ 13:36)  HR: 109 (11-24-19 @ 13:36) (96 - 111)  BP: 131/77 (11-24-19 @ 13:36) (94/55 - 134/78)  RR: 18 (11-24-19 @ 13:36) (16 - 18)  SpO2: 95% (11-24-19 @ 13:36) (95% - 100%)  Wt(kg): --  I&O's Summary    23 Nov 2019 07:01  -  24 Nov 2019 07:00  --------------------------------------------------------  IN: 610 mL / OUT: 0 mL / NET: 610 mL    24 Nov 2019 07:01  -  24 Nov 2019 16:19  --------------------------------------------------------  IN: 100 mL / OUT: 0 mL / NET: 100 mL        LABS:                        10.0   10.93 )-----------( 164      ( 24 Nov 2019 11:29 )             34.4     11-24    136  |  96  |  12  ----------------------------<  145<H>  3.7   |  26  |  2.57<H>    Ca    11.0<H>      24 Nov 2019 09:51          CAPILLARY BLOOD GLUCOSE      POCT Blood Glucose.: 118 mg/dL (24 Nov 2019 12:32)  POCT Blood Glucose.: 109 mg/dL (24 Nov 2019 06:08)  POCT Blood Glucose.: 112 mg/dL (24 Nov 2019 00:24)  POCT Blood Glucose.: 112 mg/dL (23 Nov 2019 18:15)            MEDICATIONS  (STANDING):  atorvastatin 40 milliGRAM(s) Oral at bedtime  cyclobenzaprine 5 milliGRAM(s) Oral four times a day  dextrose 10%. 1000 milliLiter(s) (30 mL/Hr) IV Continuous <Continuous>  dextrose 5%. 1000 milliLiter(s) (50 mL/Hr) IV Continuous <Continuous>  dextrose 50% Injectable 12.5 Gram(s) IV Push once  dextrose 50% Injectable 25 Gram(s) IV Push once  dextrose 50% Injectable 25 Gram(s) IV Push once  escitalopram 20 milliGRAM(s) Oral daily  insulin lispro (HumaLOG) corrective regimen sliding scale   SubCutaneous every 6 hours  levETIRAcetam  IVPB 1000 milliGRAM(s) IV Intermittent every 12 hours  meropenem  IVPB 500 milliGRAM(s) IV Intermittent every 24 hours  meropenem  IVPB      midodrine. 10 milliGRAM(s) Oral three times a day  mycophenolate mofetil 250 milliGRAM(s) Oral two times a day  predniSONE   Tablet 5 milliGRAM(s) Oral daily  sevelamer carbonate 800 milliGRAM(s) Oral three times a day with meals    MEDICATIONS  (PRN):  dextrose 40% Gel 15 Gram(s) Oral once PRN Blood Glucose LESS THAN 70 milliGRAM(s)/deciliter  glucagon  Injectable 1 milliGRAM(s) IntraMuscular once PRN Glucose LESS THAN 70 milligrams/deciliter          PHYSICAL EXAM:  GENERAL: frail  CHEST/LUNG: Clear to percussion bilaterally; No rales, rhonchi, wheezing, or rubs  HEART: Regular rate and rhythm; No murmurs, rubs, or gallops  ABDOMEN: Soft, Nontender, Nondistended; Bowel sounds present  EXTREMITIES:  no edema     Care Discussed with Consultants/Other Providers [ ] YES  [ ] NO

## 2019-11-25 LAB
ANION GAP SERPL CALC-SCNC: 16 MMOL/L — SIGNIFICANT CHANGE UP (ref 5–17)
BUN SERPL-MCNC: 24 MG/DL — HIGH (ref 7–23)
CALCIUM SERPL-MCNC: 11.1 MG/DL — HIGH (ref 8.4–10.5)
CHLORIDE SERPL-SCNC: 94 MMOL/L — LOW (ref 96–108)
CO2 SERPL-SCNC: 23 MMOL/L — SIGNIFICANT CHANGE UP (ref 22–31)
CREAT SERPL-MCNC: 3.88 MG/DL — HIGH (ref 0.5–1.3)
CULTURE RESULTS: NO GROWTH — SIGNIFICANT CHANGE UP
CULTURE RESULTS: SIGNIFICANT CHANGE UP
CULTURE RESULTS: SIGNIFICANT CHANGE UP
GLUCOSE BLDC GLUCOMTR-MCNC: 132 MG/DL — HIGH (ref 70–99)
GLUCOSE BLDC GLUCOMTR-MCNC: 134 MG/DL — HIGH (ref 70–99)
GLUCOSE BLDC GLUCOMTR-MCNC: 137 MG/DL — HIGH (ref 70–99)
GLUCOSE BLDC GLUCOMTR-MCNC: 142 MG/DL — HIGH (ref 70–99)
GLUCOSE BLDC GLUCOMTR-MCNC: 150 MG/DL — HIGH (ref 70–99)
GLUCOSE SERPL-MCNC: 178 MG/DL — HIGH (ref 70–99)
HCT VFR BLD CALC: 33.6 % — LOW (ref 39–50)
HGB BLD-MCNC: 9.6 G/DL — LOW (ref 13–17)
MCHC RBC-ENTMCNC: 25.3 PG — LOW (ref 27–34)
MCHC RBC-ENTMCNC: 28.6 GM/DL — LOW (ref 32–36)
MCV RBC AUTO: 88.7 FL — SIGNIFICANT CHANGE UP (ref 80–100)
PLATELET # BLD AUTO: 183 K/UL — SIGNIFICANT CHANGE UP (ref 150–400)
POTASSIUM SERPL-MCNC: 4 MMOL/L — SIGNIFICANT CHANGE UP (ref 3.5–5.3)
POTASSIUM SERPL-SCNC: 4 MMOL/L — SIGNIFICANT CHANGE UP (ref 3.5–5.3)
RBC # BLD: 3.79 M/UL — LOW (ref 4.2–5.8)
RBC # FLD: 15.8 % — HIGH (ref 10.3–14.5)
SODIUM SERPL-SCNC: 133 MMOL/L — LOW (ref 135–145)
SPECIMEN SOURCE: SIGNIFICANT CHANGE UP
WBC # BLD: 11.76 K/UL — HIGH (ref 3.8–10.5)
WBC # FLD AUTO: 11.76 K/UL — HIGH (ref 3.8–10.5)

## 2019-11-25 PROCEDURE — 99232 SBSQ HOSP IP/OBS MODERATE 35: CPT

## 2019-11-25 PROCEDURE — 71045 X-RAY EXAM CHEST 1 VIEW: CPT | Mod: 26

## 2019-11-25 RX ORDER — CEFTRIAXONE 500 MG/1
1000 INJECTION, POWDER, FOR SOLUTION INTRAMUSCULAR; INTRAVENOUS EVERY 24 HOURS
Refills: 0 | Status: COMPLETED | OUTPATIENT
Start: 2019-11-25 | End: 2019-11-26

## 2019-11-25 RX ORDER — CARVEDILOL PHOSPHATE 80 MG/1
6.25 CAPSULE, EXTENDED RELEASE ORAL EVERY 12 HOURS
Refills: 0 | Status: DISCONTINUED | OUTPATIENT
Start: 2019-11-25 | End: 2019-11-26

## 2019-11-25 RX ADMIN — CEFTRIAXONE 100 MILLIGRAM(S): 500 INJECTION, POWDER, FOR SOLUTION INTRAMUSCULAR; INTRAVENOUS at 10:32

## 2019-11-25 RX ADMIN — LEVETIRACETAM 400 MILLIGRAM(S): 250 TABLET, FILM COATED ORAL at 17:57

## 2019-11-25 RX ADMIN — LEVETIRACETAM 400 MILLIGRAM(S): 250 TABLET, FILM COATED ORAL at 01:07

## 2019-11-25 RX ADMIN — Medication 30 MILLILITER(S): at 02:37

## 2019-11-25 RX ADMIN — ATORVASTATIN CALCIUM 40 MILLIGRAM(S): 80 TABLET, FILM COATED ORAL at 21:17

## 2019-11-25 NOTE — DIETITIAN INITIAL EVALUATION ADULT. - ADD RECOMMEND
1) Recommend alternate means of nutrition to promote adequate protein/energy intake. 2) Monitor for diet advancement, labs, weights, and medication.

## 2019-11-25 NOTE — PROGRESS NOTE ADULT - SUBJECTIVE AND OBJECTIVE BOX
Patient is a 74y Male whom presented to the hospital with esrd on hd     PAST MEDICAL & SURGICAL HISTORY:  Kidney transplant recipient  Anuria  GERD (gastroesophageal reflux disease)  Kidney transplant failure: dx: 2/2013  Hepatitis C: dx: 90&#x27;s.  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 10 milliGRAM(s) Oral three times a day  mycophenolate mofetil 250 milliGRAM(s) Oral two times a day  predniSONE   Tablet 5 milliGRAM(s) Oral daily  sevelamer carbonate 800 milliGRAM(s) Oral three times a day with meals  sodium bicarbonate 650 milliGRAM(s) Oral two times a day  tamsulosin 0.4 milliGRAM(s) Oral at bedtime      Allergies    No Known Allergies    Intolerances        SOCIAL HISTORY:  Denies ETOh,Smoking,     FAMILY HISTORY:  Family history of breast cancer in sister (Sibling): living at 92 yo  Family history of prostate cancer in father  Family history of lung cancer  Family history of hypertension in mother  Family history of diabetes mellitus: mother      REVIEW OF SYSTEMS:    unbable to obtained                                      9.6    11.76 )-----------( 183      ( 25 Nov 2019 15:24 )             33.6       CBC Full  -  ( 25 Nov 2019 15:24 )  WBC Count : 11.76 K/uL  RBC Count : 3.79 M/uL  Hemoglobin : 9.6 g/dL  Hematocrit : 33.6 %  Platelet Count - Automated : 183 K/uL  Mean Cell Volume : 88.7 fl  Mean Cell Hemoglobin : 25.3 pg  Mean Cell Hemoglobin Concentration : 28.6 gm/dL  Auto Neutrophil # : x  Auto Lymphocyte # : x  Auto Monocyte # : x  Auto Eosinophil # : x  Auto Basophil # : x  Auto Neutrophil % : x  Auto Lymphocyte % : x  Auto Monocyte % : x  Auto Eosinophil % : x  Auto Basophil % : x      11-25    133<L>  |  94<L>  |  24<H>  ----------------------------<  178<H>  4.0   |  23  |  3.88<H>    Ca    11.1<H>      25 Nov 2019 12:12        CAPILLARY BLOOD GLUCOSE      POCT Blood Glucose.: 134 mg/dL (25 Nov 2019 13:57)  POCT Blood Glucose.: 142 mg/dL (25 Nov 2019 12:32)  POCT Blood Glucose.: 137 mg/dL (25 Nov 2019 06:12)  POCT Blood Glucose.: 132 mg/dL (25 Nov 2019 00:23)      Vital Signs Last 24 Hrs  T(C): 37 (25 Nov 2019 17:50), Max: 37.2 (25 Nov 2019 00:25)  T(F): 98.6 (25 Nov 2019 17:50), Max: 98.9 (25 Nov 2019 00:25)  HR: 125 (25 Nov 2019 17:50) (108 - 125)  BP: 138/70 (25 Nov 2019 17:50) (112/64 - 138/70)  BP(mean): --  RR: 18 (25 Nov 2019 17:07) (18 - 20)  SpO2: 100% (25 Nov 2019 17:50) (93% - 100%)                                          PHYSICAL EXAM:    Constitutional: lethargic   HEENT: conjunctive   clear   Neck:  No JVD  Respiratory: decrease bs b/l   Cardiovascular: S1 and S2  Gastrointestinal: BS+, soft, NT/ND  Extremities: No peripheral edema  Neurological:  lethargic   Skin: dry   Access: pos fistula

## 2019-11-25 NOTE — PROGRESS NOTE ADULT - SUBJECTIVE AND OBJECTIVE BOX
Chief complaint  Patient is a 74y old  Male who presents with a chief complaint of ams (25 Nov 2019 07:08)   Review of systems  Patient in bed, looks comfortable, no fever, no hypoglycemia.    Labs and Fingersticks  CAPILLARY BLOOD GLUCOSE      POCT Blood Glucose.: 137 mg/dL (25 Nov 2019 06:12)  POCT Blood Glucose.: 132 mg/dL (25 Nov 2019 00:23)  POCT Blood Glucose.: 120 mg/dL (24 Nov 2019 17:50)  POCT Blood Glucose.: 118 mg/dL (24 Nov 2019 12:32)      Anion Gap, Serum: 14 (11-24 @ 09:51)      Calcium, Total Serum: 11.0 <H> (11-24 @ 09:51)          11-24    136  |  96  |  12  ----------------------------<  145<H>  3.7   |  26  |  2.57<H>    Ca    11.0<H>      24 Nov 2019 09:51                          10.0   10.93 )-----------( 164      ( 24 Nov 2019 11:29 )             34.4     Medications  MEDICATIONS  (STANDING):  atorvastatin 40 milliGRAM(s) Oral at bedtime  carvedilol 6.25 milliGRAM(s) Oral every 12 hours  cefTRIAXone   IVPB 1000 milliGRAM(s) IV Intermittent every 24 hours  cyclobenzaprine 5 milliGRAM(s) Oral four times a day  dextrose 10%. 1000 milliLiter(s) (30 mL/Hr) IV Continuous <Continuous>  dextrose 5%. 1000 milliLiter(s) (50 mL/Hr) IV Continuous <Continuous>  dextrose 50% Injectable 12.5 Gram(s) IV Push once  dextrose 50% Injectable 25 Gram(s) IV Push once  dextrose 50% Injectable 25 Gram(s) IV Push once  escitalopram 20 milliGRAM(s) Oral daily  insulin lispro (HumaLOG) corrective regimen sliding scale   SubCutaneous every 6 hours  levETIRAcetam  IVPB 1000 milliGRAM(s) IV Intermittent every 12 hours  midodrine. 10 milliGRAM(s) Oral three times a day  mycophenolate mofetil 250 milliGRAM(s) Oral two times a day  predniSONE   Tablet 5 milliGRAM(s) Oral daily  sevelamer carbonate 800 milliGRAM(s) Oral three times a day with meals  thiamine 100 milliGRAM(s) Oral daily      Physical Exam  General: Patient comfortable in bed  Vital Signs Last 12 Hrs  T(F): 98.3 (11-25-19 @ 09:09), Max: 98.9 (11-25-19 @ 00:25)  HR: 108 (11-25-19 @ 09:09) (108 - 114)  BP: 120/75 (11-25-19 @ 09:09) (112/64 - 120/75)  BP(mean): --  RR: 18 (11-25-19 @ 09:09) (18 - 20)  SpO2: 97% (11-25-19 @ 09:09) (97% - 99%)  Neck: No palpable thyroid nodules.  CVS: S1S2, No murmurs  Respiratory: No wheezing, no crepitations  GI: Abdomen soft, bowel sounds positive  Musculoskeletal:  edema lower extremities.   Skin: No skin rashes, no ecchymosis    Diagnostics Chief complaint  Patient is a 74y old  Male who presents with a chief complaint  of ams (25 Nov 2019 07:08)   Review of systems  Patient in bed, looks comfortable, no fever, no hypoglycemia.    Labs and Fingersticks  CAPILLARY BLOOD GLUCOSE      POCT Blood Glucose.: 137 mg/dL (25 Nov 2019 06:12)  POCT Blood Glucose.: 132 mg/dL (25 Nov 2019 00:23)  POCT Blood Glucose.: 120 mg/dL (24 Nov 2019 17:50)  POCT Blood Glucose.: 118 mg/dL (24 Nov 2019 12:32)      Anion Gap, Serum: 14 (11-24 @ 09:51)      Calcium, Total Serum: 11.0 <H> (11-24 @ 09:51)          11-24    136  |  96  |  12  ----------------------------<  145<H>  3.7   |  26  |  2.57<H>    Ca    11.0<H>      24 Nov 2019 09:51                          10.0   10.93 )-----------( 164      ( 24 Nov 2019 11:29 )             34.4     Medications  MEDICATIONS  (STANDING):  atorvastatin 40 milliGRAM(s) Oral at bedtime  carvedilol 6.25 milliGRAM(s) Oral every 12 hours  cefTRIAXone   IVPB 1000 milliGRAM(s) IV Intermittent every 24 hours  cyclobenzaprine 5 milliGRAM(s) Oral four times a day  dextrose 10%. 1000 milliLiter(s) (30 mL/Hr) IV Continuous <Continuous>  dextrose 5%. 1000 milliLiter(s) (50 mL/Hr) IV Continuous <Continuous>  dextrose 50% Injectable 12.5 Gram(s) IV Push once  dextrose 50% Injectable 25 Gram(s) IV Push once  dextrose 50% Injectable 25 Gram(s) IV Push once  escitalopram 20 milliGRAM(s) Oral daily  insulin lispro (HumaLOG) corrective regimen sliding scale   SubCutaneous every 6 hours  levETIRAcetam  IVPB 1000 milliGRAM(s) IV Intermittent every 12 hours  midodrine. 10 milliGRAM(s) Oral three times a day  mycophenolate mofetil 250 milliGRAM(s) Oral two times a day  predniSONE   Tablet 5 milliGRAM(s) Oral daily  sevelamer carbonate 800 milliGRAM(s) Oral three times a day with meals  thiamine 100 milliGRAM(s) Oral daily      Physical Exam  General: Patient comfortable in bed  Vital Signs Last 12 Hrs  T(F): 98.3 (11-25-19 @ 09:09), Max: 98.9 (11-25-19 @ 00:25)  HR: 108 (11-25-19 @ 09:09) (108 - 114)  BP: 120/75 (11-25-19 @ 09:09) (112/64 - 120/75)  BP(mean): --  RR: 18 (11-25-19 @ 09:09) (18 - 20)  SpO2: 97% (11-25-19 @ 09:09) (97% - 99%)  Neck: No palpable thyroid nodules.  CVS: S1S2, No murmurs  Respiratory: No wheezing, no crepitations  GI: Abdomen soft, bowel sounds positive  Musculoskeletal:  edema lower extremities.   Skin: No skin rashes, no ecchymosis    Diagnostics

## 2019-11-25 NOTE — PROGRESS NOTE ADULT - ASSESSMENT
74M PMHx of ESRD s/p failed renal transplant x2, HCV, DM, and meningococcal meningitis 2 years ago was sent in to the ED from HD for AMS and low BPs.  lethargic  has rigid neck  E coli bacteriuria  LP not s/o any CNS infection ,CSF negative  no evidence of any other infectious procvess   AMS ? CVA ? Hypercalcemia  Will rec change coverage to ceftriaxone-if stable can Dc in 2 days  Will tailor plan for ID issues  per course,results.Will defer to primary team on management of other issues.  case d/w Med NP,Dr James    Will Follow.  Beeper [id number]/afterhours/No [id number]

## 2019-11-25 NOTE — CHART NOTE - NSCHARTNOTEFT_GEN_A_CORE
In discussion with Dr. James, concern that pt is lethargic and at risk for aspiration with po intake; and recommended to place NGT for nutrition and to administer medications. Discuss concern with pt sister, Sheryl who agrees with NGT. Will place order for unsecured mittens as pt was attempting to pull out tube while inserting. Will start Glucerna for tube feed after placement is confirmed  and have nutrition consult in am for follow up .   Fatuma NP   [id number]

## 2019-11-25 NOTE — PROGRESS NOTE ADULT - ASSESSMENT
HTN  stable   on midodrine for autonomic dysfunction     History of cocaine induced CM  normal EF on last echo  resume BB given tachycardia     PAF  in sinus   a/c once deemed safe     Hypercalcemia  as per renal     Bacteruria   anbx  fu with ID    Coreg resumed due to tachycardia

## 2019-11-25 NOTE — PROGRESS NOTE ADULT - SUBJECTIVE AND OBJECTIVE BOX
Patient is a 74y old  Male who presents with a chief complaint of ams (25 Nov 2019 14:53)      INTERVAL HPI/OVERNIGHT EVENTS:  T(C): 36.8 (11-25-19 @ 13:30), Max: 37.2 (11-25-19 @ 00:25)  HR: 119 (11-25-19 @ 13:30) (108 - 119)  BP: 136/83 (11-25-19 @ 13:30) (112/64 - 136/83)  RR: 18 (11-25-19 @ 13:30) (18 - 20)  SpO2: 97% (11-25-19 @ 13:30) (93% - 99%)  Wt(kg): --  I&O's Summary    24 Nov 2019 07:01  -  25 Nov 2019 07:00  --------------------------------------------------------  IN: 100 mL / OUT: 0 mL / NET: 100 mL    25 Nov 2019 07:01  -  25 Nov 2019 16:34  --------------------------------------------------------  IN: 50 mL / OUT: 0 mL / NET: 50 mL        LABS:                        9.6    11.76 )-----------( 183      ( 25 Nov 2019 15:24 )             33.6     11-25    133<L>  |  94<L>  |  24<H>  ----------------------------<  178<H>  4.0   |  23  |  3.88<H>    Ca    11.1<H>      25 Nov 2019 12:12          CAPILLARY BLOOD GLUCOSE      POCT Blood Glucose.: 134 mg/dL (25 Nov 2019 13:57)  POCT Blood Glucose.: 142 mg/dL (25 Nov 2019 12:32)  POCT Blood Glucose.: 137 mg/dL (25 Nov 2019 06:12)  POCT Blood Glucose.: 132 mg/dL (25 Nov 2019 00:23)  POCT Blood Glucose.: 120 mg/dL (24 Nov 2019 17:50)            MEDICATIONS  (STANDING):  atorvastatin 40 milliGRAM(s) Oral at bedtime  carvedilol 6.25 milliGRAM(s) Oral every 12 hours  cefTRIAXone   IVPB 1000 milliGRAM(s) IV Intermittent every 24 hours  cyclobenzaprine 5 milliGRAM(s) Oral four times a day  dextrose 10%. 1000 milliLiter(s) (30 mL/Hr) IV Continuous <Continuous>  dextrose 5%. 1000 milliLiter(s) (50 mL/Hr) IV Continuous <Continuous>  dextrose 50% Injectable 12.5 Gram(s) IV Push once  dextrose 50% Injectable 25 Gram(s) IV Push once  dextrose 50% Injectable 25 Gram(s) IV Push once  escitalopram 20 milliGRAM(s) Oral daily  insulin lispro (HumaLOG) corrective regimen sliding scale   SubCutaneous every 6 hours  levETIRAcetam  IVPB 1000 milliGRAM(s) IV Intermittent every 12 hours  midodrine. 10 milliGRAM(s) Oral three times a day  mycophenolate mofetil 250 milliGRAM(s) Oral two times a day  predniSONE   Tablet 5 milliGRAM(s) Oral daily  sevelamer carbonate 800 milliGRAM(s) Oral three times a day with meals  thiamine 100 milliGRAM(s) Oral daily    MEDICATIONS  (PRN):  dextrose 40% Gel 15 Gram(s) Oral once PRN Blood Glucose LESS THAN 70 milliGRAM(s)/deciliter  glucagon  Injectable 1 milliGRAM(s) IntraMuscular once PRN Glucose LESS THAN 70 milligrams/deciliter          PHYSICAL EXAM:  GENERAL: nonverbal   CHEST/LUNG: Coarse breath sounds bilaterally   HEART: Regular rate and rhythm; No murmurs, rubs, or gallops  ABDOMEN: Soft, Nontender, Nondistended; Bowel sounds present  EXTREMITIES:edema+    Care Discussed with Consultants/Other Providers [ ] YES  [ ] NO

## 2019-11-25 NOTE — DIETITIAN INITIAL EVALUATION ADULT. - ENERGY NEEDS
Pertinent Chart Information: 75 yo male with history of ESRD, failed renal transplant x2, HCV, DM, meningococcal meningitis 2 years ago, BPH, GERD. Pt admitted for sepsis and r/o meningitis.   Ht: 72in, Current Wt: 160.4lbs, BMI: 21.8kg/m2, IBW: 178lbs +/- 10%, %IBW: 90%  Edema: none noted. Skin per nursing flowsheets: stage 2 sacral PU.

## 2019-11-25 NOTE — PROGRESS NOTE ADULT - SUBJECTIVE AND OBJECTIVE BOX
Subjective: Patient seen and examined. No new events except as noted.     SUBJECTIVE/ROS:        MEDICATIONS:  MEDICATIONS  (STANDING):  atorvastatin 40 milliGRAM(s) Oral at bedtime  cyclobenzaprine 5 milliGRAM(s) Oral four times a day  dextrose 10%. 1000 milliLiter(s) (30 mL/Hr) IV Continuous <Continuous>  dextrose 5%. 1000 milliLiter(s) (50 mL/Hr) IV Continuous <Continuous>  dextrose 50% Injectable 12.5 Gram(s) IV Push once  dextrose 50% Injectable 25 Gram(s) IV Push once  dextrose 50% Injectable 25 Gram(s) IV Push once  escitalopram 20 milliGRAM(s) Oral daily  insulin lispro (HumaLOG) corrective regimen sliding scale   SubCutaneous every 6 hours  levETIRAcetam  IVPB 1000 milliGRAM(s) IV Intermittent every 12 hours  meropenem  IVPB 500 milliGRAM(s) IV Intermittent every 24 hours  meropenem  IVPB      midodrine. 10 milliGRAM(s) Oral three times a day  mycophenolate mofetil 250 milliGRAM(s) Oral two times a day  predniSONE   Tablet 5 milliGRAM(s) Oral daily  sevelamer carbonate 800 milliGRAM(s) Oral three times a day with meals  thiamine 100 milliGRAM(s) Oral daily      PHYSICAL EXAM:  T(C): 37.2 (11-25-19 @ 04:37), Max: 37.4 (11-24-19 @ 13:36)  HR: 111 (11-25-19 @ 04:37) (100 - 119)  BP: 112/64 (11-25-19 @ 04:37) (112/64 - 134/78)  RR: 18 (11-25-19 @ 04:37) (18 - 20)  SpO2: 99% (11-25-19 @ 04:37) (93% - 99%)  Wt(kg): --  I&O's Summary    24 Nov 2019 07:01  -  25 Nov 2019 07:00  --------------------------------------------------------  IN: 100 mL / OUT: 0 mL / NET: 100 mL            JVP: Normal  Neck: supple  Lung: clear   CV: S1 S2 , Murmur:  Abd: soft  Ext: No edema  Psych: flat affect  Skin: normal``    LABS/DATA:    CARDIAC MARKERS:                                10.0   10.93 )-----------( 164      ( 24 Nov 2019 11:29 )             34.4     11-24    136  |  96  |  12  ----------------------------<  145<H>  3.7   |  26  |  2.57<H>    Ca    11.0<H>      24 Nov 2019 09:51      proBNP:   Lipid Profile:   HgA1c:   TSH:     TELE:  EKG:

## 2019-11-25 NOTE — PROGRESS NOTE ADULT - ASSESSMENT
Problem/Plan - 1:  ·  Problem: Sepsis.  Plan: with metabolic encephalopathy POA  meningitis ruled out   ID and ICU fu appreciated  abx per ID   neuro following     Problem/Plan - 2:  ·  Problem: Kidney transplant recipient.  Plan: on HD  cw HD   AVF clotted     Problem/Plan - 3:  ·  Hyperparathyroidism.    - Labs suggestive of primary Hyperparathyroidism.  - started pamidronate for hypercalcemia management.  - endo following     Palliative care called for GOC

## 2019-11-25 NOTE — PROGRESS NOTE ADULT - SUBJECTIVE AND OBJECTIVE BOX
Patient is a 74y old  Male who presents with a chief complaint of ams (25 Nov 2019 10:24)    Being followed by ID for fever, ? UTI     Interval history:still with minimal response to  stimuli   No acute events      ROS:  Not obtainable       Antimicrobials:    cefTRIAXone   IVPB 1000 milliGRAM(s) IV Intermittent every 24 hours  meropenem Dced today     Other medications reviewed    Vital Signs Last 24 Hrs  T(C): 36.8 (11-25-19 @ 13:30), Max: 37.2 (11-25-19 @ 00:25)  T(F): 98.2 (11-25-19 @ 13:30), Max: 98.9 (11-25-19 @ 00:25)  HR: 119 (11-25-19 @ 13:30) (108 - 119)  BP: 136/83 (11-25-19 @ 13:30) (112/64 - 136/83)  BP(mean): --  RR: 18 (11-25-19 @ 13:30) (18 - 20)  SpO2: 97% (11-25-19 @ 13:30) (93% - 99%)    Physical Exam:  HEENT PERRLA ,No pallor or icterus    R Sc HD catheter no erythema o tenderness    Neck rigidity     Chest Good AE,CTA    CVS RRR S1 S2 WNl No murmur or rub or gallop    Abd soft BS normal No tenderness RLQ transplant kidney no tenderness    Ext left arm AVF no erythema or tenderness    IV site no erythema tenderness or discharge    Joints no swelling or LOM    CNS no verbal response-opens eyes-does not follow commands  Lab Data:                          10.0   10.93 )-----------( 164      ( 24 Nov 2019 11:29 )             34.4       11-25    133<L>  |  94<L>  |  24<H>  ----------------------------<  178<H>  4.0   |  23  |  3.88<H>    Ca    11.1<H>      25 Nov 2019 12:12          Culture - Acid Fast (collected 23 Nov 2019 01:25)    Culture - CSF with Gram Stain (collected 22 Nov 2019 17:14)  Source: .CSF  Gram Stain (22 Nov 2019 18:32):    No polymorphonuclear cells seen    No organisms seen    by cytocentrifuge  Final Report (25 Nov 2019 06:28):    No growth    Culture - Fungal, CSF (collected 22 Nov 2019 17:14)  Source: .CSF CSF  Preliminary Report (25 Nov 2019 10:54):    Testing in progress    Culture - Urine (collected 20 Nov 2019 18:01)  Source: .Urine Clean Catch (Midstream)  Final Report (23 Nov 2019 08:31):    >100,000 CFU/ml Escherichia coli  Organism: Escherichia coli (23 Nov 2019 08:31)  Organism: Escherichia coli (23 Nov 2019 08:31)      -  Amikacin: S <=16      -  Ampicillin: S <=8 These ampicillin results predict results for amoxicillin      -  Ampicillin/Sulbactam: S <=8/4 Enterobacter, Citrobacter, and Serratia may develop resistance during prolonged therapy (3-4 days)      -  Aztreonam: S <=4      -  Cefazolin: S <=8 (MIC_CL_COM_ENTERIC_CEFAZU) For uncomplicated UTI with K. pneumoniae, E. coli, or P. mirablis: NANCY <=16 is sensitive and NANCY >=32 is resistant. This also predicts results for oral agents cefaclor, cefdinir, cefpodoxime, cefprozil, cefuroxime axetil, cephalexin and locarbef for uncomplicated UTI. Note that some isolates may be susceptible to these agents while testing resistant to cefazolin.      -  Cefepime: S <=4      -  Cefoxitin: S <=8      -  Ceftriaxone: S <=1 Enterobacter, Citrobacter, and Serratia may develop resistance during prolonged therapy      -  Ciprofloxacin: S <=1      -  Gentamicin: S <=4      -  Imipenem: S <=1      -  Levofloxacin: S <=2      -  Meropenem: S <=1      -  Nitrofurantoin: S <=32 Should not be used to treat pyelonephritis      -  Piperacillin/Tazobactam: S <=16      -  Tigecycline: S <=2      -  Tobramycin: S <=4      -  Trimethoprim/Sulfamethoxazole: S <=2/38      Method Type: NANCY    Culture - Blood (collected 20 Nov 2019 17:23)  Source: .Blood Blood-Peripheral  Preliminary Report (21 Nov 2019 18:01):    No growth to date.    Culture - Blood (collected 20 Nov 2019 17:20)  Source: .Blood Blood-Peripheral  Preliminary Report (21 Nov 2019 18:01):    No growth to date.    Cryptococcal Antigen - Serum (11.22.19 @ 03:02)    Cryptococcal Antigen - Serum: Negative          CSF PCR Panel (11.22.19 @ 17:44)    CSF PCR Result: NotDetec: The meningitis/encephalitis (ME) panel (CSFPCR) is a PCR based assay that  screens for: Escherichia coli; Haemophilus influenzae; Listeria  monocytogenes; Neisseria meningitidis; Streptococcus agalactiae;  Streptococcus pneumoniae; CMV; Enterovirus; HSV-1; HSV-2; Human  herpesvirus 6; Parechovirus; VZV and Cryptococcus. Result should be  interpreted in context of clinical presentation, imaging and other lab  tests. Positive predictive value may be lower in patients with normal CSF  chemistry and cell count.    < from: Xray Chest 1 View AP/PA (11.20.19 @ 15:02) >  IMPRESSION: Clear lungs.        < end of copied text >

## 2019-11-25 NOTE — DIETITIAN INITIAL EVALUATION ADULT. - PERTINENT MEDS FT
MEDICATIONS  (STANDING):  atorvastatin 40 milliGRAM(s) Oral at bedtime  carvedilol 6.25 milliGRAM(s) Oral every 12 hours  cefTRIAXone   IVPB 1000 milliGRAM(s) IV Intermittent every 24 hours  cyclobenzaprine 5 milliGRAM(s) Oral four times a day  dextrose 10%. 1000 milliLiter(s) (30 mL/Hr) IV Continuous <Continuous>  dextrose 5%. 1000 milliLiter(s) (50 mL/Hr) IV Continuous <Continuous>  dextrose 50% Injectable 12.5 Gram(s) IV Push once  dextrose 50% Injectable 25 Gram(s) IV Push once  dextrose 50% Injectable 25 Gram(s) IV Push once  escitalopram 20 milliGRAM(s) Oral daily  insulin lispro (HumaLOG) corrective regimen sliding scale   SubCutaneous every 6 hours  levETIRAcetam  IVPB 1000 milliGRAM(s) IV Intermittent every 12 hours  midodrine. 10 milliGRAM(s) Oral three times a day  mycophenolate mofetil 250 milliGRAM(s) Oral two times a day  predniSONE   Tablet 5 milliGRAM(s) Oral daily  sevelamer carbonate 800 milliGRAM(s) Oral three times a day with meals  thiamine 100 milliGRAM(s) Oral daily    MEDICATIONS  (PRN):  dextrose 40% Gel 15 Gram(s) Oral once PRN Blood Glucose LESS THAN 70 milliGRAM(s)/deciliter  glucagon  Injectable 1 milliGRAM(s) IntraMuscular once PRN Glucose LESS THAN 70 milligrams/deciliter

## 2019-11-25 NOTE — PROGRESS NOTE ADULT - SUBJECTIVE AND OBJECTIVE BOX
[facility]      Seen earlier today, and examined.  - Today, patient is without complaints.           *****MEDICATIONS: Current medication reviewed and documented.    MEDICATIONS  (STANDING):  atorvastatin 40 milliGRAM(s) Oral at bedtime  carvedilol 6.25 milliGRAM(s) Oral every 12 hours  cefTRIAXone   IVPB 1000 milliGRAM(s) IV Intermittent every 24 hours  cyclobenzaprine 5 milliGRAM(s) Oral four times a day  dextrose 5%. 1000 milliLiter(s) (50 mL/Hr) IV Continuous <Continuous>  dextrose 50% Injectable 12.5 Gram(s) IV Push once  dextrose 50% Injectable 25 Gram(s) IV Push once  dextrose 50% Injectable 25 Gram(s) IV Push once  escitalopram 20 milliGRAM(s) Oral daily  insulin lispro (HumaLOG) corrective regimen sliding scale   SubCutaneous every 6 hours  levETIRAcetam  IVPB 1000 milliGRAM(s) IV Intermittent every 12 hours  midodrine. 10 milliGRAM(s) Oral three times a day  mycophenolate mofetil 250 milliGRAM(s) Oral two times a day  predniSONE   Tablet 5 milliGRAM(s) Oral daily  sevelamer carbonate 800 milliGRAM(s) Oral three times a day with meals  thiamine 100 milliGRAM(s) Oral daily    MEDICATIONS  (PRN):  dextrose 40% Gel 15 Gram(s) Oral once PRN Blood Glucose LESS THAN 70 milliGRAM(s)/deciliter  glucagon  Injectable 1 milliGRAM(s) IntraMuscular once PRN Glucose LESS THAN 70 milligrams/deciliter          ***** VITAL SIGNS:  T(F): 97.7 (19 @ 08:56), Max: 99.4 (19 @ 00:02)  HR: 128 (19 @ 08:56) (108 - 130)  BP: 91/58 (19 @ 08:56) (90/65 - 138/70)  RR: 18 (19 @ 08:56) (18 - 18)  SpO2: 93% (19 @ 08:56) (93% - 100%)  Wt(kg): --  ,   I&O's Summary    2019 07:01  -  2019 07:00  --------------------------------------------------------  IN: 850 mL / OUT: 1800 mL / NET: -950 mL             *****PHYSICAL EXAM: eyes closed.      speech more discernible " leave me alone"   can squeeze hand to commands, grimaces to noxious stimuli, able to  my hands, wiggles toes  and legs, hyperreflexic in the legs with bilateral cross adductors, bilateral upgoing toes.     Gait not assessed.          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   On keppra for presumed seizure disorder, but no clear history.    Pt is encephalopathic on exam,. opens eyes to voice  can  follow simple commands  commands, grimaces to noxious stimuli, able to  my hands, wiggles toes  and legs, hyperreflexic in the legs with bilateral cross adductors, bilateral upgoing toes.     seen initially by house neurology, following up now,   lp done not consistent with meningitis.  Defer to ID     MRI brain , no contrast due to renal dysfunction  Routine EEG  without any seizures  AMS likely related to hypercalcemia, renal dysfunction , poor nutritional intake.   thiamine 500 ivpb daily x 3 days.    correct ca   mild improvement of mental status     Thank you for allowing me to participate in the care of this patient. Please do not hesitate to call me if you have any  questions.        ________________  Shira Lindsey MD  [facility]  [phone]      33 minutes spent on total encounter; more than 50 % of the visit was  spent counseling about plan of care, compliance to diet/exercise and medication regimen and or  coordinating care by the attending physician.      It is advised that stroke patients follow up with JAVIER Vaughan @ [phone] in 1- 2 weeks.   Others please follow up with Dr. Michael Nissenbaum [phone]

## 2019-11-25 NOTE — PROGRESS NOTE ADULT - ASSESSMENT
Assessment  DM: A1C 5.5, was on insulin at home, blood sugars trending within acceptable range on Humalog correction scale coverage. Patient is NPO, appears comfortable in bed.  Hypercalcemia: Primary Hyperparathyroidism, calcium improving, but still high, now 11.0, he appears lethargic.  Sepsis: IV ABx for UTI, LP inconsistent with meningitis, on medications, stable, monitored.  HTN: Controlled,  on antihypertensive medications.  ESRD: On hemodialysis, Monitor labs/BMP          Robi Gay MD  Cell: [phone]  Office: [phone] Assessment  DM: A1C 5.5, was on insulin at home, blood sugars trending within acceptable range on Humalog correction scale coverage. Patient is NPO,  appears comfortable in bed.  Hypercalcemia: Primary Hyperparathyroidism, calcium improving, but still high, now 11.0, he appears lethargic.  Sepsis: IV ABx for UTI, LP inconsistent with meningitis, on medications, stable, monitored.  HTN: Controlled,  on antihypertensive medications.  ESRD: On hemodialysis, Monitor labs/BMP          Robi Gay MD  Cell: [phone]  Office: [phone]

## 2019-11-26 LAB
ALBUMIN SERPL ELPH-MCNC: 2.3 G/DL — LOW (ref 3.3–5)
ALP SERPL-CCNC: 122 U/L — HIGH (ref 40–120)
ALT FLD-CCNC: 10 U/L — SIGNIFICANT CHANGE UP (ref 10–45)
ANION GAP SERPL CALC-SCNC: 12 MMOL/L — SIGNIFICANT CHANGE UP (ref 5–17)
ANION GAP SERPL CALC-SCNC: 13 MMOL/L — SIGNIFICANT CHANGE UP (ref 5–17)
ANISOCYTOSIS BLD QL: SLIGHT — SIGNIFICANT CHANGE UP
APTT BLD: 33.3 SEC — SIGNIFICANT CHANGE UP (ref 27.5–36.3)
AST SERPL-CCNC: 14 U/L — SIGNIFICANT CHANGE UP (ref 10–40)
BASE EXCESS BLDV CALC-SCNC: 5.1 MMOL/L — HIGH (ref -2–2)
BASOPHILS # BLD AUTO: 0.19 K/UL — SIGNIFICANT CHANGE UP (ref 0–0.2)
BASOPHILS NFR BLD AUTO: 0.9 % — SIGNIFICANT CHANGE UP (ref 0–2)
BILIRUB SERPL-MCNC: 0.6 MG/DL — SIGNIFICANT CHANGE UP (ref 0.2–1.2)
BLD GP AB SCN SERPL QL: NEGATIVE — SIGNIFICANT CHANGE UP
BUN SERPL-MCNC: 18 MG/DL — SIGNIFICANT CHANGE UP (ref 7–23)
BUN SERPL-MCNC: 22 MG/DL — SIGNIFICANT CHANGE UP (ref 7–23)
CA-I BLD-SCNC: 1.47 MMOL/L — HIGH (ref 1.12–1.3)
CALCIUM SERPL-MCNC: 10.4 MG/DL — SIGNIFICANT CHANGE UP (ref 8.4–10.5)
CALCIUM SERPL-MCNC: 10.8 MG/DL — HIGH (ref 8.4–10.5)
CHLORIDE SERPL-SCNC: 97 MMOL/L — SIGNIFICANT CHANGE UP (ref 96–108)
CHLORIDE SERPL-SCNC: 97 MMOL/L — SIGNIFICANT CHANGE UP (ref 96–108)
CO2 BLDV-SCNC: 31 MMOL/L — HIGH (ref 22–30)
CO2 SERPL-SCNC: 27 MMOL/L — SIGNIFICANT CHANGE UP (ref 22–31)
CO2 SERPL-SCNC: 27 MMOL/L — SIGNIFICANT CHANGE UP (ref 22–31)
CREAT SERPL-MCNC: 3.18 MG/DL — HIGH (ref 0.5–1.3)
CREAT SERPL-MCNC: 3.55 MG/DL — HIGH (ref 0.5–1.3)
DACRYOCYTES BLD QL SMEAR: SLIGHT — SIGNIFICANT CHANGE UP
ELLIPTOCYTES BLD QL SMEAR: SLIGHT — SIGNIFICANT CHANGE UP
EOSINOPHIL # BLD AUTO: 0 K/UL — SIGNIFICANT CHANGE UP (ref 0–0.5)
EOSINOPHIL NFR BLD AUTO: 0 % — SIGNIFICANT CHANGE UP (ref 0–6)
GAS PNL BLDA: SIGNIFICANT CHANGE UP
GAS PNL BLDV: SIGNIFICANT CHANGE UP
GLUCOSE BLDC GLUCOMTR-MCNC: 105 MG/DL — HIGH (ref 70–99)
GLUCOSE BLDC GLUCOMTR-MCNC: 110 MG/DL — HIGH (ref 70–99)
GLUCOSE BLDC GLUCOMTR-MCNC: 301 MG/DL — HIGH (ref 70–99)
GLUCOSE BLDC GLUCOMTR-MCNC: 93 MG/DL — SIGNIFICANT CHANGE UP (ref 70–99)
GLUCOSE SERPL-MCNC: 167 MG/DL — HIGH (ref 70–99)
GLUCOSE SERPL-MCNC: 87 MG/DL — SIGNIFICANT CHANGE UP (ref 70–99)
HCO3 BLDV-SCNC: 29 MMOL/L — SIGNIFICANT CHANGE UP (ref 21–29)
HCT VFR BLD CALC: 28.1 % — LOW (ref 39–50)
HCT VFR BLD CALC: 30.4 % — LOW (ref 39–50)
HGB BLD-MCNC: 8.4 G/DL — LOW (ref 13–17)
HGB BLD-MCNC: 8.7 G/DL — LOW (ref 13–17)
HYPOCHROMIA BLD QL: SLIGHT — SIGNIFICANT CHANGE UP
INR BLD: 1.44 RATIO — HIGH (ref 0.88–1.16)
LACTATE BLDV-MCNC: 2.3 MMOL/L — HIGH (ref 0.7–2)
LYMPHOCYTES # BLD AUTO: 0.73 K/UL — LOW (ref 1–3.3)
LYMPHOCYTES # BLD AUTO: 3.4 % — LOW (ref 13–44)
MACROCYTES BLD QL: SLIGHT — SIGNIFICANT CHANGE UP
MAGNESIUM SERPL-MCNC: 1.9 MG/DL — SIGNIFICANT CHANGE UP (ref 1.6–2.6)
MANUAL SMEAR VERIFICATION: SIGNIFICANT CHANGE UP
MCHC RBC-ENTMCNC: 25.4 PG — LOW (ref 27–34)
MCHC RBC-ENTMCNC: 26.3 PG — LOW (ref 27–34)
MCHC RBC-ENTMCNC: 28.6 GM/DL — LOW (ref 32–36)
MCHC RBC-ENTMCNC: 29.9 GM/DL — LOW (ref 32–36)
MCV RBC AUTO: 88.1 FL — SIGNIFICANT CHANGE UP (ref 80–100)
MCV RBC AUTO: 88.6 FL — SIGNIFICANT CHANGE UP (ref 80–100)
MICROCYTES BLD QL: SLIGHT — SIGNIFICANT CHANGE UP
MONOCYTES # BLD AUTO: 0.93 K/UL — HIGH (ref 0–0.9)
MONOCYTES NFR BLD AUTO: 4.3 % — SIGNIFICANT CHANGE UP (ref 2–14)
NEUTROPHILS # BLD AUTO: 19.7 K/UL — HIGH (ref 1.8–7.4)
NEUTROPHILS NFR BLD AUTO: 88.8 % — HIGH (ref 43–77)
NEUTS BAND # BLD: 2.6 % — SIGNIFICANT CHANGE UP (ref 0–8)
NON-GYNECOLOGICAL CYTOLOGY STUDY: SIGNIFICANT CHANGE UP
PCO2 BLDV: 44 MMHG — SIGNIFICANT CHANGE UP (ref 35–50)
PH BLDV: 7.44 — SIGNIFICANT CHANGE UP (ref 7.35–7.45)
PHOSPHATE SERPL-MCNC: 3.1 MG/DL — SIGNIFICANT CHANGE UP (ref 2.5–4.5)
PLAT MORPH BLD: NORMAL — SIGNIFICANT CHANGE UP
PLATELET # BLD AUTO: 204 K/UL — SIGNIFICANT CHANGE UP (ref 150–400)
PLATELET # BLD AUTO: 214 K/UL — SIGNIFICANT CHANGE UP (ref 150–400)
PO2 BLDV: 35 MMHG — SIGNIFICANT CHANGE UP (ref 25–45)
POIKILOCYTOSIS BLD QL AUTO: SLIGHT — SIGNIFICANT CHANGE UP
POLYCHROMASIA BLD QL SMEAR: SLIGHT — SIGNIFICANT CHANGE UP
POTASSIUM SERPL-MCNC: 3.7 MMOL/L — SIGNIFICANT CHANGE UP (ref 3.5–5.3)
POTASSIUM SERPL-MCNC: 4 MMOL/L — SIGNIFICANT CHANGE UP (ref 3.5–5.3)
POTASSIUM SERPL-SCNC: 3.7 MMOL/L — SIGNIFICANT CHANGE UP (ref 3.5–5.3)
POTASSIUM SERPL-SCNC: 4 MMOL/L — SIGNIFICANT CHANGE UP (ref 3.5–5.3)
PROT SERPL-MCNC: 6 G/DL — SIGNIFICANT CHANGE UP (ref 6–8.3)
PROTHROM AB SERPL-ACNC: 16.8 SEC — HIGH (ref 10–12.9)
RBC # BLD: 3.19 M/UL — LOW (ref 4.2–5.8)
RBC # BLD: 3.43 M/UL — LOW (ref 4.2–5.8)
RBC # FLD: 15.9 % — HIGH (ref 10.3–14.5)
RBC # FLD: 16 % — HIGH (ref 10.3–14.5)
RBC BLD AUTO: ABNORMAL
RH IG SCN BLD-IMP: POSITIVE — SIGNIFICANT CHANGE UP
SAO2 % BLDV: 62 % — LOW (ref 67–88)
SCHISTOCYTES BLD QL AUTO: SLIGHT — SIGNIFICANT CHANGE UP
SODIUM SERPL-SCNC: 136 MMOL/L — SIGNIFICANT CHANGE UP (ref 135–145)
SODIUM SERPL-SCNC: 137 MMOL/L — SIGNIFICANT CHANGE UP (ref 135–145)
TROPONIN T, HIGH SENSITIVITY RESULT: 134 NG/L — HIGH (ref 0–51)
TSH SERPL-MCNC: 2.38 UIU/ML — SIGNIFICANT CHANGE UP (ref 0.27–4.2)
WBC # BLD: 20.75 K/UL — HIGH (ref 3.8–10.5)
WBC # BLD: 21.55 K/UL — HIGH (ref 3.8–10.5)
WBC # FLD AUTO: 20.75 K/UL — HIGH (ref 3.8–10.5)
WBC # FLD AUTO: 21.55 K/UL — HIGH (ref 3.8–10.5)

## 2019-11-26 PROCEDURE — 99233 SBSQ HOSP IP/OBS HIGH 50: CPT

## 2019-11-26 PROCEDURE — 93010 ELECTROCARDIOGRAM REPORT: CPT

## 2019-11-26 PROCEDURE — 36556 INSERT NON-TUNNEL CV CATH: CPT | Mod: GC

## 2019-11-26 PROCEDURE — 71045 X-RAY EXAM CHEST 1 VIEW: CPT | Mod: 26

## 2019-11-26 PROCEDURE — 31500 INSERT EMERGENCY AIRWAY: CPT

## 2019-11-26 RX ORDER — FENTANYL CITRATE 50 UG/ML
50 INJECTION INTRAVENOUS ONCE
Refills: 0 | Status: DISCONTINUED | OUTPATIENT
Start: 2019-11-26 | End: 2019-11-26

## 2019-11-26 RX ORDER — MYCOPHENOLATE MOFETIL 250 MG/1
250 CAPSULE ORAL EVERY 12 HOURS
Refills: 0 | Status: DISCONTINUED | OUTPATIENT
Start: 2019-11-26 | End: 2019-11-27

## 2019-11-26 RX ORDER — MIDAZOLAM HYDROCHLORIDE 1 MG/ML
4 INJECTION, SOLUTION INTRAMUSCULAR; INTRAVENOUS ONCE
Refills: 0 | Status: DISCONTINUED | OUTPATIENT
Start: 2019-11-26 | End: 2019-11-26

## 2019-11-26 RX ORDER — PROPOFOL 10 MG/ML
10 INJECTION, EMULSION INTRAVENOUS
Qty: 500 | Refills: 0 | Status: DISCONTINUED | OUTPATIENT
Start: 2019-11-26 | End: 2019-11-26

## 2019-11-26 RX ORDER — MEROPENEM 1 G/30ML
INJECTION INTRAVENOUS
Refills: 0 | Status: DISCONTINUED | OUTPATIENT
Start: 2019-11-26 | End: 2019-11-30

## 2019-11-26 RX ORDER — VANCOMYCIN HCL 1 G
500 VIAL (EA) INTRAVENOUS EVERY 24 HOURS
Refills: 0 | Status: DISCONTINUED | OUTPATIENT
Start: 2019-11-26 | End: 2019-11-26

## 2019-11-26 RX ORDER — MEROPENEM 1 G/30ML
500 INJECTION INTRAVENOUS EVERY 24 HOURS
Refills: 0 | Status: DISCONTINUED | OUTPATIENT
Start: 2019-11-27 | End: 2019-11-30

## 2019-11-26 RX ORDER — CHLORHEXIDINE GLUCONATE 213 G/1000ML
15 SOLUTION TOPICAL EVERY 12 HOURS
Refills: 0 | Status: DISCONTINUED | OUTPATIENT
Start: 2019-11-26 | End: 2019-11-29

## 2019-11-26 RX ORDER — MYCOPHENOLATE MOFETIL 250 MG/1
250 CAPSULE ORAL
Refills: 0 | Status: DISCONTINUED | OUTPATIENT
Start: 2019-11-26 | End: 2019-11-26

## 2019-11-26 RX ORDER — NOREPINEPHRINE BITARTRATE/D5W 8 MG/250ML
0.05 PLASTIC BAG, INJECTION (ML) INTRAVENOUS
Qty: 8 | Refills: 0 | Status: DISCONTINUED | OUTPATIENT
Start: 2019-11-26 | End: 2019-11-26

## 2019-11-26 RX ORDER — ATORVASTATIN CALCIUM 80 MG/1
40 TABLET, FILM COATED ORAL AT BEDTIME
Refills: 0 | Status: DISCONTINUED | OUTPATIENT
Start: 2019-11-26 | End: 2019-12-21

## 2019-11-26 RX ORDER — PROPOFOL 10 MG/ML
30 INJECTION, EMULSION INTRAVENOUS
Qty: 1000 | Refills: 0 | Status: DISCONTINUED | OUTPATIENT
Start: 2019-11-26 | End: 2019-11-28

## 2019-11-26 RX ORDER — THIAMINE MONONITRATE (VIT B1) 100 MG
100 TABLET ORAL DAILY
Refills: 0 | Status: DISCONTINUED | OUTPATIENT
Start: 2019-11-26 | End: 2019-11-30

## 2019-11-26 RX ORDER — MIDODRINE HYDROCHLORIDE 2.5 MG/1
10 TABLET ORAL THREE TIMES A DAY
Refills: 0 | Status: DISCONTINUED | OUTPATIENT
Start: 2019-11-26 | End: 2019-12-12

## 2019-11-26 RX ORDER — HEPARIN SODIUM 5000 [USP'U]/ML
5000 INJECTION INTRAVENOUS; SUBCUTANEOUS EVERY 8 HOURS
Refills: 0 | Status: DISCONTINUED | OUTPATIENT
Start: 2019-11-26 | End: 2019-11-29

## 2019-11-26 RX ORDER — CHLORHEXIDINE GLUCONATE 213 G/1000ML
1 SOLUTION TOPICAL
Refills: 0 | Status: DISCONTINUED | OUTPATIENT
Start: 2019-11-26 | End: 2019-12-21

## 2019-11-26 RX ORDER — PROPOFOL 10 MG/ML
20 INJECTION, EMULSION INTRAVENOUS ONCE
Refills: 0 | Status: COMPLETED | OUTPATIENT
Start: 2019-11-26 | End: 2019-11-26

## 2019-11-26 RX ORDER — SEVELAMER CARBONATE 2400 MG/1
800 POWDER, FOR SUSPENSION ORAL
Refills: 0 | Status: DISCONTINUED | OUTPATIENT
Start: 2019-11-26 | End: 2019-11-28

## 2019-11-26 RX ORDER — NOREPINEPHRINE BITARTRATE/D5W 8 MG/250ML
0.05 PLASTIC BAG, INJECTION (ML) INTRAVENOUS
Qty: 8 | Refills: 0 | Status: DISCONTINUED | OUTPATIENT
Start: 2019-11-26 | End: 2019-11-29

## 2019-11-26 RX ORDER — MEROPENEM 1 G/30ML
500 INJECTION INTRAVENOUS ONCE
Refills: 0 | Status: COMPLETED | OUTPATIENT
Start: 2019-11-26 | End: 2019-11-26

## 2019-11-26 RX ORDER — PROPOFOL 10 MG/ML
50 INJECTION, EMULSION INTRAVENOUS ONCE
Refills: 0 | Status: COMPLETED | OUTPATIENT
Start: 2019-11-26 | End: 2019-11-26

## 2019-11-26 RX ORDER — VANCOMYCIN HCL 1 G
1000 VIAL (EA) INTRAVENOUS ONCE
Refills: 0 | Status: COMPLETED | OUTPATIENT
Start: 2019-11-26 | End: 2019-11-26

## 2019-11-26 RX ORDER — HUMAN INSULIN 100 [IU]/ML
6 INJECTION, SUSPENSION SUBCUTANEOUS EVERY 8 HOURS
Refills: 0 | Status: DISCONTINUED | OUTPATIENT
Start: 2019-11-26 | End: 2019-11-26

## 2019-11-26 RX ADMIN — SEVELAMER CARBONATE 800 MILLIGRAM(S): 2400 POWDER, FOR SUSPENSION ORAL at 18:18

## 2019-11-26 RX ADMIN — Medication 4: at 13:12

## 2019-11-26 RX ADMIN — SEVELAMER CARBONATE 800 MILLIGRAM(S): 2400 POWDER, FOR SUSPENSION ORAL at 13:03

## 2019-11-26 RX ADMIN — LEVETIRACETAM 400 MILLIGRAM(S): 250 TABLET, FILM COATED ORAL at 04:59

## 2019-11-26 RX ADMIN — MIDAZOLAM HYDROCHLORIDE 4 MILLIGRAM(S): 1 INJECTION, SOLUTION INTRAMUSCULAR; INTRAVENOUS at 15:50

## 2019-11-26 RX ADMIN — Medication 6.38 MICROGRAM(S)/KG/MIN: at 15:40

## 2019-11-26 RX ADMIN — PROPOFOL 20 MILLIGRAM(S): 10 INJECTION, EMULSION INTRAVENOUS at 15:51

## 2019-11-26 RX ADMIN — MIDODRINE HYDROCHLORIDE 10 MILLIGRAM(S): 2.5 TABLET ORAL at 13:03

## 2019-11-26 RX ADMIN — Medication 250 MILLIGRAM(S): at 17:33

## 2019-11-26 RX ADMIN — LEVETIRACETAM 400 MILLIGRAM(S): 250 TABLET, FILM COATED ORAL at 17:31

## 2019-11-26 RX ADMIN — FENTANYL CITRATE 50 MICROGRAM(S): 50 INJECTION INTRAVENOUS at 18:00

## 2019-11-26 RX ADMIN — Medication 5 MILLIGRAM(S): at 05:42

## 2019-11-26 RX ADMIN — ESCITALOPRAM OXALATE 20 MILLIGRAM(S): 10 TABLET, FILM COATED ORAL at 13:03

## 2019-11-26 RX ADMIN — CYCLOBENZAPRINE HYDROCHLORIDE 5 MILLIGRAM(S): 10 TABLET, FILM COATED ORAL at 00:42

## 2019-11-26 RX ADMIN — MEROPENEM 100 MILLIGRAM(S): 1 INJECTION INTRAVENOUS at 15:30

## 2019-11-26 RX ADMIN — PROPOFOL 50 MILLIGRAM(S): 10 INJECTION, EMULSION INTRAVENOUS at 15:56

## 2019-11-26 RX ADMIN — MIDODRINE HYDROCHLORIDE 10 MILLIGRAM(S): 2.5 TABLET ORAL at 05:42

## 2019-11-26 RX ADMIN — FENTANYL CITRATE 50 MICROGRAM(S): 50 INJECTION INTRAVENOUS at 18:16

## 2019-11-26 RX ADMIN — MYCOPHENOLATE MOFETIL 250 MILLIGRAM(S): 250 CAPSULE ORAL at 05:42

## 2019-11-26 RX ADMIN — MIDODRINE HYDROCHLORIDE 10 MILLIGRAM(S): 2.5 TABLET ORAL at 17:49

## 2019-11-26 RX ADMIN — Medication 100 MILLIGRAM(S): at 13:03

## 2019-11-26 RX ADMIN — CHLORHEXIDINE GLUCONATE 15 MILLILITER(S): 213 SOLUTION TOPICAL at 18:14

## 2019-11-26 RX ADMIN — CARVEDILOL PHOSPHATE 6.25 MILLIGRAM(S): 80 CAPSULE, EXTENDED RELEASE ORAL at 05:48

## 2019-11-26 RX ADMIN — ATORVASTATIN CALCIUM 40 MILLIGRAM(S): 80 TABLET, FILM COATED ORAL at 21:01

## 2019-11-26 RX ADMIN — HEPARIN SODIUM 5000 UNIT(S): 5000 INJECTION INTRAVENOUS; SUBCUTANEOUS at 21:01

## 2019-11-26 RX ADMIN — SEVELAMER CARBONATE 800 MILLIGRAM(S): 2400 POWDER, FOR SUSPENSION ORAL at 08:19

## 2019-11-26 RX ADMIN — CYCLOBENZAPRINE HYDROCHLORIDE 5 MILLIGRAM(S): 10 TABLET, FILM COATED ORAL at 05:42

## 2019-11-26 RX ADMIN — CYCLOBENZAPRINE HYDROCHLORIDE 5 MILLIGRAM(S): 10 TABLET, FILM COATED ORAL at 13:03

## 2019-11-26 NOTE — PROCEDURE NOTE - NSPOSTPRCRAD_GEN_A_CORE
CXR pending/depth of insertion/post-procedure radiography performed central line located in the superior vena cava/post-procedure radiography performed

## 2019-11-26 NOTE — PROGRESS NOTE ADULT - SUBJECTIVE AND OBJECTIVE BOX
Subjective: Patient seen and examined. No new events except as noted.     SUBJECTIVE/ROS:  lethargic       MEDICATIONS:  MEDICATIONS  (STANDING):  atorvastatin 40 milliGRAM(s) Oral at bedtime  carvedilol 6.25 milliGRAM(s) Oral every 12 hours  cefTRIAXone   IVPB 1000 milliGRAM(s) IV Intermittent every 24 hours  cyclobenzaprine 5 milliGRAM(s) Oral four times a day  dextrose 5%. 1000 milliLiter(s) (50 mL/Hr) IV Continuous <Continuous>  dextrose 50% Injectable 12.5 Gram(s) IV Push once  dextrose 50% Injectable 25 Gram(s) IV Push once  dextrose 50% Injectable 25 Gram(s) IV Push once  escitalopram 20 milliGRAM(s) Oral daily  insulin lispro (HumaLOG) corrective regimen sliding scale   SubCutaneous every 6 hours  levETIRAcetam  IVPB 1000 milliGRAM(s) IV Intermittent every 12 hours  midodrine. 10 milliGRAM(s) Oral three times a day  mycophenolate mofetil 250 milliGRAM(s) Oral two times a day  predniSONE   Tablet 5 milliGRAM(s) Oral daily  sevelamer carbonate 800 milliGRAM(s) Oral three times a day with meals  thiamine 100 milliGRAM(s) Oral daily      PHYSICAL EXAM:  T(C): 36.7 (11-26-19 @ 06:17), Max: 37.4 (11-26-19 @ 00:02)  HR: 130 (11-26-19 @ 06:17) (108 - 130)  BP: 136/73 (11-26-19 @ 05:38) (90/65 - 138/70)  RR: 18 (11-26-19 @ 06:17) (18 - 18)  SpO2: 100% (11-26-19 @ 06:17) (97% - 100%)  Wt(kg): --  I&O's Summary    25 Nov 2019 07:01  -  26 Nov 2019 07:00  --------------------------------------------------------  IN: 850 mL / OUT: 1800 mL / NET: -950 mL            JVP: Normal  Neck: supple  Lung: clear   CV: S1 S2 , Murmur:  Abd: soft  Ext: No edema  Psych: flat affect  Skin: normal``    LABS/DATA:    CARDIAC MARKERS:                                9.6    11.76 )-----------( 183      ( 25 Nov 2019 15:24 )             33.6     11-25    133<L>  |  94<L>  |  24<H>  ----------------------------<  178<H>  4.0   |  23  |  3.88<H>    Ca    11.1<H>      25 Nov 2019 12:12      proBNP:   Lipid Profile:   HgA1c:   TSH:     TELE:  EKG:

## 2019-11-26 NOTE — PROGRESS NOTE ADULT - SUBJECTIVE AND OBJECTIVE BOX
Patient is a 74y Male whom presented to the hospital with esrd on hd   transfer to [facility]   PAST MEDICAL & SURGICAL HISTORY:  Kidney transplant recipient  Anuria  GERD (gastroesophageal reflux disease)  Kidney transplant failure: dx: 2/2013  Hepatitis C: dx: 90&#x27;s.  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 10 milliGRAM(s) Oral three times a day  mycophenolate mofetil 250 milliGRAM(s) Oral two times a day  predniSONE   Tablet 5 milliGRAM(s) Oral daily  sevelamer carbonate 800 milliGRAM(s) Oral three times a day with meals  sodium bicarbonate 650 milliGRAM(s) Oral two times a day  tamsulosin 0.4 milliGRAM(s) Oral at bedtime      Allergies    No Known Allergies    Intolerances        SOCIAL HISTORY:  Denies ETOh,Smoking,     FAMILY HISTORY:  Family history of breast cancer in sister (Sibling): living at 92 yo  Family history of prostate cancer in father  Family history of lung cancer  Family history of hypertension in mother  Family history of diabetes mellitus: mother      REVIEW OF SYSTEMS:    unbable to obtained                                                       8.4    21.55 )-----------( 204      ( 26 Nov 2019 16:21 )             28.1       CBC Full  -  ( 26 Nov 2019 16:21 )  WBC Count : 21.55 K/uL  RBC Count : 3.19 M/uL  Hemoglobin : 8.4 g/dL  Hematocrit : 28.1 %  Platelet Count - Automated : 204 K/uL  Mean Cell Volume : 88.1 fl  Mean Cell Hemoglobin : 26.3 pg  Mean Cell Hemoglobin Concentration : 29.9 gm/dL  Auto Neutrophil # : 19.70 K/uL  Auto Lymphocyte # : 0.73 K/uL  Auto Monocyte # : 0.93 K/uL  Auto Eosinophil # : 0.00 K/uL  Auto Basophil # : 0.19 K/uL  Auto Neutrophil % : 88.8 %  Auto Lymphocyte % : 3.4 %  Auto Monocyte % : 4.3 %  Auto Eosinophil % : 0.0 %  Auto Basophil % : 0.9 %      11-26    137  |  97  |  22  ----------------------------<  87  3.7   |  27  |  3.55<H>    Ca    10.8<H>      26 Nov 2019 15:54  Phos  3.1     11-26  Mg     1.9     11-26    TPro  6.0  /  Alb  2.3<L>  /  TBili  0.6  /  DBili  x   /  AST  14  /  ALT  10  /  AlkPhos  122<H>  11-26      CAPILLARY BLOOD GLUCOSE      POCT Blood Glucose.: 93 mg/dL (26 Nov 2019 14:57)  POCT Blood Glucose.: 301 mg/dL (26 Nov 2019 12:35)  POCT Blood Glucose.: 110 mg/dL (26 Nov 2019 05:26)  POCT Blood Glucose.: 105 mg/dL (25 Nov 2019 23:58)  POCT Blood Glucose.: 150 mg/dL (25 Nov 2019 19:57)      Vital Signs Last 24 Hrs  T(C): 38.1 (26 Nov 2019 15:45), Max: 38.1 (26 Nov 2019 15:45)  T(F): 100.6 (26 Nov 2019 15:45), Max: 100.6 (26 Nov 2019 15:45)  HR: 97 (26 Nov 2019 19:15) (82 - 130)  BP: 98/57 (26 Nov 2019 19:15) (67/51 - 182/89)  BP(mean): 71 (26 Nov 2019 19:15) (53 - 128)  RR: 14 (26 Nov 2019 19:15) (14 - 28)  SpO2: 100% (26 Nov 2019 19:15) (93% - 100%)        PT/INR - ( 26 Nov 2019 16:21 )   PT: 16.8 sec;   INR: 1.44 ratio         PTT - ( 26 Nov 2019 16:21 )  PTT:33.3 sec                                  PHYSICAL EXAM:  intubated   Constitutional: intubated    HEENT: conjunctive   clear   Neck:  No JVD  Respiratory: decrease bs b/l   Cardiovascular: S1 and S2  Gastrointestinal: BS+, soft, NT/ND  Extremities: No peripheral edema  Neurological: intubated   Skin: dry   Access: pos fistula

## 2019-11-26 NOTE — PROGRESS NOTE ADULT - SUBJECTIVE AND OBJECTIVE BOX
Chief complaint  Patient is a 74y old  Male who presents with a chief complaint of ams (26 Nov 2019 14:41)   Review of systems  Patient in bed, looks comfortable, no fever, no hypoglycemia.    Labs and Fingersticks  CAPILLARY BLOOD GLUCOSE      POCT Blood Glucose.: 93 mg/dL (26 Nov 2019 14:57)  POCT Blood Glucose.: 301 mg/dL (26 Nov 2019 12:35)  POCT Blood Glucose.: 110 mg/dL (26 Nov 2019 05:26)  POCT Blood Glucose.: 105 mg/dL (25 Nov 2019 23:58)  POCT Blood Glucose.: 150 mg/dL (25 Nov 2019 19:57)      Anion Gap, Serum: 12 (11-26 @ 08:58)  Anion Gap, Serum: 16 (11-25 @ 12:12)      Calcium, Total Serum: 10.4 (11-26 @ 08:58)  Calcium, Total Serum: 11.1 <H> (11-25 @ 12:12)          11-26    136  |  97  |  18  ----------------------------<  167<H>  4.0   |  27  |  3.18<H>    Ca    10.4      26 Nov 2019 08:58                          8.7    20.75 )-----------( 214      ( 26 Nov 2019 11:50 )             30.4     Medications  MEDICATIONS  (STANDING):  atorvastatin 40 milliGRAM(s) Oral at bedtime  carvedilol 6.25 milliGRAM(s) Oral every 12 hours  dextrose 5%. 1000 milliLiter(s) (50 mL/Hr) IV Continuous <Continuous>  dextrose 50% Injectable 12.5 Gram(s) IV Push once  dextrose 50% Injectable 25 Gram(s) IV Push once  dextrose 50% Injectable 25 Gram(s) IV Push once  insulin lispro (HumaLOG) corrective regimen sliding scale   SubCutaneous every 6 hours  insulin NPH human recombinant 6 Unit(s) SubCutaneous every 8 hours  levETIRAcetam  IVPB 1000 milliGRAM(s) IV Intermittent every 12 hours  meropenem  IVPB 500 milliGRAM(s) IV Intermittent once  meropenem  IVPB      midodrine. 10 milliGRAM(s) Oral three times a day  mycophenolate mofetil 250 milliGRAM(s) Oral two times a day  norepinephrine Infusion 0.05 MICROgram(s)/kG/Min (6.375 mL/Hr) IV Continuous <Continuous>  predniSONE   Tablet 5 milliGRAM(s) Oral daily  sevelamer carbonate 800 milliGRAM(s) Oral three times a day with meals  thiamine 100 milliGRAM(s) Oral daily  vancomycin  IVPB 500 milliGRAM(s) IV Intermittent every 24 hours      Physical Exam  General: Patient comfortable in bed  Vital Signs Last 12 Hrs  T(F): 97.7 (11-26-19 @ 08:56), Max: 98.1 (11-26-19 @ 06:17)  HR: 128 (11-26-19 @ 08:56) (123 - 130)  BP: 91/58 (11-26-19 @ 08:56) (91/58 - 136/73)  BP(mean): --  RR: 18 (11-26-19 @ 08:56) (18 - 18)  SpO2: 93% (11-26-19 @ 08:56) (93% - 100%)  Neck: No palpable thyroid nodules.  CVS: S1S2, No murmurs  Respiratory: No wheezing, no crepitations  GI: Abdomen soft, bowel sounds positive  Musculoskeletal:  edema lower extremities.   Skin: No skin rashes, no ecchymosis    Diagnostics Chief complaint  Patient is a 74y old  Male who presents with a chief complaint of ams (26 Nov 2019 14:41)   Review of systems  Patient in bed, looks comfortable, no fever,  no hypoglycemia.    Labs and Fingersticks  CAPILLARY BLOOD GLUCOSE      POCT Blood Glucose.: 93 mg/dL (26 Nov 2019 14:57)  POCT Blood Glucose.: 301 mg/dL (26 Nov 2019 12:35)  POCT Blood Glucose.: 110 mg/dL (26 Nov 2019 05:26)  POCT Blood Glucose.: 105 mg/dL (25 Nov 2019 23:58)  POCT Blood Glucose.: 150 mg/dL (25 Nov 2019 19:57)      Anion Gap, Serum: 12 (11-26 @ 08:58)  Anion Gap, Serum: 16 (11-25 @ 12:12)      Calcium, Total Serum: 10.4 (11-26 @ 08:58)  Calcium, Total Serum: 11.1 <H> (11-25 @ 12:12)          11-26    136  |  97  |  18  ----------------------------<  167<H>  4.0   |  27  |  3.18<H>    Ca    10.4      26 Nov 2019 08:58                          8.7    20.75 )-----------( 214      ( 26 Nov 2019 11:50 )             30.4     Medications  MEDICATIONS  (STANDING):  atorvastatin 40 milliGRAM(s) Oral at bedtime  carvedilol 6.25 milliGRAM(s) Oral every 12 hours  dextrose 5%. 1000 milliLiter(s) (50 mL/Hr) IV Continuous <Continuous>  dextrose 50% Injectable 12.5 Gram(s) IV Push once  dextrose 50% Injectable 25 Gram(s) IV Push once  dextrose 50% Injectable 25 Gram(s) IV Push once  insulin lispro (HumaLOG) corrective regimen sliding scale   SubCutaneous every 6 hours  insulin NPH human recombinant 6 Unit(s) SubCutaneous every 8 hours  levETIRAcetam  IVPB 1000 milliGRAM(s) IV Intermittent every 12 hours  meropenem  IVPB 500 milliGRAM(s) IV Intermittent once  meropenem  IVPB      midodrine. 10 milliGRAM(s) Oral three times a day  mycophenolate mofetil 250 milliGRAM(s) Oral two times a day  norepinephrine Infusion 0.05 MICROgram(s)/kG/Min (6.375 mL/Hr) IV Continuous <Continuous>  predniSONE   Tablet 5 milliGRAM(s) Oral daily  sevelamer carbonate 800 milliGRAM(s) Oral three times a day with meals  thiamine 100 milliGRAM(s) Oral daily  vancomycin  IVPB 500 milliGRAM(s) IV Intermittent every 24 hours      Physical Exam  General: Patient comfortable in bed  Vital Signs Last 12 Hrs  T(F): 97.7 (11-26-19 @ 08:56), Max: 98.1 (11-26-19 @ 06:17)  HR: 128 (11-26-19 @ 08:56) (123 - 130)  BP: 91/58 (11-26-19 @ 08:56) (91/58 - 136/73)  BP(mean): --  RR: 18 (11-26-19 @ 08:56) (18 - 18)  SpO2: 93% (11-26-19 @ 08:56) (93% - 100%)  Neck: No palpable thyroid nodules.  CVS: S1S2, No murmurs  Respiratory: No wheezing, no crepitations  GI: Abdomen soft, bowel sounds positive  Musculoskeletal:  edema lower extremities.   Skin: No skin rashes, no ecchymosis    Diagnostics

## 2019-11-26 NOTE — PROGRESS NOTE ADULT - PROBLEM SELECTOR PLAN 1
Will start patient on NPH 6 q8. Will continue monitoring FS, log, and FU.  Suggest to hold insulin if off tube feeds. Will start patient on NPH 6 q8. Will continue monitoring FS, log, and FU.

## 2019-11-26 NOTE — PROGRESS NOTE ADULT - ASSESSMENT
HTN  stable   on midodrine for autonomic dysfunction     History of cocaine induced CM  normal EF on last echo  BB given tachycardia     PAF  in sinus   a/c once deemed safe     Hypercalcemia  as per renal     Bacteruria   anbx  fu with ID    Tachycardia  obtain EKG  pt did not get coreg yesterday , got this am

## 2019-11-26 NOTE — CHART NOTE - NSCHARTNOTEFT_GEN_A_CORE
Upon Nutritional Assessment by the Registered Dietitian your patient was determined to meet criteria / has evidence of the following diagnosis/diagnoses:          [ ]  Mild Protein Calorie Malnutrition        [ ]  Moderate Protein Calorie Malnutrition        [x ] Severe Protein Calorie Malnutrition        [ ] Unspecified Protein Calorie Malnutrition        [ ] Underweight / BMI <19        [ ] Morbid Obesity / BMI > 40      Findings as based on:  [x ] Comprehensive nutrition assessment: meeting <50% estimated needs for > 5 days   [x ] Nutrition Focused Physical Exam: moderate-severe muscle mass and body fat loss  [ ] Other:       Nutrition Plan/Recommendations:  Increase tube feeding, please refer to chart note        PROVIDER Section:     By signing this assessment you are acknowledging and agree with the diagnosis/diagnoses assigned by the Registered Dietitian    Comments:

## 2019-11-26 NOTE — CHART NOTE - NSCHARTNOTEFT_GEN_A_CORE
Nutrition Follow Up Note  Patient seen for: Pt seen for nutrition consult for tube feeding. Pt is a 74 year old male with PMH including type 2 DM, ESRD S/P kidney transplant x 2 however has been back on hemodialysis since 2013 now admitted with AMS undergoing work up. Noted pt has remained lethargic and unsafe for oral intake S/P NGT placement and initiation of tube feeding yesterday.     Source: RN, comprehensive chart review, pt noted to be lethargic and non-verbal, unable to follow commands at this time    Diet : Glucerna 1.2 Say at 30 mL/h x 24 h    Patient reports: last BM noted today, 2 noted yesterday.     Intake: Pt had been NPO 11/20-11/25, tube feeding started yesterday and currently infusing at goal of 30 mL/h x 24 h      Weight: Noted per previous RD notes pt with significant weight change during past extended hospitalization 8/26-9/24  from previous admission weight 234.3 lbs (8/27/19) to weight closer to discharge 172.1 lbs (9/24) weight changes like partially related to fluid shifts, however noted per previous RD notes pt had been NPO for an extended time and poor po intake thereafter. Pt seems to have had a further 14 lb wt loss to current admission weight 158.2 lbs (11/21) which has remained stable thus far to recent weight 158.2 lbs after dialysis.     Nutrition focused physical exam conducted in presence of RN, pt unable to provide verbal consent at this time, exam limited by pt inability to follow commands and change position pt with evidence of severe muscle loss to temple, moderate-severe muscle loss to clavicle, moderate fat loss to orbital fat pad, moderate muscle mass depletion to lower extremities.     Pertinent Medications: MEDICATIONS  (STANDING):  atorvastatin 40 milliGRAM(s) Oral at bedtime  carvedilol 6.25 milliGRAM(s) Oral every 12 hours  cefTRIAXone   IVPB 1000 milliGRAM(s) IV Intermittent every 24 hours  cyclobenzaprine 5 milliGRAM(s) Oral four times a day  dextrose 5%. 1000 milliLiter(s) (50 mL/Hr) IV Continuous <Continuous>  dextrose 50% Injectable 12.5 Gram(s) IV Push once  dextrose 50% Injectable 25 Gram(s) IV Push once  dextrose 50% Injectable 25 Gram(s) IV Push once  escitalopram 20 milliGRAM(s) Oral daily  insulin lispro (HumaLOG) corrective regimen sliding scale   SubCutaneous every 6 hours  levETIRAcetam  IVPB 1000 milliGRAM(s) IV Intermittent every 12 hours  midodrine. 10 milliGRAM(s) Oral three times a day  mycophenolate mofetil 250 milliGRAM(s) Oral two times a day  predniSONE   Tablet 5 milliGRAM(s) Oral daily  sevelamer carbonate 800 milliGRAM(s) Oral three times a day with meals  thiamine 100 milliGRAM(s) Oral daily    MEDICATIONS  (PRN):  dextrose 40% Gel 15 Gram(s) Oral once PRN Blood Glucose LESS THAN 70 milliGRAM(s)/deciliter  glucagon  Injectable 1 milliGRAM(s) IntraMuscular once PRN Glucose LESS THAN 70 milligrams/deciliter    Pertinent Labs: 11-26 @ 08:58: Na 136, BUN 18, Cr 3.18<H>, <H>, K+ 4.0, Phos --, Mg --, Alk Phos --, ALT/SGPT --, AST/SGOT --, HbA1c --    Finger Sticks:  POCT Blood Glucose.: 301 mg/dL (11-26 @ 12:35)  POCT Blood Glucose.: 110 mg/dL (11-26 @ 05:26)  POCT Blood Glucose.: 105 mg/dL (11-25 @ 23:58)  POCT Blood Glucose.: 150 mg/dL (11-25 @ 19:57)  POCT Blood Glucose.: 134 mg/dL (11-25 @ 13:57)      Skin per nursing documentation: noted with stage 2 pressure injury to sacrum  Edema: 1+ generalized edema noted     Estimated Needs:   [ ] no change since previous assessment  [x ] recalculated: based on recent weight 71.9 Kg    [id number] (30-35 kcal/Kg)  101-115 (1.4-1.6g protein/kg)    Previous Nutrition Diagnosis:   increased nutrient needs-> ongoing  Inadequate energy intake-> escalated to diagnosis below    New Nutrition Diagnosis: Severe malnutrition   Related to: swallowing difficulty, increased demand for nutrients in setting of dialysis, lethargy   As evidenced by: Pt meeting <50% estimated needs for >5 days, evidence of moderate-severe muscle mass and body fat loss     Interventions:     Recommend  1) Consider changing tube feeding via NGT to Nepro given pt with ESRD on HD and advance to goal of 55 mL/h x 24 h to provide 1320 mL total volume, 2376 Kcal, 107g protein, 960 mL free water (33 Kcal/Kg, 1.5g protein/Kg based on recent weight 71.9 Kg)  2) Monitor mental status for ability to re-initiate oral diet, would consider swallow evaluation prior to diet advance if medically feasible.    Monitoring and Evaluation:     Continue to monitor Nutritional intake, Tolerance to diet prescription, weights, labs, skin integrity    RD remains available upon request and will follow up per protocol

## 2019-11-26 NOTE — CHART NOTE - NSCHARTNOTEFT_GEN_A_CORE
MICU Accept Note    CHIEF COMPLAINT:     HPI / INTERVAL HISTORY:    Patient is a 75yo gentleman (handedness unknown) who presents to the facility on the afternoon of 11/20/19 from nursing home regarding complaints of altered mental status that has persisted since at least 11/19/19. 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 History unable to be obtained directly from patient given clinical condition. As per nursing supervisor, patient has been suffering from altered mental status since yesterday with notable worsening this morning marked with restlessness along with unable to follow commands. Patient initially was supposed to attend HD today but was rejected due to possessing a low blood pressure (90/40s). No family or friends at bedside to directly relay history to me. Chart accompanying patient on arrival demonstrates patient taking 2.5mg of Eliquis bid for nonvalvular Atrial fibrillation as well as Keppra for presumed seizure disorder. Code Stroke called on arrival, NIHSS of 16. BP of 112/55. CT Head Noncontrast negative for acute intracranial abnormality.     Patient was found to have e.coli bacteriuria/UTI now s/p LP for AMS which was negative for infection. ID following for ?new nosocomial infection vs other infectious process. Patient is s/p 1L removal from HD 11/25. RRT  called for hypotension 70s/30s; initiated 1l bolus w/o significant response. difficult IV access, IO placed on left leg. started on IV pressor support w/ levophed. cultures sent, cbc, cmp, vbg also obtained. MICU fellow POCUS US suggestive of hypovolemia as IVC collaspse. patient escorted to MICU. To be intubated in MICU 11/26.     PAST MEDICAL & SURGICAL HISTORY:  Kidney transplant recipient  Anuria  GERD (gastroesophageal reflux disease)  Kidney transplant failure: dx: 2/2013  Hepatitis C: dx: 90&#x27;s.  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, no known MIs  H/O kidney transplant: may 2015  A-V fistula: Left, implanted (?)  S/p cadaver renal transplant: 2008  S/P partial colectomy: due to diverticulitis      FAMILY HISTORY:  Family history of breast cancer in sister (Sibling): living at 94 yo  Family history of prostate cancer in father  Family history of lung cancer  Family history of hypertension in mother  Family history of diabetes mellitus: mother      SOCIAL HISTORY:  Smoking: [  ] Never Smoked  [  ] Former Smoker (# packs x # years)  [  ] Current Smoker (# packs x # years)  Substance Use:   EtOH Use:   Marital Status: [  ] Single  [  ]   [  ]   [  ]   Sexual History:   Occupation:  Recent Travel:  Country of Birth:   Advance Directives:     HOME MEDICATIONS:  Home Medications:  acetaminophen 325 mg oral tablet: 3 tab(s) orally every 6 hours, As Needed (20 Nov 2019 17:09)  Admelog SoloStar 100 units/mL injectable solution: Inject as per sliding scale. (20 Nov 2019 17:09)  allopurinol 100 mg oral tablet: 1 tab(s) orally once a day (20 Nov 2019 17:09)  apixaban 2.5 mg oral tablet: 1 tab(s) orally 2 times a day (20 Nov 2019 17:09)  atorvastatin 40 mg oral tablet: 1 tab(s) orally once a day (at bedtime) (20 Nov 2019 17:09)  carvedilol 6.25 mg oral tablet: 1 tab(s) orally every 12 hours (20 Nov 2019 17:09)  cinacalcet 30 mg oral tablet: 1 tab(s) orally once a day (20 Nov 2019 17:09)  cyclobenzaprine 5 mg oral tablet: 1 tab(s) orally 4 times a day (20 Nov 2019 17:09)  DuoNeb 0.5 mg-2.5 mg/3 mL inhalation solution: 3 milliliter(s) inhaled every 4 hours, As Needed (20 Nov 2019 17:09)  escitalopram 20 mg oral tablet: 1 tab(s) orally once a day (20 Nov 2019 17:09)  folic acid 1 mg oral tablet: 1 tab(s) orally once a day (20 Nov 2019 17:09)  furosemide 20 mg oral tablet: 3 tab(s) orally 2 times a day (20 Nov 2019 17:09)  hydrALAZINE 100 mg oral tablet: 1 tab(s) orally 3 times a day (20 Nov 2019 17:09)  hydrocortisone 25 mg rectal suppository: 1 suppository(ies) rectal once a day (at bedtime) (20 Nov 2019 17:09)  Imodium A-D 2 mg oral tablet: 1 tab(s) orally every 8 hours, As Needed (20 Nov 2019 17:09)  insulin glargine: 10 unit(s) subcutaneous once a day (at bedtime) (20 Nov 2019 17:09)  lactobacillus acidophilus oral capsule: 1 tab(s) orally 2 times a day (20 Nov 2019 17:09)  levETIRAcetam 1000 mg oral tablet: 1 tab(s) orally 2 times a day (20 Nov 2019 17:09)  Lidocaine Viscous 2% mucous membrane solution: Apply to anus every 4 hours, As Needed (20 Nov 2019 17:09)  Maalox Plus 200 mg-200 mg-20 mg/5 mL oral suspension: 30 milliliter(s) orally every 6 hours, As Needed (20 Nov 2019 17:09)  midodrine 10 mg oral tablet: 1 tab(s) orally 3 times a week, As Needed (20 Nov 2019 17:09)  mycophenolate mofetil 250 mg oral capsule: 1 cap(s) orally 2 times a day (20 Nov 2019 17:09)  Nephro-Eulalia oral tablet: 1 tab(s) orally once a day (20 Nov 2019 17:09)  ondansetron 4 mg oral tablet: 1 tab(s) orally every 6 hours, As Needed (20 Nov 2019 17:09)  predniSONE 5 mg oral tablet: 1 tab(s) orally once a day (20 Nov 2019 17:09)  raNITIdine 75 mg oral tablet: 1 tab(s) orally once a day (at bedtime) (20 Nov 2019 17:09)  sevelamer carbonate 800 mg oral tablet: 1 tab(s) orally 3 times a day (with meals) (20 Nov 2019 17:09)  silver sulfADIAZINE 1% topical cream: Apply topically to affected area 2 times a day (20 Nov 2019 17:09)  sodium bicarbonate 650 mg oral tablet: 1 tab(s) orally 2 times a day (20 Nov 2019 17:09)  tamsulosin 0.4 mg oral capsule: 1 cap(s) orally once a day (at bedtime) (20 Nov 2019 17:09)  thiamine 100 mg oral tablet: 1 tab(s) orally once a day (20 Nov 2019 17:09)  Zosyn 3 g-0.375 g intravenous injection: 3.375 gram(s) intravenous every 6 hours x10 days (20 Nov 2019 17:09)    however, Chart accompanying patient on arrival demonstrates patient taking 2.5mg of Eliquis bid for nonvalvular Atrial fibrillation as well as Keppra for presumed seizure disorder    Allergies    No Known Allergies    Intolerances          REVIEW OF SYSTEMS:  unable to assess 2/2 MS.      OBJECTIVE:  ICU Vital Signs Last 24 Hrs  T(C): 36.5 (26 Nov 2019 08:56), Max: 37.4 (26 Nov 2019 00:02)  T(F): 97.7 (26 Nov 2019 08:56), Max: 99.4 (26 Nov 2019 00:02)  HR: 128 (26 Nov 2019 08:56) (116 - 130)  BP: 91/58 (26 Nov 2019 08:56) (90/65 - 138/70)  BP(mean): --  ABP: --  ABP(mean): --  RR: 18 (26 Nov 2019 08:56) (18 - 18)  SpO2: 93% (26 Nov 2019 08:56) (93% - 100%)        11-25 @ 07:01 - 11-26 @ 07:00  --------------------------------------------------------  IN: 850 mL / OUT: 1800 mL / NET: -950 mL    11-26 @ 07:01 - 11-26 @ 15:48  --------------------------------------------------------  IN: 0 mL / OUT: 0 mL / NET: 0 mL      CAPILLARY BLOOD GLUCOSE      POCT Blood Glucose.: 93 mg/dL (26 Nov 2019 14:57)      PHYSICAL EXAM:      HOSPITAL MEDICATIONS:  MEDICATIONS  (STANDING):  atorvastatin 40 milliGRAM(s) Oral at bedtime  chlorhexidine 4% Liquid 1 Application(s) Topical <User Schedule>  insulin lispro (HumaLOG) corrective regimen sliding scale   SubCutaneous every 6 hours  levETIRAcetam  IVPB 1000 milliGRAM(s) IV Intermittent every 12 hours  meropenem  IVPB      midodrine. 10 milliGRAM(s) Oral three times a day  mycophenolate mofetil 250 milliGRAM(s) Oral two times a day  norepinephrine Infusion 0.05 MICROgram(s)/kG/Min (6.375 mL/Hr) IV Continuous <Continuous>  predniSONE   Tablet 5 milliGRAM(s) Oral daily  sevelamer carbonate 800 milliGRAM(s) Oral three times a day with meals  thiamine 100 milliGRAM(s) Oral daily  vancomycin  IVPB 500 milliGRAM(s) IV Intermittent every 24 hours    MEDICATIONS  (PRN):      LABS:                        8.7    20.75 )-----------( 214      ( 26 Nov 2019 11:50 )             30.4     Hgb Trend: 8.7<--, 9.6<--, 10.0<--, 10.3<--, 9.4<--  11-26    136  |  97  |  18  ----------------------------<  167<H>  4.0   |  27  |  3.18<H>    Ca    10.4      26 Nov 2019 08:58      Creatinine Trend: 3.18<--, 3.88<--, 2.57<--, 4.58<--, 8.28<--, 7.13<--        Venous Blood Gas:  11-26 @ 15:29  7.44/44/35/29/62  VBG Lactate: 2.3      MICROBIOLOGY:     RADIOLOGY & ADDITIONAL TESTS:      Assessment and plan    75 y/o male with PMHx of  ESRD s/p /p failed renal transplant 4 year ago and successful renal transplant 1 year ago, DM, HTN, 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       NEURO:      CV:      PULM:      GI:  #Diet:  #Bowel regiment:  #Transaminitis    RENAL:  - hx of ESRD s/p failed renal transplant x2  - renal following, HD as recommended.    ENDO:  #DM2: HbA1c 5.5  - Start Insulin Sliding Scale  - endocrine following    METABOLIC:  #Electrolyte abnormalities    HEMATOLOGIC:  #CBC shows leukocytosis/leukopenia***  #DVT prophylaxis with heparin/coumadin/apixiban***  - #PT/PTT is therapeutic/subtherapeutic on ***    INFECTIOUS:  #Leukocytosis: WBC ***, febrile/afebrile***, source unknown/likely source ***  - get labs: procalcitonin, 2 sets of blood cxs, cxr, sputum cx, respiratory viral panel  - consult infectious disease    MSK: MICU Accept Note    CHIEF COMPLAINT: AMS    HPI / INTERVAL HISTORY:    Patient is a 75yo gentleman (handedness unknown) who presents to the facility on the afternoon of 11/20/19 from nursing home regarding complaints of altered mental status that has persisted since at least 11/19/19. 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 History unable to be obtained directly from patient given clinical condition. As per nursing supervisor, patient has been suffering from altered mental status since yesterday with notable worsening this morning marked with restlessness along with unable to follow commands. Patient initially was supposed to attend HD today but was rejected due to possessing a low blood pressure (90/40s). No family or friends at bedside to directly relay history to me. Chart accompanying patient on arrival demonstrates patient taking 2.5mg of Eliquis bid for nonvalvular Atrial fibrillation as well as Keppra for presumed seizure disorder. Code Stroke called on arrival, NIHSS of 16. BP of 112/55. CT Head Noncontrast negative for acute intracranial abnormality, showing no acute hemorrhage. Scattered white matter infarcts of indeterminate age representing an interval change compared with 9/3/2019.    Patient was found to have e.coli bacteriuria/UTI now s/p LP for AMS which was negative for infection. ID following for ?new nosocomial infection vs other infectious process. Patient is s/p 1L removal from HD 11/25. RRT  called for hypotension 70s/30s; initiated 1l bolus w/o significant response. difficult IV access, IO placed on left leg. started on IV pressor support w/ levophed. cultures sent, cbc, cmp, vbg also obtained. MICU fellow POCUS US suggestive of hypovolemia as IVC collaspse. patient escorted to MICU. To be intubated in MICU 11/26.     PAST MEDICAL & SURGICAL HISTORY:  Kidney transplant recipient  Anuria  GERD (gastroesophageal reflux disease)  Kidney transplant failure: dx: 2/2013  Hepatitis C: dx: 90&#x27;s.  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, no known MIs  H/O kidney transplant: may 2015  A-V fistula: Left, implanted (?)  S/p cadaver renal transplant: 2008  S/P partial colectomy: due to diverticulitis      FAMILY HISTORY:  Family history of breast cancer in sister (Sibling): living at 94 yo  Family history of prostate cancer in father  Family history of lung cancer  Family history of hypertension in mother  Family history of diabetes mellitus: mother      SOCIAL HISTORY:  Smoking: [  ] Never Smoked  [  ] Former Smoker (# packs x # years)  [  ] Current Smoker (# packs x # years)  Substance Use:   EtOH Use:   Marital Status: [  ] Single  [  ]   [  ]   [  ]   Sexual History:   Occupation:  Recent Travel:  Country of Birth:   Advance Directives:     HOME MEDICATIONS:  Home Medications:  acetaminophen 325 mg oral tablet: 3 tab(s) orally every 6 hours, As Needed (20 Nov 2019 17:09)  Admelog SoloStar 100 units/mL injectable solution: Inject as per sliding scale. (20 Nov 2019 17:09)  allopurinol 100 mg oral tablet: 1 tab(s) orally once a day (20 Nov 2019 17:09)  apixaban 2.5 mg oral tablet: 1 tab(s) orally 2 times a day (20 Nov 2019 17:09)  atorvastatin 40 mg oral tablet: 1 tab(s) orally once a day (at bedtime) (20 Nov 2019 17:09)  carvedilol 6.25 mg oral tablet: 1 tab(s) orally every 12 hours (20 Nov 2019 17:09)  cinacalcet 30 mg oral tablet: 1 tab(s) orally once a day (20 Nov 2019 17:09)  cyclobenzaprine 5 mg oral tablet: 1 tab(s) orally 4 times a day (20 Nov 2019 17:09)  DuoNeb 0.5 mg-2.5 mg/3 mL inhalation solution: 3 milliliter(s) inhaled every 4 hours, As Needed (20 Nov 2019 17:09)  escitalopram 20 mg oral tablet: 1 tab(s) orally once a day (20 Nov 2019 17:09)  folic acid 1 mg oral tablet: 1 tab(s) orally once a day (20 Nov 2019 17:09)  furosemide 20 mg oral tablet: 3 tab(s) orally 2 times a day (20 Nov 2019 17:09)  hydrALAZINE 100 mg oral tablet: 1 tab(s) orally 3 times a day (20 Nov 2019 17:09)  hydrocortisone 25 mg rectal suppository: 1 suppository(ies) rectal once a day (at bedtime) (20 Nov 2019 17:09)  Imodium A-D 2 mg oral tablet: 1 tab(s) orally every 8 hours, As Needed (20 Nov 2019 17:09)  insulin glargine: 10 unit(s) subcutaneous once a day (at bedtime) (20 Nov 2019 17:09)  lactobacillus acidophilus oral capsule: 1 tab(s) orally 2 times a day (20 Nov 2019 17:09)  levETIRAcetam 1000 mg oral tablet: 1 tab(s) orally 2 times a day (20 Nov 2019 17:09)  Lidocaine Viscous 2% mucous membrane solution: Apply to anus every 4 hours, As Needed (20 Nov 2019 17:09)  Maalox Plus 200 mg-200 mg-20 mg/5 mL oral suspension: 30 milliliter(s) orally every 6 hours, As Needed (20 Nov 2019 17:09)  midodrine 10 mg oral tablet: 1 tab(s) orally 3 times a week, As Needed (20 Nov 2019 17:09)  mycophenolate mofetil 250 mg oral capsule: 1 cap(s) orally 2 times a day (20 Nov 2019 17:09)  Nephro-Eulalia oral tablet: 1 tab(s) orally once a day (20 Nov 2019 17:09)  ondansetron 4 mg oral tablet: 1 tab(s) orally every 6 hours, As Needed (20 Nov 2019 17:09)  predniSONE 5 mg oral tablet: 1 tab(s) orally once a day (20 Nov 2019 17:09)  raNITIdine 75 mg oral tablet: 1 tab(s) orally once a day (at bedtime) (20 Nov 2019 17:09)  sevelamer carbonate 800 mg oral tablet: 1 tab(s) orally 3 times a day (with meals) (20 Nov 2019 17:09)  silver sulfADIAZINE 1% topical cream: Apply topically to affected area 2 times a day (20 Nov 2019 17:09)  sodium bicarbonate 650 mg oral tablet: 1 tab(s) orally 2 times a day (20 Nov 2019 17:09)  tamsulosin 0.4 mg oral capsule: 1 cap(s) orally once a day (at bedtime) (20 Nov 2019 17:09)  thiamine 100 mg oral tablet: 1 tab(s) orally once a day (20 Nov 2019 17:09)  Zosyn 3 g-0.375 g intravenous injection: 3.375 gram(s) intravenous every 6 hours x10 days (20 Nov 2019 17:09)    however, Chart accompanying patient on arrival demonstrates patient taking 2.5mg of Eliquis bid for nonvalvular Atrial fibrillation as well as Keppra for presumed seizure disorder    Allergies    No Known Allergies    Intolerances          REVIEW OF SYSTEMS:  unable to assess 2/2 MS.      OBJECTIVE:  ICU Vital Signs Last 24 Hrs  T(C): 36.5 (26 Nov 2019 08:56), Max: 37.4 (26 Nov 2019 00:02)  T(F): 97.7 (26 Nov 2019 08:56), Max: 99.4 (26 Nov 2019 00:02)  HR: 128 (26 Nov 2019 08:56) (116 - 130)  BP: 91/58 (26 Nov 2019 08:56) (90/65 - 138/70)  BP(mean): --  ABP: --  ABP(mean): --  RR: 18 (26 Nov 2019 08:56) (18 - 18)  SpO2: 93% (26 Nov 2019 08:56) (93% - 100%)        11-25 @ 07:01 - 11-26 @ 07:00  --------------------------------------------------------  IN: 850 mL / OUT: 1800 mL / NET: -950 mL    11-26 @ 07:01  -  11-26 @ 15:48  --------------------------------------------------------  IN: 0 mL / OUT: 0 mL / NET: 0 mL      CAPILLARY BLOOD GLUCOSE      POCT Blood Glucose.: 93 mg/dL (26 Nov 2019 14:57)      PHYSICAL EXAM:      HOSPITAL MEDICATIONS:  MEDICATIONS  (STANDING):  atorvastatin 40 milliGRAM(s) Oral at bedtime  chlorhexidine 4% Liquid 1 Application(s) Topical <User Schedule>  insulin lispro (HumaLOG) corrective regimen sliding scale   SubCutaneous every 6 hours  levETIRAcetam  IVPB 1000 milliGRAM(s) IV Intermittent every 12 hours  meropenem  IVPB      midodrine. 10 milliGRAM(s) Oral three times a day  mycophenolate mofetil 250 milliGRAM(s) Oral two times a day  norepinephrine Infusion 0.05 MICROgram(s)/kG/Min (6.375 mL/Hr) IV Continuous <Continuous>  predniSONE   Tablet 5 milliGRAM(s) Oral daily  sevelamer carbonate 800 milliGRAM(s) Oral three times a day with meals  thiamine 100 milliGRAM(s) Oral daily  vancomycin  IVPB 500 milliGRAM(s) IV Intermittent every 24 hours    MEDICATIONS  (PRN):      LABS:                        8.7    20.75 )-----------( 214      ( 26 Nov 2019 11:50 )             30.4     Hgb Trend: 8.7<--, 9.6<--, 10.0<--, 10.3<--, 9.4<--  11-26    136  |  97  |  18  ----------------------------<  167<H>  4.0   |  27  |  3.18<H>    Ca    10.4      26 Nov 2019 08:58      Creatinine Trend: 3.18<--, 3.88<--, 2.57<--, 4.58<--, 8.28<--, 7.13<--        Venous Blood Gas:  11-26 @ 15:29  7.44/44/35/29/62  VBG Lactate: 2.3      MICROBIOLOGY:     RADIOLOGY & ADDITIONAL TESTS:      Assessment and plan    75 y/o male with PMHx of  ESRD s/p /p failed renal transplant 4 year ago and successful renal transplant 1 year ago, DM, HTN, 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       NEURO:  - now intubated, presented with AMS    CV:      PULM:      GI:  #Diet:  #Bowel regiment:      RENAL:  - hx of ESRD s/p failed renal transplant x2  - renal following, HD as recommended.    ENDO:  #DM2: HbA1c 5.5  - Start Insulin Sliding Scale  - endocrine following    METABOLIC:  #Electrolyte abnormalities    HEMATOLOGIC:  #CBC shows leukocytosis/leukopenia***  #DVT prophylaxis with heparin/coumadin/apixiban***  - #PT/PTT is therapeutic/subtherapeutic on ***    INFECTIOUS:  - WBC uptrending during the hospital stay. ID following  - Urine culture growing E.coli; blood culture negative  - was given empiric CNS infx coverage - vanc, manoj, acyclovir, LP CSF negative  - per ID, resart meropenem 500 IV q24, Vanc x1, will dose vanc by level      MSK:      PPX:    GOC:  - consider palliative consult MICU Accept Note    CHIEF COMPLAINT: AMS    HPI / INTERVAL HISTORY:    Patient is a 73yo gentleman (handedness unknown) who presents to the facility on the afternoon of 11/20/19 from nursing home regarding complaints of altered mental status that has persisted since at least 11/19/19. 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 History unable to be obtained directly from patient given clinical condition. As per nursing supervisor, patient has been suffering from altered mental status since yesterday with notable worsening this morning marked with restlessness along with unable to follow commands. Patient initially was supposed to attend HD today but was rejected due to possessing a low blood pressure (90/40s). No family or friends at bedside to directly relay history to me. Chart accompanying patient on arrival demonstrates patient taking 2.5mg of Eliquis bid for nonvalvular Atrial fibrillation as well as Keppra for presumed seizure disorder. Code Stroke called on arrival, NIHSS of 16. BP of 112/55. CT Head Noncontrast negative for acute intracranial abnormality, showing no acute hemorrhage. Scattered white matter infarcts of indeterminate age representing an interval change compared with 9/3/2019.    Patient was found to have e.coli bacteriuria/UTI now s/p LP for AMS which was negative for infection. ID following for ?new nosocomial infection vs other infectious process. Patient is s/p 1L removal from HD 11/25. RRT  called for hypotension 70s/30s; initiated 1l bolus w/o significant response. difficult IV access, IO placed on left leg. started on IV pressor support w/ levophed. cultures sent, cbc, cmp, vbg also obtained. MICU fellow POCUS US suggestive of hypovolemia as IVC collaspse. patient escorted to MICU. To be intubated in MICU 11/26.     PAST MEDICAL & SURGICAL HISTORY:  Kidney transplant recipient  Anuria  GERD (gastroesophageal reflux disease)  Kidney transplant failure: dx: 2/2013  Hepatitis C: dx: 90&#x27;s.  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, no known MIs  H/O kidney transplant: may 2015  A-V fistula: Left, implanted (?)  S/p cadaver renal transplant: 2008  S/P partial colectomy: due to diverticulitis      FAMILY HISTORY:  Family history of breast cancer in sister (Sibling): living at 94 yo  Family history of prostate cancer in father  Family history of lung cancer  Family history of hypertension in mother  Family history of diabetes mellitus: mother      SOCIAL HISTORY:  Smoking: [  ] Never Smoked  [  ] Former Smoker (# packs x # years)  [  ] Current Smoker (# packs x # years)  Substance Use:   EtOH Use:   Marital Status: [  ] Single  [  ]   [  ]   [  ]   Sexual History:   Occupation:  Recent Travel:  Country of Birth:   Advance Directives:     HOME MEDICATIONS:  Home Medications:  acetaminophen 325 mg oral tablet: 3 tab(s) orally every 6 hours, As Needed (20 Nov 2019 17:09)  Admelog SoloStar 100 units/mL injectable solution: Inject as per sliding scale. (20 Nov 2019 17:09)  allopurinol 100 mg oral tablet: 1 tab(s) orally once a day (20 Nov 2019 17:09)  apixaban 2.5 mg oral tablet: 1 tab(s) orally 2 times a day (20 Nov 2019 17:09)  atorvastatin 40 mg oral tablet: 1 tab(s) orally once a day (at bedtime) (20 Nov 2019 17:09)  carvedilol 6.25 mg oral tablet: 1 tab(s) orally every 12 hours (20 Nov 2019 17:09)  cinacalcet 30 mg oral tablet: 1 tab(s) orally once a day (20 Nov 2019 17:09)  cyclobenzaprine 5 mg oral tablet: 1 tab(s) orally 4 times a day (20 Nov 2019 17:09)  DuoNeb 0.5 mg-2.5 mg/3 mL inhalation solution: 3 milliliter(s) inhaled every 4 hours, As Needed (20 Nov 2019 17:09)  escitalopram 20 mg oral tablet: 1 tab(s) orally once a day (20 Nov 2019 17:09)  folic acid 1 mg oral tablet: 1 tab(s) orally once a day (20 Nov 2019 17:09)  furosemide 20 mg oral tablet: 3 tab(s) orally 2 times a day (20 Nov 2019 17:09)  hydrALAZINE 100 mg oral tablet: 1 tab(s) orally 3 times a day (20 Nov 2019 17:09)  hydrocortisone 25 mg rectal suppository: 1 suppository(ies) rectal once a day (at bedtime) (20 Nov 2019 17:09)  Imodium A-D 2 mg oral tablet: 1 tab(s) orally every 8 hours, As Needed (20 Nov 2019 17:09)  insulin glargine: 10 unit(s) subcutaneous once a day (at bedtime) (20 Nov 2019 17:09)  lactobacillus acidophilus oral capsule: 1 tab(s) orally 2 times a day (20 Nov 2019 17:09)  levETIRAcetam 1000 mg oral tablet: 1 tab(s) orally 2 times a day (20 Nov 2019 17:09)  Lidocaine Viscous 2% mucous membrane solution: Apply to anus every 4 hours, As Needed (20 Nov 2019 17:09)  Maalox Plus 200 mg-200 mg-20 mg/5 mL oral suspension: 30 milliliter(s) orally every 6 hours, As Needed (20 Nov 2019 17:09)  midodrine 10 mg oral tablet: 1 tab(s) orally 3 times a week, As Needed (20 Nov 2019 17:09)  mycophenolate mofetil 250 mg oral capsule: 1 cap(s) orally 2 times a day (20 Nov 2019 17:09)  Nephro-Eulalia oral tablet: 1 tab(s) orally once a day (20 Nov 2019 17:09)  ondansetron 4 mg oral tablet: 1 tab(s) orally every 6 hours, As Needed (20 Nov 2019 17:09)  predniSONE 5 mg oral tablet: 1 tab(s) orally once a day (20 Nov 2019 17:09)  raNITIdine 75 mg oral tablet: 1 tab(s) orally once a day (at bedtime) (20 Nov 2019 17:09)  sevelamer carbonate 800 mg oral tablet: 1 tab(s) orally 3 times a day (with meals) (20 Nov 2019 17:09)  silver sulfADIAZINE 1% topical cream: Apply topically to affected area 2 times a day (20 Nov 2019 17:09)  sodium bicarbonate 650 mg oral tablet: 1 tab(s) orally 2 times a day (20 Nov 2019 17:09)  tamsulosin 0.4 mg oral capsule: 1 cap(s) orally once a day (at bedtime) (20 Nov 2019 17:09)  thiamine 100 mg oral tablet: 1 tab(s) orally once a day (20 Nov 2019 17:09)  Zosyn 3 g-0.375 g intravenous injection: 3.375 gram(s) intravenous every 6 hours x10 days (20 Nov 2019 17:09)    however, Chart accompanying patient on arrival demonstrates patient taking 2.5mg of Eliquis bid for nonvalvular Atrial fibrillation as well as Keppra for presumed seizure disorder    Allergies    No Known Allergies    Intolerances          REVIEW OF SYSTEMS:  unable to assess 2/2 MS.      OBJECTIVE:  ICU Vital Signs Last 24 Hrs  T(C): 36.5 (26 Nov 2019 08:56), Max: 37.4 (26 Nov 2019 00:02)  T(F): 97.7 (26 Nov 2019 08:56), Max: 99.4 (26 Nov 2019 00:02)  HR: 128 (26 Nov 2019 08:56) (116 - 130)  BP: 91/58 (26 Nov 2019 08:56) (90/65 - 138/70)  BP(mean): --  ABP: --  ABP(mean): --  RR: 18 (26 Nov 2019 08:56) (18 - 18)  SpO2: 93% (26 Nov 2019 08:56) (93% - 100%)        11-25 @ 07:01 - 11-26 @ 07:00  --------------------------------------------------------  IN: 850 mL / OUT: 1800 mL / NET: -950 mL    11-26 @ 07:01  -  11-26 @ 15:48  --------------------------------------------------------  IN: 0 mL / OUT: 0 mL / NET: 0 mL      CAPILLARY BLOOD GLUCOSE      POCT Blood Glucose.: 93 mg/dL (26 Nov 2019 14:57)      PHYSICAL EXAM:    PHYSICAL EXAM:  GENERAL: thin, intubated, sedated  EYES: EOMI, PERRLA, conjunctiva and sclera clear  NECK: Supple, No JVD, Normal thyroid  CHEST/LUNG: Clear to ausculation bilaterally; No rales, rhonchi, wheezing, or rubs  HEART: tachy, regular hythm; No murmurs, rubs, or gallops  ABDOMEN: Soft, Nontender, Nondistended; Bowel sounds present  EXTREMITIES:  2+ Peripheral Pulses, No clubbing, cyanosis, or edema  LYMPH: No lymphadenopathy noted  SKIN: No rashes or lesions  NERVOUS SYSTEM:  intubated and sedated      HOSPITAL MEDICATIONS:  MEDICATIONS  (STANDING):  atorvastatin 40 milliGRAM(s) Oral at bedtime  chlorhexidine 4% Liquid 1 Application(s) Topical <User Schedule>  insulin lispro (HumaLOG) corrective regimen sliding scale   SubCutaneous every 6 hours  levETIRAcetam  IVPB 1000 milliGRAM(s) IV Intermittent every 12 hours  meropenem  IVPB      midodrine. 10 milliGRAM(s) Oral three times a day  mycophenolate mofetil 250 milliGRAM(s) Oral two times a day  norepinephrine Infusion 0.05 MICROgram(s)/kG/Min (6.375 mL/Hr) IV Continuous <Continuous>  predniSONE   Tablet 5 milliGRAM(s) Oral daily  sevelamer carbonate 800 milliGRAM(s) Oral three times a day with meals  thiamine 100 milliGRAM(s) Oral daily  vancomycin  IVPB 500 milliGRAM(s) IV Intermittent every 24 hours    MEDICATIONS  (PRN):      LABS:                        8.7    20.75 )-----------( 214      ( 26 Nov 2019 11:50 )             30.4     Hgb Trend: 8.7<--, 9.6<--, 10.0<--, 10.3<--, 9.4<--  11-26    136  |  97  |  18  ----------------------------<  167<H>  4.0   |  27  |  3.18<H>    Ca    10.4      26 Nov 2019 08:58      Creatinine Trend: 3.18<--, 3.88<--, 2.57<--, 4.58<--, 8.28<--, 7.13<--        Venous Blood Gas:  11-26 @ 15:29  7.44/44/35/29/62  VBG Lactate: 2.3      MICROBIOLOGY:     RADIOLOGY & ADDITIONAL TESTS:      Assessment and plan    75 y/o male with PMHx of  ESRD s/p /p failed renal transplant 4 year ago and successful renal transplant 1 year ago, DM, HTN, 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       NEURO:  - now intubated, presented with AMS  - metabolic encephalopathy, meningitis r/o  - neuro following  - chronic lacunar infarcts  - c/w keppra for ?hx of seizure disorder  -AMS likely 2/2 hyperCa, renal dysfunction, poor PO intake  - thiamine IVPB daily x 3 days    CV:  - hx of afib was on eliquis, now off a/c per cards due to bleeding risk  - holding carvedilol 6.25 BID  - hx of cocaine cardiomyopathy, resolved, normal EF on most recent echo  - c/w statin  - trop 134 -- trend  - hypotensive during RRT 11/26; started levo gtt, c/w midodrine    PULM:  - now intubated and sedated  - monitor for improvement for eventual extubation    GI:  - NG tube in place  - tube feeds ordered      RENAL:  - hx of ESRD s/p failed renal transplant x2  - renal following, HD as recommended.  - c/w cellcept, predisone, sevelamer   - has dialysis catheter in place    ENDO:  #DM2: HbA1c 5.5  - Start Insulin Sliding Scale  - endocrine following  - presented hypercalcemic, downtrending. hold pamidronate tomorrow    METABOLIC:  #lyes WNL; monitor and f/u with HD schedule  - initially hyperCa, improved. hold pamidronate for now, follow up with endocrine    HEMATOLOGIC:  - hgb 8-9. Monitor  - pt was on eliquis; has been held.       INFECTIOUS:  - WBC uptrending during the hospital stay. ID following  - Urine culture growing E.coli; blood culture negative  - was given empiric CNS infx coverage - vanc, manoj, acyclovir, LP CSF negative  - per ID, resart meropenem 500 IV q24, Vanc x1, will dose vanc by level    PPX: HSQ     GOC:  - consider palliative consult

## 2019-11-26 NOTE — PROGRESS NOTE ADULT - ASSESSMENT
74M PMHx of ESRD s/p failed renal transplant x2, HCV, DM, and meningococcal meningitis 2 years ago was sent in to the ED from HD for AMS and low BPs.  lethargic  E coli bacteriuria/UTI  LP not s/o any CNS infection ,CSF negative  leucocytosis  afebrile  ? new nosocomial infection  ? aspiration  ? other non infectious process    REc:  1)CXR  2) repeat blood and urine Cx  3) Restart Merem 500 mg IV q 24 + vanco x 1 dose  4) Aspiration precautions  5) CVA per primary, neuro  Overall prognosis is poor-would rec GOC discussion and consider Palliative eval  case d/w Dr James and med NP  Will Follow.  Beeper [id number]-pfgb/afterhours/No [id number]

## 2019-11-26 NOTE — PROGRESS NOTE ADULT - PROBLEM SELECTOR PLAN 5
f/u blood  and urine cx,serial lactate levels,monitor vitals closley,ivfs hydration,monitor urine output and renal profile,iv abx as per id cons.

## 2019-11-26 NOTE — PROGRESS NOTE ADULT - ASSESSMENT
Assessment  DM: A1C 5.5, was on insulin at home, now on insulin coverage, patient is now NPO on tube feeds, blood sugars fluctuating, appears comfortable in bed.  Hypercalcemia: Primary Hyperparathyroidism, calcium improved, 10.4.  Sepsis: IV ABx for UTI, LP inconsistent with meningitis, on medications, stable, monitored.  HTN: Controlled,  on antihypertensive medications.  ESRD: On hemodialysis, Monitor labs/BMP          Robi Gay MD  Cell: [phone]  Office: [phone] Assessment  DM: A1C 5.5, was on insulin at home, now on insulin coverage, patient is now NPO on tube feeds, blood sugars fluctuating,  appears comfortable in bed.  Hypercalcemia: Primary Hyperparathyroidism, calcium improved, 10.4.  Sepsis: IV ABx for UTI, LP inconsistent with meningitis, on medications, stable, monitored.  HTN: Controlled,  on antihypertensive medications.  ESRD: On hemodialysis, Monitor labs/BMP          Robi Gay MD  Cell: [phone]  Office: [phone]

## 2019-11-26 NOTE — PROGRESS NOTE ADULT - SUBJECTIVE AND OBJECTIVE BOX
Patient is a 74y old  Male who presents with a chief complaint of ams (26 Nov 2019 07:29)    Being followed by ID for UTI ,CVA    Interval history:minimal response to stimuli  No acute events      ROS:  Not obtainable     Antimicrobials:  ceftriaxone Dced     Other medications reviewed  MEDICATIONS  (STANDING):  atorvastatin 40 milliGRAM(s) Oral at bedtime  carvedilol 6.25 milliGRAM(s) Oral every 12 hours  cyclobenzaprine 5 milliGRAM(s) Oral four times a day  dextrose 5%. 1000 milliLiter(s) (50 mL/Hr) IV Continuous <Continuous>  dextrose 50% Injectable 12.5 Gram(s) IV Push once  dextrose 50% Injectable 25 Gram(s) IV Push once  dextrose 50% Injectable 25 Gram(s) IV Push once  escitalopram 20 milliGRAM(s) Oral daily  insulin lispro (HumaLOG) corrective regimen sliding scale   SubCutaneous every 6 hours  insulin NPH human recombinant 6 Unit(s) SubCutaneous every 8 hours  levETIRAcetam  IVPB 1000 milliGRAM(s) IV Intermittent every 12 hours  midodrine. 10 milliGRAM(s) Oral three times a day  mycophenolate mofetil 250 milliGRAM(s) Oral two times a day  predniSONE   Tablet 5 milliGRAM(s) Oral daily  sevelamer carbonate 800 milliGRAM(s) Oral three times a day with meals  thiamine 100 milliGRAM(s) Oral daily      Vital Signs Last 24 Hrs  T(C): 36.5 (11-26-19 @ 08:56), Max: 37.4 (11-26-19 @ 00:02)  T(F): 97.7 (11-26-19 @ 08:56), Max: 99.4 (11-26-19 @ 00:02)  HR: 128 (11-26-19 @ 08:56) (116 - 130)  BP: 91/58 (11-26-19 @ 08:56) (90/65 - 138/70)  BP(mean): --  RR: 18 (11-26-19 @ 08:56) (18 - 18)  SpO2: 93% (11-26-19 @ 08:56) (93% - 100%)    Physical Exam:    HEENT PERRLA ,No pallor or icterus    R Sc HD catheter no erythema o tenderness    Neck rigidity     Chest Good AE,CTA    CVS RRR S1 S2 WNl No murmur or rub or gallop    Abd soft BS normal No tenderness RLQ transplant kidney no tenderness    Ext left arm AVF no erythema or tenderness    IV site no erythema tenderness or discharge    Joints no swelling or LOM    Lab Data:                          8.7    20.75 )-----------( 214      ( 26 Nov 2019 11:50 )             30.4     WBC Count: 20.75 (11-26-19 @ 11:50)  WBC Count: 11.76 (11-25-19 @ 15:24)  WBC Count: 10.93 (11-24-19 @ 11:29)  WBC Count: 7.62 (11-23-19 @ 12:30)  WBC Count: 12.14 (11-22-19 @ 14:32)  WBC Count: 12.72 (11-22-19 @ 10:42)  WBC Count: 11.47 (11-21-19 @ 22:08)  WBC Count: 12.78 (11-20-19 @ 15:25)    11-26    136  |  97  |  18  ----------------------------<  167<H>  4.0   |  27  |  3.18<H>    Ca    10.4      26 Nov 2019 08:58          Culture - Acid Fast (collected 23 Nov 2019 01:25)    Culture - CSF with Gram Stain (collected 22 Nov 2019 17:14)  Source: .CSF  Gram Stain (22 Nov 2019 18:32):    No polymorphonuclear cells seen    No organisms seen    by cytocentrifuge  Final Report (25 Nov 2019 06:28):    No growth    Culture - Fungal, CSF (collected 22 Nov 2019 17:14)  Source: .CSF CSF  Preliminary Report (25 Nov 2019 10:54):    Testing in progress        < from: Xray Chest 1 View- PORTABLE-Urgent (11.25.19 @ 19:17) >    IMPRESSION:    Enteric tube with distal tip in stomach.            < end of copied text >

## 2019-11-26 NOTE — PROCEDURE NOTE - ADDITIONAL PROCEDURE DETAILS
Patient evaluated for AMS, not protecting airway . Sedated with propofol and midazolam . initial visualization dentures found near vocal cords. Removed with joe forceps. Patient intubated with 7.5mm et tube at 23cm at lip.

## 2019-11-27 DIAGNOSIS — Z51.5 ENCOUNTER FOR PALLIATIVE CARE: ICD-10-CM

## 2019-11-27 DIAGNOSIS — Z71.89 OTHER SPECIFIED COUNSELING: ICD-10-CM

## 2019-11-27 DIAGNOSIS — J96.90 RESPIRATORY FAILURE, UNSPECIFIED, UNSPECIFIED WHETHER WITH HYPOXIA OR HYPERCAPNIA: ICD-10-CM

## 2019-11-27 DIAGNOSIS — R53.2 FUNCTIONAL QUADRIPLEGIA: ICD-10-CM

## 2019-11-27 LAB
ALBUMIN SERPL ELPH-MCNC: 1.9 G/DL — LOW (ref 3.3–5)
ALP SERPL-CCNC: 126 U/L — HIGH (ref 40–120)
ALT FLD-CCNC: 10 U/L — SIGNIFICANT CHANGE UP (ref 10–45)
ANION GAP SERPL CALC-SCNC: 15 MMOL/L — SIGNIFICANT CHANGE UP (ref 5–17)
AST SERPL-CCNC: 14 U/L — SIGNIFICANT CHANGE UP (ref 10–40)
BILIRUB SERPL-MCNC: 0.8 MG/DL — SIGNIFICANT CHANGE UP (ref 0.2–1.2)
BUN SERPL-MCNC: 27 MG/DL — HIGH (ref 7–23)
CALCIUM SERPL-MCNC: 10.5 MG/DL — SIGNIFICANT CHANGE UP (ref 8.4–10.5)
CHLORIDE SERPL-SCNC: 97 MMOL/L — SIGNIFICANT CHANGE UP (ref 96–108)
CO2 SERPL-SCNC: 23 MMOL/L — SIGNIFICANT CHANGE UP (ref 22–31)
CORTIS AM PEAK SERPL-MCNC: 18.2 UG/DL — SIGNIFICANT CHANGE UP (ref 6–18.4)
CREAT SERPL-MCNC: 3.89 MG/DL — HIGH (ref 0.5–1.3)
GAS PNL BLDA: SIGNIFICANT CHANGE UP
GLUCOSE BLDC GLUCOMTR-MCNC: 130 MG/DL — HIGH (ref 70–99)
GLUCOSE BLDC GLUCOMTR-MCNC: 159 MG/DL — HIGH (ref 70–99)
GLUCOSE BLDC GLUCOMTR-MCNC: 209 MG/DL — HIGH (ref 70–99)
GLUCOSE BLDC GLUCOMTR-MCNC: 288 MG/DL — HIGH (ref 70–99)
GLUCOSE SERPL-MCNC: 174 MG/DL — HIGH (ref 70–99)
GRAM STN FLD: SIGNIFICANT CHANGE UP
HCT VFR BLD CALC: 27.2 % — LOW (ref 39–50)
HGB BLD-MCNC: 7.8 G/DL — LOW (ref 13–17)
MAGNESIUM SERPL-MCNC: 1.9 MG/DL — SIGNIFICANT CHANGE UP (ref 1.6–2.6)
MCHC RBC-ENTMCNC: 25 PG — LOW (ref 27–34)
MCHC RBC-ENTMCNC: 28.7 GM/DL — LOW (ref 32–36)
MCV RBC AUTO: 87.2 FL — SIGNIFICANT CHANGE UP (ref 80–100)
NRBC # BLD: 0 /100 WBCS — SIGNIFICANT CHANGE UP (ref 0–0)
PHOSPHATE SERPL-MCNC: 3.9 MG/DL — SIGNIFICANT CHANGE UP (ref 2.5–4.5)
PLATELET # BLD AUTO: 195 K/UL — SIGNIFICANT CHANGE UP (ref 150–400)
POTASSIUM SERPL-MCNC: 4 MMOL/L — SIGNIFICANT CHANGE UP (ref 3.5–5.3)
POTASSIUM SERPL-SCNC: 4 MMOL/L — SIGNIFICANT CHANGE UP (ref 3.5–5.3)
PROT SERPL-MCNC: 5.7 G/DL — LOW (ref 6–8.3)
RBC # BLD: 3.12 M/UL — LOW (ref 4.2–5.8)
RBC # FLD: 15.9 % — HIGH (ref 10.3–14.5)
SODIUM SERPL-SCNC: 135 MMOL/L — SIGNIFICANT CHANGE UP (ref 135–145)
SPECIMEN SOURCE: SIGNIFICANT CHANGE UP
TROPONIN T, HIGH SENSITIVITY RESULT: 111 NG/L — HIGH (ref 0–51)
VANCOMYCIN TROUGH SERPL-MCNC: 13.4 UG/ML — SIGNIFICANT CHANGE UP (ref 10–20)
WBC # BLD: 18.38 K/UL — HIGH (ref 3.8–10.5)
WBC # FLD AUTO: 18.38 K/UL — HIGH (ref 3.8–10.5)

## 2019-11-27 PROCEDURE — 99291 CRITICAL CARE FIRST HOUR: CPT

## 2019-11-27 PROCEDURE — 99223 1ST HOSP IP/OBS HIGH 75: CPT

## 2019-11-27 PROCEDURE — 99233 SBSQ HOSP IP/OBS HIGH 50: CPT

## 2019-11-27 PROCEDURE — 93306 TTE W/DOPPLER COMPLETE: CPT | Mod: 26

## 2019-11-27 RX ORDER — VANCOMYCIN HCL 1 G
1000 VIAL (EA) INTRAVENOUS ONCE
Refills: 0 | Status: COMPLETED | OUTPATIENT
Start: 2019-11-27 | End: 2019-11-27

## 2019-11-27 RX ADMIN — Medication 2: at 05:34

## 2019-11-27 RX ADMIN — HEPARIN SODIUM 5000 UNIT(S): 5000 INJECTION INTRAVENOUS; SUBCUTANEOUS at 05:34

## 2019-11-27 RX ADMIN — PROPOFOL 12.24 MICROGRAM(S)/KG/MIN: 10 INJECTION, EMULSION INTRAVENOUS at 05:33

## 2019-11-27 RX ADMIN — SEVELAMER CARBONATE 800 MILLIGRAM(S): 2400 POWDER, FOR SUSPENSION ORAL at 11:42

## 2019-11-27 RX ADMIN — Medication 250 MILLIGRAM(S): at 22:10

## 2019-11-27 RX ADMIN — HEPARIN SODIUM 5000 UNIT(S): 5000 INJECTION INTRAVENOUS; SUBCUTANEOUS at 14:07

## 2019-11-27 RX ADMIN — MIDODRINE HYDROCHLORIDE 10 MILLIGRAM(S): 2.5 TABLET ORAL at 18:03

## 2019-11-27 RX ADMIN — MYCOPHENOLATE MOFETIL 250 MILLIGRAM(S): 250 CAPSULE ORAL at 05:34

## 2019-11-27 RX ADMIN — ATORVASTATIN CALCIUM 40 MILLIGRAM(S): 80 TABLET, FILM COATED ORAL at 22:11

## 2019-11-27 RX ADMIN — CHLORHEXIDINE GLUCONATE 15 MILLILITER(S): 213 SOLUTION TOPICAL at 05:34

## 2019-11-27 RX ADMIN — MEROPENEM 100 MILLIGRAM(S): 1 INJECTION INTRAVENOUS at 14:27

## 2019-11-27 RX ADMIN — MIDODRINE HYDROCHLORIDE 10 MILLIGRAM(S): 2.5 TABLET ORAL at 05:34

## 2019-11-27 RX ADMIN — PROPOFOL 12.24 MICROGRAM(S)/KG/MIN: 10 INJECTION, EMULSION INTRAVENOUS at 10:06

## 2019-11-27 RX ADMIN — MIDODRINE HYDROCHLORIDE 10 MILLIGRAM(S): 2.5 TABLET ORAL at 11:42

## 2019-11-27 RX ADMIN — Medication 3: at 12:18

## 2019-11-27 RX ADMIN — Medication 1: at 18:02

## 2019-11-27 RX ADMIN — Medication 100 MILLIGRAM(S): at 11:42

## 2019-11-27 RX ADMIN — PROPOFOL 12.24 MICROGRAM(S)/KG/MIN: 10 INJECTION, EMULSION INTRAVENOUS at 08:36

## 2019-11-27 RX ADMIN — LEVETIRACETAM 400 MILLIGRAM(S): 250 TABLET, FILM COATED ORAL at 18:03

## 2019-11-27 RX ADMIN — SEVELAMER CARBONATE 800 MILLIGRAM(S): 2400 POWDER, FOR SUSPENSION ORAL at 18:03

## 2019-11-27 RX ADMIN — LEVETIRACETAM 400 MILLIGRAM(S): 250 TABLET, FILM COATED ORAL at 05:34

## 2019-11-27 RX ADMIN — CHLORHEXIDINE GLUCONATE 1 APPLICATION(S): 213 SOLUTION TOPICAL at 05:34

## 2019-11-27 RX ADMIN — Medication 1: at 00:33

## 2019-11-27 RX ADMIN — HEPARIN SODIUM 5000 UNIT(S): 5000 INJECTION INTRAVENOUS; SUBCUTANEOUS at 22:11

## 2019-11-27 RX ADMIN — SEVELAMER CARBONATE 800 MILLIGRAM(S): 2400 POWDER, FOR SUSPENSION ORAL at 08:35

## 2019-11-27 RX ADMIN — Medication 5 MILLIGRAM(S): at 05:34

## 2019-11-27 RX ADMIN — CHLORHEXIDINE GLUCONATE 15 MILLILITER(S): 213 SOLUTION TOPICAL at 18:03

## 2019-11-27 RX ADMIN — PROPOFOL 12.24 MICROGRAM(S)/KG/MIN: 10 INJECTION, EMULSION INTRAVENOUS at 20:15

## 2019-11-27 RX ADMIN — Medication 6.38 MICROGRAM(S)/KG/MIN: at 08:36

## 2019-11-27 NOTE — CHART NOTE - NSCHARTNOTEFT_GEN_A_CORE
Contact Note  Pt admitted to MICU for hypotension at HD, currently intubated. Has NGT. EN order for Glucerna 1.2 30cc/hr x 24 hrs. 24 hr EN intake 270cc, propofol intake 130 kcals. Plan for HD today. 11/27 Labs: K 4.0, phos 3.9 WDL.  Endocrine following,  , 209, 130. Has stage 2 sacrum pressure injury. Nutrition needs recalculated, estimated calorie needs for intubated pt per [place] [employer] (PSU) 2003  1474 luther/day (20 kcals/kg), protein needs 86-100gm (1.2-1.4gm/kg). Recommend change EN formula to Vital AF (high protein, low carb formula), recommend Vital 1.2  at 70cc/hr x 18 hrs provides 1512 kcals, 95 gm protein, 1022cc free water meets 21 Kcal/Kg, 1.3Gm/kg wt 71.9 kg.  At goal tube feeding will provide 2126 mg K, 1063mg phos, 139 gm CHO, and meet 100% RDIs. Will f/u on EN tolerance Contact Note  Pt admitted to MICU for hypotension at HD, currently intubated. Nutrition dx of severe malnutrition. Has NGT. EN order for Glucerna 1.2 30cc/hr x 24 hrs. 24 hr EN intake 270cc, propofol intake 130 kcals. Plan for HD today. 11/27 Labs: K 4.0, phos 3.9 WDL.  Endocrine following,  , 209, 130. Has stage 2 sacrum pressure injury. Nutrition needs recalculated, estimated calorie needs for intubated pt per [place] [employer] Equation (PSU) 2003  1474 luther/day (20 kcals/kg), protein needs 86-100gm (1.2-1.4gm/kg). Recommend change EN formula to Vital AF (high protein, low carb formula), recommend Vital 1.2  at 70cc/hr x 18 hrs provides 1512 kcals, 95 gm protein, 1022cc free water meets 21 Kcal/Kg, 1.3Gm/kg wt 71.9 kg.  At goal tube feeding will provide 2126 mg K, 1063mg phos, 139 gm CHO, and meet 100% RDIs. Will f/u on EN tolerance

## 2019-11-27 NOTE — CONSULT NOTE ADULT - SUBJECTIVE AND OBJECTIVE BOX
HPI:  74M PMHx of ESRD s/p failed renal transplant x2, HCV, DM, and meningococcal meningitis 2 years ago was sent in to the ED from HD for AMS and low BPs. He is unable to provide any information at this time. His sister is at the bedside and reports that he is typically able to carry a conversation and walk a small amount. She is not aware if he had been having fevers at the nursing home, and believes that his last HD was on Tuesday. (20 Nov 2019 18:01)    Palliative care consulted to assist with goals of care. Patient overnight had RRT and now is in the MICU, intubated and sedated. No family at bedside. Per discussion with MICU team, they plan to call family today to get update on previous functional status and to discuss current clinical situation. Chart and history reviewed, patient without decision making capacity.     PERTINENT PM/SXH:   Kidney transplant recipient  Anuria  GERD (gastroesophageal reflux disease)  Kidney transplant failure  Hepatitis C  GERD (gastroesophageal reflux disease)  Renal transplant failure and rejection  Rectal abscess  IV drug abuse  HTN (hypertension)  Diverticulitis  ESRD on dialysis  Diabetes mellitus  BPH (Benign Prostatic Hyperplasia)  Cardiomyopathy    H/O kidney transplant  A-V fistula  S/p cadaver renal transplant  S/P partial colectomy    FAMILY HISTORY:  Family history of breast cancer in sister (Sibling): living at 94 yo  Family history of prostate cancer in father  Family history of lung cancer  Family history of hypertension in mother  Family history of diabetes mellitus: mother    ITEMS NOT CHECKED ARE NOT PRESENT    SOCIAL HISTORY:   Significant other/partner:  [ ]  Children:  [ ]  Religious/Spirituality:  Substance hx:  [ ]   Tobacco hx:  [ ]   Alcohol hx: [ ]   Home Opioid hx:  [ ] I-Stop Reference No:  Living Situation: [x ]Home  [ ]Long term care  [ ]Rehab [ ]Other    ADVANCE DIRECTIVES:    DNR  MOLST  [ ]  Living Will  [ ]   DECISION MAKER(s):  [ ] Health Care Proxy(s)  [x ] Surrogate(s)  [ ] Guardian           Name(s): Phone Number(s): Sister Vivian Mims [phone]    BASELINE (I)ADL(s) (prior to admission):  [place]: [ ]Total  [x ] Moderate [ ]Dependent    Allergies    No Known Allergies    Intolerances    MEDICATIONS  (STANDING):  atorvastatin 40 milliGRAM(s) Oral at bedtime  chlorhexidine 0.12% Liquid 15 milliLiter(s) Oral Mucosa every 12 hours  chlorhexidine 4% Liquid 1 Application(s) Topical <User Schedule>  heparin  Injectable 5000 Unit(s) SubCutaneous every 8 hours  insulin lispro (HumaLOG) corrective regimen sliding scale   SubCutaneous every 6 hours  levETIRAcetam  IVPB 1000 milliGRAM(s) IV Intermittent every 12 hours  meropenem  IVPB 500 milliGRAM(s) IV Intermittent every 24 hours  meropenem  IVPB      midodrine. 10 milliGRAM(s) Oral three times a day  norepinephrine Infusion 0.05 MICROgram(s)/kG/Min (6.375 mL/Hr) IV Continuous <Continuous>  predniSONE   Tablet 5 milliGRAM(s) Oral daily  propofol Infusion 30 MICROgram(s)/kG/Min (12.24 mL/Hr) IV Continuous <Continuous>  sevelamer carbonate 800 milliGRAM(s) Oral three times a day with meals  thiamine 100 milliGRAM(s) Oral daily    MEDICATIONS  (PRN):    PRESENT SYMPTOMS: [ x]Unable to obtain due to poor mentation   Source if other than patient:  [ ]Family   [ ]Team     Pain (Impact on QOL):    Location -         Minimal acceptable level (0-10 scale):                    Aggrevating factors -  Quality -  Radiation -  Severity (0-10 scale) -    Timing -    PAIN AD Score: 0    [email or web address] (press ctrl +  left click to view)    Dyspnea:                           [ ]Mild [ ]Moderate [ ]Severe  Anxiety:                             [ ]Mild [ ]Moderate [ ]Severe  Fatigue:                             [ ]Mild [ ]Moderate [ ]Severe  Nausea:                             [ ]Mild [ ]Moderate [ ]Severe  Loss of appetite:              [ ]Mild [ ]Moderate [ ]Severe  Constipation:                    [ ]Mild [ ]Moderate [ ]Severe    Other Symptoms:  [ ]All other review of systems negative     Karnofsky Performance Score/Palliative Performance Status Version 2:      10   %  PHYSICAL EXAM:  Vital Signs Last 24 Hrs  T(C): 36.1 (27 Nov 2019 08:00), Max: 36.7 (26 Nov 2019 20:00)  T(F): 96.9 (27 Nov 2019 08:00), Max: 98.1 (26 Nov 2019 20:00)  HR: 92 (27 Nov 2019 16:30) (72 - 102)  BP: 104/62 (27 Nov 2019 16:30) (58/42 - 182/89)  BP(mean): 78 (27 Nov 2019 16:30) (47 - 128)  RR: 14 (27 Nov 2019 16:30) (14 - 35)  SpO2: 100% (27 Nov 2019 16:30) (99% - 100%) I&O's Summary    26 Nov 2019 07:01  -  27 Nov 2019 07:00  --------------------------------------------------------  IN: 1406 mL / OUT: 0 mL / NET: 1406 mL    27 Nov 2019 07:01  -  27 Nov 2019 17:18  --------------------------------------------------------  IN: 660.5 mL / OUT: 0 mL / NET: 660.5 mL    GENERAL:  [ ]Alert  [ ]Oriented x   [ ]Lethargic  [ ]Cachexia  [x ]Unarousable  [ ]Verbal  [ ]Non-Verbal  Behavioral:   [ ] Anxiety  [ ] Delirium [ ] Agitation [ ] Other  HEENT:  [ ]Normal   [ ]Dry mouth   [x ]ET Tube/Trach  [ ]Oral lesions  PULMONARY:   [ ]Clear [ ]Tachypnea  [ ]Audible excessive secretions   [ ]Rhonchi        [ ]Right [ ]Left [ ]Bilateral  [x ]Crackles        [ ]Right [ ]Left [ ]Bilateral  [ ]Wheezing     [ ]Right [ ]Left [ ]Bilateral  CARDIOVASCULAR:    [ x]Regular [ ]Irregular [ ]Tachy  [ ]Yaniv [ ]Murmur [ ]Other  GASTROINTESTINAL:  [ x]Soft  [ ]Distended   [ ]+BS  [ x]Non tender [ ]Tender  [ ]PEG [ x]OGT/ NGT  Last BM:   GENITOURINARY:  [ ]Normal [ ] Incontinent   [ ]Oliguria/Anuria   [ x][place]  MUSCULOSKELETAL:   [ ]Normal   [ ]Weakness  [ x]Bed/Wheelchair bound [ ]Edema  NEUROLOGIC: sedated  [ ]No focal deficits  [ ] Cognitive impairment  [ ] Dysphagia [ ]Dysarthria [ ] Paresis [ ]Other   SKIN: skin tears lower extremities  [ ]Normal   [ ]Pressure ulcer(s)  [ ]Rash    CRITICAL CARE:  [x ] Shock Present  [ x]Septic [ ]Cardiogenic [ ]Neurologic [ ]Hypovolemic  [ ]  Vasopressors [ ]  Inotropes   [x ] Respiratory failure present  [ x] Acute  [ ] Chronic [x ] Hypoxic  [ ] Hypercarbic [ ] Other  [x ] Other organ failure - renal    LABS:                        7.8    18.38 )-----------( 195      ( 27 Nov 2019 00:42 )             27.2   11-27    135  |  97  |  27<H>  ----------------------------<  174<H>  4.0   |  23  |  3.89<H>    Ca    10.5      27 Nov 2019 00:42  Phos  3.9     11-27  Mg     1.9     11-27    TPro  5.7<L>  /  Alb  1.9<L>  /  TBili  0.8  /  DBili  x   /  AST  14  /  ALT  10  /  AlkPhos  126<H>  11-27  PT/INR - ( 26 Nov 2019 16:21 )   PT: 16.8 sec;   INR: 1.44 ratio         PTT - ( 26 Nov 2019 16:21 )  PTT:33.3 sec      RADIOLOGY & ADDITIONAL STUDIES:  < from: CT Brain Stroke Protocol (11.20.19 @ 13:28) >    EXAM:  CT BRAIN STROKE PROTOCOL                            PROCEDURE DATE:  11/20/2019            INTERPRETATION:  Noncontrast CT of the brain.    CLINICAL INDICATION:  Altered mental status and decreased alertness    TECHNIQUE : Axial CT scanning of the brain was obtained from the skull   base to the vertex without the administration of intravenous contrast.      COMPARISON: Brain CT dated 9/3/2019    FINDINGS:  No acute hemorrhage, mass effect or extra-axial collections   are present. A rightfrontal lucency is unchanged compared with the prior   study. A microvascular ischemic changes again noted. Lucencies are noted   within the corona radiata bilaterally which was not clearly present on   the prior study and are consistent with infarcts of indeterminate age.    No osseous abnormalities are identified. The orbits are not remarkable in   appearance. Right lens enucleation is noted.     The visualized paranasal sinuses and tympanomastoid cavities are free of   acute disease.    Multiple metallic fragments are again noted along the left posterior   lateral aspect of the neck are possibly representing shrapnel canal.        Impression:    No acute hemorrhage. Scattered white matter infarcts of indeterminate age   representing an interval change compared with 9/3/2019.    The results of this examination were discussed with Dr. Rahman from   stroke neurology at 1:30 PM on 11/20/2019      CHERY JEFFERSON M.D., ATTENDING RADIOLOGIST  This document has been electronically signed. Nov 20 2019  1:41PM      < end of copied text >    PROTEIN CALORIE MALNUTRITION:   [x ] PPSV2 < or = to 30% [ ] significant weight loss  [ x] poor nutritional intake [ ] catabolic state [ ] anasarca     Albumin, Serum: 1.9 g/dL (11-27-19 @ 00:42)  Artificial Nutrition [ ]     REFERRALS:   [ ]Chaplaincy  [ ] Hospice  [ ]Child Life  [x ]Social Work  [ ]Case management [ ]Holistic Therapy   Goals of Care Discussion Document:

## 2019-11-27 NOTE — CONSULT NOTE ADULT - ASSESSMENT
74M PMHx of ESRD s/p failed renal transplant x2, HCV, DM, and meningococcal meningitis 2 years ago was sent in to the ED from HD for AMS and low BPs. Admitted with concern for CVA, neurology following. Patient with RRT overnight 11/27 resulting in transfer to MICU for respiratory failure concern for aspiration and sepsis. Palliative consulted for [facility]

## 2019-11-27 NOTE — PROGRESS NOTE ADULT - SUBJECTIVE AND OBJECTIVE BOX
Jonathan Fields  PGY1 | Internal Medicine  [phone] / [phone]    INTERVAL HPI/OVERNIGHT EVENTS:    SUBJECTIVE: Patient seen and examined at bedside.     unable to assess    OBJECTIVE:    VITAL SIGNS:  ICU Vital Signs Last 24 Hrs  T(C): 36.4 (27 Nov 2019 04:00), Max: 38.1 (26 Nov 2019 15:45)  T(F): 97.5 (27 Nov 2019 04:00), Max: 100.6 (26 Nov 2019 15:45)  HR: 79 (27 Nov 2019 07:00) (72 - 128)  BP: 106/63 (27 Nov 2019 07:00) (58/42 - 182/89)  BP(mean): 80 (27 Nov 2019 07:00) (47 - 128)  ABP: --  ABP(mean): --  RR: 14 (27 Nov 2019 07:00) (14 - 35)  SpO2: 100% (27 Nov 2019 07:00) (93% - 100%)    Mode: AC/ CMV (Assist Control/ Continuous Mandatory Ventilation), RR (machine): 14, TV (machine): 500, FiO2: 30, PEEP: 5, ITime: 1, MAP: 9, PIP: 21    11-26 @ 07:01  -  11-27 @ 07:00  --------------------------------------------------------  IN: 1351 mL / OUT: 0 mL / NET: 1351 mL      CAPILLARY BLOOD GLUCOSE      POCT Blood Glucose.: 209 mg/dL (27 Nov 2019 05:16)      PHYSICAL EXAM:    GENERAL: thin, intubated, sedated  EYES: EOMI, PERRLA, conjunctiva and sclera clear  NECK: Supple, No JVD, Normal thyroid  CHEST/LUNG: Clear to ausculation bilaterally; No rales, rhonchi, wheezing, or rubs  HEART: rr, regular rhythm; No murmurs, rubs, or gallops  ABDOMEN: Soft, Nontender, Nondistended; Bowel sounds present  EXTREMITIES:  2+ Peripheral Pulses, No clubbing, cyanosis, or edema  LYMPH: No lymphadenopathy noted  SKIN: No rashes or lesions  NERVOUS SYSTEM:  intubated and sedated    MEDICATIONS  (STANDING):  atorvastatin 40 milliGRAM(s) Oral at bedtime  chlorhexidine 0.12% Liquid 15 milliLiter(s) Oral Mucosa every 12 hours  chlorhexidine 4% Liquid 1 Application(s) Topical <User Schedule>  heparin  Injectable 5000 Unit(s) SubCutaneous every 8 hours  insulin lispro (HumaLOG) corrective regimen sliding scale   SubCutaneous every 6 hours  levETIRAcetam  IVPB 1000 milliGRAM(s) IV Intermittent every 12 hours  meropenem  IVPB 500 milliGRAM(s) IV Intermittent every 24 hours  meropenem  IVPB      midodrine. 10 milliGRAM(s) Oral three times a day  mycophenolate mofetil Suspension 250 milliGRAM(s) Oral every 12 hours  norepinephrine Infusion 0.05 MICROgram(s)/kG/Min (6.375 mL/Hr) IV Continuous <Continuous>  predniSONE   Tablet 5 milliGRAM(s) Oral daily  propofol Infusion 30 MICROgram(s)/kG/Min (12.24 mL/Hr) IV Continuous <Continuous>  sevelamer carbonate 800 milliGRAM(s) Oral three times a day with meals  thiamine 100 milliGRAM(s) Oral daily    MEDICATIONS  (PRN):      ALLERGIES:  Allergies    No Known Allergies    Intolerances        LABS:                        7.8    18.38 )-----------( 195      ( 27 Nov 2019 00:42 )             27.2     11-27    135  |  97  |  27<H>  ----------------------------<  174<H>  4.0   |  23  |  3.89<H>    Ca    10.5      27 Nov 2019 00:42  Phos  3.9     11-27  Mg     1.9     11-27    TPro  5.7<L>  /  Alb  1.9<L>  /  TBili  0.8  /  DBili  x   /  AST  14  /  ALT  10  /  AlkPhos  126<H>  11-27    PT/INR - ( 26 Nov 2019 16:21 )   PT: 16.8 sec;   INR: 1.44 ratio         PTT - ( 26 Nov 2019 16:21 )  PTT:33.3 sec      RADIOLOGY & ADDITIONAL TESTS: Reviewed.    Thyroid Stimulating Hormone, Serum (11.26.19 @ 18:06)    Thyroid Stimulating Hormone, Serum: 2.38 uIU/mL    Cortisol AM, Serum (11.27.19 @ 03:23)    Cortisol AM, Serum: 18.2 ug/dL    Troponin T, High Sensitivity (11.27.19 @ 00:42)    Troponin T, High Sensitivity Result: 111: Rapid upward or downward changes in high-sensitivity troponin levels  suggest acute myocardial injury. Renal impairment may cause sustained  troponin elevations.  Normal: <6 - 14 ng/L  Indeterminate: 15-51 ng/L  Elevated: > 51 ng/L  See [email or web address] on the [facility] intranet for more  information ng/L Jonathan Fields  PGY1 | Internal Medicine  [phone] / [phone]    INTERVAL HPI/OVERNIGHT EVENTS: Transferred to MICU. Stopped trending troponins.     SUBJECTIVE: Patient seen and examined at bedside. Intubated and sedated. As per nursing, patient was able topen eyes trying to pull out tubes.    unable to assess    OBJECTIVE:    VITAL SIGNS:  ICU Vital Signs Last 24 Hrs  T(C): 36.4 (27 Nov 2019 04:00), Max: 38.1 (26 Nov 2019 15:45)  T(F): 97.5 (27 Nov 2019 04:00), Max: 100.6 (26 Nov 2019 15:45)  HR: 79 (27 Nov 2019 07:00) (72 - 128)  BP: 106/63 (27 Nov 2019 07:00) (58/42 - 182/89)  BP(mean): 80 (27 Nov 2019 07:00) (47 - 128)  ABP: --  ABP(mean): --  RR: 14 (27 Nov 2019 07:00) (14 - 35)  SpO2: 100% (27 Nov 2019 07:00) (93% - 100%)    Mode: AC/ CMV (Assist Control/ Continuous Mandatory Ventilation), RR (machine): 14, TV (machine): 500, FiO2: 30, PEEP: 5, ITime: 1, MAP: 9, PIP: 21    11-26 @ 07:01  -  11-27 @ 07:00  --------------------------------------------------------  IN: 1351 mL / OUT: 0 mL / NET: 1351 mL      CAPILLARY BLOOD GLUCOSE      POCT Blood Glucose.: 209 mg/dL (27 Nov 2019 05:16)      PHYSICAL EXAM:    GENERAL: thin, intubated, sedated  EYES: EOMI, PERRLA, conjunctiva and sclera clear  NECK: Supple, No JVD, Normal thyroid  CHEST/LUNG: Clear to ausculation bilaterally; No rales, rhonchi, wheezing, or rubs  HEART: rr, regular rhythm; No murmurs, rubs, or gallops  ABDOMEN: Soft, Nontender, Nondistended; Bowel sounds present  EXTREMITIES:  2+ Peripheral Pulses, No clubbing, cyanosis, or edema  LYMPH: No lymphadenopathy noted  SKIN: No rashes or lesions  NERVOUS SYSTEM:  intubated and sedated    MEDICATIONS  (STANDING):  atorvastatin 40 milliGRAM(s) Oral at bedtime  chlorhexidine 0.12% Liquid 15 milliLiter(s) Oral Mucosa every 12 hours  chlorhexidine 4% Liquid 1 Application(s) Topical <User Schedule>  heparin  Injectable 5000 Unit(s) SubCutaneous every 8 hours  insulin lispro (HumaLOG) corrective regimen sliding scale   SubCutaneous every 6 hours  levETIRAcetam  IVPB 1000 milliGRAM(s) IV Intermittent every 12 hours  meropenem  IVPB 500 milliGRAM(s) IV Intermittent every 24 hours  meropenem  IVPB      midodrine. 10 milliGRAM(s) Oral three times a day  mycophenolate mofetil Suspension 250 milliGRAM(s) Oral every 12 hours  norepinephrine Infusion 0.05 MICROgram(s)/kG/Min (6.375 mL/Hr) IV Continuous <Continuous>  predniSONE   Tablet 5 milliGRAM(s) Oral daily  propofol Infusion 30 MICROgram(s)/kG/Min (12.24 mL/Hr) IV Continuous <Continuous>  sevelamer carbonate 800 milliGRAM(s) Oral three times a day with meals  thiamine 100 milliGRAM(s) Oral daily    MEDICATIONS  (PRN):      ALLERGIES:  Allergies    No Known Allergies    Intolerances        LABS:                        7.8    18.38 )-----------( 195      ( 27 Nov 2019 00:42 )             27.2     11-27    135  |  97  |  27<H>  ----------------------------<  174<H>  4.0   |  23  |  3.89<H>    Ca    10.5      27 Nov 2019 00:42  Phos  3.9     11-27  Mg     1.9     11-27    TPro  5.7<L>  /  Alb  1.9<L>  /  TBili  0.8  /  DBili  x   /  AST  14  /  ALT  10  /  AlkPhos  126<H>  11-27    PT/INR - ( 26 Nov 2019 16:21 )   PT: 16.8 sec;   INR: 1.44 ratio         PTT - ( 26 Nov 2019 16:21 )  PTT:33.3 sec      RADIOLOGY & ADDITIONAL TESTS: Reviewed.    Thyroid Stimulating Hormone, Serum (11.26.19 @ 18:06)    Thyroid Stimulating Hormone, Serum: 2.38 uIU/mL    Cortisol AM, Serum (11.27.19 @ 03:23)    Cortisol AM, Serum: 18.2 ug/dL    Troponin T, High Sensitivity (11.27.19 @ 00:42)    Troponin T, High Sensitivity Result: 111: Rapid upward or downward changes in high-sensitivity troponin levels  suggest acute myocardial injury. Renal impairment may cause sustained  troponin elevations.  Normal: <6 - 14 ng/L  Indeterminate: 15-51 ng/L  Elevated: > 51 ng/L  See [email or web address] on the [facility] intranet for more  information ng/L

## 2019-11-27 NOTE — PROGRESS NOTE ADULT - ASSESSMENT
Assessment  DM: A1C 5.5%, was on insulin at home, now on insulin coverage, patient is NPO on tube feeds, blood sugars trending up and not at target. RRT yesterday for hypotensive episode s/p HD. Patient was transferred to MICU.  Hypercalcemia: Primary Hyperparathyroidism, calcium improved, 10.4.  Sepsis: IV ABx for UTI, LP inconsistent with meningitis, on medications, stable, monitored.  HTN: Controlled,  on antihypertensive medications.  ESRD: On hemodialysis, Monitor labs/BMP          Robi Gay MD  Cell: [phone]  Office: [phone] Assessment  DM: A1C 5.5%, was on insulin at home, now on insulin coverage, patient is NPO on tube feeds, blood sugars trending up and not at target.  RRT yesterday for hypotensive episode s/p HD. Patient was transferred to MICU.  Hypercalcemia: Primary Hyperparathyroidism, calcium improved, 10.4.  Sepsis: IV ABx for UTI, LP inconsistent with meningitis, on medications, stable, monitored.  HTN: Controlled,  on antihypertensive medications.  ESRD: On hemodialysis, Monitor labs/BMP          Robi Gay MD  Cell: [phone]  Office: [phone]

## 2019-11-27 NOTE — CONSULT NOTE ADULT - PROBLEM SELECTOR RECOMMENDATION 5
will follow for GOC. MICU team to update family regarding change in status. Pending their conversation and clinical trajectory will with conversation regarding goals and prognosis. Discussed with MICU team

## 2019-11-27 NOTE — PROGRESS NOTE ADULT - PROBLEM SELECTOR PLAN 1
Suggest to start low-dose basal NPH insulin and continue Humalog correction scale coverage.  Will continue monitoring FS, log, and FU. Suggest to start low-dose basal NPH insulin and continue Humalog correction scale coverage.

## 2019-11-27 NOTE — PROGRESS NOTE ADULT - SUBJECTIVE AND OBJECTIVE BOX
Subjective: Patient seen and examined. No new events except as noted.     SUBJECTIVE/ROS:  ROS limited       MEDICATIONS:  MEDICATIONS  (STANDING):  atorvastatin 40 milliGRAM(s) Oral at bedtime  chlorhexidine 0.12% Liquid 15 milliLiter(s) Oral Mucosa every 12 hours  chlorhexidine 4% Liquid 1 Application(s) Topical <User Schedule>  heparin  Injectable 5000 Unit(s) SubCutaneous every 8 hours  insulin lispro (HumaLOG) corrective regimen sliding scale   SubCutaneous every 6 hours  levETIRAcetam  IVPB 1000 milliGRAM(s) IV Intermittent every 12 hours  meropenem  IVPB 500 milliGRAM(s) IV Intermittent every 24 hours  meropenem  IVPB      midodrine. 10 milliGRAM(s) Oral three times a day  mycophenolate mofetil Suspension 250 milliGRAM(s) Oral every 12 hours  norepinephrine Infusion 0.05 MICROgram(s)/kG/Min (6.375 mL/Hr) IV Continuous <Continuous>  predniSONE   Tablet 5 milliGRAM(s) Oral daily  propofol Infusion 30 MICROgram(s)/kG/Min (12.24 mL/Hr) IV Continuous <Continuous>  sevelamer carbonate 800 milliGRAM(s) Oral three times a day with meals  thiamine 100 milliGRAM(s) Oral daily      PHYSICAL EXAM:  T(C): 36.1 (11-27-19 @ 08:00), Max: 38.1 (11-26-19 @ 15:45)  HR: 86 (11-27-19 @ 10:15) (72 - 108)  BP: 113/65 (11-27-19 @ 10:15) (58/42 - 182/89)  RR: 14 (11-27-19 @ 10:15) (14 - 35)  SpO2: 100% (11-27-19 @ 10:15) (99% - 100%)  Wt(kg): --  I&O's Summary    26 Nov 2019 07:01  -  27 Nov 2019 07:00  --------------------------------------------------------  IN: 1351 mL / OUT: 0 mL / NET: 1351 mL            Appearance: on vent 	  Cardiovascular: Normal S1 S2,    Murmur:   Neck: JVP limited to be evaluated   Respiratory: Lungs few rhonchi   Gastrointestinal:  Soft  Skin: normal   Neuro: limited as pt on vent      LABS/DATA:    CARDIAC MARKERS:                                7.8    18.38 )-----------( 195      ( 27 Nov 2019 00:42 )             27.2     11-27    135  |  97  |  27<H>  ----------------------------<  174<H>  4.0   |  23  |  3.89<H>    Ca    10.5      27 Nov 2019 00:42  Phos  3.9     11-27  Mg     1.9     11-27    TPro  5.7<L>  /  Alb  1.9<L>  /  TBili  0.8  /  DBili  x   /  AST  14  /  ALT  10  /  AlkPhos  126<H>  11-27    proBNP:   Lipid Profile:   HgA1c:   TSH: Thyroid Stimulating Hormone, Serum: 2.38 uIU/mL (11-26 @ 18:06)      TELE:  EKG:

## 2019-11-27 NOTE — PROGRESS NOTE ADULT - SUBJECTIVE AND OBJECTIVE BOX
Patient is a 74y Male whom presented to the hospital with esrd on hd   transfer to micu   PAST MEDICAL & SURGICAL HISTORY:  Kidney transplant recipient  Anuria  GERD (gastroesophageal reflux disease)  Kidney transplant failure: dx: 2/2013  Hepatitis C: dx: 90&#x27;s.  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 10 milliGRAM(s) Oral three times a day  mycophenolate mofetil 250 milliGRAM(s) Oral two times a day  predniSONE   Tablet 5 milliGRAM(s) Oral daily  sevelamer carbonate 800 milliGRAM(s) Oral three times a day with meals  sodium bicarbonate 650 milliGRAM(s) Oral two times a day  tamsulosin 0.4 milliGRAM(s) Oral at bedtime      Allergies    No Known Allergies    Intolerances        SOCIAL HISTORY:  Denies ETOh,Smoking,     FAMILY HISTORY:  Family history of breast cancer in sister (Sibling): living at 92 yo  Family history of prostate cancer in father  Family history of lung cancer  Family history of hypertension in mother  Family history of diabetes mellitus: mother      REVIEW OF SYSTEMS:    unbable to obtained                           7.8    18.38 )-----------( 195      ( 27 Nov 2019 00:42 )             27.2       CBC Full  -  ( 27 Nov 2019 00:42 )  WBC Count : 18.38 K/uL  RBC Count : 3.12 M/uL  Hemoglobin : 7.8 g/dL  Hematocrit : 27.2 %  Platelet Count - Automated : 195 K/uL  Mean Cell Volume : 87.2 fl  Mean Cell Hemoglobin : 25.0 pg  Mean Cell Hemoglobin Concentration : 28.7 gm/dL  Auto Neutrophil # : x  Auto Lymphocyte # : x  Auto Monocyte # : x  Auto Eosinophil # : x  Auto Basophil # : x  Auto Neutrophil % : x  Auto Lymphocyte % : x  Auto Monocyte % : x  Auto Eosinophil % : x  Auto Basophil % : x      11-27    135  |  97  |  27<H>  ----------------------------<  174<H>  4.0   |  23  |  3.89<H>    Ca    10.5      27 Nov 2019 00:42  Phos  3.9     11-27  Mg     1.9     11-27    TPro  5.7<L>  /  Alb  1.9<L>  /  TBili  0.8  /  DBili  x   /  AST  14  /  ALT  10  /  AlkPhos  126<H>  11-27      CAPILLARY BLOOD GLUCOSE      POCT Blood Glucose.: 288 mg/dL (27 Nov 2019 11:52)  POCT Blood Glucose.: 209 mg/dL (27 Nov 2019 05:16)  POCT Blood Glucose.: 130 mg/dL (26 Nov 2019 18:03)      Vital Signs Last 24 Hrs  T(C): 36.1 (27 Nov 2019 08:00), Max: 36.7 (26 Nov 2019 20:00)  T(F): 96.9 (27 Nov 2019 08:00), Max: 98.1 (26 Nov 2019 20:00)  HR: 92 (27 Nov 2019 16:30) (72 - 102)  BP: 104/62 (27 Nov 2019 16:30) (58/42 - 182/89)  BP(mean): 78 (27 Nov 2019 16:30) (47 - 128)  RR: 14 (27 Nov 2019 16:30) (14 - 35)  SpO2: 100% (27 Nov 2019 16:30) (99% - 100%)        PT/INR - ( 26 Nov 2019 16:21 )   PT: 16.8 sec;   INR: 1.44 ratio         PTT - ( 26 Nov 2019 16:21 )  PTT:33.3 sec                                               PHYSICAL EXAM:  intubated   Constitutional: intubated    HEENT: conjunctive   clear   Neck:  No JVD  Respiratory: decrease bs b/l   Cardiovascular: S1 and S2  Gastrointestinal: BS+, soft, NT/ND  Extremities: No peripheral edema  Neurological: intubated   Skin: dry   Access: pos fistula

## 2019-11-27 NOTE — PROGRESS NOTE ADULT - ASSESSMENT
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       NEURO:  - now intubated, presented with AMS  - metabolic encephalopathy, meningitis r/o  - neuro following  - chronic lacunar infarcts  - c/w keppra for ?hx of seizure disorder  -AMS likely 2/2 hyperCa, renal dysfunction, poor PO intake  - thiamine IVPB daily x 3 days    CV:  - hx of afib was on eliquis, now off a/c per cards due to bleeding risk  - holding carvedilol 6.25 BID  - hx of cocaine cardiomyopathy, resolved, normal EF on most recent echo  - c/w statin  - trop 134 -- trend  - hypotensive during RRT 11/26; started levo gtt, c/w midodrine    PULM:  - now intubated and sedated  - monitor for improvement for eventual extubation    GI:  - NG tube in place  - tube feeds ordered      RENAL:  - hx of ESRD s/p failed renal transplant x2  - renal following, HD as recommended.  - c/w cellcept, predisone, sevelamer   - has dialysis catheter in place    ENDO:  #DM2: HbA1c 5.5  - Start Insulin Sliding Scale  - endocrine following  - presented hypercalcemic, downtrending. hold pamidronate tomorrow    METABOLIC:  #lyes WNL; monitor and f/u with HD schedule  - initially hyperCa, improved. hold pamidronate for now, follow up with endocrine    HEMATOLOGIC:  - hgb 8-9. Monitor  - pt was on eliquis; has been held.       INFECTIOUS:  - WBC uptrending during the hospital stay. ID following  - Urine culture growing E.coli; blood culture negative  - was given empiric CNS infx coverage - vanc, manoj, acyclovir, LP CSF negative  - per ID, resart meropenem 500 IV q24, Vanc x1, will dose vanc by level    PPX: HSQ     GOC:  - consider palliative consult. 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       NEURO:  - now intubated, presented with AMS  - metabolic encephalopathy, meningitis r/o  - neuro following  - chronic lacunar infarcts  - c/w keppra for ?hx of seizure disorder  -AMS likely 2/2 hyperCa, renal dysfunction, poor PO intake  - thiamine IVPB daily x 3 days    CV:  - hx of afib was on eliquis, now off a/c per cards due to bleeding risk  - holding carvedilol 6.25 BID  - hx of cocaine cardiomyopathy, resolved, normal EF on most recent echo  - c/w statin  - trop 134 -- trended down.   - hypotensive during RRT 11/26; started levo gtt, c/w midodrine, try to wean off levo.    PULM:  - now intubated and sedated  - monitor for improvement for eventual extubation    GI:  - NG tube in place  - tube feeds ordered      RENAL:  - hx of ESRD s/p failed renal transplant x2  - renal following, HD as recommended.  - c/w cellcept, predisone, sevelamer   - has dialysis catheter in place    ENDO:  #DM2: HbA1c 5.5  - Start Insulin Sliding Scale  - endocrine following  - presented hypercalcemic, downtrending. hold pamidronate tomorrow    METABOLIC:  #lyes WNL; monitor and f/u with HD schedule  - initially hyperCa, improved. hold pamidronate for now, follow up with endocrine    HEMATOLOGIC:  - hgb 8-9. Monitor  - pt was on eliquis; has been held.       INFECTIOUS:  - WBC uptrending during the hospital stay. ID following  - RRT likely 2/2 septic shock, possible aspiration, UTI, has decubiti ulcers as well.  - Urine culture growing E.coli; blood culture negative  - was given empiric CNS infx coverage - vanc, manoj, acyclovir, LP CSF negative  - per ID, resart meropenem 500 IV q24, Vanc x1, will dose vanc by level w/ HD.    PPX: HSQ     GOC:  - palliative consult.  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       NEURO:  - now intubated, presented with AMS  - metabolic encephalopathy, meningitis r/o  - neuro following  - chronic lacunar infarcts  - c/w keppra for ?hx of seizure disorder  -AMS likely 2/2 hyperCa, renal dysfunction, poor PO intake  - thiamine IVPB daily x 3 days    CV:  - hx of afib was on eliquis, now off a/c per cards due to bleeding risk  - holding carvedilol 6.25 BID  - hx of cocaine cardiomyopathy, resolved, normal EF on most recent echo  - c/w statin  - trop 134 -- trended down.   - hypotensive during RRT 11/26; started levo gtt, c/w midodrine, try to wean off levo.  - echo ordered    PULM:  - now intubated and sedated  - monitor for improvement for eventual extubation    GI:  - NG tube in place  - tube feeds ordered      RENAL:  - hx of ESRD s/p failed renal transplant x2  - renal following, HD as recommended.  - c/w cellcept, predisone, sevelamer   - has dialysis catheter in place    ENDO:  #DM2: HbA1c 5.5  - Start Insulin Sliding Scale  - endocrine following  - presented hypercalcemic, downtrending. hold pamidronate tomorrow    METABOLIC:  #lyes WNL; monitor and f/u with HD schedule  - initially hyperCa, improved. hold pamidronate for now, follow up with endocrine    HEMATOLOGIC:  - hgb 8-9. Monitor  - pt was on eliquis; has been held.       INFECTIOUS:  - WBC uptrending during the hospital stay. ID following  - RRT likely 2/2 septic shock, possible aspiration, UTI, has decubiti ulcers as well.  - Urine culture growing E.coli; blood culture negative  - was given empiric CNS infx coverage - vanc, manoj, acyclovir, LP CSF negative  - per ID, resart meropenem 500 IV q24, Vanc x1, will dose vanc by level w/ HD.  - f/u sputum culture    PPX: HSQ     GOC:  - palliative consult.  - discuss with sister

## 2019-11-27 NOTE — PROGRESS NOTE ADULT - ATTENDING COMMENTS
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 Found to have CVA and EEG and LP negative for other etiologies of AMS. Pt yesterday tx to ICU for unresponsiveness, septic shock and resp failure requiring intubation. Cont pressors but low dose currently. f/u pan cx. Cont empiric abx. Cont immunosuppression for renal tx. 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 Found to have CVA and EEG and LP negative for other etiologies of AMS. Pt yesterday tx to ICU for unresponsiveness, septic shock and resp failure requiring intubation. Cont pressors but low dose currently. f/u dale cx. US shows lower lobe consolidations likely PNA. Cont empiric abx. Cont immunosuppression for renal tx.

## 2019-11-27 NOTE — PROGRESS NOTE ADULT - ASSESSMENT
shock / resp failure  on vent  plan as per ICU    History of cocaine induced CM  normal EF on last echo    PAF  in sinus   a/c once deemed safe   for now off given high bleed risk, sepsis     Hypercalcemia  as per renal     Bacteruria   anbx  fu with ID

## 2019-11-27 NOTE — PROGRESS NOTE ADULT - SUBJECTIVE AND OBJECTIVE BOX
Patient is a 74y old  Male who presents with a chief complaint of ams (27 Nov 2019 12:38)    Being followed by ID for ?asp pna    Interval history:s/p RRT  in ICU  Intubated  s/p Left TLC insertion   No other acute events      ROS:  Not obtainable     Antimicrobials:    meropenem  IVPB 500 milliGRAM(s) IV Intermittent every 24 hours  meropenem  IVPB      s/p vanco x 1     other medications reviewed    Vital Signs Last 24 Hrs  T(C): 36.1 (11-27-19 @ 08:00), Max: 38.1 (11-26-19 @ 15:45)  T(F): 96.9 (11-27-19 @ 08:00), Max: 100.6 (11-26-19 @ 15:45)  HR: 90 (11-27-19 @ 12:30) (72 - 108)  BP: 90/53 (11-27-19 @ 12:30) (58/42 - 182/89)  BP(mean): 65 (11-27-19 @ 12:30) (47 - 128)  RR: 14 (11-27-19 @ 12:30) (14 - 35)  SpO2: 100% (11-27-19 @ 12:30) (99% - 100%)    Physical Exam:        ETT     left Sc TLC no erythema or tenderness    R Sc HD catheter no erythema o tenderness    Neck rigidity     Chest Good AE,bibasilar crackles     CVS RRR S1 S2 WNl No murmur or rub or gallop    Abd soft BS normal No tenderness RLQ transplant kidney no tenderness    Ext left arm AVF no erythema or tenderness    IV site no erythema tenderness or discharge    Joints no swelling or LOM  CNS sedated   Lab Data:                          7.8    18.38 )-----------( 195      ( 27 Nov 2019 00:42 )             27.2     WBC Count: 18.38 (11-27-19 @ 00:42)  WBC Count: 21.55 (11-26-19 @ 16:21)  WBC Count: 20.75 (11-26-19 @ 11:50)  WBC Count: 11.76 (11-25-19 @ 15:24)  WBC Count: 10.93 (11-24-19 @ 11:29)  WBC Count: 7.62 (11-23-19 @ 12:30)  WBC Count: 12.14 (11-22-19 @ 14:32)  WBC Count: 12.72 (11-22-19 @ 10:42)  WBC Count: 11.47 (11-21-19 @ 22:08)  WBC Count: 12.78 (11-20-19 @ 15:25)    11-27    135  |  97  |  27<H>  ----------------------------<  174<H>  4.0   |  23  |  3.89<H>    Ca    10.5      27 Nov 2019 00:42  Phos  3.9     11-27  Mg     1.9     11-27    TPro  5.7<L>  /  Alb  1.9<L>  /  TBili  0.8  /  DBili  x   /  AST  14  /  ALT  10  /  AlkPhos  126<H>  11-27        Culture - Acid Fast (collected 23 Nov 2019 01:25)    Culture - CSF with Gram Stain (collected 22 Nov 2019 17:14)  Source: .CSF  Gram Stain (22 Nov 2019 18:32):    No polymorphonuclear cells seen    No organisms seen    by cytocentrifuge  Final Report (25 Nov 2019 06:28):    No growth    Culture - Fungal, CSF (collected 22 Nov 2019 17:14)  Source: .CSF CSF  Preliminary Report (25 Nov 2019 10:54):    Testing in progress        < from: Xray Chest 1 View- PORTABLE-Urgent (11.26.19 @ 21:21) >  Impression:    The heart is normal in size. Right lower lobe pneumonia and/or   atelectasis cannot be ruled out entirely. Endotracheal tube is in good   position. NG tube is in the stomach. A new central line was placed in the   left and the tip is in superior vena cava.A Cordis sheet catheter is   seen on right and the tip is in the superior vena cava as well.   Degenerative changes of the thoracic spine.      < end of copied text >

## 2019-11-27 NOTE — PROGRESS NOTE ADULT - ASSESSMENT
74M PMHx of ESRD s/p failed renal transplant x2, HCV, DM, and meningococcal meningitis 2 years ago was sent in to the ED from HD for AMS and low BPs.  lethargic  hypercalcemia   E coli bacteriuria/UTI  LP not s/o any CNS infection ,CSF negative  Now with ? aspiration pna  resp failure  s/p intubation  In ICU  Rec:  1) follow sputum, blood cx  2) ICU support per ICU team  3) Continue empiric renally adjusted meropenem and vanco post HD  4) aspiration precautions  50 Consider GOC discussion as patients mental status likely due to CVa and has not improved    prognosis guarded  Will tailor plan for ID issues  per course,results.Will defer to primary team on management of other issues  case d/w MICU team .  ID service will cover and follow over next 4 days.Please call [phone] if any issues.

## 2019-11-27 NOTE — PROGRESS NOTE ADULT - SUBJECTIVE AND OBJECTIVE BOX
Chief complaint  Patient is a 74y old  Male who presents with a chief complaint of ams (27 Nov 2019 11:25)   Review of systems  RRT yesterday for hypotensive episode s/p HD. Patient was transferred to MICU.  Patient in bed, looks comfortable, no fever, no hypoglycemia.      Labs and Fingersticks  CAPILLARY BLOOD GLUCOSE      POCT Blood Glucose.: 288 mg/dL (27 Nov 2019 11:52)  POCT Blood Glucose.: 209 mg/dL (27 Nov 2019 05:16)  POCT Blood Glucose.: 130 mg/dL (26 Nov 2019 18:03)  POCT Blood Glucose.: 93 mg/dL (26 Nov 2019 14:57)      Anion Gap, Serum: 15 (11-27 @ 00:42)  Anion Gap, Serum: 13 (11-26 @ 15:54)  Anion Gap, Serum: 12 (11-26 @ 08:58)      Calcium, Total Serum: 10.5 (11-27 @ 00:42)  Calcium, Total Serum: 10.8 <H> (11-26 @ 15:54)  Calcium, Total Serum: 10.4 (11-26 @ 08:58)  Albumin, Serum: 1.9 <L> (11-27 @ 00:42)  Albumin, Serum: 2.3 <L> (11-26 @ 15:54)    Alanine Aminotransferase (ALT/SGPT): 10 (11-27 @ 00:42)  Alanine Aminotransferase (ALT/SGPT): 10 (11-26 @ 15:54)  Alkaline Phosphatase, Serum: 126 <H> (11-27 @ 00:42)  Alkaline Phosphatase, Serum: 122 <H> (11-26 @ 15:54)  Aspartate Aminotransferase (AST/SGOT): 14 (11-27 @ 00:42)  Aspartate Aminotransferase (AST/SGOT): 14 (11-26 @ 15:54)        11-27    135  |  97  |  27<H>  ----------------------------<  174<H>  4.0   |  23  |  3.89<H>    Ca    10.5      27 Nov 2019 00:42  Phos  3.9     11-27  Mg     1.9     11-27    TPro  5.7<L>  /  Alb  1.9<L>  /  TBili  0.8  /  DBili  x   /  AST  14  /  ALT  10  /  AlkPhos  126<H>  11-27                        7.8    18.38 )-----------( 195      ( 27 Nov 2019 00:42 )             27.2     Medications  MEDICATIONS  (STANDING):  atorvastatin 40 milliGRAM(s) Oral at bedtime  chlorhexidine 0.12% Liquid 15 milliLiter(s) Oral Mucosa every 12 hours  chlorhexidine 4% Liquid 1 Application(s) Topical <User Schedule>  heparin  Injectable 5000 Unit(s) SubCutaneous every 8 hours  insulin lispro (HumaLOG) corrective regimen sliding scale   SubCutaneous every 6 hours  levETIRAcetam  IVPB 1000 milliGRAM(s) IV Intermittent every 12 hours  meropenem  IVPB 500 milliGRAM(s) IV Intermittent every 24 hours  meropenem  IVPB      midodrine. 10 milliGRAM(s) Oral three times a day  mycophenolate mofetil Suspension 250 milliGRAM(s) Oral every 12 hours  norepinephrine Infusion 0.05 MICROgram(s)/kG/Min (6.375 mL/Hr) IV Continuous <Continuous>  predniSONE   Tablet 5 milliGRAM(s) Oral daily  propofol Infusion 30 MICROgram(s)/kG/Min (12.24 mL/Hr) IV Continuous <Continuous>  sevelamer carbonate 800 milliGRAM(s) Oral three times a day with meals  thiamine 100 milliGRAM(s) Oral daily      Physical Exam  General: Patient comfortable in bed  Vital Signs Last 12 Hrs  T(F): 96.9 (11-27-19 @ 08:00), Max: 97.5 (11-27-19 @ 04:00)  HR: 90 (11-27-19 @ 12:30) (75 - 91)  BP: 90/53 (11-27-19 @ 12:30) (75/51 - 167/86)  BP(mean): 65 (11-27-19 @ 12:30) (58 - 116)  RR: 14 (11-27-19 @ 12:30) (14 - 35)  SpO2: 100% (11-27-19 @ 12:30) (99% - 100%)  Neck: No palpable thyroid nodules.  CVS: S1S2, No murmurs  Respiratory: No wheezing, no crepitations  GI: Abdomen soft, bowel sounds positive  Musculoskeletal:  edema lower extremities.   Skin: No skin rashes, no ecchymosis    Diagnostics Chief complaint  Patient is a 74y old  Male who presents with a chief complaint of ams (27 Nov 2019 11:25)   Review of systems  RRT yesterday for hypotensive episode s/p HD. Patient was transferred to MICU.  Patient in bed, looks comfortable,  no fever, no hypoglycemia.      Labs and Fingersticks  CAPILLARY BLOOD GLUCOSE      POCT Blood Glucose.: 288 mg/dL (27 Nov 2019 11:52)  POCT Blood Glucose.: 209 mg/dL (27 Nov 2019 05:16)  POCT Blood Glucose.: 130 mg/dL (26 Nov 2019 18:03)  POCT Blood Glucose.: 93 mg/dL (26 Nov 2019 14:57)      Anion Gap, Serum: 15 (11-27 @ 00:42)  Anion Gap, Serum: 13 (11-26 @ 15:54)  Anion Gap, Serum: 12 (11-26 @ 08:58)      Calcium, Total Serum: 10.5 (11-27 @ 00:42)  Calcium, Total Serum: 10.8 <H> (11-26 @ 15:54)  Calcium, Total Serum: 10.4 (11-26 @ 08:58)  Albumin, Serum: 1.9 <L> (11-27 @ 00:42)  Albumin, Serum: 2.3 <L> (11-26 @ 15:54)    Alanine Aminotransferase (ALT/SGPT): 10 (11-27 @ 00:42)  Alanine Aminotransferase (ALT/SGPT): 10 (11-26 @ 15:54)  Alkaline Phosphatase, Serum: 126 <H> (11-27 @ 00:42)  Alkaline Phosphatase, Serum: 122 <H> (11-26 @ 15:54)  Aspartate Aminotransferase (AST/SGOT): 14 (11-27 @ 00:42)  Aspartate Aminotransferase (AST/SGOT): 14 (11-26 @ 15:54)        11-27    135  |  97  |  27<H>  ----------------------------<  174<H>  4.0   |  23  |  3.89<H>    Ca    10.5      27 Nov 2019 00:42  Phos  3.9     11-27  Mg     1.9     11-27    TPro  5.7<L>  /  Alb  1.9<L>  /  TBili  0.8  /  DBili  x   /  AST  14  /  ALT  10  /  AlkPhos  126<H>  11-27                        7.8    18.38 )-----------( 195      ( 27 Nov 2019 00:42 )             27.2     Medications  MEDICATIONS  (STANDING):  atorvastatin 40 milliGRAM(s) Oral at bedtime  chlorhexidine 0.12% Liquid 15 milliLiter(s) Oral Mucosa every 12 hours  chlorhexidine 4% Liquid 1 Application(s) Topical <User Schedule>  heparin  Injectable 5000 Unit(s) SubCutaneous every 8 hours  insulin lispro (HumaLOG) corrective regimen sliding scale   SubCutaneous every 6 hours  levETIRAcetam  IVPB 1000 milliGRAM(s) IV Intermittent every 12 hours  meropenem  IVPB 500 milliGRAM(s) IV Intermittent every 24 hours  meropenem  IVPB      midodrine. 10 milliGRAM(s) Oral three times a day  mycophenolate mofetil Suspension 250 milliGRAM(s) Oral every 12 hours  norepinephrine Infusion 0.05 MICROgram(s)/kG/Min (6.375 mL/Hr) IV Continuous <Continuous>  predniSONE   Tablet 5 milliGRAM(s) Oral daily  propofol Infusion 30 MICROgram(s)/kG/Min (12.24 mL/Hr) IV Continuous <Continuous>  sevelamer carbonate 800 milliGRAM(s) Oral three times a day with meals  thiamine 100 milliGRAM(s) Oral daily      Physical Exam  General: Patient comfortable in bed  Vital Signs Last 12 Hrs  T(F): 96.9 (11-27-19 @ 08:00), Max: 97.5 (11-27-19 @ 04:00)  HR: 90 (11-27-19 @ 12:30) (75 - 91)  BP: 90/53 (11-27-19 @ 12:30) (75/51 - 167/86)  BP(mean): 65 (11-27-19 @ 12:30) (58 - 116)  RR: 14 (11-27-19 @ 12:30) (14 - 35)  SpO2: 100% (11-27-19 @ 12:30) (99% - 100%)  Neck: No palpable thyroid nodules.  CVS: S1S2, No murmurs  Respiratory: No wheezing, no crepitations  GI: Abdomen soft, bowel sounds positive  Musculoskeletal:  edema lower extremities.   Skin: No skin rashes, no ecchymosis    Diagnostics

## 2019-11-28 LAB
ALBUMIN SERPL ELPH-MCNC: 2 G/DL — LOW (ref 3.3–5)
ALP SERPL-CCNC: 125 U/L — HIGH (ref 40–120)
ALT FLD-CCNC: 8 U/L — LOW (ref 10–45)
ANION GAP SERPL CALC-SCNC: 10 MMOL/L — SIGNIFICANT CHANGE UP (ref 5–17)
APTT BLD: 33.8 SEC — SIGNIFICANT CHANGE UP (ref 27.5–36.3)
AST SERPL-CCNC: 13 U/L — SIGNIFICANT CHANGE UP (ref 10–40)
BASOPHILS # BLD AUTO: 0.02 K/UL — SIGNIFICANT CHANGE UP (ref 0–0.2)
BASOPHILS NFR BLD AUTO: 0.1 % — SIGNIFICANT CHANGE UP (ref 0–2)
BILIRUB SERPL-MCNC: 0.4 MG/DL — SIGNIFICANT CHANGE UP (ref 0.2–1.2)
BUN SERPL-MCNC: 19 MG/DL — SIGNIFICANT CHANGE UP (ref 7–23)
CALCIUM SERPL-MCNC: 9.5 MG/DL — SIGNIFICANT CHANGE UP (ref 8.4–10.5)
CHLORIDE SERPL-SCNC: 96 MMOL/L — SIGNIFICANT CHANGE UP (ref 96–108)
CO2 SERPL-SCNC: 25 MMOL/L — SIGNIFICANT CHANGE UP (ref 22–31)
CREAT SERPL-MCNC: 2.72 MG/DL — HIGH (ref 0.5–1.3)
EOSINOPHIL # BLD AUTO: 0.04 K/UL — SIGNIFICANT CHANGE UP (ref 0–0.5)
EOSINOPHIL NFR BLD AUTO: 0.3 % — SIGNIFICANT CHANGE UP (ref 0–6)
GLUCOSE BLDC GLUCOMTR-MCNC: 102 MG/DL — HIGH (ref 70–99)
GLUCOSE BLDC GLUCOMTR-MCNC: 137 MG/DL — HIGH (ref 70–99)
GLUCOSE BLDC GLUCOMTR-MCNC: 148 MG/DL — HIGH (ref 70–99)
GLUCOSE BLDC GLUCOMTR-MCNC: 152 MG/DL — HIGH (ref 70–99)
GLUCOSE SERPL-MCNC: 168 MG/DL — HIGH (ref 70–99)
HCT VFR BLD CALC: 24 % — LOW (ref 39–50)
HGB BLD-MCNC: 7.3 G/DL — LOW (ref 13–17)
IMM GRANULOCYTES NFR BLD AUTO: 0.8 % — SIGNIFICANT CHANGE UP (ref 0–1.5)
INR BLD: 1.12 RATIO — SIGNIFICANT CHANGE UP (ref 0.88–1.16)
LYMPHOCYTES # BLD AUTO: 1.32 K/UL — SIGNIFICANT CHANGE UP (ref 1–3.3)
LYMPHOCYTES # BLD AUTO: 9.6 % — LOW (ref 13–44)
MAGNESIUM SERPL-MCNC: 1.8 MG/DL — SIGNIFICANT CHANGE UP (ref 1.6–2.6)
MCHC RBC-ENTMCNC: 25.8 PG — LOW (ref 27–34)
MCHC RBC-ENTMCNC: 30.4 GM/DL — LOW (ref 32–36)
MCV RBC AUTO: 84.8 FL — SIGNIFICANT CHANGE UP (ref 80–100)
MONOCYTES # BLD AUTO: 1.73 K/UL — HIGH (ref 0–0.9)
MONOCYTES NFR BLD AUTO: 12.6 % — SIGNIFICANT CHANGE UP (ref 2–14)
NEUTROPHILS # BLD AUTO: 10.52 K/UL — HIGH (ref 1.8–7.4)
NEUTROPHILS NFR BLD AUTO: 76.6 % — SIGNIFICANT CHANGE UP (ref 43–77)
NRBC # BLD: 0 /100 WBCS — SIGNIFICANT CHANGE UP (ref 0–0)
PHOSPHATE SERPL-MCNC: 2 MG/DL — LOW (ref 2.5–4.5)
PLATELET # BLD AUTO: 177 K/UL — SIGNIFICANT CHANGE UP (ref 150–400)
POTASSIUM SERPL-MCNC: 3.9 MMOL/L — SIGNIFICANT CHANGE UP (ref 3.5–5.3)
POTASSIUM SERPL-SCNC: 3.9 MMOL/L — SIGNIFICANT CHANGE UP (ref 3.5–5.3)
PROT SERPL-MCNC: 5.9 G/DL — LOW (ref 6–8.3)
PROTHROM AB SERPL-ACNC: 12.8 SEC — SIGNIFICANT CHANGE UP (ref 10–12.9)
RBC # BLD: 2.83 M/UL — LOW (ref 4.2–5.8)
RBC # FLD: 15.9 % — HIGH (ref 10.3–14.5)
SODIUM SERPL-SCNC: 131 MMOL/L — LOW (ref 135–145)
WBC # BLD: 13.74 K/UL — HIGH (ref 3.8–10.5)
WBC # FLD AUTO: 13.74 K/UL — HIGH (ref 3.8–10.5)

## 2019-11-28 PROCEDURE — 99232 SBSQ HOSP IP/OBS MODERATE 35: CPT

## 2019-11-28 PROCEDURE — 99291 CRITICAL CARE FIRST HOUR: CPT

## 2019-11-28 RX ORDER — DEXMEDETOMIDINE HYDROCHLORIDE IN 0.9% SODIUM CHLORIDE 4 UG/ML
0.2 INJECTION INTRAVENOUS
Qty: 200 | Refills: 0 | Status: DISCONTINUED | OUTPATIENT
Start: 2019-11-28 | End: 2019-11-29

## 2019-11-28 RX ADMIN — SEVELAMER CARBONATE 800 MILLIGRAM(S): 2400 POWDER, FOR SUSPENSION ORAL at 21:44

## 2019-11-28 RX ADMIN — HEPARIN SODIUM 5000 UNIT(S): 5000 INJECTION INTRAVENOUS; SUBCUTANEOUS at 21:44

## 2019-11-28 RX ADMIN — LEVETIRACETAM 400 MILLIGRAM(S): 250 TABLET, FILM COATED ORAL at 06:18

## 2019-11-28 RX ADMIN — Medication 1: at 01:00

## 2019-11-28 RX ADMIN — Medication 62.5 MILLIMOLE(S): at 01:46

## 2019-11-28 RX ADMIN — MIDODRINE HYDROCHLORIDE 10 MILLIGRAM(S): 2.5 TABLET ORAL at 11:40

## 2019-11-28 RX ADMIN — CHLORHEXIDINE GLUCONATE 15 MILLILITER(S): 213 SOLUTION TOPICAL at 18:28

## 2019-11-28 RX ADMIN — ATORVASTATIN CALCIUM 40 MILLIGRAM(S): 80 TABLET, FILM COATED ORAL at 21:44

## 2019-11-28 RX ADMIN — Medication 5 MILLIGRAM(S): at 06:22

## 2019-11-28 RX ADMIN — Medication 100 MILLIGRAM(S): at 11:40

## 2019-11-28 RX ADMIN — LEVETIRACETAM 400 MILLIGRAM(S): 250 TABLET, FILM COATED ORAL at 17:26

## 2019-11-28 RX ADMIN — MIDODRINE HYDROCHLORIDE 10 MILLIGRAM(S): 2.5 TABLET ORAL at 06:22

## 2019-11-28 RX ADMIN — HEPARIN SODIUM 5000 UNIT(S): 5000 INJECTION INTRAVENOUS; SUBCUTANEOUS at 14:49

## 2019-11-28 RX ADMIN — CHLORHEXIDINE GLUCONATE 1 APPLICATION(S): 213 SOLUTION TOPICAL at 06:22

## 2019-11-28 RX ADMIN — CHLORHEXIDINE GLUCONATE 15 MILLILITER(S): 213 SOLUTION TOPICAL at 06:19

## 2019-11-28 RX ADMIN — MEROPENEM 100 MILLIGRAM(S): 1 INJECTION INTRAVENOUS at 14:49

## 2019-11-28 RX ADMIN — HEPARIN SODIUM 5000 UNIT(S): 5000 INJECTION INTRAVENOUS; SUBCUTANEOUS at 06:22

## 2019-11-28 RX ADMIN — MIDODRINE HYDROCHLORIDE 10 MILLIGRAM(S): 2.5 TABLET ORAL at 17:26

## 2019-11-28 NOTE — PROGRESS NOTE ADULT - SUBJECTIVE AND OBJECTIVE BOX
Chief complaint  Patient is a 74y old  Male who presents with a chief complaint of ams (28 Nov 2019 11:34)   Review of systems  Patient in bed, looks comfortable, no fever,  no hypoglycemia.    Labs and Fingersticks  CAPILLARY BLOOD GLUCOSE      POCT Blood Glucose.: 137 mg/dL (28 Nov 2019 11:33)  POCT Blood Glucose.: 148 mg/dL (28 Nov 2019 06:17)  POCT Blood Glucose.: 152 mg/dL (28 Nov 2019 00:44)  POCT Blood Glucose.: 159 mg/dL (27 Nov 2019 17:45)      Anion Gap, Serum: 10 (11-28 @ 00:57)  Anion Gap, Serum: 15 (11-27 @ 00:42)  Anion Gap, Serum: 13 (11-26 @ 15:54)      Calcium, Total Serum: 9.5 (11-28 @ 00:57)  Calcium, Total Serum: 10.5 (11-27 @ 00:42)  Calcium, Total Serum: 10.8 <H> (11-26 @ 15:54)  Albumin, Serum: 2.0 <L> (11-28 @ 00:57)  Albumin, Serum: 1.9 <L> (11-27 @ 00:42)  Albumin, Serum: 2.3 <L> (11-26 @ 15:54)    Alanine Aminotransferase (ALT/SGPT): 8 <L> (11-28 @ 00:57)  Alanine Aminotransferase (ALT/SGPT): 10 (11-27 @ 00:42)  Alanine Aminotransferase (ALT/SGPT): 10 (11-26 @ 15:54)  Alkaline Phosphatase, Serum: 125 <H> (11-28 @ 00:57)  Alkaline Phosphatase, Serum: 126 <H> (11-27 @ 00:42)  Alkaline Phosphatase, Serum: 122 <H> (11-26 @ 15:54)  Aspartate Aminotransferase (AST/SGOT): 13 (11-28 @ 00:57)  Aspartate Aminotransferase (AST/SGOT): 14 (11-27 @ 00:42)  Aspartate Aminotransferase (AST/SGOT): 14 (11-26 @ 15:54)        11-28    131<L>  |  96  |  19  ----------------------------<  168<H>  3.9   |  25  |  2.72<H>    Ca    9.5      28 Nov 2019 00:57  Phos  2.0     11-28  Mg     1.8     11-28    TPro  5.9<L>  /  Alb  2.0<L>  /  TBili  0.4  /  DBili  x   /  AST  13  /  ALT  8<L>  /  AlkPhos  125<H>  11-28                        7.3    13.74 )-----------( 177      ( 28 Nov 2019 00:57 )             24.0     Medications  MEDICATIONS  (STANDING):  atorvastatin 40 milliGRAM(s) Oral at bedtime  chlorhexidine 0.12% Liquid 15 milliLiter(s) Oral Mucosa every 12 hours  chlorhexidine 4% Liquid 1 Application(s) Topical <User Schedule>  heparin  Injectable 5000 Unit(s) SubCutaneous every 8 hours  insulin lispro (HumaLOG) corrective regimen sliding scale   SubCutaneous every 6 hours  levETIRAcetam  IVPB 1000 milliGRAM(s) IV Intermittent every 12 hours  meropenem  IVPB 500 milliGRAM(s) IV Intermittent every 24 hours  meropenem  IVPB      midodrine. 10 milliGRAM(s) Oral three times a day  norepinephrine Infusion 0.05 MICROgram(s)/kG/Min (6.375 mL/Hr) IV Continuous <Continuous>  predniSONE   Tablet 5 milliGRAM(s) Oral daily  propofol Infusion 30 MICROgram(s)/kG/Min (12.24 mL/Hr) IV Continuous <Continuous>  sevelamer carbonate 800 milliGRAM(s) Oral three times a day with meals  thiamine 100 milliGRAM(s) Oral daily      Physical Exam  Culture - Bronchial (collected 11-27-19 @ 17:19)  Source: Bronch Wash Combicath  Gram Stain (11-27-19 @ 21:50):    No polymorphonuclear cells seen per low power field    No squamous epithelial cells per low power field    No organisms seen per oil power field      General: Patient comfortable in bed  Vital Signs Last 12 Hrs  T(F): 99.1 (11-28-19 @ 04:00), Max: 99.1 (11-28-19 @ 04:00)  HR: 91 (11-28-19 @ 11:30) (85 - 99)  BP: 104/57 (11-28-19 @ 11:30) (85/54 - 152/79)  BP(mean): 75 (11-28-19 @ 11:30) (66 - 109)  RR: 31 (11-28-19 @ 11:30) (14 - 38)  SpO2: 100% (11-28-19 @ 11:30) (100% - 100%)  Neck: No palpable thyroid nodules.  CVS: S1S2, No murmurs  Respiratory: No wheezing, no crepitations  GI: Abdomen soft, bowel sounds positive  Musculoskeletal:  edema lower extremities.   Skin: No skin rashes, no ecchymosis    Diagnostics

## 2019-11-28 NOTE — PROGRESS NOTE ADULT - SUBJECTIVE AND OBJECTIVE BOX
Subjective: Patient seen and examined. No new events except as noted.     SUBJECTIVE/ROS:    ROS is limited as pt currently sedated on ventilator.      MEDICATIONS:  MEDICATIONS  (STANDING):  atorvastatin 40 milliGRAM(s) Oral at bedtime  chlorhexidine 0.12% Liquid 15 milliLiter(s) Oral Mucosa every 12 hours  chlorhexidine 4% Liquid 1 Application(s) Topical <User Schedule>  heparin  Injectable 5000 Unit(s) SubCutaneous every 8 hours  insulin lispro (HumaLOG) corrective regimen sliding scale   SubCutaneous every 6 hours  levETIRAcetam  IVPB 1000 milliGRAM(s) IV Intermittent every 12 hours  meropenem  IVPB 500 milliGRAM(s) IV Intermittent every 24 hours  meropenem  IVPB      midodrine. 10 milliGRAM(s) Oral three times a day  norepinephrine Infusion 0.05 MICROgram(s)/kG/Min (6.375 mL/Hr) IV Continuous <Continuous>  predniSONE   Tablet 5 milliGRAM(s) Oral daily  propofol Infusion 30 MICROgram(s)/kG/Min (12.24 mL/Hr) IV Continuous <Continuous>  sevelamer carbonate 800 milliGRAM(s) Oral three times a day with meals  thiamine 100 milliGRAM(s) Oral daily      PHYSICAL EXAM:  T(C): 37.3 (11-28-19 @ 04:00), Max: 37.3 (11-28-19 @ 04:00)  HR: 95 (11-28-19 @ 11:15) (85 - 101)  BP: 109/63 (11-28-19 @ 11:15) (85/54 - 152/79)  RR: 38 (11-28-19 @ 11:15) (10 - 38)  SpO2: 100% (11-28-19 @ 11:15) (100% - 100%)  Wt(kg): --  I&O's Summary    27 Nov 2019 07:01  -  28 Nov 2019 07:00  --------------------------------------------------------  IN: 3238.6 mL / OUT: 1900 mL / NET: 1338.6 mL    28 Nov 2019 07:01  -  28 Nov 2019 11:34  --------------------------------------------------------  IN: 17.1 mL / OUT: 0 mL / NET: 17.1 mL        Appearance: on vent 	  Cardiovascular: Normal S1 S2,    Murmur:   Neck: JVP limited to be evaluated   Respiratory: Lungs few rhonchi   Gastrointestinal:  Soft  Skin: normal   Neuro: limited as pt on vent      LABS/DATA:    CARDIAC MARKERS:                                7.3    13.74 )-----------( 177      ( 28 Nov 2019 00:57 )             24.0     11-28    131<L>  |  96  |  19  ----------------------------<  168<H>  3.9   |  25  |  2.72<H>    Ca    9.5      28 Nov 2019 00:57  Phos  2.0     11-28  Mg     1.8     11-28    TPro  5.9<L>  /  Alb  2.0<L>  /  TBili  0.4  /  DBili  x   /  AST  13  /  ALT  8<L>  /  AlkPhos  125<H>  11-28    proBNP:   Lipid Profile:   HgA1c:   TSH:     TELE:  EKG:

## 2019-11-28 NOTE — PROGRESS NOTE ADULT - ASSESSMENT
shock / resp failure  on vent  plan as per ICU    History of cocaine induced CM  normal EF on last echo  resume BB once more hemodynamically stable     PAF  in sinus   a/c once deemed safe   for now off given high bleed risk, sepsis on hold     Hypercalcemia  as per renal   HD

## 2019-11-28 NOTE — PROGRESS NOTE ADULT - SUBJECTIVE AND OBJECTIVE BOX
Patient is a 74y old  Male who presents with a chief complaint of ams (27 Nov 2019 12:38)    Being followed by ID for ?asp pna    Interval history:  in ICU  Intubated  s/p Left TLC insertion   No other acute events  Fevers down. WBC trending down      ROS:  Not obtainable     Antimicrobials:    meropenem  IVPB 500 every 24 hours  meropenem  IVPB      s/p vanco x 1 11/27    other medications reviewed    Vital Signs Last 24 Hrs  T(F): 99.1 (11-28-19 @ 04:00), Max: 99.1 (11-28-19 @ 04:00)  HR: 97 (11-28-19 @ 08:04)  BP: 131/72 (11-28-19 @ 07:30)  RR: 18 (11-28-19 @ 07:30)  SpO2: 100% (11-28-19 @ 08:04) (100% - 100%)  Wt(kg): --    Physical Exam:    ETT     left Sc TLC no erythema or tenderness    R Sc HD catheter no erythema o tenderness    Neck rigidity     Chest Good AE,bibasilar crackles     CVS RRR S1 S2 WNl No murmur or rub or gallop    Abd soft BS normal No tenderness RLQ transplant kidney no tenderness    Ext left arm AVF no erythema or tenderness    IV site no erythema tenderness or discharge    Joints no swelling or LOM  CNS sedated   Lab Data:                          7.3    13.74 )-----------( 177      ( 28 Nov 2019 00:57 )             24.0 11-28    131  |  96  |  19  ----------------------------<  168  3.9   |  25  |  2.72  Ca    9.5      28 Nov 2019 00:57Phos  2.0     11-28Mg     1.8     11-28  TPro  5.9  /  Alb  2.0  /  TBili  0.4  /  DBili  x   /  AST  13  /  ALT  8   /  AlkPhos  125  11-28                          7.8    18.38 )-----------( 195      ( 27 Nov 2019 00:42 )             27.2     WBC Count: 18.38 (11-27-19 @ 00:42)  WBC Count: 21.55 (11-26-19 @ 16:21)  WBC Count: 20.75 (11-26-19 @ 11:50)  WBC Count: 11.76 (11-25-19 @ 15:24)  WBC Count: 10.93 (11-24-19 @ 11:29)  WBC Count: 7.62 (11-23-19 @ 12:30)  WBC Count: 12.14 (11-22-19 @ 14:32)  WBC Count: 12.72 (11-22-19 @ 10:42)  WBC Count: 11.47 (11-21-19 @ 22:08)  WBC Count: 12.78 (11-20-19 @ 15:25)    11-27    135  |  97  |  27<H>  ----------------------------<  174<H>  4.0   |  23  |  3.89<H>    Ca    10.5      27 Nov 2019 00:42  Phos  3.9     11-27  Mg     1.9     11-27    TPro  5.7<L>  /  Alb  1.9<L>  /  TBili  0.8  /  DBili  x   /  AST  14  /  ALT  10  /  AlkPhos  126<H>  11-27        Culture - Bronchial (11.27.19 @ 17:19)    Gram Stain:   No polymorphonuclear cells seen per low power field  No squamous epithelial cells per low power field  No organisms seen per oil power field    Specimen Source: Bronch Wash Combicath    Culture - Blood (11.26.19 @ 18:36)    Specimen Source: .Blood Blood-Venous    Culture Results:   No growth to date.    Culture - Blood (11.26.19 @ 18:35)    Specimen Source: .Blood Blood-Venous    Culture Results:   No growth to date.        Culture - Acid Fast (collected 23 Nov 2019 01:25)    Culture - CSF with Gram Stain (collected 22 Nov 2019 17:14)  Source: .CSF  Gram Stain (22 Nov 2019 18:32):    No polymorphonuclear cells seen    No organisms seen    by cytocentrifuge  Final Report (25 Nov 2019 06:28):    No growth    Culture - Fungal, CSF (collected 22 Nov 2019 17:14)  Source: .CSF CSF  Preliminary Report (25 Nov 2019 10:54):    Testing in progress        < from: Xray Chest 1 View- PORTABLE-Urgent (11.26.19 @ 21:21) >  Impression:    The heart is normal in size. Right lower lobe pneumonia and/or   atelectasis cannot be ruled out entirely. Endotracheal tube is in good   position. NG tube is in the stomach. A new central line was placed in the   left and the tip is in superior vena cava.A Cordis sheet catheter is   seen on right and the tip is in the superior vena cava as well.   Degenerative changes of the thoracic spine.      < end of copied text >

## 2019-11-28 NOTE — PROGRESS NOTE ADULT - SUBJECTIVE AND OBJECTIVE BOX
Patient is a 74y Male whom presented to the hospital with esrd on hd   transfer to micu , intubated   PAST MEDICAL & SURGICAL HISTORY:  Kidney transplant recipient  Anuria  GERD (gastroesophageal reflux disease)  Kidney transplant failure: dx: 2/2013  Hepatitis C: dx: 90&#x27;s.  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 10 milliGRAM(s) Oral three times a day  mycophenolate mofetil 250 milliGRAM(s) Oral two times a day  predniSONE   Tablet 5 milliGRAM(s) Oral daily  sevelamer carbonate 800 milliGRAM(s) Oral three times a day with meals  sodium bicarbonate 650 milliGRAM(s) Oral two times a day  tamsulosin 0.4 milliGRAM(s) Oral at bedtime      Allergies    No Known Allergies    Intolerances        SOCIAL HISTORY:  Denies ETOh,Smoking,     FAMILY HISTORY:  Family history of breast cancer in sister (Sibling): living at 92 yo  Family history of prostate cancer in father  Family history of lung cancer  Family history of hypertension in mother  Family history of diabetes mellitus: mother      REVIEW OF SYSTEMS:    unbable to obtained                                   7.3    13.74 )-----------( 177       28 Nov 2019 00:57 )             24.0       CBC Full  -  ( 28 Nov 2019 00:57 )  WBC Count : 13.74 K/uL  RBC Count : 2.83 M/uL  Hemoglobin : 7.3 g/dL  Hematocrit : 24.0 %  Platelet Count - Automated : 177 K/uL  Mean Cell Volume : 84.8 fl  Mean Cell Hemoglobin : 25.8 pg  Mean Cell Hemoglobin Concentration : 30.4 gm/dL  Auto Neutrophil # : 10.52 K/uL  Auto Lymphocyte # : 1.32 K/uL  Auto Monocyte # : 1.73 K/uL  Auto Eosinophil # : 0.04 K/uL  Auto Basophil # : 0.02 K/uL  Auto Neutrophil % : 76.6 %  Auto Lymphocyte % : 9.6 %  Auto Monocyte % : 12.6 %  Auto Eosinophil % : 0.3 %  Auto Basophil % : 0.1 %      11-28    131<L>  |  96  |  19  ----------------------------<  168<H>  3.9   |  25  |  2.72<H>    Ca    9.5      28 Nov 2019 00:57  Phos  2.0     11-28  Mg     1.8     11-28    TPro  5.9<L>  /  Alb  2.0<L>  /  TBili  0.4  /  DBili  x   /  AST  13  /  ALT  8<L>  /  AlkPhos  125<H>  11-28      CAPILLARY BLOOD GLUCOSE      POCT Blood Glucose.: 102 mg/dL (28 Nov 2019 17:23)  POCT Blood Glucose.: 137 mg/dL (28 Nov 2019 11:33)  POCT Blood Glucose.: 148 mg/dL (28 Nov 2019 06:17)  POCT Blood Glucose.: 152 mg/dL (28 Nov 2019 00:44)      Vital Signs Last 24 Hrs  T(C): 36.6 (28 Nov 2019 20:00), Max: 37.3 (28 Nov 2019 04:00)  T(F): 97.8 (28 Nov 2019 20:00), Max: 99.1 (28 Nov 2019 04:00)  HR: 66 (28 Nov 2019 22:00) (66 - 112)  BP: 104/59 (28 Nov 2019 22:00) (82/53 - 154/76)  BP(mean): 76 (28 Nov 2019 22:00) (63 - 109)  RR: 14 (28 Nov 2019 22:00) (14 - 43)  SpO2: 100% (28 Nov 2019 22:00) (75% - 100%)        PT/INR - ( 28 Nov 2019 00:57 )   PT: 12.8 sec;   INR: 1.12 ratio         PTT - ( 28 Nov 2019 00:57 )  PTT:33.8 sec                                             PHYSICAL EXAM:  intubated   Constitutional: intubated    HEENT: conjunctive   clear   Neck:  No JVD  Respiratory: decrease bs b/l   Cardiovascular: S1 and S2  Gastrointestinal: BS+, soft, NT/ND  Extremities: No peripheral edema  Neurological: intubated   Skin: dry   Access: pos fistula

## 2019-11-28 NOTE — PROGRESS NOTE ADULT - ASSESSMENT
74M PMHx of ESRD s/p failed renal transplant x2, HCV, DM, and meningococcal meningitis 2 years ago was sent in to the ED from HD for AMS and low BPs.  lethargic  hypercalcemia   E coli bacteriuria/UTI  LP no s/o any CNS infection ,CSF negative  Now with ? aspiration pna  resp failure, leukocytosis, fevers.  s/p intubation  In ICU    Rec:  1) follow sputum, blood cx - NGTD.  2) ICU support per ICU team  3) Continue empiric renally adjusted meropenem and vanco post HD  4) aspiration precautions  5) Consider GOC discussion as patients mental status likely due to CVA and has not improved    prognosis guarded  Will tailor plan for ID issues  per course,results.Will defer to primary team on management of other issues  case d/w MICU team .  ID service will cover and follow over next 4 days.Please call [phone] if any issues.

## 2019-11-28 NOTE — PROGRESS NOTE ADULT - ASSESSMENT
Assessment  DM: A1C 5.5%, was on insulin at home, now on insulin coverage, patient is NPO on tube feeds,  his Fs is improving now, Patient still in MICU.  Hypercalcemia: Primary Hyperparathyroidism, calcium improved, 10.4.  Sepsis: IV ABx for UTI, LP inconsistent with meningitis, on medications, stable, monitored.  HTN: Controlled,  on antihypertensive medications.  ESRD: On hemodialysis, Monitor labs/BMP          Robi Gay MD  Cell: [phone]  Office: [phone]

## 2019-11-28 NOTE — PROGRESS NOTE ADULT - ATTENDING COMMENTS
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 Found to have CVA and EEG and LP negative for other etiologies of AMS. Pt yesterday tx to ICU for unresponsiveness, septic shock and resp failure requiring intubation. Cont pressors but low dose currently. ALmost weaned off. Cont midodrine. f/u dale cx so far bcx and sputum cx negative. US shows lower lobe consolidations likely PNA. Cont empiric abx. d/c vanco if remains mrsa negative. Cont immunosuppression for renal tx. Pt intermittently following commands. Hold sedation. Started on PS trials and doing well. If cont to do well and MS improves will extubate.

## 2019-11-28 NOTE — PROGRESS NOTE ADULT - SUBJECTIVE AND OBJECTIVE BOX
Jonathan Fields  PGY1 | Internal Medicine  [id number] / [phone]    INTERVAL HPI/OVERNIGHT EVENTS: ON restarted prop, pt agitated. Got HD yesterday, vanc dosed after.     SUBJECTIVE: Patient seen and examined at bedside. Sedated on prop, pt able to follow some commands, move extremity to command, open eyes.    unable to assess 2/2 MS    OBJECTIVE:    VITAL SIGNS:  ICU Vital Signs Last 24 Hrs  T(C): 37.3 (28 Nov 2019 04:00), Max: 37.3 (28 Nov 2019 04:00)  T(F): 99.1 (28 Nov 2019 04:00), Max: 99.1 (28 Nov 2019 04:00)  HR: 97 (28 Nov 2019 08:04) (76 - 101)  BP: 131/72 (28 Nov 2019 07:30) (85/54 - 149/78)  BP(mean): 96 (28 Nov 2019 07:30) (64 - 105)  ABP: --  ABP(mean): --  RR: 18 (28 Nov 2019 07:30) (10 - 34)  SpO2: 100% (28 Nov 2019 08:04) (100% - 100%)    Mode: AC/ CMV (Assist Control/ Continuous Mandatory Ventilation), RR (machine): 14, TV (machine): 500, FiO2: 30, PEEP: 5, ITime: 1, MAP: 9, PIP: 19    11-27 @ 07:01  -  11-28 @ 07:00  --------------------------------------------------------  IN: 3238.6 mL / OUT: 1900 mL / NET: 1338.6 mL      CAPILLARY BLOOD GLUCOSE      POCT Blood Glucose.: 148 mg/dL (28 Nov 2019 06:17)      PHYSICAL EXAM:    General: NAD  HEENT: NC/AT; PERRL, clear conjunctiva  Neck: supple  Respiratory: CTA b/l  Cardiovascular: +S1/S2; RRR  Abdomen: soft, NT/ND; +BS x4  Extremities: WWP, 2+ peripheral pulses b/l; no LE edema  Skin: normal color and turgor; no rash  Neurological:    MEDICATIONS:  MEDICATIONS  (STANDING):  atorvastatin 40 milliGRAM(s) Oral at bedtime  chlorhexidine 0.12% Liquid 15 milliLiter(s) Oral Mucosa every 12 hours  chlorhexidine 4% Liquid 1 Application(s) Topical <User Schedule>  heparin  Injectable 5000 Unit(s) SubCutaneous every 8 hours  insulin lispro (HumaLOG) corrective regimen sliding scale   SubCutaneous every 6 hours  levETIRAcetam  IVPB 1000 milliGRAM(s) IV Intermittent every 12 hours  meropenem  IVPB 500 milliGRAM(s) IV Intermittent every 24 hours  meropenem  IVPB      midodrine. 10 milliGRAM(s) Oral three times a day  norepinephrine Infusion 0.05 MICROgram(s)/kG/Min (6.375 mL/Hr) IV Continuous <Continuous>  predniSONE   Tablet 5 milliGRAM(s) Oral daily  propofol Infusion 30 MICROgram(s)/kG/Min (12.24 mL/Hr) IV Continuous <Continuous>  sevelamer carbonate 800 milliGRAM(s) Oral three times a day with meals  thiamine 100 milliGRAM(s) Oral daily    MEDICATIONS  (PRN):      ALLERGIES:  Allergies    No Known Allergies    Intolerances        LABS:                        7.3    13.74 )-----------( 177      ( 28 Nov 2019 00:57 )             24.0     11-28    131<L>  |  96  |  19  ----------------------------<  168<H>  3.9   |  25  |  2.72<H>    Ca    9.5      28 Nov 2019 00:57  Phos  2.0     11-28  Mg     1.8     11-28    TPro  5.9<L>  /  Alb  2.0<L>  /  TBili  0.4  /  DBili  x   /  AST  13  /  ALT  8<L>  /  AlkPhos  125<H>  11-28    PT/INR - ( 28 Nov 2019 00:57 )   PT: 12.8 sec;   INR: 1.12 ratio         PTT - ( 28 Nov 2019 00:57 )  PTT:33.8 sec    Culture - Bronchial (11.27.19 @ 17:19)    Gram Stain:   No polymorphonuclear cells seen per low power field  No squamous epithelial cells per low power field  No organisms seen per oil power field    Specimen Source: Bronch Wash Combicath        RADIOLOGY & ADDITIONAL TESTS: Reviewed.    < from: TTE with Doppler (w/Cont) (11.27.19 @ 16:30) >  PROCEDURE: Transthoracic echocardiogram with 2-D, M-Mode  and complete spectral and color flow Doppler    < end of copied text >  < from: TTE with Doppler (w/Cont) (11.27.19 @ 16:30) >  Conclusions:  1. Mitral annular calcification and calcified mitral  leaflets with normal diastolic opening.  Mild mitral  regurgitation.  2. Calcified trileaflet aortic valve with decreased  opening. Peak transaortic valve gradient equals 13 mm Hg,  estimated aortic valve area equals 2 sqcm (by continuity  equation), consistent with mild aortic stenosis. Minimal  aortic regurgitation.  3. Moderate to severe concentric left ventricular  hypertrophy.  4. Hyperdynamic left ventricular systolic function.  Endocardial visualization enhanced with intravenous  injection of Ultrasonic Enhancing Agent (Definity).  5. The right ventricle is not well visualized; grossly  normal right ventricular systolic function.  6. Estimated right ventricular systolic pressure equals 46  mm Hg, assuming right atrial pressure equals 8 mm Hg,  consistent with mild pulmonary hypertension.  7. Normal pericardium with trace pericardial effusion.  *** Compared with echocardiogram of 8/28/2019,  Pericardial  effusion size has decreased. An Intracavitary gradient is  not seen in the present study. Other findings are grossly  similar.    < end of copied text > Jonathan Fields  PGY1 | Internal Medicine  [id number] / [phone]    INTERVAL HPI/OVERNIGHT EVENTS: ON restarted prop, pt agitated. Got HD yesterday, vanc dosed after.     SUBJECTIVE: Patient seen and examined at bedside. Sedated on prop, pt able to follow some commands, move extremity to command, open eyes.    unable to assess 2/2 MS    OBJECTIVE:    VITAL SIGNS:  ICU Vital Signs Last 24 Hrs  T(C): 37.3 (28 Nov 2019 04:00), Max: 37.3 (28 Nov 2019 04:00)  T(F): 99.1 (28 Nov 2019 04:00), Max: 99.1 (28 Nov 2019 04:00)  HR: 97 (28 Nov 2019 08:04) (76 - 101)  BP: 131/72 (28 Nov 2019 07:30) (85/54 - 149/78)  BP(mean): 96 (28 Nov 2019 07:30) (64 - 105)  ABP: --  ABP(mean): --  RR: 18 (28 Nov 2019 07:30) (10 - 34)  SpO2: 100% (28 Nov 2019 08:04) (100% - 100%)    Mode: AC/ CMV (Assist Control/ Continuous Mandatory Ventilation), RR (machine): 14, TV (machine): 500, FiO2: 30, PEEP: 5, ITime: 1, MAP: 9, PIP: 19    11-27 @ 07:01  -  11-28 @ 07:00  --------------------------------------------------------  IN: 3238.6 mL / OUT: 1900 mL / NET: 1338.6 mL      CAPILLARY BLOOD GLUCOSE      POCT Blood Glucose.: 148 mg/dL (28 Nov 2019 06:17)      PHYSICAL EXAM:  GENERAL: thin, intubated, sedated  EYES: EOMI, PERRLA, conjunctiva and sclera clear  NECK: Supple, No JVD, Normal thyroid  CHEST/LUNG: Clear to ausculation bilaterally; No rales, rhonchi, wheezing, or rubs  HEART: rr, regular rhythm; No murmurs, rubs, or gallops  ABDOMEN: Soft, Nontender, Nondistended; Bowel sounds present  EXTREMITIES:  2+ Peripheral Pulses, No clubbing, cyanosis, or edema  LYMPH: No lymphadenopathy noted  SKIN: No rashes or lesions  NERVOUS SYSTEM:  intubated and sedated      MEDICATIONS:  MEDICATIONS  (STANDING):  atorvastatin 40 milliGRAM(s) Oral at bedtime  chlorhexidine 0.12% Liquid 15 milliLiter(s) Oral Mucosa every 12 hours  chlorhexidine 4% Liquid 1 Application(s) Topical <User Schedule>  heparin  Injectable 5000 Unit(s) SubCutaneous every 8 hours  insulin lispro (HumaLOG) corrective regimen sliding scale   SubCutaneous every 6 hours  levETIRAcetam  IVPB 1000 milliGRAM(s) IV Intermittent every 12 hours  meropenem  IVPB 500 milliGRAM(s) IV Intermittent every 24 hours  meropenem  IVPB      midodrine. 10 milliGRAM(s) Oral three times a day  norepinephrine Infusion 0.05 MICROgram(s)/kG/Min (6.375 mL/Hr) IV Continuous <Continuous>  predniSONE   Tablet 5 milliGRAM(s) Oral daily  propofol Infusion 30 MICROgram(s)/kG/Min (12.24 mL/Hr) IV Continuous <Continuous>  sevelamer carbonate 800 milliGRAM(s) Oral three times a day with meals  thiamine 100 milliGRAM(s) Oral daily    MEDICATIONS  (PRN):      ALLERGIES:  Allergies    No Known Allergies    Intolerances        LABS:                        7.3    13.74 )-----------( 177      ( 28 Nov 2019 00:57 )             24.0     11-28    131<L>  |  96  |  19  ----------------------------<  168<H>  3.9   |  25  |  2.72<H>    Ca    9.5      28 Nov 2019 00:57  Phos  2.0     11-28  Mg     1.8     11-28    TPro  5.9<L>  /  Alb  2.0<L>  /  TBili  0.4  /  DBili  x   /  AST  13  /  ALT  8<L>  /  AlkPhos  125<H>  11-28    PT/INR - ( 28 Nov 2019 00:57 )   PT: 12.8 sec;   INR: 1.12 ratio         PTT - ( 28 Nov 2019 00:57 )  PTT:33.8 sec    Culture - Bronchial (11.27.19 @ 17:19)    Gram Stain:   No polymorphonuclear cells seen per low power field  No squamous epithelial cells per low power field  No organisms seen per oil power field    Specimen Source: Bronch Wash Combicath        RADIOLOGY & ADDITIONAL TESTS: Reviewed.    < from: TTE with Doppler (w/Cont) (11.27.19 @ 16:30) >  PROCEDURE: Transthoracic echocardiogram with 2-D, M-Mode  and complete spectral and color flow Doppler    < end of copied text >  < from: TTE with Doppler (w/Cont) (11.27.19 @ 16:30) >  Conclusions:  1. Mitral annular calcification and calcified mitral  leaflets with normal diastolic opening.  Mild mitral  regurgitation.  2. Calcified trileaflet aortic valve with decreased  opening. Peak transaortic valve gradient equals 13 mm Hg,  estimated aortic valve area equals 2 sqcm (by continuity  equation), consistent with mild aortic stenosis. Minimal  aortic regurgitation.  3. Moderate to severe concentric left ventricular  hypertrophy.  4. Hyperdynamic left ventricular systolic function.  Endocardial visualization enhanced with intravenous  injection of Ultrasonic Enhancing Agent (Definity).  5. The right ventricle is not well visualized; grossly  normal right ventricular systolic function.  6. Estimated right ventricular systolic pressure equals 46  mm Hg, assuming right atrial pressure equals 8 mm Hg,  consistent with mild pulmonary hypertension.  7. Normal pericardium with trace pericardial effusion.  *** Compared with echocardiogram of 8/28/2019,  Pericardial  effusion size has decreased. An Intracavitary gradient is  not seen in the present study. Other findings are grossly  similar.    < end of copied text > Jonathan Fields  PGY1 | Internal Medicine  [id number] / [phone]    INTERVAL HPI/OVERNIGHT EVENTS: ON restarted prop, pt agitated. Got HD yesterday, vanc dosed after.     SUBJECTIVE: Patient seen and examined at bedside. Sedated on prop, pt able to follow some commands, move extremity to command, open eyes.    unable to assess 2/2 MS    OBJECTIVE:    VITAL SIGNS:  ICU Vital Signs Last 24 Hrs  T(C): 37.3 (28 Nov 2019 04:00), Max: 37.3 (28 Nov 2019 04:00)  T(F): 99.1 (28 Nov 2019 04:00), Max: 99.1 (28 Nov 2019 04:00)  HR: 97 (28 Nov 2019 08:04) (76 - 101)  BP: 131/72 (28 Nov 2019 07:30) (85/54 - 149/78)  BP(mean): 96 (28 Nov 2019 07:30) (64 - 105)  ABP: --  ABP(mean): --  RR: 18 (28 Nov 2019 07:30) (10 - 34)  SpO2: 100% (28 Nov 2019 08:04) (100% - 100%)    Mode: AC/ CMV (Assist Control/ Continuous Mandatory Ventilation), RR (machine): 14, TV (machine): 500, FiO2: 30, PEEP: 5, ITime: 1, MAP: 9, PIP: 19    11-27 @ 07:01  -  11-28 @ 07:00  --------------------------------------------------------  IN: 3238.6 mL / OUT: 1900 mL / NET: 1338.6 mL      CAPILLARY BLOOD GLUCOSE      POCT Blood Glucose.: 148 mg/dL (28 Nov 2019 06:17)      PHYSICAL EXAM:  GENERAL: thin, intubated, sedated  EYES: EOMI, PERRLA, conjunctiva and sclera clear  NECK: Supple, No JVD, Normal thyroid  CHEST/LUNG: Clear to ausculation bilaterally; No rales, rhonchi, wheezing, or rubs  HEART: rr, regular rhythm; No murmurs, rubs, or gallops  ABDOMEN: Soft, Nontender, Nondistended; Bowel sounds present  EXTREMITIES:  2+ Peripheral Pulses, No clubbing, cyanosis, or edema  LYMPH: No lymphadenopathy noted  SKIN: No rashes or lesions  NERVOUS SYSTEM:  intubated and sedated; able to follow some commands, move extremities      MEDICATIONS:  MEDICATIONS  (STANDING):  atorvastatin 40 milliGRAM(s) Oral at bedtime  chlorhexidine 0.12% Liquid 15 milliLiter(s) Oral Mucosa every 12 hours  chlorhexidine 4% Liquid 1 Application(s) Topical <User Schedule>  heparin  Injectable 5000 Unit(s) SubCutaneous every 8 hours  insulin lispro (HumaLOG) corrective regimen sliding scale   SubCutaneous every 6 hours  levETIRAcetam  IVPB 1000 milliGRAM(s) IV Intermittent every 12 hours  meropenem  IVPB 500 milliGRAM(s) IV Intermittent every 24 hours  meropenem  IVPB      midodrine. 10 milliGRAM(s) Oral three times a day  norepinephrine Infusion 0.05 MICROgram(s)/kG/Min (6.375 mL/Hr) IV Continuous <Continuous>  predniSONE   Tablet 5 milliGRAM(s) Oral daily  propofol Infusion 30 MICROgram(s)/kG/Min (12.24 mL/Hr) IV Continuous <Continuous>  sevelamer carbonate 800 milliGRAM(s) Oral three times a day with meals  thiamine 100 milliGRAM(s) Oral daily    MEDICATIONS  (PRN):      ALLERGIES:  Allergies    No Known Allergies    Intolerances        LABS:                        7.3    13.74 )-----------( 177      ( 28 Nov 2019 00:57 )             24.0     11-28    131<L>  |  96  |  19  ----------------------------<  168<H>  3.9   |  25  |  2.72<H>    Ca    9.5      28 Nov 2019 00:57  Phos  2.0     11-28  Mg     1.8     11-28    TPro  5.9<L>  /  Alb  2.0<L>  /  TBili  0.4  /  DBili  x   /  AST  13  /  ALT  8<L>  /  AlkPhos  125<H>  11-28    PT/INR - ( 28 Nov 2019 00:57 )   PT: 12.8 sec;   INR: 1.12 ratio         PTT - ( 28 Nov 2019 00:57 )  PTT:33.8 sec    Culture - Bronchial (11.27.19 @ 17:19)    Gram Stain:   No polymorphonuclear cells seen per low power field  No squamous epithelial cells per low power field  No organisms seen per oil power field    Specimen Source: Bronch Wash Combicath        RADIOLOGY & ADDITIONAL TESTS: Reviewed.    < from: TTE with Doppler (w/Cont) (11.27.19 @ 16:30) >  PROCEDURE: Transthoracic echocardiogram with 2-D, M-Mode  and complete spectral and color flow Doppler    < end of copied text >  < from: TTE with Doppler (w/Cont) (11.27.19 @ 16:30) >  Conclusions:  1. Mitral annular calcification and calcified mitral  leaflets with normal diastolic opening.  Mild mitral  regurgitation.  2. Calcified trileaflet aortic valve with decreased  opening. Peak transaortic valve gradient equals 13 mm Hg,  estimated aortic valve area equals 2 sqcm (by continuity  equation), consistent with mild aortic stenosis. Minimal  aortic regurgitation.  3. Moderate to severe concentric left ventricular  hypertrophy.  4. Hyperdynamic left ventricular systolic function.  Endocardial visualization enhanced with intravenous  injection of Ultrasonic Enhancing Agent (Definity).  5. The right ventricle is not well visualized; grossly  normal right ventricular systolic function.  6. Estimated right ventricular systolic pressure equals 46  mm Hg, assuming right atrial pressure equals 8 mm Hg,  consistent with mild pulmonary hypertension.  7. Normal pericardium with trace pericardial effusion.  *** Compared with echocardiogram of 8/28/2019,  Pericardial  effusion size has decreased. An Intracavitary gradient is  not seen in the present study. Other findings are grossly  similar.    < end of copied text >

## 2019-11-29 LAB
ALBUMIN SERPL ELPH-MCNC: 2 G/DL — LOW (ref 3.3–5)
ALP SERPL-CCNC: 105 U/L — SIGNIFICANT CHANGE UP (ref 40–120)
ALT FLD-CCNC: 6 U/L — LOW (ref 10–45)
ANION GAP SERPL CALC-SCNC: 10 MMOL/L — SIGNIFICANT CHANGE UP (ref 5–17)
AST SERPL-CCNC: 9 U/L — LOW (ref 10–40)
BASOPHILS # BLD AUTO: 0.02 K/UL — SIGNIFICANT CHANGE UP (ref 0–0.2)
BASOPHILS # BLD AUTO: 0.02 K/UL — SIGNIFICANT CHANGE UP (ref 0–0.2)
BASOPHILS # BLD AUTO: 0.03 K/UL — SIGNIFICANT CHANGE UP (ref 0–0.2)
BASOPHILS NFR BLD AUTO: 0.2 % — SIGNIFICANT CHANGE UP (ref 0–2)
BILIRUB SERPL-MCNC: 0.4 MG/DL — SIGNIFICANT CHANGE UP (ref 0.2–1.2)
BLD GP AB SCN SERPL QL: NEGATIVE — SIGNIFICANT CHANGE UP
BUN SERPL-MCNC: 28 MG/DL — HIGH (ref 7–23)
CALCIUM SERPL-MCNC: 10.2 MG/DL — SIGNIFICANT CHANGE UP (ref 8.4–10.5)
CHLORIDE SERPL-SCNC: 97 MMOL/L — SIGNIFICANT CHANGE UP (ref 96–108)
CO2 SERPL-SCNC: 25 MMOL/L — SIGNIFICANT CHANGE UP (ref 22–31)
CREAT SERPL-MCNC: 3.63 MG/DL — HIGH (ref 0.5–1.3)
CULTURE RESULTS: SIGNIFICANT CHANGE UP
EOSINOPHIL # BLD AUTO: 0 K/UL — SIGNIFICANT CHANGE UP (ref 0–0.5)
EOSINOPHIL # BLD AUTO: 0.03 K/UL — SIGNIFICANT CHANGE UP (ref 0–0.5)
EOSINOPHIL # BLD AUTO: 0.03 K/UL — SIGNIFICANT CHANGE UP (ref 0–0.5)
EOSINOPHIL NFR BLD AUTO: 0 % — SIGNIFICANT CHANGE UP (ref 0–6)
EOSINOPHIL NFR BLD AUTO: 0.3 % — SIGNIFICANT CHANGE UP (ref 0–6)
EOSINOPHIL NFR BLD AUTO: 0.3 % — SIGNIFICANT CHANGE UP (ref 0–6)
GAS PNL BLDA: SIGNIFICANT CHANGE UP
GAS PNL BLDV: SIGNIFICANT CHANGE UP
GLUCOSE BLDC GLUCOMTR-MCNC: 114 MG/DL — HIGH (ref 70–99)
GLUCOSE BLDC GLUCOMTR-MCNC: 136 MG/DL — HIGH (ref 70–99)
GLUCOSE BLDC GLUCOMTR-MCNC: 86 MG/DL — SIGNIFICANT CHANGE UP (ref 70–99)
GLUCOSE SERPL-MCNC: 116 MG/DL — HIGH (ref 70–99)
HCT VFR BLD CALC: 21.7 % — LOW (ref 39–50)
HCT VFR BLD CALC: 25.7 % — LOW (ref 39–50)
HCT VFR BLD CALC: 26.2 % — LOW (ref 39–50)
HGB BLD-MCNC: 6.5 G/DL — CRITICAL LOW (ref 13–17)
HGB BLD-MCNC: 7.7 G/DL — LOW (ref 13–17)
HGB BLD-MCNC: 8.2 G/DL — LOW (ref 13–17)
IMM GRANULOCYTES NFR BLD AUTO: 0.6 % — SIGNIFICANT CHANGE UP (ref 0–1.5)
IMM GRANULOCYTES NFR BLD AUTO: 1 % — SIGNIFICANT CHANGE UP (ref 0–1.5)
IMM GRANULOCYTES NFR BLD AUTO: 1.2 % — SIGNIFICANT CHANGE UP (ref 0–1.5)
LDH SERPL L TO P-CCNC: 188 U/L — SIGNIFICANT CHANGE UP (ref 50–242)
LYMPHOCYTES # BLD AUTO: 1.29 K/UL — SIGNIFICANT CHANGE UP (ref 1–3.3)
LYMPHOCYTES # BLD AUTO: 1.75 K/UL — SIGNIFICANT CHANGE UP (ref 1–3.3)
LYMPHOCYTES # BLD AUTO: 11.9 % — LOW (ref 13–44)
LYMPHOCYTES # BLD AUTO: 16.2 % — SIGNIFICANT CHANGE UP (ref 13–44)
LYMPHOCYTES # BLD AUTO: 18.4 % — SIGNIFICANT CHANGE UP (ref 13–44)
LYMPHOCYTES # BLD AUTO: 2.03 K/UL — SIGNIFICANT CHANGE UP (ref 1–3.3)
MAGNESIUM SERPL-MCNC: 1.9 MG/DL — SIGNIFICANT CHANGE UP (ref 1.6–2.6)
MCHC RBC-ENTMCNC: 25.4 PG — LOW (ref 27–34)
MCHC RBC-ENTMCNC: 26.3 PG — LOW (ref 27–34)
MCHC RBC-ENTMCNC: 26.7 PG — LOW (ref 27–34)
MCHC RBC-ENTMCNC: 30 GM/DL — LOW (ref 32–36)
MCHC RBC-ENTMCNC: 30 GM/DL — LOW (ref 32–36)
MCHC RBC-ENTMCNC: 31.3 GM/DL — LOW (ref 32–36)
MCV RBC AUTO: 84.8 FL — SIGNIFICANT CHANGE UP (ref 80–100)
MCV RBC AUTO: 85.3 FL — SIGNIFICANT CHANGE UP (ref 80–100)
MCV RBC AUTO: 87.7 FL — SIGNIFICANT CHANGE UP (ref 80–100)
MONOCYTES # BLD AUTO: 1 K/UL — HIGH (ref 0–0.9)
MONOCYTES # BLD AUTO: 1.21 K/UL — HIGH (ref 0–0.9)
MONOCYTES # BLD AUTO: 1.29 K/UL — HIGH (ref 0–0.9)
MONOCYTES NFR BLD AUTO: 10.3 % — SIGNIFICANT CHANGE UP (ref 2–14)
MONOCYTES NFR BLD AUTO: 12.7 % — SIGNIFICANT CHANGE UP (ref 2–14)
MONOCYTES NFR BLD AUTO: 9.2 % — SIGNIFICANT CHANGE UP (ref 2–14)
NEUTROPHILS # BLD AUTO: 6.46 K/UL — SIGNIFICANT CHANGE UP (ref 1.8–7.4)
NEUTROPHILS # BLD AUTO: 8.4 K/UL — HIGH (ref 1.8–7.4)
NEUTROPHILS # BLD AUTO: 9.02 K/UL — HIGH (ref 1.8–7.4)
NEUTROPHILS NFR BLD AUTO: 67.8 % — SIGNIFICANT CHANGE UP (ref 43–77)
NEUTROPHILS NFR BLD AUTO: 72.1 % — SIGNIFICANT CHANGE UP (ref 43–77)
NEUTROPHILS NFR BLD AUTO: 77.4 % — HIGH (ref 43–77)
NRBC # BLD: 0 /100 WBCS — SIGNIFICANT CHANGE UP (ref 0–0)
OB PNL STL: NEGATIVE — SIGNIFICANT CHANGE UP
PHOSPHATE SERPL-MCNC: 3.5 MG/DL — SIGNIFICANT CHANGE UP (ref 2.5–4.5)
PLATELET # BLD AUTO: 166 K/UL — SIGNIFICANT CHANGE UP (ref 150–400)
PLATELET # BLD AUTO: 172 K/UL — SIGNIFICANT CHANGE UP (ref 150–400)
PLATELET # BLD AUTO: 194 K/UL — SIGNIFICANT CHANGE UP (ref 150–400)
POTASSIUM SERPL-MCNC: 4.3 MMOL/L — SIGNIFICANT CHANGE UP (ref 3.5–5.3)
POTASSIUM SERPL-SCNC: 4.3 MMOL/L — SIGNIFICANT CHANGE UP (ref 3.5–5.3)
PROT SERPL-MCNC: 5.5 G/DL — LOW (ref 6–8.3)
RBC # BLD: 2.56 M/UL — LOW (ref 4.2–5.8)
RBC # BLD: 2.93 M/UL — LOW (ref 4.2–5.8)
RBC # BLD: 3.07 M/UL — LOW (ref 4.2–5.8)
RBC # FLD: 15.9 % — HIGH (ref 10.3–14.5)
RBC # FLD: 16 % — HIGH (ref 10.3–14.5)
RBC # FLD: 16 % — HIGH (ref 10.3–14.5)
RH IG SCN BLD-IMP: POSITIVE — SIGNIFICANT CHANGE UP
SODIUM SERPL-SCNC: 132 MMOL/L — LOW (ref 135–145)
SPECIMEN SOURCE: SIGNIFICANT CHANGE UP
WBC # BLD: 10.85 K/UL — HIGH (ref 3.8–10.5)
WBC # BLD: 12.52 K/UL — HIGH (ref 3.8–10.5)
WBC # BLD: 9.53 K/UL — SIGNIFICANT CHANGE UP (ref 3.8–10.5)
WBC # FLD AUTO: 10.85 K/UL — HIGH (ref 3.8–10.5)
WBC # FLD AUTO: 12.52 K/UL — HIGH (ref 3.8–10.5)
WBC # FLD AUTO: 9.53 K/UL — SIGNIFICANT CHANGE UP (ref 3.8–10.5)

## 2019-11-29 PROCEDURE — 99233 SBSQ HOSP IP/OBS HIGH 50: CPT

## 2019-11-29 PROCEDURE — 99291 CRITICAL CARE FIRST HOUR: CPT

## 2019-11-29 RX ORDER — MIDODRINE HYDROCHLORIDE 2.5 MG/1
10 TABLET ORAL ONCE
Refills: 0 | Status: COMPLETED | OUTPATIENT
Start: 2019-11-29 | End: 2019-11-29

## 2019-11-29 RX ORDER — SODIUM CHLORIDE 9 MG/ML
500 INJECTION INTRAMUSCULAR; INTRAVENOUS; SUBCUTANEOUS ONCE
Refills: 0 | Status: DISCONTINUED | OUTPATIENT
Start: 2019-11-29 | End: 2019-11-30

## 2019-11-29 RX ORDER — PANTOPRAZOLE SODIUM 20 MG/1
40 TABLET, DELAYED RELEASE ORAL
Refills: 0 | Status: DISCONTINUED | OUTPATIENT
Start: 2019-11-29 | End: 2019-12-07

## 2019-11-29 RX ORDER — DEXTROSE 10 % IN WATER 10 %
1000 INTRAVENOUS SOLUTION INTRAVENOUS
Refills: 0 | Status: DISCONTINUED | OUTPATIENT
Start: 2019-11-29 | End: 2019-11-30

## 2019-11-29 RX ORDER — DEXMEDETOMIDINE HYDROCHLORIDE IN 0.9% SODIUM CHLORIDE 4 UG/ML
0.2 INJECTION INTRAVENOUS
Qty: 200 | Refills: 0 | Status: DISCONTINUED | OUTPATIENT
Start: 2019-11-29 | End: 2019-11-29

## 2019-11-29 RX ORDER — SODIUM CHLORIDE 9 MG/ML
250 INJECTION INTRAMUSCULAR; INTRAVENOUS; SUBCUTANEOUS ONCE
Refills: 0 | Status: DISCONTINUED | OUTPATIENT
Start: 2019-11-29 | End: 2019-11-29

## 2019-11-29 RX ORDER — ACETAMINOPHEN 500 MG
650 TABLET ORAL ONCE
Refills: 0 | Status: COMPLETED | OUTPATIENT
Start: 2019-11-29 | End: 2019-11-29

## 2019-11-29 RX ORDER — SODIUM CHLORIDE 9 MG/ML
250 INJECTION INTRAMUSCULAR; INTRAVENOUS; SUBCUTANEOUS ONCE
Refills: 0 | Status: COMPLETED | OUTPATIENT
Start: 2019-11-29 | End: 2019-11-29

## 2019-11-29 RX ADMIN — Medication 100 MILLIGRAM(S): at 11:28

## 2019-11-29 RX ADMIN — CHLORHEXIDINE GLUCONATE 15 MILLILITER(S): 213 SOLUTION TOPICAL at 17:33

## 2019-11-29 RX ADMIN — Medication 650 MILLIGRAM(S): at 22:28

## 2019-11-29 RX ADMIN — MIDODRINE HYDROCHLORIDE 10 MILLIGRAM(S): 2.5 TABLET ORAL at 11:28

## 2019-11-29 RX ADMIN — MEROPENEM 100 MILLIGRAM(S): 1 INJECTION INTRAVENOUS at 14:36

## 2019-11-29 RX ADMIN — Medication 5 MILLIGRAM(S): at 05:42

## 2019-11-29 RX ADMIN — PANTOPRAZOLE SODIUM 40 MILLIGRAM(S): 20 TABLET, DELAYED RELEASE ORAL at 17:33

## 2019-11-29 RX ADMIN — ATORVASTATIN CALCIUM 40 MILLIGRAM(S): 80 TABLET, FILM COATED ORAL at 21:58

## 2019-11-29 RX ADMIN — SODIUM CHLORIDE 250 MILLILITER(S): 9 INJECTION INTRAMUSCULAR; INTRAVENOUS; SUBCUTANEOUS at 23:10

## 2019-11-29 RX ADMIN — LEVETIRACETAM 400 MILLIGRAM(S): 250 TABLET, FILM COATED ORAL at 05:42

## 2019-11-29 RX ADMIN — CHLORHEXIDINE GLUCONATE 15 MILLILITER(S): 213 SOLUTION TOPICAL at 05:42

## 2019-11-29 RX ADMIN — Medication 650 MILLIGRAM(S): at 21:58

## 2019-11-29 RX ADMIN — LEVETIRACETAM 400 MILLIGRAM(S): 250 TABLET, FILM COATED ORAL at 17:33

## 2019-11-29 RX ADMIN — CHLORHEXIDINE GLUCONATE 1 APPLICATION(S): 213 SOLUTION TOPICAL at 05:43

## 2019-11-29 RX ADMIN — HEPARIN SODIUM 5000 UNIT(S): 5000 INJECTION INTRAVENOUS; SUBCUTANEOUS at 05:42

## 2019-11-29 RX ADMIN — Medication 10 MILLILITER(S): at 17:35

## 2019-11-29 RX ADMIN — MIDODRINE HYDROCHLORIDE 10 MILLIGRAM(S): 2.5 TABLET ORAL at 05:42

## 2019-11-29 NOTE — PROGRESS NOTE ADULT - SUBJECTIVE AND OBJECTIVE BOX
Jonathan Fields  PGY1 | Internal Medicine  [id number] / [phone]    INTERVAL HPI/OVERNIGHT EVENTS: ON hgb drop to 6.5. Given 1uPRBC.     SUBJECTIVE: Patient seen and examined at bedside. Off precedex. Following commands this AM.     unable to assess    OBJECTIVE:    VITAL SIGNS:  ICU Vital Signs Last 24 Hrs  T(C): 36.6 (29 Nov 2019 08:00), Max: 37 (29 Nov 2019 04:00)  T(F): 97.9 (29 Nov 2019 08:00), Max: 98.6 (29 Nov 2019 04:00)  HR: 77 (29 Nov 2019 07:00) (64 - 112)  BP: 87/53 (29 Nov 2019 07:00) (82/53 - 154/76)  BP(mean): 65 (29 Nov 2019 07:00) (62 - 109)  ABP: --  ABP(mean): --  RR: 23 (29 Nov 2019 07:00) (14 - 43)  SpO2: 100% (29 Nov 2019 07:00) (75% - 100%)    Mode: AC/ CMV (Assist Control/ Continuous Mandatory Ventilation), RR (machine): 14, TV (machine): 500, FiO2: 30, PEEP: 5, PS: 5, ITime: 1, MAP: 7, PIP: 11    11-28 @ 07:01  -  11-29 @ 07:00  --------------------------------------------------------  IN: 1154.2 mL / OUT: 150 mL / NET: 1004.2 mL      CAPILLARY BLOOD GLUCOSE      POCT Blood Glucose.: 136 mg/dL (29 Nov 2019 05:39)      PHYSICAL EXAM:    GENERAL: thin, intubated,   EYES: EOMI, PERRLA, conjunctiva and sclera clear  NECK: Supple, No JVD, Normal thyroid  CHEST/LUNG: Clear to ausculation bilaterally; No rales, rhonchi, wheezing, or rubs  HEART: rr, regular rhythm; No murmurs, rubs, or gallops  ABDOMEN: Soft, Nontender, Nondistended; Bowel sounds present  EXTREMITIES:  2+ Peripheral Pulses, No clubbing, cyanosis, or edema  LYMPH: No lymphadenopathy noted  SKIN: No rashes or lesions  NERVOUS SYSTEM:  intubated and sedated; able to follow some commands, move extremities      MEDICATIONS:  MEDICATIONS  (STANDING):  atorvastatin 40 milliGRAM(s) Oral at bedtime  chlorhexidine 0.12% Liquid 15 milliLiter(s) Oral Mucosa every 12 hours  chlorhexidine 4% Liquid 1 Application(s) Topical <User Schedule>  dexMEDEtomidine Infusion 0.2 MICROgram(s)/kG/Hr (3.4 mL/Hr) IV Continuous <Continuous>  heparin  Injectable 5000 Unit(s) SubCutaneous every 8 hours  insulin lispro (HumaLOG) corrective regimen sliding scale   SubCutaneous every 6 hours  levETIRAcetam  IVPB 1000 milliGRAM(s) IV Intermittent every 12 hours  meropenem  IVPB 500 milliGRAM(s) IV Intermittent every 24 hours  meropenem  IVPB      midodrine. 10 milliGRAM(s) Oral three times a day  norepinephrine Infusion 0.05 MICROgram(s)/kG/Min (6.375 mL/Hr) IV Continuous <Continuous>  pantoprazole  Injectable 40 milliGRAM(s) IV Push two times a day  predniSONE   Tablet 5 milliGRAM(s) Oral daily  thiamine 100 milliGRAM(s) Oral daily    MEDICATIONS  (PRN):      ALLERGIES:  Allergies    No Known Allergies    Intolerances        LABS:                        6.5    9.53  )-----------( 172      ( 29 Nov 2019 00:31 )             21.7     11-29    132<L>  |  97  |  28<H>  ----------------------------<  116<H>  4.3   |  25  |  3.63<H>    Ca    10.2      29 Nov 2019 00:31  Phos  3.5     11-29  Mg     1.9     11-29    TPro  5.5<L>  /  Alb  2.0<L>  /  TBili  0.4  /  DBili  x   /  AST  9<L>  /  ALT  6<L>  /  AlkPhos  105  11-29    PT/INR - ( 28 Nov 2019 00:57 )   PT: 12.8 sec;   INR: 1.12 ratio         PTT - ( 28 Nov 2019 00:57 )  PTT:33.8 sec    Vancomycin Level, Trough (11.27.19 @ 18:31)    Vancomycin Level, Trough: 13.4: Vancomycin trough levels should be rapidly reached and maintained at  15-20 ug/ml for life threatening MRSA  infections such as sepsis, endocarditis, osteomyelitis and pneumonia. A  first trough level should be drawn  before the 3rd or 4th dose.  Risk of renal toxicity is increased for levels >15 ug/ml, in patients on  other nephrotoxic drugs, who are  hemodynamically unstable, have unstable renal function, or are on  Vancomycin therapy for >14 days. Renal function with  creatinine levels should be monitored for those patients. ug/mL        Culture - Bronchial (11.27.19 @ 17:19)    Gram Stain:   No polymorphonuclear cells seen per low power field  No squamous epithelial cells per low power field  No organisms seen per oil power field    Specimen Source: Bronch Wash Combicath    Culture Results:   Normal Respiratory Millie present    RADIOLOGY & ADDITIONAL TESTS: Reviewed.

## 2019-11-29 NOTE — PROGRESS NOTE ADULT - SUBJECTIVE AND OBJECTIVE BOX
Chief complaint  Patient is a 74y old  Male who presents with a chief complaint of ams (29 Nov 2019 12:05)   Review of systems  Patient in bed, looks comfortable, no fever, no hypoglycemia.    Labs and Fingersticks  CAPILLARY BLOOD GLUCOSE      POCT Blood Glucose.: 114 mg/dL (29 Nov 2019 11:25)  POCT Blood Glucose.: 136 mg/dL (29 Nov 2019 05:39)  POCT Blood Glucose.: 102 mg/dL (28 Nov 2019 17:23)      Anion Gap, Serum: 10 (11-29 @ 00:31)  Anion Gap, Serum: 10 (11-28 @ 00:57)      Calcium, Total Serum: 10.2 (11-29 @ 00:31)  Calcium, Total Serum: 9.5 (11-28 @ 00:57)  Albumin, Serum: 2.0 <L> (11-29 @ 00:31)  Albumin, Serum: 2.0 <L> (11-28 @ 00:57)    Alanine Aminotransferase (ALT/SGPT): 6 <L> (11-29 @ 00:31)  Alanine Aminotransferase (ALT/SGPT): 8 <L> (11-28 @ 00:57)  Alkaline Phosphatase, Serum: 105 (11-29 @ 00:31)  Alkaline Phosphatase, Serum: 125 <H> (11-28 @ 00:57)  Aspartate Aminotransferase (AST/SGOT): 9 <L> (11-29 @ 00:31)  Aspartate Aminotransferase (AST/SGOT): 13 (11-28 @ 00:57)        11-29    132<L>  |  97  |  28<H>  ----------------------------<  116<H>  4.3   |  25  |  3.63<H>    Ca    10.2      29 Nov 2019 00:31  Phos  3.5     11-29  Mg     1.9     11-29    TPro  5.5<L>  /  Alb  2.0<L>  /  TBili  0.4  /  DBili  x   /  AST  9<L>  /  ALT  6<L>  /  AlkPhos  105  11-29                        7.7    10.85 )-----------( 166      ( 29 Nov 2019 09:29 )             25.7     Medications  MEDICATIONS  (STANDING):  atorvastatin 40 milliGRAM(s) Oral at bedtime  chlorhexidine 0.12% Liquid 15 milliLiter(s) Oral Mucosa every 12 hours  chlorhexidine 4% Liquid 1 Application(s) Topical <User Schedule>  insulin lispro (HumaLOG) corrective regimen sliding scale   SubCutaneous every 6 hours  levETIRAcetam  IVPB 1000 milliGRAM(s) IV Intermittent every 12 hours  meropenem  IVPB 500 milliGRAM(s) IV Intermittent every 24 hours  meropenem  IVPB      midodrine. 10 milliGRAM(s) Oral three times a day  pantoprazole  Injectable 40 milliGRAM(s) IV Push two times a day  predniSONE   Tablet 5 milliGRAM(s) Oral daily  thiamine 100 milliGRAM(s) Oral daily      Physical Exam  General: Patient comfortable in bed  Vital Signs Last 12 Hrs  T(F): 97.9 (11-29-19 @ 08:00), Max: 98.6 (11-29-19 @ 04:00)  HR: 76 (11-29-19 @ 11:00) (64 - 80)  BP: 133/71 (11-29-19 @ 11:00) (84/51 - 133/71)  BP(mean): 96 (11-29-19 @ 11:00) (62 - 96)  RR: 22 (11-29-19 @ 11:00) (14 - 38)  SpO2: 100% (11-29-19 @ 11:00) (100% - 100%)  Neck: No palpable thyroid nodules.  CVS: S1S2, No murmurs  Respiratory: No wheezing, no crepitations  GI: Abdomen soft, bowel sounds positive  Musculoskeletal:  edema lower extremities.   Skin: No skin rashes, no ecchymosis    Diagnostics Chief complaint  Patient is a 74y old  Male who presents with a chief complaint of ams (29 Nov 2019 12:05)   Review of systems  Patient in bed, looks comfortable, no fever,  no hypoglycemia.    Labs and Fingersticks  CAPILLARY BLOOD GLUCOSE      POCT Blood Glucose.: 114 mg/dL (29 Nov 2019 11:25)  POCT Blood Glucose.: 136 mg/dL (29 Nov 2019 05:39)  POCT Blood Glucose.: 102 mg/dL (28 Nov 2019 17:23)      Anion Gap, Serum: 10 (11-29 @ 00:31)  Anion Gap, Serum: 10 (11-28 @ 00:57)      Calcium, Total Serum: 10.2 (11-29 @ 00:31)  Calcium, Total Serum: 9.5 (11-28 @ 00:57)  Albumin, Serum: 2.0 <L> (11-29 @ 00:31)  Albumin, Serum: 2.0 <L> (11-28 @ 00:57)    Alanine Aminotransferase (ALT/SGPT): 6 <L> (11-29 @ 00:31)  Alanine Aminotransferase (ALT/SGPT): 8 <L> (11-28 @ 00:57)  Alkaline Phosphatase, Serum: 105 (11-29 @ 00:31)  Alkaline Phosphatase, Serum: 125 <H> (11-28 @ 00:57)  Aspartate Aminotransferase (AST/SGOT): 9 <L> (11-29 @ 00:31)  Aspartate Aminotransferase (AST/SGOT): 13 (11-28 @ 00:57)        11-29    132<L>  |  97  |  28<H>  ----------------------------<  116<H>  4.3   |  25  |  3.63<H>    Ca    10.2      29 Nov 2019 00:31  Phos  3.5     11-29  Mg     1.9     11-29    TPro  5.5<L>  /  Alb  2.0<L>  /  TBili  0.4  /  DBili  x   /  AST  9<L>  /  ALT  6<L>  /  AlkPhos  105  11-29                        7.7    10.85 )-----------( 166      ( 29 Nov 2019 09:29 )             25.7     Medications  MEDICATIONS  (STANDING):  atorvastatin 40 milliGRAM(s) Oral at bedtime  chlorhexidine 0.12% Liquid 15 milliLiter(s) Oral Mucosa every 12 hours  chlorhexidine 4% Liquid 1 Application(s) Topical <User Schedule>  insulin lispro (HumaLOG) corrective regimen sliding scale   SubCutaneous every 6 hours  levETIRAcetam  IVPB 1000 milliGRAM(s) IV Intermittent every 12 hours  meropenem  IVPB 500 milliGRAM(s) IV Intermittent every 24 hours  meropenem  IVPB      midodrine. 10 milliGRAM(s) Oral three times a day  pantoprazole  Injectable 40 milliGRAM(s) IV Push two times a day  predniSONE   Tablet 5 milliGRAM(s) Oral daily  thiamine 100 milliGRAM(s) Oral daily      Physical Exam  General: Patient comfortable in bed  Vital Signs Last 12 Hrs  T(F): 97.9 (11-29-19 @ 08:00), Max: 98.6 (11-29-19 @ 04:00)  HR: 76 (11-29-19 @ 11:00) (64 - 80)  BP: 133/71 (11-29-19 @ 11:00) (84/51 - 133/71)  BP(mean): 96 (11-29-19 @ 11:00) (62 - 96)  RR: 22 (11-29-19 @ 11:00) (14 - 38)  SpO2: 100% (11-29-19 @ 11:00) (100% - 100%)  Neck: No palpable thyroid nodules.  CVS: S1S2, No murmurs  Respiratory: No wheezing, no crepitations  GI: Abdomen soft, bowel sounds positive  Musculoskeletal:  edema lower extremities.   Skin: No skin rashes, no ecchymosis    Diagnostics

## 2019-11-29 NOTE — PROGRESS NOTE ADULT - PROBLEM SELECTOR PLAN 4
remains full code at this time  no known HCP or advance directives  although sister is named as emergency contact, patient has 3 adult children  contact received for daughter Crystal: [phone]  under [facility], all of patients children would equal surrogate decision makers

## 2019-11-29 NOTE — ADVANCED PRACTICE NURSE CONSULT - RECOMMEDATIONS
Will recommend the followin. Sacrum, b/l buttocks: routine pericare with Triad, apply twice daily and prn for stooling  2. Continue with turning and positioning  3. Nutrition support as pt condition allows  Tx plan discussed with RN

## 2019-11-29 NOTE — PROGRESS NOTE ADULT - ASSESSMENT
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       NEURO:  - now intubated, presented with AMS  - metabolic encephalopathy, meningitis r/o  - neuro following  - chronic lacunar infarcts  - c/w keppra for ?hx of seizure disorder  -AMS likely 2/2 hyperCa, renal dysfunction, poor PO intake  - thiamine IVPB daily x 3 days    CV:  - hx of afib was on eliquis, now off a/c per cards due to bleeding risk  - holding carvedilol 6.25 BID  - hx of cocaine cardiomyopathy, resolved, normal EF on most recent echo  - c/w statin  - trop 134 -- trended down.   - hypotensive during RRT 11/26; started levo gtt, c/w midodrine, try to wean off levo.  - echo repeat EF70, LVH, hyperdynamic LCF, normal RV sys fxn    PULM:  - now intubated and sedated  - monitor for improvement for eventual extubation    GI:  - NG tube in place  - tube feeds ordered      RENAL:  - hx of ESRD s/p failed renal transplant x2  - renal following, HD as recommended.  - c/w predisone, sevelamer. hold cellcept for now given likely septic state; monitor and d/w renal when to restart cellcept  - has dialysis catheter in place; HD 11/28    ENDO:  #DM2: HbA1c 5.5  - Start Insulin Sliding Scale  - endocrine following  - presented hypercalcemic, downtrending. hold pamidronate for now. f/u with endo    METABOLIC:  #lyes WNL; monitor and f/u with HD schedule  - initially hyperCa, improved. hold pamidronate for now, follow up with endocrine    HEMATOLOGIC:  - hgb 8-9. Monitor  - pt was on eliquis; has been held.       INFECTIOUS:  - WBC downtrending. ID following  - RRT likely 2/2 septic shock, possible aspiration, UTI, has decubiti ulcers as well.  - Urine culture growing E.coli; blood culture negative  - was given empiric CNS infx coverage - vanc, manoj, acyclovir, LP CSF negative  - per ID, resart meropenem 500 IV q24, Vanc x1, will dose vanc by level w/ HD.  - f/u sputum culture  - wound care consulted    PPX: HSQ     GOC:  - palliative consult.  - discussed w/ sister who is [employer] employee. 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       NEURO:  - now intubated, presented with AMS  - metabolic encephalopathy, meningitis r/o  - neuro following  - chronic lacunar infarcts  - c/w keppra for ?hx of seizure disorder  -AMS likely 2/2 hyperCa, renal dysfunction, poor PO intake  - thiamine IVPB daily x 3 days  - plan for extubation today if tolerated.    CV:  - hx of afib was on eliquis, now off a/c per cards due to bleeding risk  - holding carvedilol 6.25 BID  - hx of cocaine cardiomyopathy, resolved, normal EF on most recent echo  - c/w statin  - trop 134 -- trended down.   - hypotensive during RRT 11/26; started levo gtt, c/w midodrine, off levo  - echo repeat EF70, LVH, hyperdynamic LCF, normal RV sys fxn    PULM:  - now intubated; plan for extubation today 11/29  - monitor for improvement for eventual extubation    GI:  - NG tube in place  - tube feeds ordered  - ?bleeding as hgb dropping. stool occult ordered. no melena reported.      RENAL:  - hx of ESRD s/p failed renal transplant x2  - renal following, HD as recommended.  - c/w predisone, sevelamer. hold cellcept for now given likely septic state; monitor and d/w renal when to restart cellcept  - has dialysis catheter in place; HD 11/27, plan for 11/29.    ENDO:  #DM2: HbA1c 5.5  - Start Insulin Sliding Scale  - endocrine following  - presented hypercalcemic, downtrending. hold pamidronate for now. f/u with endo    METABOLIC:  #lyes WNL; monitor and f/u with HD schedule  - initially hyperCa, improved. hold pamidronate for now, follow up with endocrine    HEMATOLOGIC:  - hgb dropped to 6.5, unclear source of blood loss. s/p 1 unit PRBC 7.7 hgb. continue to monitor, consider imaging. stool guaiac ordered.   - pt was on eliquis; has been held.       INFECTIOUS:  - WBC downtrending. ID following  - RRT likely 2/2 septic shock, possible aspiration, UTI, has decubiti ulcers as well.  - Urine culture growing E.coli; blood culture negative  - was given empiric CNS infx coverage - vanc, manoj, acyclovir, LP CSF negative  - per ID, resart meropenem 500 IV q24 for 7 days total. dc vanc.   - f/u sputum culture - ngtd  - wound care consulted    PPX: SCDs    GOC:  - palliative consult.  - discussed w/ sister who is [employer] employee. 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       NEURO:  - now intubated, presented with AMS  - metabolic encephalopathy, meningitis r/o  - neuro following  - chronic lacunar infarcts; per neuro, AMS also likely related to hypercalcemia, renal dysfunction , poor nutritional intake.   - c/w keppra for ?hx of seizure disorder  -AMS likely 2/2 hyperCa, renal dysfunction, poor PO intake  - thiamine IVPB daily x 3 days  - plan for extubation today if tolerated.    CV:  - hx of afib was on eliquis, now off a/c per cards due to bleeding risk  - holding carvedilol 6.25 BID  - hx of cocaine cardiomyopathy, resolved, normal EF on most recent echo  - c/w statin  - trop 134 -- trended down.   - hypotensive during RRT 11/26; started levo gtt, c/w midodrine, off levo  - echo repeat EF70, LVH, hyperdynamic LCF, normal RV sys fxn    PULM:  - now intubated; plan for extubation today 11/29  - monitor for improvement for eventual extubation    GI:  - NG tube in place  - tube feeds ordered  - ?bleeding as hgb dropping. stool occult ordered. no melena reported.      RENAL:  - hx of ESRD s/p failed renal transplant x2  - renal following, HD as recommended.  - c/w predisone, sevelamer. hold cellcept for now given likely septic state; monitor and d/w renal when to restart cellcept  - has dialysis catheter in place; HD 11/27, plan for 11/29.    ENDO:  #DM2: HbA1c 5.5  - Start Insulin Sliding Scale  - endocrine following  - presented hypercalcemic, downtrending. hold pamidronate for now. f/u with endo    METABOLIC:  #lyes WNL; monitor and f/u with HD schedule  - initially hyperCa, improved. hold pamidronate for now, follow up with endocrine    HEMATOLOGIC:  - hgb dropped to 6.5, unclear source of blood loss. s/p 1 unit PRBC 7.7 hgb. continue to monitor, consider imaging. stool guaiac ordered.   - pt was on eliquis; has been held.       INFECTIOUS:  - WBC downtrending. ID following  - RRT likely 2/2 septic shock, possible aspiration, UTI, has decubiti ulcers as well.  - Urine culture growing E.coli; blood culture negative  - was given empiric CNS infx coverage - vanc, manoj, acyclovir, LP CSF negative  - per ID, resart meropenem 500 IV q24 for 7 days total. dc vanc.   - f/u sputum culture - ngtd  - wound care consulted    PPX: SCDs    GOC:  - palliative consult.  - discussed w/ sister who is [employer] employee. 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       NEURO:  - now intubated, presented with AMS  - metabolic encephalopathy, meningitis r/o  - neuro following  - chronic lacunar infarcts; per neuro, AMS also likely related to hypercalcemia, renal dysfunction , poor nutritional intake.   - c/w keppra for ?hx of seizure disorder  -AMS likely 2/2 hyperCa, renal dysfunction, poor PO intake  - thiamine IVPB daily x 3 days  - plan for extubation today if tolerated.    CV:  - hx of afib was on eliquis, now off a/c per cards due to bleeding risk  - holding carvedilol 6.25 BID  - hx of cocaine cardiomyopathy, resolved, normal EF on most recent echo  - c/w statin  - trop 134 -- trended down.   - hypotensive during RRT 11/26; started levo gtt, c/w midodrine, off levo  - echo repeat EF70, LVH, hyperdynamic LCF, normal RV sys fxn    PULM:  - now intubated; plan for extubation today 11/29  - monitor for improvement for eventual extubation    GI:  - NG tube in place  - tube feeds ordered  - ?bleeding as hgb dropping. stool occult ordered. no melena reported.      RENAL:  - hx of ESRD s/p failed renal transplant x2  - renal following, HD as recommended.  - c/w predisone, sevelamer. hold cellcept for now given likely septic state; monitor and d/w renal when to restart cellcept  - has dialysis catheter in place; HD 11/27, plan for 11/29.    ENDO:  #DM2: HbA1c 5.5  - Start Insulin Sliding Scale  - endocrine following  - presented hypercalcemic, downtrending. hold pamidronate for now. f/u with endo    METABOLIC:  - hypoNa; monitor and f/u with HD schedule  - initially hyperCa, improving. hold pamidronate for now, follow up with endocrine    HEMATOLOGIC:  - hgb dropped to 6.5, unclear source of blood loss. s/p 1 unit PRBC 7.7 hgb. continue to monitor, consider imaging. stool guaiac ordered.   - pt was on eliquis; has been held.       INFECTIOUS:  - WBC downtrending. ID following  - RRT likely 2/2 septic shock, possible aspiration, UTI, has decubiti ulcers as well.  - Urine culture growing E.coli; blood culture negative  - was given empiric CNS infx coverage - vanc, manoj, acyclovir, LP CSF negative  - per ID, resart meropenem 500 IV q24 for 7 days total. dc vanc.   - f/u sputum culture - ngtd  - wound care consulted    PPX: SCDs    GOC:  - palliative consult.  - discussed w/ sister who is [employer] employee. Made aware of status and will update

## 2019-11-29 NOTE — ADVANCED PRACTICE NURSE CONSULT - ASSESSMENT
The pt remains in the MICU, extubated today, face mask in use. A [employer] support surface is in use and the pt is being assisted with turning and positioning. Staff have applied z-sharee boots to off-load the heels.  The pt has a [place]-feed tube and receives enteral feeds as able- he is being followed by nutrition.  Upon assessment, the pt was incontinent of liquid brown stool and pericare was provided. Pt continually oozing liquid stool. Rectal exam done by MD to assess sphincter for possible FMS- pt is not a candidate due to poor rectal tone.   Pt was a/w a stage 2 pressure injury to the sacrum. Upon wound assessment, dimensions noted to be increased and extending to the b/l buttocks. Nonviable tissue in the form of yellow slough is noted over 40% of the wound bed. the remaining 60% of the tissue is pale, pink moist. Will classify as unstageable at this time. The periwound skin is darkened, hyperpigmented. Scattered areas of superficial tissue loss were noted in the perirectal area. There was no odor, no induration or fluctuance.  Will recommend the application of Triad paste given the frequency of the stooling. The Triad will lay down a protective coating on the skin and promote the autolytic debridement of the non-viable tissue.   Pt has a condom catheter in place to divert urine from the skin.  Suggest that incontinence of stool may be impacting the skin and played a role in the deterioration- the constant contact of the skin with the enzymes in stool can lead to further breakdown. Pt also has multiple comorbid conditions and multiorgan system failure with hx of septic shock- this also may have an impact of skin integrity and may explain the deterioration.

## 2019-11-29 NOTE — AIRWAY REMOVAL NOTE  ADULT & PEDS - ARTIFICAL AIRWAY REMOVAL COMMENTS
Written order for extubation verified.  Patient identified by full name and birth date as per identification band.  Present at the procedure was Sydnee Vicente RN

## 2019-11-29 NOTE — PROGRESS NOTE ADULT - ASSESSMENT
74M PMHx of ESRD s/p failed renal transplant x2, HCV, DM, and meningococcal meningitis 2 years ago was sent in to the ED from HD for AMS and low BPs.  lethargic  hypercalcemia   E coli bacteriuria/UTI  LP not s/o any CNS infection ,CSF negative  Now with ? aspiration pna  resp failure  s/p intubation  In ICU  Rec:  1) follow sputum, blood cx  2) ICU support per ICU team  3) Continue empiric renally adjusted meropenem   if stable may be able to Dc abx early next week   4) aspiration precautions  5) Consider GOC discussion though mental status better     prognosis guarded  Will tailor plan for ID issues  per course,results.Will defer to primary team on management of other issues  case d/w MICU team .    Infectious Diseases Service will cover over weekend.  Please call [phone] if issues    ID service will cover and follow over next 4 days.Please call [phone] if any issues.

## 2019-11-29 NOTE — PROGRESS NOTE ADULT - SUBJECTIVE AND OBJECTIVE BOX
Patient is a 74y old  Male who presents with a chief complaint of ams (29 Nov 2019 08:49)    Being followed by ID for pna     Interval history:opens eyes  following some commands  still on vent   No acute events      ROS:  Not obtainable     Antimicrobials:    meropenem  IVPB 500 milliGRAM(s) IV Intermittent every 24 hours  meropenem  IVPB        Other medications reviewed    Vital Signs Last 24 Hrs  T(C): 36.6 (11-29-19 @ 08:00), Max: 37 (11-29-19 @ 04:00)  T(F): 97.9 (11-29-19 @ 08:00), Max: 98.6 (11-29-19 @ 04:00)  HR: 76 (11-29-19 @ 11:00) (64 - 112)  BP: 133/71 (11-29-19 @ 11:00) (82/53 - 154/76)  BP(mean): 96 (11-29-19 @ 11:00) (62 - 108)  RR: 22 (11-29-19 @ 11:00) (14 - 43)  SpO2: 100% (11-29-19 @ 11:00) (75% - 100%)    Physical Exam:        ETT     left Sc TLC no erythema or tenderness    R Sc HD catheter no erythema o tenderness    decreased Neck rigidity     Chest Good AE,bibasilar crackles     CVS RRR S1 S2 WNl No murmur or rub or gallop    Abd soft BS normal No tenderness RLQ transplant kidney no tenderness    Ext left arm AVF no erythema or tenderness    IV site no erythema tenderness or discharge    Joints no swelling or LOM  CNS sedated         Lab Data:                          7.7    10.85 )-----------( 166      ( 29 Nov 2019 09:29 )             25.7     WBC Count: 10.85 (11-29-19 @ 09:29)  WBC Count: 9.53 (11-29-19 @ 00:31)  WBC Count: 13.74 (11-28-19 @ 00:57)  WBC Count: 18.38 (11-27-19 @ 00:42)  WBC Count: 21.55 (11-26-19 @ 16:21)  WBC Count: 20.75 (11-26-19 @ 11:50)  WBC Count: 11.76 (11-25-19 @ 15:24)  WBC Count: 10.93 (11-24-19 @ 11:29)  WBC Count: 7.62 (11-23-19 @ 12:30)  WBC Count: 12.14 (11-22-19 @ 14:32)      11-29    132<L>  |  97  |  28<H>  ----------------------------<  116<H>  4.3   |  25  |  3.63<H>    Ca    10.2      29 Nov 2019 00:31  Phos  3.5     11-29  Mg     1.9     11-29    TPro  5.5<L>  /  Alb  2.0<L>  /  TBili  0.4  /  DBili  x   /  AST  9<L>  /  ALT  6<L>  /  AlkPhos  105  11-29        Culture - Bronchial (collected 27 Nov 2019 17:19)  Source: Bronch Wash Combicath  Gram Stain (27 Nov 2019 21:50):    No polymorphonuclear cells seen per low power field    No squamous epithelial cells per low power field    No organisms seen per oil power field  Preliminary Report (28 Nov 2019 17:46):    Normal Respiratory Millie present    Culture - Blood (collected 26 Nov 2019 18:36)  Source: .Blood Blood-Venous  Preliminary Report (27 Nov 2019 19:01):    No growth to date.    Culture - Blood (collected 26 Nov 2019 18:35)  Source: .Blood Blood-Venous  Preliminary Report (27 Nov 2019 19:01):    No growth to date.            Vancomycin Level, Trough: 13.4 ug/mL (11-27-19 @ 18:31)      < from: Xray Chest 1 View- PORTABLE-Urgent (11.26.19 @ 21:21) >    Impression:    The heart is normal in size. Right lower lobe pneumonia and/or   atelectasis cannot be ruled out entirely. Endotracheal tube is in good   position. NG tube is in the stomach. A new central line was placed in the   left and the tip is in superior vena cava.A Cordis sheet catheter is   seen on right and the tip is in the superior vena cava as well.   Degenerative changes of the thoracic spine.    < end of copied text >

## 2019-11-29 NOTE — PROGRESS NOTE ADULT - ASSESSMENT
shock / resp failure  on vent  plan as per ICU    History of cocaine induced CM  normal EF on last echo  resume BB once more hemodynamically stable     PAF  in sinus   a/c once deemed safe   for now off given high bleed risk, sepsis on hold     Hypercalcemia  as per renal   HD    NSVT  resume BB if no objection

## 2019-11-29 NOTE — PROGRESS NOTE ADULT - SUBJECTIVE AND OBJECTIVE BOX
SUBJECTIVE AND OBJECTIVE:  INTERVAL HPI/OVERNIGHT EVENTS:    DNR on chart:   Allergies    No Known Allergies    Intolerances    MEDICATIONS  (STANDING):  atorvastatin 40 milliGRAM(s) Oral at bedtime  chlorhexidine 0.12% Liquid 15 milliLiter(s) Oral Mucosa every 12 hours  chlorhexidine 4% Liquid 1 Application(s) Topical <User Schedule>  dextrose 10%. 1000 milliLiter(s) (10 mL/Hr) IV Continuous <Continuous>  insulin lispro (HumaLOG) corrective regimen sliding scale   SubCutaneous every 6 hours  levETIRAcetam  IVPB 1000 milliGRAM(s) IV Intermittent every 12 hours  meropenem  IVPB 500 milliGRAM(s) IV Intermittent every 24 hours  meropenem  IVPB      midodrine. 10 milliGRAM(s) Oral three times a day  pantoprazole  Injectable 40 milliGRAM(s) IV Push two times a day  predniSONE   Tablet 5 milliGRAM(s) Oral daily  thiamine 100 milliGRAM(s) Oral daily    MEDICATIONS  (PRN):      ITEMS UNCHECKED ARE NOT PRESENT    PRESENT SYMPTOMS: [ ]Unable to obtain due to poor mentation   Source if other than patient:  [ ]Family   [ ]Team     Pain (Impact on QOL):    Location:  Minimal acceptable level (0-10 scale):                   Aggravating factors:  Quality:  Radiation:  Severity (0-10 scale):    Timing:    Dyspnea:                           [ ]Mild [ ]Moderate [ ]Severe  Anxiety:                             [ ]Mild [ ]Moderate [ ]Severe  Fatigue:                             [ ]Mild [ ]Moderate [ ]Severe  Nausea:                             [ ]Mild [ ]Moderate [ ]Severe  Loss of appetite:              [ ]Mild [ ]Moderate [ ]Severe  Constipation:                    [ ]Mild [ ]Moderate [ ]Severe    PAIN AD Score:	  [email or web address]/painad.pdf (Ctrl + left click to view)    Other Symptoms:  [ ]All other review of systems negative     Karnofsky Performance Score/Palliative Performance Status Version 2:         %    http://palliative.info/resource_material/PPSv2.pdf  PHYSICAL EXAM:  Vital Signs Last 24 Hrs  T(C): 36.4 (29 Nov 2019 12:00), Max: 37 (29 Nov 2019 04:00)  T(F): 97.6 (29 Nov 2019 12:00), Max: 98.6 (29 Nov 2019 04:00)  HR: 95 (29 Nov 2019 16:00) (64 - 95)  BP: 148/74 (29 Nov 2019 16:00) (82/53 - 174/84)  BP(mean): 105 (29 Nov 2019 16:00) (62 - 120)  RR: 29 (29 Nov 2019 16:00) (14 - 38)  SpO2: 100% (29 Nov 2019 16:00) (100% - 100%) I&O's Summary    28 Nov 2019 07:01  -  29 Nov 2019 07:00  --------------------------------------------------------  IN: 1154.2 mL / OUT: 150 mL / NET: 1004.2 mL    29 Nov 2019 07:01  -  29 Nov 2019 17:14  --------------------------------------------------------  IN: 100 mL / OUT: 0 mL / NET: 100 mL     GENERAL:  [ ]Alert  [ ]Oriented x   [ ]Lethargic  [ ]Cachexia  [ ]Unarousable  [ ]Verbal  [ ]Non-Verbal  Behavioral:   [ ] Anxiety  [ ] Delirium [ ] Agitation [ ] Other  HEENT:  [ ]Normal   [ ]Dry mouth   [ ]ET Tube/Trach  [ ]Oral lesions  PULMONARY:   [ ]Clear [ ]Tachypnea  [ ]Audible excessive secretions   [ ]Rhonchi        [ ]Right [ ]Left [ ]Bilateral  [ ]Crackles        [ ]Right [ ]Left [ ]Bilateral  [ ]Wheezing     [ ]Right [ ]Left [ ]Bilateral  CARDIOVASCULAR:    [ ]Regular [ ]Irregular [ ]Tachy  [ ]Yaniv [ ]Murmur [ ]Other  GASTROINTESTINAL:  [ ]Soft  [ ]Distended   [ ]+BS  [ ]Non tender [ ]Tender  [ ]PEG [ ]OGT/ NGT   Last BM:    GENITOURINARY:  [ ]Normal [ ] Incontinent   [ ]Oliguria/Anuria   [ ]Mclaughlin  MUSCULOSKELETAL:   [ ]Normal   [ ]Weakness  [ ]Bed/Wheelchair bound [ ]Edema  NEUROLOGIC:   [ ]No focal deficits  [ ] Cognitive impairment  [ ] Dysphagia [ ]Dysarthria [ ] Paresis [ ]Other   SKIN:   [ ]Normal   [ ]Pressure ulcer(s)  [ ]Rash    CRITICAL CARE:  [ ] Shock Present  [ ]Septic [ ]Cardiogenic [ ]Neurologic [ ]Hypovolemic  [ ]  Vasopressors [ ]  Inotropes   [ ] Respiratory failure present  [ ] Acute  [ ] Chronic [ ] Hypoxic  [ ] Hypercarbic [ ] Other  [ ] Other organ failure     LABS:                        7.7    10.85 )-----------( 166      ( 29 Nov 2019 09:29 )             25.7   11-29    132<L>  |  97  |  28<H>  ----------------------------<  116<H>  4.3   |  25  |  3.63<H>    Ca    10.2      29 Nov 2019 00:31  Phos  3.5     11-29  Mg     1.9     11-29    TPro  5.5<L>  /  Alb  2.0<L>  /  TBili  0.4  /  DBili  x   /  AST  9<L>  /  ALT  6<L>  /  AlkPhos  105  11-29  PT/INR - ( 28 Nov 2019 00:57 )   PT: 12.8 sec;   INR: 1.12 ratio         PTT - ( 28 Nov 2019 00:57 )  PTT:33.8 sec      RADIOLOGY & ADDITIONAL STUDIES:    Protein Calorie Malnutrition Present: [ ] yes [ ] no  [ ] PPSV2 < or = 30%  [ ] significant weight loss [ ] poor nutritional intake [ ] anasarca [ ] catabolic state Albumin, Serum: 2.0 g/dL (11-29-19 @ 00:31)  Artificial Nutrition [ ]     REFERRALS:   [ ]Chaplaincy  [ ] Hospice  [ ]Child Life  [ ]Social Work  [ ]Case management [ ]Holistic Therapy   Goals of Care Document: SUBJECTIVE AND OBJECTIVE: Patient seen and examined, extubated on this date. Minimally arousable, not following commands.    Outreach made to emergency contact, sister Sheryl Mims. She is concerned regarding involvement of palliative care as she associates this with her brother being "on his way out." Update provided. Per discussion with sister, patient has 3 children, 2 daughters and 1 son. Contact information provided for daughter Roro who lives in [place].     Sister describes patient as an outgoing gregarious man, [place] due to recently breaking up with a girlfriend who lives in [place]. he had been trying to gain senior citizen housing in [place]. He has another sister in [place] and a Brother who lives in [place] who suffers from Parkinsons disease.     Primary team updated on above. Based on [facility]CDA and no HCP per discussion with sister, patients children would be surrogate decision makers.    INTERVAL HPI/OVERNIGHT EVENTS: no acute overnight events.     DNR on chart:   Allergies    No Known Allergies    Intolerances    MEDICATIONS  (STANDING):  atorvastatin 40 milliGRAM(s) Oral at bedtime  chlorhexidine 0.12% Liquid 15 milliLiter(s) Oral Mucosa every 12 hours  chlorhexidine 4% Liquid 1 Application(s) Topical <User Schedule>  dextrose 10%. 1000 milliLiter(s) (10 mL/Hr) IV Continuous <Continuous>  insulin lispro (HumaLOG) corrective regimen sliding scale   SubCutaneous every 6 hours  levETIRAcetam  IVPB 1000 milliGRAM(s) IV Intermittent every 12 hours  meropenem  IVPB 500 milliGRAM(s) IV Intermittent every 24 hours  meropenem  IVPB      midodrine. 10 milliGRAM(s) Oral three times a day  pantoprazole  Injectable 40 milliGRAM(s) IV Push two times a day  predniSONE   Tablet 5 milliGRAM(s) Oral daily  thiamine 100 milliGRAM(s) Oral daily    MEDICATIONS  (PRN):      ITEMS UNCHECKED ARE NOT PRESENT    PRESENT SYMPTOMS: [ x]Unable to obtain due to poor mentation   Source if other than patient:  [ ]Family   [ ]Team     Pain (Impact on QOL):    Location:  Minimal acceptable level (0-10 scale):                   Aggravating factors:  Quality:  Radiation:  Severity (0-10 scale):    Timing:    Dyspnea:                           [ ]Mild [ ]Moderate [ ]Severe  Anxiety:                             [ ]Mild [ ]Moderate [ ]Severe  Fatigue:                             [ ]Mild [ ]Moderate [ ]Severe  Nausea:                             [ ]Mild [ ]Moderate [ ]Severe  Loss of appetite:              [ ]Mild [ ]Moderate [ ]Severe  Constipation:                    [ ]Mild [ ]Moderate [ ]Severe    PAIN AD Score:	2  [email or web address].[email or web address]/painad.pdf (Ctrl + left click to view)    Other Symptoms:  [ ]All other review of systems negative     Karnofsky Performance Score/Palliative Performance Status Version 2:       20  %    http://palliative.info/resource_material/PPSv2.pdf  PHYSICAL EXAM:  Vital Signs Last 24 Hrs  T(C): 36.4 (29 Nov 2019 12:00), Max: 37 (29 Nov 2019 04:00)  T(F): 97.6 (29 Nov 2019 12:00), Max: 98.6 (29 Nov 2019 04:00)  HR: 95 (29 Nov 2019 16:00) (64 - 95)  BP: 148/74 (29 Nov 2019 16:00) (82/53 - 174/84)  BP(mean): 105 (29 Nov 2019 16:00) (62 - 120)  RR: 29 (29 Nov 2019 16:00) (14 - 38)  SpO2: 100% (29 Nov 2019 16:00) (100% - 100%) I&O's Summary    28 Nov 2019 07:01  -  29 Nov 2019 07:00  --------------------------------------------------------  IN: 1154.2 mL / OUT: 150 mL / NET: 1004.2 mL    29 Nov 2019 07:01  -  29 Nov 2019 17:14  --------------------------------------------------------  IN: 100 mL / OUT: 0 mL / NET: 100 mL     GENERAL:  [x ]Alert  [ ]Oriented x   [ ]Lethargic  [ ]Cachexia  [ ]Unarousable  [ ]Verbal  [ x]Non-Verbal  Behavioral:   [ ] Anxiety  [ ] Delirium [ ] Agitation [ ] Other  HEENT:  [ ]Normal   [x ]Dry mouth   [ ]ET Tube/Trach  [ ]Oral lesions  PULMONARY:   [ ]Clear [ ]Tachypnea  [ ]Audible excessive secretions   [ ]Rhonchi        [ ]Right [ ]Left [ ]Bilateral  [x ]Crackles        [ ]Right [ ]Left [ ]Bilateral  [ ]Wheezing     [ ]Right [ ]Left [ ]Bilateral  CARDIOVASCULAR:    [x ]Regular [ ]Irregular [ ]Tachy  [ ]Yaniv [ ]Murmur [ ]Other  GASTROINTESTINAL:  [x ]Soft  [ ]Distended   [x ]+BS  [ ]Non tender [ ]Tender  [ ]PEG [ x]OGT/ NGT   Last BM:    GENITOURINARY:  [ ]Normal [ ] Incontinent   [ ]Oliguria/Anuria   [x ]Mclaughlin  MUSCULOSKELETAL:   [ ]Normal   [ ]Weakness  [x ]Bed/Wheelchair bound [ ]Edema  NEUROLOGIC: not following commands  [ ]No focal deficits  [x ] Cognitive impairment  [ ] Dysphagia [ ]Dysarthria [ ] Paresis [ ]Other   SKIN: ecchymoses  [ ]Normal   [ ]Pressure ulcer(s)  [ ]Rash    CRITICAL CARE:  [ ] Shock Present  [ ]Septic [ ]Cardiogenic [ ]Neurologic [ ]Hypovolemic  [ ]  Vasopressors [ ]  Inotropes   [x ] Respiratory failure present  [x ] Acute  [ ] Chronic [x ] Hypoxic  [ ] Hypercarbic [ ] Other  [ ] Other organ failure     LABS:                        7.7    10.85 )-----------( 166      ( 29 Nov 2019 09:29 )             25.7   11-29    132<L>  |  97  |  28<H>  ----------------------------<  116<H>  4.3   |  25  |  3.63<H>    Ca    10.2      29 Nov 2019 00:31  Phos  3.5     11-29  Mg     1.9     11-29    TPro  5.5<L>  /  Alb  2.0<L>  /  TBili  0.4  /  DBili  x   /  AST  9<L>  /  ALT  6<L>  /  AlkPhos  105  11-29  PT/INR - ( 28 Nov 2019 00:57 )   PT: 12.8 sec;   INR: 1.12 ratio         PTT - ( 28 Nov 2019 00:57 )  PTT:33.8 sec      RADIOLOGY & ADDITIONAL STUDIES:  < from: Xray Chest 1 View- PORTABLE-Urgent (11.26.19 @ 21:21) >    EXAM:  XR CHEST PORTABLE URGENT 1V                            PROCEDURE DATE:  11/26/2019            INTERPRETATION:  A single chest x-ray was obtained on November 26, 2019.    Indication: New catheter placement on the left.    Impression:    The heart is normal in size. Right lower lobe pneumonia and/or   atelectasis cannot be ruled out entirely. Endotracheal tube is in good   position. NG tube is in the stomach. A new central line was placed in the   left and the tip is in superior vena cava.A Cordis sheet catheter is   seen on right and the tip is in the superior vena cava as well.   Degenerative changes of the thoracic spine.      MARIA ESTHER WHITE M.D., ATTENDING RADIOLOGIST  This document has been electronically signed. Nov 27 2019  8:21AM     < end of copied text >      Protein Calorie Malnutrition Present: [x ] yes [ ] no  [x ] PPSV2 < or = 30%  [ ] significant weight loss [x ] poor nutritional intake [ ] anasarca [ ] catabolic state Albumin, Serum: 2.0 g/dL (11-29-19 @ 00:31)  Artificial Nutrition [x ]     REFERRALS:   [ ]Chaplaincy  [ ] Hospice  [ ]Child Life  [ x]Social Work  [ ]Case management [ ]Holistic Therapy   Goals of Care Document:

## 2019-11-29 NOTE — PROGRESS NOTE ADULT - ASSESSMENT
Assessment  DM: A1C 5.5%, was on insulin at home, now on insulin low-dose coverage, blood sugars improved and trending within acceptable range, patient is still NPO on tube feeds, in MICU.  Hypercalcemia: Primary Hyperparathyroidism, calcium improved s/p Pamidronate dose.  Sepsis: IV ABx for UTI, LP inconsistent with meningitis, on medications, stable, monitored.  HTN: Controlled,  on antihypertensive medications.  ESRD: On hemodialysis, Monitor labs/BMP          Robi Gay MD  Cell: [phone]  Office: [phone] Assessment  DM: A1C 5.5%, was on insulin at home, now on insulin low-dose coverage, blood sugars improved and trending within acceptable range,  patient is still NPO on tube feeds, in MICU.  Hypercalcemia: Primary Hyperparathyroidism, calcium improved s/p Pamidronate dose.  Sepsis: IV ABx for UTI, LP inconsistent with meningitis, on medications, stable, monitored.  HTN: Controlled,  on antihypertensive medications.  ESRD: On hemodialysis, Monitor labs/BMP          Robi Gay MD  Cell: [phone]  Office: [phone]

## 2019-11-29 NOTE — PROGRESS NOTE ADULT - SUBJECTIVE AND OBJECTIVE BOX
Subjective: Patient seen and examined. No new events except as noted.     SUBJECTIVE/ROS:  ROS is limited as pt currently sedated on ventilator.       MEDICATIONS:  MEDICATIONS  (STANDING):  atorvastatin 40 milliGRAM(s) Oral at bedtime  chlorhexidine 0.12% Liquid 15 milliLiter(s) Oral Mucosa every 12 hours  chlorhexidine 4% Liquid 1 Application(s) Topical <User Schedule>  dexMEDEtomidine Infusion 0.2 MICROgram(s)/kG/Hr (3.4 mL/Hr) IV Continuous <Continuous>  heparin  Injectable 5000 Unit(s) SubCutaneous every 8 hours  insulin lispro (HumaLOG) corrective regimen sliding scale   SubCutaneous every 6 hours  levETIRAcetam  IVPB 1000 milliGRAM(s) IV Intermittent every 12 hours  meropenem  IVPB 500 milliGRAM(s) IV Intermittent every 24 hours  meropenem  IVPB      midodrine. 10 milliGRAM(s) Oral three times a day  norepinephrine Infusion 0.05 MICROgram(s)/kG/Min (6.375 mL/Hr) IV Continuous <Continuous>  pantoprazole  Injectable 40 milliGRAM(s) IV Push two times a day  predniSONE   Tablet 5 milliGRAM(s) Oral daily  thiamine 100 milliGRAM(s) Oral daily      PHYSICAL EXAM:  T(C): 36.6 (11-29-19 @ 08:00), Max: 37 (11-29-19 @ 04:00)  HR: 73 (11-29-19 @ 08:02) (64 - 112)  BP: 114/62 (11-29-19 @ 08:00) (82/53 - 154/76)  RR: 22 (11-29-19 @ 08:00) (14 - 43)  SpO2: 100% (11-29-19 @ 08:02) (75% - 100%)  Wt(kg): --  I&O's Summary    28 Nov 2019 07:01  -  29 Nov 2019 07:00  --------------------------------------------------------  IN: 1154.2 mL / OUT: 150 mL / NET: 1004.2 mL    29 Nov 2019 07:01  -  29 Nov 2019 08:49  --------------------------------------------------------  IN: 0 mL / OUT: 0 mL / NET: 0 mL    Appearance: on vent 	  Cardiovascular: Normal S1 S2,    Murmur:   Neck: JVP limited to be evaluated   Respiratory: Lungs few rhonchi   Gastrointestinal:  Soft  Skin: normal   Neuro: limited as pt on vent      LABS/DATA:    CARDIAC MARKERS:                                6.5    9.53  )-----------( 172      ( 29 Nov 2019 00:31 )             21.7     11-29    132<L>  |  97  |  28<H>  ----------------------------<  116<H>  4.3   |  25  |  3.63<H>    Ca    10.2      29 Nov 2019 00:31  Phos  3.5     11-29  Mg     1.9     11-29    TPro  5.5<L>  /  Alb  2.0<L>  /  TBili  0.4  /  DBili  x   /  AST  9<L>  /  ALT  6<L>  /  AlkPhos  105  11-29    proBNP:   Lipid Profile:   HgA1c:   TSH:     TELE:  EKG:

## 2019-11-29 NOTE — ADVANCED PRACTICE NURSE CONSULT - REASON FOR CONSULT
Requested by staff to assess skin status: coccyx. staff is concerned that skin status has deteriorated. PMH is noted:  75 y/o male with PMHx of  ESRD s/p /p failed renal transplant, DM, HTN, cardiomyopathy 2/2 cocaine abuse, Hepatitis C, meningococcal meningitis, Afib and admitted w/ AMS. Found to have CVA and EEG and LP negative for other etiologies of AMS. Pt yesterday tx to ICU for unresponsiveness, septic shock and resp failure requiring intubation. Off pressors for 24 h. Cont midodrine. f/u dale cx so far bcx and sputum cx negative. US shows lower lobe consolidations likely PNA. Cont empiric abx. d/c vanco as remains mrsa negative. Cont immunosuppression for renal tx. Pt following commands off sedation. Doing well on PS trials and extubated today. If MS does not improve further will send him for previously ordered MRI.

## 2019-11-29 NOTE — PROGRESS NOTE ADULT - ASSESSMENT
74M PMHx of ESRD s/p failed renal transplant x2, HCV, DM, and meningococcal meningitis 2 years ago was sent in to the ED from HD for AMS and low BPs. Admitted with concern for CVA, neurology following. Patient with RRT overnight 11/27 resulting in transfer to MICU for respiratory failure concern for aspiration and sepsis. Palliative consulted for [facility]

## 2019-11-29 NOTE — PROGRESS NOTE ADULT - SUBJECTIVE AND OBJECTIVE BOX
Patient is a 74y Male whom presented to the hospital with esrd on hd   transfer to micu , seen in am   PAST MEDICAL & SURGICAL HISTORY:  Kidney transplant recipient  Anuria  GERD (gastroesophageal reflux disease)  Kidney transplant failure: dx: 2/2013  Hepatitis C: dx: 90&#x27;s.  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 10 milliGRAM(s) Oral three times a day  mycophenolate mofetil 250 milliGRAM(s) Oral two times a day  predniSONE   Tablet 5 milliGRAM(s) Oral daily  sevelamer carbonate 800 milliGRAM(s) Oral three times a day with meals  sodium bicarbonate 650 milliGRAM(s) Oral two times a day  tamsulosin 0.4 milliGRAM(s) Oral at bedtime      Allergies    No Known Allergies    Intolerances        SOCIAL HISTORY:  Denies ETOh,Smoking,     FAMILY HISTORY:  Family history of breast cancer in sister (Sibling): living at 94 yo  Family history of prostate cancer in father  Family history of lung cancer  Family history of hypertension in mother  Family history of diabetes mellitus: mother      REVIEW OF SYSTEMS:    unbable to obtained                             7.7    10.85 )-----------( 541 ( 29 Nov 2019 09:29 )             25.7       CBC Full  -  ( 29 Nov 2019 09:29 )  WBC Count : 10.85 K/uL  RBC Count : 2.93 M/uL  Hemoglobin : 7.7 g/dL  Hematocrit : 25.7 %  Platelet Count - Automated : 166 K/uL  Mean Cell Volume : 87.7 fl  Mean Cell Hemoglobin : 26.3 pg  Mean Cell Hemoglobin Concentration : 30.0 gm/dL  Auto Neutrophil # : 8.40 K/uL  Auto Lymphocyte # : 1.29 K/uL  Auto Monocyte # : 1.00 K/uL  Auto Eosinophil # : 0.03 K/uL  Auto Basophil # : 0.02 K/uL  Auto Neutrophil % : 77.4 %  Auto Lymphocyte % : 11.9 %  Auto Monocyte % : 9.2 %  Auto Eosinophil % : 0.3 %  Auto Basophil % : 0.2 %      11-29    132<L>  |  97  |  28<H>  ----------------------------<  116<H>  4.3   |  25  |  3.63<H>    Ca    10.2      29 Nov 2019 00:31  Phos  3.5     11-29  Mg     1.9     11-29    TPro  5.5<L>  /  Alb  2.0<L>  /  TBili  0.4  /  DBili  x   /  AST  9<L>  /  ALT  6<L>  /  AlkPhos  105  11-29      CAPILLARY BLOOD GLUCOSE      POCT Blood Glucose.: 86 mg/dL (29 Nov 2019 16:33)  POCT Blood Glucose.: 114 mg/dL (29 Nov 2019 11:25)  POCT Blood Glucose.: 136 mg/dL (29 Nov 2019 05:39)      Vital Signs Last 24 Hrs  T(C): 36.4 (29 Nov 2019 12:00), Max: 37 (29 Nov 2019 04:00)  T(F): 97.6 (29 Nov 2019 12:00), Max: 98.6 (29 Nov 2019 04:00)  HR: 95 (29 Nov 2019 16:00) (64 - 95)  BP: 148/74 (29 Nov 2019 16:00) (82/53 - 174/84)  BP(mean): 105 (29 Nov 2019 16:00) (62 - 120)  RR: 29 (29 Nov 2019 16:00) (14 - 38)  SpO2: 100% (29 Nov 2019 16:00) (100% - 100%)        PT/INR - ( 28 Nov 2019 00:57 )   PT: 12.8 sec;   INR: 1.12 ratio         PTT - ( 28 Nov 2019 00:57 )  PTT:33.8 sec                               HEENT: conjunctive   clear   Neck:  No JVD  Respiratory: decrease bs b/l   Cardiovascular: S1 and S2  Gastrointestinal: BS+, soft, NT/ND  Extremities: No peripheral edema  Skin: dry   Access: pos fistula

## 2019-11-29 NOTE — PROGRESS NOTE ADULT - PROBLEM SELECTOR PLAN 5
will f/u, likely 12/2, as need to identify all surrogates  will f/u with social work regarding above

## 2019-11-29 NOTE — PROGRESS NOTE ADULT - ATTENDING COMMENTS
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 Found to have CVA and EEG and LP negative for other etiologies of AMS. Pt yesterday tx to ICU for unresponsiveness, septic shock and resp failure requiring intubation. Off pressors for 24 h. Cont midodrine. f/u dale cx so far bcx and sputum cx negative. US shows lower lobe consolidations likely PNA. Cont empiric abx. d/c vanco as remains mrsa negative. Cont immunosuppression for renal tx. Pt following commands off sedation. DOing well on PS trials and extubated today. If MS does not improve further will send him for previously ordered MRI.

## 2019-11-30 LAB
ALBUMIN SERPL ELPH-MCNC: 2 G/DL — LOW (ref 3.3–5)
ALBUMIN SERPL ELPH-MCNC: 2.2 G/DL — LOW (ref 3.3–5)
ALP SERPL-CCNC: 100 U/L — SIGNIFICANT CHANGE UP (ref 40–120)
ALP SERPL-CCNC: 100 U/L — SIGNIFICANT CHANGE UP (ref 40–120)
ALT FLD-CCNC: 7 U/L — LOW (ref 10–45)
ALT FLD-CCNC: 9 U/L — LOW (ref 10–45)
ANION GAP SERPL CALC-SCNC: 11 MMOL/L — SIGNIFICANT CHANGE UP (ref 5–17)
ANION GAP SERPL CALC-SCNC: 13 MMOL/L — SIGNIFICANT CHANGE UP (ref 5–17)
AST SERPL-CCNC: 10 U/L — SIGNIFICANT CHANGE UP (ref 10–40)
AST SERPL-CCNC: 14 U/L — SIGNIFICANT CHANGE UP (ref 10–40)
BASE EXCESS BLDV CALC-SCNC: 6.5 MMOL/L — HIGH (ref -2–2)
BASOPHILS # BLD AUTO: 0.02 K/UL — SIGNIFICANT CHANGE UP (ref 0–0.2)
BASOPHILS # BLD AUTO: 0.03 K/UL — SIGNIFICANT CHANGE UP (ref 0–0.2)
BASOPHILS NFR BLD AUTO: 0.2 % — SIGNIFICANT CHANGE UP (ref 0–2)
BASOPHILS NFR BLD AUTO: 0.3 % — SIGNIFICANT CHANGE UP (ref 0–2)
BILIRUB SERPL-MCNC: 0.5 MG/DL — SIGNIFICANT CHANGE UP (ref 0.2–1.2)
BILIRUB SERPL-MCNC: 0.5 MG/DL — SIGNIFICANT CHANGE UP (ref 0.2–1.2)
BUN SERPL-MCNC: 16 MG/DL — SIGNIFICANT CHANGE UP (ref 7–23)
BUN SERPL-MCNC: 18 MG/DL — SIGNIFICANT CHANGE UP (ref 7–23)
C DIFF BY PCR RESULT: SIGNIFICANT CHANGE UP
C DIFF GDH STL QL: SIGNIFICANT CHANGE UP
C DIFF GDH STL QL: SIGNIFICANT CHANGE UP
C DIFF TOX GENS STL QL NAA+PROBE: SIGNIFICANT CHANGE UP
CA-I SERPL-SCNC: 1.2 MMOL/L — SIGNIFICANT CHANGE UP (ref 1.12–1.3)
CALCIUM SERPL-MCNC: 8.9 MG/DL — SIGNIFICANT CHANGE UP (ref 8.4–10.5)
CALCIUM SERPL-MCNC: 8.9 MG/DL — SIGNIFICANT CHANGE UP (ref 8.4–10.5)
CHLORIDE BLDV-SCNC: 102 MMOL/L — SIGNIFICANT CHANGE UP (ref 96–108)
CHLORIDE SERPL-SCNC: 98 MMOL/L — SIGNIFICANT CHANGE UP (ref 96–108)
CHLORIDE SERPL-SCNC: 98 MMOL/L — SIGNIFICANT CHANGE UP (ref 96–108)
CO2 BLDV-SCNC: 29 MMOL/L — SIGNIFICANT CHANGE UP (ref 22–30)
CO2 SERPL-SCNC: 25 MMOL/L — SIGNIFICANT CHANGE UP (ref 22–31)
CO2 SERPL-SCNC: 25 MMOL/L — SIGNIFICANT CHANGE UP (ref 22–31)
CREAT SERPL-MCNC: 2.4 MG/DL — HIGH (ref 0.5–1.3)
CREAT SERPL-MCNC: 2.62 MG/DL — HIGH (ref 0.5–1.3)
EOSINOPHIL # BLD AUTO: 0.01 K/UL — SIGNIFICANT CHANGE UP (ref 0–0.5)
EOSINOPHIL # BLD AUTO: 0.02 K/UL — SIGNIFICANT CHANGE UP (ref 0–0.5)
EOSINOPHIL NFR BLD AUTO: 0.1 % — SIGNIFICANT CHANGE UP (ref 0–6)
EOSINOPHIL NFR BLD AUTO: 0.2 % — SIGNIFICANT CHANGE UP (ref 0–6)
GAS PNL BLDV: 134 MMOL/L — LOW (ref 135–145)
GAS PNL BLDV: SIGNIFICANT CHANGE UP
GAS PNL BLDV: SIGNIFICANT CHANGE UP
GLUCOSE BLDC GLUCOMTR-MCNC: 111 MG/DL — HIGH (ref 70–99)
GLUCOSE BLDC GLUCOMTR-MCNC: 116 MG/DL — HIGH (ref 70–99)
GLUCOSE BLDC GLUCOMTR-MCNC: 144 MG/DL — HIGH (ref 70–99)
GLUCOSE BLDV-MCNC: 72 MG/DL — SIGNIFICANT CHANGE UP (ref 70–99)
GLUCOSE SERPL-MCNC: 105 MG/DL — HIGH (ref 70–99)
GLUCOSE SERPL-MCNC: 74 MG/DL — SIGNIFICANT CHANGE UP (ref 70–99)
HAPTOGLOB SERPL-MCNC: 311 MG/DL — HIGH (ref 34–200)
HCO3 BLDV-SCNC: 28 MMOL/L — SIGNIFICANT CHANGE UP (ref 21–29)
HCT VFR BLD CALC: 24.1 % — LOW (ref 39–50)
HCT VFR BLD CALC: 24.4 % — LOW (ref 39–50)
HCT VFR BLDA CALC: 24 % — LOW (ref 39–50)
HGB BLD CALC-MCNC: 7.9 G/DL — LOW (ref 13–17)
HGB BLD-MCNC: 7.6 G/DL — LOW (ref 13–17)
HGB BLD-MCNC: 7.6 G/DL — LOW (ref 13–17)
HOROWITZ INDEX BLDV+IHG-RTO: 21 — SIGNIFICANT CHANGE UP
IMM GRANULOCYTES NFR BLD AUTO: 0.9 % — SIGNIFICANT CHANGE UP (ref 0–1.5)
IMM GRANULOCYTES NFR BLD AUTO: 1 % — SIGNIFICANT CHANGE UP (ref 0–1.5)
LACTATE BLDV-MCNC: 1.5 MMOL/L — SIGNIFICANT CHANGE UP (ref 0.7–2)
LYMPHOCYTES # BLD AUTO: 1.62 K/UL — SIGNIFICANT CHANGE UP (ref 1–3.3)
LYMPHOCYTES # BLD AUTO: 1.86 K/UL — SIGNIFICANT CHANGE UP (ref 1–3.3)
LYMPHOCYTES # BLD AUTO: 14.6 % — SIGNIFICANT CHANGE UP (ref 13–44)
LYMPHOCYTES # BLD AUTO: 16.4 % — SIGNIFICANT CHANGE UP (ref 13–44)
MAGNESIUM SERPL-MCNC: 1.8 MG/DL — SIGNIFICANT CHANGE UP (ref 1.6–2.6)
MCHC RBC-ENTMCNC: 26.2 PG — LOW (ref 27–34)
MCHC RBC-ENTMCNC: 26.4 PG — LOW (ref 27–34)
MCHC RBC-ENTMCNC: 31.1 GM/DL — LOW (ref 32–36)
MCHC RBC-ENTMCNC: 31.5 GM/DL — LOW (ref 32–36)
MCV RBC AUTO: 83.1 FL — SIGNIFICANT CHANGE UP (ref 80–100)
MCV RBC AUTO: 84.7 FL — SIGNIFICANT CHANGE UP (ref 80–100)
MONOCYTES # BLD AUTO: 1.38 K/UL — HIGH (ref 0–0.9)
MONOCYTES # BLD AUTO: 1.43 K/UL — HIGH (ref 0–0.9)
MONOCYTES NFR BLD AUTO: 12.2 % — SIGNIFICANT CHANGE UP (ref 2–14)
MONOCYTES NFR BLD AUTO: 12.9 % — SIGNIFICANT CHANGE UP (ref 2–14)
NEUTROPHILS # BLD AUTO: 7.88 K/UL — HIGH (ref 1.8–7.4)
NEUTROPHILS # BLD AUTO: 7.95 K/UL — HIGH (ref 1.8–7.4)
NEUTROPHILS NFR BLD AUTO: 70.2 % — SIGNIFICANT CHANGE UP (ref 43–77)
NEUTROPHILS NFR BLD AUTO: 71 % — SIGNIFICANT CHANGE UP (ref 43–77)
NRBC # BLD: 0 /100 WBCS — SIGNIFICANT CHANGE UP (ref 0–0)
NRBC # BLD: 0 /100 WBCS — SIGNIFICANT CHANGE UP (ref 0–0)
OTHER CELLS CSF MANUAL: 7 ML/DL — LOW (ref 18–22)
PCO2 BLDV: 29 MMHG — LOW (ref 35–50)
PH BLDV: 7.59 — HIGH (ref 7.35–7.45)
PHOSPHATE SERPL-MCNC: 1.6 MG/DL — LOW (ref 2.5–4.5)
PLATELET # BLD AUTO: 191 K/UL — SIGNIFICANT CHANGE UP (ref 150–400)
PLATELET # BLD AUTO: 209 K/UL — SIGNIFICANT CHANGE UP (ref 150–400)
PO2 BLDV: 31 MMHG — SIGNIFICANT CHANGE UP (ref 25–45)
POTASSIUM BLDV-SCNC: 3.2 MMOL/L — LOW (ref 3.5–5.3)
POTASSIUM SERPL-MCNC: 3.3 MMOL/L — LOW (ref 3.5–5.3)
POTASSIUM SERPL-MCNC: 3.5 MMOL/L — SIGNIFICANT CHANGE UP (ref 3.5–5.3)
POTASSIUM SERPL-SCNC: 3.3 MMOL/L — LOW (ref 3.5–5.3)
POTASSIUM SERPL-SCNC: 3.5 MMOL/L — SIGNIFICANT CHANGE UP (ref 3.5–5.3)
PROT SERPL-MCNC: 5.6 G/DL — LOW (ref 6–8.3)
PROT SERPL-MCNC: 5.7 G/DL — LOW (ref 6–8.3)
RBC # BLD: 2.88 M/UL — LOW (ref 4.2–5.8)
RBC # BLD: 2.9 M/UL — LOW (ref 4.2–5.8)
RBC # FLD: 15.7 % — HIGH (ref 10.3–14.5)
RBC # FLD: 15.8 % — HIGH (ref 10.3–14.5)
SAO2 % BLDV: 64 % — LOW (ref 67–88)
SODIUM SERPL-SCNC: 134 MMOL/L — LOW (ref 135–145)
SODIUM SERPL-SCNC: 136 MMOL/L — SIGNIFICANT CHANGE UP (ref 135–145)
WBC # BLD: 11.09 K/UL — HIGH (ref 3.8–10.5)
WBC # BLD: 11.32 K/UL — HIGH (ref 3.8–10.5)
WBC # FLD AUTO: 11.09 K/UL — HIGH (ref 3.8–10.5)
WBC # FLD AUTO: 11.32 K/UL — HIGH (ref 3.8–10.5)

## 2019-11-30 PROCEDURE — 71045 X-RAY EXAM CHEST 1 VIEW: CPT | Mod: 26

## 2019-11-30 PROCEDURE — 99291 CRITICAL CARE FIRST HOUR: CPT

## 2019-11-30 RX ORDER — HEPARIN SODIUM 5000 [USP'U]/ML
5000 INJECTION INTRAVENOUS; SUBCUTANEOUS EVERY 8 HOURS
Refills: 0 | Status: DISCONTINUED | OUTPATIENT
Start: 2019-11-30 | End: 2019-12-21

## 2019-11-30 RX ORDER — ERYTHROPOIETIN 10000 [IU]/ML
10000 INJECTION, SOLUTION INTRAVENOUS; SUBCUTANEOUS
Refills: 0 | Status: DISCONTINUED | OUTPATIENT
Start: 2019-11-30 | End: 2019-12-21

## 2019-11-30 RX ORDER — POTASSIUM CHLORIDE 20 MEQ
10 PACKET (EA) ORAL ONCE
Refills: 0 | Status: COMPLETED | OUTPATIENT
Start: 2019-11-30 | End: 2019-11-30

## 2019-11-30 RX ORDER — POTASSIUM CHLORIDE 20 MEQ
20 PACKET (EA) ORAL ONCE
Refills: 0 | Status: COMPLETED | OUTPATIENT
Start: 2019-11-30 | End: 2019-11-30

## 2019-11-30 RX ADMIN — CHLORHEXIDINE GLUCONATE 1 APPLICATION(S): 213 SOLUTION TOPICAL at 05:49

## 2019-11-30 RX ADMIN — MIDODRINE HYDROCHLORIDE 10 MILLIGRAM(S): 2.5 TABLET ORAL at 11:02

## 2019-11-30 RX ADMIN — Medication 20 MILLIEQUIVALENT(S): at 06:13

## 2019-11-30 RX ADMIN — Medication 20 MILLILITER(S): at 00:35

## 2019-11-30 RX ADMIN — Medication 83.33 MILLIMOLE(S): at 03:47

## 2019-11-30 RX ADMIN — Medication 5 MILLIGRAM(S): at 05:02

## 2019-11-30 RX ADMIN — LEVETIRACETAM 400 MILLIGRAM(S): 250 TABLET, FILM COATED ORAL at 04:27

## 2019-11-30 RX ADMIN — PANTOPRAZOLE SODIUM 40 MILLIGRAM(S): 20 TABLET, DELAYED RELEASE ORAL at 17:10

## 2019-11-30 RX ADMIN — PANTOPRAZOLE SODIUM 40 MILLIGRAM(S): 20 TABLET, DELAYED RELEASE ORAL at 05:02

## 2019-11-30 RX ADMIN — MIDODRINE HYDROCHLORIDE 10 MILLIGRAM(S): 2.5 TABLET ORAL at 05:02

## 2019-11-30 RX ADMIN — LEVETIRACETAM 400 MILLIGRAM(S): 250 TABLET, FILM COATED ORAL at 16:30

## 2019-11-30 RX ADMIN — Medication 100 MILLIEQUIVALENT(S): at 00:43

## 2019-11-30 RX ADMIN — HEPARIN SODIUM 5000 UNIT(S): 5000 INJECTION INTRAVENOUS; SUBCUTANEOUS at 21:51

## 2019-11-30 RX ADMIN — Medication 100 MILLIGRAM(S): at 11:02

## 2019-11-30 RX ADMIN — MIDODRINE HYDROCHLORIDE 10 MILLIGRAM(S): 2.5 TABLET ORAL at 16:30

## 2019-11-30 RX ADMIN — ATORVASTATIN CALCIUM 40 MILLIGRAM(S): 80 TABLET, FILM COATED ORAL at 21:51

## 2019-11-30 RX ADMIN — MIDODRINE HYDROCHLORIDE 10 MILLIGRAM(S): 2.5 TABLET ORAL at 00:01

## 2019-11-30 NOTE — PROGRESS NOTE ADULT - ASSESSMENT
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       NEURO:  - now intubated, presented with AMS  - metabolic encephalopathy, meningitis r/o  - neuro following  - chronic lacunar infarcts; per neuro, AMS also likely related to hypercalcemia, renal dysfunction , poor nutritional intake.   - c/w keppra for ?hx of seizure disorder  -AMS likely 2/2 hyperCa, renal dysfunction, poor PO intake  - thiamine IVPB daily x 3 days  - plan for extubation today if tolerated.    CV:  - hx of afib was on eliquis, now off a/c per cards due to bleeding risk  - holding carvedilol 6.25 BID  - hx of cocaine cardiomyopathy, resolved, normal EF on most recent echo  - c/w statin  - trop 134 -- trended down.   - hypotensive during RRT 11/26; started levo gtt, c/w midodrine, off levo  - echo repeat EF70, LVH, hyperdynamic LCF, normal RV sys fxn    PULM:  - now intubated; plan for extubation today 11/29  - monitor for improvement for eventual extubation    GI:  - NG tube in place  - tube feeds ordered  - ?bleeding as hgb dropping. stool occult negative. no melena reported.  - pt with diarrhea, Cdif sent      RENAL:  - hx of ESRD s/p failed renal transplant x2  - renal following, HD as recommended.  - c/w predisone, sevelamer. hold cellcept for now given likely septic state; monitor and d/w renal when to restart cellcept  - has dialysis catheter in place; HD 11/27, 11/29 tolerated wel    ENDO:  #DM2: HbA1c 5.5  - Start Insulin Sliding Scale  - endocrine following  - presented hypercalcemic, downtrending. hold pamidronate for now. f/u with endo    METABOLIC:  - hypoNa; monitor and f/u with HD schedule  - initially hyperCa, improving. hold pamidronate for now, follow up with endocrine    HEMATOLOGIC:  - hgb dropped to 6.5, unclear source of blood loss. s/p 1 unit PRBC 7.7 hgb. continue to monitor, consider imaging. stool guaiac ordered.   - pt was on eliquis; has been held.       INFECTIOUS:  - WBC downtrending. ID following  - RRT likely 2/2 septic shock, possible aspiration, UTI, has decubiti ulcers as well.  - Urine culture growing E.coli; blood culture negative  - was given empiric CNS infx coverage - vanc, manoj, acyclovir, LP CSF negative  - per ID, resart meropenem 500 IV q24 for 7 days total. dc vanc.   - f/u sputum culture - ngtd  - wound care consulted appreciate recs    PPX: SCDs  - home eliquis has been held while in septic state. can add back when tolerated    GOC:  - palliative consult.  - discussed w/ sister who is [employer] employee. 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       NEURO:  - now intubated, presented with AMS  - metabolic encephalopathy, meningitis r/o  - neuro following  - chronic lacunar infarcts; per neuro, AMS also likely related to hypercalcemia, renal dysfunction , poor nutritional intake.   - c/w keppra for ?hx of seizure disorder  -AMS likely 2/2 hyperCa, renal dysfunction, poor PO intake  - thiamine IVPB daily x 3 days  - plan for extubation today if tolerated.    CV:  - hx of afib was on eliquis, now off a/c per cards due to bleeding risk  - holding carvedilol 6.25 BID  - hx of cocaine cardiomyopathy, resolved, normal EF on most recent echo  - c/w statin  - trop 134 -- trended down.   - hypotensive during RRT 11/26; started levo gtt, c/w midodrine, off levo  - echo repeat EF70, LVH, hyperdynamic LCF, normal RV sys fxn    PULM:  - now intubated; plan for extubation today 11/29  - monitor for improvement for eventual extubation    GI:  - NG tube in place  - tube feeds ordered  - ?bleeding as hgb dropping. stool occult negative. no melena reported.  - pt with diarrhea, Cdif sent      RENAL:  - hx of ESRD s/p failed renal transplant x2  - renal following, HD as recommended.  - c/w predisone, sevelamer. hold cellcept for now given likely septic state; monitor and d/w renal when to restart cellcept  - has dialysis catheter in place; HD 11/27, 11/29 tolerated wel    ENDO:  #DM2: HbA1c 5.5  - Start Insulin Sliding Scale  - endocrine following  - presented hypercalcemic, downtrending. hold pamidronate for now. f/u with endo    METABOLIC:  - hypoNa; monitor and f/u with HD schedule  - initially hyperCa, improving. hold pamidronate for now, follow up with endocrine    HEMATOLOGIC:  - hgb dropped to 6.5, unclear source of blood loss. s/p 1 unit PRBC 7.7 hgb. continue to monitor, consider imaging. stool guaiac ordered.   - pt was on eliquis; has been held.       INFECTIOUS:  - WBC downtrending. ID following  - RRT likely 2/2 septic shock, possible aspiration, UTI, has decubiti ulcers as well.  - Urine culture growing E.coli; blood culture negative  - was given empiric CNS infx coverage - vanc, manoj, acyclovir, LP CSF negative  - per ID, resart meropenem 500 IV q24 for 7 days total. dc vanc.   - f/u sputum culture - ngtd  - wound care consulted appreciate recs  - pt with diarrhea; cdiff indeterminate at this time, will f/u     PPX: SCDs  - home eliquis has been held while in septic state. can add back when tolerated    GOC:  - palliative consult.  - discussed w/ sister who is [employer] employee. Made aware of status and will update

## 2019-11-30 NOTE — PROGRESS NOTE ADULT - SUBJECTIVE AND OBJECTIVE BOX
Chief complaint  Patient is a 74y old  Male who presents with a chief complaint of ams (30 Nov 2019 16:15)   Review of systems  Patient in bed, looks comfortable, no fever, no hypoglycemia.    Labs and Fingersticks  CAPILLARY BLOOD GLUCOSE      POCT Blood Glucose.: 116 mg/dL (30 Nov 2019 17:07)  POCT Blood Glucose.: 144 mg/dL (30 Nov 2019 11:09)      Anion Gap, Serum: 11 (11-30 @ 05:19)  Anion Gap, Serum: 13 (11-30 @ 00:32)  Anion Gap, Serum: 10 (11-29 @ 00:31)      Calcium, Total Serum: 8.9 (11-30 @ 05:19)  Calcium, Total Serum: 8.9 (11-30 @ 00:32)  Calcium, Total Serum: 10.2 (11-29 @ 00:31)  Albumin, Serum: 2.2 <L> (11-30 @ 05:19)  Albumin, Serum: 2.0 <L> (11-30 @ 00:32)  Albumin, Serum: 2.0 <L> (11-29 @ 00:31)    Alanine Aminotransferase (ALT/SGPT): 7 <L> (11-30 @ 05:19)  Alanine Aminotransferase (ALT/SGPT): 9 <L> (11-30 @ 00:32)  Alanine Aminotransferase (ALT/SGPT): 6 <L> (11-29 @ 00:31)  Alkaline Phosphatase, Serum: 100 (11-30 @ 05:19)  Alkaline Phosphatase, Serum: 100 (11-30 @ 00:32)  Alkaline Phosphatase, Serum: 105 (11-29 @ 00:31)  Aspartate Aminotransferase (AST/SGOT): 14 (11-30 @ 05:19)  Aspartate Aminotransferase (AST/SGOT): 10 (11-30 @ 00:32)  Aspartate Aminotransferase (AST/SGOT): 9 <L> (11-29 @ 00:31)        11-30    134<L>  |  98  |  18  ----------------------------<  105<H>  3.3<L>   |  25  |  2.62<H>    Ca    8.9      30 Nov 2019 05:19  Phos  1.6     11-30  Mg     1.8     11-30    TPro  5.6<L>  /  Alb  2.2<L>  /  TBili  0.5  /  DBili  x   /  AST  14  /  ALT  7<L>  /  AlkPhos  100  11-30                        7.6    11.09 )-----------( 191      ( 30 Nov 2019 05:19 )             24.4     Medications  MEDICATIONS  (STANDING):  atorvastatin 40 milliGRAM(s) Oral at bedtime  chlorhexidine 4% Liquid 1 Application(s) Topical <User Schedule>  epoetin tan Injectable 77124 Unit(s) IV Push <User Schedule>  heparin  Injectable 5000 Unit(s) SubCutaneous every 8 hours  insulin lispro (HumaLOG) corrective regimen sliding scale   SubCutaneous every 6 hours  levETIRAcetam  IVPB 1000 milliGRAM(s) IV Intermittent every 12 hours  midodrine. 10 milliGRAM(s) Oral three times a day  pantoprazole  Injectable 40 milliGRAM(s) IV Push two times a day  predniSONE   Tablet 5 milliGRAM(s) Oral daily      Physical Exam  General: Patient comfortable in bed  Vital Signs Last 12 Hrs  T(F): 99.5 (11-30-19 @ 20:00), Max: 99.5 (11-30-19 @ 20:00)  HR: 104 (11-30-19 @ 20:00) (87 - 104)  BP: 118/62 (11-30-19 @ 20:00) (99/57 - 131/73)  BP(mean): 85 (11-30-19 @ 20:00) (72 - 96)  RR: 26 (11-30-19 @ 20:00) (16 - 26)  SpO2: 100% (11-30-19 @ 20:00) (100% - 100%)  Neck: No palpable thyroid nodules.  CVS: S1S2, No murmurs  Respiratory: No wheezing, no crepitations  GI: Abdomen soft, bowel sounds positive  Musculoskeletal:  edema lower extremities.   Skin: No skin rashes, no ecchymosis    Diagnostics

## 2019-11-30 NOTE — PROGRESS NOTE ADULT - ASSESSMENT
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       NEURO:  - now intubated, presented with AMS  - metabolic encephalopathy, meningitis r/o  - neuro following  - chronic lacunar infarcts; per neuro, AMS also likely related to hypercalcemia, renal dysfunction , poor nutritional intake.   - c/w keppra for ?hx of seizure disorder  -AMS likely 2/2 hyperCa, renal dysfunction, poor PO intake  - thiamine IVPB daily x 3 days  - plan for extubation today if tolerated.    CV:  - hx of afib was on eliquis, now off a/c per cards due to bleeding risk  - holding carvedilol 6.25 BID  - hx of cocaine cardiomyopathy, resolved, normal EF on most recent echo  - c/w statin  - trop 134 -- trended down.   - hypotensive during RRT 11/26; started levo gtt, c/w midodrine, off levo  - echo repeat EF70, LVH, hyperdynamic LCF, normal RV sys fxn    PULM:  - now intubated; plan for extubation today 11/29  - monitor for improvement for eventual extubation    GI:  - NG tube in place  - tube feeds ordered  - ?bleeding as hgb dropping. stool occult negative. no melena reported.  - pt with diarrhea, Cdif sent      RENAL:  - hx of ESRD s/p failed renal transplant x2  - renal following, HD as recommended.  - c/w predisone, sevelamer. hold cellcept for now given likely septic state; monitor and d/w renal when to restart cellcept  - has dialysis catheter in place; HD 11/27, 11/29 tolerated wel    ENDO:  #DM2: HbA1c 5.5  - Start Insulin Sliding Scale  - endocrine following  - presented hypercalcemic, downtrending. hold pamidronate for now. f/u with endo    METABOLIC:  - hypoNa; monitor and f/u with HD schedule  - initially hyperCa, improving. hold pamidronate for now, follow up with endocrine    HEMATOLOGIC:  - hgb dropped to 6.5, unclear source of blood loss. s/p 1 unit PRBC 7.7 hgb. continue to monitor, consider imaging. stool guaiac ordered.   - pt was on eliquis; has been held.       INFECTIOUS:  - WBC downtrending. ID following  - RRT likely 2/2 septic shock, possible aspiration, UTI, has decubiti ulcers as well.  - Urine culture growing E.coli; blood culture negative  - was given empiric CNS infx coverage - vanc, manoj, acyclovir, LP CSF negative  - per ID, resart meropenem 500 IV q24 for 7 days total. dc vanc.   - f/u sputum culture - ngtd  - wound care consulted appreciate recs  - pt with diarrhea; cdiff indeterminate at this time, will f/u     PPX: SCDs  - home eliquis has been held while in septic state. can add back when tolerated    GOC:  - palliative consult.  plan as per ICU

## 2019-11-30 NOTE — CHART NOTE - NSCHARTNOTEFT_GEN_A_CORE
Patient Cdif toxin indeterminate, GDH toxin positive, however Cdif PCR negative. Does not need contact precautions given negative Cdif PCR result.      Jonathan Fields MD  PGY-1 | Internal Medicine  [phone] / [phone]

## 2019-11-30 NOTE — PROGRESS NOTE ADULT - ASSESSMENT
Assessment  DM: A1C 5.5%, was on insulin at home, now on insulin low-dose coverage, blood sugars improved, patient is still NPO on tube feeds, in MICU.  Hypercalcemia: Primary Hyperparathyroidism, calcium improved s/p Pamidronate dose.  Sepsis: IV ABx for UTI, LP inconsistent with meningitis, on medications, stable, monitored.  HTN: Controlled,  on antihypertensive medications.  ESRD: On hemodialysis, Monitor labs/BMP          Robi Gay MD  Cell: [phone]  Office: [phone]

## 2019-11-30 NOTE — PROGRESS NOTE ADULT - SUBJECTIVE AND OBJECTIVE BOX
Jonathan Fields  PGY1 | Internal Medicine  [id number] / [phone]    INTERVAL HPI/OVERNIGHT EVENTS: ON d10 dc'd. Low SBPs 70s/40s, given 250 cc bolus and given missed midodrine dose, since responded. Tried to pullout NG tube, had to be reinserted. Cdiff sent.    SUBJECTIVE: Patient seen and examined at bedside. Extubated, on RA, following some commands however not answering many questions, speech difficult to understand. Able to say his name, denied complaints by nodding.     otherwise unable to assess ROS    OBJECTIVE:    VITAL SIGNS:  ICU Vital Signs Last 24 Hrs  T(C): 37.7 (30 Nov 2019 04:00), Max: 37.7 (29 Nov 2019 20:00)  T(F): 99.9 (30 Nov 2019 04:00), Max: 99.9 (30 Nov 2019 04:00)  HR: 94 (30 Nov 2019 07:00) (73 - 114)  BP: 112/60 (30 Nov 2019 07:00) (71/48 - 174/84)  BP(mean): 81 (30 Nov 2019 07:00) (53 - 120)  ABP: --  ABP(mean): --  RR: 21 (30 Nov 2019 07:00) (11 - 31)  SpO2: 100% (30 Nov 2019 07:00) (96% - 100%)    Mode: CPAP with PS, FiO2: 30, PEEP: 5, PS: 5, MAP: 8, PIP: 11    11-29 @ 07:01  -  11-30 @ 07:00  --------------------------------------------------------  IN: 2289 mL / OUT: 2800 mL / NET: -511 mL    11-30 @ 07:01  -  11-30 @ 08:00  --------------------------------------------------------  IN: 113 mL / OUT: 0 mL / NET: 113 mL      CAPILLARY BLOOD GLUCOSE      POCT Blood Glucose.: 86 mg/dL (29 Nov 2019 16:33)      PHYSICAL EXAM:    GENERAL: thin, NAD   EYES: EOMI, PERRLA  NECK: Supple, No JVD  CHEST/LUNG: some DC bs noted  HEART: rr, regular rhythm; No murmurs, rubs, or gallops  ABDOMEN: Soft, Nontender, Nondistended; Bowel sounds present  EXTREMITIES:  2+ Peripheral Pulses, No clubbing, cyanosis, or edema  LYMPH: No lymphadenopathy noted  SKIN: No rashes or lesions  NERVOUS SYSTEM:  extubated now; BURTON, following some commands. AOx1    MEDICATIONS  (STANDING):  atorvastatin 40 milliGRAM(s) Oral at bedtime  chlorhexidine 4% Liquid 1 Application(s) Topical <User Schedule>  insulin lispro (HumaLOG) corrective regimen sliding scale   SubCutaneous every 6 hours  levETIRAcetam  IVPB 1000 milliGRAM(s) IV Intermittent every 12 hours  meropenem  IVPB 500 milliGRAM(s) IV Intermittent every 24 hours  meropenem  IVPB      midodrine. 10 milliGRAM(s) Oral three times a day  pantoprazole  Injectable 40 milliGRAM(s) IV Push two times a day  predniSONE   Tablet 5 milliGRAM(s) Oral daily  thiamine 100 milliGRAM(s) Oral daily    MEDICATIONS  (PRN):    ALLERGIES:  Allergies    No Known Allergies    Intolerances        LABS:                        7.6    11.09 )-----------( 191      ( 30 Nov 2019 05:19 )             24.4     11-30    134<L>  |  98  |  18  ----------------------------<  105<H>  3.3<L>   |  25  |  2.62<H>    Ca    8.9      30 Nov 2019 05:19  Phos  1.6     11-30  Mg     1.8     11-30    TPro  5.6<L>  /  Alb  2.2<L>  /  TBili  0.5  /  DBili  x   /  AST  14  /  ALT  7<L>  /  AlkPhos  100  11-30    C. difficile GDH &amp; toxins A/B by EIA . (11.30.19 @ 02:49)    Clostridium difficile GDH Toxins A&amp;B, EIA:   Indeterminate    Clostridium difficile GDH Interpretation: This specimen is positive for C. Difficile glutamate dehydrogenase (GDH)  antigen and negative for C. Difficile Toxins A & B, by EIA.  GDH is a  highly sensitive screening marker for C. Difficile that is produced in  large amounts by all C. Difficilestrains, both toxigenic and  nontoxigenic.  Specimens that are GDH positive and toxin negative will be  tested further by PCR to detect toxin gene sequences associated with  toxin producing C. Difficle.        Haptoglobin, Serum (11.29.19 @ 23:50)    Haptoglobin, Serum: 311 mg/dL    Lactate Dehydrogenase, Serum (11.29.19 @ 20:40)    Lactate Dehydrogenase, Serum: 188 U/L      RADIOLOGY & ADDITIONAL TESTS: Reviewed. Jonathan Fields  PGY1 | Internal Medicine  [id number] / [phone]    INTERVAL HPI/OVERNIGHT EVENTS: ON d10 dc'd. Low SBPs 70s/40s, given 250 cc bolus and given missed midodrine dose, since responded. Tried to pullout NG tube, had to be reinserted. Cdiff sent.    SUBJECTIVE: Patient seen and examined at bedside. Extubated, on RA, following some commands however not answering many questions, speech difficult to understand. Able to say his name, denied complaints by nodding.     otherwise unable to assess ROS    OBJECTIVE:    VITAL SIGNS:  ICU Vital Signs Last 24 Hrs  T(C): 37.7 (30 Nov 2019 04:00), Max: 37.7 (29 Nov 2019 20:00)  T(F): 99.9 (30 Nov 2019 04:00), Max: 99.9 (30 Nov 2019 04:00)  HR: 94 (30 Nov 2019 07:00) (73 - 114)  BP: 112/60 (30 Nov 2019 07:00) (71/48 - 174/84)  BP(mean): 81 (30 Nov 2019 07:00) (53 - 120)  ABP: --  ABP(mean): --  RR: 21 (30 Nov 2019 07:00) (11 - 31)  SpO2: 100% (30 Nov 2019 07:00) (96% - 100%)    Mode: CPAP with PS, FiO2: 30, PEEP: 5, PS: 5, MAP: 8, PIP: 11    11-29 @ 07:01  -  11-30 @ 07:00  --------------------------------------------------------  IN: 2289 mL / OUT: 2800 mL / NET: -511 mL    11-30 @ 07:01  -  11-30 @ 08:00  --------------------------------------------------------  IN: 113 mL / OUT: 0 mL / NET: 113 mL      CAPILLARY BLOOD GLUCOSE      POCT Blood Glucose.: 86 mg/dL (29 Nov 2019 16:33)      PHYSICAL EXAM:    GENERAL: thin, NAD   EYES: EOMI, PERRLA  NECK: Supple, No JVD  CHEST/LUNG: some DC bs noted  HEART: rr, regular rhythm; No murmurs, rubs, or gallops  ABDOMEN: Soft, Nontender, Nondistended; Bowel sounds present  EXTREMITIES:  2+ Peripheral Pulses, No clubbing, cyanosis, or edema  LYMPH: No lymphadenopathy noted  SKIN: sacral ulcer unstageable, likely contaminated w/ stool  NERVOUS SYSTEM:  extubated now; BURTON, following some commands. AOx1    MEDICATIONS  (STANDING):  atorvastatin 40 milliGRAM(s) Oral at bedtime  chlorhexidine 4% Liquid 1 Application(s) Topical <User Schedule>  insulin lispro (HumaLOG) corrective regimen sliding scale   SubCutaneous every 6 hours  levETIRAcetam  IVPB 1000 milliGRAM(s) IV Intermittent every 12 hours  meropenem  IVPB 500 milliGRAM(s) IV Intermittent every 24 hours  meropenem  IVPB      midodrine. 10 milliGRAM(s) Oral three times a day  pantoprazole  Injectable 40 milliGRAM(s) IV Push two times a day  predniSONE   Tablet 5 milliGRAM(s) Oral daily  thiamine 100 milliGRAM(s) Oral daily    MEDICATIONS  (PRN):    ALLERGIES:  Allergies    No Known Allergies    Intolerances        LABS:                        7.6    11.09 )-----------( 191      ( 30 Nov 2019 05:19 )             24.4     11-30    134<L>  |  98  |  18  ----------------------------<  105<H>  3.3<L>   |  25  |  2.62<H>    Ca    8.9      30 Nov 2019 05:19  Phos  1.6     11-30  Mg     1.8     11-30    TPro  5.6<L>  /  Alb  2.2<L>  /  TBili  0.5  /  DBili  x   /  AST  14  /  ALT  7<L>  /  AlkPhos  100  11-30    C. difficile GDH &amp; toxins A/B by EIA . (11.30.19 @ 02:49)    Clostridium difficile GDH Toxins A&amp;B, EIA:   Indeterminate    Clostridium difficile GDH Interpretation: This specimen is positive for C. Difficile glutamate dehydrogenase (GDH)  antigen and negative for C. Difficile Toxins A & B, by EIA.  GDH is a  highly sensitive screening marker for C. Difficile that is produced in  large amounts by all C. Difficilestrains, both toxigenic and  nontoxigenic.  Specimens that are GDH positive and toxin negative will be  tested further by PCR to detect toxin gene sequences associated with  toxin producing C. Difficle.        Haptoglobin, Serum (11.29.19 @ 23:50)    Haptoglobin, Serum: 311 mg/dL    Lactate Dehydrogenase, Serum (11.29.19 @ 20:40)    Lactate Dehydrogenase, Serum: 188 U/L      RADIOLOGY & ADDITIONAL TESTS: Reviewed. Jonathan Fields  PGY1 | Internal Medicine  [phone] / [phone]    INTERVAL HPI/OVERNIGHT EVENTS: ON d10 dc'd. Low SBPs 70s/40s, given 250 cc bolus and given missed midodrine dose, since responded. Tried to pullout NG tube, had to be reinserted. Cdiff sent.    SUBJECTIVE: Patient seen and examined at bedside. Extubated, on RA, following some commands however not answering many questions, speech difficult to understand. Able to say his name, denied complaints by nodding.     otherwise unable to assess ROS    OBJECTIVE:    VITAL SIGNS:  ICU Vital Signs Last 24 Hrs  T(C): 37.7 (30 Nov 2019 04:00), Max: 37.7 (29 Nov 2019 20:00)  T(F): 99.9 (30 Nov 2019 04:00), Max: 99.9 (30 Nov 2019 04:00)  HR: 94 (30 Nov 2019 07:00) (73 - 114)  BP: 112/60 (30 Nov 2019 07:00) (71/48 - 174/84)  BP(mean): 81 (30 Nov 2019 07:00) (53 - 120)  ABP: --  ABP(mean): --  RR: 21 (30 Nov 2019 07:00) (11 - 31)  SpO2: 100% (30 Nov 2019 07:00) (96% - 100%)    Mode: CPAP with PS, FiO2: 30, PEEP: 5, PS: 5, MAP: 8, PIP: 11    11-29 @ 07:01  -  11-30 @ 07:00  --------------------------------------------------------  IN: 2289 mL / OUT: 2800 mL / NET: -511 mL    11-30 @ 07:01  -  11-30 @ 08:00  --------------------------------------------------------  IN: 113 mL / OUT: 0 mL / NET: 113 mL      CAPILLARY BLOOD GLUCOSE      POCT Blood Glucose.: 86 mg/dL (29 Nov 2019 16:33)      PHYSICAL EXAM:    GENERAL: thin, NAD   EYES: EOMI, PERRLA  NECK: Supple, No JVD  CHEST/LUNG: some DC bs noted  HEART: rr, regular rhythm; No murmurs, rubs, or gallops  ABDOMEN: Soft, Nontender, Nondistended; Bowel sounds present  EXTREMITIES:  2+ Peripheral Pulses, No clubbing, cyanosis, or edema  LYMPH: No lymphadenopathy noted  SKIN: sacral ulcer unstageable, likely contaminated w/ stool  NERVOUS SYSTEM:  extubated now; BURTON, following some commands. AOx1; r facial droop    MEDICATIONS  (STANDING):  atorvastatin 40 milliGRAM(s) Oral at bedtime  chlorhexidine 4% Liquid 1 Application(s) Topical <User Schedule>  insulin lispro (HumaLOG) corrective regimen sliding scale   SubCutaneous every 6 hours  levETIRAcetam  IVPB 1000 milliGRAM(s) IV Intermittent every 12 hours  meropenem  IVPB 500 milliGRAM(s) IV Intermittent every 24 hours  meropenem  IVPB      midodrine. 10 milliGRAM(s) Oral three times a day  pantoprazole  Injectable 40 milliGRAM(s) IV Push two times a day  predniSONE   Tablet 5 milliGRAM(s) Oral daily  thiamine 100 milliGRAM(s) Oral daily    MEDICATIONS  (PRN):    ALLERGIES:  Allergies    No Known Allergies    Intolerances        LABS:                        7.6    11.09 )-----------( 191      ( 30 Nov 2019 05:19 )             24.4     11-30    134<L>  |  98  |  18  ----------------------------<  105<H>  3.3<L>   |  25  |  2.62<H>    Ca    8.9      30 Nov 2019 05:19  Phos  1.6     11-30  Mg     1.8     11-30    TPro  5.6<L>  /  Alb  2.2<L>  /  TBili  0.5  /  DBili  x   /  AST  14  /  ALT  7<L>  /  AlkPhos  100  11-30    C. difficile GDH &amp; toxins A/B by EIA . (11.30.19 @ 02:49)    Clostridium difficile GDH Toxins A&amp;B, EIA:   Indeterminate    Clostridium difficile GDH Interpretation: This specimen is positive for C. Difficile glutamate dehydrogenase (GDH)  antigen and negative for C. Difficile Toxins A & B, by EIA.  GDH is a  highly sensitive screening marker for C. Difficile that is produced in  large amounts by all C. Difficilestrains, both toxigenic and  nontoxigenic.  Specimens that are GDH positive and toxin negative will be  tested further by PCR to detect toxin gene sequences associated with  toxin producing C. Difficle.        Haptoglobin, Serum (11.29.19 @ 23:50)    Haptoglobin, Serum: 311 mg/dL    Lactate Dehydrogenase, Serum (11.29.19 @ 20:40)    Lactate Dehydrogenase, Serum: 188 U/L      RADIOLOGY & ADDITIONAL TESTS: Reviewed.

## 2019-11-30 NOTE — PROGRESS NOTE ADULT - SUBJECTIVE AND OBJECTIVE BOX
Patient is a 74y old  Male who presents with a chief complaint of ams (30 Nov 2019 07:59)      SUBJECTIVE / OVERNIGHT EVENTS: NAD    MEDICATIONS  (STANDING):  atorvastatin 40 milliGRAM(s) Oral at bedtime  chlorhexidine 4% Liquid 1 Application(s) Topical <User Schedule>  insulin lispro (HumaLOG) corrective regimen sliding scale   SubCutaneous every 6 hours  levETIRAcetam  IVPB 1000 milliGRAM(s) IV Intermittent every 12 hours  meropenem  IVPB 500 milliGRAM(s) IV Intermittent every 24 hours  meropenem  IVPB      midodrine. 10 milliGRAM(s) Oral three times a day  pantoprazole  Injectable 40 milliGRAM(s) IV Push two times a day  predniSONE   Tablet 5 milliGRAM(s) Oral daily  thiamine 100 milliGRAM(s) Oral daily    MEDICATIONS  (PRN):      Vital Signs Last 24 Hrs  T(C): 36.4 (30 Nov 2019 08:00), Max: 37.7 (29 Nov 2019 20:00)  T(F): 97.5 (30 Nov 2019 08:00), Max: 99.9 (30 Nov 2019 04:00)  HR: 92 (30 Nov 2019 11:00) (86 - 114)  BP: 99/57 (30 Nov 2019 11:00) (71/48 - 174/84)  BP(mean): 72 (30 Nov 2019 11:00) (53 - 120)  RR: 22 (30 Nov 2019 11:00) (11 - 31)  SpO2: 100% (30 Nov 2019 11:00) (96% - 100%)  CAPILLARY BLOOD GLUCOSE      POCT Blood Glucose.: 144 mg/dL (30 Nov 2019 11:09)  POCT Blood Glucose.: 86 mg/dL (29 Nov 2019 16:33)    I&O's Summary    29 Nov 2019 07:01  -  30 Nov 2019 07:00  --------------------------------------------------------  IN: 2289 mL / OUT: 2800 mL / NET: -511 mL    30 Nov 2019 07:01  -  30 Nov 2019 11:51  --------------------------------------------------------  IN: 226 mL / OUT: 0 mL / NET: 226 mL        PHYSICAL EXAM:  GENERAL: NAD, well-developed  HEAD:  Atraumatic, Normocephalic  EYES: EOMI, PERRLA, conjunctiva and sclera clear  NECK: Supple, No JVD  CHEST/LUNG: Clear to auscultation bilaterally; No wheeze  HEART: Regular rate and rhythm; No murmurs, rubs, or gallops  ABDOMEN: Soft, Nontender, Nondistended; Bowel sounds present  EXTREMITIES:  2+ Peripheral Pulses, No clubbing, cyanosis, or edema    NEUROLOGY: non-focal  SKIN: No rashes or lesions    LABS:                        7.6    11.09 )-----------( 191      ( 30 Nov 2019 05:19 )             24.4     11-30    134<L>  |  98  |  18  ----------------------------<  105<H>  3.3<L>   |  25  |  2.62<H>    Ca    8.9      30 Nov 2019 05:19  Phos  1.6     11-30  Mg     1.8     11-30    TPro  5.6<L>  /  Alb  2.2<L>  /  TBili  0.5  /  DBili  x   /  AST  14  /  ALT  7<L>  /  AlkPhos  100  11-30              RADIOLOGY & ADDITIONAL TESTS:    Imaging Personally Reviewed:    Consultant(s) Notes Reviewed:      Care Discussed with Consultants/Other Providers:

## 2019-11-30 NOTE — CHART NOTE - NSCHARTNOTEFT_GEN_A_CORE
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 milliGRAM(s) Oral at bedtime  chlorhexidine 4% Liquid 1 Application(s) Topical <User Schedule>  epoetin tan Injectable 66911 Unit(s) IV Push <User Schedule>  insulin lispro (HumaLOG) corrective regimen sliding scale   SubCutaneous every 6 hours  levETIRAcetam  IVPB 1000 milliGRAM(s) IV Intermittent every 12 hours  midodrine. 10 milliGRAM(s) Oral three times a day  pantoprazole  Injectable 40 milliGRAM(s) IV Push two times a day  predniSONE   Tablet 5 milliGRAM(s) Oral daily  thiamine 100 milliGRAM(s) Oral daily    MEDICATIONS  (PRN):        LABS                                            7.6                   Neurophils% (auto):   71.0   (11-30 @ 05:19):    11.09)-----------(191          Lymphocytes% (auto):  14.6                                          24.4                   Eosinphils% (auto):   0.2      Manual%: Neutrophils x    ; Lymphocytes x    ; Eosinophils x    ; Bands%: x    ; Blasts x                                    134    |  98     |  18                  Calcium: 8.9   / iCa: x      (11-30 @ 05:19)    ----------------------------<  105       Magnesium: x                                3.3     |  25     |  2.62             Phosphorous: x        TPro  5.6    /  Alb  2.2    /  TBili  0.5    /  DBili  x      /  AST  14     /  ALT  7      /  AlkPhos  100    30 Nov 2019 05:19

## 2019-11-30 NOTE — PROGRESS NOTE ADULT - SUBJECTIVE AND OBJECTIVE BOX
Subjective: Patient seen and examined. No new events except as noted.     SUBJECTIVE/ROS:  extubated       MEDICATIONS:  MEDICATIONS  (STANDING):  atorvastatin 40 milliGRAM(s) Oral at bedtime  chlorhexidine 4% Liquid 1 Application(s) Topical <User Schedule>  dextrose 10%. 1000 milliLiter(s) (20 mL/Hr) IV Continuous <Continuous>  insulin lispro (HumaLOG) corrective regimen sliding scale   SubCutaneous every 6 hours  levETIRAcetam  IVPB 1000 milliGRAM(s) IV Intermittent every 12 hours  meropenem  IVPB 500 milliGRAM(s) IV Intermittent every 24 hours  meropenem  IVPB      midodrine. 10 milliGRAM(s) Oral three times a day  pantoprazole  Injectable 40 milliGRAM(s) IV Push two times a day  predniSONE   Tablet 5 milliGRAM(s) Oral daily  sodium chloride 0.9% Bolus 500 milliLiter(s) IV Bolus once  thiamine 100 milliGRAM(s) Oral daily      PHYSICAL EXAM:  T(C): 37.7 (11-30-19 @ 04:00), Max: 37.7 (11-29-19 @ 20:00)  HR: 94 (11-30-19 @ 07:00) (73 - 114)  BP: 112/60 (11-30-19 @ 07:00) (71/48 - 174/84)  RR: 21 (11-30-19 @ 07:00) (11 - 31)  SpO2: 100% (11-30-19 @ 07:00) (96% - 100%)  Wt(kg): --  I&O's Summary    29 Nov 2019 07:01  -  30 Nov 2019 07:00  --------------------------------------------------------  IN: 2289 mL / OUT: 2800 mL / NET: -511 mL    30 Nov 2019 07:01  -  30 Nov 2019 07:48  --------------------------------------------------------  IN: 113 mL / OUT: 0 mL / NET: 113 mL            JVP: Normal  Neck: supple  Lung: clear   CV: S1 S2 , Murmur:  Abd: soft  Ext: No edema  neuro: Awake   Psych: flat affect  Skin: normal``    LABS/DATA:    CARDIAC MARKERS:                                7.6    11.09 )-----------( 191      ( 30 Nov 2019 05:19 )             24.4     11-30    134<L>  |  98  |  18  ----------------------------<  105<H>  3.3<L>   |  25  |  2.62<H>    Ca    8.9      30 Nov 2019 05:19  Phos  1.6     11-30  Mg     1.8     11-30    TPro  5.6<L>  /  Alb  2.2<L>  /  TBili  0.5  /  DBili  x   /  AST  14  /  ALT  7<L>  /  AlkPhos  100  11-30    proBNP:   Lipid Profile:   HgA1c:   TSH:     TELE:  EKG:
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
## 2019-11-30 NOTE — PROGRESS NOTE ADULT - SUBJECTIVE AND OBJECTIVE BOX
Patient is a 74y Male whom presented to the hospital with esrd on hd   transfer to micu , seen in am   PAST MEDICAL & SURGICAL HISTORY:  Kidney transplant recipient  Anuria  GERD (gastroesophageal reflux disease)  Kidney transplant failure: dx: 2/2013  Hepatitis C: dx: 90&#x27;s.  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 10 milliGRAM(s) Oral three times a day  mycophenolate mofetil 250 milliGRAM(s) Oral two times a day  predniSONE   Tablet 5 milliGRAM(s) Oral daily  sevelamer carbonate 800 milliGRAM(s) Oral three times a day with meals  sodium bicarbonate 650 milliGRAM(s) Oral two times a day  tamsulosin 0.4 milliGRAM(s) Oral at bedtime      Allergies    No Known Allergies                          7.6    11.09 )-----------( 191      ( 30 Nov 2019 05:19 )             24.4       CBC Full  -  ( 30 Nov 2019 05:19 )  WBC Count : 11.09 K/uL  RBC Count : 2.88 M/uL  Hemoglobin : 7.6 g/dL  Hematocrit : 24.4 %  Platelet Count - Automated : 191 K/uL  Mean Cell Volume : 84.7 fl  Mean Cell Hemoglobin : 26.4 pg  Mean Cell Hemoglobin Concentration : 31.1 gm/dL  Auto Neutrophil # : 7.88 K/uL  Auto Lymphocyte # : 1.62 K/uL  Auto Monocyte # : 1.43 K/uL  Auto Eosinophil # : 0.02 K/uL  Auto Basophil # : 0.03 K/uL  Auto Neutrophil % : 71.0 %  Auto Lymphocyte % : 14.6 %  Auto Monocyte % : 12.9 %  Auto Eosinophil % : 0.2 %  Auto Basophil % : 0.3 %      11-30    134<L>  |  98  |  18  ----------------------------<  105<H>  3.3<L>   |  25  |  2.62<H>    Ca    8.9      30 Nov 2019 05:19  Phos  1.6     11-30  Mg     1.8     11-30    TPro  5.6<L>  /  Alb  2.2<L>  /  TBili  0.5  /  DBili  x   /  AST  14  /  ALT  7<L>  /  AlkPhos  100  11-30      CAPILLARY BLOOD GLUCOSE      POCT Blood Glucose.: 144 mg/dL (30 Nov 2019 11:09)  POCT Blood Glucose.: 86 mg/dL (29 Nov 2019 16:33)      Vital Signs Last 24 Hrs  T(C): 36.8 (30 Nov 2019 15:20), Max: 37.7 (29 Nov 2019 20:00)  T(F): 98.2 (30 Nov 2019 15:20), Max: 99.9 (30 Nov 2019 04:00)  HR: 95 (30 Nov 2019 15:20) (87 - 114)  BP: 131/73 (30 Nov 2019 15:20) (71/48 - 141/69)  BP(mean): 96 (30 Nov 2019 15:20) (53 - 99)  RR: 18 (30 Nov 2019 15:20) (11 - 31)  SpO2: 100% (30 Nov 2019 15:20) (96% - 100%)        Intolerances        SOCIAL HISTORY:  Denies ETOh,Smoking,     FAMILY HISTORY:  Family history of breast cancer in sister (Sibling): living at 92 yo  Family history of prostate cancer in father  Family history of lung cancer  Family history of hypertension in mother  Family history of diabetes mellitus: mother      REVIEW OF SYSTEMS:    unbable to obtained                                                     HEENT: conjunctive   clear   Neck:  No JVD  Respiratory: decrease bs b/l   Cardiovascular: S1 and S2  Gastrointestinal: BS+, soft, NT/ND  Extremities: No peripheral edema  Skin: dry   Access: pos fistula

## 2019-11-30 NOTE — PROGRESS NOTE ADULT - ASSESSMENT
shock / resp failure  improving  off pressers  on midodrine  off vent     History of cocaine induced CM  normal EF on last echo  resume BB once more hemodynamically stable     PAF  in sinus   a/c once deemed safe   for now off given high bleed risk, sepsis on hold     Hypercalcemia  as per renal   HD    NSVT  resume BB

## 2019-12-01 LAB
ALBUMIN SERPL ELPH-MCNC: 2.6 G/DL — LOW (ref 3.3–5)
ALP SERPL-CCNC: 122 U/L — HIGH (ref 40–120)
ALT FLD-CCNC: 5 U/L — LOW (ref 10–45)
ANION GAP SERPL CALC-SCNC: 11 MMOL/L — SIGNIFICANT CHANGE UP (ref 5–17)
ANION GAP SERPL CALC-SCNC: 14 MMOL/L — SIGNIFICANT CHANGE UP (ref 5–17)
AST SERPL-CCNC: 16 U/L — SIGNIFICANT CHANGE UP (ref 10–40)
BASOPHILS # BLD AUTO: 0.06 K/UL — SIGNIFICANT CHANGE UP (ref 0–0.2)
BASOPHILS NFR BLD AUTO: 0.5 % — SIGNIFICANT CHANGE UP (ref 0–2)
BILIRUB SERPL-MCNC: 0.4 MG/DL — SIGNIFICANT CHANGE UP (ref 0.2–1.2)
BUN SERPL-MCNC: 11 MG/DL — SIGNIFICANT CHANGE UP (ref 7–23)
BUN SERPL-MCNC: 13 MG/DL — SIGNIFICANT CHANGE UP (ref 7–23)
CALCIUM SERPL-MCNC: 9.7 MG/DL — SIGNIFICANT CHANGE UP (ref 8.4–10.5)
CALCIUM SERPL-MCNC: 9.8 MG/DL — SIGNIFICANT CHANGE UP (ref 8.4–10.5)
CHLORIDE SERPL-SCNC: 98 MMOL/L — SIGNIFICANT CHANGE UP (ref 96–108)
CHLORIDE SERPL-SCNC: 99 MMOL/L — SIGNIFICANT CHANGE UP (ref 96–108)
CO2 SERPL-SCNC: 23 MMOL/L — SIGNIFICANT CHANGE UP (ref 22–31)
CO2 SERPL-SCNC: 27 MMOL/L — SIGNIFICANT CHANGE UP (ref 22–31)
CREAT SERPL-MCNC: 2.03 MG/DL — HIGH (ref 0.5–1.3)
CREAT SERPL-MCNC: 2.43 MG/DL — HIGH (ref 0.5–1.3)
CULTURE RESULTS: SIGNIFICANT CHANGE UP
CULTURE RESULTS: SIGNIFICANT CHANGE UP
EOSINOPHIL # BLD AUTO: 0.03 K/UL — SIGNIFICANT CHANGE UP (ref 0–0.5)
EOSINOPHIL NFR BLD AUTO: 0.3 % — SIGNIFICANT CHANGE UP (ref 0–6)
GLUCOSE BLDC GLUCOMTR-MCNC: 105 MG/DL — HIGH (ref 70–99)
GLUCOSE BLDC GLUCOMTR-MCNC: 117 MG/DL — HIGH (ref 70–99)
GLUCOSE BLDC GLUCOMTR-MCNC: 142 MG/DL — HIGH (ref 70–99)
GLUCOSE SERPL-MCNC: 115 MG/DL — HIGH (ref 70–99)
GLUCOSE SERPL-MCNC: 128 MG/DL — HIGH (ref 70–99)
HCT VFR BLD CALC: 26.4 % — LOW (ref 39–50)
HCT VFR BLD CALC: 28.2 % — LOW (ref 39–50)
HGB BLD-MCNC: 8 G/DL — LOW (ref 13–17)
HGB BLD-MCNC: 8.6 G/DL — LOW (ref 13–17)
IMM GRANULOCYTES NFR BLD AUTO: 1 % — SIGNIFICANT CHANGE UP (ref 0–1.5)
LYMPHOCYTES # BLD AUTO: 1.81 K/UL — SIGNIFICANT CHANGE UP (ref 1–3.3)
LYMPHOCYTES # BLD AUTO: 15.9 % — SIGNIFICANT CHANGE UP (ref 13–44)
MAGNESIUM SERPL-MCNC: 1.8 MG/DL — SIGNIFICANT CHANGE UP (ref 1.6–2.6)
MCHC RBC-ENTMCNC: 25.9 PG — LOW (ref 27–34)
MCHC RBC-ENTMCNC: 26.1 PG — LOW (ref 27–34)
MCHC RBC-ENTMCNC: 30.3 GM/DL — LOW (ref 32–36)
MCHC RBC-ENTMCNC: 30.5 GM/DL — LOW (ref 32–36)
MCV RBC AUTO: 85.4 FL — SIGNIFICANT CHANGE UP (ref 80–100)
MCV RBC AUTO: 85.7 FL — SIGNIFICANT CHANGE UP (ref 80–100)
MONOCYTES # BLD AUTO: 1.24 K/UL — HIGH (ref 0–0.9)
MONOCYTES NFR BLD AUTO: 10.9 % — SIGNIFICANT CHANGE UP (ref 2–14)
NEUTROPHILS # BLD AUTO: 8.12 K/UL — HIGH (ref 1.8–7.4)
NEUTROPHILS NFR BLD AUTO: 71.4 % — SIGNIFICANT CHANGE UP (ref 43–77)
NRBC # BLD: 0 /100 WBCS — SIGNIFICANT CHANGE UP (ref 0–0)
NRBC # BLD: 0 /100 WBCS — SIGNIFICANT CHANGE UP (ref 0–0)
PHOSPHATE SERPL-MCNC: 2.9 MG/DL — SIGNIFICANT CHANGE UP (ref 2.5–4.5)
PLATELET # BLD AUTO: 188 K/UL — SIGNIFICANT CHANGE UP (ref 150–400)
PLATELET # BLD AUTO: 196 K/UL — SIGNIFICANT CHANGE UP (ref 150–400)
POTASSIUM SERPL-MCNC: 4 MMOL/L — SIGNIFICANT CHANGE UP (ref 3.5–5.3)
POTASSIUM SERPL-MCNC: 4.1 MMOL/L — SIGNIFICANT CHANGE UP (ref 3.5–5.3)
POTASSIUM SERPL-SCNC: 4 MMOL/L — SIGNIFICANT CHANGE UP (ref 3.5–5.3)
POTASSIUM SERPL-SCNC: 4.1 MMOL/L — SIGNIFICANT CHANGE UP (ref 3.5–5.3)
PROT SERPL-MCNC: 6.6 G/DL — SIGNIFICANT CHANGE UP (ref 6–8.3)
RBC # BLD: 3.09 M/UL — LOW (ref 4.2–5.8)
RBC # BLD: 3.29 M/UL — LOW (ref 4.2–5.8)
RBC # FLD: 15.9 % — HIGH (ref 10.3–14.5)
RBC # FLD: 16 % — HIGH (ref 10.3–14.5)
SODIUM SERPL-SCNC: 136 MMOL/L — SIGNIFICANT CHANGE UP (ref 135–145)
SODIUM SERPL-SCNC: 136 MMOL/L — SIGNIFICANT CHANGE UP (ref 135–145)
SPECIMEN SOURCE: SIGNIFICANT CHANGE UP
SPECIMEN SOURCE: SIGNIFICANT CHANGE UP
WBC # BLD: 11.37 K/UL — HIGH (ref 3.8–10.5)
WBC # BLD: 9.41 K/UL — SIGNIFICANT CHANGE UP (ref 3.8–10.5)
WBC # FLD AUTO: 11.37 K/UL — HIGH (ref 3.8–10.5)
WBC # FLD AUTO: 9.41 K/UL — SIGNIFICANT CHANGE UP (ref 3.8–10.5)

## 2019-12-01 RX ORDER — CARVEDILOL PHOSPHATE 80 MG/1
3.12 CAPSULE, EXTENDED RELEASE ORAL EVERY 12 HOURS
Refills: 0 | Status: DISCONTINUED | OUTPATIENT
Start: 2019-12-01 | End: 2019-12-21

## 2019-12-01 RX ADMIN — CHLORHEXIDINE GLUCONATE 1 APPLICATION(S): 213 SOLUTION TOPICAL at 05:53

## 2019-12-01 RX ADMIN — MIDODRINE HYDROCHLORIDE 10 MILLIGRAM(S): 2.5 TABLET ORAL at 12:14

## 2019-12-01 RX ADMIN — PANTOPRAZOLE SODIUM 40 MILLIGRAM(S): 20 TABLET, DELAYED RELEASE ORAL at 05:22

## 2019-12-01 RX ADMIN — Medication 5 MILLIGRAM(S): at 05:22

## 2019-12-01 RX ADMIN — PANTOPRAZOLE SODIUM 40 MILLIGRAM(S): 20 TABLET, DELAYED RELEASE ORAL at 17:39

## 2019-12-01 RX ADMIN — LEVETIRACETAM 400 MILLIGRAM(S): 250 TABLET, FILM COATED ORAL at 05:21

## 2019-12-01 RX ADMIN — MIDODRINE HYDROCHLORIDE 10 MILLIGRAM(S): 2.5 TABLET ORAL at 05:23

## 2019-12-01 RX ADMIN — HEPARIN SODIUM 5000 UNIT(S): 5000 INJECTION INTRAVENOUS; SUBCUTANEOUS at 13:13

## 2019-12-01 RX ADMIN — MIDODRINE HYDROCHLORIDE 10 MILLIGRAM(S): 2.5 TABLET ORAL at 17:39

## 2019-12-01 RX ADMIN — LEVETIRACETAM 400 MILLIGRAM(S): 250 TABLET, FILM COATED ORAL at 17:38

## 2019-12-01 RX ADMIN — HEPARIN SODIUM 5000 UNIT(S): 5000 INJECTION INTRAVENOUS; SUBCUTANEOUS at 05:22

## 2019-12-01 RX ADMIN — HEPARIN SODIUM 5000 UNIT(S): 5000 INJECTION INTRAVENOUS; SUBCUTANEOUS at 21:15

## 2019-12-01 RX ADMIN — ATORVASTATIN CALCIUM 40 MILLIGRAM(S): 80 TABLET, FILM COATED ORAL at 21:15

## 2019-12-01 RX ADMIN — CARVEDILOL PHOSPHATE 3.12 MILLIGRAM(S): 80 CAPSULE, EXTENDED RELEASE ORAL at 17:39

## 2019-12-01 NOTE — PROGRESS NOTE ADULT - ASSESSMENT
Assessment  DM: A1C 5.5%, was on insulin at home, now on insulin low-dose coverage, blood sugars improved, patient is still NPO on tube feeds, transferred to medical floor.  Hypercalcemia: Primary Hyperparathyroidism, calcium improved s/p Pamidronate dose.  Sepsis: IV ABx for UTI, LP inconsistent with meningitis, on medications, stable, monitored.  HTN: Controlled,  on antihypertensive medications.  ESRD: On hemodialysis, Monitor labs/BMP          Robi aGy MD  Cell: [phone]  Office: [phone]

## 2019-12-01 NOTE — PROGRESS NOTE ADULT - SUBJECTIVE AND OBJECTIVE BOX
Patient is a 74y Male whom presented to the hospital with esrd on hd   pt  seen in am nad   PAST MEDICAL & SURGICAL HISTORY:  Kidney transplant recipient  Anuria  GERD (gastroesophageal reflux disease)  Kidney transplant failure: dx: 2/2013  Hepatitis C: dx: 90&#x27;s.  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 10 milliGRAM(s) Oral three times a day  mycophenolate mofetil 250 milliGRAM(s) Oral two times a day  predniSONE   Tablet 5 milliGRAM(s) Oral daily  sevelamer carbonate 800 milliGRAM(s) Oral three times a day with meals  sodium bicarbonate 650 milliGRAM(s) Oral two times a day  tamsulosin 0.4 milliGRAM(s) Oral at bedtime      Allergies    No Known Allergies                       SOCIAL HISTORY:  Denies ETOh,Smoking,     FAMILY HISTORY:  Family history of breast cancer in sister (Sibling): living at 94 yo  Family history of prostate cancer in father  Family history of lung cancer  Family history of hypertension in mother  Family history of diabetes mellitus: mother      REVIEW OF SYSTEMS:    unbable to obtained                                              8.0    9.41  )-----------( 188      ( 01 Dec 2019 06:50 )             26.4       CBC Full  -  ( 01 Dec 2019 06:50 )  WBC Count : 9.41 K/uL  RBC Count : 3.09 M/uL  Hemoglobin : 8.0 g/dL  Hematocrit : 26.4 %  Platelet Count - Automated : 188 K/uL  Mean Cell Volume : 85.4 fl  Mean Cell Hemoglobin : 25.9 pg  Mean Cell Hemoglobin Concentration : 30.3 gm/dL  Auto Neutrophil # : x  Auto Lymphocyte # : x  Auto Monocyte # : x  Auto Eosinophil # : x  Auto Basophil # : x  Auto Neutrophil % : x  Auto Lymphocyte % : x  Auto Monocyte % : x  Auto Eosinophil % : x  Auto Basophil % : x      12-01    136  |  99  |  13  ----------------------------<  128<H>  4.0   |  23  |  2.43<H>    Ca    9.8      01 Dec 2019 06:50  Phos  2.9     12-01  Mg     1.8     12-01    TPro  6.6  /  Alb  2.6<L>  /  TBili  0.4  /  DBili  x   /  AST  16  /  ALT  5<L>  /  AlkPhos  122<H>  12-01      CAPILLARY BLOOD GLUCOSE      POCT Blood Glucose.: 117 mg/dL (01 Dec 2019 05:45)  POCT Blood Glucose.: 111 mg/dL (30 Nov 2019 23:33)  POCT Blood Glucose.: 116 mg/dL (30 Nov 2019 17:07)      Vital Signs Last 24 Hrs  T(C): 37.7 (01 Dec 2019 09:00), Max: 37.8 (01 Dec 2019 00:00)  T(F): 99.9 (01 Dec 2019 09:00), Max: 100 (01 Dec 2019 00:00)  HR: 114 (01 Dec 2019 09:00) (89 - 115)  BP: 125/67 (01 Dec 2019 09:00) (100/59 - 145/78)  BP(mean): 85 (01 Dec 2019 01:00) (76 - 106)  RR: 18 (01 Dec 2019 09:00) (13 - 26)  SpO2: 97% (01 Dec 2019 09:00) (95% - 100%)                                     HEENT: conjunctive   clear   Neck:  No JVD  Respiratory: decrease bs b/l   Cardiovascular: S1 and S2  Gastrointestinal: BS+, soft, NT/ND  Extremities: No peripheral edema  Skin: dry   Access: pos fistula

## 2019-12-01 NOTE — PROGRESS NOTE ADULT - ASSESSMENT
shock / resp failure  improved   pt now on floor     History of cocaine induced CM  normal EF on last echo  resume BB     PAF  in sinus   a/c once deemed safe   for now off given high bleed risk, sepsis on hold     Hypercalcemia  as per renal   HD    NSVT  resume BB

## 2019-12-01 NOTE — PROGRESS NOTE ADULT - SUBJECTIVE AND OBJECTIVE BOX
Subjective: Patient seen and examined. No new events except as noted.     SUBJECTIVE/ROS:  ROS limited       MEDICATIONS:  MEDICATIONS  (STANDING):  atorvastatin 40 milliGRAM(s) Oral at bedtime  chlorhexidine 4% Liquid 1 Application(s) Topical <User Schedule>  epoetin tan Injectable 23438 Unit(s) IV Push <User Schedule>  heparin  Injectable 5000 Unit(s) SubCutaneous every 8 hours  insulin lispro (HumaLOG) corrective regimen sliding scale   SubCutaneous every 6 hours  levETIRAcetam  IVPB 1000 milliGRAM(s) IV Intermittent every 12 hours  midodrine. 10 milliGRAM(s) Oral three times a day  pantoprazole  Injectable 40 milliGRAM(s) IV Push two times a day  predniSONE   Tablet 5 milliGRAM(s) Oral daily      PHYSICAL EXAM:  T(C): 37.3 (12-01-19 @ 05:00), Max: 37.8 (12-01-19 @ 00:00)  HR: 115 (12-01-19 @ 05:00) (87 - 115)  BP: 100/59 (12-01-19 @ 05:00) (99/57 - 145/78)  RR: 18 (12-01-19 @ 05:00) (13 - 26)  SpO2: 95% (12-01-19 @ 05:00) (95% - 100%)  Wt(kg): --  I&O's Summary    30 Nov 2019 07:01  -  01 Dec 2019 07:00  --------------------------------------------------------  IN: 1976 mL / OUT: 3300 mL / NET: -1324 mL            JVP: Normal  Neck: supple  Lung: clear   CV: S1 S2 , Murmur:  Abd: soft  Ext: No edema  neuro: Awake / alert  Psych: flat affect  Skin: normal``    LABS/DATA:    CARDIAC MARKERS:                                8.0    9.41  )-----------( 188      ( 01 Dec 2019 06:50 )             26.4     12-01    136  |  99  |  13  ----------------------------<  128<H>  4.0   |  23  |  2.43<H>    Ca    9.8      01 Dec 2019 06:50  Phos  2.9     12-01  Mg     1.8     12-01    TPro  6.6  /  Alb  2.6<L>  /  TBili  0.4  /  DBili  x   /  AST  16  /  ALT  5<L>  /  AlkPhos  122<H>  12-01    proBNP:   Lipid Profile:   HgA1c:   TSH:     TELE:  EKG:

## 2019-12-01 NOTE — PROGRESS NOTE ADULT - SUBJECTIVE AND OBJECTIVE BOX
Patient is a 74y old  Male who presents with a chief complaint of ams (01 Dec 2019 12:08)      SUBJECTIVE / OVERNIGHT EVENTS:  pt was transferred out of MICU.  No chest pain. No shortness of breath. No complaints. No events overnight.     MEDICATIONS  (STANDING):  atorvastatin 40 milliGRAM(s) Oral at bedtime  carvedilol 3.125 milliGRAM(s) Oral every 12 hours  chlorhexidine 4% Liquid 1 Application(s) Topical <User Schedule>  epoetin tan Injectable 18998 Unit(s) IV Push <User Schedule>  heparin  Injectable 5000 Unit(s) SubCutaneous every 8 hours  insulin lispro (HumaLOG) corrective regimen sliding scale   SubCutaneous every 6 hours  levETIRAcetam  IVPB 1000 milliGRAM(s) IV Intermittent every 12 hours  midodrine. 10 milliGRAM(s) Oral three times a day  pantoprazole  Injectable 40 milliGRAM(s) IV Push two times a day  predniSONE   Tablet 5 milliGRAM(s) Oral daily    MEDICATIONS  (PRN):      Vital Signs Last 24 Hrs  T(C): 37.1 (01 Dec 2019 17:23), Max: 37.9 (01 Dec 2019 13:00)  T(F): 98.7 (01 Dec 2019 17:23), Max: 100.2 (01 Dec 2019 13:00)  HR: 105 (01 Dec 2019 17:23) (99 - 115)  BP: 118/65 (01 Dec 2019 17:23) (100/59 - 154/79)  BP(mean): 85 (01 Dec 2019 01:00) (81 - 106)  RR: 18 (01 Dec 2019 17:23) (13 - 26)  SpO2: 96% (01 Dec 2019 17:23) (95% - 100%)  CAPILLARY BLOOD GLUCOSE      POCT Blood Glucose.: 142 mg/dL (01 Dec 2019 12:18)  POCT Blood Glucose.: 117 mg/dL (01 Dec 2019 05:45)  POCT Blood Glucose.: 111 mg/dL (30 Nov 2019 23:33)    I&O's Summary    30 Nov 2019 07:01  -  01 Dec 2019 07:00  --------------------------------------------------------  IN: 1976 mL / OUT: 3300 mL / NET: -1324 mL        PHYSICAL EXAM:  GENERAL: NAD, well-developed  HEAD:  Atraumatic, Normocephalic  EYES: EOMI, PERRLA, conjunctiva and sclera clear  NECK: Supple, No JVD  CHEST/LUNG: Clear to auscultation bilaterally; No wheeze  HEART: Regular rate and rhythm; No murmurs, rubs, or gallops  ABDOMEN: Soft, Nontender, Nondistended; Bowel sounds present  EXTREMITIES:  2+ Peripheral Pulses, No clubbing, cyanosis, or edema  PSYCH: AAOx3  NEUROLOGY: non-focal  SKIN: No rashes or lesions    LABS:                        8.0    9.41  )-----------( 188      ( 01 Dec 2019 06:50 )             26.4     12-01    136  |  99  |  13  ----------------------------<  128<H>  4.0   |  23  |  2.43<H>    Ca    9.8      01 Dec 2019 06:50  Phos  2.9     12-01  Mg     1.8     12-01    TPro  6.6  /  Alb  2.6<L>  /  TBili  0.4  /  DBili  x   /  AST  16  /  ALT  5<L>  /  AlkPhos  122<H>  12-01              RADIOLOGY & ADDITIONAL TESTS:    Imaging Personally Reviewed:    Consultant(s) Notes Reviewed:      Care Discussed with Consultants/Other Providers:

## 2019-12-01 NOTE — PROGRESS NOTE ADULT - SUBJECTIVE AND OBJECTIVE BOX
Chief complaint  Patient is a 74y old  Male who presents with a chief complaint of ams (01 Dec 2019 08:17)   Review of systems  Patient in bed, looks comfortable, no fever, no hypoglycemia.    Labs and Fingersticks  CAPILLARY BLOOD GLUCOSE      POCT Blood Glucose.: 117 mg/dL (01 Dec 2019 05:45)  POCT Blood Glucose.: 111 mg/dL (30 Nov 2019 23:33)  POCT Blood Glucose.: 116 mg/dL (30 Nov 2019 17:07)      Anion Gap, Serum: 14 (12-01 @ 06:50)  Anion Gap, Serum: 11 (12-01 @ 00:27)  Anion Gap, Serum: 11 (11-30 @ 05:19)  Anion Gap, Serum: 13 (11-30 @ 00:32)      Calcium, Total Serum: 9.8 (12-01 @ 06:50)  Calcium, Total Serum: 9.7 (12-01 @ 00:27)  Calcium, Total Serum: 8.9 (11-30 @ 05:19)  Calcium, Total Serum: 8.9 (11-30 @ 00:32)  Albumin, Serum: 2.6 <L> (12-01 @ 00:27)  Albumin, Serum: 2.2 <L> (11-30 @ 05:19)  Albumin, Serum: 2.0 <L> (11-30 @ 00:32)    Alanine Aminotransferase (ALT/SGPT): 5 <L> (12-01 @ 00:27)  Alanine Aminotransferase (ALT/SGPT): 7 <L> (11-30 @ 05:19)  Alanine Aminotransferase (ALT/SGPT): 9 <L> (11-30 @ 00:32)  Alkaline Phosphatase, Serum: 122 <H> (12-01 @ 00:27)  Alkaline Phosphatase, Serum: 100 (11-30 @ 05:19)  Alkaline Phosphatase, Serum: 100 (11-30 @ 00:32)  Aspartate Aminotransferase (AST/SGOT): 16 (12-01 @ 00:27)  Aspartate Aminotransferase (AST/SGOT): 14 (11-30 @ 05:19)  Aspartate Aminotransferase (AST/SGOT): 10 (11-30 @ 00:32)        12-01    136  |  99  |  13  ----------------------------<  128<H>  4.0   |  23  |  2.43<H>    Ca    9.8      01 Dec 2019 06:50  Phos  2.9     12-01  Mg     1.8     12-01    TPro  6.6  /  Alb  2.6<L>  /  TBili  0.4  /  DBili  x   /  AST  16  /  ALT  5<L>  /  AlkPhos  122<H>  12-01                        8.0    9.41  )-----------( 188      ( 01 Dec 2019 06:50 )             26.4     Medications  MEDICATIONS  (STANDING):  atorvastatin 40 milliGRAM(s) Oral at bedtime  carvedilol 3.125 milliGRAM(s) Oral every 12 hours  chlorhexidine 4% Liquid 1 Application(s) Topical <User Schedule>  epoetin tan Injectable 91362 Unit(s) IV Push <User Schedule>  heparin  Injectable 5000 Unit(s) SubCutaneous every 8 hours  insulin lispro (HumaLOG) corrective regimen sliding scale   SubCutaneous every 6 hours  levETIRAcetam  IVPB 1000 milliGRAM(s) IV Intermittent every 12 hours  midodrine. 10 milliGRAM(s) Oral three times a day  pantoprazole  Injectable 40 milliGRAM(s) IV Push two times a day  predniSONE   Tablet 5 milliGRAM(s) Oral daily      Physical Exam  General: Patient comfortable in bed  Vital Signs Last 12 Hrs  T(F): 99.9 (12-01-19 @ 09:00), Max: 99.9 (12-01-19 @ 09:00)  HR: 114 (12-01-19 @ 09:00) (106 - 115)  BP: 125/67 (12-01-19 @ 09:00) (100/59 - 129/69)  BP(mean): 85 (12-01-19 @ 01:00) (85 - 85)  RR: 18 (12-01-19 @ 09:00) (17 - 18)  SpO2: 97% (12-01-19 @ 09:00) (95% - 100%)  Neck: No palpable thyroid nodules.  CVS: S1S2, No murmurs  Respiratory: No wheezing, no crepitations  GI: Abdomen soft, bowel sounds positive  Musculoskeletal:  edema lower extremities.   Skin: No skin rashes, no ecchymosis    Diagnostics

## 2019-12-01 NOTE — PROGRESS NOTE ADULT - ASSESSMENT
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       NEURO:  - Extubated 11/29, now on nasal cannula. Minimally verbal but alert  - metabolic encephalopathy, meningitis r/o, LP negative  - neuro following  - chronic lacunar infarcts; per neuro, AMS also likely related to hypercalcemia, renal dysfunction , poor nutritional intake.   - c/w keppra for ?hx of seizure disorder  -AMS likely 2/2 hyperCa, renal dysfunction, poor PO intake  - thiamine IVPB daily x 3 days      CV:  - hx of afib was on eliquis, now off a/c per cards due to bleeding risk  - holding carvedilol 6.25 BID  - hx of cocaine cardiomyopathy, resolved, normal EF on most recent echo  - c/w statin  - trop 134 -- trended down.   - hypotensive during RRT 11/26; started levo gtt, c/w midodrine, off levo  - echo repeat EF70, LVH, hyperdynamic LCF, normal RV sys fxn    PULM:  - extubated 11/29--tolerating nasal cannula    GI:  - NG tube in place  - tube feeds ordered  - ?bleeding as hgb dropping. stool occult negative. no melena reported.  - C diff negative; does not need contact precautions      RENAL:  - hx of ESRD s/p failed renal transplant x2  - renal following, HD as recommended.  - c/w predisone, sevelamer. hold cellcept for now given likely septic state; monitor and d/w renal when to restart cellcept  - has dialysis catheter in place; HD 11/27, 11/29 tolerated wel    ENDO:  #DM2: HbA1c 5.5  - Start Insulin Sliding Scale  - endocrine following  - presented hypercalcemic, downtrending. hold pamidronate for now. f/u with endo    METABOLIC:  - hypoNa; monitor and f/u with HD schedule  - initially hyperCa, improving. hold pamidronate for now, follow up with endocrine    HEMATOLOGIC:  - hgb dropped to 6.5, unclear source of blood loss. s/p 1 unit PRBC 7.7 hgb. continue to monitor, consider imaging. stool guaiac ordered.   - pt was on eliquis; has been held.       INFECTIOUS:  - WBC downtrending. ID following  - RRT likely 2/2 septic shock, possible aspiration, UTI, has decubiti ulcers as well.  - Urine culture growing E.coli; blood culture negative  - was given empiric CNS infx coverage - vanc, manoj, acyclovir, LP CSF negative  - per ID, resart meropenem 500 IV q24 for 7 days total. dc vanc.   - f/u sputum culture - ngtd  - wound care consulted appreciate recs  - pt with diarrhea; cdiff indeterminate at this time, will f/u     PPX: SCDs  - home eliquis has been held while in septic state. can add back when tolerated    GOC:  - palliative consult.  -  sister is [employer] employee. Made aware of status and will update

## 2019-12-02 LAB
ANION GAP SERPL CALC-SCNC: 14 MMOL/L — SIGNIFICANT CHANGE UP (ref 5–17)
BUN SERPL-MCNC: 23 MG/DL — SIGNIFICANT CHANGE UP (ref 7–23)
CALCIUM SERPL-MCNC: 10.3 MG/DL — SIGNIFICANT CHANGE UP (ref 8.4–10.5)
CHLORIDE SERPL-SCNC: 100 MMOL/L — SIGNIFICANT CHANGE UP (ref 96–108)
CO2 SERPL-SCNC: 24 MMOL/L — SIGNIFICANT CHANGE UP (ref 22–31)
CREAT SERPL-MCNC: 3.44 MG/DL — HIGH (ref 0.5–1.3)
GLUCOSE BLDC GLUCOMTR-MCNC: 116 MG/DL — HIGH (ref 70–99)
GLUCOSE BLDC GLUCOMTR-MCNC: 124 MG/DL — HIGH (ref 70–99)
GLUCOSE BLDC GLUCOMTR-MCNC: 128 MG/DL — HIGH (ref 70–99)
GLUCOSE BLDC GLUCOMTR-MCNC: 134 MG/DL — HIGH (ref 70–99)
GLUCOSE BLDC GLUCOMTR-MCNC: 145 MG/DL — HIGH (ref 70–99)
GLUCOSE SERPL-MCNC: 133 MG/DL — HIGH (ref 70–99)
HCT VFR BLD CALC: 27.7 % — LOW (ref 39–50)
HGB BLD-MCNC: 8 G/DL — LOW (ref 13–17)
MCHC RBC-ENTMCNC: 26 PG — LOW (ref 27–34)
MCHC RBC-ENTMCNC: 28.9 GM/DL — LOW (ref 32–36)
MCV RBC AUTO: 89.9 FL — SIGNIFICANT CHANGE UP (ref 80–100)
PLATELET # BLD AUTO: 217 K/UL — SIGNIFICANT CHANGE UP (ref 150–400)
POTASSIUM SERPL-MCNC: 4 MMOL/L — SIGNIFICANT CHANGE UP (ref 3.5–5.3)
POTASSIUM SERPL-SCNC: 4 MMOL/L — SIGNIFICANT CHANGE UP (ref 3.5–5.3)
RBC # BLD: 3.08 M/UL — LOW (ref 4.2–5.8)
RBC # FLD: 16.2 % — HIGH (ref 10.3–14.5)
SODIUM SERPL-SCNC: 138 MMOL/L — SIGNIFICANT CHANGE UP (ref 135–145)
WBC # BLD: 8.83 K/UL — SIGNIFICANT CHANGE UP (ref 3.8–10.5)
WBC # FLD AUTO: 8.83 K/UL — SIGNIFICANT CHANGE UP (ref 3.8–10.5)

## 2019-12-02 PROCEDURE — 99232 SBSQ HOSP IP/OBS MODERATE 35: CPT

## 2019-12-02 PROCEDURE — 99233 SBSQ HOSP IP/OBS HIGH 50: CPT

## 2019-12-02 RX ADMIN — Medication 5 MILLIGRAM(S): at 05:21

## 2019-12-02 RX ADMIN — MIDODRINE HYDROCHLORIDE 10 MILLIGRAM(S): 2.5 TABLET ORAL at 13:52

## 2019-12-02 RX ADMIN — CHLORHEXIDINE GLUCONATE 1 APPLICATION(S): 213 SOLUTION TOPICAL at 05:51

## 2019-12-02 RX ADMIN — LEVETIRACETAM 400 MILLIGRAM(S): 250 TABLET, FILM COATED ORAL at 18:42

## 2019-12-02 RX ADMIN — PANTOPRAZOLE SODIUM 40 MILLIGRAM(S): 20 TABLET, DELAYED RELEASE ORAL at 18:41

## 2019-12-02 RX ADMIN — CARVEDILOL PHOSPHATE 3.12 MILLIGRAM(S): 80 CAPSULE, EXTENDED RELEASE ORAL at 05:26

## 2019-12-02 RX ADMIN — MIDODRINE HYDROCHLORIDE 10 MILLIGRAM(S): 2.5 TABLET ORAL at 18:42

## 2019-12-02 RX ADMIN — HEPARIN SODIUM 5000 UNIT(S): 5000 INJECTION INTRAVENOUS; SUBCUTANEOUS at 13:52

## 2019-12-02 RX ADMIN — Medication 0: at 23:59

## 2019-12-02 RX ADMIN — HEPARIN SODIUM 5000 UNIT(S): 5000 INJECTION INTRAVENOUS; SUBCUTANEOUS at 23:59

## 2019-12-02 RX ADMIN — ERYTHROPOIETIN 10000 UNIT(S): 10000 INJECTION, SOLUTION INTRAVENOUS; SUBCUTANEOUS at 20:43

## 2019-12-02 RX ADMIN — MIDODRINE HYDROCHLORIDE 10 MILLIGRAM(S): 2.5 TABLET ORAL at 05:21

## 2019-12-02 RX ADMIN — ATORVASTATIN CALCIUM 40 MILLIGRAM(S): 80 TABLET, FILM COATED ORAL at 23:58

## 2019-12-02 RX ADMIN — PANTOPRAZOLE SODIUM 40 MILLIGRAM(S): 20 TABLET, DELAYED RELEASE ORAL at 05:22

## 2019-12-02 RX ADMIN — LEVETIRACETAM 400 MILLIGRAM(S): 250 TABLET, FILM COATED ORAL at 05:20

## 2019-12-02 RX ADMIN — HEPARIN SODIUM 5000 UNIT(S): 5000 INJECTION INTRAVENOUS; SUBCUTANEOUS at 05:26

## 2019-12-02 NOTE — PROGRESS NOTE ADULT - ASSESSMENT
shock / resp failure  improved   pt now on floor     History of cocaine induced CM  normal EF on last echo  cont BB     PAF  in sinus   a/c once deemed safe   for now off given high bleed risk, sepsis on hold     Hypercalcemia  as per renal   HD    NSVT  cont BB     tachycardia  improving  cont BB

## 2019-12-02 NOTE — PROGRESS NOTE ADULT - ASSESSMENT
74M PMHx of ESRD s/p failed renal transplant x2, HCV, DM, and meningococcal meningitis 2 years ago was sent in to the ED from HD for AMS and low BPs.  lethargic  CVA  hypercalcemia   E coli bacteriuria/UTI  LP not s/o any CNS infection ,CSF negative  Aspiration pna  resp failure-resolved  s/p RX  stable  Mental status better  'stable off abx  Continue observation for s/s any nosocomial infection  Will defer to primary team on other plan  ID will follow as needed,please call [phone] if any questions or issues.

## 2019-12-02 NOTE — PROGRESS NOTE ADULT - SUBJECTIVE AND OBJECTIVE BOX
Chief complaint  Patient is a 74y old  Male who presents with a chief complaint of ams (02 Dec 2019 07:43)   Review of systems  Patient in bed, looks comfortable, no fever, no hypoglycemia.    Labs and Fingersticks  CAPILLARY BLOOD GLUCOSE      POCT Blood Glucose.: 145 mg/dL (02 Dec 2019 05:45)  POCT Blood Glucose.: 116 mg/dL (02 Dec 2019 00:19)  POCT Blood Glucose.: 105 mg/dL (01 Dec 2019 18:21)  POCT Blood Glucose.: 142 mg/dL (01 Dec 2019 12:18)      Anion Gap, Serum: 14 (12-02 @ 06:44)  Anion Gap, Serum: 14 (12-01 @ 06:50)  Anion Gap, Serum: 11 (12-01 @ 00:27)      Calcium, Total Serum: 10.3 (12-02 @ 06:44)  Calcium, Total Serum: 9.8 (12-01 @ 06:50)  Calcium, Total Serum: 9.7 (12-01 @ 00:27)  Albumin, Serum: 2.6 <L> (12-01 @ 00:27)    Alanine Aminotransferase (ALT/SGPT): 5 <L> (12-01 @ 00:27)  Alkaline Phosphatase, Serum: 122 <H> (12-01 @ 00:27)  Aspartate Aminotransferase (AST/SGOT): 16 (12-01 @ 00:27)        12-02    138  |  100  |  23  ----------------------------<  133<H>  4.0   |  24  |  3.44<H>    Ca    10.3      02 Dec 2019 06:44  Phos  2.9     12-01  Mg     1.8     12-01    TPro  6.6  /  Alb  2.6<L>  /  TBili  0.4  /  DBili  x   /  AST  16  /  ALT  5<L>  /  AlkPhos  122<H>  12-01                        8.0    8.83  )-----------( 217      ( 02 Dec 2019 09:06 )             27.7     Medications  MEDICATIONS  (STANDING):  atorvastatin 40 milliGRAM(s) Oral at bedtime  carvedilol 3.125 milliGRAM(s) Oral every 12 hours  chlorhexidine 4% Liquid 1 Application(s) Topical <User Schedule>  epoetin tan Injectable 02775 Unit(s) IV Push <User Schedule>  heparin  Injectable 5000 Unit(s) SubCutaneous every 8 hours  insulin lispro (HumaLOG) corrective regimen sliding scale   SubCutaneous every 6 hours  levETIRAcetam  IVPB 1000 milliGRAM(s) IV Intermittent every 12 hours  midodrine. 10 milliGRAM(s) Oral three times a day  pantoprazole  Injectable 40 milliGRAM(s) IV Push two times a day  predniSONE   Tablet 5 milliGRAM(s) Oral daily      Physical Exam  General: Patient comfortable in bed  Vital Signs Last 12 Hrs  T(F): 98.5 (12-02-19 @ 08:57), Max: 98.8 (12-02-19 @ 01:00)  HR: 95 (12-02-19 @ 08:57) (82 - 98)  BP: 127/66 (12-02-19 @ 08:57) (114/63 - 127/66)  BP(mean): --  RR: 18 (12-02-19 @ 08:57) (18 - 18)  SpO2: 93% (12-02-19 @ 08:57) (93% - 100%)  Neck: No palpable thyroid nodules.  CVS: S1S2, No murmurs  Respiratory: No wheezing, no crepitations  GI: Abdomen soft, bowel sounds positive  Musculoskeletal:  edema lower extremities.   Skin: No skin rashes, no ecchymosis    Diagnostics Chief complaint  Patient is a 74y old  Male who presents with a chief complaint of ams (02 Dec 2019 07:43)   Review of systems  Patient in bed, looks comfortable, no fever,  no hypoglycemia.    Labs and Fingersticks  CAPILLARY BLOOD GLUCOSE      POCT Blood Glucose.: 145 mg/dL (02 Dec 2019 05:45)  POCT Blood Glucose.: 116 mg/dL (02 Dec 2019 00:19)  POCT Blood Glucose.: 105 mg/dL (01 Dec 2019 18:21)  POCT Blood Glucose.: 142 mg/dL (01 Dec 2019 12:18)      Anion Gap, Serum: 14 (12-02 @ 06:44)  Anion Gap, Serum: 14 (12-01 @ 06:50)  Anion Gap, Serum: 11 (12-01 @ 00:27)      Calcium, Total Serum: 10.3 (12-02 @ 06:44)  Calcium, Total Serum: 9.8 (12-01 @ 06:50)  Calcium, Total Serum: 9.7 (12-01 @ 00:27)  Albumin, Serum: 2.6 <L> (12-01 @ 00:27)    Alanine Aminotransferase (ALT/SGPT): 5 <L> (12-01 @ 00:27)  Alkaline Phosphatase, Serum: 122 <H> (12-01 @ 00:27)  Aspartate Aminotransferase (AST/SGOT): 16 (12-01 @ 00:27)        12-02    138  |  100  |  23  ----------------------------<  133<H>  4.0   |  24  |  3.44<H>    Ca    10.3      02 Dec 2019 06:44  Phos  2.9     12-01  Mg     1.8     12-01    TPro  6.6  /  Alb  2.6<L>  /  TBili  0.4  /  DBili  x   /  AST  16  /  ALT  5<L>  /  AlkPhos  122<H>  12-01                        8.0    8.83  )-----------( 217      ( 02 Dec 2019 09:06 )             27.7     Medications  MEDICATIONS  (STANDING):  atorvastatin 40 milliGRAM(s) Oral at bedtime  carvedilol 3.125 milliGRAM(s) Oral every 12 hours  chlorhexidine 4% Liquid 1 Application(s) Topical <User Schedule>  epoetin atn Injectable 43945 Unit(s) IV Push <User Schedule>  heparin  Injectable 5000 Unit(s) SubCutaneous every 8 hours  insulin lispro (HumaLOG) corrective regimen sliding scale   SubCutaneous every 6 hours  levETIRAcetam  IVPB 1000 milliGRAM(s) IV Intermittent every 12 hours  midodrine. 10 milliGRAM(s) Oral three times a day  pantoprazole  Injectable 40 milliGRAM(s) IV Push two times a day  predniSONE   Tablet 5 milliGRAM(s) Oral daily      Physical Exam  General: Patient comfortable in bed  Vital Signs Last 12 Hrs  T(F): 98.5 (12-02-19 @ 08:57), Max: 98.8 (12-02-19 @ 01:00)  HR: 95 (12-02-19 @ 08:57) (82 - 98)  BP: 127/66 (12-02-19 @ 08:57) (114/63 - 127/66)  BP(mean): --  RR: 18 (12-02-19 @ 08:57) (18 - 18)  SpO2: 93% (12-02-19 @ 08:57) (93% - 100%)  Neck: No palpable thyroid nodules.  CVS: S1S2, No murmurs  Respiratory: No wheezing, no crepitations  GI: Abdomen soft, bowel sounds positive  Musculoskeletal:  edema lower extremities.   Skin: No skin rashes, no ecchymosis    Diagnostics

## 2019-12-02 NOTE — PROGRESS NOTE ADULT - PROBLEM SELECTOR PLAN 1
Will continue low-dose insulin coverage for now.  Will continue monitoring FS, log, will Follow up. Will continue monitoring FS, log, will Follow up.

## 2019-12-02 NOTE — PROGRESS NOTE ADULT - SUBJECTIVE AND OBJECTIVE BOX
Patient is a 74y Male whom presented to the hospital with esrd on hd   pt  seen in am nad , more awake   PAST MEDICAL & SURGICAL HISTORY:  Kidney transplant recipient  Anuria  GERD (gastroesophageal reflux disease)  Kidney transplant failure: dx: 2/2013  Hepatitis C: dx: 90&#x27;s.  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 10 milliGRAM(s) Oral three times a day  mycophenolate mofetil 250 milliGRAM(s) Oral two times a day  predniSONE   Tablet 5 milliGRAM(s) Oral daily  sevelamer carbonate 800 milliGRAM(s) Oral three times a day with meals  sodium bicarbonate 650 milliGRAM(s) Oral two times a day  tamsulosin 0.4 milliGRAM(s) Oral at bedtime      Allergies    No Known Allergies                       SOCIAL HISTORY:  Denies ETOh,Smoking,     FAMILY HISTORY:  Family history of breast cancer in sister (Sibling): living at 92 yo  Family history of prostate cancer in father  Family history of lung cancer  Family history of hypertension in mother  Family history of diabetes mellitus: mother      REVIEW OF SYSTEMS:    unbable to obtained                                          8.0    8.83  )-----------( 217      ( 02 Dec 2019 09:06 )             27.7       CBC Full  -  ( 02 Dec 2019 09:06 )  WBC Count : 8.83 K/uL  RBC Count : 3.08 M/uL  Hemoglobin : 8.0 g/dL  Hematocrit : 27.7 %  Platelet Count - Automated : 217 K/uL  Mean Cell Volume : 89.9 fl  Mean Cell Hemoglobin : 26.0 pg  Mean Cell Hemoglobin Concentration : 28.9 gm/dL  Auto Neutrophil # : x  Auto Lymphocyte # : x  Auto Monocyte # : x  Auto Eosinophil # : x  Auto Basophil # : x  Auto Neutrophil % : x  Auto Lymphocyte % : x  Auto Monocyte % : x  Auto Eosinophil % : x  Auto Basophil % : x      12-02    138  |  100  |  23  ----------------------------<  133<H>  4.0   |  24  |  3.44<H>    Ca    10.3      02 Dec 2019 06:44  Phos  2.9     12-01  Mg     1.8     12-01    TPro  6.6  /  Alb  2.6<L>  /  TBili  0.4  /  DBili  x   /  AST  16  /  ALT  5<L>  /  AlkPhos  122<H>  12-01      CAPILLARY BLOOD GLUCOSE      POCT Blood Glucose.: 134 mg/dL (02 Dec 2019 12:47)  POCT Blood Glucose.: 145 mg/dL (02 Dec 2019 05:45)  POCT Blood Glucose.: 116 mg/dL (02 Dec 2019 00:19)  POCT Blood Glucose.: 105 mg/dL (01 Dec 2019 18:21)      Vital Signs Last 24 Hrs  T(C): 36.8 (02 Dec 2019 13:00), Max: 37.7 (01 Dec 2019 21:00)  T(F): 98.3 (02 Dec 2019 13:00), Max: 99.9 (01 Dec 2019 21:00)  HR: 91 (02 Dec 2019 13:00) (82 - 105)  BP: 130/74 (02 Dec 2019 13:00) (114/63 - 130/74)  BP(mean): --  RR: 18 (02 Dec 2019 13:00) (18 - 18)  SpO2: 95% (02 Dec 2019 13:00) (93% - 100%)                                       HEENT: conjunctive   clear   Neck:  No JVD  Respiratory: decrease bs b/l   Cardiovascular: S1 and S2  Gastrointestinal: BS+, soft, NT/ND  Extremities: No peripheral edema  Skin: dry   Access: pos fistula

## 2019-12-02 NOTE — PROGRESS NOTE ADULT - ASSESSMENT
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       NEURO:  - Extubated 11/29, now on nasal cannula. Minimally verbal but alert  - metabolic encephalopathy, meningitis r/o, LP negative  - neuro following  - chronic lacunar infarcts; per neuro, AMS also likely related to hypercalcemia, renal dysfunction , poor nutritional intake.   - c/w keppra for ?hx of seizure disorder  -AMS likely 2/2 hyperCa, renal dysfunction, poor PO intake  - thiamine IVPB daily x 3 days      CV:  - hx of afib was on eliquis, now off a/c per cards due to bleeding risk  - holding carvedilol 6.25 BID  - hx of cocaine cardiomyopathy, resolved, normal EF on most recent echo  - c/w statin  - trop 134 -- trended down.   - hypotensive during RRT 11/26; started levo gtt, c/w midodrine, off levo  - echo repeat EF70, LVH, hyperdynamic LCF, normal RV sys fxn    PULM:  - extubated 11/29--tolerating nasal cannula    GI:  - NG tube in place  - ernie tube feeds  - ?bleeding as hgb dropping. stool occult negative. no melena reported.  - C diff negative; does not need contact precautions  - swallow eval      RENAL:  - hx of ESRD s/p failed renal transplant x2  - renal following, HD as recommended.  - c/w predisone, sevelamer. hold cellcept for now given likely septic state; monitor and d/w renal when to restart cellcept  - has dialysis catheter in place; HD 11/27, 11/29 tolerated wel    ENDO:  #DM2: HbA1c 5.5  - Start Insulin Sliding Scale  - endocrine following  - presented hypercalcemic, downtrending. hold pamidronate for now. f/u with endo    METABOLIC:  - hypoNa; monitor and f/u with HD schedule  - initially hyperCa, improving. hold pamidronate for now, follow up with endocrine    HEMATOLOGIC:  - hgb dropped to 6.5, unclear source of blood loss. s/p 1 unit PRBC 7.7 hgb. continue to monitor, consider imaging. stool guaiac ordered.   - pt was on eliquis; has been held.       INFECTIOUS:  - WBC downtrending. ID following  - RRT likely 2/2 septic shock, possible aspiration, UTI, has decubiti ulcers as well.  - Urine culture growing E.coli; blood culture negative  - was given empiric CNS infx coverage - vanc, manoj, acyclovir, LP CSF negative  - per ID, resart meropenem 500 IV q24 for 7 days total. dc vanc.   - f/u sputum culture - ngtd  - wound care consulted appreciate recs  - pt with diarrhea; cdiff indeterminate at this time, will f/u     PPX: SCDs  - home eliquis has been held while in septic state. can add back when tolerated    GOC:  - palliative consult.  -  sister is [employer] employee. Made aware of status and will update

## 2019-12-02 NOTE — PROGRESS NOTE ADULT - SUBJECTIVE AND OBJECTIVE BOX
Subjective: Patient seen and examined. No new events except as noted.     SUBJECTIVE/ROS:      MEDICATIONS:  MEDICATIONS  (STANDING):  atorvastatin 40 milliGRAM(s) Oral at bedtime  carvedilol 3.125 milliGRAM(s) Oral every 12 hours  chlorhexidine 4% Liquid 1 Application(s) Topical <User Schedule>  epoetin tan Injectable 73657 Unit(s) IV Push <User Schedule>  heparin  Injectable 5000 Unit(s) SubCutaneous every 8 hours  insulin lispro (HumaLOG) corrective regimen sliding scale   SubCutaneous every 6 hours  levETIRAcetam  IVPB 1000 milliGRAM(s) IV Intermittent every 12 hours  midodrine. 10 milliGRAM(s) Oral three times a day  pantoprazole  Injectable 40 milliGRAM(s) IV Push two times a day  predniSONE   Tablet 5 milliGRAM(s) Oral daily      PHYSICAL EXAM:  T(C): 36.7 (12-02-19 @ 05:00), Max: 37.9 (12-01-19 @ 13:00)  HR: 98 (12-02-19 @ 05:00) (82 - 114)  BP: 114/63 (12-02-19 @ 05:00) (114/63 - 154/79)  RR: 18 (12-02-19 @ 05:00) (18 - 18)  SpO2: 96% (12-02-19 @ 05:00) (94% - 100%)  Wt(kg): --  I&O's Summary    01 Dec 2019 07:01  -  02 Dec 2019 07:00  --------------------------------------------------------  IN: 1040 mL / OUT: 400 mL / NET: 640 mL            JVP: Normal  Neck: supple  Lung: clear   CV: S1 S2 , Murmur:  Abd: soft  Ext: No edema  neuro: Awake / alert  Psych: flat affect  Skin: normal``    LABS/DATA:    CARDIAC MARKERS:                                8.0    9.41  )-----------( 188      ( 01 Dec 2019 06:50 )             26.4     12-02    138  |  100  |  23  ----------------------------<  133<H>  4.0   |  24  |  3.44<H>    Ca    10.3      02 Dec 2019 06:44  Phos  2.9     12-01  Mg     1.8     12-01    TPro  6.6  /  Alb  2.6<L>  /  TBili  0.4  /  DBili  x   /  AST  16  /  ALT  5<L>  /  AlkPhos  122<H>  12-01    proBNP:   Lipid Profile:   HgA1c:   TSH:     TELE:  EKG:

## 2019-12-02 NOTE — PROGRESS NOTE ADULT - ASSESSMENT
Assessment  DM: A1C 5.5%, was on insulin at home, now on insulin low-dose coverage, blood sugars improved and trending within acceptable range, patient is still NPO on tube feeds, transferred to medical floor, appears alert.  Hypercalcemia: Primary Hyperparathyroidism, calcium improved and WNL s/p Pamidronate dose.  Sepsis: IV ABx for UTI, LP inconsistent with meningitis, on medications, stable, monitored.  HTN: Controlled,  on antihypertensive medications.  ESRD: On hemodialysis, Monitor labs/BMP          Robi Gay MD  Cell: [phone]  Office: [phone] Assessment  DM: A1C 5.5%, was on insulin at home, now on insulin low-dose coverage, blood sugars improved and trending within acceptable range, patient is still NPO on tube feeds,  transferred to medical floor, appears alert.  Hypercalcemia: Primary Hyperparathyroidism, calcium improved and WNL s/p Pamidronate dose.  Sepsis: IV ABx for UTI, LP inconsistent with meningitis, on medications, stable, monitored.  HTN: Controlled,  on antihypertensive medications.  ESRD: On hemodialysis, Monitor labs/BMP          Robi Gay MD  Cell: [phone]  Office: [phone]

## 2019-12-02 NOTE — CHART NOTE - NSCHARTNOTEFT_GEN_A_CORE
Nutrition Follow Up Note  Patient seen for: malnutrition follow up.     Source: Comprehensive chart review     Chart reviewed. Events noted. Pertinent chart information: 75 y/o male with PMHx of  ESRD s/p /p failed renal transplant 4 year ago and successful renal transplant 1 year ago, DM, HTN, 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     Diet : NPO    Patient reports:     PO intake : NPO     Source for PO intake: chart     Enteral /Parenteral Nutrition: Nepro w/ Carbsteady @ 30ml/hour x24 hours.   Total volume: 720ml/day  Total calories: 1296   Total protein: 58 grams        Daily Weight in k.3 (-30), Weight in k.8 (-30), Weight in k.8 (-30), Weight in k.4 (-29), Weight in k.4 (-29), Weight in k (-29), Weight in k.3 (-28)  % Weight Change    Pertinent Medications: MEDICATIONS  (STANDING):  atorvastatin 40 milliGRAM(s) Oral at bedtime  carvedilol 3.125 milliGRAM(s) Oral every 12 hours  chlorhexidine 4% Liquid 1 Application(s) Topical <User Schedule>  epoetin tan Injectable 86256 Unit(s) IV Push <User Schedule>  heparin  Injectable 5000 Unit(s) SubCutaneous every 8 hours  insulin lispro (HumaLOG) corrective regimen sliding scale   SubCutaneous every 6 hours  levETIRAcetam  IVPB 1000 milliGRAM(s) IV Intermittent every 12 hours  midodrine. 10 milliGRAM(s) Oral three times a day  pantoprazole  Injectable 40 milliGRAM(s) IV Push two times a day  predniSONE   Tablet 5 milliGRAM(s) Oral daily    MEDICATIONS  (PRN):    Pertinent Labs:  @ 06:44: Na 138, BUN 23, Cr 3.44<H>, <H>, K+ 4.0, Phos --, Mg --, Alk Phos --, ALT/SGPT --, AST/SGOT --, HbA1c --    Finger Sticks:  POCT Blood Glucose.: 134 mg/dL ( @ 12:47)  POCT Blood Glucose.: 145 mg/dL ( @ 05:45)  POCT Blood Glucose.: 116 mg/dL ( @ 00:19)  POCT Blood Glucose.: 105 mg/dL ( @ 18:21)    GI: noted with fecal incontinence w/ loose, liquid stools   Last BM per flowsheet: 19  Skin per nursing documentation: sacral stage 3 PU  Edema: none noted    Estimated Needs:   [x] no change since previous assessment  [ ] recalculated:     Previous Nutrition Diagnosis: severe malnutrition  Nutrition Diagnosis is: ongoing         Interventions:     Recommend  1) Consider advancing Nepro to goal of 55 mL/h x 24 h to provide 1320 mL total volume, 2376 Kcal, 107g protein, 960 mL free water (33 Kcal/Kg, 1.5g protein/Kg based on recent weight 71.9 Kg)  2) Monitor mental status for ability to re-initiate oral diet, would consider swallow evaluation prior to diet advance if medically feasible.      Monitoring and Evaluation:     Continue to monitor Nutritional intake, Tolerance to diet prescription, weights, labs, skin integrity    RD remains available upon request and will follow up per protocol    Anushka Smith RD pager # [phone] Nutrition Follow Up Note  Patient seen for: malnutrition follow up.     Source: Comprehensive chart review, RN and NP    Chart reviewed. Events noted. Pertinent chart information: 73 y/o male with PMHx of  ESRD s/p /p failed renal transplant 4 year ago and successful renal transplant 1 year ago, DM, HTN, 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     Diet : NPO    Subjective: Pt is awake but still lethargic and unable to participate well during interview. NP reports that plan is to attempt to advance pt to PO diet - Speech consult placed. Tube feeds well tolerated per RN.        Enteral /Parenteral Nutrition: Nepro w/ Carbsteady @ 30ml/hour x24 hours.   Total volume: 720ml/day  Total calories: 1296   Total protein: 58 grams        Daily Weight in k.3 (11-30),  70.8 (11-30),   64.8 (11-30),   70.4 (-29),  71.4 (11-29),  67 (11-29),   64.3 (11-28)  % Weight Change: weight fluctuations noted in-house; pt on HD. Weight change likely related to fluid shifts. Will continue to monitor.     Pertinent Medications: MEDICATIONS  (STANDING):  atorvastatin 40 milliGRAM(s) Oral at bedtime  carvedilol 3.125 milliGRAM(s) Oral every 12 hours  chlorhexidine 4% Liquid 1 Application(s) Topical <User Schedule>  epoetin tan Injectable 63400 Unit(s) IV Push <User Schedule>  heparin  Injectable 5000 Unit(s) SubCutaneous every 8 hours  insulin lispro (HumaLOG) corrective regimen sliding scale   SubCutaneous every 6 hours  levETIRAcetam  IVPB 1000 milliGRAM(s) IV Intermittent every 12 hours  midodrine. 10 milliGRAM(s) Oral three times a day  pantoprazole  Injectable 40 milliGRAM(s) IV Push two times a day  predniSONE   Tablet 5 milliGRAM(s) Oral daily    MEDICATIONS  (PRN):    Pertinent Labs:  @ 06:44: Na 138, BUN 23, Cr 3.44<H>, <H>, K+ 4.0, Phos --, Mg --, Alk Phos --, ALT/SGPT --, AST/SGOT --, HbA1c --    Finger Sticks:  POCT Blood Glucose.: 134 mg/dL ( @ 12:47)  POCT Blood Glucose.: 145 mg/dL ( @ 05:45)  POCT Blood Glucose.: 116 mg/dL ( @ 00:19)  POCT Blood Glucose.: 105 mg/dL ( @ 18:21)    GI: noted with fecal incontinence w/ loose, liquid stools   Last BM per flowsheet: 19  Skin per nursing documentation: sacral stage 3 PU  Edema: none noted    Estimated Needs:   [ ] no change since previous assessment  [x] recalculated: based on recent weight 69.3 Kg    2931-4354 (30-35 kcal/Kg)   (1.4-1.6g protein/kg)    Previous Nutrition Diagnosis: severe malnutrition  Nutrition Diagnosis is: ongoing         Interventions:     Recommend  1) Consider advancing Nepro to goal of 55 mL/h x 24 h to provide 1320 mL total volume, 2376 Kcal, 107g protein, 960 mL free water (33 Kcal/Kg, 1.5g protein/Kg based on recent weight 69.3 Kg)  2) Monitor mental status for ability to re-initiate oral diet.      Monitoring and Evaluation:     Continue to monitor Nutritional intake, Tolerance to diet prescription, weights, labs, skin integrity    RD remains available upon request and will follow up per protocol    Anushka Smith RD pager # [phone]

## 2019-12-02 NOTE — PROGRESS NOTE ADULT - ASSESSMENT
74M PMHx of ESRD s/p failed renal transplant x2, HCV, DM, and meningococcal meningitis 2 years ago was sent in to the ED from HD for AMS and low BPs. Admitted with concern for CVA, neurology following. Patient with RRT overnight 11/27 resulting in transfer to MICU for respiratory failure concern for aspiration and sepsis. Palliative consulted for [facility]

## 2019-12-02 NOTE — PROGRESS NOTE ADULT - SUBJECTIVE AND OBJECTIVE BOX
Patient is a 74y old  Male who presents with a chief complaint of ams (02 Dec 2019 14:51)      SUBJECTIVE / OVERNIGHT EVENTS:  NAD.  No chest pain. No shortness of breath. No complaints. No events overnight.     MEDICATIONS  (STANDING):  atorvastatin 40 milliGRAM(s) Oral at bedtime  carvedilol 3.125 milliGRAM(s) Oral every 12 hours  chlorhexidine 4% Liquid 1 Application(s) Topical <User Schedule>  epoetin tan Injectable 30488 Unit(s) IV Push <User Schedule>  heparin  Injectable 5000 Unit(s) SubCutaneous every 8 hours  insulin lispro (HumaLOG) corrective regimen sliding scale   SubCutaneous every 6 hours  levETIRAcetam  IVPB 1000 milliGRAM(s) IV Intermittent every 12 hours  midodrine. 10 milliGRAM(s) Oral three times a day  pantoprazole  Injectable 40 milliGRAM(s) IV Push two times a day  predniSONE   Tablet 5 milliGRAM(s) Oral daily    MEDICATIONS  (PRN):      Vital Signs Last 24 Hrs  T(C): 36.8 (02 Dec 2019 13:00), Max: 37.7 (01 Dec 2019 21:00)  T(F): 98.3 (02 Dec 2019 13:00), Max: 99.9 (01 Dec 2019 21:00)  HR: 91 (02 Dec 2019 13:00) (82 - 105)  BP: 130/74 (02 Dec 2019 13:00) (114/63 - 130/74)  BP(mean): --  RR: 18 (02 Dec 2019 13:00) (18 - 18)  SpO2: 95% (02 Dec 2019 13:00) (93% - 100%)  CAPILLARY BLOOD GLUCOSE      POCT Blood Glucose.: 134 mg/dL (02 Dec 2019 12:47)  POCT Blood Glucose.: 145 mg/dL (02 Dec 2019 05:45)  POCT Blood Glucose.: 116 mg/dL (02 Dec 2019 00:19)  POCT Blood Glucose.: 105 mg/dL (01 Dec 2019 18:21)    I&O's Summary    01 Dec 2019 07:01  -  02 Dec 2019 07:00  --------------------------------------------------------  IN: 1040 mL / OUT: 400 mL / NET: 640 mL    02 Dec 2019 07:01  -  02 Dec 2019 16:45  --------------------------------------------------------  IN: 0 mL / OUT: 200 mL / NET: -200 mL        PHYSICAL EXAM:  GENERAL: NAD, well-developed  HEAD:  Atraumatic, Normocephalic  EYES: EOMI, PERRLA, conjunctiva and sclera clear  NECK: Supple, No JVD  CHEST/LUNG: Clear to auscultation bilaterally; No wheeze  HEART: Regular rate and rhythm; No murmurs, rubs, or gallops  ABDOMEN: Soft, Nontender, Nondistended; Bowel sounds present  EXTREMITIES:  2+ Peripheral Pulses, No clubbing, cyanosis, or edema  PSYCH: AAOx3  NEUROLOGY: non-focal  SKIN: No rashes or lesions    LABS:                        8.0    8.83  )-----------( 217      ( 02 Dec 2019 09:06 )             27.7     12-02    138  |  100  |  23  ----------------------------<  133<H>  4.0   |  24  |  3.44<H>    Ca    10.3      02 Dec 2019 06:44  Phos  2.9     12-01  Mg     1.8     12-01    TPro  6.6  /  Alb  2.6<L>  /  TBili  0.4  /  DBili  x   /  AST  16  /  ALT  5<L>  /  AlkPhos  122<H>  12-01              RADIOLOGY & ADDITIONAL TESTS:    Imaging Personally Reviewed:    Consultant(s) Notes Reviewed:      Care Discussed with Consultants/Other Providers:

## 2019-12-02 NOTE — PROGRESS NOTE ADULT - PROBLEM SELECTOR PLAN 4
full code at this time  although sister has been involved in decision making, patient has 3 children whom [facility] are the legal surrogate decision makers and should be contacted prior to any medical decisions being made unless all 3 children defer to patients sister. Patient without capacity to fill out HCP at this time  Attempted to call allie Read on this date at number [phone] but no answer. voicemail left with contact number, awaiting call back

## 2019-12-02 NOTE — PROGRESS NOTE ADULT - SUBJECTIVE AND OBJECTIVE BOX
Patient is a 74y old  Male who presents with a chief complaint of ams (02 Dec 2019 12:01)    Being followed by ID for pna    Interval history: much more awake   answers some queries appropriately  No other acute events      ROS:  denies any cough  denies any pain     Antimicrobials:        other medications reviewed    Vital Signs Last 24 Hrs  T(C): 36.9 (12-02-19 @ 08:57), Max: 37.9 (12-01-19 @ 13:00)  T(F): 98.5 (12-02-19 @ 08:57), Max: 100.2 (12-01-19 @ 13:00)  HR: 95 (12-02-19 @ 08:57) (82 - 106)  BP: 127/66 (12-02-19 @ 08:57) (114/63 - 154/79)  BP(mean): --  RR: 18 (12-02-19 @ 08:57) (18 - 18)  SpO2: 93% (12-02-19 @ 08:57) (93% - 100%)    Physical Exam:        R Sc HD catheter no erythema o tenderness    decreased Neck rigidity     Chest Good AE,bibasilar crackles     CVS RRR S1 S2 WNl No murmur or rub or gallop    Abd soft BS normal No tenderness RLQ transplant kidney no tenderness    Ext left arm AVF no erythema or tenderness    IV site no erythema tenderness or discharge    Joints no swelling or LOM    CNS as above    Lab Data:                          8.0    8.83  )-----------( 217      ( 02 Dec 2019 09:06 )             27.7       12-02    138  |  100  |  23  ----------------------------<  133<H>  4.0   |  24  |  3.44<H>    Ca    10.3      02 Dec 2019 06:44  Phos  2.9     12-01  Mg     1.8     12-01    TPro  6.6  /  Alb  2.6<L>  /  TBili  0.4  /  DBili  x   /  AST  16  /  ALT  5<L>  /  AlkPhos  122<H>  12-01        Culture - Bronchial (collected 27 Nov 2019 17:19)  Source: Bronch Wash Combicath  Gram Stain (27 Nov 2019 21:50):    No polymorphonuclear cells seen per low power field    No squamous epithelial cells per low power field    No organisms seen per oil power field  Final Report (29 Nov 2019 19:03):    Normal Respiratory Millie present        < from: Xray Chest 1 View- PORTABLE-Urgent (11.30.19 @ 01:46) >  FINDINGS/  IMPRESSION:    No consolidation. Nopleural effusion or pneumothorax.    Enteric tube tip within the stomach. Left IJ central line tip within SVC.        < end of copied text >

## 2019-12-02 NOTE — PROGRESS NOTE ADULT - SUBJECTIVE AND OBJECTIVE BOX
[facility]      Seen earlier today, and examined.  - Today, patient is without complaints.           *****MEDICATIONS: Current medication reviewed and documented.    MEDICATIONS  (STANDING):  atorvastatin 40 milliGRAM(s) Oral at bedtime  carvedilol 3.125 milliGRAM(s) Oral every 12 hours  chlorhexidine 4% Liquid 1 Application(s) Topical <User Schedule>  epoetin tan Injectable 35602 Unit(s) IV Push <User Schedule>  heparin  Injectable 5000 Unit(s) SubCutaneous every 8 hours  insulin lispro (HumaLOG) corrective regimen sliding scale   SubCutaneous every 6 hours  levETIRAcetam  IVPB 1000 milliGRAM(s) IV Intermittent every 12 hours  midodrine. 10 milliGRAM(s) Oral three times a day  pantoprazole  Injectable 40 milliGRAM(s) IV Push two times a day  predniSONE   Tablet 5 milliGRAM(s) Oral daily    MEDICATIONS  (PRN):          ***** VITAL SIGNS:  T(F): 98.3 (19 @ 13:00), Max: 99.9 (19 @ 21:00)  HR: 91 (19 @ 13:00) (82 - 98)  BP: 130/74 (19 @ 13:00) (114/63 - 130/74)  RR: 18 (19 @ 13:00) (18 - 18)  SpO2: 95% (19 @ 13:00) (93% - 100%)  Wt(kg): --  ,   I&O's Summary    01 Dec 2019 07  -  02 Dec 2019 07:00  --------------------------------------------------------  IN: 1040 mL / OUT: 400 mL / NET: 640 mL    02 Dec 2019 07:01  -  02 Dec 2019 17:46  --------------------------------------------------------  IN: 0 mL / OUT: 200 mL / NET: -200 mL             *****PHYSICAL EXAM: speech more discernible " leave me alone"  able to speak spontaneously  more insight   can squeeze hand to commands, grimaces to noxious stimuli, able to  my hands, wiggles toes  and legs, hyperreflexic in the legs with bilateral cross adductors, bilateral upgoing toes.     Gait not assessed.          *****LAB AND IMAGIN.0    8.83  )-----------( 217      ( 02 Dec 2019 09:06 )             27.7                   138  |  100  |  23  ----------------------------<  133<H>  4.0   |  24  |  3.44<H>    Ca    10.3      02 Dec 2019 06:44  Phos  2.9       Mg     1.8         TPro  6.6  /  Alb  2.6<L>  /  TBili  0.4  /  DBili  x   /  AST  16  /  ALT  5<L>  /  AlkPhos  122<H>                           [All pertinent recent Imaging/Reports reviewed]           *****A S S E S S M E N T   A N D   P L A N :    75 y/o male with PMHx of  ESRD s/p /p failed renal transplant 4 year ago and successful renal transplant 1 year ago, DM, HTN, 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   On keppra for presumed seizure disorder, but no clear history.    Pt is encephalopathic on exam,. opens eyes to voice  can  follow simple commands  commands, grimaces to noxious stimuli, able to  my hands, wiggles toes  and legs, hyperreflexic in the legs with bilateral cross adductors, bilateral upgoing toes.     seen initially by house neurology, following up now,   lp done not consistent with meningitis.  Defer to ID     MRI brain , no contrast due to renal dysfunction  Routine EEG  without any seizures  AMS likely related to hypercalcemia, renal dysfunction , poor nutritional intake.   thiamine 500 ivpb daily x 3 days.    correct ca   mild improvement in  mental status       Thank you for allowing me to participate in the care of this patient. Please do not hesitate to call me if you have any  questions.        ________________  Shira Lindsey MD  [facility]rological [facility] ([facility]  [phone]      33 minutes spent on total encounter; more than 50 % of the visit was  spent counseling about plan of care, compliance to diet/exercise and medication regimen and or  coordinating care by the attending physician.      It is advised that stroke patients follow up with JAVIER Vaughan @ [phone] in 1- 2 weeks.   Others please follow up with Dr. Michael Nissenbaum [phone]

## 2019-12-02 NOTE — PROGRESS NOTE ADULT - SUBJECTIVE AND OBJECTIVE BOX
SUBJECTIVE AND OBJECTIVE:  INTERVAL HPI/OVERNIGHT EVENTS:    DNR on chart:   Allergies    No Known Allergies    Intolerances    MEDICATIONS  (STANDING):  atorvastatin 40 milliGRAM(s) Oral at bedtime  carvedilol 3.125 milliGRAM(s) Oral every 12 hours  chlorhexidine 4% Liquid 1 Application(s) Topical <User Schedule>  epoetin tan Injectable 57987 Unit(s) IV Push <User Schedule>  heparin  Injectable 5000 Unit(s) SubCutaneous every 8 hours  insulin lispro (HumaLOG) corrective regimen sliding scale   SubCutaneous every 6 hours  levETIRAcetam  IVPB 1000 milliGRAM(s) IV Intermittent every 12 hours  midodrine. 10 milliGRAM(s) Oral three times a day  pantoprazole  Injectable 40 milliGRAM(s) IV Push two times a day  predniSONE   Tablet 5 milliGRAM(s) Oral daily    MEDICATIONS  (PRN):      ITEMS UNCHECKED ARE NOT PRESENT    PRESENT SYMPTOMS: [ ]Unable to obtain due to poor mentation   Source if other than patient:  [ ]Family   [ ]Team     Pain (Impact on QOL):    Location:  Minimal acceptable level (0-10 scale):                   Aggravating factors:  Quality:  Radiation:  Severity (0-10 scale):    Timing:    Dyspnea:                           [ ]Mild [ ]Moderate [ ]Severe  Anxiety:                             [ ]Mild [ ]Moderate [ ]Severe  Fatigue:                             [ ]Mild [ ]Moderate [ ]Severe  Nausea:                             [ ]Mild [ ]Moderate [ ]Severe  Loss of appetite:              [ ]Mild [ ]Moderate [ ]Severe  Constipation:                    [ ]Mild [ ]Moderate [ ]Severe    PAIN AD Score:	  [email or web address] (Ctrl + left click to view)    Other Symptoms:  [ ]All other review of systems negative     Karnofsky Performance Score/Palliative Performance Status Version 2:         %    http://palliative.info/resource_material/PPSv2.pdf  PHYSICAL EXAM:  Vital Signs Last 24 Hrs  T(C): 36.8 (02 Dec 2019 13:00), Max: 37.7 (01 Dec 2019 21:00)  T(F): 98.3 (02 Dec 2019 13:00), Max: 99.9 (01 Dec 2019 21:00)  HR: 91 (02 Dec 2019 13:00) (82 - 105)  BP: 130/74 (02 Dec 2019 13:00) (114/63 - 130/74)  BP(mean): --  RR: 18 (02 Dec 2019 13:00) (18 - 18)  SpO2: 95% (02 Dec 2019 13:00) (93% - 100%) I&O's Summary    01 Dec 2019 07:01  -  02 Dec 2019 07:00  --------------------------------------------------------  IN: 1040 mL / OUT: 400 mL / NET: 640 mL     GENERAL:  [ ]Alert  [ ]Oriented x   [ ]Lethargic  [ ]Cachexia  [ ]Unarousable  [ ]Verbal  [ ]Non-Verbal  Behavioral:   [ ] Anxiety  [ ] Delirium [ ] Agitation [ ] Other  HEENT:  [ ]Normal   [ ]Dry mouth   [ ]ET Tube/Trach  [ ]Oral lesions  PULMONARY:   [ ]Clear [ ]Tachypnea  [ ]Audible excessive secretions   [ ]Rhonchi        [ ]Right [ ]Left [ ]Bilateral  [ ]Crackles        [ ]Right [ ]Left [ ]Bilateral  [ ]Wheezing     [ ]Right [ ]Left [ ]Bilateral  CARDIOVASCULAR:    [ ]Regular [ ]Irregular [ ]Tachy  [ ]Yaniv [ ]Murmur [ ]Other  GASTROINTESTINAL:  [ ]Soft  [ ]Distended   [ ]+BS  [ ]Non tender [ ]Tender  [ ]PEG [ ]OGT/ NGT   Last BM:    GENITOURINARY:  [ ]Normal [ ] Incontinent   [ ]Oliguria/Anuria   [ ]Mclaughlin  MUSCULOSKELETAL:   [ ]Normal   [ ]Weakness  [ ]Bed/Wheelchair bound [ ]Edema  NEUROLOGIC:   [ ]No focal deficits  [ ] Cognitive impairment  [ ] Dysphagia [ ]Dysarthria [ ] Paresis [ ]Other   SKIN:   [ ]Normal   [ ]Pressure ulcer(s)  [ ]Rash    CRITICAL CARE:  [ ] Shock Present  [ ]Septic [ ]Cardiogenic [ ]Neurologic [ ]Hypovolemic  [ ]  Vasopressors [ ]  Inotropes   [ ] Respiratory failure present  [ ] Acute  [ ] Chronic [ ] Hypoxic  [ ] Hypercarbic [ ] Other  [ ] Other organ failure     LABS:                        8.0    8.83  )-----------( 217      ( 02 Dec 2019 09:06 )             27.7   12-02    138  |  100  |  23  ----------------------------<  133<H>  4.0   |  24  |  3.44<H>    Ca    10.3      02 Dec 2019 06:44  Phos  2.9     12-01  Mg     1.8     12-01    TPro  6.6  /  Alb  2.6<L>  /  TBili  0.4  /  DBili  x   /  AST  16  /  ALT  5<L>  /  AlkPhos  122<H>  12-01        RADIOLOGY & ADDITIONAL STUDIES:    Protein Calorie Malnutrition Present: [ ] yes [ ] no  [ ] PPSV2 < or = 30%  [ ] significant weight loss [ ] poor nutritional intake [ ] anasarca [ ] catabolic state Albumin, Serum: 2.6 g/dL (12-01-19 @ 00:27)  Artificial Nutrition [ ]     REFERRALS:   [ ]Chaplaincy  [ ] Hospice  [ ]Child Life  [ ]Social Work  [ ]Case management [ ]Holistic Therapy   Goals of Care Document: SUBJECTIVE AND OBJECTIVE: Patient seen and examined, encephalopathic. Alert and oriented x1-2, knows he is in the hospital but unaware of why. Agrees that he has 3 children though cannot say their names. No family at bedside. nutrition via NG.  Denies pain/sob, ROS otherwise negative.     INTERVAL HPI/OVERNIGHT EVENTS: transferred from MICU, now on medical floor. remains encephalopathic    DNR on chart:   Allergies    No Known Allergies    Intolerances    MEDICATIONS  (STANDING):  atorvastatin 40 milliGRAM(s) Oral at bedtime  carvedilol 3.125 milliGRAM(s) Oral every 12 hours  chlorhexidine 4% Liquid 1 Application(s) Topical <User Schedule>  epoetin tan Injectable 93330 Unit(s) IV Push <User Schedule>  heparin  Injectable 5000 Unit(s) SubCutaneous every 8 hours  insulin lispro (HumaLOG) corrective regimen sliding scale   SubCutaneous every 6 hours  levETIRAcetam  IVPB 1000 milliGRAM(s) IV Intermittent every 12 hours  midodrine. 10 milliGRAM(s) Oral three times a day  pantoprazole  Injectable 40 milliGRAM(s) IV Push two times a day  predniSONE   Tablet 5 milliGRAM(s) Oral daily    MEDICATIONS  (PRN):      ITEMS UNCHECKED ARE NOT PRESENT    PRESENT SYMPTOMS: [ ]Unable to obtain due to poor mentation   Source if other than patient:  [ ]Family   [ ]Team     Pain (Impact on QOL):    Location:  Minimal acceptable level (0-10 scale):                   Aggravating factors:  Quality:  Radiation:  Severity (0-10 scale):    Timing:    Dyspnea:                           [ ]Mild [ ]Moderate [ ]Severe  Anxiety:                             [ ]Mild [ ]Moderate [ ]Severe  Fatigue:                             [ ]Mild [ ]Moderate [ ]Severe  Nausea:                             [ ]Mild [ ]Moderate [ ]Severe  Loss of appetite:              [ ]Mild [ ]Moderate [ ]Severe  Constipation:                    [ ]Mild [ ]Moderate [ ]Severe    PAIN AD Score:	  http://[email or web address].[email or web address]/painad.pdf (Ctrl + left click to view)    Other Symptoms:  [x ]All other review of systems negative     Karnofsky Performance Score/Palliative Performance Status Version 2:      30   %    http://palliative.info/resource_material/PPSv2.pdf  PHYSICAL EXAM:  Vital Signs Last 24 Hrs  T(C): 36.8 (02 Dec 2019 13:00), Max: 37.7 (01 Dec 2019 21:00)  T(F): 98.3 (02 Dec 2019 13:00), Max: 99.9 (01 Dec 2019 21:00)  HR: 91 (02 Dec 2019 13:00) (82 - 105)  BP: 130/74 (02 Dec 2019 13:00) (114/63 - 130/74)  BP(mean): --  RR: 18 (02 Dec 2019 13:00) (18 - 18)  SpO2: 95% (02 Dec 2019 13:00) (93% - 100%) I&O's Summary    01 Dec 2019 07:01  -  02 Dec 2019 07:00  --------------------------------------------------------  IN: 1040 mL / OUT: 400 mL / NET: 640 mL     GENERAL:  [x ]Alert  [ x]Oriented x1   [ ]Lethargic  [ ]Cachexia  [ ]Unarousable  [ ]Verbal  [ ]Non-Verbal  Behavioral:   [ ] Anxiety  [ x] Delirium [ ] Agitation [ ] Other  HEENT:  [ ]Normal   [ x]Dry mouth   [ ]ET Tube/Trach  [ ]Oral lesions  PULMONARY:   [x ]Clear [ ]Tachypnea  [ ]Audible excessive secretions   [ ]Rhonchi        [ ]Right [ ]Left [ ]Bilateral  [ ]Crackles        [ ]Right [ ]Left [ ]Bilateral  [ ]Wheezing     [ ]Right [ ]Left [ ]Bilateral  CARDIOVASCULAR:    [x ]Regular [ ]Irregular [ ]Tachy  [ ]Yaniv [ ]Murmur [ ]Other  GASTROINTESTINAL:  [x ]Soft  [ ]Distended   [ ]+BS  [x ]Non tender [ ]Tender  [ ]PEG [ x]OGT/ NGT   Last BM:    GENITOURINARY:  [ ]Normal [ ] Incontinent   [ x]Oliguria/Anuria   [ ]Mclaughlin  MUSCULOSKELETAL:   [ ]Normal   [ ]Weakness  [x ]Bed/Wheelchair bound [ ]Edema  NEUROLOGIC:   [ ]No focal deficits  [x ] Cognitive impairment  [ ] Dysphagia [ ]Dysarthria [ ] Paresis [ ]Other   SKIN: xerosis  [ ]Normal   [ ]Pressure ulcer(s)  [ ]Rash    CRITICAL CARE:  [ ] Shock Present  [ ]Septic [ ]Cardiogenic [ ]Neurologic [ ]Hypovolemic  [ ]  Vasopressors [ ]  Inotropes   [ ] Respiratory failure present  [ ] Acute  [ ] Chronic [ ] Hypoxic  [ ] Hypercarbic [ ] Other  [ ] Other organ failure     LABS:                        8.0    8.83  )-----------( 217      ( 02 Dec 2019 09:06 )             27.7   12-02    138  |  100  |  23  ----------------------------<  133<H>  4.0   |  24  |  3.44<H>    Ca    10.3      02 Dec 2019 06:44  Phos  2.9     12-01  Mg     1.8     12-01    TPro  6.6  /  Alb  2.6<L>  /  TBili  0.4  /  DBili  x   /  AST  16  /  ALT  5<L>  /  AlkPhos  122<H>  12-01    RADIOLOGY & ADDITIONAL STUDIES: no new imaging studies for review    Protein Calorie Malnutrition Present: [x ] yes [ ] no  [x ] PPSV2 < or = 30%  [ ] significant weight loss [ x] poor nutritional intake [ ] anasarca [ ] catabolic state Albumin, Serum: 2.6 g/dL (12-01-19 @ 00:27)  Artificial Nutrition [x]     REFERRALS:   [ ]Chaplaincy  [ ] Hospice  [ ]Child Life  [ ]Social Work  [ x]Case management [ ]Holistic Therapy   Goals of Care Document:

## 2019-12-03 LAB
ANION GAP SERPL CALC-SCNC: 12 MMOL/L — SIGNIFICANT CHANGE UP (ref 5–17)
BUN SERPL-MCNC: 12 MG/DL — SIGNIFICANT CHANGE UP (ref 7–23)
CALCIUM SERPL-MCNC: 9.7 MG/DL — SIGNIFICANT CHANGE UP (ref 8.4–10.5)
CHLORIDE SERPL-SCNC: 100 MMOL/L — SIGNIFICANT CHANGE UP (ref 96–108)
CO2 SERPL-SCNC: 24 MMOL/L — SIGNIFICANT CHANGE UP (ref 22–31)
CREAT SERPL-MCNC: 2.17 MG/DL — HIGH (ref 0.5–1.3)
GLUCOSE BLDC GLUCOMTR-MCNC: 118 MG/DL — HIGH (ref 70–99)
GLUCOSE BLDC GLUCOMTR-MCNC: 128 MG/DL — HIGH (ref 70–99)
GLUCOSE BLDC GLUCOMTR-MCNC: 134 MG/DL — HIGH (ref 70–99)
GLUCOSE SERPL-MCNC: 109 MG/DL — HIGH (ref 70–99)
HCT VFR BLD CALC: 27.7 % — LOW (ref 39–50)
HGB BLD-MCNC: 8.1 G/DL — LOW (ref 13–17)
MCHC RBC-ENTMCNC: 25.7 PG — LOW (ref 27–34)
MCHC RBC-ENTMCNC: 29.2 GM/DL — LOW (ref 32–36)
MCV RBC AUTO: 87.9 FL — SIGNIFICANT CHANGE UP (ref 80–100)
PLATELET # BLD AUTO: 227 K/UL — SIGNIFICANT CHANGE UP (ref 150–400)
POTASSIUM SERPL-MCNC: 4.2 MMOL/L — SIGNIFICANT CHANGE UP (ref 3.5–5.3)
POTASSIUM SERPL-SCNC: 4.2 MMOL/L — SIGNIFICANT CHANGE UP (ref 3.5–5.3)
RBC # BLD: 3.15 M/UL — LOW (ref 4.2–5.8)
RBC # FLD: 16.2 % — HIGH (ref 10.3–14.5)
SODIUM SERPL-SCNC: 136 MMOL/L — SIGNIFICANT CHANGE UP (ref 135–145)
T4 FREE SERPL-MCNC: 1 NG/DL — SIGNIFICANT CHANGE UP (ref 0.9–1.8)
TSH SERPL-MCNC: 3.08 UIU/ML — SIGNIFICANT CHANGE UP (ref 0.27–4.2)
WBC # BLD: 10.14 K/UL — SIGNIFICANT CHANGE UP (ref 3.8–10.5)
WBC # FLD AUTO: 10.14 K/UL — SIGNIFICANT CHANGE UP (ref 3.8–10.5)

## 2019-12-03 PROCEDURE — 71045 X-RAY EXAM CHEST 1 VIEW: CPT | Mod: 26

## 2019-12-03 RX ORDER — LEVETIRACETAM 250 MG/1
1000 TABLET, FILM COATED ORAL AT BEDTIME
Refills: 0 | Status: DISCONTINUED | OUTPATIENT
Start: 2019-12-03 | End: 2019-12-21

## 2019-12-03 RX ADMIN — LEVETIRACETAM 400 MILLIGRAM(S): 250 TABLET, FILM COATED ORAL at 06:28

## 2019-12-03 RX ADMIN — MIDODRINE HYDROCHLORIDE 10 MILLIGRAM(S): 2.5 TABLET ORAL at 06:28

## 2019-12-03 RX ADMIN — HEPARIN SODIUM 5000 UNIT(S): 5000 INJECTION INTRAVENOUS; SUBCUTANEOUS at 06:28

## 2019-12-03 RX ADMIN — HEPARIN SODIUM 5000 UNIT(S): 5000 INJECTION INTRAVENOUS; SUBCUTANEOUS at 21:53

## 2019-12-03 RX ADMIN — CHLORHEXIDINE GLUCONATE 1 APPLICATION(S): 213 SOLUTION TOPICAL at 09:09

## 2019-12-03 RX ADMIN — MIDODRINE HYDROCHLORIDE 10 MILLIGRAM(S): 2.5 TABLET ORAL at 11:49

## 2019-12-03 RX ADMIN — LEVETIRACETAM 400 MILLIGRAM(S): 250 TABLET, FILM COATED ORAL at 18:11

## 2019-12-03 RX ADMIN — MIDODRINE HYDROCHLORIDE 10 MILLIGRAM(S): 2.5 TABLET ORAL at 18:21

## 2019-12-03 RX ADMIN — PANTOPRAZOLE SODIUM 40 MILLIGRAM(S): 20 TABLET, DELAYED RELEASE ORAL at 18:11

## 2019-12-03 RX ADMIN — CARVEDILOL PHOSPHATE 3.12 MILLIGRAM(S): 80 CAPSULE, EXTENDED RELEASE ORAL at 06:28

## 2019-12-03 RX ADMIN — CARVEDILOL PHOSPHATE 3.12 MILLIGRAM(S): 80 CAPSULE, EXTENDED RELEASE ORAL at 18:11

## 2019-12-03 RX ADMIN — HEPARIN SODIUM 5000 UNIT(S): 5000 INJECTION INTRAVENOUS; SUBCUTANEOUS at 13:39

## 2019-12-03 RX ADMIN — Medication 5 MILLIGRAM(S): at 06:29

## 2019-12-03 RX ADMIN — PANTOPRAZOLE SODIUM 40 MILLIGRAM(S): 20 TABLET, DELAYED RELEASE ORAL at 06:29

## 2019-12-03 RX ADMIN — ATORVASTATIN CALCIUM 40 MILLIGRAM(S): 80 TABLET, FILM COATED ORAL at 21:53

## 2019-12-03 NOTE — PROGRESS NOTE ADULT - SUBJECTIVE AND OBJECTIVE BOX
Subjective: Patient seen and examined. No new events except as noted.     SUBJECTIVE/ROS:  Due to altered mental status, subjective information were not able to be obtained from the patient. History was obtained, to the extent possible, from review of the chart and collateral sources of information.       MEDICATIONS:  MEDICATIONS  (STANDING):  atorvastatin 40 milliGRAM(s) Oral at bedtime  carvedilol 3.125 milliGRAM(s) Oral every 12 hours  chlorhexidine 4% Liquid 1 Application(s) Topical <User Schedule>  epoetin tan Injectable 79644 Unit(s) IV Push <User Schedule>  heparin  Injectable 5000 Unit(s) SubCutaneous every 8 hours  insulin lispro (HumaLOG) corrective regimen sliding scale   SubCutaneous every 6 hours  levETIRAcetam  IVPB 1000 milliGRAM(s) IV Intermittent every 12 hours  midodrine. 10 milliGRAM(s) Oral three times a day  pantoprazole  Injectable 40 milliGRAM(s) IV Push two times a day  predniSONE   Tablet 5 milliGRAM(s) Oral daily      PHYSICAL EXAM:  T(C): 37.1 (12-03-19 @ 05:00), Max: 37.1 (12-03-19 @ 05:00)  HR: 90 (12-03-19 @ 05:00) (90 - 102)  BP: 109/62 (12-03-19 @ 05:00) (104/61 - 130/74)  RR: 18 (12-03-19 @ 05:00) (18 - 20)  SpO2: 99% (12-03-19 @ 05:00) (93% - 100%)  Wt(kg): --  I&O's Summary    02 Dec 2019 07:01  -  03 Dec 2019 07:00  --------------------------------------------------------  IN: 570 mL / OUT: 2450 mL / NET: -1880 mL            JVP: Normal  Neck: supple  Lung: clear   CV: S1 S2 , Murmur:  Abd: soft  Ext: No edema  neuro: Awake   Psych: flat affect  Skin: normal``    LABS/DATA:    CARDIAC MARKERS:                                8.0    8.83  )-----------( 217      ( 02 Dec 2019 09:06 )             27.7     12-03    136  |  100  |  12  ----------------------------<  109<H>  4.2   |  24  |  2.17<H>    Ca    9.7      03 Dec 2019 06:12      proBNP:   Lipid Profile:   HgA1c:   TSH:     TELE:  EKG:

## 2019-12-03 NOTE — PROGRESS NOTE ADULT - ASSESSMENT
Assessment  DM: A1C 5.5%, was on insulin at home, now on insulin low-dose coverage, blood sugars improved and trending within acceptable range, patient is still NPO on tube feeds, appears comfortable, pulled out his NGT tube overnight, now reinserted.  Hypercalcemia: Primary Hyperparathyroidism, calcium improved and WNL s/p Pamidronate dose.  Sepsis: IV ABx for UTI, LP inconsistent with meningitis, on medications, stable, monitored.  HTN: Controlled,  on antihypertensive medications.  ESRD: On hemodialysis, Monitor labs/BMP          Robi Gay MD  Cell: [phone]  Office: [phone] Assessment  DM: A1C 5.5%, was on insulin at home, now on insulin low-dose coverage, blood sugars improved and trending within acceptable range, patient is still NPO on tube feeds, appears comfortable, pulled out his NGT tube overnight,  now reinserted.  Hypercalcemia: Primary Hyperparathyroidism, calcium improved and WNL s/p Pamidronate dose.  Sepsis: IV ABx for UTI, LP inconsistent with meningitis, on medications, stable, monitored.  HTN: Controlled,  on antihypertensive medications.  ESRD: On hemodialysis, Monitor labs/BMP          Robi Gay MD  Cell: [phone]  Office: [phone]

## 2019-12-03 NOTE — PROGRESS NOTE ADULT - PROBLEM SELECTOR PLAN 1
Will continue current insulin regimen for now.  Will continue monitoring FS, log, will Follow up. Will continue monitoring FS, log, will Follow up.

## 2019-12-03 NOTE — PROGRESS NOTE ADULT - ASSESSMENT
History of cocaine induced CM  normal EF on last echo  cont BB     PAF  in sinus   a/c once deemed safe   for now off given high bleed risk, sepsis on hold     Hypercalcemia  as per renal   HD    NSVT  cont BB     tachycardia  improving  cont BB     appreciate PC input

## 2019-12-03 NOTE — PROGRESS NOTE ADULT - SUBJECTIVE AND OBJECTIVE BOX
Patient is a 74y old  Male who presents with a chief complaint of ams (03 Dec 2019 08:05)      SUBJECTIVE / OVERNIGHT EVENTS:  pulled out NGT but it was replaced.    MEDICATIONS  (STANDING):  atorvastatin 40 milliGRAM(s) Oral at bedtime  carvedilol 3.125 milliGRAM(s) Oral every 12 hours  chlorhexidine 4% Liquid 1 Application(s) Topical <User Schedule>  epoetin tan Injectable 81235 Unit(s) IV Push <User Schedule>  heparin  Injectable 5000 Unit(s) SubCutaneous every 8 hours  insulin lispro (HumaLOG) corrective regimen sliding scale   SubCutaneous every 6 hours  levETIRAcetam  IVPB 1000 milliGRAM(s) IV Intermittent every 12 hours  midodrine. 10 milliGRAM(s) Oral three times a day  pantoprazole  Injectable 40 milliGRAM(s) IV Push two times a day  predniSONE   Tablet 5 milliGRAM(s) Oral daily    MEDICATIONS  (PRN):      Vital Signs Last 24 Hrs  T(C): 37.2 (03 Dec 2019 14:03), Max: 37.2 (03 Dec 2019 14:03)  T(F): 98.9 (03 Dec 2019 14:03), Max: 98.9 (03 Dec 2019 14:03)  HR: 91 (03 Dec 2019 14:03) (90 - 102)  BP: 106/63 (03 Dec 2019 14:03) (104/61 - 125/68)  BP(mean): --  RR: 18 (03 Dec 2019 14:03) (18 - 20)  SpO2: 97% (03 Dec 2019 14:03) (96% - 100%)  CAPILLARY BLOOD GLUCOSE      POCT Blood Glucose.: 118 mg/dL (03 Dec 2019 06:24)  POCT Blood Glucose.: 128 mg/dL (02 Dec 2019 23:56)  POCT Blood Glucose.: 124 mg/dL (02 Dec 2019 18:35)    I&O's Summary    02 Dec 2019 07:01  -  03 Dec 2019 07:00  --------------------------------------------------------  IN: 570 mL / OUT: 2450 mL / NET: -1880 mL        PHYSICAL EXAM:  GENERAL: NAD, well-developed  HEAD:  Atraumatic, Normocephalic  EYES: EOMI, PERRLA, conjunctiva and sclera clear  NECK: Supple, No JVD  CHEST/LUNG: Clear to auscultation bilaterally; No wheeze  HEART: Regular rate and rhythm; No murmurs, rubs, or gallops  ABDOMEN: Soft, Nontender, Nondistended; Bowel sounds present  EXTREMITIES:  2+ Peripheral Pulses, No clubbing, cyanosis, or edema  NEUROLOGY: non-focal  SKIN: No rashes or lesions    LABS:                        8.1    10.14 )-----------( 227      ( 03 Dec 2019 08:57 )             27.7     12-03    136  |  100  |  12  ----------------------------<  109<H>  4.2   |  24  |  2.17<H>    Ca    9.7      03 Dec 2019 06:12                RADIOLOGY & ADDITIONAL TESTS:    Imaging Personally Reviewed:    Consultant(s) Notes Reviewed:      Care Discussed with Consultants/Other Providers:

## 2019-12-03 NOTE — PROGRESS NOTE ADULT - SUBJECTIVE AND OBJECTIVE BOX
Chief complaint  Patient is a 74y old  Male who presents with a chief complaint of ams (03 Dec 2019 14:06)   Review of systems  Patient in bed, looks comfortable, no fever, no hypoglycemia.  Overnight events: Patient pulled out NGT; now reinserted.    Labs and Fingersticks  CAPILLARY BLOOD GLUCOSE      POCT Blood Glucose.: 118 mg/dL (03 Dec 2019 06:24)  POCT Blood Glucose.: 128 mg/dL (02 Dec 2019 23:56)  POCT Blood Glucose.: 124 mg/dL (02 Dec 2019 18:35)      Anion Gap, Serum: 12 (12-03 @ 06:12)  Anion Gap, Serum: 14 (12-02 @ 06:44)      Calcium, Total Serum: 9.7 (12-03 @ 06:12)  Calcium, Total Serum: 10.3 (12-02 @ 06:44)          12-03    136  |  100  |  12  ----------------------------<  109<H>  4.2   |  24  |  2.17<H>    Ca    9.7      03 Dec 2019 06:12                          8.1    10.14 )-----------( 227      ( 03 Dec 2019 08:57 )             27.7     Medications  MEDICATIONS  (STANDING):  atorvastatin 40 milliGRAM(s) Oral at bedtime  carvedilol 3.125 milliGRAM(s) Oral every 12 hours  chlorhexidine 4% Liquid 1 Application(s) Topical <User Schedule>  epoetin tan Injectable 04306 Unit(s) IV Push <User Schedule>  heparin  Injectable 5000 Unit(s) SubCutaneous every 8 hours  insulin lispro (HumaLOG) corrective regimen sliding scale   SubCutaneous every 6 hours  levETIRAcetam  IVPB 1000 milliGRAM(s) IV Intermittent every 12 hours  midodrine. 10 milliGRAM(s) Oral three times a day  pantoprazole  Injectable 40 milliGRAM(s) IV Push two times a day  predniSONE   Tablet 5 milliGRAM(s) Oral daily      Physical Exam  General: Patient comfortable in bed  Vital Signs Last 12 Hrs  T(F): 98.9 (12-03-19 @ 14:03), Max: 98.9 (12-03-19 @ 14:03)  HR: 91 (12-03-19 @ 14:03) (90 - 91)  BP: 106/63 (12-03-19 @ 14:03) (106/63 - 109/62)  BP(mean): --  RR: 18 (12-03-19 @ 14:03) (18 - 18)  SpO2: 97% (12-03-19 @ 14:03) (97% - 99%)  Neck: No palpable thyroid nodules.  CVS: S1S2, No murmurs  Respiratory: No wheezing, no crepitations  GI: Abdomen soft, bowel sounds positive  Musculoskeletal:  edema lower extremities.   Skin: No skin rashes, no ecchymosis    Diagnostics Chief complaint  Patient is a 74y old  Male who presents with a chief complaint of ams (03 Dec 2019 14:06)   Review of systems  Patient in bed, looks comfortable, no fever,  no hypoglycemia.  Overnight events: Patient pulled out NGT; now reinserted.    Labs and Fingersticks  CAPILLARY BLOOD GLUCOSE      POCT Blood Glucose.: 118 mg/dL (03 Dec 2019 06:24)  POCT Blood Glucose.: 128 mg/dL (02 Dec 2019 23:56)  POCT Blood Glucose.: 124 mg/dL (02 Dec 2019 18:35)      Anion Gap, Serum: 12 (12-03 @ 06:12)  Anion Gap, Serum: 14 (12-02 @ 06:44)      Calcium, Total Serum: 9.7 (12-03 @ 06:12)  Calcium, Total Serum: 10.3 (12-02 @ 06:44)          12-03    136  |  100  |  12  ----------------------------<  109<H>  4.2   |  24  |  2.17<H>    Ca    9.7      03 Dec 2019 06:12                          8.1    10.14 )-----------( 227      ( 03 Dec 2019 08:57 )             27.7     Medications  MEDICATIONS  (STANDING):  atorvastatin 40 milliGRAM(s) Oral at bedtime  carvedilol 3.125 milliGRAM(s) Oral every 12 hours  chlorhexidine 4% Liquid 1 Application(s) Topical <User Schedule>  epoetin tan Injectable 37800 Unit(s) IV Push <User Schedule>  heparin  Injectable 5000 Unit(s) SubCutaneous every 8 hours  insulin lispro (HumaLOG) corrective regimen sliding scale   SubCutaneous every 6 hours  levETIRAcetam  IVPB 1000 milliGRAM(s) IV Intermittent every 12 hours  midodrine. 10 milliGRAM(s) Oral three times a day  pantoprazole  Injectable 40 milliGRAM(s) IV Push two times a day  predniSONE   Tablet 5 milliGRAM(s) Oral daily      Physical Exam  General: Patient comfortable in bed  Vital Signs Last 12 Hrs  T(F): 98.9 (12-03-19 @ 14:03), Max: 98.9 (12-03-19 @ 14:03)  HR: 91 (12-03-19 @ 14:03) (90 - 91)  BP: 106/63 (12-03-19 @ 14:03) (106/63 - 109/62)  BP(mean): --  RR: 18 (12-03-19 @ 14:03) (18 - 18)  SpO2: 97% (12-03-19 @ 14:03) (97% - 99%)  Neck: No palpable thyroid nodules.  CVS: S1S2, No murmurs  Respiratory: No wheezing, no crepitations  GI: Abdomen soft, bowel sounds positive  Musculoskeletal:  edema lower extremities.   Skin: No skin rashes, no ecchymosis    Diagnostics

## 2019-12-03 NOTE — PROGRESS NOTE ADULT - ASSESSMENT
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       NEURO:  - Extubated 11/29, now on nasal cannula. Minimally verbal but alert  - metabolic encephalopathy, meningitis r/o, LP negative  - neuro following  - chronic lacunar infarcts; per neuro, AMS also likely related to hypercalcemia, renal dysfunction , poor nutritional intake.   - c/w keppra for ?hx of seizure disorder  -AMS likely 2/2 hyperCa, renal dysfunction, poor PO intake  - thiamine IVPB daily x 3 days      CV:  - hx of afib was on eliquis, now off a/c per cards due to bleeding risk  - holding carvedilol 6.25 BID  - hx of cocaine cardiomyopathy, resolved, normal EF on most recent echo  - c/w statin  - trop 134 -- trended down.   - hypotensive during RRT 11/26; started levo gtt, c/w midodrine, off levo  - echo repeat EF70, LVH, hyperdynamic LCF, normal RV sys fxn    PULM:  - extubated 11/29--tolerating nasal cannula    GI:  - NG tube in place  - ernie tube feeds  - ?bleeding as hgb dropping. stool occult negative. no melena reported.  - C diff negative; does not need contact precautions  - swallow eval  - if fails will need to discuss peg with family      RENAL:  - hx of ESRD s/p failed renal transplant x2  - renal following, HD as recommended.  - c/w predisone, sevelamer. hold cellcept for now given likely septic state; monitor and d/w renal when to restart cellcept  - has dialysis catheter in place; HD 11/27, 11/29 tolerated wel    ENDO:  #DM2: HbA1c 5.5  - Start Insulin Sliding Scale  - endocrine following  - presented hypercalcemic, downtrending. hold pamidronate for now. f/u with endo    anemia  - hgb dropped to 6.5, unclear source of blood loss. s/p 1 unit PRBC 7.7 hgb. continue to monitor, consider imaging. stool guaiac ordered.   - pt was on eliquis; has been held.       INFECTIOUS:  - WBC downtrending. ID following  - RRT likely 2/2 septic shock, possible aspiration, UTI, has decubiti ulcers as well.  - Urine culture growing E.coli; blood culture negative  - was given empiric CNS infx coverage - vanc, manoj, acyclovir, LP CSF negative  - per ID, resart meropenem 500 IV q24 for 7 days total. dc vanc.   - f/u sputum culture - ngtd  - wound care consulted appreciate recs  - pt with diarrhea; cdiff indeterminate at this time, will f/u     PPX: SCDs  - home eliquis has been held while in septic state. can add back when tolerated    GOC:  - palliative consult.  -  sister is [employer] employee. Made aware of status and will update

## 2019-12-03 NOTE — PROGRESS NOTE ADULT - SUBJECTIVE AND OBJECTIVE BOX
[facility]      Seen earlier today, and examined.  - Today, patient is without complaints.           *****MEDICATIONS: Current medication reviewed and documented.    MEDICATIONS  (STANDING):  atorvastatin 40 milliGRAM(s) Oral at bedtime  carvedilol 3.125 milliGRAM(s) Oral every 12 hours  chlorhexidine 4% Liquid 1 Application(s) Topical <User Schedule>  epoetin tan Injectable 16771 Unit(s) IV Push <User Schedule>  heparin  Injectable 5000 Unit(s) SubCutaneous every 8 hours  insulin lispro (HumaLOG) corrective regimen sliding scale   SubCutaneous every 6 hours  levETIRAcetam  IVPB 1000 milliGRAM(s) IV Intermittent every 12 hours  midodrine. 10 milliGRAM(s) Oral three times a day  pantoprazole  Injectable 40 milliGRAM(s) IV Push two times a day  predniSONE   Tablet 5 milliGRAM(s) Oral daily    MEDICATIONS  (PRN):          ***** VITAL SIGNS:  T(F): 97.3 (19 @ 17:01), Max: 98.9 (19 @ 14:03)  HR: 98 (19 @ 17:01) (90 - 102)  BP: 123/66 (19 @ 17:01) (104/61 - 123/66)  RR: 18 (19 @ 17:01) (18 - 20)  SpO2: 97% (19 @ 17:01) (96% - 100%)  Wt(kg): --  ,   I&O's Summary    02 Dec 2019 07:  -  03 Dec 2019 07:00  --------------------------------------------------------  IN: 570 mL / OUT: 2450 mL / NET: -1880 mL    03 Dec 2019 07:  -  03 Dec 2019 21:07  --------------------------------------------------------  IN: 0 mL / OUT: 50 mL / NET: -50 mL             *****PHYSICAL EXAM: alerts to sternal rub    lethargic today    can squeeze hand to commands, grimaces to noxious stimuli, able to  my hands, wiggles toes  and legs, hyperreflexic in the legs with bilateral cross adductors, bilateral upgoing toes.     Gait not assessed.           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   On keppra for presumed seizure disorder, but no clear history.    Pt is encephalopathic on exam,. opens eyes to voice  can  follow simple commands  commands, grimaces to noxious stimuli, able to  my hands, wiggles toes  and legs, hyperreflexic in the legs with bilateral cross adductors, bilateral upgoing toes.     seen initially by house neurology, following up now,   lp done not consistent with meningitis.  Defer to ID     MRI brain , no contrast due to renal dysfunction  Routine EEG  without any seizures  AMS likely related to hypercalcemia, renal dysfunction , poor nutritional intake.   thiamine 500 ivpb daily x 3 days completed.      minimal improvement in mental status, will lower keppra dose   palliative care following to establish goals of care.      ________________  Shira Lindsey MD  [facility] ([facility]  [phone]      33 minutes spent on total encounter; more than 50 % of the visit was  spent counseling about plan of care, compliance to diet/exercise and medication regimen and or  coordinating care by the attending physician.      It is advised that stroke patients follow up with JAVIER Vaughan @ [phone] in 1- 2 weeks.   Others please follow up with Dr. Michael Nissenbaum [phone]

## 2019-12-03 NOTE — PROGRESS NOTE ADULT - SUBJECTIVE AND OBJECTIVE BOX
Patient is a 74y Male whom presented to the hospital with esrd on hd   pt  seen in am nad , more awake   PAST MEDICAL & SURGICAL HISTORY:  Kidney transplant recipient  Anuria  GERD (gastroesophageal reflux disease)  Kidney transplant failure: dx: 2/2013  Hepatitis C: dx: 90&#x27;s.  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 10 milliGRAM(s) Oral three times a day  mycophenolate mofetil 250 milliGRAM(s) Oral two times a day  predniSONE   Tablet 5 milliGRAM(s) Oral daily  sevelamer carbonate 800 milliGRAM(s) Oral three times a day with meals  sodium bicarbonate 650 milliGRAM(s) Oral two times a day  tamsulosin 0.4 milliGRAM(s) Oral at bedtime      Allergies    No Known Allergies                       SOCIAL HISTORY:  Denies ETOh,Smoking,     FAMILY HISTORY:  Family history of breast cancer in sister (Sibling): living at 94 yo  Family history of prostate cancer in father  Family history of lung cancer  Family history of hypertension in mother  Family history of diabetes mellitus: mother      REVIEW OF SYSTEMS:    unbable to obtained                                          8.0    8.83  )-----------( 217      ( 02 Dec 2019 09:06 )             27.7       CBC Full  -  ( 02 Dec 2019 09:06 )  WBC Count : 8.83 K/uL  RBC Count : 3.08 M/uL  Hemoglobin : 8.0 g/dL  Hematocrit : 27.7 %  Platelet Count - Automated : 217 K/uL  Mean Cell Volume : 89.9 fl  Mean Cell Hemoglobin : 26.0 pg  Mean Cell Hemoglobin Concentration : 28.9 gm/dL  Auto Neutrophil # : x  Auto Lymphocyte # : x  Auto Monocyte # : x  Auto Eosinophil # : x  Auto Basophil # : x  Auto Neutrophil % : x  Auto Lymphocyte % : x  Auto Monocyte % : x  Auto Eosinophil % : x  Auto Basophil % : x      12-02    138  |  100  |  23  ----------------------------<  133<H>  4.0   |  24  |  3.44<H>    Ca    10.3      02 Dec 2019 06:44  Phos  2.9     12-01  Mg     1.8     12-01    TPro  6.6  /  Alb  2.6<L>  /  TBili  0.4  /  DBili  x   /  AST  16  /  ALT  5<L>  /  AlkPhos  122<H>  12-01      CAPILLARY BLOOD GLUCOSE      POCT Blood Glucose.: 134 mg/dL (02 Dec 2019 12:47)  POCT Blood Glucose.: 145 mg/dL (02 Dec 2019 05:45)  POCT Blood Glucose.: 116 mg/dL (02 Dec 2019 00:19)  POCT Blood Glucose.: 105 mg/dL (01 Dec 2019 18:21)      Vital Signs Last 24 Hrs  T(C): 36.8 (02 Dec 2019 13:00), Max: 37.7 (01 Dec 2019 21:00)  T(F): 98.3 (02 Dec 2019 13:00), Max: 99.9 (01 Dec 2019 21:00)  HR: 91 (02 Dec 2019 13:00) (82 - 105)  BP: 130/74 (02 Dec 2019 13:00) (114/63 - 130/74)  BP(mean): --  RR: 18 (02 Dec 2019 13:00) (18 - 18)  SpO2: 95% (02 Dec 2019 13:00) (93% - 100%)                                       HEENT: conjunctive   clear   Neck:  No JVD  Respiratory: decrease bs b/l   Cardiovascular: S1 and S2  Gastrointestinal: BS+, soft, NT/ND  Extremities: No peripheral edema  Skin: dry   Access: pos fistula

## 2019-12-03 NOTE — GOALS OF CARE CONVERSATION - ADVANCED CARE PLANNING - CONVERSATION DETAILS
LMSW contacted Patient's daughter Roro via telephone today. LMSW informed Roro that under the Family Health Care Decisions Act, Roro and her two siblings are the surrogate-decision makers for the Patient, as he is presently AOx1-2 and without capacity to make health care decisions or designate a HCP. Roro verbalized understanding, and informed LMSW that she does wish to be the decision-maker for the Patient, in conjunction with her aunt/Patient's sister Sheryl, [phone]. Roro states that she is presently hospitalized and unable to physically be at Patient's bedside, but states any hospital staff can reach her via phone for updates and any decisions. LMSW inquired if Roro has the contact information for her siblings, as they are also surrogate decision-makers, and Roro informed LMSW that her sister does not presently have a phone and that her brother is currently incarcerated and thus, Roro is the only available decision-maker. LMSW to inform team of this update, and will continue to follow this case for ongoing support/goals of care.

## 2019-12-04 LAB
ANION GAP SERPL CALC-SCNC: 11 MMOL/L — SIGNIFICANT CHANGE UP (ref 5–17)
BUN SERPL-MCNC: 25 MG/DL — HIGH (ref 7–23)
CALCIUM SERPL-MCNC: 10.4 MG/DL — SIGNIFICANT CHANGE UP (ref 8.4–10.5)
CHLORIDE SERPL-SCNC: 99 MMOL/L — SIGNIFICANT CHANGE UP (ref 96–108)
CO2 SERPL-SCNC: 28 MMOL/L — SIGNIFICANT CHANGE UP (ref 22–31)
CREAT SERPL-MCNC: 3.56 MG/DL — HIGH (ref 0.5–1.3)
GLUCOSE BLDC GLUCOMTR-MCNC: 131 MG/DL — HIGH (ref 70–99)
GLUCOSE BLDC GLUCOMTR-MCNC: 150 MG/DL — HIGH (ref 70–99)
GLUCOSE BLDC GLUCOMTR-MCNC: 154 MG/DL — HIGH (ref 70–99)
GLUCOSE BLDC GLUCOMTR-MCNC: 164 MG/DL — HIGH (ref 70–99)
GLUCOSE SERPL-MCNC: 153 MG/DL — HIGH (ref 70–99)
HCT VFR BLD CALC: 28.5 % — LOW (ref 39–50)
HGB BLD-MCNC: 8.2 G/DL — LOW (ref 13–17)
MCHC RBC-ENTMCNC: 26.4 PG — LOW (ref 27–34)
MCHC RBC-ENTMCNC: 28.8 GM/DL — LOW (ref 32–36)
MCV RBC AUTO: 91.6 FL — SIGNIFICANT CHANGE UP (ref 80–100)
PLATELET # BLD AUTO: 243 K/UL — SIGNIFICANT CHANGE UP (ref 150–400)
POTASSIUM SERPL-MCNC: 4.4 MMOL/L — SIGNIFICANT CHANGE UP (ref 3.5–5.3)
POTASSIUM SERPL-SCNC: 4.4 MMOL/L — SIGNIFICANT CHANGE UP (ref 3.5–5.3)
RBC # BLD: 3.11 M/UL — LOW (ref 4.2–5.8)
RBC # FLD: 16.2 % — HIGH (ref 10.3–14.5)
SODIUM SERPL-SCNC: 138 MMOL/L — SIGNIFICANT CHANGE UP (ref 135–145)
WBC # BLD: 10.44 K/UL — SIGNIFICANT CHANGE UP (ref 3.8–10.5)
WBC # FLD AUTO: 10.44 K/UL — SIGNIFICANT CHANGE UP (ref 3.8–10.5)

## 2019-12-04 PROCEDURE — 99233 SBSQ HOSP IP/OBS HIGH 50: CPT

## 2019-12-04 RX ORDER — ALTEPLASE 100 MG
2 KIT INTRAVENOUS ONCE
Refills: 0 | Status: DISCONTINUED | OUTPATIENT
Start: 2019-12-06 | End: 2019-12-20

## 2019-12-04 RX ORDER — ACETAMINOPHEN 500 MG
1000 TABLET ORAL ONCE
Refills: 0 | Status: COMPLETED | OUTPATIENT
Start: 2019-12-04 | End: 2019-12-05

## 2019-12-04 RX ADMIN — Medication 1: at 12:32

## 2019-12-04 RX ADMIN — PANTOPRAZOLE SODIUM 40 MILLIGRAM(S): 20 TABLET, DELAYED RELEASE ORAL at 18:53

## 2019-12-04 RX ADMIN — Medication 5 MILLIGRAM(S): at 05:20

## 2019-12-04 RX ADMIN — HEPARIN SODIUM 5000 UNIT(S): 5000 INJECTION INTRAVENOUS; SUBCUTANEOUS at 13:19

## 2019-12-04 RX ADMIN — ERYTHROPOIETIN 10000 UNIT(S): 10000 INJECTION, SOLUTION INTRAVENOUS; SUBCUTANEOUS at 20:58

## 2019-12-04 RX ADMIN — CARVEDILOL PHOSPHATE 3.12 MILLIGRAM(S): 80 CAPSULE, EXTENDED RELEASE ORAL at 05:20

## 2019-12-04 RX ADMIN — MIDODRINE HYDROCHLORIDE 10 MILLIGRAM(S): 2.5 TABLET ORAL at 05:20

## 2019-12-04 RX ADMIN — MIDODRINE HYDROCHLORIDE 10 MILLIGRAM(S): 2.5 TABLET ORAL at 18:19

## 2019-12-04 RX ADMIN — CHLORHEXIDINE GLUCONATE 1 APPLICATION(S): 213 SOLUTION TOPICAL at 11:52

## 2019-12-04 RX ADMIN — MIDODRINE HYDROCHLORIDE 10 MILLIGRAM(S): 2.5 TABLET ORAL at 12:27

## 2019-12-04 RX ADMIN — Medication 1: at 06:01

## 2019-12-04 RX ADMIN — PANTOPRAZOLE SODIUM 40 MILLIGRAM(S): 20 TABLET, DELAYED RELEASE ORAL at 05:20

## 2019-12-04 RX ADMIN — HEPARIN SODIUM 5000 UNIT(S): 5000 INJECTION INTRAVENOUS; SUBCUTANEOUS at 05:20

## 2019-12-04 NOTE — GOALS OF CARE CONVERSATION - ADVANCED CARE PLANNING - CONVERSATION DETAILS
Palliative continues to follow this case for ongoing GOC and for support to family. Patient remains comfortable but has no capacity for decision-making/designating a HCP at this time. HOWARD and Dr. Olivarez met with Patient at bedside today. Patient with lethargy and unable to participate in assessment.    LMSW contacted Patient's sister Sheryl, [phone], via telephone today to offer support and discuss decision-making capacity. Sheryl informed LMSW that she/the Patient's sister Laurie Courtney was also on the phone, and inquired into Patient's status. NANISW informed Sheryl and Laurie that Patient was evaluated by Dr. Olivarez today, and that Patient remains comfortable at this time but with lethary. Sheryl and Laurie verbalized understanding.    HOWARD informed Laurie and Sheryl that LMSW was in contact with Patient's daughter Roro, [phone], on 12/2 and that, at that time, Roro informed NANISW that she would like to be the decision-maker for Patient, but provided verbal permission to inform Patient's sisters of any updates for Patient. HOWARD informed Charles that, per the [facility] Family Health Care Decisions Act, Roro and her two siblings are the surrogate decision-makers for Patient while he is unable to make his own, and that, due to Roro's sister being without a phone and unable to be communicated with, and Roro's brother being presently incarcerated, Roro is the sole decision-maker for Patient. Laurie and Sheryl both verbalized frustration, and state that Roro is "unreliable" and  state that Sheryl has been making decisions for Patient since his initial admission. HOWARD informed Sheryl and Laurie that the team had been previously unaware that Patient had children but that, now that staff has contacted Roro and she has verbalized interest in making decisions for Patient, she is legally the decision-maker. HOWADR informed Sheryl and Laurie that, if Roro is ever unable to be reached after numerous attempts and hospital staff due diligence, the decisions for any continuation or cessation of care for Patient would fall onto Charles as next of kin. Both Monika verbalized understanding. Laurie informed HOWARD that she was present at [facility] yesterday to visit the Patient and states that, at that time, he appeared to have capacity. Laurie inquired if Patient would be able to designate a HCP at this time. HOWARD informed Laurie and Sheryl that as per Dr. Olivarez, Patient is without capacity at this time, and that today he was lethargic and unable to participate in any assessment with LMSW whatsoever. HOWARD informed Laurie and Sheryl that Patient may have periods of being lucid but overall, Patient is without capacity to name a HCP or make any of his own decisions. HOWARD informed Sheryl and Laurie that if Patient does improve enough to have long-standing capacity, he could designate a HCP which would [facility]rule [facility] Crystal as his decision-maker. Laurie and Sheryl verbalized understanding.    LMSW provided room for ventilation of feelings, and empathized with Monika's frustrations in this matter. LMSW provided contact information to Sheryl and Laurie for any questions or concerns they may have, and encouraged Patient's sisters to continue to receive updates on Patient's status. Palliative will continue to follow this case for ongoing GOC and support, and will collaborate with the interdisciplinary team. Palliative continues to follow this case for ongoing GOC and for support to family. Patient remains comfortable but has no capacity for decision-making/designating a HCP at this time. HOWARD and Dr. Olivarez met with Patient at bedside today. Patient with lethargy and unable to participate in assessment.    LMSW contacted Patient's sister Sheryl, [phone], via telephone today to offer support and discuss decision-making capacity. Sheryl informed LMSW that her/the Patient's sister Laurie Courtney was also on the phone, and inquired into Patient's status. NANISW informed Sheryl and Laurie that Patient was evaluated by Dr. Olivarez today, and that Patient remains comfortable at this time but with lethary. Sheryl and Laurie verbalized understanding.    HOWARD informed Laurie and Sheryl that LMSW was in contact with Patient's daughter Roro, [phone], on 12/2 and that, at that time, Roro informed NANISW that she would like to be the decision-maker for Patient, but provided verbal permission to inform Patient's sisters of any updates for Patient. HOWARD informed Sheryl and Laurie that, per the [facility] Family Health Care Decisions Act, Roro and her two siblings are the surrogate decision-makers for Patient while he is unable to make his own, and that, due to Roro's sister being without a phone and unable to be communicated with, and Roro's brother being presently incarcerated, Roro is the sole decision-maker for Patient. Laurie and Sheryl both verbalized frustration, and state that Roro is "unreliable" and  state that Sheryl has been making decisions for Patient since his initial admission. HOWARD informed Sheryl and Laurie that the team had been previously unaware that Patient had children but that, now that staff has contacted Roro and she has verbalized interest in making decisions for Patient, she is legally the decision-maker. HOWARD informed Sheryl and Laurie that, if Roro is ever unable to be reached after numerous attempts and hospital staff due diligence, the decisions for any continuation or cessation of care for Patient would fall onto Sheryl and Laurie as next of kin. Both Monika verbalized understanding. Laurie informed HOWARD that she was present at [facility] yesterday to visit the Patient and states that, at that time, he appeared to have capacity. Laurie inquired if Patient would be able to designate a HCP at this time. HOWARD informed Laurie and Sheryl that as per Dr. Olivarez, Patient is without capacity at this time, and that today he was lethargic and unable to participate in any assessment with LMSW whatsoever. HOWARD informed Laurie and Sheryl that Patient may have periods of being lucid but overall, Patient is without capacity to name a HCP or make any of his own decisions. HOWARD informed Sheryl and Laurie that if Patient does improve enough to have long-standing capacity, he could designate a HCP which would overrule the [facility] that [facility] Crystal as his decision-maker. Laurie and Sheryl verbalized understanding.    LMSW provided room for ventilation of feelings, and empathized with Monika's frustrations in this matter. LMSW provided contact information to Sheryl and Laurie for any questions or concerns they may have, and encouraged Patient's sisters to continue to receive updates on Patient's status. Palliative will continue to follow this case for ongoing GOC and support, and will collaborate with the interdisciplinary team.

## 2019-12-04 NOTE — PROGRESS NOTE ADULT - SUBJECTIVE AND OBJECTIVE BOX
Patient is a 74y Male whom presented to the hospital with esrd on hd   pt  seen in am nad , more awake   PAST MEDICAL & SURGICAL HISTORY:  Kidney transplant recipient  Anuria  GERD (gastroesophageal reflux disease)  Kidney transplant failure: dx: 2/2013  Hepatitis C: dx: 90&#x27;s.  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 10 milliGRAM(s) Oral three times a day  mycophenolate mofetil 250 milliGRAM(s) Oral two times a day  predniSONE   Tablet 5 milliGRAM(s) Oral daily  sevelamer carbonate 800 milliGRAM(s) Oral three times a day with meals  sodium bicarbonate 650 milliGRAM(s) Oral two times a day  tamsulosin 0.4 milliGRAM(s) Oral at bedtime      Allergies    No Known Allergies                       SOCIAL HISTORY:  Denies ETOh,Smoking,     FAMILY HISTORY:  Family history of breast cancer in sister (Sibling): living at 92 yo  Family history of prostate cancer in father  Family history of lung cancer  Family history of hypertension in mother  Family history of diabetes mellitus: mother      REVIEW OF SYSTEMS:    unbable to obtained                                                          8.2    10.44 )-----------( 243      ( 04 Dec 2019 08:37 )             28.5       CBC Full  -  ( 04 Dec 2019 08:37 )  WBC Count : 10.44 K/uL  RBC Count : 3.11 M/uL  Hemoglobin : 8.2 g/dL  Hematocrit : 28.5 %  Platelet Count - Automated : 243 K/uL  Mean Cell Volume : 91.6 fl  Mean Cell Hemoglobin : 26.4 pg  Mean Cell Hemoglobin Concentration : 28.8 gm/dL  Auto Neutrophil # : x  Auto Lymphocyte # : x  Auto Monocyte # : x  Auto Eosinophil # : x  Auto Basophil # : x  Auto Neutrophil % : x  Auto Lymphocyte % : x  Auto Monocyte % : x  Auto Eosinophil % : x  Auto Basophil % : x      12-04    138  |  99  |  25<H>  ----------------------------<  153<H>  4.4   |  28  |  3.56<H>    Ca    10.4      04 Dec 2019 07:16        CAPILLARY BLOOD GLUCOSE      POCT Blood Glucose.: 164 mg/dL (04 Dec 2019 12:29)  POCT Blood Glucose.: 154 mg/dL (04 Dec 2019 05:49)  POCT Blood Glucose.: 150 mg/dL (04 Dec 2019 00:10)      Vital Signs Last 24 Hrs  T(C): 36.7 (04 Dec 2019 17:00), Max: 37.1 (04 Dec 2019 09:00)  T(F): 98 (04 Dec 2019 17:00), Max: 98.7 (04 Dec 2019 09:00)  HR: 97 (04 Dec 2019 17:00) (92 - 102)  BP: 145/78 (04 Dec 2019 17:00) (112/69 - 145/78)  BP(mean): --  RR: 18 (04 Dec 2019 17:00) (18 - 18)  SpO2: 96% (04 Dec 2019 17:00) (93% - 96%)                                 HEENT: conjunctive   clear   Neck:  No JVD  Respiratory: decrease bs b/l   Cardiovascular: S1 and S2  Gastrointestinal: BS+, soft, NT/ND  Extremities: No peripheral edema  Skin: dry   Access: pos fistula

## 2019-12-04 NOTE — PROGRESS NOTE ADULT - ASSESSMENT
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       NEURO:  - Extubated 11/29, now on nasal cannula. Minimally verbal but alert  - metabolic encephalopathy, meningitis r/o, LP negative  - neuro following  - chronic lacunar infarcts; per neuro, AMS also likely related to hypercalcemia, renal dysfunction , poor nutritional intake.   - c/w keppra for ?hx of seizure disorder  -AMS likely 2/2 hyperCa, renal dysfunction, poor PO intake  - s/p thiamine IVPB daily x 3 days      CV:  - hx of afib was on eliquis, now off a/c per cards due to bleeding risk  - holding carvedilol 6.25 BID  - hx of cocaine cardiomyopathy, resolved, normal EF on most recent echo  - c/w statin  - trop 134 -- trended down.   - hypotensive during RRT 11/26; started levo gtt, c/w midodrine, off levo  - echo repeat EF70, LVH, hyperdynamic LCF, normal RV sys fxn    PULM:  - extubated 11/29--tolerating nasal cannula    GI:  - NG tube in place  - ernie tube feeds  - ?bleeding as hgb dropping. stool occult negative. no melena reported.  - C diff negative; does not need contact precautions  - swallow eval  - if fails will need to discuss peg with family      RENAL:  - hx of ESRD s/p failed renal transplant x2  - renal following, HD as recommended.  - c/w predisone, sevelamer. hold cellcept for now given likely septic state; monitor and d/w renal when to restart cellcept  - has dialysis catheter in place; HD 11/27,  tolerated wel    ENDO:  #DM2: HbA1c 5.5  - Start Insulin Sliding Scale  - endocrine following  - presented hypercalcemic, downtrending. hold pamidronate for now. f/u with endo    anemia  - hgb dropped to 6.5, unclear source of blood loss. s/p 1 unit PRBC 7.7 hgb. continue to monitor, consider imaging. stool guaiac ordered.   - pt was on eliquis; has been held.       INFECTIOUS:  - WBC downtrending. ID following  - RRT likely 2/2 septic shock, possible aspiration, UTI, has decubiti ulcers as well.  - Urine culture growing E.coli; blood culture negative  - was given empiric CNS infx coverage - vanc, manoj, acyclovir, LP CSF negative  - per ID, resart meropenem 500 IV q24 for 7 days total. dc vanc.   - f/u sputum culture - ngtd  - wound care consulted appreciate recs  - pt with diarrhea; cdiff indeterminate at this time, will f/u     PPX: SCDs  - home eliquis has been held while in septic state. can add back when tolerated    GOC:  - palliative consult.  -  sister is [employer] employee. Made aware of status and will update   - has 3 children who should be contacted first

## 2019-12-04 NOTE — PROGRESS NOTE ADULT - SUBJECTIVE AND OBJECTIVE BOX
Chief complaint  Patient is a 74y old  Male who presents with a chief complaint of ams (04 Dec 2019 11:22)   Review of systems  Patient in bed, looks comfortable, no fever, no hypoglycemia.    Labs and Fingersticks  CAPILLARY BLOOD GLUCOSE      POCT Blood Glucose.: 164 mg/dL (04 Dec 2019 12:29)  POCT Blood Glucose.: 154 mg/dL (04 Dec 2019 05:49)  POCT Blood Glucose.: 150 mg/dL (04 Dec 2019 00:10)  POCT Blood Glucose.: 128 mg/dL (03 Dec 2019 18:08)  POCT Blood Glucose.: 134 mg/dL (03 Dec 2019 15:22)      Anion Gap, Serum: 11 (12-04 @ 07:16)  Anion Gap, Serum: 12 (12-03 @ 06:12)      Calcium, Total Serum: 10.4 (12-04 @ 07:16)  Calcium, Total Serum: 9.7 (12-03 @ 06:12)          12-04    138  |  99  |  25<H>  ----------------------------<  153<H>  4.4   |  28  |  3.56<H>    Ca    10.4      04 Dec 2019 07:16                          8.2    10.44 )-----------( 243      ( 04 Dec 2019 08:37 )             28.5     Medications  MEDICATIONS  (STANDING):  atorvastatin 40 milliGRAM(s) Oral at bedtime  carvedilol 3.125 milliGRAM(s) Oral every 12 hours  chlorhexidine 4% Liquid 1 Application(s) Topical <User Schedule>  epoetin tan Injectable 33724 Unit(s) IV Push <User Schedule>  heparin  Injectable 5000 Unit(s) SubCutaneous every 8 hours  insulin lispro (HumaLOG) corrective regimen sliding scale   SubCutaneous every 6 hours  levETIRAcetam  Solution 1000 milliGRAM(s) Oral at bedtime  midodrine. 10 milliGRAM(s) Oral three times a day  pantoprazole  Injectable 40 milliGRAM(s) IV Push two times a day  predniSONE   Tablet 5 milliGRAM(s) Oral daily      Physical Exam  General: Patient comfortable in bed  Vital Signs Last 12 Hrs  T(F): 98.6 (12-04-19 @ 13:00), Max: 98.7 (12-04-19 @ 09:00)  HR: 96 (12-04-19 @ 13:00) (96 - 102)  BP: 112/69 (12-04-19 @ 13:00) (112/69 - 118/76)  BP(mean): --  RR: 18 (12-04-19 @ 13:00) (18 - 18)  SpO2: 96% (12-04-19 @ 13:00) (93% - 96%)  Neck: No palpable thyroid nodules.  CVS: S1S2, No murmurs  Respiratory: No wheezing, no crepitations  GI: Abdomen soft, bowel sounds positive  Musculoskeletal:  edema lower extremities.   Skin: No skin rashes, no ecchymosis    Diagnostics Chief complaint  Patient is a 74y old  Male who presents with a chief complaint of ams (04 Dec 2019 11:22)   Review of systems  Patient in bed, looks comfortable, no fever,  no hypoglycemia.    Labs and Fingersticks  CAPILLARY BLOOD GLUCOSE      POCT Blood Glucose.: 164 mg/dL (04 Dec 2019 12:29)  POCT Blood Glucose.: 154 mg/dL (04 Dec 2019 05:49)  POCT Blood Glucose.: 150 mg/dL (04 Dec 2019 00:10)  POCT Blood Glucose.: 128 mg/dL (03 Dec 2019 18:08)  POCT Blood Glucose.: 134 mg/dL (03 Dec 2019 15:22)      Anion Gap, Serum: 11 (12-04 @ 07:16)  Anion Gap, Serum: 12 (12-03 @ 06:12)      Calcium, Total Serum: 10.4 (12-04 @ 07:16)  Calcium, Total Serum: 9.7 (12-03 @ 06:12)          12-04    138  |  99  |  25<H>  ----------------------------<  153<H>  4.4   |  28  |  3.56<H>    Ca    10.4      04 Dec 2019 07:16                          8.2    10.44 )-----------( 243      ( 04 Dec 2019 08:37 )             28.5     Medications  MEDICATIONS  (STANDING):  atorvastatin 40 milliGRAM(s) Oral at bedtime  carvedilol 3.125 milliGRAM(s) Oral every 12 hours  chlorhexidine 4% Liquid 1 Application(s) Topical <User Schedule>  epoetin tan Injectable 80517 Unit(s) IV Push <User Schedule>  heparin  Injectable 5000 Unit(s) SubCutaneous every 8 hours  insulin lispro (HumaLOG) corrective regimen sliding scale   SubCutaneous every 6 hours  levETIRAcetam  Solution 1000 milliGRAM(s) Oral at bedtime  midodrine. 10 milliGRAM(s) Oral three times a day  pantoprazole  Injectable 40 milliGRAM(s) IV Push two times a day  predniSONE   Tablet 5 milliGRAM(s) Oral daily      Physical Exam  General: Patient comfortable in bed  Vital Signs Last 12 Hrs  T(F): 98.6 (12-04-19 @ 13:00), Max: 98.7 (12-04-19 @ 09:00)  HR: 96 (12-04-19 @ 13:00) (96 - 102)  BP: 112/69 (12-04-19 @ 13:00) (112/69 - 118/76)  BP(mean): --  RR: 18 (12-04-19 @ 13:00) (18 - 18)  SpO2: 96% (12-04-19 @ 13:00) (93% - 96%)  Neck: No palpable thyroid nodules.  CVS: S1S2, No murmurs  Respiratory: No wheezing, no crepitations  GI: Abdomen soft, bowel sounds positive  Musculoskeletal:  edema lower extremities.   Skin: No skin rashes, no ecchymosis    Diagnostics

## 2019-12-04 NOTE — PROGRESS NOTE ADULT - ASSESSMENT
Assessment  DM: A1C 5.5%, was on insulin at home, now on insulin low-dose coverage, blood sugars improved and trending within overall acceptable range, patient is still NPO on tube feeds, appears comfortable.  Hypercalcemia: Primary Hyperparathyroidism, calcium improved and WNL s/p Pamidronate dose.  Sepsis: IV ABx for UTI, LP inconsistent with meningitis, on medications, stable, monitored.  HTN: Controlled,  on antihypertensive medications.  ESRD: On hemodialysis, Monitor labs/BMP          Robi Gay MD  Cell: [phone]  Office: [phone] Assessment  DM: A1C 5.5%, was on insulin at home, now on insulin low-dose coverage, blood sugars improved and trending within overall acceptable range,  patient is still NPO on tube feeds, appears comfortable.  Hypercalcemia: Primary Hyperparathyroidism, calcium improved and WNL s/p Pamidronate dose.  Sepsis: IV ABx for UTI, LP inconsistent with meningitis, on medications, stable, monitored.  HTN: Controlled,  on antihypertensive medications.  ESRD: On hemodialysis, Monitor labs/BMP          Robi Gay MD  Cell: [phone]  Office: [phone]

## 2019-12-04 NOTE — PROGRESS NOTE ADULT - SUBJECTIVE AND OBJECTIVE BOX
Patient is a 74y old  Male who presents with a chief complaint of ams (04 Dec 2019 15:09)      SUBJECTIVE / OVERNIGHT EVENTS:  weak and lethargic but arousable    MEDICATIONS  (STANDING):  atorvastatin 40 milliGRAM(s) Oral at bedtime  carvedilol 3.125 milliGRAM(s) Oral every 12 hours  chlorhexidine 4% Liquid 1 Application(s) Topical <User Schedule>  epoetin tan Injectable 12991 Unit(s) IV Push <User Schedule>  heparin  Injectable 5000 Unit(s) SubCutaneous every 8 hours  insulin lispro (HumaLOG) corrective regimen sliding scale   SubCutaneous every 6 hours  levETIRAcetam  Solution 1000 milliGRAM(s) Oral at bedtime  midodrine. 10 milliGRAM(s) Oral three times a day  pantoprazole  Injectable 40 milliGRAM(s) IV Push two times a day  predniSONE   Tablet 5 milliGRAM(s) Oral daily    MEDICATIONS  (PRN):      Vital Signs Last 24 Hrs  T(C): 37 (04 Dec 2019 13:00), Max: 37.1 (04 Dec 2019 09:00)  T(F): 98.6 (04 Dec 2019 13:00), Max: 98.7 (04 Dec 2019 09:00)  HR: 96 (04 Dec 2019 13:00) (92 - 102)  BP: 112/69 (04 Dec 2019 13:00) (112/69 - 125/67)  BP(mean): --  RR: 18 (04 Dec 2019 13:00) (18 - 18)  SpO2: 96% (04 Dec 2019 13:00) (93% - 97%)  CAPILLARY BLOOD GLUCOSE      POCT Blood Glucose.: 164 mg/dL (04 Dec 2019 12:29)  POCT Blood Glucose.: 154 mg/dL (04 Dec 2019 05:49)  POCT Blood Glucose.: 150 mg/dL (04 Dec 2019 00:10)  POCT Blood Glucose.: 128 mg/dL (03 Dec 2019 18:08)    I&O's Summary    03 Dec 2019 07:01  -  04 Dec 2019 07:00  --------------------------------------------------------  IN: 920 mL / OUT: 50 mL / NET: 870 mL    04 Dec 2019 07:01  -  04 Dec 2019 15:49  --------------------------------------------------------  IN: 80 mL / OUT: 0 mL / NET: 80 mL        PHYSICAL EXAM:  GENERAL: NAD, well-developed  HEAD:  Atraumatic, Normocephalic  EYES: EOMI, PERRLA, conjunctiva and sclera clear  NECK: Supple, No JVD  CHEST/LUNG: Clear to auscultation bilaterally; No wheeze  HEART: Regular rate and rhythm; No murmurs, rubs, or gallops  ABDOMEN: Soft, Nontender, Nondistended; Bowel sounds present  EXTREMITIES:  2+ Peripheral Pulses, No clubbing, cyanosis, or edema    NEUROLOGY: non-focal  SKIN: No rashes or lesions    LABS:                        8.2    10.44 )-----------( 243      ( 04 Dec 2019 08:37 )             28.5     12-04    138  |  99  |  25<H>  ----------------------------<  153<H>  4.4   |  28  |  3.56<H>    Ca    10.4      04 Dec 2019 07:16                RADIOLOGY & ADDITIONAL TESTS:    Imaging Personally Reviewed:    Consultant(s) Notes Reviewed:      Care Discussed with Consultants/Other Providers:

## 2019-12-04 NOTE — PROGRESS NOTE ADULT - SUBJECTIVE AND OBJECTIVE BOX
Subjective: Patient seen and examined. No new events except as noted.     SUBJECTIVE/ROS:    limited     MEDICATIONS:  MEDICATIONS  (STANDING):  atorvastatin 40 milliGRAM(s) Oral at bedtime  carvedilol 3.125 milliGRAM(s) Oral every 12 hours  chlorhexidine 4% Liquid 1 Application(s) Topical <User Schedule>  epoetin tan Injectable 92518 Unit(s) IV Push <User Schedule>  heparin  Injectable 5000 Unit(s) SubCutaneous every 8 hours  insulin lispro (HumaLOG) corrective regimen sliding scale   SubCutaneous every 6 hours  levETIRAcetam  Solution 1000 milliGRAM(s) Oral at bedtime  midodrine. 10 milliGRAM(s) Oral three times a day  pantoprazole  Injectable 40 milliGRAM(s) IV Push two times a day  predniSONE   Tablet 5 milliGRAM(s) Oral daily      PHYSICAL EXAM:  T(C): 37.1 (12-04-19 @ 09:00), Max: 37.2 (12-03-19 @ 14:03)  HR: 101 (12-04-19 @ 09:00) (91 - 102)  BP: 118/76 (12-04-19 @ 09:00) (106/63 - 125/67)  RR: 18 (12-04-19 @ 09:00) (18 - 18)  SpO2: 96% (12-04-19 @ 09:00) (93% - 97%)  Wt(kg): --  I&O's Summary    03 Dec 2019 07:01  -  04 Dec 2019 07:00  --------------------------------------------------------  IN: 920 mL / OUT: 50 mL / NET: 870 mL    04 Dec 2019 07:01  -  04 Dec 2019 11:23  --------------------------------------------------------  IN: 80 mL / OUT: 0 mL / NET: 80 mL            JVP: Normal  Neck: supple  Lung: clear   CV: S1 S2 , Murmur:  Abd: soft  Ext: No edema  neuro: Awake  Psych: flat affect  Skin: normal``    LABS/DATA:    CARDIAC MARKERS:                                8.2    10.44 )-----------( 243      ( 04 Dec 2019 08:37 )             28.5     12-04    138  |  99  |  25<H>  ----------------------------<  153<H>  4.4   |  28  |  3.56<H>    Ca    10.4      04 Dec 2019 07:16      proBNP:   Lipid Profile:   HgA1c:   TSH:     TELE:  EKG:

## 2019-12-04 NOTE — PROGRESS NOTE ADULT - PROBLEM SELECTOR PLAN 4
extensive conversation with patients daughter and siblings. [facility] are surrogate decision makers as there is no HCP. Awaiting speech and swallow evaluation to pursue nutritional GOC conversation

## 2019-12-04 NOTE — PROGRESS NOTE ADULT - ASSESSMENT
History of cocaine induced CM  normal EF on last echo  cont BB     PAF  in sinus   a/c once deemed safe   for now off given high bleed risk    Hypercalcemia  as per renal   HD    NSVT  cont BB     tachycardia  improving  cont BB     appreciate PC input   fu with recommendation

## 2019-12-04 NOTE — PROGRESS NOTE ADULT - SUBJECTIVE AND OBJECTIVE BOX
SUBJECTIVE AND OBJECTIVE:  INTERVAL HPI/OVERNIGHT EVENTS:    DNR on chart:   Allergies    No Known Allergies    Intolerances    MEDICATIONS  (STANDING):  atorvastatin 40 milliGRAM(s) Oral at bedtime  carvedilol 3.125 milliGRAM(s) Oral every 12 hours  chlorhexidine 4% Liquid 1 Application(s) Topical <User Schedule>  epoetin tan Injectable 37747 Unit(s) IV Push <User Schedule>  heparin  Injectable 5000 Unit(s) SubCutaneous every 8 hours  insulin lispro (HumaLOG) corrective regimen sliding scale   SubCutaneous every 6 hours  levETIRAcetam  Solution 1000 milliGRAM(s) Oral at bedtime  midodrine. 10 milliGRAM(s) Oral three times a day  pantoprazole  Injectable 40 milliGRAM(s) IV Push two times a day  predniSONE   Tablet 5 milliGRAM(s) Oral daily    MEDICATIONS  (PRN):      ITEMS UNCHECKED ARE NOT PRESENT    PRESENT SYMPTOMS: [ ]Unable to obtain due to poor mentation   Source if other than patient:  [ ]Family   [ ]Team     Pain (Impact on QOL):    Location:  Minimal acceptable level (0-10 scale):                   Aggravating factors:  Quality:  Radiation:  Severity (0-10 scale):    Timing:    Dyspnea:                           [ ]Mild [ ]Moderate [ ]Severe  Anxiety:                             [ ]Mild [ ]Moderate [ ]Severe  Fatigue:                             [ ]Mild [ ]Moderate [ ]Severe  Nausea:                             [ ]Mild [ ]Moderate [ ]Severe  Loss of appetite:              [ ]Mild [ ]Moderate [ ]Severe  Constipation:                    [ ]Mild [ ]Moderate [ ]Severe    PAIN AD Score:	  [email or web address] (Ctrl + left click to view)    Other Symptoms:  [ ]All other review of systems negative     Karnofsky Performance Score/Palliative Performance Status Version 2:         %    http://palliative.info/resource_material/[email or web address].pdf  PHYSICAL EXAM:  Vital Signs Last 24 Hrs  T(C): 37 (04 Dec 2019 13:00), Max: 37.1 (04 Dec 2019 09:00)  T(F): 98.6 (04 Dec 2019 13:00), Max: 98.7 (04 Dec 2019 09:00)  HR: 96 (04 Dec 2019 13:00) (92 - 102)  BP: 112/69 (04 Dec 2019 13:00) (112/69 - 125/67)  BP(mean): --  RR: 18 (04 Dec 2019 13:00) (18 - 18)  SpO2: 96% (04 Dec 2019 13:00) (93% - 96%) I&O's Summary    03 Dec 2019 07:01  -  04 Dec 2019 07:00  --------------------------------------------------------  IN: 920 mL / OUT: 50 mL / NET: 870 mL    04 Dec 2019 07:01  -  04 Dec 2019 17:08  --------------------------------------------------------  IN: 80 mL / OUT: 0 mL / NET: 80 mL     GENERAL:  [ ]Alert  [ ]Oriented x   [ ]Lethargic  [ ]Cachexia  [ ]Unarousable  [ ]Verbal  [ ]Non-Verbal  Behavioral:   [ ] Anxiety  [ ] Delirium [ ] Agitation [ ] Other  HEENT:  [ ]Normal   [ ]Dry mouth   [ ]ET Tube/Trach  [ ]Oral lesions  PULMONARY:   [ ]Clear [ ]Tachypnea  [ ]Audible excessive secretions   [ ]Rhonchi        [ ]Right [ ]Left [ ]Bilateral  [ ]Crackles        [ ]Right [ ]Left [ ]Bilateral  [ ]Wheezing     [ ]Right [ ]Left [ ]Bilateral  CARDIOVASCULAR:    [ ]Regular [ ]Irregular [ ]Tachy  [ ]Yaniv [ ]Murmur [ ]Other  GASTROINTESTINAL:  [ ]Soft  [ ]Distended   [ ]+BS  [ ]Non tender [ ]Tender  [ ]PEG [ ]OGT/ NGT   Last BM:    GENITOURINARY:  [ ]Normal [ ] Incontinent   [ ]Oliguria/Anuria   [ ]Mclaughlin  MUSCULOSKELETAL:   [ ]Normal   [ ]Weakness  [ ]Bed/Wheelchair bound [ ]Edema  NEUROLOGIC:   [ ]No focal deficits  [ ] Cognitive impairment  [ ] Dysphagia [ ]Dysarthria [ ] Paresis [ ]Other   SKIN:   [ ]Normal   [ ]Pressure ulcer(s)  [ ]Rash    CRITICAL CARE:  [ ] Shock Present  [ ]Septic [ ]Cardiogenic [ ]Neurologic [ ]Hypovolemic  [ ]  Vasopressors [ ]  Inotropes   [ ] Respiratory failure present  [ ] Acute  [ ] Chronic [ ] Hypoxic  [ ] Hypercarbic [ ] Other  [ ] Other organ failure     LABS:                        8.2    10.44 )-----------( 243      ( 04 Dec 2019 08:37 )             28.5   12-04    138  |  99  |  25<H>  ----------------------------<  153<H>  4.4   |  28  |  3.56<H>    Ca    10.4      04 Dec 2019 07:16          RADIOLOGY & ADDITIONAL STUDIES:    Protein Calorie Malnutrition Present: [ ] yes [ ] no  [ ] PPSV2 < or = 30%  [ ] significant weight loss [ ] poor nutritional intake [ ] anasarca [ ] catabolic state Albumin, Serum: 2.6 g/dL (12-01-19 @ 00:27)  Artificial Nutrition [ ]     REFERRALS:   [ ]Chaplaincy  [ ] Hospice  [ ]Child Life  [ ]Social Work  [ ]Case management [ ]Holistic Therapy   Goals of Care Document: SUBJECTIVE AND OBJECTIVE: Patient seen and examined, remains without decision making capacity. Complains only of some pain in his arm at the time RN attempted IV placement but otherwise no other pain or symptoms. He was not able to communicate orientation or participate in thorough conversation. Extensive conversation between [facility] team SW and patients family (sisters) regarding FHCDA and patients daughters interest in being decision maker as patient without a HCP at this time. Patient at this time is pending speech and swallow evaluation. will need nutritional goc conversation pending their evaluation.    INTERVAL HPI/OVERNIGHT EVENTS: no acute overnight events.     DNR on chart:   Allergies    No Known Allergies    Intolerances    MEDICATIONS  (STANDING):  atorvastatin 40 milliGRAM(s) Oral at bedtime  carvedilol 3.125 milliGRAM(s) Oral every 12 hours  chlorhexidine 4% Liquid 1 Application(s) Topical <User Schedule>  epoetin tan Injectable 00069 Unit(s) IV Push <User Schedule>  heparin  Injectable 5000 Unit(s) SubCutaneous every 8 hours  insulin lispro (HumaLOG) corrective regimen sliding scale   SubCutaneous every 6 hours  levETIRAcetam  Solution 1000 milliGRAM(s) Oral at bedtime  midodrine. 10 milliGRAM(s) Oral three times a day  pantoprazole  Injectable 40 milliGRAM(s) IV Push two times a day  predniSONE   Tablet 5 milliGRAM(s) Oral daily    MEDICATIONS  (PRN):      ITEMS UNCHECKED ARE NOT PRESENT    PRESENT SYMPTOMS: [ ]Unable to obtain due to poor mentation   Source if other than patient:  [ ]Family   [ ]Team     Pain (Impact on QOL):    Location:  Minimal acceptable level (0-10 scale):                   Aggravating factors:  Quality:  Radiation:  Severity (0-10 scale):    Timing:    Dyspnea:                           [ ]Mild [ ]Moderate [ ]Severe  Anxiety:                             [ ]Mild [ ]Moderate [ ]Severe  Fatigue:                             [ ]Mild [ ]Moderate [ ]Severe  Nausea:                             [ ]Mild [ ]Moderate [ ]Severe  Loss of appetite:              [ ]Mild [ ]Moderate [ ]Severe  Constipation:                    [ ]Mild [ ]Moderate [ ]Severe    PAIN AD Score:	  [email or web address].[email or web address]/cog/painad.pdf (Ctrl + left click to view)    Other Symptoms:  [x ]All other review of systems negative     Karnofsky Performance Score/Palliative Performance Status Version 2:        20 %    http://palliative.info/resource_material/[email or web address].pdf  PHYSICAL EXAM:  Vital Signs Last 24 Hrs  T(C): 37 (04 Dec 2019 13:00), Max: 37.1 (04 Dec 2019 09:00)  T(F): 98.6 (04 Dec 2019 13:00), Max: 98.7 (04 Dec 2019 09:00)  HR: 96 (04 Dec 2019 13:00) (92 - 102)  BP: 112/69 (04 Dec 2019 13:00) (112/69 - 125/67)  BP(mean): --  RR: 18 (04 Dec 2019 13:00) (18 - 18)  SpO2: 96% (04 Dec 2019 13:00) (93% - 96%) I&O's Summary    03 Dec 2019 07:01  -  04 Dec 2019 07:00  --------------------------------------------------------  IN: 920 mL / OUT: 50 mL / NET: 870 mL    04 Dec 2019 07:01  -  04 Dec 2019 17:08  --------------------------------------------------------  IN: 80 mL / OUT: 0 mL / NET: 80 mL     GENERAL:  [ ]Alert  [ ]Oriented x   [ x]Lethargic  [ ]Cachexia  [ ]Unarousable  [ ]Verbal  [ ]Non-Verbal  Behavioral:   [ ] Anxiety  [x ] Delirium [ ] Agitation [ ] Other  HEENT:  [ ]Normal   [x ]Dry mouth   [ ]ET Tube/Trach  [ ]Oral lesions  PULMONARY:   [ x]Clear [ ]Tachypnea  [ ]Audible excessive secretions   [ ]Rhonchi        [ ]Right [ ]Left [ ]Bilateral  [ ]Crackles        [ ]Right [ ]Left [ ]Bilateral  [ ]Wheezing     [ ]Right [ ]Left [ ]Bilateral  CARDIOVASCULAR:    [ x]Regular [ ]Irregular [ ]Tachy  [ ]Yaniv [ ]Murmur [ ]Other  GASTROINTESTINAL:  [x ]Soft  [ ]Distended   [x ]+BS  [ x]Non tender [ ]Tender  [ ]PEG [ ]OGT/ NGT   Last BM:    GENITOURINARY:  [ ]Normal [x ] Incontinent   [ ]Oliguria/Anuria   [ ]Mclaughlin  MUSCULOSKELETAL:   [ ]Normal   [x ]Weakness  [ ]Bed/Wheelchair bound [ ]Edema  NEUROLOGIC: not following commands  [ ]No focal deficits  [x ] Cognitive impairment  [ ] Dysphagia [ ]Dysarthria [ ] Paresis [ ]Other   SKIN: xerosis  [ ]Normal   [ ]Pressure ulcer(s)  [ ]Rash    CRITICAL CARE:  [ ] Shock Present  [ ]Septic [ ]Cardiogenic [ ]Neurologic [ ]Hypovolemic  [ ]  Vasopressors [ ]  Inotropes   [ ] Respiratory failure present  [ ] Acute  [ ] Chronic [ ] Hypoxic  [ ] Hypercarbic [ ] Other  [ ] Other organ failure     LABS:                        8.2    10.44 )-----------( 243      ( 04 Dec 2019 08:37 )             28.5   12-04    138  |  99  |  25<H>  ----------------------------<  153<H>  4.4   |  28  |  3.56<H>    Ca    10.4      04 Dec 2019 07:16          RADIOLOGY & ADDITIONAL STUDIES: no new imaging studies for review    Protein Calorie Malnutrition Present: [x ] yes [ ] no  [x ] PPSV2 < or = 30%  [ ] significant weight loss [x ] poor nutritional intake [ ] anasarca [ ] catabolic state Albumin, Serum: 2.6 g/dL (12-01-19 @ 00:27)  Artificial Nutrition [x]     REFERRALS:   [ ]Chaplaincy  [ ] Hospice  [ ]Child Life  [ x]Social Work  [ ]Case management [ ]Holistic Therapy   Goals of Care Document:

## 2019-12-04 NOTE — PROGRESS NOTE ADULT - SUBJECTIVE AND OBJECTIVE BOX
[facility]      Seen earlier today, and examined.  - Today, patient is without complaints.           *****MEDICATIONS: Current medication reviewed and documented.    MEDICATIONS  (STANDING):  atorvastatin 40 milliGRAM(s) Oral at bedtime  carvedilol 3.125 milliGRAM(s) Oral every 12 hours  chlorhexidine 4% Liquid 1 Application(s) Topical <User Schedule>  epoetin tan Injectable 02866 Unit(s) IV Push <User Schedule>  heparin  Injectable 5000 Unit(s) SubCutaneous every 8 hours  insulin lispro (HumaLOG) corrective regimen sliding scale   SubCutaneous every 6 hours  levETIRAcetam  Solution 1000 milliGRAM(s) Oral at bedtime  midodrine. 10 milliGRAM(s) Oral three times a day  pantoprazole  Injectable 40 milliGRAM(s) IV Push two times a day  predniSONE   Tablet 5 milliGRAM(s) Oral daily    MEDICATIONS  (PRN):          ***** VITAL SIGNS:  T(F): 98 (19 @ 17:00), Max: 98.7 (19 @ 09:00)  HR: 97 (19 @ 17:00) (92 - 102)  BP: 145/78 (19 @ 17:00) (112/69 - 145/78)  RR: 18 (19 @ 17:00) (18 - 18)  SpO2: 96% (19 @ 17:00) (93% - 96%)  Wt(kg): --  ,   I&O's Summary    03 Dec 2019 07:  -  04 Dec 2019 07:00  --------------------------------------------------------  IN: 920 mL / OUT: 50 mL / NET: 870 mL    04 Dec 2019 07:  -  04 Dec 2019 19:12  --------------------------------------------------------  IN: 280 mL / OUT: 0 mL / NET: 280 mL             *****PHYSICAL EXAM:        alerts to sternal rub    lethargic today    can squeeze hand to commands, grimaces to noxious stimuli, able to  my hands, wiggles toes  and legs, hyperreflexic in the legs with bilateral cross adductors, bilateral upgoing toes.     Gait not assessed.     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   On keppra for presumed seizure disorder, but no clear history.    Pt is encephalopathic on exam,. opens eyes to voice  can  follow simple commands  commands, grimaces to noxious stimuli, able to  my hands, wiggles toes  and legs, hyperreflexic in the legs with bilateral cross adductors, bilateral upgoing toes.     seen initially by house neurology, following up now,   lp done not consistent with meningitis.  Defer to ID   unable to tolerate mri due to ams   Routine EEG  without any seizures  AMS likely related to hypercalcemia, renal dysfunction , poor nutritional intake.   thiamine 500 ivpb daily x 3 days completed.      minimal improvement in mental status, will lower keppra dose   palliative care following to establish goals of care.      ________________    Thank you for allowing me to participate in the care of this patient. Please do not hesitate to call me if you have any  questions.        ________________  Shira Lindsey MD  [facility]  [phone]      33 minutes spent on total encounter; more than 50 % of the visit was  spent counseling about plan of care, compliance to diet/exercise and medication regimen and or  coordinating care by the attending physician.      It is advised that stroke patients follow up with JAVIER Vaughan @ [phone] in 1- 2 weeks.   Others please follow up with Dr. Michael Nissenbaum [phone]

## 2019-12-04 NOTE — PROGRESS NOTE ADULT - ASSESSMENT
74M PMHx of ESRD s/p failed renal transplant x2, HCV, DM, and meningococcal meningitis 2 years ago was sent in to the ED from HD for AMS and low BPs. Admitted with concern for CVA, neurology following. Patient with RRT overnight 11/27 resulting in transfer to MICU for respiratory failure concern for aspiration and sepsis. Palliative consulted for [facility]

## 2019-12-05 LAB
ANION GAP SERPL CALC-SCNC: 15 MMOL/L — SIGNIFICANT CHANGE UP (ref 5–17)
BUN SERPL-MCNC: 18 MG/DL — SIGNIFICANT CHANGE UP (ref 7–23)
CALCIUM SERPL-MCNC: 10.3 MG/DL — SIGNIFICANT CHANGE UP (ref 8.4–10.5)
CHLORIDE SERPL-SCNC: 95 MMOL/L — LOW (ref 96–108)
CO2 SERPL-SCNC: 26 MMOL/L — SIGNIFICANT CHANGE UP (ref 22–31)
CREAT SERPL-MCNC: 2.75 MG/DL — HIGH (ref 0.5–1.3)
GLUCOSE BLDC GLUCOMTR-MCNC: 109 MG/DL — HIGH (ref 70–99)
GLUCOSE BLDC GLUCOMTR-MCNC: 148 MG/DL — HIGH (ref 70–99)
GLUCOSE BLDC GLUCOMTR-MCNC: 150 MG/DL — HIGH (ref 70–99)
GLUCOSE BLDC GLUCOMTR-MCNC: 161 MG/DL — HIGH (ref 70–99)
GLUCOSE SERPL-MCNC: 156 MG/DL — HIGH (ref 70–99)
HCT VFR BLD CALC: 26.9 % — LOW (ref 39–50)
HGB BLD-MCNC: 7.6 G/DL — LOW (ref 13–17)
MCHC RBC-ENTMCNC: 25.2 PG — LOW (ref 27–34)
MCHC RBC-ENTMCNC: 28.3 GM/DL — LOW (ref 32–36)
MCV RBC AUTO: 89.1 FL — SIGNIFICANT CHANGE UP (ref 80–100)
NRBC # BLD: 0 /100 WBCS — SIGNIFICANT CHANGE UP (ref 0–0)
PLATELET # BLD AUTO: 233 K/UL — SIGNIFICANT CHANGE UP (ref 150–400)
POTASSIUM SERPL-MCNC: 3.7 MMOL/L — SIGNIFICANT CHANGE UP (ref 3.5–5.3)
POTASSIUM SERPL-SCNC: 3.7 MMOL/L — SIGNIFICANT CHANGE UP (ref 3.5–5.3)
RBC # BLD: 3.02 M/UL — LOW (ref 4.2–5.8)
RBC # FLD: 16 % — HIGH (ref 10.3–14.5)
SODIUM SERPL-SCNC: 136 MMOL/L — SIGNIFICANT CHANGE UP (ref 135–145)
WBC # BLD: 13.05 K/UL — HIGH (ref 3.8–10.5)
WBC # FLD AUTO: 13.05 K/UL — HIGH (ref 3.8–10.5)

## 2019-12-05 RX ORDER — LIDOCAINE 4 G/100G
1 CREAM TOPICAL DAILY
Refills: 0 | Status: DISCONTINUED | OUTPATIENT
Start: 2019-12-05 | End: 2019-12-21

## 2019-12-05 RX ORDER — ACETAMINOPHEN 500 MG
1000 TABLET ORAL ONCE
Refills: 0 | Status: COMPLETED | OUTPATIENT
Start: 2019-12-05 | End: 2019-12-05

## 2019-12-05 RX ADMIN — Medication 1000 MILLIGRAM(S): at 16:10

## 2019-12-05 RX ADMIN — ATORVASTATIN CALCIUM 40 MILLIGRAM(S): 80 TABLET, FILM COATED ORAL at 00:31

## 2019-12-05 RX ADMIN — PANTOPRAZOLE SODIUM 40 MILLIGRAM(S): 20 TABLET, DELAYED RELEASE ORAL at 05:11

## 2019-12-05 RX ADMIN — Medication 1: at 06:27

## 2019-12-05 RX ADMIN — HEPARIN SODIUM 5000 UNIT(S): 5000 INJECTION INTRAVENOUS; SUBCUTANEOUS at 22:18

## 2019-12-05 RX ADMIN — CARVEDILOL PHOSPHATE 3.12 MILLIGRAM(S): 80 CAPSULE, EXTENDED RELEASE ORAL at 17:34

## 2019-12-05 RX ADMIN — LEVETIRACETAM 1000 MILLIGRAM(S): 250 TABLET, FILM COATED ORAL at 23:19

## 2019-12-05 RX ADMIN — LEVETIRACETAM 1000 MILLIGRAM(S): 250 TABLET, FILM COATED ORAL at 00:28

## 2019-12-05 RX ADMIN — MIDODRINE HYDROCHLORIDE 10 MILLIGRAM(S): 2.5 TABLET ORAL at 05:11

## 2019-12-05 RX ADMIN — Medication 400 MILLIGRAM(S): at 15:12

## 2019-12-05 RX ADMIN — HEPARIN SODIUM 5000 UNIT(S): 5000 INJECTION INTRAVENOUS; SUBCUTANEOUS at 13:00

## 2019-12-05 RX ADMIN — CARVEDILOL PHOSPHATE 3.12 MILLIGRAM(S): 80 CAPSULE, EXTENDED RELEASE ORAL at 00:31

## 2019-12-05 RX ADMIN — MIDODRINE HYDROCHLORIDE 10 MILLIGRAM(S): 2.5 TABLET ORAL at 17:33

## 2019-12-05 RX ADMIN — PANTOPRAZOLE SODIUM 40 MILLIGRAM(S): 20 TABLET, DELAYED RELEASE ORAL at 17:34

## 2019-12-05 RX ADMIN — MIDODRINE HYDROCHLORIDE 10 MILLIGRAM(S): 2.5 TABLET ORAL at 13:00

## 2019-12-05 RX ADMIN — Medication 400 MILLIGRAM(S): at 00:23

## 2019-12-05 RX ADMIN — HEPARIN SODIUM 5000 UNIT(S): 5000 INJECTION INTRAVENOUS; SUBCUTANEOUS at 00:31

## 2019-12-05 RX ADMIN — Medication 5 MILLIGRAM(S): at 05:11

## 2019-12-05 RX ADMIN — Medication 1000 MILLIGRAM(S): at 00:55

## 2019-12-05 RX ADMIN — HEPARIN SODIUM 5000 UNIT(S): 5000 INJECTION INTRAVENOUS; SUBCUTANEOUS at 05:10

## 2019-12-05 RX ADMIN — ATORVASTATIN CALCIUM 40 MILLIGRAM(S): 80 TABLET, FILM COATED ORAL at 22:18

## 2019-12-05 RX ADMIN — CARVEDILOL PHOSPHATE 3.12 MILLIGRAM(S): 80 CAPSULE, EXTENDED RELEASE ORAL at 06:27

## 2019-12-05 NOTE — PROGRESS NOTE ADULT - SUBJECTIVE AND OBJECTIVE BOX
Patient is a 74y old  Male who presents with a chief complaint of ams (05 Dec 2019 13:39)      SUBJECTIVE / OVERNIGHT EVENTS:  weak and lethargic.  failed swallow eval,  I called  Patient's daughter Roro, [phone], left VM.    MEDICATIONS  (STANDING):  atorvastatin 40 milliGRAM(s) Oral at bedtime  carvedilol 3.125 milliGRAM(s) Oral every 12 hours  chlorhexidine 4% Liquid 1 Application(s) Topical <User Schedule>  epoetin tan Injectable 87424 Unit(s) IV Push <User Schedule>  heparin  Injectable 5000 Unit(s) SubCutaneous every 8 hours  insulin lispro (HumaLOG) corrective regimen sliding scale   SubCutaneous every 6 hours  levETIRAcetam  Solution 1000 milliGRAM(s) Oral at bedtime  midodrine. 10 milliGRAM(s) Oral three times a day  pantoprazole  Injectable 40 milliGRAM(s) IV Push two times a day  predniSONE   Tablet 5 milliGRAM(s) Oral daily    MEDICATIONS  (PRN):      Vital Signs Last 24 Hrs  T(C): 36.6 (05 Dec 2019 13:21), Max: 37.1 (05 Dec 2019 01:28)  T(F): 97.9 (05 Dec 2019 13:21), Max: 98.8 (05 Dec 2019 01:28)  HR: 90 (05 Dec 2019 13:21) (90 - 110)  BP: 103/60 (05 Dec 2019 13:21) (94/58 - 145/78)  BP(mean): --  RR: 16 (05 Dec 2019 13:21) (16 - 18)  SpO2: 97% (05 Dec 2019 13:21) (94% - 97%)  CAPILLARY BLOOD GLUCOSE      POCT Blood Glucose.: 148 mg/dL (05 Dec 2019 12:59)  POCT Blood Glucose.: 161 mg/dL (05 Dec 2019 05:54)  POCT Blood Glucose.: 109 mg/dL (05 Dec 2019 00:02)  POCT Blood Glucose.: 131 mg/dL (04 Dec 2019 18:45)    I&O's Summary    04 Dec 2019 07:01  -  05 Dec 2019 07:00  --------------------------------------------------------  IN: 600 mL / OUT: 1150 mL / NET: -550 mL    05 Dec 2019 07:01  -  05 Dec 2019 15:15  --------------------------------------------------------  IN: 385 mL / OUT: 0 mL / NET: 385 mL        PHYSICAL EXAM:  GENERAL: NAD, weak and lethargic  HEAD:  Atraumatic, Normocephalic  EYES: EOMI, PERRLA, conjunctiva and sclera clear  NECK: Supple, No JVD  CHEST/LUNG: Clear to auscultation bilaterally; No wheeze  HEART: Regular rate and rhythm; No murmurs, rubs, or gallops  ABDOMEN: Soft, Nontender, Nondistended; Bowel sounds present  EXTREMITIES:  2+ Peripheral Pulses, No clubbing, cyanosis, or edema  PSYCH: AAOx0  NEUROLOGY: non-focal  SKIN: No rashes or lesions    LABS:                        7.6    13.05 )-----------( 233      ( 05 Dec 2019 07:17 )             26.9     12-05    136  |  95<L>  |  18  ----------------------------<  156<H>  3.7   |  26  |  2.75<H>    Ca    10.3      05 Dec 2019 07:08                RADIOLOGY & ADDITIONAL TESTS:    Imaging Personally Reviewed:    Consultant(s) Notes Reviewed:      Care Discussed with Consultants/Other Providers:

## 2019-12-05 NOTE — ADVANCED PRACTICE NURSE CONSULT - ASSESSMENT
The pt presents on a [employer] P 500 support surface for low air-loss and pressure redistribution features. The pt is being turned and positioned, staff have applied z-sharee boots to off-load the heels.   The pt is being followed by nutrition and receives enteral feeds as able. He has unsecured mittens in place.  Upon assessment, the pt was incontinent of a small amount of liquid stool and pericare was provided There was a foam dressing to the sacrum which was contaminated and removed. Upon assessment, a wound was noted measuring 9cm x 9cm x0.2cm -. The wound presents with pale pink tissue, there was pale white and yellow tissue noted as well- the yellow tissue was noted at approximately 1 o'clock with a divet in the wound noted in this location. The tissue has an atypical appearance it does not present  as slough or eschar. Will classify as unstageable at this time.  There was scant drainage, no odor, the periwound skin was intact with no erythema, induration, or fluctuance.  Given hx of stool incontinence, will recommend to d/c the foam and apply Advanced Cavilon to lay down a protective coating on the skin.

## 2019-12-05 NOTE — ADVANCED PRACTICE NURSE CONSULT - RECOMMEDATIONS
Will recommend the followin. Sacrum: apply Advanced Cavilon M-W-F. routine pericare between applications.  2. Continue with turning and positioning  3. nutrition support as pt condition allows  Tx plan discussed with RN

## 2019-12-05 NOTE — SWALLOW BEDSIDE ASSESSMENT ADULT - SLP PERTINENT HISTORY OF CURRENT PROBLEM
Patient is a 73yo gentleman who presented to the facility on the afternoon of 11/20/19 from nursing home regarding c/o AMS that has persisted since at least 11/19/19. 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 History unable to be obtained directly from patient given clinical condition. As per nursing supervisor, patient has been suffering from AMS since prior day with notable worsening restlessness along with unable to follow commands. Code Stroke called on arrival, NIHSS of 16. BP of 112/55. CT Head Noncontrast negative for acute intracranial abnormality, showing no acute hemorrhage. Scattered white matter infarcts of indeterminate age representing an interval change compared with 9/3/2019.

## 2019-12-05 NOTE — SWALLOW BEDSIDE ASSESSMENT ADULT - SWALLOW EVAL: DIAGNOSIS
Pt presents with clinical evidence of 1)an oropharyngeal dysphagia. Oral stage is marked by reduced orientation to feeding process, requiring verbal cues to attend to food/utensil. Ultimately, Pt able to strip bolus from spoon. Poor manipulation of bolus with delayed oral transit time. Delay in swallow initiation (approximately 45 seconds) with reduced laryngeal elevation upon palpation. Immediate weak, wet/gurgly throat clear which is suggestive of penetration/aspiration. Pt immediately suctioned orally via [place] and able to resume a clear/dry vocal quality. 2)Cognitive deficits 3)Lethargy/AMS

## 2019-12-05 NOTE — PROGRESS NOTE ADULT - SUBJECTIVE AND OBJECTIVE BOX
Subjective: Patient seen and examined. No new events except as noted.     SUBJECTIVE/ROS:        MEDICATIONS:  MEDICATIONS  (STANDING):  atorvastatin 40 milliGRAM(s) Oral at bedtime  carvedilol 3.125 milliGRAM(s) Oral every 12 hours  chlorhexidine 4% Liquid 1 Application(s) Topical <User Schedule>  epoetin tan Injectable 05555 Unit(s) IV Push <User Schedule>  heparin  Injectable 5000 Unit(s) SubCutaneous every 8 hours  insulin lispro (HumaLOG) corrective regimen sliding scale   SubCutaneous every 6 hours  levETIRAcetam  Solution 1000 milliGRAM(s) Oral at bedtime  midodrine. 10 milliGRAM(s) Oral three times a day  pantoprazole  Injectable 40 milliGRAM(s) IV Push two times a day  predniSONE   Tablet 5 milliGRAM(s) Oral daily      PHYSICAL EXAM:  T(C): 36.7 (12-05-19 @ 10:10), Max: 37.1 (12-05-19 @ 01:28)  HR: 99 (12-05-19 @ 10:10) (92 - 110)  BP: 103/57 (12-05-19 @ 10:10) (94/58 - 145/78)  RR: 16 (12-05-19 @ 10:10) (16 - 18)  SpO2: 96% (12-05-19 @ 10:10) (94% - 97%)  Wt(kg): --  I&O's Summary    04 Dec 2019 07:01  -  05 Dec 2019 07:00  --------------------------------------------------------  IN: 600 mL / OUT: 1150 mL / NET: -550 mL    05 Dec 2019 07:01  -  05 Dec 2019 11:44  --------------------------------------------------------  IN: 120 mL / OUT: 0 mL / NET: 120 mL            JVP: Normal  Neck: supple  Lung: clear   CV: S1 S2 , Murmur:  Abd: soft  Ext: No edema  neuro: Awake   Psych: flat affect  Skin: normal``    LABS/DATA:    CARDIAC MARKERS:                                7.6    13.05 )-----------( 233      ( 05 Dec 2019 07:17 )             26.9     12-05    136  |  95<L>  |  18  ----------------------------<  156<H>  3.7   |  26  |  2.75<H>    Ca    10.3      05 Dec 2019 07:08      proBNP:   Lipid Profile:   HgA1c:   TSH:     TELE:  EKG:

## 2019-12-05 NOTE — SWALLOW BEDSIDE ASSESSMENT ADULT - ADDITIONAL RECOMMENDATIONS
Suggest diligent oral care Suggest diligent oral care  Suggest GOC conversation with re: to nutrition/hydration needs

## 2019-12-05 NOTE — PROGRESS NOTE ADULT - PROBLEM SELECTOR PLAN 1
Will continue current insulin regimen for now. Will continue monitoring FS, log, and FU. Will continue current insulin regimen for now. Will continue monitoring FS, log, will Follow up.

## 2019-12-05 NOTE — CHART NOTE - NSCHARTNOTEFT_GEN_A_CORE
Outreach made to patients daughter Roro via phone.  Explained current medical situation, poor mental status, failed S&S exam and inability to provide artificial nutrition via NG tube long term  Discussed GOC overall and what matters most to patient, if mental status does't improve, is this a quality of life that patient would find acceptable.  Daughter states that this is not an acceptable quality of life but she doesn't want her father to be without nutrition. We discussed alternative methods of feeding. Daughter to discuss with patiens sister, Carmela regarding options at this time. All questions answered. Discussed following up tomorrow to see if any questions and/or decision made. Palliative to continue to follow

## 2019-12-05 NOTE — SWALLOW BEDSIDE ASSESSMENT ADULT - SLP GENERAL OBSERVATIONS
Pt encountered asleep in bed, repositioned upright with assist of PCA Prasanna. +bilateral hand restraints. +NGT. +Lethargy. Pt able to open eyes and nod head "yes" in response to name. Pt minimally able to follow 1-step directives, likely due to lethargy. Pt primarily remained non-verbal, however, able to state first name, revealing a hypophonic, weak, harsh vocal quality. Pt provided with oral care and successfully removed minimal secretions upon labial/lingual surface. +weak lingual strength/ROM with lingual deviation to the right.

## 2019-12-05 NOTE — PROGRESS NOTE ADULT - SUBJECTIVE AND OBJECTIVE BOX
Patient is a 74y Male whom presented to the hospital with esrd on hd   pt  seen in am nad , more awake   PAST MEDICAL & SURGICAL HISTORY:  Kidney transplant recipient  Anuria  GERD (gastroesophageal reflux disease)  Kidney transplant failure: dx: 2/2013  Hepatitis C: dx: 90&#x27;s.  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 10 milliGRAM(s) Oral three times a day  mycophenolate mofetil 250 milliGRAM(s) Oral two times a day  predniSONE   Tablet 5 milliGRAM(s) Oral daily  sevelamer carbonate 800 milliGRAM(s) Oral three times a day with meals  sodium bicarbonate 650 milliGRAM(s) Oral two times a day  tamsulosin 0.4 milliGRAM(s) Oral at bedtime      Allergies    No Known Allergies                       SOCIAL HISTORY:  Denies ETOh,Smoking,     FAMILY HISTORY:  Family history of breast cancer in sister (Sibling): living at 92 yo  Family history of prostate cancer in father  Family history of lung cancer  Family history of hypertension in mother  Family history of diabetes mellitus: mother      REVIEW OF SYSTEMS:    unbable to obtained                                                                         7.6    13.05 )-----------( 233      ( 05 Dec 2019 07:17 )             26.9       CBC Full  -  ( 05 Dec 2019 07:17 )  WBC Count : 13.05 K/uL  RBC Count : 3.02 M/uL  Hemoglobin : 7.6 g/dL  Hematocrit : 26.9 %  Platelet Count - Automated : 233 K/uL  Mean Cell Volume : 89.1 fl  Mean Cell Hemoglobin : 25.2 pg  Mean Cell Hemoglobin Concentration : 28.3 gm/dL  Auto Neutrophil # : x  Auto Lymphocyte # : x  Auto Monocyte # : x  Auto Eosinophil # : x  Auto Basophil # : x  Auto Neutrophil % : x  Auto Lymphocyte % : x  Auto Monocyte % : x  Auto Eosinophil % : x  Auto Basophil % : x      12-05    136  |  95<L>  |  18  ----------------------------<  156<H>  3.7   |  26  |  2.75<H>    Ca    10.3      05 Dec 2019 07:08        CAPILLARY BLOOD GLUCOSE      POCT Blood Glucose.: 150 mg/dL (05 Dec 2019 18:01)  POCT Blood Glucose.: 148 mg/dL (05 Dec 2019 12:59)  POCT Blood Glucose.: 161 mg/dL (05 Dec 2019 05:54)  POCT Blood Glucose.: 109 mg/dL (05 Dec 2019 00:02)      Vital Signs Last 24 Hrs  T(C): 37 (05 Dec 2019 17:23), Max: 37.1 (05 Dec 2019 01:28)  T(F): 98.6 (05 Dec 2019 17:23), Max: 98.8 (05 Dec 2019 01:28)  HR: 97 (05 Dec 2019 17:23) (90 - 110)  BP: 107/62 (05 Dec 2019 17:23) (94/58 - 132/80)  BP(mean): --  RR: 18 (05 Dec 2019 17:23) (16 - 18)  SpO2: 91% (05 Dec 2019 17:23) (91% - 97%)                                     HEENT: conjunctive   clear   Neck:  No JVD  Respiratory: decrease bs b/l   Cardiovascular: S1 and S2  Gastrointestinal: BS+, soft, NT/ND  Extremities: No peripheral edema  Skin: dry   Access: pos fistula

## 2019-12-05 NOTE — CHART NOTE - NSCHARTNOTEFT_GEN_A_CORE
Nutrition Follow Up Note  Patient seen for: Malnutrition follow up. Pt is a 74 year old male with PMH including ESRD S/P 2 failed renal transplants currently on dialysis admitted with AMS.  Patient S/P RRT  resulting in transfer to MICU for respiratory failure concern for aspiration and sepsis, S/P swallow evaluation today however pt recommended NPO with non-oral nutrition/hydration, palliative following for GOC, medical team to discuss possibility of PEG with family.     Source: RN, pt lethargic, minimally verbal     Diet : Nepro via NGT at 40 mL/h x 24 h    Per RN pt with no signs of N+V, last BM today, pt will typically have 1-2 per shift loose consistency, noted with 2 BM yesterday, 2 thus far today         Enteral /Parenteral Nutrition: Per RN pt tolerating Nepro well at goal rate of 40 mL/h , tube feeding held temporarily today for swallow evaluation but promptly resumed. Noted pt previously pulled out NGT however was replaced and pt currently with mitten restraints to prevent further dislodgement.       Daily Weight in k.5 (), Weight in k.6 (), Weight in k.4 (), Weight in k.9 (), Weight in k.9 (), Weight in k.3 (), Weight in k.8 ()  % Weight Change    Pertinent Medications: MEDICATIONS  (STANDING):  atorvastatin 40 milliGRAM(s) Oral at bedtime  carvedilol 3.125 milliGRAM(s) Oral every 12 hours  chlorhexidine 4% Liquid 1 Application(s) Topical <User Schedule>  epoetin tan Injectable 38182 Unit(s) IV Push <User Schedule>  heparin  Injectable 5000 Unit(s) SubCutaneous every 8 hours  insulin lispro (HumaLOG) corrective regimen sliding scale   SubCutaneous every 6 hours  levETIRAcetam  Solution 1000 milliGRAM(s) Oral at bedtime  midodrine. 10 milliGRAM(s) Oral three times a day  pantoprazole  Injectable 40 milliGRAM(s) IV Push two times a day  predniSONE   Tablet 5 milliGRAM(s) Oral daily    MEDICATIONS  (PRN):    Pertinent Labs:  @ 07:08: Na 136, BUN 18, Cr 2.75<H>, <H>, K+ 3.7, Phos --, Mg --, Alk Phos --, ALT/SGPT --, AST/SGOT --, HbA1c --    Finger Sticks:  POCT Blood Glucose.: 148 mg/dL ( @ 12:59)  POCT Blood Glucose.: 161 mg/dL ( @ 05:54)  POCT Blood Glucose.: 109 mg/dL ( @ 00:02)  POCT Blood Glucose.: 131 mg/dL ( @ 18:45)      Skin per nursing documentation:   Edema:    Estimated Needs:   [ ] no change since previous assessment  [ ] recalculated:     Previous Nutrition Diagnosis:   Nutrition Diagnosis is:    New Nutrition Diagnosis:  Related to:    As evidenced by:      Interventions:     Recommend  1)    Monitoring and Evaluation:     Continue to monitor Nutritional intake, Tolerance to diet prescription, weights, labs, skin integrity    RD remains available upon request and will follow up per protocol Nutrition Follow Up Note  Patient seen for: Malnutrition follow up. Pt is a 74 year old male with PMH including ESRD S/P 2 failed renal transplants currently on dialysis admitted with AMS.  Patient S/P RRT 11/27 resulting in transfer to MICU for respiratory failure concern for aspiration and sepsis, S/P swallow evaluation today however pt recommended NPO with non-oral nutrition/hydration, palliative following for GOC, medical team to discuss possibility of PEG with family.     Source: RN, pt lethargic, minimally verbal     Diet : Nepro via NGT at 40 mL/h x 24 h    Per RN pt with no signs of N+V, last BM today, pt will typically have 1-2 per shift loose consistency, noted with 2 BM yesterday, 2 thus far today         Enteral /Parenteral Nutrition: Per RN pt tolerating Nepro well at goal rate of 40 mL/h , tube feeding held temporarily today for swallow evaluation but promptly resumed. Noted pt previously pulled out NGT however was replaced and pt currently with mitten restraints to prevent further dislodgement.       Weight: 158.2 lbs (11/21), 143 lbs (12/2), 144.4 lbs (12/5), weight fluctuations noted, overall decreased from admission     Pertinent Medications: MEDICATIONS  (STANDING):  atorvastatin 40 milliGRAM(s) Oral at bedtime  carvedilol 3.125 milliGRAM(s) Oral every 12 hours  chlorhexidine 4% Liquid 1 Application(s) Topical <User Schedule>  epoetin tan Injectable 09214 Unit(s) IV Push <User Schedule>  heparin  Injectable 5000 Unit(s) SubCutaneous every 8 hours  insulin lispro (HumaLOG) corrective regimen sliding scale   SubCutaneous every 6 hours  levETIRAcetam  Solution 1000 milliGRAM(s) Oral at bedtime  midodrine. 10 milliGRAM(s) Oral three times a day  pantoprazole  Injectable 40 milliGRAM(s) IV Push two times a day  predniSONE   Tablet 5 milliGRAM(s) Oral daily    MEDICATIONS  (PRN):    Pertinent Labs: 12-05 @ 07:08: Na 136, BUN 18, Cr 2.75<H>, <H>, K+ 3.7, Phos --, Mg --, Alk Phos --, ALT/SGPT --, AST/SGOT --, HbA1c --    Finger Sticks:  POCT Blood Glucose.: 148 mg/dL (12-05 @ 12:59)  POCT Blood Glucose.: 161 mg/dL (12-05 @ 05:54)  POCT Blood Glucose.: 109 mg/dL (12-05 @ 00:02)  POCT Blood Glucose.: 131 mg/dL (12-04 @ 18:45)      Skin per nursing documentation: noted with stage 3 pressure injury to sacrum   Edema: none    Estimated Needs:   [ x] no change since previous assessment  [ ] recalculated:     Previous Nutrition Diagnosis: Severe malnutrition   Nutrition Diagnosis is: ongoing, addressed with tube feeding, nutrition care plan in progress    New Nutrition Diagnosis:   Related to:    As evidenced by:      Interventions:     Recommend  1) Consider increasing Tube feeding via NGT to Nepro @ 55 mL/h x 24 h to provide 1320 mL total volume, 2376 Kcal, 107g protein, 960 mL free water (33 Kcal/Kg, 1.5g protein/Kg based on dosing weight 71.8 Kg) to optimize nutrition and wound healing  2) Nutrition plan of care to be in line with medical GOC and pt/family wishes      Monitoring and Evaluation:     Continue to monitor Nutritional intake, Tolerance to diet prescription, weights, labs, skin integrity    RD remains available upon request and will follow up per protocol Nutrition Follow Up Note  Patient seen for: Malnutrition follow up. Pt is a 74 year old male with PMH including ESRD S/P 2 failed renal transplants currently on dialysis admitted with AMS.  Patient S/P RRT 11/27 resulting in transfer to MICU for respiratory failure concern for aspiration and sepsis, S/P swallow evaluation today however pt recommended NPO with non-oral nutrition/hydration, palliative following for GOC, medical team to discuss possibility of PEG with family.     Source: RN, pt lethargic, minimally verbal     Diet : Nepro via NGT at 40 mL/h x 24 h    Per RN pt with no signs of N+V, last BM today, pt will typically have 1-2 per shift loose consistency, noted with 2 BM yesterday, 2 thus far today         Enteral /Parenteral Nutrition: Per RN pt tolerating Nepro well at goal rate of 40 mL/h , tube feeding held temporarily today for swallow evaluation but promptly resumed. Noted pt previously pulled out NGT however was replaced and pt currently with mitten restraints to prevent further dislodgement.       Weight: 158.2 lbs (11/21), 143 lbs (12/2), 144.4 lbs (12/5), weight fluctuations noted, overall decreased from admission     Pertinent Medications: MEDICATIONS  (STANDING):  atorvastatin 40 milliGRAM(s) Oral at bedtime  carvedilol 3.125 milliGRAM(s) Oral every 12 hours  chlorhexidine 4% Liquid 1 Application(s) Topical <User Schedule>  epoetin tan Injectable 94450 Unit(s) IV Push <User Schedule>  heparin  Injectable 5000 Unit(s) SubCutaneous every 8 hours  insulin lispro (HumaLOG) corrective regimen sliding scale   SubCutaneous every 6 hours  levETIRAcetam  Solution 1000 milliGRAM(s) Oral at bedtime  midodrine. 10 milliGRAM(s) Oral three times a day  pantoprazole  Injectable 40 milliGRAM(s) IV Push two times a day  predniSONE   Tablet 5 milliGRAM(s) Oral daily    MEDICATIONS  (PRN):    Pertinent Labs: 12-05 @ 07:08: Na 136, BUN 18, Cr 2.75<H>, <H>, K+ 3.7, Phos --, Mg --, Alk Phos --, ALT/SGPT --, AST/SGOT --, HbA1c --    Finger Sticks:  POCT Blood Glucose.: 148 mg/dL (12-05 @ 12:59)  POCT Blood Glucose.: 161 mg/dL (12-05 @ 05:54)  POCT Blood Glucose.: 109 mg/dL (12-05 @ 00:02)  POCT Blood Glucose.: 131 mg/dL (12-04 @ 18:45)      Skin per nursing documentation: noted with stage 3 pressure injury to sacrum-wound care following  Edema: none    Estimated Needs:   [ x] no change since previous assessment  [ ] recalculated:     Previous Nutrition Diagnosis: Severe malnutrition   Nutrition Diagnosis is: ongoing, addressed with tube feeding, nutrition care plan in progress    New Nutrition Diagnosis:   Related to:    As evidenced by:      Interventions:     Recommend  1) Consider increasing Tube feeding via NGT to Nepro @ 55 mL/h x 24 h to provide 1320 mL total volume, 2376 Kcal, 107g protein, 960 mL free water (33 Kcal/Kg, 1.5g protein/Kg based on dosing weight 71.8 Kg) to optimize nutrition and wound healing  2) Nutrition plan of care to be in line with medical GOC and pt/family wishes      Monitoring and Evaluation:     Continue to monitor Nutritional intake, Tolerance to diet prescription, weights, labs, skin integrity    RD remains available upon request and will follow up per protocol

## 2019-12-05 NOTE — SWALLOW BEDSIDE ASSESSMENT ADULT - SWALLOW EVAL: SECRETION MANAGEMENT
+minimal secretions upon lingual and labial surface - manually removed with oral care provided by this SLP
Rahat

## 2019-12-05 NOTE — PROGRESS NOTE ADULT - ASSESSMENT
History of cocaine induced CM  normal EF on last echo  cont BB     PAF  in sinus   a/c once deemed safe   for now off given high bleed risk    Hypercalcemia  as per renal   HD    NSVT  cont BB     tachycardia  improving  cont BB     anemia  Monitor hemoglobin, transfuse as needed.   as per primary team     appreciate PC input   fu with recommendation

## 2019-12-05 NOTE — SWALLOW BEDSIDE ASSESSMENT ADULT - ASR SWALLOW LINGUAL MOBILITY
impaired right lateral movement/impaired protrusion/impaired anterior elevation/impaired left lateral movement/+lingual deviation to the right

## 2019-12-05 NOTE — ADVANCED PRACTICE NURSE CONSULT - REASON FOR CONSULT
F/u visit by the Wound Care Team to re-evaluate sacral wound. PMH is noted:  75 y/o male with PMHx of  ESRD s/p /p failed renal transplant, DM, HTN, cardiomyopathy 2/2 cocaine abuse, Hepatitis C, meningococcal meningitis, Afib and admitted w/ AMS. Found to have CVA and EEG and LP negative for other etiologies of AMS. Pt yesterday tx to ICU for unresponsiveness, septic shock and resp failure requiring intubation. Off pressors for 24 h. Cont midodrine. f/u dale cx so far bcx and sputum cx negative. US shows lower lobe consolidations likely PNA. Cont empiric abx. d/c vanco as remains mrsa negative. Cont immunosuppression for renal tx. Pt following commands off sedation. Doing well on PS trials and extubated today. If MS does not improve further will send him for previously ordered MRI.  since last assessment by the team on 11/29, the pt was transferred from the MICU to [facility].

## 2019-12-05 NOTE — PROGRESS NOTE ADULT - ASSESSMENT
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       NEURO:  - Extubated 11/29, now on nasal cannula. Minimally verbal   - metabolic encephalopathy, meningitis r/o, LP negative  - neuro following  - chronic lacunar infarcts; per neuro, AMS also likely related to hypercalcemia, renal dysfunction , poor nutritional intake.   - c/w keppra for ?hx of seizure disorder  -AMS likely 2/2 hyperCa, renal dysfunction, poor PO intake  - may be developing sepsis . will consider fever w/u      CV:  - hx of afib was on eliquis, now off a/c per cards due to bleeding risk  - holding carvedilol 6.25 BID  - hx of cocaine cardiomyopathy, resolved, normal EF on most recent echo  - c/w statin  - echo repeat EF70, LVH, hyperdynamic LCF, normal RV sys fxn    PULM:  - extubated 11/29--tolerating nasal cannula    GI:  - NG tube in place  - ernie tube feeds  - ?bleeding as hgb dropping. stool occult negative. no melena reported.  - C diff negative; does not need contact precautions  - failed swallow eval  - will need to discuss peg with family    RENAL:  - hx of ESRD s/p failed renal transplant x2  - renal following, HD as recommended.  - c/w predisone, sevelamer. hold cellcept for now given likely septic state; monitor and d/w renal when to restart cellcept  - has dialysis catheter in place; HD 11/27,  tolerated wel    ENDO:  #DM2: HbA1c 5.5  - Start Insulin Sliding Scale  - endocrine following  - presented hypercalcemic, downtrending. hold pamidronate for now. f/u with endo    anemia  -- monitor H&H  - transfuse PRN    INFECTIOUS:  - WBC uptrending. ID following  - Urine culture growing E.coli; blood culture negative  - was given empiric CNS infx coverage - vanc, manoj, acyclovir, LP CSF negative  - wound care consulted appreciate recs  - pt with diarrhea; cdiff indeterminate at this time, will f/u   - will call ID if wbc goes up and do fever w/u    PPX: SCDs  - home eliquis has been held while in septic state. can add back when tolerated    GOC:  - palliative consult.  - has 3 children who should be contacted first  - poor prognosis

## 2019-12-05 NOTE — SWALLOW BEDSIDE ASSESSMENT ADULT - PHARYNGEAL PHASE
Wet vocal quality post oral intake/Throat clear post oral intake/Delayed pharyngeal swallow/Decreased laryngeal elevation
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
## 2019-12-05 NOTE — PROGRESS NOTE ADULT - SUBJECTIVE AND OBJECTIVE BOX
Chief complaint  Patient is a 74y old  Male who presents with a chief complaint of ams (05 Dec 2019 11:44)   Review of systems  Patient in bed, looks comfortable, NPO on tube feeds, no fever, no hypoglycemia.    Labs and Fingersticks  CAPILLARY BLOOD GLUCOSE      POCT Blood Glucose.: 148 mg/dL (05 Dec 2019 12:59)  POCT Blood Glucose.: 161 mg/dL (05 Dec 2019 05:54)  POCT Blood Glucose.: 109 mg/dL (05 Dec 2019 00:02)  POCT Blood Glucose.: 131 mg/dL (04 Dec 2019 18:45)      Anion Gap, Serum: 15 (12-05 @ 07:08)  Anion Gap, Serum: 11 (12-04 @ 07:16)      Calcium, Total Serum: 10.3 (12-05 @ 07:08)  Calcium, Total Serum: 10.4 (12-04 @ 07:16)          12-05    136  |  95<L>  |  18  ----------------------------<  156<H>  3.7   |  26  |  2.75<H>    Ca    10.3      05 Dec 2019 07:08                          7.6    13.05 )-----------( 233      ( 05 Dec 2019 07:17 )             26.9     Medications  MEDICATIONS  (STANDING):  atorvastatin 40 milliGRAM(s) Oral at bedtime  carvedilol 3.125 milliGRAM(s) Oral every 12 hours  chlorhexidine 4% Liquid 1 Application(s) Topical <User Schedule>  epoetin tan Injectable 97325 Unit(s) IV Push <User Schedule>  heparin  Injectable 5000 Unit(s) SubCutaneous every 8 hours  insulin lispro (HumaLOG) corrective regimen sliding scale   SubCutaneous every 6 hours  levETIRAcetam  Solution 1000 milliGRAM(s) Oral at bedtime  midodrine. 10 milliGRAM(s) Oral three times a day  pantoprazole  Injectable 40 milliGRAM(s) IV Push two times a day  predniSONE   Tablet 5 milliGRAM(s) Oral daily      Physical Exam  General: Patient comfortable in bed  Vital Signs Last 12 Hrs  T(F): 97.9 (12-05-19 @ 13:21), Max: 98.6 (12-05-19 @ 05:05)  HR: 90 (12-05-19 @ 13:21) (90 - 99)  BP: 103/60 (12-05-19 @ 13:21) (94/58 - 120/63)  BP(mean): --  RR: 16 (12-05-19 @ 13:21) (16 - 18)  SpO2: 97% (12-05-19 @ 13:21) (94% - 97%)  Neck: No palpable thyroid nodules.  CVS: S1S2, No murmurs  Respiratory: No wheezing, no crepitations  GI: Abdomen soft, bowel sounds positive  Musculoskeletal:  edema lower extremities.   Skin: No skin rashes, no ecchymosis    Diagnostics Chief complaint  Patient is a 74y old  Male who presents with a chief complaint of ams (05 Dec 2019 11:44)   Review of systems  Patient in bed, looks comfortable, NPO on tube feeds, no fever,  no hypoglycemia.    Labs and Fingersticks  CAPILLARY BLOOD GLUCOSE      POCT Blood Glucose.: 148 mg/dL (05 Dec 2019 12:59)  POCT Blood Glucose.: 161 mg/dL (05 Dec 2019 05:54)  POCT Blood Glucose.: 109 mg/dL (05 Dec 2019 00:02)  POCT Blood Glucose.: 131 mg/dL (04 Dec 2019 18:45)      Anion Gap, Serum: 15 (12-05 @ 07:08)  Anion Gap, Serum: 11 (12-04 @ 07:16)      Calcium, Total Serum: 10.3 (12-05 @ 07:08)  Calcium, Total Serum: 10.4 (12-04 @ 07:16)          12-05    136  |  95<L>  |  18  ----------------------------<  156<H>  3.7   |  26  |  2.75<H>    Ca    10.3      05 Dec 2019 07:08                          7.6    13.05 )-----------( 233      ( 05 Dec 2019 07:17 )             26.9     Medications  MEDICATIONS  (STANDING):  atorvastatin 40 milliGRAM(s) Oral at bedtime  carvedilol 3.125 milliGRAM(s) Oral every 12 hours  chlorhexidine 4% Liquid 1 Application(s) Topical <User Schedule>  epoetin tan Injectable 58148 Unit(s) IV Push <User Schedule>  heparin  Injectable 5000 Unit(s) SubCutaneous every 8 hours  insulin lispro (HumaLOG) corrective regimen sliding scale   SubCutaneous every 6 hours  levETIRAcetam  Solution 1000 milliGRAM(s) Oral at bedtime  midodrine. 10 milliGRAM(s) Oral three times a day  pantoprazole  Injectable 40 milliGRAM(s) IV Push two times a day  predniSONE   Tablet 5 milliGRAM(s) Oral daily      Physical Exam  General: Patient comfortable in bed  Vital Signs Last 12 Hrs  T(F): 97.9 (12-05-19 @ 13:21), Max: 98.6 (12-05-19 @ 05:05)  HR: 90 (12-05-19 @ 13:21) (90 - 99)  BP: 103/60 (12-05-19 @ 13:21) (94/58 - 120/63)  BP(mean): --  RR: 16 (12-05-19 @ 13:21) (16 - 18)  SpO2: 97% (12-05-19 @ 13:21) (94% - 97%)  Neck: No palpable thyroid nodules.  CVS: S1S2, No murmurs  Respiratory: No wheezing, no crepitations  GI: Abdomen soft, bowel sounds positive  Musculoskeletal:  edema lower extremities.   Skin: No skin rashes, no ecchymosis    Diagnostics

## 2019-12-05 NOTE — PROGRESS NOTE ADULT - ASSESSMENT
Assessment  DM: A1C 5.5%, was on insulin at home, now on insulin low-dose coverage, blood sugars improved and trending within overall acceptable range,  patient is still NPO on tube feeds, appears comfortable, failed speech and swallow eval today.  Hypercalcemia: Primary Hyperparathyroidism, calcium improved and WNL s/p Pamidronate dose.  Sepsis: IV ABx for UTI, LP inconsistent with meningitis, on medications, stable, monitored.  HTN: Controlled,  on antihypertensive medications.  ESRD: On hemodialysis, Monitor labs/BMP          Robi Gay MD  Cell: [phone]  Office: [phone] Assessment  DM: A1C 5.5%, was on insulin at home, now on insulin low-dose coverage, blood sugars improved and trending within overall acceptable range,  patient is still NPO on tube feeds,  appears comfortable, failed speech and swallow eval today.  Hypercalcemia: Primary Hyperparathyroidism, calcium improved and WNL s/p Pamidronate dose.  Sepsis: IV ABx for UTI, LP inconsistent with meningitis, on medications, stable, monitored.  HTN: Controlled,  on antihypertensive medications.  ESRD: On hemodialysis, Monitor labs/BMP          Robi Gay MD  Cell: [phone]  Office: [phone]

## 2019-12-06 DIAGNOSIS — R13.10 DYSPHAGIA, UNSPECIFIED: ICD-10-CM

## 2019-12-06 LAB
ANION GAP SERPL CALC-SCNC: 12 MMOL/L — SIGNIFICANT CHANGE UP (ref 5–17)
BUN SERPL-MCNC: 30 MG/DL — HIGH (ref 7–23)
CALCIUM SERPL-MCNC: 10.9 MG/DL — HIGH (ref 8.4–10.5)
CHLORIDE SERPL-SCNC: 97 MMOL/L — SIGNIFICANT CHANGE UP (ref 96–108)
CO2 SERPL-SCNC: 28 MMOL/L — SIGNIFICANT CHANGE UP (ref 22–31)
CREAT SERPL-MCNC: 3.82 MG/DL — HIGH (ref 0.5–1.3)
GLUCOSE BLDC GLUCOMTR-MCNC: 128 MG/DL — HIGH (ref 70–99)
GLUCOSE BLDC GLUCOMTR-MCNC: 155 MG/DL — HIGH (ref 70–99)
GLUCOSE BLDC GLUCOMTR-MCNC: 173 MG/DL — HIGH (ref 70–99)
GLUCOSE BLDC GLUCOMTR-MCNC: 179 MG/DL — HIGH (ref 70–99)
GLUCOSE SERPL-MCNC: 169 MG/DL — HIGH (ref 70–99)
HCT VFR BLD CALC: 28 % — LOW (ref 39–50)
HGB BLD-MCNC: 8.1 G/DL — LOW (ref 13–17)
MCHC RBC-ENTMCNC: 26.4 PG — LOW (ref 27–34)
MCHC RBC-ENTMCNC: 28.9 GM/DL — LOW (ref 32–36)
MCV RBC AUTO: 91.2 FL — SIGNIFICANT CHANGE UP (ref 80–100)
PLATELET # BLD AUTO: 270 K/UL — SIGNIFICANT CHANGE UP (ref 150–400)
POTASSIUM SERPL-MCNC: 3.7 MMOL/L — SIGNIFICANT CHANGE UP (ref 3.5–5.3)
POTASSIUM SERPL-SCNC: 3.7 MMOL/L — SIGNIFICANT CHANGE UP (ref 3.5–5.3)
RBC # BLD: 3.07 M/UL — LOW (ref 4.2–5.8)
RBC # FLD: 16.4 % — HIGH (ref 10.3–14.5)
SODIUM SERPL-SCNC: 137 MMOL/L — SIGNIFICANT CHANGE UP (ref 135–145)
WBC # BLD: 14.94 K/UL — HIGH (ref 3.8–10.5)
WBC # FLD AUTO: 14.94 K/UL — HIGH (ref 3.8–10.5)

## 2019-12-06 PROCEDURE — 71045 X-RAY EXAM CHEST 1 VIEW: CPT | Mod: 26

## 2019-12-06 PROCEDURE — 99233 SBSQ HOSP IP/OBS HIGH 50: CPT

## 2019-12-06 RX ORDER — CINACALCET 30 MG/1
30 TABLET, FILM COATED ORAL DAILY
Refills: 0 | Status: DISCONTINUED | OUTPATIENT
Start: 2019-12-06 | End: 2019-12-21

## 2019-12-06 RX ADMIN — CARVEDILOL PHOSPHATE 3.12 MILLIGRAM(S): 80 CAPSULE, EXTENDED RELEASE ORAL at 17:18

## 2019-12-06 RX ADMIN — MIDODRINE HYDROCHLORIDE 10 MILLIGRAM(S): 2.5 TABLET ORAL at 13:06

## 2019-12-06 RX ADMIN — CINACALCET 30 MILLIGRAM(S): 30 TABLET, FILM COATED ORAL at 17:18

## 2019-12-06 RX ADMIN — LIDOCAINE 1 PATCH: 4 CREAM TOPICAL at 19:30

## 2019-12-06 RX ADMIN — Medication 1: at 13:06

## 2019-12-06 RX ADMIN — HEPARIN SODIUM 5000 UNIT(S): 5000 INJECTION INTRAVENOUS; SUBCUTANEOUS at 22:23

## 2019-12-06 RX ADMIN — Medication 5 MILLIGRAM(S): at 05:06

## 2019-12-06 RX ADMIN — CHLORHEXIDINE GLUCONATE 1 APPLICATION(S): 213 SOLUTION TOPICAL at 06:12

## 2019-12-06 RX ADMIN — ATORVASTATIN CALCIUM 40 MILLIGRAM(S): 80 TABLET, FILM COATED ORAL at 22:23

## 2019-12-06 RX ADMIN — Medication 1: at 06:24

## 2019-12-06 RX ADMIN — PANTOPRAZOLE SODIUM 40 MILLIGRAM(S): 20 TABLET, DELAYED RELEASE ORAL at 05:06

## 2019-12-06 RX ADMIN — Medication 1: at 00:23

## 2019-12-06 RX ADMIN — CARVEDILOL PHOSPHATE 3.12 MILLIGRAM(S): 80 CAPSULE, EXTENDED RELEASE ORAL at 05:06

## 2019-12-06 RX ADMIN — HEPARIN SODIUM 5000 UNIT(S): 5000 INJECTION INTRAVENOUS; SUBCUTANEOUS at 13:07

## 2019-12-06 RX ADMIN — LIDOCAINE 1 PATCH: 4 CREAM TOPICAL at 13:10

## 2019-12-06 RX ADMIN — MIDODRINE HYDROCHLORIDE 10 MILLIGRAM(S): 2.5 TABLET ORAL at 05:12

## 2019-12-06 RX ADMIN — HEPARIN SODIUM 5000 UNIT(S): 5000 INJECTION INTRAVENOUS; SUBCUTANEOUS at 05:06

## 2019-12-06 RX ADMIN — LEVETIRACETAM 1000 MILLIGRAM(S): 250 TABLET, FILM COATED ORAL at 22:29

## 2019-12-06 RX ADMIN — PANTOPRAZOLE SODIUM 40 MILLIGRAM(S): 20 TABLET, DELAYED RELEASE ORAL at 17:18

## 2019-12-06 RX ADMIN — MIDODRINE HYDROCHLORIDE 10 MILLIGRAM(S): 2.5 TABLET ORAL at 17:18

## 2019-12-06 NOTE — PROGRESS NOTE ADULT - ASSESSMENT
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       NEURO:  - Extubated 11/29, now on nasal cannula. Minimally verbal   - metabolic encephalopathy, meningitis r/o, LP negative  - neuro following  - chronic lacunar infarcts; per neuro, AMS also likely related to hypercalcemia, renal dysfunction , poor nutritional intake.   - c/w keppra for ?hx of seizure disorder  -AMS likely 2/2 hyperCa, renal dysfunction, poor PO intake  - may be developing sepsis . will consider fever w/u  - ID called to follow up    CV:  - hx of afib was on eliquis, now off a/c per cards due to bleeding risk  - holding carvedilol 6.25 BID  - hx of cocaine cardiomyopathy, resolved, normal EF on most recent echo  - c/w statin  - echo repeat EF70, LVH, hyperdynamic LCF, normal RV sys fxn    PULM:  - extubated 11/29--tolerating nasal cannula    GI:  - NG tube in place  - ernie tube feeds  - ?bleeding as hgb dropping. stool occult negative. no melena reported.  - C diff negative; does not need contact precautions  - failed swallow eval  - will need to discuss peg with family  - again  I called  Patient's daughter Roro, [phone].  The family has decided for peg placement.    RENAL:  - hx of ESRD s/p failed renal transplant x2  - renal following, HD as recommended.  - c/w predisone, sevelamer. hold cellcept for now given likely septic state; monitor and d/w renal when to restart cellcept  - has dialysis catheter in place; HD 11/27,  tolerated well  - will need tunneled permcath prior to d/c.    ENDO:  #DM2: HbA1c 5.5  - Start Insulin Sliding Scale  - endocrine following  - presented hypercalcemic, downtrending. hold pamidronate for now. f/u with endo    anemia  -- monitor H&H  - transfuse PRN    INFECTIOUS:  - WBC uptrending. ID following  - Urine culture growing E.coli; blood culture negative  - was given empiric CNS infx coverage - vanc, manoj, acyclovir, LP CSF negative  - wound care consulted appreciate recs  - ID called to follow up    PPX: SCDs  - home eliquis has been held while in septic state. can add back when tolerated    GOC:  - palliative consult.  - has 3 children who should be contacted first  - poor prognosis

## 2019-12-06 NOTE — PROGRESS NOTE ADULT - SUBJECTIVE AND OBJECTIVE BOX
Patient is a 74y old  Male who presents with a chief complaint of ams (06 Dec 2019 11:18)      SUBJECTIVE / OVERNIGHT EVENTS:  NAD    MEDICATIONS  (STANDING):  alteplase for catheter clearance 2 milliGRAM(s) Catheter once  alteplase for catheter clearance 2 milliGRAM(s) Catheter once  atorvastatin 40 milliGRAM(s) Oral at bedtime  carvedilol 3.125 milliGRAM(s) Oral every 12 hours  chlorhexidine 4% Liquid 1 Application(s) Topical <User Schedule>  cinacalcet 30 milliGRAM(s) Oral daily  epoetin tan Injectable 07925 Unit(s) IV Push <User Schedule>  heparin  Injectable 5000 Unit(s) SubCutaneous every 8 hours  insulin lispro (HumaLOG) corrective regimen sliding scale   SubCutaneous every 6 hours  levETIRAcetam  Solution 1000 milliGRAM(s) Oral at bedtime  lidocaine   Patch 1 Patch Transdermal daily  midodrine. 10 milliGRAM(s) Oral three times a day  pantoprazole  Injectable 40 milliGRAM(s) IV Push two times a day  predniSONE   Tablet 5 milliGRAM(s) Oral daily    MEDICATIONS  (PRN):      Vital Signs Last 24 Hrs  T(C): 36.6 (06 Dec 2019 05:02), Max: 37 (05 Dec 2019 17:23)  T(F): 97.9 (06 Dec 2019 05:02), Max: 98.6 (05 Dec 2019 17:23)  HR: 92 (06 Dec 2019 05:02) (90 - 97)  BP: 123/65 (06 Dec 2019 05:02) (99/56 - 123/65)  BP(mean): --  RR: 16 (06 Dec 2019 05:02) (16 - 18)  SpO2: 96% (06 Dec 2019 05:02) (91% - 99%)  CAPILLARY BLOOD GLUCOSE      POCT Blood Glucose.: 179 mg/dL (06 Dec 2019 05:51)  POCT Blood Glucose.: 155 mg/dL (06 Dec 2019 00:19)  POCT Blood Glucose.: 150 mg/dL (05 Dec 2019 18:01)  POCT Blood Glucose.: 148 mg/dL (05 Dec 2019 12:59)    I&O's Summary    05 Dec 2019 07:01  -  06 Dec 2019 07:00  --------------------------------------------------------  IN: 1410 mL / OUT: 0 mL / NET: 1410 mL    06 Dec 2019 07:01  -  06 Dec 2019 12:50  --------------------------------------------------------  IN: 55 mL / OUT: 0 mL / NET: 55 mL        PHYSICAL EXAM:  GENERAL: NAD, well-developed  HEAD:  Atraumatic, Normocephalic  EYES: EOMI, PERRLA, conjunctiva and sclera clear  NECK: Supple, No JVD  CHEST/LUNG: Clear to auscultation bilaterally; No wheeze  HEART: Regular rate and rhythm; No murmurs, rubs, or gallops  ABDOMEN: Soft, Nontender, Nondistended; Bowel sounds present  EXTREMITIES:  2+ Peripheral Pulses, No clubbing, cyanosis, or edema  NEUROLOGY: non-focal  SKIN: No rashes or lesions    LABS:                        8.1    14.94 )-----------( 270      ( 06 Dec 2019 07:40 )             28.0     12-06    137  |  97  |  30<H>  ----------------------------<  169<H>  3.7   |  28  |  3.82<H>    Ca    10.9<H>      06 Dec 2019 06:30                RADIOLOGY & ADDITIONAL TESTS:    Imaging Personally Reviewed:    Consultant(s) Notes Reviewed:      Care Discussed with Consultants/Other Providers:

## 2019-12-06 NOTE — PROGRESS NOTE ADULT - SUBJECTIVE AND OBJECTIVE BOX
Patient is a 74y Male whom presented to the hospital with esrd on hd   pt  seen in am nad , no fever , no chills    PAST MEDICAL & SURGICAL HISTORY:  Kidney transplant recipient  Anuria  GERD (gastroesophageal reflux disease)  Kidney transplant failure: dx: 2/2013  Hepatitis C: dx: 90&#x27;s.  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 10 milliGRAM(s) Oral three times a day  mycophenolate mofetil 250 milliGRAM(s) Oral two times a day  predniSONE   Tablet 5 milliGRAM(s) Oral daily  sevelamer carbonate 800 milliGRAM(s) Oral three times a day with meals  sodium bicarbonate 650 milliGRAM(s) Oral two times a day  tamsulosin 0.4 milliGRAM(s) Oral at bedtime      Allergies    No Known Allergies                       SOCIAL HISTORY:  Denies ETOh,Smoking,     FAMILY HISTORY:  Family history of breast cancer in sister (Sibling): living at 92 yo  Family history of prostate cancer in father  Family history of lung cancer  Family history of hypertension in mother  Family history of diabetes mellitus: mother      REVIEW OF SYSTEMS:    unbable to obtained                                                                                         8.1    14.94 )-----------( 270      ( 06 Dec 2019 07:40 )             28.0       CBC Full  -  ( 06 Dec 2019 07:40 )  WBC Count : 14.94 K/uL  RBC Count : 3.07 M/uL  Hemoglobin : 8.1 g/dL  Hematocrit : 28.0 %  Platelet Count - Automated : 270 K/uL  Mean Cell Volume : 91.2 fl  Mean Cell Hemoglobin : 26.4 pg  Mean Cell Hemoglobin Concentration : 28.9 gm/dL  Auto Neutrophil # : x  Auto Lymphocyte # : x  Auto Monocyte # : x  Auto Eosinophil # : x  Auto Basophil # : x  Auto Neutrophil % : x  Auto Lymphocyte % : x  Auto Monocyte % : x  Auto Eosinophil % : x  Auto Basophil % : x      12-06    137  |  97  |  30<H>  ----------------------------<  169<H>  3.7   |  28  |  3.82<H>    Ca    10.9<H>      06 Dec 2019 06:30        CAPILLARY BLOOD GLUCOSE      POCT Blood Glucose.: 173 mg/dL (06 Dec 2019 13:05)  POCT Blood Glucose.: 179 mg/dL (06 Dec 2019 05:51)  POCT Blood Glucose.: 155 mg/dL (06 Dec 2019 00:19)  POCT Blood Glucose.: 150 mg/dL (05 Dec 2019 18:01)      Vital Signs Last 24 Hrs  T(C): 36.7 (06 Dec 2019 14:00), Max: 37 (05 Dec 2019 17:23)  T(F): 98.1 (06 Dec 2019 14:00), Max: 98.6 (05 Dec 2019 17:23)  HR: 92 (06 Dec 2019 14:00) (90 - 97)  BP: 127/66 (06 Dec 2019 14:00) (99/56 - 127/66)  BP(mean): --  RR: 18 (06 Dec 2019 14:00) (16 - 18)  SpO2: 97% (06 Dec 2019 14:00) (91% - 99%)                                         HEENT: conjunctive   clear   Neck:  No JVD  Respiratory: decrease bs b/l   Cardiovascular: S1 and S2  Gastrointestinal: BS+, soft, NT/ND  Extremities: No peripheral edema  Skin: dry   Access: pos fistula

## 2019-12-06 NOTE — PROGRESS NOTE ADULT - ASSESSMENT
74M PMHx of ESRD s/p failed renal transplant x2, HCV, DM, and meningococcal meningitis 2 years ago was sent in to the ED from HD for AMS and low BPs.  CVA  Pneumonia-aspiration-resp failure s/p Rx  patient also with sacral decub stage III  Mild leucocytosis  Is on baseline steroids  Cough But CXR clear  No fevers  Hemodynamically stable  No s/s any other foci  ? Steroid induced  ? Reactive  ? occult infection    Rec:  1) monitor clinically  2) Aspiration precautions  3) Check blood Cx  4) wound care for sacral decub  5) For now observe off abx -but if any further increase or worsening  CT chest/abd pelvis and start empiric coverage with vanco + zosyn(renally dosed)  Overall prognosis poor    Will tailor plan for ID issues  per course,results.Will defer to primary team on management of other issues.  case d/w Med NP  Infectious Diseases Service will cover over weekend.  Please call [phone] if issues

## 2019-12-06 NOTE — PROGRESS NOTE ADULT - PROBLEM SELECTOR PLAN 2
discussed with family at length, see chart note dated 12/5  they have elected for PEG  at this time  their goals are in line with all life sustaining treatments

## 2019-12-06 NOTE — PROGRESS NOTE ADULT - ASSESSMENT
Assessment  DM: A1C 5.5%, was on insulin at home, now on coverage, blood sugars trending within overall acceptable range, no hypoglycemic episodes, appears comfortable, failed speech and swallow eval yesterday, still NPO on tube feeds. Palliative care discussing GOC with patient's family.  Hypercalcemia: Primary Hyperparathyroidism, calcium improved and WNL s/p Pamidronate dose.  Sepsis: IV ABx for UTI, LP inconsistent with meningitis, on medications, stable, monitored.  HTN: Controlled,  on antihypertensive medications.  ESRD: On hemodialysis, Monitor labs/BMP          Robi Gay MD  Cell: [phone]  Office: [phone] Assessment  DM: A1C 5.5%, was on insulin at home, now on coverage, blood sugars trending within overall acceptable range, no hypoglycemic episodes, appears comfortable, failed speech and swallow eval yesterday, still NPO on tube feeds.  Palliative care discussing GOC with patient's family.  Hypercalcemia: Primary Hyperparathyroidism, calcium improved and WNL s/p Pamidronate dose.  Sepsis: IV ABx for UTI, LP inconsistent with meningitis, on medications, stable, monitored.  HTN: Controlled,  on antihypertensive medications.  ESRD: On hemodialysis, Monitor labs/BMP          Robi Gay MD  Cell: [phone]  Office: [phone]

## 2019-12-06 NOTE — PROGRESS NOTE ADULT - PROBLEM SELECTOR PLAN 4
remains full code per family wishes  family requesting PEG in setting of dysphagia  [facility]

## 2019-12-06 NOTE — PROGRESS NOTE ADULT - ASSESSMENT
74M PMHx of ESRD s/p failed renal transplant x2, HCV, DM, and meningococcal meningitis 2 years ago was sent in to the ED from HD for AMS and low BPs. Admitted with concern for CVA, neurology following. Patient with RRT overnight 11/27 resulting in transfer to MICU for respiratory failure concern for aspiration and sepsis. Palliative consulted for [facility]

## 2019-12-06 NOTE — PROGRESS NOTE ADULT - ASSESSMENT
History of cocaine induced CM  normal EF on last echo  cont BB     PAF  in sinus   a/c once deemed safe   for now off given high bleed risk    Hypercalcemia  as per renal   HD    NSVT  cont BB     tachycardia  improving  cont BB     anemia  Monitor hemoglobin, transfuse as needed.   as per primary team     appreciate PC input   fu with recommendation and follow up

## 2019-12-06 NOTE — PROGRESS NOTE ADULT - SUBJECTIVE AND OBJECTIVE BOX
Subjective: Patient seen and examined. No new events except as noted.     SUBJECTIVE/ROS:        MEDICATIONS:  MEDICATIONS  (STANDING):  alteplase for catheter clearance 2 milliGRAM(s) Catheter once  alteplase for catheter clearance 2 milliGRAM(s) Catheter once  atorvastatin 40 milliGRAM(s) Oral at bedtime  carvedilol 3.125 milliGRAM(s) Oral every 12 hours  chlorhexidine 4% Liquid 1 Application(s) Topical <User Schedule>  cinacalcet 30 milliGRAM(s) Oral daily  epoetin tan Injectable 70951 Unit(s) IV Push <User Schedule>  heparin  Injectable 5000 Unit(s) SubCutaneous every 8 hours  insulin lispro (HumaLOG) corrective regimen sliding scale   SubCutaneous every 6 hours  levETIRAcetam  Solution 1000 milliGRAM(s) Oral at bedtime  lidocaine   Patch 1 Patch Transdermal daily  midodrine. 10 milliGRAM(s) Oral three times a day  pantoprazole  Injectable 40 milliGRAM(s) IV Push two times a day  predniSONE   Tablet 5 milliGRAM(s) Oral daily      PHYSICAL EXAM:  T(C): 36.6 (12-06-19 @ 05:02), Max: 37 (12-05-19 @ 17:23)  HR: 92 (12-06-19 @ 05:02) (90 - 97)  BP: 123/65 (12-06-19 @ 05:02) (99/56 - 123/65)  RR: 16 (12-06-19 @ 05:02) (16 - 18)  SpO2: 96% (12-06-19 @ 05:02) (91% - 99%)  Wt(kg): --  I&O's Summary    05 Dec 2019 07:01  -  06 Dec 2019 07:00  --------------------------------------------------------  IN: 1410 mL / OUT: 0 mL / NET: 1410 mL    06 Dec 2019 07:01  -  06 Dec 2019 11:18  --------------------------------------------------------  IN: 55 mL / OUT: 0 mL / NET: 55 mL            JVP: Normal  Neck: supple  Lung: clear   CV: S1 S2 , Murmur:  Abd: soft  Ext: No edema  Psych: flat affect  Skin: normal``    LABS/DATA:    CARDIAC MARKERS:                                8.1    14.94 )-----------( 270      ( 06 Dec 2019 07:40 )             28.0     12-06    137  |  97  |  30<H>  ----------------------------<  169<H>  3.7   |  28  |  3.82<H>    Ca    10.9<H>      06 Dec 2019 06:30      proBNP:   Lipid Profile:   HgA1c:   TSH:     TELE:  EKG:

## 2019-12-06 NOTE — PROGRESS NOTE ADULT - PROBLEM SELECTOR PLAN 5
will sign off at this time as goals are clear.  Family wants to aim all treatments at sustaining life, including artificial nutrition.  will need LTC when stable for discharge  please reconsult if needs arise

## 2019-12-06 NOTE — PROGRESS NOTE ADULT - SUBJECTIVE AND OBJECTIVE BOX
Chief complaint  Patient is a 74y old  Male who presents with a chief complaint of ams (06 Dec 2019 12:50)   Review of systems  Patient in bed, looks comfortable, NPO on tube feeds, no fever, no hypoglycemia.    Labs and Fingersticks  CAPILLARY BLOOD GLUCOSE      POCT Blood Glucose.: 173 mg/dL (06 Dec 2019 13:05)  POCT Blood Glucose.: 179 mg/dL (06 Dec 2019 05:51)  POCT Blood Glucose.: 155 mg/dL (06 Dec 2019 00:19)  POCT Blood Glucose.: 150 mg/dL (05 Dec 2019 18:01)      Anion Gap, Serum: 12 (12-06 @ 06:30)  Anion Gap, Serum: 15 (12-05 @ 07:08)      Calcium, Total Serum: 10.9 <H> (12-06 @ 06:30)  Calcium, Total Serum: 10.3 (12-05 @ 07:08)          12-06    137  |  97  |  30<H>  ----------------------------<  169<H>  3.7   |  28  |  3.82<H>    Ca    10.9<H>      06 Dec 2019 06:30                          8.1    14.94 )-----------( 270      ( 06 Dec 2019 07:40 )             28.0     Medications  MEDICATIONS  (STANDING):  alteplase for catheter clearance 2 milliGRAM(s) Catheter once  alteplase for catheter clearance 2 milliGRAM(s) Catheter once  atorvastatin 40 milliGRAM(s) Oral at bedtime  carvedilol 3.125 milliGRAM(s) Oral every 12 hours  chlorhexidine 4% Liquid 1 Application(s) Topical <User Schedule>  cinacalcet 30 milliGRAM(s) Oral daily  epoetin tan Injectable 11384 Unit(s) IV Push <User Schedule>  heparin  Injectable 5000 Unit(s) SubCutaneous every 8 hours  insulin lispro (HumaLOG) corrective regimen sliding scale   SubCutaneous every 6 hours  levETIRAcetam  Solution 1000 milliGRAM(s) Oral at bedtime  lidocaine   Patch 1 Patch Transdermal daily  midodrine. 10 milliGRAM(s) Oral three times a day  pantoprazole  Injectable 40 milliGRAM(s) IV Push two times a day  predniSONE   Tablet 5 milliGRAM(s) Oral daily      Physical Exam  General: Patient comfortable in bed  Vital Signs Last 12 Hrs  T(F): 97.9 (12-06-19 @ 05:02), Max: 97.9 (12-06-19 @ 05:02)  HR: 92 (12-06-19 @ 05:02) (92 - 92)  BP: 123/65 (12-06-19 @ 05:02) (123/65 - 123/65)  BP(mean): --  RR: 16 (12-06-19 @ 05:02) (16 - 16)  SpO2: 96% (12-06-19 @ 05:02) (96% - 96%)  Neck: No palpable thyroid nodules.  CVS: S1S2, No murmurs  Respiratory: No wheezing, no crepitations  GI: Abdomen soft, bowel sounds positive  Musculoskeletal:  edema lower extremities.   Skin: No skin rashes, no ecchymosis    Diagnostics Chief complaint  Patient is a 74y old  Male who presents with a chief complaint of ams (06 Dec 2019 12:50)   Review of systems  Patient in bed, looks comfortable, NPO on tube feeds,  no fever, no hypoglycemia.    Labs and Fingersticks  CAPILLARY BLOOD GLUCOSE      POCT Blood Glucose.: 173 mg/dL (06 Dec 2019 13:05)  POCT Blood Glucose.: 179 mg/dL (06 Dec 2019 05:51)  POCT Blood Glucose.: 155 mg/dL (06 Dec 2019 00:19)  POCT Blood Glucose.: 150 mg/dL (05 Dec 2019 18:01)      Anion Gap, Serum: 12 (12-06 @ 06:30)  Anion Gap, Serum: 15 (12-05 @ 07:08)      Calcium, Total Serum: 10.9 <H> (12-06 @ 06:30)  Calcium, Total Serum: 10.3 (12-05 @ 07:08)          12-06    137  |  97  |  30<H>  ----------------------------<  169<H>  3.7   |  28  |  3.82<H>    Ca    10.9<H>      06 Dec 2019 06:30                          8.1    14.94 )-----------( 270      ( 06 Dec 2019 07:40 )             28.0     Medications  MEDICATIONS  (STANDING):  alteplase for catheter clearance 2 milliGRAM(s) Catheter once  alteplase for catheter clearance 2 milliGRAM(s) Catheter once  atorvastatin 40 milliGRAM(s) Oral at bedtime  carvedilol 3.125 milliGRAM(s) Oral every 12 hours  chlorhexidine 4% Liquid 1 Application(s) Topical <User Schedule>  cinacalcet 30 milliGRAM(s) Oral daily  epoetin tan Injectable 70151 Unit(s) IV Push <User Schedule>  heparin  Injectable 5000 Unit(s) SubCutaneous every 8 hours  insulin lispro (HumaLOG) corrective regimen sliding scale   SubCutaneous every 6 hours  levETIRAcetam  Solution 1000 milliGRAM(s) Oral at bedtime  lidocaine   Patch 1 Patch Transdermal daily  midodrine. 10 milliGRAM(s) Oral three times a day  pantoprazole  Injectable 40 milliGRAM(s) IV Push two times a day  predniSONE   Tablet 5 milliGRAM(s) Oral daily      Physical Exam  General: Patient comfortable in bed  Vital Signs Last 12 Hrs  T(F): 97.9 (12-06-19 @ 05:02), Max: 97.9 (12-06-19 @ 05:02)  HR: 92 (12-06-19 @ 05:02) (92 - 92)  BP: 123/65 (12-06-19 @ 05:02) (123/65 - 123/65)  BP(mean): --  RR: 16 (12-06-19 @ 05:02) (16 - 16)  SpO2: 96% (12-06-19 @ 05:02) (96% - 96%)  Neck: No palpable thyroid nodules.  CVS: S1S2, No murmurs  Respiratory: No wheezing, no crepitations  GI: Abdomen soft, bowel sounds positive  Musculoskeletal:  edema lower extremities.   Skin: No skin rashes, no ecchymosis    Diagnostics

## 2019-12-06 NOTE — PROGRESS NOTE ADULT - SUBJECTIVE AND OBJECTIVE BOX
SUBJECTIVE AND OBJECTIVE:  INTERVAL HPI/OVERNIGHT EVENTS:    DNR on chart:   Allergies    No Known Allergies    Intolerances    MEDICATIONS  (STANDING):  alteplase for catheter clearance 2 milliGRAM(s) Catheter once  alteplase for catheter clearance 2 milliGRAM(s) Catheter once  atorvastatin 40 milliGRAM(s) Oral at bedtime  carvedilol 3.125 milliGRAM(s) Oral every 12 hours  chlorhexidine 4% Liquid 1 Application(s) Topical <User Schedule>  cinacalcet 30 milliGRAM(s) Oral daily  epoetin tan Injectable 57335 Unit(s) IV Push <User Schedule>  heparin  Injectable 5000 Unit(s) SubCutaneous every 8 hours  insulin lispro (HumaLOG) corrective regimen sliding scale   SubCutaneous every 6 hours  levETIRAcetam  Solution 1000 milliGRAM(s) Oral at bedtime  lidocaine   Patch 1 Patch Transdermal daily  midodrine. 10 milliGRAM(s) Oral three times a day  pantoprazole  Injectable 40 milliGRAM(s) IV Push two times a day  predniSONE   Tablet 5 milliGRAM(s) Oral daily    MEDICATIONS  (PRN):      ITEMS UNCHECKED ARE NOT PRESENT    PRESENT SYMPTOMS: [ ]Unable to obtain due to poor mentation   Source if other than patient:  [ ]Family   [ ]Team     Pain (Impact on QOL):    Location:  Minimal acceptable level (0-10 scale):                   Aggravating factors:  Quality:  Radiation:  Severity (0-10 scale):    Timing:    Dyspnea:                           [ ]Mild [ ]Moderate [ ]Severe  Anxiety:                             [ ]Mild [ ]Moderate [ ]Severe  Fatigue:                             [ ]Mild [ ]Moderate [ ]Severe  Nausea:                             [ ]Mild [ ]Moderate [ ]Severe  Loss of appetite:              [ ]Mild [ ]Moderate [ ]Severe  Constipation:                    [ ]Mild [ ]Moderate [ ]Severe    PAIN AD Score:	  [email or web address] (Ctrl + left click to view)    Other Symptoms:  [ ]All other review of systems negative     Karnofsky Performance Score/Palliative Performance Status Version 2:         %    http://palliative.info/resource_material/[email or web address].pdf  PHYSICAL EXAM:  Vital Signs Last 24 Hrs  T(C): 36.7 (06 Dec 2019 14:00), Max: 37 (05 Dec 2019 17:23)  T(F): 98.1 (06 Dec 2019 14:00), Max: 98.6 (05 Dec 2019 17:23)  HR: 92 (06 Dec 2019 14:00) (90 - 97)  BP: 127/66 (06 Dec 2019 14:00) (99/56 - 127/66)  BP(mean): --  RR: 18 (06 Dec 2019 14:00) (16 - 18)  SpO2: 97% (06 Dec 2019 14:00) (91% - 99%) I&O's Summary    05 Dec 2019 07:01  -  06 Dec 2019 07:00  --------------------------------------------------------  IN: 1410 mL / OUT: 0 mL / NET: 1410 mL    06 Dec 2019 07:01  -  06 Dec 2019 16:33  --------------------------------------------------------  IN: 265 mL / OUT: 0 mL / NET: 265 mL     GENERAL:  [ ]Alert  [ ]Oriented x   [ ]Lethargic  [ ]Cachexia  [ ]Unarousable  [ ]Verbal  [ ]Non-Verbal  Behavioral:   [ ] Anxiety  [ ] Delirium [ ] Agitation [ ] Other  HEENT:  [ ]Normal   [ ]Dry mouth   [ ]ET Tube/Trach  [ ]Oral lesions  PULMONARY:   [ ]Clear [ ]Tachypnea  [ ]Audible excessive secretions   [ ]Rhonchi        [ ]Right [ ]Left [ ]Bilateral  [ ]Crackles        [ ]Right [ ]Left [ ]Bilateral  [ ]Wheezing     [ ]Right [ ]Left [ ]Bilateral  CARDIOVASCULAR:    [ ]Regular [ ]Irregular [ ]Tachy  [ ]Yaniv [ ]Murmur [ ]Other  GASTROINTESTINAL:  [ ]Soft  [ ]Distended   [ ]+BS  [ ]Non tender [ ]Tender  [ ]PEG [ ]OGT/ NGT   Last BM:    GENITOURINARY:  [ ]Normal [ ] Incontinent   [ ]Oliguria/Anuria   [ ]Mclaughlin  MUSCULOSKELETAL:   [ ]Normal   [ ]Weakness  [ ]Bed/Wheelchair bound [ ]Edema  NEUROLOGIC:   [ ]No focal deficits  [ ] Cognitive impairment  [ ] Dysphagia [ ]Dysarthria [ ] Paresis [ ]Other   SKIN:   [ ]Normal   [ ]Pressure ulcer(s)  [ ]Rash    CRITICAL CARE:  [ ] Shock Present  [ ]Septic [ ]Cardiogenic [ ]Neurologic [ ]Hypovolemic  [ ]  Vasopressors [ ]  Inotropes   [ ] Respiratory failure present  [ ] Acute  [ ] Chronic [ ] Hypoxic  [ ] Hypercarbic [ ] Other  [ ] Other organ failure     LABS:                        8.1    14.94 )-----------( 270      ( 06 Dec 2019 07:40 )             28.0   12-06    137  |  97  |  30<H>  ----------------------------<  169<H>  3.7   |  28  |  3.82<H>    Ca    10.9<H>      06 Dec 2019 06:30          RADIOLOGY & ADDITIONAL STUDIES:    Protein Calorie Malnutrition Present: [ ] yes [ ] no  [ ] PPSV2 < or = 30%  [ ] significant weight loss [ ] poor nutritional intake [ ] anasarca [ ] catabolic state Albumin, Serum: 2.6 g/dL (12-01-19 @ 00:27)  Artificial Nutrition [ ]     REFERRALS:   [ ]Chaplaincy  [ ] Hospice  [ ]Child Life  [ ]Social Work  [ ]Case management [ ]Holistic Therapy   Goals of Care Document: SUBJECTIVE AND OBJECTIVE: Patient seen and examined, remains lethargic and without decision making capacity but is opening eyes at time and answering questions with one word, not always intelligible. Primary team notes appreciated, family has decided on PEG.    INTERVAL HPI/OVERNIGHT EVENTS: no acute overnight events. mental status remains poor with family opting for artificial nutrition via PEG tube.     DNR on chart:   Allergies    No Known Allergies    Intolerances    MEDICATIONS  (STANDING):  alteplase for catheter clearance 2 milliGRAM(s) Catheter once  alteplase for catheter clearance 2 milliGRAM(s) Catheter once  atorvastatin 40 milliGRAM(s) Oral at bedtime  carvedilol 3.125 milliGRAM(s) Oral every 12 hours  chlorhexidine 4% Liquid 1 Application(s) Topical <User Schedule>  cinacalcet 30 milliGRAM(s) Oral daily  epoetin tan Injectable 52714 Unit(s) IV Push <User Schedule>  heparin  Injectable 5000 Unit(s) SubCutaneous every 8 hours  insulin lispro (HumaLOG) corrective regimen sliding scale   SubCutaneous every 6 hours  levETIRAcetam  Solution 1000 milliGRAM(s) Oral at bedtime  lidocaine   Patch 1 Patch Transdermal daily  midodrine. 10 milliGRAM(s) Oral three times a day  pantoprazole  Injectable 40 milliGRAM(s) IV Push two times a day  predniSONE   Tablet 5 milliGRAM(s) Oral daily    MEDICATIONS  (PRN):      ITEMS UNCHECKED ARE NOT PRESENT    PRESENT SYMPTOMS: [x ]Unable to obtain due to poor mentation   Source if other than patient:  [ ]Family   [ ]Team     Pain (Impact on QOL):    Location:  Minimal acceptable level (0-10 scale):                   Aggravating factors:  Quality:  Radiation:  Severity (0-10 scale):    Timing:    Dyspnea:                           [ ]Mild [ ]Moderate [ ]Severe  Anxiety:                             [ ]Mild [ ]Moderate [ ]Severe  Fatigue:                             [ ]Mild [ ]Moderate [ ]Severe  Nausea:                             [ ]Mild [ ]Moderate [ ]Severe  Loss of appetite:              [ ]Mild [ ]Moderate [ ]Severe  Constipation:                    [ ]Mild [ ]Moderate [ ]Severe    PAIN AD Score:	2  [email or web address] (Ctrl + left click to view)    Other Symptoms:  [ ]All other review of systems negative     Karnofsky Performance Score/Palliative Performance Status Version 2:       20  %    http://palliative.info/resource_material/[email or web address].pdf  PHYSICAL EXAM:  Vital Signs Last 24 Hrs  T(C): 36.7 (06 Dec 2019 14:00), Max: 37 (05 Dec 2019 17:23)  T(F): 98.1 (06 Dec 2019 14:00), Max: 98.6 (05 Dec 2019 17:23)  HR: 92 (06 Dec 2019 14:00) (90 - 97)  BP: 127/66 (06 Dec 2019 14:00) (99/56 - 127/66)  BP(mean): --  RR: 18 (06 Dec 2019 14:00) (16 - 18)  SpO2: 97% (06 Dec 2019 14:00) (91% - 99%) I&O's Summary    05 Dec 2019 07:01  -  06 Dec 2019 07:00  --------------------------------------------------------  IN: 1410 mL / OUT: 0 mL / NET: 1410 mL    06 Dec 2019 07:01  -  06 Dec 2019 16:33  --------------------------------------------------------  IN: 265 mL / OUT: 0 mL / NET: 265 mL     GENERAL:  [ ]Alert  [ ]Oriented x   [ x]Lethargic  [ ]Cachexia  [ ]Unarousable  [ ]Verbal  [ ]Non-Verbal  Behavioral:   [ ] Anxiety  [x ] Delirium [ ] Agitation [ ] Other  HEENT:  [ ]Normal   [x ]Dry mouth   [ ]ET Tube/Trach  [ ]Oral lesions  PULMONARY:   [ ]Clear [ ]Tachypnea  [ ]Audible excessive secretions   [ ]Rhonchi        [ ]Right [ ]Left [ ]Bilateral  [ x]Crackles        [ ]Right [ ]Left [ ]Bilateral  [ ]Wheezing     [ ]Right [ ]Left [ ]Bilateral  CARDIOVASCULAR:    [x ]Regular [ ]Irregular [ ]Tachy  [ ]Yaniv [ ]Murmur [ ]Other  GASTROINTESTINAL:  [x ]Soft  [ ]Distended   [x ]+BS  [x ]Non tender [ ]Tender  [ ]PEG [ x]OGT/ NGT   Last BM:    GENITOURINARY:  [ ]Normal [x ] Incontinent   [ ]Oliguria/Anuria   [ ]Mclaughlin  MUSCULOSKELETAL:   [ ]Normal   [x ]Weakness  [ ]Bed/Wheelchair bound [ ]Edema  NEUROLOGIC:   [ ]No focal deficits  [ x] Cognitive impairment  [ ] Dysphagia [ ]Dysarthria [ ] Paresis [ ]Other   SKIN: xerosis  [ ]Normal   [ ]Pressure ulcer(s)  [ ]Rash    CRITICAL CARE:  [ ] Shock Present  [ ]Septic [ ]Cardiogenic [ ]Neurologic [ ]Hypovolemic  [ ]  Vasopressors [ ]  Inotropes   [ ] Respiratory failure present  [ ] Acute  [ ] Chronic [ ] Hypoxic  [ ] Hypercarbic [ ] Other  [x ] Other organ failure - renal, on HD    LABS:                        8.1    14.94 )-----------( 270      ( 06 Dec 2019 07:40 )             28.0   12-06    137  |  97  |  30<H>  ----------------------------<  169<H>  3.7   |  28  |  3.82<H>    Ca    10.9<H>      06 Dec 2019 06:30      RADIOLOGY & ADDITIONAL STUDIES: no new imaging studies    Protein Calorie Malnutrition Present: [ x] yes [ ] no  [x ] PPSV2 < or = 30%  [ ] significant weight loss [ x] poor nutritional intake [ ] anasarca [ ] catabolic state Albumin, Serum: 2.6 g/dL (12-01-19 @ 00:27)  Artificial Nutrition [ x]     REFERRALS:   [ ]Chaplaincy  [ ] Hospice  [ ]Child Life  [ ]Social Work  [ x]Case management [ ]Holistic Therapy   Goals of Care Document:

## 2019-12-06 NOTE — PROGRESS NOTE ADULT - SUBJECTIVE AND OBJECTIVE BOX
Patient is a 74y old  Male who presents with a chief complaint of ams (06 Dec 2019 13:22)    called back to see patient as WBC noted to be higher today     Interval history:patient awake-answers soem queries but reliability is fair-poor  No other acute events      ROS:  some  cough,no SOB,CP  No N/V/D  No abd pain  No urinary complaints  No HA  No joint or limb pain  No other complaints      Antimicrobials:    none     other medications reviewed  MEDICATIONS  (STANDING):  alteplase for catheter clearance 2 milliGRAM(s) Catheter once  alteplase for catheter clearance 2 milliGRAM(s) Catheter once  atorvastatin 40 milliGRAM(s) Oral at bedtime  carvedilol 3.125 milliGRAM(s) Oral every 12 hours  chlorhexidine 4% Liquid 1 Application(s) Topical <User Schedule>  cinacalcet 30 milliGRAM(s) Oral daily  epoetin tan Injectable 08758 Unit(s) IV Push <User Schedule>  heparin  Injectable 5000 Unit(s) SubCutaneous every 8 hours  insulin lispro (HumaLOG) corrective regimen sliding scale   SubCutaneous every 6 hours  levETIRAcetam  Solution 1000 milliGRAM(s) Oral at bedtime  lidocaine   Patch 1 Patch Transdermal daily  midodrine. 10 milliGRAM(s) Oral three times a day  pantoprazole  Injectable 40 milliGRAM(s) IV Push two times a day  predniSONE   Tablet 5 milliGRAM(s) Oral daily      Vital Signs Last 24 Hrs  T(C): 36.6 (12-06-19 @ 05:02), Max: 37 (12-05-19 @ 17:23)  T(F): 97.9 (12-06-19 @ 05:02), Max: 98.6 (12-05-19 @ 17:23)  HR: 92 (12-06-19 @ 05:02) (90 - 97)  BP: 123/65 (12-06-19 @ 05:02) (99/56 - 123/65)  BP(mean): --  RR: 16 (12-06-19 @ 05:02) (16 - 18)  SpO2: 96% (12-06-19 @ 05:02) (91% - 99%)    Physical Exam:      R Sc HD catheter no erythema o tenderness    decreased Neck rigidity     Chest Good AE,bibasilar crackles     CVS RRR S1 S2 WNl No murmur or rub or gallop    Abd soft BS normal No tenderness RLQ transplant kidney no tenderness    sacral decub stage III no purulence or discharge noted     Ext left arm AVF no erythema or tenderness    IV site no erythema tenderness or discharge    Joints no swelling or LOM    CNS as above  Lab Data:                          8.1    14.94 )-----------( 270      ( 06 Dec 2019 07:40 )             28.0     WBC Count: 14.94 (12-06-19 @ 07:40)  WBC Count: 13.05 (12-05-19 @ 07:17)  WBC Count: 10.44 (12-04-19 @ 08:37)  WBC Count: 10.14 (12-03-19 @ 08:57)  WBC Count: 8.83 (12-02-19 @ 09:06)  WBC Count: 9.41 (12-01-19 @ 06:50)  WBC Count: 11.37 (12-01-19 @ 00:27)  WBC Count: 11.09 (11-30-19 @ 05:19)  WBC Count: 11.32 (11-30-19 @ 00:32)  WBC Count: 12.52 (11-29-19 @ 17:40)    12-06    137  |  97  |  30<H>  ----------------------------<  169<H>  3.7   |  28  |  3.82<H>    Ca    10.9<H>      06 Dec 2019 06:30          < from: Xray Chest 1 View- PORTABLE-Urgent (12.06.19 @ 08:47) >  FINDINGS:     Right IJ central venous catheter sheath tip terminates in the SVC.    Interval repositioning of enteric tube, which now terminates in the   stomach.    The heart is normal in size.     The lungs are clear. No pneumothorax. No pleural effusion.    No acute osseous abnormalities.    IMPRESSION:   Enteric tube terminates in the stomach.          < end of copied text >      Clostridium difficile Toxin by PCR (11.30.19 @ 04:48)    C Diff by PCR Result: [facility]       Culture - Blood (11.26.19 @ 18:36)    Specimen Source: .Blood Blood-Venous    Culture Results:   No growth at 5 days.

## 2019-12-07 LAB
ANION GAP SERPL CALC-SCNC: 16 MMOL/L — SIGNIFICANT CHANGE UP (ref 5–17)
BUN SERPL-MCNC: 46 MG/DL — HIGH (ref 7–23)
CALCIUM SERPL-MCNC: 10.8 MG/DL — HIGH (ref 8.4–10.5)
CHLORIDE SERPL-SCNC: 96 MMOL/L — SIGNIFICANT CHANGE UP (ref 96–108)
CO2 SERPL-SCNC: 26 MMOL/L — SIGNIFICANT CHANGE UP (ref 22–31)
CREAT SERPL-MCNC: 4.69 MG/DL — HIGH (ref 0.5–1.3)
GLUCOSE BLDC GLUCOMTR-MCNC: 114 MG/DL — HIGH (ref 70–99)
GLUCOSE BLDC GLUCOMTR-MCNC: 125 MG/DL — HIGH (ref 70–99)
GLUCOSE BLDC GLUCOMTR-MCNC: 172 MG/DL — HIGH (ref 70–99)
GLUCOSE BLDC GLUCOMTR-MCNC: 182 MG/DL — HIGH (ref 70–99)
GLUCOSE SERPL-MCNC: 142 MG/DL — HIGH (ref 70–99)
HBV CORE AB SER-ACNC: REACTIVE
HBV SURFACE AB SER-ACNC: 101.9 MIU/ML — SIGNIFICANT CHANGE UP
HBV SURFACE AG SER-ACNC: SIGNIFICANT CHANGE UP
HCT VFR BLD CALC: 25.4 % — LOW (ref 39–50)
HCV AB S/CO SERPL IA: 9.95 S/CO — HIGH (ref 0–0.99)
HCV AB SERPL-IMP: REACTIVE
HGB BLD-MCNC: 7.3 G/DL — LOW (ref 13–17)
MCHC RBC-ENTMCNC: 25.9 PG — LOW (ref 27–34)
MCHC RBC-ENTMCNC: 28.7 GM/DL — LOW (ref 32–36)
MCV RBC AUTO: 90.1 FL — SIGNIFICANT CHANGE UP (ref 80–100)
PLATELET # BLD AUTO: 298 K/UL — SIGNIFICANT CHANGE UP (ref 150–400)
POTASSIUM SERPL-MCNC: 3.8 MMOL/L — SIGNIFICANT CHANGE UP (ref 3.5–5.3)
POTASSIUM SERPL-SCNC: 3.8 MMOL/L — SIGNIFICANT CHANGE UP (ref 3.5–5.3)
RBC # BLD: 2.82 M/UL — LOW (ref 4.2–5.8)
RBC # FLD: 16.8 % — HIGH (ref 10.3–14.5)
SODIUM SERPL-SCNC: 138 MMOL/L — SIGNIFICANT CHANGE UP (ref 135–145)
WBC # BLD: 14.77 K/UL — HIGH (ref 3.8–10.5)
WBC # FLD AUTO: 14.77 K/UL — HIGH (ref 3.8–10.5)

## 2019-12-07 PROCEDURE — 99232 SBSQ HOSP IP/OBS MODERATE 35: CPT

## 2019-12-07 RX ORDER — PANTOPRAZOLE SODIUM 20 MG/1
40 TABLET, DELAYED RELEASE ORAL
Refills: 0 | Status: DISCONTINUED | OUTPATIENT
Start: 2019-12-07 | End: 2019-12-09

## 2019-12-07 RX ADMIN — ATORVASTATIN CALCIUM 40 MILLIGRAM(S): 80 TABLET, FILM COATED ORAL at 21:33

## 2019-12-07 RX ADMIN — CARVEDILOL PHOSPHATE 3.12 MILLIGRAM(S): 80 CAPSULE, EXTENDED RELEASE ORAL at 07:22

## 2019-12-07 RX ADMIN — CARVEDILOL PHOSPHATE 3.12 MILLIGRAM(S): 80 CAPSULE, EXTENDED RELEASE ORAL at 18:17

## 2019-12-07 RX ADMIN — HEPARIN SODIUM 5000 UNIT(S): 5000 INJECTION INTRAVENOUS; SUBCUTANEOUS at 14:52

## 2019-12-07 RX ADMIN — Medication 1: at 07:23

## 2019-12-07 RX ADMIN — LIDOCAINE 1 PATCH: 4 CREAM TOPICAL at 14:00

## 2019-12-07 RX ADMIN — LIDOCAINE 1 PATCH: 4 CREAM TOPICAL at 01:40

## 2019-12-07 RX ADMIN — Medication 5 MILLIGRAM(S): at 07:22

## 2019-12-07 RX ADMIN — HEPARIN SODIUM 5000 UNIT(S): 5000 INJECTION INTRAVENOUS; SUBCUTANEOUS at 07:23

## 2019-12-07 RX ADMIN — LIDOCAINE 1 PATCH: 4 CREAM TOPICAL at 18:19

## 2019-12-07 RX ADMIN — LEVETIRACETAM 1000 MILLIGRAM(S): 250 TABLET, FILM COATED ORAL at 21:32

## 2019-12-07 RX ADMIN — MIDODRINE HYDROCHLORIDE 10 MILLIGRAM(S): 2.5 TABLET ORAL at 18:17

## 2019-12-07 RX ADMIN — CHLORHEXIDINE GLUCONATE 1 APPLICATION(S): 213 SOLUTION TOPICAL at 07:24

## 2019-12-07 RX ADMIN — Medication 1: at 14:56

## 2019-12-07 RX ADMIN — HEPARIN SODIUM 5000 UNIT(S): 5000 INJECTION INTRAVENOUS; SUBCUTANEOUS at 21:32

## 2019-12-07 RX ADMIN — MIDODRINE HYDROCHLORIDE 10 MILLIGRAM(S): 2.5 TABLET ORAL at 07:23

## 2019-12-07 RX ADMIN — ERYTHROPOIETIN 10000 UNIT(S): 10000 INJECTION, SOLUTION INTRAVENOUS; SUBCUTANEOUS at 14:29

## 2019-12-07 RX ADMIN — CINACALCET 30 MILLIGRAM(S): 30 TABLET, FILM COATED ORAL at 14:51

## 2019-12-07 RX ADMIN — MIDODRINE HYDROCHLORIDE 10 MILLIGRAM(S): 2.5 TABLET ORAL at 14:50

## 2019-12-07 RX ADMIN — PANTOPRAZOLE SODIUM 40 MILLIGRAM(S): 20 TABLET, DELAYED RELEASE ORAL at 19:09

## 2019-12-07 NOTE — PROGRESS NOTE ADULT - ASSESSMENT
74M with ESRD s/p failed renal transplant, HCV, DM, and history of meningococcal meningitis, admitted 11/20/19 from HD for altered mentation and hypotension.   CVA  Pneumonia-aspiration-resp failure s/p Rx  patient also with sacral decub stage III  Mild leucocytosis  Is on baseline steroids  Cough But CXR clear  No fevers  Hemodynamically stable  No s/s any other foci  ? Steroid induced  ? Reactive  ? occult infection    Rec:  1) monitor clinically  2) Aspiration precautions  3) Check blood Cx  4) wound care for sacral decub  5) For now observe off abx -but if any further increase or worsening  CT chest/abd pelvis and start empiric coverage with vanco + zosyn(renally dosed)  Overall prognosis poor 74M with ESRD s/p failed renal transplant, HCV, DM, and history of meningococcal meningitis, admitted 11/20/19 from HD for altered mentation and hypotension.   Completed antibiotics for pneumonia possibly aspiration.   WBC branden from 10 earlier this week to 14 yesterday. ID asked for follow up.   Unclear if this represents an infection. Nontoxic, no focal complaints or exam findings although limited. CXR clear. On chronic steroids for renal transplant which can cause leukocytosis.   Overall poor prognosis.     Suggest  -trend WBC  -f/u blood cultures   -monitor off antibiotics   -wound care for sacral wound   -if clinical deterioration concerning for infection, reculture and start Vancomycin and Zosyn (renally dose) and check CT chest/abd pelvis     Cristobal Gonzales MD   Infectious Disease   Pager [phone]   After 5PM and on weekends please page fellow on call or call [phone]

## 2019-12-07 NOTE — PROGRESS NOTE ADULT - SUBJECTIVE AND OBJECTIVE BOX
Patient is a 74y old  Male who presents with a chief complaint of ams (07 Dec 2019 15:58)      INTERVAL HPI/OVERNIGHT EVENTS:  T(C): 36.9 (12-07-19 @ 14:55), Max: 36.9 (12-07-19 @ 14:55)  HR: 93 (12-07-19 @ 16:13) (88 - 104)  BP: 147/71 (12-07-19 @ 16:13) (103/53 - 147/71)  RR: 20 (12-07-19 @ 14:55) (18 - 20)  SpO2: 100% (12-07-19 @ 14:55) (97% - 100%)  Wt(kg): --  I&O's Summary    06 Dec 2019 07:01  -  07 Dec 2019 07:00  --------------------------------------------------------  IN: 375 mL / OUT: 0 mL / NET: 375 mL        LABS:                        7.3    14.77 )-----------( 298      ( 07 Dec 2019 14:19 )             25.4     12-07    138  |  96  |  46<H>  ----------------------------<  142<H>  3.8   |  26  |  4.69<H>    Ca    10.8<H>      07 Dec 2019 11:41          CAPILLARY BLOOD GLUCOSE      POCT Blood Glucose.: 182 mg/dL (07 Dec 2019 14:52)  POCT Blood Glucose.: 172 mg/dL (07 Dec 2019 07:12)  POCT Blood Glucose.: 125 mg/dL (07 Dec 2019 00:15)  POCT Blood Glucose.: 128 mg/dL (06 Dec 2019 18:07)            MEDICATIONS  (STANDING):  alteplase for catheter clearance 2 milliGRAM(s) Catheter once  alteplase for catheter clearance 2 milliGRAM(s) Catheter once  atorvastatin 40 milliGRAM(s) Oral at bedtime  carvedilol 3.125 milliGRAM(s) Oral every 12 hours  chlorhexidine 4% Liquid 1 Application(s) Topical <User Schedule>  cinacalcet 30 milliGRAM(s) Oral daily  epoetin tan Injectable 80910 Unit(s) IV Push <User Schedule>  heparin  Injectable 5000 Unit(s) SubCutaneous every 8 hours  insulin lispro (HumaLOG) corrective regimen sliding scale   SubCutaneous every 6 hours  levETIRAcetam  Solution 1000 milliGRAM(s) Oral at bedtime  lidocaine   Patch 1 Patch Transdermal daily  midodrine. 10 milliGRAM(s) Oral three times a day  pantoprazole   Suspension 40 milliGRAM(s) Oral two times a day  predniSONE   Tablet 5 milliGRAM(s) Oral daily    MEDICATIONS  (PRN):          PHYSICAL EXAM:  GENERAL: NAD, well-groomed, well-developed  HEAD:  Atraumatic, Normocephalic  CHEST/LUNG: Clear to percussion bilaterally; No rales, rhonchi, wheezing, or rubs  HEART: Regular rate and rhythm; No murmurs, rubs, or gallops  ABDOMEN: Soft, Nontender, Nondistended; Bowel sounds present  EXTREMITIES:  2+ Peripheral Pulses, No clubbing, cyanosis, or edema  LYMPH: No lymphadenopathy noted  SKIN: No rashes or lesions    Care Discussed with Consultants/Other Providers [ ] YES  [ ] NO

## 2019-12-07 NOTE — PROGRESS NOTE ADULT - SUBJECTIVE AND OBJECTIVE BOX
Subjective: Patient seen and examined. No new events except as noted.     SUBJECTIVE/ROS:        MEDICATIONS:  MEDICATIONS  (STANDING):  alteplase for catheter clearance 2 milliGRAM(s) Catheter once  alteplase for catheter clearance 2 milliGRAM(s) Catheter once  atorvastatin 40 milliGRAM(s) Oral at bedtime  carvedilol 3.125 milliGRAM(s) Oral every 12 hours  chlorhexidine 4% Liquid 1 Application(s) Topical <User Schedule>  cinacalcet 30 milliGRAM(s) Oral daily  epoetin tan Injectable 33816 Unit(s) IV Push <User Schedule>  heparin  Injectable 5000 Unit(s) SubCutaneous every 8 hours  insulin lispro (HumaLOG) corrective regimen sliding scale   SubCutaneous every 6 hours  levETIRAcetam  Solution 1000 milliGRAM(s) Oral at bedtime  lidocaine   Patch 1 Patch Transdermal daily  midodrine. 10 milliGRAM(s) Oral three times a day  pantoprazole  Injectable 40 milliGRAM(s) IV Push two times a day  predniSONE   Tablet 5 milliGRAM(s) Oral daily      PHYSICAL EXAM:  T(C): 36.7 (12-07-19 @ 07:18), Max: 36.7 (12-06-19 @ 14:00)  HR: 96 (12-07-19 @ 07:18) (88 - 96)  BP: 124/70 (12-07-19 @ 07:18) (120/67 - 130/74)  RR: 18 (12-07-19 @ 07:18) (18 - 18)  SpO2: 98% (12-07-19 @ 07:18) (97% - 98%)  Wt(kg): --  I&O's Summary    06 Dec 2019 07:01  -  07 Dec 2019 07:00  --------------------------------------------------------  IN: 375 mL / OUT: 0 mL / NET: 375 mL            JVP: Normal  Neck: supple  Lung: clear   CV: S1 S2 , Murmur:  Abd: soft  Ext: No edema  Psych: flat affect  Skin: normal``    LABS/DATA:    CARDIAC MARKERS:                                8.1    14.94 )-----------( 270      ( 06 Dec 2019 07:40 )             28.0     12-06    137  |  97  |  30<H>  ----------------------------<  169<H>  3.7   |  28  |  3.82<H>    Ca    10.9<H>      06 Dec 2019 06:30      proBNP:   Lipid Profile:   HgA1c:   TSH:     TELE:  EKG:

## 2019-12-07 NOTE — PROGRESS NOTE ADULT - ASSESSMENT
History of cocaine induced CM  normal EF on last echo  cont BB     PAF  in sinus   a/c once deemed safe   for now off given high bleed risk

## 2019-12-07 NOTE — PROGRESS NOTE ADULT - SUBJECTIVE AND OBJECTIVE BOX
Patient is a 74y Male whom presented to the hospital with esrd on hd   pt  seen in am nad , no fever , no chills    PAST MEDICAL & SURGICAL HISTORY:  Kidney transplant recipient  Anuria  GERD (gastroesophageal reflux disease)  Kidney transplant failure: dx: 2/2013  Hepatitis C: dx: 90&#x27;s.  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 10 milliGRAM(s) Oral three times a day  mycophenolate mofetil 250 milliGRAM(s) Oral two times a day  predniSONE   Tablet 5 milliGRAM(s) Oral daily  sevelamer carbonate 800 milliGRAM(s) Oral three times a day with meals  sodium bicarbonate 650 milliGRAM(s) Oral two times a day  tamsulosin 0.4 milliGRAM(s) Oral at bedtime      Allergies    No Known Allergies                       SOCIAL HISTORY:  Denies ETOh,Smoking,     FAMILY HISTORY:  Family history of breast cancer in sister (Sibling): living at 92 yo  Family history of prostate cancer in father  Family history of lung cancer  Family history of hypertension in mother  Family history of diabetes mellitus: mother      REVIEW OF SYSTEMS:    unbable to obtained                                                                                                           7.3    14.77 )-----------( 298      ( 07 Dec 2019 14:19 )             25.4       CBC Full  -  ( 07 Dec 2019 14:19 )  WBC Count : 14.77 K/uL  RBC Count : 2.82 M/uL  Hemoglobin : 7.3 g/dL  Hematocrit : 25.4 %  Platelet Count - Automated : 298 K/uL  Mean Cell Volume : 90.1 fl  Mean Cell Hemoglobin : 25.9 pg  Mean Cell Hemoglobin Concentration : 28.7 gm/dL  Auto Neutrophil # : x  Auto Lymphocyte # : x  Auto Monocyte # : x  Auto Eosinophil # : x  Auto Basophil # : x  Auto Neutrophil % : x  Auto Lymphocyte % : x  Auto Monocyte % : x  Auto Eosinophil % : x  Auto Basophil % : x      12-07    138  |  96  |  46<H>  ----------------------------<  142<H>  3.8   |  26  |  4.69<H>    Ca    10.8<H>      07 Dec 2019 11:41        CAPILLARY BLOOD GLUCOSE      POCT Blood Glucose.: 182 mg/dL (07 Dec 2019 14:52)  POCT Blood Glucose.: 172 mg/dL (07 Dec 2019 07:12)  POCT Blood Glucose.: 125 mg/dL (07 Dec 2019 00:15)  POCT Blood Glucose.: 128 mg/dL (06 Dec 2019 18:07)      Vital Signs Last 24 Hrs  T(C): 36.9 (07 Dec 2019 14:55), Max: 36.9 (07 Dec 2019 14:55)  T(F): 98.5 (07 Dec 2019 14:55), Max: 98.5 (07 Dec 2019 14:55)  HR: 104 (07 Dec 2019 14:55) (88 - 104)  BP: 103/53 (07 Dec 2019 14:55) (103/53 - 130/74)  BP(mean): --  RR: 20 (07 Dec 2019 14:55) (18 - 20)  SpO2: 100% (07 Dec 2019 14:55) (97% - 100%)                                               HEENT: conjunctive   clear   Neck:  No JVD  Respiratory: decrease bs b/l   Cardiovascular: S1 and S2  Gastrointestinal: BS+, soft, NT/ND  Extremities: No peripheral edema  Skin: dry   Access: pos fistula

## 2019-12-07 NOTE — PROGRESS NOTE ADULT - ASSESSMENT
Assessment  DM: A1C 5.5%, was on insulin at home, now on coverage, no hypoglycemic episodes, appears comfortable, still NPO on tube feeds.  Palliative care on board..  Hypercalcemia: Primary Hyperparathyroidism, calcium improved and WNL s/p Pamidronate dose.  Sepsis: IV ABx for UTI, LP inconsistent with meningitis, on medications, stable, monitored.  HTN: Controlled,  on antihypertensive medications.  ESRD: On hemodialysis, Monitor labs/BMP          Robi Gay MD  Cell: [phone]  Office: [phone]

## 2019-12-07 NOTE — PROGRESS NOTE ADULT - SUBJECTIVE AND OBJECTIVE BOX
Follow Up:  Leukocytosis    Interval History/ROS: Seen at dialysis. Afebrile. Awake but hard to talk to due to fatigue/lethargy. Denies pain or cough.     Allergies  No Known Allergies    ANTIMICROBIALS:      OTHER MEDS:  alteplase for catheter clearance 2 milliGRAM(s) Catheter once  alteplase for catheter clearance 2 milliGRAM(s) Catheter once  atorvastatin 40 milliGRAM(s) Oral at bedtime  carvedilol 3.125 milliGRAM(s) Oral every 12 hours  chlorhexidine 4% Liquid 1 Application(s) Topical <User Schedule>  cinacalcet 30 milliGRAM(s) Oral daily  epoetin tan Injectable 48676 Unit(s) IV Push <User Schedule>  heparin  Injectable 5000 Unit(s) SubCutaneous every 8 hours  insulin lispro (HumaLOG) corrective regimen sliding scale   SubCutaneous every 6 hours  levETIRAcetam  Solution 1000 milliGRAM(s) Oral at bedtime  lidocaine   Patch 1 Patch Transdermal daily  midodrine. 10 milliGRAM(s) Oral three times a day  pantoprazole   Suspension 40 milliGRAM(s) Oral two times a day  predniSONE   Tablet 5 milliGRAM(s) Oral daily      Vital Signs Last 24 Hrs  T(C): 36.6 (07 Dec 2019 09:15), Max: 36.7 (06 Dec 2019 14:00)  T(F): 97.8 (07 Dec 2019 09:15), Max: 98.1 (06 Dec 2019 14:00)  HR: 92 (07 Dec 2019 09:15) (88 - 96)  BP: 112/67 (07 Dec 2019 09:15) (112/67 - 130/74)  BP(mean): --  RR: 18 (07 Dec 2019 09:15) (18 - 18)  SpO2: 98% (07 Dec 2019 09:15) (97% - 98%)    Physical Exam:  General: fatigued, non toxic  Head: atraumatic, normocephalic  ENT: NG tube in placed. no gross oral lesions   Cardio:  tachycardic, regular rhythm   Respiratory: nonlabored, decreased breath sounds at bases but otherwise clear bilaterally, no wheezing  abd: soft, BS +, no tenderness  : no suprapubic tenderness, no  gutierrez  Musculoskeletal: no edema. unable to examine sacral decubitus   vascular: right IJ dialysis catheter                           8.1    14.94 )-----------( 270      ( 06 Dec 2019 07:40 )             28.0       12-07    138  |  96  |  46<H>  ----------------------------<  142<H>  3.8   |  26  |  4.69<H>    Ca    10.8<H>      07 Dec 2019 11:41            MICROBIOLOGY:  CMV PCR Detection: NotDetec IU/mL (09-05-19 @ 21:02)    RADIOLOGY:  Images below reviewed personally  Xray Chest 1 View- PORTABLE-Urgent (12.06.19 @ 08:47)   Right IJ central venous catheter sheath tip terminates in the SVC.  Interval repositioning of enteric tube, which now terminates in the stomach.  The heart is normal in size.   The lungs are clear. No pneumothorax. No pleural effusion.  No acute osseous abnormalities.

## 2019-12-07 NOTE — PROGRESS NOTE ADULT - SUBJECTIVE AND OBJECTIVE BOX
Chief complaint  Patient is a 74y old  Male who presents with a chief complaint of ams (07 Dec 2019 17:04)   Review of systems  Patient in bed, looks comfortable, no fever, no hypoglycemia.    Labs and Fingersticks  CAPILLARY BLOOD GLUCOSE      POCT Blood Glucose.: 114 mg/dL (07 Dec 2019 18:21)  POCT Blood Glucose.: 182 mg/dL (07 Dec 2019 14:52)  POCT Blood Glucose.: 172 mg/dL (07 Dec 2019 07:12)  POCT Blood Glucose.: 125 mg/dL (07 Dec 2019 00:15)      Anion Gap, Serum: 16 (12-07 @ 11:41)  Anion Gap, Serum: 12 (12-06 @ 06:30)      Calcium, Total Serum: 10.8 <H> (12-07 @ 11:41)  Calcium, Total Serum: 10.9 <H> (12-06 @ 06:30)          12-07    138  |  96  |  46<H>  ----------------------------<  142<H>  3.8   |  26  |  4.69<H>    Ca    10.8<H>      07 Dec 2019 11:41                          7.3    14.77 )-----------( 298      ( 07 Dec 2019 14:19 )             25.4     Medications  MEDICATIONS  (STANDING):  alteplase for catheter clearance 2 milliGRAM(s) Catheter once  alteplase for catheter clearance 2 milliGRAM(s) Catheter once  atorvastatin 40 milliGRAM(s) Oral at bedtime  carvedilol 3.125 milliGRAM(s) Oral every 12 hours  chlorhexidine 4% Liquid 1 Application(s) Topical <User Schedule>  cinacalcet 30 milliGRAM(s) Oral daily  epoetin tan Injectable 94271 Unit(s) IV Push <User Schedule>  heparin  Injectable 5000 Unit(s) SubCutaneous every 8 hours  insulin lispro (HumaLOG) corrective regimen sliding scale   SubCutaneous every 6 hours  levETIRAcetam  Solution 1000 milliGRAM(s) Oral at bedtime  lidocaine   Patch 1 Patch Transdermal daily  midodrine. 10 milliGRAM(s) Oral three times a day  pantoprazole   Suspension 40 milliGRAM(s) Oral two times a day  predniSONE   Tablet 5 milliGRAM(s) Oral daily      Physical Exam  General: Patient comfortable in bed  Vital Signs Last 12 Hrs  T(F): 98.5 (12-07-19 @ 21:00), Max: 98.5 (12-07-19 @ 14:55)  HR: 95 (12-07-19 @ 21:00) (93 - 104)  BP: 116/68 (12-07-19 @ 21:00) (103/53 - 147/71)  BP(mean): --  RR: 20 (12-07-19 @ 21:00) (20 - 20)  SpO2: 95% (12-07-19 @ 21:00) (95% - 100%)  Neck: No palpable thyroid nodules.  CVS: S1S2, No murmurs  Respiratory: No wheezing, no crepitations  GI: Abdomen soft, bowel sounds positive  Musculoskeletal:  edema lower extremities.   Skin: No skin rashes, no ecchymosis    Diagnostics    Thyroid Stimulating Hormone, Serum: AM Sched. Collection: 03-Dec-2019 06:00 (12-02 @ 10:42)  Free Thyroxine, Serum: AM Sched. Collection: 03-Dec-2019 06:00 (12-02 @ 10:42)

## 2019-12-07 NOTE — PROGRESS NOTE ADULT - ASSESSMENT
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       NEURO:  - Extubated 11/29, now on nasal cannula. Minimally verbal   - metabolic encephalopathy, meningitis r/o, LP negative  - neuro following  - chronic lacunar infarcts; per neuro, AMS also likely related to hypercalcemia, renal dysfunction , poor nutritional intake.   - c/w keppra for ?hx of seizure disorder  -AMS likely 2/2 hyperCa, renal dysfunction, poor PO intake  - may be developing sepsis . will consider fever w/u  - ID called to follow up    CV:  - hx of afib was on eliquis, now off a/c per cards due to bleeding risk  - holding carvedilol 6.25 BID  - hx of cocaine cardiomyopathy, resolved, normal EF on most recent echo  - c/w statin  - echo repeat EF70, LVH, hyperdynamic LCF, normal RV sys fxn    PULM:- extubated 11/29--tolerating nasal cannula    GI:  - NG tube in place  - ernie tube feeds  - ?bleeding as hgb dropping. stool occult negative. no melena reported.  - C diff negative; does not need contact precautions  - failed swallow eval  - will need to discuss peg with family  - again  I called  Patient's daughter Roro, [phone].  The family has decided for peg placement.    RENAL:  - hx of ESRD s/p failed renal transplant x2  - renal following, HD as recommended.  - c/w predisone, sevelamir    ENDO:  #DM2: HbA1c 5.5  - Start Insulin Sliding Scale  - endocrine following  - presented hypercalcemic, downtrending. hold pamidronate for now. f/u with endo    anemia  -- monitor H&H  - transfuse PRN    INFECTIOUS:  - WBC uptrending. ID following  - Urine culture growing E.coli; blood culture negative  - was given empiric CNS infx coverage - vanc, manoj, acyclovir, LP CSF negative  - wound care consulted appreciate recs  - ID called to follow up  PPX: SCDs  - home eliquis has been held while in septic state. can add back when tolerated    GOC:  - palliative consult.  - has 3 children who should be contacted first  - poor prognosis

## 2019-12-08 LAB
ANION GAP SERPL CALC-SCNC: 16 MMOL/L — SIGNIFICANT CHANGE UP (ref 5–17)
BLD GP AB SCN SERPL QL: NEGATIVE — SIGNIFICANT CHANGE UP
BUN SERPL-MCNC: 30 MG/DL — HIGH (ref 7–23)
CALCIUM SERPL-MCNC: 10.2 MG/DL — SIGNIFICANT CHANGE UP (ref 8.4–10.5)
CHLORIDE SERPL-SCNC: 96 MMOL/L — SIGNIFICANT CHANGE UP (ref 96–108)
CO2 SERPL-SCNC: 25 MMOL/L — SIGNIFICANT CHANGE UP (ref 22–31)
CREAT SERPL-MCNC: 3.22 MG/DL — HIGH (ref 0.5–1.3)
GLUCOSE BLDC GLUCOMTR-MCNC: 147 MG/DL — HIGH (ref 70–99)
GLUCOSE BLDC GLUCOMTR-MCNC: 158 MG/DL — HIGH (ref 70–99)
GLUCOSE BLDC GLUCOMTR-MCNC: 181 MG/DL — HIGH (ref 70–99)
GLUCOSE BLDC GLUCOMTR-MCNC: 197 MG/DL — HIGH (ref 70–99)
GLUCOSE SERPL-MCNC: 182 MG/DL — HIGH (ref 70–99)
HCT VFR BLD CALC: 25.2 % — LOW (ref 39–50)
HGB BLD-MCNC: 7.2 G/DL — LOW (ref 13–17)
MCHC RBC-ENTMCNC: 26.2 PG — LOW (ref 27–34)
MCHC RBC-ENTMCNC: 28.6 GM/DL — LOW (ref 32–36)
MCV RBC AUTO: 91.6 FL — SIGNIFICANT CHANGE UP (ref 80–100)
PLATELET # BLD AUTO: 257 K/UL — SIGNIFICANT CHANGE UP (ref 150–400)
POTASSIUM SERPL-MCNC: 4.1 MMOL/L — SIGNIFICANT CHANGE UP (ref 3.5–5.3)
POTASSIUM SERPL-SCNC: 4.1 MMOL/L — SIGNIFICANT CHANGE UP (ref 3.5–5.3)
RBC # BLD: 2.75 M/UL — LOW (ref 4.2–5.8)
RBC # FLD: 16.6 % — HIGH (ref 10.3–14.5)
RH IG SCN BLD-IMP: POSITIVE — SIGNIFICANT CHANGE UP
SODIUM SERPL-SCNC: 137 MMOL/L — SIGNIFICANT CHANGE UP (ref 135–145)
WBC # BLD: 15.47 K/UL — HIGH (ref 3.8–10.5)
WBC # FLD AUTO: 15.47 K/UL — HIGH (ref 3.8–10.5)

## 2019-12-08 RX ADMIN — LEVETIRACETAM 1000 MILLIGRAM(S): 250 TABLET, FILM COATED ORAL at 21:29

## 2019-12-08 RX ADMIN — CARVEDILOL PHOSPHATE 3.12 MILLIGRAM(S): 80 CAPSULE, EXTENDED RELEASE ORAL at 05:35

## 2019-12-08 RX ADMIN — LIDOCAINE 1 PATCH: 4 CREAM TOPICAL at 01:30

## 2019-12-08 RX ADMIN — HEPARIN SODIUM 5000 UNIT(S): 5000 INJECTION INTRAVENOUS; SUBCUTANEOUS at 12:53

## 2019-12-08 RX ADMIN — ATORVASTATIN CALCIUM 40 MILLIGRAM(S): 80 TABLET, FILM COATED ORAL at 21:29

## 2019-12-08 RX ADMIN — HEPARIN SODIUM 5000 UNIT(S): 5000 INJECTION INTRAVENOUS; SUBCUTANEOUS at 05:35

## 2019-12-08 RX ADMIN — MIDODRINE HYDROCHLORIDE 10 MILLIGRAM(S): 2.5 TABLET ORAL at 12:52

## 2019-12-08 RX ADMIN — CINACALCET 30 MILLIGRAM(S): 30 TABLET, FILM COATED ORAL at 12:52

## 2019-12-08 RX ADMIN — Medication 1: at 05:37

## 2019-12-08 RX ADMIN — Medication 1: at 12:52

## 2019-12-08 RX ADMIN — CARVEDILOL PHOSPHATE 3.12 MILLIGRAM(S): 80 CAPSULE, EXTENDED RELEASE ORAL at 18:02

## 2019-12-08 RX ADMIN — PANTOPRAZOLE SODIUM 40 MILLIGRAM(S): 20 TABLET, DELAYED RELEASE ORAL at 18:03

## 2019-12-08 RX ADMIN — MIDODRINE HYDROCHLORIDE 10 MILLIGRAM(S): 2.5 TABLET ORAL at 05:37

## 2019-12-08 RX ADMIN — LIDOCAINE 1 PATCH: 4 CREAM TOPICAL at 19:30

## 2019-12-08 RX ADMIN — MIDODRINE HYDROCHLORIDE 10 MILLIGRAM(S): 2.5 TABLET ORAL at 18:03

## 2019-12-08 RX ADMIN — LIDOCAINE 1 PATCH: 4 CREAM TOPICAL at 12:52

## 2019-12-08 RX ADMIN — Medication 5 MILLIGRAM(S): at 05:35

## 2019-12-08 RX ADMIN — Medication 1: at 00:25

## 2019-12-08 RX ADMIN — HEPARIN SODIUM 5000 UNIT(S): 5000 INJECTION INTRAVENOUS; SUBCUTANEOUS at 21:29

## 2019-12-08 RX ADMIN — PANTOPRAZOLE SODIUM 40 MILLIGRAM(S): 20 TABLET, DELAYED RELEASE ORAL at 05:35

## 2019-12-08 NOTE — PROGRESS NOTE ADULT - SUBJECTIVE AND OBJECTIVE BOX
Subjective: Patient seen and examined. No new events except as noted.     SUBJECTIVE/ROS:      MEDICATIONS:  MEDICATIONS  (STANDING):  alteplase for catheter clearance 2 milliGRAM(s) Catheter once  alteplase for catheter clearance 2 milliGRAM(s) Catheter once  atorvastatin 40 milliGRAM(s) Oral at bedtime  carvedilol 3.125 milliGRAM(s) Oral every 12 hours  chlorhexidine 4% Liquid 1 Application(s) Topical <User Schedule>  cinacalcet 30 milliGRAM(s) Oral daily  epoetin tan Injectable 39341 Unit(s) IV Push <User Schedule>  heparin  Injectable 5000 Unit(s) SubCutaneous every 8 hours  insulin lispro (HumaLOG) corrective regimen sliding scale   SubCutaneous every 6 hours  levETIRAcetam  Solution 1000 milliGRAM(s) Oral at bedtime  lidocaine   Patch 1 Patch Transdermal daily  midodrine. 10 milliGRAM(s) Oral three times a day  pantoprazole   Suspension 40 milliGRAM(s) Oral two times a day  predniSONE   Tablet 5 milliGRAM(s) Oral daily      PHYSICAL EXAM:  T(C): 37.1 (12-08-19 @ 09:00), Max: 37.1 (12-08-19 @ 09:00)  HR: 99 (12-08-19 @ 09:00) (87 - 104)  BP: 115/67 (12-08-19 @ 09:00) (103/53 - 153/69)  RR: 18 (12-08-19 @ 09:00) (18 - 20)  SpO2: 99% (12-08-19 @ 09:00) (94% - 100%)  Wt(kg): --  I&O's Summary    07 Dec 2019 07:01  -  08 Dec 2019 07:00  --------------------------------------------------------  IN: 2770 mL / OUT: 1800 mL / NET: 970 mL            JVP: Normal  Neck: supple  Lung: clear   CV: S1 S2 , Murmur:  Abd: soft  Ext: No edema  Psych: flat affect  Skin: normal``    LABS/DATA:    CARDIAC MARKERS:                                7.2    15.47 )-----------( 257      ( 08 Dec 2019 09:24 )             25.2     12-08    137  |  96  |  30<H>  ----------------------------<  182<H>  4.1   |  25  |  3.22<H>    Ca    10.2      08 Dec 2019 06:52      proBNP:   Lipid Profile:   HgA1c:   TSH:     TELE:  EKG:

## 2019-12-08 NOTE — PROGRESS NOTE ADULT - ATTENDING COMMENTS
Patient counseled for compliance with consistent low carb diet, exercise as tolerated as out patient.

## 2019-12-08 NOTE — PROGRESS NOTE ADULT - ASSESSMENT
Assessment  DM: A1C 5.5%, was on insulin at home, now on insulin coverage, blood sugars trending within overall acceptable range, trending up, no hypoglycemic episodes. Patient still NPO on tube feeds, appears comfortable.  Hypercalcemia: Primary Hyperparathyroidism, calcium improved and WNL s/p Pamidronate dose.  Sepsis: IV ABx for UTI, LP inconsistent with meningitis, on medications, stable, monitored.  HTN: Controlled,  on antihypertensive medications.  ESRD: On hemodialysis, Monitor labs/BMP          Robi Gay MD  Cell: [phone]  Office: [phone] Assessment  DM: A1C 5.5%, was on insulin at home, now on insulin coverage, blood sugars trending within overall acceptable range, trending up,  no hypoglycemic episodes. Patient still NPO on tube feeds, appears comfortable.  Hypercalcemia: Primary Hyperparathyroidism, calcium improved and WNL s/p Pamidronate dose.  Sepsis: IV ABx for UTI, LP inconsistent with meningitis, on medications, stable, monitored.  HTN: Controlled,  on antihypertensive medications.  ESRD: On hemodialysis, Monitor labs/BMP          Robi Gay MD  Cell: [phone]  Office: [phone]

## 2019-12-08 NOTE — PROGRESS NOTE ADULT - ASSESSMENT
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       NEURO:  - Extubated 11/29, now on nasal cannula. Minimally verbal   - metabolic encephalopathy, meningitis r/o, LP negative- neuro following  - chronic lacunar infarcts; per neuro, AMS also likely related to hypercalcemia, renal dysfunction , poor nutritional intake.   - c/w keppra for ?hx of seizure disorder  -AMS likely 2/2 hyperCa, renal dysfunction, poor PO intake  - may be developing sepsis . will consider fever w/u  - ID called to follow up    CV:  - hx of afib was on eliquis, now off a/c per cards due to bleeding risk  - holding carvedilol 6.25 BID  - hx of cocaine cardiomyopathy, resolved, normal EF on most recent echo  - c/w statin  - echo repeat EF70, LVH, hyperdynamic LCF, normal RV sys fxn    PULM:- extubated 11/29--tolerating nasal cannula    GI:  - NG tube in place  - ernie tube feeds  - ?bleeding as hgb dropping. stool occult negative. no melena reported.- C diff negative; does not need contact precautions  - failed swallow eval  - will need to discuss peg with family  - again  I called  Patient's daughter Roro, [phone].  The family has decided for peg placement.    RENAL:  - hx of ESRD s/p failed renal transplant x2  - renal following, HD as recommended.  - c/w predisone, sevelamir    ENDO:  #DM2: HbA1c 5.5  - Start Insulin Sliding Scale  - endocrine following  - presented hypercalcemic, downtrending. hold pamidronate for now. f/u with endo    anemia  -- monitor H&H  - transfuse PRN    INFECTIOUS:  - WBC uptrending. ID following  - Urine culture growing E.coli; blood culture negative- was given empiric CNS infx coverage - vanc, manoj, acyclovir, LP CSF negative  - wound care consulted appreciate recs  - ID called to follow up  PPX: SCDs  - home eliquis has been held while in septic state. can add back when tolerated    GOC:  - palliative consult.  - has 3 children who should be contacted first  - poor prognosis

## 2019-12-08 NOTE — PROGRESS NOTE ADULT - SUBJECTIVE AND OBJECTIVE BOX
Patient is a 74y Male whom presented to the hospital with esrd on hd   pt  seen in am nad , no fever , no chills    PAST MEDICAL & SURGICAL HISTORY:  Kidney transplant recipient  Anuria  GERD (gastroesophageal reflux disease)  Kidney transplant failure: dx: 2/2013  Hepatitis C: dx: 90&#x27;s.  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 10 milliGRAM(s) Oral three times a day  mycophenolate mofetil 250 milliGRAM(s) Oral two times a day  predniSONE   Tablet 5 milliGRAM(s) Oral daily  sevelamer carbonate 800 milliGRAM(s) Oral three times a day with meals  sodium bicarbonate 650 milliGRAM(s) Oral two times a day  tamsulosin 0.4 milliGRAM(s) Oral at bedtime      Allergies    No Known Allergies                       SOCIAL HISTORY:  Denies ETOh,Smoking,     FAMILY HISTORY:  Family history of breast cancer in sister (Sibling): living at 92 yo  Family history of prostate cancer in father  Family history of lung cancer  Family history of hypertension in mother  Family history of diabetes mellitus: mother      REVIEW OF SYSTEMS:    unbable to obtained                                                                                                                        7.2    15.47 )-----------( 257      ( 08 Dec 2019 09:24 )             25.2       CBC Full  -  ( 08 Dec 2019 09:24 )  WBC Count : 15.47 K/uL  RBC Count : 2.75 M/uL  Hemoglobin : 7.2 g/dL  Hematocrit : 25.2 %  Platelet Count - Automated : 257 K/uL  Mean Cell Volume : 91.6 fl  Mean Cell Hemoglobin : 26.2 pg  Mean Cell Hemoglobin Concentration : 28.6 gm/dL  Auto Neutrophil # : x  Auto Lymphocyte # : x  Auto Monocyte # : x  Auto Eosinophil # : x  Auto Basophil # : x  Auto Neutrophil % : x  Auto Lymphocyte % : x  Auto Monocyte % : x  Auto Eosinophil % : x  Auto Basophil % : x      12-08    137  |  96  |  30<H>  ----------------------------<  182<H>  4.1   |  25  |  3.22<H>    Ca    10.2      08 Dec 2019 06:52        CAPILLARY BLOOD GLUCOSE      POCT Blood Glucose.: 197 mg/dL (08 Dec 2019 12:39)  POCT Blood Glucose.: 158 mg/dL (08 Dec 2019 05:31)  POCT Blood Glucose.: 181 mg/dL (08 Dec 2019 00:22)  POCT Blood Glucose.: 114 mg/dL (07 Dec 2019 18:21)  POCT Blood Glucose.: 182 mg/dL (07 Dec 2019 14:52)      Vital Signs Last 24 Hrs  T(C): 36.7 (08 Dec 2019 12:42), Max: 37.1 (08 Dec 2019 09:00)  T(F): 98.1 (08 Dec 2019 12:42), Max: 98.7 (08 Dec 2019 09:00)  HR: 96 (08 Dec 2019 12:42) (87 - 104)  BP: 137/67 (08 Dec 2019 12:42) (103/53 - 153/69)  BP(mean): --  RR: 18 (08 Dec 2019 12:42) (18 - 20)  SpO2: 96% (08 Dec 2019 12:42) (94% - 100%)                                             HEENT: conjunctive   clear   Neck:  No JVD  Respiratory: decrease bs b/l   Cardiovascular: S1 and S2  Gastrointestinal: BS+, soft, NT/ND  Extremities: No peripheral edema  Skin: dry   Access: pos fistula

## 2019-12-08 NOTE — PROGRESS NOTE ADULT - SUBJECTIVE AND OBJECTIVE BOX
Chief complaint  Patient is a 74y old  Male who presents with a chief complaint of ams (08 Dec 2019 14:36)   Review of systems  Patient in bed, looks comfortable, no fever, no hypoglycemia.    Labs and Fingersticks  CAPILLARY BLOOD GLUCOSE      POCT Blood Glucose.: 197 mg/dL (08 Dec 2019 12:39)  POCT Blood Glucose.: 158 mg/dL (08 Dec 2019 05:31)  POCT Blood Glucose.: 181 mg/dL (08 Dec 2019 00:22)  POCT Blood Glucose.: 114 mg/dL (07 Dec 2019 18:21)      Anion Gap, Serum: 16 (12-08 @ 06:52)  Anion Gap, Serum: 16 (12-07 @ 11:41)      Calcium, Total Serum: 10.2 (12-08 @ 06:52)  Calcium, Total Serum: 10.8 <H> (12-07 @ 11:41)          12-08    137  |  96  |  30<H>  ----------------------------<  182<H>  4.1   |  25  |  3.22<H>    Ca    10.2      08 Dec 2019 06:52                          7.2    15.47 )-----------( 257      ( 08 Dec 2019 09:24 )             25.2     Medications  MEDICATIONS  (STANDING):  alteplase for catheter clearance 2 milliGRAM(s) Catheter once  alteplase for catheter clearance 2 milliGRAM(s) Catheter once  atorvastatin 40 milliGRAM(s) Oral at bedtime  carvedilol 3.125 milliGRAM(s) Oral every 12 hours  chlorhexidine 4% Liquid 1 Application(s) Topical <User Schedule>  cinacalcet 30 milliGRAM(s) Oral daily  epoetin tan Injectable 15877 Unit(s) IV Push <User Schedule>  heparin  Injectable 5000 Unit(s) SubCutaneous every 8 hours  insulin lispro (HumaLOG) corrective regimen sliding scale   SubCutaneous every 6 hours  levETIRAcetam  Solution 1000 milliGRAM(s) Oral at bedtime  lidocaine   Patch 1 Patch Transdermal daily  midodrine. 10 milliGRAM(s) Oral three times a day  pantoprazole   Suspension 40 milliGRAM(s) Oral two times a day  predniSONE   Tablet 5 milliGRAM(s) Oral daily      Physical Exam  General: Patient comfortable in bed  Vital Signs Last 12 Hrs  T(F): 98.1 (12-08-19 @ 14:40), Max: 98.7 (12-08-19 @ 09:00)  HR: 94 (12-08-19 @ 14:40) (94 - 99)  BP: 128/65 (12-08-19 @ 14:40) (115/67 - 153/69)  BP(mean): --  RR: 20 (12-08-19 @ 14:40) (18 - 20)  SpO2: 95% (12-08-19 @ 14:40) (95% - 99%)  Neck: No palpable thyroid nodules.  CVS: S1S2, No murmurs  Respiratory: No wheezing, no crepitations  GI: Abdomen soft, bowel sounds positive  Musculoskeletal:  edema lower extremities.   Skin: No skin rashes, no ecchymosis    Diagnostics Chief complaint  Patient is a 74y old  Male who presents with a chief complaint of ams (08 Dec 2019 14:36)   Review of systems  Patient in bed, looks comfortable, no fever,  no hypoglycemia.    Labs and Fingersticks  CAPILLARY BLOOD GLUCOSE      POCT Blood Glucose.: 197 mg/dL (08 Dec 2019 12:39)  POCT Blood Glucose.: 158 mg/dL (08 Dec 2019 05:31)  POCT Blood Glucose.: 181 mg/dL (08 Dec 2019 00:22)  POCT Blood Glucose.: 114 mg/dL (07 Dec 2019 18:21)      Anion Gap, Serum: 16 (12-08 @ 06:52)  Anion Gap, Serum: 16 (12-07 @ 11:41)      Calcium, Total Serum: 10.2 (12-08 @ 06:52)  Calcium, Total Serum: 10.8 <H> (12-07 @ 11:41)          12-08    137  |  96  |  30<H>  ----------------------------<  182<H>  4.1   |  25  |  3.22<H>    Ca    10.2      08 Dec 2019 06:52                          7.2    15.47 )-----------( 257      ( 08 Dec 2019 09:24 )             25.2     Medications  MEDICATIONS  (STANDING):  alteplase for catheter clearance 2 milliGRAM(s) Catheter once  alteplase for catheter clearance 2 milliGRAM(s) Catheter once  atorvastatin 40 milliGRAM(s) Oral at bedtime  carvedilol 3.125 milliGRAM(s) Oral every 12 hours  chlorhexidine 4% Liquid 1 Application(s) Topical <User Schedule>  cinacalcet 30 milliGRAM(s) Oral daily  epoetin tan Injectable 84850 Unit(s) IV Push <User Schedule>  heparin  Injectable 5000 Unit(s) SubCutaneous every 8 hours  insulin lispro (HumaLOG) corrective regimen sliding scale   SubCutaneous every 6 hours  levETIRAcetam  Solution 1000 milliGRAM(s) Oral at bedtime  lidocaine   Patch 1 Patch Transdermal daily  midodrine. 10 milliGRAM(s) Oral three times a day  pantoprazole   Suspension 40 milliGRAM(s) Oral two times a day  predniSONE   Tablet 5 milliGRAM(s) Oral daily      Physical Exam  General: Patient comfortable in bed  Vital Signs Last 12 Hrs  T(F): 98.1 (12-08-19 @ 14:40), Max: 98.7 (12-08-19 @ 09:00)  HR: 94 (12-08-19 @ 14:40) (94 - 99)  BP: 128/65 (12-08-19 @ 14:40) (115/67 - 153/69)  BP(mean): --  RR: 20 (12-08-19 @ 14:40) (18 - 20)  SpO2: 95% (12-08-19 @ 14:40) (95% - 99%)  Neck: No palpable thyroid nodules.  CVS: S1S2, No murmurs  Respiratory: No wheezing, no crepitations  GI: Abdomen soft, bowel sounds positive  Musculoskeletal:  edema lower extremities.   Skin: No skin rashes, no ecchymosis    Diagnostics

## 2019-12-08 NOTE — PROGRESS NOTE ADULT - ASSESSMENT
History of cocaine induced CM  normal EF on last echo  cont BB     PAF  in sinus   a/c once deemed safe   for now off given high bleed risk    GOC   fu with palliative care

## 2019-12-08 NOTE — PROGRESS NOTE ADULT - SUBJECTIVE AND OBJECTIVE BOX
Patient is a 74y old  Male who presents with a chief complaint of ams (08 Dec 2019 12:15)      INTERVAL HPI/OVERNIGHT EVENTS:  T(C): 36.7 (12-08-19 @ 12:42), Max: 37.1 (12-08-19 @ 09:00)  HR: 96 (12-08-19 @ 12:42) (87 - 104)  BP: 137/67 (12-08-19 @ 12:42) (103/53 - 153/69)  RR: 18 (12-08-19 @ 12:42) (18 - 20)  SpO2: 96% (12-08-19 @ 12:42) (94% - 100%)  Wt(kg): --  I&O's Summary    07 Dec 2019 07:01  -  08 Dec 2019 07:00  --------------------------------------------------------  IN: 2770 mL / OUT: 1800 mL / NET: 970 mL    08 Dec 2019 07:01  -  08 Dec 2019 14:33  --------------------------------------------------------  IN: 330 mL / OUT: 0 mL / NET: 330 mL        LABS:                        7.2    15.47 )-----------( 257      ( 08 Dec 2019 09:24 )             25.2     12-08    137  |  96  |  30<H>  ----------------------------<  182<H>  4.1   |  25  |  3.22<H>    Ca    10.2      08 Dec 2019 06:52          CAPILLARY BLOOD GLUCOSE      POCT Blood Glucose.: 197 mg/dL (08 Dec 2019 12:39)  POCT Blood Glucose.: 158 mg/dL (08 Dec 2019 05:31)  POCT Blood Glucose.: 181 mg/dL (08 Dec 2019 00:22)  POCT Blood Glucose.: 114 mg/dL (07 Dec 2019 18:21)  POCT Blood Glucose.: 182 mg/dL (07 Dec 2019 14:52)            MEDICATIONS  (STANDING):  alteplase for catheter clearance 2 milliGRAM(s) Catheter once  alteplase for catheter clearance 2 milliGRAM(s) Catheter once  atorvastatin 40 milliGRAM(s) Oral at bedtime  carvedilol 3.125 milliGRAM(s) Oral every 12 hours  chlorhexidine 4% Liquid 1 Application(s) Topical <User Schedule>  cinacalcet 30 milliGRAM(s) Oral daily  epoetin tan Injectable 89292 Unit(s) IV Push <User Schedule>  heparin  Injectable 5000 Unit(s) SubCutaneous every 8 hours  insulin lispro (HumaLOG) corrective regimen sliding scale   SubCutaneous every 6 hours  levETIRAcetam  Solution 1000 milliGRAM(s) Oral at bedtime  lidocaine   Patch 1 Patch Transdermal daily  midodrine. 10 milliGRAM(s) Oral three times a day  pantoprazole   Suspension 40 milliGRAM(s) Oral two times a day  predniSONE   Tablet 5 milliGRAM(s) Oral daily    MEDICATIONS  (PRN):          PHYSICAL EXAM:  GENERAL: frail, lethargic   CHEST/LUNG: Clear to percussion bilaterally; No rales, rhonchi, wheezing, or rubs  HEART: Regular rate and rhythm; No murmurs, rubs, or gallops  ABDOMEN: Soft, Nontender, Nondistended; Bowel sounds present  EXTREMITIES:  edema    Care Discussed with Consultants/Other Providers [ ] YES  [ ] NO

## 2019-12-09 LAB
ANION GAP SERPL CALC-SCNC: 15 MMOL/L — SIGNIFICANT CHANGE UP (ref 5–17)
BUN SERPL-MCNC: 43 MG/DL — HIGH (ref 7–23)
CALCIUM SERPL-MCNC: 10.7 MG/DL — HIGH (ref 8.4–10.5)
CHLORIDE SERPL-SCNC: 97 MMOL/L — SIGNIFICANT CHANGE UP (ref 96–108)
CO2 SERPL-SCNC: 26 MMOL/L — SIGNIFICANT CHANGE UP (ref 22–31)
CREAT SERPL-MCNC: 4.14 MG/DL — HIGH (ref 0.5–1.3)
GLUCOSE BLDC GLUCOMTR-MCNC: 183 MG/DL — HIGH (ref 70–99)
GLUCOSE BLDC GLUCOMTR-MCNC: 185 MG/DL — HIGH (ref 70–99)
GLUCOSE BLDC GLUCOMTR-MCNC: 190 MG/DL — HIGH (ref 70–99)
GLUCOSE BLDC GLUCOMTR-MCNC: 214 MG/DL — HIGH (ref 70–99)
GLUCOSE BLDC GLUCOMTR-MCNC: 97 MG/DL — SIGNIFICANT CHANGE UP (ref 70–99)
GLUCOSE SERPL-MCNC: 196 MG/DL — HIGH (ref 70–99)
HCT VFR BLD CALC: 24.1 % — LOW (ref 39–50)
HCT VFR BLD CALC: 24.8 % — LOW (ref 39–50)
HGB BLD-MCNC: 6.9 G/DL — CRITICAL LOW (ref 13–17)
HGB BLD-MCNC: 7.2 G/DL — LOW (ref 13–17)
MCHC RBC-ENTMCNC: 25.9 PG — LOW (ref 27–34)
MCHC RBC-ENTMCNC: 26.2 PG — LOW (ref 27–34)
MCHC RBC-ENTMCNC: 28.6 GM/DL — LOW (ref 32–36)
MCHC RBC-ENTMCNC: 29 GM/DL — LOW (ref 32–36)
MCV RBC AUTO: 90.2 FL — SIGNIFICANT CHANGE UP (ref 80–100)
MCV RBC AUTO: 90.6 FL — SIGNIFICANT CHANGE UP (ref 80–100)
NRBC # BLD: 0 /100 WBCS — SIGNIFICANT CHANGE UP (ref 0–0)
PLATELET # BLD AUTO: 256 K/UL — SIGNIFICANT CHANGE UP (ref 150–400)
PLATELET # BLD AUTO: 257 K/UL — SIGNIFICANT CHANGE UP (ref 150–400)
POTASSIUM SERPL-MCNC: 4.2 MMOL/L — SIGNIFICANT CHANGE UP (ref 3.5–5.3)
POTASSIUM SERPL-SCNC: 4.2 MMOL/L — SIGNIFICANT CHANGE UP (ref 3.5–5.3)
RBC # BLD: 2.66 M/UL — LOW (ref 4.2–5.8)
RBC # BLD: 2.75 M/UL — LOW (ref 4.2–5.8)
RBC # FLD: 16.6 % — HIGH (ref 10.3–14.5)
RBC # FLD: 16.7 % — HIGH (ref 10.3–14.5)
SODIUM SERPL-SCNC: 138 MMOL/L — SIGNIFICANT CHANGE UP (ref 135–145)
WBC # BLD: 15.25 K/UL — HIGH (ref 3.8–10.5)
WBC # BLD: 17.74 K/UL — HIGH (ref 3.8–10.5)
WBC # FLD AUTO: 15.25 K/UL — HIGH (ref 3.8–10.5)
WBC # FLD AUTO: 17.74 K/UL — HIGH (ref 3.8–10.5)

## 2019-12-09 PROCEDURE — 99232 SBSQ HOSP IP/OBS MODERATE 35: CPT

## 2019-12-09 RX ORDER — DEXTROSE 50 % IN WATER 50 %
25 SYRINGE (ML) INTRAVENOUS ONCE
Refills: 0 | Status: DISCONTINUED | OUTPATIENT
Start: 2019-12-09 | End: 2019-12-21

## 2019-12-09 RX ORDER — GLUCAGON INJECTION, SOLUTION 0.5 MG/.1ML
1 INJECTION, SOLUTION SUBCUTANEOUS ONCE
Refills: 0 | Status: DISCONTINUED | OUTPATIENT
Start: 2019-12-09 | End: 2019-12-21

## 2019-12-09 RX ORDER — SODIUM CHLORIDE 9 MG/ML
1000 INJECTION, SOLUTION INTRAVENOUS
Refills: 0 | Status: DISCONTINUED | OUTPATIENT
Start: 2019-12-09 | End: 2019-12-21

## 2019-12-09 RX ORDER — INSULIN LISPRO 100/ML
VIAL (ML) SUBCUTANEOUS EVERY 6 HOURS
Refills: 0 | Status: DISCONTINUED | OUTPATIENT
Start: 2019-12-09 | End: 2019-12-21

## 2019-12-09 RX ORDER — DEXTROSE 50 % IN WATER 50 %
15 SYRINGE (ML) INTRAVENOUS ONCE
Refills: 0 | Status: DISCONTINUED | OUTPATIENT
Start: 2019-12-09 | End: 2019-12-21

## 2019-12-09 RX ORDER — DEXTROSE 50 % IN WATER 50 %
12.5 SYRINGE (ML) INTRAVENOUS ONCE
Refills: 0 | Status: DISCONTINUED | OUTPATIENT
Start: 2019-12-09 | End: 2019-12-21

## 2019-12-09 RX ORDER — PANTOPRAZOLE SODIUM 20 MG/1
20 TABLET, DELAYED RELEASE ORAL DAILY
Refills: 0 | Status: DISCONTINUED | OUTPATIENT
Start: 2019-12-09 | End: 2019-12-21

## 2019-12-09 RX ADMIN — LIDOCAINE 1 PATCH: 4 CREAM TOPICAL at 18:20

## 2019-12-09 RX ADMIN — CARVEDILOL PHOSPHATE 3.12 MILLIGRAM(S): 80 CAPSULE, EXTENDED RELEASE ORAL at 17:45

## 2019-12-09 RX ADMIN — LIDOCAINE 1 PATCH: 4 CREAM TOPICAL at 00:15

## 2019-12-09 RX ADMIN — HEPARIN SODIUM 5000 UNIT(S): 5000 INJECTION INTRAVENOUS; SUBCUTANEOUS at 21:09

## 2019-12-09 RX ADMIN — MIDODRINE HYDROCHLORIDE 10 MILLIGRAM(S): 2.5 TABLET ORAL at 11:50

## 2019-12-09 RX ADMIN — Medication 5 MILLIGRAM(S): at 06:26

## 2019-12-09 RX ADMIN — HEPARIN SODIUM 5000 UNIT(S): 5000 INJECTION INTRAVENOUS; SUBCUTANEOUS at 13:35

## 2019-12-09 RX ADMIN — MIDODRINE HYDROCHLORIDE 10 MILLIGRAM(S): 2.5 TABLET ORAL at 06:27

## 2019-12-09 RX ADMIN — CHLORHEXIDINE GLUCONATE 1 APPLICATION(S): 213 SOLUTION TOPICAL at 11:13

## 2019-12-09 RX ADMIN — CINACALCET 30 MILLIGRAM(S): 30 TABLET, FILM COATED ORAL at 11:50

## 2019-12-09 RX ADMIN — ATORVASTATIN CALCIUM 40 MILLIGRAM(S): 80 TABLET, FILM COATED ORAL at 21:09

## 2019-12-09 RX ADMIN — Medication 1: at 06:28

## 2019-12-09 RX ADMIN — Medication 2: at 13:38

## 2019-12-09 RX ADMIN — LEVETIRACETAM 1000 MILLIGRAM(S): 250 TABLET, FILM COATED ORAL at 21:09

## 2019-12-09 RX ADMIN — MIDODRINE HYDROCHLORIDE 10 MILLIGRAM(S): 2.5 TABLET ORAL at 21:10

## 2019-12-09 RX ADMIN — PANTOPRAZOLE SODIUM 20 MILLIGRAM(S): 20 TABLET, DELAYED RELEASE ORAL at 11:50

## 2019-12-09 RX ADMIN — HEPARIN SODIUM 5000 UNIT(S): 5000 INJECTION INTRAVENOUS; SUBCUTANEOUS at 06:29

## 2019-12-09 RX ADMIN — Medication 1: at 00:23

## 2019-12-09 RX ADMIN — LIDOCAINE 1 PATCH: 4 CREAM TOPICAL at 11:49

## 2019-12-09 NOTE — PROGRESS NOTE ADULT - SUBJECTIVE AND OBJECTIVE BOX
Patient is a 74y old  Male who presents with a chief complaint of ams (09 Dec 2019 18:21)      INTERVAL HPI/OVERNIGHT EVENTS:  T(C): 36.7 (12-09-19 @ 17:00), Max: 37.2 (12-08-19 @ 21:00)  HR: 82 (12-09-19 @ 17:00) (82 - 113)  BP: 129/64 (12-09-19 @ 17:00) (103/64 - 132/71)  RR: 20 (12-09-19 @ 17:00) (20 - 20)  SpO2: 97% (12-09-19 @ 17:00) (97% - 99%)  Wt(kg): --  I&O's Summary    08 Dec 2019 07:01  -  09 Dec 2019 07:00  --------------------------------------------------------  IN: 1500 mL / OUT: 0 mL / NET: 1500 mL    09 Dec 2019 07:01  -  09 Dec 2019 19:10  --------------------------------------------------------  IN: 860 mL / OUT: 0 mL / NET: 860 mL        LABS:                        7.2    17.74 )-----------( 256      ( 09 Dec 2019 10:32 )             24.8     12-09    138  |  97  |  43<H>  ----------------------------<  196<H>  4.2   |  26  |  4.14<H>    Ca    10.7<H>      09 Dec 2019 07:18          CAPILLARY BLOOD GLUCOSE      POCT Blood Glucose.: 97 mg/dL (09 Dec 2019 18:16)  POCT Blood Glucose.: 214 mg/dL (09 Dec 2019 13:36)  POCT Blood Glucose.: 190 mg/dL (09 Dec 2019 06:05)  POCT Blood Glucose.: 183 mg/dL (09 Dec 2019 04:48)  POCT Blood Glucose.: 185 mg/dL (09 Dec 2019 00:07)            MEDICATIONS  (STANDING):  alteplase for catheter clearance 2 milliGRAM(s) Catheter once  alteplase for catheter clearance 2 milliGRAM(s) Catheter once  atorvastatin 40 milliGRAM(s) Oral at bedtime  carvedilol 3.125 milliGRAM(s) Oral every 12 hours  chlorhexidine 4% Liquid 1 Application(s) Topical <User Schedule>  cinacalcet 30 milliGRAM(s) Oral daily  dextrose 5%. 1000 milliLiter(s) (50 mL/Hr) IV Continuous <Continuous>  dextrose 50% Injectable 12.5 Gram(s) IV Push once  dextrose 50% Injectable 25 Gram(s) IV Push once  dextrose 50% Injectable 25 Gram(s) IV Push once  epoetin tan Injectable 57848 Unit(s) IV Push <User Schedule>  heparin  Injectable 5000 Unit(s) SubCutaneous every 8 hours  insulin lispro (HumaLOG) corrective regimen sliding scale   SubCutaneous every 6 hours  levETIRAcetam  Solution 1000 milliGRAM(s) Oral at bedtime  lidocaine   Patch 1 Patch Transdermal daily  midodrine. 10 milliGRAM(s) Oral three times a day  pantoprazole  Injectable 20 milliGRAM(s) IV Push daily  predniSONE   Tablet 5 milliGRAM(s) Oral daily    MEDICATIONS  (PRN):  dextrose 40% Gel 15 Gram(s) Oral once PRN Blood Glucose LESS THAN 70 milliGRAM(s)/deciLiter  glucagon  Injectable 1 milliGRAM(s) IntraMuscular once PRN Glucose <70 milliGRAM(s)/deciLiter          PHYSICAL EXAM:  GENERAL: frail  CHEST/LUNG: Clear to percussion bilaterally; No rales, rhonchi, wheezing, or rubs  HEART: Regular rate and rhythm; No murmurs, rubs, or gallops  ABDOMEN: Soft, Nontender, Nondistended; Bowel sounds present  EXTREMITIES:  2+ Peripheral Pulses, No clubbing, cyanosis, or edema  LYMPH: No lymphadenopathy noted  SKIN: No rashes or lesions    Care Discussed with Consultants/Other Providers [ ] YES  [ ] NO

## 2019-12-09 NOTE — PROGRESS NOTE ADULT - ASSESSMENT
74M PMHx of ESRD s/p failed renal transplant x2, HCV, DM, and meningococcal meningitis 2 years ago was sent in to the ED from HD for AMS and low BPs.  CVA  Pneumonia-aspiration-resp failure s/p Rx  patient also with sacral decub stage III  Mild leucocytosis  Is on baseline steroids  Cough But CXR clear  No fevers  Hemodynamically stable  No s/s any other foci  Cx negative CXR negative  WBC stable  Continue observation off abx  Aspiration precautions  Wound care for decub  Will tailor plan for ID issues  per course,results.Will defer to primary team on management of other issues.  case d/w Med NP  Will Follow.  Beeper [id number]/afterhours/No response-[id number]

## 2019-12-09 NOTE — PROGRESS NOTE ADULT - ASSESSMENT
Assessment  DM: A1C 5.5%, was on insulin at home, now on insulin coverage, blood sugars trending up and not at target, no hypoglycemic episodes. Patient still NPO on tube feeds, appears comfortable.  Hypercalcemia: Primary Hyperparathyroidism, calcium improved and WNL s/p Pamidronate dose.  Sepsis: IV ABx for UTI, LP inconsistent with meningitis, on medications, stable, monitored.  HTN: Controlled,  on antihypertensive medications.  ESRD: On hemodialysis, Monitor labs/BMP          Robi Gay MD  Cell: [phone]  Office: [phone] Assessment  DM: A1C 5.5%, was on insulin at home, now on insulin coverage, blood sugars trending up and not at target, no hypoglycemic episodes. Patient still NPO on tube feeds,  appears comfortable.  Hypercalcemia: Primary Hyperparathyroidism, calcium improved and WNL s/p Pamidronate dose.  Sepsis: IV ABx for UTI, LP inconsistent with meningitis, on medications, stable, monitored.  HTN: Controlled,  on antihypertensive medications.  ESRD: On hemodialysis, Monitor labs/BMP          Robi Gay MD  Cell: [phone]  Office: [phone]

## 2019-12-09 NOTE — PROGRESS NOTE ADULT - SUBJECTIVE AND OBJECTIVE BOX
Chief complaint  Patient is a 74y old  Male who presents with a chief complaint of ams (09 Dec 2019 10:27)   Review of systems  Patient in bed, looks comfortable, NPO on tube feeds, no fever, no hypoglycemia.    Labs and Fingersticks  CAPILLARY BLOOD GLUCOSE      POCT Blood Glucose.: 214 mg/dL (09 Dec 2019 13:36)  POCT Blood Glucose.: 190 mg/dL (09 Dec 2019 06:05)  POCT Blood Glucose.: 183 mg/dL (09 Dec 2019 04:48)  POCT Blood Glucose.: 185 mg/dL (09 Dec 2019 00:07)  POCT Blood Glucose.: 147 mg/dL (08 Dec 2019 17:56)      Anion Gap, Serum: 15 (12-09 @ 07:18)  Anion Gap, Serum: 16 (12-08 @ 06:52)      Calcium, Total Serum: 10.7 <H> (12-09 @ 07:18)  Calcium, Total Serum: 10.2 (12-08 @ 06:52)          12-09    138  |  97  |  43<H>  ----------------------------<  196<H>  4.2   |  26  |  4.14<H>    Ca    10.7<H>      09 Dec 2019 07:18                          7.2    17.74 )-----------( 256      ( 09 Dec 2019 10:32 )             24.8     Medications  MEDICATIONS  (STANDING):  alteplase for catheter clearance 2 milliGRAM(s) Catheter once  alteplase for catheter clearance 2 milliGRAM(s) Catheter once  atorvastatin 40 milliGRAM(s) Oral at bedtime  carvedilol 3.125 milliGRAM(s) Oral every 12 hours  chlorhexidine 4% Liquid 1 Application(s) Topical <User Schedule>  cinacalcet 30 milliGRAM(s) Oral daily  dextrose 5%. 1000 milliLiter(s) (50 mL/Hr) IV Continuous <Continuous>  dextrose 50% Injectable 12.5 Gram(s) IV Push once  dextrose 50% Injectable 25 Gram(s) IV Push once  dextrose 50% Injectable 25 Gram(s) IV Push once  epoetin tan Injectable 14985 Unit(s) IV Push <User Schedule>  heparin  Injectable 5000 Unit(s) SubCutaneous every 8 hours  insulin lispro (HumaLOG) corrective regimen sliding scale   SubCutaneous every 6 hours  levETIRAcetam  Solution 1000 milliGRAM(s) Oral at bedtime  lidocaine   Patch 1 Patch Transdermal daily  midodrine. 10 milliGRAM(s) Oral three times a day  pantoprazole  Injectable 20 milliGRAM(s) IV Push daily  predniSONE   Tablet 5 milliGRAM(s) Oral daily      Physical Exam  General: Patient comfortable in bed  Vital Signs Last 12 Hrs  T(F): 98 (12-09-19 @ 13:25), Max: 99 (12-09-19 @ 10:30)  HR: 98 (12-09-19 @ 13:25) (85 - 113)  BP: 132/71 (12-09-19 @ 13:25) (103/64 - 132/71)  BP(mean): --  RR: 20 (12-09-19 @ 13:25) (20 - 20)  SpO2: 97% (12-09-19 @ 13:25) (97% - 99%)  Neck: No palpable thyroid nodules.  CVS: S1S2, No murmurs  Respiratory: No wheezing, no crepitations  GI: Abdomen soft, bowel sounds positive  Musculoskeletal:  edema lower extremities.   Skin: No skin rashes, no ecchymosis    Diagnostics Chief complaint  Patient is a 74y old  Male who presents with a chief complaint of ams (09 Dec 2019 10:27)   Review of systems  Patient in bed, looks comfortable, NPO on tube feeds,  no fever, no hypoglycemia.    Labs and Fingersticks  CAPILLARY BLOOD GLUCOSE      POCT Blood Glucose.: 214 mg/dL (09 Dec 2019 13:36)  POCT Blood Glucose.: 190 mg/dL (09 Dec 2019 06:05)  POCT Blood Glucose.: 183 mg/dL (09 Dec 2019 04:48)  POCT Blood Glucose.: 185 mg/dL (09 Dec 2019 00:07)  POCT Blood Glucose.: 147 mg/dL (08 Dec 2019 17:56)      Anion Gap, Serum: 15 (12-09 @ 07:18)  Anion Gap, Serum: 16 (12-08 @ 06:52)      Calcium, Total Serum: 10.7 <H> (12-09 @ 07:18)  Calcium, Total Serum: 10.2 (12-08 @ 06:52)          12-09    138  |  97  |  43<H>  ----------------------------<  196<H>  4.2   |  26  |  4.14<H>    Ca    10.7<H>      09 Dec 2019 07:18                          7.2    17.74 )-----------( 256      ( 09 Dec 2019 10:32 )             24.8     Medications  MEDICATIONS  (STANDING):  alteplase for catheter clearance 2 milliGRAM(s) Catheter once  alteplase for catheter clearance 2 milliGRAM(s) Catheter once  atorvastatin 40 milliGRAM(s) Oral at bedtime  carvedilol 3.125 milliGRAM(s) Oral every 12 hours  chlorhexidine 4% Liquid 1 Application(s) Topical <User Schedule>  cinacalcet 30 milliGRAM(s) Oral daily  dextrose 5%. 1000 milliLiter(s) (50 mL/Hr) IV Continuous <Continuous>  dextrose 50% Injectable 12.5 Gram(s) IV Push once  dextrose 50% Injectable 25 Gram(s) IV Push once  dextrose 50% Injectable 25 Gram(s) IV Push once  epoetin tan Injectable 92057 Unit(s) IV Push <User Schedule>  heparin  Injectable 5000 Unit(s) SubCutaneous every 8 hours  insulin lispro (HumaLOG) corrective regimen sliding scale   SubCutaneous every 6 hours  levETIRAcetam  Solution 1000 milliGRAM(s) Oral at bedtime  lidocaine   Patch 1 Patch Transdermal daily  midodrine. 10 milliGRAM(s) Oral three times a day  pantoprazole  Injectable 20 milliGRAM(s) IV Push daily  predniSONE   Tablet 5 milliGRAM(s) Oral daily      Physical Exam  General: Patient comfortable in bed  Vital Signs Last 12 Hrs  T(F): 98 (12-09-19 @ 13:25), Max: 99 (12-09-19 @ 10:30)  HR: 98 (12-09-19 @ 13:25) (85 - 113)  BP: 132/71 (12-09-19 @ 13:25) (103/64 - 132/71)  BP(mean): --  RR: 20 (12-09-19 @ 13:25) (20 - 20)  SpO2: 97% (12-09-19 @ 13:25) (97% - 99%)  Neck: No palpable thyroid nodules.  CVS: S1S2, No murmurs  Respiratory: No wheezing, no crepitations  GI: Abdomen soft, bowel sounds positive  Musculoskeletal:  edema lower extremities.   Skin: No skin rashes, no ecchymosis    Diagnostics

## 2019-12-09 NOTE — PROGRESS NOTE ADULT - SUBJECTIVE AND OBJECTIVE BOX
Patient is a 74y Male whom presented to the hospital with esrd on hd   pt  seen in am nad , no fever , no chills    PAST MEDICAL & SURGICAL HISTORY:  Kidney transplant recipient  Anuria  GERD (gastroesophageal reflux disease)  Kidney transplant failure: dx: 2/2013  Hepatitis C: dx: 90&#x27;s.  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 10 milliGRAM(s) Oral three times a day  mycophenolate mofetil 250 milliGRAM(s) Oral two times a day  predniSONE   Tablet 5 milliGRAM(s) Oral daily  sevelamer carbonate 800 milliGRAM(s) Oral three times a day with meals  sodium bicarbonate 650 milliGRAM(s) Oral two times a day  tamsulosin 0.4 milliGRAM(s) Oral at bedtime      Allergies    No Known Allergies                       SOCIAL HISTORY:  Denies ETOh,Smoking,     FAMILY HISTORY:  Family history of breast cancer in sister (Sibling): living at 94 yo  Family history of prostate cancer in father  Family history of lung cancer  Family history of hypertension in mother  Family history of diabetes mellitus: mother      REVIEW OF SYSTEMS:    unbable to obtained                                                                                                                                       7.2    17.74 )-----------( 256      ( 09 Dec 2019 10:32 )             24.8       CBC Full  -  ( 09 Dec 2019 10:32 )  WBC Count : 17.74 K/uL  RBC Count : 2.75 M/uL  Hemoglobin : 7.2 g/dL  Hematocrit : 24.8 %  Platelet Count - Automated : 256 K/uL  Mean Cell Volume : 90.2 fl  Mean Cell Hemoglobin : 26.2 pg  Mean Cell Hemoglobin Concentration : 29.0 gm/dL  Auto Neutrophil # : x  Auto Lymphocyte # : x  Auto Monocyte # : x  Auto Eosinophil # : x  Auto Basophil # : x  Auto Neutrophil % : x  Auto Lymphocyte % : x  Auto Monocyte % : x  Auto Eosinophil % : x  Auto Basophil % : x      12-09    138  |  97  |  43<H>  ----------------------------<  196<H>  4.2   |  26  |  4.14<H>    Ca    10.7<H>      09 Dec 2019 07:18        CAPILLARY BLOOD GLUCOSE      POCT Blood Glucose.: 97 mg/dL (09 Dec 2019 18:16)  POCT Blood Glucose.: 214 mg/dL (09 Dec 2019 13:36)  POCT Blood Glucose.: 190 mg/dL (09 Dec 2019 06:05)  POCT Blood Glucose.: 183 mg/dL (09 Dec 2019 04:48)  POCT Blood Glucose.: 185 mg/dL (09 Dec 2019 00:07)      Vital Signs Last 24 Hrs  T(C): 36.7 (09 Dec 2019 17:00), Max: 37.2 (08 Dec 2019 21:00)  T(F): 98.1 (09 Dec 2019 17:00), Max: 99 (09 Dec 2019 10:30)  HR: 82 (09 Dec 2019 17:00) (82 - 113)  BP: 129/64 (09 Dec 2019 17:00) (103/64 - 132/71)  BP(mean): --  RR: 20 (09 Dec 2019 17:00) (20 - 20)  SpO2: 97% (09 Dec 2019 17:00) (97% - 99%)                                           HEENT: conjunctive   clear   Neck:  No JVD  Respiratory: decrease bs b/l   Cardiovascular: S1 and S2  Gastrointestinal: BS+, soft, NT/ND  Extremities: No peripheral edema  Skin: dry   Access: pos fistula

## 2019-12-09 NOTE — PROGRESS NOTE ADULT - PROBLEM SELECTOR PLAN 1
Will increase Humalog correction scale coverage to be moderate-scale q6.  Will continue monitoring FS, log, will Follow up. Will continue monitoring FS, log, will Follow up.

## 2019-12-09 NOTE — PROVIDER CONTACT NOTE (CRITICAL VALUE NOTIFICATION) - BACKGROUND
Admitted with sepsis, UTI, H/O Hep C, BPH, ESRD on HD, DM, HTN, Cardiomyopathy. pt already has a hx of Hep C

## 2019-12-09 NOTE — PROGRESS NOTE ADULT - ASSESSMENT
History of cocaine induced CM  normal EF on last echo  cont BB     PAF  in sinus   a/c once deemed safe   for now off given high bleed risk    GOC   fu with palliative care    anemia  transfusion recommended  fu labs today

## 2019-12-09 NOTE — PROGRESS NOTE ADULT - SUBJECTIVE AND OBJECTIVE BOX
[facility]      Seen earlier today, and examined.  - Today, patient is without complaints.           *****MEDICATIONS: Current medication reviewed and documented.    MEDICATIONS  (STANDING):  alteplase for catheter clearance 2 milliGRAM(s) Catheter once  alteplase for catheter clearance 2 milliGRAM(s) Catheter once  atorvastatin 40 milliGRAM(s) Oral at bedtime  carvedilol 3.125 milliGRAM(s) Oral every 12 hours  chlorhexidine 4% Liquid 1 Application(s) Topical <User Schedule>  cinacalcet 30 milliGRAM(s) Oral daily  dextrose 5%. 1000 milliLiter(s) (50 mL/Hr) IV Continuous <Continuous>  dextrose 50% Injectable 12.5 Gram(s) IV Push once  dextrose 50% Injectable 25 Gram(s) IV Push once  dextrose 50% Injectable 25 Gram(s) IV Push once  epoetin tan Injectable 25345 Unit(s) IV Push <User Schedule>  heparin  Injectable 5000 Unit(s) SubCutaneous every 8 hours  insulin lispro (HumaLOG) corrective regimen sliding scale   SubCutaneous every 6 hours  levETIRAcetam  Solution 1000 milliGRAM(s) Oral at bedtime  lidocaine   Patch 1 Patch Transdermal daily  midodrine. 10 milliGRAM(s) Oral three times a day  pantoprazole  Injectable 20 milliGRAM(s) IV Push daily  predniSONE   Tablet 5 milliGRAM(s) Oral daily    MEDICATIONS  (PRN):  dextrose 40% Gel 15 Gram(s) Oral once PRN Blood Glucose LESS THAN 70 milliGRAM(s)/deciLiter  glucagon  Injectable 1 milliGRAM(s) IntraMuscular once PRN Glucose <70 milliGRAM(s)/deciLiter          ***** VITAL SIGNS:  T(F): 97.8 (19 @ 21:00), Max: 99 (19 @ 10:30)  HR: 83 (19 @ 21:00) (82 - 113)  BP: 131/61 (19 @ 21:00) (103/64 - 132/71)  RR: 20 (19 @ 21:00) (20 - 20)  SpO2: 95% (19 @ 21:00) (95% - 99%)  Wt(kg): --  ,   I&O's Summary    08 Dec 2019 07:01  -  09 Dec 2019 07:00  --------------------------------------------------------  IN: 1500 mL / OUT: 0 mL / NET: 1500 mL    09 Dec 2019 07:  -  09 Dec 2019 23:04  --------------------------------------------------------  IN: 920 mL / OUT: 0 mL / NET: 920 mL             *****PHYSICAL EXAM:   lethargic  able to arouse to repeated tactile stimuli      very limited exam as pt is not cooperative.  EOmi fundi not visualized   no nystagmus VFF to confrontation  Tongue is midline  Palate elevates symmetrically   Moving all 4 ext spontaneously no drift appreciated    Gait not assessed.            *****LAB AND IMAGIN.2    17.74 )-----------( 256      ( 09 Dec 2019 10:32 )             24.8               12-    138  |  97  |  43<H>  ----------------------------<  196<H>  4.2   |  26  |  4.14<H>    Ca    10.7<H>      09 Dec 2019 07:18                           [All pertinent recent Imaging/Reports reviewed]           *****A S S E S S M E N T   A N D   P L A N :      75 y/o male with PMHx of  ESRD s/p /p failed renal transplant 4 year ago and successful renal transplant 1 year ago, DM, HTN, 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   On keppra for presumed seizure disorder, but no clear history.    Pt is encephalopathic on exam,. opens eyes to voice  can  follow simple commands  commands, grimaces to noxious stimuli, able to  my hands, wiggles toes  and legs, hyperreflexic in the legs with bilateral cross adductors, bilateral upgoing toes.    	  Routine EEG  without any seizures  AMS likely related to hypercalcemia, renal dysfunction , poor nutritional intake.   thiamine 500 ivpb daily x 3 days completed.   minimal improvement in mental status, will lower keppra dose   palliative care following to establish goals of care.   Thank you for allowing me to participate in the care of this patient. Please do not hesitate to call me if you have any  questions.        ________________  Shira Lindsey MD  [facility] ([facility]  [phone]      33 minutes spent on total encounter; more than 50 % of the visit was  spent counseling about plan of care, compliance to diet/exercise and medication regimen and or  coordinating care by the attending physician.      It is advised that stroke patients follow up with JAVIER Vaughan @ [phone] in 1- 2 weeks.   Others please follow up with Dr. Michael Nissenbaum [phone]

## 2019-12-09 NOTE — PROGRESS NOTE ADULT - ASSESSMENT
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       NEURO:  - Extubated 11/29, now on nasal cannula. Minimally verbal   - metabolic encephalopathy, meningitis r/o, LP negative- neuro following  - chronic lacunar infarcts; per neuro, AMS also likely related to hypercalcemia, renal dysfunction , poor nutritional intake.   - c/w keppra for ?hx of seizure disorder  -AMS likely 2/2 hyperCa, renal dysfunction, poor PO intake  - may be developing sepsis . will consider fever w/u  - ID called to follow up    CV:  - hx of afib was on eliquis, now off a/c per cards due to bleeding risk  - holding carvedilol 6.25 BID  - hx of cocaine cardiomyopathy, resolved, normal EF on most recent echo  - c/w statin  - echo repeat EF70, LVH, hyperdynamic LCF, normal RV sys fxn    PULM:- extubated 11/29--tolerating nasal cannula    GI:  - NG tube in place  - ernie tube feeds  - ?bleeding as hgb dropping. stool occult negative. no melena reported.- C diff negative; does not need contact precautions  - failed swallow eval  - will need to discuss peg with family  - again  I called  Patient's daughter Roro, [phone].  The family has decided for peg placement.  RENAL:  - hx of ESRD s/p failed renal transplant x2  - renal following, HD as recommended.  - c/w predisone, sevelamir    ENDO:  #DM2: HbA1c 5.5  - Start Insulin Sliding Scale  - endocrine following  - presented hypercalcemic, downtrending. hold pamidronate for now. f/u with endo    anemia  -- monitor H&H  - transfuse PRN    INFECTIOUS:  - WBC uptrending. ID following  - Urine culture growing E.coli; blood culture negative- was given empiric CNS infx coverage - vanc, manoj, acyclovir, LP CSF negative  - wound care consulted appreciate recs  - ID called to follow up  PPX: SCDs  - home eliquis has been held while in septic state. can add back when tolerated    GOC:  - palliative consult.  - has 3 children who should be contacted first  - poor prognosis

## 2019-12-09 NOTE — PROGRESS NOTE ADULT - SUBJECTIVE AND OBJECTIVE BOX
Patient is a 74y old  Male who presents with a chief complaint of ams (09 Dec 2019 06:54)    Being followed by ID for leucocytosis     Interval history:awake-some verbal response but no comprehensible answers and does not follow commands   No acute events      ROS:  Not obtainable     Antimicrobials:      Other medications reviewed    Vital Signs Last 24 Hrs  T(C): 36.8 (12-09-19 @ 04:55), Max: 37.2 (12-08-19 @ 21:00)  T(F): 98.3 (12-09-19 @ 04:55), Max: 98.9 (12-08-19 @ 21:00)  HR: 113 (12-09-19 @ 06:30) (94 - 113)  BP: 103/64 (12-09-19 @ 06:30) (103/64 - 137/67)  BP(mean): --  RR: 20 (12-09-19 @ 04:55) (18 - 20)  SpO2: 99% (12-09-19 @ 04:55) (95% - 99%)    Physical Exam:        R Sc HD catheter no erythema o tenderness    NGT     Chest Good AE,bibasilar crackles     CVS RRR S1 S2 WNl No murmur or rub or gallop    Abd soft BS normal No tenderness RLQ transplant kidney no tenderness    sacral decub stage III no purulence or discharge noted     Ext left arm AVF no erythema or tenderness    IV site no erythema tenderness or discharge    Joints no swelling or LOM    CNS as above      Lab Data:                          6.9    15.25 )-----------( 257      ( 09 Dec 2019 08:34 )             24.1     WBC Count: 15.25 (12-09-19 @ 08:34)  WBC Count: 15.47 (12-08-19 @ 09:24)  WBC Count: 14.77 (12-07-19 @ 14:19)  WBC Count: 14.94 (12-06-19 @ 07:40)  WBC Count: 13.05 (12-05-19 @ 07:17)  WBC Count: 10.44 (12-04-19 @ 08:37)  WBC Count: 10.14 (12-03-19 @ 08:57)    12-09    138  |  97  |  43<H>  ----------------------------<  196<H>  4.2   |  26  |  4.14<H>    Ca    10.7<H>      09 Dec 2019 07:18          Culture - Blood (collected 06 Dec 2019 18:26)  Source: .Blood Blood-Peripheral  Preliminary Report (07 Dec 2019 19:01):    No growth to date.    Culture - Blood (collected 06 Dec 2019 18:26)  Source: .Blood Blood-Peripheral  Preliminary Report (07 Dec 2019 19:01):    No growth to date.        < from: Xray Chest 1 View- PORTABLE-Urgent (12.06.19 @ 08:47) >  The heart is normal in size.     The lungs are clear. No pneumothorax. No pleural effusion.    No acute osseous abnormalities.    IMPRESSION:   Enteric tube terminates in the stomach.          < end of copied text >

## 2019-12-09 NOTE — PROGRESS NOTE ADULT - SUBJECTIVE AND OBJECTIVE BOX
Subjective: Patient seen and examined. No new events except as noted.     SUBJECTIVE/ROS:        MEDICATIONS:  MEDICATIONS  (STANDING):  alteplase for catheter clearance 2 milliGRAM(s) Catheter once  alteplase for catheter clearance 2 milliGRAM(s) Catheter once  atorvastatin 40 milliGRAM(s) Oral at bedtime  carvedilol 3.125 milliGRAM(s) Oral every 12 hours  chlorhexidine 4% Liquid 1 Application(s) Topical <User Schedule>  cinacalcet 30 milliGRAM(s) Oral daily  epoetin tan Injectable 38702 Unit(s) IV Push <User Schedule>  heparin  Injectable 5000 Unit(s) SubCutaneous every 8 hours  insulin lispro (HumaLOG) corrective regimen sliding scale   SubCutaneous every 6 hours  levETIRAcetam  Solution 1000 milliGRAM(s) Oral at bedtime  lidocaine   Patch 1 Patch Transdermal daily  midodrine. 10 milliGRAM(s) Oral three times a day  pantoprazole  Injectable 20 milliGRAM(s) IV Push daily  predniSONE   Tablet 5 milliGRAM(s) Oral daily      PHYSICAL EXAM:  T(C): 36.8 (12-09-19 @ 04:55), Max: 37.2 (12-08-19 @ 21:00)  HR: 113 (12-09-19 @ 06:30) (94 - 113)  BP: 103/64 (12-09-19 @ 06:30) (103/64 - 137/67)  RR: 20 (12-09-19 @ 04:55) (18 - 20)  SpO2: 99% (12-09-19 @ 04:55) (95% - 99%)  Wt(kg): --  I&O's Summary    07 Dec 2019 07:01  -  08 Dec 2019 07:00  --------------------------------------------------------  IN: 2770 mL / OUT: 1800 mL / NET: 970 mL    08 Dec 2019 07:01  -  09 Dec 2019 06:54  --------------------------------------------------------  IN: 1280 mL / OUT: 0 mL / NET: 1280 mL            JVP: Normal  Neck: supple  Lung: clear   CV: S1 S2 , Murmur:  Abd: soft  Ext: No edema  Psych: flat affect  Skin: normal``    LABS/DATA:    CARDIAC MARKERS:                                7.2    15.47 )-----------( 257      ( 08 Dec 2019 09:24 )             25.2     12-08    137  |  96  |  30<H>  ----------------------------<  182<H>  4.1   |  25  |  3.22<H>    Ca    10.2      08 Dec 2019 06:52      proBNP:   Lipid Profile:   HgA1c:   TSH:     TELE:  EKG:

## 2019-12-10 DIAGNOSIS — Z71.89 OTHER SPECIFIED COUNSELING: ICD-10-CM

## 2019-12-10 LAB
ANION GAP SERPL CALC-SCNC: 15 MMOL/L — SIGNIFICANT CHANGE UP (ref 5–17)
BUN SERPL-MCNC: 53 MG/DL — HIGH (ref 7–23)
C DIFF GDH STL QL: SIGNIFICANT CHANGE UP
C DIFF GDH STL QL: SIGNIFICANT CHANGE UP
CALCIUM SERPL-MCNC: 11.1 MG/DL — HIGH (ref 8.4–10.5)
CHLORIDE SERPL-SCNC: 98 MMOL/L — SIGNIFICANT CHANGE UP (ref 96–108)
CO2 SERPL-SCNC: 23 MMOL/L — SIGNIFICANT CHANGE UP (ref 22–31)
CREAT SERPL-MCNC: 4.66 MG/DL — HIGH (ref 0.5–1.3)
GLUCOSE BLDC GLUCOMTR-MCNC: 158 MG/DL — HIGH (ref 70–99)
GLUCOSE BLDC GLUCOMTR-MCNC: 179 MG/DL — HIGH (ref 70–99)
GLUCOSE BLDC GLUCOMTR-MCNC: 196 MG/DL — HIGH (ref 70–99)
GLUCOSE BLDC GLUCOMTR-MCNC: 200 MG/DL — HIGH (ref 70–99)
GLUCOSE BLDC GLUCOMTR-MCNC: 96 MG/DL — SIGNIFICANT CHANGE UP (ref 70–99)
GLUCOSE SERPL-MCNC: 186 MG/DL — HIGH (ref 70–99)
HCT VFR BLD CALC: 27.9 % — LOW (ref 39–50)
HGB BLD-MCNC: 7.9 G/DL — LOW (ref 13–17)
MCHC RBC-ENTMCNC: 25.7 PG — LOW (ref 27–34)
MCHC RBC-ENTMCNC: 28.3 GM/DL — LOW (ref 32–36)
MCV RBC AUTO: 90.9 FL — SIGNIFICANT CHANGE UP (ref 80–100)
PLATELET # BLD AUTO: 274 K/UL — SIGNIFICANT CHANGE UP (ref 150–400)
POTASSIUM SERPL-MCNC: 4.6 MMOL/L — SIGNIFICANT CHANGE UP (ref 3.5–5.3)
POTASSIUM SERPL-SCNC: 4.6 MMOL/L — SIGNIFICANT CHANGE UP (ref 3.5–5.3)
RBC # BLD: 3.07 M/UL — LOW (ref 4.2–5.8)
RBC # FLD: 16.5 % — HIGH (ref 10.3–14.5)
SODIUM SERPL-SCNC: 136 MMOL/L — SIGNIFICANT CHANGE UP (ref 135–145)
WBC # BLD: 14.85 K/UL — HIGH (ref 3.8–10.5)
WBC # FLD AUTO: 14.85 K/UL — HIGH (ref 3.8–10.5)

## 2019-12-10 PROCEDURE — 71045 X-RAY EXAM CHEST 1 VIEW: CPT | Mod: 26

## 2019-12-10 PROCEDURE — 99232 SBSQ HOSP IP/OBS MODERATE 35: CPT

## 2019-12-10 RX ADMIN — Medication 5 MILLIGRAM(S): at 05:44

## 2019-12-10 RX ADMIN — HEPARIN SODIUM 5000 UNIT(S): 5000 INJECTION INTRAVENOUS; SUBCUTANEOUS at 13:24

## 2019-12-10 RX ADMIN — LIDOCAINE 1 PATCH: 4 CREAM TOPICAL at 13:27

## 2019-12-10 RX ADMIN — MIDODRINE HYDROCHLORIDE 10 MILLIGRAM(S): 2.5 TABLET ORAL at 21:09

## 2019-12-10 RX ADMIN — HEPARIN SODIUM 5000 UNIT(S): 5000 INJECTION INTRAVENOUS; SUBCUTANEOUS at 21:09

## 2019-12-10 RX ADMIN — Medication 2: at 13:25

## 2019-12-10 RX ADMIN — CINACALCET 30 MILLIGRAM(S): 30 TABLET, FILM COATED ORAL at 13:26

## 2019-12-10 RX ADMIN — CHLORHEXIDINE GLUCONATE 1 APPLICATION(S): 213 SOLUTION TOPICAL at 05:55

## 2019-12-10 RX ADMIN — Medication 2: at 00:51

## 2019-12-10 RX ADMIN — Medication 2: at 06:02

## 2019-12-10 RX ADMIN — LIDOCAINE 1 PATCH: 4 CREAM TOPICAL at 20:09

## 2019-12-10 RX ADMIN — ATORVASTATIN CALCIUM 40 MILLIGRAM(S): 80 TABLET, FILM COATED ORAL at 21:09

## 2019-12-10 RX ADMIN — MIDODRINE HYDROCHLORIDE 10 MILLIGRAM(S): 2.5 TABLET ORAL at 05:44

## 2019-12-10 RX ADMIN — CARVEDILOL PHOSPHATE 3.12 MILLIGRAM(S): 80 CAPSULE, EXTENDED RELEASE ORAL at 18:43

## 2019-12-10 RX ADMIN — CARVEDILOL PHOSPHATE 3.12 MILLIGRAM(S): 80 CAPSULE, EXTENDED RELEASE ORAL at 05:44

## 2019-12-10 RX ADMIN — HEPARIN SODIUM 5000 UNIT(S): 5000 INJECTION INTRAVENOUS; SUBCUTANEOUS at 05:44

## 2019-12-10 RX ADMIN — LEVETIRACETAM 1000 MILLIGRAM(S): 250 TABLET, FILM COATED ORAL at 21:09

## 2019-12-10 RX ADMIN — MIDODRINE HYDROCHLORIDE 10 MILLIGRAM(S): 2.5 TABLET ORAL at 13:24

## 2019-12-10 RX ADMIN — PANTOPRAZOLE SODIUM 20 MILLIGRAM(S): 20 TABLET, DELAYED RELEASE ORAL at 13:25

## 2019-12-10 NOTE — PROGRESS NOTE ADULT - ASSESSMENT
74M PMHx of ESRD s/p failed renal transplant x2, HCV, DM, and meningococcal meningitis 2 years ago was sent in to the ED from HD for AMS and low BPs.  CVA  Pneumonia-aspiration-resp failure s/p Rx  patient also with sacral decub stage III  Mild leucocytosis  Is on baseline steroids  Cough But CXR clear  No fevers  Hemodynamically stable  No s/s any other foci  Cx negative CXR negative  WBC stable-mild decrease   ? from steroids   Continue observation off abx  Aspiration precautions  Wound care for decub  Will tailor plan for ID issues  per course,results.Will defer to primary team on management of other issues.  case d/w Med NP  Will Follow.  Beeper [id number]/afterhours/No response-[id number]

## 2019-12-10 NOTE — PROGRESS NOTE ADULT - PROBLEM SELECTOR PLAN 1
Will continue moderate-scale insulin coverage.  Will continue monitoring FS, log, will Follow up. Will continue monitoring FS, log, will Follow up.

## 2019-12-10 NOTE — PROGRESS NOTE ADULT - SUBJECTIVE AND OBJECTIVE BOX
Patient is a 74y Male whom presented to the hospital with esrd on hd   pt  seen in am nad , no fever , no chills    PAST MEDICAL & SURGICAL HISTORY:  Kidney transplant recipient  Anuria  GERD (gastroesophageal reflux disease)  Kidney transplant failure: dx: 2/2013  Hepatitis C: dx: 90&#x27;s.  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 10 milliGRAM(s) Oral three times a day  mycophenolate mofetil 250 milliGRAM(s) Oral two times a day  predniSONE   Tablet 5 milliGRAM(s) Oral daily  sevelamer carbonate 800 milliGRAM(s) Oral three times a day with meals  sodium bicarbonate 650 milliGRAM(s) Oral two times a day  tamsulosin 0.4 milliGRAM(s) Oral at bedtime      Allergies    No Known Allergies                       SOCIAL HISTORY:  Denies ETOh,Smoking,     FAMILY HISTORY:  Family history of breast cancer in sister (Sibling): living at 92 yo  Family history of prostate cancer in father  Family history of lung cancer  Family history of hypertension in mother  Family history of diabetes mellitus: mother      REVIEW OF SYSTEMS:    unbable to obtained                                                                                                                                                       7.9    14.85 )-----------( 274      ( 10 Dec 2019 09:19 )             27.9       CBC Full  -  ( 10 Dec 2019 09:19 )  WBC Count : 14.85 K/uL  RBC Count : 3.07 M/uL  Hemoglobin : 7.9 g/dL  Hematocrit : 27.9 %  Platelet Count - Automated : 274 K/uL  Mean Cell Volume : 90.9 fl  Mean Cell Hemoglobin : 25.7 pg  Mean Cell Hemoglobin Concentration : 28.3 gm/dL  Auto Neutrophil # : x  Auto Lymphocyte # : x  Auto Monocyte # : x  Auto Eosinophil # : x  Auto Basophil # : x  Auto Neutrophil % : x  Auto Lymphocyte % : x  Auto Monocyte % : x  Auto Eosinophil % : x  Auto Basophil % : x      12-10    136  |  98  |  53<H>  ----------------------------<  186<H>  4.6   |  23  |  4.66<H>    Ca    11.1<H>      10 Dec 2019 07:44        CAPILLARY BLOOD GLUCOSE      POCT Blood Glucose.: 96 mg/dL (10 Dec 2019 18:07)  POCT Blood Glucose.: 196 mg/dL (10 Dec 2019 12:40)  POCT Blood Glucose.: 200 mg/dL (10 Dec 2019 05:56)  POCT Blood Glucose.: 179 mg/dL (10 Dec 2019 00:32)      Vital Signs Last 24 Hrs  T(C): 36.7 (10 Dec 2019 20:31), Max: 37.3 (10 Dec 2019 01:05)  T(F): 98 (10 Dec 2019 20:31), Max: 99.1 (10 Dec 2019 01:05)  HR: 99 (10 Dec 2019 20:31) (99 - 114)  BP: 120/65 (10 Dec 2019 20:31) (112/63 - 139/71)  BP(mean): --  RR: 20 (10 Dec 2019 20:31) (18 - 20)  SpO2: 95% (10 Dec 2019 20:31) (95% - 100%)                                           HEENT: conjunctive   clear   Neck:  No JVD  Respiratory: decrease bs b/l   Cardiovascular: S1 and S2  Gastrointestinal: BS+, soft, NT/ND  Extremities: No peripheral edema  Skin: dry   Access: pos fistula

## 2019-12-10 NOTE — CONSULT NOTE ADULT - ASSESSMENT
74M PMHx of ESRD s/p failed renal transplant x2, HCV, DM, and meningococcal meningitis 2 years ago was sent in to the ED from HD for AMS and low BPs. Admitted with concern for CVA, neurology following. Patient with RRT overnight 11/27 resulting in transfer to MICU for respiratory failure concern for aspiration and sepsis. GI consulted for dysphagia.

## 2019-12-10 NOTE — PROGRESS NOTE ADULT - SUBJECTIVE AND OBJECTIVE BOX
Chief complaint  Patient is a 74y old  Male who presents with a chief complaint of ams (10 Dec 2019 14:57)   Review of systems  Patient in bed, looks comfortable, no fever, no hypoglycemia.    Labs and Fingersticks  CAPILLARY BLOOD GLUCOSE      POCT Blood Glucose.: 196 mg/dL (10 Dec 2019 12:40)  POCT Blood Glucose.: 200 mg/dL (10 Dec 2019 05:56)  POCT Blood Glucose.: 179 mg/dL (10 Dec 2019 00:32)  POCT Blood Glucose.: 97 mg/dL (09 Dec 2019 18:16)      Anion Gap, Serum: 15 (12-10 @ 07:44)  Anion Gap, Serum: 15 (12-09 @ 07:18)      Calcium, Total Serum: 11.1 <H> (12-10 @ 07:44)  Calcium, Total Serum: 10.7 <H> (12-09 @ 07:18)          12-10    136  |  98  |  53<H>  ----------------------------<  186<H>  4.6   |  23  |  4.66<H>    Ca    11.1<H>      10 Dec 2019 07:44                          7.9    14.85 )-----------( 274      ( 10 Dec 2019 09:19 )             27.9     Medications  MEDICATIONS  (STANDING):  alteplase for catheter clearance 2 milliGRAM(s) Catheter once  alteplase for catheter clearance 2 milliGRAM(s) Catheter once  atorvastatin 40 milliGRAM(s) Oral at bedtime  carvedilol 3.125 milliGRAM(s) Oral every 12 hours  chlorhexidine 4% Liquid 1 Application(s) Topical <User Schedule>  cinacalcet 30 milliGRAM(s) Oral daily  dextrose 5%. 1000 milliLiter(s) (50 mL/Hr) IV Continuous <Continuous>  dextrose 50% Injectable 12.5 Gram(s) IV Push once  dextrose 50% Injectable 25 Gram(s) IV Push once  dextrose 50% Injectable 25 Gram(s) IV Push once  epoetin tan Injectable 78693 Unit(s) IV Push <User Schedule>  heparin  Injectable 5000 Unit(s) SubCutaneous every 8 hours  insulin lispro (HumaLOG) corrective regimen sliding scale   SubCutaneous every 6 hours  levETIRAcetam  Solution 1000 milliGRAM(s) Oral at bedtime  lidocaine   Patch 1 Patch Transdermal daily  midodrine. 10 milliGRAM(s) Oral three times a day  pantoprazole  Injectable 20 milliGRAM(s) IV Push daily  predniSONE   Tablet 5 milliGRAM(s) Oral daily      Physical Exam  General: Patient comfortable in bed  Vital Signs Last 12 Hrs  T(F): 98.3 (12-10-19 @ 12:00), Max: 98.3 (12-10-19 @ 12:00)  HR: 101 (12-10-19 @ 12:00) (101 - 114)  BP: 112/63 (12-10-19 @ 12:00) (112/63 - 126/66)  BP(mean): --  RR: 18 (12-10-19 @ 12:00) (18 - 18)  SpO2: 98% (12-10-19 @ 12:00) (98% - 100%)  Neck: No palpable thyroid nodules.  CVS: S1S2, No murmurs  Respiratory: No wheezing, no crepitations  GI: Abdomen soft, bowel sounds positive  Musculoskeletal:  edema lower extremities.   Skin: No skin rashes, no ecchymosis    Diagnostics Chief complaint  Patient is a 74y old  Male who presents with a chief complaint of ams (10 Dec 2019 14:57)   Review of systems  Patient in bed, looks comfortable, no fever,  no hypoglycemia.    Labs and Fingersticks  CAPILLARY BLOOD GLUCOSE      POCT Blood Glucose.: 196 mg/dL (10 Dec 2019 12:40)  POCT Blood Glucose.: 200 mg/dL (10 Dec 2019 05:56)  POCT Blood Glucose.: 179 mg/dL (10 Dec 2019 00:32)  POCT Blood Glucose.: 97 mg/dL (09 Dec 2019 18:16)      Anion Gap, Serum: 15 (12-10 @ 07:44)  Anion Gap, Serum: 15 (12-09 @ 07:18)      Calcium, Total Serum: 11.1 <H> (12-10 @ 07:44)  Calcium, Total Serum: 10.7 <H> (12-09 @ 07:18)          12-10    136  |  98  |  53<H>  ----------------------------<  186<H>  4.6   |  23  |  4.66<H>    Ca    11.1<H>      10 Dec 2019 07:44                          7.9    14.85 )-----------( 274      ( 10 Dec 2019 09:19 )             27.9     Medications  MEDICATIONS  (STANDING):  alteplase for catheter clearance 2 milliGRAM(s) Catheter once  alteplase for catheter clearance 2 milliGRAM(s) Catheter once  atorvastatin 40 milliGRAM(s) Oral at bedtime  carvedilol 3.125 milliGRAM(s) Oral every 12 hours  chlorhexidine 4% Liquid 1 Application(s) Topical <User Schedule>  cinacalcet 30 milliGRAM(s) Oral daily  dextrose 5%. 1000 milliLiter(s) (50 mL/Hr) IV Continuous <Continuous>  dextrose 50% Injectable 12.5 Gram(s) IV Push once  dextrose 50% Injectable 25 Gram(s) IV Push once  dextrose 50% Injectable 25 Gram(s) IV Push once  epoetin tan Injectable 95397 Unit(s) IV Push <User Schedule>  heparin  Injectable 5000 Unit(s) SubCutaneous every 8 hours  insulin lispro (HumaLOG) corrective regimen sliding scale   SubCutaneous every 6 hours  levETIRAcetam  Solution 1000 milliGRAM(s) Oral at bedtime  lidocaine   Patch 1 Patch Transdermal daily  midodrine. 10 milliGRAM(s) Oral three times a day  pantoprazole  Injectable 20 milliGRAM(s) IV Push daily  predniSONE   Tablet 5 milliGRAM(s) Oral daily      Physical Exam  General: Patient comfortable in bed  Vital Signs Last 12 Hrs  T(F): 98.3 (12-10-19 @ 12:00), Max: 98.3 (12-10-19 @ 12:00)  HR: 101 (12-10-19 @ 12:00) (101 - 114)  BP: 112/63 (12-10-19 @ 12:00) (112/63 - 126/66)  BP(mean): --  RR: 18 (12-10-19 @ 12:00) (18 - 18)  SpO2: 98% (12-10-19 @ 12:00) (98% - 100%)  Neck: No palpable thyroid nodules.  CVS: S1S2, No murmurs  Respiratory: No wheezing, no crepitations  GI: Abdomen soft, bowel sounds positive  Musculoskeletal:  edema lower extremities.   Skin: No skin rashes, no ecchymosis    Diagnostics

## 2019-12-10 NOTE — PROGRESS NOTE ADULT - ASSESSMENT
Assessment  DM: A1C 5.5%, was on insulin at home, now on insulin coverage, increased dose yesterday to moderate-scale coverage, blood sugars fluctuating, no hypoglycemic episodes. Patient still NPO on tube feeds, appears comfortable, GI on board to evaluate dysphagia, planning possible endoscopy.  Hypercalcemia: Primary Hyperparathyroidism, calcium improved and WNL s/p Pamidronate dose.  Sepsis: IV ABx for UTI, LP inconsistent with meningitis, on medications, stable, monitored.  HTN: Controlled,  on antihypertensive medications.  ESRD: On hemodialysis, Monitor labs/BMP          Robi Gay MD  Cell: [phone]  Office: [phone] Assessment  DM: A1C 5.5%, was on insulin at home, now on insulin coverage, increased dose yesterday to moderate-scale coverage, blood sugars fluctuating, no hypoglycemic episodes. Patient still NPO on tube feeds, appears comfortable, GI on board to evaluate dysphagia,  planning possible endoscopy.  Hypercalcemia: Primary Hyperparathyroidism, calcium improved and WNL s/p Pamidronate dose.  Sepsis: IV ABx for UTI, LP inconsistent with meningitis, on medications, stable, monitored.  HTN: Controlled,  on antihypertensive medications.  ESRD: On hemodialysis, Monitor labs/BMP          Robi Gay MD  Cell: [phone]  Office: [phone]

## 2019-12-10 NOTE — PROGRESS NOTE ADULT - ASSESSMENT
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         NEURO:  - Extubated 11/29, now on nasal cannula. Minimally verbal   - metabolic encephalopathy, meningitis r/o, LP negative- neuro following  - chronic lacunar infarcts; per neuro, AMS also likely related to hypercalcemia, renal dysfunction , poor nutritional intake.   - c/w keppra for ?hx of seizure disorder  -AMS likely 2/2 hyperCa, renal dysfunction, poor PO intake  - may be developing sepsis . will consider fever w/u  - ID called to follow up    CV:  - hx of afib was on eliquis, now off a/c per cards due to bleeding risk  - holding carvedilol 6.25 BID  - hx of cocaine cardiomyopathy, resolved, normal EF on most recent echo  - c/w statin  - echo repeat EF70, LVH, hyperdynamic LCF, normal RV sys fxn    PULM:- extubated 11/29--tolerating nasal cannula    GI:  - GI consult for PEG     RENAL:  - hx of ESRD s/p failed renal transplant x2  - renal following, HD as recommended.  - c/w predisone, sevelamir    ENDO:  #DM2: HbA1c 5.5  - Start Insulin Sliding Scale  - endocrine following  - presented hypercalcemic, downtrending. hold pamidronate for now. f/u with endo  anemia  -- monitor H&H  - transfuse PRN    INFECTIOUS:  - WBC uptrending. ID following  - Urine culture growing E.coli; blood culture negative- was given empiric CNS infx coverage - vanc, manoj, acyclovir, LP CSF negative  - wound care consulted appreciate recs  - ID called to follow up  PPX: SCDs  - home eliquis has been held while in septic state. can add back when tolerated    GOC:  - palliative consult.  - has 3 children who should be contacted first

## 2019-12-10 NOTE — PROVIDER CONTACT NOTE (OTHER) - DATE AND TIME:
History of Present Illness





- Reason for Consult


Consult date: 01/16/18


sepsis


Requesting physician: BRADEN ALBERT





- HA


   MARISEL


   PRN Reason: Protocol


Sodium Chloride (Nacl 0.9% 1000 Ml)  1,000 mls @ 125 mls/hr IV AS DIRECT MARISEL


   MARISEL


   Last Admin: 01/16/18 13:08 Dose:  500 mg


Ondansetron HCl (Zofran)  4 mg IV Q4H PRN


   PRN Reason: N/V unrelieved by Reglan


Oseltamivir Phosphate (Tamiflu)  75 mg PO BID MARISEL


 


Mono % (Auto)  11.4 H   (0.0-7.3)  %


 


Lymph #  0.4 L   (1.2-5.4)  K/mm3


 


Mono #  


- 10-Dec-2019 22:55

## 2019-12-10 NOTE — CONSULT NOTE ADULT - SUBJECTIVE AND OBJECTIVE BOX
Chief Complaint:  Patient is a 74y old  Male who presents with a chief complaint of ams (10 Dec 2019 10:10)      HPI: 74M PMHx of ESRD s/p failed renal transplant x2, HCV, DM, and meningococcal meningitis 2 years ago was sent in to the ED from HD for AMS and low BPs. He is unable to provide any information at this time. His sister is at the bedside and reports that he is typically able to carry a conversation and walk a small amount. She is not aware if he had been having fevers at the nursing home, and believes that his last HD was on Tuesday. (20 Nov 2019 18:01)    GI consulted for dysphagia. Pt failed bedside speech and swallow evaluation. As per chart review, family is opting for PEG.     Allergies:  No Known Allergies      Medications:  alteplase for catheter clearance 2 milliGRAM(s) Catheter once  alteplase for catheter clearance 2 milliGRAM(s) Catheter once  atorvastatin 40 milliGRAM(s) Oral at bedtime  carvedilol 3.125 milliGRAM(s) Oral every 12 hours  chlorhexidine 4% Liquid 1 Application(s) Topical <User Schedule>  cinacalcet 30 milliGRAM(s) Oral daily  dextrose 40% Gel 15 Gram(s) Oral once PRN  dextrose 5%. 1000 milliLiter(s) IV Continuous <Continuous>  dextrose 50% Injectable 12.5 Gram(s) IV Push once  dextrose 50% Injectable 25 Gram(s) IV Push once  dextrose 50% Injectable 25 Gram(s) IV Push once  epoetin tan Injectable 72996 Unit(s) IV Push <User Schedule>  glucagon  Injectable 1 milliGRAM(s) IntraMuscular once PRN  heparin  Injectable 5000 Unit(s) SubCutaneous every 8 hours  insulin lispro (HumaLOG) corrective regimen sliding scale   SubCutaneous every 6 hours  levETIRAcetam  Solution 1000 milliGRAM(s) Oral at bedtime  lidocaine   Patch 1 Patch Transdermal daily  midodrine. 10 milliGRAM(s) Oral three times a day  pantoprazole  Injectable 20 milliGRAM(s) IV Push daily  predniSONE   Tablet 5 milliGRAM(s) Oral daily      PMHX/PSHX:  Kidney transplant recipient  Anuria  GERD (gastroesophageal reflux disease)  Kidney transplant failure  Hepatitis C  GERD (gastroesophageal reflux disease)  Renal transplant failure and rejection  Rectal abscess  IV drug abuse  HTN (hypertension)  Diverticulitis  ESRD on dialysis  Diabetes mellitus  BPH (Benign Prostatic Hyperplasia)  Cardiomyopathy  H/O kidney transplant  A-V fistula  S/p cadaver renal transplant  S/P partial colectomy      Family history:  Family history of breast cancer in sister (Sibling)  Family history of prostate cancer in father  Family history of lung cancer  Family history of hypertension in mother  Family history of diabetes mellitus      Social History:     ROS:     unable to obtain       PHYSICAL EXAM:   Vital Signs:  Vital Signs Last 24 Hrs  T(C): 36.8 (10 Dec 2019 12:00), Max: 37.3 (10 Dec 2019 01:05)  T(F): 98.3 (10 Dec 2019 12:00), Max: 99.1 (10 Dec 2019 01:05)  HR: 101 (10 Dec 2019 12:00) (82 - 114)  BP: 112/63 (10 Dec 2019 12:00) (112/63 - 139/71)  BP(mean): --  RR: 18 (10 Dec 2019 12:00) (18 - 20)  SpO2: 98% (10 Dec 2019 12:00) (95% - 100%)  Daily     Daily     GENERAL:   no distress, lethargic  HEENT:  NC/AT,  conjunctivae clear and pink, no thyromegaly, nodules, adenopathy, no JVD, sclera -anicteric  CHEST:  Full & symmetric excursion, no increased effort, breath sounds clear  HEART:  Regular rhythm, S1, S2, no murmur/rub/S3/S4, no abdominal bruit, no edema  ABDOMEN:  Soft, non-tender, non-distended, normoactive bowel sounds,  no masses ,no hepato-splenomegaly, no signs of chronic liver disease  EXTEREMITIES:  no cyanosis, clubbing or edema  SKIN:  No rash/erythema/ecchymoses/petechiae/wounds/abscess/warm/dry  NEURO:  awake, lethargic, confused     LABS:                        7.9    14.85 )-----------( 274      ( 10 Dec 2019 09:19 )             27.9     12-10    136  |  98  |  53<H>  ----------------------------<  186<H>  4.6   |  23  |  4.66<H>    Ca    11.1<H>      10 Dec 2019 07:44                Imaging:

## 2019-12-10 NOTE — CONSULT NOTE ADULT - PROBLEM SELECTOR RECOMMENDATION 9
- pt failed bedside speech and swallow evaluation and is at risk for aspiration  - ngt was removed, hold NGT feeds past midnight if replaced   - recommend NPO with ngt feeds  - appreciate palliative care evaluation, as per chart family is in agreement with  upper gastrointestinal endoscopy with peg placement  - will plan for tomorrow pending family consent   - aspiration precautions

## 2019-12-10 NOTE — PROGRESS NOTE ADULT - SUBJECTIVE AND OBJECTIVE BOX
Subjective: Patient seen and examined. No new events except as noted.     SUBJECTIVE/ROS:        MEDICATIONS:  MEDICATIONS  (STANDING):  alteplase for catheter clearance 2 milliGRAM(s) Catheter once  alteplase for catheter clearance 2 milliGRAM(s) Catheter once  atorvastatin 40 milliGRAM(s) Oral at bedtime  carvedilol 3.125 milliGRAM(s) Oral every 12 hours  chlorhexidine 4% Liquid 1 Application(s) Topical <User Schedule>  cinacalcet 30 milliGRAM(s) Oral daily  dextrose 5%. 1000 milliLiter(s) (50 mL/Hr) IV Continuous <Continuous>  dextrose 50% Injectable 12.5 Gram(s) IV Push once  dextrose 50% Injectable 25 Gram(s) IV Push once  dextrose 50% Injectable 25 Gram(s) IV Push once  epoetin tan Injectable 27570 Unit(s) IV Push <User Schedule>  heparin  Injectable 5000 Unit(s) SubCutaneous every 8 hours  insulin lispro (HumaLOG) corrective regimen sliding scale   SubCutaneous every 6 hours  levETIRAcetam  Solution 1000 milliGRAM(s) Oral at bedtime  lidocaine   Patch 1 Patch Transdermal daily  midodrine. 10 milliGRAM(s) Oral three times a day  pantoprazole  Injectable 20 milliGRAM(s) IV Push daily  predniSONE   Tablet 5 milliGRAM(s) Oral daily      PHYSICAL EXAM:  T(C): 36.8 (12-10-19 @ 12:00), Max: 37.3 (12-10-19 @ 01:05)  HR: 101 (12-10-19 @ 12:00) (82 - 114)  BP: 112/63 (12-10-19 @ 12:00) (112/63 - 139/71)  RR: 18 (12-10-19 @ 12:00) (18 - 20)  SpO2: 98% (12-10-19 @ 12:00) (95% - 100%)  Wt(kg): --  I&O's Summary    09 Dec 2019 07:01  -  10 Dec 2019 07:00  --------------------------------------------------------  IN: 980 mL / OUT: 0 mL / NET: 980 mL            JVP: Normal  Neck: supple  Lung: clear   CV: S1 S2 , Murmur:  Abd: soft  Ext: No edema  neuro: Awake   Psych: flat affect  Skin: normal``    LABS/DATA:    CARDIAC MARKERS:                                7.9    14.85 )-----------( 274      ( 10 Dec 2019 09:19 )             27.9     12-10    136  |  98  |  53<H>  ----------------------------<  186<H>  4.6   |  23  |  4.66<H>    Ca    11.1<H>      10 Dec 2019 07:44      proBNP:   Lipid Profile:   HgA1c:   TSH:     TELE:  EKG:

## 2019-12-10 NOTE — PROGRESS NOTE ADULT - SUBJECTIVE AND OBJECTIVE BOX
Patient is a 74y old  Male who presents with a chief complaint of ams (09 Dec 2019 19:09)    Being followed by ID for leucocytosis     Interval history:pulled NGT   No  other acute events      ROS:  Not obtainable as patient does not respond to queries    Antimicrobials:      Other medications reviewed    Vital Signs Last 24 Hrs  T(C): 36.5 (12-10-19 @ 08:58), Max: 37.3 (12-10-19 @ 01:05)  T(F): 97.7 (12-10-19 @ 08:58), Max: 99.1 (12-10-19 @ 01:05)  HR: 114 (12-10-19 @ 08:58) (82 - 114)  BP: 126/51 (12-10-19 @ 08:58) (126/51 - 139/71)  BP(mean): --  RR: 18 (12-10-19 @ 08:58) (18 - 20)  SpO2: 100% (12-10-19 @ 08:58) (95% - 100%)    Physical Exam:    R Sc HD catheter no erythema o tenderness        Chest Good AE,bibasilar crackles     CVS RRR S1 S2 WNl No murmur or rub or gallop    Abd soft BS normal No tenderness RLQ transplant kidney no tenderness    sacral decub stage III no purulence or discharge noted     Ext left arm AVF no erythema or tenderness    IV site no erythema tenderness or discharge    Joints no swelling or LOM    CNS as above      Lab Data:                          7.9    14.85 )-----------( 274      ( 10 Dec 2019 09:19 )             27.9   WBC Count: 14.85 (12-10-19 @ 09:19)  WBC Count: 17.74 (12-09-19 @ 10:32)  WBC Count: 15.25 (12-09-19 @ 08:34)  WBC Count: 15.47 (12-08-19 @ 09:24)  WBC Count: 14.77 (12-07-19 @ 14:19)  WBC Count: 14.94 (12-06-19 @ 07:40)  WBC Count: 13.05 (12-05-19 @ 07:17)  WBC Count: 10.44 (12-04-19 @ 08:37)      12-10    136  |  98  |  53<H>  ----------------------------<  186<H>  4.6   |  23  |  4.66<H>    Ca    11.1<H>      10 Dec 2019 07:44          Culture - Blood (collected 06 Dec 2019 18:26)  Source: .Blood Blood-Peripheral  Preliminary Report (07 Dec 2019 19:01):    No growth to date.    Culture - Blood (collected 06 Dec 2019 18:26)  Source: .Blood Blood-Peripheral  Preliminary Report (07 Dec 2019 19:01):    No growth to date.      < from: Xray Chest 1 View- PORTABLE-Urgent (12.06.19 @ 08:47) >  The lungs are clear. No pneumothorax. No pleural effusion.    < end of copied text >

## 2019-12-10 NOTE — PROGRESS NOTE ADULT - SUBJECTIVE AND OBJECTIVE BOX
Patient is a 74y old  Male who presents with a chief complaint of ams (10 Dec 2019 15:28)      INTERVAL HPI/OVERNIGHT EVENTS:  T(C): 36.6 (12-10-19 @ 13:55), Max: 37.3 (12-10-19 @ 01:05)  HR: 99 (12-10-19 @ 13:55) (83 - 114)  BP: 118/65 (12-10-19 @ 13:55) (112/63 - 139/71)  RR: 18 (12-10-19 @ 13:55) (18 - 20)  SpO2: 98% (12-10-19 @ 13:55) (95% - 100%)  Wt(kg): --  I&O's Summary    09 Dec 2019 07:01  -  10 Dec 2019 07:00  --------------------------------------------------------  IN: 980 mL / OUT: 0 mL / NET: 980 mL        LABS:                        7.9    14.85 )-----------( 274      ( 10 Dec 2019 09:19 )             27.9     12-10    136  |  98  |  53<H>  ----------------------------<  186<H>  4.6   |  23  |  4.66<H>    Ca    11.1<H>      10 Dec 2019 07:44          CAPILLARY BLOOD GLUCOSE      POCT Blood Glucose.: 196 mg/dL (10 Dec 2019 12:40)  POCT Blood Glucose.: 200 mg/dL (10 Dec 2019 05:56)  POCT Blood Glucose.: 179 mg/dL (10 Dec 2019 00:32)  POCT Blood Glucose.: 97 mg/dL (09 Dec 2019 18:16)            MEDICATIONS  (STANDING):  alteplase for catheter clearance 2 milliGRAM(s) Catheter once  alteplase for catheter clearance 2 milliGRAM(s) Catheter once  atorvastatin 40 milliGRAM(s) Oral at bedtime  carvedilol 3.125 milliGRAM(s) Oral every 12 hours  chlorhexidine 4% Liquid 1 Application(s) Topical <User Schedule>  cinacalcet 30 milliGRAM(s) Oral daily  dextrose 5%. 1000 milliLiter(s) (50 mL/Hr) IV Continuous <Continuous>  dextrose 50% Injectable 12.5 Gram(s) IV Push once  dextrose 50% Injectable 25 Gram(s) IV Push once  dextrose 50% Injectable 25 Gram(s) IV Push once  epoetin tan Injectable 65150 Unit(s) IV Push <User Schedule>  heparin  Injectable 5000 Unit(s) SubCutaneous every 8 hours  insulin lispro (HumaLOG) corrective regimen sliding scale   SubCutaneous every 6 hours  levETIRAcetam  Solution 1000 milliGRAM(s) Oral at bedtime  lidocaine   Patch 1 Patch Transdermal daily  midodrine. 10 milliGRAM(s) Oral three times a day  pantoprazole  Injectable 20 milliGRAM(s) IV Push daily  predniSONE   Tablet 5 milliGRAM(s) Oral daily    MEDICATIONS  (PRN):  dextrose 40% Gel 15 Gram(s) Oral once PRN Blood Glucose LESS THAN 70 milliGRAM(s)/deciLiter  glucagon  Injectable 1 milliGRAM(s) IntraMuscular once PRN Glucose <70 milliGRAM(s)/deciLiter          PHYSICAL EXAM:  GENERAL: frail  CHEST/LUNG: Clear to percussion bilaterally; No rales, rhonchi, wheezing, or rubs  HEART: Regular rate and rhythm; No murmurs, rubs, or gallops  ABDOMEN: Soft, Nontender, Nondistended; Bowel sounds present  EXTREMITIES: edema+    Care Discussed with Consultants/Other Providers [ ] YES  [ ] NO

## 2019-12-11 LAB
ANION GAP SERPL CALC-SCNC: 13 MMOL/L — SIGNIFICANT CHANGE UP (ref 5–17)
BUN SERPL-MCNC: 32 MG/DL — HIGH (ref 7–23)
CALCIUM SERPL-MCNC: 10.3 MG/DL — SIGNIFICANT CHANGE UP (ref 8.4–10.5)
CHLORIDE SERPL-SCNC: 95 MMOL/L — LOW (ref 96–108)
CO2 SERPL-SCNC: 29 MMOL/L — SIGNIFICANT CHANGE UP (ref 22–31)
CREAT SERPL-MCNC: 3.21 MG/DL — HIGH (ref 0.5–1.3)
CULTURE RESULTS: SIGNIFICANT CHANGE UP
GLUCOSE BLDC GLUCOMTR-MCNC: 102 MG/DL — HIGH (ref 70–99)
GLUCOSE BLDC GLUCOMTR-MCNC: 126 MG/DL — HIGH (ref 70–99)
GLUCOSE BLDC GLUCOMTR-MCNC: 131 MG/DL — HIGH (ref 70–99)
GLUCOSE SERPL-MCNC: 126 MG/DL — HIGH (ref 70–99)
HCT VFR BLD CALC: 23.3 % — LOW (ref 39–50)
HCT VFR BLD CALC: 23.9 % — LOW (ref 39–50)
HGB BLD-MCNC: 6.9 G/DL — CRITICAL LOW (ref 13–17)
HGB BLD-MCNC: 6.9 G/DL — CRITICAL LOW (ref 13–17)
MCHC RBC-ENTMCNC: 25.8 PG — LOW (ref 27–34)
MCHC RBC-ENTMCNC: 26.1 PG — LOW (ref 27–34)
MCHC RBC-ENTMCNC: 28.9 GM/DL — LOW (ref 32–36)
MCHC RBC-ENTMCNC: 29.6 GM/DL — LOW (ref 32–36)
MCV RBC AUTO: 88.3 FL — SIGNIFICANT CHANGE UP (ref 80–100)
MCV RBC AUTO: 89.5 FL — SIGNIFICANT CHANGE UP (ref 80–100)
NRBC # BLD: 0 /100 WBCS — SIGNIFICANT CHANGE UP (ref 0–0)
PLATELET # BLD AUTO: 243 K/UL — SIGNIFICANT CHANGE UP (ref 150–400)
PLATELET # BLD AUTO: 257 K/UL — SIGNIFICANT CHANGE UP (ref 150–400)
POTASSIUM SERPL-MCNC: 3.5 MMOL/L — SIGNIFICANT CHANGE UP (ref 3.5–5.3)
POTASSIUM SERPL-SCNC: 3.5 MMOL/L — SIGNIFICANT CHANGE UP (ref 3.5–5.3)
RBC # BLD: 2.64 M/UL — LOW (ref 4.2–5.8)
RBC # BLD: 2.67 M/UL — LOW (ref 4.2–5.8)
RBC # FLD: 16.3 % — HIGH (ref 10.3–14.5)
RBC # FLD: 16.6 % — HIGH (ref 10.3–14.5)
SODIUM SERPL-SCNC: 137 MMOL/L — SIGNIFICANT CHANGE UP (ref 135–145)
SPECIMEN SOURCE: SIGNIFICANT CHANGE UP
WBC # BLD: 13.69 K/UL — HIGH (ref 3.8–10.5)
WBC # BLD: 14.83 K/UL — HIGH (ref 3.8–10.5)
WBC # FLD AUTO: 13.69 K/UL — HIGH (ref 3.8–10.5)
WBC # FLD AUTO: 14.83 K/UL — HIGH (ref 3.8–10.5)

## 2019-12-11 PROCEDURE — 99233 SBSQ HOSP IP/OBS HIGH 50: CPT

## 2019-12-11 RX ORDER — VANCOMYCIN HCL 1 G
125 VIAL (EA) INTRAVENOUS EVERY 6 HOURS
Refills: 0 | Status: DISCONTINUED | OUTPATIENT
Start: 2019-12-11 | End: 2019-12-12

## 2019-12-11 RX ADMIN — Medication 125 MILLIGRAM(S): at 21:48

## 2019-12-11 RX ADMIN — MIDODRINE HYDROCHLORIDE 10 MILLIGRAM(S): 2.5 TABLET ORAL at 11:35

## 2019-12-11 RX ADMIN — Medication 2: at 00:12

## 2019-12-11 RX ADMIN — LIDOCAINE 1 PATCH: 4 CREAM TOPICAL at 02:00

## 2019-12-11 RX ADMIN — PANTOPRAZOLE SODIUM 20 MILLIGRAM(S): 20 TABLET, DELAYED RELEASE ORAL at 11:37

## 2019-12-11 RX ADMIN — Medication 5 MILLIGRAM(S): at 05:01

## 2019-12-11 RX ADMIN — CARVEDILOL PHOSPHATE 3.12 MILLIGRAM(S): 80 CAPSULE, EXTENDED RELEASE ORAL at 21:26

## 2019-12-11 RX ADMIN — HEPARIN SODIUM 5000 UNIT(S): 5000 INJECTION INTRAVENOUS; SUBCUTANEOUS at 21:27

## 2019-12-11 RX ADMIN — LEVETIRACETAM 1000 MILLIGRAM(S): 250 TABLET, FILM COATED ORAL at 21:27

## 2019-12-11 RX ADMIN — LIDOCAINE 1 PATCH: 4 CREAM TOPICAL at 11:35

## 2019-12-11 RX ADMIN — ATORVASTATIN CALCIUM 40 MILLIGRAM(S): 80 TABLET, FILM COATED ORAL at 21:26

## 2019-12-11 RX ADMIN — CINACALCET 30 MILLIGRAM(S): 30 TABLET, FILM COATED ORAL at 11:35

## 2019-12-11 RX ADMIN — CHLORHEXIDINE GLUCONATE 1 APPLICATION(S): 213 SOLUTION TOPICAL at 05:03

## 2019-12-11 RX ADMIN — LIDOCAINE 1 PATCH: 4 CREAM TOPICAL at 23:55

## 2019-12-11 RX ADMIN — CARVEDILOL PHOSPHATE 3.12 MILLIGRAM(S): 80 CAPSULE, EXTENDED RELEASE ORAL at 05:01

## 2019-12-11 RX ADMIN — LIDOCAINE 1 PATCH: 4 CREAM TOPICAL at 19:49

## 2019-12-11 NOTE — PROGRESS NOTE ADULT - SUBJECTIVE AND OBJECTIVE BOX
Patient is a 74y old  Male who presents with a chief complaint of ams (11 Dec 2019 10:00)    Being followed by ID for leucocytosis     Interval history:loose BMs yesterday  C diff sent-indeterminate  patient stable-mental status unchanged  No other acute events      ROS:  Not obtainable     Antimicrobials:    vancomycin    Solution 125 milliGRAM(s) Enteral Tube every 6 hours      other medications reviewed    Vital Signs Last 24 Hrs  T(C): 37.3 (12-11-19 @ 08:21), Max: 37.8 (12-10-19 @ 22:15)  T(F): 99.1 (12-11-19 @ 08:21), Max: 100.1 (12-10-19 @ 22:15)  HR: 93 (12-11-19 @ 08:21) (93 - 101)  BP: 114/64 (12-11-19 @ 08:21) (112/63 - 149/70)  BP(mean): --  RR: 18 (12-11-19 @ 08:21) (18 - 20)  SpO2: 98% (12-11-19 @ 08:21) (95% - 99%)    Physical Exam:    R Sc HD catheter no erythema o tenderness    NGT    Chest Good AE,bibasilar crackles     CVS RRR S1 S2 WNl No murmur or rub or gallop    Abd soft BS normal No tenderness RLQ transplant kidney no tenderness    sacral decub stage III no purulence or discharge noted     Ext left arm AVF no erythema or tenderness    IV site no erythema tenderness or discharge    Joints no swelling or LOM    CNS as above  Lab Data:                          6.9    14.83 )-----------( 257      ( 11 Dec 2019 08:57 )             23.9     WBC Count: 14.83 (12-11-19 @ 08:57)  WBC Count: 14.85 (12-10-19 @ 09:19)  WBC Count: 17.74 (12-09-19 @ 10:32)  WBC Count: 15.25 (12-09-19 @ 08:34)  WBC Count: 15.47 (12-08-19 @ 09:24)  WBC Count: 14.77 (12-07-19 @ 14:19)  WBC Count: 14.94 (12-06-19 @ 07:40)  WBC Count: 13.05 (12-05-19 @ 07:17)    12-11    137  |  95<L>  |  32<H>  ----------------------------<  126<H>  3.5   |  29  |  3.21<H>    Ca    10.3      11 Dec 2019 07:15          GI PCR Panel, Stool (collected 10 Dec 2019 22:54)  Source: .Stool Feces  Final Report (11 Dec 2019 02:20):    GI PCR Results: NOT detected    *******Please Note:*******    GI panel PCR evaluates for:    Campylobacter, Plesiomonas shigelloides, Salmonella,    Vibrio, Yersinia enterocolitica, Enteroaggregative    Escherichia coli (EAEC), Enteropathogenic E.coli (EPEC),    Enterotoxigenic E. coli (ETEC) lt/st, Shiga-like    toxin-producing E. coli (STEC) stx1/stx2,    Shigella/ Enteroinvasive E. coli (EIEC), Cryptosporidium,    Cyclospora cayetanensis, Entamoeba histolytica,    Giardia lamblia, Adenovirus F 40/41, Astrovirus,    Norovirus GI/GII, Rotavirus A, Sapovirus    Culture - Blood (collected 06 Dec 2019 18:26)  Source: .Blood Blood-Peripheral  Preliminary Report (07 Dec 2019 19:01):    No growth to date.    Culture - Blood (collected 06 Dec 2019 18:26)  Source: .Blood Blood-Peripheral  Preliminary Report (07 Dec 2019 19:01):    No growth to date.          Clostridium difficile GDH Interpretation: This specimen is positive for C. Difficile glutamate dehydrogenase (GDH)  antigen and negative for C. Difficile Toxins A & B, by EIA.  GDH is a  highly sensitive screening marker for C. Difficile that is produced in  large amounts by all C. Difficilestrains, both toxigenic and  nontoxigenic.  Specimens that are GDH positive and toxin negative will be  tested further by PCR to detect toxin gene sequences associated with  toxin producing C. Difficle. (12-10-19 @ 18:47)          C diff PCR pending

## 2019-12-11 NOTE — CHART NOTE - NSCHARTNOTEFT_GEN_A_CORE
Informed by RN of a critical Hg this am of 6.9; CBC repeated and result as follow:                        6.9    13.69 )-----------( 243      ( 11 Dec 2019 11:48 )             23.3     74M PMHx of ESRD s/p failed renal transplant x2, HCV, DM, and meningococcal meningitis 2 years ago was sent in to the ED from HD for AMS and low BPs. He is unable to provide any information.    Admitted with AMS and hypotension; RRT 6 days after admit; Transferred to MICU (11/26-11/30); intubated and on pressors. Transferred back to Med/surg unit with multiple medical issues. Patient with NGT and has pulled out multiple times. Pt is for EGD with PEG placement today.    P/E:  Drowsy but arouses to loud verbal stimulation; pt is not answering questions which is his baseline since admit; No focal motor deficits observed  S1S2  LCTA B/L; No dyspnea observed      A/P:  AMS  ESRD on HD  Dysphagia   Cdiff colitis  Anemia (will transfuse 1u prbc today)  Repeat cbc later today     Await call back from Dr James.    NP Chip [id number]

## 2019-12-11 NOTE — PROGRESS NOTE ADULT - SUBJECTIVE AND OBJECTIVE BOX
Pre-Endoscopy Evaluation      Referring Physician: dr. tan lund                                   Procedure:  upper gastrointestinal endoscopy/peg placement    Indication for Procedure: dysphagia    Pertinent History:    Sedation by Anesthesia [x]    PAST MEDICAL & SURGICAL HISTORY:  Kidney transplant recipient  Anuria  GERD (gastroesophageal reflux disease)  Kidney transplant failure: dx: 2013  Hepatitis C: dx: 90&#x27;s.  From iv drug abuse  GERD (gastroesophageal reflux disease)  Renal transplant failure and rejection  Rectal abscess:   IV drug abuse: former, cocaine. 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, no known MIs  H/O kidney transplant: may 2015  A-V fistula: Left, implanted (?)  S/p cadaver renal transplant:   S/P partial colectomy: due to diverticulitis      PMH of Gastroparesis [ ]  Gastric Surgery [ ]  Gastric Outlet Obstruction [ ]    Allergies:    No Known Allergies    Intolerances:      Latex allergy: [ ] yes [x] no    Medications:MEDICATIONS  (STANDING):  alteplase for catheter clearance 2 milliGRAM(s) Catheter once  alteplase for catheter clearance 2 milliGRAM(s) Catheter once  atorvastatin 40 milliGRAM(s) Oral at bedtime  carvedilol 3.125 milliGRAM(s) Oral every 12 hours  chlorhexidine 4% Liquid 1 Application(s) Topical <User Schedule>  cinacalcet 30 milliGRAM(s) Oral daily  dextrose 5%. 1000 milliLiter(s) (50 mL/Hr) IV Continuous <Continuous>  dextrose 50% Injectable 12.5 Gram(s) IV Push once  dextrose 50% Injectable 25 Gram(s) IV Push once  dextrose 50% Injectable 25 Gram(s) IV Push once  epoetin tan Injectable 38785 Unit(s) IV Push <User Schedule>  heparin  Injectable 5000 Unit(s) SubCutaneous every 8 hours  insulin lispro (HumaLOG) corrective regimen sliding scale   SubCutaneous every 6 hours  levETIRAcetam  Solution 1000 milliGRAM(s) Oral at bedtime  lidocaine   Patch 1 Patch Transdermal daily  midodrine. 10 milliGRAM(s) Oral three times a day  pantoprazole  Injectable 20 milliGRAM(s) IV Push daily  predniSONE   Tablet 5 milliGRAM(s) Oral daily  vancomycin    Solution 125 milliGRAM(s) Enteral Tube every 6 hours    MEDICATIONS  (PRN):  dextrose 40% Gel 15 Gram(s) Oral once PRN Blood Glucose LESS THAN 70 milliGRAM(s)/deciLiter  glucagon  Injectable 1 milliGRAM(s) IntraMuscular once PRN Glucose <70 milliGRAM(s)/deciLiter      Smoking: [ ] yes  [x] no    AICD/PPM: [ ] yes   [x] no    Pertinent lab data:                        6.9    13.69 )-----------( 243      ( 11 Dec 2019 11:48 )             23.3     12-    137  |  95<L>  |  32<H>  ----------------------------<  126<H>  3.5   |  29  |  3.21<H>    Ca    10.3      11 Dec 2019 07:15        CAPILLARY BLOOD GLUCOSE    POCT Blood Glucose.: 131 mg/dL (11 Dec 2019 11:59)      < from: TTE with Doppler (w/Cont) (11.27.19 @ 16:30) >      Fractional short: 10 %  EF (Visual Estimate): 70-75 %  Doppler Peak Velocity (m/sec): AoV=1.8 PV=1.3  ------------------------------------------------------------------------  Observations:  Mitral Valve: Mitral annular calcification and calcified  mitral leaflets with normal diastolic opening.  Mild mitral  regurgitation.  Aortic Valve/Aorta: Calcified trileaflet aortic valve with  decreased opening. Peak transaortic valve gradient equals  13 mm Hg, estimated aortic valve area equals 2 sqcm (by  continuity equation), consistent with mild aortic stenosis.  Minimal aortic regurgitation.  Peak left ventricular  outflow tract gradient equals 8 mmHg.  Aortic Root: 3.3 cm.  LVOT diameter: 1.8 cm.  Left Atrium: Normal left atrium.  LA volume index = 25  cc/m2.  Left Ventricle: Hyperdynamic left ventricular systolic  function. Endocardial visualization enhanced with  intravenous injection of Ultrasonic Enhancing Agent  (Definity). Moderate to severe concentric left ventricular  hypertrophy. Mild diastolic dysfunction (Stage I).  Right Heart: Normal right atrium. The right ventricle is  not well visualized; grossly normal right ventricular  systolic function. Normal tricuspid valve. Mild tricuspid  regurgitation. Pulmonic valve not well visualized. Minimal  pulmonic regurgitation.  Pericardium/Pleura: Normal pericardium with trace  pericardial effusion.  Hemodynamic: Estimated right atrial pressure is 8 mm Hg.  Estimated right ventricular systolic pressure equals 46 mm  Hg, assuming right atrial pressure equals 8 mm Hg,  consistent with mild pulmonary hypertension.  ------------------------------------------------------------------------  Conclusions:  1. Mitral annular calcification and calcified mitral  leaflets with normal diastolic opening.  Mild mitral  regurgitation.  2. Calcified trileaflet aortic valve with decreased  opening. Peak transaortic valve gradient equals 13 mm Hg,  estimated aortic valve area equals 2 sqcm (by continuity  equation), consistent with mild aortic stenosis. Minimal  aortic regurgitation.  3. Moderate to severe concentric left ventricular  hypertrophy.  4. Hyperdynamic left ventricular systolic function.  Endocardial visualization enhanced with intravenous  injection of Ultrasonic Enhancing Agent (Definity).  5. The right ventricle is not well visualized; grossly  normal right ventricular systolic function.  6. Estimated right ventricular systolic pressure equals 46  mm Hg, assuming right atrial pressure equals 8 mm Hg,  consistent with mild pulmonary hypertension.  7. Normal pericardium with trace pericardial effusion.  *** Compared with echocardiogram of 2019,  Pericardial  effusion size has decreased. An Intracavitary gradient is  not seen in the present study. Other findings are grossly  similar.  -------------      Physical Examination:  Daily     Daily Weight in k.5 (11 Dec 2019 04:34)  Vital Signs Last 24 Hrs  T(C): 37 (11 Dec 2019 11:45), Max: 37.8 (10 Dec 2019 22:15)  T(F): 98.6 (11 Dec 2019 11:45), Max: 100.1 (10 Dec 2019 22:15)  HR: 98 (11 Dec 2019 11:45) (93 - 101)  BP: 125/66 (11 Dec 2019 11:45) (112/69 - 149/70)  BP(mean): --  RR: 18 (11 Dec 2019 11:45) (18 - 20)  SpO2: 96% (11 Dec 2019 11:45) (95% - 99%)      Drug Dosing Weight  Height (cm): 182.88 (2019 12:46)  Weight (kg): 68 (11 Dec 2019 13:23)  BMI (kg/m2): 20.3 (11 Dec 2019 13:23)  BSA (m2): 1.88 (11 Dec 2019 13:23)    Constitutional: NAD     Neck:  No JVD    Respiratory: CTAB/L    Cardiovascular: S1 and S2    Gastrointestinal: BS+, soft, NT/ND    Extremities: No peripheral edema    Neurological:     : No Mclaughlin    Skin: No rashes    Comments:    ASA Class: I [ ]  II [ ]  III [ ]  IV [ ]    The patient is a suitable candidate for the planned procedure unless box checked [ ]  No, explain: Pre-Endoscopy Evaluation      Referring Physician: dr. tan lund                                   Procedure:  upper gastrointestinal endoscopy/peg placement    Indication for Procedure: dysphagia    Pertinent History: 74y old male with  PMH of ESRD s/p failed renal transplant x2, HCV, DM, and meningococcal meningitis 2 years ago was sent in to the ED from HD for AMS and low BPs. Admitted with concern for CVA, neurology following. Patient with RRT overnight  resulting in transfer to MICU for respiratory failure concern for aspiration and sepsis, now with dysphagia       Sedation by Anesthesia [x]    PAST MEDICAL & SURGICAL HISTORY:  Kidney transplant recipient  Anuria  GERD (gastroesophageal reflux disease)  Kidney transplant failure: dx: 2013  Hepatitis C: dx: 90&#x27;s.  From iv drug abuse  GERD (gastroesophageal reflux disease)  Renal transplant failure and rejection  Rectal abscess:   IV drug abuse: former, cocaine. 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, no known MIs  H/O kidney transplant: may 2015  A-V fistula: Left, implanted (?)  S/p cadaver renal transplant:   S/P partial colectomy: due to diverticulitis      PMH of Gastroparesis [ ]  Gastric Surgery [ ]  Gastric Outlet Obstruction [ ]    Allergies:    No Known Allergies    Intolerances:      Latex allergy: [ ] yes [x] no    Medications:MEDICATIONS  (STANDING):  alteplase for catheter clearance 2 milliGRAM(s) Catheter once  alteplase for catheter clearance 2 milliGRAM(s) Catheter once  atorvastatin 40 milliGRAM(s) Oral at bedtime  carvedilol 3.125 milliGRAM(s) Oral every 12 hours  chlorhexidine 4% Liquid 1 Application(s) Topical <User Schedule>  cinacalcet 30 milliGRAM(s) Oral daily  dextrose 5%. 1000 milliLiter(s) (50 mL/Hr) IV Continuous <Continuous>  dextrose 50% Injectable 12.5 Gram(s) IV Push once  dextrose 50% Injectable 25 Gram(s) IV Push once  dextrose 50% Injectable 25 Gram(s) IV Push once  epoetin tan Injectable 33717 Unit(s) IV Push <User Schedule>  heparin  Injectable 5000 Unit(s) SubCutaneous every 8 hours  insulin lispro (HumaLOG) corrective regimen sliding scale   SubCutaneous every 6 hours  levETIRAcetam  Solution 1000 milliGRAM(s) Oral at bedtime  lidocaine   Patch 1 Patch Transdermal daily  midodrine. 10 milliGRAM(s) Oral three times a day  pantoprazole  Injectable 20 milliGRAM(s) IV Push daily  predniSONE   Tablet 5 milliGRAM(s) Oral daily  vancomycin    Solution 125 milliGRAM(s) Enteral Tube every 6 hours    MEDICATIONS  (PRN):  dextrose 40% Gel 15 Gram(s) Oral once PRN Blood Glucose LESS THAN 70 milliGRAM(s)/deciLiter  glucagon  Injectable 1 milliGRAM(s) IntraMuscular once PRN Glucose <70 milliGRAM(s)/deciLiter      Smoking: [ ] yes  [x] no    AICD/PPM: [ ] yes   [x] no    Pertinent lab data:                        6.9    13.69 )-----------( 243      ( 11 Dec 2019 11:48 )             23.3     12-11    137  |  95<L>  |  32<H>  ----------------------------<  126<H>  3.5   |  29  |  3.21<H>    Ca    10.3      11 Dec 2019 07:15        CAPILLARY BLOOD GLUCOSE    POCT Blood Glucose.: 131 mg/dL (11 Dec 2019 11:59)      < from: TTE with Doppler (w/Cont) (11.27.19 @ 16:30) >      Fractional short: 10 %  EF (Visual Estimate): 70-75 %  Doppler Peak Velocity (m/sec): AoV=1.8 PV=1.3  ------------------------------------------------------------------------  Observations:  Mitral Valve: Mitral annular calcification and calcified  mitral leaflets with normal diastolic opening.  Mild mitral  regurgitation.  Aortic Valve/Aorta: Calcified trileaflet aortic valve with  decreased opening. Peak transaortic valve gradient equals  13 mm Hg, estimated aortic valve area equals 2 sqcm (by  continuity equation), consistent with mild aortic stenosis.  Minimal aortic regurgitation.  Peak left ventricular  outflow tract gradient equals 8 mmHg.  Aortic Root: 3.3 cm.  LVOT diameter: 1.8 cm.  Left Atrium: Normal left atrium.  LA volume index = 25  cc/m2.  Left Ventricle: Hyperdynamic left ventricular systolic  function. Endocardial visualization enhanced with  intravenous injection of Ultrasonic Enhancing Agent  (Definity). Moderate to severe concentric left ventricular  hypertrophy. Mild diastolic dysfunction (Stage I).  Right Heart: Normal right atrium. The right ventricle is  not well visualized; grossly normal right ventricular  systolic function. Normal tricuspid valve. Mild tricuspid  regurgitation. Pulmonic valve not well visualized. Minimal  pulmonic regurgitation.  Pericardium/Pleura: Normal pericardium with trace  pericardial effusion.  Hemodynamic: Estimated right atrial pressure is 8 mm Hg.  Estimated right ventricular systolic pressure equals 46 mm  Hg, assuming right atrial pressure equals 8 mm Hg,  consistent with mild pulmonary hypertension.  ------------------------------------------------------------------------  Conclusions:  1. Mitral annular calcification and calcified mitral  leaflets with normal diastolic opening.  Mild mitral  regurgitation.  2. Calcified trileaflet aortic valve with decreased  opening. Peak transaortic valve gradient equals 13 mm Hg,  estimated aortic valve area equals 2 sqcm (by continuity  equation), consistent with mild aortic stenosis. Minimal  aortic regurgitation.  3. Moderate to severe concentric left ventricular  hypertrophy.  4. Hyperdynamic left ventricular systolic function.  Endocardial visualization enhanced with intravenous  injection of Ultrasonic Enhancing Agent (Definity).  5. The right ventricle is not well visualized; grossly  normal right ventricular systolic function.  6. Estimated right ventricular systolic pressure equals 46  mm Hg, assuming right atrial pressure equals 8 mm Hg,  consistent with mild pulmonary hypertension.  7. Normal pericardium with trace pericardial effusion.  *** Compared with echocardiogram of 2019,  Pericardial  effusion size has decreased. An Intracavitary gradient is  not seen in the present study. Other findings are grossly  similar.  -------------      Physical Examination:  Daily     Daily Weight in k.5 (11 Dec 2019 04:34)  Vital Signs Last 24 Hrs  T(C): 37 (11 Dec 2019 11:45), Max: 37.8 (10 Dec 2019 22:15)  T(F): 98.6 (11 Dec 2019 11:45), Max: 100.1 (10 Dec 2019 22:15)  HR: 98 (11 Dec 2019 11:45) (93 - 101)  BP: 125/66 (11 Dec 2019 11:45) (112/69 - 149/70)  BP(mean): --  RR: 18 (11 Dec 2019 11:45) (18 - 20)  SpO2: 96% (11 Dec 2019 11:45) (95% - 99%)      Drug Dosing Weight  Height (cm): 182.88 (2019 12:46)  Weight (kg): 68 (11 Dec 2019 13:23)  BMI (kg/m2): 20.3 (11 Dec 2019 13:23)  BSA (m2): 1.88 (11 Dec 2019 13:23)    Constitutional: NAD     Neck:  No JVD    Respiratory: CTAB/L    Cardiovascular: S1 and S2    Gastrointestinal: BS+, soft, NT/ND    Extremities: No peripheral edema    Neurological:     : No Mclaughlin    Skin: No rashes    Comments:    The patient is a suitable candidate for the planned procedure unless box checked [ ]  No, explain: Pre-Endoscopy Evaluation      Referring Physician: dr. tan lund                                   Procedure:  upper gastrointestinal endoscopy/peg placement    Indication for Procedure: dysphagia    Pertinent History: 74y old male with  PMH of ESRD s/p failed renal transplant x2, HCV, DM, and meningococcal meningitis 2 years ago was sent in to the ED from HD for AMS and low BPs. Admitted with concern for CVA, neurology following. Patient with RRT overnight  resulting in transfer to MICU for respiratory failure concern for aspiration and sepsis, now with dysphagia       Sedation by Anesthesia [x]    PAST MEDICAL & SURGICAL HISTORY:  Kidney transplant recipient  Anuria  GERD (gastroesophageal reflux disease)  Kidney transplant failure: dx: 2013  Hepatitis C: dx: 90&#x27;s.  From iv drug abuse  GERD (gastroesophageal reflux disease)  Renal transplant failure and rejection  Rectal abscess:   IV drug abuse: former, cocaine. 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, no known MIs  H/O kidney transplant: may 2015  A-V fistula: Left, implanted (?)  S/p cadaver renal transplant:   S/P partial colectomy: due to diverticulitis      PMH of Gastroparesis [ ]  Gastric Surgery [ ]  Gastric Outlet Obstruction [ ]    Allergies:    No Known Allergies    Intolerances:      Latex allergy: [ ] yes [x] no    Medications:MEDICATIONS  (STANDING):  alteplase for catheter clearance 2 milliGRAM(s) Catheter once  alteplase for catheter clearance 2 milliGRAM(s) Catheter once  atorvastatin 40 milliGRAM(s) Oral at bedtime  carvedilol 3.125 milliGRAM(s) Oral every 12 hours  chlorhexidine 4% Liquid 1 Application(s) Topical <User Schedule>  cinacalcet 30 milliGRAM(s) Oral daily  dextrose 5%. 1000 milliLiter(s) (50 mL/Hr) IV Continuous <Continuous>  dextrose 50% Injectable 12.5 Gram(s) IV Push once  dextrose 50% Injectable 25 Gram(s) IV Push once  dextrose 50% Injectable 25 Gram(s) IV Push once  epoetin tan Injectable 10972 Unit(s) IV Push <User Schedule>  heparin  Injectable 5000 Unit(s) SubCutaneous every 8 hours  insulin lispro (HumaLOG) corrective regimen sliding scale   SubCutaneous every 6 hours  levETIRAcetam  Solution 1000 milliGRAM(s) Oral at bedtime  lidocaine   Patch 1 Patch Transdermal daily  midodrine. 10 milliGRAM(s) Oral three times a day  pantoprazole  Injectable 20 milliGRAM(s) IV Push daily  predniSONE   Tablet 5 milliGRAM(s) Oral daily  vancomycin    Solution 125 milliGRAM(s) Enteral Tube every 6 hours    MEDICATIONS  (PRN):  dextrose 40% Gel 15 Gram(s) Oral once PRN Blood Glucose LESS THAN 70 milliGRAM(s)/deciLiter  glucagon  Injectable 1 milliGRAM(s) IntraMuscular once PRN Glucose <70 milliGRAM(s)/deciLiter      Smoking: [ ] yes  [x] no    AICD/PPM: [ ] yes   [x] no    Pertinent lab data:                        6.9    13.69 )-----------( 243      ( 11 Dec 2019 11:48 )             23.3     12-11    137  |  95<L>  |  32<H>  ----------------------------<  126<H>  3.5   |  29  |  3.21<H>    Ca    10.3      11 Dec 2019 07:15        CAPILLARY BLOOD GLUCOSE    POCT Blood Glucose.: 131 mg/dL (11 Dec 2019 11:59)      < from: TTE with Doppler (w/Cont) (11.27.19 @ 16:30) >      Fractional short: 10 %  EF (Visual Estimate): 70-75 %  Doppler Peak Velocity (m/sec): AoV=1.8 PV=1.3  ------------------------------------------------------------------------  Observations:  Mitral Valve: Mitral annular calcification and calcified  mitral leaflets with normal diastolic opening.  Mild mitral  regurgitation.  Aortic Valve/Aorta: Calcified trileaflet aortic valve with  decreased opening. Peak transaortic valve gradient equals  13 mm Hg, estimated aortic valve area equals 2 sqcm (by  continuity equation), consistent with mild aortic stenosis.  Minimal aortic regurgitation.  Peak left ventricular  outflow tract gradient equals 8 mmHg.  Aortic Root: 3.3 cm.  LVOT diameter: 1.8 cm.  Left Atrium: Normal left atrium.  LA volume index = 25  cc/m2.  Left Ventricle: Hyperdynamic left ventricular systolic  function. Endocardial visualization enhanced with  intravenous injection of Ultrasonic Enhancing Agent  (Definity). Moderate to severe concentric left ventricular  hypertrophy. Mild diastolic dysfunction (Stage I).  Right Heart: Normal right atrium. The right ventricle is  not well visualized; grossly normal right ventricular  systolic function. Normal tricuspid valve. Mild tricuspid  regurgitation. Pulmonic valve not well visualized. Minimal  pulmonic regurgitation.  Pericardium/Pleura: Normal pericardium with trace  pericardial effusion.  Hemodynamic: Estimated right atrial pressure is 8 mm Hg.  Estimated right ventricular systolic pressure equals 46 mm  Hg, assuming right atrial pressure equals 8 mm Hg,  consistent with mild pulmonary hypertension.  ------------------------------------------------------------------------  Conclusions:  1. Mitral annular calcification and calcified mitral  leaflets with normal diastolic opening.  Mild mitral  regurgitation.  2. Calcified trileaflet aortic valve with decreased  opening. Peak transaortic valve gradient equals 13 mm Hg,  estimated aortic valve area equals 2 sqcm (by continuity  equation), consistent with mild aortic stenosis. Minimal  aortic regurgitation.  3. Moderate to severe concentric left ventricular  hypertrophy.  4. Hyperdynamic left ventricular systolic function.  Endocardial visualization enhanced with intravenous  injection of Ultrasonic Enhancing Agent (Definity).  5. The right ventricle is not well visualized; grossly  normal right ventricular systolic function.  6. Estimated right ventricular systolic pressure equals 46  mm Hg, assuming right atrial pressure equals 8 mm Hg,  consistent with mild pulmonary hypertension.  7. Normal pericardium with trace pericardial effusion.  *** Compared with echocardiogram of 2019,  Pericardial  effusion size has decreased. An Intracavitary gradient is  not seen in the present study. Other findings are grossly  similar.  -------------      Physical Examination:  Daily     Daily Weight in k.5 (11 Dec 2019 04:34)  Vital Signs Last 24 Hrs  T(C): 37 (11 Dec 2019 11:45), Max: 37.8 (10 Dec 2019 22:15)  T(F): 98.6 (11 Dec 2019 11:45), Max: 100.1 (10 Dec 2019 22:15)  HR: 98 (11 Dec 2019 11:45) (93 - 101)  BP: 125/66 (11 Dec 2019 11:45) (112/69 - 149/70)  BP(mean): --  RR: 18 (11 Dec 2019 11:45) (18 - 20)  SpO2: 96% (11 Dec 2019 11:45) (95% - 99%)      Drug Dosing Weight  Height (cm): 182.88 (2019 12:46)  Weight (kg): 68 (11 Dec 2019 13:23)  BMI (kg/m2): 20.3 (11 Dec 2019 13:23)  BSA (m2): 1.88 (11 Dec 2019 13:23)    Constitutional: NAD     Neck:  No JVD    Respiratory: CTAB/L    Cardiovascular: S1 and S2    Gastrointestinal: BS+, soft, NT/ND    Extremities: No peripheral edema    Neurological:     : No Mclaughlin    Skin: No rashes    Comments: s/p HD 12/10  PRBCS in progress on arrival to Endoscopy Unit    The patient is a suitable candidate for the planned procedure unless box checked [ ]  No, explain:

## 2019-12-11 NOTE — PROGRESS NOTE ADULT - ASSESSMENT
History of cocaine induced CM  normal EF on last echo  cont BB     PAF  in sinus   a/c once deemed safe   for now off given high bleed risk    GOC   fu with palliative care    Dysphagia  plan for PEG

## 2019-12-11 NOTE — PROGRESS NOTE ADULT - SUBJECTIVE AND OBJECTIVE BOX
Patient is a 74y old  Male who presents with a chief complaint of ams (11 Dec 2019 17:19)      INTERVAL HPI/OVERNIGHT EVENTS:  T(C): 36.8 (12-11-19 @ 15:30), Max: 37.8 (12-10-19 @ 22:15)  HR: 94 (12-11-19 @ 15:30) (92 - 101)  BP: 128/76 (12-11-19 @ 15:30) (112/69 - 149/70)  RR: 18 (12-11-19 @ 15:30) (18 - 20)  SpO2: 97% (12-11-19 @ 15:30) (95% - 98%)  Wt(kg): --  I&O's Summary    10 Dec 2019 07:01  -  11 Dec 2019 07:00  --------------------------------------------------------  IN: 530 mL / OUT: 1001 mL / NET: -471 mL    11 Dec 2019 07:01  -  11 Dec 2019 19:09  --------------------------------------------------------  IN: 0 mL / OUT: 0 mL / NET: 0 mL        LABS:                        6.9    13.69 )-----------( 243      ( 11 Dec 2019 11:48 )             23.3     12-11    137  |  95<L>  |  32<H>  ----------------------------<  126<H>  3.5   |  29  |  3.21<H>    Ca    10.3      11 Dec 2019 07:15          CAPILLARY BLOOD GLUCOSE      POCT Blood Glucose.: 131 mg/dL (11 Dec 2019 11:59)  POCT Blood Glucose.: 126 mg/dL (11 Dec 2019 06:26)  POCT Blood Glucose.: 158 mg/dL (10 Dec 2019 23:54)            MEDICATIONS  (STANDING):  alteplase for catheter clearance 2 milliGRAM(s) Catheter once  alteplase for catheter clearance 2 milliGRAM(s) Catheter once  atorvastatin 40 milliGRAM(s) Oral at bedtime  carvedilol 3.125 milliGRAM(s) Oral every 12 hours  chlorhexidine 4% Liquid 1 Application(s) Topical <User Schedule>  cinacalcet 30 milliGRAM(s) Oral daily  dextrose 5%. 1000 milliLiter(s) (50 mL/Hr) IV Continuous <Continuous>  dextrose 50% Injectable 12.5 Gram(s) IV Push once  dextrose 50% Injectable 25 Gram(s) IV Push once  dextrose 50% Injectable 25 Gram(s) IV Push once  epoetin tan Injectable 85335 Unit(s) IV Push <User Schedule>  heparin  Injectable 5000 Unit(s) SubCutaneous every 8 hours  insulin lispro (HumaLOG) corrective regimen sliding scale   SubCutaneous every 6 hours  levETIRAcetam  Solution 1000 milliGRAM(s) Oral at bedtime  lidocaine   Patch 1 Patch Transdermal daily  midodrine. 10 milliGRAM(s) Oral three times a day  pantoprazole  Injectable 20 milliGRAM(s) IV Push daily  predniSONE   Tablet 5 milliGRAM(s) Oral daily  vancomycin    Solution 125 milliGRAM(s) Enteral Tube every 6 hours    MEDICATIONS  (PRN):  dextrose 40% Gel 15 Gram(s) Oral once PRN Blood Glucose LESS THAN 70 milliGRAM(s)/deciLiter  glucagon  Injectable 1 milliGRAM(s) IntraMuscular once PRN Glucose <70 milliGRAM(s)/deciLiter          PHYSICAL EXAM:  GENERAL: NAD, well-groomed, well-developed  HEAD:  Atraumatic, Normocephalic  CHEST/LUNG: Clear to percussion bilaterally; No rales, rhonchi, wheezing, or rubs  HEART: Regular rate and rhythm; No murmurs, rubs, or gallops  ABDOMEN: Soft, Nontender, Nondistended; Bowel sounds present  EXTREMITIES:  2+ Peripheral Pulses, No clubbing, cyanosis, or edema  LYMPH: No lymphadenopathy noted  SKIN: No rashes or lesions    Care Discussed with Consultants/Other Providers [ ] YES  [ ] NO

## 2019-12-11 NOTE — PROGRESS NOTE ADULT - ASSESSMENT
74M PMHx of ESRD s/p failed renal transplant x2, HCV, DM, and meningococcal meningitis 2 years ago was sent in to the ED from HD for AMS and low BPs.  CVA  Pneumonia-aspiration-resp failure s/p Rx  patient also with sacral decub stage III  Mild leucocytosis  Is on baseline steroids  Cough But CXR clear  No fevers  Hemodynamically stable  Cx negative CXR negative  WBC stable-mild decrease   ? from steroids   also C diff indeterminate- PCR pending  Had some loose stools  continue po  vanco via NGT pending C diff PCR  Aspiration precautions  Wound care for decub  Will tailor plan for ID issues  per course,results.Will defer to primary team on management of other issues.  case d/w Med NP  Will Follow.  Beeper [id number]/afterhours/No [id number]

## 2019-12-11 NOTE — PROGRESS NOTE ADULT - ASSESSMENT
Assessment  DM: A1C 5.5%, was on insulin at home, now on moderate-scale insulin coverage, blood sugars improving, trending within acceptable range, no hypoglycemic episodes. Patient still NPO on tube feeds, planning PEG placement today.  Hypercalcemia: Primary Hyperparathyroidism, calcium improved and WNL s/p Pamidronate dose.  Sepsis: IV ABx for UTI, LP inconsistent with meningitis, on medications, stable, monitored.  HTN: Controlled,  on antihypertensive medications.  ESRD: On hemodialysis, Monitor labs/BMP          Robi Gay MD  Cell: [phone]  Office: [phone] Assessment  DM: A1C 5.5%, was on insulin at home, now on moderate-scale insulin coverage, blood sugars improving, trending within acceptable range, no hypoglycemic episodes. Patient still NPO on tube feeds,  planning PEG placement today.  Hypercalcemia: Primary Hyperparathyroidism, calcium improved and WNL s/p Pamidronate dose.  Sepsis: IV ABx for UTI, LP inconsistent with meningitis, on medications, stable, monitored.  HTN: Controlled,  on antihypertensive medications.  ESRD: On hemodialysis, Monitor labs/BMP          Robi Gay MD  Cell: [phone]  Office: [phone]

## 2019-12-11 NOTE — PROGRESS NOTE ADULT - SUBJECTIVE AND OBJECTIVE BOX
Patient is a 74y Male whom presented to the hospital with esrd on hd   pt  seen in am nad , no fever , no chills    PAST MEDICAL & SURGICAL HISTORY:  Kidney transplant recipient  Anuria  GERD (gastroesophageal reflux disease)  Kidney transplant failure: dx: 2/2013  Hepatitis C: dx: 90&#x27;s.  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 10 milliGRAM(s) Oral three times a day  mycophenolate mofetil 250 milliGRAM(s) Oral two times a day  predniSONE   Tablet 5 milliGRAM(s) Oral daily  sevelamer carbonate 800 milliGRAM(s) Oral three times a day with meals  sodium bicarbonate 650 milliGRAM(s) Oral two times a day  tamsulosin 0.4 milliGRAM(s) Oral at bedtime      Allergies    No Known Allergies                       SOCIAL HISTORY:  Denies ETOh,Smoking,     FAMILY HISTORY:  Family history of breast cancer in sister (Sibling): living at 92 yo  Family history of prostate cancer in father  Family history of lung cancer  Family history of hypertension in mother  Family history of diabetes mellitus: mother      REVIEW OF SYSTEMS:    unbable to obtained                                                                                                                              6.9    13.69 )-----------( 243      ( 11 Dec 2019 11:48 )             23.3       CBC Full  -  ( 11 Dec 2019 11:48 )  WBC Count : 13.69 K/uL  RBC Count : 2.64 M/uL  Hemoglobin : 6.9 g/dL  Hematocrit : 23.3 %  Platelet Count - Automated : 243 K/uL  Mean Cell Volume : 88.3 fl  Mean Cell Hemoglobin : 26.1 pg  Mean Cell Hemoglobin Concentration : 29.6 gm/dL  Auto Neutrophil # : x  Auto Lymphocyte # : x  Auto Monocyte # : x  Auto Eosinophil # : x  Auto Basophil # : x  Auto Neutrophil % : x  Auto Lymphocyte % : x  Auto Monocyte % : x  Auto Eosinophil % : x  Auto Basophil % : x      12-11    137  |  95<L>  |  32<H>  ----------------------------<  126<H>  3.5   |  29  |  3.21<H>    Ca    10.3      11 Dec 2019 07:15        CAPILLARY BLOOD GLUCOSE      POCT Blood Glucose.: 131 mg/dL (11 Dec 2019 11:59)  POCT Blood Glucose.: 126 mg/dL (11 Dec 2019 06:26)  POCT Blood Glucose.: 158 mg/dL (10 Dec 2019 23:54)  POCT Blood Glucose.: 96 mg/dL (10 Dec 2019 18:07)      Vital Signs Last 24 Hrs  T(C): 36.8 (11 Dec 2019 15:30), Max: 37.8 (10 Dec 2019 22:15)  T(F): 98.2 (11 Dec 2019 15:30), Max: 100.1 (10 Dec 2019 22:15)  HR: 94 (11 Dec 2019 15:30) (92 - 101)  BP: 128/76 (11 Dec 2019 15:30) (112/69 - 149/70)  BP(mean): --  RR: 18 (11 Dec 2019 15:30) (18 - 20)  SpO2: 97% (11 Dec 2019 15:30) (95% - 99%)                                       HEENT: conjunctive   clear   Neck:  No JVD  Respiratory: decrease bs b/l   Cardiovascular: S1 and S2  Gastrointestinal: BS+, soft, NT/ND  Extremities: No peripheral edema  Skin: dry   Access: pos fistula

## 2019-12-11 NOTE — CHART NOTE - NSCHARTNOTEFT_GEN_A_CORE
Nutrition Follow Up Note  Patient seen for: malnutrition follow up      Source: chart reviewed events, noted . 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 " now receiving HD per renal  NEURO: noted  -" Extubated , now on nasal cannula. Minimally verbal   - metabolic encephalopathy, meningitis r/o, LP negative- neuro following  - chronic lacunar infarcts; per neuro, AMS also likely related to hypercalcemia, renal dysfunction , poor nutritional intake."   -    Diet : NPO now for PEG placement today         Enteral  Nutrition: previously Nepro@ 55ml/hr x24 hrs       Daily Weight in k.5 (-), Weight in k.9 (12-10), Weight in k.9 (12-10), Weight in k.3 (), Weight in k.9 (), Weight in k.5 (), Weight in k.5 ()  % Weight Change    Pertinent Medications: MEDICATIONS  (STANDING):  alteplase for catheter clearance 2 milliGRAM(s) Catheter once  alteplase for catheter clearance 2 milliGRAM(s) Catheter once  atorvastatin 40 milliGRAM(s) Oral at bedtime  carvedilol 3.125 milliGRAM(s) Oral every 12 hours  chlorhexidine 4% Liquid 1 Application(s) Topical <User Schedule>  cinacalcet 30 milliGRAM(s) Oral daily  dextrose 5%. 1000 milliLiter(s) (50 mL/Hr) IV Continuous <Continuous>  dextrose 50% Injectable 12.5 Gram(s) IV Push once  dextrose 50% Injectable 25 Gram(s) IV Push once  dextrose 50% Injectable 25 Gram(s) IV Push once  epoetin tan Injectable 24849 Unit(s) IV Push <User Schedule>  heparin  Injectable 5000 Unit(s) SubCutaneous every 8 hours  insulin lispro (HumaLOG) corrective regimen sliding scale   SubCutaneous every 6 hours  levETIRAcetam  Solution 1000 milliGRAM(s) Oral at bedtime  lidocaine   Patch 1 Patch Transdermal daily  midodrine. 10 milliGRAM(s) Oral three times a day  pantoprazole  Injectable 20 milliGRAM(s) IV Push daily  predniSONE   Tablet 5 milliGRAM(s) Oral daily  vancomycin    Solution 125 milliGRAM(s) Enteral Tube every 6 hours    MEDICATIONS  (PRN):  dextrose 40% Gel 15 Gram(s) Oral once PRN Blood Glucose LESS THAN 70 milliGRAM(s)/deciLiter  glucagon  Injectable 1 milliGRAM(s) IntraMuscular once PRN Glucose <70 milliGRAM(s)/deciLiter    Pertinent Labs:  @ 07:15: Na 137, BUN 32<H>, Cr 3.21<H>, <H>, K+ 3.5, Phos --, Mg --, Alk Phos --, ALT/SGPT --, AST/SGOT --, HbA1c --    Finger Sticks:  POCT Blood Glucose.: 131 mg/dL ( @ 11:59)  POCT Blood Glucose.: 126 mg/dL ( @ 06:26)  POCT Blood Glucose.: 158 mg/dL (12-10 @ 23:54)  POCT Blood Glucose.: 96 mg/dL (12-10 @ 18:07)    GI BM x6 loose stools, + cdiff  Skin per nursing documentation: sacrum st III  Edema: none    Estimated Needs:   [ X] no change since previous assessment  [ ] recalculated:     Previous Nutrition Diagnosis: Malnutrition  Nutrition Diagnosis is: ongoing, addressed with planned feeding tube        Recommend  1) once PEG placed and cleared for use, recommend Nepro @50ml/hr x24 hrs to provide total 1200ml, 2160 calories, 31.7/kg, protein 97gm 1.2/kg based on IBW= 80.9kg. Add henri x2 to provide total 28gm amino acids arginine and glutamine  for wound healing . free water in formula 840ml, added free water per team  2) add henri x2 daily  3) banana flakes x2 daily for loose stools  Monitoring and Evaluation:     Continue to monitor 3)intake, Tolerance to diet prescription, weights, labs, skin integrity    RD remains available upon request and will follow up per protocol

## 2019-12-11 NOTE — PROGRESS NOTE ADULT - SUBJECTIVE AND OBJECTIVE BOX
Chief complaint  Patient is a 74y old  Male who presents with a chief complaint of ams (11 Dec 2019 11:17)   Review of systems  Patient in bed, looks comfortable, no fever, no hypoglycemia.    Labs and Fingersticks  CAPILLARY BLOOD GLUCOSE      POCT Blood Glucose.: 131 mg/dL (11 Dec 2019 11:59)  POCT Blood Glucose.: 126 mg/dL (11 Dec 2019 06:26)  POCT Blood Glucose.: 158 mg/dL (10 Dec 2019 23:54)  POCT Blood Glucose.: 96 mg/dL (10 Dec 2019 18:07)      Anion Gap, Serum: 13 (12-11 @ 07:15)  Anion Gap, Serum: 15 (12-10 @ 07:44)      Calcium, Total Serum: 10.3 (12-11 @ 07:15)  Calcium, Total Serum: 11.1 <H> (12-10 @ 07:44)          12-11    137  |  95<L>  |  32<H>  ----------------------------<  126<H>  3.5   |  29  |  3.21<H>    Ca    10.3      11 Dec 2019 07:15                          6.9    13.69 )-----------( 243      ( 11 Dec 2019 11:48 )             23.3     Medications  MEDICATIONS  (STANDING):  alteplase for catheter clearance 2 milliGRAM(s) Catheter once  alteplase for catheter clearance 2 milliGRAM(s) Catheter once  atorvastatin 40 milliGRAM(s) Oral at bedtime  carvedilol 3.125 milliGRAM(s) Oral every 12 hours  chlorhexidine 4% Liquid 1 Application(s) Topical <User Schedule>  cinacalcet 30 milliGRAM(s) Oral daily  dextrose 5%. 1000 milliLiter(s) (50 mL/Hr) IV Continuous <Continuous>  dextrose 50% Injectable 12.5 Gram(s) IV Push once  dextrose 50% Injectable 25 Gram(s) IV Push once  dextrose 50% Injectable 25 Gram(s) IV Push once  epoetin tan Injectable 49061 Unit(s) IV Push <User Schedule>  heparin  Injectable 5000 Unit(s) SubCutaneous every 8 hours  insulin lispro (HumaLOG) corrective regimen sliding scale   SubCutaneous every 6 hours  levETIRAcetam  Solution 1000 milliGRAM(s) Oral at bedtime  lidocaine   Patch 1 Patch Transdermal daily  midodrine. 10 milliGRAM(s) Oral three times a day  pantoprazole  Injectable 20 milliGRAM(s) IV Push daily  predniSONE   Tablet 5 milliGRAM(s) Oral daily  vancomycin    Solution 125 milliGRAM(s) Enteral Tube every 6 hours      Physical Exam  GI PCR Panel, Stool (collected 12-10-19 @ 22:54)  Source: .Stool Feces  Final Report (12-11-19 @ 02:20):    GI PCR Results: NOT detected    *******Please Note:*******    GI panel PCR evaluates for:    Campylobacter, Plesiomonas shigelloides, Salmonella,    Vibrio, Yersinia enterocolitica, Enteroaggregative    Escherichia coli (EAEC), Enteropathogenic E.coli (EPEC),    Enterotoxigenic E. coli (ETEC) lt/st, Shiga-like    toxin-producing E. coli (STEC) stx1/stx2,    Shigella/ Enteroinvasive E. coli (EIEC), Cryptosporidium,    Cyclospora cayetanensis, Entamoeba histolytica,    Giardia lamblia, Adenovirus F 40/41, Astrovirus,    Norovirus GI/GII, Rotavirus A, Sapovirus      General: Patient comfortable in bed  Vital Signs Last 12 Hrs  T(F): 98.6 (12-11-19 @ 11:45), Max: 99.1 (12-11-19 @ 08:21)  HR: 98 (12-11-19 @ 11:45) (93 - 99)  BP: 125/66 (12-11-19 @ 11:45) (112/69 - 125/66)  BP(mean): --  RR: 18 (12-11-19 @ 11:45) (18 - 18)  SpO2: 96% (12-11-19 @ 11:45) (96% - 98%)  Neck: No palpable thyroid nodules.  CVS: S1S2, No murmurs  Respiratory: No wheezing, no crepitations  GI: Abdomen soft, bowel sounds positive  Musculoskeletal:  edema lower extremities.   Skin: No skin rashes, no ecchymosis    Diagnostics Chief complaint  Patient is a 74y old  Male who presents with a chief complaint of ams (11 Dec 2019 11:17)   Review of systems  Patient in bed, looks comfortable, no fever,  no hypoglycemia.    Labs and Fingersticks  CAPILLARY BLOOD GLUCOSE      POCT Blood Glucose.: 131 mg/dL (11 Dec 2019 11:59)  POCT Blood Glucose.: 126 mg/dL (11 Dec 2019 06:26)  POCT Blood Glucose.: 158 mg/dL (10 Dec 2019 23:54)  POCT Blood Glucose.: 96 mg/dL (10 Dec 2019 18:07)      Anion Gap, Serum: 13 (12-11 @ 07:15)  Anion Gap, Serum: 15 (12-10 @ 07:44)      Calcium, Total Serum: 10.3 (12-11 @ 07:15)  Calcium, Total Serum: 11.1 <H> (12-10 @ 07:44)          12-11    137  |  95<L>  |  32<H>  ----------------------------<  126<H>  3.5   |  29  |  3.21<H>    Ca    10.3      11 Dec 2019 07:15                          6.9    13.69 )-----------( 243      ( 11 Dec 2019 11:48 )             23.3     Medications  MEDICATIONS  (STANDING):  alteplase for catheter clearance 2 milliGRAM(s) Catheter once  alteplase for catheter clearance 2 milliGRAM(s) Catheter once  atorvastatin 40 milliGRAM(s) Oral at bedtime  carvedilol 3.125 milliGRAM(s) Oral every 12 hours  chlorhexidine 4% Liquid 1 Application(s) Topical <User Schedule>  cinacalcet 30 milliGRAM(s) Oral daily  dextrose 5%. 1000 milliLiter(s) (50 mL/Hr) IV Continuous <Continuous>  dextrose 50% Injectable 12.5 Gram(s) IV Push once  dextrose 50% Injectable 25 Gram(s) IV Push once  dextrose 50% Injectable 25 Gram(s) IV Push once  epoetin tan Injectable 55287 Unit(s) IV Push <User Schedule>  heparin  Injectable 5000 Unit(s) SubCutaneous every 8 hours  insulin lispro (HumaLOG) corrective regimen sliding scale   SubCutaneous every 6 hours  levETIRAcetam  Solution 1000 milliGRAM(s) Oral at bedtime  lidocaine   Patch 1 Patch Transdermal daily  midodrine. 10 milliGRAM(s) Oral three times a day  pantoprazole  Injectable 20 milliGRAM(s) IV Push daily  predniSONE   Tablet 5 milliGRAM(s) Oral daily  vancomycin    Solution 125 milliGRAM(s) Enteral Tube every 6 hours      Physical Exam  GI PCR Panel, Stool (collected 12-10-19 @ 22:54)  Source: .Stool Feces  Final Report (12-11-19 @ 02:20):    GI PCR Results: NOT detected    *******Please Note:*******    GI panel PCR evaluates for:    Campylobacter, Plesiomonas shigelloides, Salmonella,    Vibrio, Yersinia enterocolitica, Enteroaggregative    Escherichia coli (EAEC), Enteropathogenic E.coli (EPEC),    Enterotoxigenic E. coli (ETEC) lt/st, Shiga-like    toxin-producing E. coli (STEC) stx1/stx2,    Shigella/ Enteroinvasive E. coli (EIEC), Cryptosporidium,    Cyclospora cayetanensis, Entamoeba histolytica,    Giardia lamblia, Adenovirus F 40/41, Astrovirus,    Norovirus GI/GII, Rotavirus A, Sapovirus      General: Patient comfortable in bed  Vital Signs Last 12 Hrs  T(F): 98.6 (12-11-19 @ 11:45), Max: 99.1 (12-11-19 @ 08:21)  HR: 98 (12-11-19 @ 11:45) (93 - 99)  BP: 125/66 (12-11-19 @ 11:45) (112/69 - 125/66)  BP(mean): --  RR: 18 (12-11-19 @ 11:45) (18 - 18)  SpO2: 96% (12-11-19 @ 11:45) (96% - 98%)  Neck: No palpable thyroid nodules.  CVS: S1S2, No murmurs  Respiratory: No wheezing, no crepitations  GI: Abdomen soft, bowel sounds positive  Musculoskeletal:  edema lower extremities.   Skin: No skin rashes, no ecchymosis    Diagnostics

## 2019-12-11 NOTE — PRE-OP CHECKLIST - SELECT TESTS ORDERED
EKG/109/PT/PTT/Hepatic Function/CXR/CMP/POCT Blood Glucose/CBC PT/PTT/Hepatic Function/CMP/Type and Cross/Type and Screen/CXR/POCT Blood Glucose/EKG/109/CBC

## 2019-12-11 NOTE — PROGRESS NOTE ADULT - ASSESSMENT
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   NEURO:  - Extubated 11/29, now on nasal cannula. Minimally verbal   - metabolic encephalopathy, meningitis r/o, LP negative- neuro following  - chronic lacunar infarcts; per neuro, AMS also likely related to hypercalcemia, renal dysfunction , poor nutritional intake.   - c/w keppra for ?hx of seizure disorder  -AMS likely 2/2 hyperCa, renal dysfunction, poor PO intake  - may be developing sepsis . will consider fever w/u  - ID called to follow up    CV:  - hx of afib was on eliquis, now off a/c per cards due to bleeding risk  - holding carvedilol 6.25 BID  - hx of cocaine cardiomyopathy, resolved, normal EF on most recent echo  - c/w statin  - echo repeat EF70, LVH, hyperdynamic LCF, normal RV sys fxn    PULM:- extubated 11/29--tolerating nasal cannula  GI:  - GI consult for PEG     RENAL:  - hx of ESRD s/p failed renal transplant x2  - renal following, HD as recommended.  - c/w predisone, sevelamir    ENDO:  #DM2: HbA1c 5.5  - Start Insulin Sliding Scale  - endocrine following  - monitor Ca    INFECTIOUS:  - started vanco for c diff  - id fu       GOC: palliative fu

## 2019-12-12 DIAGNOSIS — R19.7 DIARRHEA, UNSPECIFIED: ICD-10-CM

## 2019-12-12 LAB
ANION GAP SERPL CALC-SCNC: 15 MMOL/L — SIGNIFICANT CHANGE UP (ref 5–17)
BUN SERPL-MCNC: 46 MG/DL — HIGH (ref 7–23)
C DIFF BY PCR RESULT: SIGNIFICANT CHANGE UP
C DIFF TOX GENS STL QL NAA+PROBE: SIGNIFICANT CHANGE UP
CALCIUM SERPL-MCNC: 10.4 MG/DL — SIGNIFICANT CHANGE UP (ref 8.4–10.5)
CHLORIDE SERPL-SCNC: 97 MMOL/L — SIGNIFICANT CHANGE UP (ref 96–108)
CO2 SERPL-SCNC: 24 MMOL/L — SIGNIFICANT CHANGE UP (ref 22–31)
CREAT SERPL-MCNC: 4.24 MG/DL — HIGH (ref 0.5–1.3)
GLUCOSE BLDC GLUCOMTR-MCNC: 107 MG/DL — HIGH (ref 70–99)
GLUCOSE BLDC GLUCOMTR-MCNC: 110 MG/DL — HIGH (ref 70–99)
GLUCOSE BLDC GLUCOMTR-MCNC: 112 MG/DL — HIGH (ref 70–99)
GLUCOSE BLDC GLUCOMTR-MCNC: 142 MG/DL — HIGH (ref 70–99)
GLUCOSE BLDC GLUCOMTR-MCNC: 143 MG/DL — HIGH (ref 70–99)
GLUCOSE SERPL-MCNC: 143 MG/DL — HIGH (ref 70–99)
HCT VFR BLD CALC: 23.7 % — LOW (ref 39–50)
HGB BLD-MCNC: 7.2 G/DL — LOW (ref 13–17)
INR BLD: 1.12 RATIO — SIGNIFICANT CHANGE UP (ref 0.88–1.16)
MCHC RBC-ENTMCNC: 26.3 PG — LOW (ref 27–34)
MCHC RBC-ENTMCNC: 30.4 GM/DL — LOW (ref 32–36)
MCV RBC AUTO: 86.5 FL — SIGNIFICANT CHANGE UP (ref 80–100)
NRBC # BLD: 0 /100 WBCS — SIGNIFICANT CHANGE UP (ref 0–0)
PLATELET # BLD AUTO: 276 K/UL — SIGNIFICANT CHANGE UP (ref 150–400)
POTASSIUM SERPL-MCNC: 3.6 MMOL/L — SIGNIFICANT CHANGE UP (ref 3.5–5.3)
POTASSIUM SERPL-SCNC: 3.6 MMOL/L — SIGNIFICANT CHANGE UP (ref 3.5–5.3)
PROTHROM AB SERPL-ACNC: 12.9 SEC — SIGNIFICANT CHANGE UP (ref 10–12.9)
RBC # BLD: 2.74 M/UL — LOW (ref 4.2–5.8)
RBC # FLD: 16.1 % — HIGH (ref 10.3–14.5)
SODIUM SERPL-SCNC: 136 MMOL/L — SIGNIFICANT CHANGE UP (ref 135–145)
WBC # BLD: 13.59 K/UL — HIGH (ref 3.8–10.5)
WBC # FLD AUTO: 13.59 K/UL — HIGH (ref 3.8–10.5)

## 2019-12-12 PROCEDURE — 74176 CT ABD & PELVIS W/O CONTRAST: CPT | Mod: 26

## 2019-12-12 PROCEDURE — 99232 SBSQ HOSP IP/OBS MODERATE 35: CPT

## 2019-12-12 RX ORDER — ALTEPLASE 100 MG
2 KIT INTRAVENOUS ONCE
Refills: 0 | Status: COMPLETED | OUTPATIENT
Start: 2019-12-12 | End: 2019-12-12

## 2019-12-12 RX ADMIN — Medication 5 MILLIGRAM(S): at 05:40

## 2019-12-12 RX ADMIN — CHLORHEXIDINE GLUCONATE 1 APPLICATION(S): 213 SOLUTION TOPICAL at 05:41

## 2019-12-12 RX ADMIN — ATORVASTATIN CALCIUM 40 MILLIGRAM(S): 80 TABLET, FILM COATED ORAL at 22:37

## 2019-12-12 RX ADMIN — LEVETIRACETAM 1000 MILLIGRAM(S): 250 TABLET, FILM COATED ORAL at 22:37

## 2019-12-12 RX ADMIN — HEPARIN SODIUM 5000 UNIT(S): 5000 INJECTION INTRAVENOUS; SUBCUTANEOUS at 22:37

## 2019-12-12 RX ADMIN — LIDOCAINE 1 PATCH: 4 CREAM TOPICAL at 13:17

## 2019-12-12 RX ADMIN — CINACALCET 30 MILLIGRAM(S): 30 TABLET, FILM COATED ORAL at 13:16

## 2019-12-12 RX ADMIN — LIDOCAINE 1 PATCH: 4 CREAM TOPICAL at 17:41

## 2019-12-12 RX ADMIN — HEPARIN SODIUM 5000 UNIT(S): 5000 INJECTION INTRAVENOUS; SUBCUTANEOUS at 05:40

## 2019-12-12 RX ADMIN — Medication 125 MILLIGRAM(S): at 05:40

## 2019-12-12 RX ADMIN — CARVEDILOL PHOSPHATE 3.12 MILLIGRAM(S): 80 CAPSULE, EXTENDED RELEASE ORAL at 22:37

## 2019-12-12 RX ADMIN — PANTOPRAZOLE SODIUM 20 MILLIGRAM(S): 20 TABLET, DELAYED RELEASE ORAL at 13:16

## 2019-12-12 RX ADMIN — Medication 125 MILLIGRAM(S): at 13:17

## 2019-12-12 RX ADMIN — ALTEPLASE 2 MILLIGRAM(S): KIT at 21:50

## 2019-12-12 RX ADMIN — ERYTHROPOIETIN 10000 UNIT(S): 10000 INJECTION, SOLUTION INTRAVENOUS; SUBCUTANEOUS at 20:25

## 2019-12-12 RX ADMIN — ALTEPLASE 2 MILLIGRAM(S): KIT at 21:52

## 2019-12-12 RX ADMIN — CARVEDILOL PHOSPHATE 3.12 MILLIGRAM(S): 80 CAPSULE, EXTENDED RELEASE ORAL at 11:54

## 2019-12-12 NOTE — PROGRESS NOTE ADULT - PROBLEM SELECTOR PLAN 1
Will continue moderate-scale coverage for now.  Will continue monitoring FS and FU. Will continue monitoring FS and FU.

## 2019-12-12 NOTE — PROGRESS NOTE ADULT - SUBJECTIVE AND OBJECTIVE BOX
Patient is a 74y old  Male who presents with a chief complaint of ams (11 Dec 2019 19:09)    Being followed by ID for leucocytosis C diff    Interval history:still some loose stools   No acute events      ROS:  Not obtainable     Antimicrobials:    vancomycin    Solution 125 milliGRAM(s) Enteral Tube every 6 hours    Other medications reviewed    Vital Signs Last 24 Hrs  T(C): 36.8 (12-12-19 @ 08:00), Max: 37.3 (12-12-19 @ 04:00)  T(F): 98.3 (12-12-19 @ 08:00), Max: 99.2 (12-12-19 @ 04:00)  HR: 103 (12-12-19 @ 08:00) (90 - 103)  BP: 135/71 (12-12-19 @ 08:00) (125/66 - 150/74)  BP(mean): --  RR: 18 (12-12-19 @ 08:00) (18 - 18)  SpO2: 93% (12-12-19 @ 08:00) (93% - 97%)    Physical Exam:      R Sc HD catheter no erythema o tenderness    NGT    Chest Good AE,bibasilar crackles     CVS RRR S1 S2 WNl No murmur or rub or gallop    Abd soft BS normal No tenderness RLQ transplant kidney no tenderness    sacral decub stage III no purulence or discharge noted     Ext left arm AVF no erythema or tenderness    IV site no erythema tenderness or discharge    Joints no swelling or LOM    CNS as above      Lab Data:                          6.9    13.69 )-----------( 243      ( 11 Dec 2019 11:48 )             23.3       12-11    137  |  95<L>  |  32<H>  ----------------------------<  126<H>  3.5   |  29  |  3.21<H>    Ca    10.3      11 Dec 2019 07:15          GI PCR Panel, Stool (collected 10 Dec 2019 22:54)  Source: .Stool Feces  Final Report (11 Dec 2019 02:20):    GI PCR Results: NOT detected    *******Please Note:*******    GI panel PCR evaluates for:    Campylobacter, Plesiomonas shigelloides, Salmonella,    Vibrio, Yersinia enterocolitica, Enteroaggregative    Escherichia coli (EAEC), Enteropathogenic E.coli (EPEC),    Enterotoxigenic E. coli (ETEC) lt/st, Shiga-like    toxin-producing E. coli (STEC) stx1/stx2,    Shigella/ Enteroinvasive E. coli (EIEC), Cryptosporidium,    Cyclospora cayetanensis, Entamoeba histolytica,    Giardia lamblia, Adenovirus F 40/41, Astrovirus,    Norovirus GI/GII, Rotavirus A, Sapovirus        Clostridium difficile GDH Interpretation: This specimen is positive for C. Difficile glutamate dehydrogenase (GDH)  antigen and negative for C. Difficile Toxins A & B, by EIA.  GDH is a  highly sensitive screening marker for C. Difficile that is produced in  large amounts by all C. Difficilestrains, both toxigenic and  nontoxigenic.  Specimens that are GDH positive and toxin negative will be  tested further by PCR to detect toxin gene sequences associated with  toxin producing C. Difficle. (12-10-19 @ 18:47)        C diff PCR pending

## 2019-12-12 NOTE — PROGRESS NOTE ADULT - ASSESSMENT
Assessment  DM: A1C 5.5%, was on insulin at home, on moderate-scale insulin coverage, blood sugars trending within acceptable range, no hypoglycemic episodes. Patient still NPO on tube feeds, on low-dose steroids, still NPO on tube feeds.  Hypercalcemia: Primary Hyperparathyroidism, calcium improved and WNL s/p Pamidronate dose.  Sepsis: IV ABx for UTI, LP inconsistent with meningitis, on medications, stable, monitored.  HTN: Controlled,  on antihypertensive medications.  ESRD: On hemodialysis, Monitor labs/BMP          Robi Gay MD  Cell: [phone]  Office: [phone] Assessment  DM: A1C 5.5%, was on insulin at home, on moderate-scale insulin coverage, blood sugars trending within acceptable range, no hypoglycemic episodes. Patient still NPO on tube feeds, on low-dose steroids,  still NPO on tube feeds.  Hypercalcemia: Primary Hyperparathyroidism, calcium improved and WNL s/p Pamidronate dose.  Sepsis: IV ABx for UTI, LP inconsistent with meningitis, on medications, stable, monitored.  HTN: Controlled,  on antihypertensive medications.  ESRD: On hemodialysis, Monitor labs/BMP          Robi Gay MD  Cell: [phone]  Office: [phone]

## 2019-12-12 NOTE — PROGRESS NOTE ADULT - ASSESSMENT
History of cocaine induced CM  normal EF on last echo  cont BB     PAF  in sinus   a/c once deemed safe   for now will keep off given high bleed risk    GOC   fu with palliative care

## 2019-12-12 NOTE — PROGRESS NOTE ADULT - ASSESSMENT
74M PMHx of ESRD s/p failed renal transplant x2, HCV, DM, and meningococcal meningitis 2 years ago was sent in to the ED from HD for AMS and low BPs.  CVA  Pneumonia-aspiration-resp failure s/p Rx  patient also with sacral decub stage III  Mild leucocytosis  Is on baseline steroids  Cough But CXR clear  No fevers  Hemodynamically stable  Cx negative CXR negative  WBC stable-mild decrease   ? from steroids   also C diff indeterminate- PCR pending  Had some loose stools  continue po  vanco via NGT pending C diff PCR  Aspiration precautions  Wound care for decub  Will tailor plan for ID issues  per course,results.Will defer to primary team on management of other issues.  case d/w Med NP  Will Follow.  Beeper [id number]-rigy/afterhours/No response-[id number]

## 2019-12-12 NOTE — PROGRESS NOTE ADULT - SUBJECTIVE AND OBJECTIVE BOX
Chief complaint  Patient is a 74y old  Male who presents with a chief complaint of ams (12 Dec 2019 10:19)   Review of systems  Patient in bed, looks comfortable, no fever, no hypoglycemia.    Labs and Fingersticks  CAPILLARY BLOOD GLUCOSE      POCT Blood Glucose.: 143 mg/dL (12 Dec 2019 11:49)  POCT Blood Glucose.: 110 mg/dL (12 Dec 2019 05:53)  POCT Blood Glucose.: 107 mg/dL (12 Dec 2019 00:19)  POCT Blood Glucose.: 102 mg/dL (11 Dec 2019 20:22)      Anion Gap, Serum: 15 (12-12 @ 10:54)  Anion Gap, Serum: 13 (12-11 @ 07:15)      Calcium, Total Serum: 10.4 (12-12 @ 10:54)  Calcium, Total Serum: 10.3 (12-11 @ 07:15)          12-12    136  |  97  |  46<H>  ----------------------------<  143<H>  3.6   |  24  |  4.24<H>    Ca    10.4      12 Dec 2019 10:54                          7.2    13.59 )-----------( 276      ( 12 Dec 2019 10:54 )             23.7     Medications  MEDICATIONS  (STANDING):  alteplase for catheter clearance 2 milliGRAM(s) Catheter once  alteplase for catheter clearance 2 milliGRAM(s) Catheter once  atorvastatin 40 milliGRAM(s) Oral at bedtime  carvedilol 3.125 milliGRAM(s) Oral every 12 hours  chlorhexidine 4% Liquid 1 Application(s) Topical <User Schedule>  cinacalcet 30 milliGRAM(s) Oral daily  dextrose 5%. 1000 milliLiter(s) (50 mL/Hr) IV Continuous <Continuous>  dextrose 50% Injectable 12.5 Gram(s) IV Push once  dextrose 50% Injectable 25 Gram(s) IV Push once  dextrose 50% Injectable 25 Gram(s) IV Push once  epoetin tan Injectable 28012 Unit(s) IV Push <User Schedule>  heparin  Injectable 5000 Unit(s) SubCutaneous every 8 hours  insulin lispro (HumaLOG) corrective regimen sliding scale   SubCutaneous every 6 hours  levETIRAcetam  Solution 1000 milliGRAM(s) Oral at bedtime  lidocaine   Patch 1 Patch Transdermal daily  midodrine. 10 milliGRAM(s) Oral three times a day  pantoprazole  Injectable 20 milliGRAM(s) IV Push daily  predniSONE   Tablet 5 milliGRAM(s) Oral daily  vancomycin    Solution 125 milliGRAM(s) Enteral Tube every 6 hours      Physical Exam  General: Patient comfortable in bed  Vital Signs Last 12 Hrs  T(F): 98.4 (12-12-19 @ 12:00), Max: 99.2 (12-12-19 @ 04:00)  HR: 99 (12-12-19 @ 12:00) (99 - 103)  BP: 130/68 (12-12-19 @ 12:00) (130/68 - 144/85)  BP(mean): --  RR: 18 (12-12-19 @ 12:00) (18 - 18)  SpO2: 99% (12-12-19 @ 12:00) (93% - 99%)  Neck: No palpable thyroid nodules.  CVS: S1S2, No murmurs  Respiratory: No wheezing, no crepitations  GI: Abdomen soft, bowel sounds positive  Musculoskeletal:  edema lower extremities.   Skin: No skin rashes, no ecchymosis    Diagnostics Chief complaint  Patient is a 74y old  Male who presents with a chief complaint of ams (12 Dec 2019 10:19)   Review of systems  Patient in bed, looks comfortable, no fever,  no hypoglycemia.    Labs and Fingersticks  CAPILLARY BLOOD GLUCOSE      POCT Blood Glucose.: 143 mg/dL (12 Dec 2019 11:49)  POCT Blood Glucose.: 110 mg/dL (12 Dec 2019 05:53)  POCT Blood Glucose.: 107 mg/dL (12 Dec 2019 00:19)  POCT Blood Glucose.: 102 mg/dL (11 Dec 2019 20:22)      Anion Gap, Serum: 15 (12-12 @ 10:54)  Anion Gap, Serum: 13 (12-11 @ 07:15)      Calcium, Total Serum: 10.4 (12-12 @ 10:54)  Calcium, Total Serum: 10.3 (12-11 @ 07:15)          12-12    136  |  97  |  46<H>  ----------------------------<  143<H>  3.6   |  24  |  4.24<H>    Ca    10.4      12 Dec 2019 10:54                          7.2    13.59 )-----------( 276      ( 12 Dec 2019 10:54 )             23.7     Medications  MEDICATIONS  (STANDING):  alteplase for catheter clearance 2 milliGRAM(s) Catheter once  alteplase for catheter clearance 2 milliGRAM(s) Catheter once  atorvastatin 40 milliGRAM(s) Oral at bedtime  carvedilol 3.125 milliGRAM(s) Oral every 12 hours  chlorhexidine 4% Liquid 1 Application(s) Topical <User Schedule>  cinacalcet 30 milliGRAM(s) Oral daily  dextrose 5%. 1000 milliLiter(s) (50 mL/Hr) IV Continuous <Continuous>  dextrose 50% Injectable 12.5 Gram(s) IV Push once  dextrose 50% Injectable 25 Gram(s) IV Push once  dextrose 50% Injectable 25 Gram(s) IV Push once  epoetin tan Injectable 77991 Unit(s) IV Push <User Schedule>  heparin  Injectable 5000 Unit(s) SubCutaneous every 8 hours  insulin lispro (HumaLOG) corrective regimen sliding scale   SubCutaneous every 6 hours  levETIRAcetam  Solution 1000 milliGRAM(s) Oral at bedtime  lidocaine   Patch 1 Patch Transdermal daily  midodrine. 10 milliGRAM(s) Oral three times a day  pantoprazole  Injectable 20 milliGRAM(s) IV Push daily  predniSONE   Tablet 5 milliGRAM(s) Oral daily  vancomycin    Solution 125 milliGRAM(s) Enteral Tube every 6 hours      Physical Exam  General: Patient comfortable in bed  Vital Signs Last 12 Hrs  T(F): 98.4 (12-12-19 @ 12:00), Max: 99.2 (12-12-19 @ 04:00)  HR: 99 (12-12-19 @ 12:00) (99 - 103)  BP: 130/68 (12-12-19 @ 12:00) (130/68 - 144/85)  BP(mean): --  RR: 18 (12-12-19 @ 12:00) (18 - 18)  SpO2: 99% (12-12-19 @ 12:00) (93% - 99%)  Neck: No palpable thyroid nodules.  CVS: S1S2, No murmurs  Respiratory: No wheezing, no crepitations  GI: Abdomen soft, bowel sounds positive  Musculoskeletal:  edema lower extremities.   Skin: No skin rashes, no ecchymosis    Diagnostics

## 2019-12-12 NOTE — PROGRESS NOTE ADULT - SUBJECTIVE AND OBJECTIVE BOX
Patient is a 74y old  Male who presents with a chief complaint of ams (12 Dec 2019 16:11)      INTERVAL HPI/OVERNIGHT EVENTS:  T(C): 37.5 (12-12-19 @ 16:16), Max: 37.5 (12-12-19 @ 16:16)  HR: 96 (12-12-19 @ 16:16) (90 - 103)  BP: 132/72 (12-12-19 @ 16:16) (130/68 - 150/74)  RR: 18 (12-12-19 @ 16:16) (18 - 18)  SpO2: 95% (12-12-19 @ 16:16) (93% - 99%)  Wt(kg): --  I&O's Summary    11 Dec 2019 07:01  -  12 Dec 2019 07:00  --------------------------------------------------------  IN: 400 mL / OUT: 0 mL / NET: 400 mL    12 Dec 2019 07:01  -  12 Dec 2019 16:32  --------------------------------------------------------  IN: 0 mL / OUT: 0 mL / NET: 0 mL        LABS:                        7.2    13.59 )-----------( 276      ( 12 Dec 2019 10:54 )             23.7     12-12    136  |  97  |  46<H>  ----------------------------<  143<H>  3.6   |  24  |  4.24<H>    Ca    10.4      12 Dec 2019 10:54      PT/INR - ( 12 Dec 2019 10:54 )   PT: 12.9 sec;   INR: 1.12 ratio             CAPILLARY BLOOD GLUCOSE      POCT Blood Glucose.: 143 mg/dL (12 Dec 2019 11:49)  POCT Blood Glucose.: 110 mg/dL (12 Dec 2019 05:53)  POCT Blood Glucose.: 107 mg/dL (12 Dec 2019 00:19)  POCT Blood Glucose.: 102 mg/dL (11 Dec 2019 20:22)            MEDICATIONS  (STANDING):  alteplase for catheter clearance 2 milliGRAM(s) Catheter once  alteplase for catheter clearance 2 milliGRAM(s) Catheter once  atorvastatin 40 milliGRAM(s) Oral at bedtime  carvedilol 3.125 milliGRAM(s) Oral every 12 hours  chlorhexidine 4% Liquid 1 Application(s) Topical <User Schedule>  cinacalcet 30 milliGRAM(s) Oral daily  dextrose 5%. 1000 milliLiter(s) (50 mL/Hr) IV Continuous <Continuous>  dextrose 50% Injectable 12.5 Gram(s) IV Push once  dextrose 50% Injectable 25 Gram(s) IV Push once  dextrose 50% Injectable 25 Gram(s) IV Push once  epoetin tan Injectable 99292 Unit(s) IV Push <User Schedule>  heparin  Injectable 5000 Unit(s) SubCutaneous every 8 hours  insulin lispro (HumaLOG) corrective regimen sliding scale   SubCutaneous every 6 hours  levETIRAcetam  Solution 1000 milliGRAM(s) Oral at bedtime  lidocaine   Patch 1 Patch Transdermal daily  midodrine. 10 milliGRAM(s) Oral three times a day  pantoprazole  Injectable 20 milliGRAM(s) IV Push daily  predniSONE   Tablet 5 milliGRAM(s) Oral daily  vancomycin    Solution 125 milliGRAM(s) Enteral Tube every 6 hours    MEDICATIONS  (PRN):  dextrose 40% Gel 15 Gram(s) Oral once PRN Blood Glucose LESS THAN 70 milliGRAM(s)/deciLiter  glucagon  Injectable 1 milliGRAM(s) IntraMuscular once PRN Glucose <70 milliGRAM(s)/deciLiter          PHYSICAL EXAM:  GENERAL: NAD, well-groomed, well-developed  HEAD:  Atraumatic, Normocephalicfrail  CHEST/LUNG: Clear to percussion bilaterally; No rales, rhonchi, wheezing, or rubs  HEART: Regular rate and rhythm; No murmurs, rubs, or gallops  ABDOMEN: Soft, Nontender, Nondistended; Bowel sounds present  EXTREMITIES:  no edema +    Care Discussed with Consultants/Other Providers [ ] YES  [ ] NO

## 2019-12-12 NOTE — PROGRESS NOTE ADULT - ASSESSMENT
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   NEURO:  - Extubated 11/29, now on nasal cannula. Minimally verbal   - metabolic encephalopathy, meningitis r/o, LP negative  - neuro following  - chronic lacunar infarcts; per neuro, AMS also likely related to hypercalcemia, renal dysfunction , poor nutritional intake.   - c/w keppra for ?hx of seizure disorder  -AMS likely 2/2 hyperCa, renal dysfunction, poor PO intake      CV:  - hx of afib was on eliquis, now off a/c per cards due to bleeding risk  - holding carvedilol 6.25 BID  - hx of cocaine cardiomyopathy, resolved, normal EF on most recent echo  - c/w statin  - echo repeat EF70, LVH, hyperdynamic LCF, normal RV sys fxn    PULM:- extubated 11/29--tolerating nasal cannula    GI:  - GI consult for PEG   - CT abdomen     RENAL:  - hx of ESRD s/p failed renal transplant x2  - renal following, HD as recommended.  - c/w predisone, sevelamir    ENDO:  #DM2: HbA1c 5.5  - Start Insulin Sliding Scale  - endocrine following  - monitor Ca    INFECTIOUS:  - started vanco for c diff  - id fu       GOC: palliative fu

## 2019-12-12 NOTE — PROGRESS NOTE ADULT - SUBJECTIVE AND OBJECTIVE BOX
INTERVAL HPI/OVERNIGHT EVENTS:    s/p  upper gastrointestinal endoscopy with the procedure was aborted due to inability to place PEG tube.  NGT replaced with endoscopic confirmation  no loose stool today    MEDICATIONS  (STANDING):  alteplase for catheter clearance 2 milliGRAM(s) Catheter once  alteplase for catheter clearance 2 milliGRAM(s) Catheter once  atorvastatin 40 milliGRAM(s) Oral at bedtime  carvedilol 3.125 milliGRAM(s) Oral every 12 hours  chlorhexidine 4% Liquid 1 Application(s) Topical <User Schedule>  cinacalcet 30 milliGRAM(s) Oral daily  dextrose 5%. 1000 milliLiter(s) (50 mL/Hr) IV Continuous <Continuous>  dextrose 50% Injectable 12.5 Gram(s) IV Push once  dextrose 50% Injectable 25 Gram(s) IV Push once  dextrose 50% Injectable 25 Gram(s) IV Push once  epoetin tan Injectable 31046 Unit(s) IV Push <User Schedule>  heparin  Injectable 5000 Unit(s) SubCutaneous every 8 hours  insulin lispro (HumaLOG) corrective regimen sliding scale   SubCutaneous every 6 hours  levETIRAcetam  Solution 1000 milliGRAM(s) Oral at bedtime  lidocaine   Patch 1 Patch Transdermal daily  midodrine. 10 milliGRAM(s) Oral three times a day  pantoprazole  Injectable 20 milliGRAM(s) IV Push daily  predniSONE   Tablet 5 milliGRAM(s) Oral daily  vancomycin    Solution 125 milliGRAM(s) Enteral Tube every 6 hours    MEDICATIONS  (PRN):  dextrose 40% Gel 15 Gram(s) Oral once PRN Blood Glucose LESS THAN 70 milliGRAM(s)/deciLiter  glucagon  Injectable 1 milliGRAM(s) IntraMuscular once PRN Glucose <70 milliGRAM(s)/deciLiter      Allergies    No Known Allergies    Intolerances        Review of Systems:    General:  No wt loss, fevers, chills, night sweats,fatigue,   Eyes:  Good vision, no reported pain  ENT:  No sore throat, pain, runny nose, dysphagia  CV:  No pain, palpitations, hypo/hypertension  Resp:  No dyspnea, cough, tachypnea, wheezing  GI:  No pain, No nausea, No vomiting, No diarrhea, No constipation, No weight loss, No fever, No pruritis, No rectal bleeding, No melena, No dysphagia  :  No pain, bleeding, incontinence, nocturia  Muscle:  No pain, weakness  Neuro:  No weakness, tingling, memory problems  Psych:  No fatigue, insomnia, mood problems, depression  Endocrine:  No polyuria, polydypsia, cold/heat intolerance  Heme:  No petechiae, ecchymosis, easy bruisability  Skin:  No rash, tattoos, scars, edema      Vital Signs Last 24 Hrs  T(C): 36.9 (12 Dec 2019 12:00), Max: 37.3 (12 Dec 2019 04:00)  T(F): 98.4 (12 Dec 2019 12:00), Max: 99.2 (12 Dec 2019 04:00)  HR: 99 (12 Dec 2019 12:00) (90 - 103)  BP: 130/68 (12 Dec 2019 12:00) (130/68 - 150/74)  BP(mean): --  RR: 18 (12 Dec 2019 12:00) (18 - 18)  SpO2: 99% (12 Dec 2019 12:00) (93% - 99%)    PHYSICAL EXAM:    Constitutional: NAD, well-developed  HEENT: EOMI, throat clear, + NGT  Neck: No LAD, supple  Respiratory: CTA and P  Cardiovascular: S1 and S2, RRR, no M  Gastrointestinal: BS+, soft, NT/ND, neg HSM,  Extremities: No peripheral edema, neg clubing, cyanosis  Vascular: 2+ peripheral pulses  Neurological: A/O x 3, no focal deficits  Psychiatric: Normal mood, normal affect  Skin: No rashes      LABS:                        7.2    13.59 )-----------( 276      ( 12 Dec 2019 10:54 )             23.7     12-12    136  |  97  |  46<H>  ----------------------------<  143<H>  3.6   |  24  |  4.24<H>    Ca    10.4      12 Dec 2019 10:54      PT/INR - ( 12 Dec 2019 10:54 )   PT: 12.9 sec;   INR: 1.12 ratio               RADIOLOGY & ADDITIONAL TESTS:

## 2019-12-12 NOTE — PROGRESS NOTE ADULT - SUBJECTIVE AND OBJECTIVE BOX
Patient is a 74y Male whom presented to   pateint seen and examined nad ,     PAST MEDICAL & SURGICAL HISTORY:  Kidney transplant recipient  Anuria  GERD (gastroesophageal reflux disease)  Kidney transplant failure: dx: 2/2013  Hepatitis C: dx: 90&#x27;s.  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 10 milliGRAM(s) Oral three times a day  mycophenolate mofetil 250 milliGRAM(s) Oral two times a day  predniSONE   Tablet 5 milliGRAM(s) Oral daily  sevelamer carbonate 800 milliGRAM(s) Oral three times a day with meals  sodium bicarbonate 650 milliGRAM(s) Oral two times a day  tamsulosin 0.4 milliGRAM(s) Oral at bedtime      Allergies    No Known Allergies                       SOCIAL HISTORY:  Denies ETOh,Smoking,     FAMILY HISTORY:  Family history of breast cancer in sister (Sibling): living at 94 yo  Family history of prostate cancer in father  Family history of lung cancer  Family history of hypertension in mother  Family history of diabetes mellitus: mother      REVIEW OF SYSTEMS:    unbable to obtained                                                                                                                                               7.2    13.59 )-----------( 276      ( 12 Dec 2019 10:54 )             23.7       CBC Full  -  ( 12 Dec 2019 10:54 )  WBC Count : 13.59 K/uL  RBC Count : 2.74 M/uL  Hemoglobin : 7.2 g/dL  Hematocrit : 23.7 %  Platelet Count - Automated : 276 K/uL  Mean Cell Volume : 86.5 fl  Mean Cell Hemoglobin : 26.3 pg  Mean Cell Hemoglobin Concentration : 30.4 gm/dL  Auto Neutrophil # : x  Auto Lymphocyte # : x  Auto Monocyte # : x  Auto Eosinophil # : x  Auto Basophil # : x  Auto Neutrophil % : x  Auto Lymphocyte % : x  Auto Monocyte % : x  Auto Eosinophil % : x  Auto Basophil % : x      12-12    136  |  97  |  46<H>  ----------------------------<  143<H>  3.6   |  24  |  4.24<H>    Ca    10.4      12 Dec 2019 10:54        CAPILLARY BLOOD GLUCOSE      POCT Blood Glucose.: 112 mg/dL (12 Dec 2019 17:09)  POCT Blood Glucose.: 143 mg/dL (12 Dec 2019 11:49)  POCT Blood Glucose.: 110 mg/dL (12 Dec 2019 05:53)  POCT Blood Glucose.: 107 mg/dL (12 Dec 2019 00:19)      Vital Signs Last 24 Hrs  T(C): 37.4 (12 Dec 2019 19:39), Max: 37.5 (12 Dec 2019 16:16)  T(F): 99.3 (12 Dec 2019 19:39), Max: 99.5 (12 Dec 2019 16:16)  HR: 99 (12 Dec 2019 19:39) (92 - 103)  BP: 155/72 (12 Dec 2019 19:39) (130/68 - 155/72)  BP(mean): --  RR: 18 (12 Dec 2019 19:39) (18 - 18)  SpO2: 97% (12 Dec 2019 19:39) (93% - 99%)        PT/INR - ( 12 Dec 2019 10:54 )   PT: 12.9 sec;   INR: 1.12 ratio                                            HEENT: conjunctive   clear   Neck:  No JVD  Respiratory: decrease bs b/l   Cardiovascular: S1 and S2  Gastrointestinal: BS+, soft, NT/ND  Extremities: No peripheral edema  Skin: dry   Access: pos fistula

## 2019-12-12 NOTE — PROGRESS NOTE ADULT - PROBLEM SELECTOR PLAN 1
- s/p  upper gastrointestinal endoscopy with the procedure was aborted due to inability to place PEG tube.  - NGT replaced with endoscopic confirmation  - Refer to IR for PEG tube placement under image guidance

## 2019-12-12 NOTE — PROGRESS NOTE ADULT - SUBJECTIVE AND OBJECTIVE BOX
Subjective: Patient seen and examined. No new events except as noted.     SUBJECTIVE/ROS:        MEDICATIONS:  MEDICATIONS  (STANDING):  alteplase for catheter clearance 2 milliGRAM(s) Catheter once  alteplase for catheter clearance 2 milliGRAM(s) Catheter once  atorvastatin 40 milliGRAM(s) Oral at bedtime  carvedilol 3.125 milliGRAM(s) Oral every 12 hours  chlorhexidine 4% Liquid 1 Application(s) Topical <User Schedule>  cinacalcet 30 milliGRAM(s) Oral daily  dextrose 5%. 1000 milliLiter(s) (50 mL/Hr) IV Continuous <Continuous>  dextrose 50% Injectable 12.5 Gram(s) IV Push once  dextrose 50% Injectable 25 Gram(s) IV Push once  dextrose 50% Injectable 25 Gram(s) IV Push once  epoetin tan Injectable 73647 Unit(s) IV Push <User Schedule>  heparin  Injectable 5000 Unit(s) SubCutaneous every 8 hours  insulin lispro (HumaLOG) corrective regimen sliding scale   SubCutaneous every 6 hours  levETIRAcetam  Solution 1000 milliGRAM(s) Oral at bedtime  lidocaine   Patch 1 Patch Transdermal daily  midodrine. 10 milliGRAM(s) Oral three times a day  pantoprazole  Injectable 20 milliGRAM(s) IV Push daily  predniSONE   Tablet 5 milliGRAM(s) Oral daily  vancomycin    Solution 125 milliGRAM(s) Enteral Tube every 6 hours      PHYSICAL EXAM:  T(C): 36.8 (12-12-19 @ 08:00), Max: 37.3 (12-12-19 @ 04:00)  HR: 103 (12-12-19 @ 08:00) (90 - 103)  BP: 135/71 (12-12-19 @ 08:00) (125/66 - 150/74)  RR: 18 (12-12-19 @ 08:00) (18 - 18)  SpO2: 93% (12-12-19 @ 08:00) (93% - 97%)  Wt(kg): --  I&O's Summary    11 Dec 2019 07:01  -  12 Dec 2019 07:00  --------------------------------------------------------  IN: 400 mL / OUT: 0 mL / NET: 400 mL    12 Dec 2019 07:01  -  12 Dec 2019 10:19  --------------------------------------------------------  IN: 0 mL / OUT: 0 mL / NET: 0 mL        Weight (kg): 68 (12-11 @ 13:23)      JVP: Normal  Neck: supple  Lung: clear   CV: S1 S2 , Murmur:  Abd: soft  Ext: No edema  neuro: Awake   Psych: flat affect  Skin: normal``    LABS/DATA:    CARDIAC MARKERS:                                6.9    13.69 )-----------( 243      ( 11 Dec 2019 11:48 )             23.3     12-11    137  |  95<L>  |  32<H>  ----------------------------<  126<H>  3.5   |  29  |  3.21<H>    Ca    10.3      11 Dec 2019 07:15      proBNP:   Lipid Profile:   HgA1c:   TSH:     TELE:  EKG:

## 2019-12-13 DIAGNOSIS — I63.9 CEREBRAL INFARCTION, UNSPECIFIED: ICD-10-CM

## 2019-12-13 DIAGNOSIS — D72.829 ELEVATED WHITE BLOOD CELL COUNT, UNSPECIFIED: ICD-10-CM

## 2019-12-13 DIAGNOSIS — R19.7 DIARRHEA, UNSPECIFIED: ICD-10-CM

## 2019-12-13 DIAGNOSIS — L89.153 PRESSURE ULCER OF SACRAL REGION, STAGE 3: ICD-10-CM

## 2019-12-13 LAB
ANION GAP SERPL CALC-SCNC: 12 MMOL/L — SIGNIFICANT CHANGE UP (ref 5–17)
BUN SERPL-MCNC: 38 MG/DL — HIGH (ref 7–23)
CALCIUM SERPL-MCNC: 10.2 MG/DL — SIGNIFICANT CHANGE UP (ref 8.4–10.5)
CHLORIDE SERPL-SCNC: 99 MMOL/L — SIGNIFICANT CHANGE UP (ref 96–108)
CO2 SERPL-SCNC: 27 MMOL/L — SIGNIFICANT CHANGE UP (ref 22–31)
CREAT SERPL-MCNC: 3.93 MG/DL — HIGH (ref 0.5–1.3)
GLUCOSE BLDC GLUCOMTR-MCNC: 156 MG/DL — HIGH (ref 70–99)
GLUCOSE BLDC GLUCOMTR-MCNC: 169 MG/DL — HIGH (ref 70–99)
GLUCOSE BLDC GLUCOMTR-MCNC: 189 MG/DL — HIGH (ref 70–99)
GLUCOSE BLDC GLUCOMTR-MCNC: 198 MG/DL — HIGH (ref 70–99)
GLUCOSE SERPL-MCNC: 220 MG/DL — HIGH (ref 70–99)
HCT VFR BLD CALC: 25.1 % — LOW (ref 39–50)
HGB BLD-MCNC: 7.5 G/DL — LOW (ref 13–17)
MCHC RBC-ENTMCNC: 26.6 PG — LOW (ref 27–34)
MCHC RBC-ENTMCNC: 29.9 GM/DL — LOW (ref 32–36)
MCV RBC AUTO: 89 FL — SIGNIFICANT CHANGE UP (ref 80–100)
PLATELET # BLD AUTO: 306 K/UL — SIGNIFICANT CHANGE UP (ref 150–400)
POTASSIUM SERPL-MCNC: 3.4 MMOL/L — LOW (ref 3.5–5.3)
POTASSIUM SERPL-SCNC: 3.4 MMOL/L — LOW (ref 3.5–5.3)
RBC # BLD: 2.82 M/UL — LOW (ref 4.2–5.8)
RBC # FLD: 15.9 % — HIGH (ref 10.3–14.5)
SODIUM SERPL-SCNC: 138 MMOL/L — SIGNIFICANT CHANGE UP (ref 135–145)
WBC # BLD: 12.7 K/UL — HIGH (ref 3.8–10.5)
WBC # FLD AUTO: 12.7 K/UL — HIGH (ref 3.8–10.5)

## 2019-12-13 PROCEDURE — 99232 SBSQ HOSP IP/OBS MODERATE 35: CPT

## 2019-12-13 PROCEDURE — 99222 1ST HOSP IP/OBS MODERATE 55: CPT

## 2019-12-13 RX ORDER — SODIUM HYPOCHLORITE 0.125 %
1 SOLUTION, NON-ORAL MISCELLANEOUS
Refills: 0 | Status: DISCONTINUED | OUTPATIENT
Start: 2019-12-13 | End: 2019-12-21

## 2019-12-13 RX ADMIN — CARVEDILOL PHOSPHATE 3.12 MILLIGRAM(S): 80 CAPSULE, EXTENDED RELEASE ORAL at 10:30

## 2019-12-13 RX ADMIN — LIDOCAINE 1 PATCH: 4 CREAM TOPICAL at 18:03

## 2019-12-13 RX ADMIN — CARVEDILOL PHOSPHATE 3.12 MILLIGRAM(S): 80 CAPSULE, EXTENDED RELEASE ORAL at 21:42

## 2019-12-13 RX ADMIN — LEVETIRACETAM 1000 MILLIGRAM(S): 250 TABLET, FILM COATED ORAL at 21:42

## 2019-12-13 RX ADMIN — ATORVASTATIN CALCIUM 40 MILLIGRAM(S): 80 TABLET, FILM COATED ORAL at 21:42

## 2019-12-13 RX ADMIN — CINACALCET 30 MILLIGRAM(S): 30 TABLET, FILM COATED ORAL at 12:32

## 2019-12-13 RX ADMIN — Medication 1 APPLICATION(S): at 21:42

## 2019-12-13 RX ADMIN — HEPARIN SODIUM 5000 UNIT(S): 5000 INJECTION INTRAVENOUS; SUBCUTANEOUS at 14:41

## 2019-12-13 RX ADMIN — LIDOCAINE 1 PATCH: 4 CREAM TOPICAL at 19:00

## 2019-12-13 RX ADMIN — LIDOCAINE 1 PATCH: 4 CREAM TOPICAL at 01:01

## 2019-12-13 RX ADMIN — Medication 2: at 12:48

## 2019-12-13 RX ADMIN — Medication 2: at 06:35

## 2019-12-13 RX ADMIN — Medication 2: at 17:59

## 2019-12-13 RX ADMIN — Medication 5 MILLIGRAM(S): at 05:12

## 2019-12-13 RX ADMIN — HEPARIN SODIUM 5000 UNIT(S): 5000 INJECTION INTRAVENOUS; SUBCUTANEOUS at 05:12

## 2019-12-13 RX ADMIN — Medication 0: at 00:17

## 2019-12-13 RX ADMIN — HEPARIN SODIUM 5000 UNIT(S): 5000 INJECTION INTRAVENOUS; SUBCUTANEOUS at 21:42

## 2019-12-13 RX ADMIN — CHLORHEXIDINE GLUCONATE 1 APPLICATION(S): 213 SOLUTION TOPICAL at 05:11

## 2019-12-13 RX ADMIN — PANTOPRAZOLE SODIUM 20 MILLIGRAM(S): 20 TABLET, DELAYED RELEASE ORAL at 12:48

## 2019-12-13 NOTE — PROGRESS NOTE ADULT - SUBJECTIVE AND OBJECTIVE BOX
Chief complaint  Patient is a 74y old  Male who presents with a chief complaint of ams (12 Dec 2019 20:22)   Review of systems  Patient in bed, looks comfortable, no fever, no hypoglycemia.    Labs and Fingersticks  CAPILLARY BLOOD GLUCOSE      POCT Blood Glucose.: 198 mg/dL (13 Dec 2019 06:33)  POCT Blood Glucose.: 142 mg/dL (12 Dec 2019 23:52)  POCT Blood Glucose.: 112 mg/dL (12 Dec 2019 17:09)      Anion Gap, Serum: 12 (12-13 @ 10:36)  Anion Gap, Serum: 15 (12-12 @ 10:54)      Calcium, Total Serum: 10.2 (12-13 @ 10:36)  Calcium, Total Serum: 10.4 (12-12 @ 10:54)          12-13    138  |  99  |  38<H>  ----------------------------<  220<H>  3.4<L>   |  27  |  3.93<H>    Ca    10.2      13 Dec 2019 10:36                          7.2    13.59 )-----------( 276      ( 12 Dec 2019 10:54 )             23.7     Medications  MEDICATIONS  (STANDING):  alteplase for catheter clearance 2 milliGRAM(s) Catheter once  alteplase for catheter clearance 2 milliGRAM(s) Catheter once  atorvastatin 40 milliGRAM(s) Oral at bedtime  carvedilol 3.125 milliGRAM(s) Oral every 12 hours  chlorhexidine 4% Liquid 1 Application(s) Topical <User Schedule>  cinacalcet 30 milliGRAM(s) Oral daily  dextrose 5%. 1000 milliLiter(s) (50 mL/Hr) IV Continuous <Continuous>  dextrose 50% Injectable 12.5 Gram(s) IV Push once  dextrose 50% Injectable 25 Gram(s) IV Push once  dextrose 50% Injectable 25 Gram(s) IV Push once  epoetin tan Injectable 55250 Unit(s) IV Push <User Schedule>  heparin  Injectable 5000 Unit(s) SubCutaneous every 8 hours  insulin lispro (HumaLOG) corrective regimen sliding scale   SubCutaneous every 6 hours  levETIRAcetam  Solution 1000 milliGRAM(s) Oral at bedtime  lidocaine   Patch 1 Patch Transdermal daily  pantoprazole  Injectable 20 milliGRAM(s) IV Push daily  predniSONE   Tablet 5 milliGRAM(s) Oral daily      Physical Exam  General: Patient comfortable in bed  Vital Signs Last 12 Hrs  T(F): 97.4 (12-13-19 @ 10:25), Max: 97.9 (12-13-19 @ 04:28)  HR: 102 (12-13-19 @ 10:25) (102 - 103)  BP: 102/61 (12-13-19 @ 10:25) (102/61 - 112/69)  BP(mean): --  RR: 18 (12-13-19 @ 10:25) (18 - 18)  SpO2: 93% (12-13-19 @ 10:25) (93% - 98%)  Neck: No palpable thyroid nodules.  CVS: S1S2, No murmurs  Respiratory: No wheezing, no crepitations  GI: Abdomen soft, bowel sounds positive  Musculoskeletal:  edema lower extremities.   Skin: No skin rashes, no ecchymosis    Diagnostics Chief complaint  Patient is a 74y old  Male who presents with a chief complaint of ams (12 Dec 2019 20:22)   Review of systems  Patient in bed, looks comfortable, no fever,  no hypoglycemia.    Labs and Fingersticks  CAPILLARY BLOOD GLUCOSE      POCT Blood Glucose.: 198 mg/dL (13 Dec 2019 06:33)  POCT Blood Glucose.: 142 mg/dL (12 Dec 2019 23:52)  POCT Blood Glucose.: 112 mg/dL (12 Dec 2019 17:09)      Anion Gap, Serum: 12 (12-13 @ 10:36)  Anion Gap, Serum: 15 (12-12 @ 10:54)      Calcium, Total Serum: 10.2 (12-13 @ 10:36)  Calcium, Total Serum: 10.4 (12-12 @ 10:54)          12-13    138  |  99  |  38<H>  ----------------------------<  220<H>  3.4<L>   |  27  |  3.93<H>    Ca    10.2      13 Dec 2019 10:36                          7.2    13.59 )-----------( 276      ( 12 Dec 2019 10:54 )             23.7     Medications  MEDICATIONS  (STANDING):  alteplase for catheter clearance 2 milliGRAM(s) Catheter once  alteplase for catheter clearance 2 milliGRAM(s) Catheter once  atorvastatin 40 milliGRAM(s) Oral at bedtime  carvedilol 3.125 milliGRAM(s) Oral every 12 hours  chlorhexidine 4% Liquid 1 Application(s) Topical <User Schedule>  cinacalcet 30 milliGRAM(s) Oral daily  dextrose 5%. 1000 milliLiter(s) (50 mL/Hr) IV Continuous <Continuous>  dextrose 50% Injectable 12.5 Gram(s) IV Push once  dextrose 50% Injectable 25 Gram(s) IV Push once  dextrose 50% Injectable 25 Gram(s) IV Push once  epoetin tan Injectable 34993 Unit(s) IV Push <User Schedule>  heparin  Injectable 5000 Unit(s) SubCutaneous every 8 hours  insulin lispro (HumaLOG) corrective regimen sliding scale   SubCutaneous every 6 hours  levETIRAcetam  Solution 1000 milliGRAM(s) Oral at bedtime  lidocaine   Patch 1 Patch Transdermal daily  pantoprazole  Injectable 20 milliGRAM(s) IV Push daily  predniSONE   Tablet 5 milliGRAM(s) Oral daily      Physical Exam  General: Patient comfortable in bed  Vital Signs Last 12 Hrs  T(F): 97.4 (12-13-19 @ 10:25), Max: 97.9 (12-13-19 @ 04:28)  HR: 102 (12-13-19 @ 10:25) (102 - 103)  BP: 102/61 (12-13-19 @ 10:25) (102/61 - 112/69)  BP(mean): --  RR: 18 (12-13-19 @ 10:25) (18 - 18)  SpO2: 93% (12-13-19 @ 10:25) (93% - 98%)  Neck: No palpable thyroid nodules.  CVS: S1S2, No murmurs  Respiratory: No wheezing, no crepitations  GI: Abdomen soft, bowel sounds positive  Musculoskeletal:  edema lower extremities.   Skin: No skin rashes, no ecchymosis    Diagnostics

## 2019-12-13 NOTE — PROGRESS NOTE ADULT - ASSESSMENT
Assessment  DM: A1C 5.5%, was on insulin at home, on moderate-scale insulin coverage, blood sugars trending up, no hypoglycemic episodes. Patient still NPO on tube feeds, on low-dose steroids, he appears alert when spoken to though still not responsive.  Hypercalcemia: Primary Hyperparathyroidism, calcium improved and WNL s/p Pamidronate dose.  Sepsis: IV ABx for UTI, LP inconsistent with meningitis, on medications, stable, monitored.  HTN: Controlled,  on antihypertensive medications.  ESRD: On hemodialysis, Monitor labs/BMP          Robi Gay MD  Cell: [phone]  Office: [phone] Assessment  DM: A1C 5.5%, was on insulin at home, on moderate-scale insulin coverage, blood sugars trending up, no hypoglycemic episodes. Patient still NPO on tube feeds, on low-dose steroids, he appears  alert when spoken to though still not responsive.  Hypercalcemia: Primary Hyperparathyroidism, calcium improved and WNL s/p Pamidronate dose.  Sepsis: IV ABx for UTI, LP inconsistent with meningitis, on medications, stable, monitored.  HTN: Controlled,  on antihypertensive medications.  ESRD: On hemodialysis, Monitor labs/BMP          Robi Gay MD  Cell: [phone]  Office: [phone]

## 2019-12-13 NOTE — ADVANCED PRACTICE NURSE CONSULT - REASON FOR CONSULT
Requested by staff to re-evaluate skin status: sacrum. PMH is noted:  74M PMHx of ESRD s/p failed renal transplant x2, HCV, DM, and meningococcal meningitis 2 years ago was sent in to the ED from HD for AMS and low BPs. Admitted with concern for CVA, neurology following. Patient with RRT overnight 11/27 resulting in transfer to MICU for respiratory failure concern for aspiration and sepsis. Pt is s/p intubation  and pressor support in MICU, extubated  11/29 and  transferred to [address] [place] [address]. Since last assessment by the [facility] on 12/5, the pt was transferred from [facility] to [facility].

## 2019-12-13 NOTE — ADVANCED PRACTICE NURSE CONSULT - ASSESSMENT
The pt remains on a [employer] P 500 support surface for low air-loss and pressure redistribution features. He is being assisted with turning and positioning. staff have applied z-sharee boots to off-load the heels. Pt is thin, frail, fragile in appearance.  As per review of the medial record, pt has failed Swallow exam and was taken to IR for PEG placement with inability to place the tube. Pt remains with a Keofeed tube in place for enteral feeds and he is being followed by nutrition.  Upon assessment, the pt was incontinent of loose liquid stool- the consistency was too thick for a FMS. Pericare was provided. As per primary RN, this would be the 4th stool of the day. Pt presents with a full-thickness wound encompassing the sacrum, b/l buttocks and gluteal cleft. It continues to evolve. The size has increased- the wound measures 11.5 cm x 11.5cm x 1cm. A tunnel of 1.5cm was noted at 12 o'clock, there was undermining of 1.2 cm at 10 o'clock. The tissue was soft, loosely adherent yellow slough, there was + odor, moderate drainage. the periwound skin presents with hyperpigmented tissue. Will recommend Dakin's dressing to be applied to decrease the odor and bacteria.  As the wound is covered with non-viable tissue will classify as unstageable at this time.  Deterioration of the wound may be multifactorial with frequent stooling having an impact. Pt with nutritional challenges as well. Multiple comorbid conditions may also be contributing to the deterioration.   The pt turns easily to his side and was encouraged to change his position frequently to keep pressure off the sacrum.

## 2019-12-13 NOTE — PROGRESS NOTE ADULT - PROBLEM SELECTOR PLAN 1
Will continue moderate-scale coverage.   Will continue monitoring FS and FU. Will continue monitoring FS and FU.

## 2019-12-13 NOTE — PROGRESS NOTE ADULT - SUBJECTIVE AND OBJECTIVE BOX
Patient is a 74y old  Male who presents with a chief complaint of ams (13 Dec 2019 14:39)      INTERVAL HPI/OVERNIGHT EVENTS:  T(C): 36.4 (12-13-19 @ 16:16), Max: 37.4 (12-12-19 @ 19:39)  HR: 111 (12-13-19 @ 16:16) (99 - 111)  BP: 130/82 (12-13-19 @ 16:16) (102/61 - 155/72)  RR: 18 (12-13-19 @ 16:16) (18 - 18)  SpO2: 98% (12-13-19 @ 16:16) (93% - 98%)  Wt(kg): --  I&O's Summary    12 Dec 2019 07:01  -  13 Dec 2019 07:00  --------------------------------------------------------  IN: 1620 mL / OUT: 1100 mL / NET: 520 mL    13 Dec 2019 07:01  -  13 Dec 2019 17:48  --------------------------------------------------------  IN: 0 mL / OUT: 0 mL / NET: 0 mL        LABS:                        7.5    12.70 )-----------( 306      ( 13 Dec 2019 13:55 )             25.1     12-13    138  |  99  |  38<H>  ----------------------------<  220<H>  3.4<L>   |  27  |  3.93<H>    Ca    10.2      13 Dec 2019 10:36      PT/INR - ( 12 Dec 2019 10:54 )   PT: 12.9 sec;   INR: 1.12 ratio             CAPILLARY BLOOD GLUCOSE      POCT Blood Glucose.: 156 mg/dL (13 Dec 2019 17:08)  POCT Blood Glucose.: 189 mg/dL (13 Dec 2019 12:13)  POCT Blood Glucose.: 198 mg/dL (13 Dec 2019 06:33)  POCT Blood Glucose.: 142 mg/dL (12 Dec 2019 23:52)            MEDICATIONS  (STANDING):  alteplase for catheter clearance 2 milliGRAM(s) Catheter once  alteplase for catheter clearance 2 milliGRAM(s) Catheter once  atorvastatin 40 milliGRAM(s) Oral at bedtime  carvedilol 3.125 milliGRAM(s) Oral every 12 hours  chlorhexidine 4% Liquid 1 Application(s) Topical <User Schedule>  cinacalcet 30 milliGRAM(s) Oral daily  Dakins Solution - 1/4 Strength 1 Application(s) Topical two times a day  dextrose 5%. 1000 milliLiter(s) (50 mL/Hr) IV Continuous <Continuous>  dextrose 50% Injectable 12.5 Gram(s) IV Push once  dextrose 50% Injectable 25 Gram(s) IV Push once  dextrose 50% Injectable 25 Gram(s) IV Push once  epoetin tan Injectable 17525 Unit(s) IV Push <User Schedule>  heparin  Injectable 5000 Unit(s) SubCutaneous every 8 hours  insulin lispro (HumaLOG) corrective regimen sliding scale   SubCutaneous every 6 hours  levETIRAcetam  Solution 1000 milliGRAM(s) Oral at bedtime  lidocaine   Patch 1 Patch Transdermal daily  pantoprazole  Injectable 20 milliGRAM(s) IV Push daily  predniSONE   Tablet 5 milliGRAM(s) Oral daily    MEDICATIONS  (PRN):  dextrose 40% Gel 15 Gram(s) Oral once PRN Blood Glucose LESS THAN 70 milliGRAM(s)/deciLiter  glucagon  Injectable 1 milliGRAM(s) IntraMuscular once PRN Glucose <70 milliGRAM(s)/deciLiter          PHYSICAL EXAM:  GENERAL: NAD, well-groomed, well-developed  HEAD:  Atraumatic, Normocephalic  CHEST/LUNG: Clear to percussion bilaterally; No rales, rhonchi, wheezing, or rubs  HEART: Regular rate and rhythm; No murmurs, rubs, or gallops  ABDOMEN: Soft, Nontender, Nondistended; Bowel sounds present  EXTREMITIES:  2+ Peripheral Pulses, No clubbing, cyanosis, or edema  LYMPH: No lymphadenopathy noted  SKIN: No rashes or lesions    Care Discussed with Consultants/Other Providers [ ] YES  [ ] NO Patient is a 74y old  Male who presents with a chief complaint of ams (13 Dec 2019 14:39)      INTERVAL HPI/OVERNIGHT EVENTS:  T(C): 36.4 (12-13-19 @ 16:16), Max: 37.4 (12-12-19 @ 19:39)  HR: 111 (12-13-19 @ 16:16) (99 - 111)  BP: 130/82 (12-13-19 @ 16:16) (102/61 - 155/72)  RR: 18 (12-13-19 @ 16:16) (18 - 18)  SpO2: 98% (12-13-19 @ 16:16) (93% - 98%)  Wt(kg): --  I&O's Summary    12 Dec 2019 07:01  -  13 Dec 2019 07:00  --------------------------------------------------------  IN: 1620 mL / OUT: 1100 mL / NET: 520 mL    13 Dec 2019 07:01  -  13 Dec 2019 17:48  --------------------------------------------------------  IN: 0 mL / OUT: 0 mL / NET: 0 mL        LABS:                        7.5    12.70 )-----------( 306      ( 13 Dec 2019 13:55 )             25.1     12-13    138  |  99  |  38<H>  ----------------------------<  220<H>  3.4<L>   |  27  |  3.93<H>    Ca    10.2      13 Dec 2019 10:36      PT/INR - ( 12 Dec 2019 10:54 )   PT: 12.9 sec;   INR: 1.12 ratio             CAPILLARY BLOOD GLUCOSE      POCT Blood Glucose.: 156 mg/dL (13 Dec 2019 17:08)  POCT Blood Glucose.: 189 mg/dL (13 Dec 2019 12:13)  POCT Blood Glucose.: 198 mg/dL (13 Dec 2019 06:33)  POCT Blood Glucose.: 142 mg/dL (12 Dec 2019 23:52)            MEDICATIONS  (STANDING):  alteplase for catheter clearance 2 milliGRAM(s) Catheter once  alteplase for catheter clearance 2 milliGRAM(s) Catheter once  atorvastatin 40 milliGRAM(s) Oral at bedtime  carvedilol 3.125 milliGRAM(s) Oral every 12 hours  chlorhexidine 4% Liquid 1 Application(s) Topical <User Schedule>  cinacalcet 30 milliGRAM(s) Oral daily  Dakins Solution - 1/4 Strength 1 Application(s) Topical two times a day  dextrose 5%. 1000 milliLiter(s) (50 mL/Hr) IV Continuous <Continuous>  dextrose 50% Injectable 12.5 Gram(s) IV Push once  dextrose 50% Injectable 25 Gram(s) IV Push once  dextrose 50% Injectable 25 Gram(s) IV Push once  epoetin tan Injectable 91977 Unit(s) IV Push <User Schedule>  heparin  Injectable 5000 Unit(s) SubCutaneous every 8 hours  insulin lispro (HumaLOG) corrective regimen sliding scale   SubCutaneous every 6 hours  levETIRAcetam  Solution 1000 milliGRAM(s) Oral at bedtime  lidocaine   Patch 1 Patch Transdermal daily  pantoprazole  Injectable 20 milliGRAM(s) IV Push daily  predniSONE   Tablet 5 milliGRAM(s) Oral daily    MEDICATIONS  (PRN):  dextrose 40% Gel 15 Gram(s) Oral once PRN Blood Glucose LESS THAN 70 milliGRAM(s)/deciLiter  glucagon  Injectable 1 milliGRAM(s) IntraMuscular once PRN Glucose <70 milliGRAM(s)/deciLiter          PHYSICAL EXAM:  GENERAL: frail  CHEST/LUNG: Clear to percussion bilaterally; No rales, rhonchi, wheezing, or rubs  HEART: Regular rate and rhythm; No murmurs, rubs, or gallops  ABDOMEN: Soft, Nontender, Nondistended; Bowel sounds present  EXTREMITIES: no edema     Care Discussed with Consultants/Other Providers [ ] YES  [ ] NO

## 2019-12-13 NOTE — PROGRESS NOTE ADULT - SUBJECTIVE AND OBJECTIVE BOX
Patient is a 74y Male whom presented to   pateint seen and examined nad ,     PAST MEDICAL & SURGICAL HISTORY:  Kidney transplant recipient  Anuria  GERD (gastroesophageal reflux disease)  Kidney transplant failure: dx: 2/2013  Hepatitis C: dx: 90&#x27;s.  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 10 milliGRAM(s) Oral three times a day  mycophenolate mofetil 250 milliGRAM(s) Oral two times a day  predniSONE   Tablet 5 milliGRAM(s) Oral daily  sevelamer carbonate 800 milliGRAM(s) Oral three times a day with meals  sodium bicarbonate 650 milliGRAM(s) Oral two times a day  tamsulosin 0.4 milliGRAM(s) Oral at bedtime      Allergies    No Known Allergies                       SOCIAL HISTORY:  Denies ETOh,Smoking,     FAMILY HISTORY:  Family history of breast cancer in sister (Sibling): living at 92 yo  Family history of prostate cancer in father  Family history of lung cancer  Family history of hypertension in mother  Family history of diabetes mellitus: mother      REVIEW OF SYSTEMS:    unbable to obtained                                                         7.5    12.70 )-----------( 306      ( 13 Dec 2019 13:55 )             25.1       CBC Full  -  ( 13 Dec 2019 13:55 )  WBC Count : 12.70 K/uL  RBC Count : 2.82 M/uL  Hemoglobin : 7.5 g/dL  Hematocrit : 25.1 %  Platelet Count - Automated : 306 K/uL  Mean Cell Volume : 89.0 fl  Mean Cell Hemoglobin : 26.6 pg  Mean Cell Hemoglobin Concentration : 29.9 gm/dL  Auto Neutrophil # : x  Auto Lymphocyte # : x  Auto Monocyte # : x  Auto Eosinophil # : x  Auto Basophil # : x  Auto Neutrophil % : x  Auto Lymphocyte % : x  Auto Monocyte % : x  Auto Eosinophil % : x  Auto Basophil % : x      12-13    138  |  99  |  38<H>  ----------------------------<  220<H>  3.4<L>   |  27  |  3.93<H>    Ca    10.2      13 Dec 2019 10:36        CAPILLARY BLOOD GLUCOSE      POCT Blood Glucose.: 156 mg/dL (13 Dec 2019 17:08)  POCT Blood Glucose.: 189 mg/dL (13 Dec 2019 12:13)  POCT Blood Glucose.: 198 mg/dL (13 Dec 2019 06:33)  POCT Blood Glucose.: 142 mg/dL (12 Dec 2019 23:52)      Vital Signs Last 24 Hrs  T(C): 36.4 (13 Dec 2019 16:16), Max: 37.4 (12 Dec 2019 19:39)  T(F): 97.6 (13 Dec 2019 16:16), Max: 99.3 (12 Dec 2019 19:39)  HR: 111 (13 Dec 2019 16:16) (99 - 111)  BP: 130/82 (13 Dec 2019 16:16) (102/61 - 155/72)  BP(mean): --  RR: 18 (13 Dec 2019 16:16) (18 - 18)  SpO2: 98% (13 Dec 2019 16:16) (93% - 98%)        PT/INR - ( 12 Dec 2019 10:54 )   PT: 12.9 sec;   INR: 1.12 ratio                                      HEENT: conjunctive   clear   Neck:  No JVD  Respiratory: decrease bs b/l   Cardiovascular: S1 and S2  Gastrointestinal: BS+, soft, NT/ND  Extremities: No peripheral edema  Skin: dry   Access: pos fistula

## 2019-12-13 NOTE — PROGRESS NOTE ADULT - SUBJECTIVE AND OBJECTIVE BOX
Subjective: Patient seen and examined. No new events except as noted.     SUBJECTIVE/ROS:  ROS limited     MEDICATIONS:  MEDICATIONS  (STANDING):  alteplase for catheter clearance 2 milliGRAM(s) Catheter once  alteplase for catheter clearance 2 milliGRAM(s) Catheter once  atorvastatin 40 milliGRAM(s) Oral at bedtime  carvedilol 3.125 milliGRAM(s) Oral every 12 hours  chlorhexidine 4% Liquid 1 Application(s) Topical <User Schedule>  cinacalcet 30 milliGRAM(s) Oral daily  Dakins Solution - 1/4 Strength 1 Application(s) Topical two times a day  dextrose 5%. 1000 milliLiter(s) (50 mL/Hr) IV Continuous <Continuous>  dextrose 50% Injectable 12.5 Gram(s) IV Push once  dextrose 50% Injectable 25 Gram(s) IV Push once  dextrose 50% Injectable 25 Gram(s) IV Push once  epoetin tan Injectable 08072 Unit(s) IV Push <User Schedule>  heparin  Injectable 5000 Unit(s) SubCutaneous every 8 hours  insulin lispro (HumaLOG) corrective regimen sliding scale   SubCutaneous every 6 hours  levETIRAcetam  Solution 1000 milliGRAM(s) Oral at bedtime  lidocaine   Patch 1 Patch Transdermal daily  pantoprazole  Injectable 20 milliGRAM(s) IV Push daily  predniSONE   Tablet 5 milliGRAM(s) Oral daily      PHYSICAL EXAM:  T(C): 36.3 (12-13-19 @ 10:25), Max: 37.5 (12-12-19 @ 16:16)  HR: 102 (12-13-19 @ 10:25) (96 - 103)  BP: 102/61 (12-13-19 @ 10:25) (102/61 - 155/72)  RR: 18 (12-13-19 @ 10:25) (18 - 18)  SpO2: 93% (12-13-19 @ 10:25) (93% - 98%)  Wt(kg): --  I&O's Summary    12 Dec 2019 07:01  -  13 Dec 2019 07:00  --------------------------------------------------------  IN: 1620 mL / OUT: 1100 mL / NET: 520 mL    13 Dec 2019 07:01  -  13 Dec 2019 14:39  --------------------------------------------------------  IN: 0 mL / OUT: 0 mL / NET: 0 mL            JVP: Normal  Neck: supple  Lung: clear   CV: S1 S2 , Murmur:  Abd: soft  Ext: No edema  neuro: Awake  Psych: flat affect  Skin: normal``    LABS/DATA:    CARDIAC MARKERS:                                7.5    12.70 )-----------( 306      ( 13 Dec 2019 13:55 )             25.1     12-13    138  |  99  |  38<H>  ----------------------------<  220<H>  3.4<L>   |  27  |  3.93<H>    Ca    10.2      13 Dec 2019 10:36      proBNP:   Lipid Profile:   HgA1c:   TSH:     TELE:  EKG:

## 2019-12-13 NOTE — PROGRESS NOTE ADULT - PROBLEM SELECTOR PLAN 2
? from steroids  Workup for infectious etiology so far negative  Continue observation off antimicrobials  Observe for s/s any nosocomial infection

## 2019-12-13 NOTE — ADVANCED PRACTICE NURSE CONSULT - RECOMMEDATIONS
Will recommend the followin. Sacrum, b/l buttocks: Cavilon to periwound skin, Dakins moistened gauze to gently fill wound cavity and undermined spaces. Cover with gauze and secure with tegaderm. change twice daily and prn for soiling/drainage  2. Continue with turning and positioning  3. Nutrition support as pt condition allows  Tx plan discussed with RN    Tx plan discussed with RN

## 2019-12-13 NOTE — PROGRESS NOTE ADULT - PROBLEM SELECTOR PLAN 2
- today with multiple loose, brown stools per RN  - GI PCR ordered; will consider imodium if PCR negative  - ID input eval noted; vanco d/c  - C diff PCR negative

## 2019-12-13 NOTE — PROGRESS NOTE ADULT - ASSESSMENT
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     NEURO:  - Extubated 11/29, now on nasal cannula. Minimally verbal   - metabolic encephalopathy, meningitis r/o, LP negative  - neuro following  - chronic lacunar infarcts; per neuro, AMS also likely related to hypercalcemia, renal dysfunction , poor nutritional intake.   - c/w keppra for ?hx of seizure disorder  -AMS likely 2/2 hyperCa, renal dysfunction, poor PO intake      CV:  - hx of afib was on eliquis, now off a/c per cards due to bleeding risk  - holding carvedilol 6.25 BID  - hx of cocaine cardiomyopathy, resolved, normal EF on most recent echo  - c/w statin  - echo repeat EF70, LVH, hyperdynamic LCF, normal RV sys fxn    PULM:- extubated 11/29--tolerating nasal cannula    GI:  - GI consult for PEG   - CT abdomen     RENAL:- hx of ESRD s/p failed renal transplant x2  - renal following, HD as recommended.  - c/w predisone, sevelamir    ENDO:  #DM2: HbA1c 5.5  - Start Insulin Sliding Scale  - endocrine following  - monitor Ca    INFECTIOUS:  - dc  vanco as per infectious disease   - id fu     GOC: palliative fu

## 2019-12-13 NOTE — PROGRESS NOTE ADULT - ATTENDING COMMENTS
Case d/w Med NP  Will tailor plan for ID issues  per course,results.Will defer to primary team on management of other issues.  Infectious Diseases Service will cover over weekend.  Please call [phone] if issues

## 2019-12-13 NOTE — PROGRESS NOTE ADULT - SUBJECTIVE AND OBJECTIVE BOX
INTERVAL HPI/OVERNIGHT EVENTS:    s/p  upper gastrointestinal endoscopy with the procedure was aborted due to inability to place PEG tube; PEG to be placed by IR   +loose, brown BM x 5 as per RN; no melena or hematochezia  NGT feeds restarted; tolerating  pt is without n/v/abdominal pain per RN      MEDICATIONS  (STANDING):  alteplase for catheter clearance 2 milliGRAM(s) Catheter once  alteplase for catheter clearance 2 milliGRAM(s) Catheter once  atorvastatin 40 milliGRAM(s) Oral at bedtime  carvedilol 3.125 milliGRAM(s) Oral every 12 hours  chlorhexidine 4% Liquid 1 Application(s) Topical <User Schedule>  cinacalcet 30 milliGRAM(s) Oral daily  dextrose 5%. 1000 milliLiter(s) (50 mL/Hr) IV Continuous <Continuous>  dextrose 50% Injectable 12.5 Gram(s) IV Push once  dextrose 50% Injectable 25 Gram(s) IV Push once  dextrose 50% Injectable 25 Gram(s) IV Push once  epoetin tan Injectable 36447 Unit(s) IV Push <User Schedule>  heparin  Injectable 5000 Unit(s) SubCutaneous every 8 hours  insulin lispro (HumaLOG) corrective regimen sliding scale   SubCutaneous every 6 hours  levETIRAcetam  Solution 1000 milliGRAM(s) Oral at bedtime  lidocaine   Patch 1 Patch Transdermal daily  midodrine. 10 milliGRAM(s) Oral three times a day  pantoprazole  Injectable 20 milliGRAM(s) IV Push daily  predniSONE   Tablet 5 milliGRAM(s) Oral daily  vancomycin    Solution 125 milliGRAM(s) Enteral Tube every 6 hours    MEDICATIONS  (PRN):  dextrose 40% Gel 15 Gram(s) Oral once PRN Blood Glucose LESS THAN 70 milliGRAM(s)/deciLiter  glucagon  Injectable 1 milliGRAM(s) IntraMuscular once PRN Glucose <70 milliGRAM(s)/deciLiter      Allergies    No Known Allergies    Intolerances        Review of Systems: unable to obtain in entirety      Vital Signs Last 24 Hrs  T(C): 36.9 (12 Dec 2019 12:00), Max: 37.3 (12 Dec 2019 04:00)  T(F): 98.4 (12 Dec 2019 12:00), Max: 99.2 (12 Dec 2019 04:00)  HR: 99 (12 Dec 2019 12:00) (90 - 103)  BP: 130/68 (12 Dec 2019 12:00) (130/68 - 150/74)  BP(mean): --  RR: 18 (12 Dec 2019 12:00) (18 - 18)  SpO2: 99% (12 Dec 2019 12:00) (93% - 99%)    PHYSICAL EXAM:    Constitutional: NAD, frail  HEENT: ncat, + NGT  Gastrointestinal: BS+, soft, NT/ND  Extremities: No peripheral edema  Vascular: 2+ peripheral pulses  Neurological: alert, not responding appropriately         LABS:                        7.2    13.59 )-----------( 276      ( 12 Dec 2019 10:54 )             23.7     12-12    136  |  97  |  46<H>  ----------------------------<  143<H>  3.6   |  24  |  4.24<H>    Ca    10.4      12 Dec 2019 10:54      PT/INR - ( 12 Dec 2019 10:54 )   PT: 12.9 sec;   INR: 1.12 ratio               RADIOLOGY & ADDITIONAL TESTS:

## 2019-12-13 NOTE — PROGRESS NOTE ADULT - ASSESSMENT
74M PMHx of ESRD s/p failed renal transplant x2, HCV, DM, and meningococcal meningitis 2 years ago was sent in to the ED from HD for AMS and low BPs.  CVA  Pneumonia-aspiration-resp failure s/p Rx  patient also with sacral decub stage III  Mild leucocytosis  Is on baseline steroids

## 2019-12-13 NOTE — CHART NOTE - NSCHARTNOTEFT_GEN_A_CORE
Consulted for PEG tube placement after attempted placement with GI unsuccessful.    Patient with difficult percutaneous window however will attempt placement on Monday 12/16/2019.    Please make patient NPO, including tube feeds after midnight Sunday going into Monday.    Bottle of omnipaque given to RN and placed in patient's medicine closet, to be given the night before procedure as ordered.    Please order AM labs for the day of procedure, if not already considered. (CBC/INR/BMP).    Case discussed with Dr. Yost.

## 2019-12-13 NOTE — PROGRESS NOTE ADULT - SUBJECTIVE AND OBJECTIVE BOX
Patient is a 74y old  Male who presents with a chief complaint of ams (13 Dec 2019 11:59)    Being followed by ID for leucocytosis ? C diff    Interval history:patient awake but no verbal response   formed stools   No acute events      ROS:  Not obtainable as pt non verbal      Antimicrobials:  vanco Dced     Other medications reviewed    Vital Signs Last 24 Hrs  T(C): 36.3 (12-13-19 @ 10:25), Max: 37.5 (12-12-19 @ 16:16)  T(F): 97.4 (12-13-19 @ 10:25), Max: 99.5 (12-12-19 @ 16:16)  HR: 102 (12-13-19 @ 10:25) (96 - 103)  BP: 102/61 (12-13-19 @ 10:25) (102/61 - 155/72)  BP(mean): --  RR: 18 (12-13-19 @ 10:25) (18 - 18)  SpO2: 93% (12-13-19 @ 10:25) (93% - 98%)    Physical Exam:        R Sc HD catheter no erythema o tenderness    NGT    Chest Good AE,bibasilar crackles     CVS RRR S1 S2 WNl No murmur or rub or gallop    Abd soft BS normal No tenderness RLQ transplant kidney no tenderness    sacral decub stage III no purulence or discharge noted     Ext left arm AVF no erythema or tenderness    IV site no erythema tenderness or discharge    Joints no swelling or LOM    CNS as above    Lab Data:                          7.2    13.59 )-----------( 276      ( 12 Dec 2019 10:54 )             23.7     WBC Count: 13.59 (12-12-19 @ 10:54)  WBC Count: 13.69 (12-11-19 @ 11:48)  WBC Count: 14.83 (12-11-19 @ 08:57)  WBC Count: 14.85 (12-10-19 @ 09:19)  WBC Count: 17.74 (12-09-19 @ 10:32)  WBC Count: 15.25 (12-09-19 @ 08:34)  WBC Count: 15.47 (12-08-19 @ 09:24)  WBC Count: 14.77 (12-07-19 @ 14:19)    12-13    138  |  99  |  38<H>  ----------------------------<  220<H>  3.4<L>   |  27  |  3.93<H>    Ca    10.2      13 Dec 2019 10:36          GI PCR Panel, Stool (collected 10 Dec 2019 22:54)  Source: .Stool Feces  Final Report (11 Dec 2019 02:20):    GI PCR Results: NOT detected    *******Please Note:*******    GI panel PCR evaluates for:    Campylobacter, Plesiomonas shigelloides, Salmonella,    Vibrio, Yersinia enterocolitica, Enteroaggregative    Escherichia coli (EAEC), Enteropathogenic E.coli (EPEC),    Enterotoxigenic E. coli (ETEC) lt/st, Shiga-like    toxin-producing E. coli (STEC) stx1/stx2,    Shigella/ Enteroinvasive E. coli (EIEC), Cryptosporidium,    Cyclospora cayetanensis, Entamoeba histolytica,    Giardia lamblia, Adenovirus F 40/41, Astrovirus,    Norovirus GI/GII, Rotavirus A, Sapovirus        C Diff by PCR Result: NotDete (12-10-19 @ 22:55)  Clostridium difficile GDH Interpretation: This specimen is positive for C. Difficile glutamate dehydrogenase (GDH)  antigen and negative for C. Difficile Toxins A & B, by EIA.  GDH is a  highly sensitive screening marker for C. Difficile that is produced in  large amounts by all C. Difficilestrains, both toxigenic and  nontoxigenic.  Specimens that are GDH positive and toxin negative will be  tested further by PCR to detect toxin gene sequences associated with  toxin producing C. Difficle. (12-10-19 @ 18:47)

## 2019-12-14 LAB
ANION GAP SERPL CALC-SCNC: 14 MMOL/L — SIGNIFICANT CHANGE UP (ref 5–17)
BUN SERPL-MCNC: 48 MG/DL — HIGH (ref 7–23)
CALCIUM SERPL-MCNC: 11 MG/DL — HIGH (ref 8.4–10.5)
CHLORIDE SERPL-SCNC: 96 MMOL/L — SIGNIFICANT CHANGE UP (ref 96–108)
CO2 SERPL-SCNC: 25 MMOL/L — SIGNIFICANT CHANGE UP (ref 22–31)
CREAT SERPL-MCNC: 4.55 MG/DL — HIGH (ref 0.5–1.3)
CULTURE RESULTS: SIGNIFICANT CHANGE UP
GLUCOSE BLDC GLUCOMTR-MCNC: 138 MG/DL — HIGH (ref 70–99)
GLUCOSE BLDC GLUCOMTR-MCNC: 183 MG/DL — HIGH (ref 70–99)
GLUCOSE BLDC GLUCOMTR-MCNC: 247 MG/DL — HIGH (ref 70–99)
GLUCOSE BLDC GLUCOMTR-MCNC: 261 MG/DL — HIGH (ref 70–99)
GLUCOSE SERPL-MCNC: 180 MG/DL — HIGH (ref 70–99)
HCT VFR BLD CALC: 24.5 % — LOW (ref 39–50)
HGB BLD-MCNC: 7.2 G/DL — LOW (ref 13–17)
MCHC RBC-ENTMCNC: 25.8 PG — LOW (ref 27–34)
MCHC RBC-ENTMCNC: 29.4 GM/DL — LOW (ref 32–36)
MCV RBC AUTO: 87.8 FL — SIGNIFICANT CHANGE UP (ref 80–100)
NRBC # BLD: 0 /100 WBCS — SIGNIFICANT CHANGE UP (ref 0–0)
PLATELET # BLD AUTO: 303 K/UL — SIGNIFICANT CHANGE UP (ref 150–400)
POTASSIUM SERPL-MCNC: 3.2 MMOL/L — LOW (ref 3.5–5.3)
POTASSIUM SERPL-SCNC: 3.2 MMOL/L — LOW (ref 3.5–5.3)
RBC # BLD: 2.79 M/UL — LOW (ref 4.2–5.8)
RBC # FLD: 15.7 % — HIGH (ref 10.3–14.5)
SODIUM SERPL-SCNC: 135 MMOL/L — SIGNIFICANT CHANGE UP (ref 135–145)
SPECIMEN SOURCE: SIGNIFICANT CHANGE UP
WBC # BLD: 14 K/UL — HIGH (ref 3.8–10.5)
WBC # FLD AUTO: 14 K/UL — HIGH (ref 3.8–10.5)

## 2019-12-14 PROCEDURE — 99232 SBSQ HOSP IP/OBS MODERATE 35: CPT

## 2019-12-14 RX ORDER — INSULIN GLARGINE 100 [IU]/ML
7 INJECTION, SOLUTION SUBCUTANEOUS AT BEDTIME
Refills: 0 | Status: DISCONTINUED | OUTPATIENT
Start: 2019-12-14 | End: 2019-12-18

## 2019-12-14 RX ADMIN — LEVETIRACETAM 1000 MILLIGRAM(S): 250 TABLET, FILM COATED ORAL at 23:04

## 2019-12-14 RX ADMIN — HEPARIN SODIUM 5000 UNIT(S): 5000 INJECTION INTRAVENOUS; SUBCUTANEOUS at 05:14

## 2019-12-14 RX ADMIN — LIDOCAINE 1 PATCH: 4 CREAM TOPICAL at 05:38

## 2019-12-14 RX ADMIN — Medication 2: at 23:53

## 2019-12-14 RX ADMIN — Medication 2: at 00:05

## 2019-12-14 RX ADMIN — Medication 6: at 05:43

## 2019-12-14 RX ADMIN — HEPARIN SODIUM 5000 UNIT(S): 5000 INJECTION INTRAVENOUS; SUBCUTANEOUS at 14:15

## 2019-12-14 RX ADMIN — HEPARIN SODIUM 5000 UNIT(S): 5000 INJECTION INTRAVENOUS; SUBCUTANEOUS at 23:04

## 2019-12-14 RX ADMIN — ERYTHROPOIETIN 10000 UNIT(S): 10000 INJECTION, SOLUTION INTRAVENOUS; SUBCUTANEOUS at 20:02

## 2019-12-14 RX ADMIN — Medication 1 APPLICATION(S): at 23:04

## 2019-12-14 RX ADMIN — LIDOCAINE 1 PATCH: 4 CREAM TOPICAL at 12:34

## 2019-12-14 RX ADMIN — CARVEDILOL PHOSPHATE 3.12 MILLIGRAM(S): 80 CAPSULE, EXTENDED RELEASE ORAL at 10:23

## 2019-12-14 RX ADMIN — ATORVASTATIN CALCIUM 40 MILLIGRAM(S): 80 TABLET, FILM COATED ORAL at 23:03

## 2019-12-14 RX ADMIN — PANTOPRAZOLE SODIUM 20 MILLIGRAM(S): 20 TABLET, DELAYED RELEASE ORAL at 12:33

## 2019-12-14 RX ADMIN — CHLORHEXIDINE GLUCONATE 1 APPLICATION(S): 213 SOLUTION TOPICAL at 05:14

## 2019-12-14 RX ADMIN — Medication 5 MILLIGRAM(S): at 05:14

## 2019-12-14 RX ADMIN — Medication 4: at 12:34

## 2019-12-14 RX ADMIN — CINACALCET 30 MILLIGRAM(S): 30 TABLET, FILM COATED ORAL at 14:15

## 2019-12-14 RX ADMIN — INSULIN GLARGINE 7 UNIT(S): 100 INJECTION, SOLUTION SUBCUTANEOUS at 23:53

## 2019-12-14 RX ADMIN — Medication 1 APPLICATION(S): at 10:23

## 2019-12-14 NOTE — PROGRESS NOTE ADULT - SUBJECTIVE AND OBJECTIVE BOX
Patient is a 74y old  Male who presents with a chief complaint of ams (14 Dec 2019 15:18)      INTERVAL HPI/OVERNIGHT EVENTS:  T(C): 37.2 (12-14-19 @ 08:00), Max: 37.4 (12-13-19 @ 20:57)  HR: 101 (12-14-19 @ 08:00) (101 - 111)  BP: 128/72 (12-14-19 @ 08:00) (128/72 - 148/79)  RR: 18 (12-14-19 @ 08:00) (18 - 18)  SpO2: 95% (12-14-19 @ 08:00) (95% - 98%)  Wt(kg): --  I&O's Summary    13 Dec 2019 07:01  -  14 Dec 2019 07:00  --------------------------------------------------------  IN: 1880 mL / OUT: 0 mL / NET: 1880 mL        LABS:                        7.5    12.70 )-----------( 306      ( 13 Dec 2019 13:55 )             25.1     12-13    138  |  99  |  38<H>  ----------------------------<  220<H>  3.4<L>   |  27  |  3.93<H>    Ca    10.2      13 Dec 2019 10:36          CAPILLARY BLOOD GLUCOSE      POCT Blood Glucose.: 138 mg/dL (14 Dec 2019 17:10)  POCT Blood Glucose.: 247 mg/dL (14 Dec 2019 12:16)  POCT Blood Glucose.: 261 mg/dL (14 Dec 2019 05:42)  POCT Blood Glucose.: 169 mg/dL (13 Dec 2019 23:51)            MEDICATIONS  (STANDING):  alteplase for catheter clearance 2 milliGRAM(s) Catheter once  alteplase for catheter clearance 2 milliGRAM(s) Catheter once  atorvastatin 40 milliGRAM(s) Oral at bedtime  carvedilol 3.125 milliGRAM(s) Oral every 12 hours  chlorhexidine 4% Liquid 1 Application(s) Topical <User Schedule>  cinacalcet 30 milliGRAM(s) Oral daily  Dakins Solution - 1/4 Strength 1 Application(s) Topical two times a day  dextrose 5%. 1000 milliLiter(s) (50 mL/Hr) IV Continuous <Continuous>  dextrose 50% Injectable 12.5 Gram(s) IV Push once  dextrose 50% Injectable 25 Gram(s) IV Push once  dextrose 50% Injectable 25 Gram(s) IV Push once  epoetin tan Injectable 86260 Unit(s) IV Push <User Schedule>  heparin  Injectable 5000 Unit(s) SubCutaneous every 8 hours  insulin glargine Injectable (LANTUS) 7 Unit(s) SubCutaneous at bedtime  insulin lispro (HumaLOG) corrective regimen sliding scale   SubCutaneous every 6 hours  levETIRAcetam  Solution 1000 milliGRAM(s) Oral at bedtime  lidocaine   Patch 1 Patch Transdermal daily  pantoprazole  Injectable 20 milliGRAM(s) IV Push daily  predniSONE   Tablet 5 milliGRAM(s) Oral daily    MEDICATIONS  (PRN):  dextrose 40% Gel 15 Gram(s) Oral once PRN Blood Glucose LESS THAN 70 milliGRAM(s)/deciLiter  glucagon  Injectable 1 milliGRAM(s) IntraMuscular once PRN Glucose <70 milliGRAM(s)/deciLiter          PHYSICAL EXAM:  GENERAL: NAD, well-groomed, well-developed  HEAD:  Atraumatic, Normocephalic  CHEST/LUNG: Clear to percussion bilaterally; No rales, rhonchi, wheezing, or rubs  HEART: Regular rate and rhythm; No murmurs, rubs, or gallops  ABDOMEN: Soft, Nontender, Nondistended; Bowel sounds present  EXTREMITIES:  2+ Peripheral Pulses, No clubbing, cyanosis, or edema  LYMPH: No lymphadenopathy noted  SKIN: No rashes or lesions    Care Discussed with Consultants/Other Providers [ ] YES  [ ] NO Patient is a 74y old  Male who presents with a chief complaint of ams (14 Dec 2019 15:18)      INTERVAL HPI/OVERNIGHT EVENTS:  T(C): 37.2 (12-14-19 @ 08:00), Max: 37.4 (12-13-19 @ 20:57)  HR: 101 (12-14-19 @ 08:00) (101 - 111)  BP: 128/72 (12-14-19 @ 08:00) (128/72 - 148/79)  RR: 18 (12-14-19 @ 08:00) (18 - 18)  SpO2: 95% (12-14-19 @ 08:00) (95% - 98%)  Wt(kg): --  I&O's Summary    13 Dec 2019 07:01  -  14 Dec 2019 07:00  --------------------------------------------------------  IN: 1880 mL / OUT: 0 mL / NET: 1880 mL        LABS:                        7.5    12.70 )-----------( 306      ( 13 Dec 2019 13:55 )             25.1     12-13    138  |  99  |  38<H>  ----------------------------<  220<H>  3.4<L>   |  27  |  3.93<H>    Ca    10.2      13 Dec 2019 10:36          CAPILLARY BLOOD GLUCOSE      POCT Blood Glucose.: 138 mg/dL (14 Dec 2019 17:10)  POCT Blood Glucose.: 247 mg/dL (14 Dec 2019 12:16)  POCT Blood Glucose.: 261 mg/dL (14 Dec 2019 05:42)  POCT Blood Glucose.: 169 mg/dL (13 Dec 2019 23:51)            MEDICATIONS  (STANDING):  alteplase for catheter clearance 2 milliGRAM(s) Catheter once  alteplase for catheter clearance 2 milliGRAM(s) Catheter once  atorvastatin 40 milliGRAM(s) Oral at bedtime  carvedilol 3.125 milliGRAM(s) Oral every 12 hours  chlorhexidine 4% Liquid 1 Application(s) Topical <User Schedule>  cinacalcet 30 milliGRAM(s) Oral daily  Dakins Solution - 1/4 Strength 1 Application(s) Topical two times a day  dextrose 5%. 1000 milliLiter(s) (50 mL/Hr) IV Continuous <Continuous>  dextrose 50% Injectable 12.5 Gram(s) IV Push once  dextrose 50% Injectable 25 Gram(s) IV Push once  dextrose 50% Injectable 25 Gram(s) IV Push once  epoetin tan Injectable 77268 Unit(s) IV Push <User Schedule>  heparin  Injectable 5000 Unit(s) SubCutaneous every 8 hours  insulin glargine Injectable (LANTUS) 7 Unit(s) SubCutaneous at bedtime  insulin lispro (HumaLOG) corrective regimen sliding scale   SubCutaneous every 6 hours  levETIRAcetam  Solution 1000 milliGRAM(s) Oral at bedtime  lidocaine   Patch 1 Patch Transdermal daily  pantoprazole  Injectable 20 milliGRAM(s) IV Push daily  predniSONE   Tablet 5 milliGRAM(s) Oral daily    MEDICATIONS  (PRN):  dextrose 40% Gel 15 Gram(s) Oral once PRN Blood Glucose LESS THAN 70 milliGRAM(s)/deciLiter  glucagon  Injectable 1 milliGRAM(s) IntraMuscular once PRN Glucose <70 milliGRAM(s)/deciLiter          PHYSICAL EXAM:  GENERAL: frail  CHEST/LUNG: Clear to percussion bilaterally; No rales, rhonchi, wheezing, or rubs  HEART: Regular rate and rhythm; No murmurs, rubs, or gallops  ABDOMEN: Soft, Nontender, Nondistended; Bowel sounds present  EXTREMITIES:  edema+    Care Discussed with Consultants/Other Providers [ ] YES  [ ] NO

## 2019-12-14 NOTE — PROGRESS NOTE ADULT - ASSESSMENT
Assessment  DM: A1C 5.5%, was on insulin at home, on moderate-scale insulin coverage, blood sugars trending up, no hypoglycemic episodes, he is on tube feeds, on low-dose steroids.  Hypercalcemia: Primary Hyperparathyroidism, calcium improved and WNL s/p Pamidronate dose.  Sepsis: IV ABx for UTI, LP inconsistent with meningitis, on medications, stable, monitored.  HTN: Controlled,  on antihypertensive medications.  ESRD: On hemodialysis, Monitor labs/BMP          Robi Gay MD  Cell: [phone]  Office: [phone]

## 2019-12-14 NOTE — PROGRESS NOTE ADULT - ASSESSMENT
74M PMHx of ESRD s/p failed renal transplant x2, HCV, DM, and meningococcal meningitis 2 years ago was sent in to the ED from HD for AMS and low BPs.  CVA  Pneumonia-aspiration-resp failure s/p Rx  patient also with sacral decub stage III  Mild leucocytosis  Is on baseline steroids    Problem/Plan - 1:  ·  Problem: Diarrhea, unspecified type.  Plan: C diff PCR negative  stable off po vanco  ? from tube feeds  Will defer to primary team.     Problem/Plan - 2:  ·  Problem: Leukocytosis, unspecified type.  Plan: ? from steroids  trending down   Workup for infectious etiology so far negative  Continue observation off antimicrobials  Observe for s/s any nosocomial infection.     Problem/Plan - 3:  ·  Problem: Sacral decubitus ulcer, stage III.  Plan: Wound care follow up  CT above noted   Observe for s/s any superimposed infectious process.     Problem/Plan - 4:  ·  Problem: Cerebrovascular accident (CVA), unspecified mechanism.  Plan: will defer to primary team on further plan.       Case d/w Med NP  Will tailor plan for ID issues  per course,results.Will defer to primary team on management of other issues.  Infectious Diseases Service will cover over weekend.  Please call [phone] if issues.

## 2019-12-14 NOTE — PROGRESS NOTE ADULT - ASSESSMENT
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     NEURO:  - Extubated 11/29, now on nasal cannula. Minimally verbal   - metabolic encephalopathy, meningitis r/o, LP negative  - neuro following  - chronic lacunar infarcts; per neuro, AMS also likely related to hypercalcemia, renal dysfunction , poor nutritional intake.   - c/w keppra for ?hx of seizure disorder  -AMS likely 2/2 hyperCa, renal dysfunction, poor PO intake      CV:  - hx of afib was on eliquis, now off a/c per cards due to bleeding risk  - holding carvedilol 6.25 BID  - hx of cocaine cardiomyopathy, resolved, normal EF on most recent echo  - c/w statin  - echo repeat EF70, LVH, hyperdynamic LCF, normal RV sys fxn    PULM:- extubated 11/29--tolerating nasal cannula    GI:  - GI consult for PEG   - CT abdomen     RENAL:- hx of ESRD s/p failed renal transplant x2  - renal following, HD as recommended.  - c/w predisone, sevelamir    ENDO:  #DM2: HbA1c 5.5  - Start Insulin Sliding Scale  - endocrine following  - monitor Ca    INFECTIOUS:  - dc  vanco as per infectious disease   - id fu

## 2019-12-14 NOTE — PROGRESS NOTE ADULT - SUBJECTIVE AND OBJECTIVE BOX
Patient is a 74y old  Male who presents with a chief complaint of ams (14 Dec 2019 09:51)    Being followed by ID for leucocytosis, diarrhea    Interval history:much more awake  answers juanjose queries   No acute events      ROS:denies any complaints   No cough,SOB,CP  No N/V/no loose stools/noabd pain  No other complaints      Antimicrobials:      Other medications reviewed    Vital Signs Last 24 Hrs  T(C): 37.3 (12-14-19 @ 04:25), Max: 37.4 (12-13-19 @ 20:57)  T(F): 99.1 (12-14-19 @ 04:25), Max: 99.3 (12-13-19 @ 20:57)  HR: 107 (12-14-19 @ 04:25) (107 - 111)  BP: 133/76 (12-14-19 @ 04:25) (130/82 - 148/79)  BP(mean): --  RR: 18 (12-14-19 @ 04:25) (18 - 18)  SpO2: 96% (12-14-19 @ 04:25) (96% - 98%)    Physical Exam:        R Sc HD catheter no erythema o tenderness    NGT    Chest Good AE,bibasilar crackles     CVS RRR S1 S2 WNl No murmur or rub or gallop    Abd soft BS normal No tenderness RLQ transplant kidney no tenderness    sacral decub stage III no purulence or discharge noted     Ext left arm AVF no erythema or tenderness    IV site no erythema tenderness or discharge    Joints no swelling or LOM    CNS as above  Lab Data:                          7.5    12.70 )-----------( 306      ( 13 Dec 2019 13:55 )             25.1     WBC Count: 12.70 (12-13-19 @ 13:55)  WBC Count: 13.59 (12-12-19 @ 10:54)  WBC Count: 13.69 (12-11-19 @ 11:48)  WBC Count: 14.83 (12-11-19 @ 08:57)  WBC Count: 14.85 (12-10-19 @ 09:19)  WBC Count: 17.74 (12-09-19 @ 10:32)  WBC Count: 15.25 (12-09-19 @ 08:34)  WBC Count: 15.47 (12-08-19 @ 09:24)  WBC Count: 14.77 (12-07-19 @ 14:19)    12-13    138  |  99  |  38<H>  ----------------------------<  220<H>  3.4<L>   |  27  |  3.93<H>    Ca    10.2      13 Dec 2019 10:36          GI PCR Panel, Stool (collected 10 Dec 2019 22:54)  Source: .Stool Feces  Final Report (11 Dec 2019 02:20):    GI PCR Results: NOT detected    *******Please Note:*******    GI panel PCR evaluates for:    Campylobacter, Plesiomonas shigelloides, Salmonella,    Vibrio, Yersinia enterocolitica, Enteroaggregative    Escherichia coli (EAEC), Enteropathogenic E.coli (EPEC),    Enterotoxigenic E. coli (ETEC) lt/st, Shiga-like    toxin-producing E. coli (STEC) stx1/stx2,    Shigella/ Enteroinvasive E. coli (EIEC), Cryptosporidium,    Cyclospora cayetanensis, Entamoeba histolytica,    Giardia lamblia, Adenovirus F 40/41, Astrovirus,    Norovirus GI/GII, Rotavirus A, Sapovirus        C Diff by PCR Result: NotDetec (12-10-19 @ 22:55)  Clostridium difficile GDH Interpretation: This specimen is positive for C. Difficile glutamate dehydrogenase (GDH)  antigen and negative for C. Difficile Toxins A & B, by EIA.  GDH is a  highly sensitive screening marker for C. Difficile that is produced in  large amounts by all C. Difficilestrains, both toxigenic and  nontoxigenic.  Specimens that are GDH positive and toxin negative will be  tested further by PCR to detect toxin gene sequences associated with  toxin producing C. Difficle. (12-10-19 @ 18:47)      < from: CT Abdomen and Pelvis No Cont (12.12.19 @ 11:26) >    IMPRESSION:   Unchanged nonobstructing renal and distal left ureteral calculus.    Sacral decubitus ulcer with small focus of air in the soft tissues.    Indeterminate right renal lesion is unchanged.                    < end of copied text >

## 2019-12-14 NOTE — PROGRESS NOTE ADULT - SUBJECTIVE AND OBJECTIVE BOX
GASTROENTEROLOGY    Patient seen and examined at bedside  No acute overnight events noted  Tolerating tube feeds per RN report  No nausea/vomiting reported  Loose BM's reported yesterday  No BM so far today  Await IR PEG tube placement        MEDICATIONS  (STANDING):  alteplase for catheter clearance 2 milliGRAM(s) Catheter once  alteplase for catheter clearance 2 milliGRAM(s) Catheter once  atorvastatin 40 milliGRAM(s) Oral at bedtime  carvedilol 3.125 milliGRAM(s) Oral every 12 hours  chlorhexidine 4% Liquid 1 Application(s) Topical <User Schedule>  cinacalcet 30 milliGRAM(s) Oral daily  Dakins Solution - 1/4 Strength 1 Application(s) Topical two times a day  dextrose 5%. 1000 milliLiter(s) (50 mL/Hr) IV Continuous <Continuous>  dextrose 50% Injectable 12.5 Gram(s) IV Push once  dextrose 50% Injectable 25 Gram(s) IV Push once  dextrose 50% Injectable 25 Gram(s) IV Push once  epoetin tan Injectable 05384 Unit(s) IV Push <User Schedule>  heparin  Injectable 5000 Unit(s) SubCutaneous every 8 hours  insulin lispro (HumaLOG) corrective regimen sliding scale   SubCutaneous every 6 hours  levETIRAcetam  Solution 1000 milliGRAM(s) Oral at bedtime  lidocaine   Patch 1 Patch Transdermal daily  pantoprazole  Injectable 20 milliGRAM(s) IV Push daily  predniSONE   Tablet 5 milliGRAM(s) Oral daily        T(F): 99.1 (12-14-19 @ 04:25), Max: 99.3 (12-13-19 @ 20:57)  HR: 107 (12-14-19 @ 04:25) (102 - 111)  BP: 133/76 (12-14-19 @ 04:25) (102/61 - 148/79)  RR: 18 (12-14-19 @ 04:25) (18 - 18)  SpO2: 96% (12-14-19 @ 04:25) (93% - 98%)  Wt(kg): --    PHYSICAL EXAM  GENERAL:   NAD  HEENT:  NC/AT, no JVD, sclera-anicteric  CHEST:  clear to ascultation bilaterally, respirations nonlabored  HEART:  +S1+S2   ABDOMEN:  Soft, non-tender, non-distended, + bowel sounds, + ngt  EXTREMITIES:  no cyanosis, clubbing or edema  NEURO:  Awake, alert, speech garbled  SKIN:  No rash/warm/dry      LABS:                        7.5<L>  12.70<H> )-----------( 306      ( 13 Dec 2019 13:55 )             25.1<L>  13 Dec 2019 13:55    12-13    138  |  99  |  38<H>  ----------------------------<  220<H>  3.4<L>   |  27  |  3.93<H>    Ca    10.2      13 Dec 2019 10:36        PT/INR - ( 12 Dec 2019 10:54 )   PT: 12.9 sec;   INR: 1.12 ratio           I&O's Detail    13 Dec 2019 07:01  -  14 Dec 2019 07:00  --------------------------------------------------------  IN:    Enteral Tube Flush: 680 mL    Nepro with Carb Steady: 1200 mL  Total IN: 1880 mL    OUT:  Total OUT: 0 mL    Total NET: 1880 mL

## 2019-12-14 NOTE — PROGRESS NOTE ADULT - PROBLEM SELECTOR PLAN 1
- s/p  upper gastrointestinal endoscopy with the procedure was aborted due to inability to place PEG tube.  - NGT replaced with endoscopic confirmation  -await IR PEG tube placement

## 2019-12-14 NOTE — PROGRESS NOTE ADULT - PROBLEM SELECTOR PLAN 2
- today with multiple loose, brown stools per RN  - GI PCR ordered; will consider imodium if PCR negative  - ID input eval noted; vancomycin d/c'd  - C diff PCR negative

## 2019-12-14 NOTE — PROGRESS NOTE ADULT - SUBJECTIVE AND OBJECTIVE BOX
Patient is a 74y Male whom presented to   pateint seen and examined nad ,     PAST MEDICAL & SURGICAL HISTORY:  Kidney transplant recipient  Anuria  GERD (gastroesophageal reflux disease)  Kidney transplant failure: dx: 2/2013  Hepatitis C: dx: 90&#x27;s.  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 10 milliGRAM(s) Oral three times a day  mycophenolate mofetil 250 milliGRAM(s) Oral two times a day  predniSONE   Tablet 5 milliGRAM(s) Oral daily  sevelamer carbonate 800 milliGRAM(s) Oral three times a day with meals  sodium bicarbonate 650 milliGRAM(s) Oral two times a day  tamsulosin 0.4 milliGRAM(s) Oral at bedtime      Allergies    No Known Allergies                       SOCIAL HISTORY:  Denies ETOh,Smoking,     FAMILY HISTORY:  Family history of breast cancer in sister (Sibling): living at 92 yo  Family history of prostate cancer in father  Family history of lung cancer  Family history of hypertension in mother  Family history of diabetes mellitus: mother      REVIEW OF SYSTEMS:    unbable to obtained                                                                               7.5    12.70 )-----------( 306      ( 13 Dec 2019 13:55 )             25.1       CBC Full  -  ( 13 Dec 2019 13:55 )  WBC Count : 12.70 K/uL  RBC Count : 2.82 M/uL  Hemoglobin : 7.5 g/dL  Hematocrit : 25.1 %  Platelet Count - Automated : 306 K/uL  Mean Cell Volume : 89.0 fl  Mean Cell Hemoglobin : 26.6 pg  Mean Cell Hemoglobin Concentration : 29.9 gm/dL  Auto Neutrophil # : x  Auto Lymphocyte # : x  Auto Monocyte # : x  Auto Eosinophil # : x  Auto Basophil # : x  Auto Neutrophil % : x  Auto Lymphocyte % : x  Auto Monocyte % : x  Auto Eosinophil % : x  Auto Basophil % : x      12-13    138  |  99  |  38<H>  ----------------------------<  220<H>  3.4<L>   |  27  |  3.93<H>    Ca    10.2      13 Dec 2019 10:36        CAPILLARY BLOOD GLUCOSE      POCT Blood Glucose.: 247 mg/dL (14 Dec 2019 12:16)  POCT Blood Glucose.: 261 mg/dL (14 Dec 2019 05:42)  POCT Blood Glucose.: 169 mg/dL (13 Dec 2019 23:51)  POCT Blood Glucose.: 156 mg/dL (13 Dec 2019 17:08)      Vital Signs Last 24 Hrs  T(C): 37.2 (14 Dec 2019 08:00), Max: 37.4 (13 Dec 2019 20:57)  T(F): 98.9 (14 Dec 2019 08:00), Max: 99.3 (13 Dec 2019 20:57)  HR: 101 (14 Dec 2019 08:00) (101 - 111)  BP: 128/72 (14 Dec 2019 08:00) (128/72 - 148/79)  BP(mean): --  RR: 18 (14 Dec 2019 08:00) (18 - 18)  SpO2: 95% (14 Dec 2019 08:00) (95% - 98%)                                     HEENT: conjunctive   clear   Neck:  No JVD  Respiratory: decrease bs b/l   Cardiovascular: S1 and S2  Gastrointestinal: BS+, soft, NT/ND  Extremities: No peripheral edema  Skin: dry   Access: pos fistula

## 2019-12-14 NOTE — PROGRESS NOTE ADULT - ASSESSMENT
History of cocaine induced CM  normal EF on last echo  cont BB     PAF  in sinus   a/c once deemed safe   for now will keep off given high bleed risk    GOC   fu with palliative care    anemia  Monitor hemoglobin, transfuse as needed.

## 2019-12-14 NOTE — PROGRESS NOTE ADULT - SUBJECTIVE AND OBJECTIVE BOX
Subjective: Patient seen and examined. No new events except as noted.     SUBJECTIVE/ROS:        MEDICATIONS:  MEDICATIONS  (STANDING):  alteplase for catheter clearance 2 milliGRAM(s) Catheter once  alteplase for catheter clearance 2 milliGRAM(s) Catheter once  atorvastatin 40 milliGRAM(s) Oral at bedtime  carvedilol 3.125 milliGRAM(s) Oral every 12 hours  chlorhexidine 4% Liquid 1 Application(s) Topical <User Schedule>  cinacalcet 30 milliGRAM(s) Oral daily  Dakins Solution - 1/4 Strength 1 Application(s) Topical two times a day  dextrose 5%. 1000 milliLiter(s) (50 mL/Hr) IV Continuous <Continuous>  dextrose 50% Injectable 12.5 Gram(s) IV Push once  dextrose 50% Injectable 25 Gram(s) IV Push once  dextrose 50% Injectable 25 Gram(s) IV Push once  epoetin tan Injectable 14202 Unit(s) IV Push <User Schedule>  heparin  Injectable 5000 Unit(s) SubCutaneous every 8 hours  insulin lispro (HumaLOG) corrective regimen sliding scale   SubCutaneous every 6 hours  levETIRAcetam  Solution 1000 milliGRAM(s) Oral at bedtime  lidocaine   Patch 1 Patch Transdermal daily  pantoprazole  Injectable 20 milliGRAM(s) IV Push daily  predniSONE   Tablet 5 milliGRAM(s) Oral daily      PHYSICAL EXAM:  T(C): 37.3 (12-14-19 @ 04:25), Max: 37.4 (12-13-19 @ 20:57)  HR: 107 (12-14-19 @ 04:25) (102 - 111)  BP: 133/76 (12-14-19 @ 04:25) (102/61 - 148/79)  RR: 18 (12-14-19 @ 04:25) (18 - 18)  SpO2: 96% (12-14-19 @ 04:25) (93% - 98%)  Wt(kg): --  I&O's Summary    13 Dec 2019 07:01  -  14 Dec 2019 07:00  --------------------------------------------------------  IN: 1880 mL / OUT: 0 mL / NET: 1880 mL            JVP: Normal  Neck: supple  Lung: clear   CV: S1 S2 , Murmur:  Abd: soft  Ext: No edema  Psych: flat affect  Skin: normal``    LABS/DATA:    CARDIAC MARKERS:                                7.5    12.70 )-----------( 306      ( 13 Dec 2019 13:55 )             25.1     12-13    138  |  99  |  38<H>  ----------------------------<  220<H>  3.4<L>   |  27  |  3.93<H>    Ca    10.2      13 Dec 2019 10:36      proBNP:   Lipid Profile:   HgA1c:   TSH:     TELE:  EKG:

## 2019-12-14 NOTE — PROGRESS NOTE ADULT - SUBJECTIVE AND OBJECTIVE BOX
Chief complaint  Patient is a 74y old  Male who presents with a chief complaint of ams (14 Dec 2019 10:57)   Review of systems  Patient in bed, looks comfortable, no fever,   no hypoglycemia.    Labs and Fingersticks  CAPILLARY BLOOD GLUCOSE      POCT Blood Glucose.: 247 mg/dL (14 Dec 2019 12:16)  POCT Blood Glucose.: 261 mg/dL (14 Dec 2019 05:42)  POCT Blood Glucose.: 169 mg/dL (13 Dec 2019 23:51)  POCT Blood Glucose.: 156 mg/dL (13 Dec 2019 17:08)      Anion Gap, Serum: 12 (12-13 @ 10:36)      Calcium, Total Serum: 10.2 (12-13 @ 10:36)          12-13    138  |  99  |  38<H>  ----------------------------<  220<H>  3.4<L>   |  27  |  3.93<H>    Ca    10.2      13 Dec 2019 10:36                          7.5    12.70 )-----------( 306      ( 13 Dec 2019 13:55 )             25.1     Medications  MEDICATIONS  (STANDING):  alteplase for catheter clearance 2 milliGRAM(s) Catheter once  alteplase for catheter clearance 2 milliGRAM(s) Catheter once  atorvastatin 40 milliGRAM(s) Oral at bedtime  carvedilol 3.125 milliGRAM(s) Oral every 12 hours  chlorhexidine 4% Liquid 1 Application(s) Topical <User Schedule>  cinacalcet 30 milliGRAM(s) Oral daily  Dakins Solution - 1/4 Strength 1 Application(s) Topical two times a day  dextrose 5%. 1000 milliLiter(s) (50 mL/Hr) IV Continuous <Continuous>  dextrose 50% Injectable 12.5 Gram(s) IV Push once  dextrose 50% Injectable 25 Gram(s) IV Push once  dextrose 50% Injectable 25 Gram(s) IV Push once  epoetin tan Injectable 91798 Unit(s) IV Push <User Schedule>  heparin  Injectable 5000 Unit(s) SubCutaneous every 8 hours  insulin glargine Injectable (LANTUS) 7 Unit(s) SubCutaneous at bedtime  insulin lispro (HumaLOG) corrective regimen sliding scale   SubCutaneous every 6 hours  levETIRAcetam  Solution 1000 milliGRAM(s) Oral at bedtime  lidocaine   Patch 1 Patch Transdermal daily  pantoprazole  Injectable 20 milliGRAM(s) IV Push daily  predniSONE   Tablet 5 milliGRAM(s) Oral daily      Physical Exam  General: Patient comfortable in bed  Vital Signs Last 12 Hrs  T(F): 98.9 (12-14-19 @ 08:00), Max: 99.1 (12-14-19 @ 04:25)  HR: 101 (12-14-19 @ 08:00) (101 - 107)  BP: 128/72 (12-14-19 @ 08:00) (128/72 - 133/76)  BP(mean): --  RR: 18 (12-14-19 @ 08:00) (18 - 18)  SpO2: 95% (12-14-19 @ 08:00) (95% - 96%)  Neck: No palpable thyroid nodules.  CVS: S1S2, No murmurs  Respiratory: No wheezing, no crepitations  GI: Abdomen soft, bowel sounds positive  Musculoskeletal:  edema lower extremities.   Skin: No skin rashes, no ecchymosis    Diagnostics    Thyroid Stimulating Hormone, Serum: AM Sched. Collection: 03-Dec-2019 06:00 (12-02 @ 10:42)  Free Thyroxine, Serum: AM Sched. Collection: 03-Dec-2019 06:00 (12-02 @ 10:42)

## 2019-12-15 LAB
ANION GAP SERPL CALC-SCNC: 13 MMOL/L — SIGNIFICANT CHANGE UP (ref 5–17)
BUN SERPL-MCNC: 31 MG/DL — HIGH (ref 7–23)
CALCIUM SERPL-MCNC: 10.4 MG/DL — SIGNIFICANT CHANGE UP (ref 8.4–10.5)
CHLORIDE SERPL-SCNC: 95 MMOL/L — LOW (ref 96–108)
CO2 SERPL-SCNC: 25 MMOL/L — SIGNIFICANT CHANGE UP (ref 22–31)
CREAT SERPL-MCNC: 3.04 MG/DL — HIGH (ref 0.5–1.3)
GLUCOSE BLDC GLUCOMTR-MCNC: 129 MG/DL — HIGH (ref 70–99)
GLUCOSE BLDC GLUCOMTR-MCNC: 157 MG/DL — HIGH (ref 70–99)
GLUCOSE BLDC GLUCOMTR-MCNC: 215 MG/DL — HIGH (ref 70–99)
GLUCOSE BLDC GLUCOMTR-MCNC: 223 MG/DL — HIGH (ref 70–99)
GLUCOSE SERPL-MCNC: 146 MG/DL — HIGH (ref 70–99)
HCT VFR BLD CALC: 25.8 % — LOW (ref 39–50)
HGB BLD-MCNC: 7.8 G/DL — LOW (ref 13–17)
MCHC RBC-ENTMCNC: 26.5 PG — LOW (ref 27–34)
MCHC RBC-ENTMCNC: 30.2 GM/DL — LOW (ref 32–36)
MCV RBC AUTO: 87.8 FL — SIGNIFICANT CHANGE UP (ref 80–100)
PLATELET # BLD AUTO: 302 K/UL — SIGNIFICANT CHANGE UP (ref 150–400)
POTASSIUM SERPL-MCNC: 3.5 MMOL/L — SIGNIFICANT CHANGE UP (ref 3.5–5.3)
POTASSIUM SERPL-SCNC: 3.5 MMOL/L — SIGNIFICANT CHANGE UP (ref 3.5–5.3)
RBC # BLD: 2.94 M/UL — LOW (ref 4.2–5.8)
RBC # FLD: 15.8 % — HIGH (ref 10.3–14.5)
SODIUM SERPL-SCNC: 133 MMOL/L — LOW (ref 135–145)
WBC # BLD: 14.22 K/UL — HIGH (ref 3.8–10.5)
WBC # FLD AUTO: 14.22 K/UL — HIGH (ref 3.8–10.5)

## 2019-12-15 RX ADMIN — Medication 1 APPLICATION(S): at 21:11

## 2019-12-15 RX ADMIN — Medication 5 MILLIGRAM(S): at 05:09

## 2019-12-15 RX ADMIN — CHLORHEXIDINE GLUCONATE 1 APPLICATION(S): 213 SOLUTION TOPICAL at 05:09

## 2019-12-15 RX ADMIN — CARVEDILOL PHOSPHATE 3.12 MILLIGRAM(S): 80 CAPSULE, EXTENDED RELEASE ORAL at 00:11

## 2019-12-15 RX ADMIN — LIDOCAINE 1 PATCH: 4 CREAM TOPICAL at 20:22

## 2019-12-15 RX ADMIN — CARVEDILOL PHOSPHATE 3.12 MILLIGRAM(S): 80 CAPSULE, EXTENDED RELEASE ORAL at 22:50

## 2019-12-15 RX ADMIN — PANTOPRAZOLE SODIUM 20 MILLIGRAM(S): 20 TABLET, DELAYED RELEASE ORAL at 12:38

## 2019-12-15 RX ADMIN — Medication 4: at 12:38

## 2019-12-15 RX ADMIN — HEPARIN SODIUM 5000 UNIT(S): 5000 INJECTION INTRAVENOUS; SUBCUTANEOUS at 13:15

## 2019-12-15 RX ADMIN — LEVETIRACETAM 1000 MILLIGRAM(S): 250 TABLET, FILM COATED ORAL at 21:10

## 2019-12-15 RX ADMIN — LIDOCAINE 1 PATCH: 4 CREAM TOPICAL at 00:11

## 2019-12-15 RX ADMIN — HEPARIN SODIUM 5000 UNIT(S): 5000 INJECTION INTRAVENOUS; SUBCUTANEOUS at 21:10

## 2019-12-15 RX ADMIN — Medication 4: at 06:43

## 2019-12-15 RX ADMIN — INSULIN GLARGINE 7 UNIT(S): 100 INJECTION, SOLUTION SUBCUTANEOUS at 22:03

## 2019-12-15 RX ADMIN — HEPARIN SODIUM 5000 UNIT(S): 5000 INJECTION INTRAVENOUS; SUBCUTANEOUS at 05:09

## 2019-12-15 RX ADMIN — ATORVASTATIN CALCIUM 40 MILLIGRAM(S): 80 TABLET, FILM COATED ORAL at 21:10

## 2019-12-15 RX ADMIN — CINACALCET 30 MILLIGRAM(S): 30 TABLET, FILM COATED ORAL at 13:15

## 2019-12-15 RX ADMIN — LIDOCAINE 1 PATCH: 4 CREAM TOPICAL at 12:37

## 2019-12-15 RX ADMIN — CARVEDILOL PHOSPHATE 3.12 MILLIGRAM(S): 80 CAPSULE, EXTENDED RELEASE ORAL at 12:34

## 2019-12-15 RX ADMIN — Medication 1 APPLICATION(S): at 12:35

## 2019-12-15 NOTE — PROGRESS NOTE ADULT - PROBLEM SELECTOR PLAN 1
Will continue current insulin regimen for now.  Will continue monitoring FS and FU. Will continue current insulin regimen for now.

## 2019-12-15 NOTE — PROGRESS NOTE ADULT - SUBJECTIVE AND OBJECTIVE BOX
Subjective: Patient seen and examined. No new events except as noted.     SUBJECTIVE/ROS:      MEDICATIONS:  MEDICATIONS  (STANDING):  alteplase for catheter clearance 2 milliGRAM(s) Catheter once  alteplase for catheter clearance 2 milliGRAM(s) Catheter once  atorvastatin 40 milliGRAM(s) Oral at bedtime  carvedilol 3.125 milliGRAM(s) Oral every 12 hours  chlorhexidine 4% Liquid 1 Application(s) Topical <User Schedule>  cinacalcet 30 milliGRAM(s) Oral daily  Dakins Solution - 1/4 Strength 1 Application(s) Topical two times a day  dextrose 5%. 1000 milliLiter(s) (50 mL/Hr) IV Continuous <Continuous>  dextrose 50% Injectable 12.5 Gram(s) IV Push once  dextrose 50% Injectable 25 Gram(s) IV Push once  dextrose 50% Injectable 25 Gram(s) IV Push once  epoetin tan Injectable 92103 Unit(s) IV Push <User Schedule>  heparin  Injectable 5000 Unit(s) SubCutaneous every 8 hours  insulin glargine Injectable (LANTUS) 7 Unit(s) SubCutaneous at bedtime  insulin lispro (HumaLOG) corrective regimen sliding scale   SubCutaneous every 6 hours  levETIRAcetam  Solution 1000 milliGRAM(s) Oral at bedtime  lidocaine   Patch 1 Patch Transdermal daily  pantoprazole  Injectable 20 milliGRAM(s) IV Push daily  predniSONE   Tablet 5 milliGRAM(s) Oral daily      PHYSICAL EXAM:  T(C): 37 (12-15-19 @ 04:38), Max: 37.7 (12-14-19 @ 17:29)  HR: 100 (12-15-19 @ 04:38) (100 - 114)  BP: 128/70 (12-15-19 @ 04:38) (109/63 - 140/73)  RR: 18 (12-15-19 @ 04:38) (18 - 18)  SpO2: 98% (12-15-19 @ 04:38) (95% - 99%)  Wt(kg): --  I&O's Summary    14 Dec 2019 07:01  -  15 Dec 2019 07:00  --------------------------------------------------------  IN: 3035 mL / OUT: 2000 mL / NET: 1035 mL            JVP: Normal  Neck: supple  Lung: clear   CV: S1 S2 , Murmur:  Abd: soft  Ext: No edema  neuro: lethargic   Psych: flat affect  Skin: normal``    LABS/DATA:    CARDIAC MARKERS:                                7.2    14.00 )-----------( 303      ( 14 Dec 2019 19:43 )             24.5     12-14    135  |  96  |  48<H>  ----------------------------<  180<H>  3.2<L>   |  25  |  4.55<H>    Ca    11.0<H>      14 Dec 2019 19:43      proBNP:   Lipid Profile:   HgA1c:   TSH:     TELE:  EKG:

## 2019-12-15 NOTE — PROGRESS NOTE ADULT - SUBJECTIVE AND OBJECTIVE BOX
GASTROENTEROLOGY    Patient seen and examined at bedside  Tolerating NGT feeds  No nausea/vomiting  Await IR peg placement  2 episodes of loose brown stool overnight       MEDICATIONS  (STANDING):  alteplase for catheter clearance 2 milliGRAM(s) Catheter once  alteplase for catheter clearance 2 milliGRAM(s) Catheter once  atorvastatin 40 milliGRAM(s) Oral at bedtime  carvedilol 3.125 milliGRAM(s) Oral every 12 hours  chlorhexidine 4% Liquid 1 Application(s) Topical <User Schedule>  cinacalcet 30 milliGRAM(s) Oral daily  Dakins Solution - 1/4 Strength 1 Application(s) Topical two times a day  dextrose 5%. 1000 milliLiter(s) (50 mL/Hr) IV Continuous <Continuous>  dextrose 50% Injectable 12.5 Gram(s) IV Push once  dextrose 50% Injectable 25 Gram(s) IV Push once  dextrose 50% Injectable 25 Gram(s) IV Push once  epoetin tan Injectable 20537 Unit(s) IV Push <User Schedule>  heparin  Injectable 5000 Unit(s) SubCutaneous every 8 hours  insulin glargine Injectable (LANTUS) 7 Unit(s) SubCutaneous at bedtime  insulin lispro (HumaLOG) corrective regimen sliding scale   SubCutaneous every 6 hours  levETIRAcetam  Solution 1000 milliGRAM(s) Oral at bedtime  lidocaine   Patch 1 Patch Transdermal daily  pantoprazole  Injectable 20 milliGRAM(s) IV Push daily  predniSONE   Tablet 5 milliGRAM(s) Oral daily        T(F): 98.6 (12-15-19 @ 04:38), Max: 99.9 (12-14-19 @ 17:29)  HR: 100 (12-15-19 @ 04:38) (100 - 114)  BP: 128/70 (12-15-19 @ 04:38) (109/63 - 140/73)  RR: 18 (12-15-19 @ 04:38) (18 - 18)  SpO2: 98% (12-15-19 @ 04:38) (97% - 99%)  Wt(kg): --    PHYSICAL EXAM  GENERAL:   NAD  HEENT:  NC/AT, no JVD, sclera-anicteric  CHEST:  clear to ascultation bilaterally, respirations nonlabored  HEART:  +S1+S2   ABDOMEN:  Soft, non-distended, + bowel sounds, + NGT  EXTREMITIES:  no cyanosis, clubbing or edema  NEURO:  awake, responsive, minimally verbal  SKIN:  No rash/warm/dry      LABS:                        7.2<L>  14.00<H> )-----------( 303      ( 14 Dec 2019 19:43 )             24.5<L>  14 Dec 2019 19:43    12-15    133<L>  |  95<L>  |  31<H>  ----------------------------<  146<H>  3.5   |  25  |  3.04<H>    Ca    10.4      15 Dec 2019 10:24          I&O's Detail    14 Dec 2019 07:01  -  15 Dec 2019 07:00  --------------------------------------------------------  IN:    Enteral Tube Flush: 585 mL    Nepro with Carb Steady: 1150 mL    Oral Fluid: 200 mL    Other: 1100 mL  Total IN: 3035 mL    OUT:    Other: 2000 mL  Total OUT: 2000 mL    Total NET: 1035 mL          GI PCR Panel, Stool (collected 14 Dec 2019 17:36)  Source: .Stool Feces  Final Report (14 Dec 2019 20:50):    GI PCR Results: NOT detected    *******Please Note:*******    GI panel PCR evaluates for:    Campylobacter, Plesiomonas shigelloides, Salmonella,    Vibrio, Yersinia enterocolitica, Enteroaggregative    Escherichia coli (EAEC), Enteropathogenic E.coli (EPEC),    Enterotoxigenic E. coli (ETEC) lt/st, Shiga-like    toxin-producing E. coli (STEC) stx1/stx2,    Shigella/ Enteroinvasive E. coli (EIEC), Cryptosporidium,    Cyclospora cayetanensis, Entamoeba histolytica,    Giardia lamblia, Adenovirus F 40/41, Astrovirus,    Norovirus GI/GII, Rotavirus A, Sapovirus

## 2019-12-15 NOTE — PROGRESS NOTE ADULT - SUBJECTIVE AND OBJECTIVE BOX
Chief complaint  Patient is a 74y old  Male who presents with a chief complaint of ams (15 Dec 2019 14:33)   Review of systems  Patient in bed, looks comfortable, no fever, no hypoglycemia.    Labs and Fingersticks  CAPILLARY BLOOD GLUCOSE      POCT Blood Glucose.: 215 mg/dL (15 Dec 2019 12:25)  POCT Blood Glucose.: 223 mg/dL (15 Dec 2019 06:38)  POCT Blood Glucose.: 183 mg/dL (14 Dec 2019 23:47)  POCT Blood Glucose.: 138 mg/dL (14 Dec 2019 17:10)      Anion Gap, Serum: 13 (12-15 @ 10:24)  Anion Gap, Serum: 14 (12-14 @ 19:43)      Calcium, Total Serum: 10.4 (12-15 @ 10:24)  Calcium, Total Serum: 11.0 <H> (12-14 @ 19:43)          12-15    133<L>  |  95<L>  |  31<H>  ----------------------------<  146<H>  3.5   |  25  |  3.04<H>    Ca    10.4      15 Dec 2019 10:24                          7.2    14.00 )-----------( 303      ( 14 Dec 2019 19:43 )             24.5     Medications  MEDICATIONS  (STANDING):  alteplase for catheter clearance 2 milliGRAM(s) Catheter once  alteplase for catheter clearance 2 milliGRAM(s) Catheter once  atorvastatin 40 milliGRAM(s) Oral at bedtime  carvedilol 3.125 milliGRAM(s) Oral every 12 hours  chlorhexidine 4% Liquid 1 Application(s) Topical <User Schedule>  cinacalcet 30 milliGRAM(s) Oral daily  Dakins Solution - 1/4 Strength 1 Application(s) Topical two times a day  dextrose 5%. 1000 milliLiter(s) (50 mL/Hr) IV Continuous <Continuous>  dextrose 50% Injectable 12.5 Gram(s) IV Push once  dextrose 50% Injectable 25 Gram(s) IV Push once  dextrose 50% Injectable 25 Gram(s) IV Push once  epoetin tan Injectable 44629 Unit(s) IV Push <User Schedule>  heparin  Injectable 5000 Unit(s) SubCutaneous every 8 hours  insulin glargine Injectable (LANTUS) 7 Unit(s) SubCutaneous at bedtime  insulin lispro (HumaLOG) corrective regimen sliding scale   SubCutaneous every 6 hours  levETIRAcetam  Solution 1000 milliGRAM(s) Oral at bedtime  lidocaine   Patch 1 Patch Transdermal daily  pantoprazole  Injectable 20 milliGRAM(s) IV Push daily  predniSONE   Tablet 5 milliGRAM(s) Oral daily      Physical Exam  GI PCR Panel, Stool (collected 12-14-19 @ 17:36)  Source: .Stool Feces  Final Report (12-14-19 @ 20:50):    GI PCR Results: NOT detected    *******Please Note:*******    GI panel PCR evaluates for:    Campylobacter, Plesiomonas shigelloides, Salmonella,    Vibrio, Yersinia enterocolitica, Enteroaggregative    Escherichia coli (EAEC), Enteropathogenic E.coli (EPEC),    Enterotoxigenic E. coli (ETEC) lt/st, Shiga-like    toxin-producing E. coli (STEC) stx1/stx2,    Shigella/ Enteroinvasive E. coli (EIEC), Cryptosporidium,    Cyclospora cayetanensis, Entamoeba histolytica,    Giardia lamblia, Adenovirus F 40/41, Astrovirus,    Norovirus GI/GII, Rotavirus A, Sapovirus      General: Patient comfortable in bed  Vital Signs Last 12 Hrs  T(F): 98.5 (12-15-19 @ 14:12), Max: 98.5 (12-15-19 @ 14:12)  HR: 100 (12-15-19 @ 14:12) (100 - 100)  BP: 125/70 (12-15-19 @ 14:12) (125/70 - 125/70)  BP(mean): --  RR: 18 (12-15-19 @ 14:12) (18 - 18)  SpO2: 98% (12-15-19 @ 14:12) (98% - 98%)  Neck: No palpable thyroid nodules.  CVS: S1S2, No murmurs  Respiratory: No wheezing, no crepitations  GI: Abdomen soft, bowel sounds positive  Musculoskeletal:  edema lower extremities.   Skin: No skin rashes, no ecchymosis    Diagnostics Chief complaint  Patient is a 74y old  Male who presents with a chief complaint of ams (15 Dec 2019 14:33)   Review of systems  Patient in bed, looks comfortable, no fever,  no hypoglycemia.    Labs and Fingersticks  CAPILLARY BLOOD GLUCOSE      POCT Blood Glucose.: 215 mg/dL (15 Dec 2019 12:25)  POCT Blood Glucose.: 223 mg/dL (15 Dec 2019 06:38)  POCT Blood Glucose.: 183 mg/dL (14 Dec 2019 23:47)  POCT Blood Glucose.: 138 mg/dL (14 Dec 2019 17:10)      Anion Gap, Serum: 13 (12-15 @ 10:24)  Anion Gap, Serum: 14 (12-14 @ 19:43)      Calcium, Total Serum: 10.4 (12-15 @ 10:24)  Calcium, Total Serum: 11.0 <H> (12-14 @ 19:43)          12-15    133<L>  |  95<L>  |  31<H>  ----------------------------<  146<H>  3.5   |  25  |  3.04<H>    Ca    10.4      15 Dec 2019 10:24                          7.2    14.00 )-----------( 303      ( 14 Dec 2019 19:43 )             24.5     Medications  MEDICATIONS  (STANDING):  alteplase for catheter clearance 2 milliGRAM(s) Catheter once  alteplase for catheter clearance 2 milliGRAM(s) Catheter once  atorvastatin 40 milliGRAM(s) Oral at bedtime  carvedilol 3.125 milliGRAM(s) Oral every 12 hours  chlorhexidine 4% Liquid 1 Application(s) Topical <User Schedule>  cinacalcet 30 milliGRAM(s) Oral daily  Dakins Solution - 1/4 Strength 1 Application(s) Topical two times a day  dextrose 5%. 1000 milliLiter(s) (50 mL/Hr) IV Continuous <Continuous>  dextrose 50% Injectable 12.5 Gram(s) IV Push once  dextrose 50% Injectable 25 Gram(s) IV Push once  dextrose 50% Injectable 25 Gram(s) IV Push once  epoetin tan Injectable 52533 Unit(s) IV Push <User Schedule>  heparin  Injectable 5000 Unit(s) SubCutaneous every 8 hours  insulin glargine Injectable (LANTUS) 7 Unit(s) SubCutaneous at bedtime  insulin lispro (HumaLOG) corrective regimen sliding scale   SubCutaneous every 6 hours  levETIRAcetam  Solution 1000 milliGRAM(s) Oral at bedtime  lidocaine   Patch 1 Patch Transdermal daily  pantoprazole  Injectable 20 milliGRAM(s) IV Push daily  predniSONE   Tablet 5 milliGRAM(s) Oral daily      Physical Exam  GI PCR Panel, Stool (collected 12-14-19 @ 17:36)  Source: .Stool Feces  Final Report (12-14-19 @ 20:50):    GI PCR Results: NOT detected    *******Please Note:*******    GI panel PCR evaluates for:    Campylobacter, Plesiomonas shigelloides, Salmonella,    Vibrio, Yersinia enterocolitica, Enteroaggregative    Escherichia coli (EAEC), Enteropathogenic E.coli (EPEC),    Enterotoxigenic E. coli (ETEC) lt/st, Shiga-like    toxin-producing E. coli (STEC) stx1/stx2,    Shigella/ Enteroinvasive E. coli (EIEC), Cryptosporidium,    Cyclospora cayetanensis, Entamoeba histolytica,    Giardia lamblia, Adenovirus F 40/41, Astrovirus,    Norovirus GI/GII, Rotavirus A, Sapovirus      General: Patient comfortable in bed  Vital Signs Last 12 Hrs  T(F): 98.5 (12-15-19 @ 14:12), Max: 98.5 (12-15-19 @ 14:12)  HR: 100 (12-15-19 @ 14:12) (100 - 100)  BP: 125/70 (12-15-19 @ 14:12) (125/70 - 125/70)  BP(mean): --  RR: 18 (12-15-19 @ 14:12) (18 - 18)  SpO2: 98% (12-15-19 @ 14:12) (98% - 98%)  Neck: No palpable thyroid nodules.  CVS: S1S2, No murmurs  Respiratory: No wheezing, no crepitations  GI: Abdomen soft, bowel sounds positive  Musculoskeletal:  edema lower extremities.   Skin: No skin rashes, no ecchymosis    Diagnostics

## 2019-12-15 NOTE — PROGRESS NOTE ADULT - ASSESSMENT
NEURO:  - Extubated 11/29, now on nasal cannula. Minimally verbal   - metabolic encephalopathy, meningitis r/o, LP negative  - neuro following  - chronic lacunar infarcts; per neuro, AMS also likely related to hypercalcemia, renal dysfunction , poor nutritional intake.   - c/w keppra for ?hx of seizure disorder  -AMS likely 2/2 hyperCa, renal dysfunction, poor PO intake      CV:  - hx of afib was on eliquis, now off a/c per cards due to bleeding risk  - holding carvedilol 6.25 BID  - hx of cocaine cardiomyopathy, resolved, normal EF on most recent echo  - c/w statin  - echo repeat EF70, LVH, hyperdynamic LCF, normal RV sys fxn    PULM:- extubated 11/29--tolerating nasal cannula    GI:  - GI consult for PEG   - CT abdomen   RENAL:- hx of ESRD s/p failed renal transplant x2  - renal following, HD as recommended.  - c/w predisone, sevelamir    ENDO:  #DM2: HbA1c 5.5  - Start Insulin Sliding Scale  - endocrine following  - monitor Ca    INFECTIOUS:  - dc  vanco as per infectious disease   - id fu

## 2019-12-15 NOTE — PROGRESS NOTE ADULT - SUBJECTIVE AND OBJECTIVE BOX
Patient is a 74y old  Male who presents with a chief complaint of ams (15 Dec 2019 16:38)      INTERVAL HPI/OVERNIGHT EVENTS:  T(C): 36.9 (12-15-19 @ 14:12), Max: 37.5 (12-14-19 @ 19:06)  HR: 100 (12-15-19 @ 14:12) (100 - 114)  BP: 125/70 (12-15-19 @ 14:12) (125/70 - 140/73)  RR: 18 (12-15-19 @ 14:12) (18 - 18)  SpO2: 98% (12-15-19 @ 14:12) (97% - 99%)  Wt(kg): --  I&O's Summary    14 Dec 2019 07:01  -  15 Dec 2019 07:00  --------------------------------------------------------  IN: 3035 mL / OUT: 2000 mL / NET: 1035 mL    15 Dec 2019 07:01  -  15 Dec 2019 17:33  --------------------------------------------------------  IN: 0 mL / OUT: 0 mL / NET: 0 mL        LABS:                        7.8    14.22 )-----------( 302      ( 15 Dec 2019 15:10 )             25.8     12-15    133<L>  |  95<L>  |  31<H>  ----------------------------<  146<H>  3.5   |  25  |  3.04<H>    Ca    10.4      15 Dec 2019 10:24          CAPILLARY BLOOD GLUCOSE      POCT Blood Glucose.: 129 mg/dL (15 Dec 2019 17:09)  POCT Blood Glucose.: 215 mg/dL (15 Dec 2019 12:25)  POCT Blood Glucose.: 223 mg/dL (15 Dec 2019 06:38)  POCT Blood Glucose.: 183 mg/dL (14 Dec 2019 23:47)            MEDICATIONS  (STANDING):  alteplase for catheter clearance 2 milliGRAM(s) Catheter once  alteplase for catheter clearance 2 milliGRAM(s) Catheter once  atorvastatin 40 milliGRAM(s) Oral at bedtime  carvedilol 3.125 milliGRAM(s) Oral every 12 hours  chlorhexidine 4% Liquid 1 Application(s) Topical <User Schedule>  cinacalcet 30 milliGRAM(s) Oral daily  Dakins Solution - 1/4 Strength 1 Application(s) Topical two times a day  dextrose 5%. 1000 milliLiter(s) (50 mL/Hr) IV Continuous <Continuous>  dextrose 50% Injectable 12.5 Gram(s) IV Push once  dextrose 50% Injectable 25 Gram(s) IV Push once  dextrose 50% Injectable 25 Gram(s) IV Push once  epoetin tan Injectable 58761 Unit(s) IV Push <User Schedule>  heparin  Injectable 5000 Unit(s) SubCutaneous every 8 hours  insulin glargine Injectable (LANTUS) 7 Unit(s) SubCutaneous at bedtime  insulin lispro (HumaLOG) corrective regimen sliding scale   SubCutaneous every 6 hours  levETIRAcetam  Solution 1000 milliGRAM(s) Oral at bedtime  lidocaine   Patch 1 Patch Transdermal daily  pantoprazole  Injectable 20 milliGRAM(s) IV Push daily  predniSONE   Tablet 5 milliGRAM(s) Oral daily    MEDICATIONS  (PRN):  dextrose 40% Gel 15 Gram(s) Oral once PRN Blood Glucose LESS THAN 70 milliGRAM(s)/deciLiter  glucagon  Injectable 1 milliGRAM(s) IntraMuscular once PRN Glucose <70 milliGRAM(s)/deciLiter          PHYSICAL EXAM:  GENERAL: frail  CHEST/LUNG: Clear to percussion bilaterally; No rales, rhonchi, wheezing, or rubs  HEART: Regular rate and rhythm; No murmurs, rubs, or gallops  ABDOMEN: Soft, Nontender, Nondistended; Bowel sounds present  EXTREMITIES: edema+    Care Discussed with Consultants/Other Providers [ ] YES  [ ] NO

## 2019-12-15 NOTE — PROGRESS NOTE ADULT - ASSESSMENT
History of cocaine induced CM  normal EF on last echo  cont BB     PAF  in sinus   a/c once deemed safe   for now will keep off given high bleed risk    GOC   fu with palliative care    anemia  Monitor hemoglobin, transfuse as needed.     Renal failure   fu with renal

## 2019-12-15 NOTE — PROGRESS NOTE ADULT - SUBJECTIVE AND OBJECTIVE BOX
Patient is a 74y Male whom presented to   pateint seen and examined nad ,     PAST MEDICAL & SURGICAL HISTORY:  Kidney transplant recipient  Anuria  GERD (gastroesophageal reflux disease)  Kidney transplant failure: dx: 2/2013  Hepatitis C: dx: 90&#x27;s.  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 10 milliGRAM(s) Oral three times a day  mycophenolate mofetil 250 milliGRAM(s) Oral two times a day  predniSONE   Tablet 5 milliGRAM(s) Oral daily  sevelamer carbonate 800 milliGRAM(s) Oral three times a day with meals  sodium bicarbonate 650 milliGRAM(s) Oral two times a day  tamsulosin 0.4 milliGRAM(s) Oral at bedtime      Allergies    No Known Allergies                       SOCIAL HISTORY:  Denies ETOh,Smoking,     FAMILY HISTORY:  Family history of breast cancer in sister (Sibling): living at 94 yo  Family history of prostate cancer in father  Family history of lung cancer  Family history of hypertension in mother  Family history of diabetes mellitus: mother      REVIEW OF SYSTEMS:    unbable to obtained                                                              7.2    14.00 )-----------( 303      ( 14 Dec 2019 19:43 )             24.5       CBC Full  -  ( 14 Dec 2019 19:43 )  WBC Count : 14.00 K/uL  RBC Count : 2.79 M/uL  Hemoglobin : 7.2 g/dL  Hematocrit : 24.5 %  Platelet Count - Automated : 303 K/uL  Mean Cell Volume : 87.8 fl  Mean Cell Hemoglobin : 25.8 pg  Mean Cell Hemoglobin Concentration : 29.4 gm/dL  Auto Neutrophil # : x  Auto Lymphocyte # : x  Auto Monocyte # : x  Auto Eosinophil # : x  Auto Basophil # : x  Auto Neutrophil % : x  Auto Lymphocyte % : x  Auto Monocyte % : x  Auto Eosinophil % : x  Auto Basophil % : x      12-15    133<L>  |  95<L>  |  31<H>  ----------------------------<  146<H>  3.5   |  25  |  3.04<H>    Ca    10.4      15 Dec 2019 10:24        CAPILLARY BLOOD GLUCOSE      POCT Blood Glucose.: 215 mg/dL (15 Dec 2019 12:25)  POCT Blood Glucose.: 223 mg/dL (15 Dec 2019 06:38)  POCT Blood Glucose.: 183 mg/dL (14 Dec 2019 23:47)  POCT Blood Glucose.: 138 mg/dL (14 Dec 2019 17:10)      Vital Signs Last 24 Hrs  T(C): 36.9 (15 Dec 2019 14:12), Max: 37.7 (14 Dec 2019 17:29)  T(F): 98.5 (15 Dec 2019 14:12), Max: 99.9 (14 Dec 2019 17:29)  HR: 100 (15 Dec 2019 14:12) (100 - 114)  BP: 125/70 (15 Dec 2019 14:12) (109/63 - 140/73)  BP(mean): --  RR: 18 (15 Dec 2019 14:12) (18 - 18)  SpO2: 98% (15 Dec 2019 14:12) (97% - 99%)                                   HEENT: conjunctive   clear   Neck:  No JVD  Respiratory: decrease bs b/l   Cardiovascular: S1 and S2  Gastrointestinal: BS+, soft, NT/ND  Extremities: No peripheral edema  Skin: dry   Access: pos fistula

## 2019-12-15 NOTE — PROGRESS NOTE ADULT - ASSESSMENT
Assessment  DM: A1C 5.5%, was on insulin at home, started on low-dose basal insulin yesterday, blood sugars still elevated,, no hypoglycemic episodes. Patient is still NPO on tube feeds, planning possible PEG, on low-dose steroids.  Hypercalcemia: Primary Hyperparathyroidism, calcium improved and WNL s/p Pamidronate dose.  Sepsis: IV ABx for UTI, LP inconsistent with meningitis, on medications, stable, monitored.  HTN: Controlled,  on antihypertensive medications.  ESRD: On hemodialysis, Monitor labs/BMP          Robi Gay MD  Cell: [phone]  Office: [phone] Assessment  DM: A1C 5.5%, was on insulin at home, started on low-dose basal insulin yesterday, blood sugars still elevated,, no hypoglycemic episodes. Patient is still NPO on tube feeds, planning possible PEG, on low-dose steroids.  Hypercalcemia: Primary Hyperparathyroidism, calcium improved and WNL s/p Pamidronate dose.  Sepsis: IV ABx for UTI, LP inconsistent with meningitis, on medications, stable, monitored.  HTN: Controlled,  on antihypertensive medications.  ESRD: On hemodialysis, Monitor labs/BMP          Robi Gay MD  Cell: [phone]  Office: [phone]

## 2019-12-15 NOTE — PROGRESS NOTE ADULT - PROBLEM SELECTOR PLAN 2
- 2 episodes of loose, brown stools overnight per RN  - GI PCR ordered; will consider imodium if PCR negative  - ID input eval noted; vancomycin d/c'd  - C diff PCR negative

## 2019-12-16 LAB
ANION GAP SERPL CALC-SCNC: 13 MMOL/L — SIGNIFICANT CHANGE UP (ref 5–17)
BUN SERPL-MCNC: 51 MG/DL — HIGH (ref 7–23)
CALCIUM SERPL-MCNC: 11.3 MG/DL — HIGH (ref 8.4–10.5)
CHLORIDE SERPL-SCNC: 92 MMOL/L — LOW (ref 96–108)
CO2 SERPL-SCNC: 26 MMOL/L — SIGNIFICANT CHANGE UP (ref 22–31)
CREAT SERPL-MCNC: 3.93 MG/DL — HIGH (ref 0.5–1.3)
GLUCOSE BLDC GLUCOMTR-MCNC: 132 MG/DL — HIGH (ref 70–99)
GLUCOSE BLDC GLUCOMTR-MCNC: 132 MG/DL — HIGH (ref 70–99)
GLUCOSE BLDC GLUCOMTR-MCNC: 138 MG/DL — HIGH (ref 70–99)
GLUCOSE BLDC GLUCOMTR-MCNC: 145 MG/DL — HIGH (ref 70–99)
GLUCOSE BLDC GLUCOMTR-MCNC: 159 MG/DL — HIGH (ref 70–99)
GLUCOSE BLDC GLUCOMTR-MCNC: 170 MG/DL — HIGH (ref 70–99)
GLUCOSE BLDC GLUCOMTR-MCNC: 185 MG/DL — HIGH (ref 70–99)
GLUCOSE SERPL-MCNC: 149 MG/DL — HIGH (ref 70–99)
HCT VFR BLD CALC: 25.2 % — LOW (ref 39–50)
HGB BLD-MCNC: 7.5 G/DL — LOW (ref 13–17)
MCHC RBC-ENTMCNC: 26.2 PG — LOW (ref 27–34)
MCHC RBC-ENTMCNC: 29.8 GM/DL — LOW (ref 32–36)
MCV RBC AUTO: 88.1 FL — SIGNIFICANT CHANGE UP (ref 80–100)
PLATELET # BLD AUTO: 308 K/UL — SIGNIFICANT CHANGE UP (ref 150–400)
POTASSIUM SERPL-MCNC: 3.8 MMOL/L — SIGNIFICANT CHANGE UP (ref 3.5–5.3)
POTASSIUM SERPL-SCNC: 3.8 MMOL/L — SIGNIFICANT CHANGE UP (ref 3.5–5.3)
RBC # BLD: 2.86 M/UL — LOW (ref 4.2–5.8)
RBC # FLD: 15.9 % — HIGH (ref 10.3–14.5)
SODIUM SERPL-SCNC: 131 MMOL/L — LOW (ref 135–145)
WBC # BLD: 15.78 K/UL — HIGH (ref 3.8–10.5)
WBC # FLD AUTO: 15.78 K/UL — HIGH (ref 3.8–10.5)

## 2019-12-16 PROCEDURE — 99232 SBSQ HOSP IP/OBS MODERATE 35: CPT

## 2019-12-16 PROCEDURE — 74018 RADEX ABDOMEN 1 VIEW: CPT | Mod: 26

## 2019-12-16 RX ORDER — GLUCAGON INJECTION, SOLUTION 0.5 MG/.1ML
2 INJECTION, SOLUTION SUBCUTANEOUS ONCE
Refills: 0 | Status: DISCONTINUED | OUTPATIENT
Start: 2019-12-16 | End: 2019-12-21

## 2019-12-16 RX ORDER — LOPERAMIDE HCL 2 MG
2 TABLET ORAL EVERY 6 HOURS
Refills: 0 | Status: DISCONTINUED | OUTPATIENT
Start: 2019-12-16 | End: 2019-12-16

## 2019-12-16 RX ORDER — PAMIDRONATE DISODIUM 9 MG/ML
60 INJECTION, SOLUTION INTRAVENOUS ONCE
Refills: 0 | Status: COMPLETED | OUTPATIENT
Start: 2019-12-16 | End: 2019-12-16

## 2019-12-16 RX ORDER — GLUCAGON INJECTION, SOLUTION 0.5 MG/.1ML
0.05 INJECTION, SOLUTION SUBCUTANEOUS
Qty: 5 | Refills: 0 | Status: DISCONTINUED | OUTPATIENT
Start: 2019-12-16 | End: 2019-12-16

## 2019-12-16 RX ORDER — LOPERAMIDE HCL 2 MG
2 TABLET ORAL EVERY 6 HOURS
Refills: 0 | Status: DISCONTINUED | OUTPATIENT
Start: 2019-12-16 | End: 2019-12-20

## 2019-12-16 RX ADMIN — INSULIN GLARGINE 7 UNIT(S): 100 INJECTION, SOLUTION SUBCUTANEOUS at 22:26

## 2019-12-16 RX ADMIN — CARVEDILOL PHOSPHATE 3.12 MILLIGRAM(S): 80 CAPSULE, EXTENDED RELEASE ORAL at 21:10

## 2019-12-16 RX ADMIN — Medication 2: at 00:32

## 2019-12-16 RX ADMIN — CINACALCET 30 MILLIGRAM(S): 30 TABLET, FILM COATED ORAL at 12:18

## 2019-12-16 RX ADMIN — Medication 2: at 23:58

## 2019-12-16 RX ADMIN — PAMIDRONATE DISODIUM 65 MILLIGRAM(S): 9 INJECTION, SOLUTION INTRAVENOUS at 18:35

## 2019-12-16 RX ADMIN — PANTOPRAZOLE SODIUM 20 MILLIGRAM(S): 20 TABLET, DELAYED RELEASE ORAL at 12:18

## 2019-12-16 RX ADMIN — Medication 1 APPLICATION(S): at 21:10

## 2019-12-16 RX ADMIN — Medication 5 MILLIGRAM(S): at 05:33

## 2019-12-16 RX ADMIN — LEVETIRACETAM 1000 MILLIGRAM(S): 250 TABLET, FILM COATED ORAL at 21:10

## 2019-12-16 RX ADMIN — LIDOCAINE 1 PATCH: 4 CREAM TOPICAL at 00:31

## 2019-12-16 RX ADMIN — LIDOCAINE 1 PATCH: 4 CREAM TOPICAL at 22:25

## 2019-12-16 RX ADMIN — Medication 1 APPLICATION(S): at 10:09

## 2019-12-16 RX ADMIN — HEPARIN SODIUM 5000 UNIT(S): 5000 INJECTION INTRAVENOUS; SUBCUTANEOUS at 22:26

## 2019-12-16 RX ADMIN — ATORVASTATIN CALCIUM 40 MILLIGRAM(S): 80 TABLET, FILM COATED ORAL at 21:10

## 2019-12-16 RX ADMIN — CARVEDILOL PHOSPHATE 3.12 MILLIGRAM(S): 80 CAPSULE, EXTENDED RELEASE ORAL at 10:13

## 2019-12-16 RX ADMIN — LIDOCAINE 1 PATCH: 4 CREAM TOPICAL at 12:35

## 2019-12-16 RX ADMIN — CHLORHEXIDINE GLUCONATE 1 APPLICATION(S): 213 SOLUTION TOPICAL at 05:27

## 2019-12-16 NOTE — PROGRESS NOTE ADULT - SUBJECTIVE AND OBJECTIVE BOX
Chief complaint  Patient is a 74y old  Male who presents with a chief complaint of ams (16 Dec 2019 14:27)   Review of systems  Patient in bed, looks comfortable, no fever, no hypoglycemia.    Labs and Fingersticks  CAPILLARY BLOOD GLUCOSE      POCT Blood Glucose.: 132 mg/dL (16 Dec 2019 13:09)  POCT Blood Glucose.: 138 mg/dL (16 Dec 2019 13:01)  POCT Blood Glucose.: 132 mg/dL (16 Dec 2019 05:58)  POCT Blood Glucose.: 170 mg/dL (16 Dec 2019 00:19)  POCT Blood Glucose.: 157 mg/dL (15 Dec 2019 22:00)  POCT Blood Glucose.: 129 mg/dL (15 Dec 2019 17:09)      Anion Gap, Serum: 13 (12-16 @ 10:43)  Anion Gap, Serum: 13 (12-15 @ 10:24)  Anion Gap, Serum: 14 (12-14 @ 19:43)      Calcium, Total Serum: 11.3 <H> (12-16 @ 10:43)  Calcium, Total Serum: 10.4 (12-15 @ 10:24)  Calcium, Total Serum: 11.0 <H> (12-14 @ 19:43)          12-16    131<L>  |  92<L>  |  51<H>  ----------------------------<  149<H>  3.8   |  26  |  3.93<H>    Ca    11.3<H>      16 Dec 2019 10:43                          7.5    15.78 )-----------( 308      ( 16 Dec 2019 12:53 )             25.2     Medications  MEDICATIONS  (STANDING):  alteplase for catheter clearance 2 milliGRAM(s) Catheter once  alteplase for catheter clearance 2 milliGRAM(s) Catheter once  atorvastatin 40 milliGRAM(s) Oral at bedtime  carvedilol 3.125 milliGRAM(s) Oral every 12 hours  chlorhexidine 4% Liquid 1 Application(s) Topical <User Schedule>  cinacalcet 30 milliGRAM(s) Oral daily  Dakins Solution - 1/4 Strength 1 Application(s) Topical two times a day  dextrose 5%. 1000 milliLiter(s) (50 mL/Hr) IV Continuous <Continuous>  dextrose 50% Injectable 12.5 Gram(s) IV Push once  dextrose 50% Injectable 25 Gram(s) IV Push once  dextrose 50% Injectable 25 Gram(s) IV Push once  epoetin tan Injectable 41822 Unit(s) IV Push <User Schedule>  heparin  Injectable 5000 Unit(s) SubCutaneous every 8 hours  insulin glargine Injectable (LANTUS) 7 Unit(s) SubCutaneous at bedtime  insulin lispro (HumaLOG) corrective regimen sliding scale   SubCutaneous every 6 hours  levETIRAcetam  Solution 1000 milliGRAM(s) Oral at bedtime  lidocaine   Patch 1 Patch Transdermal daily  pamidronate IVPB 60 milliGRAM(s) IV Intermittent once  pantoprazole  Injectable 20 milliGRAM(s) IV Push daily  predniSONE   Tablet 5 milliGRAM(s) Oral daily      Physical Exam  General: Patient comfortable in bed  Vital Signs Last 12 Hrs  T(F): 98.2 (12-16-19 @ 10:01), Max: 98.7 (12-16-19 @ 04:30)  HR: 97 (12-16-19 @ 10:01) (97 - 110)  BP: 115/67 (12-16-19 @ 10:01) (112/69 - 115/67)  BP(mean): --  RR: 18 (12-16-19 @ 10:01) (18 - 18)  SpO2: 97% (12-16-19 @ 10:01) (97% - 98%)  Neck: No palpable thyroid nodules.  CVS: S1S2, No murmurs  Respiratory: No wheezing, no crepitations  GI: Abdomen soft, bowel sounds positive  Musculoskeletal:  edema lower extremities.   Skin: No skin rashes, no ecchymosis    Diagnostics Chief complaint  Patient is a 74y old  Male who presents with a chief complaint of ams (16 Dec 2019 14:27)   Review of systems  Patient in bed, looks comfortable, no fever,  no hypoglycemia.    Labs and Fingersticks  CAPILLARY BLOOD GLUCOSE      POCT Blood Glucose.: 132 mg/dL (16 Dec 2019 13:09)  POCT Blood Glucose.: 138 mg/dL (16 Dec 2019 13:01)  POCT Blood Glucose.: 132 mg/dL (16 Dec 2019 05:58)  POCT Blood Glucose.: 170 mg/dL (16 Dec 2019 00:19)  POCT Blood Glucose.: 157 mg/dL (15 Dec 2019 22:00)  POCT Blood Glucose.: 129 mg/dL (15 Dec 2019 17:09)      Anion Gap, Serum: 13 (12-16 @ 10:43)  Anion Gap, Serum: 13 (12-15 @ 10:24)  Anion Gap, Serum: 14 (12-14 @ 19:43)      Calcium, Total Serum: 11.3 <H> (12-16 @ 10:43)  Calcium, Total Serum: 10.4 (12-15 @ 10:24)  Calcium, Total Serum: 11.0 <H> (12-14 @ 19:43)          12-16    131<L>  |  92<L>  |  51<H>  ----------------------------<  149<H>  3.8   |  26  |  3.93<H>    Ca    11.3<H>      16 Dec 2019 10:43                          7.5    15.78 )-----------( 308      ( 16 Dec 2019 12:53 )             25.2     Medications  MEDICATIONS  (STANDING):  alteplase for catheter clearance 2 milliGRAM(s) Catheter once  alteplase for catheter clearance 2 milliGRAM(s) Catheter once  atorvastatin 40 milliGRAM(s) Oral at bedtime  carvedilol 3.125 milliGRAM(s) Oral every 12 hours  chlorhexidine 4% Liquid 1 Application(s) Topical <User Schedule>  cinacalcet 30 milliGRAM(s) Oral daily  Dakins Solution - 1/4 Strength 1 Application(s) Topical two times a day  dextrose 5%. 1000 milliLiter(s) (50 mL/Hr) IV Continuous <Continuous>  dextrose 50% Injectable 12.5 Gram(s) IV Push once  dextrose 50% Injectable 25 Gram(s) IV Push once  dextrose 50% Injectable 25 Gram(s) IV Push once  epoetin tan Injectable 08124 Unit(s) IV Push <User Schedule>  heparin  Injectable 5000 Unit(s) SubCutaneous every 8 hours  insulin glargine Injectable (LANTUS) 7 Unit(s) SubCutaneous at bedtime  insulin lispro (HumaLOG) corrective regimen sliding scale   SubCutaneous every 6 hours  levETIRAcetam  Solution 1000 milliGRAM(s) Oral at bedtime  lidocaine   Patch 1 Patch Transdermal daily  pamidronate IVPB 60 milliGRAM(s) IV Intermittent once  pantoprazole  Injectable 20 milliGRAM(s) IV Push daily  predniSONE   Tablet 5 milliGRAM(s) Oral daily      Physical Exam  General: Patient comfortable in bed  Vital Signs Last 12 Hrs  T(F): 98.2 (12-16-19 @ 10:01), Max: 98.7 (12-16-19 @ 04:30)  HR: 97 (12-16-19 @ 10:01) (97 - 110)  BP: 115/67 (12-16-19 @ 10:01) (112/69 - 115/67)  BP(mean): --  RR: 18 (12-16-19 @ 10:01) (18 - 18)  SpO2: 97% (12-16-19 @ 10:01) (97% - 98%)  Neck: No palpable thyroid nodules.  CVS: S1S2, No murmurs  Respiratory: No wheezing, no crepitations  GI: Abdomen soft, bowel sounds positive  Musculoskeletal:  edema lower extremities.   Skin: No skin rashes, no ecchymosis    Diagnostics

## 2019-12-16 NOTE — PROGRESS NOTE ADULT - SUBJECTIVE AND OBJECTIVE BOX
Patient is a 74y old  Male who presents with a chief complaint of ams (16 Dec 2019 08:15)    Being followed by ID for leucocytosis     Interval history:less alert but some response to queries   denies pain   No acute events      ROS:  No reliable ROS obtainable       Antimicrobials:      Other medications reviewed    Vital Signs Last 24 Hrs  T(C): 36.8 (12-16-19 @ 10:01), Max: 37.3 (12-15-19 @ 20:50)  T(F): 98.2 (12-16-19 @ 10:01), Max: 99.2 (12-15-19 @ 20:50)  HR: 97 (12-16-19 @ 10:01) (97 - 123)  BP: 115/67 (12-16-19 @ 10:01) (112/69 - 125/70)  BP(mean): --  RR: 18 (12-16-19 @ 10:01) (18 - 18)  SpO2: 97% (12-16-19 @ 10:01) (97% - 99%)    Physical Exam:    R Sc HD catheter no erythema o tenderness    NGT    Chest Good AE,bibasilar crackles     CVS RRR S1 S2 WNl No murmur or rub or gallop    Abd soft BS normal No tenderness RLQ transplant kidney no tenderness    sacral decub stage III no purulence or discharge noted     Ext left arm AVF no erythema or tenderness    IV site no erythema tenderness or discharge    Joints no swelling or LOM    CNS as above    Lab Data:                          7.8    14.22 )-----------( 302      ( 15 Dec 2019 15:10 )             25.8       12-16    131<L>  |  92<L>  |  51<H>  ----------------------------<  149<H>  3.8   |  26  |  3.93<H>    Ca    11.3<H>      16 Dec 2019 10:43          GI PCR Panel, Stool (collected 14 Dec 2019 17:36)  Source: .Stool Feces  Final Report (14 Dec 2019 20:50):    GI PCR Results: NOT detected    *******Please Note:*******    GI panel PCR evaluates for:    Campylobacter, Plesiomonas shigelloides, Salmonella,    Vibrio, Yersinia enterocolitica, Enteroaggregative    Escherichia coli (EAEC), Enteropathogenic E.coli (EPEC),    Enterotoxigenic E. coli (ETEC) lt/st, Shiga-like    toxin-producing E. coli (STEC) stx1/stx2,    Shigella/ Enteroinvasive E. coli (EIEC), Cryptosporidium,    Cyclospora cayetanensis, Entamoeba histolytica,    Giardia lamblia, Adenovirus F 40/41, Astrovirus,    Norovirus GI/GII, Rotavirus A, Sapovirus        C Diff by PCR Result: NotDetec (12-10-19 @ 22:55)  Clostridium difficile GDH Interpretation: This specimen is positive for C. Difficile glutamate dehydrogenase (GDH)  antigen and negative for C. Difficile Toxins A & B, by EIA.  GDH is a  highly sensitive screening marker for C. Difficile that is produced in  large amounts by all C. Difficilestrains, both toxigenic and  nontoxigenic.  Specimens that are GDH positive and toxin negative will be  tested further by PCR to detect toxin gene sequences associated with  toxin producing C. Difficle. (12-10-19 @ 18:47)        < from: CT Abdomen and Pelvis No Cont (12.12.19 @ 11:26) >  IMPRESSION:   Unchanged nonobstructing renal and distal left ureteral calculus.    Sacral decubitus ulcer with small focus of air in the soft tissues.    Indeterminate right renal lesion is unchanged.          < end of copied text >

## 2019-12-16 NOTE — PROGRESS NOTE ADULT - PROBLEM SELECTOR PLAN 2
- per RN; pt with multple loose brown stools  - GI PCR negative; will order imodium 2mg q6h prn  - C diff PCR negative

## 2019-12-16 NOTE — CHART NOTE - NSCHARTNOTEFT_GEN_A_CORE
------------------------------------------------------------  Interventional Radiology Pre-Procedure Note  ------------------------------------------------------------    Procedure: gastrostomy tube placement    Indication: 74y Male with advanced metabolic encephalopathy and unable to take adequate PO is referred to interventional radiology for percutaneous gastrostomy tube placement.    Past Medical History:    Kidney transplant recipient  Anuria  GERD (gastroesophageal reflux disease)  Kidney transplant failure  Hepatitis C  GERD (gastroesophageal reflux disease)  Renal transplant failure and rejection  Rectal abscess  IV drug abuse  HTN (hypertension)  Diverticulitis  ESRD on dialysis  Diabetes mellitus  BPH (Benign Prostatic Hyperplasia)  Cardiomyopathy      Allergies: No Known Allergies      Vital Signs: T(F): 98.2 (10:01), Max: 99.2 (20:50)  HR: 97 (10:01)  BP: 115/67 (10:01)  RR: 18 (10:01)  SpO2: 97% (10:01)           7.5  15.78)-----(308     (12-16-19 @ 12:53)         25.2     131 | 92 | 51  ----------------------< 149     (12-16-19 @ 10:43)  3.8 | 26 | 3.93       Imaging: CT reviewed, there is a small percutaneous window that is amenable to gastrostomy is good gastric insufflation is achieved.    Consent: The risks, benefits and alternatives of the procedure were discussed with the patient's sister, who verbalized understanding. Signed consent is available in the paper chart.    Plan: percutaneous gastrostomy tube placement    Abel Silveira MD  [id number] Interventional Radiology Resident  (x) [phone]) [phone] ------------------------------------------------------------  Interventional Radiology Pre-Procedure Note  ------------------------------------------------------------    Procedure: gastrostomy tube placement    Indication: 74y Male with advanced metabolic encephalopathy and unable to take adequate PO is referred to interventional radiology for percutaneous gastrostomy tube placement.    Past Medical History:    Kidney transplant recipient  Anuria  GERD (gastroesophageal reflux disease)  Kidney transplant failure  Hepatitis C  GERD (gastroesophageal reflux disease)  Renal transplant failure and rejection  Rectal abscess  IV drug abuse  HTN (hypertension)  Diverticulitis  ESRD on dialysis  Diabetes mellitus  BPH (Benign Prostatic Hyperplasia)  Cardiomyopathy      Allergies: No Known Allergies      Vital Signs: T(F): 98.2 (10:01), Max: 99.2 (20:50)  HR: 97 (10:01)  BP: 115/67 (10:01)  RR: 18 (10:01)  SpO2: 97% (10:01)           7.5  15.78)-----(308     (12-16-19 @ 12:53)         25.2     131 | 92 | 51  ----------------------< 149     (12-16-19 @ 10:43)  3.8 | 26 | 3.93       Imaging: CT reviewed, there is a small percutaneous window that is amenable to gastrostomy if good gastric insufflation is achieved.    Consent: The risks, benefits and alternatives of the procedure were discussed with the patient's sister, who verbalized understanding. Signed consent is available in the paper chart.    Plan: percutaneous gastrostomy tube placement    Abel Silveira MD  [id number] Interventional Radiology Resident  (x) [phone]) [phone]

## 2019-12-16 NOTE — PROGRESS NOTE ADULT - SUBJECTIVE AND OBJECTIVE BOX
Patient is a 74y old  Male who presents with a chief complaint of ams (16 Dec 2019 11:55)      INTERVAL HPI/OVERNIGHT EVENTS:  T(C): 36.8 (12-16-19 @ 10:01), Max: 37.3 (12-15-19 @ 20:50)  HR: 97 (12-16-19 @ 10:01) (97 - 123)  BP: 115/67 (12-16-19 @ 10:01) (112/69 - 120/71)  RR: 18 (12-16-19 @ 10:01) (18 - 18)  SpO2: 97% (12-16-19 @ 10:01) (97% - 99%)  Wt(kg): --  I&O's Summary    15 Dec 2019 07:01  -  16 Dec 2019 07:00  --------------------------------------------------------  IN: 1100 mL / OUT: 0 mL / NET: 1100 mL    16 Dec 2019 07:01  -  16 Dec 2019 14:28  --------------------------------------------------------  IN: 0 mL / OUT: 0 mL / NET: 0 mL        LABS:                        7.5    15.78 )-----------( 308      ( 16 Dec 2019 12:53 )             25.2     12-16    131<L>  |  92<L>  |  51<H>  ----------------------------<  149<H>  3.8   |  26  |  3.93<H>    Ca    11.3<H>      16 Dec 2019 10:43          CAPILLARY BLOOD GLUCOSE      POCT Blood Glucose.: 132 mg/dL (16 Dec 2019 13:09)  POCT Blood Glucose.: 138 mg/dL (16 Dec 2019 13:01)  POCT Blood Glucose.: 132 mg/dL (16 Dec 2019 05:58)  POCT Blood Glucose.: 170 mg/dL (16 Dec 2019 00:19)  POCT Blood Glucose.: 157 mg/dL (15 Dec 2019 22:00)  POCT Blood Glucose.: 129 mg/dL (15 Dec 2019 17:09)            MEDICATIONS  (STANDING):  alteplase for catheter clearance 2 milliGRAM(s) Catheter once  alteplase for catheter clearance 2 milliGRAM(s) Catheter once  atorvastatin 40 milliGRAM(s) Oral at bedtime  carvedilol 3.125 milliGRAM(s) Oral every 12 hours  chlorhexidine 4% Liquid 1 Application(s) Topical <User Schedule>  cinacalcet 30 milliGRAM(s) Oral daily  Dakins Solution - 1/4 Strength 1 Application(s) Topical two times a day  dextrose 5%. 1000 milliLiter(s) (50 mL/Hr) IV Continuous <Continuous>  dextrose 50% Injectable 12.5 Gram(s) IV Push once  dextrose 50% Injectable 25 Gram(s) IV Push once  dextrose 50% Injectable 25 Gram(s) IV Push once  epoetin tan Injectable 97348 Unit(s) IV Push <User Schedule>  heparin  Injectable 5000 Unit(s) SubCutaneous every 8 hours  insulin glargine Injectable (LANTUS) 7 Unit(s) SubCutaneous at bedtime  insulin lispro (HumaLOG) corrective regimen sliding scale   SubCutaneous every 6 hours  levETIRAcetam  Solution 1000 milliGRAM(s) Oral at bedtime  lidocaine   Patch 1 Patch Transdermal daily  pamidronate IVPB 60 milliGRAM(s) IV Intermittent once  pantoprazole  Injectable 20 milliGRAM(s) IV Push daily  predniSONE   Tablet 5 milliGRAM(s) Oral daily    MEDICATIONS  (PRN):  dextrose 40% Gel 15 Gram(s) Oral once PRN Blood Glucose LESS THAN 70 milliGRAM(s)/deciLiter  glucagon  Injectable 1 milliGRAM(s) IntraMuscular once PRN Glucose <70 milliGRAM(s)/deciLiter          PHYSICAL EXAM:  GENERAL: frail  CHEST/LUNG: Clear to percussion bilaterally; No rales, rhonchi, wheezing, or rubs  HEART: Regular rate and rhythm; No murmurs, rubs, or gallops  ABDOMEN: Soft, Nontender, Nondistended; Bowel sounds present  EXTREMITIES: edema+    Care Discussed with Consultants/Other Providers [ ] YES  [ ] NO

## 2019-12-16 NOTE — PROGRESS NOTE ADULT - SUBJECTIVE AND OBJECTIVE BOX
Vascular & Interventional Radiology Pre-Procedure Note    Procedure Name: Gastrostomy tube placement    HPI: 74y Male with with altered mental status and dysphagia. Gastric tube placement is requested.    Allergies:   Medications (Abx/Cardiac/Anticoagulation/Blood Products)    carvedilol: 3.125 milliGRAM(s) Oral (12-16 @ 10:13)  heparin  Injectable: 5000 Unit(s) SubCutaneous (12-15 @ 21:10)    Data:  188.8  68  T(C): 36.8  HR: 97  BP: 115/67  RR: 18  SpO2: 97%    -WBC 15.78 / HgB 7.5 / Hct 25.2 / Plt 308  -Na 131 / Cl 92 / BUN 51 / Glucose 149  -K 3.8 / CO2 26 / Cr 3.93  -ALT -- / Alk Phos -- / T.Bili --  -INR1.12      Plan:   -74y Male presents for gastrostomy tube placement.   -Risks/Benefits/alternatives explained with the patient and/or healthcare proxy and witnessed informed consent obtained.

## 2019-12-16 NOTE — PROGRESS NOTE ADULT - PROBLEM SELECTOR PLAN 1
- s/p  upper gastrointestinal endoscopy with the procedure was aborted due to inability to place PEG tube.  - NGT replaced with endoscopic confirmation  - s/p IR peg today

## 2019-12-16 NOTE — PROGRESS NOTE ADULT - PROBLEM SELECTOR PLAN 2
S/P Pamidronate. Ca trending up again, 11.3.  Will order Pamidronate 60mg IV. Will continue monitoring and FU.

## 2019-12-16 NOTE — PROGRESS NOTE ADULT - SUBJECTIVE AND OBJECTIVE BOX
Subjective: Patient seen and examined. No new events except as noted.     SUBJECTIVE/ROS:    MEDICATIONS:  MEDICATIONS  (STANDING):  alteplase for catheter clearance 2 milliGRAM(s) Catheter once  alteplase for catheter clearance 2 milliGRAM(s) Catheter once  atorvastatin 40 milliGRAM(s) Oral at bedtime  carvedilol 3.125 milliGRAM(s) Oral every 12 hours  chlorhexidine 4% Liquid 1 Application(s) Topical <User Schedule>  cinacalcet 30 milliGRAM(s) Oral daily  Dakins Solution - 1/4 Strength 1 Application(s) Topical two times a day  dextrose 5%. 1000 milliLiter(s) (50 mL/Hr) IV Continuous <Continuous>  dextrose 50% Injectable 12.5 Gram(s) IV Push once  dextrose 50% Injectable 25 Gram(s) IV Push once  dextrose 50% Injectable 25 Gram(s) IV Push once  epoetin tan Injectable 29427 Unit(s) IV Push <User Schedule>  heparin  Injectable 5000 Unit(s) SubCutaneous every 8 hours  insulin glargine Injectable (LANTUS) 7 Unit(s) SubCutaneous at bedtime  insulin lispro (HumaLOG) corrective regimen sliding scale   SubCutaneous every 6 hours  levETIRAcetam  Solution 1000 milliGRAM(s) Oral at bedtime  lidocaine   Patch 1 Patch Transdermal daily  pantoprazole  Injectable 20 milliGRAM(s) IV Push daily  predniSONE   Tablet 5 milliGRAM(s) Oral daily      PHYSICAL EXAM:  T(C): 37.1 (12-16-19 @ 04:30), Max: 37.3 (12-15-19 @ 20:50)  HR: 110 (12-16-19 @ 04:30) (100 - 123)  BP: 112/69 (12-16-19 @ 04:30) (112/69 - 125/70)  RR: 18 (12-16-19 @ 04:30) (18 - 18)  SpO2: 98% (12-16-19 @ 04:30) (98% - 99%)  Wt(kg): --  I&O's Summary    15 Dec 2019 07:01  -  16 Dec 2019 07:00  --------------------------------------------------------  IN: 1100 mL / OUT: 0 mL / NET: 1100 mL            JVP: Normal  Neck: supple  Lung: clear   CV: S1 S2 , Murmur:  Abd: soft  Ext: No edema    Psych: flat affect  Skin: normal``    LABS/DATA:    CARDIAC MARKERS:                                7.8    14.22 )-----------( 302      ( 15 Dec 2019 15:10 )             25.8     12-15    133<L>  |  95<L>  |  31<H>  ----------------------------<  146<H>  3.5   |  25  |  3.04<H>    Ca    10.4      15 Dec 2019 10:24      proBNP:   Lipid Profile:   HgA1c:   TSH:     TELE:  EKG:

## 2019-12-16 NOTE — PROGRESS NOTE ADULT - ASSESSMENT
Assessment  DM: A1C 5.5%, was on insulin at home, started on low-dose basal insulin, blood sugars improving, no hypoglycemic episodes. Patient is still NPO on tube feeds, planning possible PEG, on low-dose steroids, appears comfortable.  Hypercalcemia: Primary Hyperparathyroidism, s/p Pamidronate dose. Ca trending up, 11.3.  Sepsis: IV ABx for UTI, LP inconsistent with meningitis, on medications, stable, monitored.  HTN: Controlled,  on antihypertensive medications.  ESRD: On hemodialysis, Monitor labs/BMP          Robi Gay MD  Cell: [phone]  Office: [phone] Assessment  DM: A1C 5.5%, was on insulin at home, started on low-dose basal insulin, blood sugars improving, no hypoglycemic episodes. Patient is still NPO on tube feeds, planning possible PEG, on low-dose steroids,  appears comfortable.  Hypercalcemia: Primary Hyperparathyroidism, s/p Pamidronate dose. Ca trending up, 11.3.  Sepsis: IV ABx for UTI, LP inconsistent with meningitis, on medications, stable, monitored.  HTN: Controlled,  on antihypertensive medications.  ESRD: On hemodialysis, Monitor labs/BMP          Robi Gay MD  Cell: [phone]  Office: [phone]

## 2019-12-16 NOTE — CHART NOTE - NSCHARTNOTEFT_GEN_A_CORE
-------------------------------------------------------------  Interventional Radiology Brief Operative Note  -------------------------------------------------------------    Pre-Procedure Diagnosis: metabolic encephalopathy    Post-Procedure Diagnosis: same    Indication: inadequate PO intake    Procedure: unsuccessful gastrostomy tube placement    Operators: Jordana RUTH; Gabrielle RUTH    Anesthesia: IV sedation was provided by anesthesiology. Local anesthesia was achieved with 2% lidocaine.    EBL: none    Complications: none    Imaging: fluoroscopy    Contrast: none    Findings: Following gastric insufflation the stomach was still positioned beneath the anterior ribs and there was no percutaneous window for gastrostomy placement. Percutaneous gastrostomy was thus deferred.    Specimens Removed: none    Implants: none    Condition/Disposition: stable, to recovery for 1h then return to floor    Follow-Up Plan of Care:  -patient is not a candidate for percutaneous placement o9f gastrostomy tube  -if gastrostomy tube placement is needed would recommend surgical consult for G- or J-tube placement    For questions or concerns, please call Interventional Radiology at [phone] during business hours, or [phone] and page [phone] after 5PM and on weekends.    Abel Silveira MD  PGY5 Interventional Radiology Resident  x) [phone] -------------------------------------------------------------  Interventional Radiology Brief Note  -------------------------------------------------------------    Pre-Procedure Diagnosis: metabolic encephalopathy    Post-Procedure Diagnosis: same    Indication: inadequate PO intake    Procedure: unsuccessful gastrostomy tube placement    Operators: Jordana RUTH; Gabrielle RUTH    Anesthesia: IV sedation was provided by anesthesiology. Local anesthesia was achieved with 2% lidocaine.    EBL: none    Complications: none    Imaging: fluoroscopy    Contrast: none    Findings: Following gastric insufflation the stomach was still positioned beneath the anterior ribs and there was no percutaneous window for gastrostomy placement. Percutaneous gastrostomy was thus deferred.    Specimens Removed: none    Implants: none    Condition/Disposition: stable, to recovery for 1h then return to floor    Follow-Up Plan of Care:  -patient is not a candidate for percutaneous placement of gastrostomy tube due to location of stomach after insufflation.  -if gastrostomy tube placement is needed would recommend surgical consult for G- or J-tube placement    For questions or concerns, please call Interventional Radiology at [phone] during business hours, or [phone] and page [phone] after 5PM and on weekends.    Abel Silveira MD  PGY5 Interventional Radiology Resident  x) [phone]

## 2019-12-16 NOTE — PROGRESS NOTE ADULT - SUBJECTIVE AND OBJECTIVE BOX
Patient is a 74y Male whom presented to   pateint seen and examined nad ,     PAST MEDICAL & SURGICAL HISTORY:  Kidney transplant recipient  Anuria  GERD (gastroesophageal reflux disease)  Kidney transplant failure: dx: 2/2013  Hepatitis C: dx: 90&#x27;s.  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 10 milliGRAM(s) Oral three times a day  mycophenolate mofetil 250 milliGRAM(s) Oral two times a day  predniSONE   Tablet 5 milliGRAM(s) Oral daily  sevelamer carbonate 800 milliGRAM(s) Oral three times a day with meals  sodium bicarbonate 650 milliGRAM(s) Oral two times a day  tamsulosin 0.4 milliGRAM(s) Oral at bedtime      Allergies    No Known Allergies                       SOCIAL HISTORY:  Denies ETOh,Smoking,     FAMILY HISTORY:  Family history of breast cancer in sister (Sibling): living at 94 yo  Family history of prostate cancer in father  Family history of lung cancer  Family history of hypertension in mother  Family history of diabetes mellitus: mother      REVIEW OF SYSTEMS:    unbable to obtained                                                                           7.5    15.78 )-----------( 308      ( 16 Dec 2019 12:53 )             25.2       CBC Full  -  ( 16 Dec 2019 12:53 )  WBC Count : 15.78 K/uL  RBC Count : 2.86 M/uL  Hemoglobin : 7.5 g/dL  Hematocrit : 25.2 %  Platelet Count - Automated : 308 K/uL  Mean Cell Volume : 88.1 fl  Mean Cell Hemoglobin : 26.2 pg  Mean Cell Hemoglobin Concentration : 29.8 gm/dL  Auto Neutrophil # : x  Auto Lymphocyte # : x  Auto Monocyte # : x  Auto Eosinophil # : x  Auto Basophil # : x  Auto Neutrophil % : x  Auto Lymphocyte % : x  Auto Monocyte % : x  Auto Eosinophil % : x  Auto Basophil % : x      12-16    131<L>  |  92<L>  |  51<H>  ----------------------------<  149<H>  3.8   |  26  |  3.93<H>    Ca    11.3<H>      16 Dec 2019 10:43        CAPILLARY BLOOD GLUCOSE      POCT Blood Glucose.: 132 mg/dL (16 Dec 2019 13:09)  POCT Blood Glucose.: 138 mg/dL (16 Dec 2019 13:01)  POCT Blood Glucose.: 132 mg/dL (16 Dec 2019 05:58)  POCT Blood Glucose.: 170 mg/dL (16 Dec 2019 00:19)  POCT Blood Glucose.: 157 mg/dL (15 Dec 2019 22:00)      Vital Signs Last 24 Hrs  T(C): 36.8 (16 Dec 2019 10:01), Max: 37.3 (15 Dec 2019 20:50)  T(F): 98.2 (16 Dec 2019 10:01), Max: 99.2 (15 Dec 2019 20:50)  HR: 97 (16 Dec 2019 10:01) (97 - 123)  BP: 115/67 (16 Dec 2019 10:01) (112/69 - 120/71)  BP(mean): --  RR: 18 (16 Dec 2019 10:01) (18 - 18)  SpO2: 97% (16 Dec 2019 10:01) (97% - 99%)                                     HEENT: conjunctive   clear   Neck:  No JVD  Respiratory: decrease bs b/l   Cardiovascular: S1 and S2  Gastrointestinal: BS+, soft, NT/ND  Extremities: No peripheral edema  Skin: dry   Access: pos fistula

## 2019-12-16 NOTE — PROGRESS NOTE ADULT - ASSESSMENT
NEURO:  - Extubated 11/29, now on nasal cannula. Minimally verbal   - metabolic encephalopathy, meningitis r/o, LP negative  - neuro following  - chronic lacunar infarcts; per neuro, AMS also likely related to hypercalcemia, renal dysfunction , poor nutritional intake.   - c/w keppra for ?hx of seizure disorder  -AMS likely 2/2 hyperCa, renal dysfunction, poor PO intake      CV:  - hx of afib was on eliquis, now off a/c per cards due to bleeding risk  - holding carvedilol 6.25 BID  - hx of cocaine cardiomyopathy, resolved, normal EF on most recent echo  - c/w statin  - echo repeat EF70, LVH, hyperdynamic LCF, normal RV sys fxn    PULM:- extubated 11/29--tolerating nasal cannula    GI:  - GI consult for PEG     RENAL:- hx of ESRD s/p failed renal transplant x2  - renal following, HD as recommended.  - c/w predisone, sevelamir    ENDO:  #DM2: HbA1c 5.5  - Start Insulin Sliding Scale  - endocrine following  - monitor Ca    INFECTIOUS:  - dc  vanco as per infectious disease   - id fu

## 2019-12-16 NOTE — PROGRESS NOTE ADULT - ASSESSMENT
74M PMHx of ESRD s/p failed renal transplant x2, HCV, DM, and meningococcal meningitis 2 years ago was sent in to the ED from HD for AMS and low BPs.  CVA  Pneumonia-aspiration-resp failure s/p Rx  patient also with sacral decub stage III  Mild leucocytosis  Is on baseline steroids    Problem/Plan - 1:  ·  Problem: Diarrhea, unspecified type.  Plan: C diff PCR negative  stable off po vanco  ? from tube feeds  Will defer to primary team.     Problem/Plan - 2:  ·  Problem: Leukocytosis, unspecified type.  Plan: ? from steroids  trending down   Workup for infectious etiology so far negative  Continue observation off antimicrobials  Observe for s/s any nosocomial infection.     Problem/Plan - 3:  ·  Problem: Sacral decubitus ulcer, stage III.  Plan: Wound care follow up  CT above noted   Observe for s/s any superimposed infectious process.     Problem/Plan - 4:  ·  Problem: Cerebrovascular accident (CVA), unspecified mechanism.  Plan: will defer to primary team on further plan.       Case d/w Med NP  Will defer to primary team on management of other issues.  ID will follow as needed,please call [phone] if any questions or issues.

## 2019-12-16 NOTE — PROGRESS NOTE ADULT - SUBJECTIVE AND OBJECTIVE BOX
[facility]      Seen earlier today, and examined.  - Today, patient is without complaints.           *****MEDICATIONS: Current medication reviewed and documented.    MEDICATIONS  (STANDING):  alteplase for catheter clearance 2 milliGRAM(s) Catheter once  alteplase for catheter clearance 2 milliGRAM(s) Catheter once  atorvastatin 40 milliGRAM(s) Oral at bedtime  carvedilol 3.125 milliGRAM(s) Oral every 12 hours  chlorhexidine 4% Liquid 1 Application(s) Topical <User Schedule>  cinacalcet 30 milliGRAM(s) Oral daily  Dakins Solution - 1/4 Strength 1 Application(s) Topical two times a day  dextrose 5%. 1000 milliLiter(s) (50 mL/Hr) IV Continuous <Continuous>  dextrose 50% Injectable 12.5 Gram(s) IV Push once  dextrose 50% Injectable 25 Gram(s) IV Push once  dextrose 50% Injectable 25 Gram(s) IV Push once  epoetin tan Injectable 68497 Unit(s) IV Push <User Schedule>  glucagon  Injectable 2 milliGRAM(s) IV Push once  heparin  Injectable 5000 Unit(s) SubCutaneous every 8 hours  insulin glargine Injectable (LANTUS) 7 Unit(s) SubCutaneous at bedtime  insulin lispro (HumaLOG) corrective regimen sliding scale   SubCutaneous every 6 hours  levETIRAcetam  Solution 1000 milliGRAM(s) Oral at bedtime  lidocaine   Patch 1 Patch Transdermal daily  pantoprazole  Injectable 20 milliGRAM(s) IV Push daily  predniSONE   Tablet 5 milliGRAM(s) Oral daily    MEDICATIONS  (PRN):  dextrose 40% Gel 15 Gram(s) Oral once PRN Blood Glucose LESS THAN 70 milliGRAM(s)/deciLiter  glucagon  Injectable 1 milliGRAM(s) IntraMuscular once PRN Glucose <70 milliGRAM(s)/deciLiter          ***** VITAL SIGNS:  T(F): 99 (19 @ 17:27), Max: 99.2 (12-15-19 @ 20:50)  HR: 109 (19 @ 17:57) (97 - 123)  BP: 138/56 (19 @ 17:57) (112/69 - 154/63)  RR: 15 (19 @ 17:57) (13 - 18)  SpO2: 100% (19 @ 17:57) (97% - 100%)  Wt(kg): --  ,   I&O's Summary    15 Dec 2019 07:  -  16 Dec 2019 07:00  --------------------------------------------------------  IN: 1100 mL / OUT: 0 mL / NET: 1100 mL    16 Dec 2019 07:  -  16 Dec 2019 18:42  --------------------------------------------------------  IN: 0 mL / OUT: 0 mL / NET: 0 mL             *****PHYSICAL EXAM: lethargic  difficult to arouse to repeated tactile stimuli      very limited exam as pt is not cooperative.            *****LAB AND IMAGIN.5    15.78 )-----------( 308      ( 16 Dec 2019 12:53 )             25.2               12-16    131<L>  |  92<L>  |  51<H>  ----------------------------<  149<H>  3.8   |  26  |  3.93<H>    Ca    11.3<H>      16 Dec 2019 10:43                           [All pertinent recent Imaging/Reports reviewed]           *****A S S E S S M E N T   A N D   P L A N :    75 y/o male with PMHx of  ESRD s/p /p failed renal transplant 4 year ago and successful renal transplant 1 year ago, DM, HTN, 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   On keppra for presumed seizure disorder, but no clear history.    Pt is encephalopathic on exam,. opens eyes to voice  can  follow simple commands  commands, grimaces to noxious stimuli, able to  my hands, wiggles toes  and legs, hyperreflexic in the legs with bilateral cross adductors, bilateral upgoing toes.    	  Routine EEG  without any seizures  AMS likely related to hypercalcemia, renal dysfunction , poor nutritional intake.   thiamine 500 ivpb daily x 3 days completed.   minimal improvement in mental status, will lower keppra dose    family ok with peg tube         Thank you for allowing me to participate in the care of this patient. Please do not hesitate to call me if you have any  questions.        ________________  Shira Lindsey MD  [facility] [place] [facility] (PN)[facility]  [phone]      33 minutes spent on total encounter; more than 50 % of the visit was  spent counseling about plan of care, compliance to diet/exercise and medication regimen and or  coordinating care by the attending physician.      It is advised that stroke patients follow up with JAVIER Vaughan @ [phone] in 1- 2 weeks.   Others please follow up with Dr. Michael Nissenbaum [phone]

## 2019-12-16 NOTE — PROGRESS NOTE ADULT - SUBJECTIVE AND OBJECTIVE BOX
GASTROENTEROLOGY    Patient seen and examined at bedside  was NPO for IR peg today  per RN; pt with multiple episodes of loose brown stool       MEDICATIONS  (STANDING):  alteplase for catheter clearance 2 milliGRAM(s) Catheter once  alteplase for catheter clearance 2 milliGRAM(s) Catheter once  atorvastatin 40 milliGRAM(s) Oral at bedtime  carvedilol 3.125 milliGRAM(s) Oral every 12 hours  chlorhexidine 4% Liquid 1 Application(s) Topical <User Schedule>  cinacalcet 30 milliGRAM(s) Oral daily  Dakins Solution - 1/4 Strength 1 Application(s) Topical two times a day  dextrose 5%. 1000 milliLiter(s) (50 mL/Hr) IV Continuous <Continuous>  dextrose 50% Injectable 12.5 Gram(s) IV Push once  dextrose 50% Injectable 25 Gram(s) IV Push once  dextrose 50% Injectable 25 Gram(s) IV Push once  epoetin tan Injectable 89518 Unit(s) IV Push <User Schedule>  heparin  Injectable 5000 Unit(s) SubCutaneous every 8 hours  insulin glargine Injectable (LANTUS) 7 Unit(s) SubCutaneous at bedtime  insulin lispro (HumaLOG) corrective regimen sliding scale   SubCutaneous every 6 hours  levETIRAcetam  Solution 1000 milliGRAM(s) Oral at bedtime  lidocaine   Patch 1 Patch Transdermal daily  pantoprazole  Injectable 20 milliGRAM(s) IV Push daily  predniSONE   Tablet 5 milliGRAM(s) Oral daily        T(F): 98.6 (12-15-19 @ 04:38), Max: 99.9 (12-14-19 @ 17:29)  HR: 100 (12-15-19 @ 04:38) (100 - 114)  BP: 128/70 (12-15-19 @ 04:38) (109/63 - 140/73)  RR: 18 (12-15-19 @ 04:38) (18 - 18)  SpO2: 98% (12-15-19 @ 04:38) (97% - 99%)  Wt(kg): --    PHYSICAL EXAM  GENERAL:   NAD  HEENT:  NC/AT, no JVD, sclera-anicteric  CHEST:  clear to ascultation bilaterally, respirations nonlabored  HEART:  +S1+S2   ABDOMEN:  Soft, non-distended, + bowel sounds, + NGT  EXTREMITIES:  no cyanosis, clubbing or edema  NEURO:  awake, responsive, minimally verbal  SKIN:  No rash/warm/dry      LABS:                        7.2<L>  14.00<H> )-----------( 303      ( 14 Dec 2019 19:43 )             24.5<L>  14 Dec 2019 19:43    12-15    133<L>  |  95<L>  |  31<H>  ----------------------------<  146<H>  3.5   |  25  |  3.04<H>    Ca    10.4      15 Dec 2019 10:24          I&O's Detail    14 Dec 2019 07:01  -  15 Dec 2019 07:00  --------------------------------------------------------  IN:    Enteral Tube Flush: 585 mL    Nepro with Carb Steady: 1150 mL    Oral Fluid: 200 mL    Other: 1100 mL  Total IN: 3035 mL    OUT:    Other: 2000 mL  Total OUT: 2000 mL    Total NET: 1035 mL          GI PCR Panel, Stool (collected 14 Dec 2019 17:36)  Source: .Stool Feces  Final Report (14 Dec 2019 20:50):    GI PCR Results: NOT detected    *******Please Note:*******    GI panel PCR evaluates for:    Campylobacter, Plesiomonas shigelloides, Salmonella,    Vibrio, Yersinia enterocolitica, Enteroaggregative    Escherichia coli (EAEC), Enteropathogenic E.coli (EPEC),    Enterotoxigenic E. coli (ETEC) lt/st, Shiga-like    toxin-producing E. coli (STEC) stx1/stx2,    Shigella/ Enteroinvasive E. coli (EIEC), Cryptosporidium,    Cyclospora cayetanensis, Entamoeba histolytica,    Giardia lamblia, Adenovirus F 40/41, Astrovirus,    Norovirus GI/GII, Rotavirus A, Sapovirus

## 2019-12-17 LAB
ANION GAP SERPL CALC-SCNC: 12 MMOL/L — SIGNIFICANT CHANGE UP (ref 5–17)
BLD GP AB SCN SERPL QL: NEGATIVE — SIGNIFICANT CHANGE UP
BUN SERPL-MCNC: 63 MG/DL — HIGH (ref 7–23)
CALCIUM SERPL-MCNC: 10.8 MG/DL — HIGH (ref 8.4–10.5)
CHLORIDE SERPL-SCNC: 92 MMOL/L — LOW (ref 96–108)
CO2 SERPL-SCNC: 26 MMOL/L — SIGNIFICANT CHANGE UP (ref 22–31)
CREAT SERPL-MCNC: 4.72 MG/DL — HIGH (ref 0.5–1.3)
GLUCOSE BLDC GLUCOMTR-MCNC: 136 MG/DL — HIGH (ref 70–99)
GLUCOSE BLDC GLUCOMTR-MCNC: 177 MG/DL — HIGH (ref 70–99)
GLUCOSE BLDC GLUCOMTR-MCNC: 201 MG/DL — HIGH (ref 70–99)
GLUCOSE BLDC GLUCOMTR-MCNC: 233 MG/DL — HIGH (ref 70–99)
GLUCOSE SERPL-MCNC: 233 MG/DL — HIGH (ref 70–99)
HCT VFR BLD CALC: 23.3 % — LOW (ref 39–50)
HGB BLD-MCNC: 6.9 G/DL — CRITICAL LOW (ref 13–17)
MCHC RBC-ENTMCNC: 26 PG — LOW (ref 27–34)
MCHC RBC-ENTMCNC: 29.6 GM/DL — LOW (ref 32–36)
MCV RBC AUTO: 87.9 FL — SIGNIFICANT CHANGE UP (ref 80–100)
PLATELET # BLD AUTO: 302 K/UL — SIGNIFICANT CHANGE UP (ref 150–400)
POTASSIUM SERPL-MCNC: 3.5 MMOL/L — SIGNIFICANT CHANGE UP (ref 3.5–5.3)
POTASSIUM SERPL-SCNC: 3.5 MMOL/L — SIGNIFICANT CHANGE UP (ref 3.5–5.3)
RBC # BLD: 2.65 M/UL — LOW (ref 4.2–5.8)
RBC # FLD: 15.9 % — HIGH (ref 10.3–14.5)
RH IG SCN BLD-IMP: POSITIVE — SIGNIFICANT CHANGE UP
SODIUM SERPL-SCNC: 130 MMOL/L — LOW (ref 135–145)
T4 FREE SERPL-MCNC: 1.1 NG/DL — SIGNIFICANT CHANGE UP (ref 0.9–1.8)
TSH SERPL-MCNC: 2.53 UIU/ML — SIGNIFICANT CHANGE UP (ref 0.27–4.2)
WBC # BLD: 15.12 K/UL — HIGH (ref 3.8–10.5)
WBC # FLD AUTO: 15.12 K/UL — HIGH (ref 3.8–10.5)

## 2019-12-17 PROCEDURE — 36580 REPLACE CVAD CATH: CPT

## 2019-12-17 PROCEDURE — 77001 FLUOROGUIDE FOR VEIN DEVICE: CPT | Mod: 26

## 2019-12-17 RX ADMIN — LIDOCAINE 1 PATCH: 4 CREAM TOPICAL at 15:49

## 2019-12-17 RX ADMIN — Medication 1 APPLICATION(S): at 23:44

## 2019-12-17 RX ADMIN — ERYTHROPOIETIN 10000 UNIT(S): 10000 INJECTION, SOLUTION INTRAVENOUS; SUBCUTANEOUS at 18:53

## 2019-12-17 RX ADMIN — LIDOCAINE 1 PATCH: 4 CREAM TOPICAL at 17:24

## 2019-12-17 RX ADMIN — PANTOPRAZOLE SODIUM 20 MILLIGRAM(S): 20 TABLET, DELAYED RELEASE ORAL at 15:48

## 2019-12-17 RX ADMIN — LEVETIRACETAM 1000 MILLIGRAM(S): 250 TABLET, FILM COATED ORAL at 23:44

## 2019-12-17 RX ADMIN — CINACALCET 30 MILLIGRAM(S): 30 TABLET, FILM COATED ORAL at 15:48

## 2019-12-17 RX ADMIN — HEPARIN SODIUM 5000 UNIT(S): 5000 INJECTION INTRAVENOUS; SUBCUTANEOUS at 15:48

## 2019-12-17 RX ADMIN — HEPARIN SODIUM 5000 UNIT(S): 5000 INJECTION INTRAVENOUS; SUBCUTANEOUS at 05:07

## 2019-12-17 RX ADMIN — Medication 5 MILLIGRAM(S): at 05:08

## 2019-12-17 RX ADMIN — HEPARIN SODIUM 5000 UNIT(S): 5000 INJECTION INTRAVENOUS; SUBCUTANEOUS at 23:45

## 2019-12-17 RX ADMIN — CARVEDILOL PHOSPHATE 3.12 MILLIGRAM(S): 80 CAPSULE, EXTENDED RELEASE ORAL at 10:07

## 2019-12-17 RX ADMIN — LIDOCAINE 1 PATCH: 4 CREAM TOPICAL at 00:35

## 2019-12-17 RX ADMIN — Medication 4: at 13:05

## 2019-12-17 RX ADMIN — Medication 1 APPLICATION(S): at 10:07

## 2019-12-17 RX ADMIN — Medication 2: at 06:07

## 2019-12-17 RX ADMIN — CHLORHEXIDINE GLUCONATE 1 APPLICATION(S): 213 SOLUTION TOPICAL at 05:07

## 2019-12-17 RX ADMIN — ATORVASTATIN CALCIUM 40 MILLIGRAM(S): 80 TABLET, FILM COATED ORAL at 23:44

## 2019-12-17 RX ADMIN — CARVEDILOL PHOSPHATE 3.12 MILLIGRAM(S): 80 CAPSULE, EXTENDED RELEASE ORAL at 23:44

## 2019-12-17 NOTE — PROGRESS NOTE ADULT - ASSESSMENT
Assessment  DM: A1C 5.5%, was on insulin at home, started on low-dose basal insulin, blood sugars fluctuating, no hypoglycemic episodes. Patient is still NPO on tube feeds, planning possible PEG, on low-dose steroids, resting comfortably.  Hypercalcemia: Primary Hyperparathyroidism, s/p 2nd Pamidronate dose. Ca improving, 10.8.  Sepsis: IV ABx for UTI, LP inconsistent with meningitis, on medications, stable, monitored.  HTN: Controlled,  on antihypertensive medications.  ESRD: On hemodialysis, Monitor labs/BMP          Robi Gay MD  Cell: [phone]  Office: [phone] Assessment  DM: A1C 5.5%, was on insulin at home, started on low-dose basal insulin, blood sugars fluctuating, no hypoglycemic episodes. Patient is still NPO on tube feeds, planning possible PEG, on low-dose steroids,  resting comfortably.  Hypercalcemia: Primary Hyperparathyroidism, s/p 2nd Pamidronate dose. Ca improving, 10.8.  Sepsis: IV ABx for UTI, LP inconsistent with meningitis, on medications, stable, monitored.  HTN: Controlled,  on antihypertensive medications.  ESRD: On hemodialysis, Monitor labs/BMP          Robi Gay MD  Cell: [phone]  Office: [phone]

## 2019-12-17 NOTE — PROGRESS NOTE ADULT - SUBJECTIVE AND OBJECTIVE BOX
Patient is a 74y old  Male who presents with a chief complaint of ams (17 Dec 2019 20:11)      INTERVAL HPI/OVERNIGHT EVENTS:  T(C): 36.3 (12-17-19 @ 18:05), Max: 36.8 (12-17-19 @ 05:06)  HR: 98 (12-17-19 @ 18:05) (94 - 111)  BP: 110/66 (12-17-19 @ 18:05) (96/62 - 137/79)  RR: 18 (12-17-19 @ 18:05) (18 - 20)  SpO2: 96% (12-17-19 @ 18:05) (94% - 98%)  Wt(kg): --  I&O's Summary    16 Dec 2019 07:01  -  17 Dec 2019 07:00  --------------------------------------------------------  IN: 970 mL / OUT: 0 mL / NET: 970 mL    17 Dec 2019 07:01  -  17 Dec 2019 20:14  --------------------------------------------------------  IN: 650 mL / OUT: 0 mL / NET: 650 mL        LABS:                        6.9    15.12 )-----------( 302      ( 17 Dec 2019 13:51 )             23.3     12-17    130<L>  |  92<L>  |  63<H>  ----------------------------<  233<H>  3.5   |  26  |  4.72<H>    Ca    10.8<H>      17 Dec 2019 10:56          CAPILLARY BLOOD GLUCOSE      POCT Blood Glucose.: 136 mg/dL (17 Dec 2019 17:17)  POCT Blood Glucose.: 233 mg/dL (17 Dec 2019 12:35)  POCT Blood Glucose.: 177 mg/dL (17 Dec 2019 05:59)  POCT Blood Glucose.: 185 mg/dL (16 Dec 2019 23:53)  POCT Blood Glucose.: 159 mg/dL (16 Dec 2019 22:00)            MEDICATIONS  (STANDING):  alteplase for catheter clearance 2 milliGRAM(s) Catheter once  alteplase for catheter clearance 2 milliGRAM(s) Catheter once  atorvastatin 40 milliGRAM(s) Oral at bedtime  carvedilol 3.125 milliGRAM(s) Oral every 12 hours  chlorhexidine 4% Liquid 1 Application(s) Topical <User Schedule>  cinacalcet 30 milliGRAM(s) Oral daily  Dakins Solution - 1/4 Strength 1 Application(s) Topical two times a day  dextrose 5%. 1000 milliLiter(s) (50 mL/Hr) IV Continuous <Continuous>  dextrose 50% Injectable 12.5 Gram(s) IV Push once  dextrose 50% Injectable 25 Gram(s) IV Push once  dextrose 50% Injectable 25 Gram(s) IV Push once  epoetin tan Injectable 90305 Unit(s) IV Push <User Schedule>  glucagon  Injectable 2 milliGRAM(s) IV Push once  heparin  Injectable 5000 Unit(s) SubCutaneous every 8 hours  insulin glargine Injectable (LANTUS) 7 Unit(s) SubCutaneous at bedtime  insulin lispro (HumaLOG) corrective regimen sliding scale   SubCutaneous every 6 hours  levETIRAcetam  Solution 1000 milliGRAM(s) Oral at bedtime  lidocaine   Patch 1 Patch Transdermal daily  pantoprazole  Injectable 20 milliGRAM(s) IV Push daily  predniSONE   Tablet 5 milliGRAM(s) Oral daily    MEDICATIONS  (PRN):  dextrose 40% Gel 15 Gram(s) Oral once PRN Blood Glucose LESS THAN 70 milliGRAM(s)/deciLiter  glucagon  Injectable 1 milliGRAM(s) IntraMuscular once PRN Glucose <70 milliGRAM(s)/deciLiter  loperamide Suspension 2 milliGRAM(s) Oral every 6 hours PRN loose stools          PHYSICAL EXAM:  GENERAL: frail  CHEST/LUNG: Clear to percussion bilaterally; No rales, rhonchi, wheezing, or rubs  HEART: Regular rate and rhythm; No murmurs, rubs, or gallops  ABDOMEN: Soft, Nontender, Nondistended; Bowel sounds present  EXTREMITIES:  edema +    Care Discussed with Consultants/Other Providers [ ] YES  [ ] NO

## 2019-12-17 NOTE — PROGRESS NOTE ADULT - SUBJECTIVE AND OBJECTIVE BOX
GASTROENTEROLOGY    IR PEG failed yesterday as there was no percutaneous window for gastrostomy placement   Per RN; diarrhea improving with imodium; +loose BM x 1 today  pt is without GI complaints       MEDICATIONS  (STANDING):  alteplase for catheter clearance 2 milliGRAM(s) Catheter once  alteplase for catheter clearance 2 milliGRAM(s) Catheter once  atorvastatin 40 milliGRAM(s) Oral at bedtime  carvedilol 3.125 milliGRAM(s) Oral every 12 hours  chlorhexidine 4% Liquid 1 Application(s) Topical <User Schedule>  cinacalcet 30 milliGRAM(s) Oral daily  Dakins Solution - 1/4 Strength 1 Application(s) Topical two times a day  dextrose 5%. 1000 milliLiter(s) (50 mL/Hr) IV Continuous <Continuous>  dextrose 50% Injectable 12.5 Gram(s) IV Push once  dextrose 50% Injectable 25 Gram(s) IV Push once  dextrose 50% Injectable 25 Gram(s) IV Push once  epoetin tan Injectable 75069 Unit(s) IV Push <User Schedule>  heparin  Injectable 5000 Unit(s) SubCutaneous every 8 hours  insulin glargine Injectable (LANTUS) 7 Unit(s) SubCutaneous at bedtime  insulin lispro (HumaLOG) corrective regimen sliding scale   SubCutaneous every 6 hours  levETIRAcetam  Solution 1000 milliGRAM(s) Oral at bedtime  lidocaine   Patch 1 Patch Transdermal daily  pantoprazole  Injectable 20 milliGRAM(s) IV Push daily  predniSONE   Tablet 5 milliGRAM(s) Oral daily        T(F): 98.6 (12-15-19 @ 04:38), Max: 99.9 (12-14-19 @ 17:29)  HR: 100 (12-15-19 @ 04:38) (100 - 114)  BP: 128/70 (12-15-19 @ 04:38) (109/63 - 140/73)  RR: 18 (12-15-19 @ 04:38) (18 - 18)  SpO2: 98% (12-15-19 @ 04:38) (97% - 99%)  Wt(kg): --    PHYSICAL EXAM  GENERAL:   NAD  HEENT:  NC/AT, no JVD, sclera-anicteric  ABDOMEN:  Soft, non-distended, + bowel sounds, + NGT  EXTREMITIES:  no cyanosis, clubbing or edema  NEURO:  awake, responsive, minimally verbal  SKIN:  No rash/warm/dry      LABS:                        7.2<L>  14.00<H> )-----------( 303      ( 14 Dec 2019 19:43 )             24.5<L>  14 Dec 2019 19:43    12-15    133<L>  |  95<L>  |  31<H>  ----------------------------<  146<H>  3.5   |  25  |  3.04<H>    Ca    10.4      15 Dec 2019 10:24          I&O's Detail    14 Dec 2019 07:01  -  15 Dec 2019 07:00  --------------------------------------------------------  IN:    Enteral Tube Flush: 585 mL    Nepro with Carb Steady: 1150 mL    Oral Fluid: 200 mL    Other: 1100 mL  Total IN: 3035 mL    OUT:    Other: 2000 mL  Total OUT: 2000 mL    Total NET: 1035 mL          GI PCR Panel, Stool (collected 14 Dec 2019 17:36)  Source: .Stool Feces  Final Report (14 Dec 2019 20:50):    GI PCR Results: NOT detected    *******Please Note:*******    GI panel PCR evaluates for:    Campylobacter, Plesiomonas shigelloides, Salmonella,    Vibrio, Yersinia enterocolitica, Enteroaggregative    Escherichia coli (EAEC), Enteropathogenic E.coli (EPEC),    Enterotoxigenic E. coli (ETEC) lt/st, Shiga-like    toxin-producing E. coli (STEC) stx1/stx2,    Shigella/ Enteroinvasive E. coli (EIEC), Cryptosporidium,    Cyclospora cayetanensis, Entamoeba histolytica,    Giardia lamblia, Adenovirus F 40/41, Astrovirus,    Norovirus GI/GII, Rotavirus A, Sapovirus

## 2019-12-17 NOTE — PROGRESS NOTE ADULT - SUBJECTIVE AND OBJECTIVE BOX
Chief complaint  Patient is a 74y old  Male who presents with a chief complaint of ams (17 Dec 2019 14:52)   Review of systems  Patient in bed, looks comfortable, no fever, no hypoglycemia.    Labs and Fingersticks  CAPILLARY BLOOD GLUCOSE      POCT Blood Glucose.: 233 mg/dL (17 Dec 2019 12:35)  POCT Blood Glucose.: 177 mg/dL (17 Dec 2019 05:59)  POCT Blood Glucose.: 185 mg/dL (16 Dec 2019 23:53)  POCT Blood Glucose.: 159 mg/dL (16 Dec 2019 22:00)  POCT Blood Glucose.: 145 mg/dL (16 Dec 2019 18:33)      Anion Gap, Serum: 12 (12-17 @ 10:56)  Anion Gap, Serum: 13 (12-16 @ 10:43)      Calcium, Total Serum: 10.8 <H> (12-17 @ 10:56)  Calcium, Total Serum: 11.3 <H> (12-16 @ 10:43)          12-17    130<L>  |  92<L>  |  63<H>  ----------------------------<  233<H>  3.5   |  26  |  4.72<H>    Ca    10.8<H>      17 Dec 2019 10:56                          6.9    15.12 )-----------( 302      ( 17 Dec 2019 13:51 )             23.3     Medications  MEDICATIONS  (STANDING):  alteplase for catheter clearance 2 milliGRAM(s) Catheter once  alteplase for catheter clearance 2 milliGRAM(s) Catheter once  atorvastatin 40 milliGRAM(s) Oral at bedtime  carvedilol 3.125 milliGRAM(s) Oral every 12 hours  chlorhexidine 4% Liquid 1 Application(s) Topical <User Schedule>  cinacalcet 30 milliGRAM(s) Oral daily  Dakins Solution - 1/4 Strength 1 Application(s) Topical two times a day  dextrose 5%. 1000 milliLiter(s) (50 mL/Hr) IV Continuous <Continuous>  dextrose 50% Injectable 12.5 Gram(s) IV Push once  dextrose 50% Injectable 25 Gram(s) IV Push once  dextrose 50% Injectable 25 Gram(s) IV Push once  epoetin tan Injectable 10713 Unit(s) IV Push <User Schedule>  glucagon  Injectable 2 milliGRAM(s) IV Push once  heparin  Injectable 5000 Unit(s) SubCutaneous every 8 hours  insulin glargine Injectable (LANTUS) 7 Unit(s) SubCutaneous at bedtime  insulin lispro (HumaLOG) corrective regimen sliding scale   SubCutaneous every 6 hours  levETIRAcetam  Solution 1000 milliGRAM(s) Oral at bedtime  lidocaine   Patch 1 Patch Transdermal daily  pantoprazole  Injectable 20 milliGRAM(s) IV Push daily  predniSONE   Tablet 5 milliGRAM(s) Oral daily      Physical Exam  General: Patient comfortable in bed  Vital Signs Last 12 Hrs  T(F): 97.9 (12-17-19 @ 11:13), Max: 98.2 (12-17-19 @ 05:06)  HR: 109 (12-17-19 @ 11:13) (94 - 109)  BP: 104/64 (12-17-19 @ 11:13) (104/64 - 121/74)  BP(mean): --  RR: 18 (12-17-19 @ 11:13) (18 - 19)  SpO2: 95% (12-17-19 @ 11:13) (95% - 95%)  Neck: No palpable thyroid nodules.  CVS: S1S2, No murmurs  Respiratory: No wheezing, no crepitations  GI: Abdomen soft, bowel sounds positive  Musculoskeletal:  edema lower extremities.   Skin: No skin rashes, no ecchymosis    Diagnostics Chief complaint  Patient is a 74y old  Male who presents with a chief complaint of ams (17 Dec 2019 14:52)   Review of systems  Patient in bed, looks comfortable, no fever,  no hypoglycemia.    Labs and Fingersticks  CAPILLARY BLOOD GLUCOSE      POCT Blood Glucose.: 233 mg/dL (17 Dec 2019 12:35)  POCT Blood Glucose.: 177 mg/dL (17 Dec 2019 05:59)  POCT Blood Glucose.: 185 mg/dL (16 Dec 2019 23:53)  POCT Blood Glucose.: 159 mg/dL (16 Dec 2019 22:00)  POCT Blood Glucose.: 145 mg/dL (16 Dec 2019 18:33)      Anion Gap, Serum: 12 (12-17 @ 10:56)  Anion Gap, Serum: 13 (12-16 @ 10:43)      Calcium, Total Serum: 10.8 <H> (12-17 @ 10:56)  Calcium, Total Serum: 11.3 <H> (12-16 @ 10:43)          12-17    130<L>  |  92<L>  |  63<H>  ----------------------------<  233<H>  3.5   |  26  |  4.72<H>    Ca    10.8<H>      17 Dec 2019 10:56                          6.9    15.12 )-----------( 302      ( 17 Dec 2019 13:51 )             23.3     Medications  MEDICATIONS  (STANDING):  alteplase for catheter clearance 2 milliGRAM(s) Catheter once  alteplase for catheter clearance 2 milliGRAM(s) Catheter once  atorvastatin 40 milliGRAM(s) Oral at bedtime  carvedilol 3.125 milliGRAM(s) Oral every 12 hours  chlorhexidine 4% Liquid 1 Application(s) Topical <User Schedule>  cinacalcet 30 milliGRAM(s) Oral daily  Dakins Solution - 1/4 Strength 1 Application(s) Topical two times a day  dextrose 5%. 1000 milliLiter(s) (50 mL/Hr) IV Continuous <Continuous>  dextrose 50% Injectable 12.5 Gram(s) IV Push once  dextrose 50% Injectable 25 Gram(s) IV Push once  dextrose 50% Injectable 25 Gram(s) IV Push once  epoetin tan Injectable 90389 Unit(s) IV Push <User Schedule>  glucagon  Injectable 2 milliGRAM(s) IV Push once  heparin  Injectable 5000 Unit(s) SubCutaneous every 8 hours  insulin glargine Injectable (LANTUS) 7 Unit(s) SubCutaneous at bedtime  insulin lispro (HumaLOG) corrective regimen sliding scale   SubCutaneous every 6 hours  levETIRAcetam  Solution 1000 milliGRAM(s) Oral at bedtime  lidocaine   Patch 1 Patch Transdermal daily  pantoprazole  Injectable 20 milliGRAM(s) IV Push daily  predniSONE   Tablet 5 milliGRAM(s) Oral daily      Physical Exam  General: Patient comfortable in bed  Vital Signs Last 12 Hrs  T(F): 97.9 (12-17-19 @ 11:13), Max: 98.2 (12-17-19 @ 05:06)  HR: 109 (12-17-19 @ 11:13) (94 - 109)  BP: 104/64 (12-17-19 @ 11:13) (104/64 - 121/74)  BP(mean): --  RR: 18 (12-17-19 @ 11:13) (18 - 19)  SpO2: 95% (12-17-19 @ 11:13) (95% - 95%)  Neck: No palpable thyroid nodules.  CVS: S1S2, No murmurs  Respiratory: No wheezing, no crepitations  GI: Abdomen soft, bowel sounds positive  Musculoskeletal:  edema lower extremities.   Skin: No skin rashes, no ecchymosis    Diagnostics

## 2019-12-17 NOTE — PROGRESS NOTE ADULT - SUBJECTIVE AND OBJECTIVE BOX
Subjective: Patient seen and examined. No new events except as noted.     SUBJECTIVE/ROS:  limited       MEDICATIONS:  MEDICATIONS  (STANDING):  alteplase for catheter clearance 2 milliGRAM(s) Catheter once  alteplase for catheter clearance 2 milliGRAM(s) Catheter once  atorvastatin 40 milliGRAM(s) Oral at bedtime  carvedilol 3.125 milliGRAM(s) Oral every 12 hours  chlorhexidine 4% Liquid 1 Application(s) Topical <User Schedule>  cinacalcet 30 milliGRAM(s) Oral daily  Dakins Solution - 1/4 Strength 1 Application(s) Topical two times a day  dextrose 5%. 1000 milliLiter(s) (50 mL/Hr) IV Continuous <Continuous>  dextrose 50% Injectable 12.5 Gram(s) IV Push once  dextrose 50% Injectable 25 Gram(s) IV Push once  dextrose 50% Injectable 25 Gram(s) IV Push once  epoetin tan Injectable 01630 Unit(s) IV Push <User Schedule>  glucagon  Injectable 2 milliGRAM(s) IV Push once  heparin  Injectable 5000 Unit(s) SubCutaneous every 8 hours  insulin glargine Injectable (LANTUS) 7 Unit(s) SubCutaneous at bedtime  insulin lispro (HumaLOG) corrective regimen sliding scale   SubCutaneous every 6 hours  levETIRAcetam  Solution 1000 milliGRAM(s) Oral at bedtime  lidocaine   Patch 1 Patch Transdermal daily  pantoprazole  Injectable 20 milliGRAM(s) IV Push daily  predniSONE   Tablet 5 milliGRAM(s) Oral daily      PHYSICAL EXAM:  T(C): 36.8 (12-17-19 @ 05:06), Max: 37.2 (12-16-19 @ 17:27)  HR: 94 (12-17-19 @ 05:06) (94 - 111)  BP: 121/74 (12-17-19 @ 05:06) (96/62 - 154/63)  RR: 19 (12-17-19 @ 05:06) (13 - 19)  SpO2: 95% (12-17-19 @ 05:06) (94% - 100%)  Wt(kg): --  I&O's Summary    16 Dec 2019 07:01  -  17 Dec 2019 07:00  --------------------------------------------------------  IN: 970 mL / OUT: 0 mL / NET: 970 mL    17 Dec 2019 07:01  -  17 Dec 2019 10:28  --------------------------------------------------------  IN: 0 mL / OUT: 0 mL / NET: 0 mL      Height (cm): 188.8 (12-16 @ 15:17)  Weight (kg): 68 (12-16 @ 15:17)  BMI (kg/m2): 19.1 (12-16 @ 15:17)  BSA (m2): 1.93 (12-16 @ 15:17)      JVP: Normal  Neck: supple  Lung: clear   CV: S1 S2 , Murmur:  Abd: soft  Ext: No edema  neuro: Awake  Psych: flat affect  Skin: normal``    LABS/DATA:    CARDIAC MARKERS:                                7.5    15.78 )-----------( 308      ( 16 Dec 2019 12:53 )             25.2     12-16    131<L>  |  92<L>  |  51<H>  ----------------------------<  149<H>  3.8   |  26  |  3.93<H>    Ca    11.3<H>      16 Dec 2019 10:43      proBNP:   Lipid Profile:   HgA1c:   TSH:     TELE:  EKG:

## 2019-12-17 NOTE — PROGRESS NOTE ADULT - ASSESSMENT
History of cocaine induced CM  normal EF on last echo  cont BB     PAF  off a/c high bleed risk     tachycardia  improving

## 2019-12-17 NOTE — PROGRESS NOTE ADULT - SUBJECTIVE AND OBJECTIVE BOX
Patient is a 74y Male whom presented to   pateint seen and examined nad ,     PAST MEDICAL & SURGICAL HISTORY:  Kidney transplant recipient  Anuria  GERD (gastroesophageal reflux disease)  Kidney transplant failure: dx: 2/2013  Hepatitis C: dx: 90&#x27;s.  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 10 milliGRAM(s) Oral three times a day  mycophenolate mofetil 250 milliGRAM(s) Oral two times a day  predniSONE   Tablet 5 milliGRAM(s) Oral daily  sevelamer carbonate 800 milliGRAM(s) Oral three times a day with meals  sodium bicarbonate 650 milliGRAM(s) Oral two times a day  tamsulosin 0.4 milliGRAM(s) Oral at bedtime      Allergies    No Known Allergies                       SOCIAL HISTORY:  Denies ETOh,Smoking,     FAMILY HISTORY:  Family history of breast cancer in sister (Sibling): living at 92 yo  Family history of prostate cancer in father  Family history of lung cancer  Family history of hypertension in mother  Family history of diabetes mellitus: mother      REVIEW OF SYSTEMS:    unbable to obtained                                                                                                 6.9    15.12 )-----------( 302      ( 17 Dec 2019 13:51 )             23.3       CBC Full  -  ( 17 Dec 2019 13:51 )  WBC Count : 15.12 K/uL  RBC Count : 2.65 M/uL  Hemoglobin : 6.9 g/dL  Hematocrit : 23.3 %  Platelet Count - Automated : 302 K/uL  Mean Cell Volume : 87.9 fl  Mean Cell Hemoglobin : 26.0 pg  Mean Cell Hemoglobin Concentration : 29.6 gm/dL  Auto Neutrophil # : x  Auto Lymphocyte # : x  Auto Monocyte # : x  Auto Eosinophil # : x  Auto Basophil # : x  Auto Neutrophil % : x  Auto Lymphocyte % : x  Auto Monocyte % : x  Auto Eosinophil % : x  Auto Basophil % : x      12-17    130<L>  |  92<L>  |  63<H>  ----------------------------<  233<H>  3.5   |  26  |  4.72<H>    Ca    10.8<H>      17 Dec 2019 10:56        CAPILLARY BLOOD GLUCOSE      POCT Blood Glucose.: 136 mg/dL (17 Dec 2019 17:17)  POCT Blood Glucose.: 233 mg/dL (17 Dec 2019 12:35)  POCT Blood Glucose.: 177 mg/dL (17 Dec 2019 05:59)  POCT Blood Glucose.: 185 mg/dL (16 Dec 2019 23:53)  POCT Blood Glucose.: 159 mg/dL (16 Dec 2019 22:00)      Vital Signs Last 24 Hrs  T(C): 36.3 (17 Dec 2019 18:05), Max: 36.8 (17 Dec 2019 05:06)  T(F): 97.4 (17 Dec 2019 18:05), Max: 98.2 (17 Dec 2019 05:06)  HR: 98 (17 Dec 2019 18:05) (94 - 111)  BP: 110/66 (17 Dec 2019 18:05) (96/62 - 137/79)  BP(mean): --  RR: 18 (17 Dec 2019 18:05) (18 - 20)  SpO2: 96% (17 Dec 2019 18:05) (94% - 98%)                                     HEENT: conjunctive   clear   Neck:  No JVD  Respiratory: decrease bs b/l   Cardiovascular: S1 and S2  Gastrointestinal: BS+, soft, NT/ND  Extremities: No peripheral edema  Skin: dry   Access: pos fistula

## 2019-12-17 NOTE — PROGRESS NOTE ADULT - ASSESSMENT
NEURO:  - Extubated 11/29, now on nasal cannula. Minimally verbal   - metabolic encephalopathy, meningitis r/o, LP negative  - neuro following  - chronic lacunar infarcts; per neuro, AMS also likely related to hypercalcemia, renal dysfunction , poor nutritional intake.   - c/w keppra for ?hx of seizure disorder  -AMS likely 2/2 hyperCa, renal dysfunction, poor PO intake      CV:  - hx of afib was on eliquis, now off a/c per cards due to bleeding risk  - holding carvedilol 6.25 BID  - hx of cocaine cardiomyopathy, resolved, normal EF on most recent echo  - c/w statin  - echo repeat EF70, LVH, hyperdynamic LCF, normal RV sys fxn    PULM:- extubated 11/29--tolerating nasal cannula    GI:  - GI consult for PEG   - surgery consult  - GI AND IR unable to do a PEG     RENAL:- hx of ESRD s/p failed renal transplant x2  - renal following, HD as recommended.      ENDO:  #DM2: HbA1c 5.5  - Start Insulin Sliding Scale  - endocrine following  - monitor Ca    INFECTIOUS:  - dc  vanco as per infectious disease   - id fu     Anemia  - transfuse 1 unit   - monitor cbc  - check guiac

## 2019-12-17 NOTE — PROGRESS NOTE ADULT - SUBJECTIVE AND OBJECTIVE BOX
Interventional Radiology     Pre- Procedure   Patient and/or family/surrogate educated about central line-associated blood stream infection prevention practices  Central Line insertion checklist utilized    Catheter Type: ( x ) Dialysis  (  ) Introducer  (   ) Central venous catheter   (  ) peripherally inserted central catheter (PICC)      Number of Lumens: (  ) single   ( x ) Double   (   ) Triple  (  ) Antimicrobial catheter    TIME OUT performed prior to this procedure with verbal confirmation of correct patient identity, correct site, side and kerri (if applicable), agreement of the procedure, correct patient position, availability of necessary equipment.  The consent form (if applicable) is complete and accurate.  Safety precautions based on patient history or medication use has been addressed   RN at bedside    Procedure  The patient was placed in the (x  )Supine position, (   ) Trendelenburg postiton.  The patient's placement site was prepped with Chlorhexidine solution then drapped using maximum sterile barrier protection.  The area was injected with 2 cc of 2% Lidocaine.  Using the  (x  ) Seldinger technique    (  ) Modified Seldinger technique   (  ) Ultrasound, the catheter was introduced.  Strict adherence to outlined aseptic technique, including hand washing prior to donning sterile barriers (gloves and gown), utilizing a mask and cap, plus draping the patient with a sterile drape was observed.  Uponcompletion of the line placement, the insertion site was covered with sterile dressing.    All materials used for catheter insertion, including the intact guide wire, were accounted for at the end of the procedure      Emergency placement (  x) No    (   ) Yes   (   ) New Site    (  x )  Guide Wire change      Attempted site and actual site ( x  ) Right  (   )  Left                 Internal Jugular vein        Post Procedure (Catheter Placement Verification)  Correct placement confirmed by pressure transducer or ultrasonography or venous saturation  The tip of the catheter is confirmed to be in the SVC by radiography which revealed no pneumothorax and line may be accessed

## 2019-12-17 NOTE — PROGRESS NOTE ADULT - PROBLEM SELECTOR PLAN 1
- s/p  upper gastrointestinal endoscopy with the procedure was aborted due to inability to place PEG tube.  - NGT replaced with endoscopic confirmation  - IR PEG failed; per IR, rec surgical consult for G or J-tube placement   - f/u surgery recs

## 2019-12-17 NOTE — PROGRESS NOTE ADULT - PROBLEM SELECTOR PLAN 2
- per RN; diarrhea improved today; +loose BM x 1 today  - cont imodium prn   - GI PCR negative  - C diff PCR negative

## 2019-12-17 NOTE — PROGRESS NOTE ADULT - SUBJECTIVE AND OBJECTIVE BOX
[facility]      Seen earlier today, and examined.  - Today, patient is without complaints.           *****MEDICATIONS: Current medication reviewed and documented.    MEDICATIONS  (STANDING):  alteplase for catheter clearance 2 milliGRAM(s) Catheter once  alteplase for catheter clearance 2 milliGRAM(s) Catheter once  atorvastatin 40 milliGRAM(s) Oral at bedtime  carvedilol 3.125 milliGRAM(s) Oral every 12 hours  chlorhexidine 4% Liquid 1 Application(s) Topical <User Schedule>  cinacalcet 30 milliGRAM(s) Oral daily  Dakins Solution - 1/4 Strength 1 Application(s) Topical two times a day  dextrose 5%. 1000 milliLiter(s) (50 mL/Hr) IV Continuous <Continuous>  dextrose 50% Injectable 12.5 Gram(s) IV Push once  dextrose 50% Injectable 25 Gram(s) IV Push once  dextrose 50% Injectable 25 Gram(s) IV Push once  epoetin tan Injectable 42622 Unit(s) IV Push <User Schedule>  glucagon  Injectable 2 milliGRAM(s) IV Push once  heparin  Injectable 5000 Unit(s) SubCutaneous every 8 hours  insulin glargine Injectable (LANTUS) 7 Unit(s) SubCutaneous at bedtime  insulin lispro (HumaLOG) corrective regimen sliding scale   SubCutaneous every 6 hours  levETIRAcetam  Solution 1000 milliGRAM(s) Oral at bedtime  lidocaine   Patch 1 Patch Transdermal daily  pantoprazole  Injectable 20 milliGRAM(s) IV Push daily  predniSONE   Tablet 5 milliGRAM(s) Oral daily    MEDICATIONS  (PRN):  dextrose 40% Gel 15 Gram(s) Oral once PRN Blood Glucose LESS THAN 70 milliGRAM(s)/deciLiter  glucagon  Injectable 1 milliGRAM(s) IntraMuscular once PRN Glucose <70 milliGRAM(s)/deciLiter  loperamide Suspension 2 milliGRAM(s) Oral every 6 hours PRN loose stools          ***** VITAL SIGNS:  T(F): 97.9 (19 @ 11:13), Max: 99 (19 @ 17:27)  HR: 109 (19 @ 11:13) (94 - 111)  BP: 104/64 (19 @ 11:13) (96/62 - 154/63)  RR: 18 (19 @ 11:13) ()  SpO2: 95% (19 @ 11:13) (94% - 100%)  Wt(kg): --  ,   I&O's Summary    16 Dec 2019 07:01  -  17 Dec 2019 07:00  --------------------------------------------------------  IN: 970 mL / OUT: 0 mL / NET: 970 mL    17 Dec 2019 07:01  -  17 Dec 2019 11:42  --------------------------------------------------------  IN: 0 mL / OUT: 0 mL / NET: 0 mL             *****PHYSICAL EXAM:    more alert today   able to follow simple commands  oriented x 2   moving both upper ext antigravity   moves his toes b/l       very limited exam as pt is not cooperative.              *****LAB AND IMAGIN.5    15.78 )-----------( 308      ( 16 Dec 2019 12:53 )             25.2               12-17    130<L>  |  92<L>  |  63<H>  ----------------------------<  233<H>  3.5   |  26  |  4.72<H>    Ca    10.8<H>      17 Dec 2019 10:56                           [All pertinent recent Imaging/Reports reviewed]           *****A S S E S S M E N T   A N D   P L A N :  75 y/o male with PMHx of  ESRD s/p /p failed renal transplant 4 year ago and successful renal transplant 1 year ago, DM, HTN, 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   On keppra for presumed seizure disorder, but no clear history.    Pt is encephalopathic on exam,. opens eyes to voice  can  follow simple commands  commands, grimaces to noxious stimuli, able to  my hands, wiggles toes  and legs, hyperreflexic in the legs with bilateral cross adductors, bilateral upgoing toes.    	  Routine EEG  without any seizures  AMS likely related to hypercalcemia, renal dysfunction , poor nutritional intake.   thiamine 500 ivpb daily x 3 days completed.   minimal improvement in mental status, will lower keppra dose    family ok with peg tube         Thank you for allowing me to participate in the care of this patient. Please do not hesitate to call me if you have any  questions.        ________________  Shira Lindsey MD  [facility] ([facility]  [phone]      33 minutes spent on total encounter; more than 50 % of the visit was  spent counseling about plan of care, compliance to diet/exercise and medication regimen and or  coordinating care by the attending physician.      It is advised that stroke patients follow up with JAVIER Vaughan @ [phone] in 1- 2 weeks.   Others please follow up with Dr. Michael Nissenbaum [phone] [facility]      Seen earlier today, and examined.  - Today, patient is without complaints.           *****MEDICATIONS: Current medication reviewed and documented.    MEDICATIONS  (STANDING):  alteplase for catheter clearance 2 milliGRAM(s) Catheter once  alteplase for catheter clearance 2 milliGRAM(s) Catheter once  atorvastatin 40 milliGRAM(s) Oral at bedtime  carvedilol 3.125 milliGRAM(s) Oral every 12 hours  chlorhexidine 4% Liquid 1 Application(s) Topical <User Schedule>  cinacalcet 30 milliGRAM(s) Oral daily  Dakins Solution - 1/4 Strength 1 Application(s) Topical two times a day  dextrose 5%. 1000 milliLiter(s) (50 mL/Hr) IV Continuous <Continuous>  dextrose 50% Injectable 12.5 Gram(s) IV Push once  dextrose 50% Injectable 25 Gram(s) IV Push once  dextrose 50% Injectable 25 Gram(s) IV Push once  epoetin tan Injectable 36308 Unit(s) IV Push <User Schedule>  glucagon  Injectable 2 milliGRAM(s) IV Push once  heparin  Injectable 5000 Unit(s) SubCutaneous every 8 hours  insulin glargine Injectable (LANTUS) 7 Unit(s) SubCutaneous at bedtime  insulin lispro (HumaLOG) corrective regimen sliding scale   SubCutaneous every 6 hours  levETIRAcetam  Solution 1000 milliGRAM(s) Oral at bedtime  lidocaine   Patch 1 Patch Transdermal daily  pantoprazole  Injectable 20 milliGRAM(s) IV Push daily  predniSONE   Tablet 5 milliGRAM(s) Oral daily    MEDICATIONS  (PRN):  dextrose 40% Gel 15 Gram(s) Oral once PRN Blood Glucose LESS THAN 70 milliGRAM(s)/deciLiter  glucagon  Injectable 1 milliGRAM(s) IntraMuscular once PRN Glucose <70 milliGRAM(s)/deciLiter  loperamide Suspension 2 milliGRAM(s) Oral every 6 hours PRN loose stools          ***** VITAL SIGNS:  T(F): 97.9 (19 @ 11:13), Max: 99 (19 @ 17:27)  HR: 109 (19 @ 11:13) (94 - 111)  BP: 104/64 (19 @ 11:13) (96/62 - 154/63)  RR: 18 (19 @ 11:13) ()  SpO2: 95% (19 @ 11:13) (94% - 100%)  Wt(kg): --  ,   I&O's Summary    16 Dec 2019 07:01  -  17 Dec 2019 07:00  --------------------------------------------------------  IN: 970 mL / OUT: 0 mL / NET: 970 mL    17 Dec 2019 07:01  -  17 Dec 2019 11:42  --------------------------------------------------------  IN: 0 mL / OUT: 0 mL / NET: 0 mL             *****PHYSICAL EXAM:    more alert today   able to follow simple commands  oriented x 2   moving both upper ext antigravity   moves his toes b/l       very limited exam as pt is not cooperative.              *****LAB AND IMAGIN.5    15.78 )-----------( 308      ( 16 Dec 2019 12:53 )             25.2               12-17    130<L>  |  92<L>  |  63<H>  ----------------------------<  233<H>  3.5   |  26  |  4.72<H>    Ca    10.8<H>      17 Dec 2019 10:56                           [All pertinent recent Imaging/Reports reviewed]           *****A S S E S S M E N T   A N D   P L A N :  75 y/o male with PMHx of  ESRD s/p /p failed renal transplant 4 year ago and successful renal transplant 1 year ago, DM, HTN, 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   On keppra for presumed seizure disorder, but no clear history.    Pt is encephalopathic on exam,. opens eyes to voice  can  follow simple commands  commands, grimaces to noxious stimuli, able to  my hands, wiggles toes  and legs, hyperreflexic in the legs with bilateral cross adductors, bilateral upgoing toes.    	  Routine EEG  without any seizures  AMS likely related to hypercalcemia, renal dysfunction , poor nutritional intake.   thiamine 500 ivpb daily x 3 days completed.   minimal improvement in mental status, will lower keppra dose    family ok with peg tube   no further neurologic workup anticipated   will sign off, please reconsult if necessary       Thank you for allowing me to participate in the care of this patient. Please do not hesitate to call me if you have any  questions.        ________________  Shira Lindsey MD  [facility] ([facility]  [phone]      33 minutes spent on total encounter; more than 50 % of the visit was  spent counseling about plan of care, compliance to diet/exercise and medication regimen and or  coordinating care by the attending physician.      It is advised that stroke patients follow up with JAVIER Vaughan @ [phone] in 1- 2 weeks.   Others please follow up with Dr. Michael Nissenbaum [phone]

## 2019-12-17 NOTE — CHART NOTE - NSCHARTNOTEFT_GEN_A_CORE
Pt's          H/H             6.9    15.12 )-----------( 302      ( 17 Dec 2019 13:51 )             23.3   d/w Dr. James. Will give 1 unit PRBC in HD as d/w attending. Continue to monitor CBC.

## 2019-12-17 NOTE — CHART NOTE - NSCHARTNOTEFT_GEN_A_CORE
------------------------------------------------------------  Interventional Radiology Pre-Procedure Note  ------------------------------------------------------------    Procedure:    Indication: 74y Male with ESRD on dialysis not with non-functioning Shiley catheter. Consult for exchange.     Past Medical History:    Kidney transplant recipient  Anuria  GERD (gastroesophageal reflux disease)  Kidney transplant failure  Hepatitis C  GERD (gastroesophageal reflux disease)  Renal transplant failure and rejection  Rectal abscess  IV drug abuse  HTN (hypertension)  Diverticulitis  ESRD on dialysis  Diabetes mellitus  BPH (Benign Prostatic Hyperplasia)  Cardiomyopathy      Allergies: No Known Allergies      Vital Signs: T(F): 97.9 (11:13), Max: 99 (17:27)  HR: 109 (11:13)  BP: 104/64 (11:13)  RR: 18 (11:13)  SpO2: 95% (11:13)    Basic: Labs   130 | 92 | 63  ----------------------< 233     (12-17-19 @ 10:56)  3.5 | 26 | 4.72      Consent: Obtained via phone after discussion of risk, benefits, alternatives with the patient's sister, Sheryl Mims. Consent signed and placed with patient's chart.     Plan: Shiley exchange over guide-wire.

## 2019-12-17 NOTE — CONSULT NOTE ADULT - ASSESSMENT
74M PMHx of ESRD s/p failed renal transplant x2, HCV, DM, and meningococcal meningitis 2 years ago was sent in to the ED from HD for AMS and low BPs with conitnued AMS with attmepted PEG insertion with both IR and GI with inability to obtain good window for PEG insertion.    -Continue medical care as per primary team  -Timing of laparoscopic assisted PEG tube TBD; pt will require medical optimization and clearance prior to scheduling of OR  -D/w Dr. Zacarias    P#[id number]

## 2019-12-17 NOTE — CONSULT NOTE ADULT - SUBJECTIVE AND OBJECTIVE BOX
HPI:  74M PMHx of ESRD s/p failed renal transplant x2, HCV, DM, and meningococcal meningitis 2 years ago was sent in to the ED from HD for AMS and low BPs. He is unable to provide any information at this time. His sister is at the bedside and reports that he is typically able to carry a conversation and walk a small amount. She is not aware if he had been having fevers at the nursing home, and believes that his last HD was on Tuesday. (20 Nov 2019 18:01)      No Known Allergies      PAST MEDICAL & SURGICAL HISTORY:  Kidney transplant recipient  Anuria  GERD (gastroesophageal reflux disease)  Kidney transplant failure: dx: 2/2013  Hepatitis C: dx: 90&#x27;s.  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, no known MIs  H/O kidney transplant: may 2015  A-V fistula: Left, implanted (?)  S/p cadaver renal transplant: 2008  S/P partial colectomy: due to diverticulitis        Home Medications:  acetaminophen 325 mg oral tablet: 3 tab(s) orally every 6 hours, As Needed (20 Nov 2019 17:09)  Admelog SoloStar 100 units/mL injectable solution: Inject as per sliding scale. (20 Nov 2019 17:09)  allopurinol 100 mg oral tablet: 1 tab(s) orally once a day (20 Nov 2019 17:09)  apixaban 2.5 mg oral tablet: 1 tab(s) orally 2 times a day (20 Nov 2019 17:09)  atorvastatin 40 mg oral tablet: 1 tab(s) orally once a day (at bedtime) (20 Nov 2019 17:09)  carvedilol 6.25 mg oral tablet: 1 tab(s) orally every 12 hours (20 Nov 2019 17:09)  cinacalcet 30 mg oral tablet: 1 tab(s) orally once a day (20 Nov 2019 17:09)  cyclobenzaprine 5 mg oral tablet: 1 tab(s) orally 4 times a day (20 Nov 2019 17:09)  DuoNeb 0.5 mg-2.5 mg/3 mL inhalation solution: 3 milliliter(s) inhaled every 4 hours, As Needed (20 Nov 2019 17:09)  escitalopram 20 mg oral tablet: 1 tab(s) orally once a day (20 Nov 2019 17:09)  folic acid 1 mg oral tablet: 1 tab(s) orally once a day (20 Nov 2019 17:09)  furosemide 20 mg oral tablet: 3 tab(s) orally 2 times a day (20 Nov 2019 17:09)  hydrALAZINE 100 mg oral tablet: 1 tab(s) orally 3 times a day (20 Nov 2019 17:09)  hydrocortisone 25 mg rectal suppository: 1 suppository(ies) rectal once a day (at bedtime) (20 Nov 2019 17:09)  Imodium A-D 2 mg oral tablet: 1 tab(s) orally every 8 hours, As Needed (20 Nov 2019 17:09)  insulin glargine: 10 unit(s) subcutaneous once a day (at bedtime) (20 Nov 2019 17:09)  lactobacillus acidophilus oral capsule: 1 tab(s) orally 2 times a day (20 Nov 2019 17:09)  levETIRAcetam 1000 mg oral tablet: 1 tab(s) orally 2 times a day (20 Nov 2019 17:09)  Lidocaine Viscous 2% mucous membrane solution: Apply to anus every 4 hours, As Needed (20 Nov 2019 17:09)  Maalox Plus 200 mg-200 mg-20 mg/5 mL oral suspension: 30 milliliter(s) orally every 6 hours, As Needed (20 Nov 2019 17:09)  midodrine 10 mg oral tablet: 1 tab(s) orally 3 times a week, As Needed (20 Nov 2019 17:09)  mycophenolate mofetil 250 mg oral capsule: 1 cap(s) orally 2 times a day (20 Nov 2019 17:09)  Nephro-Eulalia oral tablet: 1 tab(s) orally once a day (20 Nov 2019 17:09)  ondansetron 4 mg oral tablet: 1 tab(s) orally every 6 hours, As Needed (20 Nov 2019 17:09)  predniSONE 5 mg oral tablet: 1 tab(s) orally once a day (20 Nov 2019 17:09)  raNITIdine 75 mg oral tablet: 1 tab(s) orally once a day (at bedtime) (20 Nov 2019 17:09)  sevelamer carbonate 800 mg oral tablet: 1 tab(s) orally 3 times a day (with meals) (20 Nov 2019 17:09)  silver sulfADIAZINE 1% topical cream: Apply topically to affected area 2 times a day (20 Nov 2019 17:09)  sodium bicarbonate 650 mg oral tablet: 1 tab(s) orally 2 times a day (20 Nov 2019 17:09)  tamsulosin 0.4 mg oral capsule: 1 cap(s) orally once a day (at bedtime) (20 Nov 2019 17:09)  thiamine 100 mg oral tablet: 1 tab(s) orally once a day (20 Nov 2019 17:09)  Zosyn 3 g-0.375 g intravenous injection: 3.375 gram(s) intravenous every 6 hours x10 days (20 Nov 2019 17:09)      Social History:  ETOH: unable to obtain  TOB: unable to obtain  Illicits: unable to obtain  Work/Home:     FAMILY HISTORY:  Family history of breast cancer in sister (Sibling): living at 94 yo  Family history of prostate cancer in father  Family history of lung cancer  Family history of hypertension in mother  Family history of diabetes mellitus: mother      REVIEW OF SYSTEMS:    unable to obtain       MEDICATIONS  (STANDING):  alteplase for catheter clearance 2 milliGRAM(s) Catheter once  alteplase for catheter clearance 2 milliGRAM(s) Catheter once  atorvastatin 40 milliGRAM(s) Oral at bedtime  carvedilol 3.125 milliGRAM(s) Oral every 12 hours  chlorhexidine 4% Liquid 1 Application(s) Topical <User Schedule>  cinacalcet 30 milliGRAM(s) Oral daily  Dakins Solution - 1/4 Strength 1 Application(s) Topical two times a day  dextrose 5%. 1000 milliLiter(s) (50 mL/Hr) IV Continuous <Continuous>  dextrose 50% Injectable 12.5 Gram(s) IV Push once  dextrose 50% Injectable 25 Gram(s) IV Push once  dextrose 50% Injectable 25 Gram(s) IV Push once  epoetin tan Injectable 21764 Unit(s) IV Push <User Schedule>  glucagon  Injectable 2 milliGRAM(s) IV Push once  heparin  Injectable 5000 Unit(s) SubCutaneous every 8 hours  insulin glargine Injectable (LANTUS) 7 Unit(s) SubCutaneous at bedtime  insulin lispro (HumaLOG) corrective regimen sliding scale   SubCutaneous every 6 hours  levETIRAcetam  Solution 1000 milliGRAM(s) Oral at bedtime  lidocaine   Patch 1 Patch Transdermal daily  pantoprazole  Injectable 20 milliGRAM(s) IV Push daily  predniSONE   Tablet 5 milliGRAM(s) Oral daily    MEDICATIONS  (PRN):  dextrose 40% Gel 15 Gram(s) Oral once PRN Blood Glucose LESS THAN 70 milliGRAM(s)/deciLiter  glucagon  Injectable 1 milliGRAM(s) IntraMuscular once PRN Glucose <70 milliGRAM(s)/deciLiter  loperamide Suspension 2 milliGRAM(s) Oral every 6 hours PRN loose stools        Vital Signs Last 24 Hrs  T(C): 36.3 (17 Dec 2019 18:05), Max: 36.8 (17 Dec 2019 05:06)  T(F): 97.4 (17 Dec 2019 18:05), Max: 98.2 (17 Dec 2019 05:06)  HR: 98 (17 Dec 2019 18:05) (94 - 109)  BP: 110/66 (17 Dec 2019 18:05) (96/62 - 137/79)  BP(mean): --  RR: 18 (17 Dec 2019 18:05) (18 - 20)  SpO2: 96% (17 Dec 2019 18:05) (94% - 96%)    General: NAD  Neurology: Awake, no purposeful movement  Neck: Neck supple, trachea midline, No JVD  Respiratory: CTA B/L, (-)rales, rhonchi  CV: S1S2, r/r/r, (-)m/r/g  Abdomen: S/NT/ND,, BSX4  Extremities: 2+ peripheral pulses bilat throughout; (-)edema appreciated  Skin: No Rashes, Hematoma, Ecchymosis    LABS:                        6.9    15.12 )-----------( 302      ( 17 Dec 2019 13:51 )             23.3     12-17    130<L>  |  92<L>  |  63<H>  ----------------------------<  233<H>  3.5   |  26  |  4.72<H>    Ca    10.8<H>      17 Dec 2019 10:56        RADIOLOGY & ADDITIONAL STUDIES:  < from: IR Procedure (12.16.19 @ 17:22) >  INTERPRETATION:  Procedure: IR aborted procedure    Clinical Information: 74-year-old male with dysphagia. Gastrostomy tube   placement is requested.    Technique: Informed consent was obtained. The patient was placed in the   supine position on the angiography table. The patient was given   intravenous glucagon.  radiograph demonstrates the transverse colon   demarcated by contrast. The stomach was insufflated with air through the   nasogastric tube which expanded the stomach in a lateral direction and   only slightly inferiorly. Despite insufflation, there was no clear window   for percutaneous nephrostomy tube placement given the cranialextent of   the stomach location within the abdomen.    Sedation: None.    Impression:     Gastrostomy tube was not placed due to inadequate window for percutaneous   gastrostomy tube placement.

## 2019-12-18 ENCOUNTER — RESULT REVIEW (OUTPATIENT)
Age: 74
End: 2019-12-18

## 2019-12-18 DIAGNOSIS — D64.9 ANEMIA, UNSPECIFIED: ICD-10-CM

## 2019-12-18 LAB
ANION GAP SERPL CALC-SCNC: 15 MMOL/L — SIGNIFICANT CHANGE UP (ref 5–17)
BUN SERPL-MCNC: 29 MG/DL — HIGH (ref 7–23)
CALCIUM SERPL-MCNC: 10.4 MG/DL — SIGNIFICANT CHANGE UP (ref 8.4–10.5)
CHLORIDE SERPL-SCNC: 93 MMOL/L — LOW (ref 96–108)
CO2 SERPL-SCNC: 25 MMOL/L — SIGNIFICANT CHANGE UP (ref 22–31)
CREAT SERPL-MCNC: 2.6 MG/DL — HIGH (ref 0.5–1.3)
GLUCOSE BLDC GLUCOMTR-MCNC: 170 MG/DL — HIGH (ref 70–99)
GLUCOSE BLDC GLUCOMTR-MCNC: 194 MG/DL — HIGH (ref 70–99)
GLUCOSE BLDC GLUCOMTR-MCNC: 219 MG/DL — HIGH (ref 70–99)
GLUCOSE BLDC GLUCOMTR-MCNC: 267 MG/DL — HIGH (ref 70–99)
GLUCOSE BLDC GLUCOMTR-MCNC: 99 MG/DL — SIGNIFICANT CHANGE UP (ref 70–99)
GLUCOSE SERPL-MCNC: 209 MG/DL — HIGH (ref 70–99)
HCT VFR BLD CALC: 31.5 % — LOW (ref 39–50)
HGB BLD-MCNC: 9.3 G/DL — LOW (ref 13–17)
MCHC RBC-ENTMCNC: 26.1 PG — LOW (ref 27–34)
MCHC RBC-ENTMCNC: 29.5 GM/DL — LOW (ref 32–36)
MCV RBC AUTO: 88.2 FL — SIGNIFICANT CHANGE UP (ref 80–100)
OB PNL STL: NEGATIVE — SIGNIFICANT CHANGE UP
PLATELET # BLD AUTO: 265 K/UL — SIGNIFICANT CHANGE UP (ref 150–400)
POTASSIUM SERPL-MCNC: 3.3 MMOL/L — LOW (ref 3.5–5.3)
POTASSIUM SERPL-SCNC: 3.3 MMOL/L — LOW (ref 3.5–5.3)
RBC # BLD: 3.57 M/UL — LOW (ref 4.2–5.8)
RBC # FLD: 15.3 % — HIGH (ref 10.3–14.5)
SODIUM SERPL-SCNC: 133 MMOL/L — LOW (ref 135–145)
WBC # BLD: 14.82 K/UL — HIGH (ref 3.8–10.5)
WBC # FLD AUTO: 14.82 K/UL — HIGH (ref 3.8–10.5)

## 2019-12-18 PROCEDURE — 88304 TISSUE EXAM BY PATHOLOGIST: CPT | Mod: 26

## 2019-12-18 RX ORDER — POTASSIUM CHLORIDE 20 MEQ
20 PACKET (EA) ORAL ONCE
Refills: 0 | Status: COMPLETED | OUTPATIENT
Start: 2019-12-18 | End: 2019-12-18

## 2019-12-18 RX ORDER — INSULIN GLARGINE 100 [IU]/ML
9 INJECTION, SOLUTION SUBCUTANEOUS AT BEDTIME
Refills: 0 | Status: DISCONTINUED | OUTPATIENT
Start: 2019-12-18 | End: 2019-12-21

## 2019-12-18 RX ADMIN — LEVETIRACETAM 1000 MILLIGRAM(S): 250 TABLET, FILM COATED ORAL at 21:47

## 2019-12-18 RX ADMIN — Medication 5 MILLIGRAM(S): at 05:28

## 2019-12-18 RX ADMIN — INSULIN GLARGINE 7 UNIT(S): 100 INJECTION, SOLUTION SUBCUTANEOUS at 00:30

## 2019-12-18 RX ADMIN — HEPARIN SODIUM 5000 UNIT(S): 5000 INJECTION INTRAVENOUS; SUBCUTANEOUS at 21:47

## 2019-12-18 RX ADMIN — HEPARIN SODIUM 5000 UNIT(S): 5000 INJECTION INTRAVENOUS; SUBCUTANEOUS at 13:46

## 2019-12-18 RX ADMIN — Medication 20 MILLIEQUIVALENT(S): at 22:17

## 2019-12-18 RX ADMIN — CARVEDILOL PHOSPHATE 3.12 MILLIGRAM(S): 80 CAPSULE, EXTENDED RELEASE ORAL at 22:27

## 2019-12-18 RX ADMIN — Medication 1 APPLICATION(S): at 11:56

## 2019-12-18 RX ADMIN — CARVEDILOL PHOSPHATE 3.12 MILLIGRAM(S): 80 CAPSULE, EXTENDED RELEASE ORAL at 11:55

## 2019-12-18 RX ADMIN — CHLORHEXIDINE GLUCONATE 1 APPLICATION(S): 213 SOLUTION TOPICAL at 05:26

## 2019-12-18 RX ADMIN — HEPARIN SODIUM 5000 UNIT(S): 5000 INJECTION INTRAVENOUS; SUBCUTANEOUS at 05:26

## 2019-12-18 RX ADMIN — ATORVASTATIN CALCIUM 40 MILLIGRAM(S): 80 TABLET, FILM COATED ORAL at 21:47

## 2019-12-18 RX ADMIN — Medication 1 APPLICATION(S): at 22:18

## 2019-12-18 RX ADMIN — LIDOCAINE 1 PATCH: 4 CREAM TOPICAL at 12:06

## 2019-12-18 RX ADMIN — PANTOPRAZOLE SODIUM 20 MILLIGRAM(S): 20 TABLET, DELAYED RELEASE ORAL at 12:07

## 2019-12-18 RX ADMIN — LIDOCAINE 1 PATCH: 4 CREAM TOPICAL at 22:27

## 2019-12-18 RX ADMIN — INSULIN GLARGINE 9 UNIT(S): 100 INJECTION, SOLUTION SUBCUTANEOUS at 22:18

## 2019-12-18 RX ADMIN — Medication 2: at 06:36

## 2019-12-18 RX ADMIN — CINACALCET 30 MILLIGRAM(S): 30 TABLET, FILM COATED ORAL at 12:06

## 2019-12-18 RX ADMIN — Medication 6: at 12:44

## 2019-12-18 RX ADMIN — Medication 4: at 00:31

## 2019-12-18 RX ADMIN — LIDOCAINE 1 PATCH: 4 CREAM TOPICAL at 19:08

## 2019-12-18 NOTE — PROGRESS NOTE ADULT - ASSESSMENT
Assessment  DM: A1C 5.5%, was on insulin at home, started on low-dose basal insulin, blood sugars fluctuate, elevated and not at target, no hypoglycemic episodes. Patient is still NPO on tube feeds, planning possible PEG, on low-dose steroids, resting comfortably.  Hypercalcemia: Primary Hyperparathyroidism, s/p 2nd Pamidronate dose. Ca improved 10.4.  Sepsis: IV ABx for UTI, LP inconsistent with meningitis, on medications, stable, monitored.  HTN: Controlled,  on antihypertensive medications.  ESRD: On hemodialysis, Monitor labs/BMP          Robi Gay MD  Cell: [phone]  Office: [phone] Assessment  DM: A1C 5.5%, was on insulin at home, started on low-dose basal insulin, blood sugars fluctuate, elevated and not at target, no hypoglycemic episodes. Patient is still NPO on tube feeds, planning possible PEG,  on low-dose steroids, resting comfortably.  Hypercalcemia: Primary Hyperparathyroidism, s/p 2nd Pamidronate dose. Ca improved 10.4.  Sepsis: IV ABx for UTI, LP inconsistent with meningitis, on medications, stable, monitored.  HTN: Controlled,  on antihypertensive medications.  ESRD: On hemodialysis, Monitor labs/BMP          Robi Gay MD  Cell: [phone]  Office: [phone]

## 2019-12-18 NOTE — PROGRESS NOTE ADULT - PROBLEM SELECTOR PLAN 3
Hgb drop noted; s/p 1uprbc with appropriate response  f/u FOBT   monitor cbc, transfuse prn Hgb drop noted; s/p 1uprbc with appropriate response  per RN, no s/s active GIB   f/u FOBT   monitor cbc, transfuse prn

## 2019-12-18 NOTE — PROGRESS NOTE ADULT - PROBLEM SELECTOR PLAN 1
- s/p  upper gastrointestinal endoscopy with the procedure was aborted due to inability to place PEG tube.  - NGT replaced with endoscopic confirmation  - s/p failed IR PEG  - surg eval noted; planning for laparoscopic-assisted PEG insertion pending medical clearance

## 2019-12-18 NOTE — PROGRESS NOTE ADULT - ASSESSMENT
74M PMHx of ESRD s/p failed renal transplant x2, HCV, DM, and meningococcal meningitis 2 years ago was sent in to the ED from HD for AMS and hypotension with continued AMS with attempted PEG insertion with both IR and GI with inability to obtain good window for PEG insertion.    -- All cares per primary team  -- Timing of laparoscopic assisted PEG tube TBD and relayed to primary team accordingly  -- Patient will require medical optimization and clearance prior to scheduling of OR    Please contact Trauma & Acute Care Surgery (#[id number]) with any questions or concerns.

## 2019-12-18 NOTE — PROGRESS NOTE ADULT - SUBJECTIVE AND OBJECTIVE BOX
Patient is a 74y old  Male who presents with a chief complaint of ams (18 Dec 2019 14:19)      INTERVAL HPI/OVERNIGHT EVENTS:  T(C): 36.9 (12-18-19 @ 12:20), Max: 37.3 (12-18-19 @ 01:08)  HR: 106 (12-18-19 @ 12:20) (70 - 118)  BP: 113/61 (12-18-19 @ 12:20) (106/63 - 139/79)  RR: 18 (12-18-19 @ 12:20) (18 - 19)  SpO2: 94% (12-18-19 @ 12:20) (94% - 98%)  Wt(kg): --  I&O's Summary    17 Dec 2019 07:01  -  18 Dec 2019 07:00  --------------------------------------------------------  IN: 2970 mL / OUT: 2300 mL / NET: 670 mL        LABS:                        9.3    14.82 )-----------( 265      ( 18 Dec 2019 08:25 )             31.5     12-18    133<L>  |  93<L>  |  29<H>  ----------------------------<  209<H>  3.3<L>   |  25  |  2.60<H>    Ca    10.4      18 Dec 2019 07:13          CAPILLARY BLOOD GLUCOSE      POCT Blood Glucose.: 99 mg/dL (18 Dec 2019 17:04)  POCT Blood Glucose.: 267 mg/dL (18 Dec 2019 12:43)  POCT Blood Glucose.: 194 mg/dL (18 Dec 2019 06:28)  POCT Blood Glucose.: 219 mg/dL (18 Dec 2019 00:14)  POCT Blood Glucose.: 201 mg/dL (17 Dec 2019 22:41)  POCT Blood Glucose.: 136 mg/dL (17 Dec 2019 17:17)            MEDICATIONS  (STANDING):  alteplase for catheter clearance 2 milliGRAM(s) Catheter once  alteplase for catheter clearance 2 milliGRAM(s) Catheter once  atorvastatin 40 milliGRAM(s) Oral at bedtime  carvedilol 3.125 milliGRAM(s) Oral every 12 hours  chlorhexidine 4% Liquid 1 Application(s) Topical <User Schedule>  cinacalcet 30 milliGRAM(s) Oral daily  Dakins Solution - 1/4 Strength 1 Application(s) Topical two times a day  dextrose 5%. 1000 milliLiter(s) (50 mL/Hr) IV Continuous <Continuous>  dextrose 50% Injectable 12.5 Gram(s) IV Push once  dextrose 50% Injectable 25 Gram(s) IV Push once  dextrose 50% Injectable 25 Gram(s) IV Push once  epoetin tan Injectable 68526 Unit(s) IV Push <User Schedule>  glucagon  Injectable 2 milliGRAM(s) IV Push once  heparin  Injectable 5000 Unit(s) SubCutaneous every 8 hours  insulin glargine Injectable (LANTUS) 9 Unit(s) SubCutaneous at bedtime  insulin lispro (HumaLOG) corrective regimen sliding scale   SubCutaneous every 6 hours  levETIRAcetam  Solution 1000 milliGRAM(s) Oral at bedtime  lidocaine   Patch 1 Patch Transdermal daily  pantoprazole  Injectable 20 milliGRAM(s) IV Push daily  predniSONE   Tablet 5 milliGRAM(s) Oral daily    MEDICATIONS  (PRN):  dextrose 40% Gel 15 Gram(s) Oral once PRN Blood Glucose LESS THAN 70 milliGRAM(s)/deciLiter  glucagon  Injectable 1 milliGRAM(s) IntraMuscular once PRN Glucose <70 milliGRAM(s)/deciLiter  loperamide Suspension 2 milliGRAM(s) Oral every 6 hours PRN loose stools          PHYSICAL EXAM:  GENERAL: frail  CHEST/LUNG: Clear to percussion bilaterally; No rales, rhonchi, wheezing, or rubs  HEART: Regular rate and rhythm; No murmurs, rubs, or gallops  ABDOMEN: Soft, Nontender, Nondistended; Bowel sounds present  EXTREMITIES:  no edema     Care Discussed with Consultants/Other Providers [ ] YES  [ ] NO

## 2019-12-18 NOTE — PROGRESS NOTE ADULT - ASSESSMENT
NEURO:  - Extubated 11/29, now on nasal cannula. Minimally verbal   - metabolic encephalopathy, meningitis r/o, LP negative  - neuro following  - chronic lacunar infarcts; per neuro, AMS also likely related to hypercalcemia, renal dysfunction , poor nutritional intake.   - c/w keppra for ?hx of seizure disorder  -AMS likely 2/2 hyperCa, renal dysfunction, poor PO intake      CV:  - hx of afib was on eliquis, now off a/c per cards due to bleeding risk  - holding carvedilol 6.25 BID  - hx of cocaine cardiomyopathy, resolved, normal EF on most recent echo  - c/w statin  - echo repeat EF70, LVH, hyperdynamic LCF, normal RV sys fxn    PULM:- extubated 11/29--tolerating nasal cannula    GI:  - GI consult for PEG   - surgery consult appreciated for PEG   - GI AND IR unable to do a PEG     RENAL:- hx of ESRD s/p failed renal transplant x2  - renal following, HD as recommended.    ENDO:  #DM2: HbA1c 5.5  - Start Insulin Sliding Scale  - endocrine following  - monitor Ca    INFECTIOUS:  - dc  vanco as per infectious disease   - id fu     Anemia  - transfuse 1 unit   - monitor cbc  - check guiac      Pt medically optimized for PEG , pending cardiac clearance

## 2019-12-18 NOTE — PROGRESS NOTE ADULT - ASSESSMENT
History of cocaine induced CM  normal EF on last echo  cont BB     PAF  off a/c high bleed risk     tachycardia  improving     Replete K

## 2019-12-18 NOTE — CHART NOTE - NSCHARTNOTEFT_GEN_A_CORE
Nutrition Follow Up Note  Patient seen for: malnutrition follow up    Source:   "75 y/o male with PMHx of  ESRD  s/p /p failed renal transplant 4 year ago and successful renal transplant 1 year ago, failed, DM, HTN, 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 " now receiving HD per renal. Followed by "neuro  metabolic encephalopathy, meningitis r/o, LP negative-chronic lacunar infarcts; per neuro, AMS also likely related to hypercalcemia, renal dysfunction , poor nutrition."          Enteral Nutrition:   Nepro @50ml/hr x24 hrs to provide total 1200ml, 2160 calories, 31.7/kg, protein 97gm 1.2/kg based on IBW= 80.9kg. Add henri x2 to provide total 28gm amino acids arginine and glutamine  for wound healing . free water in formula 840ml, added free water per team, Plus henri x2 daily  4      Daily Weight in k.9 (12-18), Weight in k.8 (12-17), Weight in k.8 (12-17), Weight in k (12-17), Weight in k.3 (12-16), Weight in k (12-14), Weight in k.9 (12-14)  % Weight Change    Pertinent Medications: MEDICATIONS  (STANDING):  alteplase for catheter clearance 2 milliGRAM(s) Catheter once  alteplase for catheter clearance 2 milliGRAM(s) Catheter once  atorvastatin 40 milliGRAM(s) Oral at bedtime  carvedilol 3.125 milliGRAM(s) Oral every 12 hours  chlorhexidine 4% Liquid 1 Application(s) Topical <User Schedule>  cinacalcet 30 milliGRAM(s) Oral daily  Dakins Solution - 1/4 Strength 1 Application(s) Topical two times a day  dextrose 5%. 1000 milliLiter(s) (50 mL/Hr) IV Continuous <Continuous>  dextrose 50% Injectable 12.5 Gram(s) IV Push once  dextrose 50% Injectable 25 Gram(s) IV Push once  dextrose 50% Injectable 25 Gram(s) IV Push once  epoetin tan Injectable 08022 Unit(s) IV Push <User Schedule>  glucagon  Injectable 2 milliGRAM(s) IV Push once  heparin  Injectable 5000 Unit(s) SubCutaneous every 8 hours  insulin glargine Injectable (LANTUS) 7 Unit(s) SubCutaneous at bedtime  insulin lispro (HumaLOG) corrective regimen sliding scale   SubCutaneous every 6 hours  levETIRAcetam  Solution 1000 milliGRAM(s) Oral at bedtime  lidocaine   Patch 1 Patch Transdermal daily  pantoprazole  Injectable 20 milliGRAM(s) IV Push daily  predniSONE   Tablet 5 milliGRAM(s) Oral daily    MEDICATIONS  (PRN):  dextrose 40% Gel 15 Gram(s) Oral once PRN Blood Glucose LESS THAN 70 milliGRAM(s)/deciLiter  glucagon  Injectable 1 milliGRAM(s) IntraMuscular once PRN Glucose <70 milliGRAM(s)/deciLiter  loperamide Suspension 2 milliGRAM(s) Oral every 6 hours PRN loose stools    Pertinent Labs:  @ 07:13: Na 133<L>, BUN 29<H>, Cr 2.60<H>, <H>, K+ 3.3<L>, Phos --, Mg --, Alk Phos --, ALT/SGPT --, AST/SGOT --, HbA1c --    Finger Sticks:  POCT Blood Glucose.: 267 mg/dL ( @ 12:43)  POCT Blood Glucose.: 194 mg/dL ( @ 06:28)  POCT Blood Glucose.: 219 mg/dL ( @ 00:14)  POCT Blood Glucose.: 201 mg/dL ( @ 22:41)  POCT Blood Glucose.: 136 mg/dL ( @ 17:17)      Skin per nursing documentation: coccyx, sacrum st IV    Estimated Needs:   X[ ] no change since previous assessment  [ ] recalculated:     Previous Nutrition Diagnosis: malnutrition  Nutrition Diagnosis is: ongoing, addressed with enteral nutrition and henri 2x daily for wound healing        Recommend: continue  Nepro @50ml/hr x24 hrs to provide total 1200ml, 2160 calories, 31.7/kg, protein 97gm 1.2/kg based on IBW= 80.9kg. Add henri x2 to provide total 28gm amino acids arginine and glutamine  for wound healing . free water in formula 840ml, added free water per team, Plus henri x2 daily      1) add banatrol x2 daily  2) continue henri x2 daily  3) continue tube feeding as ordered, patient requires renal formula for HD  Discussed with team   Monitoring and Evaluation:     Continue to monitor tube feed tolerance, weights, labs, skin integrity    RD remains available upon request and will follow up per protocol

## 2019-12-18 NOTE — PROGRESS NOTE ADULT - SUBJECTIVE AND OBJECTIVE BOX
Subjective: Patient seen and examined. No new events except as noted.     SUBJECTIVE/ROS:    Due to altered mental status, subjective information were not able to be obtained from the patient. History was obtained, to the extent possible, from review of the chart and collateral sources of information.     MEDICATIONS:  MEDICATIONS  (STANDING):  alteplase for catheter clearance 2 milliGRAM(s) Catheter once  alteplase for catheter clearance 2 milliGRAM(s) Catheter once  atorvastatin 40 milliGRAM(s) Oral at bedtime  carvedilol 3.125 milliGRAM(s) Oral every 12 hours  chlorhexidine 4% Liquid 1 Application(s) Topical <User Schedule>  cinacalcet 30 milliGRAM(s) Oral daily  Dakins Solution - 1/4 Strength 1 Application(s) Topical two times a day  dextrose 5%. 1000 milliLiter(s) (50 mL/Hr) IV Continuous <Continuous>  dextrose 50% Injectable 12.5 Gram(s) IV Push once  dextrose 50% Injectable 25 Gram(s) IV Push once  dextrose 50% Injectable 25 Gram(s) IV Push once  epoetin tan Injectable 36657 Unit(s) IV Push <User Schedule>  glucagon  Injectable 2 milliGRAM(s) IV Push once  heparin  Injectable 5000 Unit(s) SubCutaneous every 8 hours  insulin glargine Injectable (LANTUS) 7 Unit(s) SubCutaneous at bedtime  insulin lispro (HumaLOG) corrective regimen sliding scale   SubCutaneous every 6 hours  levETIRAcetam  Solution 1000 milliGRAM(s) Oral at bedtime  lidocaine   Patch 1 Patch Transdermal daily  pantoprazole  Injectable 20 milliGRAM(s) IV Push daily  predniSONE   Tablet 5 milliGRAM(s) Oral daily      PHYSICAL EXAM:  T(C): 37 (12-18-19 @ 05:30), Max: 37.3 (12-18-19 @ 01:08)  HR: 74 (12-18-19 @ 05:30) (70 - 118)  BP: 135/70 (12-18-19 @ 05:30) (104/64 - 139/79)  RR: 19 (12-18-19 @ 05:30) (18 - 20)  SpO2: 97% (12-18-19 @ 05:30) (94% - 98%)  Wt(kg): --  I&O's Summary    17 Dec 2019 07:01  -  18 Dec 2019 07:00  --------------------------------------------------------  IN: 2970 mL / OUT: 2300 mL / NET: 670 mL            JVP: Normal  Neck: supple  Lung: clear   CV: S1 S2 , Murmur:  Abd: soft  Ext: No edema  neuro: Awake  Psych: flat affect  Skin: normal``    LABS/DATA:    CARDIAC MARKERS:                                9.3    14.82 )-----------( 265      ( 18 Dec 2019 08:25 )             31.5     12-18    133<L>  |  93<L>  |  29<H>  ----------------------------<  209<H>  3.3<L>   |  25  |  2.60<H>    Ca    10.4      18 Dec 2019 07:13      proBNP:   Lipid Profile:   HgA1c:   TSH: Thyroid Stimulating Hormone, Serum: 2.53 uIU/mL (12-17 @ 13:56)      TELE:  EKG:

## 2019-12-18 NOTE — PROGRESS NOTE ADULT - SUBJECTIVE AND OBJECTIVE BOX
General Surgery Progress Note     Subjective/24hour Events:   Patient seen and examined.   Clinically stable with no acute events overnight.   On tube feeds (nepro) at 50 cc/hr. Well-tolerated.      Vital Signs:  Vital Signs Last 24 Hrs  T(C): 37 (18 Dec 2019 05:30), Max: 37.3 (18 Dec 2019 01:08)  T(F): 98.6 (18 Dec 2019 05:30), Max: 99.1 (18 Dec 2019 01:08)  HR: 74 (18 Dec 2019 05:30) (70 - 118)  BP: 135/70 (18 Dec 2019 05:30) (104/64 - 139/79)  BP(mean): --  RR: 19 (18 Dec 2019 05:30) (18 - 20)  SpO2: 97% (18 Dec 2019 05:30) (94% - 98%)    CAPILLARY BLOOD GLUCOSE      POCT Blood Glucose.: 194 mg/dL (18 Dec 2019 06:28)  POCT Blood Glucose.: 219 mg/dL (18 Dec 2019 00:14)  POCT Blood Glucose.: 201 mg/dL (17 Dec 2019 22:41)  POCT Blood Glucose.: 136 mg/dL (17 Dec 2019 17:17)  POCT Blood Glucose.: 233 mg/dL (17 Dec 2019 12:35)      I&O's Detail    17 Dec 2019 07:01  -  18 Dec 2019 07:00  --------------------------------------------------------  IN:    Enteral Tube Flush: 250 mL    Nepro with Carb Steady: 400 mL    Other: 1300 mL  Total IN: 1950 mL    OUT:    Other: 2300 mL  Total OUT: 2300 mL    Total NET: -350 mL          MEDICATIONS  (STANDING):  alteplase for catheter clearance 2 milliGRAM(s) Catheter once  alteplase for catheter clearance 2 milliGRAM(s) Catheter once  atorvastatin 40 milliGRAM(s) Oral at bedtime  carvedilol 3.125 milliGRAM(s) Oral every 12 hours  chlorhexidine 4% Liquid 1 Application(s) Topical <User Schedule>  cinacalcet 30 milliGRAM(s) Oral daily  Dakins Solution - 1/4 Strength 1 Application(s) Topical two times a day  dextrose 5%. 1000 milliLiter(s) (50 mL/Hr) IV Continuous <Continuous>  dextrose 50% Injectable 12.5 Gram(s) IV Push once  dextrose 50% Injectable 25 Gram(s) IV Push once  dextrose 50% Injectable 25 Gram(s) IV Push once  epoetin tan Injectable 03239 Unit(s) IV Push <User Schedule>  glucagon  Injectable 2 milliGRAM(s) IV Push once  heparin  Injectable 5000 Unit(s) SubCutaneous every 8 hours  insulin glargine Injectable (LANTUS) 7 Unit(s) SubCutaneous at bedtime  insulin lispro (HumaLOG) corrective regimen sliding scale   SubCutaneous every 6 hours  levETIRAcetam  Solution 1000 milliGRAM(s) Oral at bedtime  lidocaine   Patch 1 Patch Transdermal daily  pantoprazole  Injectable 20 milliGRAM(s) IV Push daily  predniSONE   Tablet 5 milliGRAM(s) Oral daily    MEDICATIONS  (PRN):  dextrose 40% Gel 15 Gram(s) Oral once PRN Blood Glucose LESS THAN 70 milliGRAM(s)/deciLiter  glucagon  Injectable 1 milliGRAM(s) IntraMuscular once PRN Glucose <70 milliGRAM(s)/deciLiter  loperamide Suspension 2 milliGRAM(s) Oral every 6 hours PRN loose stools      Physical Exam:  Gen: Lying comfortably in bed, no obvious distress. Minimal verbal response on assessment, unchanged from baseline.  HEENT: Feeding tube in place.  Lungs: Non labored breathing.   Ab: Soft, nontender, nondistended.   Ext: Moves all 4 spontaneously.     Labs:    12-17    130<L>  |  92<L>  |  63<H>  ----------------------------<  233<H>  3.5   |  26  |  4.72<H>    Ca    10.8<H>      17 Dec 2019 10:56                              6.9    15.12 )-----------( 302      ( 17 Dec 2019 13:51 )             23.3         Imaging:

## 2019-12-18 NOTE — PROGRESS NOTE ADULT - PROBLEM SELECTOR PLAN 1
Will increase Lantus to 9u at bedtime and continue Humalog correction scale coverage.  Will continue monitoring FS and FU. Will increase Lantus to 9u at bedtime and continue Humalog correction scale coverage.

## 2019-12-18 NOTE — PROGRESS NOTE ADULT - PROBLEM SELECTOR PLAN 2
- per RN; diarrhea improved today; No BMs today as per RN  - cont imodium prn   - GI PCR negative  - C diff PCR negative

## 2019-12-18 NOTE — PROGRESS NOTE ADULT - SUBJECTIVE AND OBJECTIVE BOX
Patient is a 74y Male whom presented to   pateint seen and examined nad ,     PAST MEDICAL & SURGICAL HISTORY:  Kidney transplant recipient  Anuria  GERD (gastroesophageal reflux disease)  Kidney transplant failure: dx: 2/2013  Hepatitis C: dx: 90&#x27;s.  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 10 milliGRAM(s) Oral three times a day  mycophenolate mofetil 250 milliGRAM(s) Oral two times a day  predniSONE   Tablet 5 milliGRAM(s) Oral daily  sevelamer carbonate 800 milliGRAM(s) Oral three times a day with meals  sodium bicarbonate 650 milliGRAM(s) Oral two times a day  tamsulosin 0.4 milliGRAM(s) Oral at bedtime      Allergies    No Known Allergies                       SOCIAL HISTORY:  Denies ETOh,Smoking,     FAMILY HISTORY:  Family history of breast cancer in sister (Sibling): living at 92 yo  Family history of prostate cancer in father  Family history of lung cancer  Family history of hypertension in mother  Family history of diabetes mellitus: mother      REVIEW OF SYSTEMS:    unbable to obtained                                                                              9.3    14.82 )-----------( 265      ( 18 Dec 2019 08:25 )             31.5       CBC Full  -  ( 18 Dec 2019 08:25 )  WBC Count : 14.82 K/uL  RBC Count : 3.57 M/uL  Hemoglobin : 9.3 g/dL  Hematocrit : 31.5 %  Platelet Count - Automated : 265 K/uL  Mean Cell Volume : 88.2 fl  Mean Cell Hemoglobin : 26.1 pg  Mean Cell Hemoglobin Concentration : 29.5 gm/dL  Auto Neutrophil # : x  Auto Lymphocyte # : x  Auto Monocyte # : x  Auto Eosinophil # : x  Auto Basophil # : x  Auto Neutrophil % : x  Auto Lymphocyte % : x  Auto Monocyte % : x  Auto Eosinophil % : x  Auto Basophil % : x      12-18    133<L>  |  93<L>  |  29<H>  ----------------------------<  209<H>  3.3<L>   |  25  |  2.60<H>    Ca    10.4      18 Dec 2019 07:13        CAPILLARY BLOOD GLUCOSE      POCT Blood Glucose.: 99 mg/dL (18 Dec 2019 17:04)  POCT Blood Glucose.: 267 mg/dL (18 Dec 2019 12:43)  POCT Blood Glucose.: 194 mg/dL (18 Dec 2019 06:28)  POCT Blood Glucose.: 219 mg/dL (18 Dec 2019 00:14)  POCT Blood Glucose.: 201 mg/dL (17 Dec 2019 22:41)      Vital Signs Last 24 Hrs  T(C): 36.9 (18 Dec 2019 12:20), Max: 37.3 (18 Dec 2019 01:08)  T(F): 98.5 (18 Dec 2019 12:20), Max: 99.1 (18 Dec 2019 01:08)  HR: 106 (18 Dec 2019 12:20) (70 - 118)  BP: 113/61 (18 Dec 2019 12:20) (106/63 - 139/79)  BP(mean): --  RR: 18 (18 Dec 2019 12:20) (18 - 19)  SpO2: 94% (18 Dec 2019 12:20) (94% - 98%)                                 HEENT: conjunctive   clear   Neck:  No JVD  Respiratory: decrease bs b/l   Cardiovascular: S1 and S2  Gastrointestinal: BS+, soft, NT/ND  Extremities: No peripheral edema  Skin: dry   Access: pos fistula

## 2019-12-18 NOTE — PROGRESS NOTE ADULT - SUBJECTIVE AND OBJECTIVE BOX
Chief complaint  Patient is a 74y old  Male who presents with a chief complaint of ams (18 Dec 2019 12:16)   Review of systems  Patient in bed, looks comfortable, NPO on tube feeds, no fever, no hypoglycemia.    Labs and Fingersticks  CAPILLARY BLOOD GLUCOSE      POCT Blood Glucose.: 267 mg/dL (18 Dec 2019 12:43)  POCT Blood Glucose.: 194 mg/dL (18 Dec 2019 06:28)  POCT Blood Glucose.: 219 mg/dL (18 Dec 2019 00:14)  POCT Blood Glucose.: 201 mg/dL (17 Dec 2019 22:41)  POCT Blood Glucose.: 136 mg/dL (17 Dec 2019 17:17)      Anion Gap, Serum: 15 (12-18 @ 07:13)  Anion Gap, Serum: 12 (12-17 @ 10:56)      Calcium, Total Serum: 10.4 (12-18 @ 07:13)  Calcium, Total Serum: 10.8 <H> (12-17 @ 10:56)          12-18    133<L>  |  93<L>  |  29<H>  ----------------------------<  209<H>  3.3<L>   |  25  |  2.60<H>    Ca    10.4      18 Dec 2019 07:13                          9.3    14.82 )-----------( 265      ( 18 Dec 2019 08:25 )             31.5     Medications  MEDICATIONS  (STANDING):  alteplase for catheter clearance 2 milliGRAM(s) Catheter once  alteplase for catheter clearance 2 milliGRAM(s) Catheter once  atorvastatin 40 milliGRAM(s) Oral at bedtime  carvedilol 3.125 milliGRAM(s) Oral every 12 hours  chlorhexidine 4% Liquid 1 Application(s) Topical <User Schedule>  cinacalcet 30 milliGRAM(s) Oral daily  Dakins Solution - 1/4 Strength 1 Application(s) Topical two times a day  dextrose 5%. 1000 milliLiter(s) (50 mL/Hr) IV Continuous <Continuous>  dextrose 50% Injectable 12.5 Gram(s) IV Push once  dextrose 50% Injectable 25 Gram(s) IV Push once  dextrose 50% Injectable 25 Gram(s) IV Push once  epoetin tan Injectable 76638 Unit(s) IV Push <User Schedule>  glucagon  Injectable 2 milliGRAM(s) IV Push once  heparin  Injectable 5000 Unit(s) SubCutaneous every 8 hours  insulin glargine Injectable (LANTUS) 7 Unit(s) SubCutaneous at bedtime  insulin lispro (HumaLOG) corrective regimen sliding scale   SubCutaneous every 6 hours  levETIRAcetam  Solution 1000 milliGRAM(s) Oral at bedtime  lidocaine   Patch 1 Patch Transdermal daily  pantoprazole  Injectable 20 milliGRAM(s) IV Push daily  predniSONE   Tablet 5 milliGRAM(s) Oral daily      Physical Exam  General: Patient comfortable in bed  Vital Signs Last 12 Hrs  T(F): 98.5 (12-18-19 @ 12:20), Max: 98.6 (12-18-19 @ 05:30)  HR: 106 (12-18-19 @ 12:20) (74 - 111)  BP: 113/61 (12-18-19 @ 12:20) (106/63 - 135/70)  BP(mean): --  RR: 18 (12-18-19 @ 12:20) (18 - 19)  SpO2: 94% (12-18-19 @ 12:20) (94% - 97%)  Neck: No palpable thyroid nodules.  CVS: S1S2, No murmurs  Respiratory: No wheezing, no crepitations  GI: Abdomen soft, bowel sounds positive  Musculoskeletal:  edema lower extremities.   Skin: No skin rashes, no ecchymosis    Diagnostics Chief complaint  Patient is a 74y old  Male who presents with a chief complaint of ams (18 Dec 2019 12:16)   Review of systems  Patient in bed, looks comfortable, NPO on tube feeds,  no fever, no hypoglycemia.    Labs and Fingersticks  CAPILLARY BLOOD GLUCOSE      POCT Blood Glucose.: 267 mg/dL (18 Dec 2019 12:43)  POCT Blood Glucose.: 194 mg/dL (18 Dec 2019 06:28)  POCT Blood Glucose.: 219 mg/dL (18 Dec 2019 00:14)  POCT Blood Glucose.: 201 mg/dL (17 Dec 2019 22:41)  POCT Blood Glucose.: 136 mg/dL (17 Dec 2019 17:17)      Anion Gap, Serum: 15 (12-18 @ 07:13)  Anion Gap, Serum: 12 (12-17 @ 10:56)      Calcium, Total Serum: 10.4 (12-18 @ 07:13)  Calcium, Total Serum: 10.8 <H> (12-17 @ 10:56)          12-18    133<L>  |  93<L>  |  29<H>  ----------------------------<  209<H>  3.3<L>   |  25  |  2.60<H>    Ca    10.4      18 Dec 2019 07:13                          9.3    14.82 )-----------( 265      ( 18 Dec 2019 08:25 )             31.5     Medications  MEDICATIONS  (STANDING):  alteplase for catheter clearance 2 milliGRAM(s) Catheter once  alteplase for catheter clearance 2 milliGRAM(s) Catheter once  atorvastatin 40 milliGRAM(s) Oral at bedtime  carvedilol 3.125 milliGRAM(s) Oral every 12 hours  chlorhexidine 4% Liquid 1 Application(s) Topical <User Schedule>  cinacalcet 30 milliGRAM(s) Oral daily  Dakins Solution - 1/4 Strength 1 Application(s) Topical two times a day  dextrose 5%. 1000 milliLiter(s) (50 mL/Hr) IV Continuous <Continuous>  dextrose 50% Injectable 12.5 Gram(s) IV Push once  dextrose 50% Injectable 25 Gram(s) IV Push once  dextrose 50% Injectable 25 Gram(s) IV Push once  epoetin tan Injectable 66483 Unit(s) IV Push <User Schedule>  glucagon  Injectable 2 milliGRAM(s) IV Push once  heparin  Injectable 5000 Unit(s) SubCutaneous every 8 hours  insulin glargine Injectable (LANTUS) 7 Unit(s) SubCutaneous at bedtime  insulin lispro (HumaLOG) corrective regimen sliding scale   SubCutaneous every 6 hours  levETIRAcetam  Solution 1000 milliGRAM(s) Oral at bedtime  lidocaine   Patch 1 Patch Transdermal daily  pantoprazole  Injectable 20 milliGRAM(s) IV Push daily  predniSONE   Tablet 5 milliGRAM(s) Oral daily      Physical Exam  General: Patient comfortable in bed  Vital Signs Last 12 Hrs  T(F): 98.5 (12-18-19 @ 12:20), Max: 98.6 (12-18-19 @ 05:30)  HR: 106 (12-18-19 @ 12:20) (74 - 111)  BP: 113/61 (12-18-19 @ 12:20) (106/63 - 135/70)  BP(mean): --  RR: 18 (12-18-19 @ 12:20) (18 - 19)  SpO2: 94% (12-18-19 @ 12:20) (94% - 97%)  Neck: No palpable thyroid nodules.  CVS: S1S2, No murmurs  Respiratory: No wheezing, no crepitations  GI: Abdomen soft, bowel sounds positive  Musculoskeletal:  edema lower extremities.   Skin: No skin rashes, no ecchymosis    Diagnostics

## 2019-12-19 LAB
GLUCOSE BLDC GLUCOMTR-MCNC: 162 MG/DL — HIGH (ref 70–99)
GLUCOSE BLDC GLUCOMTR-MCNC: 177 MG/DL — HIGH (ref 70–99)
GLUCOSE BLDC GLUCOMTR-MCNC: 195 MG/DL — HIGH (ref 70–99)
GLUCOSE BLDC GLUCOMTR-MCNC: 208 MG/DL — HIGH (ref 70–99)
GLUCOSE BLDC GLUCOMTR-MCNC: 269 MG/DL — HIGH (ref 70–99)

## 2019-12-19 PROCEDURE — 11042 DBRDMT SUBQ TIS 1ST 20SQCM/<: CPT

## 2019-12-19 RX ADMIN — LIDOCAINE 1 PATCH: 4 CREAM TOPICAL at 12:20

## 2019-12-19 RX ADMIN — CHLORHEXIDINE GLUCONATE 1 APPLICATION(S): 213 SOLUTION TOPICAL at 05:02

## 2019-12-19 RX ADMIN — ATORVASTATIN CALCIUM 40 MILLIGRAM(S): 80 TABLET, FILM COATED ORAL at 22:11

## 2019-12-19 RX ADMIN — Medication 1 APPLICATION(S): at 21:12

## 2019-12-19 RX ADMIN — Medication 1 APPLICATION(S): at 12:21

## 2019-12-19 RX ADMIN — Medication 2 MILLIGRAM(S): at 07:49

## 2019-12-19 RX ADMIN — CINACALCET 30 MILLIGRAM(S): 30 TABLET, FILM COATED ORAL at 12:20

## 2019-12-19 RX ADMIN — Medication 5 MILLIGRAM(S): at 05:14

## 2019-12-19 RX ADMIN — PANTOPRAZOLE SODIUM 20 MILLIGRAM(S): 20 TABLET, DELAYED RELEASE ORAL at 12:20

## 2019-12-19 RX ADMIN — Medication 6: at 12:20

## 2019-12-19 RX ADMIN — ERYTHROPOIETIN 10000 UNIT(S): 10000 INJECTION, SOLUTION INTRAVENOUS; SUBCUTANEOUS at 20:06

## 2019-12-19 RX ADMIN — CARVEDILOL PHOSPHATE 3.12 MILLIGRAM(S): 80 CAPSULE, EXTENDED RELEASE ORAL at 07:48

## 2019-12-19 RX ADMIN — Medication 4: at 22:18

## 2019-12-19 RX ADMIN — Medication 2: at 01:22

## 2019-12-19 RX ADMIN — HEPARIN SODIUM 5000 UNIT(S): 5000 INJECTION INTRAVENOUS; SUBCUTANEOUS at 05:14

## 2019-12-19 RX ADMIN — Medication 2 MILLIGRAM(S): at 14:00

## 2019-12-19 RX ADMIN — HEPARIN SODIUM 5000 UNIT(S): 5000 INJECTION INTRAVENOUS; SUBCUTANEOUS at 22:12

## 2019-12-19 RX ADMIN — INSULIN GLARGINE 9 UNIT(S): 100 INJECTION, SOLUTION SUBCUTANEOUS at 22:17

## 2019-12-19 RX ADMIN — Medication 2: at 05:34

## 2019-12-19 RX ADMIN — LEVETIRACETAM 1000 MILLIGRAM(S): 250 TABLET, FILM COATED ORAL at 22:13

## 2019-12-19 RX ADMIN — LIDOCAINE 1 PATCH: 4 CREAM TOPICAL at 21:05

## 2019-12-19 RX ADMIN — HEPARIN SODIUM 5000 UNIT(S): 5000 INJECTION INTRAVENOUS; SUBCUTANEOUS at 14:18

## 2019-12-19 RX ADMIN — CARVEDILOL PHOSPHATE 3.12 MILLIGRAM(S): 80 CAPSULE, EXTENDED RELEASE ORAL at 22:11

## 2019-12-19 NOTE — PROGRESS NOTE ADULT - SUBJECTIVE AND OBJECTIVE BOX
Wound Surgery Progress Note:    HPI:  74M PMHx of ESRD s/p failed renal transplant x2, HCV, DM, and meningococcal meningitis 2 years ago was sent in to the ED from HD for AMS and low BPs. He is unable to provide any information at this time. His sister is at the bedside and reports that he is typically able to carry a conversation and walk a small amount. She is not aware if he had been having fevers at the nursing home, and believes that his last HD was on Tuesday. (20 Nov 2019 18:01)    Follow up visit to patient for sacral wound management. Discussed need for debridement with primary team NP. After consent obtained from patient's next of kin, Sheryl Mims, the sacral wound was debrided as detailed below.    PAST MEDICAL & SURGICAL HISTORY:  Kidney transplant recipient  Anuria  GERD (gastroesophageal reflux disease)  Kidney transplant failure: dx: 2/2013  Hepatitis C: dx: 90&#x27;s.  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 patient is non verbal    MEDICATIONS  (STANDING):  alteplase for catheter clearance 2 milliGRAM(s) Catheter once  alteplase for catheter clearance 2 milliGRAM(s) Catheter once  atorvastatin 40 milliGRAM(s) Oral at bedtime  carvedilol 3.125 milliGRAM(s) Oral every 12 hours  chlorhexidine 4% Liquid 1 Application(s) Topical <User Schedule>  cinacalcet 30 milliGRAM(s) Oral daily  Dakins Solution - 1/4 Strength 1 Application(s) Topical two times a day  dextrose 5%. 1000 milliLiter(s) (50 mL/Hr) IV Continuous <Continuous>  dextrose 50% Injectable 12.5 Gram(s) IV Push once  dextrose 50% Injectable 25 Gram(s) IV Push once  dextrose 50% Injectable 25 Gram(s) IV Push once  epoetin tan Injectable 03826 Unit(s) IV Push <User Schedule>  glucagon  Injectable 2 milliGRAM(s) IV Push once  heparin  Injectable 5000 Unit(s) SubCutaneous every 8 hours  insulin glargine Injectable (LANTUS) 9 Unit(s) SubCutaneous at bedtime  insulin lispro (HumaLOG) corrective regimen sliding scale   SubCutaneous every 6 hours  levETIRAcetam  Solution 1000 milliGRAM(s) Oral at bedtime  lidocaine   Patch 1 Patch Transdermal daily  pantoprazole  Injectable 20 milliGRAM(s) IV Push daily  predniSONE   Tablet 5 milliGRAM(s) Oral daily    MEDICATIONS  (PRN):  dextrose 40% Gel 15 Gram(s) Oral once PRN Blood Glucose LESS THAN 70 milliGRAM(s)/deciLiter  glucagon  Injectable 1 milliGRAM(s) IntraMuscular once PRN Glucose <70 milliGRAM(s)/deciLiter  loperamide Suspension 2 milliGRAM(s) Oral every 6 hours PRN loose stools    Allergies    No Known Allergies    Intolerances    SOCIAL HISTORY:  unable to obtain. patient is non verbal.    FAMILY HISTORY:  Family history of breast cancer in sister (Sibling): living at 92 yo  Family history of prostate cancer in father  Family history of lung cancer  Family history of hypertension in mother  Family history of diabetes mellitus: mother    Vital Signs Last 24 Hrs  T(C): 36.5 (19 Dec 2019 05:41), Max: 37 (19 Dec 2019 01:05)  T(F): 97.7 (19 Dec 2019 05:41), Max: 98.6 (19 Dec 2019 01:05)  HR: 106 (19 Dec 2019 07:46) (105 - 111)  BP: 109/67 (19 Dec 2019 07:46) (106/62 - 126/71)  BP(mean): --  RR: 18 (19 Dec 2019 05:41) (18 - 18)  SpO2: 98% (19 Dec 2019 05:41) (94% - 98%)    Physical Exam:  General: awake, cachectic, frail  Respiratory: no SOB on room air  Gastrointestinal: soft NT/ND (+)BM  Neurology: nonverbal, not following commands  Musculoskeletal: no contractures  Vascular: no BLE edema, BLE equally warm  Skin:  sacral wound L 11.5cm x W 11.5cm x D 1.5cm circumferential undermining to 1.5 at 12 oclock, + foul odor, loosely adherent grey, moist slough, large amount of serosanguinous drainage, crumbling bone in wound, + TTP, No erythema, increased warmth, induration, fluctuance    LABS:  12-18    133<L>  |  93<L>  |  29<H>  ----------------------------<  209<H>  3.3<L>   |  25  |  2.60<H>    Ca    10.4      18 Dec 2019 07:13                            9.3    14.82 )-----------( 265      ( 18 Dec 2019 08:25 )             31.5       Procedure Note:    Sacral wound with loosely adherent slough excisionally debrided today with scalpel #15 and forceps to presacral fascia. Hemostasis maintained throughout. patient tolerated well.    Preprocedure measurements:  L 11.5cm x W 11.5cm x D 1.5cm   Post procedure measurements:  L 11.5cm x W 11.5cm x D 1.5cm   Specimen sent for pathology

## 2019-12-19 NOTE — PROGRESS NOTE ADULT - PROBLEM SELECTOR PLAN 1
Will continue Lantus 9u at bedtime as well as Humalog correction scale coverage.  Will continue monitoring FS and FU. Will continue monitoring FS and FU.

## 2019-12-19 NOTE — PROGRESS NOTE ADULT - SUBJECTIVE AND OBJECTIVE BOX
Patient is a 74y Male whom presented to   pateint seen and examined nad , in am     PAST MEDICAL & SURGICAL HISTORY:  Kidney transplant recipient  Anuria  GERD (gastroesophageal reflux disease)  Kidney transplant failure: dx: 2/2013  Hepatitis C: dx: 90&#x27;s.  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 10 milliGRAM(s) Oral three times a day  mycophenolate mofetil 250 milliGRAM(s) Oral two times a day  predniSONE   Tablet 5 milliGRAM(s) Oral daily  sevelamer carbonate 800 milliGRAM(s) Oral three times a day with meals  sodium bicarbonate 650 milliGRAM(s) Oral two times a day  tamsulosin 0.4 milliGRAM(s) Oral at bedtime      Allergies    No Known Allergies                       SOCIAL HISTORY:  Denies ETOh,Smoking,     FAMILY HISTORY:  Family history of breast cancer in sister (Sibling): living at 92 yo  Family history of prostate cancer in father  Family history of lung cancer  Family history of hypertension in mother  Family history of diabetes mellitus: mother      REVIEW OF SYSTEMS:    unbable to obtained                                                            9.3    14.82 )-----------( 265      ( 18 Dec 2019 08:25 )             31.5       CBC Full  -  ( 18 Dec 2019 08:25 )  WBC Count : 14.82 K/uL  RBC Count : 3.57 M/uL  Hemoglobin : 9.3 g/dL  Hematocrit : 31.5 %  Platelet Count - Automated : 265 K/uL  Mean Cell Volume : 88.2 fl  Mean Cell Hemoglobin : 26.1 pg  Mean Cell Hemoglobin Concentration : 29.5 gm/dL  Auto Neutrophil # : x  Auto Lymphocyte # : x  Auto Monocyte # : x  Auto Eosinophil # : x  Auto Basophil # : x  Auto Neutrophil % : x  Auto Lymphocyte % : x  Auto Monocyte % : x  Auto Eosinophil % : x  Auto Basophil % : x      12-18    133<L>  |  93<L>  |  29<H>  ----------------------------<  209<H>  3.3<L>   |  25  |  2.60<H>    Ca    10.4      18 Dec 2019 07:13        CAPILLARY BLOOD GLUCOSE      POCT Blood Glucose.: 162 mg/dL (19 Dec 2019 20:14)  POCT Blood Glucose.: 269 mg/dL (19 Dec 2019 12:01)  POCT Blood Glucose.: 195 mg/dL (19 Dec 2019 05:08)  POCT Blood Glucose.: 177 mg/dL (19 Dec 2019 01:03)  POCT Blood Glucose.: 170 mg/dL (18 Dec 2019 22:09)      Vital Signs Last 24 Hrs  T(C): 36.7 (19 Dec 2019 17:00), Max: 37 (19 Dec 2019 01:05)  T(F): 98 (19 Dec 2019 17:00), Max: 98.6 (19 Dec 2019 01:05)  HR: 102 (19 Dec 2019 17:00) (102 - 110)  BP: 110/63 (19 Dec 2019 17:00) (97/63 - 126/71)  BP(mean): --  RR: 18 (19 Dec 2019 17:00) (18 - 18)  SpO2: 98% (19 Dec 2019 17:00) (96% - 98%)                                 HEENT: conjunctive   clear   Neck:  No JVD  Respiratory: decrease bs b/l   Cardiovascular: S1 and S2  Gastrointestinal: BS+, soft, NT/ND  Extremities: No peripheral edema  Skin: dry   Access: pos fistula

## 2019-12-19 NOTE — PROGRESS NOTE ADULT - SUBJECTIVE AND OBJECTIVE BOX
Chief complaint  Patient is a 74y old  Male who presents with a chief complaint of ams (19 Dec 2019 12:53)   Review of systems  Patient in bed, looks comfortable, no fever, no hypoglycemia.    Labs and Fingersticks  CAPILLARY BLOOD GLUCOSE      POCT Blood Glucose.: 269 mg/dL (19 Dec 2019 12:01)  POCT Blood Glucose.: 195 mg/dL (19 Dec 2019 05:08)  POCT Blood Glucose.: 177 mg/dL (19 Dec 2019 01:03)  POCT Blood Glucose.: 170 mg/dL (18 Dec 2019 22:09)  POCT Blood Glucose.: 99 mg/dL (18 Dec 2019 17:04)      Anion Gap, Serum: 15 (12-18 @ 07:13)      Calcium, Total Serum: 10.4 (12-18 @ 07:13)          12-18    133<L>  |  93<L>  |  29<H>  ----------------------------<  209<H>  3.3<L>   |  25  |  2.60<H>    Ca    10.4      18 Dec 2019 07:13                          9.3    14.82 )-----------( 265      ( 18 Dec 2019 08:25 )             31.5     Medications  MEDICATIONS  (STANDING):  alteplase for catheter clearance 2 milliGRAM(s) Catheter once  alteplase for catheter clearance 2 milliGRAM(s) Catheter once  atorvastatin 40 milliGRAM(s) Oral at bedtime  carvedilol 3.125 milliGRAM(s) Oral every 12 hours  chlorhexidine 4% Liquid 1 Application(s) Topical <User Schedule>  cinacalcet 30 milliGRAM(s) Oral daily  Dakins Solution - 1/4 Strength 1 Application(s) Topical two times a day  dextrose 5%. 1000 milliLiter(s) (50 mL/Hr) IV Continuous <Continuous>  dextrose 50% Injectable 12.5 Gram(s) IV Push once  dextrose 50% Injectable 25 Gram(s) IV Push once  dextrose 50% Injectable 25 Gram(s) IV Push once  epoetin tan Injectable 08324 Unit(s) IV Push <User Schedule>  glucagon  Injectable 2 milliGRAM(s) IV Push once  heparin  Injectable 5000 Unit(s) SubCutaneous every 8 hours  insulin glargine Injectable (LANTUS) 9 Unit(s) SubCutaneous at bedtime  insulin lispro (HumaLOG) corrective regimen sliding scale   SubCutaneous every 6 hours  levETIRAcetam  Solution 1000 milliGRAM(s) Oral at bedtime  lidocaine   Patch 1 Patch Transdermal daily  pantoprazole  Injectable 20 milliGRAM(s) IV Push daily  predniSONE   Tablet 5 milliGRAM(s) Oral daily      Physical Exam  General: Patient comfortable in bed  Vital Signs Last 12 Hrs  T(F): 97.6 (12-19-19 @ 11:52), Max: 97.7 (12-19-19 @ 05:41)  HR: 110 (12-19-19 @ 11:52) (106 - 110)  BP: 97/63 (12-19-19 @ 11:52) (97/63 - 109/67)  BP(mean): --  RR: 18 (12-19-19 @ 11:52) (18 - 18)  SpO2: 96% (12-19-19 @ 11:52) (96% - 98%)  Neck: No palpable thyroid nodules.  CVS: S1S2, No murmurs  Respiratory: No wheezing, no crepitations  GI: Abdomen soft, bowel sounds positive  Musculoskeletal:  edema lower extremities.   Skin: No skin rashes, no ecchymosis    Diagnostics Chief complaint  Patient is a 74y old  Male who presents with a chief complaint of ams (19 Dec 2019 12:53)   Review of systems  Patient in bed, looks comfortable, no fever,  no hypoglycemia.    Labs and Fingersticks  CAPILLARY BLOOD GLUCOSE      POCT Blood Glucose.: 269 mg/dL (19 Dec 2019 12:01)  POCT Blood Glucose.: 195 mg/dL (19 Dec 2019 05:08)  POCT Blood Glucose.: 177 mg/dL (19 Dec 2019 01:03)  POCT Blood Glucose.: 170 mg/dL (18 Dec 2019 22:09)  POCT Blood Glucose.: 99 mg/dL (18 Dec 2019 17:04)      Anion Gap, Serum: 15 (12-18 @ 07:13)      Calcium, Total Serum: 10.4 (12-18 @ 07:13)          12-18    133<L>  |  93<L>  |  29<H>  ----------------------------<  209<H>  3.3<L>   |  25  |  2.60<H>    Ca    10.4      18 Dec 2019 07:13                          9.3    14.82 )-----------( 265      ( 18 Dec 2019 08:25 )             31.5     Medications  MEDICATIONS  (STANDING):  alteplase for catheter clearance 2 milliGRAM(s) Catheter once  alteplase for catheter clearance 2 milliGRAM(s) Catheter once  atorvastatin 40 milliGRAM(s) Oral at bedtime  carvedilol 3.125 milliGRAM(s) Oral every 12 hours  chlorhexidine 4% Liquid 1 Application(s) Topical <User Schedule>  cinacalcet 30 milliGRAM(s) Oral daily  Dakins Solution - 1/4 Strength 1 Application(s) Topical two times a day  dextrose 5%. 1000 milliLiter(s) (50 mL/Hr) IV Continuous <Continuous>  dextrose 50% Injectable 12.5 Gram(s) IV Push once  dextrose 50% Injectable 25 Gram(s) IV Push once  dextrose 50% Injectable 25 Gram(s) IV Push once  epoetin tan Injectable 83502 Unit(s) IV Push <User Schedule>  glucagon  Injectable 2 milliGRAM(s) IV Push once  heparin  Injectable 5000 Unit(s) SubCutaneous every 8 hours  insulin glargine Injectable (LANTUS) 9 Unit(s) SubCutaneous at bedtime  insulin lispro (HumaLOG) corrective regimen sliding scale   SubCutaneous every 6 hours  levETIRAcetam  Solution 1000 milliGRAM(s) Oral at bedtime  lidocaine   Patch 1 Patch Transdermal daily  pantoprazole  Injectable 20 milliGRAM(s) IV Push daily  predniSONE   Tablet 5 milliGRAM(s) Oral daily      Physical Exam  General: Patient comfortable in bed  Vital Signs Last 12 Hrs  T(F): 97.6 (12-19-19 @ 11:52), Max: 97.7 (12-19-19 @ 05:41)  HR: 110 (12-19-19 @ 11:52) (106 - 110)  BP: 97/63 (12-19-19 @ 11:52) (97/63 - 109/67)  BP(mean): --  RR: 18 (12-19-19 @ 11:52) (18 - 18)  SpO2: 96% (12-19-19 @ 11:52) (96% - 98%)  Neck: No palpable thyroid nodules.  CVS: S1S2, No murmurs  Respiratory: No wheezing, no crepitations  GI: Abdomen soft, bowel sounds positive  Musculoskeletal:  edema lower extremities.   Skin: No skin rashes, no ecchymosis    Diagnostics

## 2019-12-19 NOTE — PROGRESS NOTE ADULT - SUBJECTIVE AND OBJECTIVE BOX
Subjective: Patient seen and examined. No new events except as noted.     SUBJECTIVE/ROS:        MEDICATIONS:  MEDICATIONS  (STANDING):  alteplase for catheter clearance 2 milliGRAM(s) Catheter once  alteplase for catheter clearance 2 milliGRAM(s) Catheter once  atorvastatin 40 milliGRAM(s) Oral at bedtime  carvedilol 3.125 milliGRAM(s) Oral every 12 hours  chlorhexidine 4% Liquid 1 Application(s) Topical <User Schedule>  cinacalcet 30 milliGRAM(s) Oral daily  Dakins Solution - 1/4 Strength 1 Application(s) Topical two times a day  dextrose 5%. 1000 milliLiter(s) (50 mL/Hr) IV Continuous <Continuous>  dextrose 50% Injectable 12.5 Gram(s) IV Push once  dextrose 50% Injectable 25 Gram(s) IV Push once  dextrose 50% Injectable 25 Gram(s) IV Push once  epoetin tan Injectable 20052 Unit(s) IV Push <User Schedule>  glucagon  Injectable 2 milliGRAM(s) IV Push once  heparin  Injectable 5000 Unit(s) SubCutaneous every 8 hours  insulin glargine Injectable (LANTUS) 9 Unit(s) SubCutaneous at bedtime  insulin lispro (HumaLOG) corrective regimen sliding scale   SubCutaneous every 6 hours  levETIRAcetam  Solution 1000 milliGRAM(s) Oral at bedtime  lidocaine   Patch 1 Patch Transdermal daily  pantoprazole  Injectable 20 milliGRAM(s) IV Push daily  predniSONE   Tablet 5 milliGRAM(s) Oral daily      PHYSICAL EXAM:  T(C): 36.5 (12-19-19 @ 05:41), Max: 37 (12-19-19 @ 01:05)  HR: 106 (12-19-19 @ 07:46) (105 - 111)  BP: 109/67 (12-19-19 @ 07:46) (106/62 - 126/71)  RR: 18 (12-19-19 @ 05:41) (18 - 18)  SpO2: 98% (12-19-19 @ 05:41) (94% - 98%)  Wt(kg): --  I&O's Summary    18 Dec 2019 07:01  -  19 Dec 2019 07:00  --------------------------------------------------------  IN: 1100 mL / OUT: 0 mL / NET: 1100 mL            JVP: Normal  Neck: supple  Lung: clear   CV: S1 S2 , Murmur:  Abd: soft  Ext: No edema  neuro: Awake / alert  Psych: flat affect  Skin: normal``    LABS/DATA:    CARDIAC MARKERS:                                9.3    14.82 )-----------( 265      ( 18 Dec 2019 08:25 )             31.5     12-18    133<L>  |  93<L>  |  29<H>  ----------------------------<  209<H>  3.3<L>   |  25  |  2.60<H>    Ca    10.4      18 Dec 2019 07:13      proBNP:   Lipid Profile:   HgA1c:   TSH:     TELE:  EKG:

## 2019-12-19 NOTE — PROGRESS NOTE ADULT - ASSESSMENT
NEURO:  - Extubated 11/29, now on nasal cannula. Minimally verbal   - metabolic encephalopathy, meningitis r/o, LP negative  - neuro following  - chronic lacunar infarcts; per neuro, AMS also likely related to hypercalcemia, renal dysfunction , poor nutritional intake.   - c/w keppra for ?hx of seizure disorder  -AMS likely 2/2 hyperCa, renal dysfunction, poor PO intake      CV:  - hx of afib was on eliquis, now off a/c per cards due to bleeding risk  - holding carvedilol 6.25 BID  - hx of cocaine cardiomyopathy, resolved, normal EF on most recent echo  - c/w statin  - echo repeat EF70, LVH, hyperdynamic LCF, normal RV sys fxn    PULM:- extubated 11/29--tolerating nasal cannula    GI:  - GI consult for PEG   - surgery consult appreciated for PEG   - GI AND IR unable to do a PEG     RENAL:- hx of ESRD s/p failed renal transplant x2  - renal following, HD as recommended.    ENDO:  #DM2: HbA1c 5.5  - Start Insulin Sliding Scale- endocrine following  - monitor Ca    INFECTIOUS:  - dc  vanco as per infectious disease   - id fu     Anemia  - transfuse PRN  - monitor cbc  - check guiac      Pt medically optimized for PEG , pending cardiac clearance

## 2019-12-19 NOTE — PROGRESS NOTE ADULT - ASSESSMENT
74M PMHx of ESRD s/p failed renal transplant x2, HCV, DM, and meningococcal meningitis 2 years ago was sent in to the ED from HD for AMS and hypotension with continued AMS with attempted PEG insertion with both IR and GI with inability to obtain good window for PEG insertion.    -- All cares per primary team  -- Timing of laparoscopic assisted PEG tube TBD and relayed to primary team accordingly  -- Patient will require medical optimization and clearance prior to scheduling of OR    Please contact Trauma & Acute Care Surgery (#[phone]) with any questions or concerns.

## 2019-12-19 NOTE — PROGRESS NOTE ADULT - SUBJECTIVE AND OBJECTIVE BOX
INTERVAL HPI/OVERNIGHT EVENTS:    pt s/e  NGT feeds running   + loose stools    FOBT pending  pt is without GI complaints   MEDICATIONS  (STANDING):  alteplase for catheter clearance 2 milliGRAM(s) Catheter once  alteplase for catheter clearance 2 milliGRAM(s) Catheter once  atorvastatin 40 milliGRAM(s) Oral at bedtime  carvedilol 3.125 milliGRAM(s) Oral every 12 hours  chlorhexidine 4% Liquid 1 Application(s) Topical <User Schedule>  cinacalcet 30 milliGRAM(s) Oral daily  Dakins Solution - 1/4 Strength 1 Application(s) Topical two times a day  dextrose 5%. 1000 milliLiter(s) (50 mL/Hr) IV Continuous <Continuous>  dextrose 50% Injectable 12.5 Gram(s) IV Push once  dextrose 50% Injectable 25 Gram(s) IV Push once  dextrose 50% Injectable 25 Gram(s) IV Push once  epoetin tan Injectable 74855 Unit(s) IV Push <User Schedule>  glucagon  Injectable 2 milliGRAM(s) IV Push once  heparin  Injectable 5000 Unit(s) SubCutaneous every 8 hours  insulin glargine Injectable (LANTUS) 9 Unit(s) SubCutaneous at bedtime  insulin lispro (HumaLOG) corrective regimen sliding scale   SubCutaneous every 6 hours  levETIRAcetam  Solution 1000 milliGRAM(s) Oral at bedtime  lidocaine   Patch 1 Patch Transdermal daily  pantoprazole  Injectable 20 milliGRAM(s) IV Push daily  predniSONE   Tablet 5 milliGRAM(s) Oral daily    MEDICATIONS  (PRN):  dextrose 40% Gel 15 Gram(s) Oral once PRN Blood Glucose LESS THAN 70 milliGRAM(s)/deciLiter  glucagon  Injectable 1 milliGRAM(s) IntraMuscular once PRN Glucose <70 milliGRAM(s)/deciLiter  loperamide Suspension 2 milliGRAM(s) Oral every 6 hours PRN loose stools      Allergies    No Known Allergies    Intolerances        Review of Systems:    General:  No wt loss, fevers, chills, night sweats,fatigue,   Eyes:  Good vision, no reported pain  ENT:  No sore throat, pain, runny nose, dysphagia  CV:  No pain, palpitations, hypo/hypertension  Resp:  No dyspnea, cough, tachypnea, wheezing  GI:  + loose stools   :  No pain, bleeding, incontinence, nocturia  Muscle:  No pain, weakness  Neuro:  No weakness, tingling, memory problems  Psych:  No fatigue, insomnia, mood problems, depression  Endocrine:  No polyuria, polydypsia, cold/heat intolerance  Heme:  No petechiae, ecchymosis, easy bruisability  Skin:  No rash, tattoos, scars, edema      Vital Signs Last 24 Hrs  T(C): 36.4 (19 Dec 2019 11:52), Max: 37 (19 Dec 2019 01:05)  T(F): 97.6 (19 Dec 2019 11:52), Max: 98.6 (19 Dec 2019 01:05)  HR: 110 (19 Dec 2019 11:52) (105 - 110)  BP: 97/63 (19 Dec 2019 11:52) (97/63 - 126/71)  BP(mean): --  RR: 18 (19 Dec 2019 11:52) (18 - 18)  SpO2: 96% (19 Dec 2019 11:52) (96% - 98%)    PHYSICAL EXAM:    GENERAL:   NAD  HEENT:  NC/AT, no JVD, sclera-anicteric  ABDOMEN:  Soft, non-distended, + bowel sounds, + NGT  EXTREMITIES:  no cyanosis, clubbing or edema  NEURO:  awake, responsive, minimally verbal  SKIN:  No rash/warm/dry          LABS:                        9.3    14.82 )-----------( 265      ( 18 Dec 2019 08:25 )             31.5     12-18    133<L>  |  93<L>  |  29<H>  ----------------------------<  209<H>  3.3<L>   |  25  |  2.60<H>    Ca    10.4      18 Dec 2019 07:13            RADIOLOGY & ADDITIONAL TESTS:

## 2019-12-19 NOTE — PROGRESS NOTE ADULT - ASSESSMENT
History of cocaine induced CM  normal EF on last echo  cont BB     PAF  off a/c high bleed risk     dysphagia  plan for surgical peg  Based on current ACC/AHA guidelines, patient history and physical exam, the patient is considered to have high risk   no absolute CV objection to proceed

## 2019-12-19 NOTE — PROGRESS NOTE ADULT - PROBLEM SELECTOR PLAN 3
Hgb drop noted; s/p 1uprbc with appropriate response  per RN, no s/s active GIB   occult negative  monitor cbc, transfuse prn

## 2019-12-19 NOTE — PROGRESS NOTE ADULT - SUBJECTIVE AND OBJECTIVE BOX
Wound SURGERY CONSULT NOTE    HPI:  74M PMHx of ESRD s/p failed renal transplant x2, HCV, DM, and meningococcal meningitis 2 years ago was sent in to the ED from HD for AMS and low BPs. He is unable to provide any information at this time. His sister is at the bedside and reports that he is typically able to carry a conversation and walk a small amount. She is not aware if he had been having fevers at the nursing home, and believes that his last HD was on Tuesday. (20 Nov 2019 18:01)      PAST MEDICAL & SURGICAL HISTORY:  Kidney transplant recipient  Anuria  GERD (gastroesophageal reflux disease)  Kidney transplant failure: dx: 2/2013  Hepatitis C: dx: 90&#x27;s.  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, no known MIs  H/O kidney transplant: may 2015  A-V fistula: Left, implanted (?)  S/p cadaver renal transplant: 2008  S/P partial colectomy: due to diverticulitis      REVIEW OF SYSTEMS      General:	    Skin/Breast:  	  Ophthalmologic:  	  ENMT:	    Respiratory and Thorax:  	  Cardiovascular:	    Gastrointestinal:	    Genitourinary:	    Musculoskeletal:	    Neurological:	    Psychiatric:	    Hematology/Lymphatics:	    Endocrine:	    Allergic/Immunologic:	  Pt unable to offer  Skin/ MSK: see HPI  All other systems negative    MEDICATIONS  (STANDING):  alteplase for catheter clearance 2 milliGRAM(s) Catheter once  alteplase for catheter clearance 2 milliGRAM(s) Catheter once  atorvastatin 40 milliGRAM(s) Oral at bedtime  carvedilol 3.125 milliGRAM(s) Oral every 12 hours  chlorhexidine 4% Liquid 1 Application(s) Topical <User Schedule>  cinacalcet 30 milliGRAM(s) Oral daily  Dakins Solution - 1/4 Strength 1 Application(s) Topical two times a day  dextrose 5%. 1000 milliLiter(s) (50 mL/Hr) IV Continuous <Continuous>  dextrose 50% Injectable 12.5 Gram(s) IV Push once  dextrose 50% Injectable 25 Gram(s) IV Push once  dextrose 50% Injectable 25 Gram(s) IV Push once  epoetin tan Injectable 81919 Unit(s) IV Push <User Schedule>  glucagon  Injectable 2 milliGRAM(s) IV Push once  heparin  Injectable 5000 Unit(s) SubCutaneous every 8 hours  insulin glargine Injectable (LANTUS) 9 Unit(s) SubCutaneous at bedtime  insulin lispro (HumaLOG) corrective regimen sliding scale   SubCutaneous every 6 hours  levETIRAcetam  Solution 1000 milliGRAM(s) Oral at bedtime  lidocaine   Patch 1 Patch Transdermal daily  pantoprazole  Injectable 20 milliGRAM(s) IV Push daily  predniSONE   Tablet 5 milliGRAM(s) Oral daily    MEDICATIONS  (PRN):  dextrose 40% Gel 15 Gram(s) Oral once PRN Blood Glucose LESS THAN 70 milliGRAM(s)/deciLiter  glucagon  Injectable 1 milliGRAM(s) IntraMuscular once PRN Glucose <70 milliGRAM(s)/deciLiter  loperamide Suspension 2 milliGRAM(s) Oral every 6 hours PRN loose stools      Allergies    No Known Allergies    Intolerances        SOCIAL HISTORY:  / /single/ ; (+)HHA/ lives in SNF; Former smoker, Denies smoking, ETOH, drugs    FAMILY HISTORY:  Family history of breast cancer in sister (Sibling): living at 92 yo  Family history of prostate cancer in father  Family history of lung cancer  Family history of hypertension in mother  Family history of diabetes mellitus: mother      Vital Signs Last 24 Hrs  T(C): 36.5 (19 Dec 2019 05:41), Max: 37 (19 Dec 2019 01:05)  T(F): 97.7 (19 Dec 2019 05:41), Max: 98.6 (19 Dec 2019 01:05)  HR: 106 (19 Dec 2019 07:46) (105 - 111)  BP: 109/67 (19 Dec 2019 07:46) (106/62 - 126/71)  BP(mean): --  RR: 18 (19 Dec 2019 05:41) (18 - 18)  SpO2: 98% (19 Dec 2019 05:41) (94% - 98%)    NAD / gaurded but stable,  A&Ox3/ Alert/ Confused  cachectic/ MO/ Obese/ frail  WD/ WN/ WG/ Disheveled  Total Care Sport/ Versa Care P500 bed/ [place]    Cardiovascular: RRR (+)m    Respiratory: CTA    Gastrointestinal soft NT/ND (+)BS  (+)PEG (+)ostomy    Neurology  weakened strength & sensation grossly intact/ paraesthesia  nonverbal, no follow commands/ paraplegic    Musculoskeletal/Vascular:  ROM  >LE //BLE edema equal  DP/PT pulses palpable  BLE equally warm/ cool  no acute ischemia noted  hemosiderin staining  no deformities/ contractures    Skin:  moist w/ good turgor  frail,  ecchymosis w/o hematoma  serosanguinous drainage  No odor, erythema, increased warmth, tenderness, induration, fluctuance    LABS:  12-18    133<L>  |  93<L>  |  29<H>  ----------------------------<  209<H>  3.3<L>   |  25  |  2.60<H>    Ca    10.4      18 Dec 2019 07:13                            9.3    14.82 )-----------( 265      ( 18 Dec 2019 08:25 )             31.5           RADIOLOGY & ADDITIONAL STUDIES:  Cultures:

## 2019-12-19 NOTE — PROGRESS NOTE ADULT - SUBJECTIVE AND OBJECTIVE BOX
General Surgery Progress Note     Subjective/24hour Events:   ·	Patient seen and examined.   ·	Clinically stable with no acute events overnight.   ·	On tube feeds (nepro) at 50 cc/hr. Well-tolerated.      Objective  T(C): 36.5 (12-19-19 @ 05:41), Max: 37 (12-19-19 @ 01:05)  HR: 106 (12-19-19 @ 07:46) (105 - 111)  BP: 109/67 (12-19-19 @ 07:46) (106/62 - 126/71)  ABP: --  ABP(mean): --  RR: 18 (12-19-19 @ 05:41) (18 - 18)  SpO2: 98% (12-19-19 @ 05:41) (94% - 98%)  Wt(kg): --  CVP(mm Hg): --      12-18 @ 07:01  -  12-19 @ 07:00  --------------------------------------------------------  IN:    Enteral Tube Flush: 500 mL    Nepro with Carb Steady: 600 mL  Total IN: 1100 mL    OUT:  Total OUT: 0 mL  Total NET: 1100 mL      MEDICATIONS  (STANDING):  alteplase for catheter clearance 2 milliGRAM(s) Catheter once  alteplase for catheter clearance 2 milliGRAM(s) Catheter once  atorvastatin 40 milliGRAM(s) Oral at bedtime  carvedilol 3.125 milliGRAM(s) Oral every 12 hours  chlorhexidine 4% Liquid 1 Application(s) Topical <User Schedule>  cinacalcet 30 milliGRAM(s) Oral daily  Dakins Solution - 1/4 Strength 1 Application(s) Topical two times a day  dextrose 5%. 1000 milliLiter(s) (50 mL/Hr) IV Continuous <Continuous>  dextrose 50% Injectable 12.5 Gram(s) IV Push once  dextrose 50% Injectable 25 Gram(s) IV Push once  dextrose 50% Injectable 25 Gram(s) IV Push once  epoetin tan Injectable 02267 Unit(s) IV Push <User Schedule>  glucagon  Injectable 2 milliGRAM(s) IV Push once  heparin  Injectable 5000 Unit(s) SubCutaneous every 8 hours  insulin glargine Injectable (LANTUS) 9 Unit(s) SubCutaneous at bedtime  insulin lispro (HumaLOG) corrective regimen sliding scale   SubCutaneous every 6 hours  levETIRAcetam  Solution 1000 milliGRAM(s) Oral at bedtime  lidocaine   Patch 1 Patch Transdermal daily  pantoprazole  Injectable 20 milliGRAM(s) IV Push daily  predniSONE   Tablet 5 milliGRAM(s) Oral daily    MEDICATIONS  (PRN):  dextrose 40% Gel 15 Gram(s) Oral once PRN Blood Glucose LESS THAN 70 milliGRAM(s)/deciLiter  glucagon  Injectable 1 milliGRAM(s) IntraMuscular once PRN Glucose <70 milliGRAM(s)/deciLiter  loperamide Suspension 2 milliGRAM(s) Oral every 6 hours PRN loose stools      Physical Exam:  Gen: Lying comfortably in bed, no obvious distress. Minimal verbal response on assessment, unchanged from baseline.  HEENT: Feeding tube in place.  Lungs: Non labored breathing.   Ab: Soft, nontender, nondistended.   Ext: Moves all 4 spontaneously.     Lab Results  CBC (12-18 @ 08:25)                              9.3<L>                         14.82<H>  )----------------(  265        --    % Neutrophils, --    % Lymphocytes, ANC: --                                  31.5<L>    BMP (12-18 @ 07:13)             133<L>  |  93<L>   |  29<H> 		Ca++ --      Ca 10.4               ---------------------------------( 209<H>		Mg --                 3.3<L>  |  25      |  2.60<H>			Ph --

## 2019-12-19 NOTE — PROGRESS NOTE ADULT - ATTENDING COMMENTS
consent for debridement of sacral wound obtained by TC with sister as outlined above  Patient is cachectic,  non verbal , and immobile, with feeding tube in place  procedure done at bedside with NP , and RN  Well tolerated

## 2019-12-19 NOTE — PROGRESS NOTE ADULT - PROBLEM SELECTOR PLAN 1
- s/p upper gastrointestinal endoscopy with the procedure was aborted due to inability to place PEG tube  - NGT replaced with endoscopic confirmation  - s/p failed IR PEG  - surg eval noted; planning for laparoscopic-assisted PEG insertion pending medical clearance

## 2019-12-19 NOTE — PROGRESS NOTE ADULT - ASSESSMENT
Assessment  DM: A1C 5.5%, was on insulin at home, started on basal insulin, increased dose yesterday, blood sugars fluctuating, no hypoglycemic episodes. Patient is still NPO on tube feeds, failed PEG tube placement, planning laparoscopic assisted PEG placement, now s/p sacral wound debridement.  Hypercalcemia: Primary Hyperparathyroidism, s/p 2nd Pamidronate dose. Ca improved 10.4.  Sepsis: IV ABx for UTI, LP inconsistent with meningitis, on medications, stable, monitored.  HTN: Controlled,  on antihypertensive medications.  ESRD: On hemodialysis, Monitor labs/BMP          Robi Gay MD  Cell: [phone]  Office: [phone] Assessment  DM: A1C 5.5%, was on insulin at home, started on basal insulin, increased dose yesterday, blood sugars fluctuating, no hypoglycemic episodes. Patient is still NPO on tube feeds, failed PEG tube placement,  planning laparoscopic assisted PEG placement, now s/p sacral wound debridement.  Hypercalcemia: Primary Hyperparathyroidism, s/p 2nd Pamidronate dose. Ca improved 10.4.  Sepsis: IV ABx for UTI, LP inconsistent with meningitis, on medications, stable, monitored.  HTN: Controlled,  on antihypertensive medications.  ESRD: On hemodialysis, Monitor labs/BMP          Robi Gay MD  Cell: [phone]  Office: [phone]

## 2019-12-19 NOTE — PROGRESS NOTE ADULT - SUBJECTIVE AND OBJECTIVE BOX
Patient is a 74y old  Male who presents with a chief complaint of ams (19 Dec 2019 13:08)      INTERVAL HPI/OVERNIGHT EVENTS:  T(C): 36.7 (12-19-19 @ 17:00), Max: 37 (12-19-19 @ 01:05)  HR: 102 (12-19-19 @ 17:00) (102 - 110)  BP: 110/63 (12-19-19 @ 17:00) (97/63 - 126/71)  RR: 18 (12-19-19 @ 17:00) (18 - 18)  SpO2: 98% (12-19-19 @ 17:00) (96% - 98%)  Wt(kg): --  I&O's Summary    18 Dec 2019 07:01  -  19 Dec 2019 07:00  --------------------------------------------------------  IN: 1100 mL / OUT: 0 mL / NET: 1100 mL        LABS:                        9.3    14.82 )-----------( 265      ( 18 Dec 2019 08:25 )             31.5     12-18    133<L>  |  93<L>  |  29<H>  ----------------------------<  209<H>  3.3<L>   |  25  |  2.60<H>    Ca    10.4      18 Dec 2019 07:13          CAPILLARY BLOOD GLUCOSE      POCT Blood Glucose.: 269 mg/dL (19 Dec 2019 12:01)  POCT Blood Glucose.: 195 mg/dL (19 Dec 2019 05:08)  POCT Blood Glucose.: 177 mg/dL (19 Dec 2019 01:03)  POCT Blood Glucose.: 170 mg/dL (18 Dec 2019 22:09)            MEDICATIONS  (STANDING):  alteplase for catheter clearance 2 milliGRAM(s) Catheter once  alteplase for catheter clearance 2 milliGRAM(s) Catheter once  atorvastatin 40 milliGRAM(s) Oral at bedtime  carvedilol 3.125 milliGRAM(s) Oral every 12 hours  chlorhexidine 4% Liquid 1 Application(s) Topical <User Schedule>  cinacalcet 30 milliGRAM(s) Oral daily  Dakins Solution - 1/4 Strength 1 Application(s) Topical two times a day  dextrose 5%. 1000 milliLiter(s) (50 mL/Hr) IV Continuous <Continuous>  dextrose 50% Injectable 12.5 Gram(s) IV Push once  dextrose 50% Injectable 25 Gram(s) IV Push once  dextrose 50% Injectable 25 Gram(s) IV Push once  epoetin tan Injectable 76795 Unit(s) IV Push <User Schedule>  glucagon  Injectable 2 milliGRAM(s) IV Push once  heparin  Injectable 5000 Unit(s) SubCutaneous every 8 hours  insulin glargine Injectable (LANTUS) 9 Unit(s) SubCutaneous at bedtime  insulin lispro (HumaLOG) corrective regimen sliding scale   SubCutaneous every 6 hours  levETIRAcetam  Solution 1000 milliGRAM(s) Oral at bedtime  lidocaine   Patch 1 Patch Transdermal daily  pantoprazole  Injectable 20 milliGRAM(s) IV Push daily  predniSONE   Tablet 5 milliGRAM(s) Oral daily    MEDICATIONS  (PRN):  dextrose 40% Gel 15 Gram(s) Oral once PRN Blood Glucose LESS THAN 70 milliGRAM(s)/deciLiter  glucagon  Injectable 1 milliGRAM(s) IntraMuscular once PRN Glucose <70 milliGRAM(s)/deciLiter  loperamide Suspension 2 milliGRAM(s) Oral every 6 hours PRN loose stools          PHYSICAL EXAM:  GENERAL: frail  CHEST/LUNG: Clear to percussion bilaterally; No rales, rhonchi, wheezing, or rubs  HEART: Regular rate and rhythm; No murmurs, rubs, or gallops  ABDOMEN: Soft, Nontender, Nondistended; Bowel sounds present  EXTREMITIES:  edema+    Care Discussed with Consultants/Other Providers [+] YES  [ ] NO

## 2019-12-19 NOTE — PROGRESS NOTE ADULT - ASSESSMENT
Impression:    Sacral wound with clinical OM    Recommend:  1.) Topical therapy: sacral wound - irrigate well with 1/4 strength Dakins solution, pat dry, pack with Dakins moistened gauze, cover with DSD, secure with tegederm  2.) Maintain on an alternating air with low air loss surface  3.) Turn and reposition Q 2 hours  4.) Nutrition optimization  5.) Incontinence management - incontinence pads, cleanser, pericare BID, apply cavilon to bilateral buttocks, perineum Daily  6.) Glycemic control    Care as per medicine will follow w/ you  Upon discharge f/u [facility] outpatient at [facility] [address] [place] [address] [phone]  Seen with dr. Haynes and clinical nurse. Discussed with primary team NP.  Thank you for this consult  Sapna Ramirez, JAVIER-C, CWOCN [id number]

## 2019-12-20 ENCOUNTER — TRANSCRIPTION ENCOUNTER (OUTPATIENT)
Age: 74
End: 2019-12-20

## 2019-12-20 LAB
ALBUMIN SERPL ELPH-MCNC: 2.2 G/DL — LOW (ref 3.3–5)
ALP SERPL-CCNC: 109 U/L — SIGNIFICANT CHANGE UP (ref 40–120)
ALT FLD-CCNC: 6 U/L — LOW (ref 10–45)
ANION GAP SERPL CALC-SCNC: 12 MMOL/L — SIGNIFICANT CHANGE UP (ref 5–17)
ANION GAP SERPL CALC-SCNC: 12 MMOL/L — SIGNIFICANT CHANGE UP (ref 5–17)
ANION GAP SERPL CALC-SCNC: 9 MMOL/L — SIGNIFICANT CHANGE UP (ref 5–17)
AST SERPL-CCNC: 19 U/L — SIGNIFICANT CHANGE UP (ref 10–40)
BASOPHILS # BLD AUTO: 0.07 K/UL — SIGNIFICANT CHANGE UP (ref 0–0.2)
BASOPHILS NFR BLD AUTO: 0.4 % — SIGNIFICANT CHANGE UP (ref 0–2)
BILIRUB SERPL-MCNC: 0.5 MG/DL — SIGNIFICANT CHANGE UP (ref 0.2–1.2)
BLD GP AB SCN SERPL QL: NEGATIVE — SIGNIFICANT CHANGE UP
BUN SERPL-MCNC: 43 MG/DL — HIGH (ref 7–23)
BUN SERPL-MCNC: 56 MG/DL — HIGH (ref 7–23)
BUN SERPL-MCNC: 56 MG/DL — HIGH (ref 7–23)
CALCIUM SERPL-MCNC: 10.2 MG/DL — SIGNIFICANT CHANGE UP (ref 8.4–10.5)
CALCIUM SERPL-MCNC: 10.2 MG/DL — SIGNIFICANT CHANGE UP (ref 8.4–10.5)
CALCIUM SERPL-MCNC: 9.8 MG/DL — SIGNIFICANT CHANGE UP (ref 8.4–10.5)
CHLORIDE SERPL-SCNC: 89 MMOL/L — LOW (ref 96–108)
CHLORIDE SERPL-SCNC: 89 MMOL/L — LOW (ref 96–108)
CHLORIDE SERPL-SCNC: 91 MMOL/L — LOW (ref 96–108)
CO2 SERPL-SCNC: 23 MMOL/L — SIGNIFICANT CHANGE UP (ref 22–31)
CO2 SERPL-SCNC: 23 MMOL/L — SIGNIFICANT CHANGE UP (ref 22–31)
CO2 SERPL-SCNC: 28 MMOL/L — SIGNIFICANT CHANGE UP (ref 22–31)
CREAT SERPL-MCNC: 2.56 MG/DL — HIGH (ref 0.5–1.3)
CREAT SERPL-MCNC: 3.12 MG/DL — HIGH (ref 0.5–1.3)
CREAT SERPL-MCNC: 3.12 MG/DL — HIGH (ref 0.5–1.3)
EOSINOPHIL # BLD AUTO: 0.14 K/UL — SIGNIFICANT CHANGE UP (ref 0–0.5)
EOSINOPHIL NFR BLD AUTO: 0.7 % — SIGNIFICANT CHANGE UP (ref 0–6)
GLUCOSE BLDC GLUCOMTR-MCNC: 150 MG/DL — HIGH (ref 70–99)
GLUCOSE BLDC GLUCOMTR-MCNC: 181 MG/DL — HIGH (ref 70–99)
GLUCOSE BLDC GLUCOMTR-MCNC: 197 MG/DL — HIGH (ref 70–99)
GLUCOSE BLDC GLUCOMTR-MCNC: 206 MG/DL — HIGH (ref 70–99)
GLUCOSE BLDC GLUCOMTR-MCNC: 215 MG/DL — HIGH (ref 70–99)
GLUCOSE SERPL-MCNC: 202 MG/DL — HIGH (ref 70–99)
GLUCOSE SERPL-MCNC: 202 MG/DL — HIGH (ref 70–99)
GLUCOSE SERPL-MCNC: 211 MG/DL — HIGH (ref 70–99)
HCT VFR BLD CALC: 26.5 % — LOW (ref 39–50)
HCT VFR BLD CALC: 27.1 % — LOW (ref 39–50)
HGB BLD-MCNC: 8.2 G/DL — LOW (ref 13–17)
HGB BLD-MCNC: 8.3 G/DL — LOW (ref 13–17)
IMM GRANULOCYTES NFR BLD AUTO: 0.5 % — SIGNIFICANT CHANGE UP (ref 0–1.5)
LYMPHOCYTES # BLD AUTO: 16.4 % — SIGNIFICANT CHANGE UP (ref 13–44)
LYMPHOCYTES # BLD AUTO: 3.11 K/UL — SIGNIFICANT CHANGE UP (ref 1–3.3)
MAGNESIUM SERPL-MCNC: 1.9 MG/DL — SIGNIFICANT CHANGE UP (ref 1.6–2.6)
MCHC RBC-ENTMCNC: 26.5 PG — LOW (ref 27–34)
MCHC RBC-ENTMCNC: 26.5 PG — LOW (ref 27–34)
MCHC RBC-ENTMCNC: 30.6 GM/DL — LOW (ref 32–36)
MCHC RBC-ENTMCNC: 30.9 GM/DL — LOW (ref 32–36)
MCV RBC AUTO: 85.8 FL — SIGNIFICANT CHANGE UP (ref 80–100)
MCV RBC AUTO: 86.6 FL — SIGNIFICANT CHANGE UP (ref 80–100)
MONOCYTES # BLD AUTO: 1.33 K/UL — HIGH (ref 0–0.9)
MONOCYTES NFR BLD AUTO: 7 % — SIGNIFICANT CHANGE UP (ref 2–14)
NEUTROPHILS # BLD AUTO: 14.18 K/UL — HIGH (ref 1.8–7.4)
NEUTROPHILS NFR BLD AUTO: 75 % — SIGNIFICANT CHANGE UP (ref 43–77)
NRBC # BLD: 0 /100 WBCS — SIGNIFICANT CHANGE UP (ref 0–0)
PHOSPHATE SERPL-MCNC: 1.2 MG/DL — LOW (ref 2.5–4.5)
PLATELET # BLD AUTO: 209 K/UL — SIGNIFICANT CHANGE UP (ref 150–400)
PLATELET # BLD AUTO: 243 K/UL — SIGNIFICANT CHANGE UP (ref 150–400)
POTASSIUM SERPL-MCNC: 3.9 MMOL/L — SIGNIFICANT CHANGE UP (ref 3.5–5.3)
POTASSIUM SERPL-MCNC: 4.1 MMOL/L — SIGNIFICANT CHANGE UP (ref 3.5–5.3)
POTASSIUM SERPL-MCNC: 4.1 MMOL/L — SIGNIFICANT CHANGE UP (ref 3.5–5.3)
POTASSIUM SERPL-SCNC: 3.9 MMOL/L — SIGNIFICANT CHANGE UP (ref 3.5–5.3)
POTASSIUM SERPL-SCNC: 4.1 MMOL/L — SIGNIFICANT CHANGE UP (ref 3.5–5.3)
POTASSIUM SERPL-SCNC: 4.1 MMOL/L — SIGNIFICANT CHANGE UP (ref 3.5–5.3)
PROT SERPL-MCNC: 7.7 G/DL — SIGNIFICANT CHANGE UP (ref 6–8.3)
RBC # BLD: 3.09 M/UL — LOW (ref 4.2–5.8)
RBC # BLD: 3.13 M/UL — LOW (ref 4.2–5.8)
RBC # FLD: 15.7 % — HIGH (ref 10.3–14.5)
RBC # FLD: 15.8 % — HIGH (ref 10.3–14.5)
RH IG SCN BLD-IMP: POSITIVE — SIGNIFICANT CHANGE UP
SODIUM SERPL-SCNC: 124 MMOL/L — LOW (ref 135–145)
SODIUM SERPL-SCNC: 124 MMOL/L — LOW (ref 135–145)
SODIUM SERPL-SCNC: 128 MMOL/L — LOW (ref 135–145)
WBC # BLD: 16.37 K/UL — HIGH (ref 3.8–10.5)
WBC # BLD: 18.93 K/UL — HIGH (ref 3.8–10.5)
WBC # FLD AUTO: 16.37 K/UL — HIGH (ref 3.8–10.5)
WBC # FLD AUTO: 18.93 K/UL — HIGH (ref 3.8–10.5)

## 2019-12-20 PROCEDURE — 71045 X-RAY EXAM CHEST 1 VIEW: CPT | Mod: 26

## 2019-12-20 RX ORDER — INSULIN GLARGINE 100 [IU]/ML
4 INJECTION, SOLUTION SUBCUTANEOUS ONCE
Refills: 0 | Status: COMPLETED | OUTPATIENT
Start: 2019-12-20 | End: 2019-12-20

## 2019-12-20 RX ORDER — ACETAMINOPHEN 500 MG
650 TABLET ORAL EVERY 6 HOURS
Refills: 0 | Status: DISCONTINUED | OUTPATIENT
Start: 2019-12-20 | End: 2019-12-21

## 2019-12-20 RX ORDER — LOPERAMIDE HCL 2 MG
2 TABLET ORAL
Refills: 0 | Status: DISCONTINUED | OUTPATIENT
Start: 2019-12-20 | End: 2019-12-21

## 2019-12-20 RX ADMIN — Medication 1 APPLICATION(S): at 21:14

## 2019-12-20 RX ADMIN — Medication 2: at 06:22

## 2019-12-20 RX ADMIN — LIDOCAINE 1 PATCH: 4 CREAM TOPICAL at 00:20

## 2019-12-20 RX ADMIN — PANTOPRAZOLE SODIUM 20 MILLIGRAM(S): 20 TABLET, DELAYED RELEASE ORAL at 13:02

## 2019-12-20 RX ADMIN — HEPARIN SODIUM 5000 UNIT(S): 5000 INJECTION INTRAVENOUS; SUBCUTANEOUS at 21:14

## 2019-12-20 RX ADMIN — HEPARIN SODIUM 5000 UNIT(S): 5000 INJECTION INTRAVENOUS; SUBCUTANEOUS at 13:02

## 2019-12-20 RX ADMIN — Medication 4: at 13:02

## 2019-12-20 RX ADMIN — Medication 1 APPLICATION(S): at 11:52

## 2019-12-20 RX ADMIN — CHLORHEXIDINE GLUCONATE 1 APPLICATION(S): 213 SOLUTION TOPICAL at 05:00

## 2019-12-20 RX ADMIN — CINACALCET 30 MILLIGRAM(S): 30 TABLET, FILM COATED ORAL at 13:03

## 2019-12-20 RX ADMIN — CARVEDILOL PHOSPHATE 3.12 MILLIGRAM(S): 80 CAPSULE, EXTENDED RELEASE ORAL at 11:52

## 2019-12-20 RX ADMIN — Medication 5 MILLIGRAM(S): at 06:18

## 2019-12-20 RX ADMIN — LIDOCAINE 1 PATCH: 4 CREAM TOPICAL at 19:44

## 2019-12-20 RX ADMIN — Medication 2 MILLIGRAM(S): at 11:52

## 2019-12-20 RX ADMIN — INSULIN GLARGINE 4 UNIT(S): 100 INJECTION, SOLUTION SUBCUTANEOUS at 23:10

## 2019-12-20 RX ADMIN — LEVETIRACETAM 1000 MILLIGRAM(S): 250 TABLET, FILM COATED ORAL at 21:13

## 2019-12-20 RX ADMIN — ATORVASTATIN CALCIUM 40 MILLIGRAM(S): 80 TABLET, FILM COATED ORAL at 21:14

## 2019-12-20 RX ADMIN — HEPARIN SODIUM 5000 UNIT(S): 5000 INJECTION INTRAVENOUS; SUBCUTANEOUS at 06:18

## 2019-12-20 RX ADMIN — LIDOCAINE 1 PATCH: 4 CREAM TOPICAL at 11:53

## 2019-12-20 RX ADMIN — Medication 2: at 01:18

## 2019-12-20 RX ADMIN — Medication 4: at 23:12

## 2019-12-20 RX ADMIN — LIDOCAINE 1 PATCH: 4 CREAM TOPICAL at 21:17

## 2019-12-20 RX ADMIN — CARVEDILOL PHOSPHATE 3.12 MILLIGRAM(S): 80 CAPSULE, EXTENDED RELEASE ORAL at 21:14

## 2019-12-20 RX ADMIN — Medication 2 MILLIGRAM(S): at 17:18

## 2019-12-20 NOTE — PRE-ANESTHESIA EVALUATION ADULT - NSRADCARDRESULTSFT_GEN_ALL_CORE
TTE 8/2019: EF 68% 1. Mitral annular calcification and calcified mitral  leaflets with decreased diastolic opening.  2. Moderate to severe concentric left ventricular  hypertrophy.  3. Normal left ventricular systolic function with  mid-cavitary obliteration.  4. Normal right ventricular size and systolic function.  5. Small pericardial effusion.  *** Compared with echocardiogram of 9/11/2015, no  significant changes noted.

## 2019-12-20 NOTE — PROGRESS NOTE ADULT - SUBJECTIVE AND OBJECTIVE BOX
INTERVAL HPI/OVERNIGHT EVENTS:    pt s/e  NGT feeds running   No BMs today as per RN      FOBT pending  pt is without GI complaints   MEDICATIONS  (STANDING):  alteplase for catheter clearance 2 milliGRAM(s) Catheter once  alteplase for catheter clearance 2 milliGRAM(s) Catheter once  atorvastatin 40 milliGRAM(s) Oral at bedtime  carvedilol 3.125 milliGRAM(s) Oral every 12 hours  chlorhexidine 4% Liquid 1 Application(s) Topical <User Schedule>  cinacalcet 30 milliGRAM(s) Oral daily  Dakins Solution - 1/4 Strength 1 Application(s) Topical two times a day  dextrose 5%. 1000 milliLiter(s) (50 mL/Hr) IV Continuous <Continuous>  dextrose 50% Injectable 12.5 Gram(s) IV Push once  dextrose 50% Injectable 25 Gram(s) IV Push once  dextrose 50% Injectable 25 Gram(s) IV Push once  epoetin tan Injectable 60047 Unit(s) IV Push <User Schedule>  glucagon  Injectable 2 milliGRAM(s) IV Push once  heparin  Injectable 5000 Unit(s) SubCutaneous every 8 hours  insulin glargine Injectable (LANTUS) 9 Unit(s) SubCutaneous at bedtime  insulin lispro (HumaLOG) corrective regimen sliding scale   SubCutaneous every 6 hours  levETIRAcetam  Solution 1000 milliGRAM(s) Oral at bedtime  lidocaine   Patch 1 Patch Transdermal daily  pantoprazole  Injectable 20 milliGRAM(s) IV Push daily  predniSONE   Tablet 5 milliGRAM(s) Oral daily    MEDICATIONS  (PRN):  dextrose 40% Gel 15 Gram(s) Oral once PRN Blood Glucose LESS THAN 70 milliGRAM(s)/deciLiter  glucagon  Injectable 1 milliGRAM(s) IntraMuscular once PRN Glucose <70 milliGRAM(s)/deciLiter  loperamide Suspension 2 milliGRAM(s) Oral every 6 hours PRN loose stools      Allergies    No Known Allergies    Intolerances        Review of Systems: unable to obtain in entirety         Vital Signs Last 24 Hrs  T(C): 36.4 (19 Dec 2019 11:52), Max: 37 (19 Dec 2019 01:05)  T(F): 97.6 (19 Dec 2019 11:52), Max: 98.6 (19 Dec 2019 01:05)  HR: 110 (19 Dec 2019 11:52) (105 - 110)  BP: 97/63 (19 Dec 2019 11:52) (97/63 - 126/71)  BP(mean): --  RR: 18 (19 Dec 2019 11:52) (18 - 18)  SpO2: 96% (19 Dec 2019 11:52) (96% - 98%)    PHYSICAL EXAM:    GENERAL:   NAD  HEENT:  NC/AT, no JVD, sclera-anicteric  ABDOMEN:  Soft, non-distended, + bowel sounds, + NGT  EXTREMITIES:  no cyanosis, clubbing or edema  NEURO:  awake, responsive, minimally verbal  SKIN:  No rash/warm/dry          LABS:                        9.3    14.82 )-----------( 265      ( 18 Dec 2019 08:25 )             31.5     12-18    133<L>  |  93<L>  |  29<H>  ----------------------------<  209<H>  3.3<L>   |  25  |  2.60<H>    Ca    10.4      18 Dec 2019 07:13            RADIOLOGY & ADDITIONAL TESTS:

## 2019-12-20 NOTE — PROGRESS NOTE ADULT - ASSESSMENT
History of cocaine induced CM  improved LV functin   cont BB     PAF  off a/c high bleed risk     dysphagia  plan for surgical peg  Based on current ACC/AHA guidelines, patient history and physical exam, the patient is considered to have high risk   no absolute CV objection to proceed     fu labs  Monitor hemoglobin, transfuse as needed.

## 2019-12-20 NOTE — PROGRESS NOTE ADULT - SUBJECTIVE AND OBJECTIVE BOX
General Surgery Progress Note     Subjective/24hour Events:   ·	Patient seen and examined.   ·	Clinically stable with no acute events overnight.   ·	On tube feeds (nepro) at 50 cc/hr. Well-tolerated.      Objective  T(C): 37.1 (12-20-19 @ 17:17), Max: 37.1 (12-20-19 @ 16:54)  HR: 106 (12-20-19 @ 17:17) (98 - 111)  BP: 102/62 (12-20-19 @ 17:17) (100/59 - 127/68)  ABP: --  ABP(mean): --  RR: 18 (12-20-19 @ 17:17) (18 - 18)  SpO2: 95% (12-20-19 @ 17:17) (95% - 100%)  Wt(kg): --  CVP(mm Hg): --      12-19 @ 07:01  -  12-20 @ 07:00  --------------------------------------------------------  IN:    Enteral Tube Flush: 100 mL    Nepro with Carb Steady: 600 mL    Other: 1200 mL  Total IN: 1900 mL    OUT:    Other: 2700 mL  Total OUT: 2700 mL    Total NET: -800 mL          MEDICATIONS  (STANDING):  alteplase for catheter clearance 2 milliGRAM(s) Catheter once  alteplase for catheter clearance 2 milliGRAM(s) Catheter once  atorvastatin 40 milliGRAM(s) Oral at bedtime  carvedilol 3.125 milliGRAM(s) Oral every 12 hours  chlorhexidine 4% Liquid 1 Application(s) Topical <User Schedule>  cinacalcet 30 milliGRAM(s) Oral daily  Dakins Solution - 1/4 Strength 1 Application(s) Topical two times a day  dextrose 5%. 1000 milliLiter(s) (50 mL/Hr) IV Continuous <Continuous>  dextrose 50% Injectable 12.5 Gram(s) IV Push once  dextrose 50% Injectable 25 Gram(s) IV Push once  dextrose 50% Injectable 25 Gram(s) IV Push once  epoetin tan Injectable 38214 Unit(s) IV Push <User Schedule>  glucagon  Injectable 2 milliGRAM(s) IV Push once  heparin  Injectable 5000 Unit(s) SubCutaneous every 8 hours  insulin glargine Injectable (LANTUS) 9 Unit(s) SubCutaneous at bedtime  insulin lispro (HumaLOG) corrective regimen sliding scale   SubCutaneous every 6 hours  levETIRAcetam  Solution 1000 milliGRAM(s) Oral at bedtime  lidocaine   Patch 1 Patch Transdermal daily  pantoprazole  Injectable 20 milliGRAM(s) IV Push daily  predniSONE   Tablet 5 milliGRAM(s) Oral daily    MEDICATIONS  (PRN):  dextrose 40% Gel 15 Gram(s) Oral once PRN Blood Glucose LESS THAN 70 milliGRAM(s)/deciLiter  glucagon  Injectable 1 milliGRAM(s) IntraMuscular once PRN Glucose <70 milliGRAM(s)/deciLiter  loperamide Suspension 2 milliGRAM(s) Oral every 6 hours PRN loose stools      Physical Exam:  Gen: Lying comfortably in bed, no obvious distress. Minimal verbal response on assessment, unchanged from baseline.  HEENT: Feeding tube in place.  Lungs: Non labored breathing.   Ab: Soft, nontender, nondistended.   Ext: Moves all 4 spontaneously.     Lab Results  CBC (12-20 @ 12:13)                              8.3<L>                         16.37<H>  )----------------(  243        --    % Neutrophils, --    % Lymphocytes, ANC: --                                  27.1<L>    BMP (12-20 @ 10:01)             128<L>  |  91<L>   |  43<H> 		Ca++ --      Ca 9.8                ---------------------------------( 211<H>		Mg --                 3.9     |  28      |  2.56<H>			Ph --

## 2019-12-20 NOTE — PROGRESS NOTE ADULT - SUBJECTIVE AND OBJECTIVE BOX
Subjective: Patient seen and examined. No new events except as noted.     SUBJECTIVE/ROS:        MEDICATIONS:  MEDICATIONS  (STANDING):  alteplase for catheter clearance 2 milliGRAM(s) Catheter once  alteplase for catheter clearance 2 milliGRAM(s) Catheter once  atorvastatin 40 milliGRAM(s) Oral at bedtime  carvedilol 3.125 milliGRAM(s) Oral every 12 hours  chlorhexidine 4% Liquid 1 Application(s) Topical <User Schedule>  cinacalcet 30 milliGRAM(s) Oral daily  Dakins Solution - 1/4 Strength 1 Application(s) Topical two times a day  dextrose 5%. 1000 milliLiter(s) (50 mL/Hr) IV Continuous <Continuous>  dextrose 50% Injectable 12.5 Gram(s) IV Push once  dextrose 50% Injectable 25 Gram(s) IV Push once  dextrose 50% Injectable 25 Gram(s) IV Push once  epoetin tan Injectable 89497 Unit(s) IV Push <User Schedule>  glucagon  Injectable 2 milliGRAM(s) IV Push once  heparin  Injectable 5000 Unit(s) SubCutaneous every 8 hours  insulin glargine Injectable (LANTUS) 9 Unit(s) SubCutaneous at bedtime  insulin lispro (HumaLOG) corrective regimen sliding scale   SubCutaneous every 6 hours  levETIRAcetam  Solution 1000 milliGRAM(s) Oral at bedtime  lidocaine   Patch 1 Patch Transdermal daily  pantoprazole  Injectable 20 milliGRAM(s) IV Push daily  predniSONE   Tablet 5 milliGRAM(s) Oral daily      PHYSICAL EXAM:  T(C): 37 (12-20-19 @ 05:00), Max: 37 (12-20-19 @ 05:00)  HR: 102 (12-20-19 @ 05:00) (98 - 110)  BP: 117/72 (12-20-19 @ 05:00) (97/63 - 127/68)  RR: 18 (12-20-19 @ 05:00) (18 - 18)  SpO2: 100% (12-20-19 @ 05:00) (96% - 100%)  Wt(kg): --  I&O's Summary    19 Dec 2019 07:01  -  20 Dec 2019 07:00  --------------------------------------------------------  IN: 1900 mL / OUT: 2700 mL / NET: -800 mL            JVP: Normal  Neck: supple  Lung: clear   CV: S1 S2 , Murmur:  Abd: soft  Ext: No edema  neuro: Awake   Psych: flat affect  Skin: normal``    LABS/DATA:    CARDIAC MARKERS:                                9.3    14.82 )-----------( 265      ( 18 Dec 2019 08:25 )             31.5           proBNP:   Lipid Profile:   HgA1c:   TSH:     TELE:  EKG:

## 2019-12-20 NOTE — PROGRESS NOTE ADULT - ASSESSMENT
Assessment  DM: A1C 5.5%, was on insulin at home, started on basal insulin, blood sugars trending up, no hypoglycemic episodes. Patient is still NPO on tube feeds, failed PEG tube placement, planning laparoscopic assisted PEG placement pending medical clearance.  Hypercalcemia: Primary Hyperparathyroidism, Ca improved s/p Pamidronate dose.  Sepsis: IV ABx for UTI, LP inconsistent with meningitis, on medications, stable, monitored.  HTN: Controlled,  on antihypertensive medications.  ESRD: On hemodialysis, Monitor labs/BMP          Robi Gay MD  Cell: [phone]  Office: [phone] Assessment  DM: A1C 5.5%, was on insulin at home, started on basal insulin, blood sugars trending up, no hypoglycemic episodes. Patient is still NPO on tube feeds,  failed PEG tube placement, planning laparoscopic assisted PEG placement pending medical clearance.  Hypercalcemia: Primary Hyperparathyroidism, Ca improved s/p Pamidronate dose.  Sepsis: IV ABx for UTI, LP inconsistent with meningitis, on medications, stable, monitored.  HTN: Controlled,  on antihypertensive medications.  ESRD: On hemodialysis, Monitor labs/BMP          Robi Gay MD  Cell: [phone]  Office: [phone]

## 2019-12-20 NOTE — PROGRESS NOTE ADULT - SUBJECTIVE AND OBJECTIVE BOX
Patient is a 74y Male whom presented to   pateint seen and examined nad , in am     PAST MEDICAL & SURGICAL HISTORY:  Kidney transplant recipient  Anuria  GERD (gastroesophageal reflux disease)  Kidney transplant failure: dx: 2/2013  Hepatitis C: dx: 90&#x27;s.  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 10 milliGRAM(s) Oral three times a day  mycophenolate mofetil 250 milliGRAM(s) Oral two times a day  predniSONE   Tablet 5 milliGRAM(s) Oral daily  sevelamer carbonate 800 milliGRAM(s) Oral three times a day with meals  sodium bicarbonate 650 milliGRAM(s) Oral two times a day  tamsulosin 0.4 milliGRAM(s) Oral at bedtime      Allergies    No Known Allergies                       SOCIAL HISTORY:  Denies ETOh,Smoking,     FAMILY HISTORY:  Family history of breast cancer in sister (Sibling): living at 94 yo  Family history of prostate cancer in father  Family history of lung cancer  Family history of hypertension in mother  Family history of diabetes mellitus: mother      REVIEW OF SYSTEMS:    unbable to obtained                                            8.3    16.37 )-----------( 243      ( 20 Dec 2019 12:13 )             27.1       CBC Full  -  ( 20 Dec 2019 12:13 )  WBC Count : 16.37 K/uL  RBC Count : 3.13 M/uL  Hemoglobin : 8.3 g/dL  Hematocrit : 27.1 %  Platelet Count - Automated : 243 K/uL  Mean Cell Volume : 86.6 fl  Mean Cell Hemoglobin : 26.5 pg  Mean Cell Hemoglobin Concentration : 30.6 gm/dL  Auto Neutrophil # : x  Auto Lymphocyte # : x  Auto Monocyte # : x  Auto Eosinophil # : x  Auto Basophil # : x  Auto Neutrophil % : x  Auto Lymphocyte % : x  Auto Monocyte % : x  Auto Eosinophil % : x  Auto Basophil % : x      12-20    128<L>  |  91<L>  |  43<H>  ----------------------------<  211<H>  3.9   |  28  |  2.56<H>    Ca    9.8      20 Dec 2019 10:01        CAPILLARY BLOOD GLUCOSE      POCT Blood Glucose.: 206 mg/dL (20 Dec 2019 12:03)  POCT Blood Glucose.: 197 mg/dL (20 Dec 2019 06:15)  POCT Blood Glucose.: 181 mg/dL (20 Dec 2019 01:12)  POCT Blood Glucose.: 208 mg/dL (19 Dec 2019 21:53)  POCT Blood Glucose.: 162 mg/dL (19 Dec 2019 20:14)      Vital Signs Last 24 Hrs  T(C): 36.6 (20 Dec 2019 11:42), Max: 37 (20 Dec 2019 05:00)  T(F): 97.8 (20 Dec 2019 11:42), Max: 98.6 (20 Dec 2019 05:00)  HR: 111 (20 Dec 2019 11:42) (98 - 111)  BP: 100/59 (20 Dec 2019 11:42) (100/59 - 127/68)  BP(mean): --  RR: 18 (20 Dec 2019 11:42) (18 - 18)  SpO2: 98% (20 Dec 2019 11:42) (98% - 100%)                                   HEENT: conjunctive   clear   Neck:  No JVD  Respiratory: decrease bs b/l   Cardiovascular: S1 and S2  Gastrointestinal: BS+, soft, NT/ND  Extremities: No peripheral edema  Skin: dry   Access: pos fistula

## 2019-12-20 NOTE — PROGRESS NOTE ADULT - SUBJECTIVE AND OBJECTIVE BOX
Patient is a 74y old  Male who presents with a chief complaint of ams (20 Dec 2019 16:31)      INTERVAL HPI/OVERNIGHT EVENTS:  T(C): 37.1 (12-20-19 @ 16:54), Max: 37.1 (12-20-19 @ 16:54)  HR: 106 (12-20-19 @ 16:54) (98 - 111)  BP: 102/62 (12-20-19 @ 16:54) (100/59 - 127/68)  RR: 18 (12-20-19 @ 16:54) (18 - 18)  SpO2: 95% (12-20-19 @ 16:54) (95% - 100%)  Wt(kg): --  I&O's Summary    19 Dec 2019 07:01  -  20 Dec 2019 07:00  --------------------------------------------------------  IN: 1900 mL / OUT: 2700 mL / NET: -800 mL        LABS:                        8.3    16.37 )-----------( 243      ( 20 Dec 2019 12:13 )             27.1     12-20    128<L>  |  91<L>  |  43<H>  ----------------------------<  211<H>  3.9   |  28  |  2.56<H>    Ca    9.8      20 Dec 2019 10:01          CAPILLARY BLOOD GLUCOSE      POCT Blood Glucose.: 150 mg/dL (20 Dec 2019 17:14)  POCT Blood Glucose.: 206 mg/dL (20 Dec 2019 12:03)  POCT Blood Glucose.: 197 mg/dL (20 Dec 2019 06:15)  POCT Blood Glucose.: 181 mg/dL (20 Dec 2019 01:12)  POCT Blood Glucose.: 208 mg/dL (19 Dec 2019 21:53)  POCT Blood Glucose.: 162 mg/dL (19 Dec 2019 20:14)            MEDICATIONS  (STANDING):  alteplase for catheter clearance 2 milliGRAM(s) Catheter once  alteplase for catheter clearance 2 milliGRAM(s) Catheter once  atorvastatin 40 milliGRAM(s) Oral at bedtime  carvedilol 3.125 milliGRAM(s) Oral every 12 hours  chlorhexidine 4% Liquid 1 Application(s) Topical <User Schedule>  cinacalcet 30 milliGRAM(s) Oral daily  Dakins Solution - 1/4 Strength 1 Application(s) Topical two times a day  dextrose 5%. 1000 milliLiter(s) (50 mL/Hr) IV Continuous <Continuous>  dextrose 50% Injectable 12.5 Gram(s) IV Push once  dextrose 50% Injectable 25 Gram(s) IV Push once  dextrose 50% Injectable 25 Gram(s) IV Push once  epoetin tan Injectable 99166 Unit(s) IV Push <User Schedule>  glucagon  Injectable 2 milliGRAM(s) IV Push once  heparin  Injectable 5000 Unit(s) SubCutaneous every 8 hours  insulin glargine Injectable (LANTUS) 9 Unit(s) SubCutaneous at bedtime  insulin lispro (HumaLOG) corrective regimen sliding scale   SubCutaneous every 6 hours  levETIRAcetam  Solution 1000 milliGRAM(s) Oral at bedtime  lidocaine   Patch 1 Patch Transdermal daily  pantoprazole  Injectable 20 milliGRAM(s) IV Push daily  predniSONE   Tablet 5 milliGRAM(s) Oral daily    MEDICATIONS  (PRN):  dextrose 40% Gel 15 Gram(s) Oral once PRN Blood Glucose LESS THAN 70 milliGRAM(s)/deciLiter  glucagon  Injectable 1 milliGRAM(s) IntraMuscular once PRN Glucose <70 milliGRAM(s)/deciLiter  loperamide Suspension 2 milliGRAM(s) Oral five times a day PRN loose stools          PHYSICAL EXAM:  GENERAL: frail  CHEST/LUNG: Clear to percussion bilaterally; No rales, rhonchi, wheezing, or rubs  HEART: Regular rate and rhythm; No murmurs, rubs, or gallops  ABDOMEN: Soft, Nontender, Nondistended; Bowel sounds present  EXTREMITIES:  no edema  Care Discussed with Consultants/Other Providers [* ] YES  [ ] NO

## 2019-12-20 NOTE — PROGRESS NOTE ADULT - SUBJECTIVE AND OBJECTIVE BOX
Chief complaint  Patient is a 74y old  Male who presents with a chief complaint of ams (20 Dec 2019 11:55)   Review of systems  Patient in bed, looks comfortable, no fever, no hypoglycemia.    Labs and Fingersticks  CAPILLARY BLOOD GLUCOSE      POCT Blood Glucose.: 206 mg/dL (20 Dec 2019 12:03)  POCT Blood Glucose.: 197 mg/dL (20 Dec 2019 06:15)  POCT Blood Glucose.: 181 mg/dL (20 Dec 2019 01:12)  POCT Blood Glucose.: 208 mg/dL (19 Dec 2019 21:53)  POCT Blood Glucose.: 162 mg/dL (19 Dec 2019 20:14)      Anion Gap, Serum: 9 (12-20 @ 10:01)      Calcium, Total Serum: 9.8 (12-20 @ 10:01)          12-20    128<L>  |  91<L>  |  43<H>  ----------------------------<  211<H>  3.9   |  28  |  2.56<H>    Ca    9.8      20 Dec 2019 10:01                          8.3    16.37 )-----------( 243      ( 20 Dec 2019 12:13 )             27.1     Medications  MEDICATIONS  (STANDING):  alteplase for catheter clearance 2 milliGRAM(s) Catheter once  alteplase for catheter clearance 2 milliGRAM(s) Catheter once  atorvastatin 40 milliGRAM(s) Oral at bedtime  carvedilol 3.125 milliGRAM(s) Oral every 12 hours  chlorhexidine 4% Liquid 1 Application(s) Topical <User Schedule>  cinacalcet 30 milliGRAM(s) Oral daily  Dakins Solution - 1/4 Strength 1 Application(s) Topical two times a day  dextrose 5%. 1000 milliLiter(s) (50 mL/Hr) IV Continuous <Continuous>  dextrose 50% Injectable 12.5 Gram(s) IV Push once  dextrose 50% Injectable 25 Gram(s) IV Push once  dextrose 50% Injectable 25 Gram(s) IV Push once  epoetin tan Injectable 37628 Unit(s) IV Push <User Schedule>  glucagon  Injectable 2 milliGRAM(s) IV Push once  heparin  Injectable 5000 Unit(s) SubCutaneous every 8 hours  insulin glargine Injectable (LANTUS) 9 Unit(s) SubCutaneous at bedtime  insulin lispro (HumaLOG) corrective regimen sliding scale   SubCutaneous every 6 hours  levETIRAcetam  Solution 1000 milliGRAM(s) Oral at bedtime  lidocaine   Patch 1 Patch Transdermal daily  pantoprazole  Injectable 20 milliGRAM(s) IV Push daily  predniSONE   Tablet 5 milliGRAM(s) Oral daily      Physical Exam  General: Patient comfortable in bed  Vital Signs Last 12 Hrs  T(F): 97.8 (12-20-19 @ 11:42), Max: 98.6 (12-20-19 @ 05:00)  HR: 111 (12-20-19 @ 11:42) (102 - 111)  BP: 100/59 (12-20-19 @ 11:42) (100/59 - 117/72)  BP(mean): --  RR: 18 (12-20-19 @ 11:42) (18 - 18)  SpO2: 98% (12-20-19 @ 11:42) (98% - 100%)  Neck: No palpable thyroid nodules.  CVS: S1S2, No murmurs  Respiratory: No wheezing, no crepitations  GI: Abdomen soft, bowel sounds positive  Musculoskeletal:  edema lower extremities.   Skin: No skin rashes, no ecchymosis    Diagnostics Chief complaint  Patient is a 74y old  Male who presents with a chief complaint of ams (20 Dec 2019 11:55)   Review of systems  Patient in bed, looks comfortable, no fever,  no hypoglycemia.    Labs and Fingersticks  CAPILLARY BLOOD GLUCOSE      POCT Blood Glucose.: 206 mg/dL (20 Dec 2019 12:03)  POCT Blood Glucose.: 197 mg/dL (20 Dec 2019 06:15)  POCT Blood Glucose.: 181 mg/dL (20 Dec 2019 01:12)  POCT Blood Glucose.: 208 mg/dL (19 Dec 2019 21:53)  POCT Blood Glucose.: 162 mg/dL (19 Dec 2019 20:14)      Anion Gap, Serum: 9 (12-20 @ 10:01)      Calcium, Total Serum: 9.8 (12-20 @ 10:01)          12-20    128<L>  |  91<L>  |  43<H>  ----------------------------<  211<H>  3.9   |  28  |  2.56<H>    Ca    9.8      20 Dec 2019 10:01                          8.3    16.37 )-----------( 243      ( 20 Dec 2019 12:13 )             27.1     Medications  MEDICATIONS  (STANDING):  alteplase for catheter clearance 2 milliGRAM(s) Catheter once  alteplase for catheter clearance 2 milliGRAM(s) Catheter once  atorvastatin 40 milliGRAM(s) Oral at bedtime  carvedilol 3.125 milliGRAM(s) Oral every 12 hours  chlorhexidine 4% Liquid 1 Application(s) Topical <User Schedule>  cinacalcet 30 milliGRAM(s) Oral daily  Dakins Solution - 1/4 Strength 1 Application(s) Topical two times a day  dextrose 5%. 1000 milliLiter(s) (50 mL/Hr) IV Continuous <Continuous>  dextrose 50% Injectable 12.5 Gram(s) IV Push once  dextrose 50% Injectable 25 Gram(s) IV Push once  dextrose 50% Injectable 25 Gram(s) IV Push once  epoetin tan Injectable 72877 Unit(s) IV Push <User Schedule>  glucagon  Injectable 2 milliGRAM(s) IV Push once  heparin  Injectable 5000 Unit(s) SubCutaneous every 8 hours  insulin glargine Injectable (LANTUS) 9 Unit(s) SubCutaneous at bedtime  insulin lispro (HumaLOG) corrective regimen sliding scale   SubCutaneous every 6 hours  levETIRAcetam  Solution 1000 milliGRAM(s) Oral at bedtime  lidocaine   Patch 1 Patch Transdermal daily  pantoprazole  Injectable 20 milliGRAM(s) IV Push daily  predniSONE   Tablet 5 milliGRAM(s) Oral daily      Physical Exam  General: Patient comfortable in bed  Vital Signs Last 12 Hrs  T(F): 97.8 (12-20-19 @ 11:42), Max: 98.6 (12-20-19 @ 05:00)  HR: 111 (12-20-19 @ 11:42) (102 - 111)  BP: 100/59 (12-20-19 @ 11:42) (100/59 - 117/72)  BP(mean): --  RR: 18 (12-20-19 @ 11:42) (18 - 18)  SpO2: 98% (12-20-19 @ 11:42) (98% - 100%)  Neck: No palpable thyroid nodules.  CVS: S1S2, No murmurs  Respiratory: No wheezing, no crepitations  GI: Abdomen soft, bowel sounds positive  Musculoskeletal:  edema lower extremities.   Skin: No skin rashes, no ecchymosis    Diagnostics

## 2019-12-20 NOTE — PROGRESS NOTE ADULT - ASSESSMENT
NEURO:  - Extubated 11/29, now on nasal cannula. Minimally verbal   - metabolic encephalopathy, meningitis r/o, LP negative  - neuro following  - chronic lacunar infarcts; per neuro, AMS also likely related to hypercalcemia, renal dysfunction , poor nutritional intake.   - c/w keppra for ?hx of seizure disorder  -AMS likely 2/2 hyperCa, renal dysfunction, poor PO intake      CV:  - hx of afib was on eliquis, now off a/c per cards due to bleeding risk  - holding carvedilol 6.25 BID  - hx of cocaine cardiomyopathy, resolved, normal EF on most recent echo  - c/w statin  - echo repeat EF70, LVH, hyperdynamic LCF, normal RV sys fxn    PULM:- extubated 11/29--tolerating nasal cannula    GI:  - GI consult for PEG   - surgery consult appreciated for PEG   - GI AND IR unable to do a PEG     RENAL:- hx of ESRD s/p failed renal transplant x2  - renal following, HD as recommended.    ENDO:  #DM2: HbA1c 5.5  - Start Insulin Sliding Scale- endocrine following  - monitor Ca    INFECTIOUS:  - dc  vanco as per infectious disease   - id fu     Anemia  - transfuse PRN  - monitor cbc  - check guiac      Pt medically optimized for PEG , awaiting PEG by surgery

## 2019-12-20 NOTE — PRE-ANESTHESIA EVALUATION ADULT - NSANTHPMHFT_GEN_ALL_CORE
74M PMHx of ESRD s/p failed renal transplant x2, HCV, DM, and meningococcal meningitis 2 years ago , IIV drug abuse: former, cocaine. In recovery since 2000. dialysis prior to transplant in 2008, Kidney transplant failure: dx: 2/2013. kidney transplant: may 2015  and now failed recently. HD via Left arm fistula . Cardiomyopathy: likely cocaine related. PAF off a/c high bleed risk;   NPO on tube feeds, failed PEG tube placement. Hypercalcemia: Primary Hyperparathyroidism, Ca improved s/p Pamidronate dose. Sepsis: IV ABx for UTI, LP inconsistent with meningitis, on medications. sacral wound debrided 74M PMHx of ESRD s/p failed renal transplant x2, HCV, DM, and meningococcal meningitis 2 years ago , IIV drug abuse: former, cocaine. In recovery since 2000. dialysis prior to transplant in 2008, Kidney transplant failure: dx: 2/2013. kidney transplant: may 2015  and now failed recently. HD via Left arm fistula . Cardiomyopathy: likely cocaine related. PAF off a/c high bleed risk;   NPO on tube feeds, failed PEG tube placement. Hypercalcemia: Primary Hyperparathyroidism, Ca improved s/p Pamidronate dose. Sepsis: IV ABx for UTI, Extubated 11/29, now on nasal cannula. Minimally verbal   - metabolic encephalopathy, chronic lacunar infarcts; LP inconsistent with meningitis, on medications. sacral wound debrided

## 2019-12-20 NOTE — PROGRESS NOTE ADULT - PROBLEM SELECTOR PLAN 1
Will continue current insulin regimen for now.  Will continue monitoring FS and FU. Will continue monitoring FS and FU.

## 2019-12-20 NOTE — CHART NOTE - NSCHARTNOTEFT_GEN_A_CORE
Preop Dx: Dysphagia  Surgeon: Maxi Marroquin MD and Acute Care Surgery colleagues  Procedure: Open gastrostomy tube placement.    VITALS  T(C): 37.1 (12-20-19 @ 17:17), Max: 37.1 (12-20-19 @ 16:54)  HR: 106 (12-20-19 @ 17:17) (98 - 111)  BP: 102/62 (12-20-19 @ 17:17) (100/59 - 127/68)  RR: 18 (12-20-19 @ 17:17) (18 - 18)  SpO2: 95% (12-20-19 @ 17:17) (95% - 100%)    LAB RESULTS  CBC (12-20 @ 12:13)                              8.3<L>                         16.37<H>  )----------------(  243        --    % Neutrophils, --    % Lymphocytes, ANC: --                                  27.1<L>                BMP (12-20 @ 10:01)             128<L>  |  91<L>   |  43<H> 		Ca++ --      Ca 9.8                ---------------------------------( 211<H>		Mg --                 3.9     |  28      |  2.56<H>			Ph --        -> .Stool Feces Culture (12-14 @ 17:36)     NG    NG    GI PCR Results: NOT detected  *******Please Note:*******  GI panel PCR evaluates for:  Campylobacter, Plesiomonas shigelloides, Salmonella,  Vibrio, Yersinia enterocolitica, Enteroaggregative  Escherichia coli (EAEC), Enteropathogenic E.coli (EPEC),  Enterotoxigenic E. coli (ETEC) lt/st, Shiga-like  toxin-producing E. coli (STEC) stx1/stx2,  Shigella/ Enteroinvasive E. coli (EIEC), Cryptosporidium,  Cyclospora cayetanensis, Entamoeba histolytica,  Giardia lamblia, Adenovirus F 40/41, Astrovirus,  Norovirus GI/GII, Rotavirus A, Sapovirus    -> .Stool Feces Culture (12-10 @ 22:54)     NG    NG    GI PCR Results: NOT detected  *******Please Note:*******  GI panel PCR evaluates for:  Campylobacter, Plesiomonas shigelloides, Salmonella,  Vibrio, Yersinia enterocolitica, Enteroaggregative  Escherichia coli (EAEC), Enteropathogenic E.coli (EPEC),  Enterotoxigenic E. coli (ETEC) lt/st, Shiga-like  toxin-producing E. coli (STEC) stx1/stx2,  Shigella/ Enteroinvasive E. coli (EIEC), Cryptosporidium,  Cyclospora cayetanensis, Entamoeba histolytica,  Giardia lamblia, Adenovirus F 40/41, Astrovirus,  Norovirus GI/GII, Rotavirus A, Sapovirus    EKG: Ordered 12/20/2019  CXR: Ordered 12/20/2019    Type and Screen #1: Ordered 12/20/2019  Type and Screen #2: Ordered 12/20/2019      PLAN:  - OR 12/21/2019 for open gastrostomy tube placement with Dr. YA Marroquin  - Discontinue tube feeds (NPO) after midnight -- order confirmed  - Diabetic orders adjusted for NPO period  - Continue VTE ppx perioperatively  - AM labs: CBC, CMP with Mg/Phos, coags, T&S  - Medical risk stratification for OR: Cardiology clearance documented.  - Dialysis after surgery tomorrow.   - Plans discussed in person with primary team/provider JAVIER Saunders.    Permits:  - Consent in chart -- obtained from patient's sister/health care proxy   - Case scheduled for OR  - Dialysis after surgery tomorrow -- confirmed with Dialysis staff that patient is slotted for 5th shift (3:00 PM or later) to avoid scheduling conflicts. Preop Dx: Dysphagia  Surgeon: Maxi Marroquin MD and Acute Care Surgery colleagues  Procedure: Open gastrostomy tube placement.    VITALS  T(C): 37.1 (12-20-19 @ 17:17), Max: 37.1 (12-20-19 @ 16:54)  HR: 106 (12-20-19 @ 17:17) (98 - 111)  BP: 102/62 (12-20-19 @ 17:17) (100/59 - 127/68)  RR: 18 (12-20-19 @ 17:17) (18 - 18)  SpO2: 95% (12-20-19 @ 17:17) (95% - 100%)    LAB RESULTS  CBC (12-20 @ 12:13)                              8.3<L>                         16.37<H>  )----------------(  243        --    % Neutrophils, --    % Lymphocytes, ANC: --                                  27.1<L>                BMP (12-20 @ 10:01)             128<L>  |  91<L>   |  43<H> 		Ca++ --      Ca 9.8                ---------------------------------( 211<H>		Mg --                 3.9     |  28      |  2.56<H>			Ph --        -> .Stool Feces Culture (12-14 @ 17:36)     NG    NG    GI PCR Results: NOT detected  *******Please Note:*******  GI panel PCR evaluates for:  Campylobacter, Plesiomonas shigelloides, Salmonella,  Vibrio, Yersinia enterocolitica, Enteroaggregative  Escherichia coli (EAEC), Enteropathogenic E.coli (EPEC),  Enterotoxigenic E. coli (ETEC) lt/st, Shiga-like  toxin-producing E. coli (STEC) stx1/stx2,  Shigella/ Enteroinvasive E. coli (EIEC), Cryptosporidium,  Cyclospora cayetanensis, Entamoeba histolytica,  Giardia lamblia, Adenovirus F 40/41, Astrovirus,  Norovirus GI/GII, Rotavirus A, Sapovirus    -> .Stool Feces Culture (12-10 @ 22:54)     NG    NG    GI PCR Results: NOT detected  *******Please Note:*******  GI panel PCR evaluates for:  Campylobacter, Plesiomonas shigelloides, Salmonella,  Vibrio, Yersinia enterocolitica, Enteroaggregative  Escherichia coli (EAEC), Enteropathogenic E.coli (EPEC),  Enterotoxigenic E. coli (ETEC) lt/st, Shiga-like  toxin-producing E. coli (STEC) stx1/stx2,  Shigella/ Enteroinvasive E. coli (EIEC), Cryptosporidium,  Cyclospora cayetanensis, Entamoeba histolytica,  Giardia lamblia, Adenovirus F 40/41, Astrovirus,  Norovirus GI/GII, Rotavirus A, Sapovirus    EKG: Ordered 12/20/2019  CXR: Ordered 12/20/2019    Type and Screen #1: Ordered 12/20/2019  Type and Screen #2: Ordered 12/20/2019      PLAN:  - OR 12/21/2019 for open gastrostomy tube placement with Dr. YA Marroquin  - Discontinue tube feeds (NPO) after midnight -- order confirmed  - Diabetic orders adjusted for NPO period (PM Lantus dose halved, order confirmed)  - Continue VTE ppx perioperatively  - AM labs: CBC, CMP with Mg/Phos, coags, T&S  - Medical risk stratification for OR: Cardiology clearance documented.  - Dialysis after surgery tomorrow.   - Plans discussed in person with primary team/provider JAVIER Saunders.    Permits:  - Consent in chart -- obtained from patient's sister/health care proxy   - Case scheduled for OR  - Dialysis after surgery tomorrow -- confirmed with Dialysis staff that patient is slotted for 5th shift (3:00 PM or later) to avoid scheduling conflicts.

## 2019-12-20 NOTE — PROGRESS NOTE ADULT - ASSESSMENT
74M PMHx of ESRD s/p failed renal transplant x2, HCV, DM, and meningococcal meningitis 2 years ago was sent in to the ED from HD for AMS and hypotension with continued AMS with attempted PEG insertion with both IR and GI with inability to obtain good window for PEG insertion.    -- All cares per primary team  -- Patient listed and consented for OR tomorrow for open gastrostomy tube placement. Please see pre-operative note for details.  -- Cardiology clearance obtained.    Please contact Trauma & Acute Care Surgery (#[phone]) with any questions or concerns.

## 2019-12-20 NOTE — PRE-ANESTHESIA EVALUATION ADULT - NSANTHOSAYNRD_GEN_A_CORE
No. BON screening performed.  STOP BANG Legend: 0-2 = LOW Risk; 3-4 = INTERMEDIATE Risk; 5-8 = HIGH Risk

## 2019-12-21 LAB
APTT BLD: 22.1 SEC — LOW (ref 27.5–36.3)
CULTURE RESULTS: SIGNIFICANT CHANGE UP
GLUCOSE BLDC GLUCOMTR-MCNC: 133 MG/DL — HIGH (ref 70–99)
GLUCOSE BLDC GLUCOMTR-MCNC: 152 MG/DL — HIGH (ref 70–99)
GLUCOSE BLDC GLUCOMTR-MCNC: 163 MG/DL — HIGH (ref 70–99)
GLUCOSE BLDC GLUCOMTR-MCNC: 197 MG/DL — HIGH (ref 70–99)
INR BLD: 1.2 RATIO — HIGH (ref 0.88–1.16)
PROTHROM AB SERPL-ACNC: 13.9 SEC — HIGH (ref 10–12.9)
SPECIMEN SOURCE: SIGNIFICANT CHANGE UP

## 2019-12-21 PROCEDURE — 43830 GSTRST OPEN WO CONSTJ TUBE: CPT

## 2019-12-21 PROCEDURE — 99221 1ST HOSP IP/OBS SF/LOW 40: CPT | Mod: 57

## 2019-12-21 PROCEDURE — 93010 ELECTROCARDIOGRAM REPORT: CPT

## 2019-12-21 RX ORDER — CARVEDILOL PHOSPHATE 80 MG/1
3.12 CAPSULE, EXTENDED RELEASE ORAL EVERY 12 HOURS
Refills: 0 | Status: DISCONTINUED | OUTPATIENT
Start: 2019-12-21 | End: 2019-12-21

## 2019-12-21 RX ORDER — ACETAMINOPHEN 500 MG
1000 TABLET ORAL ONCE
Refills: 0 | Status: COMPLETED | OUTPATIENT
Start: 2019-12-21 | End: 2019-12-22

## 2019-12-21 RX ORDER — ERYTHROPOIETIN 10000 [IU]/ML
10000 INJECTION, SOLUTION INTRAVENOUS; SUBCUTANEOUS
Refills: 0 | Status: DISCONTINUED | OUTPATIENT
Start: 2019-12-21 | End: 2020-01-05

## 2019-12-21 RX ORDER — ATORVASTATIN CALCIUM 80 MG/1
40 TABLET, FILM COATED ORAL AT BEDTIME
Refills: 0 | Status: DISCONTINUED | OUTPATIENT
Start: 2019-12-21 | End: 2020-01-05

## 2019-12-21 RX ORDER — ONDANSETRON 8 MG/1
4 TABLET, FILM COATED ORAL ONCE
Refills: 0 | Status: DISCONTINUED | OUTPATIENT
Start: 2019-12-21 | End: 2019-12-21

## 2019-12-21 RX ORDER — SODIUM CHLORIDE 9 MG/ML
1000 INJECTION, SOLUTION INTRAVENOUS
Refills: 0 | Status: DISCONTINUED | OUTPATIENT
Start: 2019-12-21 | End: 2019-12-22

## 2019-12-21 RX ORDER — GLUCAGON INJECTION, SOLUTION 0.5 MG/.1ML
1 INJECTION, SOLUTION SUBCUTANEOUS ONCE
Refills: 0 | Status: DISCONTINUED | OUTPATIENT
Start: 2019-12-21 | End: 2019-12-30

## 2019-12-21 RX ORDER — ALBUMIN HUMAN 25 %
250 VIAL (ML) INTRAVENOUS ONCE
Refills: 0 | Status: COMPLETED | OUTPATIENT
Start: 2019-12-21 | End: 2019-12-21

## 2019-12-21 RX ORDER — LIDOCAINE 4 G/100G
1 CREAM TOPICAL DAILY
Refills: 0 | Status: DISCONTINUED | OUTPATIENT
Start: 2019-12-21 | End: 2019-12-30

## 2019-12-21 RX ORDER — FENTANYL CITRATE 50 UG/ML
25 INJECTION INTRAVENOUS
Refills: 0 | Status: DISCONTINUED | OUTPATIENT
Start: 2019-12-21 | End: 2019-12-21

## 2019-12-21 RX ORDER — SODIUM HYPOCHLORITE 0.125 %
1 SOLUTION, NON-ORAL MISCELLANEOUS
Refills: 0 | Status: DISCONTINUED | OUTPATIENT
Start: 2019-12-21 | End: 2020-01-05

## 2019-12-21 RX ORDER — CARVEDILOL PHOSPHATE 80 MG/1
3.12 CAPSULE, EXTENDED RELEASE ORAL EVERY 12 HOURS
Refills: 0 | Status: DISCONTINUED | OUTPATIENT
Start: 2019-12-21 | End: 2019-12-30

## 2019-12-21 RX ORDER — ACETAMINOPHEN 500 MG
1000 TABLET ORAL ONCE
Refills: 0 | Status: DISCONTINUED | OUTPATIENT
Start: 2019-12-22 | End: 2019-12-22

## 2019-12-21 RX ORDER — INSULIN GLARGINE 100 [IU]/ML
9 INJECTION, SOLUTION SUBCUTANEOUS AT BEDTIME
Refills: 0 | Status: DISCONTINUED | OUTPATIENT
Start: 2019-12-21 | End: 2019-12-24

## 2019-12-21 RX ORDER — PANTOPRAZOLE SODIUM 20 MG/1
20 TABLET, DELAYED RELEASE ORAL DAILY
Refills: 0 | Status: DISCONTINUED | OUTPATIENT
Start: 2019-12-21 | End: 2019-12-22

## 2019-12-21 RX ORDER — ACETAMINOPHEN 500 MG
1000 TABLET ORAL ONCE
Refills: 0 | Status: COMPLETED | OUTPATIENT
Start: 2019-12-21 | End: 2019-12-21

## 2019-12-21 RX ORDER — CHLORHEXIDINE GLUCONATE 213 G/1000ML
1 SOLUTION TOPICAL
Refills: 0 | Status: DISCONTINUED | OUTPATIENT
Start: 2019-12-21 | End: 2020-01-05

## 2019-12-21 RX ORDER — HEPARIN SODIUM 5000 [USP'U]/ML
5000 INJECTION INTRAVENOUS; SUBCUTANEOUS EVERY 8 HOURS
Refills: 0 | Status: DISCONTINUED | OUTPATIENT
Start: 2019-12-21 | End: 2019-12-30

## 2019-12-21 RX ORDER — DEXTROSE 50 % IN WATER 50 %
12.5 SYRINGE (ML) INTRAVENOUS ONCE
Refills: 0 | Status: DISCONTINUED | OUTPATIENT
Start: 2019-12-21 | End: 2019-12-30

## 2019-12-21 RX ORDER — LEVETIRACETAM 250 MG/1
1000 TABLET, FILM COATED ORAL EVERY 12 HOURS
Refills: 0 | Status: DISCONTINUED | OUTPATIENT
Start: 2019-12-21 | End: 2019-12-21

## 2019-12-21 RX ORDER — INSULIN LISPRO 100/ML
VIAL (ML) SUBCUTANEOUS EVERY 6 HOURS
Refills: 0 | Status: DISCONTINUED | OUTPATIENT
Start: 2019-12-21 | End: 2020-01-01

## 2019-12-21 RX ORDER — DEXTROSE 50 % IN WATER 50 %
15 SYRINGE (ML) INTRAVENOUS ONCE
Refills: 0 | Status: DISCONTINUED | OUTPATIENT
Start: 2019-12-21 | End: 2019-12-30

## 2019-12-21 RX ORDER — LEVETIRACETAM 250 MG/1
1000 TABLET, FILM COATED ORAL AT BEDTIME
Refills: 0 | Status: DISCONTINUED | OUTPATIENT
Start: 2019-12-21 | End: 2019-12-21

## 2019-12-21 RX ORDER — LEVETIRACETAM 250 MG/1
500 TABLET, FILM COATED ORAL
Refills: 0 | Status: DISCONTINUED | OUTPATIENT
Start: 2019-12-21 | End: 2019-12-31

## 2019-12-21 RX ORDER — PHENYLEPHRINE HYDROCHLORIDE 10 MG/ML
0.6 INJECTION INTRAVENOUS
Qty: 40 | Refills: 0 | Status: DISCONTINUED | OUTPATIENT
Start: 2019-12-21 | End: 2019-12-21

## 2019-12-21 RX ORDER — DEXTROSE 50 % IN WATER 50 %
25 SYRINGE (ML) INTRAVENOUS ONCE
Refills: 0 | Status: DISCONTINUED | OUTPATIENT
Start: 2019-12-21 | End: 2019-12-30

## 2019-12-21 RX ORDER — GLUCAGON INJECTION, SOLUTION 0.5 MG/.1ML
2 INJECTION, SOLUTION SUBCUTANEOUS ONCE
Refills: 0 | Status: DISCONTINUED | OUTPATIENT
Start: 2019-12-21 | End: 2019-12-21

## 2019-12-21 RX ADMIN — CHLORHEXIDINE GLUCONATE 1 APPLICATION(S): 213 SOLUTION TOPICAL at 04:05

## 2019-12-21 RX ADMIN — Medication 1 APPLICATION(S): at 14:03

## 2019-12-21 RX ADMIN — CARVEDILOL PHOSPHATE 3.12 MILLIGRAM(S): 80 CAPSULE, EXTENDED RELEASE ORAL at 20:24

## 2019-12-21 RX ADMIN — Medication 125 MILLILITER(S): at 10:35

## 2019-12-21 RX ADMIN — Medication 2: at 06:03

## 2019-12-21 RX ADMIN — CHLORHEXIDINE GLUCONATE 1 APPLICATION(S): 213 SOLUTION TOPICAL at 14:14

## 2019-12-21 RX ADMIN — Medication 2: at 23:12

## 2019-12-21 RX ADMIN — Medication 1000 MILLIGRAM(S): at 21:44

## 2019-12-21 RX ADMIN — HEPARIN SODIUM 5000 UNIT(S): 5000 INJECTION INTRAVENOUS; SUBCUTANEOUS at 13:58

## 2019-12-21 RX ADMIN — Medication 1000 MILLIGRAM(S): at 15:30

## 2019-12-21 RX ADMIN — INSULIN GLARGINE 9 UNIT(S): 100 INJECTION, SOLUTION SUBCUTANEOUS at 23:12

## 2019-12-21 RX ADMIN — SODIUM CHLORIDE 30 MILLILITER(S): 9 INJECTION, SOLUTION INTRAVENOUS at 14:15

## 2019-12-21 RX ADMIN — Medication 400 MILLIGRAM(S): at 15:12

## 2019-12-21 RX ADMIN — PANTOPRAZOLE SODIUM 20 MILLIGRAM(S): 20 TABLET, DELAYED RELEASE ORAL at 13:26

## 2019-12-21 RX ADMIN — Medication 2: at 13:26

## 2019-12-21 RX ADMIN — LIDOCAINE 1 PATCH: 4 CREAM TOPICAL at 13:57

## 2019-12-21 RX ADMIN — LEVETIRACETAM 500 MILLIGRAM(S): 250 TABLET, FILM COATED ORAL at 20:23

## 2019-12-21 RX ADMIN — Medication 400 MILLIGRAM(S): at 21:19

## 2019-12-21 RX ADMIN — HEPARIN SODIUM 5000 UNIT(S): 5000 INJECTION INTRAVENOUS; SUBCUTANEOUS at 22:32

## 2019-12-21 RX ADMIN — ATORVASTATIN CALCIUM 40 MILLIGRAM(S): 80 TABLET, FILM COATED ORAL at 22:31

## 2019-12-21 RX ADMIN — Medication 5 MILLIGRAM(S): at 05:01

## 2019-12-21 RX ADMIN — LIDOCAINE 1 PATCH: 4 CREAM TOPICAL at 19:20

## 2019-12-21 NOTE — PROGRESS NOTE ADULT - SUBJECTIVE AND OBJECTIVE BOX
Chief complaint  Patient is a 74y old  Male who presents with a chief complaint of ams (21 Dec 2019 11:36)   Review of systems  Patient in bed, looks comfortable, no fever,  no hypoglycemia.    Labs and Fingersticks  CAPILLARY BLOOD GLUCOSE      POCT Blood Glucose.: 197 mg/dL (21 Dec 2019 13:18)  POCT Blood Glucose.: 163 mg/dL (21 Dec 2019 05:54)  POCT Blood Glucose.: 215 mg/dL (20 Dec 2019 23:08)  POCT Blood Glucose.: 150 mg/dL (20 Dec 2019 17:14)      Anion Gap, Serum: 12 (12-20 @ 21:37)  Anion Gap, Serum: 12 (12-20 @ 21:37)  Anion Gap, Serum: 9 (12-20 @ 10:01)      Calcium, Total Serum: 10.2 (12-20 @ 21:37)  Calcium, Total Serum: 10.2 (12-20 @ 21:37)  Calcium, Total Serum: 9.8 (12-20 @ 10:01)  Albumin, Serum: 2.2 <L> (12-20 @ 21:37)    Alanine Aminotransferase (ALT/SGPT): 6 <L> (12-20 @ 21:37)  Alkaline Phosphatase, Serum: 109 (12-20 @ 21:37)  Aspartate Aminotransferase (AST/SGOT): 19 (12-20 @ 21:37)        12-20    124<L>  |  89<L>  |  56<H>  ----------------------------<  202<H>  4.1   |  23  |  3.12<H>    Ca    10.2      20 Dec 2019 21:37  Phos  1.2     12-20  Mg     1.9     12-20    TPro  7.7  /  Alb  2.2<L>  /  TBili  0.5  /  DBili  x   /  AST  19  /  ALT  6<L>  /  AlkPhos  109  12-20                        8.2    18.93 )-----------( 209      ( 20 Dec 2019 21:36 )             26.5     Medications  MEDICATIONS  (STANDING):  acetaminophen  IVPB .. 1000 milliGRAM(s) IV Intermittent once  acetaminophen  IVPB .. 1000 milliGRAM(s) IV Intermittent once  acetaminophen  IVPB .. 1000 milliGRAM(s) IV Intermittent once  atorvastatin 40 milliGRAM(s) Oral at bedtime  carvedilol 3.125 milliGRAM(s) Oral every 12 hours  chlorhexidine 4% Liquid 1 Application(s) Topical <User Schedule>  Dakins Solution - 1/4 Strength 1 Application(s) Topical two times a day  dextrose 5% + sodium chloride 0.9%. 1000 milliLiter(s) (30 mL/Hr) IV Continuous <Continuous>  dextrose 50% Injectable 12.5 Gram(s) IV Push once  dextrose 50% Injectable 25 Gram(s) IV Push once  epoetin tan Injectable 17356 Unit(s) IV Push <User Schedule>  heparin  Injectable 5000 Unit(s) SubCutaneous every 8 hours  insulin glargine Injectable (LANTUS) 9 Unit(s) SubCutaneous at bedtime  insulin lispro (HumaLOG) corrective regimen sliding scale   SubCutaneous every 6 hours  levETIRAcetam  Solution 500 milliGRAM(s) Oral two times a day  lidocaine   Patch 1 Patch Transdermal daily  pantoprazole  Injectable 20 milliGRAM(s) IV Push daily  phenylephrine    Infusion 0.6 MICROgram(s)/kG/Min (15.3 mL/Hr) IV Continuous <Continuous>  predniSONE   Tablet 5 milliGRAM(s) Oral daily      Physical Exam  General: Patient comfortable in bed  Vital Signs Last 12 Hrs  T(F): 99.3 (12-21-19 @ 13:55), Max: 99.3 (12-21-19 @ 13:55)  HR: 96 (12-21-19 @ 13:55) (92 - 109)  BP: 123/73 (12-21-19 @ 13:55) (80/51 - 123/73)  BP(mean): 78 (12-21-19 @ 13:00) (59 - 85)  RR: 17 (12-21-19 @ 13:55) (12 - 18)  SpO2: 96% (12-21-19 @ 13:55) (96% - 100%)  Neck: No palpable thyroid nodules.  CVS: S1S2, No murmurs  Respiratory: No wheezing, no crepitations  GI: Abdomen soft, bowel sounds positive  Musculoskeletal:  edema lower extremities.   Skin: No skin rashes, no ecchymosis    Diagnostics    Thyroid Stimulating Hormone, Serum: AM Sched. Collection: 03-Dec-2019 06:00 (12-02 @ 10:42)  Free Thyroxine, Serum: AM Sched. Collection: 03-Dec-2019 06:00 (12-02 @ 10:42)  Thyroid Stimulating Hormone, Serum: AM Sched. Collection: 17-Dec-2019 06:00 (12-16 @ 12:58)  Free Thyroxine, Serum: AM Sched. Collection: 17-Dec-2019 06:00 (12-16 @ 12:58)

## 2019-12-21 NOTE — PROGRESS NOTE ADULT - SUBJECTIVE AND OBJECTIVE BOX
Patient is a 74y Male whom presented to   pateint seen and examined nad , in am     PAST MEDICAL & SURGICAL HISTORY:  Kidney transplant recipient  Anuria  GERD (gastroesophageal reflux disease)  Kidney transplant failure: dx: 2/2013  Hepatitis C: dx: 90&#x27;s.  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 10 milliGRAM(s) Oral three times a day  mycophenolate mofetil 250 milliGRAM(s) Oral two times a day  predniSONE   Tablet 5 milliGRAM(s) Oral daily  sevelamer carbonate 800 milliGRAM(s) Oral three times a day with meals  sodium bicarbonate 650 milliGRAM(s) Oral two times a day  tamsulosin 0.4 milliGRAM(s) Oral at bedtime      Allergies    No Known Allergies                       SOCIAL HISTORY:  Denies ETOh,Smoking,     FAMILY HISTORY:  Family history of breast cancer in sister (Sibling): living at 92 yo  Family history of prostate cancer in father  Family history of lung cancer  Family history of hypertension in mother  Family history of diabetes mellitus: mother      REVIEW OF SYSTEMS:    unbable to obtained                                                              8.2    18.93 )-----------( 209      ( 20 Dec 2019 21:36 )             26.5       CBC Full  -  ( 20 Dec 2019 21:36 )  WBC Count : 18.93 K/uL  RBC Count : 3.09 M/uL  Hemoglobin : 8.2 g/dL  Hematocrit : 26.5 %  Platelet Count - Automated : 209 K/uL  Mean Cell Volume : 85.8 fl  Mean Cell Hemoglobin : 26.5 pg  Mean Cell Hemoglobin Concentration : 30.9 gm/dL  Auto Neutrophil # : 14.18 K/uL  Auto Lymphocyte # : 3.11 K/uL  Auto Monocyte # : 1.33 K/uL  Auto Eosinophil # : 0.14 K/uL  Auto Basophil # : 0.07 K/uL  Auto Neutrophil % : 75.0 %  Auto Lymphocyte % : 16.4 %  Auto Monocyte % : 7.0 %  Auto Eosinophil % : 0.7 %  Auto Basophil % : 0.4 %      12-20    124<L>  |  89<L>  |  56<H>  ----------------------------<  202<H>  4.1   |  23  |  3.12<H>    Ca    10.2      20 Dec 2019 21:37  Phos  1.2     12-20  Mg     1.9     12-20    TPro  7.7  /  Alb  2.2<L>  /  TBili  0.5  /  DBili  x   /  AST  19  /  ALT  6<L>  /  AlkPhos  109  12-20      CAPILLARY BLOOD GLUCOSE      POCT Blood Glucose.: 197 mg/dL (21 Dec 2019 13:18)  POCT Blood Glucose.: 163 mg/dL (21 Dec 2019 05:54)  POCT Blood Glucose.: 215 mg/dL (20 Dec 2019 23:08)      Vital Signs Last 24 Hrs  T(C): 36.4 (21 Dec 2019 14:55), Max: 37.4 (21 Dec 2019 13:55)  T(F): 97.6 (21 Dec 2019 14:55), Max: 99.3 (21 Dec 2019 13:55)  HR: 100 (21 Dec 2019 14:55) (92 - 109)  BP: 119/71 (21 Dec 2019 14:55) (80/51 - 123/73)  BP(mean): 78 (21 Dec 2019 13:00) (59 - 85)  RR: 18 (21 Dec 2019 14:55) (12 - 18)  SpO2: 99% (21 Dec 2019 14:55) (94% - 100%)        PT/INR - ( 21 Dec 2019 00:48 )   PT: 13.9 sec;   INR: 1.20 ratio         PTT - ( 21 Dec 2019 00:48 )  PTT:22.1 sec                         HEENT: conjunctive   clear   Neck:  No JVD  Respiratory: decrease bs b/l   Cardiovascular: S1 and S2  Gastrointestinal: BS+, soft, NT/ND  Extremities: No peripheral edema  Skin: dry   Access: pos fistula

## 2019-12-21 NOTE — PROGRESS NOTE ADULT - PROBLEM SELECTOR PLAN 1
- s/p upper gastrointestinal endoscopy with the procedure was aborted due to inability to place PEG tube  - NGT replaced with endoscopic confirmation  - s/p failed IR PEG  - s/p open feeding gastrostomy tube placement 12/21. Care per surgery team appreciated

## 2019-12-21 NOTE — PROGRESS NOTE ADULT - ASSESSMENT
NEURO:  - Extubated 11/29, now on nasal cannula. Minimally verbal   - metabolic encephalopathy, meningitis r/o, LP negative  - neuro following  - chronic lacunar infarcts; per neuro, AMS also likely related to hypercalcemia, renal dysfunction , poor nutritional intake.   - c/w keppra for ?hx of seizure disorder  -AMS likely 2/2 hyperCa, renal dysfunction, poor PO intake      CV:  - hx of afib was on eliquis, now off a/c per cards due to bleeding risk  - holding carvedilol 6.25 BID  - hx of cocaine cardiomyopathy, resolved, normal EF on most recent echo  - c/w statin  - echo repeat EF70, LVH, hyperdynamic LCF, normal RV sys fxn    PULM:- extubated 11/29--tolerating nasal cannula    GI:  - PEG with surgery     RENAL:- hx of ESRD s/p failed renal transplant x2- renal following, HD as recommended.    ENDO:  - Start Insulin Sliding Scale  - endocrine following  - monitor Ca    INFECTIOUS:  - dc  vanco as per infectious disease   - id fu     Anemia  - transfuse PRN  - monitor cbc

## 2019-12-21 NOTE — PROVIDER CONTACT NOTE (OTHER) - SITUATION
PEG tube placed today. Pt arrived on unit post hemodialysis at change of shift. RN unfamiliar with PEG tubing/ no cap in place. Asked senior RNs on unit who were also unfamiliar. Provider notified

## 2019-12-21 NOTE — PROGRESS NOTE ADULT - ASSESSMENT
Assessment  DM: A1C 5.5%, was on insulin at home, started on basal insulin, blood sugars trending up, no hypoglycemic episodes. Patient is still NPO on tube feeds,  PEG tube placed, stable.  Hypercalcemia: Primary Hyperparathyroidism, Ca improved s/p Pamidronate dose.  Sepsis: IV ABx for UTI, LP inconsistent with meningitis, on medications, stable, monitored.  HTN: Controlled,  on antihypertensive medications.  ESRD: On hemodialysis, Monitor labs/BMP          Robi Gay MD  Cell: [phone]  Office: [phone]

## 2019-12-21 NOTE — PROGRESS NOTE ADULT - SUBJECTIVE AND OBJECTIVE BOX
INTERVAL HPI/OVERNIGHT EVENTS:    pt seen and examined in PACU  s/p open feeding gastrostomy tube placement     MEDICATIONS  (STANDING):  acetaminophen  IVPB .. 1000 milliGRAM(s) IV Intermittent once  acetaminophen  IVPB .. 1000 milliGRAM(s) IV Intermittent once  acetaminophen  IVPB .. 1000 milliGRAM(s) IV Intermittent once  atorvastatin 40 milliGRAM(s) Oral at bedtime  carvedilol 3.125 milliGRAM(s) Oral every 12 hours  chlorhexidine 4% Liquid 1 Application(s) Topical <User Schedule>  Dakins Solution - 1/4 Strength 1 Application(s) Topical two times a day  dextrose 5% + sodium chloride 0.9%. 1000 milliLiter(s) (30 mL/Hr) IV Continuous <Continuous>  dextrose 50% Injectable 12.5 Gram(s) IV Push once  dextrose 50% Injectable 25 Gram(s) IV Push once  epoetin tan Injectable 51538 Unit(s) IV Push <User Schedule>  heparin  Injectable 5000 Unit(s) SubCutaneous every 8 hours  insulin glargine Injectable (LANTUS) 9 Unit(s) SubCutaneous at bedtime  insulin lispro (HumaLOG) corrective regimen sliding scale   SubCutaneous every 6 hours  levETIRAcetam 1000 milliGRAM(s) Oral at bedtime  lidocaine   Patch 1 Patch Transdermal daily  pantoprazole  Injectable 20 milliGRAM(s) IV Push daily  phenylephrine    Infusion 0.6 MICROgram(s)/kG/Min (15.3 mL/Hr) IV Continuous <Continuous>  predniSONE   Tablet 5 milliGRAM(s) Oral daily    MEDICATIONS  (PRN):  dextrose 40% Gel 15 Gram(s) Oral once PRN Blood Glucose LESS THAN 70 milliGRAM(s)/deciLiter  fentaNYL    Injectable 25 MICROGram(s) IV Push every 5 minutes PRN Moderate Pain (4 - 6)  glucagon  Injectable 1 milliGRAM(s) IntraMuscular once PRN Glucose <70 milliGRAM(s)/deciLiter  ondansetron Injectable 4 milliGRAM(s) IV Push once PRN Nausea and/or Vomiting      Allergies    No Known Allergies    Intolerances        Review of Systems:    unable to obtain      Vital Signs Last 24 Hrs  T(C): 36.4 (21 Dec 2019 09:45), Max: 37.1 (20 Dec 2019 16:54)  T(F): 97.5 (21 Dec 2019 09:45), Max: 98.8 (20 Dec 2019 16:54)  HR: 93 (21 Dec 2019 10:30) (93 - 111)  BP: 113/68 (21 Dec 2019 10:30) (80/51 - 113/68)  BP(mean): 85 (21 Dec 2019 10:30) (59 - 85)  RR: 12 (21 Dec 2019 11:00) (12 - 18)  SpO2: 100% (21 Dec 2019 10:30) (94% - 100%)    PHYSICAL EXAM:  GENERAL:   NAD  HEENT:  NC/AT, no JVD, sclera-anicteric  ABDOMEN:  Soft, non-distended, + bowel sounds, + peg dressed, c/d/i  EXTREMITIES:  no cyanosis, clubbing or edema  NEURO:  awake, responsive, minimally verbal  SKIN:  No rash/warm/dry      LABS:                        8.2    18.93 )-----------( 209      ( 20 Dec 2019 21:36 )             26.5     12-20    124<L>  |  89<L>  |  56<H>  ----------------------------<  202<H>  4.1   |  23  |  3.12<H>    Ca    10.2      20 Dec 2019 21:37  Phos  1.2     12-20  Mg     1.9     12-20    TPro  7.7  /  Alb  2.2<L>  /  TBili  0.5  /  DBili  x   /  AST  19  /  ALT  6<L>  /  AlkPhos  109  12-20    PT/INR - ( 21 Dec 2019 00:48 )   PT: 13.9 sec;   INR: 1.20 ratio         PTT - ( 21 Dec 2019 00:48 )  PTT:22.1 sec      RADIOLOGY & ADDITIONAL TESTS:

## 2019-12-21 NOTE — PROGRESS NOTE ADULT - SUBJECTIVE AND OBJECTIVE BOX
Patient is a 74y old  Male who presents with a chief complaint of ams (21 Dec 2019 17:57)      INTERVAL HPI/OVERNIGHT EVENTS:  T(C): 36.4 (12-21-19 @ 14:55), Max: 37.4 (12-21-19 @ 13:55)  HR: 100 (12-21-19 @ 14:55) (92 - 109)  BP: 119/71 (12-21-19 @ 14:55) (80/51 - 123/73)  RR: 18 (12-21-19 @ 14:55) (12 - 18)  SpO2: 99% (12-21-19 @ 14:55) (94% - 100%)  Wt(kg): --  I&O's Summary    20 Dec 2019 07:01  -  21 Dec 2019 07:00  --------------------------------------------------------  IN: 310 mL / OUT: 0 mL / NET: 310 mL    21 Dec 2019 07:01  -  21 Dec 2019 18:34  --------------------------------------------------------  IN: 262.8 mL / OUT: 0 mL / NET: 262.8 mL        LABS:                        8.2    18.93 )-----------( 209      ( 20 Dec 2019 21:36 )             26.5     12-20    124<L>  |  89<L>  |  56<H>  ----------------------------<  202<H>  4.1   |  23  |  3.12<H>    Ca    10.2      20 Dec 2019 21:37  Phos  1.2     12-20  Mg     1.9     12-20    TPro  7.7  /  Alb  2.2<L>  /  TBili  0.5  /  DBili  x   /  AST  19  /  ALT  6<L>  /  AlkPhos  109  12-20    PT/INR - ( 21 Dec 2019 00:48 )   PT: 13.9 sec;   INR: 1.20 ratio         PTT - ( 21 Dec 2019 00:48 )  PTT:22.1 sec    CAPILLARY BLOOD GLUCOSE      POCT Blood Glucose.: 133 mg/dL (21 Dec 2019 17:59)  POCT Blood Glucose.: 197 mg/dL (21 Dec 2019 13:18)  POCT Blood Glucose.: 163 mg/dL (21 Dec 2019 05:54)  POCT Blood Glucose.: 215 mg/dL (20 Dec 2019 23:08)            MEDICATIONS  (STANDING):  acetaminophen  IVPB .. 1000 milliGRAM(s) IV Intermittent once  acetaminophen  IVPB .. 1000 milliGRAM(s) IV Intermittent once  atorvastatin 40 milliGRAM(s) Oral at bedtime  carvedilol 3.125 milliGRAM(s) Oral every 12 hours  chlorhexidine 4% Liquid 1 Application(s) Topical <User Schedule>  Dakins Solution - 1/4 Strength 1 Application(s) Topical two times a day  dextrose 5% + sodium chloride 0.9%. 1000 milliLiter(s) (30 mL/Hr) IV Continuous <Continuous>  dextrose 50% Injectable 12.5 Gram(s) IV Push once  dextrose 50% Injectable 25 Gram(s) IV Push once  epoetin tan Injectable 70184 Unit(s) IV Push <User Schedule>  heparin  Injectable 5000 Unit(s) SubCutaneous every 8 hours  insulin glargine Injectable (LANTUS) 9 Unit(s) SubCutaneous at bedtime  insulin lispro (HumaLOG) corrective regimen sliding scale   SubCutaneous every 6 hours  levETIRAcetam  Solution 500 milliGRAM(s) Oral two times a day  lidocaine   Patch 1 Patch Transdermal daily  pantoprazole  Injectable 20 milliGRAM(s) IV Push daily  predniSONE   Tablet 5 milliGRAM(s) Oral daily    MEDICATIONS  (PRN):  dextrose 40% Gel 15 Gram(s) Oral once PRN Blood Glucose LESS THAN 70 milliGRAM(s)/deciLiter  glucagon  Injectable 1 milliGRAM(s) IntraMuscular once PRN Glucose <70 milliGRAM(s)/deciLiter          PHYSICAL EXAM:  GENERAL: frail  CHEST/LUNG: Clear to percussion bilaterally; No rales, rhonchi, wheezing, or rubs  HEART: Regular rate and rhythm; No murmurs, rubs, or gallops  ABDOMEN: Soft, Nontender, Nondistended; Bowel sounds present  EXTREMITIES:no edema     Care Discussed with Consultants/Other Providers [ ] YES  [ ] NO

## 2019-12-21 NOTE — PROGRESS NOTE ADULT - SUBJECTIVE AND OBJECTIVE BOX
Patient seen and examined yesterday (12/20/19) as well as this morning  74 year old with history of Hep C, IVDA, ESRD (s/p failed renal transplant) on HD via permacath, GERD, HTN, DM, BPH, diverticulitis (s/p colon resection multiple years ago) now hospitalized on medical service.  Diagnosed with dysphagia secondary to poor mental status.  Failed endoscopic attempt at PEG placement.  On exam-  mild tachycardia, afebrile  not follow commands for me  minimally verbal  opens eyes to voice intermittently   abd - benign, well healed surgical scars    + chronic anemia likely secondary to ESRD  + hyponatremia, will need renal follow up for correction  potassium WNL last 24 hours    I have discussed risks / benefits / alternatives to open g-tube placement with family (sisters x 2) at length (on phone yesterday and in person this AM).  I have discussed possible perioperative complications secondary to general medical condition as well as the possibility of patient dislodging feeding tube perioperatively (requiring a return trip to OR).  I have discussed possibility of NOT placing feeding tube and a generalized palliative approach to care.  The family wants feeding tube placement and understands risks of OR.  All questions answered

## 2019-12-22 LAB
ANION GAP SERPL CALC-SCNC: 13 MMOL/L — SIGNIFICANT CHANGE UP (ref 5–17)
BUN SERPL-MCNC: 36 MG/DL — HIGH (ref 7–23)
CALCIUM SERPL-MCNC: 10.3 MG/DL — SIGNIFICANT CHANGE UP (ref 8.4–10.5)
CHLORIDE SERPL-SCNC: 95 MMOL/L — LOW (ref 96–108)
CO2 SERPL-SCNC: 27 MMOL/L — SIGNIFICANT CHANGE UP (ref 22–31)
CREAT SERPL-MCNC: 2.51 MG/DL — HIGH (ref 0.5–1.3)
GLUCOSE BLDC GLUCOMTR-MCNC: 116 MG/DL — HIGH (ref 70–99)
GLUCOSE BLDC GLUCOMTR-MCNC: 136 MG/DL — HIGH (ref 70–99)
GLUCOSE BLDC GLUCOMTR-MCNC: 146 MG/DL — HIGH (ref 70–99)
GLUCOSE BLDC GLUCOMTR-MCNC: 190 MG/DL — HIGH (ref 70–99)
GLUCOSE SERPL-MCNC: 149 MG/DL — HIGH (ref 70–99)
HCT VFR BLD CALC: 29.5 % — LOW (ref 39–50)
HGB BLD-MCNC: 8.8 G/DL — LOW (ref 13–17)
MCHC RBC-ENTMCNC: 26 PG — LOW (ref 27–34)
MCHC RBC-ENTMCNC: 29.8 GM/DL — LOW (ref 32–36)
MCV RBC AUTO: 87 FL — SIGNIFICANT CHANGE UP (ref 80–100)
PLATELET # BLD AUTO: 302 K/UL — SIGNIFICANT CHANGE UP (ref 150–400)
POTASSIUM SERPL-MCNC: 3.8 MMOL/L — SIGNIFICANT CHANGE UP (ref 3.5–5.3)
POTASSIUM SERPL-SCNC: 3.8 MMOL/L — SIGNIFICANT CHANGE UP (ref 3.5–5.3)
RBC # BLD: 3.39 M/UL — LOW (ref 4.2–5.8)
RBC # FLD: 15.8 % — HIGH (ref 10.3–14.5)
SODIUM SERPL-SCNC: 135 MMOL/L — SIGNIFICANT CHANGE UP (ref 135–145)
WBC # BLD: 21.81 K/UL — HIGH (ref 3.8–10.5)
WBC # FLD AUTO: 21.81 K/UL — HIGH (ref 3.8–10.5)

## 2019-12-22 RX ORDER — PANTOPRAZOLE SODIUM 20 MG/1
40 TABLET, DELAYED RELEASE ORAL DAILY
Refills: 0 | Status: DISCONTINUED | OUTPATIENT
Start: 2019-12-22 | End: 2019-12-22

## 2019-12-22 RX ADMIN — Medication 400 MILLIGRAM(S): at 05:58

## 2019-12-22 RX ADMIN — LEVETIRACETAM 500 MILLIGRAM(S): 250 TABLET, FILM COATED ORAL at 05:57

## 2019-12-22 RX ADMIN — LEVETIRACETAM 500 MILLIGRAM(S): 250 TABLET, FILM COATED ORAL at 17:33

## 2019-12-22 RX ADMIN — LIDOCAINE 1 PATCH: 4 CREAM TOPICAL at 19:00

## 2019-12-22 RX ADMIN — Medication 2: at 23:16

## 2019-12-22 RX ADMIN — LIDOCAINE 1 PATCH: 4 CREAM TOPICAL at 00:18

## 2019-12-22 RX ADMIN — Medication 1 APPLICATION(S): at 05:56

## 2019-12-22 RX ADMIN — CARVEDILOL PHOSPHATE 3.12 MILLIGRAM(S): 80 CAPSULE, EXTENDED RELEASE ORAL at 05:59

## 2019-12-22 RX ADMIN — ATORVASTATIN CALCIUM 40 MILLIGRAM(S): 80 TABLET, FILM COATED ORAL at 21:25

## 2019-12-22 RX ADMIN — LIDOCAINE 1 PATCH: 4 CREAM TOPICAL at 12:41

## 2019-12-22 RX ADMIN — HEPARIN SODIUM 5000 UNIT(S): 5000 INJECTION INTRAVENOUS; SUBCUTANEOUS at 13:43

## 2019-12-22 RX ADMIN — INSULIN GLARGINE 9 UNIT(S): 100 INJECTION, SOLUTION SUBCUTANEOUS at 23:15

## 2019-12-22 RX ADMIN — CHLORHEXIDINE GLUCONATE 1 APPLICATION(S): 213 SOLUTION TOPICAL at 03:58

## 2019-12-22 RX ADMIN — Medication 1 APPLICATION(S): at 17:33

## 2019-12-22 RX ADMIN — CARVEDILOL PHOSPHATE 3.12 MILLIGRAM(S): 80 CAPSULE, EXTENDED RELEASE ORAL at 17:33

## 2019-12-22 RX ADMIN — LIDOCAINE 1 PATCH: 4 CREAM TOPICAL at 23:48

## 2019-12-22 RX ADMIN — Medication 1000 MILLIGRAM(S): at 06:31

## 2019-12-22 RX ADMIN — Medication 5 MILLIGRAM(S): at 05:58

## 2019-12-22 RX ADMIN — HEPARIN SODIUM 5000 UNIT(S): 5000 INJECTION INTRAVENOUS; SUBCUTANEOUS at 21:25

## 2019-12-22 RX ADMIN — HEPARIN SODIUM 5000 UNIT(S): 5000 INJECTION INTRAVENOUS; SUBCUTANEOUS at 05:57

## 2019-12-22 NOTE — PROGRESS NOTE ADULT - ASSESSMENT
Assessment  DM: A1C 5.5%, was on insulin at home, started on basal insulin, FS within acceptable range no hypoglycemic episodes. Patient is still NPO on tube feeds,  PEG tube placed, stable.  Hypercalcemia: Primary Hyperparathyroidism, Ca improved s/p Pamidronate dose.  Sepsis: IV ABx for UTI, LP inconsistent with meningitis, on medications, stable, monitored.  HTN: Controlled,  on antihypertensive medications.  ESRD: On hemodialysis, Monitor labs/BMP          Robi Gay MD  Cell: [phone]  Office: [phone]

## 2019-12-22 NOTE — PROGRESS NOTE ADULT - SUBJECTIVE AND OBJECTIVE BOX
Subjective: Patient seen and examined. No new events except as noted.     SUBJECTIVE/ROS:  ROS limited       MEDICATIONS:  MEDICATIONS  (STANDING):  atorvastatin 40 milliGRAM(s) Oral at bedtime  carvedilol 3.125 milliGRAM(s) Oral every 12 hours  chlorhexidine 4% Liquid 1 Application(s) Topical <User Schedule>  Dakins Solution - 1/4 Strength 1 Application(s) Topical two times a day  dextrose 50% Injectable 12.5 Gram(s) IV Push once  dextrose 50% Injectable 25 Gram(s) IV Push once  epoetin tan Injectable 77594 Unit(s) IV Push <User Schedule>  heparin  Injectable 5000 Unit(s) SubCutaneous every 8 hours  insulin glargine Injectable (LANTUS) 9 Unit(s) SubCutaneous at bedtime  insulin lispro (HumaLOG) corrective regimen sliding scale   SubCutaneous every 6 hours  levETIRAcetam  Solution 500 milliGRAM(s) Oral two times a day  lidocaine   Patch 1 Patch Transdermal daily  predniSONE   Tablet 5 milliGRAM(s) Oral daily      PHYSICAL EXAM:  T(C): 36.6 (12-22-19 @ 10:04), Max: 37.4 (12-21-19 @ 13:55)  HR: 93 (12-22-19 @ 10:04) (92 - 102)  BP: 107/57 (12-22-19 @ 10:04) (91/60 - 123/73)  RR: 18 (12-22-19 @ 10:04) (12 - 18)  SpO2: 98% (12-22-19 @ 10:04) (95% - 100%)  Wt(kg): --  I&O's Summary    21 Dec 2019 07:01  -  22 Dec 2019 07:00  --------------------------------------------------------  IN: 1162.8 mL / OUT: 1900 mL / NET: -737.2 mL            JVP: Normal  Neck: supple  Lung: clear   CV: S1 S2 , Murmur:  Abd: soft  Ext: No edema  neuro: Awake   Psych: flat affect  Skin: normal``    LABS/DATA:    CARDIAC MARKERS:                                8.2    18.93 )-----------( 209      ( 20 Dec 2019 21:36 )             26.5     12-20    124<L>  |  89<L>  |  56<H>  ----------------------------<  202<H>  4.1   |  23  |  3.12<H>    Ca    10.2      20 Dec 2019 21:37  Phos  1.2     12-20  Mg     1.9     12-20    TPro  7.7  /  Alb  2.2<L>  /  TBili  0.5  /  DBili  x   /  AST  19  /  ALT  6<L>  /  AlkPhos  109  12-20    proBNP:   Lipid Profile:   HgA1c:   TSH:     TELE:  EKG:

## 2019-12-22 NOTE — PROGRESS NOTE ADULT - SUBJECTIVE AND OBJECTIVE BOX
INTERVAL HPI/OVERNIGHT EVENTS:    pt seen and examined   resting comfortable  feeds to start today    MEDICATIONS  (STANDING):  atorvastatin 40 milliGRAM(s) Oral at bedtime  carvedilol 3.125 milliGRAM(s) Oral every 12 hours  chlorhexidine 4% Liquid 1 Application(s) Topical <User Schedule>  Dakins Solution - 1/4 Strength 1 Application(s) Topical two times a day  dextrose 50% Injectable 12.5 Gram(s) IV Push once  dextrose 50% Injectable 25 Gram(s) IV Push once  epoetin tan Injectable 19832 Unit(s) IV Push <User Schedule>  heparin  Injectable 5000 Unit(s) SubCutaneous every 8 hours  insulin glargine Injectable (LANTUS) 9 Unit(s) SubCutaneous at bedtime  insulin lispro (HumaLOG) corrective regimen sliding scale   SubCutaneous every 6 hours  levETIRAcetam  Solution 500 milliGRAM(s) Oral two times a day  lidocaine   Patch 1 Patch Transdermal daily  predniSONE   Tablet 5 milliGRAM(s) Oral daily    MEDICATIONS  (PRN):  dextrose 40% Gel 15 Gram(s) Oral once PRN Blood Glucose LESS THAN 70 milliGRAM(s)/deciLiter  glucagon  Injectable 1 milliGRAM(s) IntraMuscular once PRN Glucose <70 milliGRAM(s)/deciLiter      Allergies    No Known Allergies    Intolerances      Review of Systems:    unable to obtain      Vital Signs Last 24 Hrs  T(C): 36.6 (22 Dec 2019 10:04), Max: 37.4 (21 Dec 2019 13:55)  T(F): 97.9 (22 Dec 2019 10:04), Max: 99.3 (21 Dec 2019 13:55)  HR: 93 (22 Dec 2019 10:04) (93 - 102)  BP: 107/57 (22 Dec 2019 10:04) (91/60 - 123/73)  BP(mean): 78 (21 Dec 2019 13:00) (70 - 78)  RR: 18 (22 Dec 2019 10:04) (14 - 18)  SpO2: 98% (22 Dec 2019 10:04) (95% - 100%)    PHYSICAL EXAM:  GENERAL:   NAD  HEENT:  NC/AT, no JVD, sclera-anicteric  ABDOMEN:  Soft, non-distended, + bowel sounds, + peg dressed, c/d/i  EXTREMITIES:  no cyanosis, clubbing or edema  NEURO:  awake, responsive, minimally verbal  SKIN:  No rash/warm/dry    LABS:                        8.2    18.93 )-----------( 209      ( 20 Dec 2019 21:36 )             26.5     12-20    124<L>  |  89<L>  |  56<H>  ----------------------------<  202<H>  4.1   |  23  |  3.12<H>    Ca    10.2      20 Dec 2019 21:37  Phos  1.2     12-20  Mg     1.9     12-20    TPro  7.7  /  Alb  2.2<L>  /  TBili  0.5  /  DBili  x   /  AST  19  /  ALT  6<L>  /  AlkPhos  109  12-20    PT/INR - ( 21 Dec 2019 00:48 )   PT: 13.9 sec;   INR: 1.20 ratio         PTT - ( 21 Dec 2019 00:48 )  PTT:22.1 sec      RADIOLOGY & ADDITIONAL TESTS:

## 2019-12-22 NOTE — PROGRESS NOTE ADULT - ASSESSMENT
NEURO:  - Extubated 11/29, now on nasal cannula. Minimally verbal   - metabolic encephalopathy, meningitis r/o, LP negative  - neuro following  - chronic lacunar infarcts; per neuro, AMS also likely related to hypercalcemia, renal dysfunction , poor nutritional intake.   - c/w keppra for ?hx of seizure disorder  -AMS likely 2/2 hyperCa, renal dysfunction, poor PO intake      CV:  - hx of afib was on eliquis, now off a/c per cards due to bleeding risk  - holding carvedilol 6.25 BID  - hx of cocaine cardiomyopathy, resolved, normal EF on most recent echo  - c/w statin  - echo repeat EF70, LVH, hyperdynamic LCF, normal RV sys fxn    PULM:- extubated 11/29--tolerating nasal cannula    GI:  - PEG with surgery     RENAL:- hx of ESRD s/p failed renal transplant x2- renal following, HD as recommended.    ENDO:  - Start Insulin Sliding Scale- endocrine following  - monitor Ca    INFECTIOUS:  - dc  vanco as per infectious disease   - id fu     Anemia  - transfuse PRN  - monitor cbc

## 2019-12-22 NOTE — PROVIDER CONTACT NOTE (OTHER) - SITUATION
Pt found to have right arm IV infiltrated upon assessment at change of shift. Pt arm swollen and unable to use left arm so new access not possible at this time

## 2019-12-22 NOTE — PROGRESS NOTE ADULT - ASSESSMENT
History of cocaine induced CM  improved LV functin   cont BB     PAF  off a/c high bleed risk     will sign off  please call back with any questions

## 2019-12-22 NOTE — PROGRESS NOTE ADULT - SUBJECTIVE AND OBJECTIVE BOX
Patient is a 74y Male whom presented to   pateint seen and examined nad , in am     PAST MEDICAL & SURGICAL HISTORY:  Kidney transplant recipient  Anuria  GERD (gastroesophageal reflux disease)  Kidney transplant failure: dx: 2/2013  Hepatitis C: dx: 90&#x27;s.  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 10 milliGRAM(s) Oral three times a day  mycophenolate mofetil 250 milliGRAM(s) Oral two times a day  predniSONE   Tablet 5 milliGRAM(s) Oral daily  sevelamer carbonate 800 milliGRAM(s) Oral three times a day with meals  sodium bicarbonate 650 milliGRAM(s) Oral two times a day  tamsulosin 0.4 milliGRAM(s) Oral at bedtime      Allergies    No Known Allergies                       SOCIAL HISTORY:  Denies ETOh,Smoking,     FAMILY HISTORY:  Family history of breast cancer in sister (Sibling): living at 92 yo  Family history of prostate cancer in father  Family history of lung cancer  Family history of hypertension in mother  Family history of diabetes mellitus: mother      REVIEW OF SYSTEMS:    unbable to obtained                                                                              8.2    18.93 )-----------( 209      ( 20 Dec 2019 21:36 )             26.5       CBC Full  -  ( 20 Dec 2019 21:36 )  WBC Count : 18.93 K/uL  RBC Count : 3.09 M/uL  Hemoglobin : 8.2 g/dL  Hematocrit : 26.5 %  Platelet Count - Automated : 209 K/uL  Mean Cell Volume : 85.8 fl  Mean Cell Hemoglobin : 26.5 pg  Mean Cell Hemoglobin Concentration : 30.9 gm/dL  Auto Neutrophil # : 14.18 K/uL  Auto Lymphocyte # : 3.11 K/uL  Auto Monocyte # : 1.33 K/uL  Auto Eosinophil # : 0.14 K/uL  Auto Basophil # : 0.07 K/uL  Auto Neutrophil % : 75.0 %  Auto Lymphocyte % : 16.4 %  Auto Monocyte % : 7.0 %  Auto Eosinophil % : 0.7 %  Auto Basophil % : 0.4 %      12-22    135  |  95<L>  |  36<H>  ----------------------------<  149<H>  3.8   |  27  |  2.51<H>    Ca    10.3      22 Dec 2019 12:13  Phos  1.2     12-20  Mg     1.9     12-20    TPro  7.7  /  Alb  2.2<L>  /  TBili  0.5  /  DBili  x   /  AST  19  /  ALT  6<L>  /  AlkPhos  109  12-20      CAPILLARY BLOOD GLUCOSE      POCT Blood Glucose.: 146 mg/dL (22 Dec 2019 11:58)  POCT Blood Glucose.: 116 mg/dL (22 Dec 2019 05:51)  POCT Blood Glucose.: 152 mg/dL (21 Dec 2019 23:10)  POCT Blood Glucose.: 133 mg/dL (21 Dec 2019 17:59)      Vital Signs Last 24 Hrs  T(C): 36.9 (22 Dec 2019 14:34), Max: 36.9 (22 Dec 2019 14:34)  T(F): 98.5 (22 Dec 2019 14:34), Max: 98.5 (22 Dec 2019 14:34)  HR: 107 (22 Dec 2019 14:34) (93 - 107)  BP: 114/61 (22 Dec 2019 14:34) (107/57 - 119/65)  BP(mean): --  RR: 18 (22 Dec 2019 14:34) (17 - 18)  SpO2: 100% (22 Dec 2019 14:34) (95% - 100%)        PT/INR - ( 21 Dec 2019 00:48 )   PT: 13.9 sec;   INR: 1.20 ratio         PTT - ( 21 Dec 2019 00:48 )  PTT:22.1 sec              HEENT: conjunctive   clear   Neck:  No JVD  Respiratory: decrease bs b/l   Cardiovascular: S1 and S2  Gastrointestinal: BS+, soft, NT/ND  Extremities: No peripheral edema  Skin: dry   Access: pos fistula

## 2019-12-22 NOTE — PROGRESS NOTE ADULT - SUBJECTIVE AND OBJECTIVE BOX
Patient is a 74y old  Male who presents with a chief complaint of ams (22 Dec 2019 15:29)      INTERVAL HPI/OVERNIGHT EVENTS:  T(C): 36.9 (12-22-19 @ 14:34), Max: 36.9 (12-22-19 @ 14:34)  HR: 107 (12-22-19 @ 14:34) (93 - 107)  BP: 114/61 (12-22-19 @ 14:34) (107/57 - 119/65)  RR: 18 (12-22-19 @ 14:34) (17 - 18)  SpO2: 100% (12-22-19 @ 14:34) (95% - 100%)  Wt(kg): --  I&O's Summary    21 Dec 2019 07:01  -  22 Dec 2019 07:00  --------------------------------------------------------  IN: 1162.8 mL / OUT: 1900 mL / NET: -737.2 mL        LABS:                        8.2    18.93 )-----------( 209      ( 20 Dec 2019 21:36 )             26.5     12-22    135  |  95<L>  |  36<H>  ----------------------------<  149<H>  3.8   |  27  |  2.51<H>    Ca    10.3      22 Dec 2019 12:13  Phos  1.2     12-20  Mg     1.9     12-20    TPro  7.7  /  Alb  2.2<L>  /  TBili  0.5  /  DBili  x   /  AST  19  /  ALT  6<L>  /  AlkPhos  109  12-20    PT/INR - ( 21 Dec 2019 00:48 )   PT: 13.9 sec;   INR: 1.20 ratio         PTT - ( 21 Dec 2019 00:48 )  PTT:22.1 sec    CAPILLARY BLOOD GLUCOSE      POCT Blood Glucose.: 146 mg/dL (22 Dec 2019 11:58)  POCT Blood Glucose.: 116 mg/dL (22 Dec 2019 05:51)  POCT Blood Glucose.: 152 mg/dL (21 Dec 2019 23:10)  POCT Blood Glucose.: 133 mg/dL (21 Dec 2019 17:59)            MEDICATIONS  (STANDING):  atorvastatin 40 milliGRAM(s) Oral at bedtime  carvedilol 3.125 milliGRAM(s) Oral every 12 hours  chlorhexidine 4% Liquid 1 Application(s) Topical <User Schedule>  Dakins Solution - 1/4 Strength 1 Application(s) Topical two times a day  dextrose 50% Injectable 12.5 Gram(s) IV Push once  dextrose 50% Injectable 25 Gram(s) IV Push once  epoetin tan Injectable 83877 Unit(s) IV Push <User Schedule>  heparin  Injectable 5000 Unit(s) SubCutaneous every 8 hours  insulin glargine Injectable (LANTUS) 9 Unit(s) SubCutaneous at bedtime  insulin lispro (HumaLOG) corrective regimen sliding scale   SubCutaneous every 6 hours  levETIRAcetam  Solution 500 milliGRAM(s) Oral two times a day  lidocaine   Patch 1 Patch Transdermal daily  predniSONE   Tablet 5 milliGRAM(s) Oral daily    MEDICATIONS  (PRN):  dextrose 40% Gel 15 Gram(s) Oral once PRN Blood Glucose LESS THAN 70 milliGRAM(s)/deciLiter  glucagon  Injectable 1 milliGRAM(s) IntraMuscular once PRN Glucose <70 milliGRAM(s)/deciLiter          PHYSICAL EXAM:  GENERAL: frail  CHEST/LUNG: Clear to percussion bilaterally; No rales, rhonchi, wheezing, or rubs  HEART: Regular rate and rhythm; No murmurs, rubs, or gallops  ABDOMEN: Soft, Nontender, Nondistended; Bowel sounds present  EXTREMITIES:  no edema    Care Discussed with Consultants/Other Providers [ ] YES  [ ] NO

## 2019-12-22 NOTE — PROGRESS NOTE ADULT - SUBJECTIVE AND OBJECTIVE BOX
Chief complaint  Patient is a 74y old  Male who presents with a chief complaint of ams (21 Dec 2019 18:34)   Review of systems  Patient in bed, looks comfortable, no fever, no hypoglycemia.    Labs and Fingersticks  CAPILLARY BLOOD GLUCOSE      POCT Blood Glucose.: 116 mg/dL (22 Dec 2019 05:51)  POCT Blood Glucose.: 152 mg/dL (21 Dec 2019 23:10)  POCT Blood Glucose.: 133 mg/dL (21 Dec 2019 17:59)  POCT Blood Glucose.: 197 mg/dL (21 Dec 2019 13:18)      Anion Gap, Serum: 12 (12-20 @ 21:37)  Anion Gap, Serum: 12 (12-20 @ 21:37)  Anion Gap, Serum: 9 (12-20 @ 10:01)      Calcium, Total Serum: 10.2 (12-20 @ 21:37)  Calcium, Total Serum: 10.2 (12-20 @ 21:37)  Calcium, Total Serum: 9.8 (12-20 @ 10:01)  Albumin, Serum: 2.2 <L> (12-20 @ 21:37)    Alanine Aminotransferase (ALT/SGPT): 6 <L> (12-20 @ 21:37)  Alkaline Phosphatase, Serum: 109 (12-20 @ 21:37)  Aspartate Aminotransferase (AST/SGOT): 19 (12-20 @ 21:37)        12-20    124<L>  |  89<L>  |  56<H>  ----------------------------<  202<H>  4.1   |  23  |  3.12<H>    Ca    10.2      20 Dec 2019 21:37  Phos  1.2     12-20  Mg     1.9     12-20    TPro  7.7  /  Alb  2.2<L>  /  TBili  0.5  /  DBili  x   /  AST  19  /  ALT  6<L>  /  AlkPhos  109  12-20                        8.2    18.93 )-----------( 209      ( 20 Dec 2019 21:36 )             26.5     Medications  MEDICATIONS  (STANDING):  atorvastatin 40 milliGRAM(s) Oral at bedtime  carvedilol 3.125 milliGRAM(s) Oral every 12 hours  chlorhexidine 4% Liquid 1 Application(s) Topical <User Schedule>  Dakins Solution - 1/4 Strength 1 Application(s) Topical two times a day  dextrose 50% Injectable 12.5 Gram(s) IV Push once  dextrose 50% Injectable 25 Gram(s) IV Push once  epoetin tan Injectable 63237 Unit(s) IV Push <User Schedule>  heparin  Injectable 5000 Unit(s) SubCutaneous every 8 hours  insulin glargine Injectable (LANTUS) 9 Unit(s) SubCutaneous at bedtime  insulin lispro (HumaLOG) corrective regimen sliding scale   SubCutaneous every 6 hours  levETIRAcetam  Solution 500 milliGRAM(s) Oral two times a day  lidocaine   Patch 1 Patch Transdermal daily  pantoprazole  Injectable 20 milliGRAM(s) IV Push daily  predniSONE   Tablet 5 milliGRAM(s) Oral daily      Physical Exam  General: Patient comfortable in bed  Vital Signs Last 12 Hrs  T(F): 98.3 (12-22-19 @ 06:12), Max: 98.3 (12-22-19 @ 06:12)  HR: 100 (12-22-19 @ 06:12) (100 - 102)  BP: 118/76 (12-22-19 @ 06:12) (110/71 - 118/76)  BP(mean): --  RR: 18 (12-22-19 @ 06:12) (18 - 18)  SpO2: 96% (12-22-19 @ 06:12) (96% - 100%)  Neck: No palpable thyroid nodules.  CVS: S1S2, No murmurs  Respiratory: No wheezing, no crepitations  GI: Abdomen soft, bowel sounds positive  Musculoskeletal:  edema lower extremities.   Skin: No skin rashes, no ecchymosis    Diagnostics    Thyroid Stimulating Hormone, Serum: AM Sched. Collection: 03-Dec-2019 06:00 (12-02 @ 10:42)  Free Thyroxine, Serum: AM Sched. Collection: 03-Dec-2019 06:00 (12-02 @ 10:42)  Thyroid Stimulating Hormone, Serum: AM Sched. Collection: 17-Dec-2019 06:00 (12-16 @ 12:58)  Free Thyroxine, Serum: AM Sched. Collection: 17-Dec-2019 06:00 (12-16 @ 12:58)

## 2019-12-22 NOTE — PROGRESS NOTE ADULT - PROBLEM SELECTOR PLAN 1
- s/p upper gastrointestinal endoscopy with the procedure was aborted due to inability to place PEG tube  - NGT replaced with endoscopic confirmation  - s/p failed IR PEG  - s/p open feeding gastrostomy tube placement 12/21. Care per surgery team appreciated  - feeds to start today

## 2019-12-23 LAB
ANION GAP SERPL CALC-SCNC: 13 MMOL/L — SIGNIFICANT CHANGE UP (ref 5–17)
BUN SERPL-MCNC: 46 MG/DL — HIGH (ref 7–23)
CALCIUM SERPL-MCNC: 10.8 MG/DL — HIGH (ref 8.4–10.5)
CHLORIDE SERPL-SCNC: 97 MMOL/L — SIGNIFICANT CHANGE UP (ref 96–108)
CO2 SERPL-SCNC: 27 MMOL/L — SIGNIFICANT CHANGE UP (ref 22–31)
CREAT SERPL-MCNC: 3.27 MG/DL — HIGH (ref 0.5–1.3)
GLUCOSE BLDC GLUCOMTR-MCNC: 123 MG/DL — HIGH (ref 70–99)
GLUCOSE BLDC GLUCOMTR-MCNC: 151 MG/DL — HIGH (ref 70–99)
GLUCOSE BLDC GLUCOMTR-MCNC: 173 MG/DL — HIGH (ref 70–99)
GLUCOSE BLDC GLUCOMTR-MCNC: 202 MG/DL — HIGH (ref 70–99)
GLUCOSE BLDC GLUCOMTR-MCNC: 217 MG/DL — HIGH (ref 70–99)
GLUCOSE SERPL-MCNC: 177 MG/DL — HIGH (ref 70–99)
HCT VFR BLD CALC: 26 % — LOW (ref 39–50)
HGB BLD-MCNC: 7.8 G/DL — LOW (ref 13–17)
MCHC RBC-ENTMCNC: 26.3 PG — LOW (ref 27–34)
MCHC RBC-ENTMCNC: 30 GM/DL — LOW (ref 32–36)
MCV RBC AUTO: 87.5 FL — SIGNIFICANT CHANGE UP (ref 80–100)
PLATELET # BLD AUTO: 267 K/UL — SIGNIFICANT CHANGE UP (ref 150–400)
POTASSIUM SERPL-MCNC: 3.1 MMOL/L — LOW (ref 3.5–5.3)
POTASSIUM SERPL-SCNC: 3.1 MMOL/L — LOW (ref 3.5–5.3)
RBC # BLD: 2.97 M/UL — LOW (ref 4.2–5.8)
RBC # FLD: 15.7 % — HIGH (ref 10.3–14.5)
SODIUM SERPL-SCNC: 137 MMOL/L — SIGNIFICANT CHANGE UP (ref 135–145)
WBC # BLD: 15.73 K/UL — HIGH (ref 3.8–10.5)
WBC # FLD AUTO: 15.73 K/UL — HIGH (ref 3.8–10.5)

## 2019-12-23 PROCEDURE — 99233 SBSQ HOSP IP/OBS HIGH 50: CPT

## 2019-12-23 PROCEDURE — 71045 X-RAY EXAM CHEST 1 VIEW: CPT | Mod: 26

## 2019-12-23 RX ADMIN — INSULIN GLARGINE 9 UNIT(S): 100 INJECTION, SOLUTION SUBCUTANEOUS at 22:25

## 2019-12-23 RX ADMIN — ATORVASTATIN CALCIUM 40 MILLIGRAM(S): 80 TABLET, FILM COATED ORAL at 22:25

## 2019-12-23 RX ADMIN — LIDOCAINE 1 PATCH: 4 CREAM TOPICAL at 23:52

## 2019-12-23 RX ADMIN — CARVEDILOL PHOSPHATE 3.12 MILLIGRAM(S): 80 CAPSULE, EXTENDED RELEASE ORAL at 17:53

## 2019-12-23 RX ADMIN — LEVETIRACETAM 500 MILLIGRAM(S): 250 TABLET, FILM COATED ORAL at 05:13

## 2019-12-23 RX ADMIN — LIDOCAINE 1 PATCH: 4 CREAM TOPICAL at 19:32

## 2019-12-23 RX ADMIN — Medication 2: at 18:01

## 2019-12-23 RX ADMIN — CARVEDILOL PHOSPHATE 3.12 MILLIGRAM(S): 80 CAPSULE, EXTENDED RELEASE ORAL at 05:11

## 2019-12-23 RX ADMIN — Medication 5 MILLIGRAM(S): at 05:11

## 2019-12-23 RX ADMIN — LEVETIRACETAM 500 MILLIGRAM(S): 250 TABLET, FILM COATED ORAL at 17:52

## 2019-12-23 RX ADMIN — LIDOCAINE 1 PATCH: 4 CREAM TOPICAL at 12:26

## 2019-12-23 RX ADMIN — HEPARIN SODIUM 5000 UNIT(S): 5000 INJECTION INTRAVENOUS; SUBCUTANEOUS at 05:11

## 2019-12-23 RX ADMIN — HEPARIN SODIUM 5000 UNIT(S): 5000 INJECTION INTRAVENOUS; SUBCUTANEOUS at 22:25

## 2019-12-23 RX ADMIN — CHLORHEXIDINE GLUCONATE 1 APPLICATION(S): 213 SOLUTION TOPICAL at 05:12

## 2019-12-23 RX ADMIN — Medication 1 APPLICATION(S): at 17:52

## 2019-12-23 RX ADMIN — ERYTHROPOIETIN 10000 UNIT(S): 10000 INJECTION, SOLUTION INTRAVENOUS; SUBCUTANEOUS at 14:27

## 2019-12-23 RX ADMIN — Medication 4: at 12:25

## 2019-12-23 RX ADMIN — Medication 1 APPLICATION(S): at 05:12

## 2019-12-23 NOTE — PROGRESS NOTE ADULT - SUBJECTIVE AND OBJECTIVE BOX
Patient is a 74y Male whom presented to   pateint seen and examined nad , in am     PAST MEDICAL & SURGICAL HISTORY:  Kidney transplant recipient  Anuria  GERD (gastroesophageal reflux disease)  Kidney transplant failure: dx: 2/2013  Hepatitis C: dx: 90&#x27;s.  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 10 milliGRAM(s) Oral three times a day  mycophenolate mofetil 250 milliGRAM(s) Oral two times a day  predniSONE   Tablet 5 milliGRAM(s) Oral daily  sevelamer carbonate 800 milliGRAM(s) Oral three times a day with meals  sodium bicarbonate 650 milliGRAM(s) Oral two times a day  tamsulosin 0.4 milliGRAM(s) Oral at bedtime      Allergies    No Known Allergies                       SOCIAL HISTORY:  Denies ETOh,Smoking,     FAMILY HISTORY:  Family history of breast cancer in sister (Sibling): living at 94 yo  Family history of prostate cancer in father  Family history of lung cancer  Family history of hypertension in mother  Family history of diabetes mellitus: mother      REVIEW OF SYSTEMS:    unbable to obtained                                      7.8    15.73 )-----------( 267      ( 23 Dec 2019 18:15 )             26.0       CBC Full  -  ( 23 Dec 2019 18:15 )  WBC Count : 15.73 K/uL  RBC Count : 2.97 M/uL  Hemoglobin : 7.8 g/dL  Hematocrit : 26.0 %  Platelet Count - Automated : 267 K/uL  Mean Cell Volume : 87.5 fl  Mean Cell Hemoglobin : 26.3 pg  Mean Cell Hemoglobin Concentration : 30.0 gm/dL  Auto Neutrophil # : x  Auto Lymphocyte # : x  Auto Monocyte # : x  Auto Eosinophil # : x  Auto Basophil # : x  Auto Neutrophil % : x  Auto Lymphocyte % : x  Auto Monocyte % : x  Auto Eosinophil % : x  Auto Basophil % : x      12-23    137  |  97  |  46<H>  ----------------------------<  177<H>  3.1<L>   |  27  |  3.27<H>    Ca    10.8<H>      23 Dec 2019 15:14        CAPILLARY BLOOD GLUCOSE      POCT Blood Glucose.: 151 mg/dL (23 Dec 2019 17:56)  POCT Blood Glucose.: 217 mg/dL (23 Dec 2019 12:21)  POCT Blood Glucose.: 123 mg/dL (23 Dec 2019 05:45)  POCT Blood Glucose.: 190 mg/dL (22 Dec 2019 23:14)      Vital Signs Last 24 Hrs  T(C): 37.2 (23 Dec 2019 17:49), Max: 37.2 (22 Dec 2019 23:34)  T(F): 98.9 (23 Dec 2019 17:49), Max: 98.9 (22 Dec 2019 23:34)  HR: 95 (23 Dec 2019 20:23) (95 - 110)  BP: 98/61 (23 Dec 2019 20:23) (98/51 - 131/73)  BP(mean): --  RR: 18 (23 Dec 2019 20:23) (18 - 18)  SpO2: 99% (23 Dec 2019 20:23) (96% - 100%)                                                                      HEENT: conjunctive   clear   Neck:  No JVD  Respiratory: decrease bs b/l   Cardiovascular: S1 and S2  Gastrointestinal: BS+, soft, NT/ND  Extremities: No peripheral edema  Skin: dry   Access: pos fistula

## 2019-12-23 NOTE — PROGRESS NOTE ADULT - ASSESSMENT
Assessment  DM: A1C 5.5%, was on insulin at home, started on basal insulin, FS within acceptable range, no hypoglycemic episodes. Patient is still NPO on tube feeds, PEG tube placed, stable, appears comfortable.  Hypercalcemia: Primary Hyperparathyroidism, Ca improved s/p Pamidronate dose.  Sepsis: IV ABx for UTI, LP inconsistent with meningitis, on medications, stable, monitored.  HTN: Controlled,  on antihypertensive medications.  ESRD: On hemodialysis, Monitor labs/BMP          Robi Gay MD  Cell: [phone]  Office: [phone] Assessment  DM: A1C 5.5%, was on insulin at home, started on basal insulin, FS within acceptable range, no hypoglycemic episodes. Patient is still NPO  on tube feeds, PEG tube placed, stable, appears comfortable.  Hypercalcemia: Primary Hyperparathyroidism, Ca improved s/p Pamidronate dose.  Sepsis: IV ABx for UTI, LP inconsistent with meningitis, on medications, stable, monitored.  HTN: Controlled,  on antihypertensive medications.  ESRD: On hemodialysis, Monitor labs/BMP          Robi Gay MD  Cell: [phone]  Office: [phone]

## 2019-12-23 NOTE — PROGRESS NOTE ADULT - PROBLEM SELECTOR PLAN 3
H/H stable; s/p 1uprbc with appropriate response 12/17  per RN, no s/s active GIB   occult negative  monitor cbc, transfuse prn

## 2019-12-23 NOTE — PROGRESS NOTE ADULT - ASSESSMENT
NEURO:  - Extubated 11/29, now on nasal cannula. Minimally verbal   - metabolic encephalopathy, meningitis r/o, LP negative  - neuro following  - chronic lacunar infarcts; per neuro, AMS also likely related to hypercalcemia, renal dysfunction , poor nutritional intake.   - c/w keppra for ?hx of seizure disorder  -AMS likely 2/2 hyperCa, renal dysfunction, poor PO intake      CV:  - hx of afib was on eliquis, now off a/c per cards due to bleeding risk  - holding carvedilol 6.25 BID- hx of cocaine cardiomyopathy, resolved, normal EF on most recent echo  - c/w statin  - echo repeat EF70, LVH, hyperdynamic LCF, normal RV sys fxn    PULM:- extubated 11/29--tolerating nasal cannula    GI:  - PEG with surgery     RENAL:- hx of ESRD s/p failed renal transplant x2- renal following, HD as recommended.    ENDO:  - Start Insulin Sliding Scale- endocrine following  - monitor Ca    INFECTIOUS:  - dc  vanco as per infectious disease   - id fu     Anemia  - transfuse PRN  - monitor cbc

## 2019-12-23 NOTE — PROGRESS NOTE ADULT - SUBJECTIVE AND OBJECTIVE BOX
Patient is a 74y old  Male who presents with a chief complaint of ams (23 Dec 2019 15:39)      INTERVAL HPI/OVERNIGHT EVENTS:  T(C): 37.2 (12-23-19 @ 17:49), Max: 37.2 (12-22-19 @ 23:34)  HR: 110 (12-23-19 @ 17:49) (101 - 110)  BP: 131/73 (12-23-19 @ 17:49) (98/51 - 131/73)  RR: 18 (12-23-19 @ 17:49) (18 - 18)  SpO2: 100% (12-23-19 @ 17:49) (96% - 100%)  Wt(kg): --  I&O's Summary    22 Dec 2019 07:01  -  23 Dec 2019 07:00  --------------------------------------------------------  IN: 750 mL / OUT: 0 mL / NET: 750 mL    23 Dec 2019 07:01  -  23 Dec 2019 18:07  --------------------------------------------------------  IN: 900 mL / OUT: 1900 mL / NET: -1000 mL        LABS:                        8.8    21.81 )-----------( 302      ( 22 Dec 2019 16:43 )             29.5     12-23    137  |  97  |  46<H>  ----------------------------<  177<H>  3.1<L>   |  27  |  3.27<H>    Ca    10.8<H>      23 Dec 2019 15:14          CAPILLARY BLOOD GLUCOSE      POCT Blood Glucose.: 151 mg/dL (23 Dec 2019 17:56)  POCT Blood Glucose.: 217 mg/dL (23 Dec 2019 12:21)  POCT Blood Glucose.: 123 mg/dL (23 Dec 2019 05:45)  POCT Blood Glucose.: 190 mg/dL (22 Dec 2019 23:14)            MEDICATIONS  (STANDING):  atorvastatin 40 milliGRAM(s) Oral at bedtime  carvedilol 3.125 milliGRAM(s) Oral every 12 hours  chlorhexidine 4% Liquid 1 Application(s) Topical <User Schedule>  Dakins Solution - 1/4 Strength 1 Application(s) Topical two times a day  dextrose 50% Injectable 12.5 Gram(s) IV Push once  dextrose 50% Injectable 25 Gram(s) IV Push once  epoetin tan Injectable 97514 Unit(s) IV Push <User Schedule>  heparin  Injectable 5000 Unit(s) SubCutaneous every 8 hours  insulin glargine Injectable (LANTUS) 9 Unit(s) SubCutaneous at bedtime  insulin lispro (HumaLOG) corrective regimen sliding scale   SubCutaneous every 6 hours  levETIRAcetam  Solution 500 milliGRAM(s) Oral two times a day  lidocaine   Patch 1 Patch Transdermal daily  predniSONE   Tablet 5 milliGRAM(s) Oral daily    MEDICATIONS  (PRN):  dextrose 40% Gel 15 Gram(s) Oral once PRN Blood Glucose LESS THAN 70 milliGRAM(s)/deciLiter  glucagon  Injectable 1 milliGRAM(s) IntraMuscular once PRN Glucose <70 milliGRAM(s)/deciLiter          PHYSICAL EXAM:  GENERAL: frail  CHEST/LUNG: Clear to percussion bilaterally; No rales, rhonchi, wheezing, or rubs  HEART: Regular rate and rhythm; No murmurs, rubs, or gallops  ABDOMEN: Soft, Nontender, Nondistended; Bowel sounds present  EXTREMITIES:no edema     Care Discussed with Consultants/Other Providers [ ] YES  [ ] NO

## 2019-12-23 NOTE — PROGRESS NOTE ADULT - SUBJECTIVE AND OBJECTIVE BOX
Chief complaint  Patient is a 74y old  Male who presents with a chief complaint of ams (23 Dec 2019 12:27)   Review of systems  Patient in bed, looks comfortable, no fever, no hypoglycemia.    Labs and Fingersticks  CAPILLARY BLOOD GLUCOSE      POCT Blood Glucose.: 217 mg/dL (23 Dec 2019 12:21)  POCT Blood Glucose.: 123 mg/dL (23 Dec 2019 05:45)  POCT Blood Glucose.: 190 mg/dL (22 Dec 2019 23:14)  POCT Blood Glucose.: 136 mg/dL (22 Dec 2019 17:37)      Anion Gap, Serum: 13 (12-22 @ 12:13)      Calcium, Total Serum: 10.3 (12-22 @ 12:13)          12-22    135  |  95<L>  |  36<H>  ----------------------------<  149<H>  3.8   |  27  |  2.51<H>    Ca    10.3      22 Dec 2019 12:13                          8.8    21.81 )-----------( 302      ( 22 Dec 2019 16:43 )             29.5     Medications  MEDICATIONS  (STANDING):  atorvastatin 40 milliGRAM(s) Oral at bedtime  carvedilol 3.125 milliGRAM(s) Oral every 12 hours  chlorhexidine 4% Liquid 1 Application(s) Topical <User Schedule>  Dakins Solution - 1/4 Strength 1 Application(s) Topical two times a day  dextrose 50% Injectable 12.5 Gram(s) IV Push once  dextrose 50% Injectable 25 Gram(s) IV Push once  epoetin tan Injectable 05774 Unit(s) IV Push <User Schedule>  heparin  Injectable 5000 Unit(s) SubCutaneous every 8 hours  insulin glargine Injectable (LANTUS) 9 Unit(s) SubCutaneous at bedtime  insulin lispro (HumaLOG) corrective regimen sliding scale   SubCutaneous every 6 hours  levETIRAcetam  Solution 500 milliGRAM(s) Oral two times a day  lidocaine   Patch 1 Patch Transdermal daily  predniSONE   Tablet 5 milliGRAM(s) Oral daily      Physical Exam  General: Patient comfortable in bed  Vital Signs Last 12 Hrs  T(F): 97.3 (12-23-19 @ 13:34), Max: 98.1 (12-23-19 @ 04:10)  HR: 104 (12-23-19 @ 13:34) (101 - 108)  BP: 98/51 (12-23-19 @ 13:34) (98/51 - 123/69)  BP(mean): --  RR: 18 (12-23-19 @ 13:34) (18 - 18)  SpO2: 99% (12-23-19 @ 13:34) (96% - 99%)  Neck: No palpable thyroid nodules.  CVS: S1S2, No murmurs  Respiratory: No wheezing, no crepitations  GI: Abdomen soft, bowel sounds positive  Musculoskeletal:  edema lower extremities.   Skin: No skin rashes, no ecchymosis    Diagnostics Chief complaint  Patient is a 74y old  Male who presents with a chief complaint of ams (23 Dec 2019 12:27)   Review of systems  Patient in bed, looks comfortable, no fever,  no hypoglycemia.    Labs and Fingersticks  CAPILLARY BLOOD GLUCOSE      POCT Blood Glucose.: 217 mg/dL (23 Dec 2019 12:21)  POCT Blood Glucose.: 123 mg/dL (23 Dec 2019 05:45)  POCT Blood Glucose.: 190 mg/dL (22 Dec 2019 23:14)  POCT Blood Glucose.: 136 mg/dL (22 Dec 2019 17:37)      Anion Gap, Serum: 13 (12-22 @ 12:13)      Calcium, Total Serum: 10.3 (12-22 @ 12:13)          12-22    135  |  95<L>  |  36<H>  ----------------------------<  149<H>  3.8   |  27  |  2.51<H>    Ca    10.3      22 Dec 2019 12:13                          8.8    21.81 )-----------( 302      ( 22 Dec 2019 16:43 )             29.5     Medications  MEDICATIONS  (STANDING):  atorvastatin 40 milliGRAM(s) Oral at bedtime  carvedilol 3.125 milliGRAM(s) Oral every 12 hours  chlorhexidine 4% Liquid 1 Application(s) Topical <User Schedule>  Dakins Solution - 1/4 Strength 1 Application(s) Topical two times a day  dextrose 50% Injectable 12.5 Gram(s) IV Push once  dextrose 50% Injectable 25 Gram(s) IV Push once  epoetin tan Injectable 57939 Unit(s) IV Push <User Schedule>  heparin  Injectable 5000 Unit(s) SubCutaneous every 8 hours  insulin glargine Injectable (LANTUS) 9 Unit(s) SubCutaneous at bedtime  insulin lispro (HumaLOG) corrective regimen sliding scale   SubCutaneous every 6 hours  levETIRAcetam  Solution 500 milliGRAM(s) Oral two times a day  lidocaine   Patch 1 Patch Transdermal daily  predniSONE   Tablet 5 milliGRAM(s) Oral daily      Physical Exam  General: Patient comfortable in bed  Vital Signs Last 12 Hrs  T(F): 97.3 (12-23-19 @ 13:34), Max: 98.1 (12-23-19 @ 04:10)  HR: 104 (12-23-19 @ 13:34) (101 - 108)  BP: 98/51 (12-23-19 @ 13:34) (98/51 - 123/69)  BP(mean): --  RR: 18 (12-23-19 @ 13:34) (18 - 18)  SpO2: 99% (12-23-19 @ 13:34) (96% - 99%)  Neck: No palpable thyroid nodules.  CVS: S1S2, No murmurs  Respiratory: No wheezing, no crepitations  GI: Abdomen soft, bowel sounds positive  Musculoskeletal:  edema lower extremities.   Skin: No skin rashes, no ecchymosis    Diagnostics

## 2019-12-23 NOTE — PROGRESS NOTE ADULT - PROBLEM SELECTOR PLAN 1
- s/p upper gastrointestinal endoscopy with the procedure was aborted due to inability to place PEG tube  - NGT replaced with endoscopic confirmation  - s/p failed IR PEG  - s/p open feeding gastrostomy tube placement 12/21. Care per surgery team appreciated  - tolerating feeds

## 2019-12-23 NOTE — PROGRESS NOTE ADULT - ASSESSMENT
74M PMHx of ESRD s/p failed renal transplant x2, HCV, DM, and meningococcal meningitis 2 years ago was sent in to the ED from HD for AMS and hypotension with continued AMS with attempted PEG insertion with both IR and GI with inability to obtain good window for PEG insertion. Patient is now POD 2 s/p open gastrostomy tube placement. Doing well this AM, TF tolerated via g-tube and well tolerated.     -- All cares per primary team. Please contact Trauma & Acute Care Surgery (#[phone]) with any questions or concerns.

## 2019-12-23 NOTE — PROGRESS NOTE ADULT - SUBJECTIVE AND OBJECTIVE BOX
General Surgery Progress Note     Subjective/24hour Events:   POD 2 s/p open gastrostomy tube placement. Tube feeds started yesterday and well-tolerated.  Patient seen and examined. No acute events overnight.       Vital Signs:  Vital Signs Last 24 Hrs  T(C): 36.7 (23 Dec 2019 04:10), Max: 37.2 (22 Dec 2019 23:34)  T(F): 98.1 (23 Dec 2019 04:10), Max: 98.9 (22 Dec 2019 23:34)  HR: 108 (23 Dec 2019 05:10) (93 - 109)  BP: 123/69 (23 Dec 2019 05:10) (107/57 - 127/75)  BP(mean): --  RR: 18 (23 Dec 2019 04:10) (18 - 18)  SpO2: 99% (23 Dec 2019 04:10) (96% - 100%)    CAPILLARY BLOOD GLUCOSE      POCT Blood Glucose.: 123 mg/dL (23 Dec 2019 05:45)  POCT Blood Glucose.: 190 mg/dL (22 Dec 2019 23:14)  POCT Blood Glucose.: 136 mg/dL (22 Dec 2019 17:37)  POCT Blood Glucose.: 146 mg/dL (22 Dec 2019 11:58)      I&O's Detail    22 Dec 2019 07:01  -  23 Dec 2019 07:00  --------------------------------------------------------  IN:    Enteral Tube Flush: 120 mL    Nepro with Carb Steady: 630 mL  Total IN: 750 mL    OUT:  Total OUT: 0 mL    Total NET: 750 mL          MEDICATIONS  (STANDING):  atorvastatin 40 milliGRAM(s) Oral at bedtime  carvedilol 3.125 milliGRAM(s) Oral every 12 hours  chlorhexidine 4% Liquid 1 Application(s) Topical <User Schedule>  Dakins Solution - 1/4 Strength 1 Application(s) Topical two times a day  dextrose 50% Injectable 12.5 Gram(s) IV Push once  dextrose 50% Injectable 25 Gram(s) IV Push once  epoetin tan Injectable 22096 Unit(s) IV Push <User Schedule>  heparin  Injectable 5000 Unit(s) SubCutaneous every 8 hours  insulin glargine Injectable (LANTUS) 9 Unit(s) SubCutaneous at bedtime  insulin lispro (HumaLOG) corrective regimen sliding scale   SubCutaneous every 6 hours  levETIRAcetam  Solution 500 milliGRAM(s) Oral two times a day  lidocaine   Patch 1 Patch Transdermal daily  predniSONE   Tablet 5 milliGRAM(s) Oral daily    MEDICATIONS  (PRN):  dextrose 40% Gel 15 Gram(s) Oral once PRN Blood Glucose LESS THAN 70 milliGRAM(s)/deciLiter  glucagon  Injectable 1 milliGRAM(s) IntraMuscular once PRN Glucose <70 milliGRAM(s)/deciLiter      Physical Exam:  Gen: NAD.  Lungs: Non labored breathing.   Ab: Soft, nontender, nondistended. PEG tube in place, TF at goal.   Ext: Moves all 4 spontaneously.     Labs:    12-22    135  |  95<L>  |  36<H>  ----------------------------<  149<H>  3.8   |  27  |  2.51<H>    Ca    10.3      22 Dec 2019 12:13                              8.8    21.81 )-----------( 302      ( 22 Dec 2019 16:43 )             29.5

## 2019-12-23 NOTE — PROGRESS NOTE ADULT - SUBJECTIVE AND OBJECTIVE BOX
INTERVAL HPI/OVERNIGHT EVENTS:    pt seen and examined   resting comfortable  tolerating feeds @ 5occ/hr  no Bms today as per RN  No GI complaints during time of eval   HD today     MEDICATIONS  (STANDING):  atorvastatin 40 milliGRAM(s) Oral at bedtime  carvedilol 3.125 milliGRAM(s) Oral every 12 hours  chlorhexidine 4% Liquid 1 Application(s) Topical <User Schedule>  Dakins Solution - 1/4 Strength 1 Application(s) Topical two times a day  dextrose 50% Injectable 12.5 Gram(s) IV Push once  dextrose 50% Injectable 25 Gram(s) IV Push once  epoetin tan Injectable 73701 Unit(s) IV Push <User Schedule>  heparin  Injectable 5000 Unit(s) SubCutaneous every 8 hours  insulin glargine Injectable (LANTUS) 9 Unit(s) SubCutaneous at bedtime  insulin lispro (HumaLOG) corrective regimen sliding scale   SubCutaneous every 6 hours  levETIRAcetam  Solution 500 milliGRAM(s) Oral two times a day  lidocaine   Patch 1 Patch Transdermal daily  predniSONE   Tablet 5 milliGRAM(s) Oral daily    MEDICATIONS  (PRN):  dextrose 40% Gel 15 Gram(s) Oral once PRN Blood Glucose LESS THAN 70 milliGRAM(s)/deciLiter  glucagon  Injectable 1 milliGRAM(s) IntraMuscular once PRN Glucose <70 milliGRAM(s)/deciLiter      Allergies    No Known Allergies    Intolerances      Review of Systems:    unable to obtain      Vital Signs Last 24 Hrs  T(C): 36.6 (22 Dec 2019 10:04), Max: 37.4 (21 Dec 2019 13:55)  T(F): 97.9 (22 Dec 2019 10:04), Max: 99.3 (21 Dec 2019 13:55)  HR: 93 (22 Dec 2019 10:04) (93 - 102)  BP: 107/57 (22 Dec 2019 10:04) (91/60 - 123/73)  BP(mean): 78 (21 Dec 2019 13:00) (70 - 78)  RR: 18 (22 Dec 2019 10:04) (14 - 18)  SpO2: 98% (22 Dec 2019 10:04) (95% - 100%)    PHYSICAL EXAM:  GENERAL:   NAD  HEENT:  NC/AT, no JVD, sclera-anicteric  ABDOMEN:  Soft, non-distended, + bowel sounds, + peg dressed, c/d/i  EXTREMITIES:  no cyanosis, clubbing or edema  NEURO:  awake, responsive, minimally verbal  SKIN:  No rash/warm/dry    LABS:                        8.2    18.93 )-----------( 209      ( 20 Dec 2019 21:36 )             26.5     12-20    124<L>  |  89<L>  |  56<H>  ----------------------------<  202<H>  4.1   |  23  |  3.12<H>    Ca    10.2      20 Dec 2019 21:37  Phos  1.2     12-20  Mg     1.9     12-20    TPro  7.7  /  Alb  2.2<L>  /  TBili  0.5  /  DBili  x   /  AST  19  /  ALT  6<L>  /  AlkPhos  109  12-20    PT/INR - ( 21 Dec 2019 00:48 )   PT: 13.9 sec;   INR: 1.20 ratio         PTT - ( 21 Dec 2019 00:48 )  PTT:22.1 sec      RADIOLOGY & ADDITIONAL TESTS:

## 2019-12-23 NOTE — PROGRESS NOTE ADULT - SUBJECTIVE AND OBJECTIVE BOX
Patient is a 74y old  Male who presents with a chief complaint of ams (23 Dec 2019 09:02)    Being followed by ID for leucocytosis    Interval history:s/p feeding tube placement last wekk  Now with leucocytosis  ID requested to follow up  No acute events      ROS:when seen is awake and answers some queries-but ROS is not reliable   No observed  cough,SOB,CP  No N/V/D./abd pain  No other complaints      Antimicrobials:  None     Other medications reviewed  MEDICATIONS  (STANDING):  atorvastatin 40 milliGRAM(s) Oral at bedtime  carvedilol 3.125 milliGRAM(s) Oral every 12 hours  chlorhexidine 4% Liquid 1 Application(s) Topical <User Schedule>  Dakins Solution - 1/4 Strength 1 Application(s) Topical two times a day  dextrose 50% Injectable 12.5 Gram(s) IV Push once  dextrose 50% Injectable 25 Gram(s) IV Push once  epoetin tan Injectable 94047 Unit(s) IV Push <User Schedule>  heparin  Injectable 5000 Unit(s) SubCutaneous every 8 hours  insulin glargine Injectable (LANTUS) 9 Unit(s) SubCutaneous at bedtime  insulin lispro (HumaLOG) corrective regimen sliding scale   SubCutaneous every 6 hours  levETIRAcetam  Solution 500 milliGRAM(s) Oral two times a day  lidocaine   Patch 1 Patch Transdermal daily  predniSONE   Tablet 5 milliGRAM(s) Oral daily      Vital Signs Last 24 Hrs  T(C): 36.4 (12-23-19 @ 09:19), Max: 37.2 (12-22-19 @ 23:34)  T(F): 97.5 (12-23-19 @ 09:19), Max: 98.9 (12-22-19 @ 23:34)  HR: 101 (12-23-19 @ 09:19) (101 - 109)  BP: 119/68 (12-23-19 @ 09:19) (111/69 - 127/75)  BP(mean): --  RR: 18 (12-23-19 @ 09:19) (18 - 18)  SpO2: 96% (12-23-19 @ 09:19) (96% - 100%)    Physical Exam:    R Sc HD catheter no erythema o tenderness        Chest Good AE,bibasilar crackles     CVS RRR S1 S2 WNl No murmur or rub or gallop    Abd soft BS normal No tenderness RLQ transplant kidney no tenderness  feeding tube no discharge or erythema     sacral decub stage III no purulence or discharge noted     Ext left arm AVF no erythema or tenderness    IV site no erythema tenderness or discharge    Joints no swelling or LOM    CNS as above  Lab Data:                          8.8    21.81 )-----------( 302      ( 22 Dec 2019 16:43 )             29.5     WBC Count: 21.81 (12-22-19 @ 16:43)  WBC Count: 18.93 (12-20-19 @ 21:36)  WBC Count: 16.37 (12-20-19 @ 12:13)  WBC Count: 14.82 (12-18-19 @ 08:25)  WBC Count: 15.12 (12-17-19 @ 13:51)  WBC Count: 15.78 (12-16-19 @ 12:53)    12-22    135  |  95<L>  |  36<H>  ----------------------------<  149<H>  3.8   |  27  |  2.51<H>    Ca    10.3      22 Dec 2019 12:13            < from: Xray Chest 1 View- PORTABLE-Urgent (12.20.19 @ 21:48) >    IMPRESSION:   Clear lungs.            < end of copied text > Patient is a 74y old  Male who presents with a chief complaint of ams (23 Dec 2019 09:02)    Being followed by ID for leucocytosis    Interval history:s/p feeding tube placement last wekk  Now with leucocytosis  ID requested to follow up  No acute events      ROS:when seen is awake and answers some queries-but ROS is not reliable   No observed  cough,SOB,CP  No N/V/D./abd pain  No other complaints      Antimicrobials:  None     Other medications reviewed  MEDICATIONS  (STANDING):  atorvastatin 40 milliGRAM(s) Oral at bedtime  carvedilol 3.125 milliGRAM(s) Oral every 12 hours  chlorhexidine 4% Liquid 1 Application(s) Topical <User Schedule>  Dakins Solution - 1/4 Strength 1 Application(s) Topical two times a day  dextrose 50% Injectable 12.5 Gram(s) IV Push once  dextrose 50% Injectable 25 Gram(s) IV Push once  epoetin tan Injectable 90452 Unit(s) IV Push <User Schedule>  heparin  Injectable 5000 Unit(s) SubCutaneous every 8 hours  insulin glargine Injectable (LANTUS) 9 Unit(s) SubCutaneous at bedtime  insulin lispro (HumaLOG) corrective regimen sliding scale   SubCutaneous every 6 hours  levETIRAcetam  Solution 500 milliGRAM(s) Oral two times a day  lidocaine   Patch 1 Patch Transdermal daily  predniSONE   Tablet 5 milliGRAM(s) Oral daily      Vital Signs Last 24 Hrs  T(C): 36.4 (12-23-19 @ 09:19), Max: 37.2 (12-22-19 @ 23:34)  T(F): 97.5 (12-23-19 @ 09:19), Max: 98.9 (12-22-19 @ 23:34)  HR: 101 (12-23-19 @ 09:19) (101 - 109)  BP: 119/68 (12-23-19 @ 09:19) (111/69 - 127/75)  BP(mean): --  RR: 18 (12-23-19 @ 09:19) (18 - 18)  SpO2: 96% (12-23-19 @ 09:19) (96% - 100%)    Physical Exam:    R Sc HD catheter no erythema o tenderness        Chest Good AE,bibasilar crackles     CVS RRR S1 S2 WNl No murmur or rub or gallop    Abd soft BS normal No tenderness RLQ transplant kidney no tenderness  feeding tube no discharge or erythema     sacral decub stage III no purulence or discharge noted     Ext left arm AVF no erythema or tenderness    IV site no erythema tenderness or discharge    Joints no swelling or LOM    CNS as above  Lab Data:                          8.8    21.81 )-----------( 302      ( 22 Dec 2019 16:43 )             29.5     WBC Count: 21.81 (12-22-19 @ 16:43)  WBC Count: 18.93 (12-20-19 @ 21:36)  WBC Count: 16.37 (12-20-19 @ 12:13)  WBC Count: 14.82 (12-18-19 @ 08:25)  WBC Count: 15.12 (12-17-19 @ 13:51)  WBC Count: 15.78 (12-16-19 @ 12:53)    12-22    135  |  95<L>  |  36<H>  ----------------------------<  149<H>  3.8   |  27  |  2.51<H>    Ca    10.3      22 Dec 2019 12:13            < from: Xray Chest 1 View- PORTABLE-Urgent (12.20.19 @ 21:48) >    IMPRESSION:   Clear lungs.            < end of copied text >    Above Image reviewed

## 2019-12-23 NOTE — PROGRESS NOTE ADULT - ASSESSMENT
74M PMHx of ESRD s/p failed renal transplant x2, HCV, DM, and meningococcal meningitis 2 years ago was sent in to the ED from HD for AMS and low BPs.  CVA  Pneumonia-aspiration-resp failure s/p Rx  patient also with sacral decub stage III   leucocytosis  Is on baseline steroids  also s/ recent feeding tube placement     Problem/Plan - 1:  Leucocytosis    No obvious localization clinically  externally no s/s SC HD catheter site infection  Prior workup negative  Would reciommend check blood Cx X 2, CXR  trend WBC  Could also be reactive to procedure  Continue observation for s/s any infectious foci  Given hemodynamic stability monitor off abx for now      Problem/Plan - 2:    Problem: Sacral decubitus ulcer, stage III.  Plan: Wound care follow up  CT above noted   Observe for s/s any superimposed infectious process.     Problem/Plan - 3:  ·  Problem: Cerebrovascular accident (CVA), unspecified mechanism.  Plan: will defer to primary team on further plan.       Case d/w Med NP    Will tailor plan for ID issues  per course,results.Will defer to primary team on management of other issues.  Will Follow.  Beeper [phone]/afterhours/No [phone]

## 2019-12-24 LAB
ANION GAP SERPL CALC-SCNC: 14 MMOL/L — SIGNIFICANT CHANGE UP (ref 5–17)
BUN SERPL-MCNC: 38 MG/DL — HIGH (ref 7–23)
CALCIUM SERPL-MCNC: 10.9 MG/DL — HIGH (ref 8.4–10.5)
CHLORIDE SERPL-SCNC: 95 MMOL/L — LOW (ref 96–108)
CO2 SERPL-SCNC: 26 MMOL/L — SIGNIFICANT CHANGE UP (ref 22–31)
CREAT SERPL-MCNC: 2.52 MG/DL — HIGH (ref 0.5–1.3)
GLUCOSE BLDC GLUCOMTR-MCNC: 180 MG/DL — HIGH (ref 70–99)
GLUCOSE BLDC GLUCOMTR-MCNC: 183 MG/DL — HIGH (ref 70–99)
GLUCOSE BLDC GLUCOMTR-MCNC: 183 MG/DL — HIGH (ref 70–99)
GLUCOSE BLDC GLUCOMTR-MCNC: 196 MG/DL — HIGH (ref 70–99)
GLUCOSE BLDC GLUCOMTR-MCNC: 222 MG/DL — HIGH (ref 70–99)
GLUCOSE SERPL-MCNC: 195 MG/DL — HIGH (ref 70–99)
HCT VFR BLD CALC: 26.7 % — LOW (ref 39–50)
HGB BLD-MCNC: 7.8 G/DL — LOW (ref 13–17)
MCHC RBC-ENTMCNC: 25.5 PG — LOW (ref 27–34)
MCHC RBC-ENTMCNC: 29.2 GM/DL — LOW (ref 32–36)
MCV RBC AUTO: 87.3 FL — SIGNIFICANT CHANGE UP (ref 80–100)
NRBC # BLD: 0 /100 WBCS — SIGNIFICANT CHANGE UP (ref 0–0)
PLATELET # BLD AUTO: 246 K/UL — SIGNIFICANT CHANGE UP (ref 150–400)
POTASSIUM SERPL-MCNC: 3.4 MMOL/L — LOW (ref 3.5–5.3)
POTASSIUM SERPL-SCNC: 3.4 MMOL/L — LOW (ref 3.5–5.3)
RBC # BLD: 3.06 M/UL — LOW (ref 4.2–5.8)
RBC # FLD: 15.5 % — HIGH (ref 10.3–14.5)
SODIUM SERPL-SCNC: 135 MMOL/L — SIGNIFICANT CHANGE UP (ref 135–145)
SURGICAL PATHOLOGY STUDY: SIGNIFICANT CHANGE UP
WBC # BLD: 16.69 K/UL — HIGH (ref 3.8–10.5)
WBC # FLD AUTO: 16.69 K/UL — HIGH (ref 3.8–10.5)

## 2019-12-24 PROCEDURE — 99232 SBSQ HOSP IP/OBS MODERATE 35: CPT

## 2019-12-24 RX ORDER — INSULIN GLARGINE 100 [IU]/ML
11 INJECTION, SOLUTION SUBCUTANEOUS AT BEDTIME
Refills: 0 | Status: DISCONTINUED | OUTPATIENT
Start: 2019-12-24 | End: 2019-12-30

## 2019-12-24 RX ADMIN — Medication 1 APPLICATION(S): at 17:18

## 2019-12-24 RX ADMIN — Medication 2: at 00:02

## 2019-12-24 RX ADMIN — HEPARIN SODIUM 5000 UNIT(S): 5000 INJECTION INTRAVENOUS; SUBCUTANEOUS at 12:48

## 2019-12-24 RX ADMIN — ATORVASTATIN CALCIUM 40 MILLIGRAM(S): 80 TABLET, FILM COATED ORAL at 22:53

## 2019-12-24 RX ADMIN — Medication 5 MILLIGRAM(S): at 04:25

## 2019-12-24 RX ADMIN — LIDOCAINE 1 PATCH: 4 CREAM TOPICAL at 12:45

## 2019-12-24 RX ADMIN — LEVETIRACETAM 500 MILLIGRAM(S): 250 TABLET, FILM COATED ORAL at 04:25

## 2019-12-24 RX ADMIN — LIDOCAINE 1 PATCH: 4 CREAM TOPICAL at 16:57

## 2019-12-24 RX ADMIN — Medication 4: at 12:45

## 2019-12-24 RX ADMIN — CARVEDILOL PHOSPHATE 3.12 MILLIGRAM(S): 80 CAPSULE, EXTENDED RELEASE ORAL at 17:19

## 2019-12-24 RX ADMIN — HEPARIN SODIUM 5000 UNIT(S): 5000 INJECTION INTRAVENOUS; SUBCUTANEOUS at 04:25

## 2019-12-24 RX ADMIN — CHLORHEXIDINE GLUCONATE 1 APPLICATION(S): 213 SOLUTION TOPICAL at 04:26

## 2019-12-24 RX ADMIN — Medication 1 APPLICATION(S): at 04:27

## 2019-12-24 RX ADMIN — HEPARIN SODIUM 5000 UNIT(S): 5000 INJECTION INTRAVENOUS; SUBCUTANEOUS at 22:53

## 2019-12-24 RX ADMIN — Medication 2: at 17:19

## 2019-12-24 RX ADMIN — LEVETIRACETAM 500 MILLIGRAM(S): 250 TABLET, FILM COATED ORAL at 17:18

## 2019-12-24 RX ADMIN — Medication 2: at 06:28

## 2019-12-24 NOTE — PROGRESS NOTE ADULT - SUBJECTIVE AND OBJECTIVE BOX
Patient is a 74y Male whom presented to   pateint seen and examined nad , in am     PAST MEDICAL & SURGICAL HISTORY:  Kidney transplant recipient  Anuria  GERD (gastroesophageal reflux disease)  Kidney transplant failure: dx: 2/2013  Hepatitis C: dx: 90&#x27;s.  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 10 milliGRAM(s) Oral three times a day  mycophenolate mofetil 250 milliGRAM(s) Oral two times a day  predniSONE   Tablet 5 milliGRAM(s) Oral daily  sevelamer carbonate 800 milliGRAM(s) Oral three times a day with meals  sodium bicarbonate 650 milliGRAM(s) Oral two times a day  tamsulosin 0.4 milliGRAM(s) Oral at bedtime      Allergies    No Known Allergies                       SOCIAL HISTORY:  Denies ETOh,Smoking,     FAMILY HISTORY:  Family history of breast cancer in sister (Sibling): living at 94 yo  Family history of prostate cancer in father  Family history of lung cancer  Family history of hypertension in mother  Family history of diabetes mellitus: mother      REVIEW OF SYSTEMS:    unbable to obtained                                                     7.8    16.69 )-----------( 246      ( 24 Dec 2019 09:44 )             26.7       CBC Full  -  ( 24 Dec 2019 09:44 )  WBC Count : 16.69 K/uL  RBC Count : 3.06 M/uL  Hemoglobin : 7.8 g/dL  Hematocrit : 26.7 %  Platelet Count - Automated : 246 K/uL  Mean Cell Volume : 87.3 fl  Mean Cell Hemoglobin : 25.5 pg  Mean Cell Hemoglobin Concentration : 29.2 gm/dL  Auto Neutrophil # : x  Auto Lymphocyte # : x  Auto Monocyte # : x  Auto Eosinophil # : x  Auto Basophil # : x  Auto Neutrophil % : x  Auto Lymphocyte % : x  Auto Monocyte % : x  Auto Eosinophil % : x  Auto Basophil % : x      12-24    135  |  95<L>  |  38<H>  ----------------------------<  195<H>  3.4<L>   |  26  |  2.52<H>    Ca    10.9<H>      24 Dec 2019 09:44        CAPILLARY BLOOD GLUCOSE      POCT Blood Glucose.: 222 mg/dL (24 Dec 2019 12:07)  POCT Blood Glucose.: 196 mg/dL (24 Dec 2019 06:24)  POCT Blood Glucose.: 173 mg/dL (23 Dec 2019 23:50)  POCT Blood Glucose.: 202 mg/dL (23 Dec 2019 22:13)  POCT Blood Glucose.: 151 mg/dL (23 Dec 2019 17:56)      Vital Signs Last 24 Hrs  T(C): 36.4 (24 Dec 2019 16:22), Max: 37.2 (23 Dec 2019 17:49)  T(F): 97.6 (24 Dec 2019 16:22), Max: 98.9 (23 Dec 2019 17:49)  HR: 95 (24 Dec 2019 16:22) (95 - 110)  BP: 111/68 (24 Dec 2019 16:22) (98/61 - 131/73)  BP(mean): --  RR: 20 (24 Dec 2019 16:22) (18 - 20)  SpO2: 97% (24 Dec 2019 16:22) (97% - 100%)                                                                          HEENT: conjunctive   clear   Neck:  No JVD  Respiratory: decrease bs b/l   Cardiovascular: S1 and S2  Gastrointestinal: BS+, soft, NT/ND  Extremities: No peripheral edema  Skin: dry   Access: pos fistula

## 2019-12-24 NOTE — PROGRESS NOTE ADULT - SUBJECTIVE AND OBJECTIVE BOX
Wound Surgery Progress Note:    Follow up visit to patient for wound management. Mr. Liao is a 74M PMHx of ESRD s/p failed renal transplant x2, HCV, DM, and meningococcal meningitis 2 years ago was sent in to the ED from HD for AMS and low BPs. His sacral wound was excisionally debrided 12.19.2019. Will continue current management.    PAST MEDICAL & SURGICAL HISTORY:  Kidney transplant recipient  Anuria  GERD (gastroesophageal reflux disease)  Kidney transplant failure: dx: 2/2013  Hepatitis C: dx: 90&#x27;s.  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, no known MIs  H/O kidney transplant: may 2015  A-V fistula: Left, implanted (?)  S/p cadaver renal transplant: 2008  S/P partial colectomy: due to diverticulitis    MEDICATIONS  (STANDING):  atorvastatin 40 milliGRAM(s) Oral at bedtime  carvedilol 3.125 milliGRAM(s) Oral every 12 hours  chlorhexidine 4% Liquid 1 Application(s) Topical <User Schedule>  Dakins Solution - 1/4 Strength 1 Application(s) Topical two times a day  dextrose 50% Injectable 12.5 Gram(s) IV Push once  dextrose 50% Injectable 25 Gram(s) IV Push once  epoetin tan Injectable 66337 Unit(s) IV Push <User Schedule>  heparin  Injectable 5000 Unit(s) SubCutaneous every 8 hours  insulin glargine Injectable (LANTUS) 9 Unit(s) SubCutaneous at bedtime  insulin lispro (HumaLOG) corrective regimen sliding scale   SubCutaneous every 6 hours  levETIRAcetam  Solution 500 milliGRAM(s) Oral two times a day  lidocaine   Patch 1 Patch Transdermal daily  predniSONE   Tablet 5 milliGRAM(s) Oral daily    MEDICATIONS  (PRN):  dextrose 40% Gel 15 Gram(s) Oral once PRN Blood Glucose LESS THAN 70 milliGRAM(s)/deciLiter  glucagon  Injectable 1 milliGRAM(s) IntraMuscular once PRN Glucose <70 milliGRAM(s)/deciLiter    Allergies    No Known Allergies    Intolerances    Vital Signs Last 24 Hrs  T(C): 36.2 (24 Dec 2019 12:00), Max: 37.2 (23 Dec 2019 17:49)  T(F): 97.2 (24 Dec 2019 12:00), Max: 98.9 (23 Dec 2019 17:49)  HR: 107 (24 Dec 2019 12:00) (95 - 110)  BP: 99/64 (24 Dec 2019 12:00) (98/61 - 131/73)  BP(mean): --  RR: 18 (24 Dec 2019 12:00) (18 - 18)  SpO2: 97% (24 Dec 2019 12:00) (97% - 100%)    Physical Exam:  General: awake, cachectic, frail  Respiratory: no SOB on room air  Gastrointestinal: soft NT/ND (+)BM  Neurology: nonverbal, not following commands  Musculoskeletal: no contractures  Vascular: no BLE edema, BLE equally warm  Skin:  sacral wound L 11.5cm x W 11.5cm x D 1.5cm circumferential undermining to 1.5 at 12 oclock, no odor, adherent grey, moist slough, large amount of serosanguinous drainage, crumbling bone in wound, + TTP, No erythema, increased warmth, induration, fluctuance    LABS:  12-24    135  |  95<L>  |  38<H>  ----------------------------<  195<H>  3.4<L>   |  26  |  2.52<H>    Ca    10.9<H>      24 Dec 2019 09:44                            7.8    16.69 )-----------( 246      ( 24 Dec 2019 09:44 )             26.7

## 2019-12-24 NOTE — PROGRESS NOTE ADULT - SUBJECTIVE AND OBJECTIVE BOX
INTERVAL HPI/OVERNIGHT EVENTS:    pt seen and examined   resting comfortable  tolerating feeds @ 50cc/hr  Hgb drop noted; no s/s active GIB as per RN  no Bms today as per RN  No GI complaints during time of eval       MEDICATIONS  (STANDING):  atorvastatin 40 milliGRAM(s) Oral at bedtime  carvedilol 3.125 milliGRAM(s) Oral every 12 hours  chlorhexidine 4% Liquid 1 Application(s) Topical <User Schedule>  Dakins Solution - 1/4 Strength 1 Application(s) Topical two times a day  dextrose 50% Injectable 12.5 Gram(s) IV Push once  dextrose 50% Injectable 25 Gram(s) IV Push once  epoetin tan Injectable 53002 Unit(s) IV Push <User Schedule>  heparin  Injectable 5000 Unit(s) SubCutaneous every 8 hours  insulin glargine Injectable (LANTUS) 9 Unit(s) SubCutaneous at bedtime  insulin lispro (HumaLOG) corrective regimen sliding scale   SubCutaneous every 6 hours  levETIRAcetam  Solution 500 milliGRAM(s) Oral two times a day  lidocaine   Patch 1 Patch Transdermal daily  predniSONE   Tablet 5 milliGRAM(s) Oral daily    MEDICATIONS  (PRN):  dextrose 40% Gel 15 Gram(s) Oral once PRN Blood Glucose LESS THAN 70 milliGRAM(s)/deciLiter  glucagon  Injectable 1 milliGRAM(s) IntraMuscular once PRN Glucose <70 milliGRAM(s)/deciLiter      Allergies    No Known Allergies    Intolerances      Review of Systems:    unable to obtain      Vital Signs Last 24 Hrs  T(C): 36.6 (22 Dec 2019 10:04), Max: 37.4 (21 Dec 2019 13:55)  T(F): 97.9 (22 Dec 2019 10:04), Max: 99.3 (21 Dec 2019 13:55)  HR: 93 (22 Dec 2019 10:04) (93 - 102)  BP: 107/57 (22 Dec 2019 10:04) (91/60 - 123/73)  BP(mean): 78 (21 Dec 2019 13:00) (70 - 78)  RR: 18 (22 Dec 2019 10:04) (14 - 18)  SpO2: 98% (22 Dec 2019 10:04) (95% - 100%)    PHYSICAL EXAM:  GENERAL:   NAD  HEENT:  NC/AT, no JVD, sclera-anicteric  ABDOMEN:  Soft, non-distended, + bowel sounds, + peg dressed, c/d/i  EXTREMITIES:  no cyanosis, clubbing or edema  NEURO:  awake, responsive, minimally verbal  SKIN:  No rash/warm/dry    LABS:                        8.2    18.93 )-----------( 209      ( 20 Dec 2019 21:36 )             26.5     12-20    124<L>  |  89<L>  |  56<H>  ----------------------------<  202<H>  4.1   |  23  |  3.12<H>    Ca    10.2      20 Dec 2019 21:37  Phos  1.2     12-20  Mg     1.9     12-20    TPro  7.7  /  Alb  2.2<L>  /  TBili  0.5  /  DBili  x   /  AST  19  /  ALT  6<L>  /  AlkPhos  109  12-20    PT/INR - ( 21 Dec 2019 00:48 )   PT: 13.9 sec;   INR: 1.20 ratio         PTT - ( 21 Dec 2019 00:48 )  PTT:22.1 sec      RADIOLOGY & ADDITIONAL TESTS:

## 2019-12-24 NOTE — PROGRESS NOTE ADULT - PROBLEM SELECTOR PLAN 3
Hgb drop noted; no s/s active GIB at this time; continue to monitor CBC, transfuse prn    s/p 1uprbc with appropriate response 12/17  occult negative

## 2019-12-24 NOTE — PROGRESS NOTE ADULT - SUBJECTIVE AND OBJECTIVE BOX
Patient is a 74y old  Male who presents with a chief complaint of ams (24 Dec 2019 16:52)      INTERVAL HPI/OVERNIGHT EVENTS:  T(C): 36.4 (12-24-19 @ 16:22), Max: 37.2 (12-23-19 @ 17:49)  HR: 95 (12-24-19 @ 16:22) (95 - 110)  BP: 111/68 (12-24-19 @ 16:22) (98/61 - 131/73)  RR: 20 (12-24-19 @ 16:22) (18 - 20)  SpO2: 97% (12-24-19 @ 16:22) (97% - 100%)  Wt(kg): --  I&O's Summary    23 Dec 2019 07:01  -  24 Dec 2019 07:00  --------------------------------------------------------  IN: 2930 mL / OUT: 1900 mL / NET: 1030 mL    24 Dec 2019 07:01  -  24 Dec 2019 17:01  --------------------------------------------------------  IN: 680 mL / OUT: 0 mL / NET: 680 mL        LABS:                        7.8    16.69 )-----------( 246      ( 24 Dec 2019 09:44 )             26.7     12-24    135  |  95<L>  |  38<H>  ----------------------------<  195<H>  3.4<L>   |  26  |  2.52<H>    Ca    10.9<H>      24 Dec 2019 09:44          CAPILLARY BLOOD GLUCOSE      POCT Blood Glucose.: 183 mg/dL (24 Dec 2019 16:59)  POCT Blood Glucose.: 222 mg/dL (24 Dec 2019 12:07)  POCT Blood Glucose.: 196 mg/dL (24 Dec 2019 06:24)  POCT Blood Glucose.: 173 mg/dL (23 Dec 2019 23:50)  POCT Blood Glucose.: 202 mg/dL (23 Dec 2019 22:13)  POCT Blood Glucose.: 151 mg/dL (23 Dec 2019 17:56)            MEDICATIONS  (STANDING):  atorvastatin 40 milliGRAM(s) Oral at bedtime  carvedilol 3.125 milliGRAM(s) Oral every 12 hours  chlorhexidine 4% Liquid 1 Application(s) Topical <User Schedule>  Dakins Solution - 1/4 Strength 1 Application(s) Topical two times a day  dextrose 50% Injectable 12.5 Gram(s) IV Push once  dextrose 50% Injectable 25 Gram(s) IV Push once  epoetin tan Injectable 62631 Unit(s) IV Push <User Schedule>  heparin  Injectable 5000 Unit(s) SubCutaneous every 8 hours  insulin glargine Injectable (LANTUS) 11 Unit(s) SubCutaneous at bedtime  insulin lispro (HumaLOG) corrective regimen sliding scale   SubCutaneous every 6 hours  levETIRAcetam  Solution 500 milliGRAM(s) Oral two times a day  lidocaine   Patch 1 Patch Transdermal daily  predniSONE   Tablet 5 milliGRAM(s) Oral daily    MEDICATIONS  (PRN):  dextrose 40% Gel 15 Gram(s) Oral once PRN Blood Glucose LESS THAN 70 milliGRAM(s)/deciLiter  glucagon  Injectable 1 milliGRAM(s) IntraMuscular once PRN Glucose <70 milliGRAM(s)/deciLiter          PHYSICAL EXAM:  GENERAL: frail  CHEST/LUNG: Clear to percussion bilaterally; No rales, rhonchi, wheezing, or rubs  HEART: Regular rate and rhythm; No murmurs, rubs, or gallops  ABDOMEN: Soft, Nontender, Nondistended; Bowel sounds present  EXTREMITIES: no edema     Care Discussed with Consultants/Other Providers [ ] YES  [ ] NO

## 2019-12-24 NOTE — PROGRESS NOTE ADULT - ASSESSMENT
Impression:    Sacral wound with clinical OM  Incontinence of bowel    Recommend:  1.) Topical therapy: sacral wound - irrigate well with 1/4 strength Dakins solution, pat dry, pack with Dakins moistened gauze, cover with DSD, secure with tegederm  2.) Maintain on an alternating air with low air loss surface  3.) Turn and reposition Q 2 hours  4.) Nutrition optimization  5.) Incontinence management - incontinence pads, cleanser, pericare BID, apply cavilon to bilateral buttocks, perineum Daily  6.) Glycemic control    Care as per medicine will follow w/ you  Upon discharge f/u as outpatient at [facility] [phone]  Thank you for this consult  Sapna Ramirez, NP-C, CWOCN [id number]

## 2019-12-24 NOTE — PROGRESS NOTE ADULT - ASSESSMENT
Assessment  DM: A1C 5.5%, was on insulin at home, started on basal insulin, FS trending up and not at target, no hypoglycemic episodes. Patient is still NPO on tube feeds, PEG tube placed, stable, resting comfortably.  Sacral Wound: s/p debridement, monitored, FU wound care.  Hypercalcemia: Primary Hyperparathyroidism, Ca improved s/p Pamidronate dose.  Sepsis: IV ABx for UTI, LP inconsistent with meningitis, on medications, stable, monitored.  HTN: Controlled,  on antihypertensive medications.  ESRD: On hemodialysis, Monitor labs/BMP          Robi Gay MD  Cell: [phone]  Office: [phone] Assessment  DM: A1C 5.5%, was on insulin at home, started on basal insulin, FS trending up and not at target, no hypoglycemic episodes. Patient is still NPO on tube feeds, PEG tube placed, stable,  resting comfortably.  Sacral Wound: s/p debridement, monitored, FU wound care.  Hypercalcemia: Primary Hyperparathyroidism, Ca improved s/p Pamidronate dose.  Sepsis: IV ABx for UTI, LP inconsistent with meningitis, on medications, stable, monitored.  HTN: Controlled,  on antihypertensive medications.  ESRD: On hemodialysis, Monitor labs/BMP          Robi Gay MD  Cell: [phone]  Office: [phone]

## 2019-12-24 NOTE — PROGRESS NOTE ADULT - SUBJECTIVE AND OBJECTIVE BOX
Patient is a 74y old  Male who presents with a chief complaint of ams (23 Dec 2019 21:37)    Being followed by ID for leucocytosis    Interval history:awake  answers some queries  says pain all over-not reliable historian   No acute events      ROS:  No reliable ROS obtainable   Antimicrobials:      Other medications reviewed  MEDICATIONS  (STANDING):  atorvastatin 40 milliGRAM(s) Oral at bedtime  carvedilol 3.125 milliGRAM(s) Oral every 12 hours  chlorhexidine 4% Liquid 1 Application(s) Topical <User Schedule>  Dakins Solution - 1/4 Strength 1 Application(s) Topical two times a day  dextrose 50% Injectable 12.5 Gram(s) IV Push once  dextrose 50% Injectable 25 Gram(s) IV Push once  epoetin tan Injectable 01372 Unit(s) IV Push <User Schedule>  heparin  Injectable 5000 Unit(s) SubCutaneous every 8 hours  insulin glargine Injectable (LANTUS) 9 Unit(s) SubCutaneous at bedtime  insulin lispro (HumaLOG) corrective regimen sliding scale   SubCutaneous every 6 hours  levETIRAcetam  Solution 500 milliGRAM(s) Oral two times a day  lidocaine   Patch 1 Patch Transdermal daily  predniSONE   Tablet 5 milliGRAM(s) Oral daily      Vital Signs Last 24 Hrs  T(C): 36.8 (12-24-19 @ 04:07), Max: 37.2 (12-23-19 @ 17:49)  T(F): 98.3 (12-24-19 @ 04:07), Max: 98.9 (12-23-19 @ 17:49)  HR: 110 (12-24-19 @ 04:07) (95 - 110)  BP: 101/80 (12-24-19 @ 04:07) (98/51 - 131/73)  BP(mean): --  RR: 18 (12-24-19 @ 04:07) (18 - 18)  SpO2: 99% (12-24-19 @ 04:07) (99% - 100%)    Physical Exam:    R Sc HD catheter no erythema o tenderness        Chest Good AE,bibasilar crackles     CVS RRR S1 S2 WNl No murmur or rub or gallop    Abd soft BS normal No tenderness RLQ transplant kidney no tenderness  feeding tube no discharge or erythema     sacral decub stage III no purulence or discharge noted     Ext left arm AVF no erythema or tenderness    IV site no erythema tenderness or discharge    Joints no swelling or LOM    CNS as above  Lab Data:                          7.8    16.69 )-----------( 246      ( 24 Dec 2019 09:44 )             26.7   WBC Count: 16.69 (12-24-19 @ 09:44)  WBC Count: 15.73 (12-23-19 @ 18:15)  WBC Count: 21.81 (12-22-19 @ 16:43)  WBC Count: 18.93 (12-20-19 @ 21:36)  WBC Count: 16.37 (12-20-19 @ 12:13)  WBC Count: 14.82 (12-18-19 @ 08:25)  WBC Count: 15.12 (12-17-19 @ 13:51)      12-24    135  |  95<L>  |  38<H>  ----------------------------<  195<H>  3.4<L>   |  26  |  2.52<H>    Ca    10.9<H>      24 Dec 2019 09:44          < from: Xray Chest 1 View- PORTABLE-Urgent (12.23.19 @ 09:11) >  IMPRESSION:    Clear lungs.        < end of copied text >

## 2019-12-24 NOTE — PROGRESS NOTE ADULT - SUBJECTIVE AND OBJECTIVE BOX
Chief complaint  Patient is a 74y old  Male who presents with a chief complaint of ams (24 Dec 2019 13:48)   Review of systems  Patient in bed, looks comfortable, no fever, no hypoglycemia.    Labs and Fingersticks  CAPILLARY BLOOD GLUCOSE      POCT Blood Glucose.: 222 mg/dL (24 Dec 2019 12:07)  POCT Blood Glucose.: 196 mg/dL (24 Dec 2019 06:24)  POCT Blood Glucose.: 173 mg/dL (23 Dec 2019 23:50)  POCT Blood Glucose.: 202 mg/dL (23 Dec 2019 22:13)  POCT Blood Glucose.: 151 mg/dL (23 Dec 2019 17:56)      Anion Gap, Serum: 14 (12-24 @ 09:44)  Anion Gap, Serum: 13 (12-23 @ 15:14)      Calcium, Total Serum: 10.9 <H> (12-24 @ 09:44)  Calcium, Total Serum: 10.8 <H> (12-23 @ 15:14)          12-24    135  |  95<L>  |  38<H>  ----------------------------<  195<H>  3.4<L>   |  26  |  2.52<H>    Ca    10.9<H>      24 Dec 2019 09:44                          7.8    16.69 )-----------( 246      ( 24 Dec 2019 09:44 )             26.7     Medications  MEDICATIONS  (STANDING):  atorvastatin 40 milliGRAM(s) Oral at bedtime  carvedilol 3.125 milliGRAM(s) Oral every 12 hours  chlorhexidine 4% Liquid 1 Application(s) Topical <User Schedule>  Dakins Solution - 1/4 Strength 1 Application(s) Topical two times a day  dextrose 50% Injectable 12.5 Gram(s) IV Push once  dextrose 50% Injectable 25 Gram(s) IV Push once  epoetin tan Injectable 90433 Unit(s) IV Push <User Schedule>  heparin  Injectable 5000 Unit(s) SubCutaneous every 8 hours  insulin glargine Injectable (LANTUS) 11 Unit(s) SubCutaneous at bedtime  insulin lispro (HumaLOG) corrective regimen sliding scale   SubCutaneous every 6 hours  levETIRAcetam  Solution 500 milliGRAM(s) Oral two times a day  lidocaine   Patch 1 Patch Transdermal daily  predniSONE   Tablet 5 milliGRAM(s) Oral daily      Physical Exam  General: Patient comfortable in bed  Vital Signs Last 12 Hrs  T(F): 97.2 (12-24-19 @ 12:00), Max: 98.3 (12-24-19 @ 04:07)  HR: 107 (12-24-19 @ 12:00) (107 - 110)  BP: 99/64 (12-24-19 @ 12:00) (99/64 - 101/80)  BP(mean): --  RR: 18 (12-24-19 @ 12:00) (18 - 18)  SpO2: 97% (12-24-19 @ 12:00) (97% - 99%)  Neck: No palpable thyroid nodules.  CVS: S1S2, No murmurs  Respiratory: No wheezing, no crepitations  GI: Abdomen soft, bowel sounds positive  Musculoskeletal:  edema lower extremities.   Skin: No skin rashes, no ecchymosis    Diagnostics Chief complaint  Patient is a 74y old  Male who presents with a chief complaint of ams (24 Dec 2019 13:48)   Review of systems  Patient in bed, looks comfortable, no fever,  no hypoglycemia.    Labs and Fingersticks  CAPILLARY BLOOD GLUCOSE      POCT Blood Glucose.: 222 mg/dL (24 Dec 2019 12:07)  POCT Blood Glucose.: 196 mg/dL (24 Dec 2019 06:24)  POCT Blood Glucose.: 173 mg/dL (23 Dec 2019 23:50)  POCT Blood Glucose.: 202 mg/dL (23 Dec 2019 22:13)  POCT Blood Glucose.: 151 mg/dL (23 Dec 2019 17:56)      Anion Gap, Serum: 14 (12-24 @ 09:44)  Anion Gap, Serum: 13 (12-23 @ 15:14)      Calcium, Total Serum: 10.9 <H> (12-24 @ 09:44)  Calcium, Total Serum: 10.8 <H> (12-23 @ 15:14)          12-24    135  |  95<L>  |  38<H>  ----------------------------<  195<H>  3.4<L>   |  26  |  2.52<H>    Ca    10.9<H>      24 Dec 2019 09:44                          7.8    16.69 )-----------( 246      ( 24 Dec 2019 09:44 )             26.7     Medications  MEDICATIONS  (STANDING):  atorvastatin 40 milliGRAM(s) Oral at bedtime  carvedilol 3.125 milliGRAM(s) Oral every 12 hours  chlorhexidine 4% Liquid 1 Application(s) Topical <User Schedule>  Dakins Solution - 1/4 Strength 1 Application(s) Topical two times a day  dextrose 50% Injectable 12.5 Gram(s) IV Push once  dextrose 50% Injectable 25 Gram(s) IV Push once  epoetin tan Injectable 46928 Unit(s) IV Push <User Schedule>  heparin  Injectable 5000 Unit(s) SubCutaneous every 8 hours  insulin glargine Injectable (LANTUS) 11 Unit(s) SubCutaneous at bedtime  insulin lispro (HumaLOG) corrective regimen sliding scale   SubCutaneous every 6 hours  levETIRAcetam  Solution 500 milliGRAM(s) Oral two times a day  lidocaine   Patch 1 Patch Transdermal daily  predniSONE   Tablet 5 milliGRAM(s) Oral daily      Physical Exam  General: Patient comfortable in bed  Vital Signs Last 12 Hrs  T(F): 97.2 (12-24-19 @ 12:00), Max: 98.3 (12-24-19 @ 04:07)  HR: 107 (12-24-19 @ 12:00) (107 - 110)  BP: 99/64 (12-24-19 @ 12:00) (99/64 - 101/80)  BP(mean): --  RR: 18 (12-24-19 @ 12:00) (18 - 18)  SpO2: 97% (12-24-19 @ 12:00) (97% - 99%)  Neck: No palpable thyroid nodules.  CVS: S1S2, No murmurs  Respiratory: No wheezing, no crepitations  GI: Abdomen soft, bowel sounds positive  Musculoskeletal:  edema lower extremities.   Skin: No skin rashes, no ecchymosis    Diagnostics

## 2019-12-24 NOTE — PROGRESS NOTE ADULT - PROBLEM SELECTOR PLAN 1
Will increase Lantus to 11u at bedtime and continue Humalog correction scale coverage.  Will continue monitoring FS and FU. Will increase Lantus to 11u at bedtime and continue Humalog correction scale coverage.

## 2019-12-24 NOTE — PROGRESS NOTE ADULT - ASSESSMENT
74M PMHx of ESRD s/p failed renal transplant x2, HCV, DM, and meningococcal meningitis 2 years ago was sent in to the ED from HD for AMS and low BPs.  CVA  Pneumonia-aspiration-resp failure s/p Rx  patient also with sacral decub stage III   leucocytosis  Is on baseline steroids  also s/ recent feeding tube placement     Problem/Plan - 1:  Leucocytosis    No obvious localization clinically  externally no s/s SC HD catheter site infection  Prior workup negative  Blood Cx pending  CXR no pna   trend WBC  Could also be reactive to procedure vs steroids   Continue observation for s/s any infectious foci  Given hemodynamic stability monitor off abx for now        Problem/Plan - 2:    Problem: Sacral decubitus ulcer, stage III.  Plan: Wound care follow up  CT above noted   Observe for s/s any superimposed infectious process.     Problem/Plan - 3:  ·  Problem: Cerebrovascular accident (CVA), unspecified mechanism.  Plan: will defer to primary team on further plan.         If stable,  blood Cx stay negative and leucocytosis stable/trends down no ID contraindications to planned procedure,however risks inherent to any invasive procedure/surgery including infection will remain.Risks/benefits of the same should be discussed with the patient and an informed consent should be obtained by the performing physician.        case discussed with med Np    I am away till 1/2/20.  My colleagues in ID service will cover and assume care of ID issues.  Please call [phone] if any issues or questions.

## 2019-12-25 LAB
ANION GAP SERPL CALC-SCNC: 13 MMOL/L — SIGNIFICANT CHANGE UP (ref 5–17)
BUN SERPL-MCNC: 59 MG/DL — HIGH (ref 7–23)
CALCIUM SERPL-MCNC: 11.5 MG/DL — HIGH (ref 8.4–10.5)
CHLORIDE SERPL-SCNC: 93 MMOL/L — LOW (ref 96–108)
CO2 SERPL-SCNC: 25 MMOL/L — SIGNIFICANT CHANGE UP (ref 22–31)
CREAT SERPL-MCNC: 3.37 MG/DL — HIGH (ref 0.5–1.3)
GLUCOSE BLDC GLUCOMTR-MCNC: 176 MG/DL — HIGH (ref 70–99)
GLUCOSE BLDC GLUCOMTR-MCNC: 177 MG/DL — HIGH (ref 70–99)
GLUCOSE BLDC GLUCOMTR-MCNC: 196 MG/DL — HIGH (ref 70–99)
GLUCOSE BLDC GLUCOMTR-MCNC: 198 MG/DL — HIGH (ref 70–99)
GLUCOSE BLDC GLUCOMTR-MCNC: 217 MG/DL — HIGH (ref 70–99)
GLUCOSE SERPL-MCNC: 212 MG/DL — HIGH (ref 70–99)
HCT VFR BLD CALC: 25.5 % — LOW (ref 39–50)
HGB BLD-MCNC: 7.6 G/DL — LOW (ref 13–17)
MCHC RBC-ENTMCNC: 25.9 PG — LOW (ref 27–34)
MCHC RBC-ENTMCNC: 29.8 GM/DL — LOW (ref 32–36)
MCV RBC AUTO: 87 FL — SIGNIFICANT CHANGE UP (ref 80–100)
PLATELET # BLD AUTO: 256 K/UL — SIGNIFICANT CHANGE UP (ref 150–400)
POTASSIUM SERPL-MCNC: 3.3 MMOL/L — LOW (ref 3.5–5.3)
POTASSIUM SERPL-SCNC: 3.3 MMOL/L — LOW (ref 3.5–5.3)
RBC # BLD: 2.93 M/UL — LOW (ref 4.2–5.8)
RBC # FLD: 15.7 % — HIGH (ref 10.3–14.5)
SODIUM SERPL-SCNC: 131 MMOL/L — LOW (ref 135–145)
WBC # BLD: 18.38 K/UL — HIGH (ref 3.8–10.5)
WBC # FLD AUTO: 18.38 K/UL — HIGH (ref 3.8–10.5)

## 2019-12-25 RX ORDER — POTASSIUM CHLORIDE 20 MEQ
20 PACKET (EA) ORAL ONCE
Refills: 0 | Status: COMPLETED | OUTPATIENT
Start: 2019-12-25 | End: 2019-12-25

## 2019-12-25 RX ORDER — LOPERAMIDE HCL 2 MG
2 TABLET ORAL
Refills: 0 | Status: DISCONTINUED | OUTPATIENT
Start: 2019-12-25 | End: 2019-12-26

## 2019-12-25 RX ADMIN — LIDOCAINE 1 PATCH: 4 CREAM TOPICAL at 19:20

## 2019-12-25 RX ADMIN — CARVEDILOL PHOSPHATE 3.12 MILLIGRAM(S): 80 CAPSULE, EXTENDED RELEASE ORAL at 18:14

## 2019-12-25 RX ADMIN — Medication 20 MILLIEQUIVALENT(S): at 18:14

## 2019-12-25 RX ADMIN — LEVETIRACETAM 500 MILLIGRAM(S): 250 TABLET, FILM COATED ORAL at 18:14

## 2019-12-25 RX ADMIN — Medication 1 APPLICATION(S): at 05:20

## 2019-12-25 RX ADMIN — LEVETIRACETAM 500 MILLIGRAM(S): 250 TABLET, FILM COATED ORAL at 05:19

## 2019-12-25 RX ADMIN — CHLORHEXIDINE GLUCONATE 1 APPLICATION(S): 213 SOLUTION TOPICAL at 05:21

## 2019-12-25 RX ADMIN — INSULIN GLARGINE 11 UNIT(S): 100 INJECTION, SOLUTION SUBCUTANEOUS at 22:34

## 2019-12-25 RX ADMIN — HEPARIN SODIUM 5000 UNIT(S): 5000 INJECTION INTRAVENOUS; SUBCUTANEOUS at 13:23

## 2019-12-25 RX ADMIN — INSULIN GLARGINE 11 UNIT(S): 100 INJECTION, SOLUTION SUBCUTANEOUS at 00:07

## 2019-12-25 RX ADMIN — HEPARIN SODIUM 5000 UNIT(S): 5000 INJECTION INTRAVENOUS; SUBCUTANEOUS at 05:20

## 2019-12-25 RX ADMIN — ATORVASTATIN CALCIUM 40 MILLIGRAM(S): 80 TABLET, FILM COATED ORAL at 19:58

## 2019-12-25 RX ADMIN — LIDOCAINE 1 PATCH: 4 CREAM TOPICAL at 01:51

## 2019-12-25 RX ADMIN — Medication 2: at 06:11

## 2019-12-25 RX ADMIN — Medication 2: at 18:19

## 2019-12-25 RX ADMIN — CARVEDILOL PHOSPHATE 3.12 MILLIGRAM(S): 80 CAPSULE, EXTENDED RELEASE ORAL at 05:20

## 2019-12-25 RX ADMIN — Medication 2: at 00:07

## 2019-12-25 RX ADMIN — Medication 4: at 13:22

## 2019-12-25 RX ADMIN — Medication 1 APPLICATION(S): at 18:14

## 2019-12-25 RX ADMIN — HEPARIN SODIUM 5000 UNIT(S): 5000 INJECTION INTRAVENOUS; SUBCUTANEOUS at 19:59

## 2019-12-25 RX ADMIN — LIDOCAINE 1 PATCH: 4 CREAM TOPICAL at 13:23

## 2019-12-25 RX ADMIN — Medication 5 MILLIGRAM(S): at 05:20

## 2019-12-25 NOTE — PROGRESS NOTE ADULT - SUBJECTIVE AND OBJECTIVE BOX
Patient is a 74y old  Male who presents with a chief complaint of ams (25 Dec 2019 11:52)      INTERVAL HPI/OVERNIGHT EVENTS:  T(C): 37.1 (12-25-19 @ 05:18), Max: 37.1 (12-25-19 @ 05:18)  HR: 105 (12-25-19 @ 05:18) (95 - 105)  BP: 120/71 (12-25-19 @ 05:18) (111/68 - 137/73)  RR: 18 (12-25-19 @ 05:18) (18 - 20)  SpO2: 98% (12-25-19 @ 05:18) (96% - 98%)  Wt(kg): --  I&O's Summary    24 Dec 2019 07:01  -  25 Dec 2019 07:00  --------------------------------------------------------  IN: 2040 mL / OUT: 0 mL / NET: 2040 mL        LABS:                        7.8    16.69 )-----------( 246      ( 24 Dec 2019 09:44 )             26.7     12-25    131<L>  |  93<L>  |  59<H>  ----------------------------<  212<H>  3.3<L>   |  25  |  3.37<H>    Ca    11.5<H>      25 Dec 2019 10:25          CAPILLARY BLOOD GLUCOSE      POCT Blood Glucose.: 217 mg/dL (25 Dec 2019 12:43)  POCT Blood Glucose.: 198 mg/dL (25 Dec 2019 05:53)  POCT Blood Glucose.: 183 mg/dL (24 Dec 2019 23:57)  POCT Blood Glucose.: 180 mg/dL (24 Dec 2019 22:14)  POCT Blood Glucose.: 183 mg/dL (24 Dec 2019 16:59)            MEDICATIONS  (STANDING):  atorvastatin 40 milliGRAM(s) Oral at bedtime  carvedilol 3.125 milliGRAM(s) Oral every 12 hours  chlorhexidine 4% Liquid 1 Application(s) Topical <User Schedule>  Dakins Solution - 1/4 Strength 1 Application(s) Topical two times a day  dextrose 50% Injectable 12.5 Gram(s) IV Push once  dextrose 50% Injectable 25 Gram(s) IV Push once  epoetin tan Injectable 72334 Unit(s) IV Push <User Schedule>  heparin  Injectable 5000 Unit(s) SubCutaneous every 8 hours  insulin glargine Injectable (LANTUS) 11 Unit(s) SubCutaneous at bedtime  insulin lispro (HumaLOG) corrective regimen sliding scale   SubCutaneous every 6 hours  levETIRAcetam  Solution 500 milliGRAM(s) Oral two times a day  lidocaine   Patch 1 Patch Transdermal daily  predniSONE   Tablet 5 milliGRAM(s) Oral daily    MEDICATIONS  (PRN):  dextrose 40% Gel 15 Gram(s) Oral once PRN Blood Glucose LESS THAN 70 milliGRAM(s)/deciLiter  glucagon  Injectable 1 milliGRAM(s) IntraMuscular once PRN Glucose <70 milliGRAM(s)/deciLiter  loperamide Suspension 2 milliGRAM(s) Oral five times a day PRN loose stools          PHYSICAL EXAM:  GENERAL: frail  CHEST/LUNG: Clear to percussion bilaterally; No rales, rhonchi, wheezing, or rubs  HEART: Regular rate and rhythm; No murmurs, rubs, or gallops  ABDOMEN: Soft, Nontender, Nondistended; Bowel sounds present  EXTREMITIES:  no edema     Care Discussed with Consultants/Other Providers [ ] YES  [ ] NO

## 2019-12-25 NOTE — PROGRESS NOTE ADULT - SUBJECTIVE AND OBJECTIVE BOX
INTERVAL HPI/OVERNIGHT EVENTS:    pt seen and examined   resting comfortably  + frequent, loose stools   No GI complaints during time of eval     MEDICATIONS  (STANDING):  atorvastatin 40 milliGRAM(s) Oral at bedtime  carvedilol 3.125 milliGRAM(s) Oral every 12 hours  chlorhexidine 4% Liquid 1 Application(s) Topical <User Schedule>  Dakins Solution - 1/4 Strength 1 Application(s) Topical two times a day  dextrose 50% Injectable 12.5 Gram(s) IV Push once  dextrose 50% Injectable 25 Gram(s) IV Push once  epoetin tan Injectable 87868 Unit(s) IV Push <User Schedule>  heparin  Injectable 5000 Unit(s) SubCutaneous every 8 hours  insulin glargine Injectable (LANTUS) 11 Unit(s) SubCutaneous at bedtime  insulin lispro (HumaLOG) corrective regimen sliding scale   SubCutaneous every 6 hours  levETIRAcetam  Solution 500 milliGRAM(s) Oral two times a day  lidocaine   Patch 1 Patch Transdermal daily  predniSONE   Tablet 5 milliGRAM(s) Oral daily    MEDICATIONS  (PRN):  dextrose 40% Gel 15 Gram(s) Oral once PRN Blood Glucose LESS THAN 70 milliGRAM(s)/deciLiter  glucagon  Injectable 1 milliGRAM(s) IntraMuscular once PRN Glucose <70 milliGRAM(s)/deciLiter  loperamide Suspension 2 milliGRAM(s) Oral five times a day PRN loose stools      Allergies    No Known Allergies    Intolerances        Review of Systems:    unable to obtain        Vital Signs Last 24 Hrs  T(C): 37.1 (25 Dec 2019 05:18), Max: 37.1 (25 Dec 2019 05:18)  T(F): 98.8 (25 Dec 2019 05:18), Max: 98.8 (25 Dec 2019 05:18)  HR: 105 (25 Dec 2019 05:18) (95 - 107)  BP: 120/71 (25 Dec 2019 05:18) (99/64 - 137/73)  BP(mean): --  RR: 18 (25 Dec 2019 05:18) (18 - 20)  SpO2: 98% (25 Dec 2019 05:18) (96% - 98%)      PHYSICAL EXAM:  GENERAL:   NAD  HEENT:  NC/AT, no JVD, sclera-anicteric  ABDOMEN:  Soft, non-distended, + bowel sounds, + peg dressed, c/d/i  EXTREMITIES:  no cyanosis, clubbing or edema  NEURO:  awake, responsive, minimally verbal  SKIN:  No rash/warm/dry        LABS:                        7.8    16.69 )-----------( 246      ( 24 Dec 2019 09:44 )             26.7     12-25    131<L>  |  93<L>  |  59<H>  ----------------------------<  212<H>  3.3<L>   |  25  |  3.37<H>    Ca    11.5<H>      25 Dec 2019 10:25            RADIOLOGY & ADDITIONAL TESTS:

## 2019-12-25 NOTE — PROGRESS NOTE ADULT - SUBJECTIVE AND OBJECTIVE BOX
Patient is a 74y Male whom presented to   pateint seen and examined nad , in am     PAST MEDICAL & SURGICAL HISTORY:  Kidney transplant recipient  Anuria  GERD (gastroesophageal reflux disease)  Kidney transplant failure: dx: 2/2013  Hepatitis C: dx: 90&#x27;s.  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 10 milliGRAM(s) Oral three times a day  mycophenolate mofetil 250 milliGRAM(s) Oral two times a day  predniSONE   Tablet 5 milliGRAM(s) Oral daily  sevelamer carbonate 800 milliGRAM(s) Oral three times a day with meals  sodium bicarbonate 650 milliGRAM(s) Oral two times a day  tamsulosin 0.4 milliGRAM(s) Oral at bedtime      Allergies    No Known Allergies                       SOCIAL HISTORY:  Denies ETOh,Smoking,     FAMILY HISTORY:  Family history of breast cancer in sister (Sibling): living at 94 yo  Family history of prostate cancer in father  Family history of lung cancer  Family history of hypertension in mother  Family history of diabetes mellitus: mother      REVIEW OF SYSTEMS:    unbable to obtained                                                                           7.6    18.38 )-----------( 256      ( 25 Dec 2019 14:58 )             25.5       CBC Full  -  ( 25 Dec 2019 14:58 )  WBC Count : 18.38 K/uL  RBC Count : 2.93 M/uL  Hemoglobin : 7.6 g/dL  Hematocrit : 25.5 %  Platelet Count - Automated : 256 K/uL  Mean Cell Volume : 87.0 fl  Mean Cell Hemoglobin : 25.9 pg  Mean Cell Hemoglobin Concentration : 29.8 gm/dL  Auto Neutrophil # : x  Auto Lymphocyte # : x  Auto Monocyte # : x  Auto Eosinophil # : x  Auto Basophil # : x  Auto Neutrophil % : x  Auto Lymphocyte % : x  Auto Monocyte % : x  Auto Eosinophil % : x  Auto Basophil % : x      12-25    131<L>  |  93<L>  |  59<H>  ----------------------------<  212<H>  3.3<L>   |  25  |  3.37<H>    Ca    11.5<H>      25 Dec 2019 10:25        CAPILLARY BLOOD GLUCOSE      POCT Blood Glucose.: 217 mg/dL (25 Dec 2019 12:43)  POCT Blood Glucose.: 198 mg/dL (25 Dec 2019 05:53)  POCT Blood Glucose.: 183 mg/dL (24 Dec 2019 23:57)  POCT Blood Glucose.: 180 mg/dL (24 Dec 2019 22:14)  POCT Blood Glucose.: 183 mg/dL (24 Dec 2019 16:59)      Vital Signs Last 24 Hrs  T(C): 36.6 (25 Dec 2019 13:31), Max: 37.1 (25 Dec 2019 05:18)  T(F): 97.9 (25 Dec 2019 13:31), Max: 98.8 (25 Dec 2019 05:18)  HR: 108 (25 Dec 2019 13:31) (95 - 108)  BP: 99/63 (25 Dec 2019 13:31) (99/63 - 137/73)  BP(mean): --  RR: 18 (25 Dec 2019 13:31) (18 - 20)  SpO2: 99% (25 Dec 2019 13:31) (96% - 99%)                                                                    HEENT: conjunctive   clear   Neck:  No JVD  Respiratory: decrease bs b/l   Cardiovascular: S1 and S2  Gastrointestinal: BS+, soft, NT/ND  Extremities: No peripheral edema  Skin: dry   Access: pos fistula

## 2019-12-25 NOTE — PROGRESS NOTE ADULT - SUBJECTIVE AND OBJECTIVE BOX
Chief complaint  Patient is a 74y old  Male who presents with a chief complaint of ams (25 Dec 2019 13:21)   Review of systems  Patient in bed, looks comfortable, no fever, no hypoglycemia.    Labs and Fingersticks  CAPILLARY BLOOD GLUCOSE      POCT Blood Glucose.: 217 mg/dL (25 Dec 2019 12:43)  POCT Blood Glucose.: 198 mg/dL (25 Dec 2019 05:53)  POCT Blood Glucose.: 183 mg/dL (24 Dec 2019 23:57)  POCT Blood Glucose.: 180 mg/dL (24 Dec 2019 22:14)  POCT Blood Glucose.: 183 mg/dL (24 Dec 2019 16:59)      Anion Gap, Serum: 13 (12-25 @ 10:25)  Anion Gap, Serum: 14 (12-24 @ 09:44)      Calcium, Total Serum: 11.5 <H> (12-25 @ 10:25)  Calcium, Total Serum: 10.9 <H> (12-24 @ 09:44)          12-25    131<L>  |  93<L>  |  59<H>  ----------------------------<  212<H>  3.3<L>   |  25  |  3.37<H>    Ca    11.5<H>      25 Dec 2019 10:25                          7.6    18.38 )-----------( 256      ( 25 Dec 2019 14:58 )             25.5     Medications  MEDICATIONS  (STANDING):  atorvastatin 40 milliGRAM(s) Oral at bedtime  carvedilol 3.125 milliGRAM(s) Oral every 12 hours  chlorhexidine 4% Liquid 1 Application(s) Topical <User Schedule>  Dakins Solution - 1/4 Strength 1 Application(s) Topical two times a day  dextrose 50% Injectable 12.5 Gram(s) IV Push once  dextrose 50% Injectable 25 Gram(s) IV Push once  epoetin tan Injectable 06496 Unit(s) IV Push <User Schedule>  heparin  Injectable 5000 Unit(s) SubCutaneous every 8 hours  insulin glargine Injectable (LANTUS) 11 Unit(s) SubCutaneous at bedtime  insulin lispro (HumaLOG) corrective regimen sliding scale   SubCutaneous every 6 hours  levETIRAcetam  Solution 500 milliGRAM(s) Oral two times a day  lidocaine   Patch 1 Patch Transdermal daily  potassium chloride   Powder 20 milliEquivalent(s) Oral once  predniSONE   Tablet 5 milliGRAM(s) Oral daily      Physical Exam  General: Patient comfortable in bed  Vital Signs Last 12 Hrs  T(F): 97.9 (12-25-19 @ 13:31), Max: 98.8 (12-25-19 @ 05:18)  HR: 108 (12-25-19 @ 13:31) (105 - 108)  BP: 99/63 (12-25-19 @ 13:31) (99/63 - 120/71)  BP(mean): --  RR: 18 (12-25-19 @ 13:31) (18 - 18)  SpO2: 99% (12-25-19 @ 13:31) (98% - 99%)  Neck: No palpable thyroid nodules.  CVS: S1S2, No murmurs  Respiratory: No wheezing, no crepitations  GI: Abdomen soft, bowel sounds positive  Musculoskeletal:  edema lower extremities.   Skin: No skin rashes, no ecchymosis    Diagnostics    Thyroid Stimulating Hormone, Serum: AM Sched. Collection: 03-Dec-2019 06:00 (12-02 @ 10:42)  Free Thyroxine, Serum: AM Sched. Collection: 03-Dec-2019 06:00 (12-02 @ 10:42)  Thyroid Stimulating Hormone, Serum: AM Sched. Collection: 17-Dec-2019 06:00 (12-16 @ 12:58)  Free Thyroxine, Serum: AM Sched. Collection: 17-Dec-2019 06:00 (12-16 @ 12:58)

## 2019-12-25 NOTE — PROGRESS NOTE ADULT - ASSESSMENT
Assessment  DM: A1C 5.5%, was on insulin at home, started on basal insulin, blood sugars improving, no hypoglycemic episodes. Patient is still NPO on tube feeds, PEG tube feeds, stable,  resting comfortably.  Sacral Wound: s/p debridement, monitored, FU wound care.  Hypercalcemia: Primary Hyperparathyroidism, Ca improved s/p Pamidronate dose.  Sepsis: IV ABx for UTI, LP inconsistent with meningitis, on medications, stable, monitored.  HTN: Controlled,  on antihypertensive medications.  ESRD: On hemodialysis, Monitor labs/BMP          Robi Gay MD  Cell: [phone]  Office: [phone]

## 2019-12-25 NOTE — PROGRESS NOTE ADULT - ASSESSMENT
NEURO:  - Extubated 11/29, now on nasal cannula. Minimally verbal   - metabolic encephalopathy, meningitis r/o, LP negative  - neuro following  - chronic lacunar infarcts; per neuro, AMS also likely related to hypercalcemia, renal dysfunction , poor nutritional intake.   - c/w keppra for ?hx of seizure disorder  -AMS likely 2/2 hyperCa, renal dysfunction, poor PO intake  CV:  - hx of afib was on eliquis, now off a/c per cards due to bleeding risk  - holding carvedilol 6.25 BID- hx of cocaine cardiomyopathy, resolved, normal EF on most recent echo  - c/w statin  - echo repeat EF70, LVH, hyperdynamic LCF, normal RV sys fxn    PULM:- extubated 11/29--tolerating nasal cannula    GI:  - sp PEG with surgery     RENAL:- hx of ESRD s/p failed renal transplant x2- renal following, HD as recommended.    ENDO:  - Start Insulin Sliding Scale- endocrine following- monitor Ca  INFECTIOUS:  - dc  vanco as per infectious disease   - id fu     Anemia  - transfuse PRN  - monitor cbc

## 2019-12-26 LAB
-  CANDIDA ALBICANS: SIGNIFICANT CHANGE UP
-  CANDIDA GLABRATA: SIGNIFICANT CHANGE UP
-  CANDIDA KRUSEI: SIGNIFICANT CHANGE UP
-  CANDIDA PARAPSILOSIS: SIGNIFICANT CHANGE UP
-  CANDIDA TROPICALIS: SIGNIFICANT CHANGE UP
-  COAGULASE NEGATIVE STAPHYLOCOCCUS: SIGNIFICANT CHANGE UP
-  K. PNEUMONIAE GROUP: SIGNIFICANT CHANGE UP
-  KPC RESISTANCE GENE: SIGNIFICANT CHANGE UP
-  STREPTOCOCCUS SP. (NOT GRP A, B OR S PNEUMONIAE): SIGNIFICANT CHANGE UP
A BAUMANNII DNA SPEC QL NAA+PROBE: SIGNIFICANT CHANGE UP
ANION GAP SERPL CALC-SCNC: 14 MMOL/L — SIGNIFICANT CHANGE UP (ref 5–17)
BUN SERPL-MCNC: 79 MG/DL — HIGH (ref 7–23)
CALCIUM SERPL-MCNC: 11.6 MG/DL — HIGH (ref 8.4–10.5)
CHLORIDE SERPL-SCNC: 93 MMOL/L — LOW (ref 96–108)
CO2 SERPL-SCNC: 26 MMOL/L — SIGNIFICANT CHANGE UP (ref 22–31)
CREAT SERPL-MCNC: 3.83 MG/DL — HIGH (ref 0.5–1.3)
E CLOAC COMP DNA BLD POS QL NAA+PROBE: SIGNIFICANT CHANGE UP
E COLI DNA BLD POS QL NAA+NON-PROBE: SIGNIFICANT CHANGE UP
ENTEROCOC DNA BLD POS QL NAA+NON-PROBE: SIGNIFICANT CHANGE UP
ENTEROCOC DNA BLD POS QL NAA+NON-PROBE: SIGNIFICANT CHANGE UP
GLUCOSE BLDC GLUCOMTR-MCNC: 202 MG/DL — HIGH (ref 70–99)
GLUCOSE BLDC GLUCOMTR-MCNC: 209 MG/DL — HIGH (ref 70–99)
GLUCOSE BLDC GLUCOMTR-MCNC: 213 MG/DL — HIGH (ref 70–99)
GLUCOSE BLDC GLUCOMTR-MCNC: 262 MG/DL — HIGH (ref 70–99)
GLUCOSE BLDC GLUCOMTR-MCNC: 97 MG/DL — SIGNIFICANT CHANGE UP (ref 70–99)
GLUCOSE SERPL-MCNC: 214 MG/DL — HIGH (ref 70–99)
GP B STREP DNA BLD POS QL NAA+NON-PROBE: SIGNIFICANT CHANGE UP
GRAM STN FLD: SIGNIFICANT CHANGE UP
HAEM INFLU DNA BLD POS QL NAA+NON-PROBE: SIGNIFICANT CHANGE UP
HCT VFR BLD CALC: 25.1 % — LOW (ref 39–50)
HGB BLD-MCNC: 7.7 G/DL — LOW (ref 13–17)
K OXYTOCA DNA BLD POS QL NAA+NON-PROBE: SIGNIFICANT CHANGE UP
L MONOCYTOG DNA BLD POS QL NAA+NON-PROBE: SIGNIFICANT CHANGE UP
MCHC RBC-ENTMCNC: 26.1 PG — LOW (ref 27–34)
MCHC RBC-ENTMCNC: 30.7 GM/DL — LOW (ref 32–36)
MCV RBC AUTO: 85.1 FL — SIGNIFICANT CHANGE UP (ref 80–100)
METHOD TYPE: SIGNIFICANT CHANGE UP
MRSA SPEC QL CULT: SIGNIFICANT CHANGE UP
MSSA DNA SPEC QL NAA+PROBE: SIGNIFICANT CHANGE UP
N MEN ISLT CULT: SIGNIFICANT CHANGE UP
P AERUGINOSA DNA BLD POS NAA+NON-PROBE: SIGNIFICANT CHANGE UP
PLATELET # BLD AUTO: 247 K/UL — SIGNIFICANT CHANGE UP (ref 150–400)
POTASSIUM SERPL-MCNC: 4 MMOL/L — SIGNIFICANT CHANGE UP (ref 3.5–5.3)
POTASSIUM SERPL-SCNC: 4 MMOL/L — SIGNIFICANT CHANGE UP (ref 3.5–5.3)
RBC # BLD: 2.95 M/UL — LOW (ref 4.2–5.8)
RBC # FLD: 15.9 % — HIGH (ref 10.3–14.5)
S MARCESCENS DNA BLD POS NAA+NON-PROBE: SIGNIFICANT CHANGE UP
S PNEUM DNA BLD POS QL NAA+NON-PROBE: SIGNIFICANT CHANGE UP
S PYO DNA BLD POS QL NAA+NON-PROBE: SIGNIFICANT CHANGE UP
SODIUM SERPL-SCNC: 133 MMOL/L — LOW (ref 135–145)
WBC # BLD: 19.18 K/UL — HIGH (ref 3.8–10.5)
WBC # FLD AUTO: 19.18 K/UL — HIGH (ref 3.8–10.5)

## 2019-12-26 PROCEDURE — 99232 SBSQ HOSP IP/OBS MODERATE 35: CPT

## 2019-12-26 RX ORDER — ACETAMINOPHEN 500 MG
650 TABLET ORAL EVERY 6 HOURS
Refills: 0 | Status: DISCONTINUED | OUTPATIENT
Start: 2019-12-26 | End: 2019-12-30

## 2019-12-26 RX ORDER — LOPERAMIDE HCL 2 MG
2 TABLET ORAL
Refills: 0 | Status: DISCONTINUED | OUTPATIENT
Start: 2019-12-26 | End: 2019-12-30

## 2019-12-26 RX ORDER — ACETAMINOPHEN 500 MG
650 TABLET ORAL EVERY 6 HOURS
Refills: 0 | Status: DISCONTINUED | OUTPATIENT
Start: 2019-12-26 | End: 2019-12-26

## 2019-12-26 RX ADMIN — Medication 4: at 00:13

## 2019-12-26 RX ADMIN — Medication 5 MILLIGRAM(S): at 04:48

## 2019-12-26 RX ADMIN — INSULIN GLARGINE 11 UNIT(S): 100 INJECTION, SOLUTION SUBCUTANEOUS at 21:54

## 2019-12-26 RX ADMIN — LIDOCAINE 1 PATCH: 4 CREAM TOPICAL at 03:27

## 2019-12-26 RX ADMIN — Medication 650 MILLIGRAM(S): at 17:59

## 2019-12-26 RX ADMIN — ATORVASTATIN CALCIUM 40 MILLIGRAM(S): 80 TABLET, FILM COATED ORAL at 21:02

## 2019-12-26 RX ADMIN — Medication 2 MILLIGRAM(S): at 23:12

## 2019-12-26 RX ADMIN — Medication 650 MILLIGRAM(S): at 18:40

## 2019-12-26 RX ADMIN — LEVETIRACETAM 500 MILLIGRAM(S): 250 TABLET, FILM COATED ORAL at 17:59

## 2019-12-26 RX ADMIN — HEPARIN SODIUM 5000 UNIT(S): 5000 INJECTION INTRAVENOUS; SUBCUTANEOUS at 21:02

## 2019-12-26 RX ADMIN — Medication 4: at 05:56

## 2019-12-26 RX ADMIN — Medication 6: at 13:01

## 2019-12-26 RX ADMIN — LEVETIRACETAM 500 MILLIGRAM(S): 250 TABLET, FILM COATED ORAL at 04:48

## 2019-12-26 RX ADMIN — Medication 650 MILLIGRAM(S): at 11:26

## 2019-12-26 RX ADMIN — HEPARIN SODIUM 5000 UNIT(S): 5000 INJECTION INTRAVENOUS; SUBCUTANEOUS at 14:00

## 2019-12-26 RX ADMIN — LIDOCAINE 1 PATCH: 4 CREAM TOPICAL at 19:00

## 2019-12-26 RX ADMIN — Medication 2 MILLIGRAM(S): at 18:00

## 2019-12-26 RX ADMIN — Medication 2 MILLIGRAM(S): at 04:48

## 2019-12-26 RX ADMIN — ERYTHROPOIETIN 10000 UNIT(S): 10000 INJECTION, SOLUTION INTRAVENOUS; SUBCUTANEOUS at 15:30

## 2019-12-26 RX ADMIN — Medication 650 MILLIGRAM(S): at 10:56

## 2019-12-26 RX ADMIN — Medication 2 MILLIGRAM(S): at 10:56

## 2019-12-26 RX ADMIN — CARVEDILOL PHOSPHATE 3.12 MILLIGRAM(S): 80 CAPSULE, EXTENDED RELEASE ORAL at 04:48

## 2019-12-26 RX ADMIN — CHLORHEXIDINE GLUCONATE 1 APPLICATION(S): 213 SOLUTION TOPICAL at 04:48

## 2019-12-26 RX ADMIN — Medication 1 APPLICATION(S): at 04:49

## 2019-12-26 RX ADMIN — HEPARIN SODIUM 5000 UNIT(S): 5000 INJECTION INTRAVENOUS; SUBCUTANEOUS at 04:48

## 2019-12-26 RX ADMIN — Medication 650 MILLIGRAM(S): at 23:12

## 2019-12-26 RX ADMIN — Medication 650 MILLIGRAM(S): at 23:42

## 2019-12-26 RX ADMIN — LIDOCAINE 1 PATCH: 4 CREAM TOPICAL at 13:02

## 2019-12-26 RX ADMIN — Medication 1 APPLICATION(S): at 18:00

## 2019-12-26 NOTE — PROGRESS NOTE ADULT - PROBLEM SELECTOR PLAN 1
- s/p upper gastrointestinal endoscopy with the procedure was aborted due to inability to place PEG tube  - s/p failed IR PEG  - s/p open feeding gastrostomy tube placement 12/21. Care per surgery team appreciated  - tolerating feeds

## 2019-12-26 NOTE — PROGRESS NOTE ADULT - ASSESSMENT
Assessment  DM: A1C 5.5%, was on insulin at home, now on insulin, blood sugars fluctuating, no hypoglycemic episodes. Patient is still NPO on tube feeds, tolerating PEG tube feeds, stable, resting comfortably.  Sacral Wound: s/p debridement, monitored, FU wound care.  Hypercalcemia: Primary Hyperparathyroidism, Ca improved s/p Pamidronate dose. Now trending up, 11.6  Sepsis: IV ABx for UTI, LP inconsistent with meningitis, on medications, stable, monitored.  HTN: Controlled,  on antihypertensive medications.  ESRD: On hemodialysis, Monitor labs/BMP          Robi Gay MD  Cell: [phone]  Office: [phone] Assessment  DM: A1C 5.5%, was on insulin at home, now on insulin, blood sugars fluctuating, no hypoglycemic episodes. Patient is still NPO on tube feeds, tolerating PEG tube feeds,  stable, resting comfortably.  Sacral Wound: s/p debridement, monitored, FU wound care.  Hypercalcemia: Primary Hyperparathyroidism, Ca improved s/p Pamidronate dose. Now trending up, 11.6  Sepsis: IV ABx for UTI, LP inconsistent with meningitis, on medications, stable, monitored.  HTN: Controlled,  on antihypertensive medications.  ESRD: On hemodialysis, Monitor labs/BMP          Robi Gay MD  Cell: [phone]  Office: [phone]

## 2019-12-26 NOTE — PROGRESS NOTE ADULT - SUBJECTIVE AND OBJECTIVE BOX
Patient is a 74y Male whom presented to   pateint seen and examined nad , in am     PAST MEDICAL & SURGICAL HISTORY:  Kidney transplant recipient  Anuria  GERD (gastroesophageal reflux disease)  Kidney transplant failure: dx: 2/2013  Hepatitis C: dx: 90&#x27;s.  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 10 milliGRAM(s) Oral three times a day  mycophenolate mofetil 250 milliGRAM(s) Oral two times a day  predniSONE   Tablet 5 milliGRAM(s) Oral daily  sevelamer carbonate 800 milliGRAM(s) Oral three times a day with meals  sodium bicarbonate 650 milliGRAM(s) Oral two times a day  tamsulosin 0.4 milliGRAM(s) Oral at bedtime      Allergies    No Known Allergies                       SOCIAL HISTORY:  Denies ETOh,Smoking,     FAMILY HISTORY:  Family history of breast cancer in sister (Sibling): living at 92 yo  Family history of prostate cancer in father  Family history of lung cancer  Family history of hypertension in mother  Family history of diabetes mellitus: mother      REVIEW OF SYSTEMS:    unbable to obtained                                                                                        7.7    19.18 )-----------( 247      ( 26 Dec 2019 12:16 )             25.1       CBC Full  -  ( 26 Dec 2019 12:16 )  WBC Count : 19.18 K/uL  RBC Count : 2.95 M/uL  Hemoglobin : 7.7 g/dL  Hematocrit : 25.1 %  Platelet Count - Automated : 247 K/uL  Mean Cell Volume : 85.1 fl  Mean Cell Hemoglobin : 26.1 pg  Mean Cell Hemoglobin Concentration : 30.7 gm/dL  Auto Neutrophil # : x  Auto Lymphocyte # : x  Auto Monocyte # : x  Auto Eosinophil # : x  Auto Basophil # : x  Auto Neutrophil % : x  Auto Lymphocyte % : x  Auto Monocyte % : x  Auto Eosinophil % : x  Auto Basophil % : x      12-26    133<L>  |  93<L>  |  79<H>  ----------------------------<  214<H>  4.0   |  26  |  3.83<H>    Ca    11.6<H>      26 Dec 2019 08:31        CAPILLARY BLOOD GLUCOSE      POCT Blood Glucose.: 97 mg/dL (26 Dec 2019 17:00)  POCT Blood Glucose.: 262 mg/dL (26 Dec 2019 12:16)  POCT Blood Glucose.: 213 mg/dL (26 Dec 2019 05:51)  POCT Blood Glucose.: 202 mg/dL (26 Dec 2019 00:02)  POCT Blood Glucose.: 196 mg/dL (25 Dec 2019 22:16)  POCT Blood Glucose.: 176 mg/dL (25 Dec 2019 18:17)      Vital Signs Last 24 Hrs  T(C): 36.9 (26 Dec 2019 15:15), Max: 36.9 (26 Dec 2019 15:15)  T(F): 98.5 (26 Dec 2019 15:15), Max: 98.5 (26 Dec 2019 15:15)  HR: 98 (26 Dec 2019 15:15) (98 - 110)  BP: 116/77 (26 Dec 2019 15:15) (100/65 - 121/75)  BP(mean): --  RR: 20 (26 Dec 2019 15:15) (18 - 20)  SpO2: 99% (26 Dec 2019 15:15) (95% - 99%)                                                                    HEENT: conjunctive   clear   Neck:  No JVD  Respiratory: decrease bs b/l   Cardiovascular: S1 and S2  Gastrointestinal: BS+, soft, NT/ND  Extremities: No peripheral edema  Skin: dry   Access: pos fistula

## 2019-12-26 NOTE — PROGRESS NOTE ADULT - SUBJECTIVE AND OBJECTIVE BOX
Chief complaint  Patient is a 74y old  Male who presents with a chief complaint of ams (26 Dec 2019 13:41)   Review of systems  Patient in bed, looks comfortable, no fever, no hypoglycemia.    Labs and Fingersticks  CAPILLARY BLOOD GLUCOSE      POCT Blood Glucose.: 262 mg/dL (26 Dec 2019 12:16)  POCT Blood Glucose.: 213 mg/dL (26 Dec 2019 05:51)  POCT Blood Glucose.: 202 mg/dL (26 Dec 2019 00:02)  POCT Blood Glucose.: 196 mg/dL (25 Dec 2019 22:16)  POCT Blood Glucose.: 176 mg/dL (25 Dec 2019 18:17)  POCT Blood Glucose.: 177 mg/dL (25 Dec 2019 17:07)      Anion Gap, Serum: 14 (12-26 @ 08:31)  Anion Gap, Serum: 13 (12-25 @ 10:25)      Calcium, Total Serum: 11.6 <H> (12-26 @ 08:31)  Calcium, Total Serum: 11.5 <H> (12-25 @ 10:25)          12-26    133<L>  |  93<L>  |  79<H>  ----------------------------<  214<H>  4.0   |  26  |  3.83<H>    Ca    11.6<H>      26 Dec 2019 08:31                          7.7    19.18 )-----------( 247      ( 26 Dec 2019 12:16 )             25.1     Medications  MEDICATIONS  (STANDING):  acetaminophen    Suspension .. 650 milliGRAM(s) Oral every 6 hours  atorvastatin 40 milliGRAM(s) Oral at bedtime  carvedilol 3.125 milliGRAM(s) Oral every 12 hours  chlorhexidine 4% Liquid 1 Application(s) Topical <User Schedule>  Dakins Solution - 1/4 Strength 1 Application(s) Topical two times a day  dextrose 50% Injectable 12.5 Gram(s) IV Push once  dextrose 50% Injectable 25 Gram(s) IV Push once  epoetin tan Injectable 59170 Unit(s) IV Push <User Schedule>  heparin  Injectable 5000 Unit(s) SubCutaneous every 8 hours  insulin glargine Injectable (LANTUS) 11 Unit(s) SubCutaneous at bedtime  insulin lispro (HumaLOG) corrective regimen sliding scale   SubCutaneous every 6 hours  levETIRAcetam  Solution 500 milliGRAM(s) Oral two times a day  lidocaine   Patch 1 Patch Transdermal daily  loperamide Suspension 2 milliGRAM(s) Oral five times a day  predniSONE   Tablet 5 milliGRAM(s) Oral daily      Physical Exam  General: Patient comfortable in bed  Vital Signs Last 12 Hrs  T(F): 97.5 (12-26-19 @ 04:46), Max: 97.5 (12-26-19 @ 04:46)  HR: 110 (12-26-19 @ 04:46) (110 - 110)  BP: 121/75 (12-26-19 @ 04:46) (121/75 - 121/75)  BP(mean): --  RR: 20 (12-26-19 @ 04:46) (20 - 20)  SpO2: 95% (12-26-19 @ 04:46) (95% - 95%)  Neck: No palpable thyroid nodules.  CVS: S1S2, No murmurs  Respiratory: No wheezing, no crepitations  GI: Abdomen soft, bowel sounds positive  Musculoskeletal:  edema lower extremities.   Skin: No skin rashes, no ecchymosis    Diagnostics Chief complaint  Patient is a 74y old  Male who presents with a chief complaint of ams (26 Dec 2019 13:41)   Review of systems  Patient in bed, looks comfortable, no fever,  no hypoglycemia.    Labs and Fingersticks  CAPILLARY BLOOD GLUCOSE      POCT Blood Glucose.: 262 mg/dL (26 Dec 2019 12:16)  POCT Blood Glucose.: 213 mg/dL (26 Dec 2019 05:51)  POCT Blood Glucose.: 202 mg/dL (26 Dec 2019 00:02)  POCT Blood Glucose.: 196 mg/dL (25 Dec 2019 22:16)  POCT Blood Glucose.: 176 mg/dL (25 Dec 2019 18:17)  POCT Blood Glucose.: 177 mg/dL (25 Dec 2019 17:07)      Anion Gap, Serum: 14 (12-26 @ 08:31)  Anion Gap, Serum: 13 (12-25 @ 10:25)      Calcium, Total Serum: 11.6 <H> (12-26 @ 08:31)  Calcium, Total Serum: 11.5 <H> (12-25 @ 10:25)          12-26    133<L>  |  93<L>  |  79<H>  ----------------------------<  214<H>  4.0   |  26  |  3.83<H>    Ca    11.6<H>      26 Dec 2019 08:31                          7.7    19.18 )-----------( 247      ( 26 Dec 2019 12:16 )             25.1     Medications  MEDICATIONS  (STANDING):  acetaminophen    Suspension .. 650 milliGRAM(s) Oral every 6 hours  atorvastatin 40 milliGRAM(s) Oral at bedtime  carvedilol 3.125 milliGRAM(s) Oral every 12 hours  chlorhexidine 4% Liquid 1 Application(s) Topical <User Schedule>  Dakins Solution - 1/4 Strength 1 Application(s) Topical two times a day  dextrose 50% Injectable 12.5 Gram(s) IV Push once  dextrose 50% Injectable 25 Gram(s) IV Push once  epoetin tan Injectable 83333 Unit(s) IV Push <User Schedule>  heparin  Injectable 5000 Unit(s) SubCutaneous every 8 hours  insulin glargine Injectable (LANTUS) 11 Unit(s) SubCutaneous at bedtime  insulin lispro (HumaLOG) corrective regimen sliding scale   SubCutaneous every 6 hours  levETIRAcetam  Solution 500 milliGRAM(s) Oral two times a day  lidocaine   Patch 1 Patch Transdermal daily  loperamide Suspension 2 milliGRAM(s) Oral five times a day  predniSONE   Tablet 5 milliGRAM(s) Oral daily      Physical Exam  General: Patient comfortable in bed  Vital Signs Last 12 Hrs  T(F): 97.5 (12-26-19 @ 04:46), Max: 97.5 (12-26-19 @ 04:46)  HR: 110 (12-26-19 @ 04:46) (110 - 110)  BP: 121/75 (12-26-19 @ 04:46) (121/75 - 121/75)  BP(mean): --  RR: 20 (12-26-19 @ 04:46) (20 - 20)  SpO2: 95% (12-26-19 @ 04:46) (95% - 95%)  Neck: No palpable thyroid nodules.  CVS: S1S2, No murmurs  Respiratory: No wheezing, no crepitations  GI: Abdomen soft, bowel sounds positive  Musculoskeletal:  edema lower extremities.   Skin: No skin rashes, no ecchymosis    Diagnostics

## 2019-12-26 NOTE — PROGRESS NOTE ADULT - SUBJECTIVE AND OBJECTIVE BOX
INTERVAL HPI/OVERNIGHT EVENTS:    pt seen and examined   resting comfortably  + frequent, loose stools       MEDICATIONS  (STANDING):  acetaminophen    Suspension .. 650 milliGRAM(s) Oral every 6 hours  atorvastatin 40 milliGRAM(s) Oral at bedtime  carvedilol 3.125 milliGRAM(s) Oral every 12 hours  chlorhexidine 4% Liquid 1 Application(s) Topical <User Schedule>  Dakins Solution - 1/4 Strength 1 Application(s) Topical two times a day  dextrose 50% Injectable 12.5 Gram(s) IV Push once  dextrose 50% Injectable 25 Gram(s) IV Push once  epoetin tan Injectable 16887 Unit(s) IV Push <User Schedule>  heparin  Injectable 5000 Unit(s) SubCutaneous every 8 hours  insulin glargine Injectable (LANTUS) 11 Unit(s) SubCutaneous at bedtime  insulin lispro (HumaLOG) corrective regimen sliding scale   SubCutaneous every 6 hours  levETIRAcetam  Solution 500 milliGRAM(s) Oral two times a day  lidocaine   Patch 1 Patch Transdermal daily  loperamide Suspension 2 milliGRAM(s) Oral five times a day  predniSONE   Tablet 5 milliGRAM(s) Oral daily    MEDICATIONS  (PRN):  dextrose 40% Gel 15 Gram(s) Oral once PRN Blood Glucose LESS THAN 70 milliGRAM(s)/deciLiter  glucagon  Injectable 1 milliGRAM(s) IntraMuscular once PRN Glucose <70 milliGRAM(s)/deciLiter      Allergies    No Known Allergies    Intolerances        Review of Systems:    unable to obtain in completion       Vital Signs Last 24 Hrs  T(C): 36.4 (26 Dec 2019 04:46), Max: 36.4 (25 Dec 2019 20:35)  T(F): 97.5 (26 Dec 2019 04:46), Max: 97.5 (25 Dec 2019 20:35)  HR: 110 (26 Dec 2019 04:46) (98 - 110)  BP: 121/75 (26 Dec 2019 04:46) (100/65 - 121/75)  BP(mean): --  RR: 20 (26 Dec 2019 04:46) (20 - 20)  SpO2: 95% (26 Dec 2019 04:46) (95% - 96%)    PHYSICAL EXAM:    GENERAL:   NAD  HEENT:  NC/AT, no JVD, sclera-anicteric  ABDOMEN:  Soft, non-distended, + bowel sounds, + peg dressed, c/d/i  EXTREMITIES:  no cyanosis, clubbing or edema  NEURO:  awake, responsive, minimally verbal  SKIN:  No rash/warm/dry      LABS:                        7.7    19.18 )-----------( 247      ( 26 Dec 2019 12:16 )             25.1     12-26    133<L>  |  93<L>  |  79<H>  ----------------------------<  214<H>  4.0   |  26  |  3.83<H>    Ca    11.6<H>      26 Dec 2019 08:31            RADIOLOGY & ADDITIONAL TESTS:

## 2019-12-26 NOTE — PROGRESS NOTE ADULT - SUBJECTIVE AND OBJECTIVE BOX
CC: F/U Leukocytosis    Saw/spoke to patient. No fevers, no chills. Poor mental status, cannot interact well with me. Having diarrhea.  Allergies  No Known Allergies    ANTIMICROBIALS:  Off    PE:    Vital Signs Last 24 Hrs  T(C): 36.4 (26 Dec 2019 04:46), Max: 36.4 (25 Dec 2019 20:35)  T(F): 97.5 (26 Dec 2019 04:46), Max: 97.5 (25 Dec 2019 20:35)  HR: 110 (26 Dec 2019 04:46) (98 - 110)  BP: 121/75 (26 Dec 2019 04:46) (100/65 - 121/75)  RR: 20 (26 Dec 2019 04:46) (20 - 20)  SpO2: 95% (26 Dec 2019 04:46) (95% - 96%)    Gen: AOx3, NAD, non-toxic, pleasant  CV: S1+S2 normal, tachycardic  Resp: Clear bilat, no resp distress, no crackles/wheezes  Abd: Soft, nontender, +BS  Ext: No LE edema, no wounds  Msk: Sacral wound    LABS:                        7.7    19.18 )-----------( 247      ( 26 Dec 2019 12:16 )             25.1     12-26    133<L>  |  93<L>  |  79<H>  ----------------------------<  214<H>  4.0   |  26  |  3.83<H>    Ca    11.6<H>      26 Dec 2019 08:31    MICROBIOLOGY:    .Blood Blood  12-24-19   No growth to date    .Stool Feces  12-14-19   GI PCR Results: NOT detected    .Stool Feces  12-10-19   GI PCR Results: NOT detected    .Blood Blood-Peripheral  12-06-19   No growth at 5 days.     Bronch Wash Combicath  11-27-19   Normal Respiratory Millie present  --    No polymorphonuclear cells seen per low power field  No squamous epithelial cells per low power field  No organisms seen per oil power field    (otherwise reviewed)    RADIOLOGY:    12/23 CXR:    FINDINGS:     The right internal jugular dialysis catheter tip is in the distal SVC.    The lungs are clear.    The heart size is normal.    Degenerative changes of the spine.    IMPRESSION:    Clear lungs.

## 2019-12-26 NOTE — PROGRESS NOTE ADULT - PROBLEM SELECTOR PLAN 1
Will continue current insulin regimen for now, continue monitoring FS and FU. Will continue current insulin regimen for now.

## 2019-12-26 NOTE — PROGRESS NOTE ADULT - SUBJECTIVE AND OBJECTIVE BOX
Patient is a 74y old  Male who presents with a chief complaint of ams (26 Dec 2019 14:40)      INTERVAL HPI/OVERNIGHT EVENTS:  T(C): 36.9 (12-26-19 @ 15:15), Max: 36.9 (12-26-19 @ 15:15)  HR: 98 (12-26-19 @ 15:15) (98 - 110)  BP: 116/77 (12-26-19 @ 15:15) (100/65 - 121/75)  RR: 20 (12-26-19 @ 15:15) (18 - 20)  SpO2: 99% (12-26-19 @ 15:15) (95% - 99%)  Wt(kg): --  I&O's Summary    25 Dec 2019 07:01  -  26 Dec 2019 07:00  --------------------------------------------------------  IN: 1020 mL / OUT: 0 mL / NET: 1020 mL        LABS:                        7.7    19.18 )-----------( 247      ( 26 Dec 2019 12:16 )             25.1     12-26    133<L>  |  93<L>  |  79<H>  ----------------------------<  214<H>  4.0   |  26  |  3.83<H>    Ca    11.6<H>      26 Dec 2019 08:31          CAPILLARY BLOOD GLUCOSE      POCT Blood Glucose.: 262 mg/dL (26 Dec 2019 12:16)  POCT Blood Glucose.: 213 mg/dL (26 Dec 2019 05:51)  POCT Blood Glucose.: 202 mg/dL (26 Dec 2019 00:02)  POCT Blood Glucose.: 196 mg/dL (25 Dec 2019 22:16)  POCT Blood Glucose.: 176 mg/dL (25 Dec 2019 18:17)  POCT Blood Glucose.: 177 mg/dL (25 Dec 2019 17:07)            MEDICATIONS  (STANDING):  acetaminophen    Suspension .. 650 milliGRAM(s) Oral every 6 hours  atorvastatin 40 milliGRAM(s) Oral at bedtime  carvedilol 3.125 milliGRAM(s) Oral every 12 hours  chlorhexidine 4% Liquid 1 Application(s) Topical <User Schedule>  Dakins Solution - 1/4 Strength 1 Application(s) Topical two times a day  dextrose 50% Injectable 12.5 Gram(s) IV Push once  dextrose 50% Injectable 25 Gram(s) IV Push once  epoetin tan Injectable 95619 Unit(s) IV Push <User Schedule>  heparin  Injectable 5000 Unit(s) SubCutaneous every 8 hours  insulin glargine Injectable (LANTUS) 11 Unit(s) SubCutaneous at bedtime  insulin lispro (HumaLOG) corrective regimen sliding scale   SubCutaneous every 6 hours  levETIRAcetam  Solution 500 milliGRAM(s) Oral two times a day  lidocaine   Patch 1 Patch Transdermal daily  loperamide Suspension 2 milliGRAM(s) Oral five times a day  predniSONE   Tablet 5 milliGRAM(s) Oral daily    MEDICATIONS  (PRN):  dextrose 40% Gel 15 Gram(s) Oral once PRN Blood Glucose LESS THAN 70 milliGRAM(s)/deciLiter  glucagon  Injectable 1 milliGRAM(s) IntraMuscular once PRN Glucose <70 milliGRAM(s)/deciLiter          PHYSICAL EXAM:  GENERAL: frail  CHEST/LUNG: Clear to percussion bilaterally; No rales, rhonchi, wheezing, or rubs  HEART: Regular rate and rhythm; No murmurs, rubs, or gallops  ABDOMEN: Soft, Nontender, Nondistended; Bowel sounds present  EXTREMITIES:  no edema     Care Discussed with Consultants/Other Providers [ ] YES  [ ] NO

## 2019-12-26 NOTE — PROGRESS NOTE ADULT - ASSESSMENT
74 M PMHx of ESRD s/p failed renal transplant x2, HCV, DM, and meningococcal meningitis 2 years ago was sent in to the ED from HD for AMS and low BPs  CVA  Pneumonia-aspiration-resp failure s/p Rx  Patient also with sacral decub stage III  Leukocytosis  Is on baseline steroids  also s/ recent feeding tube placement   Overall,  1) Persistent Leucocytosis   - No obvious localization clinically  - externally no s/s SC HD catheter site infection  - Monitor for focal signs infection, if watery diarrhea persists, check C diff PCR  - If worsening, will consider repeat CT A/P, CT Chest  2) Sacral wound  - Monitor for signs infection  - Follow up wound care  3) Stroke--Monitor for signs aspiration, rehabilitation per primary team    Deniz Barron MD  Pager [phone]  After 5pm and on weekends call [phone]

## 2019-12-26 NOTE — PROGRESS NOTE ADULT - ASSESSMENT
NEURO:  - Extubated 11/29, now on nasal cannula. Minimally verbal   - metabolic encephalopathy, meningitis r/o, LP negative  - neuro following  - chronic lacunar infarcts; per neuro, AMS also likely related to hypercalcemia, renal dysfunction , poor nutritional intake.   - c/w keppra for ?hx of seizure disorder-AMS likely 2/2 hyperCa, renal dysfunction, poor PO intake  CV:  - hx of afib was on eliquis, now off a/c per cards due to bleeding risk  - holding carvedilol 6.25 BID- hx of cocaine cardiomyopathy, resolved, normal EF on most recent echo  - c/w statin  - echo repeat EF70, LVH, hyperdynamic LCF, normal RV sys fxn    PULM:- extubated 11/29--tolerating nasal cannula    GI:  - sp PEG with surgery     RENAL:- hx of ESRD s/p failed renal transplant x2- renal following, HD as recommended.    ENDO:  - Start Insulin Sliding Scale- endocrine following- monitor Ca    INFECTIOUS:  - dc  vanco as per infectious disease   - id fu     Anemia  - transfuse PRN  - monitor cbc    Sacral decub  - wound care consult

## 2019-12-26 NOTE — PROGRESS NOTE ADULT - PROBLEM SELECTOR PLAN 5
f/u blood  and urine cx,serial lactate levels,monitor vitals closley,ivfs hydration,monitor urine output and renal profile,iv abx as per id cons. no

## 2019-12-27 DIAGNOSIS — R78.81 BACTEREMIA: ICD-10-CM

## 2019-12-27 LAB
ANION GAP SERPL CALC-SCNC: 14 MMOL/L — SIGNIFICANT CHANGE UP (ref 5–17)
BUN SERPL-MCNC: 40 MG/DL — HIGH (ref 7–23)
CALCIUM SERPL-MCNC: 10.6 MG/DL — HIGH (ref 8.4–10.5)
CHLORIDE SERPL-SCNC: 94 MMOL/L — LOW (ref 96–108)
CO2 SERPL-SCNC: 25 MMOL/L — SIGNIFICANT CHANGE UP (ref 22–31)
CREAT SERPL-MCNC: 2.31 MG/DL — HIGH (ref 0.5–1.3)
GLUCOSE BLDC GLUCOMTR-MCNC: 139 MG/DL — HIGH (ref 70–99)
GLUCOSE BLDC GLUCOMTR-MCNC: 155 MG/DL — HIGH (ref 70–99)
GLUCOSE BLDC GLUCOMTR-MCNC: 157 MG/DL — HIGH (ref 70–99)
GLUCOSE BLDC GLUCOMTR-MCNC: 163 MG/DL — HIGH (ref 70–99)
GLUCOSE BLDC GLUCOMTR-MCNC: 180 MG/DL — HIGH (ref 70–99)
GLUCOSE BLDC GLUCOMTR-MCNC: 190 MG/DL — HIGH (ref 70–99)
GLUCOSE SERPL-MCNC: 151 MG/DL — HIGH (ref 70–99)
HCT VFR BLD CALC: 25.9 % — LOW (ref 39–50)
HGB BLD-MCNC: 7.8 G/DL — LOW (ref 13–17)
MCHC RBC-ENTMCNC: 25.7 PG — LOW (ref 27–34)
MCHC RBC-ENTMCNC: 30.1 GM/DL — LOW (ref 32–36)
MCV RBC AUTO: 85.2 FL — SIGNIFICANT CHANGE UP (ref 80–100)
PLATELET # BLD AUTO: 248 K/UL — SIGNIFICANT CHANGE UP (ref 150–400)
POTASSIUM SERPL-MCNC: 3.6 MMOL/L — SIGNIFICANT CHANGE UP (ref 3.5–5.3)
POTASSIUM SERPL-SCNC: 3.6 MMOL/L — SIGNIFICANT CHANGE UP (ref 3.5–5.3)
RBC # BLD: 3.04 M/UL — LOW (ref 4.2–5.8)
RBC # FLD: 15.9 % — HIGH (ref 10.3–14.5)
SODIUM SERPL-SCNC: 133 MMOL/L — LOW (ref 135–145)
WBC # BLD: 15.4 K/UL — HIGH (ref 3.8–10.5)
WBC # FLD AUTO: 15.4 K/UL — HIGH (ref 3.8–10.5)

## 2019-12-27 PROCEDURE — 99233 SBSQ HOSP IP/OBS HIGH 50: CPT

## 2019-12-27 RX ORDER — ERTAPENEM SODIUM 1 G/1
500 INJECTION, POWDER, LYOPHILIZED, FOR SOLUTION INTRAMUSCULAR; INTRAVENOUS EVERY 24 HOURS
Refills: 0 | Status: DISCONTINUED | OUTPATIENT
Start: 2019-12-27 | End: 2019-12-31

## 2019-12-27 RX ADMIN — LIDOCAINE 1 PATCH: 4 CREAM TOPICAL at 00:42

## 2019-12-27 RX ADMIN — Medication 2 MILLIGRAM(S): at 19:39

## 2019-12-27 RX ADMIN — HEPARIN SODIUM 5000 UNIT(S): 5000 INJECTION INTRAVENOUS; SUBCUTANEOUS at 05:04

## 2019-12-27 RX ADMIN — ATORVASTATIN CALCIUM 40 MILLIGRAM(S): 80 TABLET, FILM COATED ORAL at 21:31

## 2019-12-27 RX ADMIN — Medication 650 MILLIGRAM(S): at 05:04

## 2019-12-27 RX ADMIN — Medication 650 MILLIGRAM(S): at 12:45

## 2019-12-27 RX ADMIN — Medication 650 MILLIGRAM(S): at 13:30

## 2019-12-27 RX ADMIN — Medication 2 MILLIGRAM(S): at 12:45

## 2019-12-27 RX ADMIN — LIDOCAINE 1 PATCH: 4 CREAM TOPICAL at 17:56

## 2019-12-27 RX ADMIN — CARVEDILOL PHOSPHATE 3.12 MILLIGRAM(S): 80 CAPSULE, EXTENDED RELEASE ORAL at 17:58

## 2019-12-27 RX ADMIN — LEVETIRACETAM 500 MILLIGRAM(S): 250 TABLET, FILM COATED ORAL at 05:04

## 2019-12-27 RX ADMIN — CARVEDILOL PHOSPHATE 3.12 MILLIGRAM(S): 80 CAPSULE, EXTENDED RELEASE ORAL at 05:04

## 2019-12-27 RX ADMIN — Medication 1 APPLICATION(S): at 05:05

## 2019-12-27 RX ADMIN — Medication 2: at 17:56

## 2019-12-27 RX ADMIN — Medication 2 MILLIGRAM(S): at 08:25

## 2019-12-27 RX ADMIN — INSULIN GLARGINE 11 UNIT(S): 100 INJECTION, SOLUTION SUBCUTANEOUS at 22:10

## 2019-12-27 RX ADMIN — HEPARIN SODIUM 5000 UNIT(S): 5000 INJECTION INTRAVENOUS; SUBCUTANEOUS at 12:53

## 2019-12-27 RX ADMIN — Medication 2: at 06:42

## 2019-12-27 RX ADMIN — LIDOCAINE 1 PATCH: 4 CREAM TOPICAL at 12:46

## 2019-12-27 RX ADMIN — Medication 2 MILLIGRAM(S): at 16:18

## 2019-12-27 RX ADMIN — Medication 2: at 00:42

## 2019-12-27 RX ADMIN — Medication 650 MILLIGRAM(S): at 05:34

## 2019-12-27 RX ADMIN — ERTAPENEM SODIUM 100 MILLIGRAM(S): 1 INJECTION, POWDER, LYOPHILIZED, FOR SOLUTION INTRAMUSCULAR; INTRAVENOUS at 12:46

## 2019-12-27 RX ADMIN — CHLORHEXIDINE GLUCONATE 1 APPLICATION(S): 213 SOLUTION TOPICAL at 05:05

## 2019-12-27 RX ADMIN — Medication 5 MILLIGRAM(S): at 05:04

## 2019-12-27 RX ADMIN — Medication 650 MILLIGRAM(S): at 17:58

## 2019-12-27 RX ADMIN — Medication 1 APPLICATION(S): at 17:58

## 2019-12-27 RX ADMIN — LEVETIRACETAM 500 MILLIGRAM(S): 250 TABLET, FILM COATED ORAL at 17:58

## 2019-12-27 RX ADMIN — HEPARIN SODIUM 5000 UNIT(S): 5000 INJECTION INTRAVENOUS; SUBCUTANEOUS at 21:31

## 2019-12-27 RX ADMIN — Medication 2: at 12:45

## 2019-12-27 NOTE — PROGRESS NOTE ADULT - SUBJECTIVE AND OBJECTIVE BOX
Patient is a 74y Male whom presented to   pateint seen and examined nad , in am     PAST MEDICAL & SURGICAL HISTORY:  Kidney transplant recipient  Anuria  GERD (gastroesophageal reflux disease)  Kidney transplant failure: dx: 2/2013  Hepatitis C: dx: 90&#x27;s.  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 10 milliGRAM(s) Oral three times a day  mycophenolate mofetil 250 milliGRAM(s) Oral two times a day  predniSONE   Tablet 5 milliGRAM(s) Oral daily  sevelamer carbonate 800 milliGRAM(s) Oral three times a day with meals  sodium bicarbonate 650 milliGRAM(s) Oral two times a day  tamsulosin 0.4 milliGRAM(s) Oral at bedtime      Allergies    No Known Allergies                       SOCIAL HISTORY:  Denies ETOh,Smoking,     FAMILY HISTORY:  Family history of breast cancer in sister (Sibling): living at 94 yo  Family history of prostate cancer in father  Family history of lung cancer  Family history of hypertension in mother  Family history of diabetes mellitus: mother      REVIEW OF SYSTEMS:    unbable to obtained                                                                                                             7.8    15.40 )-----------( 248      ( 27 Dec 2019 08:54 )             25.9       CBC Full  -  ( 27 Dec 2019 08:54 )  WBC Count : 15.40 K/uL  RBC Count : 3.04 M/uL  Hemoglobin : 7.8 g/dL  Hematocrit : 25.9 %  Platelet Count - Automated : 248 K/uL  Mean Cell Volume : 85.2 fl  Mean Cell Hemoglobin : 25.7 pg  Mean Cell Hemoglobin Concentration : 30.1 gm/dL  Auto Neutrophil # : x  Auto Lymphocyte # : x  Auto Monocyte # : x  Auto Eosinophil # : x  Auto Basophil # : x  Auto Neutrophil % : x  Auto Lymphocyte % : x  Auto Monocyte % : x  Auto Eosinophil % : x  Auto Basophil % : x      12-27    133<L>  |  94<L>  |  40<H>  ----------------------------<  151<H>  3.6   |  25  |  2.31<H>    Ca    10.6<H>      27 Dec 2019 07:11        CAPILLARY BLOOD GLUCOSE      POCT Blood Glucose.: 180 mg/dL (27 Dec 2019 12:10)  POCT Blood Glucose.: 155 mg/dL (27 Dec 2019 06:28)  POCT Blood Glucose.: 139 mg/dL (27 Dec 2019 00:59)  POCT Blood Glucose.: 157 mg/dL (27 Dec 2019 00:41)  POCT Blood Glucose.: 209 mg/dL (26 Dec 2019 21:49)  POCT Blood Glucose.: 97 mg/dL (26 Dec 2019 17:00)      Vital Signs Last 24 Hrs  T(C): 36.6 (27 Dec 2019 11:57), Max: 36.9 (26 Dec 2019 20:00)  T(F): 97.9 (27 Dec 2019 11:57), Max: 98.5 (26 Dec 2019 20:00)  HR: 110 (27 Dec 2019 11:57) (100 - 114)  BP: 110/70 (27 Dec 2019 11:57) (94/60 - 117/96)  BP(mean): --  RR: 18 (27 Dec 2019 11:57) (18 - 18)  SpO2: 95% (27 Dec 2019 11:57) (94% - 99%)                                                                        HEENT: conjunctive   clear   Neck:  No JVD  Respiratory: decrease bs b/l   Cardiovascular: S1 and S2  Gastrointestinal: BS+, soft, NT/ND  Extremities: No peripheral edema  Skin: dry   Access: pos fistula

## 2019-12-27 NOTE — PROGRESS NOTE ADULT - SUBJECTIVE AND OBJECTIVE BOX
INTERVAL HPI/OVERNIGHT EVENTS:    overnight events noted; blood cx + for gram negative rods  pt seen and examined  feeds running @ 55cc/hr; tolerating  No BMs today, diarrhea improving  pt is without n/v      MEDICATIONS  (STANDING):  acetaminophen    Suspension .. 650 milliGRAM(s) Oral every 6 hours  atorvastatin 40 milliGRAM(s) Oral at bedtime  carvedilol 3.125 milliGRAM(s) Oral every 12 hours  chlorhexidine 4% Liquid 1 Application(s) Topical <User Schedule>  Dakins Solution - 1/4 Strength 1 Application(s) Topical two times a day  dextrose 50% Injectable 12.5 Gram(s) IV Push once  dextrose 50% Injectable 25 Gram(s) IV Push once  epoetin tan Injectable 11989 Unit(s) IV Push <User Schedule>  heparin  Injectable 5000 Unit(s) SubCutaneous every 8 hours  insulin glargine Injectable (LANTUS) 11 Unit(s) SubCutaneous at bedtime  insulin lispro (HumaLOG) corrective regimen sliding scale   SubCutaneous every 6 hours  levETIRAcetam  Solution 500 milliGRAM(s) Oral two times a day  lidocaine   Patch 1 Patch Transdermal daily  loperamide Suspension 2 milliGRAM(s) Oral five times a day  predniSONE   Tablet 5 milliGRAM(s) Oral daily    MEDICATIONS  (PRN):  dextrose 40% Gel 15 Gram(s) Oral once PRN Blood Glucose LESS THAN 70 milliGRAM(s)/deciLiter  glucagon  Injectable 1 milliGRAM(s) IntraMuscular once PRN Glucose <70 milliGRAM(s)/deciLiter      Allergies    No Known Allergies    Intolerances        Review of Systems:    unable to obtain in completion       Vital Signs Last 24 Hrs  T(C): 36.4 (26 Dec 2019 04:46), Max: 36.4 (25 Dec 2019 20:35)  T(F): 97.5 (26 Dec 2019 04:46), Max: 97.5 (25 Dec 2019 20:35)  HR: 110 (26 Dec 2019 04:46) (98 - 110)  BP: 121/75 (26 Dec 2019 04:46) (100/65 - 121/75)  BP(mean): --  RR: 20 (26 Dec 2019 04:46) (20 - 20)  SpO2: 95% (26 Dec 2019 04:46) (95% - 96%)    PHYSICAL EXAM:    GENERAL:  lying in bed  HEENT:  NC/AT  ABDOMEN:  Soft, non-distended, + bowel sounds, + peg dressed, c/d/i  EXTREMITIES:  no cyanosis, clubbing or edema  NEURO:  minimally verbal        LABS:                        7.7    19.18 )-----------( 247      ( 26 Dec 2019 12:16 )             25.1     12-26    133<L>  |  93<L>  |  79<H>  ----------------------------<  214<H>  4.0   |  26  |  3.83<H>    Ca    11.6<H>      26 Dec 2019 08:31            RADIOLOGY & ADDITIONAL TESTS:

## 2019-12-27 NOTE — PROGRESS NOTE ADULT - ASSESSMENT
Assessment  DM: A1C 5.5%, was on insulin at home, now on insulin, blood sugars trending within acceptable range, no hypoglycemic episodes. Patient is still NPO, tolerating PEG tube feeds, stable, resting comfortably, appears alert when spoken to though is minimally responsive.  Sacral Wound: s/p debridement, monitored, FU wound care.  Hypercalcemia: Primary Hyperparathyroidism, Ca improved s/p Pamidronate dose.   Sepsis: IV ABx for UTI, LP inconsistent with meningitis, on medications, stable, monitored.  HTN: Controlled,  on antihypertensive medications.  ESRD: On hemodialysis, Monitor labs/BMP          Robi Gay MD  Cell: [phone]  Office: [phone] Assessment  DM: A1C 5.5%, was on insulin at home, now on insulin, blood sugars trending within acceptable range, no hypoglycemic episodes. Patient is still NPO, tolerating PEG tube feeds,  stable, resting comfortably, appears alert when spoken to though is minimally responsive.  Sacral Wound: s/p debridement, monitored, FU wound care.  Hypercalcemia: Primary Hyperparathyroidism, Ca improved s/p Pamidronate dose.   Sepsis: IV ABx for UTI, LP inconsistent with meningitis, on medications, stable, monitored.  HTN: Controlled,  on antihypertensive medications.  ESRD: On hemodialysis, Monitor labs/BMP          Robi Gay MD  Cell: [phone]  Office: [phone]

## 2019-12-27 NOTE — PROVIDER CONTACT NOTE (OTHER) - BACKGROUND
pt admitted with  [facility]. PM of Hep C., GERD, ESRD on dialysis, HTN, Diabetes mellitus

## 2019-12-27 NOTE — PROGRESS NOTE ADULT - SUBJECTIVE AND OBJECTIVE BOX
Chief complaint  Patient is a 74y old  Male who presents with a chief complaint of ams (27 Dec 2019 11:56)   Review of systems  Patient in bed, looks comfortable, no fever, no hypoglycemia.    Labs and Fingersticks  CAPILLARY BLOOD GLUCOSE      POCT Blood Glucose.: 180 mg/dL (27 Dec 2019 12:10)  POCT Blood Glucose.: 155 mg/dL (27 Dec 2019 06:28)  POCT Blood Glucose.: 139 mg/dL (27 Dec 2019 00:59)  POCT Blood Glucose.: 157 mg/dL (27 Dec 2019 00:41)  POCT Blood Glucose.: 209 mg/dL (26 Dec 2019 21:49)  POCT Blood Glucose.: 97 mg/dL (26 Dec 2019 17:00)      Anion Gap, Serum: 14 (12-27 @ 07:11)  Anion Gap, Serum: 14 (12-26 @ 08:31)      Calcium, Total Serum: 10.6 <H> (12-27 @ 07:11)  Calcium, Total Serum: 11.6 <H> (12-26 @ 08:31)          12-27    133<L>  |  94<L>  |  40<H>  ----------------------------<  151<H>  3.6   |  25  |  2.31<H>    Ca    10.6<H>      27 Dec 2019 07:11                          7.8    15.40 )-----------( 248      ( 27 Dec 2019 08:54 )             25.9     Medications  MEDICATIONS  (STANDING):  acetaminophen    Suspension .. 650 milliGRAM(s) Oral every 6 hours  atorvastatin 40 milliGRAM(s) Oral at bedtime  carvedilol 3.125 milliGRAM(s) Oral every 12 hours  chlorhexidine 4% Liquid 1 Application(s) Topical <User Schedule>  Dakins Solution - 1/4 Strength 1 Application(s) Topical two times a day  dextrose 50% Injectable 12.5 Gram(s) IV Push once  dextrose 50% Injectable 25 Gram(s) IV Push once  epoetin tan Injectable 42099 Unit(s) IV Push <User Schedule>  ertapenem  IVPB 500 milliGRAM(s) IV Intermittent every 24 hours  heparin  Injectable 5000 Unit(s) SubCutaneous every 8 hours  insulin glargine Injectable (LANTUS) 11 Unit(s) SubCutaneous at bedtime  insulin lispro (HumaLOG) corrective regimen sliding scale   SubCutaneous every 6 hours  levETIRAcetam  Solution 500 milliGRAM(s) Oral two times a day  lidocaine   Patch 1 Patch Transdermal daily  loperamide Suspension 2 milliGRAM(s) Oral five times a day  predniSONE   Tablet 5 milliGRAM(s) Oral daily      Physical Exam  General: Patient comfortable in bed  Vital Signs Last 12 Hrs  T(F): 97.9 (12-27-19 @ 11:57), Max: 98.1 (12-27-19 @ 05:03)  HR: 110 (12-27-19 @ 11:57) (106 - 110)  BP: 110/70 (12-27-19 @ 11:57) (110/70 - 115/71)  BP(mean): --  RR: 18 (12-27-19 @ 11:57) (18 - 18)  SpO2: 95% (12-27-19 @ 11:57) (94% - 95%)  Neck: No palpable thyroid nodules.  CVS: S1S2, No murmurs  Respiratory: No wheezing, no crepitations  GI: Abdomen soft, bowel sounds positive  Musculoskeletal:  edema lower extremities.   Skin: No skin rashes, no ecchymosis    Diagnostics Chief complaint  Patient is a 74y old  Male who presents with a chief complaint of  ams (27 Dec 2019 11:56)   Review of systems  Patient in bed, looks comfortable, no fever, no hypoglycemia.    Labs and Fingersticks  CAPILLARY BLOOD GLUCOSE      POCT Blood Glucose.: 180 mg/dL (27 Dec 2019 12:10)  POCT Blood Glucose.: 155 mg/dL (27 Dec 2019 06:28)  POCT Blood Glucose.: 139 mg/dL (27 Dec 2019 00:59)  POCT Blood Glucose.: 157 mg/dL (27 Dec 2019 00:41)  POCT Blood Glucose.: 209 mg/dL (26 Dec 2019 21:49)  POCT Blood Glucose.: 97 mg/dL (26 Dec 2019 17:00)      Anion Gap, Serum: 14 (12-27 @ 07:11)  Anion Gap, Serum: 14 (12-26 @ 08:31)      Calcium, Total Serum: 10.6 <H> (12-27 @ 07:11)  Calcium, Total Serum: 11.6 <H> (12-26 @ 08:31)          12-27    133<L>  |  94<L>  |  40<H>  ----------------------------<  151<H>  3.6   |  25  |  2.31<H>    Ca    10.6<H>      27 Dec 2019 07:11                          7.8    15.40 )-----------( 248      ( 27 Dec 2019 08:54 )             25.9     Medications  MEDICATIONS  (STANDING):  acetaminophen    Suspension .. 650 milliGRAM(s) Oral every 6 hours  atorvastatin 40 milliGRAM(s) Oral at bedtime  carvedilol 3.125 milliGRAM(s) Oral every 12 hours  chlorhexidine 4% Liquid 1 Application(s) Topical <User Schedule>  Dakins Solution - 1/4 Strength 1 Application(s) Topical two times a day  dextrose 50% Injectable 12.5 Gram(s) IV Push once  dextrose 50% Injectable 25 Gram(s) IV Push once  epoetin tan Injectable 92861 Unit(s) IV Push <User Schedule>  ertapenem  IVPB 500 milliGRAM(s) IV Intermittent every 24 hours  heparin  Injectable 5000 Unit(s) SubCutaneous every 8 hours  insulin glargine Injectable (LANTUS) 11 Unit(s) SubCutaneous at bedtime  insulin lispro (HumaLOG) corrective regimen sliding scale   SubCutaneous every 6 hours  levETIRAcetam  Solution 500 milliGRAM(s) Oral two times a day  lidocaine   Patch 1 Patch Transdermal daily  loperamide Suspension 2 milliGRAM(s) Oral five times a day  predniSONE   Tablet 5 milliGRAM(s) Oral daily      Physical Exam  General: Patient comfortable in bed  Vital Signs Last 12 Hrs  T(F): 97.9 (12-27-19 @ 11:57), Max: 98.1 (12-27-19 @ 05:03)  HR: 110 (12-27-19 @ 11:57) (106 - 110)  BP: 110/70 (12-27-19 @ 11:57) (110/70 - 115/71)  BP(mean): --  RR: 18 (12-27-19 @ 11:57) (18 - 18)  SpO2: 95% (12-27-19 @ 11:57) (94% - 95%)  Neck: No palpable thyroid nodules.  CVS: S1S2, No murmurs  Respiratory: No wheezing, no crepitations  GI: Abdomen soft, bowel sounds positive  Musculoskeletal:  edema lower extremities.   Skin: No skin rashes, no ecchymosis    Diagnostics

## 2019-12-27 NOTE — CHART NOTE - NSCHARTNOTEFT_GEN_A_CORE
Rn to PA- pt's cultures came back positive for gram negative rods  Pt has no IV access- IV nurse tried 3 times  Pt is a very hard stick    pt has a left AVF and possibly may need a PICC line?  will F/u with day team     Vital Signs Last 24 Hrs  T(C): 36.6 (26 Dec 2019 20:09), Max: 36.9 (26 Dec 2019 15:15)  T(F): 97.9 (26 Dec 2019 20:09), Max: 98.5 (26 Dec 2019 15:15)  HR: 108 (26 Dec 2019 20:09) (98 - 110)  BP: 117/96 (26 Dec 2019 20:09) (105/71 - 121/75)  BP(mean): --  RR: 18 (26 Dec 2019 20:09) (18 - 20)  SpO2: 96% (26 Dec 2019 20:09) (95% - 99%)                          7.7    19.18 )-----------( 247      ( 26 Dec 2019 12:16 )             25.1       GIL Heath  [id number]

## 2019-12-27 NOTE — PROGRESS NOTE ADULT - SUBJECTIVE AND OBJECTIVE BOX
Patient is a 74y old  Male who presents with a chief complaint of ams (27 Dec 2019 15:47)      INTERVAL HPI/OVERNIGHT EVENTS:  T(C): 36.6 (12-27-19 @ 11:57), Max: 36.9 (12-26-19 @ 20:00)  HR: 110 (12-27-19 @ 11:57) (100 - 114)  BP: 110/70 (12-27-19 @ 11:57) (94/60 - 117/96)  RR: 18 (12-27-19 @ 11:57) (18 - 18)  SpO2: 95% (12-27-19 @ 11:57) (94% - 99%)  Wt(kg): --  I&O's Summary    26 Dec 2019 07:01  -  27 Dec 2019 07:00  --------------------------------------------------------  IN: 950 mL / OUT: 2000 mL / NET: -1050 mL        LABS:                        7.8    15.40 )-----------( 248      ( 27 Dec 2019 08:54 )             25.9     12-27    133<L>  |  94<L>  |  40<H>  ----------------------------<  151<H>  3.6   |  25  |  2.31<H>    Ca    10.6<H>      27 Dec 2019 07:11          CAPILLARY BLOOD GLUCOSE      POCT Blood Glucose.: 163 mg/dL (27 Dec 2019 16:58)  POCT Blood Glucose.: 180 mg/dL (27 Dec 2019 12:10)  POCT Blood Glucose.: 155 mg/dL (27 Dec 2019 06:28)  POCT Blood Glucose.: 139 mg/dL (27 Dec 2019 00:59)  POCT Blood Glucose.: 157 mg/dL (27 Dec 2019 00:41)  POCT Blood Glucose.: 209 mg/dL (26 Dec 2019 21:49)            MEDICATIONS  (STANDING):  acetaminophen    Suspension .. 650 milliGRAM(s) Oral every 6 hours  atorvastatin 40 milliGRAM(s) Oral at bedtime  carvedilol 3.125 milliGRAM(s) Oral every 12 hours  chlorhexidine 4% Liquid 1 Application(s) Topical <User Schedule>  Dakins Solution - 1/4 Strength 1 Application(s) Topical two times a day  dextrose 50% Injectable 12.5 Gram(s) IV Push once  dextrose 50% Injectable 25 Gram(s) IV Push once  epoetin tan Injectable 90593 Unit(s) IV Push <User Schedule>  ertapenem  IVPB 500 milliGRAM(s) IV Intermittent every 24 hours  heparin  Injectable 5000 Unit(s) SubCutaneous every 8 hours  insulin glargine Injectable (LANTUS) 11 Unit(s) SubCutaneous at bedtime  insulin lispro (HumaLOG) corrective regimen sliding scale   SubCutaneous every 6 hours  levETIRAcetam  Solution 500 milliGRAM(s) Oral two times a day  lidocaine   Patch 1 Patch Transdermal daily  loperamide Suspension 2 milliGRAM(s) Oral five times a day  predniSONE   Tablet 5 milliGRAM(s) Oral daily    MEDICATIONS  (PRN):  dextrose 40% Gel 15 Gram(s) Oral once PRN Blood Glucose LESS THAN 70 milliGRAM(s)/deciLiter  glucagon  Injectable 1 milliGRAM(s) IntraMuscular once PRN Glucose <70 milliGRAM(s)/deciLiter          PHYSICAL EXAM:  GENERAL: frail  CHEST/LUNG: Clear to percussion bilaterally; No rales, rhonchi, wheezing, or rubs  HEART: Regular rate and rhythm; No murmurs, rubs, or gallops  ABDOMEN: Soft, Nontender, Nondistended; Bowel sounds present  EXTREMITIES: no edema     Care Discussed with Consultants/Other Providers [ ] YES  [ ] NO

## 2019-12-27 NOTE — PROGRESS NOTE ADULT - SUBJECTIVE AND OBJECTIVE BOX
CC: F/U for Bacteremia    Saw/spoke to patient. Patient able to very minimally interact with me. Otherwise appears unchanged. Found to have bacteremia in the interim.    Allergies  No Known Allergies    ANTIMICROBIALS:  ertapenem  IVPB 500 every 24 hours    PE:    Vital Signs Last 24 Hrs  T(C): 36.6 (27 Dec 2019 11:57), Max: 36.9 (26 Dec 2019 15:15)  T(F): 97.9 (27 Dec 2019 11:57), Max: 98.5 (26 Dec 2019 15:15)  HR: 110 (27 Dec 2019 11:57) (98 - 114)  BP: 110/70 (27 Dec 2019 11:57) (94/60 - 117/96)  RR: 18 (27 Dec 2019 11:57) (18 - 20)  SpO2: 95% (27 Dec 2019 11:57) (94% - 99%)    Gen: AOx3, NAD, non-toxic, pleasant  CV: S1+S2 normal, tachycardic  Resp: Clear bilat, no resp distress, no crackles/wheezes  Abd: Soft, nontender, +BS  Ext: No LE edema, no wounds    LABS:                        7.8    15.40 )-----------( 248      ( 27 Dec 2019 08:54 )             25.9     12-27    133<L>  |  94<L>  |  40<H>  ----------------------------<  151<H>  3.6   |  25  |  2.31<H>    Ca    10.6<H>      27 Dec 2019 07:11    MICROBIOLOGY:    .Blood Blood  12-24-19   Growth in anaerobic bottle: Gram Negative Rods    .Stool Feces  12-14-19   GI PCR Results: NOT detected    .Stool Feces  12-10-19   GI PCR Results: NOT detected    .Blood Blood-Peripheral  12-06-19   No growth at 5 days.     Bronch Wash Combicath  11-27-19   Normal Respiratory Millie present  --    No polymorphonuclear cells seen per low power field  No squamous epithelial cells per low power field  No organisms seen per oil power field    CMV PCR Detection: NotDetec IU/mL (09-05-19 @ 21:02)    (otherwise reviewed)    RADIOLOGY:    12/23 CT:    FINDINGS:     The right internal jugular dialysis catheter tip is in the distal SVC.    The lungs are clear.    The heart size is normal.    Degenerative changes of the spine.    IMPRESSION:    Clear lungs.

## 2019-12-27 NOTE — PROGRESS NOTE ADULT - ASSESSMENT
NEURO:  - Extubated 11/29, now on nasal cannula. Minimally verbal   - metabolic encephalopathy, meningitis r/o, LP negative  - neuro following  - chronic lacunar infarcts; per neuro, AMS also likely related to hypercalcemia, renal dysfunction , poor nutritional intake.   - c/w keppra for ?hx of seizure disorder-AMS likely 2/2 hyperCa, renal dysfunction, poor PO intake  CV:- hx of afib was on eliquis, now off a/c per cards due to bleeding risk  - holding carvedilol 6.25 BID- hx of cocaine cardiomyopathy, resolved, normal EF on most recent echo  - c/w statin  - echo repeat EF70, LVH, hyperdynamic LCF, normal RV sys fxn    PULM:- extubated 11/29--tolerating nasal cannula    GI:  - sp PEG with surgery     RENAL:- hx of ESRD s/p failed renal transplant x2- renal following, HD as recommended.    ENDO:  - Start Insulin Sliding Scale- endocrine following- monitor Ca    INFECTIOUS:  - dc  vanco as per infectious disease   - id fu     Anemia  - transfuse PRN- monitor cbc    Sacral decub  - wound care consult     Bactremia  - cw antibiotics as per ID

## 2019-12-27 NOTE — PROGRESS NOTE ADULT - ASSESSMENT
74 M PMHx of ESRD s/p failed renal transplant x2, HCV, DM, and meningococcal meningitis 2 years ago was sent in to the ED from HD for AMS and low BPs  CVA  Pneumonia-aspiration-resp failure s/p Rx  Patient also with sacral decub stage III  Leukocytosis  Is on baseline steroids  also s/ recent feeding tube placement   New GNR bacteremia--PCR negative  Overall,  1) Persistent Leucocytosis   - No obvious localization clinically  - externally no s/s SC HD catheter site infection  - Monitor for focal signs infection, if watery diarrhea persists, check C diff PCR  - Trend WBC  2) Sacral wound  - Monitor for signs infection  - Follow up wound care  3) Stroke--Monitor for signs aspiration, rehabilitation per primary team  4) New GNR Bacteremia, high risk for acute decompensation (monitor closely)  - Start Ertapenem 500mg q 24  - Repeat BCXs x 2  - F/U pending BCX  - Check CT A/P w/ contrast (if can tolerate contrast)--to evaluate for source GNR    Deniz Barron MD  Pager [phone]  After 5pm and on weekends call [phone]

## 2019-12-27 NOTE — PROVIDER CONTACT NOTE (CRITICAL VALUE NOTIFICATION) - ASSESSMENT
pt confused lethargic and ox1 at times, VSS, incontinent, peg tube in place. No iv access pt hard stick unable to get iv access by assigned nurse and IV nurse. pt not on IV abx at this time

## 2019-12-27 NOTE — PROVIDER CONTACT NOTE (CRITICAL VALUE NOTIFICATION) - TEST AND RESULT REPORTED:
blood cultures obtained 12/24/19 preliminary blood cultures growth in anaerobic bottle gram negative rods

## 2019-12-27 NOTE — PROGRESS NOTE ADULT - PROBLEM SELECTOR PLAN 4
Hgb improved today; no s/s active GIB at this time; continue to monitor CBC, transfuse prn    s/p 1uprbc with appropriate response 12/17  occult negative

## 2019-12-28 LAB
ANION GAP SERPL CALC-SCNC: 17 MMOL/L — SIGNIFICANT CHANGE UP (ref 5–17)
BUN SERPL-MCNC: 63 MG/DL — HIGH (ref 7–23)
CALCIUM SERPL-MCNC: 11.7 MG/DL — HIGH (ref 8.4–10.5)
CHLORIDE SERPL-SCNC: 89 MMOL/L — LOW (ref 96–108)
CO2 SERPL-SCNC: 25 MMOL/L — SIGNIFICANT CHANGE UP (ref 22–31)
CREAT SERPL-MCNC: 3.16 MG/DL — HIGH (ref 0.5–1.3)
CULTURE RESULTS: SIGNIFICANT CHANGE UP
GLUCOSE BLDC GLUCOMTR-MCNC: 150 MG/DL — HIGH (ref 70–99)
GLUCOSE BLDC GLUCOMTR-MCNC: 153 MG/DL — HIGH (ref 70–99)
GLUCOSE BLDC GLUCOMTR-MCNC: 208 MG/DL — HIGH (ref 70–99)
GLUCOSE BLDC GLUCOMTR-MCNC: 209 MG/DL — HIGH (ref 70–99)
GLUCOSE SERPL-MCNC: 222 MG/DL — HIGH (ref 70–99)
HCT VFR BLD CALC: 30 % — LOW (ref 39–50)
HGB BLD-MCNC: 8.9 G/DL — LOW (ref 13–17)
MCHC RBC-ENTMCNC: 25.2 PG — LOW (ref 27–34)
MCHC RBC-ENTMCNC: 29.7 GM/DL — LOW (ref 32–36)
MCV RBC AUTO: 85 FL — SIGNIFICANT CHANGE UP (ref 80–100)
ORGANISM # SPEC MICROSCOPIC CNT: SIGNIFICANT CHANGE UP
ORGANISM # SPEC MICROSCOPIC CNT: SIGNIFICANT CHANGE UP
PLATELET # BLD AUTO: 310 K/UL — SIGNIFICANT CHANGE UP (ref 150–400)
POTASSIUM SERPL-MCNC: 4.1 MMOL/L — SIGNIFICANT CHANGE UP (ref 3.5–5.3)
POTASSIUM SERPL-SCNC: 4.1 MMOL/L — SIGNIFICANT CHANGE UP (ref 3.5–5.3)
RBC # BLD: 3.53 M/UL — LOW (ref 4.2–5.8)
RBC # FLD: 15.9 % — HIGH (ref 10.3–14.5)
SODIUM SERPL-SCNC: 131 MMOL/L — LOW (ref 135–145)
SPECIMEN SOURCE: SIGNIFICANT CHANGE UP
WBC # BLD: 21.12 K/UL — HIGH (ref 3.8–10.5)
WBC # FLD AUTO: 21.12 K/UL — HIGH (ref 3.8–10.5)

## 2019-12-28 PROCEDURE — 99232 SBSQ HOSP IP/OBS MODERATE 35: CPT

## 2019-12-28 PROCEDURE — 36580 REPLACE CVAD CATH: CPT

## 2019-12-28 PROCEDURE — 77001 FLUOROGUIDE FOR VEIN DEVICE: CPT | Mod: 26

## 2019-12-28 RX ORDER — ONDANSETRON 8 MG/1
4 TABLET, FILM COATED ORAL EVERY 8 HOURS
Refills: 0 | Status: DISCONTINUED | OUTPATIENT
Start: 2019-12-28 | End: 2019-12-30

## 2019-12-28 RX ORDER — PAMIDRONATE DISODIUM 9 MG/ML
60 INJECTION, SOLUTION INTRAVENOUS ONCE
Refills: 0 | Status: COMPLETED | OUTPATIENT
Start: 2019-12-28 | End: 2019-12-28

## 2019-12-28 RX ORDER — ALTEPLASE 100 MG
2 KIT INTRAVENOUS ONCE
Refills: 0 | Status: COMPLETED | OUTPATIENT
Start: 2019-12-28 | End: 2019-12-28

## 2019-12-28 RX ADMIN — Medication 1 APPLICATION(S): at 05:02

## 2019-12-28 RX ADMIN — CARVEDILOL PHOSPHATE 3.12 MILLIGRAM(S): 80 CAPSULE, EXTENDED RELEASE ORAL at 05:02

## 2019-12-28 RX ADMIN — HEPARIN SODIUM 5000 UNIT(S): 5000 INJECTION INTRAVENOUS; SUBCUTANEOUS at 14:31

## 2019-12-28 RX ADMIN — CHLORHEXIDINE GLUCONATE 1 APPLICATION(S): 213 SOLUTION TOPICAL at 05:06

## 2019-12-28 RX ADMIN — LIDOCAINE 1 PATCH: 4 CREAM TOPICAL at 12:45

## 2019-12-28 RX ADMIN — CARVEDILOL PHOSPHATE 3.12 MILLIGRAM(S): 80 CAPSULE, EXTENDED RELEASE ORAL at 17:52

## 2019-12-28 RX ADMIN — Medication 4: at 12:45

## 2019-12-28 RX ADMIN — Medication 2 MILLIGRAM(S): at 16:13

## 2019-12-28 RX ADMIN — LIDOCAINE 1 PATCH: 4 CREAM TOPICAL at 00:47

## 2019-12-28 RX ADMIN — ATORVASTATIN CALCIUM 40 MILLIGRAM(S): 80 TABLET, FILM COATED ORAL at 22:09

## 2019-12-28 RX ADMIN — HEPARIN SODIUM 5000 UNIT(S): 5000 INJECTION INTRAVENOUS; SUBCUTANEOUS at 22:09

## 2019-12-28 RX ADMIN — Medication 1 APPLICATION(S): at 17:52

## 2019-12-28 RX ADMIN — Medication 650 MILLIGRAM(S): at 12:46

## 2019-12-28 RX ADMIN — Medication 2: at 17:52

## 2019-12-28 RX ADMIN — ONDANSETRON 4 MILLIGRAM(S): 8 TABLET, FILM COATED ORAL at 13:31

## 2019-12-28 RX ADMIN — Medication 650 MILLIGRAM(S): at 18:40

## 2019-12-28 RX ADMIN — HEPARIN SODIUM 5000 UNIT(S): 5000 INJECTION INTRAVENOUS; SUBCUTANEOUS at 05:02

## 2019-12-28 RX ADMIN — LIDOCAINE 1 PATCH: 4 CREAM TOPICAL at 20:01

## 2019-12-28 RX ADMIN — Medication 2 MILLIGRAM(S): at 12:46

## 2019-12-28 RX ADMIN — Medication 4: at 01:13

## 2019-12-28 RX ADMIN — Medication 650 MILLIGRAM(S): at 01:45

## 2019-12-28 RX ADMIN — ERYTHROPOIETIN 10000 UNIT(S): 10000 INJECTION, SOLUTION INTRAVENOUS; SUBCUTANEOUS at 12:29

## 2019-12-28 RX ADMIN — Medication 650 MILLIGRAM(S): at 05:34

## 2019-12-28 RX ADMIN — ERTAPENEM SODIUM 100 MILLIGRAM(S): 1 INJECTION, POWDER, LYOPHILIZED, FOR SOLUTION INTRAMUSCULAR; INTRAVENOUS at 14:15

## 2019-12-28 RX ADMIN — Medication 2 MILLIGRAM(S): at 00:58

## 2019-12-28 RX ADMIN — Medication 2 MILLIGRAM(S): at 20:08

## 2019-12-28 RX ADMIN — ALTEPLASE 2 MILLIGRAM(S): KIT at 13:13

## 2019-12-28 RX ADMIN — Medication 650 MILLIGRAM(S): at 12:40

## 2019-12-28 RX ADMIN — Medication 650 MILLIGRAM(S): at 17:51

## 2019-12-28 RX ADMIN — Medication 5 MILLIGRAM(S): at 05:02

## 2019-12-28 RX ADMIN — LEVETIRACETAM 500 MILLIGRAM(S): 250 TABLET, FILM COATED ORAL at 05:02

## 2019-12-28 RX ADMIN — LEVETIRACETAM 500 MILLIGRAM(S): 250 TABLET, FILM COATED ORAL at 17:52

## 2019-12-28 RX ADMIN — Medication 650 MILLIGRAM(S): at 05:03

## 2019-12-28 RX ADMIN — Medication 650 MILLIGRAM(S): at 00:53

## 2019-12-28 RX ADMIN — PAMIDRONATE DISODIUM 65 MILLIGRAM(S): 9 INJECTION, SOLUTION INTRAVENOUS at 22:34

## 2019-12-28 NOTE — PROGRESS NOTE ADULT - SUBJECTIVE AND OBJECTIVE BOX
Patient is a 74y old  Male who presents with a chief complaint of ams (28 Dec 2019 13:53)      INTERVAL HPI/OVERNIGHT EVENTS:  T(C): 36.4 (12-28-19 @ 16:40), Max: 36.9 (12-28-19 @ 01:25)  HR: 110 (12-28-19 @ 16:40) (84 - 114)  BP: 100/64 (12-28-19 @ 16:40) (100/64 - 127/64)  RR: 18 (12-28-19 @ 16:40) (18 - 18)  SpO2: 98% (12-28-19 @ 16:40) (96% - 98%)  Wt(kg): --  I&O's Summary    27 Dec 2019 07:01  -  28 Dec 2019 07:00  --------------------------------------------------------  IN: 1870 mL / OUT: 0 mL / NET: 1870 mL    28 Dec 2019 07:01  -  28 Dec 2019 18:08  --------------------------------------------------------  IN: 600 mL / OUT: 300 mL / NET: 300 mL        LABS:                        8.9    21.12 )-----------( 310      ( 28 Dec 2019 11:49 )             30.0     12-28    131<L>  |  89<L>  |  63<H>  ----------------------------<  222<H>  4.1   |  25  |  3.16<H>    Ca    11.7<H>      28 Dec 2019 09:36          CAPILLARY BLOOD GLUCOSE      POCT Blood Glucose.: 153 mg/dL (28 Dec 2019 17:23)  POCT Blood Glucose.: 209 mg/dL (28 Dec 2019 12:33)  POCT Blood Glucose.: 150 mg/dL (28 Dec 2019 06:33)  POCT Blood Glucose.: 208 mg/dL (28 Dec 2019 00:51)  POCT Blood Glucose.: 190 mg/dL (27 Dec 2019 21:47)            MEDICATIONS  (STANDING):  acetaminophen    Suspension .. 650 milliGRAM(s) Oral every 6 hours  atorvastatin 40 milliGRAM(s) Oral at bedtime  carvedilol 3.125 milliGRAM(s) Oral every 12 hours  chlorhexidine 4% Liquid 1 Application(s) Topical <User Schedule>  Dakins Solution - 1/4 Strength 1 Application(s) Topical two times a day  dextrose 50% Injectable 12.5 Gram(s) IV Push once  dextrose 50% Injectable 25 Gram(s) IV Push once  epoetin tan Injectable 49557 Unit(s) IV Push <User Schedule>  ertapenem  IVPB 500 milliGRAM(s) IV Intermittent every 24 hours  heparin  Injectable 5000 Unit(s) SubCutaneous every 8 hours  insulin glargine Injectable (LANTUS) 11 Unit(s) SubCutaneous at bedtime  insulin lispro (HumaLOG) corrective regimen sliding scale   SubCutaneous every 6 hours  levETIRAcetam  Solution 500 milliGRAM(s) Oral two times a day  lidocaine   Patch 1 Patch Transdermal daily  loperamide Suspension 2 milliGRAM(s) Oral five times a day  predniSONE   Tablet 5 milliGRAM(s) Oral daily    MEDICATIONS  (PRN):  dextrose 40% Gel 15 Gram(s) Oral once PRN Blood Glucose LESS THAN 70 milliGRAM(s)/deciLiter  glucagon  Injectable 1 milliGRAM(s) IntraMuscular once PRN Glucose <70 milliGRAM(s)/deciLiter  ondansetron Injectable 4 milliGRAM(s) IV Push every 8 hours PRN Nausea and/or Vomiting          PHYSICAL EXAM:  GENERAL: frail  CHEST/LUNG: Clear to percussion bilaterally; No rales, rhonchi, wheezing, or rubs  HEART: Regular rate and rhythm; No murmurs, rubs, or gallops  ABDOMEN: Soft, Nontender, Nondistended; Bowel sounds present  EXTREMITIES: no edema     Care Discussed with Consultants/Other Providers [ ] YES  [ ] NO

## 2019-12-28 NOTE — PROGRESS NOTE ADULT - PROBLEM SELECTOR PLAN 1
Blood cx obtained 12/24 growing gram negative rods; f/u repeat blood cx  cont IV invanz  f/u ID recs

## 2019-12-28 NOTE — PROVIDER CONTACT NOTE (MEDICATION) - ACTION/TREATMENT ORDERED:
unable to get new IV access, No additional action done at this time, will continue to monitor.
Give prednisone/ hold heparin
Sally Chiu NP made aware. okay to hold heparin and give coreg and prednisone with a small amount of water. will continue to monitor.
hold midodrine

## 2019-12-28 NOTE — PROGRESS NOTE ADULT - PROBLEM SELECTOR PLAN 3
- per RN; diarrhea improved today; No BMs today as per RN  - cont imodium; rec changing to prn if diarrhea has resolved  - GI PCR negative; can re-check if with persistent diarrhea   - C diff PCR negative; can re-check if with persistent watery diarrhea      - ID eval noted

## 2019-12-28 NOTE — PROGRESS NOTE ADULT - SUBJECTIVE AND OBJECTIVE BOX
Patient is a 74y Male whom presented to   pateint seen and examined nad , in am     PAST MEDICAL & SURGICAL HISTORY:  Kidney transplant recipient  Anuria  GERD (gastroesophageal reflux disease)  Kidney transplant failure: dx: 2/2013  Hepatitis C: dx: 90&#x27;s.  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 10 milliGRAM(s) Oral three times a day  mycophenolate mofetil 250 milliGRAM(s) Oral two times a day  predniSONE   Tablet 5 milliGRAM(s) Oral daily  sevelamer carbonate 800 milliGRAM(s) Oral three times a day with meals  sodium bicarbonate 650 milliGRAM(s) Oral two times a day  tamsulosin 0.4 milliGRAM(s) Oral at bedtime      Allergies    No Known Allergies                       SOCIAL HISTORY:  Denies ETOh,Smoking,     FAMILY HISTORY:  Family history of breast cancer in sister (Sibling): living at 94 yo  Family history of prostate cancer in father  Family history of lung cancer  Family history of hypertension in mother  Family history of diabetes mellitus: mother      REVIEW OF SYSTEMS:    unbable to obtained                                                                                                                        8.9    21.12 )-----------( 310      ( 28 Dec 2019 11:49 )             30.0       CBC Full  -  ( 28 Dec 2019 11:49 )  WBC Count : 21.12 K/uL  RBC Count : 3.53 M/uL  Hemoglobin : 8.9 g/dL  Hematocrit : 30.0 %  Platelet Count - Automated : 310 K/uL  Mean Cell Volume : 85.0 fl  Mean Cell Hemoglobin : 25.2 pg  Mean Cell Hemoglobin Concentration : 29.7 gm/dL  Auto Neutrophil # : x  Auto Lymphocyte # : x  Auto Monocyte # : x  Auto Eosinophil # : x  Auto Basophil # : x  Auto Neutrophil % : x  Auto Lymphocyte % : x  Auto Monocyte % : x  Auto Eosinophil % : x  Auto Basophil % : x      12-28    131<L>  |  89<L>  |  63<H>  ----------------------------<  222<H>  4.1   |  25  |  3.16<H>    Ca    11.7<H>      28 Dec 2019 09:36        CAPILLARY BLOOD GLUCOSE      POCT Blood Glucose.: 153 mg/dL (28 Dec 2019 17:23)  POCT Blood Glucose.: 209 mg/dL (28 Dec 2019 12:33)  POCT Blood Glucose.: 150 mg/dL (28 Dec 2019 06:33)  POCT Blood Glucose.: 208 mg/dL (28 Dec 2019 00:51)      Vital Signs Last 24 Hrs  T(C): 37.1 (28 Dec 2019 20:23), Max: 37.1 (28 Dec 2019 20:23)  T(F): 98.7 (28 Dec 2019 20:23), Max: 98.7 (28 Dec 2019 20:23)  HR: 104 (28 Dec 2019 21:54) (99 - 124)  BP: 101/60 (28 Dec 2019 21:54) (95/61 - 117/73)  BP(mean): --  RR: 16 (28 Dec 2019 21:54) (16 - 18)  SpO2: 94% (28 Dec 2019 21:54) (92% - 98%)                                                                            HEENT: conjunctive   clear   Neck:  No JVD  Respiratory: decrease bs b/l   Cardiovascular: S1 and S2  Gastrointestinal: BS+, soft, NT/ND  Extremities: No peripheral edema  Skin: dry   Access: pos fistula

## 2019-12-28 NOTE — PROGRESS NOTE ADULT - SUBJECTIVE AND OBJECTIVE BOX
Vascular & Interventional Radiology Post-Procedure Note    Pre-Procedure Diagnosis: ESRD  Post-Procedure Diagnosis: Same as pre.  Indications for Procedure: non-functioning catheter    Attending: Dr. Yost  Resident: Dr. Nicole    Procedure Details/Findings: Successful exchange.  Access (if applicable): R neck    Complications: none  Estimated Blood Loss: Minimal  Specimen: none  Contrast: none  Sedation: none  Patient Condition/Disposition: Stable,     Plan:   - Catheter may be used.

## 2019-12-28 NOTE — PROGRESS NOTE ADULT - SUBJECTIVE AND OBJECTIVE BOX
Chief complaint  Patient is a 74y old  Male who presents with a chief complaint of ams (28 Dec 2019 18:17)   Review of systems  Patient in bed, looks comfortable, no fever, no hypoglycemia.    Labs and Fingersticks  CAPILLARY BLOOD GLUCOSE      POCT Blood Glucose.: 153 mg/dL (28 Dec 2019 17:23)  POCT Blood Glucose.: 209 mg/dL (28 Dec 2019 12:33)  POCT Blood Glucose.: 150 mg/dL (28 Dec 2019 06:33)  POCT Blood Glucose.: 208 mg/dL (28 Dec 2019 00:51)  POCT Blood Glucose.: 190 mg/dL (27 Dec 2019 21:47)      Anion Gap, Serum: 17 (12-28 @ 09:36)  Anion Gap, Serum: 14 (12-27 @ 07:11)      Calcium, Total Serum: 11.7 <H> (12-28 @ 09:36)  Calcium, Total Serum: 10.6 <H> (12-27 @ 07:11)          12-28    131<L>  |  89<L>  |  63<H>  ----------------------------<  222<H>  4.1   |  25  |  3.16<H>    Ca    11.7<H>      28 Dec 2019 09:36                          8.9    21.12 )-----------( 310      ( 28 Dec 2019 11:49 )             30.0     Medications  MEDICATIONS  (STANDING):  acetaminophen    Suspension .. 650 milliGRAM(s) Oral every 6 hours  atorvastatin 40 milliGRAM(s) Oral at bedtime  carvedilol 3.125 milliGRAM(s) Oral every 12 hours  chlorhexidine 4% Liquid 1 Application(s) Topical <User Schedule>  Dakins Solution - 1/4 Strength 1 Application(s) Topical two times a day  dextrose 50% Injectable 12.5 Gram(s) IV Push once  dextrose 50% Injectable 25 Gram(s) IV Push once  epoetin tan Injectable 50149 Unit(s) IV Push <User Schedule>  ertapenem  IVPB 500 milliGRAM(s) IV Intermittent every 24 hours  heparin  Injectable 5000 Unit(s) SubCutaneous every 8 hours  insulin glargine Injectable (LANTUS) 11 Unit(s) SubCutaneous at bedtime  insulin lispro (HumaLOG) corrective regimen sliding scale   SubCutaneous every 6 hours  levETIRAcetam  Solution 500 milliGRAM(s) Oral two times a day  lidocaine   Patch 1 Patch Transdermal daily  loperamide Suspension 2 milliGRAM(s) Oral five times a day  predniSONE   Tablet 5 milliGRAM(s) Oral daily      Physical Exam  General: Patient comfortable in bed  Vital Signs Last 12 Hrs  T(F): 97.6 (12-28-19 @ 16:40), Max: 97.6 (12-28-19 @ 16:40)  HR: 110 (12-28-19 @ 16:40) (104 - 110)  BP: 100/64 (12-28-19 @ 16:40) (100/64 - 117/73)  BP(mean): --  RR: 18 (12-28-19 @ 16:40) (18 - 18)  SpO2: 98% (12-28-19 @ 16:40) (97% - 98%)  Neck: No palpable thyroid nodules.  CVS: S1S2, No murmurs  Respiratory: No wheezing, no crepitations  GI: Abdomen soft, bowel sounds positive  Musculoskeletal:  edema lower extremities.   Skin: No skin rashes, no ecchymosis    Diagnostics    Thyroid Stimulating Hormone, Serum: AM Sched. Collection: 03-Dec-2019 06:00 (12-02 @ 10:42)  Free Thyroxine, Serum: AM Sched. Collection: 03-Dec-2019 06:00 (12-02 @ 10:42)  Thyroid Stimulating Hormone, Serum: AM Sched. Collection: 17-Dec-2019 06:00 (12-16 @ 12:58)  Free Thyroxine, Serum: AM Sched. Collection: 17-Dec-2019 06:00 (12-16 @ 12:58)

## 2019-12-28 NOTE — PROGRESS NOTE ADULT - PROBLEM SELECTOR PLAN 2
- s/p upper gastrointestinal endoscopy with the procedure was aborted due to inability to place PEG tube  - s/p failed IR PEG  - s/p open feeding gastrostomy tube placement 12/21. Care per surgery team appreciated  - tolerating feeds - s/p upper gastrointestinal endoscopy with the procedure was aborted due to inability to place PEG tube  - s/p failed IR PEG  - s/p open feeding gastrostomy tube placement 12/21. Care per surgery team appreciated  - tolerating feeds  - f/u palliative consult

## 2019-12-28 NOTE — PROGRESS NOTE ADULT - ASSESSMENT
Assessment  DM: A1C 5.5%, was on insulin at home, now on insulin, blood sugars trending within acceptable range, no hypoglycemic episodes. Patient is still NPO, tolerating PEG tube feeds,  stable, resting comfortably, appears alert when spoken to though is minimally responsive.  Sacral Wound: s/p debridement, monitored, FU wound care.  Hypercalcemia: Primary Hyperparathyroidism, Ca elevated now.   Sepsis: IV ABx for UTI, LP inconsistent with meningitis, on medications, stable, monitored.  HTN: Controlled,  on antihypertensive medications.  ESRD: On hemodialysis, Monitor labs/BMP          Robi Gay MD  Cell: [phone]  Office: [phone]

## 2019-12-28 NOTE — PROGRESS NOTE ADULT - ASSESSMENT
NEURO:  - Extubated 11/29, now on nasal cannula. Minimally verbal   - metabolic encephalopathy, meningitis r/o, LP negative  - neuro following  - chronic lacunar infarcts; per neuro, AMS also likely related to hypercalcemia, renal dysfunction , poor nutritional intake.   - c/w keppra for ?hx of seizure disorder-AMS likely 2/2 hyperCa, renal dysfunction, poor PO intake  CV:- hx of afib was on eliquis, now off a/c per cards due to bleeding risk  - holding carvedilol 6.25 BID- hx of cocaine cardiomyopathy, resolved, normal EF on most recent echo  - c/w statin- echo repeat EF70, LVH, hyperdynamic LCF, normal RV sys fxn    PULM:- extubated 11/29--tolerating nasal cannula    GI:  - sp PEG with surgery     RENAL:- hx of ESRD s/p failed renal transplant x2- renal following, HD as recommended.    ENDO:  - Start Insulin Sliding Scale- endocrine following- monitor Ca    INFECTIOUS:  - dc  vanco as per infectious disease   - id fu     Anemia- transfuse PRN- monitor cbc    Sacral decub  - wound care consult     Bactremia  - cw antibiotics as per ID Galo

## 2019-12-28 NOTE — PROGRESS NOTE ADULT - SUBJECTIVE AND OBJECTIVE BOX
Follow Up:      Interval History/ROS:     Allergies  No Known Allergies        ANTIMICROBIALS:  ertapenem  IVPB 500 every 24 hours      OTHER MEDS:  acetaminophen    Suspension .. 650 milliGRAM(s) Oral every 6 hours  atorvastatin 40 milliGRAM(s) Oral at bedtime  carvedilol 3.125 milliGRAM(s) Oral every 12 hours  chlorhexidine 4% Liquid 1 Application(s) Topical <User Schedule>  Dakins Solution - 1/4 Strength 1 Application(s) Topical two times a day  dextrose 40% Gel 15 Gram(s) Oral once PRN  dextrose 50% Injectable 12.5 Gram(s) IV Push once  dextrose 50% Injectable 25 Gram(s) IV Push once  epoetin tan Injectable 67898 Unit(s) IV Push <User Schedule>  glucagon  Injectable 1 milliGRAM(s) IntraMuscular once PRN  heparin  Injectable 5000 Unit(s) SubCutaneous every 8 hours  insulin glargine Injectable (LANTUS) 11 Unit(s) SubCutaneous at bedtime  insulin lispro (HumaLOG) corrective regimen sliding scale   SubCutaneous every 6 hours  levETIRAcetam  Solution 500 milliGRAM(s) Oral two times a day  lidocaine   Patch 1 Patch Transdermal daily  loperamide Suspension 2 milliGRAM(s) Oral five times a day  ondansetron Injectable 4 milliGRAM(s) IV Push every 8 hours PRN  pamidronate IVPB 60 milliGRAM(s) IV Intermittent once  predniSONE   Tablet 5 milliGRAM(s) Oral daily      Vital Signs Last 24 Hrs  T(C): 36.7 (28 Dec 2019 04:36), Max: 36.9 (28 Dec 2019 01:25)  T(F): 98 (28 Dec 2019 04:36), Max: 98.4 (28 Dec 2019 01:25)  HR: 99 (28 Dec 2019 04:36) (84 - 114)  BP: 114/70 (28 Dec 2019 04:36) (109/67 - 127/64)  BP(mean): --  RR: 18 (28 Dec 2019 04:36) (18 - 20)  SpO2: 96% (28 Dec 2019 04:36) (94% - 96%)    Physical Exam:  General: NAD, non toxic  Head: atraumatic, normocephalic  Eye: normal sclera and conjunctiva  ENT: no oropharyngeal lesions, no cervical lymphadenopathy   Cardio: regular rate and rhythm   Respiratory: nonlabored on room air, clear bilaterally, no wheezing  abd: soft, bowel sounds present, no tenderness  : no CVAT, no suprapubic tenderness, no  gutierrez  Musculoskeletal: no joint swelling, no edema  vascular: no phlebitis   Skin: no rash  Neurologic: no focal deficit  psych: normal affect                          8.9    21.12 )-----------( 310      ( 28 Dec 2019 11:49 )             30.0       12-28    131<L>  |  89<L>  |  63<H>  ----------------------------<  222<H>  4.1   |  25  |  3.16<H>    Ca    11.7<H>      28 Dec 2019 09:36            MICROBIOLOGY:  Culture - Blood (collected 12-24-19 @ 14:53)  Source: .Blood Blood  Gram Stain (12-26-19 @ 23:48):    Growth in anaerobic bottle: Gram Negative Rods  Preliminary Report (12-26-19 @ 23:48):    Growth in anaerobic bottle: Gram Negative Rods    "Due to technical problems, Proteus sp. will Not be reported as part of    the BCID panel until further notice"    ***Blood Panel PCR results on this specimen are available    approximately 3 hours after the Gram stain result.***    Gram stain, PCR, and/or culture results may not always    correspond due to difference in methodologies.    ************************************************************    This PCR assay was performed using [employer].    The following targets are tested for: Enterococcus,    vancomycin resistant enterococci, Listeria monocytogenes,    coagulase negative staphylococci, S. aureus,    methicillin resistant S. aureus, Streptococcus agalactiae    (Group B), S. pneumoniae, S. pyogenes (Group A),    Acinetobacter baumannii, Enterobacter cloacae, E. coli,    Klebsiella oxytoca, K. pneumoniae, Proteus sp.,    Serratia marcescens, Haemophilus influenzae,    Neisseria meningitidis, Pseudomonas aeruginosa, Candida    albicans, C. glabrata, C krusei, C parapsilosis,    C. tropicalis and the KPC resistance gene.  Organism: Blood Culture PCR (12-26-19 @ 23:49)  Organism: Blood Culture PCR (12-26-19 @ 23:49)      -  Acinetobacter baumanii: Nondet      -  Candida albicans: Nondet      -  Candida glabrata: Nondet      -  Candida krusei: Nondet      -  Candida parapsilosis: Nondet      -  Candida tropicalis: Nondet      -  Coagulase negative Staphylococcus: Nondet      -  Enterobacter cloacae complex: Nondet      -  Enterococcus species: Nondet      -  Escherichia coli: Nondet      -  Haemophilus influenzae: Nondet      -  Klebsiella oxytoca: Nondet      -  Klebsiella pneumoniae: Nondet      -  Listeria monocytogenes: Nondet      -  Methicillin resistant Staphylococcus aureus (MRSA): Nondet      -  Multidrug (KPC pos) resistant organism: Nondet      -  Neisseria meningitidis: Nondet      -  Pseudomonas aeruginosa: Nondet      -  Serratia marcescens: Nondet      -  Staphylococcus aureus: Nondet      -  Streptococcus agalactiae (Group B): Nondet      -  Streptococcus pneumoniae: Nondet      -  Streptococcus pyogenes (Group A): Nondet      -  Streptococcus sp. (Not Grp A, B or S pneumoniae): Nondet      -  Vancomycin resistant Enterococcus sp.: Nondet      Method Type: PCR        RADIOLOGY:  Images below reviewed personally Follow Up:  Bacteremia    Interval History/ROS: Afebrile. He can tell me his name and denies pain, reports coughing. Otherwise not talking.     Allergies  No Known Allergies    ANTIMICROBIALS:  ertapenem  IVPB 500 every 24 hours      OTHER MEDS:  acetaminophen    Suspension .. 650 milliGRAM(s) Oral every 6 hours  atorvastatin 40 milliGRAM(s) Oral at bedtime  carvedilol 3.125 milliGRAM(s) Oral every 12 hours  chlorhexidine 4% Liquid 1 Application(s) Topical <User Schedule>  Dakins Solution - 1/4 Strength 1 Application(s) Topical two times a day  dextrose 40% Gel 15 Gram(s) Oral once PRN  dextrose 50% Injectable 12.5 Gram(s) IV Push once  dextrose 50% Injectable 25 Gram(s) IV Push once  epoetin tan Injectable 28251 Unit(s) IV Push <User Schedule>  glucagon  Injectable 1 milliGRAM(s) IntraMuscular once PRN  heparin  Injectable 5000 Unit(s) SubCutaneous every 8 hours  insulin glargine Injectable (LANTUS) 11 Unit(s) SubCutaneous at bedtime  insulin lispro (HumaLOG) corrective regimen sliding scale   SubCutaneous every 6 hours  levETIRAcetam  Solution 500 milliGRAM(s) Oral two times a day  lidocaine   Patch 1 Patch Transdermal daily  loperamide Suspension 2 milliGRAM(s) Oral five times a day  ondansetron Injectable 4 milliGRAM(s) IV Push every 8 hours PRN  pamidronate IVPB 60 milliGRAM(s) IV Intermittent once  predniSONE   Tablet 5 milliGRAM(s) Oral daily      Vital Signs Last 24 Hrs  T(C): 36.7 (28 Dec 2019 04:36), Max: 36.9 (28 Dec 2019 01:25)  T(F): 98 (28 Dec 2019 04:36), Max: 98.4 (28 Dec 2019 01:25)  HR: 99 (28 Dec 2019 04:36) (84 - 114)  BP: 114/70 (28 Dec 2019 04:36) (109/67 - 127/64)  BP(mean): --  RR: 18 (28 Dec 2019 04:36) (18 - 20)  SpO2: 96% (28 Dec 2019 04:36) (94% - 96%)    Physical Exam:  General: chronically ill appearing but non toxic  Head: atraumatic, normocephalic  Cardio: tachycardic, regular rhythm   Respiratory: shallow breath sounds bilaterally, nonlabored, occasional cough   abd: PEG in place, site clean. soft, bowel sounds present, no tenderness  Musculoskeletal: no focal joint swelling, no edema  vascular: right hand peripheral IV in finger. right IJ dialysis catheter. no phlebitis   Neuro: awake, not alert, follows simple instructions (squeeze hand), yes/no questions   Skin: few superficial abrasions/wounds right anterior shin, not infected appearing                           8.9    21.12 )-----------( 310      ( 28 Dec 2019 11:49 )             30.0       12-28    131<L>  |  89<L>  |  63<H>  ----------------------------<  222<H>  4.1   |  25  |  3.16<H>    Ca    11.7<H>      28 Dec 2019 09:36            MICROBIOLOGY:  Culture - Blood (collected 12-24-19 @ 14:53)  Source: .Blood Blood  Gram Stain (12-26-19 @ 23:48):    Growth in anaerobic bottle: Gram Negative Rods  Preliminary Report (12-26-19 @ 23:48):    Growth in anaerobic bottle: Gram Negative Rods    "Due to technical problems, Proteus sp. will Not be reported as part of    the BCID panel until further notice"    ***Blood Panel PCR results on this specimen are available    approximately 3 hours after the Gram stain result.***    Gram stain, PCR, and/or culture results may not always    correspond due to difference in methodologies.    ************************************************************    This PCR assay was performed using [employer].    The following targets are tested for: Enterococcus,    vancomycin resistant enterococci, Listeria monocytogenes,    coagulase negative staphylococci, S. aureus,    methicillin resistant S. aureus, Streptococcus agalactiae    (Group B), S. pneumoniae, S. pyogenes (Group A),    Acinetobacter baumannii, Enterobacter cloacae, E. coli,    Klebsiella oxytoca, K. pneumoniae, Proteus sp.,    Serratia marcescens, Haemophilus influenzae,    Neisseria meningitidis, Pseudomonas aeruginosa, Candida    albicans, C. glabrata, C krusei, C parapsilosis,    C. tropicalis and the KPC resistance gene.  Organism: Blood Culture PCR (12-26-19 @ 23:49)  Organism: Blood Culture PCR (12-26-19 @ 23:49)      -  Acinetobacter baumanii: Nondet      -  Candida albicans: Nondet      -  Candida glabrata: Nondet      -  Candida krusei: Nondet      -  Candida parapsilosis: Nondet      -  Candida tropicalis: Nondet      -  Coagulase negative Staphylococcus: Nondet      -  Enterobacter cloacae complex: Nondet      -  Enterococcus species: Nondet      -  Escherichia coli: Nondet      -  Haemophilus influenzae: Nondet      -  Klebsiella oxytoca: Nondet      -  Klebsiella pneumoniae: Nondet      -  Listeria monocytogenes: Nondet      -  Methicillin resistant Staphylococcus aureus (MRSA): Nondet      -  Multidrug (KPC pos) resistant organism: Nondet      -  Neisseria meningitidis: Nondet      -  Pseudomonas aeruginosa: Nondet      -  Serratia marcescens: Nondet      -  Staphylococcus aureus: Nondet      -  Streptococcus agalactiae (Group B): Nondet      -  Streptococcus pneumoniae: Nondet      -  Streptococcus pyogenes (Group A): Nondet      -  Streptococcus sp. (Not Grp A, B or S pneumoniae): Nondet      -  Vancomycin resistant Enterococcus sp.: Nondet      Method Type: PCR    RADIOLOGY:  Images below reviewed personally  Xray Chest 1 View- PORTABLE-Urgent (12.23.19 @ 09:11)   The right internal jugular dialysis catheter tip is in the distal SVC.  The lungs are clear.  The heart size is normal.  Degenerative changes of the spine.

## 2019-12-28 NOTE — CHART NOTE - NSCHARTNOTEFT_GEN_A_CORE
Notified by Hemodialysis RN that Rt IJ Dialysis cath was not able to be flushed. Cath Juaquin placed with no good effect.  D/w Attending Dr Palacios who recommended that IR or Vasc Surg be called. Called and spoke with IR Fellow Dr Nicole who will check & replace catheter if necessary.

## 2019-12-28 NOTE — PROGRESS NOTE ADULT - SUBJECTIVE AND OBJECTIVE BOX
INTERVAL HPI/OVERNIGHT EVENTS:    pt seen and examined  feeds running @ 50cc/hr; tolerating  No BMs today, diarrhea improving  pt is without n/v  for HD today      MEDICATIONS  (STANDING):  acetaminophen    Suspension .. 650 milliGRAM(s) Oral every 6 hours  atorvastatin 40 milliGRAM(s) Oral at bedtime  carvedilol 3.125 milliGRAM(s) Oral every 12 hours  chlorhexidine 4% Liquid 1 Application(s) Topical <User Schedule>  Dakins Solution - 1/4 Strength 1 Application(s) Topical two times a day  dextrose 50% Injectable 12.5 Gram(s) IV Push once  dextrose 50% Injectable 25 Gram(s) IV Push once  epoetin tan Injectable 02344 Unit(s) IV Push <User Schedule>  heparin  Injectable 5000 Unit(s) SubCutaneous every 8 hours  insulin glargine Injectable (LANTUS) 11 Unit(s) SubCutaneous at bedtime  insulin lispro (HumaLOG) corrective regimen sliding scale   SubCutaneous every 6 hours  levETIRAcetam  Solution 500 milliGRAM(s) Oral two times a day  lidocaine   Patch 1 Patch Transdermal daily  loperamide Suspension 2 milliGRAM(s) Oral five times a day  predniSONE   Tablet 5 milliGRAM(s) Oral daily    MEDICATIONS  (PRN):  dextrose 40% Gel 15 Gram(s) Oral once PRN Blood Glucose LESS THAN 70 milliGRAM(s)/deciLiter  glucagon  Injectable 1 milliGRAM(s) IntraMuscular once PRN Glucose <70 milliGRAM(s)/deciLiter      Allergies    No Known Allergies    Intolerances        Review of Systems:    unable to obtain in completion       Vital Signs Last 24 Hrs  T(C): 36.4 (26 Dec 2019 04:46), Max: 36.4 (25 Dec 2019 20:35)  T(F): 97.5 (26 Dec 2019 04:46), Max: 97.5 (25 Dec 2019 20:35)  HR: 110 (26 Dec 2019 04:46) (98 - 110)  BP: 121/75 (26 Dec 2019 04:46) (100/65 - 121/75)  BP(mean): --  RR: 20 (26 Dec 2019 04:46) (20 - 20)  SpO2: 95% (26 Dec 2019 04:46) (95% - 96%)    PHYSICAL EXAM:    GENERAL:  lying in bed  HEENT:  NC/AT  ABDOMEN:  Soft, non-distended, + bowel sounds, + peg dressed, c/d/i  EXTREMITIES:  no cyanosis, clubbing or edema  NEURO:  minimally verbal        LABS:                        7.7    19.18 )-----------( 247      ( 26 Dec 2019 12:16 )             25.1     12-26    133<L>  |  93<L>  |  79<H>  ----------------------------<  214<H>  4.0   |  26  |  3.83<H>    Ca    11.6<H>      26 Dec 2019 08:31            RADIOLOGY & ADDITIONAL TESTS:

## 2019-12-28 NOTE — PROGRESS NOTE ADULT - PROBLEM SELECTOR PLAN 4
no s/s active GIB at this time; continue to monitor CBC, transfuse prn    s/p 1uprbc with appropriate response 12/17  occult negative

## 2019-12-28 NOTE — PROGRESS NOTE ADULT - ASSESSMENT
74M with ESRD s/p failed renal transplant, HCV, DM, and history meningococcal meningitis 2017, admitted 11/20/19 from HD for altered mentation and hypotension, febrile here to 101F.   Initial CT head with scattered white matter infarcts new since Sept.   Prolonged hospital course with continued encephalopathy and leukocytosis. Required intubation. Treated for aspiration pneumonia.   Afebrile but blood cultures 12/24 grew an unidentified GNR in anaerobic bottle at 57hrs   sacral decub stage III  chronic steroids for renal transplant   Planned for IR PEG placement Monday     Suggest  -f/u GNR identification   -continue Ertapenem   -f/u repeat blood cultures 12/27   -CT abdomen pelvis w/ contrast (if can tolerate contrast)--to evaluate for source GNR 74M with ESRD s/p failed renal transplant, HCV, DM, and history meningococcal meningitis 2017, admitted 11/20/19 from HD for altered mentation and hypotension, febrile here to 101F.   Initial CT head with scattered white matter infarcts new since Sept. CSF without infection.   Prolonged hospital course with continued encephalopathy and leukocytosis (chronic prednisone use). Required intubation. Treated for aspiration pneumonia.   s/p open gastrostomy tube placement 12/21   Afebrile but blood cultures 12/24 grew an unidentified GNR in anaerobic bottle at 57hrs   Sacral decub stage III  Poor prognosis     Suggest  -continue empiric Ertapenem   -f/u GNR identification   -f/u repeat blood cultures 12/27   -CT abdomen pelvis w/ contrast (if can tolerate contrast) to evaluate for source GNR    Cristobal Gonzales MD   Infectious Disease   Pager [phone]   After 5PM and on weekends please page fellow on call or call [phone]

## 2019-12-28 NOTE — PROGRESS NOTE ADULT - SUBJECTIVE AND OBJECTIVE BOX
Vascular & Interventional Radiology Pre-Procedure Note    Procedure Name: non-tunneled hemodialysis catheter exchange    HPI: 74y Male with ESRD on HD via non-tunnelled HD catheter that is malfunctioning.     Allergies:   Medications (Abx/Cardiac/Anticoagulation/Blood Products)    alteplase for catheter clearance: 2 milliGRAM(s) Catheter (12-28 @ 13:13)  alteplase for catheter clearance: 2 milliGRAM(s) Catheter (12-28 @ 13:13)  carvedilol: 3.125 milliGRAM(s) Oral (12-28 @ 17:52)  ertapenem  IVPB: 100 mL/Hr IV Intermittent (12-28 @ 14:15)  heparin  Injectable: 5000 Unit(s) SubCutaneous (12-28 @ 14:31)    Data:    T(C): 36.4  HR: 110  BP: 100/64  RR: 18  SpO2: 98%      -WBC 21.12 / HgB 8.9 / Hct 30.0 / Plt 310  -Na 131 / Cl 89 / BUN 63 / Glucose 222  -K 4.1 / CO2 25 / Cr 3.16  -ALT -- / Alk Phos -- / T.Bili --    Imaging:     Plan:   -74y Male presents for non-tunneled hd cath exchange.   -Risks/Benefits/alternatives explained with the patient's sister and witnessed informed consent obtained.

## 2019-12-29 LAB
GLUCOSE BLDC GLUCOMTR-MCNC: 135 MG/DL — HIGH (ref 70–99)
GLUCOSE BLDC GLUCOMTR-MCNC: 151 MG/DL — HIGH (ref 70–99)
GLUCOSE BLDC GLUCOMTR-MCNC: 158 MG/DL — HIGH (ref 70–99)
GLUCOSE BLDC GLUCOMTR-MCNC: 185 MG/DL — HIGH (ref 70–99)
GLUCOSE BLDC GLUCOMTR-MCNC: 191 MG/DL — HIGH (ref 70–99)
GLUCOSE BLDC GLUCOMTR-MCNC: 99 MG/DL — SIGNIFICANT CHANGE UP (ref 70–99)

## 2019-12-29 RX ADMIN — LIDOCAINE 1 PATCH: 4 CREAM TOPICAL at 00:14

## 2019-12-29 RX ADMIN — Medication 650 MILLIGRAM(S): at 18:01

## 2019-12-29 RX ADMIN — Medication 650 MILLIGRAM(S): at 18:45

## 2019-12-29 RX ADMIN — CHLORHEXIDINE GLUCONATE 1 APPLICATION(S): 213 SOLUTION TOPICAL at 04:30

## 2019-12-29 RX ADMIN — Medication 2 MILLIGRAM(S): at 00:12

## 2019-12-29 RX ADMIN — HEPARIN SODIUM 5000 UNIT(S): 5000 INJECTION INTRAVENOUS; SUBCUTANEOUS at 14:24

## 2019-12-29 RX ADMIN — Medication 650 MILLIGRAM(S): at 11:48

## 2019-12-29 RX ADMIN — Medication 2 MILLIGRAM(S): at 23:51

## 2019-12-29 RX ADMIN — Medication 1 APPLICATION(S): at 18:01

## 2019-12-29 RX ADMIN — Medication 650 MILLIGRAM(S): at 12:30

## 2019-12-29 RX ADMIN — HEPARIN SODIUM 5000 UNIT(S): 5000 INJECTION INTRAVENOUS; SUBCUTANEOUS at 05:36

## 2019-12-29 RX ADMIN — Medication 2: at 14:22

## 2019-12-29 RX ADMIN — LIDOCAINE 1 PATCH: 4 CREAM TOPICAL at 23:00

## 2019-12-29 RX ADMIN — Medication 1 APPLICATION(S): at 04:30

## 2019-12-29 RX ADMIN — ATORVASTATIN CALCIUM 40 MILLIGRAM(S): 80 TABLET, FILM COATED ORAL at 22:39

## 2019-12-29 RX ADMIN — INSULIN GLARGINE 11 UNIT(S): 100 INJECTION, SOLUTION SUBCUTANEOUS at 00:09

## 2019-12-29 RX ADMIN — Medication 2 MILLIGRAM(S): at 17:58

## 2019-12-29 RX ADMIN — LIDOCAINE 1 PATCH: 4 CREAM TOPICAL at 19:57

## 2019-12-29 RX ADMIN — Medication 650 MILLIGRAM(S): at 06:28

## 2019-12-29 RX ADMIN — ERTAPENEM SODIUM 100 MILLIGRAM(S): 1 INJECTION, POWDER, LYOPHILIZED, FOR SOLUTION INTRAMUSCULAR; INTRAVENOUS at 14:23

## 2019-12-29 RX ADMIN — INSULIN GLARGINE 11 UNIT(S): 100 INJECTION, SOLUTION SUBCUTANEOUS at 23:50

## 2019-12-29 RX ADMIN — HEPARIN SODIUM 5000 UNIT(S): 5000 INJECTION INTRAVENOUS; SUBCUTANEOUS at 22:39

## 2019-12-29 RX ADMIN — LEVETIRACETAM 500 MILLIGRAM(S): 250 TABLET, FILM COATED ORAL at 18:02

## 2019-12-29 RX ADMIN — Medication 2: at 00:11

## 2019-12-29 RX ADMIN — LEVETIRACETAM 500 MILLIGRAM(S): 250 TABLET, FILM COATED ORAL at 05:35

## 2019-12-29 RX ADMIN — Medication 2: at 06:28

## 2019-12-29 RX ADMIN — LIDOCAINE 1 PATCH: 4 CREAM TOPICAL at 11:48

## 2019-12-29 RX ADMIN — Medication 650 MILLIGRAM(S): at 05:35

## 2019-12-29 RX ADMIN — Medication 650 MILLIGRAM(S): at 00:43

## 2019-12-29 RX ADMIN — Medication 5 MILLIGRAM(S): at 05:35

## 2019-12-29 RX ADMIN — Medication 2 MILLIGRAM(S): at 11:47

## 2019-12-29 RX ADMIN — Medication 650 MILLIGRAM(S): at 00:12

## 2019-12-29 RX ADMIN — Medication 650 MILLIGRAM(S): at 23:51

## 2019-12-29 NOTE — PROGRESS NOTE ADULT - SUBJECTIVE AND OBJECTIVE BOX
Patient is a 74y Male whom presented to   pateint seen and examined nad , in am     PAST MEDICAL & SURGICAL HISTORY:  Kidney transplant recipient  Anuria  GERD (gastroesophageal reflux disease)  Kidney transplant failure: dx: 2/2013  Hepatitis C: dx: 90&#x27;s.  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 10 milliGRAM(s) Oral three times a day  mycophenolate mofetil 250 milliGRAM(s) Oral two times a day  predniSONE   Tablet 5 milliGRAM(s) Oral daily  sevelamer carbonate 800 milliGRAM(s) Oral three times a day with meals  sodium bicarbonate 650 milliGRAM(s) Oral two times a day  tamsulosin 0.4 milliGRAM(s) Oral at bedtime      Allergies    No Known Allergies                       SOCIAL HISTORY:  Denies ETOh,Smoking,     FAMILY HISTORY:  Family history of breast cancer in sister (Sibling): living at 94 yo  Family history of prostate cancer in father  Family history of lung cancer  Family history of hypertension in mother  Family history of diabetes mellitus: mother      REVIEW OF SYSTEMS:    unbable to obtained                                                                                                                                              8.9    21.12 )-----------( 310      ( 28 Dec 2019 11:49 )             30.0       CBC Full  -  ( 28 Dec 2019 11:49 )  WBC Count : 21.12 K/uL  RBC Count : 3.53 M/uL  Hemoglobin : 8.9 g/dL  Hematocrit : 30.0 %  Platelet Count - Automated : 310 K/uL  Mean Cell Volume : 85.0 fl  Mean Cell Hemoglobin : 25.2 pg  Mean Cell Hemoglobin Concentration : 29.7 gm/dL  Auto Neutrophil # : x  Auto Lymphocyte # : x  Auto Monocyte # : x  Auto Eosinophil # : x  Auto Basophil # : x  Auto Neutrophil % : x  Auto Lymphocyte % : x  Auto Monocyte % : x  Auto Eosinophil % : x  Auto Basophil % : x      12-28    131<L>  |  89<L>  |  63<H>  ----------------------------<  222<H>  4.1   |  25  |  3.16<H>    Ca    11.7<H>      28 Dec 2019 09:36        CAPILLARY BLOOD GLUCOSE      POCT Blood Glucose.: 151 mg/dL (29 Dec 2019 13:30)  POCT Blood Glucose.: 158 mg/dL (29 Dec 2019 12:06)  POCT Blood Glucose.: 185 mg/dL (29 Dec 2019 06:25)  POCT Blood Glucose.: 191 mg/dL (29 Dec 2019 00:07)  POCT Blood Glucose.: 153 mg/dL (28 Dec 2019 17:23)      Vital Signs Last 24 Hrs  T(C): 36.3 (29 Dec 2019 12:10), Max: 37.1 (28 Dec 2019 20:23)  T(F): 97.4 (29 Dec 2019 12:10), Max: 98.7 (28 Dec 2019 20:23)  HR: 99 (29 Dec 2019 12:10) (99 - 124)  BP: 108/69 (29 Dec 2019 12:10) (95/61 - 108/69)  BP(mean): --  RR: 18 (29 Dec 2019 12:10) (16 - 18)  SpO2: 97% (29 Dec 2019 12:10) (92% - 98%)                                                                    HEENT: conjunctive   clear   Neck:  No JVD  Respiratory: decrease bs b/l   Cardiovascular: S1 and S2  Gastrointestinal: BS+, soft, NT/ND  Extremities: No peripheral edema  Skin: dry   Access: pos fistula

## 2019-12-29 NOTE — PROGRESS NOTE ADULT - ASSESSMENT
NEURO:  - Extubated 11/29, now on nasal cannula. Minimally verbal   - metabolic encephalopathy, meningitis r/o, LP negative  - neuro following  - chronic lacunar infarcts; per neuro, AMS also likely related to hypercalcemia, renal dysfunction , poor nutritional intake.   - c/w keppra for ?hx of seizure disorder-AMS likely 2/2 hyperCa, renal dysfunction, poor PO intake    CV:- hx of afib was on eliquis, now off a/c per cards due to bleeding risk  - holding carvedilol 6.25 BID- hx of cocaine cardiomyopathy, resolved, normal EF on most recent echo  - c/w statin- echo repeat EF70, LVH, hyperdynamic LCF, normal RV sys fxn    PULM:- extubated 11/29--tolerating nasal cannula    GI:  - sp PEG with surgery   - now with vomiting with tube feeds, which were held     RENAL:- hx of ESRD s/p failed renal transplant x2  - renal following, HD as recommended.    ENDO:  - Start Insulin Sliding Scale- endocrine following- monitor Ca    INFECTIOUS:  - dc  vanco as per infectious disease   - id fu   - now with bactremia     Anemia- transfuse PRN- monitor cbc    Sacral decub  - wound care fu  - cw antibiotics     Bactremia  - cw antibiotics as per ID

## 2019-12-29 NOTE — PROGRESS NOTE ADULT - PROBLEM SELECTOR PLAN 2
- s/p upper gastrointestinal endoscopy with the procedure was aborted due to inability to place PEG tube  - s/p failed IR PEG  - s/p open feeding gastrostomy tube placement 12/21. Care per surgery team appreciated  - tolerating feeds  - f/u palliative consult

## 2019-12-29 NOTE — PROGRESS NOTE ADULT - SUBJECTIVE AND OBJECTIVE BOX
Patient is a 74y old  Male who presents with a chief complaint of ams (29 Dec 2019 15:54)      INTERVAL HPI/OVERNIGHT EVENTS:  T(C): 36.3 (12-29-19 @ 12:10), Max: 37.1 (12-28-19 @ 20:23)  HR: 99 (12-29-19 @ 12:10) (99 - 124)  BP: 108/69 (12-29-19 @ 12:10) (95/61 - 108/69)  RR: 18 (12-29-19 @ 12:10) (16 - 18)  SpO2: 97% (12-29-19 @ 12:10) (92% - 98%)  Wt(kg): --  I&O's Summary    28 Dec 2019 07:01  -  29 Dec 2019 07:00  --------------------------------------------------------  IN: 690 mL / OUT: 300 mL / NET: 390 mL        LABS:                        8.9    21.12 )-----------( 310      ( 28 Dec 2019 11:49 )             30.0     12-28    131<L>  |  89<L>  |  63<H>  ----------------------------<  222<H>  4.1   |  25  |  3.16<H>    Ca    11.7<H>      28 Dec 2019 09:36          CAPILLARY BLOOD GLUCOSE      POCT Blood Glucose.: 151 mg/dL (29 Dec 2019 13:30)  POCT Blood Glucose.: 158 mg/dL (29 Dec 2019 12:06)  POCT Blood Glucose.: 185 mg/dL (29 Dec 2019 06:25)  POCT Blood Glucose.: 191 mg/dL (29 Dec 2019 00:07)  POCT Blood Glucose.: 153 mg/dL (28 Dec 2019 17:23)            MEDICATIONS  (STANDING):  acetaminophen    Suspension .. 650 milliGRAM(s) Oral every 6 hours  atorvastatin 40 milliGRAM(s) Oral at bedtime  carvedilol 3.125 milliGRAM(s) Oral every 12 hours  chlorhexidine 4% Liquid 1 Application(s) Topical <User Schedule>  Dakins Solution - 1/4 Strength 1 Application(s) Topical two times a day  dextrose 50% Injectable 12.5 Gram(s) IV Push once  dextrose 50% Injectable 25 Gram(s) IV Push once  epoetin tan Injectable 11534 Unit(s) IV Push <User Schedule>  ertapenem  IVPB 500 milliGRAM(s) IV Intermittent every 24 hours  heparin  Injectable 5000 Unit(s) SubCutaneous every 8 hours  insulin glargine Injectable (LANTUS) 11 Unit(s) SubCutaneous at bedtime  insulin lispro (HumaLOG) corrective regimen sliding scale   SubCutaneous every 6 hours  levETIRAcetam  Solution 500 milliGRAM(s) Oral two times a day  lidocaine   Patch 1 Patch Transdermal daily  loperamide Suspension 2 milliGRAM(s) Oral five times a day  predniSONE   Tablet 5 milliGRAM(s) Oral daily    MEDICATIONS  (PRN):  dextrose 40% Gel 15 Gram(s) Oral once PRN Blood Glucose LESS THAN 70 milliGRAM(s)/deciLiter  glucagon  Injectable 1 milliGRAM(s) IntraMuscular once PRN Glucose <70 milliGRAM(s)/deciLiter  ondansetron Injectable 4 milliGRAM(s) IV Push every 8 hours PRN Nausea and/or Vomiting          PHYSICAL EXAM:  GENERAL: frail  CHEST/LUNG: Clear to percussion bilaterally; No rales, rhonchi, wheezing, or rubs  HEART: Regular rate and rhythm; No murmurs, rubs, or gallops  ABDOMEN: Soft, Nontender, Nondistended; Bowel sounds present  EXTREMITIES:  no edema     Care Discussed with Consultants/Other Providers [ ] YES  [ ] NO

## 2019-12-29 NOTE — PROGRESS NOTE ADULT - ASSESSMENT
Assessment  DM: A1C 5.5%, was on insulin at home, now on insulin, FS improved no hypoglycemic episodes. Patient is still NPO, tolerating PEG tube feeds,  stable, resting comfortably, appears alert when spoken to though is minimally responsive.  Sacral Wound: s/p debridement, monitored, FU wound care.  Hypercalcemia: Primary Hyperparathyroidism, hypercalcemia S/P Pamidronate injection, Ca levels improved.   Sepsis: IV ABx for UTI, LP inconsistent with meningitis, on medications, stable, monitored.  HTN: Controlled,  on antihypertensive medications.  ESRD: On hemodialysis, Monitor labs/BMP          Robi Gay MD  Cell: [phone]  Office: [phone]

## 2019-12-29 NOTE — PROGRESS NOTE ADULT - SUBJECTIVE AND OBJECTIVE BOX
INTERVAL HPI/OVERNIGHT EVENTS:    pt seen and examined  ernie feeds  No BMs today, diarrhea improving  pt is without n/v  No new labs today      MEDICATIONS  (STANDING):  acetaminophen    Suspension .. 650 milliGRAM(s) Oral every 6 hours  atorvastatin 40 milliGRAM(s) Oral at bedtime  carvedilol 3.125 milliGRAM(s) Oral every 12 hours  chlorhexidine 4% Liquid 1 Application(s) Topical <User Schedule>  Dakins Solution - 1/4 Strength 1 Application(s) Topical two times a day  dextrose 50% Injectable 12.5 Gram(s) IV Push once  dextrose 50% Injectable 25 Gram(s) IV Push once  epoetin tan Injectable 82637 Unit(s) IV Push <User Schedule>  heparin  Injectable 5000 Unit(s) SubCutaneous every 8 hours  insulin glargine Injectable (LANTUS) 11 Unit(s) SubCutaneous at bedtime  insulin lispro (HumaLOG) corrective regimen sliding scale   SubCutaneous every 6 hours  levETIRAcetam  Solution 500 milliGRAM(s) Oral two times a day  lidocaine   Patch 1 Patch Transdermal daily  loperamide Suspension 2 milliGRAM(s) Oral five times a day  predniSONE   Tablet 5 milliGRAM(s) Oral daily    MEDICATIONS  (PRN):  dextrose 40% Gel 15 Gram(s) Oral once PRN Blood Glucose LESS THAN 70 milliGRAM(s)/deciLiter  glucagon  Injectable 1 milliGRAM(s) IntraMuscular once PRN Glucose <70 milliGRAM(s)/deciLiter      Allergies    No Known Allergies    Intolerances        Review of Systems:    unable to obtain in completion       Vital Signs Last 24 Hrs  T(C): 36.4 (26 Dec 2019 04:46), Max: 36.4 (25 Dec 2019 20:35)  T(F): 97.5 (26 Dec 2019 04:46), Max: 97.5 (25 Dec 2019 20:35)  HR: 110 (26 Dec 2019 04:46) (98 - 110)  BP: 121/75 (26 Dec 2019 04:46) (100/65 - 121/75)  BP(mean): --  RR: 20 (26 Dec 2019 04:46) (20 - 20)  SpO2: 95% (26 Dec 2019 04:46) (95% - 96%)    PHYSICAL EXAM:    GENERAL:  lying in bed  HEENT:  NC/AT  ABDOMEN:  Soft, non-distended, + bowel sounds, + peg dressed, c/d/i  EXTREMITIES:  no cyanosis, clubbing or edema  NEURO:  minimally verbal        LABS:                        7.7    19.18 )-----------( 247      ( 26 Dec 2019 12:16 )             25.1     12-26    133<L>  |  93<L>  |  79<H>  ----------------------------<  214<H>  4.0   |  26  |  3.83<H>    Ca    11.6<H>      26 Dec 2019 08:31            RADIOLOGY & ADDITIONAL TESTS:

## 2019-12-29 NOTE — PROGRESS NOTE ADULT - SUBJECTIVE AND OBJECTIVE BOX
Chief complaint  Patient is a 74y old  Male who presents with a chief complaint of ams (29 Dec 2019 16:36)   Review of systems  Patient in bed, looks comfortable, no fever, no hypoglycemia.    Labs and Fingersticks  CAPILLARY BLOOD GLUCOSE      POCT Blood Glucose.: 151 mg/dL (29 Dec 2019 13:30)  POCT Blood Glucose.: 158 mg/dL (29 Dec 2019 12:06)  POCT Blood Glucose.: 185 mg/dL (29 Dec 2019 06:25)  POCT Blood Glucose.: 191 mg/dL (29 Dec 2019 00:07)      Anion Gap, Serum: 17 (12-28 @ 09:36)      Calcium, Total Serum: 11.7 <H> (12-28 @ 09:36)          12-28    131<L>  |  89<L>  |  63<H>  ----------------------------<  222<H>  4.1   |  25  |  3.16<H>    Ca    11.7<H>      28 Dec 2019 09:36                          8.9    21.12 )-----------( 310      ( 28 Dec 2019 11:49 )             30.0     Medications  MEDICATIONS  (STANDING):  acetaminophen    Suspension .. 650 milliGRAM(s) Oral every 6 hours  atorvastatin 40 milliGRAM(s) Oral at bedtime  carvedilol 3.125 milliGRAM(s) Oral every 12 hours  chlorhexidine 4% Liquid 1 Application(s) Topical <User Schedule>  Dakins Solution - 1/4 Strength 1 Application(s) Topical two times a day  dextrose 50% Injectable 12.5 Gram(s) IV Push once  dextrose 50% Injectable 25 Gram(s) IV Push once  epoetin tan Injectable 65609 Unit(s) IV Push <User Schedule>  ertapenem  IVPB 500 milliGRAM(s) IV Intermittent every 24 hours  heparin  Injectable 5000 Unit(s) SubCutaneous every 8 hours  insulin glargine Injectable (LANTUS) 11 Unit(s) SubCutaneous at bedtime  insulin lispro (HumaLOG) corrective regimen sliding scale   SubCutaneous every 6 hours  levETIRAcetam  Solution 500 milliGRAM(s) Oral two times a day  lidocaine   Patch 1 Patch Transdermal daily  loperamide Suspension 2 milliGRAM(s) Oral five times a day  predniSONE   Tablet 5 milliGRAM(s) Oral daily      Physical Exam  General: Patient comfortable in bed  Vital Signs Last 12 Hrs  T(F): 98.3 (12-29-19 @ 16:36), Max: 98.3 (12-29-19 @ 16:36)  HR: 103 (12-29-19 @ 16:36) (99 - 103)  BP: 101/68 (12-29-19 @ 16:36) (101/68 - 108/69)  BP(mean): --  RR: 18 (12-29-19 @ 16:36) (18 - 18)  SpO2: 97% (12-29-19 @ 16:36) (97% - 97%)  Neck: No palpable thyroid nodules.  CVS: S1S2, No murmurs  Respiratory: No wheezing, no crepitations  GI: Abdomen soft, bowel sounds positive  Musculoskeletal:  edema lower extremities.   Skin: No skin rashes, no ecchymosis    Diagnostics    Thyroid Stimulating Hormone, Serum: AM Sched. Collection: 03-Dec-2019 06:00 (12-02 @ 10:42)  Free Thyroxine, Serum: AM Sched. Collection: 03-Dec-2019 06:00 (12-02 @ 10:42)  Thyroid Stimulating Hormone, Serum: AM Sched. Collection: 17-Dec-2019 06:00 (12-16 @ 12:58)  Free Thyroxine, Serum: AM Sched. Collection: 17-Dec-2019 06:00 (12-16 @ 12:58)

## 2019-12-30 LAB
ALBUMIN SERPL ELPH-MCNC: 2.5 G/DL — LOW (ref 3.3–5)
ALP SERPL-CCNC: 100 U/L — SIGNIFICANT CHANGE UP (ref 40–120)
ALT FLD-CCNC: 8 U/L — LOW (ref 10–45)
ANION GAP SERPL CALC-SCNC: 19 MMOL/L — HIGH (ref 5–17)
ANION GAP SERPL CALC-SCNC: 20 MMOL/L — HIGH (ref 5–17)
APTT BLD: 29.1 SEC — SIGNIFICANT CHANGE UP (ref 27.5–36.3)
AST SERPL-CCNC: 25 U/L — SIGNIFICANT CHANGE UP (ref 10–40)
BILIRUB SERPL-MCNC: 0.4 MG/DL — SIGNIFICANT CHANGE UP (ref 0.2–1.2)
BLD GP AB SCN SERPL QL: NEGATIVE — SIGNIFICANT CHANGE UP
BUN SERPL-MCNC: 37 MG/DL — HIGH (ref 7–23)
BUN SERPL-MCNC: 56 MG/DL — HIGH (ref 7–23)
CALCIUM SERPL-MCNC: 10.6 MG/DL — HIGH (ref 8.4–10.5)
CALCIUM SERPL-MCNC: 11 MG/DL — HIGH (ref 8.4–10.5)
CHLORIDE SERPL-SCNC: 90 MMOL/L — LOW (ref 96–108)
CHLORIDE SERPL-SCNC: 91 MMOL/L — LOW (ref 96–108)
CO2 SERPL-SCNC: 24 MMOL/L — SIGNIFICANT CHANGE UP (ref 22–31)
CO2 SERPL-SCNC: 24 MMOL/L — SIGNIFICANT CHANGE UP (ref 22–31)
CREAT SERPL-MCNC: 2.53 MG/DL — HIGH (ref 0.5–1.3)
CREAT SERPL-MCNC: 3.38 MG/DL — HIGH (ref 0.5–1.3)
GAS PNL BLDA: SIGNIFICANT CHANGE UP
GLUCOSE BLDC GLUCOMTR-MCNC: 135 MG/DL — HIGH (ref 70–99)
GLUCOSE BLDC GLUCOMTR-MCNC: 158 MG/DL — HIGH (ref 70–99)
GLUCOSE BLDC GLUCOMTR-MCNC: 170 MG/DL — HIGH (ref 70–99)
GLUCOSE BLDC GLUCOMTR-MCNC: 189 MG/DL — HIGH (ref 70–99)
GLUCOSE SERPL-MCNC: 133 MG/DL — HIGH (ref 70–99)
GLUCOSE SERPL-MCNC: 170 MG/DL — HIGH (ref 70–99)
HCT VFR BLD CALC: 28.4 % — LOW (ref 39–50)
HCT VFR BLD CALC: 30.1 % — LOW (ref 39–50)
HGB BLD-MCNC: 8.4 G/DL — LOW (ref 13–17)
HGB BLD-MCNC: 9 G/DL — LOW (ref 13–17)
INR BLD: 1.25 RATIO — HIGH (ref 0.88–1.16)
MAGNESIUM SERPL-MCNC: 2.2 MG/DL — SIGNIFICANT CHANGE UP (ref 1.6–2.6)
MCHC RBC-ENTMCNC: 25 PG — LOW (ref 27–34)
MCHC RBC-ENTMCNC: 25.4 PG — LOW (ref 27–34)
MCHC RBC-ENTMCNC: 29.6 GM/DL — LOW (ref 32–36)
MCHC RBC-ENTMCNC: 29.9 GM/DL — LOW (ref 32–36)
MCV RBC AUTO: 84.5 FL — SIGNIFICANT CHANGE UP (ref 80–100)
MCV RBC AUTO: 84.8 FL — SIGNIFICANT CHANGE UP (ref 80–100)
PHOSPHATE SERPL-MCNC: 2.8 MG/DL — SIGNIFICANT CHANGE UP (ref 2.5–4.5)
PLATELET # BLD AUTO: 274 K/UL — SIGNIFICANT CHANGE UP (ref 150–400)
PLATELET # BLD AUTO: 356 K/UL — SIGNIFICANT CHANGE UP (ref 150–400)
POTASSIUM SERPL-MCNC: 3.5 MMOL/L — SIGNIFICANT CHANGE UP (ref 3.5–5.3)
POTASSIUM SERPL-MCNC: 3.8 MMOL/L — SIGNIFICANT CHANGE UP (ref 3.5–5.3)
POTASSIUM SERPL-SCNC: 3.5 MMOL/L — SIGNIFICANT CHANGE UP (ref 3.5–5.3)
POTASSIUM SERPL-SCNC: 3.8 MMOL/L — SIGNIFICANT CHANGE UP (ref 3.5–5.3)
PROT SERPL-MCNC: 7.3 G/DL — SIGNIFICANT CHANGE UP (ref 6–8.3)
PROTHROM AB SERPL-ACNC: 14.5 SEC — HIGH (ref 10–12.9)
RBC # BLD: 3.36 M/UL — LOW (ref 4.2–5.8)
RBC # BLD: 3.55 M/UL — LOW (ref 4.2–5.8)
RBC # FLD: 16.2 % — HIGH (ref 10.3–14.5)
RBC # FLD: 17.1 % — HIGH (ref 10.3–14.5)
RH IG SCN BLD-IMP: POSITIVE — SIGNIFICANT CHANGE UP
SODIUM SERPL-SCNC: 134 MMOL/L — LOW (ref 135–145)
SODIUM SERPL-SCNC: 134 MMOL/L — LOW (ref 135–145)
WBC # BLD: 18.67 K/UL — HIGH (ref 3.8–10.5)
WBC # BLD: 29.47 K/UL — HIGH (ref 3.8–10.5)
WBC # FLD AUTO: 18.67 K/UL — HIGH (ref 3.8–10.5)
WBC # FLD AUTO: 29.47 K/UL — HIGH (ref 3.8–10.5)

## 2019-12-30 PROCEDURE — 74176 CT ABD & PELVIS W/O CONTRAST: CPT | Mod: 26

## 2019-12-30 PROCEDURE — 99232 SBSQ HOSP IP/OBS MODERATE 35: CPT | Mod: GC

## 2019-12-30 PROCEDURE — 93010 ELECTROCARDIOGRAM REPORT: CPT | Mod: 76

## 2019-12-30 PROCEDURE — 99232 SBSQ HOSP IP/OBS MODERATE 35: CPT

## 2019-12-30 PROCEDURE — 99233 SBSQ HOSP IP/OBS HIGH 50: CPT

## 2019-12-30 RX ORDER — ALBUMIN HUMAN 25 %
250 VIAL (ML) INTRAVENOUS ONCE
Refills: 0 | Status: COMPLETED | OUTPATIENT
Start: 2019-12-30 | End: 2019-12-30

## 2019-12-30 RX ORDER — CHLORHEXIDINE GLUCONATE 213 G/1000ML
15 SOLUTION TOPICAL
Refills: 0 | Status: DISCONTINUED | OUTPATIENT
Start: 2019-12-30 | End: 2020-01-05

## 2019-12-30 RX ORDER — HYDROCORTISONE 20 MG
100 TABLET ORAL EVERY 8 HOURS
Refills: 0 | Status: DISCONTINUED | OUTPATIENT
Start: 2019-12-30 | End: 2019-12-31

## 2019-12-30 RX ORDER — VANCOMYCIN HCL 1 G
1000 VIAL (EA) INTRAVENOUS ONCE
Refills: 0 | Status: COMPLETED | OUTPATIENT
Start: 2019-12-30 | End: 2019-12-30

## 2019-12-30 RX ORDER — NOREPINEPHRINE BITARTRATE/D5W 8 MG/250ML
0.4 PLASTIC BAG, INJECTION (ML) INTRAVENOUS
Qty: 16 | Refills: 0 | Status: DISCONTINUED | OUTPATIENT
Start: 2019-12-30 | End: 2020-01-01

## 2019-12-30 RX ORDER — LOPERAMIDE HCL 2 MG
2 TABLET ORAL
Refills: 0 | Status: DISCONTINUED | OUTPATIENT
Start: 2019-12-30 | End: 2019-12-30

## 2019-12-30 RX ADMIN — CHLORHEXIDINE GLUCONATE 1 APPLICATION(S): 213 SOLUTION TOPICAL at 05:04

## 2019-12-30 RX ADMIN — HEPARIN SODIUM 5000 UNIT(S): 5000 INJECTION INTRAVENOUS; SUBCUTANEOUS at 05:49

## 2019-12-30 RX ADMIN — ERTAPENEM SODIUM 100 MILLIGRAM(S): 1 INJECTION, POWDER, LYOPHILIZED, FOR SOLUTION INTRAMUSCULAR; INTRAVENOUS at 14:18

## 2019-12-30 RX ADMIN — HEPARIN SODIUM 5000 UNIT(S): 5000 INJECTION INTRAVENOUS; SUBCUTANEOUS at 14:18

## 2019-12-30 RX ADMIN — Medication 650 MILLIGRAM(S): at 12:20

## 2019-12-30 RX ADMIN — Medication 100 MILLIGRAM(S): at 22:37

## 2019-12-30 RX ADMIN — Medication 650 MILLIGRAM(S): at 06:10

## 2019-12-30 RX ADMIN — Medication 125 MILLILITER(S): at 20:32

## 2019-12-30 RX ADMIN — LIDOCAINE 1 PATCH: 4 CREAM TOPICAL at 18:42

## 2019-12-30 RX ADMIN — Medication 650 MILLIGRAM(S): at 00:06

## 2019-12-30 RX ADMIN — Medication 1 APPLICATION(S): at 15:52

## 2019-12-30 RX ADMIN — LIDOCAINE 1 PATCH: 4 CREAM TOPICAL at 11:26

## 2019-12-30 RX ADMIN — LEVETIRACETAM 500 MILLIGRAM(S): 250 TABLET, FILM COATED ORAL at 05:52

## 2019-12-30 RX ADMIN — Medication 5 MILLIGRAM(S): at 05:52

## 2019-12-30 RX ADMIN — Medication 1 APPLICATION(S): at 05:04

## 2019-12-30 RX ADMIN — Medication 650 MILLIGRAM(S): at 11:27

## 2019-12-30 RX ADMIN — Medication 125 MILLILITER(S): at 22:38

## 2019-12-30 RX ADMIN — Medication 650 MILLIGRAM(S): at 05:51

## 2019-12-30 NOTE — PROVIDER CONTACT NOTE (CRITICAL VALUE NOTIFICATION) - ACTION/TREATMENT ORDERED:
Continue plan of care.
NP aware. No new orders at this time
GIL Feldman made aware will be endorsed with day team
continue to monitor CBC
continue to monitor for bleeding
dialysis  RN Ishmael made aware of same
pt already has a hx of Hep C. will continue to monitor
sent CBC stat as per order.

## 2019-12-30 NOTE — PROGRESS NOTE ADULT - SUBJECTIVE AND OBJECTIVE BOX
INTERVAL HPI/OVERNIGHT EVENTS:    pt seen and examined  ernie feeds @ 50cc/hr  +small BM x 1 this AM  pt is without n/v        MEDICATIONS  (STANDING):  acetaminophen    Suspension .. 650 milliGRAM(s) Oral every 6 hours  atorvastatin 40 milliGRAM(s) Oral at bedtime  carvedilol 3.125 milliGRAM(s) Oral every 12 hours  chlorhexidine 4% Liquid 1 Application(s) Topical <User Schedule>  Dakins Solution - 1/4 Strength 1 Application(s) Topical two times a day  dextrose 50% Injectable 12.5 Gram(s) IV Push once  dextrose 50% Injectable 25 Gram(s) IV Push once  epoetin tan Injectable 72013 Unit(s) IV Push <User Schedule>  heparin  Injectable 5000 Unit(s) SubCutaneous every 8 hours  insulin glargine Injectable (LANTUS) 11 Unit(s) SubCutaneous at bedtime  insulin lispro (HumaLOG) corrective regimen sliding scale   SubCutaneous every 6 hours  levETIRAcetam  Solution 500 milliGRAM(s) Oral two times a day  lidocaine   Patch 1 Patch Transdermal daily  loperamide Suspension 2 milliGRAM(s) Oral five times a day  predniSONE   Tablet 5 milliGRAM(s) Oral daily    MEDICATIONS  (PRN):  dextrose 40% Gel 15 Gram(s) Oral once PRN Blood Glucose LESS THAN 70 milliGRAM(s)/deciLiter  glucagon  Injectable 1 milliGRAM(s) IntraMuscular once PRN Glucose <70 milliGRAM(s)/deciLiter      Allergies    No Known Allergies    Intolerances        Review of Systems:    unable to obtain in completion       Vital Signs Last 24 Hrs  T(C): 36.4 (26 Dec 2019 04:46), Max: 36.4 (25 Dec 2019 20:35)  T(F): 97.5 (26 Dec 2019 04:46), Max: 97.5 (25 Dec 2019 20:35)  HR: 110 (26 Dec 2019 04:46) (98 - 110)  BP: 121/75 (26 Dec 2019 04:46) (100/65 - 121/75)  BP(mean): --  RR: 20 (26 Dec 2019 04:46) (20 - 20)  SpO2: 95% (26 Dec 2019 04:46) (95% - 96%)    PHYSICAL EXAM:    GENERAL:  lying in bed  HEENT:  NC/AT  ABDOMEN:  Soft, non-distended, + bowel sounds, + peg dressed, c/d/i  EXTREMITIES:  no cyanosis, clubbing or edema  NEURO:  minimally verbal        LABS:                        7.7    19.18 )-----------( 247      ( 26 Dec 2019 12:16 )             25.1     12-26    133<L>  |  93<L>  |  79<H>  ----------------------------<  214<H>  4.0   |  26  |  3.83<H>    Ca    11.6<H>      26 Dec 2019 08:31            RADIOLOGY & ADDITIONAL TESTS:

## 2019-12-30 NOTE — PROGRESS NOTE ADULT - ATTENDING COMMENTS
Patient seen and examined and agree with the above documentation with the following additions:   Palliative reconsulted to address goals of care. Per conversation dated back 12/5, family had requested all medical interventions aimed at life preservation including full code status. Outreach made again today to clarify goals of care and provide medical update, including overall poor prognosis given ongoing encephalopathy, renal dysfunction and now bacteremia. Despite this, family states their goals are to focus on all life sustaining interventions. Daughter is on bed rest and not available to meet in person for meeting, but goals are defined.

## 2019-12-30 NOTE — PROGRESS NOTE ADULT - PROBLEM SELECTOR PLAN 1
Blood cx obtained 12/24 growing gram negative rods; repeat blood cultures from 12/27 with NGTD  cont IV invanz as per ID   f/u ID recs

## 2019-12-30 NOTE — PROGRESS NOTE ADULT - PROBLEM SELECTOR PLAN 2
discussed with family at length, see chart note dated 12/5  PEG placed  their goals are in line with all life sustaining treatments

## 2019-12-30 NOTE — RAPID RESPONSE TEAM SUMMARY - NSMEDICATIONSRRT_GEN_ALL_CORE
levophed IV pressor support, initiation of meropenem and vancomycin, Tylenol IV.
Levophed, Hydrocortisone, Albumin

## 2019-12-30 NOTE — PROGRESS NOTE ADULT - PROBLEM SELECTOR PLAN 2
- s/p upper gastrointestinal endoscopy with the procedure was aborted due to inability to place PEG tube  - s/p failed IR PEG  - s/p open feeding gastrostomy tube placement 12/21. Care per surgery team appreciated  - tolerating feeds  - palliative eval noted; patient remains full code.

## 2019-12-30 NOTE — PROGRESS NOTE ADULT - SUBJECTIVE AND OBJECTIVE BOX
Trauma & Acute Care Surgery Progress Note     Brief HPI:  74 year old with history of Hep C, IVDA, ESRD (s/p failed renal transplant) on HD via permacath, GERD, HTN, DM, BPH, diverticulitis (s/p colon resection multiple years ago) now hospitalized on medical service. Diagnosed with dysphagia secondary to poor mental status. Failed endoscopic attempt at PEG placement. Patient is s/p open gastrostomy tube placement. Procedure was well-tolerated.     Subjective/24hour Events:   -- ACS reconsulted for worsening abdominal distension and bilious secretions suctioned from the mouth  -- Interval GNR bacteremia on Meropenem with ID following  -- CT A/P obtained suggestive of ileus vs. early/incomplete SBO  -- Of note, patient has been on round-the-clock Loperamide for diarrhea.      Vital Signs:  Vital Signs Last 24 Hrs  T(C): 37.2 (30 Dec 2019 18:15), Max: 37.3 (30 Dec 2019 16:49)  T(F): 99 (30 Dec 2019 18:15), Max: 99.2 (30 Dec 2019 16:49)  HR: 128 (30 Dec 2019 18:15) (98 - 128)  BP: 90/52 (30 Dec 2019 18:15) (83/54 - 112/62)  BP(mean): --  RR: 18 (30 Dec 2019 18:15) (16 - 18)  SpO2: 95% (30 Dec 2019 18:15) (88% - 97%)    CAPILLARY BLOOD GLUCOSE      POCT Blood Glucose.: 170 mg/dL (30 Dec 2019 18:50)  POCT Blood Glucose.: 189 mg/dL (30 Dec 2019 11:51)  POCT Blood Glucose.: 135 mg/dL (30 Dec 2019 05:41)  POCT Blood Glucose.: 135 mg/dL (29 Dec 2019 23:47)      I&O's Detail    29 Dec 2019 07:01  -  30 Dec 2019 07:00  --------------------------------------------------------  IN:    Enteral Tube Flush: 320 mL    Nepro with Carb Steady: 560 mL    Solution: 50 mL  Total IN: 930 mL    OUT:  Total OUT: 0 mL    Total NET: 930 mL          MEDICATIONS  (STANDING):  acetaminophen    Suspension .. 650 milliGRAM(s) Oral every 6 hours  atorvastatin 40 milliGRAM(s) Oral at bedtime  carvedilol 3.125 milliGRAM(s) Oral every 12 hours  chlorhexidine 4% Liquid 1 Application(s) Topical <User Schedule>  Dakins Solution - 1/4 Strength 1 Application(s) Topical two times a day  dextrose 50% Injectable 12.5 Gram(s) IV Push once  dextrose 50% Injectable 25 Gram(s) IV Push once  epoetin tan Injectable 83492 Unit(s) IV Push <User Schedule>  ertapenem  IVPB 500 milliGRAM(s) IV Intermittent every 24 hours  insulin glargine Injectable (LANTUS) 11 Unit(s) SubCutaneous at bedtime  insulin lispro (HumaLOG) corrective regimen sliding scale   SubCutaneous every 6 hours  levETIRAcetam  Solution 500 milliGRAM(s) Oral two times a day  lidocaine   Patch 1 Patch Transdermal daily  predniSONE   Tablet 5 milliGRAM(s) Oral daily    MEDICATIONS  (PRN):  dextrose 40% Gel 15 Gram(s) Oral once PRN Blood Glucose LESS THAN 70 milliGRAM(s)/deciLiter  glucagon  Injectable 1 milliGRAM(s) IntraMuscular once PRN Glucose <70 milliGRAM(s)/deciLiter  ondansetron Injectable 4 milliGRAM(s) IV Push every 8 hours PRN Nausea and/or Vomiting      Physical Exam:  Gen: NAD. Minimally responsive, no interval change in mentation  Lungs: Non labored breathing.   Ab: Soft, nontender, significant distension compared to last known exam by ACS team. Gastrostomy tube in place, capped.  Ext: Warm. well perfused.     Labs:    12-30    134<L>  |  91<L>  |  37<H>  ----------------------------<  133<H>  3.5   |  24  |  2.53<H>    Ca    10.6<H>      30 Dec 2019 06:19                        9.0    18.67 )-----------( 274      ( 30 Dec 2019 08:43 )             30.1         Imaging:  < from: CT Abdomen and Pelvis No Cont (12.30.19 @ 17:46) >  INTERPRETATION:  New right lower lobe patchy opacity, likely pneumonia. New partially imaged ill-defined left lower lobe opacities may reflect distal mucoid impacted airways or infectious etiology.  New dilated loops of air and fluid-filled small bowel and stomach with collapsed loops distally without a discrete transition point, compatible with ileus vs. early or incomplete small bowel obstruction.  Interval placement of a gastrostomy tube which terminates in the stomach with associated pneumoperitoneum, likely postprocedural given the patient's placement of a gastrostomy tube on 12/21/2019.  1.0 cm calculus in the transplant proximal ureter with mild infiltration of the associated perinephric fat without significant hydroureteronephrosis.   Interval debridement of a sacral decubitus ulcer which extends to the sacrum. Correlate for osteomyelitis.  < end of copied text >

## 2019-12-30 NOTE — RAPID RESPONSE TEAM SUMMARY - NSADDTLFINDINGSRRT_GEN_ALL_CORE
Patient lethargic, minimally responsive to pain.  Only with 1 24g PIVL through which patient receiving albumin.  SBP in 70s. HR 120s. O2 Sat > 93%. Unable to get additional IV access as patient with limb restriction. IO placed.  Given 500cc LR, second bag of albumin, hydrocortisone 100mg IV x 1. Vanco x 1 ordered for OM coverage. Ertapenem for GNR coverage. BP fluctuating 70s-80s.  Lowest BP 70 systolic, highest 89. Primary team clarified patient full code with family. BP maintaining low 80s. MICU called for reconsult. Levophed started to maintain MAP > 65.  Full set of labs sent including ABG--maintaining airway.  Blood culture x 1 sent.  SBP > 100 prior to transfer.

## 2019-12-30 NOTE — PROGRESS NOTE ADULT - PROBLEM SELECTOR PLAN 4
remains full code per family wishes  PEG placed for dysphagia  [facility] Roro [facility] per family wishes (as per daughter crystal).   PEG placed for dysphagia  [facility] full code per family wishes (as per daughter Crystal).   PEG placed for dysphagia  [facility]

## 2019-12-30 NOTE — CONSULT NOTE ADULT - ASSESSMENT
74M PMHx of ESRD s/p failed renal transplant x2, HCV, DM, and meningococcal meningitis 2 years ago was sent in to the ED from HD for AMS and low BPs course c/b aspiration PNA, intubated 74M PMHx of ESRD s/p failed renal transplant x2, HCV, DM, and meningococcal meningitis 2 years ago was sent in to the ED from HD for AMS and low BPs found later to be in septic shock 2/2 UTI on pressors, intubated with concern for aspiration PNA, found to have GNR bacteremia. New imaging showing dilated loops of bowel, no clear transition point. Patient's BP soft 90s/50s so MICU re-consulted.     Recs:  -250cc 5% albumin x2 bolus  -hydrocortisone 100mg IV Q8H  -May start midodrine if mental status improves though may not absorb well.   -Stop carvedilol  -f/u surgery recs    Deniz Anderson MD/MS  PGY-2 Internal Medicine  [facility]  Pager# [phone] ([facility])/[phone] ([facility])

## 2019-12-30 NOTE — PROGRESS NOTE ADULT - ASSESSMENT
NEURO:  - Extubated 11/29, now on nasal cannula. Minimally verbal   - metabolic encephalopathy, meningitis r/o, LP negative  - neuro following  - chronic lacunar infarcts; per neuro, AMS also likely related to hypercalcemia, renal dysfunction , poor nutritional intake.   - c/w keppra for ?hx of seizure disorder-AMS likely 2/2 hyperCa, renal dysfunction, poor PO intake    CV:- hx of afib was on eliquis, now off a/c per cards due to bleeding risk  - holding carvedilol 6.25 BID- hx of cocaine cardiomyopathy, resolved, normal EF on most recent echo  - c/w statin- echo repeat EF70, LVH, hyperdynamic LCF, normal RV sys fxn    PULM:- extubated 11/29--tolerating nasal cannula    GI:  - sp PEG with surgery   - now with vomiting with tube feeds, which were held     RENAL:- hx of ESRD s/p failed renal transplant x2- renal following, HD as recommended.    ENDO:  - Start Insulin Sliding Scale- endocrine following- monitor Ca    INFECTIOUS:  - dc  vanco as per infectious disease   - id fu   - now with bactremia     Anemia- transfuse PRN- monitor cbc    Sacral decub  - wound care fu  - cw antibiotics     Bactremia  - cw antibiotics as per ID     SBO  - GI fu   - call surgery

## 2019-12-30 NOTE — CHART NOTE - NSCHARTNOTEFT_GEN_A_CORE
Informed by RN, pt was suctioned with greenish/bile output. RN checked residual with 30 cc, greenish bile/mixed with tube feeds. Dr. James and Dr. Mitchell (GI made aware). Pt's abd soft, nontender, no pain with palpation. CT abd/pelvis ordered and tube feeds on hold and d/w attending and GI.

## 2019-12-30 NOTE — PROGRESS NOTE ADULT - ASSESSMENT
Assessment  DM: A1C 5.5%, was on insulin at home, now on insulin, FS trending within acceptable range, no hypoglycemic episodes. Patient is still NPO, tolerating PEG tube feeds, stable, resting comfortably, suction done today.  Sacral Wound: s/p debridement, monitored, FU wound care.  Hypercalcemia: Primary Hyperparathyroidism, hypercalcemia S/P Pamidronate injection, Ca levels improved.   Sepsis: IV ABx for UTI, LP inconsistent with meningitis, on medications, stable, monitored.  HTN: Controlled,  on antihypertensive medications.  ESRD: On hemodialysis, Monitor labs/BMP          Robi Gay MD  Cell: [phone]  Office: [phone] Assessment  DM: A1C 5.5%, was on insulin at home, now on insulin, FS trending within acceptable range, no hypoglycemic episodes.  Patient is still NPO, tolerating PEG tube feeds, stable, resting comfortably, suction done today.  Sacral Wound: s/p debridement, monitored, FU wound care.  Hypercalcemia: Primary Hyperparathyroidism, hypercalcemia S/P Pamidronate injection, Ca levels improved.   Sepsis: IV ABx for UTI, LP inconsistent with meningitis, on medications, stable, monitored.  HTN: Controlled,  on antihypertensive medications.  ESRD: On hemodialysis, Monitor labs/BMP          Robi Gay MD  Cell: [phone]  Office: [phone]

## 2019-12-30 NOTE — PROGRESS NOTE ADULT - SUBJECTIVE AND OBJECTIVE BOX
SUBJECTIVE AND OBJECTIVE: Patient seen and examined, remains lethargic and without decision making capacity but is opening eyes at time and answering questions with one word, not always intelligible. Primary team notes appreciated. Since last consult the patient developed gram negative luther bacteremia.   INTERVAL HPI/OVERNIGHT EVENTS: no acute events overnight.     DNR on chart:   Allergies    No Known Allergies    Intolerances    MEDICATIONS  (STANDING):  acetaminophen    Suspension .. 650 milliGRAM(s) Oral every 6 hours  atorvastatin 40 milliGRAM(s) Oral at bedtime  carvedilol 3.125 milliGRAM(s) Oral every 12 hours  chlorhexidine 4% Liquid 1 Application(s) Topical <User Schedule>  Dakins Solution - 1/4 Strength 1 Application(s) Topical two times a day  dextrose 50% Injectable 12.5 Gram(s) IV Push once  dextrose 50% Injectable 25 Gram(s) IV Push once  epoetin tan Injectable 70215 Unit(s) IV Push <User Schedule>  ertapenem  IVPB 500 milliGRAM(s) IV Intermittent every 24 hours  heparin  Injectable 5000 Unit(s) SubCutaneous every 8 hours  insulin glargine Injectable (LANTUS) 11 Unit(s) SubCutaneous at bedtime  insulin lispro (HumaLOG) corrective regimen sliding scale   SubCutaneous every 6 hours  levETIRAcetam  Solution 500 milliGRAM(s) Oral two times a day  lidocaine   Patch 1 Patch Transdermal daily  loperamide Suspension 2 milliGRAM(s) Oral five times a day  predniSONE   Tablet 5 milliGRAM(s) Oral daily    MEDICATIONS  (PRN):  dextrose 40% Gel 15 Gram(s) Oral once PRN Blood Glucose LESS THAN 70 milliGRAM(s)/deciLiter  glucagon  Injectable 1 milliGRAM(s) IntraMuscular once PRN Glucose <70 milliGRAM(s)/deciLiter  ondansetron Injectable 4 milliGRAM(s) IV Push every 8 hours PRN Nausea and/or Vomiting      ITEMS UNCHECKED ARE NOT PRESENT    PRESENT SYMPTOMS: [x ]Unable to obtain due to poor mentation   Source if other than patient:  [ ]Family   [x ]Team     Pain:  [ ]yes [ ]no  QOL impact -   Location -                    Aggravating factors -  Quality -  Radiation -  Timing-  Severity (0-10 scale):  Minimal acceptable level (0-10 scale):     Dyspnea:                           [ ]Mild [ ]Moderate [ ]Severe  Anxiety:                             [ ]Mild [ ]Moderate [ ]Severe  Fatigue:                             [ ]Mild [ ]Moderate [ ]Severe  Nausea:                             [ ]Mild [ ]Moderate [ ]Severe  Loss of appetite:              [ ]Mild [ ]Moderate [ ]Severe  Constipation:                    [ ]Mild [ ]Moderate [ ]Severe    PAIN AD Score:	  [email or web address]/[email or web address] (Ctrl + left click to view)    Other Symptoms:  [ ]All other review of systems negative     Palliative Performance Status Version 2:         %      [email or web address]  PHYSICAL EXAM:  Vital Signs Last 24 Hrs  T(C): 36.8 (30 Dec 2019 10:00), Max: 36.8 (29 Dec 2019 16:36)  T(F): 98.2 (30 Dec 2019 10:00), Max: 98.3 (29 Dec 2019 16:36)  HR: 101 (30 Dec 2019 10:00) (98 - 103)  BP: 98/62 (30 Dec 2019 10:00) (97/56 - 112/62)  BP(mean): --  RR: 18 (30 Dec 2019 10:00) (17 - 18)  SpO2: 96% (30 Dec 2019 04:06) (96% - 97%) I&O's Summary    29 Dec 2019 07:01  -  30 Dec 2019 07:00  --------------------------------------------------------  IN: 930 mL / OUT: 0 mL / NET: 930 mL       GENERAL:  [ ]Alert  [ ]Oriented x   [x ]Lethargic  [ x]Cachexia  [ ]Unarousable  [ ]Verbal  [ x]minimally verbal  Behavioral:   [ ]Anxiety  [ ]Delirium [ ]Agitation [ ]Other  HEENT:  [ ]Normal   [ ]Dry mouth   [ ]ET Tube/Trach  [ ]Oral lesions  PULMONARY:   [x ]Clear [ ]Tachypnea  [ ]Audible excessive secretions   [ ]Rhonchi        [ ]Right [ ]Left [ ]Bilateral  [ ]Crackles        [ ]Right [ ]Left [ ]Bilateral  [ ]Wheezing     [ ]Right [ ]Left [ ]Bilateral  [ ]Diminished BS [ ] Right [ ]Left [ ]Bilateral  CARDIOVASCULAR:    [x ]Regular [ ]Irregular [ ]Tachy  [ ]Yaniv [ ]Murmur [ ]Other  GASTROINTESTINAL:  [ x]Soft  [ ]Distended   [ ]+BS  [ ]Non tender [ ]Tender  [x ]PEG [ ]OGT/ NGT   Last BM:      GENITOURINARY:  [ ]Normal [ ]Incontinent   [ ]Oliguria/Anuria   [ ]Mclaughlin  MUSCULOSKELETAL:   [ ]Normal   [x ]Weakness  [ x]Bed/Wheelchair bound [ ]Edema  NEUROLOGIC:   [ ]No focal deficits  [ ] Cognitive impairment  [ ] Dysphagia [ ]Dysarthria [ ] Paresis [ ]Other   SKIN:   [ ]Normal  [ ]Rash   [ ]Pressure ulcer(s) [ ]y [ ]n present on admission    CRITICAL CARE:  [ ]Shock Present  [ ]Septic [ ]Cardiogenic [ ]Neurologic [ ]Hypovolemic  [ ]Vasopressors [ ]Inotropes  [ ]Respiratory failure present [ ]Mechanical Ventilation [ ]Non-invasive ventilatory support [ ]High-Flow  [ ]Acute  [ ]Chronic [ ]Hypoxic  [ ]Hypercarbic [ ]Other  [ ]Other organ failure     LABS:                        9.0    18.67 )-----------( 274      ( 30 Dec 2019 08:43 )             30.1   12-30    134<L>  |  91<L>  |  37<H>  ----------------------------<  133<H>  3.5   |  24  |  2.53<H>    Ca    10.6<H>      30 Dec 2019 06:19    RADIOLOGY & ADDITIONAL STUDIES:    Protein Calorie Malnutrition Present: [ ]mild [ ]moderate [ ]severe [ ]underweight [ ]morbid obesity  https://www.andeal.org/vault/2440/web/files/ONC/Table_Clinical%20Characteristics%20to%20Document%20Malnutrition-White%20JV%20et%20al%202012.pdf    Height (cm): 188.8 (12-20-19 @ 17:17), 187.96 (08-26-19 @ 14:29)  Weight (kg): 68 (12-20-19 @ 17:17), 108.9 (08-26-19 @ 14:29)  BMI (kg/m2): 19.1 (12-20-19 @ 17:17), 30.8 (08-26-19 @ 14:29)    [ ]PPSV2 < or = 30%  [ ]significant weight loss [ ]poor nutritional intake [ ]anasarca   Albumin, Serum: 2.2 g/dL (12-20-19 @ 21:37)   [ ]Artificial Nutrition    REFERRALS:   [ ]Chaplaincy  [ ]Hospice  [ ]Child Life  [ ]Social Work  [ ]Case management [ ]Holistic Therapy     Goals of Care Document: SUBJECTIVE AND OBJECTIVE: Patient seen and examined, remains lethargic and without decision making capacity but is opening eyes at time and answering questions with one word, not always intelligible. Primary team notes appreciated. Since last consult the patient developed gram negative luther bacteremia. Discussed current clinical condition with the patient's daughter Crystal She wishes to continue with aggressive measures to treat her father.   INTERVAL HPI/OVERNIGHT EVENTS: no acute events overnight.     DNR on chart:   Allergies    No Known Allergies    Intolerances    MEDICATIONS  (STANDING):  acetaminophen    Suspension .. 650 milliGRAM(s) Oral every 6 hours  atorvastatin 40 milliGRAM(s) Oral at bedtime  carvedilol 3.125 milliGRAM(s) Oral every 12 hours  chlorhexidine 4% Liquid 1 Application(s) Topical <User Schedule>  Dakins Solution - 1/4 Strength 1 Application(s) Topical two times a day  dextrose 50% Injectable 12.5 Gram(s) IV Push once  dextrose 50% Injectable 25 Gram(s) IV Push once  epoetin tan Injectable 69362 Unit(s) IV Push <User Schedule>  ertapenem  IVPB 500 milliGRAM(s) IV Intermittent every 24 hours  heparin  Injectable 5000 Unit(s) SubCutaneous every 8 hours  insulin glargine Injectable (LANTUS) 11 Unit(s) SubCutaneous at bedtime  insulin lispro (HumaLOG) corrective regimen sliding scale   SubCutaneous every 6 hours  levETIRAcetam  Solution 500 milliGRAM(s) Oral two times a day  lidocaine   Patch 1 Patch Transdermal daily  loperamide Suspension 2 milliGRAM(s) Oral five times a day  predniSONE   Tablet 5 milliGRAM(s) Oral daily    MEDICATIONS  (PRN):  dextrose 40% Gel 15 Gram(s) Oral once PRN Blood Glucose LESS THAN 70 milliGRAM(s)/deciLiter  glucagon  Injectable 1 milliGRAM(s) IntraMuscular once PRN Glucose <70 milliGRAM(s)/deciLiter  ondansetron Injectable 4 milliGRAM(s) IV Push every 8 hours PRN Nausea and/or Vomiting      ITEMS UNCHECKED ARE NOT PRESENT    PRESENT SYMPTOMS: [x ]Unable to obtain due to poor mentation   Source if other than patient:  [ ]Family   [x ]Team     Pain:  [ ]yes [ ]no  QOL impact -   Location -                    Aggravating factors -  Quality -  Radiation -  Timing-  Severity (0-10 scale):  Minimal acceptable level (0-10 scale):     Dyspnea:                           [ ]Mild [ ]Moderate [ ]Severe  Anxiety:                             [ ]Mild [ ]Moderate [ ]Severe  Fatigue:                             [ ]Mild [ ]Moderate [ ]Severe  Nausea:                             [ ]Mild [ ]Moderate [ ]Severe  Loss of appetite:              [ ]Mild [ ]Moderate [ ]Severe  Constipation:                    [ ]Mild [ ]Moderate [ ]Severe    PAIN AD Score:	  [email or web address]/[email or web address] (Ctrl + left click to view)    Other Symptoms:  [ ]All other review of systems negative     Palliative Performance Status Version 2:         %      http://[facility].org/files/news/palliative_performance_scale_[email or web address].pdf  PHYSICAL EXAM:  Vital Signs Last 24 Hrs  T(C): 36.8 (30 Dec 2019 10:00), Max: 36.8 (29 Dec 2019 16:36)  T(F): 98.2 (30 Dec 2019 10:00), Max: 98.3 (29 Dec 2019 16:36)  HR: 101 (30 Dec 2019 10:00) (98 - 103)  BP: 98/62 (30 Dec 2019 10:00) (97/56 - 112/62)  BP(mean): --  RR: 18 (30 Dec 2019 10:00) (17 - 18)  SpO2: 96% (30 Dec 2019 04:06) (96% - 97%) I&O's Summary    29 Dec 2019 07:01  -  30 Dec 2019 07:00  --------------------------------------------------------  IN: 930 mL / OUT: 0 mL / NET: 930 mL       GENERAL:  [ ]Alert  [ ]Oriented x   [x ]Lethargic  [ x]Cachexia  [ ]Unarousable  [ ]Verbal  [ x]minimally verbal  Behavioral:   [ ]Anxiety  [ ]Delirium [ ]Agitation [ ]Other  HEENT:  [ ]Normal   [ ]Dry mouth   [ ]ET Tube/Trach  [ ]Oral lesions  PULMONARY:   [x ]Clear [ ]Tachypnea  [ ]Audible excessive secretions   [ ]Rhonchi        [ ]Right [ ]Left [ ]Bilateral  [ ]Crackles        [ ]Right [ ]Left [ ]Bilateral  [ ]Wheezing     [ ]Right [ ]Left [ ]Bilateral  [ ]Diminished BS [ ] Right [ ]Left [ ]Bilateral  CARDIOVASCULAR:    [x ]Regular [ ]Irregular [ ]Tachy  [ ]Yaniv [ ]Murmur [ ]Other  GASTROINTESTINAL:  [ x]Soft  [ ]Distended   [ ]+BS  [ ]Non tender [ ]Tender  [x ]PEG [ ]OGT/ NGT   Last BM:      GENITOURINARY:  [ ]Normal [ ]Incontinent   [ ]Oliguria/Anuria   [ ]Mclaughlin  MUSCULOSKELETAL:   [ ]Normal   [x ]Weakness  [ x]Bed/Wheelchair bound [ ]Edema  NEUROLOGIC:   [ ]No focal deficits  [ ] Cognitive impairment  [ ] Dysphagia [ ]Dysarthria [ ] Paresis [ ]Other   SKIN:   [ ]Normal  [ ]Rash   [ ]Pressure ulcer(s) [ ]y [ ]n present on admission    CRITICAL CARE:  [ ]Shock Present  [ ]Septic [ ]Cardiogenic [ ]Neurologic [ ]Hypovolemic  [ ]Vasopressors [ ]Inotropes  [ ]Respiratory failure present [ ]Mechanical Ventilation [ ]Non-invasive ventilatory support [ ]High-Flow  [ ]Acute  [ ]Chronic [ ]Hypoxic  [ ]Hypercarbic [ ]Other  [ ]Other organ failure     LABS:                        9.0    18.67 )-----------( 274      ( 30 Dec 2019 08:43 )             30.1   12-30    134<L>  |  91<L>  |  37<H>  ----------------------------<  133<H>  3.5   |  24  |  2.53<H>    Ca    10.6<H>      30 Dec 2019 06:19    RADIOLOGY & ADDITIONAL STUDIES:    Protein Calorie Malnutrition Present: [ ]mild [ ]moderate [ ]severe [ ]underweight [ ]morbid obesity  https://www.andeal.org/vault/2440/web/files/ONC/Table_Clinical%20Characteristics%20to%20Document%20Malnutrition-White%20JV%20et%20al%202012.pdf    Height (cm): 188.8 (12-20-19 @ 17:17), 187.96 (08-26-19 @ 14:29)  Weight (kg): 68 (12-20-19 @ 17:17), 108.9 (08-26-19 @ 14:29)  BMI (kg/m2): 19.1 (12-20-19 @ 17:17), 30.8 (08-26-19 @ 14:29)    [ ]PPSV2 < or = 30%  [ ]significant weight loss [ ]poor nutritional intake [ ]anasarca   Albumin, Serum: 2.2 g/dL (12-20-19 @ 21:37)   [ ]Artificial Nutrition    REFERRALS:   [ ]Chaplaincy  [ ]Hospice  [ ]Child Life  [ ]Social Work  [ ]Case management [ ]Holistic Therapy     Goals of Care Document: SUBJECTIVE AND OBJECTIVE: Patient seen and examined, remains lethargic and without decision making capacity but is opening eyes at time and answering questions with one word, not always intelligible. Primary team notes appreciated. Since last consult the patient developed gram negative luther bacteremia. Discussed current clinical condition with the patient's daughter Crystal She wishes to continue with aggressive measures to treat her father, despite depiction of overall poor prognosis and 40 day LOS in hospital.     INTERVAL HPI/OVERNIGHT EVENTS: no acute events overnight.     DNR on chart:   Allergies    No Known Allergies    Intolerances    MEDICATIONS  (STANDING):  acetaminophen    Suspension .. 650 milliGRAM(s) Oral every 6 hours  atorvastatin 40 milliGRAM(s) Oral at bedtime  carvedilol 3.125 milliGRAM(s) Oral every 12 hours  chlorhexidine 4% Liquid 1 Application(s) Topical <User Schedule>  Dakins Solution - 1/4 Strength 1 Application(s) Topical two times a day  dextrose 50% Injectable 12.5 Gram(s) IV Push once  dextrose 50% Injectable 25 Gram(s) IV Push once  epoetin tan Injectable 85770 Unit(s) IV Push <User Schedule>  ertapenem  IVPB 500 milliGRAM(s) IV Intermittent every 24 hours  heparin  Injectable 5000 Unit(s) SubCutaneous every 8 hours  insulin glargine Injectable (LANTUS) 11 Unit(s) SubCutaneous at bedtime  insulin lispro (HumaLOG) corrective regimen sliding scale   SubCutaneous every 6 hours  levETIRAcetam  Solution 500 milliGRAM(s) Oral two times a day  lidocaine   Patch 1 Patch Transdermal daily  loperamide Suspension 2 milliGRAM(s) Oral five times a day  predniSONE   Tablet 5 milliGRAM(s) Oral daily    MEDICATIONS  (PRN):  dextrose 40% Gel 15 Gram(s) Oral once PRN Blood Glucose LESS THAN 70 milliGRAM(s)/deciLiter  glucagon  Injectable 1 milliGRAM(s) IntraMuscular once PRN Glucose <70 milliGRAM(s)/deciLiter  ondansetron Injectable 4 milliGRAM(s) IV Push every 8 hours PRN Nausea and/or Vomiting      ITEMS UNCHECKED ARE NOT PRESENT    PRESENT SYMPTOMS: [x ]Unable to obtain due to poor mentation   Source if other than patient:  [ ]Family   [x ]Team     Pain:  [ ]yes [ ]no  QOL impact -   Location -                    Aggravating factors -  Quality -  Radiation -  Timing-  Severity (0-10 scale):  Minimal acceptable level (0-10 scale):     Dyspnea:                           [ ]Mild [ ]Moderate [ ]Severe  Anxiety:                             [ ]Mild [ ]Moderate [ ]Severe  Fatigue:                             [ ]Mild [ ]Moderate [ ]Severe  Nausea:                             [ ]Mild [ ]Moderate [ ]Severe  Loss of appetite:              [ ]Mild [ ]Moderate [ ]Severe  Constipation:                    [ ]Mild [ ]Moderate [ ]Severe    PAIN AD Score:	0  [email or web address] (Ctrl + left click to view)    Other Symptoms:  [ ]All other review of systems negative     Palliative Performance Status Version 2:        20 %      [email or web address]  PHYSICAL EXAM:  Vital Signs Last 24 Hrs  T(C): 36.8 (30 Dec 2019 10:00), Max: 36.8 (29 Dec 2019 16:36)  T(F): 98.2 (30 Dec 2019 10:00), Max: 98.3 (29 Dec 2019 16:36)  HR: 101 (30 Dec 2019 10:00) (98 - 103)  BP: 98/62 (30 Dec 2019 10:00) (97/56 - 112/62)  BP(mean): --  RR: 18 (30 Dec 2019 10:00) (17 - 18)  SpO2: 96% (30 Dec 2019 04:06) (96% - 97%) I&O's Summary    29 Dec 2019 07:01  -  30 Dec 2019 07:00  --------------------------------------------------------  IN: 930 mL / OUT: 0 mL / NET: 930 mL       GENERAL:  [ ]Alert  [ ]Oriented x   [x ]Lethargic  [ x]Cachexia  [ ]Unarousable  [ ]Verbal  [ x]minimally verbal  Behavioral:   [ ]Anxiety  [ ]Delirium [ ]Agitation [ ]Other  HEENT:  [ ]Normal   [ x]Dry mouth   [ ]ET Tube/Trach  [ ]Oral lesions  PULMONARY:   [x ]Clear [ ]Tachypnea  [x ]Audible excessive secretions   [ ]Rhonchi        [ ]Right [ ]Left [ ]Bilateral  [ ]Crackles        [ ]Right [ ]Left [ ]Bilateral  [ ]Wheezing     [ ]Right [ ]Left [ ]Bilateral  [ ]Diminished BS [ ] Right [ ]Left [ ]Bilateral  CARDIOVASCULAR:    [x ]Regular [ ]Irregular [ ]Tachy  [ ]Yaniv [ ]Murmur [ ]Other  GASTROINTESTINAL:  [ x]Soft  [ ]Distended   [ ]+BS  [ ]Non tender [ ]Tender  [x ]PEG [ ]OGT/ NGT   Last BM:      GENITOURINARY:  [ ]Normal [ ]Incontinent   [ x]Oliguria/Anuria   [ ]Mclaughlin  MUSCULOSKELETAL:   [ ]Normal   [x ]Weakness  [ x]Bed/Wheelchair bound [ ]Edema  NEUROLOGIC: encephalopathic  [ ]No focal deficits  [ ] Cognitive impairment  [ ] Dysphagia [ ]Dysarthria [ ] Paresis [ ]Other   SKIN: xerosis  [ ]Normal  [ ]Rash   [ ]Pressure ulcer(s) [ ]y [ ]n present on admission    CRITICAL CARE:  [ ]Shock Present  [ ]Septic [ ]Cardiogenic [ ]Neurologic [ ]Hypovolemic  [ ]Vasopressors [ ]Inotropes  [ ]Respiratory failure present [ ]Mechanical Ventilation [ ]Non-invasive ventilatory support [ ]High-Flow  [ ]Acute  [ ]Chronic [ ]Hypoxic  [ ]Hypercarbic [ ]Other  [ ]Other organ failure     LABS:                        9.0    18.67 )-----------( 274      ( 30 Dec 2019 08:43 )             30.1   12-30    134<L>  |  91<L>  |  37<H>  ----------------------------<  133<H>  3.5   |  24  |  2.53<H>    Ca    10.6<H>      30 Dec 2019 06:19    RADIOLOGY & ADDITIONAL STUDIES:  < from: Xray Chest 1 View- PORTABLE-Urgent (12.23.19 @ 09:11) >    EXAM:  XR CHEST PORTABLE URGENT 1V                            PROCEDURE DATE:  12/23/2019            INTERPRETATION:  CLINICAL INFORMATION: Aspiration. Evaluation for infection.    TECHNIQUE: AP view of the chest.    COMPARISON: Chest radiograph 12/20/2019. CT chest 7/18/2013.    FINDINGS:     The right internal jugular dialysis catheter tip is in the distal SVC.    The lungs are clear.    The heart size is normal.    Degenerative changes of the spine.    IMPRESSION:    Clear lungs.      SANDEEP GAMBINO M.D., RADIOLOGY RESIDENT  This document has been electronically signed.  ARAMIS ROMERO M.D., ATTENDING RADIOLOGIST  This document has been electronically signed. Dec 23 2019  4:03PM     < end of copied text >    Protein Calorie Malnutrition Present: [ ]mild [x ]moderate [ ]severe [ ]underweight [ ]morbid obesity  https://www.andeal.org/vault/2440/web/files/ONC/Table_Clinical%20Characteristics%20to%20Document%20Malnutrition-White%20JV%20et%20al%281629.pdf    Height (cm): 188.8 (12-20-19 @ 17:17), 187.96 (08-26-19 @ 14:29)  Weight (kg): 68 (12-20-19 @ 17:17), 108.9 (08-26-19 @ 14:29)  BMI (kg/m2): 19.1 (12-20-19 @ 17:17), 30.8 (08-26-19 @ 14:29)    [x ]PPSV2 < or = 30%  [x ]significant weight loss [ ]poor nutritional intake [ ]anasarca   Albumin, Serum: 2.2 g/dL (12-20-19 @ 21:37)   [x ]Artificial Nutrition    REFERRALS:   [ ]Chaplaincy  [ ]Hospice  [ ]Child Life  [ ]Social Work  [ x]Case management [ ]Holistic Therapy     Goals of Care Document:

## 2019-12-30 NOTE — CONSULT NOTE ADULT - SUBJECTIVE AND OBJECTIVE BOX
CHIEF COMPLAINT:     HPI:  74M PMHx of ESRD s/p failed renal transplant x2, HCV, DM, and meningococcal meningitis 2 years ago was sent in to the ED from HD for AMS and low BPs. He is unable to provide any information at this time. His sister is at the bedside and reports that he is typically able to carry a conversation and walk a small amount. She is not aware if he had been having fevers at the nursing home, and believes that his last HD was on Tuesday. (20 Nov 2019 18:01)    Currently, minimally responsive. Hypotensive to sbp 87.       FAMILY HISTORY:  Family history of breast cancer in sister (Sibling): living at 92 yo  Family history of prostate cancer in father  Family history of lung cancer  Family history of hypertension in mother  Family history of diabetes mellitus: mother      SOCIAL HISTORY:  Smoking: __ packs x ___ years  EtOH Use:  Marital Status:  Occupation:  Recent Travel:  Country of Birth:  Advance Directives:    Allergies    No Known Allergies    Intolerances        HOME MEDICATIONS:    REVIEW OF SYSTEMS:  Constitutional:   Eyes:  ENT:  CV:  Resp:  GI:  :  MSK:  Integumentary:  Neurological:  Psychiatric:  Endocrine:  Hematologic/Lymphatic:  Allergic/Immunologic:  [ ] All other systems negative  [x ] Unable to assess ROS because Mental status_    OBJECTIVE:  ICU Vital Signs Last 24 Hrs  T(C): 37.2 (30 Dec 2019 19:46), Max: 37.3 (30 Dec 2019 16:49)  T(F): 98.9 (30 Dec 2019 19:46), Max: 99.2 (30 Dec 2019 16:49)  HR: 129 (30 Dec 2019 19:46) (98 - 129)  BP: 92/53 (30 Dec 2019 19:46) (83/54 - 112/62)  BP(mean): --  ABP: --  ABP(mean): --  RR: 20 (30 Dec 2019 19:46) (16 - 20)  SpO2: 95% (30 Dec 2019 19:46) (88% - 97%)        12-29 @ 07:01  -  12-30 @ 07:00  --------------------------------------------------------  IN: 930 mL / OUT: 0 mL / NET: 930 mL      CAPILLARY BLOOD GLUCOSE      POCT Blood Glucose.: 170 mg/dL (30 Dec 2019 18:50)    PHYSICAL EXAM:  GENERAL: lethargic, emaciated  HEAD:  Atraumatic, Normocephalic  EYES: EOMI, PERRLA, conjunctiva and sclera clear  NECK: Supple, No JVD  CHEST/LUNG: Clear to auscultation bilaterally; No wheeze  HEART: NL s1s2, regular rate and rhythm; No murmurs, rubs, or gallops  ABDOMEN: Soft, Nontender, Nondistended; Bowel sounds present  EXTREMITIES:  2+ Peripheral Pulses, No clubbing, cyanosis, or edema  PSYCH: AAOx0  NEUROLOGY: non-focal  SKIN: J tube in place, bilious drainage    HOSPITAL MEDICATIONS:  MEDICATIONS  (STANDING):  acetaminophen    Suspension .. 650 milliGRAM(s) Oral every 6 hours  albumin human  5% IVPB 250 milliLiter(s) IV Intermittent once  albumin human  5% IVPB 250 milliLiter(s) IV Intermittent once  atorvastatin 40 milliGRAM(s) Oral at bedtime  chlorhexidine 4% Liquid 1 Application(s) Topical <User Schedule>  Dakins Solution - 1/4 Strength 1 Application(s) Topical two times a day  dextrose 50% Injectable 12.5 Gram(s) IV Push once  dextrose 50% Injectable 25 Gram(s) IV Push once  epoetin tan Injectable 59548 Unit(s) IV Push <User Schedule>  ertapenem  IVPB 500 milliGRAM(s) IV Intermittent every 24 hours  hydrocortisone sodium succinate Injectable 100 milliGRAM(s) IV Push every 8 hours  insulin glargine Injectable (LANTUS) 11 Unit(s) SubCutaneous at bedtime  insulin lispro (HumaLOG) corrective regimen sliding scale   SubCutaneous every 6 hours  levETIRAcetam  Solution 500 milliGRAM(s) Oral two times a day  lidocaine   Patch 1 Patch Transdermal daily    MEDICATIONS  (PRN):  dextrose 40% Gel 15 Gram(s) Oral once PRN Blood Glucose LESS THAN 70 milliGRAM(s)/deciLiter  glucagon  Injectable 1 milliGRAM(s) IntraMuscular once PRN Glucose <70 milliGRAM(s)/deciLiter  ondansetron Injectable 4 milliGRAM(s) IV Push every 8 hours PRN Nausea and/or Vomiting      LABS:                        9.0    18.67 )-----------( 274      ( 30 Dec 2019 08:43 )             30.1     12-30    134<L>  |  91<L>  |  37<H>  ----------------------------<  133<H>  3.5   |  24  |  2.53<H>    Ca    10.6<H>      30 Dec 2019 06:19                MICROBIOLOGY:     RADIOLOGY:  [ ] Reviewed and interpreted by me    EKG: CHIEF COMPLAINT:     HPI:  74M PMHx of ESRD s/p failed renal transplant x2, HCV, DM, and meningococcal meningitis 2 years ago was sent in to the ED from HD for AMS and low BPs. He is unable to provide any information at this time. His sister is at the bedside and reports that he is typically able to carry a conversation and walk a small amount. She is not aware if he had been having fevers at the nursing home, and believes that his last HD was on Tuesday. (20 Nov 2019 18:01)    Currently, minimally responsive. Hypotensive to sbp 87.       FAMILY HISTORY:  Family history of breast cancer in sister (Sibling): living at 94 yo  Family history of prostate cancer in father  Family history of lung cancer  Family history of hypertension in mother  Family history of diabetes mellitus: mother      SOCIAL HISTORY:  Smoking: __ packs x ___ years  EtOH Use:  Marital Status:  Occupation:  Recent Travel:  Country of Birth:  Advance Directives:    Allergies    No Known Allergies    Intolerances        HOME MEDICATIONS:    REVIEW OF SYSTEMS:  Constitutional:   Eyes:  ENT:  CV:  Resp:  GI:  :  MSK:  Integumentary:  Neurological:  Psychiatric:  Endocrine:  Hematologic/Lymphatic:  Allergic/Immunologic:  [ ] All other systems negative  [x ] Unable to assess ROS because Mental status_    OBJECTIVE:  ICU Vital Signs Last 24 Hrs  T(C): 37.2 (30 Dec 2019 19:46), Max: 37.3 (30 Dec 2019 16:49)  T(F): 98.9 (30 Dec 2019 19:46), Max: 99.2 (30 Dec 2019 16:49)  HR: 129 (30 Dec 2019 19:46) (98 - 129)  BP: 92/53 (30 Dec 2019 19:46) (83/54 - 112/62)  BP(mean): --  ABP: --  ABP(mean): --  RR: 20 (30 Dec 2019 19:46) (16 - 20)  SpO2: 95% (30 Dec 2019 19:46) (88% - 97%)        12-29 @ 07:01  -  12-30 @ 07:00  --------------------------------------------------------  IN: 930 mL / OUT: 0 mL / NET: 930 mL      CAPILLARY BLOOD GLUCOSE      POCT Blood Glucose.: 170 mg/dL (30 Dec 2019 18:50)    PHYSICAL EXAM:  GENERAL: lethargic, emaciated  HEAD:  Atraumatic, Normocephalic  EYES: EOMI, PERRLA, conjunctiva and sclera clear  NECK: Supple, No JVD  CHEST/LUNG: Clear to auscultation bilaterally; No wheeze  HEART: NL s1s2, regular rate and rhythm; No murmurs, rubs, or gallops  ABDOMEN: Soft, Nontender, Nondistended; Bowel sounds present. Drain present. draining bilious fluid.   EXTREMITIES:  2+ Peripheral Pulses, No clubbing, cyanosis, or edema  PSYCH: AAOx0  NEUROLOGY: non-focal  SKIN: J tube in place, bilious drainage    HOSPITAL MEDICATIONS:  MEDICATIONS  (STANDING):  acetaminophen    Suspension .. 650 milliGRAM(s) Oral every 6 hours  albumin human  5% IVPB 250 milliLiter(s) IV Intermittent once  albumin human  5% IVPB 250 milliLiter(s) IV Intermittent once  atorvastatin 40 milliGRAM(s) Oral at bedtime  chlorhexidine 4% Liquid 1 Application(s) Topical <User Schedule>  Dakins Solution - 1/4 Strength 1 Application(s) Topical two times a day  dextrose 50% Injectable 12.5 Gram(s) IV Push once  dextrose 50% Injectable 25 Gram(s) IV Push once  epoetin tan Injectable 24874 Unit(s) IV Push <User Schedule>  ertapenem  IVPB 500 milliGRAM(s) IV Intermittent every 24 hours  hydrocortisone sodium succinate Injectable 100 milliGRAM(s) IV Push every 8 hours  insulin glargine Injectable (LANTUS) 11 Unit(s) SubCutaneous at bedtime  insulin lispro (HumaLOG) corrective regimen sliding scale   SubCutaneous every 6 hours  levETIRAcetam  Solution 500 milliGRAM(s) Oral two times a day  lidocaine   Patch 1 Patch Transdermal daily    MEDICATIONS  (PRN):  dextrose 40% Gel 15 Gram(s) Oral once PRN Blood Glucose LESS THAN 70 milliGRAM(s)/deciLiter  glucagon  Injectable 1 milliGRAM(s) IntraMuscular once PRN Glucose <70 milliGRAM(s)/deciLiter  ondansetron Injectable 4 milliGRAM(s) IV Push every 8 hours PRN Nausea and/or Vomiting      LABS:                        9.0    18.67 )-----------( 274      ( 30 Dec 2019 08:43 )             30.1     12-30    134<L>  |  91<L>  |  37<H>  ----------------------------<  133<H>  3.5   |  24  |  2.53<H>    Ca    10.6<H>      30 Dec 2019 06:19                MICROBIOLOGY:     RADIOLOGY:  [ ] Reviewed and interpreted by me    EKG:

## 2019-12-30 NOTE — PROGRESS NOTE ADULT - SUBJECTIVE AND OBJECTIVE BOX
Patient is a 74y Male whom presented to   pateint seen and examined nad , in am     PAST MEDICAL & SURGICAL HISTORY:  Kidney transplant recipient  Anuria  GERD (gastroesophageal reflux disease)  Kidney transplant failure: dx: 2/2013  Hepatitis C: dx: 90&#x27;s.  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 10 milliGRAM(s) Oral three times a day  mycophenolate mofetil 250 milliGRAM(s) Oral two times a day  predniSONE   Tablet 5 milliGRAM(s) Oral daily  sevelamer carbonate 800 milliGRAM(s) Oral three times a day with meals  sodium bicarbonate 650 milliGRAM(s) Oral two times a day  tamsulosin 0.4 milliGRAM(s) Oral at bedtime      Allergies    No Known Allergies                       SOCIAL HISTORY:  Denies ETOh,Smoking,     FAMILY HISTORY:  Family history of breast cancer in sister (Sibling): living at 92 yo  Family history of prostate cancer in father  Family history of lung cancer  Family history of hypertension in mother  Family history of diabetes mellitus: mother      REVIEW OF SYSTEMS:    unbable to obtained                                                                                                                                                                    9.0    18.67 )-----------( 274      ( 30 Dec 2019 08:43 )             30.1       CBC Full  -  ( 30 Dec 2019 08:43 )  WBC Count : 18.67 K/uL  RBC Count : 3.55 M/uL  Hemoglobin : 9.0 g/dL  Hematocrit : 30.1 %  Platelet Count - Automated : 274 K/uL  Mean Cell Volume : 84.8 fl  Mean Cell Hemoglobin : 25.4 pg  Mean Cell Hemoglobin Concentration : 29.9 gm/dL  Auto Neutrophil # : x  Auto Lymphocyte # : x  Auto Monocyte # : x  Auto Eosinophil # : x  Auto Basophil # : x  Auto Neutrophil % : x  Auto Lymphocyte % : x  Auto Monocyte % : x  Auto Eosinophil % : x  Auto Basophil % : x      12-30    134<L>  |  91<L>  |  37<H>  ----------------------------<  133<H>  3.5   |  24  |  2.53<H>    Ca    10.6<H>      30 Dec 2019 06:19        CAPILLARY BLOOD GLUCOSE      POCT Blood Glucose.: 189 mg/dL (30 Dec 2019 11:51)  POCT Blood Glucose.: 135 mg/dL (30 Dec 2019 05:41)  POCT Blood Glucose.: 135 mg/dL (29 Dec 2019 23:47)  POCT Blood Glucose.: 99 mg/dL (29 Dec 2019 19:00)      Vital Signs Last 24 Hrs  T(C): 37.3 (30 Dec 2019 16:49), Max: 37.3 (30 Dec 2019 16:49)  T(F): 99.2 (30 Dec 2019 16:49), Max: 99.2 (30 Dec 2019 16:49)  HR: 123 (30 Dec 2019 16:49) (98 - 123)  BP: 83/54 (30 Dec 2019 16:49) (83/54 - 112/62)  BP(mean): --  RR: 16 (30 Dec 2019 16:49) (16 - 18)  SpO2: 95% (30 Dec 2019 16:49) (88% - 97%)                                                                        HEENT: conjunctive   clear   Neck:  No JVD  Respiratory: decrease bs b/l   Cardiovascular: S1 and S2  Gastrointestinal: BS+, soft, NT/ND  Extremities: No peripheral edema  Skin: dry   Access: pos fistula

## 2019-12-30 NOTE — PROGRESS NOTE ADULT - SUBJECTIVE AND OBJECTIVE BOX
Chief complaint  Patient is a 74y old  Male who presents with a chief complaint of ams (30 Dec 2019 17:04)   Review of systems  Patient in bed, looks comfortable, no fever, no hypoglycemia.    Labs and Fingersticks  CAPILLARY BLOOD GLUCOSE      POCT Blood Glucose.: 189 mg/dL (30 Dec 2019 11:51)  POCT Blood Glucose.: 135 mg/dL (30 Dec 2019 05:41)  POCT Blood Glucose.: 135 mg/dL (29 Dec 2019 23:47)  POCT Blood Glucose.: 99 mg/dL (29 Dec 2019 19:00)      Anion Gap, Serum: 19 <H> (12-30 @ 06:19)      Calcium, Total Serum: 10.6 <H> (12-30 @ 06:19)          12-30    134<L>  |  91<L>  |  37<H>  ----------------------------<  133<H>  3.5   |  24  |  2.53<H>    Ca    10.6<H>      30 Dec 2019 06:19                          9.0    18.67 )-----------( 274      ( 30 Dec 2019 08:43 )             30.1     Medications  MEDICATIONS  (STANDING):  acetaminophen    Suspension .. 650 milliGRAM(s) Oral every 6 hours  atorvastatin 40 milliGRAM(s) Oral at bedtime  carvedilol 3.125 milliGRAM(s) Oral every 12 hours  chlorhexidine 4% Liquid 1 Application(s) Topical <User Schedule>  Dakins Solution - 1/4 Strength 1 Application(s) Topical two times a day  dextrose 50% Injectable 12.5 Gram(s) IV Push once  dextrose 50% Injectable 25 Gram(s) IV Push once  epoetin tan Injectable 14503 Unit(s) IV Push <User Schedule>  ertapenem  IVPB 500 milliGRAM(s) IV Intermittent every 24 hours  heparin  Injectable 5000 Unit(s) SubCutaneous every 8 hours  insulin glargine Injectable (LANTUS) 11 Unit(s) SubCutaneous at bedtime  insulin lispro (HumaLOG) corrective regimen sliding scale   SubCutaneous every 6 hours  levETIRAcetam  Solution 500 milliGRAM(s) Oral two times a day  lidocaine   Patch 1 Patch Transdermal daily  predniSONE   Tablet 5 milliGRAM(s) Oral daily      Physical Exam  General: Patient comfortable in bed  Vital Signs Last 12 Hrs  T(F): 99.2 (12-30-19 @ 16:49), Max: 99.2 (12-30-19 @ 16:49)  HR: 123 (12-30-19 @ 16:49) (101 - 123)  BP: 83/54 (12-30-19 @ 16:49) (83/54 - 106/67)  BP(mean): --  RR: 16 (12-30-19 @ 16:49) (16 - 18)  SpO2: 95% (12-30-19 @ 16:49) (88% - 95%)  Neck: No palpable thyroid nodules.  CVS: S1S2, No murmurs  Respiratory: No wheezing, no crepitations  GI: Abdomen soft, bowel sounds positive  Musculoskeletal:  edema lower extremities.   Skin: No skin rashes, no ecchymosis    Diagnostics Chief complaint  Patient is a 74y old  Male who presents with a chief complaint of ams (30 Dec 2019 17:04)   Review of systems  Patient in bed, looks comfortable, no fever,  no hypoglycemia.    Labs and Fingersticks  CAPILLARY BLOOD GLUCOSE      POCT Blood Glucose.: 189 mg/dL (30 Dec 2019 11:51)  POCT Blood Glucose.: 135 mg/dL (30 Dec 2019 05:41)  POCT Blood Glucose.: 135 mg/dL (29 Dec 2019 23:47)  POCT Blood Glucose.: 99 mg/dL (29 Dec 2019 19:00)      Anion Gap, Serum: 19 <H> (12-30 @ 06:19)      Calcium, Total Serum: 10.6 <H> (12-30 @ 06:19)          12-30    134<L>  |  91<L>  |  37<H>  ----------------------------<  133<H>  3.5   |  24  |  2.53<H>    Ca    10.6<H>      30 Dec 2019 06:19                          9.0    18.67 )-----------( 274      ( 30 Dec 2019 08:43 )             30.1     Medications  MEDICATIONS  (STANDING):  acetaminophen    Suspension .. 650 milliGRAM(s) Oral every 6 hours  atorvastatin 40 milliGRAM(s) Oral at bedtime  carvedilol 3.125 milliGRAM(s) Oral every 12 hours  chlorhexidine 4% Liquid 1 Application(s) Topical <User Schedule>  Dakins Solution - 1/4 Strength 1 Application(s) Topical two times a day  dextrose 50% Injectable 12.5 Gram(s) IV Push once  dextrose 50% Injectable 25 Gram(s) IV Push once  epoetin tan Injectable 21984 Unit(s) IV Push <User Schedule>  ertapenem  IVPB 500 milliGRAM(s) IV Intermittent every 24 hours  heparin  Injectable 5000 Unit(s) SubCutaneous every 8 hours  insulin glargine Injectable (LANTUS) 11 Unit(s) SubCutaneous at bedtime  insulin lispro (HumaLOG) corrective regimen sliding scale   SubCutaneous every 6 hours  levETIRAcetam  Solution 500 milliGRAM(s) Oral two times a day  lidocaine   Patch 1 Patch Transdermal daily  predniSONE   Tablet 5 milliGRAM(s) Oral daily      Physical Exam  General: Patient comfortable in bed  Vital Signs Last 12 Hrs  T(F): 99.2 (12-30-19 @ 16:49), Max: 99.2 (12-30-19 @ 16:49)  HR: 123 (12-30-19 @ 16:49) (101 - 123)  BP: 83/54 (12-30-19 @ 16:49) (83/54 - 106/67)  BP(mean): --  RR: 16 (12-30-19 @ 16:49) (16 - 18)  SpO2: 95% (12-30-19 @ 16:49) (88% - 95%)  Neck: No palpable thyroid nodules.  CVS: S1S2, No murmurs  Respiratory: No wheezing, no crepitations  GI: Abdomen soft, bowel sounds positive  Musculoskeletal:  edema lower extremities.   Skin: No skin rashes, no ecchymosis    Diagnostics

## 2019-12-30 NOTE — CHART NOTE - NSCHARTNOTEFT_GEN_A_CORE
Nutrition Follow Up Note  Patient seen for: malnutrition follow up    Source: chart reviewed, events noted. 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 "  Followed by neuro  metabolic encephalopathy, meningitis r/o, LP negative-chronic lacunar infarcts'           Patient reports: confused      Diet : NPO with PEG feeding now on hold          Enteral  Nutrition: NPO  at this time.  Previously Nepro@50ml/hr x24 hrs      Daily Weight in k.1 (12-30), Weight in k.5 (12-29), Weight in k.5 (12-29), Weight in k.3 (12-29), Weight in k.3 (12-28), Weight in k (12-28), Weight in k.5 (12-28)  Dosing Weight 68kg    Pertinent Medications: MEDICATIONS  (STANDING):  acetaminophen    Suspension .. 650 milliGRAM(s) Oral every 6 hours  atorvastatin 40 milliGRAM(s) Oral at bedtime  carvedilol 3.125 milliGRAM(s) Oral every 12 hours  chlorhexidine 4% Liquid 1 Application(s) Topical <User Schedule>  Dakins Solution - 1/4 Strength 1 Application(s) Topical two times a day  dextrose 50% Injectable 12.5 Gram(s) IV Push once  dextrose 50% Injectable 25 Gram(s) IV Push once  epoetin tan Injectable 23177 Unit(s) IV Push <User Schedule>  ertapenem  IVPB 500 milliGRAM(s) IV Intermittent every 24 hours  heparin  Injectable 5000 Unit(s) SubCutaneous every 8 hours  insulin glargine Injectable (LANTUS) 11 Unit(s) SubCutaneous at bedtime  insulin lispro (HumaLOG) corrective regimen sliding scale   SubCutaneous every 6 hours  levETIRAcetam  Solution 500 milliGRAM(s) Oral two times a day  lidocaine   Patch 1 Patch Transdermal daily  predniSONE   Tablet 5 milliGRAM(s) Oral daily    MEDICATIONS  (PRN):  dextrose 40% Gel 15 Gram(s) Oral once PRN Blood Glucose LESS THAN 70 milliGRAM(s)/deciLiter  glucagon  Injectable 1 milliGRAM(s) IntraMuscular once PRN Glucose <70 milliGRAM(s)/deciLiter  loperamide 2 milliGRAM(s) Oral two times a day PRN Diarrhea  ondansetron Injectable 4 milliGRAM(s) IV Push every 8 hours PRN Nausea and/or Vomiting    Pertinent Labs:  @ 06:19: Na 134<L>, BUN 37<H>, Cr 2.53<H>, <H>, K+ 3.5, Phos --, Mg --, Alk Phos --, ALT/SGPT --, AST/SGOT --, HbA1c --    Finger Sticks:  POCT Blood Glucose.: 189 mg/dL ( @ 11:51)  POCT Blood Glucose.: 135 mg/dL ( @ 05:41)  POCT Blood Glucose.: 135 mg/dL ( @ 23:47)  POCT Blood Glucose.: 99 mg/dL ( @ 19:00)      Skin per nursing documentation: sacral decubiti st III    Estimated Needs:   [X ] no change since previous assessment  [ ] recalculated:     Previous Nutrition Diagnosis: malnutrition  Nutrition Diagnosis is: ongoing, addressed with tube feeding        Recommend  1) when medically feasible,  Nepro @45ml/hr x24 hrs, to provide 28 calories/kg, protein 87gm, 1.3/kg, based on dosing weight  68kg  2) continue henri x2 daily  3) follow GI work up    Monitoring and Evaluation:     Continue to monitor Nutritional intake, Tolerance to diet prescription, weights, labs, skin integrity    RD remains available upon request and will follow up per protocol

## 2019-12-30 NOTE — PROGRESS NOTE ADULT - ASSESSMENT
74M PMHx of ESRD s/p failed renal transplant x2, HCV, DM, and meningococcal meningitis 2 years ago was sent in to the ED from HD for AMS and hypotension with continued AMS with attempted PEG insertion with both IR and GI with inability to obtain good window for PEG insertion. Patient is s/p open gastrostomy tube placement on 12/21/2019 by ACS. Re-consulted for worsening abdominal distension and bilious fluid suctioned from oral cavity. CT A/P obtained showing a gastric and small bowel distension with air-fluid levels suggestive of ileus vs. early SBO. No discrete transition point identified.     -- No emergent surgical intervention warranted given benign abdominal exam. Low suspicion for ischemic bowel.   -- Tube feeds discontinued and gastrostomy tube to gravity.  -- Discontinue loperamide.   -- Acute Care Surgery to follow. Please contact ACS if worsening abdominal distension or interval change in exam.     Discussed with Attending Surgeon, Dr. YA Marroquin. Please contact Trauma & Acute Care Surgery (#[phone]) with any questions or concerns. 74M PMHx of ESRD s/p failed renal transplant x2, HCV, DM, and meningococcal meningitis 2 years ago was sent in to the ED from HD for AMS and hypotension with continued AMS with attempted PEG insertion with both IR and GI with inability to obtain good window for PEG insertion. Patient is s/p open gastrostomy tube placement on 12/21/2019 by [facility]. Re-consulted for worsening abdominal distension and bilious fluid suctioned from oral cavity. CT A/P obtained showing a gastric and small bowel distension with air-fluid levels suggestive of ileus vs. early SBO. No discrete transition point identified. Interval GNR bacteremia, and fluid collection visualized in lung likely secondary to an aspiration event. Ileus likely due to sepsis.     -- No emergent surgical intervention warranted given benign abdominal exam. Low suspicion for ischemic bowel.   -- Tube feeds discontinued and gastrostomy tube to gravity.  -- Discontinue loperamide.   -- Please obtain abdominal xray in the AM.   -- Acute Care Surgery to follow. Please contact ACS if worsening abdominal distension or interval change in exam.     Seen and examined with Attending Surgeon, Dr. YA Marroquin. Please contact Trauma & Acute Care Surgery (#[phone]) with any questions or concerns.

## 2019-12-30 NOTE — PROGRESS NOTE ADULT - ASSESSMENT
74M PMHx of ESRD s/p failed renal transplant x2, HCV, DM, and meningococcal meningitis 2 years ago was sent in to the ED from HD for AMS and low BPs. Admitted with concern for CVA, neurology following. Patient with RRT overnight 11/27 resulting in transfer to MICU for respiratory failure concern for aspiration and sepsis. Patient had PEG placed and hospital course further complicated by GNR bacteremia. Palliative re-consulted to readdress GO

## 2019-12-30 NOTE — PROGRESS NOTE ADULT - PROBLEM SELECTOR PLAN 5
will sign off at this time as goals are clear.  Family wants to aim all treatments at sustaining life, including artificial nutrition.  will need LTC when stable for discharge  please reconsult if needs arise Family wants to aim all treatments at sustaining life, including artificial nutrition.  will need LTC when stable for discharge  please reconsult if needs arise, will sign off at this time as goals are clear.

## 2019-12-30 NOTE — RAPID RESPONSE TEAM SUMMARY - NSSITUATIONBACKGROUNDRRT_GEN_ALL_CORE
74M phx ESRD s/p failed renal transplant x2, HCV, DM, and meningococcal meningitis 2 years ago was sent in to the ED from HD for AMS and low BPs. patient was found to have e.coli bacteriuria/UTI now s/p LP for AMS which was negative for infection. ID following for ?new nosocomial infection vs other infectious process. Patient is s/p 1L removal from HD yesterday. RRT  called for hypotension 70s/30s; initiated 1l bolus w/o significant response. difficult IV access, IO placed on left leg. started on IV pressor support w/ levophed. cultures sent, cbc, cmp, vbg also obtained. MICU fellow POCUS US suggestive of hypovolemia as IVC collaspse. patient escorted to MICU.
74M Hx Resp Failure requiring intubation, ESRD s/p Failed Renal Transplant X 2, HCV, UTI, CVA, , prolonged Hospitalization, Chronic Aspiration PNA, J-Tube drainage, Bedbound with A&Ox1, Functional Quad, large Presacral Decubital Wound Sepsis r/o Osteomyelitis, recent GN Bacteremia with CT today showing worsening pneumonia, ileus vs SBO, possible osteo. Earlier MICU consult for hypotension noted recommending albumin, hydrocortisone, midodrine if able. PEG currently to drainage given above findings. RRT called tonight for hypotension to 70s.  Patient receiving first bag of Albumin upon arrival.

## 2019-12-30 NOTE — PROGRESS NOTE ADULT - SUBJECTIVE AND OBJECTIVE BOX
CC: F/U for Bacteremia    Saw/spoke to patient. No fevers, no chills. Poor mental status. Chronically ill appearing.    Allergies  No Known Allergies    ANTIMICROBIALS:  ertapenem  IVPB 500 every 24 hours    PE:    Vital Signs Last 24 Hrs  T(C): 36.6 (30 Dec 2019 14:00), Max: 36.8 (29 Dec 2019 16:36)  T(F): 97.8 (30 Dec 2019 14:00), Max: 98.3 (29 Dec 2019 16:36)  HR: 101 (30 Dec 2019 10:00) (98 - 103)  BP: 106/67 (30 Dec 2019 14:00) (97/56 - 112/62)  RR: 18 (30 Dec 2019 14:00) (17 - 18)  SpO2: 92% (30 Dec 2019 14:00) (92% - 97%)    Gen: AOx1, chronically ill appearing  CV: S1+S2 normal, tachycardic  Resp: Clear bilat, no resp distress, no crackles/wheezes  Abd: Soft, nontender, +BS  Ext: No LE edema, no wounds  Lines: R sided HD catheter    LABS:                        9.0    18.67 )-----------( 274      ( 30 Dec 2019 08:43 )             30.1     12-30    134<L>  |  91<L>  |  37<H>  ----------------------------<  133<H>  3.5   |  24  |  2.53<H>    Ca    10.6<H>      30 Dec 2019 06:19    MICROBIOLOGY:    .Blood Blood-Venous  12-27-19   No growth to date.    .Blood Blood  12-27-19   No growth to date.    .Blood Blood  12-24-19   Growth in anaerobic bottle: Prevotella denticola "Susceptibilities not  performed"    .Stool Feces  12-14-19   GI PCR Results: NOT detected    .Stool Feces  12-10-19   GI PCR Results: NOT detected    .Blood Blood-Peripheral  12-06-19   No growth at 5 days.     CMV PCR Detection: NotDetec IU/mL (09-05-19 @ 21:02)    (otherwise reviewed)    RADIOLOGY:    12/23 XR:    FINDINGS:     The right internal jugular dialysis catheter tip is in the distal SVC.    The lungs are clear.    The heart size is normal.    Degenerative changes of the spine.    IMPRESSION:    Clear lungs.

## 2019-12-30 NOTE — PROGRESS NOTE ADULT - PROBLEM SELECTOR PLAN 3
- small BM today as per RN; diarrhea resolved  - changed imodium to prn as diarrhea has resolved   - GI PCR negative; can re-check if with persistent diarrhea   - C diff PCR negative; can re-check if with persistent watery diarrhea      - ID namrata noted

## 2019-12-30 NOTE — PROGRESS NOTE ADULT - ASSESSMENT
74 M PMHx of ESRD s/p failed renal transplant x2, HCV, DM, and meningococcal meningitis 2 years ago was sent in to the ED from HD for AMS and low BPs  CVA  Pneumonia-aspiration-resp failure s/p Rx  Patient also with sacral decub stage III  Leukocytosis  Is on baseline steroids  also s/p recent feeding tube placement   New GNR bacteremia--Prevotella denticola (oral mucosa vs gut)  Overall,  1) Persistent Leucocytosis   - externally no s/s SC HD catheter site infection  - Monitor for focal signs infection, if watery diarrhea persists, check C diff PCR  - Trend WBC  2) Sacral wound  - Monitor for signs infection  - Follow up wound care  3) Stroke--Monitor for signs aspiration, rehabilitation per primary team  4) New GNR Bacteremia  - Ertapenem 500mg q 24  - F/U pending BCX  - Check CT A/P w/ contrast (if can tolerate contrast)--to evaluate for source GNR  - Obtain dental eval to rule out dental source    Discussed with medicine team    My colleagues will be covering this patient on 12/31-1/1, I will return 1/2/20, please call [phone] with questions or change in status.     Deniz Barron MD  Pager [phone]  After 5pm and on weekends call [phone]

## 2019-12-30 NOTE — CONSULT NOTE ADULT - ATTENDING COMMENTS
Patient seen and examined  Above verified  Agree with above unless as noted below.  74M PMHx of ESRD s/p failed renal transplant x2, HCV, DM, and meningococcal meningitis 2 years ago was sent in to the ED from HD for AMS and low BPs.  patient when seen unable to provide history  very lethargic  has rigid neck  Exam with no abd tenderness  basal crackles  sacral wound no purulence no discharge  Left arm AVF no tenderness  UA with pyuria  CXR clear  labs with WBC 12, Ca 13  CT head with multiple strokkes of indeterminate age -cjhanged since 9/3    Dds ? sepsis ? from systemic infection ? CNS infection cannot be entirely ruled out since he is immunocompromised  Rec:  1) LP  2) blood, urine Cx, RVP, follow imaging  3) Correct Ca-consider ICU eval-wikll defer to primary team  4) Empiric CNS infection coverage with vanco x 1(redose if HD) meropenem 500 MG iv q 24 and acyclovir 340 mg IV q 24  5) Droplet precautions  6) Monitor for s/s any other foci  7) will need echo if blood Cx positive    Will tailor plan for ID issues  per course,results.Will defer to primary team on management of other issues.  case d/w Dr James, ER attending and sister  Will Follow.  Beeper [id number]/afterhours/No response-[id number]
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  Concern for possible infectious process such as meningitis, pt at increased risk of infections due to immunosuppression for renal transplant.   On keppra for presumed seizure disorder, but no clear history.    Pt is encephalopathic on exam,. opens eyes to voice but voice but does not maintain, not following commands, grimaces to noxious stimuli, resists passive movement in the arms and legs, hyperreflexic in the legs with bilateral cross adductors, bilateral upgoing toes.     Pt with signs of cortical or UMN dysfunction. Concern remains for meningo-encephalitis.   Started on empiric treatment with vancomycin, meropenem and acyclovir. Would also consider adding an antifungal since patient is immunocompromised.   Will have to hold off lumbar puncture as patient was on Eliquis, and with significantly decreased GFR it is recommended to wait 5 days.   Pt started on heparin gtt.   MRI brain w/con, no contrast due to renal dysfunction  Routine EEG
Agree with above. Surgical planning. Will d/w ATP team.
74M Hx Ex-ICU 11/30, ESRD s/p Failed Renal Transplant X 2, HCV, UTI, CVA, , prolonged Hospitalization, Chronic Aspiration PNA, J-Tube drainage, Bedbound with A&Ox1, Functional Quad, large Presacral Decubital Wound Sepsis r/o Osteomyelitis, recent GN Bacteremia, GI Ileus and borderline BP consulted for Hypotension.  - Sepsis associated Hypotension with multiple risk factors   - Poor functional status and nutritional deficiency   - ESRD s/p failed renal transplant x 2 on hemodialysis with poor clearance due to hypotension   - Consider 5% Albumin bolus and starting Midodrine; DC Coreg   - Overall prognosis poor and family insisted on Full Code    Patient seen and examined with ICU Resident/Fellow at bedside and lab data, medical records and radiology reports reviewed. I have read and agreeable with resident's Documentation, Assessment and Management Plans above in general which reflected my opinions from discussion.
Patient seen and examined.  Agree with resident note as above.  Patient with hx as noted including ESRD with failed renal transplant x2 who presents with altered mentation and signs of sepsis.  Found in ER to have labs showing hypercalcemia, +UA, repiratory alkalosis.  CTH shows new strokes (since Sept, but not acute).  Clearly this pt has multifocal encephalopathy - underlying dementia, sepsis (due to UTI vs gut; r/o meningitis), subacute CVAs, hypercalcemia.  Recs as above- pt is protecting his airway and vitals are stable.  Suggest tele/cont pulse ox.  Otherwise workup for sepsis/strokes/hyperCa and management of those.  Will need addtl hx from family for collateral and goals of care.
Patient counseled for compliance with consistent low carb diet and exercise as tolerated outpatient.

## 2019-12-30 NOTE — PROGRESS NOTE ADULT - SUBJECTIVE AND OBJECTIVE BOX
Patient is a 74y old  Male who presents with a chief complaint of ams (30 Dec 2019 17:10)      INTERVAL HPI/OVERNIGHT EVENTS:  T(C): 37.2 (12-30-19 @ 18:15), Max: 37.3 (12-30-19 @ 16:49)  HR: 128 (12-30-19 @ 18:15) (98 - 128)  BP: 90/52 (12-30-19 @ 18:15) (83/54 - 112/62)  RR: 18 (12-30-19 @ 18:15) (16 - 18)  SpO2: 95% (12-30-19 @ 18:15) (88% - 97%)  Wt(kg): --  I&O's Summary    29 Dec 2019 07:01  -  30 Dec 2019 07:00  --------------------------------------------------------  IN: 930 mL / OUT: 0 mL / NET: 930 mL        LABS:                        9.0    18.67 )-----------( 274      ( 30 Dec 2019 08:43 )             30.1     12-30    134<L>  |  91<L>  |  37<H>  ----------------------------<  133<H>  3.5   |  24  |  2.53<H>    Ca    10.6<H>      30 Dec 2019 06:19          CAPILLARY BLOOD GLUCOSE      POCT Blood Glucose.: 170 mg/dL (30 Dec 2019 18:50)  POCT Blood Glucose.: 189 mg/dL (30 Dec 2019 11:51)  POCT Blood Glucose.: 135 mg/dL (30 Dec 2019 05:41)  POCT Blood Glucose.: 135 mg/dL (29 Dec 2019 23:47)  POCT Blood Glucose.: 99 mg/dL (29 Dec 2019 19:00)            MEDICATIONS  (STANDING):  acetaminophen    Suspension .. 650 milliGRAM(s) Oral every 6 hours  atorvastatin 40 milliGRAM(s) Oral at bedtime  carvedilol 3.125 milliGRAM(s) Oral every 12 hours  chlorhexidine 4% Liquid 1 Application(s) Topical <User Schedule>  Dakins Solution - 1/4 Strength 1 Application(s) Topical two times a day  dextrose 50% Injectable 12.5 Gram(s) IV Push once  dextrose 50% Injectable 25 Gram(s) IV Push once  epoetin tan Injectable 32284 Unit(s) IV Push <User Schedule>  ertapenem  IVPB 500 milliGRAM(s) IV Intermittent every 24 hours  heparin  Injectable 5000 Unit(s) SubCutaneous every 8 hours  insulin glargine Injectable (LANTUS) 11 Unit(s) SubCutaneous at bedtime  insulin lispro (HumaLOG) corrective regimen sliding scale   SubCutaneous every 6 hours  levETIRAcetam  Solution 500 milliGRAM(s) Oral two times a day  lidocaine   Patch 1 Patch Transdermal daily  predniSONE   Tablet 5 milliGRAM(s) Oral daily    MEDICATIONS  (PRN):  dextrose 40% Gel 15 Gram(s) Oral once PRN Blood Glucose LESS THAN 70 milliGRAM(s)/deciLiter  glucagon  Injectable 1 milliGRAM(s) IntraMuscular once PRN Glucose <70 milliGRAM(s)/deciLiter  loperamide 2 milliGRAM(s) Oral two times a day PRN Diarrhea  ondansetron Injectable 4 milliGRAM(s) IV Push every 8 hours PRN Nausea and/or Vomiting          PHYSICAL EXAM:  GENERAL: frail  CHEST/LUNG: Clear to percussion bilaterally; No rales, rhonchi, wheezing, or rubs  HEART: Regular rate and rhythm; No murmurs, rubs, or gallops  ABDOMEN: Soft, Nontender, Nondistended; Bowel sounds present  EXTREMITIES:  no edema     Care Discussed with Consultants/Other Providers [ ] YES  [ ] NO

## 2019-12-30 NOTE — PROGRESS NOTE ADULT - ATTENDING COMMENTS
Patient seen and examined on 12/30/19 with house staff  mildly toxic appearing  mental status remains poor  abdominal exam - mildly distended, soft, nontender    CT scan reviewed - likely ileus, no signs of bowel ischemia, G-tube in good position    - Likely reactive ileus from sepsis.  VERY high risk for significant pneumonia given severe dysphagia.  MICU to be consulted by primary service.  - Would place g-tube to gravity to help decompress ileus

## 2019-12-31 LAB
ANISOCYTOSIS BLD QL: SLIGHT — SIGNIFICANT CHANGE UP
BASE EXCESS BLDV CALC-SCNC: 3.7 MMOL/L — HIGH (ref -2–2)
BASOPHILS # BLD AUTO: 0 K/UL — SIGNIFICANT CHANGE UP (ref 0–0.2)
BASOPHILS NFR BLD AUTO: 0 % — SIGNIFICANT CHANGE UP (ref 0–2)
CA-I SERPL-SCNC: 1.33 MMOL/L — HIGH (ref 1.12–1.3)
CHLORIDE BLDV-SCNC: 99 MMOL/L — SIGNIFICANT CHANGE UP (ref 96–108)
CO2 BLDV-SCNC: 29 MMOL/L — SIGNIFICANT CHANGE UP (ref 22–30)
EOSINOPHIL # BLD AUTO: 0 K/UL — SIGNIFICANT CHANGE UP (ref 0–0.5)
EOSINOPHIL NFR BLD AUTO: 0 % — SIGNIFICANT CHANGE UP (ref 0–6)
GAS PNL BLDA: SIGNIFICANT CHANGE UP
GAS PNL BLDV: 135 MMOL/L — SIGNIFICANT CHANGE UP (ref 135–145)
GAS PNL BLDV: SIGNIFICANT CHANGE UP
GAS PNL BLDV: SIGNIFICANT CHANGE UP
GIANT PLATELETS BLD QL SMEAR: PRESENT — SIGNIFICANT CHANGE UP
GLUCOSE BLDC GLUCOMTR-MCNC: 145 MG/DL — HIGH (ref 70–99)
GLUCOSE BLDC GLUCOMTR-MCNC: 185 MG/DL — HIGH (ref 70–99)
GLUCOSE BLDC GLUCOMTR-MCNC: 236 MG/DL — HIGH (ref 70–99)
GLUCOSE BLDV-MCNC: 161 MG/DL — HIGH (ref 70–99)
HCO3 BLDV-SCNC: 28 MMOL/L — SIGNIFICANT CHANGE UP (ref 21–29)
HCT VFR BLDA CALC: 23 % — LOW (ref 39–50)
HGB BLD CALC-MCNC: 7.4 G/DL — LOW (ref 13–17)
HYPOCHROMIA BLD QL: SIGNIFICANT CHANGE UP
LACTATE BLDV-MCNC: 1.3 MMOL/L — SIGNIFICANT CHANGE UP (ref 0.7–2)
LYMPHOCYTES # BLD AUTO: 10.5 % — LOW (ref 13–44)
LYMPHOCYTES # BLD AUTO: 3.09 K/UL — SIGNIFICANT CHANGE UP (ref 1–3.3)
MACROCYTES BLD QL: SLIGHT — SIGNIFICANT CHANGE UP
MANUAL SMEAR VERIFICATION: SIGNIFICANT CHANGE UP
MICROCYTES BLD QL: SLIGHT — SIGNIFICANT CHANGE UP
MONOCYTES # BLD AUTO: 2.33 K/UL — HIGH (ref 0–0.9)
MONOCYTES NFR BLD AUTO: 7.9 % — SIGNIFICANT CHANGE UP (ref 2–14)
NEUTROPHILS # BLD AUTO: 24.05 K/UL — HIGH (ref 1.8–7.4)
NEUTROPHILS NFR BLD AUTO: 75.4 % — SIGNIFICANT CHANGE UP (ref 43–77)
NEUTS BAND # BLD: 6.2 % — SIGNIFICANT CHANGE UP (ref 0–8)
OTHER CELLS CSF MANUAL: 7 ML/DL — LOW (ref 18–22)
PCO2 BLDV: 44 MMHG — SIGNIFICANT CHANGE UP (ref 35–50)
PH BLDV: 7.42 — SIGNIFICANT CHANGE UP (ref 7.35–7.45)
PLAT MORPH BLD: NORMAL — SIGNIFICANT CHANGE UP
PLATELET COUNT - ESTIMATE: NORMAL — SIGNIFICANT CHANGE UP
PO2 BLDV: 39 MMHG — SIGNIFICANT CHANGE UP (ref 25–45)
POLYCHROMASIA BLD QL SMEAR: SLIGHT — SIGNIFICANT CHANGE UP
POTASSIUM BLDV-SCNC: 3.3 MMOL/L — LOW (ref 3.5–5.3)
PROCALCITONIN SERPL-MCNC: 1.82 NG/ML — HIGH (ref 0.02–0.1)
RBC BLD AUTO: ABNORMAL
SAO2 % BLDV: 68 % — SIGNIFICANT CHANGE UP (ref 67–88)
VANCOMYCIN TROUGH SERPL-MCNC: 16.4 UG/ML — SIGNIFICANT CHANGE UP (ref 10–20)

## 2019-12-31 PROCEDURE — 74177 CT ABD & PELVIS W/CONTRAST: CPT | Mod: 26

## 2019-12-31 PROCEDURE — 99232 SBSQ HOSP IP/OBS MODERATE 35: CPT

## 2019-12-31 PROCEDURE — 71045 X-RAY EXAM CHEST 1 VIEW: CPT | Mod: 26

## 2019-12-31 PROCEDURE — 99291 CRITICAL CARE FIRST HOUR: CPT | Mod: 25

## 2019-12-31 PROCEDURE — 36556 INSERT NON-TUNNEL CV CATH: CPT

## 2019-12-31 PROCEDURE — 70450 CT HEAD/BRAIN W/O DYE: CPT | Mod: 26

## 2019-12-31 RX ORDER — ALTEPLASE 100 MG
2 KIT INTRAVENOUS ONCE
Refills: 0 | Status: COMPLETED | OUTPATIENT
Start: 2019-12-31 | End: 2019-12-31

## 2019-12-31 RX ORDER — LEVETIRACETAM 250 MG/1
500 TABLET, FILM COATED ORAL EVERY 12 HOURS
Refills: 0 | Status: DISCONTINUED | OUTPATIENT
Start: 2019-12-31 | End: 2020-01-05

## 2019-12-31 RX ORDER — HYDROCORTISONE 20 MG
50 TABLET ORAL EVERY 8 HOURS
Refills: 0 | Status: DISCONTINUED | OUTPATIENT
Start: 2019-12-31 | End: 2020-01-02

## 2019-12-31 RX ORDER — MEROPENEM 1 G/30ML
500 INJECTION INTRAVENOUS EVERY 24 HOURS
Refills: 0 | Status: DISCONTINUED | OUTPATIENT
Start: 2020-01-01 | End: 2020-01-05

## 2019-12-31 RX ORDER — PANTOPRAZOLE SODIUM 20 MG/1
40 TABLET, DELAYED RELEASE ORAL DAILY
Refills: 0 | Status: DISCONTINUED | OUTPATIENT
Start: 2019-12-31 | End: 2020-01-05

## 2019-12-31 RX ORDER — MEROPENEM 1 G/30ML
500 INJECTION INTRAVENOUS EVERY 24 HOURS
Refills: 0 | Status: DISCONTINUED | OUTPATIENT
Start: 2019-12-31 | End: 2019-12-31

## 2019-12-31 RX ADMIN — Medication 50 MILLIGRAM(S): at 14:30

## 2019-12-31 RX ADMIN — CHLORHEXIDINE GLUCONATE 15 MILLILITER(S): 213 SOLUTION TOPICAL at 05:45

## 2019-12-31 RX ADMIN — Medication 100 MILLIGRAM(S): at 05:44

## 2019-12-31 RX ADMIN — Medication 23.7 MICROGRAM(S)/KG/MIN: at 19:23

## 2019-12-31 RX ADMIN — CHLORHEXIDINE GLUCONATE 1 APPLICATION(S): 213 SOLUTION TOPICAL at 05:45

## 2019-12-31 RX ADMIN — Medication 1 APPLICATION(S): at 05:45

## 2019-12-31 RX ADMIN — Medication 23.7 MICROGRAM(S)/KG/MIN: at 18:18

## 2019-12-31 RX ADMIN — Medication 250 MILLIGRAM(S): at 01:52

## 2019-12-31 RX ADMIN — CHLORHEXIDINE GLUCONATE 15 MILLILITER(S): 213 SOLUTION TOPICAL at 18:18

## 2019-12-31 RX ADMIN — PANTOPRAZOLE SODIUM 40 MILLIGRAM(S): 20 TABLET, DELAYED RELEASE ORAL at 14:30

## 2019-12-31 RX ADMIN — Medication 1 APPLICATION(S): at 18:19

## 2019-12-31 RX ADMIN — Medication 2: at 00:21

## 2019-12-31 RX ADMIN — ERYTHROPOIETIN 10000 UNIT(S): 10000 INJECTION, SOLUTION INTRAVENOUS; SUBCUTANEOUS at 10:57

## 2019-12-31 RX ADMIN — LIDOCAINE 1 PATCH: 4 CREAM TOPICAL at 00:00

## 2019-12-31 RX ADMIN — LEVETIRACETAM 400 MILLIGRAM(S): 250 TABLET, FILM COATED ORAL at 18:19

## 2019-12-31 RX ADMIN — ATORVASTATIN CALCIUM 40 MILLIGRAM(S): 80 TABLET, FILM COATED ORAL at 22:42

## 2019-12-31 RX ADMIN — Medication 2: at 18:18

## 2019-12-31 RX ADMIN — ALTEPLASE 2 MILLIGRAM(S): KIT at 13:48

## 2019-12-31 RX ADMIN — ERTAPENEM SODIUM 100 MILLIGRAM(S): 1 INJECTION, POWDER, LYOPHILIZED, FOR SOLUTION INTRAMUSCULAR; INTRAVENOUS at 14:33

## 2019-12-31 RX ADMIN — Medication 23.7 MICROGRAM(S)/KG/MIN: at 01:52

## 2019-12-31 RX ADMIN — Medication 50 MILLIGRAM(S): at 22:41

## 2019-12-31 RX ADMIN — LEVETIRACETAM 400 MILLIGRAM(S): 250 TABLET, FILM COATED ORAL at 05:43

## 2019-12-31 RX ADMIN — ALTEPLASE 2 MILLIGRAM(S): KIT at 13:47

## 2019-12-31 RX ADMIN — Medication 4: at 05:45

## 2019-12-31 NOTE — CONSULT NOTE ADULT - PROVIDER SPECIALTY LIST ADULT
Infectious Disease
MICU
Cardiology
Dental
MICU
Neurology
Surgery
Gastroenterology
Endocrinology
Palliative Care
Nephrology

## 2019-12-31 NOTE — PROGRESS NOTE ADULT - ASSESSMENT
Assessment  DM: A1C 5.5%, was on insulin at home, now on insulin coverage scale, FS trending within acceptable range, no hypoglycemic episodes. RRT overnight for hypotensive episode requiring pressors, Patient is NPO, transferred to the MICU overnight, now on hydrocortisone, appears comfortable.  Sacral Wound: s/p debridement, monitored, FU wound care.  Hypercalcemia: Primary Hyperparathyroidism, hypercalcemia S/P Pamidronate injection, Ca levels improved.   Sepsis: IV ABx for UTI, LP inconsistent with meningitis, on medications, stable, monitored.  HTN: Controlled,  on antihypertensive medications.  ESRD: On hemodialysis, Monitor labs/BMP          Robi Gay MD  Cell: [phone]  Office: [phone] Assessment  DM: A1C 5.5%, was on insulin at home, now on insulin coverage scale, FS trending within acceptable range, no hypoglycemic episodes. RRT overnight for  hypotensive episode requiring pressors, Patient is NPO, transferred to the MICU overnight, now on hydrocortisone, appears comfortable.  Sacral Wound: s/p debridement, monitored, FU wound care.  Hypercalcemia: Primary Hyperparathyroidism, hypercalcemia S/P Pamidronate injection, Ca levels improved.   Sepsis: IV ABx for UTI, LP inconsistent with meningitis, on medications, stable, monitored.  HTN: Controlled,  on antihypertensive medications.  ESRD: On hemodialysis, Monitor labs/BMP          Robi Gay MD  Cell: [phone]  Office: [phone]

## 2019-12-31 NOTE — CHART NOTE - NSCHARTNOTEFT_GEN_A_CORE
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 Pt initally presented 11/20/19  from NH for AMS x 1 day. RRT called on 11/26/19 for hypotension. Transferred to MICU for intubation, treatment of ecoli bacteremia/UTI and pressor support. LP negative for meningitis. Extubated on 11/29/19, transferred to floors. Patient s/p open feeding gastrostomy tube placement on 12/21/19 with surgery. Baseline AOx1. MICU consulted on 12/30 for AMS. Pt AOx0 and hypotensive to sbp 87. CT abd/pelvis non-con showing dilated loops of bowel, no clear transition point and new opacities in RLL suspicious for pneumonia. Patient admitted to MICU for pressor support, and treatment of septic shock.     PAST MEDICAL & SURGICAL HISTORY:  Kidney transplant recipient  Anuria  GERD (gastroesophageal reflux disease)  Kidney transplant failure: dx: 2/2013  Hepatitis C: dx: 90&#x27;s.  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, no known MIs  H/O kidney transplant: may 2015  A-V fistula: Left, implanted (?)  S/p cadaver renal transplant: 2008  S/P partial colectomy: due to diverticulitis      FAMILY HISTORY:  Family history of breast cancer in sister (Sibling): living at 92 yo  Family history of prostate cancer in father  Family history of lung cancer  Family history of hypertension in mother  Family history of diabetes mellitus: mother      SOCIAL HISTORY:  Smoking: [  ] Never Smoked  [  ] Former Smoker (# packs x # years)  [  ] Current Smoker (# packs x # years)  Substance Use:   EtOH Use:   Marital Status: [  ] Single  [  ]   [  ]   [  ]   Sexual History:   Occupation:  Recent Travel:  Country of Birth:   Advance Directives:     HOME MEDICATIONS:      Allergies    No Known Allergies    Intolerances          REVIEW OF SYSTEMS:  Constitutional: No fevers, chills, weight loss, weight gain  HEENT: No vision problems, eye pain, nasal congestion, rhinorrhea, sore throat, dysphagia  CV: No chest pain, orthopnea, palpitations  Resp: No cough, dyspnea, wheezing, hemoptysis  GI: No nausea, vomiting, diarrhea, constipation, abdominal pain  : [ ] dysuria [ ] nocturia [ ] hematuria [ ] increased urinary frequency  Musculoskeletal: [ ] back pain [ ] myalgias [ ] arthralgias [ ] fracture  Skin: [ ] rash [ ] itch  Neurological: [ ] headache [ ] dizziness [ ] syncope [ ] weakness [ ] numbness  Psychiatric: [ ] anxiety [ ] depression  Endocrine: [ ] diabetes [ ] thyroid problem  Hematologic/Lymphatic: [ ] anemia [ ] bleeding problem  Allergic/Immunologic: [ ] itchy eyes [ ] nasal discharge [ ] hives [ ] angioedema  [ ] All other systems negative  [X] Unable to assess ROS due to patient's clinical condition.     OBJECTIVE:  ICU Vital Signs Last 24 Hrs  T(C): 37.4 (30 Dec 2019 23:20), Max: 37.4 (30 Dec 2019 23:20)  T(F): 99.4 (30 Dec 2019 23:20), Max: 99.4 (30 Dec 2019 23:20)  HR: 120 (30 Dec 2019 23:30) (98 - 132)  BP: 75/49 (30 Dec 2019 23:30) (75/49 - 106/67)  BP(mean): 57 (30 Dec 2019 23:30) (57 - 63)  ABP: --  ABP(mean): --  RR: 22 (30 Dec 2019 23:30) (16 - 22)  SpO2: 97% (30 Dec 2019 23:30) (88% - 98%)        12-29 @ 07:01  -  12-30 @ 07:00  --------------------------------------------------------  IN: 930 mL / OUT: 0 mL / NET: 930 mL      CAPILLARY BLOOD GLUCOSE      POCT Blood Glucose.: 158 mg/dL (30 Dec 2019 22:14)      PHYSICAL EXAM:  GENERAL: lethargic, emaciated  HEAD:  Atraumatic, Normocephalic  EYES: EOMI, PERRLA, conjunctiva and sclera clear  NECK: Supple, No JVD  CHEST/LUNG: Clear to auscultation bilaterally; No wheeze  HEART: NL s1s2, regular rate and rhythm; No murmurs, rubs, or gallops  ABDOMEN: Soft, Nontender, Nondistended; Bowel sounds present. Drain present. draining bilious fluid.   EXTREMITIES:  2+ Peripheral Pulses, No clubbing, cyanosis, or edema  PSYCH: AAOx0  NEUROLOGY: non-focal  SKIN: J tube in place, bilious drainage    LINES:     HOSPITAL MEDICATIONS:  MEDICATIONS  (STANDING):  atorvastatin 40 milliGRAM(s) Oral at bedtime  chlorhexidine 0.12% Liquid 15 milliLiter(s) Oral Mucosa two times a day  chlorhexidine 4% Liquid 1 Application(s) Topical <User Schedule>  Dakins Solution - 1/4 Strength 1 Application(s) Topical two times a day  epoetin tan Injectable 25182 Unit(s) IV Push <User Schedule>  ertapenem  IVPB 500 milliGRAM(s) IV Intermittent every 24 hours  hydrocortisone sodium succinate Injectable 100 milliGRAM(s) IV Push every 8 hours  insulin lispro (HumaLOG) corrective regimen sliding scale   SubCutaneous every 6 hours  levETIRAcetam  Solution 500 milliGRAM(s) Oral two times a day  norepinephrine Infusion 0.4 MICROgram(s)/kG/Min (23.7 mL/Hr) IV Continuous <Continuous>  vancomycin  IVPB 1000 milliGRAM(s) IV Intermittent once    MEDICATIONS  (PRN):      LABS:                        8.4    29.47 )-----------( 356      ( 30 Dec 2019 22:43 )             28.4     12-30    134<L>  |  90<L>  |  56<H>  ----------------------------<  170<H>  3.8   |  24  |  3.38<H>    Ca    11.0<H>      30 Dec 2019 22:43  Phos  2.8     12-30  Mg     2.2     12-30    TPro  7.3  /  Alb  2.5<L>  /  TBili  0.4  /  DBili  x   /  AST  25  /  ALT  8<L>  /  AlkPhos  100  12-30    PT/INR - ( 30 Dec 2019 22:50 )   PT: 14.5 sec;   INR: 1.25 ratio         PTT - ( 30 Dec 2019 22:50 )  PTT:29.1 sec  ABG - ( 30 Dec 2019 22:58 )  pH, Arterial: 7.51  pH, Blood: x     /  pCO2: 35    /  pO2: 73    / HCO3: 28    / Base Excess: 4.6   /  SaO2: 96                  Lactate Trend        CAPILLARY BLOOD GLUCOSE      POCT Blood Glucose.: 158 mg/dL (30 Dec 2019 22:14)  POCT Blood Glucose.: 170 mg/dL (30 Dec 2019 18:50)  POCT Blood Glucose.: 189 mg/dL (30 Dec 2019 11:51)  POCT Blood Glucose.: 135 mg/dL (30 Dec 2019 05:41)      RADIOLOGY & ADDITIONAL TESTS:    ASSESSMENT/PLAN:  74M PMHx of ESRD s/p failed renal transplant x2, HCV, DM, and meningococcal meningitis 2 years ago was sent in to the ED from HD for AMS and low BPs found later to be in septic shock 2/2 UTI on pressors, intubated with concern for aspiration PNA, found to have GNR bacteremia. New imaging showing dilated loops of bowel, no clear transition point. Patient's BP soft 90s/50s admitted to MICU for pressor support, and treatment of septic shock.     # NEURO  - p/w AMS, metabolic encephalopathy  - neuro following  - chronic lacunar infarcts; per neuro, AMS also likely related to hypercalcemia, renal dysfunction , poor nutritional intake.   - c/w keppra for hx of seizure disorder  -neuro checks per ICU protocol     # CARDIAC  - hx of afib was on eliquis, now off a/c per cards due to bleeding risk  - holding carvedilol 6.25 BID  - hx of cocaine cardiomyopathy, resolved, normal EF on most recent echo  - c/w statin  - hypotensive during RRT 12/30; started levo gtt    # PULM  -satting well on RA   -continue to monitor     # RENAL  - hx of ESRD s/p failed renal transplant x2  - renal following, HD as recommended.  - c/w hydrocortisone, epogen   - has dialysis catheter in place  - start 250cc 5% albumin x2 bolus    # GI   - PEG tube in place   - tube feeds ordered    # HEME/ONC  - pt was on eliquis; now held   -HDS  -keep active type and screen     # ID   WBC uptrending. ID following  -c/w vanc, ertapenem  - hypotension likely 2/2 septic shock, possible aspiration, UTI, has decubitus ulcers as well.  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 Pt initally presented 11/20/19  from NH for AMS x 1 day. RRT called on 11/26/19 for hypotension. Transferred to MICU for intubation, treatment of ecoli bacteremia/UTI and pressor support. LP negative for meningitis. Extubated on 11/29/19, transferred to floors. Patient s/p open feeding gastrostomy tube placement on 12/21/19 with surgery. Baseline AOx1. MICU consulted on 12/30 for AMS. Pt AOx0 and hypotensive to sbp 87. CT abd/pelvis non-con showing dilated loops of bowel, no clear transition point and new opacities in RLL suspicious for pneumonia. Patient admitted to MICU for pressor support, and treatment of septic shock.     PAST MEDICAL & SURGICAL HISTORY:  Kidney transplant recipient  Anuria  GERD (gastroesophageal reflux disease)  Kidney transplant failure: dx: 2/2013  Hepatitis C: dx: 90&#x27;s.  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, no known MIs  H/O kidney transplant: may 2015  A-V fistula: Left, implanted (?)  S/p cadaver renal transplant: 2008  S/P partial colectomy: due to diverticulitis      FAMILY HISTORY:  Family history of breast cancer in sister (Sibling): living at 92 yo  Family history of prostate cancer in father  Family history of lung cancer  Family history of hypertension in mother  Family history of diabetes mellitus: mother      SOCIAL HISTORY:  Smoking: [  ] Never Smoked  [  ] Former Smoker (# packs x # years)  [  ] Current Smoker (# packs x # years)  Substance Use:   EtOH Use:   Marital Status: [  ] Single  [  ]   [  ]   [  ]   Sexual History:   Occupation:  Recent Travel:  Country of Birth:   Advance Directives:     HOME MEDICATIONS:      Allergies    No Known Allergies    Intolerances          REVIEW OF SYSTEMS:  Constitutional: No fevers, chills, weight loss, weight gain  HEENT: No vision problems, eye pain, nasal congestion, rhinorrhea, sore throat, dysphagia  CV: No chest pain, orthopnea, palpitations  Resp: No cough, dyspnea, wheezing, hemoptysis  GI: No nausea, vomiting, diarrhea, constipation, abdominal pain  : [ ] dysuria [ ] nocturia [ ] hematuria [ ] increased urinary frequency  Musculoskeletal: [ ] back pain [ ] myalgias [ ] arthralgias [ ] fracture  Skin: [ ] rash [ ] itch  Neurological: [ ] headache [ ] dizziness [ ] syncope [ ] weakness [ ] numbness  Psychiatric: [ ] anxiety [ ] depression  Endocrine: [ ] diabetes [ ] thyroid problem  Hematologic/Lymphatic: [ ] anemia [ ] bleeding problem  Allergic/Immunologic: [ ] itchy eyes [ ] nasal discharge [ ] hives [ ] angioedema  [ ] All other systems negative  [X] Unable to assess ROS due to patient's clinical condition.     OBJECTIVE:  ICU Vital Signs Last 24 Hrs  T(C): 37.4 (30 Dec 2019 23:20), Max: 37.4 (30 Dec 2019 23:20)  T(F): 99.4 (30 Dec 2019 23:20), Max: 99.4 (30 Dec 2019 23:20)  HR: 120 (30 Dec 2019 23:30) (98 - 132)  BP: 75/49 (30 Dec 2019 23:30) (75/49 - 106/67)  BP(mean): 57 (30 Dec 2019 23:30) (57 - 63)  ABP: --  ABP(mean): --  RR: 22 (30 Dec 2019 23:30) (16 - 22)  SpO2: 97% (30 Dec 2019 23:30) (88% - 98%)        12-29 @ 07:01  -  12-30 @ 07:00  --------------------------------------------------------  IN: 930 mL / OUT: 0 mL / NET: 930 mL      CAPILLARY BLOOD GLUCOSE      POCT Blood Glucose.: 158 mg/dL (30 Dec 2019 22:14)      PHYSICAL EXAM:  GENERAL: lethargic, emaciated  HEAD:  Atraumatic, Normocephalic  EYES: EOMI, PERRLA, conjunctiva and sclera clear  NECK: Supple, No JVD  CHEST/LUNG: Clear to auscultation bilaterally; No wheeze  HEART: NL s1s2, regular rate and rhythm; No murmurs, rubs, or gallops  ABDOMEN: Soft, Nontender, Nondistended; Bowel sounds present. Drain present. draining bilious fluid.   EXTREMITIES:  2+ Peripheral Pulses, No clubbing, cyanosis, or edema  PSYCH: AAOx0  NEUROLOGY: non-focal  SKIN: J tube in place, bilious drainage    LINES:     HOSPITAL MEDICATIONS:  MEDICATIONS  (STANDING):  atorvastatin 40 milliGRAM(s) Oral at bedtime  chlorhexidine 0.12% Liquid 15 milliLiter(s) Oral Mucosa two times a day  chlorhexidine 4% Liquid 1 Application(s) Topical <User Schedule>  Dakins Solution - 1/4 Strength 1 Application(s) Topical two times a day  epoetin tan Injectable 48974 Unit(s) IV Push <User Schedule>  ertapenem  IVPB 500 milliGRAM(s) IV Intermittent every 24 hours  hydrocortisone sodium succinate Injectable 100 milliGRAM(s) IV Push every 8 hours  insulin lispro (HumaLOG) corrective regimen sliding scale   SubCutaneous every 6 hours  levETIRAcetam  Solution 500 milliGRAM(s) Oral two times a day  norepinephrine Infusion 0.4 MICROgram(s)/kG/Min (23.7 mL/Hr) IV Continuous <Continuous>  vancomycin  IVPB 1000 milliGRAM(s) IV Intermittent once    MEDICATIONS  (PRN):      LABS:                        8.4    29.47 )-----------( 356      ( 30 Dec 2019 22:43 )             28.4     12-30    134<L>  |  90<L>  |  56<H>  ----------------------------<  170<H>  3.8   |  24  |  3.38<H>    Ca    11.0<H>      30 Dec 2019 22:43  Phos  2.8     12-30  Mg     2.2     12-30    TPro  7.3  /  Alb  2.5<L>  /  TBili  0.4  /  DBili  x   /  AST  25  /  ALT  8<L>  /  AlkPhos  100  12-30    PT/INR - ( 30 Dec 2019 22:50 )   PT: 14.5 sec;   INR: 1.25 ratio         PTT - ( 30 Dec 2019 22:50 )  PTT:29.1 sec  ABG - ( 30 Dec 2019 22:58 )  pH, Arterial: 7.51  pH, Blood: x     /  pCO2: 35    /  pO2: 73    / HCO3: 28    / Base Excess: 4.6   /  SaO2: 96                  Lactate Trend        CAPILLARY BLOOD GLUCOSE      POCT Blood Glucose.: 158 mg/dL (30 Dec 2019 22:14)  POCT Blood Glucose.: 170 mg/dL (30 Dec 2019 18:50)  POCT Blood Glucose.: 189 mg/dL (30 Dec 2019 11:51)  POCT Blood Glucose.: 135 mg/dL (30 Dec 2019 05:41)      RADIOLOGY & ADDITIONAL TESTS:    ASSESSMENT/PLAN:  74M PMHx of ESRD s/p failed renal transplant x2, HCV, DM, and meningococcal meningitis 2 years ago was sent in to the ED from HD for AMS and low BPs found later to be in septic shock 2/2 UTI on pressors, intubated with concern for aspiration PNA, found to have GNR bacteremia. New imaging showing dilated loops of bowel, no clear transition point. Patient's BP soft 90s/50s admitted to MICU for pressor support, and treatment of septic shock.     # NEURO  - p/w AMS, metabolic encephalopathy  - neuro following  - chronic lacunar infarcts; per neuro, AMS also likely related to hypercalcemia, renal dysfunction , poor nutritional intake.   - c/w keppra for hx of seizure disorder  -neuro checks per ICU protocol     # CARDIAC  - hx of afib was on eliquis, now off a/c per cards due to bleeding risk  - holding carvedilol 6.25 BID  - hx of cocaine cardiomyopathy, resolved, normal EF on most recent echo  - c/w statin  - hypotensive during RRT 12/30; started levo gtt  - will place central line     # PULM  -satting well on NC 2L O2   -continue to monitor     # RENAL  - hx of ESRD s/p failed renal transplant x2  - renal following, HD as recommended.  - c/w hydrocortisone, epogen   - has dialysis catheter in place  - start 250cc 5% albumin x2 bolus    # GI   - PEG tube in place   - tube feeds ordered    # HEME/ONC  - pt was on eliquis; now held   -HDS  -keep active type and screen     # ID   WBC uptrending. ID following  -c/w vanc, ertapenem  - hypotension likely 2/2 septic shock, possible aspiration, UTI, has decubitus ulcers as well.  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 Pt initally presented 11/20/19  from NH for AMS x 1 day. RRT called on 11/26/19 for hypotension. Transferred to MICU for intubation, treatment of ecoli bacteremia/UTI and pressor support. LP negative for meningitis. Extubated on 11/29/19, transferred to floors. Patient s/p open feeding gastrostomy tube placement on 12/21/19 with surgery. Baseline AOx1. MICU consulted on 12/30 for AMS. Pt AOx0 and hypotensive to sbp 87. CT abd/pelvis non-con showing dilated loops of bowel, no clear transition point and new opacities in RLL suspicious for pneumonia. Patient admitted to MICU for pressor support, and treatment of septic shock.     PAST MEDICAL & SURGICAL HISTORY:  Kidney transplant recipient  Anuria  GERD (gastroesophageal reflux disease)  Kidney transplant failure: dx: 2/2013  Hepatitis C: dx: 90&#x27;s.  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, no known MIs  H/O kidney transplant: may 2015  A-V fistula: Left, implanted (?)  S/p cadaver renal transplant: 2008  S/P partial colectomy: due to diverticulitis      FAMILY HISTORY:  Family history of breast cancer in sister (Sibling): living at 94 yo  Family history of prostate cancer in father  Family history of lung cancer  Family history of hypertension in mother  Family history of diabetes mellitus: mother      SOCIAL HISTORY:  Smoking: [  ] Never Smoked  [  ] Former Smoker (# packs x # years)  [  ] Current Smoker (# packs x # years)  Substance Use:   EtOH Use:   Marital Status: [  ] Single  [  ]   [  ]   [  ]   Sexual History:   Occupation:  Recent Travel:  Country of Birth:   Advance Directives:     HOME MEDICATIONS:      Allergies    No Known Allergies    Intolerances          REVIEW OF SYSTEMS:  Constitutional: No fevers, chills, weight loss, weight gain  HEENT: No vision problems, eye pain, nasal congestion, rhinorrhea, sore throat, dysphagia  CV: No chest pain, orthopnea, palpitations  Resp: No cough, dyspnea, wheezing, hemoptysis  GI: No nausea, vomiting, diarrhea, constipation, abdominal pain  : [ ] dysuria [ ] nocturia [ ] hematuria [ ] increased urinary frequency  Musculoskeletal: [ ] back pain [ ] myalgias [ ] arthralgias [ ] fracture  Skin: [ ] rash [ ] itch  Neurological: [ ] headache [ ] dizziness [ ] syncope [ ] weakness [ ] numbness  Psychiatric: [ ] anxiety [ ] depression  Endocrine: [ ] diabetes [ ] thyroid problem  Hematologic/Lymphatic: [ ] anemia [ ] bleeding problem  Allergic/Immunologic: [ ] itchy eyes [ ] nasal discharge [ ] hives [ ] angioedema  [ ] All other systems negative  [X] Unable to assess ROS due to patient's clinical condition.     OBJECTIVE:  ICU Vital Signs Last 24 Hrs  T(C): 37.4 (30 Dec 2019 23:20), Max: 37.4 (30 Dec 2019 23:20)  T(F): 99.4 (30 Dec 2019 23:20), Max: 99.4 (30 Dec 2019 23:20)  HR: 120 (30 Dec 2019 23:30) (98 - 132)  BP: 75/49 (30 Dec 2019 23:30) (75/49 - 106/67)  BP(mean): 57 (30 Dec 2019 23:30) (57 - 63)  ABP: --  ABP(mean): --  RR: 22 (30 Dec 2019 23:30) (16 - 22)  SpO2: 97% (30 Dec 2019 23:30) (88% - 98%)        12-29 @ 07:01  -  12-30 @ 07:00  --------------------------------------------------------  IN: 930 mL / OUT: 0 mL / NET: 930 mL      CAPILLARY BLOOD GLUCOSE      POCT Blood Glucose.: 158 mg/dL (30 Dec 2019 22:14)      PHYSICAL EXAM:  GENERAL: lethargic, emaciated  HEAD:  Atraumatic, Normocephalic  EYES: EOMI, PERRLA, conjunctiva and sclera clear  NECK: Supple, No JVD  CHEST/LUNG: Clear to auscultation bilaterally; No wheeze  HEART: NL s1s2, regular rate and rhythm; No murmurs, rubs, or gallops  ABDOMEN: Soft, Nontender, Nondistended; Bowel sounds present. Drain present. draining bilious fluid.   EXTREMITIES:  2+ Peripheral Pulses, No clubbing, cyanosis, or edema  PSYCH: AAOx0  NEUROLOGY: non-focal  SKIN: J tube in place, bilious drainage    LINES:     HOSPITAL MEDICATIONS:  MEDICATIONS  (STANDING):  atorvastatin 40 milliGRAM(s) Oral at bedtime  chlorhexidine 0.12% Liquid 15 milliLiter(s) Oral Mucosa two times a day  chlorhexidine 4% Liquid 1 Application(s) Topical <User Schedule>  Dakins Solution - 1/4 Strength 1 Application(s) Topical two times a day  epoetin tan Injectable 88748 Unit(s) IV Push <User Schedule>  ertapenem  IVPB 500 milliGRAM(s) IV Intermittent every 24 hours  hydrocortisone sodium succinate Injectable 100 milliGRAM(s) IV Push every 8 hours  insulin lispro (HumaLOG) corrective regimen sliding scale   SubCutaneous every 6 hours  levETIRAcetam  Solution 500 milliGRAM(s) Oral two times a day  norepinephrine Infusion 0.4 MICROgram(s)/kG/Min (23.7 mL/Hr) IV Continuous <Continuous>  vancomycin  IVPB 1000 milliGRAM(s) IV Intermittent once    MEDICATIONS  (PRN):      LABS:                        8.4    29.47 )-----------( 356      ( 30 Dec 2019 22:43 )             28.4     12-30    134<L>  |  90<L>  |  56<H>  ----------------------------<  170<H>  3.8   |  24  |  3.38<H>    Ca    11.0<H>      30 Dec 2019 22:43  Phos  2.8     12-30  Mg     2.2     12-30    TPro  7.3  /  Alb  2.5<L>  /  TBili  0.4  /  DBili  x   /  AST  25  /  ALT  8<L>  /  AlkPhos  100  12-30    PT/INR - ( 30 Dec 2019 22:50 )   PT: 14.5 sec;   INR: 1.25 ratio         PTT - ( 30 Dec 2019 22:50 )  PTT:29.1 sec  ABG - ( 30 Dec 2019 22:58 )  pH, Arterial: 7.51  pH, Blood: x     /  pCO2: 35    /  pO2: 73    / HCO3: 28    / Base Excess: 4.6   /  SaO2: 96                  Lactate Trend        CAPILLARY BLOOD GLUCOSE      POCT Blood Glucose.: 158 mg/dL (30 Dec 2019 22:14)  POCT Blood Glucose.: 170 mg/dL (30 Dec 2019 18:50)  POCT Blood Glucose.: 189 mg/dL (30 Dec 2019 11:51)  POCT Blood Glucose.: 135 mg/dL (30 Dec 2019 05:41)      RADIOLOGY & ADDITIONAL TESTS:    ASSESSMENT/PLAN:  74M PMHx of ESRD s/p failed renal transplant x2, HCV, DM, and meningococcal meningitis 2 years ago was sent in to the ED from HD for AMS and low BPs found later to be in septic shock 2/2 UTI on pressors, intubated with concern for aspiration PNA, found to have GNR bacteremia. New imaging showing dilated loops of bowel, no clear transition point. Patient's BP soft 90s/50s admitted to MICU for pressor support, and treatment of septic shock.     # NEURO  - p/w AMS, metabolic encephalopathy  - neuro following  - chronic lacunar infarcts; per neuro, AMS also likely related to hypercalcemia, renal dysfunction , poor nutritional intake.   - c/w keppra for hx of seizure disorder  -neuro checks per ICU protocol     # CARDIAC  - hx of afib was on eliquis, now off a/c per cards due to bleeding risk  - holding carvedilol 6.25 BID  - hx of cocaine cardiomyopathy, resolved, normal EF on most recent echo  - c/w statin  - hypotensive during RRT 12/30; started levo gtt  - will place central line     # PULM  -satting well on NC 2L O2   -continue to monitor     # RENAL  - hx of ESRD s/p failed renal transplant x2  - renal following, HD as recommended.  - c/w hydrocortisone, epogen   - has dialysis catheter in place  - start 250cc 5% albumin x2 bolus    # GI   - PEG tube in place   - NPO for now   - CT abd/pelvis non-con showing dilated loops of bowel, no clear transition point  - f/u CT abd/pelvis w/ IV contrast     # HEME/ONC  - pt was on eliquis; now held   -HDS  -keep active type and screen     # ID   WBC uptrending. ID following  -c/w vanc, ertapenem  - hypotension likely 2/2 septic shock, possible aspiration, UTI, has decubitus ulcers as well.  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 Pt initally presented 11/20/19  from NH for AMS x 1 day. RRT called on 11/26/19 for hypotension. Transferred to MICU for intubation, treatment of ecoli bacteremia/UTI and pressor support. LP negative for meningitis. Extubated on 11/29/19, transferred to floors. Patient s/p open feeding gastrostomy tube placement on 12/21/19 with surgery. Baseline AOx1. MICU consulted on 12/30 for AMS. Pt AOx0 and hypotensive to sbp 87. CT abd/pelvis non-con showing dilated loops of bowel, no clear transition point and new opacities in RLL suspicious for pneumonia. Patient admitted to MICU for pressor support, and treatment of septic shock.     PAST MEDICAL & SURGICAL HISTORY:  Kidney transplant recipient  Anuria  GERD (gastroesophageal reflux disease)  Kidney transplant failure: dx: 2/2013  Hepatitis C: dx: 90&#x27;s.  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     HOME MEDICATIONS:      Allergies    No Known Allergies    Intolerances          REVIEW OF SYSTEMS:  Constitutional: No fevers, chills, weight loss, weight gain  HEENT: No vision problems, eye pain, nasal congestion, rhinorrhea, sore throat, dysphagia  CV: No chest pain, orthopnea, palpitations  Resp: No cough, dyspnea, wheezing, hemoptysis  GI: No nausea, vomiting, diarrhea, constipation, abdominal pain  : [ ] dysuria [ ] nocturia [ ] hematuria [ ] increased urinary frequency  Musculoskeletal: [ ] back pain [ ] myalgias [ ] arthralgias [ ] fracture  Skin: [ ] rash [ ] itch  Neurological: [ ] headache [ ] dizziness [ ] syncope [ ] weakness [ ] numbness  Psychiatric: [ ] anxiety [ ] depression  Endocrine: [ ] diabetes [ ] thyroid problem  Hematologic/Lymphatic: [ ] anemia [ ] bleeding problem  Allergic/Immunologic: [ ] itchy eyes [ ] nasal discharge [ ] hives [ ] angioedema  [ ] All other systems negative  [X] Unable to assess ROS due to patient's clinical condition.     OBJECTIVE:  ICU Vital Signs Last 24 Hrs  T(C): 37.4 (30 Dec 2019 23:20), Max: 37.4 (30 Dec 2019 23:20)  T(F): 99.4 (30 Dec 2019 23:20), Max: 99.4 (30 Dec 2019 23:20)  HR: 120 (30 Dec 2019 23:30) (98 - 132)  BP: 75/49 (30 Dec 2019 23:30) (75/49 - 106/67)  BP(mean): 57 (30 Dec 2019 23:30) (57 - 63)  ABP: --  ABP(mean): --  RR: 22 (30 Dec 2019 23:30) (16 - 22)  SpO2: 97% (30 Dec 2019 23:30) (88% - 98%)        12-29 @ 07:01  -  12-30 @ 07:00  --------------------------------------------------------  IN: 930 mL / OUT: 0 mL / NET: 930 mL      CAPILLARY BLOOD GLUCOSE      POCT Blood Glucose.: 158 mg/dL (30 Dec 2019 22:14)      PHYSICAL EXAM:  GENERAL: lethargic, emaciated  HEAD:  Atraumatic, Normocephalic  EYES: EOMI, PERRLA, conjunctiva and sclera clear  NECK: Supple, No JVD  CHEST/LUNG: Clear to auscultation bilaterally; No wheeze  HEART: NL s1s2, regular rate and rhythm; No murmurs, rubs, or gallops  ABDOMEN: Soft, Nontender, Nondistended; Bowel sounds present. Drain present. draining bilious fluid.   EXTREMITIES:  2+ Peripheral Pulses, No clubbing, cyanosis, or edema  PSYCH: AAOx0  NEUROLOGY: non-focal  SKIN: J tube in place, bilious drainage    LINES:     HOSPITAL MEDICATIONS:  MEDICATIONS  (STANDING):  atorvastatin 40 milliGRAM(s) Oral at bedtime  chlorhexidine 0.12% Liquid 15 milliLiter(s) Oral Mucosa two times a day  chlorhexidine 4% Liquid 1 Application(s) Topical <User Schedule>  Dakins Solution - 1/4 Strength 1 Application(s) Topical two times a day  epoetin tan Injectable 61086 Unit(s) IV Push <User Schedule>  ertapenem  IVPB 500 milliGRAM(s) IV Intermittent every 24 hours  hydrocortisone sodium succinate Injectable 100 milliGRAM(s) IV Push every 8 hours  insulin lispro (HumaLOG) corrective regimen sliding scale   SubCutaneous every 6 hours  levETIRAcetam  Solution 500 milliGRAM(s) Oral two times a day  norepinephrine Infusion 0.4 MICROgram(s)/kG/Min (23.7 mL/Hr) IV Continuous <Continuous>  vancomycin  IVPB 1000 milliGRAM(s) IV Intermittent once    MEDICATIONS  (PRN):      LABS:                        8.4    29.47 )-----------( 356      ( 30 Dec 2019 22:43 )             28.4     12-30    134<L>  |  90<L>  |  56<H>  ----------------------------<  170<H>  3.8   |  24  |  3.38<H>    Ca    11.0<H>      30 Dec 2019 22:43  Phos  2.8     12-30  Mg     2.2     12-30    TPro  7.3  /  Alb  2.5<L>  /  TBili  0.4  /  DBili  x   /  AST  25  /  ALT  8<L>  /  AlkPhos  100  12-30    PT/INR - ( 30 Dec 2019 22:50 )   PT: 14.5 sec;   INR: 1.25 ratio         PTT - ( 30 Dec 2019 22:50 )  PTT:29.1 sec  ABG - ( 30 Dec 2019 22:58 )  pH, Arterial: 7.51  pH, Blood: x     /  pCO2: 35    /  pO2: 73    / HCO3: 28    / Base Excess: 4.6   /  SaO2: 96                  Lactate Trend        CAPILLARY BLOOD GLUCOSE      POCT Blood Glucose.: 158 mg/dL (30 Dec 2019 22:14)  POCT Blood Glucose.: 170 mg/dL (30 Dec 2019 18:50)  POCT Blood Glucose.: 189 mg/dL (30 Dec 2019 11:51)  POCT Blood Glucose.: 135 mg/dL (30 Dec 2019 05:41)      RADIOLOGY & ADDITIONAL TESTS:    ASSESSMENT/PLAN:  74M PMHx of ESRD s/p failed renal transplant x2, HCV, DM, and meningococcal meningitis 2 years ago was sent in to the ED from HD for AMS and low BPs found later to be in septic shock 2/2 UTI on pressors, intubated with concern for aspiration PNA, found to have GNR bacteremia. New imaging showing dilated loops of bowel, no clear transition point. Patient's BP soft 90s/50s admitted to MICU for pressor support, and treatment of septic shock.     # NEURO  - p/w AMS, metabolic encephalopathy  - neuro following  - chronic lacunar infarcts; per neuro, AMS also likely related to hypercalcemia, renal dysfunction , poor nutritional intake.   - c/w keppra for hx of seizure disorder  -neuro checks per ICU protocol     # CARDIAC  - hx of afib was on eliquis, now off a/c per cards due to bleeding risk  - holding carvedilol 6.25 BID  - hx of cocaine cardiomyopathy, resolved, normal EF on most recent echo  - c/w statin  - hypotensive during RRT 12/30; started levo gtt  - will place central line     # PULM  -satting well on NC 2L O2   -continue to monitor     # RENAL  - hx of ESRD s/p failed renal transplant x2  - renal following, HD as recommended.  - c/w hydrocortisone, epogen   - has dialysis catheter in place  - start 250cc 5% albumin x2 bolus    # GI   - PEG tube in place   - NPO for now   - CT abd/pelvis non-con showing dilated loops of bowel, no clear transition point  - f/u CT abd/pelvis w/ IV contrast     # HEME/ONC  - pt was on eliquis; now held   -HDS  -keep active type and screen     # ID   WBC uptrending. ID following  -c/w vanc, ertapenem  - hypotension likely 2/2 septic shock, possible aspiration, UTI, has decubitus ulcers as well.  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 Pt initally presented 11/20/19  from NH for AMS x 1 day. RRT called on 11/26/19 for hypotension. Transferred to MICU for intubation, treatment of ecoli bacteremia/UTI and pressor support. LP negative for meningitis. Extubated on 11/29/19, transferred to floors. Patient s/p open feeding gastrostomy tube placement on 12/21/19 with surgery. Baseline AOx1. MICU consulted on 12/30 for AMS. Pt AOx0 and hypotensive to sbp 87. CT abd/pelvis non-con showing dilated loops of bowel, no clear transition point and new opacities in RLL suspicious for pneumonia. Patient admitted to MICU for pressor support, and treatment of septic shock.     PAST MEDICAL & SURGICAL HISTORY:  Kidney transplant recipient  Anuria  GERD (gastroesophageal reflux disease)  Kidney transplant failure: dx: 2/2013  Hepatitis C: dx: 90&#x27;s.  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     HOME MEDICATIONS:      Allergies    No Known Allergies    Intolerances          REVIEW OF SYSTEMS:  Constitutional: No fevers, chills, weight loss, weight gain  HEENT: No vision problems, eye pain, nasal congestion, rhinorrhea, sore throat, dysphagia  CV: No chest pain, orthopnea, palpitations  Resp: No cough, dyspnea, wheezing, hemoptysis  GI: No nausea, vomiting, diarrhea, constipation, abdominal pain  : [ ] dysuria [ ] nocturia [ ] hematuria [ ] increased urinary frequency  Musculoskeletal: [ ] back pain [ ] myalgias [ ] arthralgias [ ] fracture  Skin: [ ] rash [ ] itch  Neurological: [ ] headache [ ] dizziness [ ] syncope [ ] weakness [ ] numbness  Psychiatric: [ ] anxiety [ ] depression  Endocrine: [ ] diabetes [ ] thyroid problem  Hematologic/Lymphatic: [ ] anemia [ ] bleeding problem  Allergic/Immunologic: [ ] itchy eyes [ ] nasal discharge [ ] hives [ ] angioedema  [ ] All other systems negative  [X] Unable to assess ROS due to patient's clinical condition.     OBJECTIVE:  ICU Vital Signs Last 24 Hrs  T(C): 37.4 (30 Dec 2019 23:20), Max: 37.4 (30 Dec 2019 23:20)  T(F): 99.4 (30 Dec 2019 23:20), Max: 99.4 (30 Dec 2019 23:20)  HR: 120 (30 Dec 2019 23:30) (98 - 132)  BP: 75/49 (30 Dec 2019 23:30) (75/49 - 106/67)  BP(mean): 57 (30 Dec 2019 23:30) (57 - 63)  ABP: --  ABP(mean): --  RR: 22 (30 Dec 2019 23:30) (16 - 22)  SpO2: 97% (30 Dec 2019 23:30) (88% - 98%)        12-29 @ 07:01  -  12-30 @ 07:00  --------------------------------------------------------  IN: 930 mL / OUT: 0 mL / NET: 930 mL      CAPILLARY BLOOD GLUCOSE      POCT Blood Glucose.: 158 mg/dL (30 Dec 2019 22:14)      PHYSICAL EXAM:  GENERAL: lethargic, emaciated  HEAD:  Atraumatic, Normocephalic  EYES: EOMI, PERRLA, conjunctiva and sclera clear  NECK: Supple, No JVD  CHEST/LUNG: Clear to auscultation bilaterally; No wheeze  HEART: NL s1s2, regular rate and rhythm; No murmurs, rubs, or gallops  ABDOMEN: Soft, Nontender, Nondistended; Bowel sounds present. Drain present. draining bilious fluid.   EXTREMITIES:  2+ Peripheral Pulses, No clubbing, cyanosis, or edema  PSYCH: AAOx0  NEUROLOGY: non-focal  SKIN: J tube in place, bilious drainage    LINES:     HOSPITAL MEDICATIONS:  MEDICATIONS  (STANDING):  atorvastatin 40 milliGRAM(s) Oral at bedtime  chlorhexidine 0.12% Liquid 15 milliLiter(s) Oral Mucosa two times a day  chlorhexidine 4% Liquid 1 Application(s) Topical <User Schedule>  Dakins Solution - 1/4 Strength 1 Application(s) Topical two times a day  epoetin tan Injectable 95608 Unit(s) IV Push <User Schedule>  ertapenem  IVPB 500 milliGRAM(s) IV Intermittent every 24 hours  hydrocortisone sodium succinate Injectable 100 milliGRAM(s) IV Push every 8 hours  insulin lispro (HumaLOG) corrective regimen sliding scale   SubCutaneous every 6 hours  levETIRAcetam  Solution 500 milliGRAM(s) Oral two times a day  norepinephrine Infusion 0.4 MICROgram(s)/kG/Min (23.7 mL/Hr) IV Continuous <Continuous>  vancomycin  IVPB 1000 milliGRAM(s) IV Intermittent once    MEDICATIONS  (PRN):      LABS:                        8.4    29.47 )-----------( 356      ( 30 Dec 2019 22:43 )             28.4     12-30    134<L>  |  90<L>  |  56<H>  ----------------------------<  170<H>  3.8   |  24  |  3.38<H>    Ca    11.0<H>      30 Dec 2019 22:43  Phos  2.8     12-30  Mg     2.2     12-30    TPro  7.3  /  Alb  2.5<L>  /  TBili  0.4  /  DBili  x   /  AST  25  /  ALT  8<L>  /  AlkPhos  100  12-30    PT/INR - ( 30 Dec 2019 22:50 )   PT: 14.5 sec;   INR: 1.25 ratio         PTT - ( 30 Dec 2019 22:50 )  PTT:29.1 sec  ABG - ( 30 Dec 2019 22:58 )  pH, Arterial: 7.51  pH, Blood: x     /  pCO2: 35    /  pO2: 73    / HCO3: 28    / Base Excess: 4.6   /  SaO2: 96                  Lactate Trend        CAPILLARY BLOOD GLUCOSE      POCT Blood Glucose.: 158 mg/dL (30 Dec 2019 22:14)  POCT Blood Glucose.: 170 mg/dL (30 Dec 2019 18:50)  POCT Blood Glucose.: 189 mg/dL (30 Dec 2019 11:51)  POCT Blood Glucose.: 135 mg/dL (30 Dec 2019 05:41)      RADIOLOGY & ADDITIONAL TESTS:    ASSESSMENT/PLAN:  74M PMHx of ESRD s/p failed renal transplant x2, HCV, DM, and meningococcal meningitis 2 years ago was sent in to the ED from HD for AMS and low BPs found later to be in septic shock 2/2 UTI on pressors, intubated with concern for aspiration PNA, found to have GNR bacteremia. New imaging showing dilated loops of bowel, no clear transition point. Patient's BP soft 90s/50s admitted to MICU for pressor support, and treatment of septic shock.     # NEURO  - p/w AMS, metabolic encephalopathy  - neuro following  - chronic lacunar infarcts; per neuro, AMS also likely related to hypercalcemia, renal dysfunction , poor nutritional intake.   - c/w keppra for hx of seizure disorder  -neuro checks per ICU protocol     # CARDIAC  - hx of afib was on eliquis, now off a/c per cards due to bleeding risk  - holding carvedilol 6.25 BID  - hx of cocaine cardiomyopathy, resolved, normal EF on most recent echo  - c/w statin  - hypotensive during RRT 12/30; started levo gtt  - will place central line     # PULM  -satting well on NC 2L O2   -continue to monitor     # RENAL  - hx of ESRD s/p failed renal transplant x2  - renal following, HD as recommended.  - c/w hydrocortisone, epogen   - has dialysis catheter in place  - start 250cc 5% albumin x2 bolus    # GI   - PEG tube in place   - NPO for now   - CT abd/pelvis non-con showing dilated loops of bowel, no clear transition point  - f/u CT abd/pelvis w/ IV contrast     # HEME/ONC  - pt was on eliquis; now held   -HDS  -keep active type and screen     # ID   WBC uptrending. ID following  -c/w vanc, ertapenem  - hypotension likely 2/2 septic shock, possible aspiration, UTI, has decubitus ulcers as well.  - c/w empiric vanc  -s/p LP, CSF negative  - ID following   - f/u urine cxs, blood cxs, UA   - f/u CXR     # ENDO  HbA1c 5.5  - Start Insulin Sliding Scale  - call endocrine in the AM   - FS q6hrs    - hypercalcemic     # DVT  -SCDs         Critical Care Attending Attestation:   74M Hx Ex-ICU 11/30, ESRD s/p Failed Renal Transplant X 2, HCV, UTI, CVA, , prolonged Hospitalization, Chronic Aspiration PNA, J-Tube drainage, Bedbound with A&Ox1, Functional Quad, large Presacral Decubital Wound Sepsis r/o Osteomyelitis, recent GN Bacteremia, GI Ileus in RRT.    - Metabolic encephalopathy to be closely monitoring in ICU and intubation as needed   - Persistent Septic Shock starting pressor agent will need CVP line   - Empiric ABx coverage for HCAP vs. Aspiration Pneumonitis   - CT Abdomen/Pelvis when stable  - ESRD s/p Failed Renal Transplant x 2 on Hemodialysis for HD tomorrow under pressor support   - GOC Plans to be discussed and update with family but Full Code at present   - Overall prognosis poor     Patient seen and examined with ICU Resident/Fellow at bedside and lab data, medical records and radiology reports reviewed. I have read and agreeable with resident's Documentation, Assessment and Management Plans above in general which reflected my opinions from discussion.   Total Critical Care Time = 45 Min excluding teaching and procedure activity. 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 Pt initally presented 11/20/19  from NH for AMS x 1 day. RRT called on 11/26/19 for hypotension. Transferred to MICU for intubation, treatment of ecoli bacteremia/UTI and pressor support. LP negative for meningitis. Extubated on 11/29/19, transferred to floors. Patient s/p open feeding gastrostomy tube placement on 12/21/19 with surgery. Baseline AOx1. MICU consulted on 12/30 for AMS. Pt AOx0 and hypotensive to sbp 87. CT abd/pelvis non-con showing dilated loops of bowel, no clear transition point and new opacities in RLL suspicious for pneumonia. Patient admitted to MICU for pressor support, and treatment of septic shock.     PAST MEDICAL & SURGICAL HISTORY:  Kidney transplant recipient  Anuria  GERD (gastroesophageal reflux disease)  Kidney transplant failure: dx: 2/2013  Hepatitis C: dx: 90&#x27;s.  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, no known MIs  H/O kidney transplant: may 2015  A-V fistula: Left, implanted (?)  S/p cadaver renal transplant: 2008  S/P partial colectomy: due to diverticulitis      FAMILY HISTORY:  Family history of breast cancer in sister (Sibling): living at 94 yo  Family history of prostate cancer in father  Family history of lung cancer  Family history of hypertension in mother  Family history of diabetes mellitus: mother      SOCIAL HISTORY:  Unable to obtain due to patient's clinical condition.     HOME MEDICATIONS:      Allergies    No Known Allergies    Intolerances          REVIEW OF SYSTEMS:  Constitutional: No fevers, chills, weight loss, weight gain  HEENT: No vision problems, eye pain, nasal congestion, rhinorrhea, sore throat, dysphagia  CV: No chest pain, orthopnea, palpitations  Resp: No cough, dyspnea, wheezing, hemoptysis  GI: No nausea, vomiting, diarrhea, constipation, abdominal pain  : [ ] dysuria [ ] nocturia [ ] hematuria [ ] increased urinary frequency  Musculoskeletal: [ ] back pain [ ] myalgias [ ] arthralgias [ ] fracture  Skin: [ ] rash [ ] itch  Neurological: [ ] headache [ ] dizziness [ ] syncope [ ] weakness [ ] numbness  Psychiatric: [ ] anxiety [ ] depression  Endocrine: [ ] diabetes [ ] thyroid problem  Hematologic/Lymphatic: [ ] anemia [ ] bleeding problem  Allergic/Immunologic: [ ] itchy eyes [ ] nasal discharge [ ] hives [ ] angioedema  [ ] All other systems negative  [X] Unable to assess ROS due to patient's clinical condition.     OBJECTIVE:  ICU Vital Signs Last 24 Hrs  T(C): 37.4 (30 Dec 2019 23:20), Max: 37.4 (30 Dec 2019 23:20)  T(F): 99.4 (30 Dec 2019 23:20), Max: 99.4 (30 Dec 2019 23:20)  HR: 120 (30 Dec 2019 23:30) (98 - 132)  BP: 75/49 (30 Dec 2019 23:30) (75/49 - 106/67)  BP(mean): 57 (30 Dec 2019 23:30) (57 - 63)  ABP: --  ABP(mean): --  RR: 22 (30 Dec 2019 23:30) (16 - 22)  SpO2: 97% (30 Dec 2019 23:30) (88% - 98%)        12-29 @ 07:01  -  12-30 @ 07:00  --------------------------------------------------------  IN: 930 mL / OUT: 0 mL / NET: 930 mL      CAPILLARY BLOOD GLUCOSE      POCT Blood Glucose.: 158 mg/dL (30 Dec 2019 22:14)      PHYSICAL EXAM:  GENERAL: lethargic, emaciated  HEAD:  Atraumatic, Normocephalic  EYES: EOMI, PERRLA, conjunctiva and sclera clear  NECK: Supple, No JVD  CHEST/LUNG: Clear to auscultation bilaterally; No wheeze  HEART: NL s1s2, regular rate and rhythm; No murmurs, rubs, or gallops  ABDOMEN: Soft, Nontender, Nondistended; Bowel sounds present. Drain present. draining bilious fluid.   EXTREMITIES:  2+ Peripheral Pulses, No clubbing, cyanosis, or edema  PSYCH: AAOx0  NEUROLOGY: non-focal  SKIN: J tube in place, bilious drainage    LINES:     HOSPITAL MEDICATIONS:  MEDICATIONS  (STANDING):  atorvastatin 40 milliGRAM(s) Oral at bedtime  chlorhexidine 0.12% Liquid 15 milliLiter(s) Oral Mucosa two times a day  chlorhexidine 4% Liquid 1 Application(s) Topical <User Schedule>  Dakins Solution - 1/4 Strength 1 Application(s) Topical two times a day  epoetin tan Injectable 78800 Unit(s) IV Push <User Schedule>  ertapenem  IVPB 500 milliGRAM(s) IV Intermittent every 24 hours  hydrocortisone sodium succinate Injectable 100 milliGRAM(s) IV Push every 8 hours  insulin lispro (HumaLOG) corrective regimen sliding scale   SubCutaneous every 6 hours  levETIRAcetam  Solution 500 milliGRAM(s) Oral two times a day  norepinephrine Infusion 0.4 MICROgram(s)/kG/Min (23.7 mL/Hr) IV Continuous <Continuous>  vancomycin  IVPB 1000 milliGRAM(s) IV Intermittent once    MEDICATIONS  (PRN):      LABS:                        8.4    29.47 )-----------( 356      ( 30 Dec 2019 22:43 )             28.4     12-30    134<L>  |  90<L>  |  56<H>  ----------------------------<  170<H>  3.8   |  24  |  3.38<H>    Ca    11.0<H>      30 Dec 2019 22:43  Phos  2.8     12-30  Mg     2.2     12-30    TPro  7.3  /  Alb  2.5<L>  /  TBili  0.4  /  DBili  x   /  AST  25  /  ALT  8<L>  /  AlkPhos  100  12-30    PT/INR - ( 30 Dec 2019 22:50 )   PT: 14.5 sec;   INR: 1.25 ratio         PTT - ( 30 Dec 2019 22:50 )  PTT:29.1 sec  ABG - ( 30 Dec 2019 22:58 )  pH, Arterial: 7.51  pH, Blood: x     /  pCO2: 35    /  pO2: 73    / HCO3: 28    / Base Excess: 4.6   /  SaO2: 96                  Lactate Trend        CAPILLARY BLOOD GLUCOSE      POCT Blood Glucose.: 158 mg/dL (30 Dec 2019 22:14)  POCT Blood Glucose.: 170 mg/dL (30 Dec 2019 18:50)  POCT Blood Glucose.: 189 mg/dL (30 Dec 2019 11:51)  POCT Blood Glucose.: 135 mg/dL (30 Dec 2019 05:41)      RADIOLOGY & ADDITIONAL TESTS:    ASSESSMENT/PLAN:  74M PMHx of ESRD s/p failed renal transplant x2, HCV, DM, and meningococcal meningitis 2 years ago was sent in to the ED from HD for AMS and low BPs found later to be in septic shock 2/2 UTI on pressors, intubated with concern for aspiration PNA, found to have GNR bacteremia. New imaging showing dilated loops of bowel, no clear transition point. Patient's BP soft 90s/50s admitted to MICU for pressor support, and treatment of septic shock.     # NEURO  - p/w AMS, metabolic encephalopathy  - neuro following  - chronic lacunar infarcts; per neuro, AMS also likely related to hypercalcemia, renal dysfunction , poor nutritional intake.   - c/w keppra for hx of seizure disorder  -neuro checks per ICU protocol     # CARDIAC  Shock state requiring pressors, likely septic shock  - hypotensive during RRT 12/30; started levo gtt, titrate to MAP > 65  -on stress dose steroids, hydrocortisone 100 mg q8  - holding carvedilol 6.25 BID  - hx of cocaine cardiomyopathy, resolved, normal EF on most recent echo  - c/w statin  -   - will place central line     hx of afib was on eliquis,   -now off a/c per cards due to bleeding risk    # PULM  -satting well on NC 2L O2   -ABG showing respiratory alkalosis   -d/t minimal MS, and likely aspiration, will likely need to be intubated if MS doesn't improve  -sister and daughter aware, would like all measures at this time     # RENAL  - hx of ESRD s/p failed renal transplant x2  - renal following, HD as recommended.  - c/w hydrocortisone, epogen   - has dialysis catheter in place  - start 250cc 5% albumin x2 bolus    # GI   - PEG tube in place   - NPO for now   - CT abd/pelvis non-con showing dilated loops of bowel, no clear transition point  -set PEG to gravity   - f/u CT abd/pelvis w/ IV contrast when respiratory status is more stable     # HEME/ONC  - pt was on eliquis; now held   -HDS  -keep active type and screen     # ID   WBC uptrending. ID following  -c/w vanc, ertapenem  - hypotension likely 2/2 septic shock, possible aspiration, UTI, has decubitus ulcers as well.  - c/w empiric vanc  -s/p LP, CSF negative  - ID following   - f/u urine cxs, blood cxs, UA   - f/u CXR     # ENDO  HbA1c 5.5  - Insulin Sliding Scale q6 while NPO  -holding lantus as NPO  - FS q6hrs    - hypercalcemic     # DVT  -SCDs         Critical Care Attending Attestation:   74M Hx Ex-ICU 11/30, ESRD s/p Failed Renal Transplant X 2, HCV, UTI, CVA, , prolonged Hospitalization, Chronic Aspiration PNA, J-Tube drainage, Bedbound with A&Ox1, Functional Quad, large Presacral Decubital Wound Sepsis r/o Osteomyelitis, recent GN Bacteremia, GI Ileus in RRT.    - Metabolic encephalopathy to be closely monitoring in ICU and intubation as needed   - Persistent Septic Shock starting pressor agent will need CVP line   - Empiric ABx coverage for HCAP vs. Aspiration Pneumonitis   - CT Abdomen/Pelvis when stable  - ESRD s/p Failed Renal Transplant x 2 on Hemodialysis for HD tomorrow under pressor support   - GOC Plans to be discussed and update with family but Full Code at present   - Overall prognosis poor     Patient seen and examined with ICU Resident/Fellow at bedside and lab data, medical records and radiology reports reviewed. I have read and agreeable with resident's Documentation, Assessment and Management Plans above in general which reflected my opinions from discussion.   Total Critical Care Time = 45 Min excluding teaching and procedure activity. 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 Pt initally presented 11/20/19  from NH for AMS x 1 day. RRT called on 11/26/19 for hypotension. Transferred to MICU for intubation, treatment of ecoli bacteremia/UTI and pressor support. LP negative for meningitis. Extubated on 11/29/19, transferred to floors. Patient s/p open feeding gastrostomy tube placement on 12/21/19 with surgery. Baseline AOx1. MICU consulted on 12/30 for AMS. Pt AOx0 and hypotensive to sbp 87. CT abd/pelvis non-con showing dilated loops of bowel, no clear transition point and new opacities in RLL suspicious for pneumonia. Patient admitted to MICU for pressor support, and treatment of septic shock.     PAST MEDICAL & SURGICAL HISTORY:  Kidney transplant recipient  Anuria  GERD (gastroesophageal reflux disease)  Kidney transplant failure: dx: 2/2013  Hepatitis C: dx: 90&#x27;s.  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, no known MIs  H/O kidney transplant: may 2015  A-V fistula: Left, implanted (?)  S/p cadaver renal transplant: 2008  S/P partial colectomy: due to diverticulitis      FAMILY HISTORY:  Family history of breast cancer in sister (Sibling): living at 94 yo  Family history of prostate cancer in father  Family history of lung cancer  Family history of hypertension in mother  Family history of diabetes mellitus: mother      SOCIAL HISTORY:  Unable to obtain due to patient's clinical condition.     HOME MEDICATIONS:      Allergies    No Known Allergies    Intolerances          REVIEW OF SYSTEMS:  Constitutional: No fevers, chills, weight loss, weight gain  HEENT: No vision problems, eye pain, nasal congestion, rhinorrhea, sore throat, dysphagia  CV: No chest pain, orthopnea, palpitations  Resp: No cough, dyspnea, wheezing, hemoptysis  GI: No nausea, vomiting, diarrhea, constipation, abdominal pain  : [ ] dysuria [ ] nocturia [ ] hematuria [ ] increased urinary frequency  Musculoskeletal: [ ] back pain [ ] myalgias [ ] arthralgias [ ] fracture  Skin: [ ] rash [ ] itch  Neurological: [ ] headache [ ] dizziness [ ] syncope [ ] weakness [ ] numbness  Psychiatric: [ ] anxiety [ ] depression  Endocrine: [ ] diabetes [ ] thyroid problem  Hematologic/Lymphatic: [ ] anemia [ ] bleeding problem  Allergic/Immunologic: [ ] itchy eyes [ ] nasal discharge [ ] hives [ ] angioedema  [ ] All other systems negative  [X] Unable to assess ROS due to patient's clinical condition.     OBJECTIVE:  ICU Vital Signs Last 24 Hrs  T(C): 37.4 (30 Dec 2019 23:20), Max: 37.4 (30 Dec 2019 23:20)  T(F): 99.4 (30 Dec 2019 23:20), Max: 99.4 (30 Dec 2019 23:20)  HR: 120 (30 Dec 2019 23:30) (98 - 132)  BP: 75/49 (30 Dec 2019 23:30) (75/49 - 106/67)  BP(mean): 57 (30 Dec 2019 23:30) (57 - 63)  ABP: --  ABP(mean): --  RR: 22 (30 Dec 2019 23:30) (16 - 22)  SpO2: 97% (30 Dec 2019 23:30) (88% - 98%)        12-29 @ 07:01  -  12-30 @ 07:00  --------------------------------------------------------  IN: 930 mL / OUT: 0 mL / NET: 930 mL      CAPILLARY BLOOD GLUCOSE      POCT Blood Glucose.: 158 mg/dL (30 Dec 2019 22:14)      PHYSICAL EXAM:  GENERAL: lethargic, emaciated  HEAD:  Atraumatic, Normocephalic  EYES: EOMI, PERRLA, conjunctiva and sclera clear  NECK: Supple, No JVD  CHEST/LUNG: Clear to auscultation bilaterally; No wheeze  HEART: NL s1s2, regular rate and rhythm; No murmurs, rubs, or gallops  ABDOMEN: Soft, Nontender, Nondistended; Bowel sounds present. Drain present. draining bilious fluid.   EXTREMITIES:  2+ Peripheral Pulses, No clubbing, cyanosis, or edema  PSYCH: AAOx0  NEUROLOGY: non-focal  SKIN: J tube in place, bilious drainage    LINES:     HOSPITAL MEDICATIONS:  MEDICATIONS  (STANDING):  atorvastatin 40 milliGRAM(s) Oral at bedtime  chlorhexidine 0.12% Liquid 15 milliLiter(s) Oral Mucosa two times a day  chlorhexidine 4% Liquid 1 Application(s) Topical <User Schedule>  Dakins Solution - 1/4 Strength 1 Application(s) Topical two times a day  epoetin tan Injectable 90610 Unit(s) IV Push <User Schedule>  ertapenem  IVPB 500 milliGRAM(s) IV Intermittent every 24 hours  hydrocortisone sodium succinate Injectable 100 milliGRAM(s) IV Push every 8 hours  insulin lispro (HumaLOG) corrective regimen sliding scale   SubCutaneous every 6 hours  levETIRAcetam  Solution 500 milliGRAM(s) Oral two times a day  norepinephrine Infusion 0.4 MICROgram(s)/kG/Min (23.7 mL/Hr) IV Continuous <Continuous>  vancomycin  IVPB 1000 milliGRAM(s) IV Intermittent once    MEDICATIONS  (PRN):      LABS:                        8.4    29.47 )-----------( 356      ( 30 Dec 2019 22:43 )             28.4     12-30    134<L>  |  90<L>  |  56<H>  ----------------------------<  170<H>  3.8   |  24  |  3.38<H>    Ca    11.0<H>      30 Dec 2019 22:43  Phos  2.8     12-30  Mg     2.2     12-30    TPro  7.3  /  Alb  2.5<L>  /  TBili  0.4  /  DBili  x   /  AST  25  /  ALT  8<L>  /  AlkPhos  100  12-30    PT/INR - ( 30 Dec 2019 22:50 )   PT: 14.5 sec;   INR: 1.25 ratio         PTT - ( 30 Dec 2019 22:50 )  PTT:29.1 sec  ABG - ( 30 Dec 2019 22:58 )  pH, Arterial: 7.51  pH, Blood: x     /  pCO2: 35    /  pO2: 73    / HCO3: 28    / Base Excess: 4.6   /  SaO2: 96                  Lactate Trend        CAPILLARY BLOOD GLUCOSE      POCT Blood Glucose.: 158 mg/dL (30 Dec 2019 22:14)  POCT Blood Glucose.: 170 mg/dL (30 Dec 2019 18:50)  POCT Blood Glucose.: 189 mg/dL (30 Dec 2019 11:51)  POCT Blood Glucose.: 135 mg/dL (30 Dec 2019 05:41)      RADIOLOGY & ADDITIONAL TESTS:    ASSESSMENT/PLAN:  74M PMHx of ESRD s/p failed renal transplant x2, HCV, DM, and meningococcal meningitis 2 years ago was sent in to the ED from HD for AMS and low BPs found later to be in septic shock 2/2 UTI on pressors, intubated with concern for aspiration PNA, found to have GNR bacteremia. New imaging showing dilated loops of bowel, no clear transition point. Patient's BP soft 90s/50s admitted to MICU for pressor support, and treatment of septic shock.     # NEURO  - p/w AMS, metabolic encephalopathy  - neuro following  - chronic lacunar infarcts; per neuro, AMS also likely related to hypercalcemia, renal dysfunction , poor nutritional intake.   - c/w keppra for hx of seizure disorder  -neuro checks per ICU protocol     # CARDIAC  Shock state requiring pressors, likely septic shock  - hypotensive during RRT 12/30; started levo gtt, titrate to MAP > 65  -on stress dose steroids, hydrocortisone 100 mg q8  - holding carvedilol 6.25 BID  - hx of cocaine cardiomyopathy, resolved, normal EF on most recent echo  - c/w statin  - will place central line given inadequate PIV access and pressor need    hx of afib was on eliquis,   -now off a/c per cards due to bleeding risk    # PULM  -satting well on NC 2L O2   -ABG showing respiratory alkalosis   -d/t minimal MS, and likely aspiration, will likely need to be intubated if MS doesn't improve  -sister and daughter aware, would like all measures at this time     # RENAL  - hx of ESRD s/p failed renal transplant x2  - renal following, HD as recommended.  - c/w hydrocortisone, epogen   - has dialysis catheter in place  - start 250cc 5% albumin x2 bolus    # GI   - PEG tube in place   - NPO for now   - CT abd/pelvis non-con showing dilated loops of bowel, no clear transition point  -set PEG to gravity   - f/u CT abd/pelvis w/ IV contrast when respiratory status is more stable   -Hep C positive, RNA neg  -Consider hepatology reconsult if stabilizes     # HEME/ONC  - pt was on eliquis; now held   -HDS  -keep active type and screen     # ID   WBC uptrending. ID following  -c/w vanc, ertapenem  - hypotension likely 2/2 septic shock, possible aspiration, UTI, has decubitus ulcers as well.  - c/w empiric vanc  -s/p LP, CSF negative  - ID following   - f/u urine cxs, blood cxs, UA   - f/u CXR     # ENDO  HbA1c 5.5  - Insulin Sliding Scale q6 while NPO  -holding lantus as NPO  - FS q6hrs    - hypercalcemic     # DVT  -SCDs         Critical Care Attending Attestation:   74M Hx Ex-ICU 11/30, ESRD s/p Failed Renal Transplant X 2, HCV, UTI, CVA, , prolonged Hospitalization, Chronic Aspiration PNA, J-Tube drainage, Bedbound with A&Ox1, Functional Quad, large Presacral Decubital Wound Sepsis r/o Osteomyelitis, recent GN Bacteremia, GI Ileus in RRT.    - Metabolic encephalopathy to be closely monitoring in ICU and intubation as needed   - Persistent Septic Shock starting pressor agent will need CVP line   - Empiric ABx coverage for HCAP vs. Aspiration Pneumonitis   - CT Abdomen/Pelvis when stable  - ESRD s/p Failed Renal Transplant x 2 on Hemodialysis for HD tomorrow under pressor support   - GOC Plans to be discussed and update with family but Full Code at present   - Overall prognosis poor     Patient seen and examined with ICU Resident/Fellow at bedside and lab data, medical records and radiology reports reviewed. I have read and agreeable with resident's Documentation, Assessment and Management Plans above in general which reflected my opinions from discussion.   Total Critical Care Time = 45 Min excluding teaching and procedure activity. MICU Accept Note    CHIEF COMPLAINT: AMS     HPI / INTERVAL HISTORY:    Patient is a 73yo gentleman  PMHx of ESRD s/p failed renal transplant x2, HCV, HTN, cardiomyopathy 2/2 cocaine abuse, hepC DM, and meningococcal meningitis found later to be in septic shock 2/2 UTI s/p MICU stay (11/26/19-11/30/19). Pt initally presented 11/20/19  from NH for AMS x 1 day. RRT called on 11/26/19 for hypotension. Transferred to MICU for intubation, treatment of ecoli bacteremia/UTI and pressor support. LP negative for meningitis. Extubated on 11/29/19, transferred to floors. Patient s/p open feeding gastrostomy tube placement on 12/21/19 with surgery. Baseline AOx1. MICU consulted on 12/30 for AMS. Pt AOx0 and hypotensive to sbp 87. CT abd/pelvis non-con showing dilated loops of bowel, no clear transition point and new opacities in RLL suspicious for pneumonia. Patient admitted to MICU for pressor support, and treatment of septic shock.     PAST MEDICAL & SURGICAL HISTORY:  Kidney transplant recipient  Anuria  GERD (gastroesophageal reflux disease)  Kidney transplant failure: dx: 2/2013  Hepatitis C: dx: 90&#x27;s.  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, no known MIs  H/O kidney transplant: may 2015  A-V fistula: Left, implanted (?)  S/p cadaver renal transplant: 2008  S/P partial colectomy: due to diverticulitis      FAMILY HISTORY:  Family history of breast cancer in sister (Sibling): living at 94 yo  Family history of prostate cancer in father  Family history of lung cancer  Family history of hypertension in mother  Family history of diabetes mellitus: mother      SOCIAL HISTORY:  Unable to obtain due to patient's clinical condition.     HOME MEDICATIONS:      Allergies    No Known Allergies    Intolerances          REVIEW OF SYSTEMS:  Constitutional: No fevers, chills, weight loss, weight gain  HEENT: No vision problems, eye pain, nasal congestion, rhinorrhea, sore throat, dysphagia  CV: No chest pain, orthopnea, palpitations  Resp: No cough, dyspnea, wheezing, hemoptysis  GI: No nausea, vomiting, diarrhea, constipation, abdominal pain  : [ ] dysuria [ ] nocturia [ ] hematuria [ ] increased urinary frequency  Musculoskeletal: [ ] back pain [ ] myalgias [ ] arthralgias [ ] fracture  Skin: [ ] rash [ ] itch  Neurological: [ ] headache [ ] dizziness [ ] syncope [ ] weakness [ ] numbness  Psychiatric: [ ] anxiety [ ] depression  Endocrine: [ ] diabetes [ ] thyroid problem  Hematologic/Lymphatic: [ ] anemia [ ] bleeding problem  Allergic/Immunologic: [ ] itchy eyes [ ] nasal discharge [ ] hives [ ] angioedema  [ ] All other systems negative  [X] Unable to assess ROS due to patient's clinical condition.     OBJECTIVE:  ICU Vital Signs Last 24 Hrs  T(C): 37.4 (30 Dec 2019 23:20), Max: 37.4 (30 Dec 2019 23:20)  T(F): 99.4 (30 Dec 2019 23:20), Max: 99.4 (30 Dec 2019 23:20)  HR: 120 (30 Dec 2019 23:30) (98 - 132)  BP: 75/49 (30 Dec 2019 23:30) (75/49 - 106/67)  BP(mean): 57 (30 Dec 2019 23:30) (57 - 63)  ABP: --  ABP(mean): --  RR: 22 (30 Dec 2019 23:30) (16 - 22)  SpO2: 97% (30 Dec 2019 23:30) (88% - 98%)        12-29 @ 07:01  -  12-30 @ 07:00  --------------------------------------------------------  IN: 930 mL / OUT: 0 mL / NET: 930 mL      CAPILLARY BLOOD GLUCOSE      POCT Blood Glucose.: 158 mg/dL (30 Dec 2019 22:14)      PHYSICAL EXAM:  GENERAL: lethargic, emaciated  HEAD:  Atraumatic, Normocephalic  EYES: EOMI, PERRLA, conjunctiva and sclera clear  NECK: Supple, No JVD  CHEST/LUNG: Clear to auscultation bilaterally; No wheeze  HEART: NL s1s2, regular rate and rhythm; No murmurs, rubs, or gallops  ABDOMEN: Soft, Nontender, Nondistended; Bowel sounds present. Drain present. draining bilious fluid.   EXTREMITIES:  2+ Peripheral Pulses, No clubbing, cyanosis, or edema  PSYCH: AAOx0  NEUROLOGY: non-focal  SKIN: J tube in place, bilious drainage    LINES:     HOSPITAL MEDICATIONS:  MEDICATIONS  (STANDING):  atorvastatin 40 milliGRAM(s) Oral at bedtime  chlorhexidine 0.12% Liquid 15 milliLiter(s) Oral Mucosa two times a day  chlorhexidine 4% Liquid 1 Application(s) Topical <User Schedule>  Dakins Solution - 1/4 Strength 1 Application(s) Topical two times a day  epoetin tan Injectable 54425 Unit(s) IV Push <User Schedule>  ertapenem  IVPB 500 milliGRAM(s) IV Intermittent every 24 hours  hydrocortisone sodium succinate Injectable 100 milliGRAM(s) IV Push every 8 hours  insulin lispro (HumaLOG) corrective regimen sliding scale   SubCutaneous every 6 hours  levETIRAcetam  Solution 500 milliGRAM(s) Oral two times a day  norepinephrine Infusion 0.4 MICROgram(s)/kG/Min (23.7 mL/Hr) IV Continuous <Continuous>  vancomycin  IVPB 1000 milliGRAM(s) IV Intermittent once    MEDICATIONS  (PRN):      LABS:                        8.4    29.47 )-----------( 356      ( 30 Dec 2019 22:43 )             28.4     12-30    134<L>  |  90<L>  |  56<H>  ----------------------------<  170<H>  3.8   |  24  |  3.38<H>    Ca    11.0<H>      30 Dec 2019 22:43  Phos  2.8     12-30  Mg     2.2     12-30    TPro  7.3  /  Alb  2.5<L>  /  TBili  0.4  /  DBili  x   /  AST  25  /  ALT  8<L>  /  AlkPhos  100  12-30    PT/INR - ( 30 Dec 2019 22:50 )   PT: 14.5 sec;   INR: 1.25 ratio         PTT - ( 30 Dec 2019 22:50 )  PTT:29.1 sec  ABG - ( 30 Dec 2019 22:58 )  pH, Arterial: 7.51  pH, Blood: x     /  pCO2: 35    /  pO2: 73    / HCO3: 28    / Base Excess: 4.6   /  SaO2: 96                  Lactate Trend        CAPILLARY BLOOD GLUCOSE      POCT Blood Glucose.: 158 mg/dL (30 Dec 2019 22:14)  POCT Blood Glucose.: 170 mg/dL (30 Dec 2019 18:50)  POCT Blood Glucose.: 189 mg/dL (30 Dec 2019 11:51)  POCT Blood Glucose.: 135 mg/dL (30 Dec 2019 05:41)      RADIOLOGY & ADDITIONAL TESTS:    ASSESSMENT/PLAN:  74M PMHx of ESRD s/p failed renal transplant x2, HCV, DM, and meningococcal meningitis 2 years ago was sent in to the ED from HD for AMS and low BPs found later to be in septic shock 2/2 UTI on pressors, intubated with concern for aspiration PNA, found to have GNR bacteremia. New imaging showing dilated loops of bowel, no clear transition point. Patient's BP soft 90s/50s admitted to MICU for pressor support, and treatment of septic shock.     # NEURO  - p/w AMS, metabolic encephalopathy  - neuro following  - chronic lacunar infarcts; per neuro, AMS also likely related to hypercalcemia, renal dysfunction , poor nutritional intake.   - c/w keppra for hx of seizure disorder  -neuro checks per ICU protocol     # CARDIAC  Shock state requiring pressors, likely septic shock  - hypotensive during RRT 12/30; started levo gtt, titrate to MAP > 65  -on stress dose steroids, hydrocortisone 100 mg q8  - holding carvedilol 6.25 BID  - hx of cocaine cardiomyopathy, resolved, normal EF on most recent echo  - c/w statin  - will place central line given inadequate PIV access and pressor need    hx of afib was on eliquis,   -now off a/c per cards due to bleeding risk    # PULM  -satting well on NC 2L O2   -ABG showing respiratory alkalosis   -d/t minimal MS, and likely aspiration, will likely need to be intubated if MS doesn't improve  -sister and daughter aware, would like all measures at this time     # RENAL  - hx of ESRD s/p failed renal transplant x2  - renal following, HD as recommended.  - c/w hydrocortisone, epogen   - has dialysis catheter in place  - start 250cc 5% albumin x2 bolus    # GI   - PEG tube in place   - NPO for now   - CT abd/pelvis non-con showing dilated loops of bowel, no clear transition point  -set PEG to gravity   - f/u CT abd/pelvis w/ IV contrast when respiratory status is more stable   -Hep C positive, RNA neg  -Consider hepatology reconsult if stabilizes     # HEME/ONC  - pt was on eliquis; now held   -HDS  -keep active type and screen     # ID   WBC uptrending. ID following  -c/w vanc, ertapenem  - hypotension likely 2/2 septic shock, possible aspiration, UTI, has decubitus ulcers as well.  - c/w empiric vanc  -s/p LP, CSF negative  - ID following   - f/u urine cxs, blood cxs, UA   - f/u CXR     # ENDO  HbA1c 5.5  - Insulin Sliding Scale q6 while NPO  -holding lantus as NPO  - FS q6hrs    - hypercalcemic     # DVT  -SCDs     #GOC  Spoke to sister and daughter (daughter would be the decision maker)  Stressed how acute patient is and likely need to be intubated if MS doesn't improve   Pt remains FULL CODE       Critical Care Attending Attestation:   74M Hx Ex-ICU 11/30, ESRD s/p Failed Renal Transplant X 2, HCV, UTI, CVA, , prolonged Hospitalization, Chronic Aspiration PNA, J-Tube drainage, Bedbound with A&Ox1, Functional Quad, large Presacral Decubital Wound Sepsis r/o Osteomyelitis, recent GN Bacteremia, GI Ileus in RRT.    - Metabolic encephalopathy to be closely monitoring in ICU and intubation as needed   - Persistent Septic Shock starting pressor agent will need CVP line   - Empiric ABx coverage for HCAP vs. Aspiration Pneumonitis   - CT Abdomen/Pelvis when stable  - ESRD s/p Failed Renal Transplant x 2 on Hemodialysis for HD tomorrow under pressor support   - GOC Plans to be discussed and update with family but Full Code at present   - Overall prognosis poor     Patient seen and examined with ICU Resident/Fellow at bedside and lab data, medical records and radiology reports reviewed. I have read and agreeable with resident's Documentation, Assessment and Management Plans above in general which reflected my opinions from discussion.   Total Critical Care Time = 45 Min excluding teaching and procedure activity.

## 2019-12-31 NOTE — CHART NOTE - NSCHARTNOTEFT_GEN_A_CORE
Rapid response called overnight due to patient's hypotension, SBP reportedly in the 70's. Patient started on levo and transferred to the MICU.   Patient seen and examined after transfer.  No other acute events, responding well to pressor support with SBP in the 140's (MAP goal > 65)  Significant interval decompression in abdominal exam since gastrostomy tube attached to gravity drainage.     Vital Signs:  Vital Signs Last 24 Hrs  T(C): 38.1 (31 Dec 2019 00:00), Max: 38.1 (31 Dec 2019 00:00)  T(F): 100.5 (31 Dec 2019 00:00), Max: 100.5 (31 Dec 2019 00:00)  HR: 116 (31 Dec 2019 04:00) (101 - 132)  BP: 142/76 (31 Dec 2019 04:00) (75/49 - 171/75)  BP(mean): 101 (31 Dec 2019 04:00) (57 - 119)  RR: 27 (31 Dec 2019 04:00) (16 - 29)  SpO2: 98% (31 Dec 2019 04:00) (88% - 100%)    CAPILLARY BLOOD GLUCOSE  POCT Blood Glucose.: 158 mg/dL (30 Dec 2019 22:14)  POCT Blood Glucose.: 170 mg/dL (30 Dec 2019 18:50)  POCT Blood Glucose.: 189 mg/dL (30 Dec 2019 11:51)  POCT Blood Glucose.: 135 mg/dL (30 Dec 2019 05:41)      Physical Exam:  Gen: Asleep, minimal response, largely unchanged from baseline.  Lungs: Non labored breathing. Nasal canula in place.  Abdomen: Soft, nontender, nondistended. Gastrostomy tube in place, set to gravity, 0.9 L of biliary output with granular sediment.   Ext: Warm, well-perfused    Labs:    12-30    134<L>  |  90<L>  |  56<H>  ----------------------------<  170<H>  3.8   |  24  |  3.38<H>    Ca    11.0<H>      30 Dec 2019 22:43  Phos  2.8     12-30  Mg     2.2     12-30    TPro  7.3  /  Alb  2.5<L>  /  TBili  0.4  /  DBili  x   /  AST  25  /  ALT  8<L>  /  AlkPhos  100  12-30    LIVER FUNCTIONS - ( 30 Dec 2019 22:43 )  Alb: 2.5 g/dL / Pro: 7.3 g/dL / ALK PHOS: 100 U/L / ALT: 8 U/L / AST: 25 U/L / GGT: x                                 8.4    29.47 )-----------( 356      ( 30 Dec 2019 22:43 )             28.4     PT/INR - ( 30 Dec 2019 22:50 )   PT: 14.5 sec;   INR: 1.25 ratio    PTT - ( 30 Dec 2019 22:50 )  PTT:29.1 sec    PLAN:  -- All cares per MICU team  -- No emergent surgical intervention warranted.  -- Interval update discussed with attending surgeon, Dr. YA Marroquin.    Please contact Trauma & Acute Care Surgery (#[id number]) with any questions or concerns.

## 2019-12-31 NOTE — PROGRESS NOTE ADULT - PROBLEM SELECTOR PLAN 1
Will continue current insulin regimen for now. Will continue monitoring FS, log, will Follow up. Will continue current insulin regimen for now.

## 2019-12-31 NOTE — CHART NOTE - NSCHARTNOTEFT_GEN_A_CORE
CC: RRT      HPI:  Called by RN that patient was hypotensive to 81/52 with the electronic blood pressure cuff, tachycardic to 132 and seemed less lethargic than an hour before.  Patient had been seen earlier in the evening by MICU for ongoing hypotension and was previously in the MICU during this admission.  Patient was seen and examined by me at bedside.  I repeated blood pressure manually which was 74/48.  Patient was minimally responsive to sternal rub and an RRT was called.  During RRT, patient remained hypotensive with systolic blood pressure ranging in the 70s-85, remained tachycardic ranging from 120s-130s, and remained minimally responsive to painful stimuli.  Patient was given 500cc bolus of LR and >250cc of albumin, and Hydrocortisone 100mg x 1 dose without significant rise in BP.  Labs were ordered  Patient was subsequently transferred to MICU for higher level of care.  Patient's daughter, Roro, notified and adamantly wants patient to remain a full code status.  Attending, Dr. James, notified and aware.          Roland Del Valle PA-C  Dept of Medicine  #[id number]

## 2019-12-31 NOTE — PROGRESS NOTE ADULT - SUBJECTIVE AND OBJECTIVE BOX
Chief complaint  Patient is a 74y old  Male who presents with a chief complaint of ams (31 Dec 2019 14:51)   Review of systems  Patient in bed, looks comfortable, no fever, no hypoglycemia.  Overnight Events: RRT for hypotension, patient transferred to MICU.    Labs and Fingersticks  CAPILLARY BLOOD GLUCOSE      POCT Blood Glucose.: 145 mg/dL (31 Dec 2019 11:36)  POCT Blood Glucose.: 236 mg/dL (31 Dec 2019 05:41)  POCT Blood Glucose.: 158 mg/dL (30 Dec 2019 22:14)  POCT Blood Glucose.: 170 mg/dL (30 Dec 2019 18:50)      Anion Gap, Serum: 20 <H> (12-30 @ 22:43)  Anion Gap, Serum: 19 <H> (12-30 @ 06:19)      Calcium, Total Serum: 11.0 <H> (12-30 @ 22:43)  Calcium, Total Serum: 10.6 <H> (12-30 @ 06:19)  Albumin, Serum: 2.5 <L> (12-30 @ 22:43)    Alanine Aminotransferase (ALT/SGPT): 8 <L> (12-30 @ 22:43)  Alkaline Phosphatase, Serum: 100 (12-30 @ 22:43)  Aspartate Aminotransferase (AST/SGOT): 25 (12-30 @ 22:43)        12-30    134<L>  |  90<L>  |  56<H>  ----------------------------<  170<H>  3.8   |  24  |  3.38<H>    Ca    11.0<H>      30 Dec 2019 22:43  Phos  2.8     12-30  Mg     2.2     12-30    TPro  7.3  /  Alb  2.5<L>  /  TBili  0.4  /  DBili  x   /  AST  25  /  ALT  8<L>  /  AlkPhos  100  12-30                        8.4    29.47 )-----------( 356      ( 30 Dec 2019 22:43 )             28.4     Medications  MEDICATIONS  (STANDING):  atorvastatin 40 milliGRAM(s) Oral at bedtime  chlorhexidine 0.12% Liquid 15 milliLiter(s) Oral Mucosa two times a day  chlorhexidine 4% Liquid 1 Application(s) Topical <User Schedule>  Dakins Solution - 1/4 Strength 1 Application(s) Topical two times a day  epoetin tan Injectable 08403 Unit(s) IV Push <User Schedule>  ertapenem  IVPB 500 milliGRAM(s) IV Intermittent every 24 hours  hydrocortisone sodium succinate Injectable 50 milliGRAM(s) IV Push every 8 hours  insulin lispro (HumaLOG) corrective regimen sliding scale   SubCutaneous every 6 hours  levETIRAcetam  IVPB 500 milliGRAM(s) IV Intermittent every 12 hours  norepinephrine Infusion 0.4 MICROgram(s)/kG/Min (23.7 mL/Hr) IV Continuous <Continuous>  pantoprazole  Injectable 40 milliGRAM(s) IV Push daily      Physical Exam  General: Patient comfortable in bed  Vital Signs Last 12 Hrs  T(F): 98.8 (12-31-19 @ 15:00), Max: 98.8 (12-31-19 @ 15:00)  HR: 112 (12-31-19 @ 15:30) (94 - 116)  BP: 73/52 (12-31-19 @ 15:30) (72/52 - 149/83)  BP(mean): 58 (12-31-19 @ 15:30) (57 - 110)  RR: 36 (12-31-19 @ 15:30) (11 - 76)  SpO2: 100% (12-31-19 @ 15:30) (94% - 100%)  Neck: No palpable thyroid nodules.  CVS: S1S2, No murmurs  Respiratory: No wheezing, no crepitations  GI: Abdomen soft, bowel sounds positive  Musculoskeletal:  edema lower extremities.   Skin: No skin rashes, no ecchymosis    Diagnostics Chief complaint  Patient is a 74y old  Male who presents with a chief complaint of ams (31 Dec 2019 14:51)   Review of systems  Patient in bed, looks comfortable, no fever, no hypoglycemia.  Overnight Events: RRT for hypotension,  patient transferred to MICU.    Labs and Fingersticks  CAPILLARY BLOOD GLUCOSE      POCT Blood Glucose.: 145 mg/dL (31 Dec 2019 11:36)  POCT Blood Glucose.: 236 mg/dL (31 Dec 2019 05:41)  POCT Blood Glucose.: 158 mg/dL (30 Dec 2019 22:14)  POCT Blood Glucose.: 170 mg/dL (30 Dec 2019 18:50)      Anion Gap, Serum: 20 <H> (12-30 @ 22:43)  Anion Gap, Serum: 19 <H> (12-30 @ 06:19)      Calcium, Total Serum: 11.0 <H> (12-30 @ 22:43)  Calcium, Total Serum: 10.6 <H> (12-30 @ 06:19)  Albumin, Serum: 2.5 <L> (12-30 @ 22:43)    Alanine Aminotransferase (ALT/SGPT): 8 <L> (12-30 @ 22:43)  Alkaline Phosphatase, Serum: 100 (12-30 @ 22:43)  Aspartate Aminotransferase (AST/SGOT): 25 (12-30 @ 22:43)        12-30    134<L>  |  90<L>  |  56<H>  ----------------------------<  170<H>  3.8   |  24  |  3.38<H>    Ca    11.0<H>      30 Dec 2019 22:43  Phos  2.8     12-30  Mg     2.2     12-30    TPro  7.3  /  Alb  2.5<L>  /  TBili  0.4  /  DBili  x   /  AST  25  /  ALT  8<L>  /  AlkPhos  100  12-30                        8.4    29.47 )-----------( 356      ( 30 Dec 2019 22:43 )             28.4     Medications  MEDICATIONS  (STANDING):  atorvastatin 40 milliGRAM(s) Oral at bedtime  chlorhexidine 0.12% Liquid 15 milliLiter(s) Oral Mucosa two times a day  chlorhexidine 4% Liquid 1 Application(s) Topical <User Schedule>  Dakins Solution - 1/4 Strength 1 Application(s) Topical two times a day  epoetin tan Injectable 16100 Unit(s) IV Push <User Schedule>  ertapenem  IVPB 500 milliGRAM(s) IV Intermittent every 24 hours  hydrocortisone sodium succinate Injectable 50 milliGRAM(s) IV Push every 8 hours  insulin lispro (HumaLOG) corrective regimen sliding scale   SubCutaneous every 6 hours  levETIRAcetam  IVPB 500 milliGRAM(s) IV Intermittent every 12 hours  norepinephrine Infusion 0.4 MICROgram(s)/kG/Min (23.7 mL/Hr) IV Continuous <Continuous>  pantoprazole  Injectable 40 milliGRAM(s) IV Push daily      Physical Exam  General: Patient comfortable in bed  Vital Signs Last 12 Hrs  T(F): 98.8 (12-31-19 @ 15:00), Max: 98.8 (12-31-19 @ 15:00)  HR: 112 (12-31-19 @ 15:30) (94 - 116)  BP: 73/52 (12-31-19 @ 15:30) (72/52 - 149/83)  BP(mean): 58 (12-31-19 @ 15:30) (57 - 110)  RR: 36 (12-31-19 @ 15:30) (11 - 76)  SpO2: 100% (12-31-19 @ 15:30) (94% - 100%)  Neck: No palpable thyroid nodules.  CVS: S1S2, No murmurs  Respiratory: No wheezing, no crepitations  GI: Abdomen soft, bowel sounds positive  Musculoskeletal:  edema lower extremities.   Skin: No skin rashes, no ecchymosis    Diagnostics

## 2019-12-31 NOTE — PROGRESS NOTE ADULT - PROBLEM SELECTOR PLAN 5
f/u blood  and urine cx,serial lactate levels,monitor vitals closley,ivfs hydration,monitor urine output and renal profile,iv abx as per id ,

## 2019-12-31 NOTE — PROGRESS NOTE ADULT - SUBJECTIVE AND OBJECTIVE BOX
*******************************  Deborah Catherine, PGY1  Pager [phone]  [phone]*******    INTERVAL HPI/OVERNIGHT EVENTS:    SUBJECTIVE: Patient seen and examined at bedside.     CONSTITUTIONAL: No weakness, fevers or chills  EYES/ENT: No visual changes;  No vertigo or throat pain   NECK: No pain or stiffness  RESPIRATORY: No cough, wheezing, hemoptysis; No shortness of breath  CARDIOVASCULAR: No chest pain or palpitations  GASTROINTESTINAL: No abdominal or epigastric pain. No nausea, vomiting, or hematemesis; No diarrhea or constipation. No melena or hematochezia.  GENITOURINARY: No dysuria, frequency or hematuria  NEUROLOGICAL: No numbness or weakness  SKIN: No itching, rashes    OBJECTIVE:    VITAL SIGNS:  ICU Vital Signs Last 24 Hrs  T(C): 37.8 (31 Dec 2019 04:00), Max: 38.1 (31 Dec 2019 00:00)  T(F): 100 (31 Dec 2019 04:00), Max: 100.5 (31 Dec 2019 00:00)  HR: 111 (31 Dec 2019 06:15) (101 - 132)  BP: 122/71 (31 Dec 2019 06:15) (75/49 - 171/75)  BP(mean): 90 (31 Dec 2019 06:15) (57 - 119)  ABP: --  ABP(mean): --  RR: 26 (31 Dec 2019 06:15) (16 - 29)  SpO2: 98% (31 Dec 2019 06:15) (88% - 100%)        12-29 @ 07:01  -  12-30 @ 07:00  --------------------------------------------------------  IN: 930 mL / OUT: 0 mL / NET: 930 mL    12-30 @ 07:01  -  12-31 @ 06:30  --------------------------------------------------------  IN: 468.5 mL / OUT: 800 mL / NET: -331.5 mL      CAPILLARY BLOOD GLUCOSE      POCT Blood Glucose.: 236 mg/dL (31 Dec 2019 05:41)        PHYSICAL EXAM:  GENERAL: NAD, well-developed  HEAD:  Atraumatic, Normocephalic  EYES: EOMI, PERRLA, conjunctiva and sclera clear  NECK: Supple, No JVD  CHEST/LUNG: Clear to auscultation bilaterally; No wheeze  HEART: Regular rate and rhythm; No murmurs, rubs, or gallops  ABDOMEN: Soft, Nontender, Nondistended; Bowel sounds present  EXTREMITIES:  2+ Peripheral Pulses, No clubbing, cyanosis, or edema  PSYCH: AAOx3  NEUROLOGY: non-focal  SKIN: No rashes or lesions  Lines:      MEDICATIONS:  MEDICATIONS  (STANDING):  atorvastatin 40 milliGRAM(s) Oral at bedtime  chlorhexidine 0.12% Liquid 15 milliLiter(s) Oral Mucosa two times a day  chlorhexidine 4% Liquid 1 Application(s) Topical <User Schedule>  Dakins Solution - 1/4 Strength 1 Application(s) Topical two times a day  epoetin tan Injectable 32668 Unit(s) IV Push <User Schedule>  ertapenem  IVPB 500 milliGRAM(s) IV Intermittent every 24 hours  hydrocortisone sodium succinate Injectable 100 milliGRAM(s) IV Push every 8 hours  insulin lispro (HumaLOG) corrective regimen sliding scale   SubCutaneous every 6 hours  levETIRAcetam  IVPB 500 milliGRAM(s) IV Intermittent every 12 hours  norepinephrine Infusion 0.4 MICROgram(s)/kG/Min (23.7 mL/Hr) IV Continuous <Continuous>    MEDICATIONS  (PRN):      ALLERGIES:  Allergies    No Known Allergies    Intolerances        LABS:                        8.4    29.47 )-----------( 356      ( 30 Dec 2019 22:43 )             28.4     12-30    134<L>  |  90<L>  |  56<H>  ----------------------------<  170<H>  3.8   |  24  |  3.38<H>    Ca    11.0<H>      30 Dec 2019 22:43  Phos  2.8     12-30  Mg     2.2     12-30    TPro  7.3  /  Alb  2.5<L>  /  TBili  0.4  /  DBili  x   /  AST  25  /  ALT  8<L>  /  AlkPhos  100  12-30    PT/INR - ( 30 Dec 2019 22:50 )   PT: 14.5 sec;   INR: 1.25 ratio         PTT - ( 30 Dec 2019 22:50 )  PTT:29.1 sec      RADIOLOGY & ADDITIONAL TESTS: Reviewed.

## 2019-12-31 NOTE — PROGRESS NOTE ADULT - SUBJECTIVE AND OBJECTIVE BOX
Patient is a 74y Male whom presented to   pateint seen and examined nad , in am     PAST MEDICAL & SURGICAL HISTORY:  Kidney transplant recipient  Anuria  GERD (gastroesophageal reflux disease)  Kidney transplant failure: dx: 2/2013  Hepatitis C: dx: 90&#x27;s.  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 10 milliGRAM(s) Oral three times a day  mycophenolate mofetil 250 milliGRAM(s) Oral two times a day  predniSONE   Tablet 5 milliGRAM(s) Oral daily  sevelamer carbonate 800 milliGRAM(s) Oral three times a day with meals  sodium bicarbonate 650 milliGRAM(s) Oral two times a day  tamsulosin 0.4 milliGRAM(s) Oral at bedtime      Allergies    No Known Allergies                       SOCIAL HISTORY:  Denies ETOh,Smoking,     FAMILY HISTORY:  Family history of breast cancer in sister (Sibling): living at 94 yo  Family history of prostate cancer in father  Family history of lung cancer  Family history of hypertension in mother  Family history of diabetes mellitus: mother      REVIEW OF SYSTEMS:    unbable to obtained                                                                                                 8.4    29.47 )-----------( 356      ( 30 Dec 2019 22:43 )             28.4       CBC Full  -  ( 30 Dec 2019 22:43 )  WBC Count : 29.47 K/uL  RBC Count : 3.36 M/uL  Hemoglobin : 8.4 g/dL  Hematocrit : 28.4 %  Platelet Count - Automated : 356 K/uL  Mean Cell Volume : 84.5 fl  Mean Cell Hemoglobin : 25.0 pg  Mean Cell Hemoglobin Concentration : 29.6 gm/dL  Auto Neutrophil # : 24.05 K/uL  Auto Lymphocyte # : 3.09 K/uL  Auto Monocyte # : 2.33 K/uL  Auto Eosinophil # : 0.00 K/uL  Auto Basophil # : 0.00 K/uL  Auto Neutrophil % : 75.4 %  Auto Lymphocyte % : 10.5 %  Auto Monocyte % : 7.9 %  Auto Eosinophil % : 0.0 %  Auto Basophil % : 0.0 %      12-30    134<L>  |  90<L>  |  56<H>  ----------------------------<  170<H>  3.8   |  24  |  3.38<H>    Ca    11.0<H>      30 Dec 2019 22:43  Phos  2.8     12-30  Mg     2.2     12-30    TPro  7.3  /  Alb  2.5<L>  /  TBili  0.4  /  DBili  x   /  AST  25  /  ALT  8<L>  /  AlkPhos  100  12-30      CAPILLARY BLOOD GLUCOSE      POCT Blood Glucose.: 145 mg/dL (31 Dec 2019 11:36)  POCT Blood Glucose.: 236 mg/dL (31 Dec 2019 05:41)  POCT Blood Glucose.: 158 mg/dL (30 Dec 2019 22:14)  POCT Blood Glucose.: 170 mg/dL (30 Dec 2019 18:50)      Vital Signs Last 24 Hrs  T(C): 36.6 (31 Dec 2019 13:20), Max: 38.1 (31 Dec 2019 00:00)  T(F): 97.8 (31 Dec 2019 13:20), Max: 100.5 (31 Dec 2019 00:00)  HR: 107 (31 Dec 2019 14:30) (94 - 132)  BP: 100/57 (31 Dec 2019 14:15) (72/52 - 171/75)  BP(mean): 72 (31 Dec 2019 14:15) (57 - 119)  RR: 25 (31 Dec 2019 14:30) (11 - 42)  SpO2: 100% (31 Dec 2019 14:30) (91% - 100%)        PT/INR - ( 30 Dec 2019 22:50 )   PT: 14.5 sec;   INR: 1.25 ratio         PTT - ( 30 Dec 2019 22:50 )  PTT:29.1 sec                                                                                                              HEENT: conjunctive   clear   Neck:  No JVD  Respiratory: decrease bs b/l   Cardiovascular: S1 and S2  Gastrointestinal: BS+, soft, NT/ND  Extremities: No peripheral edema  Skin: dry   Access: pos fistula

## 2019-12-31 NOTE — CONSULT NOTE ADULT - SUBJECTIVE AND OBJECTIVE BOX
Patient is a 74y old  Male who presents with a chief complaint of ams (31 Dec 2019 16:13)      HPI:  74M PMHx of ESRD s/p failed renal transplant x2, HCV, DM, and meningococcal meningitis 2 years ago was sent in to the ED from HD for AMS and low BPs. He is unable to provide any information at this time. His sister is at the bedside and reports that he is typically able to carry a conversation and walk a small amount. She is not aware if he had been having fevers at the nursing home, and believes that his last HD was on Tuesday. (20 Nov 2019 18:01)      PAST MEDICAL & SURGICAL HISTORY:  Kidney transplant recipient  Anuria  GERD (gastroesophageal reflux disease)  Kidney transplant failure: dx: 2/2013  Hepatitis C: dx: 90&#x27;s.  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, no known MIs  H/O kidney transplant: may 2015  A-V fistula: Left, implanted (?)  S/p cadaver renal transplant: 2008  S/P partial colectomy: due to diverticulitis      MEDICATIONS  (STANDING):  atorvastatin 40 milliGRAM(s) Oral at bedtime  chlorhexidine 0.12% Liquid 15 milliLiter(s) Oral Mucosa two times a day  chlorhexidine 4% Liquid 1 Application(s) Topical <User Schedule>  Dakins Solution - 1/4 Strength 1 Application(s) Topical two times a day  epoetin tan Injectable 76407 Unit(s) IV Push <User Schedule>  ertapenem  IVPB 500 milliGRAM(s) IV Intermittent every 24 hours  hydrocortisone sodium succinate Injectable 50 milliGRAM(s) IV Push every 8 hours  insulin lispro (HumaLOG) corrective regimen sliding scale   SubCutaneous every 6 hours  levETIRAcetam  IVPB 500 milliGRAM(s) IV Intermittent every 12 hours  norepinephrine Infusion 0.4 MICROgram(s)/kG/Min (23.7 mL/Hr) IV Continuous <Continuous>  pantoprazole  Injectable 40 milliGRAM(s) IV Push daily    MEDICATIONS  (PRN):      Allergies  No Known Allergies    Intolerances        FAMILY HISTORY:  Family history of breast cancer in sister (Sibling): living at 92 yo  Family history of prostate cancer in father  Family history of lung cancer  Family history of hypertension in mother  Family history of diabetes mellitus: mother      SOCIAL HISTORY: Patient presents alone bedside    Last Dental Visit: unknown    Vital Signs Last 24 Hrs  T(C): 37.1 (31 Dec 2019 15:00), Max: 38.1 (31 Dec 2019 00:00)  T(F): 98.8 (31 Dec 2019 15:00), Max: 100.5 (31 Dec 2019 00:00)  HR: 112 (31 Dec 2019 17:15) (94 - 132)  BP: 95/58 (31 Dec 2019 17:15) (72/52 - 171/75)  BP(mean): 72 (31 Dec 2019 17:15) (57 - 119)  RR: 20 (31 Dec 2019 17:15) (11 - 76)  SpO2: 100% (31 Dec 2019 17:15) (91% - 100%)    Patient was not alert during exam. Not responsive to all commands and could not open fully.  EOE:               Mandible FROM             Facial bones and MOM grossly intact  	Unable to evaluate TMJ due to limited opening      IOE:  permanent multiple missing teeth           hard/soft palate:  ( -  ) palatal torus           tongue/FOM WNL           labial/buccal mucosa WNL    Dentition present: 2-6, 18-22, 29-31  Mobility: None  Caries: None clinically    Radiographs: No radiographs taken, patient was not transportable by wheel chair at this time.    LABS:                        8.4    29.47 )-----------( 356      ( 30 Dec 2019 22:43 )             28.4     12-30    134<L>  |  90<L>  |  56<H>  ----------------------------<  170<H>  3.8   |  24  |  3.38<H>    Ca    11.0<H>      30 Dec 2019 22:43  Phos  2.8     12-30  Mg     2.2     12-30    TPro  7.3  /  Alb  2.5<L>  /  TBili  0.4  /  DBili  x   /  AST  25  /  ALT  8<L>  /  AlkPhos  100  12-30    WBC Count: 29.47 K/uL <H> [3.80 - 10.50] (12-30 @ 22:43)    Platelet Count - Automated: 356 K/uL [150 - 400] (12-30 @ 22:43)  Platelet Count - Automated: 274 K/uL [150 - 400] (12-30 @ 08:43)    INR: 1.25 ratio <H> [0.88 - 1.16] (12-30 @ 22:50)            RADIOLOGY & ADDITIONAL STUDIES:    ASSESSMENT: Patient was not responsive during limited exam. Patient has multiple missing teeth, and heavy plaque and calculus on remaining dentition. No acute odontogenic infections noted clinically. Patient was not cleared for transport to Dental ED, unable to determine if dental is a source of patients sepsis.    PROCEDURE: Limited bedside exam completed revealing multiple missing teeth, heavy plaque and calculus on remaining dentition.     Verbal and written consent given.     RECOMMENDATIONS:   1) Upon patient becoming transportable to dental ED by wheelchair, page dental for further consultation.  2) Dental F/U with outpatient dentist for comprehensive dental care.   3) If any difficulty swallowing/breathing, fever occur, page dental.     Regla Peres, pager #[phone]  Oral surgeon consulted: Dr. Pickard

## 2019-12-31 NOTE — PROGRESS NOTE ADULT - PROBLEM SELECTOR PLAN 1
Excess fluids and waste products will be removed from your blood; your electrolytes will be balanced; your blood pressure will be controlled.  plan for hd as order , dc tem catheter  plan for perm cath in 48 hr if is ok

## 2019-12-31 NOTE — PROGRESS NOTE ADULT - ASSESSMENT
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 course c/b hypercalcemia. RRT called 11/26 for AMS and hotn SBP 70s, found to be in septic shock 2/2 UTI s/p intubation and MICU stay (11/26/19-11/30/19). Extubated on 11/29/19, transferred to floors. Patient s/p open feeding gastrostomy tube placement on 12/21/19 with surgery. MICU consulted on 12/30 for AMS. Pt AOx0 and hypotensive to sbp 87. CT abd/pelvis showing new opacities in RLL suspicious for pneumonia. Patient admitted to MICU for pressor support, and treatment of septic shock.       # NEURO  - p/w AMS, metabolic encephalopathy  - bl a&o x 1  - neuro following  - chronic lacunar infarcts; per neuro, AMS also likely related to hypercalcemia, renal dysfunction, poor nutritional intake.   - c/w keppra for hx of seizure disorder  -neuro checks per ICU protocol     # CARDIAC  Shock state requiring pressors, likely septic shock  - hypotensive during RRT 12/30; started levo gtt, titrate to MAP > 65  -on stress dose steroids, hydrocortisone 100 mg q8  - holding carvedilol 6.25 BID  - hx of cocaine cardiomyopathy, resolved, normal EF on most recent echo  - c/w statin  - will place central line given inadequate PIV access and pressor need    hx of afib was on eliquis,   -now off a/c per cards due to bleeding risk    # PULM  -satting well on NC 2L O2   -ABG showing respiratory alkalosis   -d/t minimal MS, and likely aspiration, will likely need to be intubated if MS doesn't improve  -sister and daughter aware, would like all measures at this time     # RENAL  - hx of ESRD s/p failed renal transplant x2  - renal following, HD as recommended.  - c/w hydrocortisone, epogen   - has dialysis catheter in place  - start 250cc 5% albumin x2 bolus    # GI   - PEG tube in place   - NPO for now   - CT abd/pelvis non-con showing dilated loops of bowel, no clear transition point  -set PEG to gravity   - f/u CT abd/pelvis w/ IV contrast when respiratory status is more stable   -Hep C positive, RNA neg  -Consider hepatology reconsult if stabilizes     # HEME/ONC  - pt was on eliquis; now held   -HDS  -keep active type and screen     # ID   WBC uptrending. ID following  -c/w vanc, ertapenem  - hypotension likely 2/2 septic shock, possible aspiration, UTI, has decubitus ulcers as well.  - c/w empiric vanc  -s/p LP, CSF negative  - ID following   - f/u urine cxs, blood cxs, UA   - f/u CXR     # ENDO  HbA1c 5.5  - Insulin Sliding Scale q6 while NPO  -holding lantus as NPO  - FS q6hrs    - hypercalcemic     # DVT  -SCDs     #GOC  Spoke to sister and daughter (daughter would be the decision maker)  Stressed how acute patient is and likely need to be intubated if MS doesn't improve   Pt remains FULL CODE

## 2019-12-31 NOTE — PROGRESS NOTE ADULT - SUBJECTIVE AND OBJECTIVE BOX
Trauma & Acute Care Surgery Progress Note     Brief HPI:  74 year old with history of Hep C, IVDA, ESRD (s/p failed renal transplant) on HD via permacath, GERD, HTN, DM, BPH, diverticulitis (s/p colon resection multiple years ago) now hospitalized on medical service. Diagnosed with dysphagia secondary to poor mental status. Failed endoscopic attempt at PEG placement. Patient is s/p open gastrostomy tube placement. Procedure was well-tolerated.     Subjective/24hour Events:   -- G tube to gravity with high bilious output      Vital Signs Last 24 Hrs  T(C): 36.8 (31 Dec 2019 10:50), Max: 38.1 (31 Dec 2019 00:00)  T(F): 98.3 (31 Dec 2019 10:50), Max: 100.5 (31 Dec 2019 00:00)  HR: 104 (31 Dec 2019 13:30) (94 - 132)  BP: 138/72 (31 Dec 2019 13:30) (72/52 - 171/75)  BP(mean): 97 (31 Dec 2019 13:30) (57 - 119)  RR: 22 (31 Dec 2019 13:30) (11 - 42)  SpO2: 100% (31 Dec 2019 13:30) (91% - 100%)    I&O's Detail    30 Dec 2019 07:01  -  31 Dec 2019 07:00  --------------------------------------------------------  IN:    norepinephrine Infusion: 118.5 mL    Solution: 350 mL  Total IN: 468.5 mL    OUT:    Other: 800 mL  Total OUT: 800 mL    Total NET: -331.5 mL      31 Dec 2019 07:01  -  31 Dec 2019 14:19  --------------------------------------------------------  IN:    norepinephrine Infusion: 19 mL  Total IN: 19 mL    OUT:  Total OUT: 0 mL    Total NET: 19 mL        MEDICATIONS  (STANDING):  atorvastatin 40 milliGRAM(s) Oral at bedtime  chlorhexidine 0.12% Liquid 15 milliLiter(s) Oral Mucosa two times a day  chlorhexidine 4% Liquid 1 Application(s) Topical <User Schedule>  Dakins Solution - 1/4 Strength 1 Application(s) Topical two times a day  epoetin tan Injectable 86197 Unit(s) IV Push <User Schedule>  ertapenem  IVPB 500 milliGRAM(s) IV Intermittent every 24 hours  hydrocortisone sodium succinate Injectable 50 milliGRAM(s) IV Push every 8 hours  insulin lispro (HumaLOG) corrective regimen sliding scale   SubCutaneous every 6 hours  levETIRAcetam  IVPB 500 milliGRAM(s) IV Intermittent every 12 hours  norepinephrine Infusion 0.4 MICROgram(s)/kG/Min (23.7 mL/Hr) IV Continuous <Continuous>  pantoprazole  Injectable 40 milliGRAM(s) IV Push daily    MEDICATIONS  (PRN):        Physical Exam:  Gen: NAD. Minimally responsive, no interval change in mentation  Lungs: Non labored breathing.   Ab: Softly distended, nontender. Gastrostomy tube to gravity  Ext: Warm. well perfused.       Labs:    CBC Full  -  ( 30 Dec 2019 22:43 )  WBC Count : 29.47 K/uL  RBC Count : 3.36 M/uL  Hemoglobin : 8.4 g/dL  Hematocrit : 28.4 %  Platelet Count - Automated : 356 K/uL  Mean Cell Volume : 84.5 fl  Mean Cell Hemoglobin : 25.0 pg  Mean Cell Hemoglobin Concentration : 29.6 gm/dL  Auto Neutrophil # : 24.05 K/uL  Auto Lymphocyte # : 3.09 K/uL  Auto Monocyte # : 2.33 K/uL  Auto Eosinophil # : 0.00 K/uL  Auto Basophil # : 0.00 K/uL  Auto Neutrophil % : 75.4 %  Auto Lymphocyte % : 10.5 %  Auto Monocyte % : 7.9 %  Auto Eosinophil % : 0.0 %  Auto Basophil % : 0.0 %    12-30    134<L>  |  90<L>  |  56<H>  ----------------------------<  170<H>  3.8   |  24  |  3.38<H>    Ca    11.0<H>      30 Dec 2019 22:43  Phos  2.8     12-30  Mg     2.2     12-30    TPro  7.3  /  Alb  2.5<L>  /  TBili  0.4  /  DBili  x   /  AST  25  /  ALT  8<L>  /  AlkPhos  100  12-30    LIVER FUNCTIONS - ( 30 Dec 2019 22:43 )  Alb: 2.5 g/dL / Pro: 7.3 g/dL / ALK PHOS: 100 U/L / ALT: 8 U/L / AST: 25 U/L / GGT: x           PT/INR - ( 30 Dec 2019 22:50 )   PT: 14.5 sec;   INR: 1.25 ratio         PTT - ( 30 Dec 2019 22:50 )  PTT:29.1 sec

## 2019-12-31 NOTE — PROGRESS NOTE ADULT - SUBJECTIVE AND OBJECTIVE BOX
Interventional Radiology Pre-Procedure Note    This is a 74y Male with ESRD on HD with a malfunctioning non-tunnelled dialysis catheter. IR consulted for an exchange.     Procedure: Exchange of a RIJ non-tunnelled dialysis catheter    Diagnosis/Indication: Malfunctioning non-tunnelled dialysis catheter/ESRD    PAST MEDICAL & SURGICAL HISTORY:  Kidney transplant recipient  Anuria  GERD (gastroesophageal reflux disease)  Kidney transplant failure: dx: 2/2013  Hepatitis C: dx: 90&#x27;s.  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, no known MIs  H/O kidney transplant: may 2015  A-V fistula: Left, implanted (?)  S/p cadaver renal transplant: 2008  S/P partial colectomy: due to diverticulitis     Male    Allergies: No Known Allergies      LABS:  CBC Full  -  ( 30 Dec 2019 22:43 )  WBC Count : 29.47 K/uL  RBC Count : 3.36 M/uL  Hemoglobin : 8.4 g/dL  Hematocrit : 28.4 %  Platelet Count - Automated : 356 K/uL  Mean Cell Volume : 84.5 fl  Mean Cell Hemoglobin : 25.0 pg  Mean Cell Hemoglobin Concentration : 29.6 gm/dL    12-30    134<L>  |  90<L>  |  56<H>  ----------------------------<  170<H>  3.8   |  24  |  3.38<H>    Ca    11.0<H>      30 Dec 2019 22:43  Phos  2.8     12-30  Mg     2.2     12-30    TPro  7.3  /  Alb  2.5<L>  /  TBili  0.4  /  DBili  x   /  AST  25  /  ALT  8<L>  /  AlkPhos  100  12-30    PT/INR - ( 30 Dec 2019 22:50 )   PT: 14.5 sec;   INR: 1.25 ratio      PTT - ( 30 Dec 2019 22:50 )  PTT:29.1 sec    Plan:  -RIJ Non-tunnelled dialysis catheter exchange.  -Procedure/ risks/ benefits were explained, informed consent obtained from patient's daughter (Roro Liao), verbalizes understanding. Interventional Radiology Note    IR consulted for non-tunnelled RIJ dialysis catheter exchange. Case approved for an exchange however Florin Mckinley from MICU called saying that they would now like to remove the catheter and have it replaced on Thursday. Patient had positive blood cultures on the 24th but negative cultures on the 27th. Primary team would like to remove catheter as a precaution. Interventional Radiology Note    IR consulted for non-tunnelled RIJ dialysis catheter exchange. Case approved for an exchange however Dr. Catherine from MICU called saying that they would now like to remove the catheter and have it replaced on Thursday. Patient had positive blood cultures on the 24th but negative cultures on the 27th. Primary team would like to remove catheter as a precaution. Interventional Radiology Note    IR consulted for non-tunnelled RIJ dialysis catheter exchange. Case approved for an exchange however Dr. Catherine from MICU called saying that they would now like to remove the catheter and have it replaced on Thursday. Patient had positive blood cultures on the 24th but negative cultures on the 27th. Primary team would like to remove catheter as a precaution.     Attending addendum    As above, discussed case with Dr. Pinedo.  IR is available for further HD access catheter needs in future.

## 2019-12-31 NOTE — PROGRESS NOTE ADULT - ASSESSMENT
74M PMHx of ESRD s/p failed renal transplant x2, HCV, DM, and meningococcal meningitis 2 years ago was sent in to the ED from HD for AMS and hypotension with continued AMS with attempted PEG insertion with both IR and GI with inability to obtain good window for PEG insertion. Patient is s/p open gastrostomy tube placement on 12/21/2019 by ACS. Re-consulted for worsening abdominal distension and bilious fluid suctioned from oral cavity.     -- Tube feeds discontinued and gastrostomy tube to gravity.  -- Monitor G-tube output.     Pager [phone]

## 2019-12-31 NOTE — PROGRESS NOTE ADULT - ASSESSMENT
74 M PMHx of ESRD s/p failed renal transplant x2, HCV, DM, and meningococcal meningitis 2 years ago was sent in to the ED from HD for AMS and low BPs  CVA  Pneumonia-aspiration-resp failure s/p Rx  Patient also with sacral decub stage III  Leukocytosis  Is on baseline steroids  also s/p recent feeding tube placement   New GNR bacteremia--Prevotella denticola (oral mucosa vs gut)  Deteriorated overnight with new SBO and pneumoperitoneum, septic shock, hypotension, on pressors.    Overall,  1) Persistent Leucocytosis and higher today.  - externally no s/s SC HD catheter site infection  -? From SBO. Air noted in abdomen as well.  - f/up blood culture  - Trend WBC  -Vanco x 1  given overnight  - Broaden to meropenem 500 mg IV daily.   - Check vanc level in al 1/1/2020  - Surgery eval for pneumoperitoneum.      2) Sacral wound  - Follow up wound care    3) Stroke--Monitor for signs aspiration, rehabilitation per primary team    4) New GNR Bacteremia - Identified as Prevotella  - meropenem 500 IV daily  Repeat blood culture ngtd  Repeat bcxr in lab from 12/30/19.  CT a/p with SBO and pneumoperitoneum  - Obtain dental eval to rule out dental source    Discussed with MICU    My colleagues will be covering this patient on 1/1/2020.  ID service available to see pt.  Call x [phone] with questions or change in status.   My associate will resume care 1/2/2020.      Vicky Saavedra MD  [phone] (pager)  [phone] (office)

## 2019-12-31 NOTE — PROGRESS NOTE ADULT - SUBJECTIVE AND OBJECTIVE BOX
CC: F/U for Bacteremia    Saw/spoke to patient. No fevers, no chills. Poor mental status. Chronically ill appearing.  Seen in MICU. Getting dialysis.  Events noted. Pt hypotensive and moved to MICU.  New fever    Allergies  No Known Allergies    ANTIMICROBIALS:  ertapenem  IVPB 500 every 24 hours  Vanco 1 gram IV x1 overnight    MEDICATIONS  (STANDING):  atorvastatin 40 at bedtime  epoetin tan Injectable 87864 <User Schedule>  hydrocortisone sodium succinate Injectable 50 every 8 hours  insulin lispro (HumaLOG) corrective regimen sliding scale  every 6 hours  levETIRAcetam  IVPB 500 every 12 hours  norepinephrine Infusion 0.4 <Continuous>  pantoprazole  Injectable 40 daily      PE:    Vital Signs Last 24 Hrs  T(F): 98.3 (12-31-19 @ 10:50), Max: 100.5 (12-31-19 @ 00:00)  HR: 101 (12-31-19 @ 12:30)  BP: 98/63 (12-31-19 @ 12:30)  RR: 25 (12-31-19 @ 12:30)  SpO2: 99% (12-31-19 @ 12:30) (91% - 100%)  Wt(kg): --    Gen: AOx1, chronically ill appearing  CV: S1+S2 normal, tachycardic  Resp: Clear bilat, no resp distress, no crackles/wheezes  Abd: Soft, nontender, +BS, Peg  now to drainage  Ext: No LE edema, no wounds  Lines: R sided HD catheter, left TLC.      LABS:                                   8.4    29.47 )-----------( 356      ( 30 Dec 2019 22:43 )             28.4 12-30    134  |  90  |  56  ----------------------------<  170  3.8   |  24  |  3.38  Ca    11.0      30 Dec 2019 22:43Phos  2.8     12-30Mg     2.2     12-30  TPro  7.3  /  Alb  2.5  /  TBili  0.4  /  DBili  x   /  AST  25  /  ALT  8   /  AlkPhos  100  12-30               9.0    18.67 )-----------( 274      ( 30 Dec 2019 08:43 )             30.1     12-30    134<L>  |  91<L>  |  37<H>  ----------------------------<  133<H>  3.5   |  24  |  2.53<H>    Ca    10.6<H>      30 Dec 2019 06:19    MICROBIOLOGY:    Blood culture 12/30 pending    .Blood Blood-Venous  12-27-19   No growth to date.    .Blood Blood  12-27-19   No growth to date.    .Blood Blood  12-24-19   Growth in anaerobic bottle: Prevotella denticola "Susceptibilities not  performed"    .Stool Feces  12-14-19   GI PCR Results: NOT detected    .Stool Feces  12-10-19   GI PCR Results: NOT detected    .Blood Blood-Peripheral  12-06-19   No growth at 5 days.     CMV PCR Detection: NotDetec IU/mL (09-05-19 @ 21:02)    (otherwise reviewed)    RADIOLOGY:  < from: CT Abdomen and Pelvis w/ IV Cont (12.31.19 @ 08:56) >  IMPRESSION:     Persistent small bowel obstruction.    Free air greater than expected for postoperative day 9 is not significantly changed from prior study one day earlier.     Large decubitus ulcer overlying the sacrum and coccyx.      Nonobstructing stone atrophy UPJ of the transplanted kidney. No associated hydronephrosis.    Bibasilar airspace opacities stable to mildly improved.    < end of copied text >    < from: CT Abdomen and Pelvis No Cont (12.30.19 @ 17:46) >  IMPRESSION:     New perinephric stranding surrounding the left lower quadrant transplant kidney. A 1.0 cm calculus is present in the renal pelvis of the transplant kidney.    Dilated loops of small bowel with gradual transition point in the right lower quadrant, possibly partial small bowel obstruction or ileus.    Small amount of pneumoperitoneum, possibly related to recent gastrostomy placement. Please correlate with clinical findings.    New consolidation in the right lower lung and left lower lobe, probably pneumonia.    Large sacral decubitus ulcer with interval debridement.    < end of copied text >        12/23 XR:    FINDINGS:     The right internal jugular dialysis catheter tip is in the distal SVC.    The lungs are clear.    The heart size is normal.    Degenerative changes of the spine.    IMPRESSION:    Clear lungs.

## 2020-01-01 LAB
ALBUMIN SERPL ELPH-MCNC: 2.8 G/DL — LOW (ref 3.3–5)
ALP SERPL-CCNC: 97 U/L — SIGNIFICANT CHANGE UP (ref 40–120)
ALT FLD-CCNC: 8 U/L — LOW (ref 10–45)
ANION GAP SERPL CALC-SCNC: 21 MMOL/L — HIGH (ref 5–17)
APPEARANCE UR: ABNORMAL
AST SERPL-CCNC: 15 U/L — SIGNIFICANT CHANGE UP (ref 10–40)
BACTERIA # UR AUTO: NEGATIVE — SIGNIFICANT CHANGE UP
BASE EXCESS BLDV CALC-SCNC: 4.8 MMOL/L — HIGH (ref -2–2)
BASOPHILS # BLD AUTO: 0.02 K/UL — SIGNIFICANT CHANGE UP (ref 0–0.2)
BASOPHILS NFR BLD AUTO: 0.1 % — SIGNIFICANT CHANGE UP (ref 0–2)
BILIRUB SERPL-MCNC: 0.3 MG/DL — SIGNIFICANT CHANGE UP (ref 0.2–1.2)
BILIRUB UR-MCNC: NEGATIVE — SIGNIFICANT CHANGE UP
BUN SERPL-MCNC: 44 MG/DL — HIGH (ref 7–23)
CA-I SERPL-SCNC: 1.33 MMOL/L — HIGH (ref 1.12–1.3)
CALCIUM SERPL-MCNC: 10.4 MG/DL — SIGNIFICANT CHANGE UP (ref 8.4–10.5)
CHLORIDE BLDV-SCNC: 96 MMOL/L — SIGNIFICANT CHANGE UP (ref 96–108)
CHLORIDE SERPL-SCNC: 91 MMOL/L — LOW (ref 96–108)
CO2 BLDV-SCNC: 30 MMOL/L — SIGNIFICANT CHANGE UP (ref 22–30)
CO2 SERPL-SCNC: 23 MMOL/L — SIGNIFICANT CHANGE UP (ref 22–31)
COLOR SPEC: YELLOW — SIGNIFICANT CHANGE UP
CREAT SERPL-MCNC: 3 MG/DL — HIGH (ref 0.5–1.3)
CRP SERPL-MCNC: 16.59 MG/DL — HIGH (ref 0–0.4)
CULTURE RESULTS: SIGNIFICANT CHANGE UP
CULTURE RESULTS: SIGNIFICANT CHANGE UP
DIFF PNL FLD: ABNORMAL
EOSINOPHIL # BLD AUTO: 0 K/UL — SIGNIFICANT CHANGE UP (ref 0–0.5)
EOSINOPHIL NFR BLD AUTO: 0 % — SIGNIFICANT CHANGE UP (ref 0–6)
EPI CELLS # UR: 1 /HPF — SIGNIFICANT CHANGE UP
ERYTHROCYTE [SEDIMENTATION RATE] IN BLOOD: 78 MM/HR — HIGH (ref 0–20)
GAS PNL BLDA: SIGNIFICANT CHANGE UP
GAS PNL BLDV: 138 MMOL/L — SIGNIFICANT CHANGE UP (ref 135–145)
GAS PNL BLDV: SIGNIFICANT CHANGE UP
GAS PNL BLDV: SIGNIFICANT CHANGE UP
GLUCOSE BLDC GLUCOMTR-MCNC: 152 MG/DL — HIGH (ref 70–99)
GLUCOSE BLDC GLUCOMTR-MCNC: 152 MG/DL — HIGH (ref 70–99)
GLUCOSE BLDC GLUCOMTR-MCNC: 158 MG/DL — HIGH (ref 70–99)
GLUCOSE BLDC GLUCOMTR-MCNC: 172 MG/DL — HIGH (ref 70–99)
GLUCOSE BLDC GLUCOMTR-MCNC: 173 MG/DL — HIGH (ref 70–99)
GLUCOSE BLDV-MCNC: 175 MG/DL — HIGH (ref 70–99)
GLUCOSE SERPL-MCNC: 176 MG/DL — HIGH (ref 70–99)
GLUCOSE UR QL: ABNORMAL
HCO3 BLDV-SCNC: 29 MMOL/L — SIGNIFICANT CHANGE UP (ref 21–29)
HCT VFR BLD CALC: 26.6 % — LOW (ref 39–50)
HCT VFR BLDA CALC: 24 % — LOW (ref 39–50)
HGB BLD CALC-MCNC: 7.8 G/DL — LOW (ref 13–17)
HGB BLD-MCNC: 7.7 G/DL — LOW (ref 13–17)
HOROWITZ INDEX BLDV+IHG-RTO: 36 — SIGNIFICANT CHANGE UP
HYALINE CASTS # UR AUTO: 2 /LPF — SIGNIFICANT CHANGE UP (ref 0–2)
IMM GRANULOCYTES NFR BLD AUTO: 0.8 % — SIGNIFICANT CHANGE UP (ref 0–1.5)
KETONES UR-MCNC: NEGATIVE — SIGNIFICANT CHANGE UP
LACTATE BLDV-MCNC: 1.6 MMOL/L — SIGNIFICANT CHANGE UP (ref 0.7–2)
LEUKOCYTE ESTERASE UR-ACNC: ABNORMAL
LYMPHOCYTES # BLD AUTO: 1.19 K/UL — SIGNIFICANT CHANGE UP (ref 1–3.3)
LYMPHOCYTES # BLD AUTO: 6 % — LOW (ref 13–44)
MAGNESIUM SERPL-MCNC: 2.2 MG/DL — SIGNIFICANT CHANGE UP (ref 1.6–2.6)
MCHC RBC-ENTMCNC: 24.5 PG — LOW (ref 27–34)
MCHC RBC-ENTMCNC: 28.9 GM/DL — LOW (ref 32–36)
MCV RBC AUTO: 84.7 FL — SIGNIFICANT CHANGE UP (ref 80–100)
MONOCYTES # BLD AUTO: 0.8 K/UL — SIGNIFICANT CHANGE UP (ref 0–0.9)
MONOCYTES NFR BLD AUTO: 4 % — SIGNIFICANT CHANGE UP (ref 2–14)
NEUTROPHILS # BLD AUTO: 17.6 K/UL — HIGH (ref 1.8–7.4)
NEUTROPHILS NFR BLD AUTO: 89.1 % — HIGH (ref 43–77)
NITRITE UR-MCNC: NEGATIVE — SIGNIFICANT CHANGE UP
NRBC # BLD: 0 /100 WBCS — SIGNIFICANT CHANGE UP (ref 0–0)
OTHER CELLS CSF MANUAL: 8 ML/DL — LOW (ref 18–22)
PCO2 BLDV: 44 MMHG — SIGNIFICANT CHANGE UP (ref 35–50)
PH BLDV: 7.44 — SIGNIFICANT CHANGE UP (ref 7.35–7.45)
PH UR: 6 — SIGNIFICANT CHANGE UP (ref 5–8)
PHOSPHATE SERPL-MCNC: 4.2 MG/DL — SIGNIFICANT CHANGE UP (ref 2.5–4.5)
PLATELET # BLD AUTO: 253 K/UL — SIGNIFICANT CHANGE UP (ref 150–400)
PO2 BLDV: 43 MMHG — SIGNIFICANT CHANGE UP (ref 25–45)
POTASSIUM BLDV-SCNC: 3.8 MMOL/L — SIGNIFICANT CHANGE UP (ref 3.5–5.3)
POTASSIUM SERPL-MCNC: 4 MMOL/L — SIGNIFICANT CHANGE UP (ref 3.5–5.3)
POTASSIUM SERPL-SCNC: 4 MMOL/L — SIGNIFICANT CHANGE UP (ref 3.5–5.3)
PROT SERPL-MCNC: 7.3 G/DL — SIGNIFICANT CHANGE UP (ref 6–8.3)
PROT UR-MCNC: ABNORMAL
RBC # BLD: 3.14 M/UL — LOW (ref 4.2–5.8)
RBC # FLD: 17.2 % — HIGH (ref 10.3–14.5)
RBC CASTS # UR COMP ASSIST: 2 /HPF — SIGNIFICANT CHANGE UP (ref 0–4)
SAO2 % BLDV: 75 % — SIGNIFICANT CHANGE UP (ref 67–88)
SODIUM SERPL-SCNC: 135 MMOL/L — SIGNIFICANT CHANGE UP (ref 135–145)
SP GR SPEC: 1.03 — HIGH (ref 1.01–1.02)
SPECIMEN SOURCE: SIGNIFICANT CHANGE UP
SPECIMEN SOURCE: SIGNIFICANT CHANGE UP
UROBILINOGEN FLD QL: NEGATIVE — SIGNIFICANT CHANGE UP
VANCOMYCIN FLD-MCNC: 11.6 UG/ML — SIGNIFICANT CHANGE UP
WBC # BLD: 19.76 K/UL — HIGH (ref 3.8–10.5)
WBC # FLD AUTO: 19.76 K/UL — HIGH (ref 3.8–10.5)
WBC UR QL: 232 /HPF — HIGH (ref 0–5)

## 2020-01-01 PROCEDURE — 99233 SBSQ HOSP IP/OBS HIGH 50: CPT

## 2020-01-01 PROCEDURE — 99291 CRITICAL CARE FIRST HOUR: CPT

## 2020-01-01 RX ORDER — DEXTROSE 50 % IN WATER 50 %
25 SYRINGE (ML) INTRAVENOUS ONCE
Refills: 0 | Status: DISCONTINUED | OUTPATIENT
Start: 2020-01-01 | End: 2020-01-01

## 2020-01-01 RX ORDER — ACETAMINOPHEN 500 MG
1000 TABLET ORAL ONCE
Refills: 0 | Status: COMPLETED | OUTPATIENT
Start: 2020-01-01 | End: 2020-01-01

## 2020-01-01 RX ORDER — INSULIN GLARGINE 100 [IU]/ML
8 INJECTION, SOLUTION SUBCUTANEOUS AT BEDTIME
Refills: 0 | Status: DISCONTINUED | OUTPATIENT
Start: 2020-01-01 | End: 2020-01-05

## 2020-01-01 RX ORDER — DEXTROSE 50 % IN WATER 50 %
15 SYRINGE (ML) INTRAVENOUS ONCE
Refills: 0 | Status: DISCONTINUED | OUTPATIENT
Start: 2020-01-01 | End: 2020-01-01

## 2020-01-01 RX ORDER — SODIUM CHLORIDE 9 MG/ML
1000 INJECTION, SOLUTION INTRAVENOUS
Refills: 0 | Status: DISCONTINUED | OUTPATIENT
Start: 2020-01-01 | End: 2020-01-05

## 2020-01-01 RX ORDER — GLUCAGON INJECTION, SOLUTION 0.5 MG/.1ML
1 INJECTION, SOLUTION SUBCUTANEOUS ONCE
Refills: 0 | Status: DISCONTINUED | OUTPATIENT
Start: 2020-01-01 | End: 2020-01-01

## 2020-01-01 RX ORDER — DEXTROSE 50 % IN WATER 50 %
12.5 SYRINGE (ML) INTRAVENOUS ONCE
Refills: 0 | Status: DISCONTINUED | OUTPATIENT
Start: 2020-01-01 | End: 2020-01-01

## 2020-01-01 RX ORDER — VANCOMYCIN HCL 1 G
500 VIAL (EA) INTRAVENOUS ONCE
Refills: 0 | Status: COMPLETED | OUTPATIENT
Start: 2020-01-01 | End: 2020-01-01

## 2020-01-01 RX ORDER — INSULIN LISPRO 100/ML
VIAL (ML) SUBCUTANEOUS
Refills: 0 | Status: DISCONTINUED | OUTPATIENT
Start: 2020-01-01 | End: 2020-01-04

## 2020-01-01 RX ADMIN — Medication 2: at 05:53

## 2020-01-01 RX ADMIN — Medication 1 APPLICATION(S): at 05:53

## 2020-01-01 RX ADMIN — Medication 400 MILLIGRAM(S): at 07:57

## 2020-01-01 RX ADMIN — LEVETIRACETAM 400 MILLIGRAM(S): 250 TABLET, FILM COATED ORAL at 05:52

## 2020-01-01 RX ADMIN — CHLORHEXIDINE GLUCONATE 15 MILLILITER(S): 213 SOLUTION TOPICAL at 05:52

## 2020-01-01 RX ADMIN — Medication 50 MILLIGRAM(S): at 13:38

## 2020-01-01 RX ADMIN — CHLORHEXIDINE GLUCONATE 15 MILLILITER(S): 213 SOLUTION TOPICAL at 16:52

## 2020-01-01 RX ADMIN — Medication 1000 MILLIGRAM(S): at 08:12

## 2020-01-01 RX ADMIN — Medication 2: at 11:28

## 2020-01-01 RX ADMIN — Medication 50 MILLIGRAM(S): at 05:52

## 2020-01-01 RX ADMIN — PANTOPRAZOLE SODIUM 40 MILLIGRAM(S): 20 TABLET, DELAYED RELEASE ORAL at 11:28

## 2020-01-01 RX ADMIN — LEVETIRACETAM 400 MILLIGRAM(S): 250 TABLET, FILM COATED ORAL at 16:52

## 2020-01-01 RX ADMIN — Medication 50 MILLIGRAM(S): at 21:53

## 2020-01-01 RX ADMIN — Medication 1: at 17:13

## 2020-01-01 RX ADMIN — INSULIN GLARGINE 8 UNIT(S): 100 INJECTION, SOLUTION SUBCUTANEOUS at 21:53

## 2020-01-01 RX ADMIN — MEROPENEM 100 MILLIGRAM(S): 1 INJECTION INTRAVENOUS at 01:50

## 2020-01-01 RX ADMIN — CHLORHEXIDINE GLUCONATE 1 APPLICATION(S): 213 SOLUTION TOPICAL at 07:30

## 2020-01-01 RX ADMIN — ATORVASTATIN CALCIUM 40 MILLIGRAM(S): 80 TABLET, FILM COATED ORAL at 21:53

## 2020-01-01 RX ADMIN — Medication 100 MILLIGRAM(S): at 08:18

## 2020-01-01 RX ADMIN — Medication 2: at 01:50

## 2020-01-01 RX ADMIN — Medication 1 APPLICATION(S): at 16:53

## 2020-01-01 NOTE — PROGRESS NOTE ADULT - ASSESSMENT
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 course c/b hypercalcemia. RRT called 11/26 for AMS and hotn SBP 70s, found to be in septic shock 2/2 UTI s/p intubation and MICU stay (11/26/19-11/30/19). Extubated on 11/29/19, transferred to floors. Patient s/p open feeding gastrostomy tube placement on 12/21/19 with surgery. MICU consulted on 12/30 for AMS. Pt AOx0 and hypotensive to sbp 87. CT abd/pelvis showing new opacities in RLL suspicious for pneumonia. Patient admitted to MICU for pressor support, and treatment of septic shock likely in the setting of PNA    # NEURO  - p/w AMS, metabolic encephalopathy  - A&O x 2 today   - neuro following  - chronic lacunar infarcts; per neuro, AMS also likely related to hypercalcemia, renal dysfunction, poor nutritional intake.   - c/w keppra 500 BID for hx of seizure disorder  -neuro checks per ICU protocol     # CARDIAC  Septic shock, now resolved   - hypotensive during RRT 12/30; started on levophed, titrated off at 4AM   - on stress dose steroids, 50 mg q8  - holding carvedilol 6.25 BID  - hx of cocaine cardiomyopathy, resolved, normal EF on most recent echo  - c/w statin    hx of afib was on eliquis,   -now off a/c per cards due to bleeding risk    # PULM  -satting well on NA  -CT abdomen revealing for RLL consolidation  -will consider CT chest no con    # RENAL  - hx of ESRD s/p failed renal transplant x2  - renal following, HD as recommended   - c/w hydrocortisone, epogen   - AAMIR valentin d/bertin     # GI   - PEG tube in place   - NPO for now   - CT abd/pelvis non-con 12/31 revealing for SBO   - f/u CT abd/pelvis w/ IV contrast when respiratory status is more stable   - Hep C ab positive, RNA neg  - Hep B positive, immune by ab     # HEME/ONC  - pt was on eliquis; now held   -HDS  -keep active type and screen     # ID   WBC uptrending. ID following  - hypotension likely 2/2 septic shock, likely 2/2 aspiration PNA given RLL consolidation seen on CT abdomen; UTI and decubitus ulcers on differential   - s/p LP, CSF negative  - ID following   - c/w vanc, meropenem   - f/u urine cxs, blood cxs, UA   - f/u CXR       # ENDO  HbA1c 5.5  - Insulin Sliding Scale q6 while NPO  -holding lantus as NPO  - FS q6hrs    - hypercalcemic     # DVT  -SCDs     #GOC  Spoke to sister and daughter (daughter would be the decision maker)  Stressed how acute patient is and likely need to be intubated if MS doesn't improve   Pt remains FULL CODE Patient is a 75yo male with a PMHx of ESRD s/p failed renal transplant x2, HCV, HTN, T2DM, 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 course c/b hypercalcemia. RRT called 11/26 for AMS and hotn SBP 70s, found to be in septic shock 2/2 UTI s/p intubation and MICU stay (11/26/19-11/30/19). Extubated on 11/29/19, transferred to floors. Patient s/p open feeding gastrostomy tube placement on 12/21/19 with surgery. MICU consulted on 12/30 for AMS. Pt AOx0 and hypotensive to sbp 87. CT abd/pelvis showing new opacities in RLL suspicious for pneumonia. Patient admitted to MICU for pressor support, and treatment of septic shock likely in the setting of PNA vs. UTI vs. from sacral ulcer wounds.     # NEURO  - p/w AMS, metabolic encephalopathy  - A&O x 2 today   - neuro following  - chronic lacunar infarcts; per neuro, AMS also likely related to hypercalcemia, renal dysfunction, poor nutritional intake  - repeat CT head 12/31 neg for acute intracranial process   - c/w keppra 500 BID for hx of seizure disorder  - neuro checks per ICU protocol     # CARDIAC  Septic shock, now resolved   - hypotensive during RRT 12/30; started on levophed, titrated off at 4AM   - on stress dose steroids, 50 mg q8  - holding carvedilol 6.25 BID  - hx of cocaine cardiomyopathy, resolved, normal EF on most recent echo  - c/w statin    Afib on Eliquis   -now off a/c per cards due to bleeding risk    # PULM  -satting well on NA  -CT abdomen revealing for RLL consolidation    # RENAL  - hx of ESRD s/p failed renal transplant x2  - renal following, HD as recommended   - c/w hydrocortisone, epogen   - AAMIR bolañosley d/bertin 1/1  - patient to undergo IR guided nontunneled cath placement tomorrow    # GI   - PEG tube in draining to gravity, 50 cc ouput in last 24 hrs    - CT abd/pelvis non-con 12/31 revealing for SBO   - NPO  - surgery recs appreciated, will start feeds   - Hep C ab positive, RNA neg      # HEME/ONC  - pt was on eliquis; now held due rto bleeding risk   -HDS  -keep active type and screen     # ID  Septic shock: hypotension likely 2/2 septic shock, likely 2/2 aspiration PNA given RLL consolidation seen on CT abdomen vs other source: UTI, decubitus ulcers  - now resolved   - bcx 12/24 growing prevotella denticola, repeat BCx ngtd   - dental consulted, oral source of infection less likely   - s/p LP, CSF negative  - ESR, CRP elevated though low suspicion for OM given neg CT findings   - Repeat UA+ for LE, neg for bacteria   - vanc low at 11, dosed 500 cc this am   - ID following, recs appreciated   - c/w vanc (dialysis dosing), meropenem   - f/u urine cxs, blood cxs    # ENDO  HbA1c 5.5  - holding lantus as NPO  - will restart tube feeds   - spoke to Dr. 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 course c/b hypercalcemia. RRT called 11/26 for AMS and hotn SBP 70s, found to be in septic shock 2/2 UTI s/p intubation and MICU stay (11/26/19-11/30/19). Extubated on 11/29/19, transferred to floors. Patient s/p open feeding gastrostomy tube placement on 12/21/19 with surgery. MICU consulted on 12/30 for AMS. Pt AOx0 and hypotensive to sbp 87. CT abd/pelvis showing new opacities in RLL suspicious for pneumonia. Patient admitted to MICU for pressor support, and treatment of septic shock likely in the setting of PNA vs. UTI vs. from sacral ulcer wounds.     # NEURO  - p/w AMS, metabolic encephalopathy  - A&O x 2 today   - neuro following  - chronic lacunar infarcts; per neuro, AMS also likely related to hypercalcemia, renal dysfunction, poor nutritional intake  - repeat CT head 12/31 neg for acute intracranial process   - c/w keppra 500 BID for hx of seizure disorder  - neuro checks per ICU protocol     # CARDIAC  Septic shock, now resolved   - hypotensive during RRT 12/30; started on levophed, titrated off at 4AM   - on stress dose steroids, 50 mg q8; will transition to 25mg q8 hrs in AM  - holding carvedilol 6.25 BID  - hx of cocaine cardiomyopathy, resolved, normal EF on most recent echo  - c/w statin    Afib on Eliquis   -now off a/c per cards due to bleeding risk    # PULM  -satting well on NA  -CT abdomen revealing for RLL consolidation    # RENAL  - hx of ESRD s/p failed renal transplant x2  - renal following, HD as recommended   - c/w hydrocortisone, epogen   - AAMIR valentin d/bertin 1/1  - patient to undergo IR guided nontunneled cath placement tomorrow    # GI   - PEG tube in draining to gravity, 50 cc ouput in last 24 hrs    - CT abd/pelvis non-con 12/31 revealing for SBO   - NPO  - surgery recs appreciated, will start feeds   - Hep C ab positive, RNA neg      # HEME/ONC  - pt was on eliquis; now held due rto bleeding risk   -HDS  -keep active type and screen     # ID  Septic shock: hypotension likely 2/2 septic shock, likely 2/2 aspiration PNA given RLL consolidation seen on CT abdomen vs other source: UTI, decubitus ulcers  - now resolved   - bcx 12/24 growing prevotella denticola, repeat BCx ngtd   - dental consulted, oral source of infection less likely   - s/p LP, CSF negative  - ESR, CRP elevated though low suspicion for OM given neg CT findings   - Repeat UA+ for LE, neg for bacteria   - vanc low at 11, dosed 500 cc this am   - ID following, recs appreciated   - c/w vanc (dialysis dosing), meropenem   - f/u urine cxs, blood cxs    # ENDO  HbA1c 5.5  - holding lantus as NPO  - will restart tube feeds   - spoke to Dr. Rosenbaum, endocrinology  - will start lantus 8U and low scale ISS   - endo to f/u in AM   - hypercalcemic 2/2 primary HPT, s/p pamidronate tx     # DVT  -SCDs     #GOC:  - Pt remains FULL CODE   - Daughter is decision maker if patient is altered

## 2020-01-01 NOTE — PROGRESS NOTE ADULT - ASSESSMENT
74 m with DM, HTN, IVDA, hep C, ESRD s/p failed renal transplant x2 on HD and meningococcal meningitis 2 years ago, diverticulitis s/p colon resection, decubiti, initially sent in to the ED from HD for AMS and hypotension, had persistent AMS so open gastrostomy was placed 12/21 (failed endoscopic attempt for PEG), also HD cath exchange 12/27  Prevotella denticola bacteremia 12/24, repeat negative 12/27  RRT 12/31 for hypotension, WBC 29, abd CT with persistent SBO and pneumoperitoneum     septic shock, SBO, pneumoperitoneum  prevotella bacteremia, ?related to gastrostomy tube placement or HD cath exchange, also has a sacral wound    * f/u the blood and urine cx  * c/w manoj 500 qd  * vanco level 11  * give a dose of vaco 500 and then 500 post HD for now  * monitor the WBC  * f/u with surgery  * wound care

## 2020-01-01 NOTE — CHART NOTE - NSCHARTNOTEFT_GEN_A_CORE
MICU Transfer Note  Dr. Deborah Catherine PGY1   [phone]    Transfer from: MICU    Transfer to: ( x ) Medicine    (  ) Telemetry     (   ) RCU        (    ) Palliative         (   ) Stroke Unit          (   ) __________________    Accepting Physician: Dr. James     Signout given to: Dr. James     MICU COURSE:    MICU consulted on 12/30 for AMS. Pt AOx0 and hypotensive to sbp 87. CT abd/pelvis showing new opacities in RLL suspicious for pneumonia. Patient admitted to MICU for pressor support, and treatment of septic shock likely in the setting of likely PNA given RLL opacity seen on CT abdomen vs. 2/2 UTI (UA positive, Ucx pending), vs. 2/2 sacral wounds vs. dental source of infection. Central line placed at [facility]. Started on ertapenem later switched to meropenem and vancomycin. Dental consulted and did not find a dental source of infection. CTAP was repeated and revealing for SBO, PEG tube was left open draining to gravity with high output initially and dec output on 1/1. Tube feeds were restarted on 1/1. Course c/b clotting of RIJ dialysis catheter on 12/31 with renal requesting catheter holiday and exchange. RIJ dialysis catheter d/bertin 1/1 to be replaced by IR in the AM.       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 course c/b hypercalcemia. RRT called 11/26 for AMS and hotn SBP 70s, found to be in septic shock 2/2 UTI s/p intubation and MICU stay (11/26/19-11/30/19). Extubated on 11/29/19, transferred to floors. Patient s/p open feeding gastrostomy tube placement on 12/21/19 with surgery. MICU consulted on 12/30 for AMS. Pt AOx0 and hypotensive to sbp 87. CT abd/pelvis showing new opacities in RLL suspicious for pneumonia. Patient admitted to MICU for pressor support, and treatment of septic shock likely in the setting of PNA vs. UTI vs. from sacral ulcer wounds.     # NEURO  - p/w AMS, metabolic encephalopathy  - A&O x 2 today   - neuro following  - chronic lacunar infarcts; per neuro, AMS also likely related to hypercalcemia, renal dysfunction, poor nutritional intake  - repeat CT head 12/31 neg for acute intracranial process   - c/w keppra 500 BID for hx of seizure disorder  - neuro checks per ICU protocol     # CARDIAC  Septic shock, now resolved   - hypotensive during RRT 12/30; started on levophed, titrated off at 4AM   - on stress dose steroids, 50 mg q8; will transition to 25mg q8 hrs in AM  - holding carvedilol 6.25 BID  - hx of cocaine cardiomyopathy, resolved, normal EF on most recent echo  - c/w statin    Afib on Eliquis   -now off a/c per cards due to bleeding risk    # PULM  -satting well on NA  -CT abdomen revealing for RLL consolidation    # RENAL  - hx of ESRD s/p failed renal transplant x2  - renal following, HD as recommended   - c/w hydrocortisone, epogen   - AAMIR valentin d/bertin 1/1  - patient to undergo IR guided nontunneled cath placement tomorrow    # GI   - PEG tube in draining to gravity, 50 cc ouput in last 24 hrs    - CT abd/pelvis non-con 12/31 revealing for SBO   - NPO  - surgery recs appreciated, will start feeds   - Hep C ab positive, RNA neg      # HEME/ONC  - pt was on eliquis; now held due rto bleeding risk   -HDS  -keep active type and screen     # ID  Septic shock: hypotension likely 2/2 septic shock, likely 2/2 aspiration PNA given RLL consolidation seen on CT abdomen vs other source: UTI, decubitus ulcers  - now resolved   - bcx 12/24 growing prevotella denticola, repeat BCx ngtd   - dental consulted, oral source of infection less likely   - s/p LP, CSF negative  - ESR, CRP elevated though low suspicion for OM given neg CT findings   - Repeat UA+ for LE, neg for bacteria   - vanc low at 11, dosed 500 cc this am   - ID following, recs appreciated   - c/w vanc (dialysis dosing), meropenem   - f/u urine cxs, blood cxs    # ENDO  HbA1c 5.5  - holding lantus as NPO  - will restart tube feeds   - spoke to Dr. Rosenbaum, endocrinology  - will start lantus 8U and low scale ISS   - endo to f/u in AM   - hypercalcemic 2/2 primary HPT, s/p pamidronate tx     # DVT  -SCDs     #GOC:  - Pt remains FULL CODE   - Daughter is decision maker if patient is altered               FOR FOLLOW UP:

## 2020-01-01 NOTE — PROGRESS NOTE ADULT - SUBJECTIVE AND OBJECTIVE BOX
Patient is a 74y Male whom presented to   pateint seen and examined nad , in am     PAST MEDICAL & SURGICAL HISTORY:  Kidney transplant recipient  Anuria  GERD (gastroesophageal reflux disease)  Kidney transplant failure: dx: 2013  Hepatitis C: dx: 90&#x27;s.  From iv drug abuse  GERD (gastroesophageal reflux disease)  Renal transplant failure and rejection  Rectal abscess:   IV drug abuse: former, cocaine. 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 10 milliGRAM(s) Oral three times a day  mycophenolate mofetil 250 milliGRAM(s) Oral two times a day  predniSONE   Tablet 5 milliGRAM(s) Oral daily  sevelamer carbonate 800 milliGRAM(s) Oral three times a day with meals  sodium bicarbonate 650 milliGRAM(s) Oral two times a day  tamsulosin 0.4 milliGRAM(s) Oral at bedtime      Allergies    No Known Allergies                       SOCIAL HISTORY:  Denies ETOh,Smoking,     FAMILY HISTORY:  Family history of breast cancer in sister (Sibling): living at 94 yo  Family history of prostate cancer in father  Family history of lung cancer  Family history of hypertension in mother  Family history of diabetes mellitus: mother      REVIEW OF SYSTEMS:    unbable to obtained                                                                        7.7    19.76 )-----------( 253      ( 2020 01:21 )             26.6       CBC Full  -  ( 2020 01:21 )  WBC Count : 19.76 K/uL  RBC Count : 3.14 M/uL  Hemoglobin : 7.7 g/dL  Hematocrit : 26.6 %  Platelet Count - Automated : 253 K/uL  Mean Cell Volume : 84.7 fl  Mean Cell Hemoglobin : 24.5 pg  Mean Cell Hemoglobin Concentration : 28.9 gm/dL  Auto Neutrophil # : 17.60 K/uL  Auto Lymphocyte # : 1.19 K/uL  Auto Monocyte # : 0.80 K/uL  Auto Eosinophil # : 0.00 K/uL  Auto Basophil # : 0.02 K/uL  Auto Neutrophil % : 89.1 %  Auto Lymphocyte % : 6.0 %  Auto Monocyte % : 4.0 %  Auto Eosinophil % : 0.0 %  Auto Basophil % : 0.1 %          135  |  91<L>  |  44<H>  ----------------------------<  176<H>  4.0   |  23  |  3.00<H>    Ca    10.4      2020 01:21  Phos  4.2       Mg     2.2         TPro  7.3  /  Alb  2.8<L>  /  TBili  0.3  /  DBili  x   /  AST  15  /  ALT  8<L>  /  AlkPhos  97        CAPILLARY BLOOD GLUCOSE      POCT Blood Glucose.: 173 mg/dL (2020 11:19)  POCT Blood Glucose.: 158 mg/dL (2020 05:49)  POCT Blood Glucose.: 172 mg/dL (2020 01:50)  POCT Blood Glucose.: 185 mg/dL (31 Dec 2019 18:17)      Vital Signs Last 24 Hrs  T(C): 36.9 (2020 15:00), Max: 37.1 (2020 03:00)  T(F): 98.4 (2020 15:00), Max: 98.8 (2020 03:00)  HR: 98 (2020 16:00) (98 - 117)  BP: 128/70 (2020 16:00) (88/52 - 139/68)  BP(mean): 93 (2020 16:00) (64 - 96)  RR: 21 (2020 16:00) (11 - 35)  SpO2: 99% (2020 16:00) (92% - 100%)    Urinalysis Basic - ( 2020 09:35 )    Color: Yellow / Appearance: Slightly Turbid / S.027 / pH: x  Gluc: x / Ketone: Negative  / Bili: Negative / Urobili: Negative   Blood: x / Protein: 300 mg/dL / Nitrite: Negative   Leuk Esterase: Large / RBC: 2 /hpf /  /HPF   Sq Epi: x / Non Sq Epi: 1 /hpf / Bacteria: Negative        PT/INR - ( 30 Dec 2019 22:50 )   PT: 14.5 sec;   INR: 1.25 ratio         PTT - ( 30 Dec 2019 22:50 )  PTT:29.1 sec                                                              HEENT: conjunctive   clear   Neck:  No JVD  Respiratory: decrease bs b/l   Cardiovascular: S1 and S2  Gastrointestinal: BS+, soft, NT/ND  Extremities: No peripheral edema  Skin: dry   Access: pos fistula

## 2020-01-01 NOTE — PROGRESS NOTE ADULT - SUBJECTIVE AND OBJECTIVE BOX
Follow Up:  bacteremia, SBO and pneumoperitoneum    Interval History: pt afebrile for 24h and WBC improved to 19    ROS:    Unobtainable because: pt altered        Allergies  No Known Allergies        ANTIMICROBIALS:  meropenem  IVPB 500 every 24 hours      OTHER MEDS:  atorvastatin 40 milliGRAM(s) Oral at bedtime  chlorhexidine 0.12% Liquid 15 milliLiter(s) Oral Mucosa two times a day  chlorhexidine 4% Liquid 1 Application(s) Topical <User Schedule>  Dakins Solution - 1/4 Strength 1 Application(s) Topical two times a day  epoetin tan Injectable 40682 Unit(s) IV Push <User Schedule>  hydrocortisone sodium succinate Injectable 50 milliGRAM(s) IV Push every 8 hours  insulin lispro (HumaLOG) corrective regimen sliding scale   SubCutaneous every 6 hours  levETIRAcetam  IVPB 500 milliGRAM(s) IV Intermittent every 12 hours  pantoprazole  Injectable 40 milliGRAM(s) IV Push daily      Vital Signs Last 24 Hrs  T(C): 37 (2020 11:00), Max: 37.1 (31 Dec 2019 15:00)  T(F): 98.6 (2020 11:00), Max: 98.8 (31 Dec 2019 15:00)  HR: 103 (2020 12:00) (102 - 117)  BP: 97/61 (2020 12:00) (73/52 - 139/75)  BP(mean): 74 (2020 12:00) (58 - 101)  RR: 22 (2020 12:00) (11 - 76)  SpO2: 98% (2020 12:00) (92% - 100%)    Physical Exam:  General:    NAD,  non toxic  Head: atraumatic, normocephalic  Eye: normal sclera and conjunctiva  ENT:    no oropharyngeal lesions,   no LAD,   neck supple  Cardio:     regular S1, S2  Respiratory:    clear b/l,    no wheezing  abd:     soft,   BS +,   no tenderness,    PEG to gravity with greenish material  :   no CVAT,  no suprapubic tenderness  Musculoskeletal:   no joint swelling,   no edema  vascular: LIJ CVC, R HD cath  Skin:  decubiti                              7.7    19.76 )-----------( 253      ( 2020 01:21 )             26.6           135  |  91<L>  |  44<H>  ----------------------------<  176<H>  4.0   |  23  |  3.00<H>    Ca    10.4      2020 01:21  Phos  4.2       Mg     2.2         TPro  7.3  /  Alb  2.8<L>  /  TBili  0.3  /  DBili  x   /  AST  15  /  ALT  8<L>  /  AlkPhos  97        Urinalysis Basic - ( 2020 09:35 )    Color: Yellow / Appearance: Slightly Turbid / S.027 / pH: x  Gluc: x / Ketone: Negative  / Bili: Negative / Urobili: Negative   Blood: x / Protein: 300 mg/dL / Nitrite: Negative   Leuk Esterase: Large / RBC: 2 /hpf /  /HPF   Sq Epi: x / Non Sq Epi: 1 /hpf / Bacteria: Negative        MICROBIOLOGY:  Vancomycin Level, Random: 11.6 ug/mL (20 @ 03:03)  v  .Blood Blood  19   No growth to date.  --  --      .Blood Blood-Venous  19   No growth to date.  --  --      .Blood Blood  19   No growth to date.  --  --      .Blood Blood  19   Growth in anaerobic bottle: Prevotella denticola "Susceptibilities not  performed"  "Due to technical problems, Proteus sp. will Not be reported as part of  the BCID panel until further notice"  ***Blood Panel PCR results on this specimen are available  approximately 3 hours after the Gram stain result.***  Gram stain, PCR, and/or culture results may not always  correspond due to difference in methodologies.  ************************************************************  This PCR assay was performed using [employer].  The following targets are tested for: Enterococcus,  vancomycin resistant enterococci, Listeria monocytogenes,  coagulase negative staphylococci, S. aureus,  methicillin resistant S. aureus, Streptococcus agalactiae  (Group B), S. pneumoniae, S. pyogenes (Group A),  Acinetobacter baumannii, Enterobacter cloacae, E. coli,  Klebsiella oxytoca, K. pneumoniae, Proteus sp.,  Serratia marcescens, Haemophilus influenzae,  Neisseria meningitidis, Pseudomonas aeruginosa, Candida  albicans, C. glabrata, C krusei, C parapsilosis,  C. tropicalis and the KPC resistance gene.  --  Blood Culture PCR      .Stool Feces  19   GI PCR Results: NOT detected  *******Please Note:*******  GI panel PCR evaluates for:  Campylobacter, Plesiomonas shigelloides, Salmonella,  Vibrio, Yersinia enterocolitica, Enteroaggregative  Escherichia coli (EAEC), Enteropathogenic E.coli (EPEC),  Enterotoxigenic E. coli (ETEC) lt/st, Shiga-like  toxin-producing E. coli (STEC) stx1/stx2,  Shigella/ Enteroinvasive E. coli (EIEC), Cryptosporidium,  Cyclospora cayetanensis, Entamoeba histolytica,  Giardia lamblia, Adenovirus F 40/41, Astrovirus,  Norovirus GI/GII, Rotavirus A, Sapovirus  --  --      .Stool Feces  12-10-19   GI PCR Results: NOT detected    .Blood Blood-Peripheral  19   No growth at 5 days.  --  --        CMV PCR Detection: NotDetec IU/mL (19 @ 21:02)          RADIOLOGY:  Images below reviewed personally    < from: CT Abdomen and Pelvis w/ IV Cont (19 @ 08:56) >    Persistent small bowel obstruction.    Free air greater than expected for postoperative day 9 is not significantly changed from prior study one day earlier.     Large decubitus ulcer overlying the sacrum and coccyx.    Nonobstructing stone atrophy UPJ of the transplanted kidney. No associated hydronephrosis.    Bibasilar airspace opacities stable to mildly improved.

## 2020-01-01 NOTE — PROGRESS NOTE ADULT - SUBJECTIVE AND OBJECTIVE BOX
INTERVAL HPI/OVERNIGHT EVENTS:    events noted  off pressors        MEDICATIONS  (STANDING):  acetaminophen    Suspension .. 650 milliGRAM(s) Oral every 6 hours  atorvastatin 40 milliGRAM(s) Oral at bedtime  carvedilol 3.125 milliGRAM(s) Oral every 12 hours  chlorhexidine 4% Liquid 1 Application(s) Topical <User Schedule>  Dakins Solution - 1/4 Strength 1 Application(s) Topical two times a day  dextrose 50% Injectable 12.5 Gram(s) IV Push once  dextrose 50% Injectable 25 Gram(s) IV Push once  epoetin tan Injectable 65824 Unit(s) IV Push <User Schedule>  heparin  Injectable 5000 Unit(s) SubCutaneous every 8 hours  insulin glargine Injectable (LANTUS) 11 Unit(s) SubCutaneous at bedtime  insulin lispro (HumaLOG) corrective regimen sliding scale   SubCutaneous every 6 hours  levETIRAcetam  Solution 500 milliGRAM(s) Oral two times a day  lidocaine   Patch 1 Patch Transdermal daily  loperamide Suspension 2 milliGRAM(s) Oral five times a day  predniSONE   Tablet 5 milliGRAM(s) Oral daily    MEDICATIONS  (PRN):  dextrose 40% Gel 15 Gram(s) Oral once PRN Blood Glucose LESS THAN 70 milliGRAM(s)/deciLiter  glucagon  Injectable 1 milliGRAM(s) IntraMuscular once PRN Glucose <70 milliGRAM(s)/deciLiter      Allergies    No Known Allergies    Intolerances        Review of Systems:    unable to obtain in completion       Vital Signs Last 24 Hrs  T(C): 36.4 (26 Dec 2019 04:46), Max: 36.4 (25 Dec 2019 20:35)  T(F): 97.5 (26 Dec 2019 04:46), Max: 97.5 (25 Dec 2019 20:35)  HR: 110 (26 Dec 2019 04:46) (98 - 110)  BP: 121/75 (26 Dec 2019 04:46) (100/65 - 121/75)  BP(mean): --  RR: 20 (26 Dec 2019 04:46) (20 - 20)  SpO2: 95% (26 Dec 2019 04:46) (95% - 96%)    PHYSICAL EXAM:    GENERAL:  lying in bed  HEENT:  NC/AT  ABDOMEN:  Soft, non-distended, + bowel sounds, + peg dressed, c/d/i  EXTREMITIES:  no cyanosis, clubbing or edema  NEURO:  minimally verbal        LABS:                        7.7    19.18 )-----------( 247      ( 26 Dec 2019 12:16 )             25.1     12-26    133<L>  |  93<L>  |  79<H>  ----------------------------<  214<H>  4.0   |  26  |  3.83<H>    Ca    11.6<H>      26 Dec 2019 08:31            RADIOLOGY & ADDITIONAL TESTS:

## 2020-01-01 NOTE — PROGRESS NOTE ADULT - ASSESSMENT
Assessment  DM: A1C 5.5%, was on insulin at home, now on insulin coverage scale, FS trending within acceptable range, no hypoglycemic episodes. Patient is NPO, being monitored in the MICU, appears comfortable and alert though minimally responsive.  Sacral Wound: s/p debridement, monitored, FU wound care.  Hypercalcemia: Primary Hyperparathyroidism, hypercalcemia S/P Pamidronate injection, Ca levels improving.   Sepsis: IV ABx for UTI, LP inconsistent with meningitis, on medications, stable, monitored.  HTN: Controlled,  on antihypertensive medications.  ESRD: On hemodialysis, Monitor labs/BMP          Robi Gay MD  Cell: [phone]  Office: [phone] Assessment  DM: A1C 5.5%, was on insulin at home, now on insulin coverage scale, FS trending within acceptable range, no hypoglycemic episodes. Patient is NPO, being monitored in the MICU,  appears comfortable and alert though minimally responsive.  Sacral Wound: s/p debridement, monitored, FU wound care.  Hypercalcemia: Primary Hyperparathyroidism, hypercalcemia S/P Pamidronate injection, Ca levels improving.   Sepsis: IV ABx for UTI, LP inconsistent with meningitis, on medications, stable, monitored.  HTN: Controlled,  on antihypertensive medications.  ESRD: On hemodialysis, Monitor labs/BMP          Robi Gay MD  Cell: [phone]  Office: [phone]

## 2020-01-01 NOTE — PROGRESS NOTE ADULT - SUBJECTIVE AND OBJECTIVE BOX
Trauma & Acute Care Surgery Progress Note     Brief HPI:  74 year old with history of Hep C, IVDA, ESRD (s/p failed renal transplant) on HD via permacath, GERD, HTN, DM, BPH, diverticulitis (s/p colon resection multiple years ago) now hospitalized on medical service. Diagnosed with dysphagia secondary to poor mental status. Failed endoscopic attempt at PEG placement. Patient is s/p open gastrostomy tube placement.    Subjective/24hour Events:   -- G tube to gravity with lower output      Vital Signs Last 24 Hrs  T(C): 36.2 (2020 07:00), Max: 37.1 (31 Dec 2019 15:00)  T(F): 97.2 (2020 07:00), Max: 98.8 (31 Dec 2019 15:00)  HR: 103 (2020 10:00) (94 - 117)  BP: 102/62 (2020 10:00) (72/52 - 139/75)  BP(mean): 77 (2020 10:00) (57 - 101)  RR: 22 (2020 10:00) (11 - 76)  SpO2: 99% (2020 10:00) (92% - 100%)    I&O's Detail    31 Dec 2019 07:01  -  2020 07:00  --------------------------------------------------------  IN:    norepinephrine Infusion: 76.8 mL    Other: 900 mL    Solution: 300 mL  Total IN: 1276.8 mL    OUT:    Gastrostomy Tube: 50 mL    Other: 1900 mL    Other: 1100 mL  Total OUT: 3050 mL    Total NET: -1773.2 mL      2020 07:01  -  2020 10:45  --------------------------------------------------------  IN:    Solution: 100 mL  Total IN: 100 mL    OUT:    Intermittent Catheterization - Urethral: 200 mL  Total OUT: 200 mL    Total NET: -100 mL              MEDICATIONS  (STANDING):  atorvastatin 40 milliGRAM(s) Oral at bedtime  chlorhexidine 0.12% Liquid 15 milliLiter(s) Oral Mucosa two times a day  chlorhexidine 4% Liquid 1 Application(s) Topical <User Schedule>  Dakins Solution - 1/4 Strength 1 Application(s) Topical two times a day  epoetin tan Injectable 80714 Unit(s) IV Push <User Schedule>  ertapenem  IVPB 500 milliGRAM(s) IV Intermittent every 24 hours  hydrocortisone sodium succinate Injectable 50 milliGRAM(s) IV Push every 8 hours  insulin lispro (HumaLOG) corrective regimen sliding scale   SubCutaneous every 6 hours  levETIRAcetam  IVPB 500 milliGRAM(s) IV Intermittent every 12 hours  norepinephrine Infusion 0.4 MICROgram(s)/kG/Min (23.7 mL/Hr) IV Continuous <Continuous>  pantoprazole  Injectable 40 milliGRAM(s) IV Push daily    MEDICATIONS  (PRN):        Physical Exam:  Gen: NAD. Minimally responsive, no interval change in mentation  Lungs: Non labored breathing.   Ab: Softly distended, nontender. Gastrostomy tube to gravity  Ext: Warm. well perfused.       Labs:    CBC Full  -  ( 2020 01:21 )  WBC Count : 19.76 K/uL  RBC Count : 3.14 M/uL  Hemoglobin : 7.7 g/dL  Hematocrit : 26.6 %  Platelet Count - Automated : 253 K/uL  Mean Cell Volume : 84.7 fl  Mean Cell Hemoglobin : 24.5 pg  Mean Cell Hemoglobin Concentration : 28.9 gm/dL  Auto Neutrophil # : 17.60 K/uL  Auto Lymphocyte # : 1.19 K/uL  Auto Monocyte # : 0.80 K/uL  Auto Eosinophil # : 0.00 K/uL  Auto Basophil # : 0.02 K/uL  Auto Neutrophil % : 89.1 %  Auto Lymphocyte % : 6.0 %  Auto Monocyte % : 4.0 %  Auto Eosinophil % : 0.0 %  Auto Basophil % : 0.1 %    -    135  |  91<L>  |  44<H>  ----------------------------<  176<H>  4.0   |  23  |  3.00<H>    Ca    10.4      2020 01:21  Phos  4.2     -  Mg     2.2     -    TPro  7.3  /  Alb  2.8<L>  /  TBili  0.3  /  DBili  x   /  AST  15  /  ALT  8<L>  /  AlkPhos  97      LIVER FUNCTIONS - ( 2020 01:21 )  Alb: 2.8 g/dL / Pro: 7.3 g/dL / ALK PHOS: 97 U/L / ALT: 8 U/L / AST: 15 U/L / GGT: x           PT/INR - ( 30 Dec 2019 22:50 )   PT: 14.5 sec;   INR: 1.25 ratio         PTT - ( 30 Dec 2019 22:50 )  PTT:29.1 sec  Urinalysis Basic - ( 2020 09:35 )    Color: Yellow / Appearance: Slightly Turbid / S.027 / pH: x  Gluc: x / Ketone: Negative  / Bili: Negative / Urobili: Negative   Blood: x / Protein: 300 mg/dL / Nitrite: Negative   Leuk Esterase: Large / RBC: 2 /hpf /  /HPF   Sq Epi: x / Non Sq Epi: 1 /hpf / Bacteria: Negative

## 2020-01-01 NOTE — PROGRESS NOTE ADULT - SUBJECTIVE AND OBJECTIVE BOX
Chief complaint  Patient is a 74y old  Male who presents with a chief complaint of ams (01 Jan 2020 10:43)   Review of systems  Patient in bed, looks comfortable, no fever, no hypoglycemia.    Labs and Fingersticks  CAPILLARY BLOOD GLUCOSE      POCT Blood Glucose.: 173 mg/dL (01 Jan 2020 11:19)  POCT Blood Glucose.: 158 mg/dL (01 Jan 2020 05:49)  POCT Blood Glucose.: 172 mg/dL (01 Jan 2020 01:50)  POCT Blood Glucose.: 185 mg/dL (31 Dec 2019 18:17)      Anion Gap, Serum: 21 <H> (01-01 @ 01:21)  Anion Gap, Serum: 20 <H> (12-30 @ 22:43)      Calcium, Total Serum: 10.4 (01-01 @ 01:21)  Calcium, Total Serum: 11.0 <H> (12-30 @ 22:43)  Albumin, Serum: 2.8 <L> (01-01 @ 01:21)  Albumin, Serum: 2.5 <L> (12-30 @ 22:43)    Alanine Aminotransferase (ALT/SGPT): 8 <L> (01-01 @ 01:21)  Alanine Aminotransferase (ALT/SGPT): 8 <L> (12-30 @ 22:43)  Alkaline Phosphatase, Serum: 97 (01-01 @ 01:21)  Alkaline Phosphatase, Serum: 100 (12-30 @ 22:43)  Aspartate Aminotransferase (AST/SGOT): 15 (01-01 @ 01:21)  Aspartate Aminotransferase (AST/SGOT): 25 (12-30 @ 22:43)        01-01    135  |  91<L>  |  44<H>  ----------------------------<  176<H>  4.0   |  23  |  3.00<H>    Ca    10.4      01 Jan 2020 01:21  Phos  4.2     01-01  Mg     2.2     01-01    TPro  7.3  /  Alb  2.8<L>  /  TBili  0.3  /  DBili  x   /  AST  15  /  ALT  8<L>  /  AlkPhos  97  01-01                        7.7    19.76 )-----------( 253      ( 01 Jan 2020 01:21 )             26.6     Medications  MEDICATIONS  (STANDING):  atorvastatin 40 milliGRAM(s) Oral at bedtime  chlorhexidine 0.12% Liquid 15 milliLiter(s) Oral Mucosa two times a day  chlorhexidine 4% Liquid 1 Application(s) Topical <User Schedule>  Dakins Solution - 1/4 Strength 1 Application(s) Topical two times a day  epoetin tan Injectable 20823 Unit(s) IV Push <User Schedule>  hydrocortisone sodium succinate Injectable 50 milliGRAM(s) IV Push every 8 hours  insulin lispro (HumaLOG) corrective regimen sliding scale   SubCutaneous every 6 hours  levETIRAcetam  IVPB 500 milliGRAM(s) IV Intermittent every 12 hours  meropenem  IVPB 500 milliGRAM(s) IV Intermittent every 24 hours  pantoprazole  Injectable 40 milliGRAM(s) IV Push daily      Physical Exam  General: Patient comfortable in bed  Vital Signs Last 12 Hrs  T(F): 98.6 (01-01-20 @ 11:00), Max: 98.8 (01-01-20 @ 03:00)  HR: 103 (01-01-20 @ 12:00) (103 - 117)  BP: 97/61 (01-01-20 @ 12:00) (90/55 - 116/67)  BP(mean): 74 (01-01-20 @ 12:00) (68 - 86)  RR: 22 (01-01-20 @ 12:00) (11 - 25)  SpO2: 98% (01-01-20 @ 12:00) (96% - 100%)  Neck: No palpable thyroid nodules.  CVS: S1S2, No murmurs  Respiratory: No wheezing, no crepitations  GI: Abdomen soft, bowel sounds positive  Musculoskeletal:  edema lower extremities.   Skin: No skin rashes, no ecchymosis    Diagnostics Chief complaint  Patient is a 74y old  Male who presents with a chief complaint of ams (01 Jan 2020 10:43)   Review of systems  Patient in bed, looks comfortable, no fever,  no hypoglycemia.    Labs and Fingersticks  CAPILLARY BLOOD GLUCOSE      POCT Blood Glucose.: 173 mg/dL (01 Jan 2020 11:19)  POCT Blood Glucose.: 158 mg/dL (01 Jan 2020 05:49)  POCT Blood Glucose.: 172 mg/dL (01 Jan 2020 01:50)  POCT Blood Glucose.: 185 mg/dL (31 Dec 2019 18:17)      Anion Gap, Serum: 21 <H> (01-01 @ 01:21)  Anion Gap, Serum: 20 <H> (12-30 @ 22:43)      Calcium, Total Serum: 10.4 (01-01 @ 01:21)  Calcium, Total Serum: 11.0 <H> (12-30 @ 22:43)  Albumin, Serum: 2.8 <L> (01-01 @ 01:21)  Albumin, Serum: 2.5 <L> (12-30 @ 22:43)    Alanine Aminotransferase (ALT/SGPT): 8 <L> (01-01 @ 01:21)  Alanine Aminotransferase (ALT/SGPT): 8 <L> (12-30 @ 22:43)  Alkaline Phosphatase, Serum: 97 (01-01 @ 01:21)  Alkaline Phosphatase, Serum: 100 (12-30 @ 22:43)  Aspartate Aminotransferase (AST/SGOT): 15 (01-01 @ 01:21)  Aspartate Aminotransferase (AST/SGOT): 25 (12-30 @ 22:43)        01-01    135  |  91<L>  |  44<H>  ----------------------------<  176<H>  4.0   |  23  |  3.00<H>    Ca    10.4      01 Jan 2020 01:21  Phos  4.2     01-01  Mg     2.2     01-01    TPro  7.3  /  Alb  2.8<L>  /  TBili  0.3  /  DBili  x   /  AST  15  /  ALT  8<L>  /  AlkPhos  97  01-01                        7.7    19.76 )-----------( 253      ( 01 Jan 2020 01:21 )             26.6     Medications  MEDICATIONS  (STANDING):  atorvastatin 40 milliGRAM(s) Oral at bedtime  chlorhexidine 0.12% Liquid 15 milliLiter(s) Oral Mucosa two times a day  chlorhexidine 4% Liquid 1 Application(s) Topical <User Schedule>  Dakins Solution - 1/4 Strength 1 Application(s) Topical two times a day  epoetin tan Injectable 41556 Unit(s) IV Push <User Schedule>  hydrocortisone sodium succinate Injectable 50 milliGRAM(s) IV Push every 8 hours  insulin lispro (HumaLOG) corrective regimen sliding scale   SubCutaneous every 6 hours  levETIRAcetam  IVPB 500 milliGRAM(s) IV Intermittent every 12 hours  meropenem  IVPB 500 milliGRAM(s) IV Intermittent every 24 hours  pantoprazole  Injectable 40 milliGRAM(s) IV Push daily      Physical Exam  General: Patient comfortable in bed  Vital Signs Last 12 Hrs  T(F): 98.6 (01-01-20 @ 11:00), Max: 98.8 (01-01-20 @ 03:00)  HR: 103 (01-01-20 @ 12:00) (103 - 117)  BP: 97/61 (01-01-20 @ 12:00) (90/55 - 116/67)  BP(mean): 74 (01-01-20 @ 12:00) (68 - 86)  RR: 22 (01-01-20 @ 12:00) (11 - 25)  SpO2: 98% (01-01-20 @ 12:00) (96% - 100%)  Neck: No palpable thyroid nodules.  CVS: S1S2, No murmurs  Respiratory: No wheezing, no crepitations  GI: Abdomen soft, bowel sounds positive  Musculoskeletal:  edema lower extremities.   Skin: No skin rashes, no ecchymosis    Diagnostics

## 2020-01-01 NOTE — PROGRESS NOTE ADULT - ASSESSMENT
74M PMHx of ESRD s/p failed renal transplant x2, HCV, DM, and meningococcal meningitis 2 years ago was sent in to the ED from HD for AMS and hypotension with continued AMS with attempted PEG insertion with both IR and GI with inability to obtain good window for PEG insertion. Patient is s/p open gastrostomy tube placement on 12/21/2019 by [facility]. Re-consulted for worsening abdominal distension and bilious fluid suctioned from oral cavity.  Output 50 from G-tube overnight.  Abdomen soft and nondistended.     -- Can resume feeds from surgical perspective    Pager [phone]

## 2020-01-01 NOTE — PROGRESS NOTE ADULT - ATTENDING COMMENTS
Agree with above. Patient seen and examined. 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 His course has been complicated by small bowel obstruction and shock state.     1. Shock - likely due to sepsis. Continue antibiotics as per ID. Wean off vasopressors with goal MAP > 65. Shiley catheter currently removed. WIll plan to remove TLC and place IV lines. Wound care followup regarding sacral wound and concern for osteo. Will start Midodrine.  2. Small bowel obstruction - noted on CT 12/31. Abdominal exam improved with drainage of PEG to gravity. Surgical followup regarding timining of resumption of feeds.   3. ESRD - on HD. Patient received HD yesterday. His HD cath was having difficulty with flow/clotting so the decision was made to remove catheter. Patient is scheduled for catheter replacement with IR tomorrow (1/2/19). No emergent needs for HD at this time.  4. Metabolic encephalopathy - reportedly improving.   5. GOC - Palliative care evaluation noted. Patient remains full code.

## 2020-01-01 NOTE — PROGRESS NOTE ADULT - SUBJECTIVE AND OBJECTIVE BOX
*******************************  Deborah Catherine, PGY1  Pager [phone]  *******************************    INTERVAL HPI/OVERNIGHT EVENTS:    SUBJECTIVE:     Patient seen and examined at bedside.     OBJECTIVE:    VITAL SIGNS:  ICU Vital Signs Last 24 Hrs  T(C): 37.1 (01 Jan 2020 03:00), Max: 37.1 (31 Dec 2019 15:00)  T(F): 98.8 (01 Jan 2020 03:00), Max: 98.8 (31 Dec 2019 15:00)  HR: 109 (01 Jan 2020 06:00) (94 - 117)  BP: 95/60 (01 Jan 2020 06:00) (72/52 - 139/75)  BP(mean): 72 (01 Jan 2020 06:00) (57 - 101)  ABP: --  ABP(mean): --  RR: 12 (01 Jan 2020 06:00) (11 - 76)  SpO2: 99% (01 Jan 2020 06:00) (92% - 100%)        12-31 @ 07:01  -  01-01 @ 07:00  --------------------------------------------------------  IN: 1176.8 mL / OUT: 3050 mL / NET: -1873.2 mL      CAPILLARY BLOOD GLUCOSE      POCT Blood Glucose.: 158 mg/dL (01 Jan 2020 05:49)        PHYSICAL EXAM:  GENERAL: NAD, well-developed  HEAD:  Atraumatic, Normocephalic  EYES: EOMI, PERRLA, conjunctiva and sclera clear  NECK: Supple, No JVD  CHEST/LUNG: Clear to auscultation bilaterally; No wheeze  HEART: Regular rate and rhythm; No murmurs, rubs, or gallops  ABDOMEN: Soft, Nontender, Nondistended; Bowel sounds present  EXTREMITIES:  2+ Peripheral Pulses, No clubbing, cyanosis, or edema  PSYCH: AAOx3  NEUROLOGY: non-focal  SKIN: No rashes or lesions  Lines:      MEDICATIONS:  MEDICATIONS  (STANDING):  atorvastatin 40 milliGRAM(s) Oral at bedtime  chlorhexidine 0.12% Liquid 15 milliLiter(s) Oral Mucosa two times a day  chlorhexidine 4% Liquid 1 Application(s) Topical <User Schedule>  Dakins Solution - 1/4 Strength 1 Application(s) Topical two times a day  epoetin tan Injectable 28547 Unit(s) IV Push <User Schedule>  hydrocortisone sodium succinate Injectable 50 milliGRAM(s) IV Push every 8 hours  insulin lispro (HumaLOG) corrective regimen sliding scale   SubCutaneous every 6 hours  levETIRAcetam  IVPB 500 milliGRAM(s) IV Intermittent every 12 hours  meropenem  IVPB 500 milliGRAM(s) IV Intermittent every 24 hours  norepinephrine Infusion 0.4 MICROgram(s)/kG/Min (23.7 mL/Hr) IV Continuous <Continuous>  pantoprazole  Injectable 40 milliGRAM(s) IV Push daily    MEDICATIONS  (PRN):      ALLERGIES:  Allergies    No Known Allergies    Intolerances        LABS:                        7.7    19.76 )-----------( 253      ( 01 Jan 2020 01:21 )             26.6     01-01    135  |  91<L>  |  44<H>  ----------------------------<  176<H>  4.0   |  23  |  3.00<H>    Ca    10.4      01 Jan 2020 01:21  Phos  4.2     01-01  Mg     2.2     01-01    TPro  7.3  /  Alb  2.8<L>  /  TBili  0.3  /  DBili  x   /  AST  15  /  ALT  8<L>  /  AlkPhos  97  01-01    PT/INR - ( 30 Dec 2019 22:50 )   PT: 14.5 sec;   INR: 1.25 ratio         PTT - ( 30 Dec 2019 22:50 )  PTT:29.1 sec      RADIOLOGY & ADDITIONAL TESTS: Reviewed. *******************************  Deborah Catherine, PGY1  Pager [phone]  *******************************    INTERVAL HPI/OVERNIGHT EVENTS:    SUBJECTIVE:     Levophed off at 4AM. Patient awake and alert.     ROS could not be assessed as patient not replying to questions.     OBJECTIVE:    VITAL SIGNS:  ICU Vital Signs Last 24 Hrs  T(C): 37.1 (01 Jan 2020 03:00), Max: 37.1 (31 Dec 2019 15:00)  T(F): 98.8 (01 Jan 2020 03:00), Max: 98.8 (31 Dec 2019 15:00)  HR: 109 (01 Jan 2020 06:00) (94 - 117)  BP: 95/60 (01 Jan 2020 06:00) (72/52 - 139/75)  BP(mean): 72 (01 Jan 2020 06:00) (57 - 101)  ABP: --  ABP(mean): --  RR: 12 (01 Jan 2020 06:00) (11 - 76)  SpO2: 99% (01 Jan 2020 06:00) (92% - 100%)        12-31 @ 07:01  -  01-01 @ 07:00  --------------------------------------------------------  IN: 1176.8 mL / OUT: 3050 mL / NET: -1873.2 mL      CAPILLARY BLOOD GLUCOSE      POCT Blood Glucose.: 158 mg/dL (01 Jan 2020 05:49)          MEDICATIONS:  MEDICATIONS  (STANDING):  atorvastatin 40 milliGRAM(s) Oral at bedtime  chlorhexidine 0.12% Liquid 15 milliLiter(s) Oral Mucosa two times a day  chlorhexidine 4% Liquid 1 Application(s) Topical <User Schedule>  Dakins Solution - 1/4 Strength 1 Application(s) Topical two times a day  epoetin tan Injectable 32017 Unit(s) IV Push <User Schedule>  hydrocortisone sodium succinate Injectable 50 milliGRAM(s) IV Push every 8 hours  insulin lispro (HumaLOG) corrective regimen sliding scale   SubCutaneous every 6 hours  levETIRAcetam  IVPB 500 milliGRAM(s) IV Intermittent every 12 hours  meropenem  IVPB 500 milliGRAM(s) IV Intermittent every 24 hours  norepinephrine Infusion 0.4 MICROgram(s)/kG/Min (23.7 mL/Hr) IV Continuous <Continuous>  pantoprazole  Injectable 40 milliGRAM(s) IV Push daily    MEDICATIONS  (PRN):      ALLERGIES:  Allergies    No Known Allergies    Intolerances        LABS:                        7.7    19.76 )-----------( 253      ( 01 Jan 2020 01:21 )             26.6     01-01    135  |  91<L>  |  44<H>  ----------------------------<  176<H>  4.0   |  23  |  3.00<H>    Ca    10.4      01 Jan 2020 01:21  Phos  4.2     01-01  Mg     2.2     01-01    TPro  7.3  /  Alb  2.8<L>  /  TBili  0.3  /  DBili  x   /  AST  15  /  ALT  8<L>  /  AlkPhos  97  01-01    PT/INR - ( 30 Dec 2019 22:50 )   PT: 14.5 sec;   INR: 1.25 ratio         PTT - ( 30 Dec 2019 22:50 )  PTT:29.1 sec      RADIOLOGY & ADDITIONAL TESTS: Reviewed. *******************************  Deborah Catherine, PGY1  Pager [phone]  *******************************    INTERVAL HPI/OVERNIGHT EVENTS:    SUBJECTIVE:     Levophed off at 4AM. Patient awake and alert.     ROS could not be assessed as patient not replying to questions.     OBJECTIVE:    VITAL SIGNS:  ICU Vital Signs Last 24 Hrs  T(C): 37.1 (01 Jan 2020 03:00), Max: 37.1 (31 Dec 2019 15:00)  T(F): 98.8 (01 Jan 2020 03:00), Max: 98.8 (31 Dec 2019 15:00)  HR: 109 (01 Jan 2020 06:00) (94 - 117)  BP: 95/60 (01 Jan 2020 06:00) (72/52 - 139/75)  BP(mean): 72 (01 Jan 2020 06:00) (57 - 101)  ABP: --  ABP(mean): --  RR: 12 (01 Jan 2020 06:00) (11 - 76)  SpO2: 99% (01 Jan 2020 06:00) (92% - 100%)        12-31 @ 07:01  -  01-01 @ 07:00  --------------------------------------------------------  IN: 1176.8 mL / OUT: 3050 mL / NET: -1873.2 mL      CAPILLARY BLOOD GLUCOSE      POCT Blood Glucose.: 158 mg/dL (01 Jan 2020 05:49)      PHYSICAL EXAM:  GENERAL: NAD respiring on RA   HEAD:  Atraumatic, Normocephalic  EYES: EOMI, PERRLA, conjunctiva and sclera clear  NECK: Supple, No JVD  CHEST/LUNG: R basilar crackles   HEART: RRR, holosystolic murmur throughout precordium   ABDOMEN: Soft, Nontender, Nondistended; Bowel sounds present. Drain present. draining bilious fluid.   EXTREMITIES:  2+ Peripheral Pulses, No clubbing, cyanosis, or edema  PSYCH: AAOx0  NEUROLOGY: non-focal  SKIN: J tube in place, bilious drainage        MEDICATIONS:  MEDICATIONS  (STANDING):  atorvastatin 40 milliGRAM(s) Oral at bedtime  chlorhexidine 0.12% Liquid 15 milliLiter(s) Oral Mucosa two times a day  chlorhexidine 4% Liquid 1 Application(s) Topical <User Schedule>  Dakins Solution - 1/4 Strength 1 Application(s) Topical two times a day  epoetin tan Injectable 76617 Unit(s) IV Push <User Schedule>  hydrocortisone sodium succinate Injectable 50 milliGRAM(s) IV Push every 8 hours  insulin lispro (HumaLOG) corrective regimen sliding scale   SubCutaneous every 6 hours  levETIRAcetam  IVPB 500 milliGRAM(s) IV Intermittent every 12 hours  meropenem  IVPB 500 milliGRAM(s) IV Intermittent every 24 hours  norepinephrine Infusion 0.4 MICROgram(s)/kG/Min (23.7 mL/Hr) IV Continuous <Continuous>  pantoprazole  Injectable 40 milliGRAM(s) IV Push daily    MEDICATIONS  (PRN):      ALLERGIES:  Allergies    No Known Allergies    Intolerances        LABS:                        7.7    19.76 )-----------( 253      ( 01 Jan 2020 01:21 )             26.6     01-01    135  |  91<L>  |  44<H>  ----------------------------<  176<H>  4.0   |  23  |  3.00<H>    Ca    10.4      01 Jan 2020 01:21  Phos  4.2     01-01  Mg     2.2     01-01    TPro  7.3  /  Alb  2.8<L>  /  TBili  0.3  /  DBili  x   /  AST  15  /  ALT  8<L>  /  AlkPhos  97  01-01    PT/INR - ( 30 Dec 2019 22:50 )   PT: 14.5 sec;   INR: 1.25 ratio         PTT - ( 30 Dec 2019 22:50 )  PTT:29.1 sec      RADIOLOGY & ADDITIONAL TESTS: Reviewed. *******************************  Deborah Catherine, PGY1  Pager [phone]  *******************************    INTERVAL HPI/OVERNIGHT EVENTS:    SUBJECTIVE:     Levophed off at 4AM. Patient awake and alert.     ROS could not be assessed as patient not replying to questions.     OBJECTIVE:    VITAL SIGNS:  ICU Vital Signs Last 24 Hrs  T(C): 37.1 (01 Jan 2020 03:00), Max: 37.1 (31 Dec 2019 15:00)  T(F): 98.8 (01 Jan 2020 03:00), Max: 98.8 (31 Dec 2019 15:00)  HR: 109 (01 Jan 2020 06:00) (94 - 117)  BP: 95/60 (01 Jan 2020 06:00) (72/52 - 139/75)  BP(mean): 72 (01 Jan 2020 06:00) (57 - 101)  ABP: --  ABP(mean): --  RR: 12 (01 Jan 2020 06:00) (11 - 76)  SpO2: 99% (01 Jan 2020 06:00) (92% - 100%)        12-31 @ 07:01  -  01-01 @ 07:00  --------------------------------------------------------  IN: 1176.8 mL / OUT: 3050 mL / NET: -1873.2 mL      CAPILLARY BLOOD GLUCOSE      POCT Blood Glucose.: 158 mg/dL (01 Jan 2020 05:49)      PHYSICAL EXAM:  GENERAL: NAD respiring on RA   HEAD:  Atraumatic, Normocephalic  EYES: EOMI, PERRLA, conjunctiva and sclera clear  NECK: Supple, No JVD  CHEST/LUNG: R basilar crackles   HEART: RRR, holosystolic murmur throughout precordium   ABDOMEN: Soft, Nontender, Nondistended (improved from prior); Bowel sounds present. PEG tube draining bilious fluid.   EXTREMITIES:  2+ Peripheral Pulses, No clubbing, cyanosis, or edema  PSYCH: AAOx0  NEUROLOGY: non-focal  SKIN: J tube in place, bilious drainage        MEDICATIONS:  MEDICATIONS  (STANDING):  atorvastatin 40 milliGRAM(s) Oral at bedtime  chlorhexidine 0.12% Liquid 15 milliLiter(s) Oral Mucosa two times a day  chlorhexidine 4% Liquid 1 Application(s) Topical <User Schedule>  Dakins Solution - 1/4 Strength 1 Application(s) Topical two times a day  epoetin tan Injectable 40735 Unit(s) IV Push <User Schedule>  hydrocortisone sodium succinate Injectable 50 milliGRAM(s) IV Push every 8 hours  insulin lispro (HumaLOG) corrective regimen sliding scale   SubCutaneous every 6 hours  levETIRAcetam  IVPB 500 milliGRAM(s) IV Intermittent every 12 hours  meropenem  IVPB 500 milliGRAM(s) IV Intermittent every 24 hours  norepinephrine Infusion 0.4 MICROgram(s)/kG/Min (23.7 mL/Hr) IV Continuous <Continuous>  pantoprazole  Injectable 40 milliGRAM(s) IV Push daily    MEDICATIONS  (PRN):      ALLERGIES:  Allergies    No Known Allergies    Intolerances        LABS:                        7.7    19.76 )-----------( 253      ( 01 Jan 2020 01:21 )             26.6     01-01    135  |  91<L>  |  44<H>  ----------------------------<  176<H>  4.0   |  23  |  3.00<H>    Ca    10.4      01 Jan 2020 01:21  Phos  4.2     01-01  Mg     2.2     01-01    TPro  7.3  /  Alb  2.8<L>  /  TBili  0.3  /  DBili  x   /  AST  15  /  ALT  8<L>  /  AlkPhos  97  01-01    PT/INR - ( 30 Dec 2019 22:50 )   PT: 14.5 sec;   INR: 1.25 ratio         PTT - ( 30 Dec 2019 22:50 )  PTT:29.1 sec      RADIOLOGY & ADDITIONAL TESTS: Reviewed.

## 2020-01-02 LAB
ALBUMIN SERPL ELPH-MCNC: 3.1 G/DL — LOW (ref 3.3–5)
ALP SERPL-CCNC: 106 U/L — SIGNIFICANT CHANGE UP (ref 40–120)
ALT FLD-CCNC: 8 U/L — LOW (ref 10–45)
ANION GAP SERPL CALC-SCNC: 21 MMOL/L — HIGH (ref 5–17)
ANION GAP SERPL CALC-SCNC: 23 MMOL/L — HIGH (ref 5–17)
AST SERPL-CCNC: 14 U/L — SIGNIFICANT CHANGE UP (ref 10–40)
BASOPHILS # BLD AUTO: 0.01 K/UL — SIGNIFICANT CHANGE UP (ref 0–0.2)
BASOPHILS NFR BLD AUTO: 0.1 % — SIGNIFICANT CHANGE UP (ref 0–2)
BILIRUB SERPL-MCNC: 0.4 MG/DL — SIGNIFICANT CHANGE UP (ref 0.2–1.2)
BUN SERPL-MCNC: 62 MG/DL — HIGH (ref 7–23)
BUN SERPL-MCNC: 77 MG/DL — HIGH (ref 7–23)
CALCIUM SERPL-MCNC: 11.2 MG/DL — HIGH (ref 8.4–10.5)
CALCIUM SERPL-MCNC: 11.6 MG/DL — HIGH (ref 8.4–10.5)
CHLORIDE SERPL-SCNC: 93 MMOL/L — LOW (ref 96–108)
CHLORIDE SERPL-SCNC: 95 MMOL/L — LOW (ref 96–108)
CO2 SERPL-SCNC: 23 MMOL/L — SIGNIFICANT CHANGE UP (ref 22–31)
CO2 SERPL-SCNC: 25 MMOL/L — SIGNIFICANT CHANGE UP (ref 22–31)
CREAT SERPL-MCNC: 3.87 MG/DL — HIGH (ref 0.5–1.3)
CREAT SERPL-MCNC: 4.13 MG/DL — HIGH (ref 0.5–1.3)
CULTURE RESULTS: NO GROWTH — SIGNIFICANT CHANGE UP
EOSINOPHIL # BLD AUTO: 0 K/UL — SIGNIFICANT CHANGE UP (ref 0–0.5)
EOSINOPHIL NFR BLD AUTO: 0 % — SIGNIFICANT CHANGE UP (ref 0–6)
GLUCOSE BLDC GLUCOMTR-MCNC: 121 MG/DL — HIGH (ref 70–99)
GLUCOSE BLDC GLUCOMTR-MCNC: 168 MG/DL — HIGH (ref 70–99)
GLUCOSE BLDC GLUCOMTR-MCNC: 178 MG/DL — HIGH (ref 70–99)
GLUCOSE BLDC GLUCOMTR-MCNC: 230 MG/DL — HIGH (ref 70–99)
GLUCOSE SERPL-MCNC: 105 MG/DL — HIGH (ref 70–99)
GLUCOSE SERPL-MCNC: 194 MG/DL — HIGH (ref 70–99)
HCT VFR BLD CALC: 29.6 % — LOW (ref 39–50)
HGB BLD-MCNC: 8.8 G/DL — LOW (ref 13–17)
IMM GRANULOCYTES NFR BLD AUTO: 0.7 % — SIGNIFICANT CHANGE UP (ref 0–1.5)
LYMPHOCYTES # BLD AUTO: 0.87 K/UL — LOW (ref 1–3.3)
LYMPHOCYTES # BLD AUTO: 6.5 % — LOW (ref 13–44)
MAGNESIUM SERPL-MCNC: 2.3 MG/DL — SIGNIFICANT CHANGE UP (ref 1.6–2.6)
MAGNESIUM SERPL-MCNC: 2.4 MG/DL — SIGNIFICANT CHANGE UP (ref 1.6–2.6)
MCHC RBC-ENTMCNC: 25.2 PG — LOW (ref 27–34)
MCHC RBC-ENTMCNC: 29.7 GM/DL — LOW (ref 32–36)
MCV RBC AUTO: 84.8 FL — SIGNIFICANT CHANGE UP (ref 80–100)
MONOCYTES # BLD AUTO: 0.51 K/UL — SIGNIFICANT CHANGE UP (ref 0–0.9)
MONOCYTES NFR BLD AUTO: 3.8 % — SIGNIFICANT CHANGE UP (ref 2–14)
NEUTROPHILS # BLD AUTO: 11.98 K/UL — HIGH (ref 1.8–7.4)
NEUTROPHILS NFR BLD AUTO: 88.9 % — HIGH (ref 43–77)
NRBC # BLD: 0 /100 WBCS — SIGNIFICANT CHANGE UP (ref 0–0)
PHOSPHATE SERPL-MCNC: 5.2 MG/DL — HIGH (ref 2.5–4.5)
PHOSPHATE SERPL-MCNC: 5.8 MG/DL — HIGH (ref 2.5–4.5)
PLATELET # BLD AUTO: 274 K/UL — SIGNIFICANT CHANGE UP (ref 150–400)
POTASSIUM SERPL-MCNC: 3.9 MMOL/L — SIGNIFICANT CHANGE UP (ref 3.5–5.3)
POTASSIUM SERPL-MCNC: 4.1 MMOL/L — SIGNIFICANT CHANGE UP (ref 3.5–5.3)
POTASSIUM SERPL-SCNC: 3.9 MMOL/L — SIGNIFICANT CHANGE UP (ref 3.5–5.3)
POTASSIUM SERPL-SCNC: 4.1 MMOL/L — SIGNIFICANT CHANGE UP (ref 3.5–5.3)
PROT SERPL-MCNC: 7.9 G/DL — SIGNIFICANT CHANGE UP (ref 6–8.3)
RBC # BLD: 3.49 M/UL — LOW (ref 4.2–5.8)
RBC # FLD: 17.3 % — HIGH (ref 10.3–14.5)
SODIUM SERPL-SCNC: 139 MMOL/L — SIGNIFICANT CHANGE UP (ref 135–145)
SODIUM SERPL-SCNC: 141 MMOL/L — SIGNIFICANT CHANGE UP (ref 135–145)
SPECIMEN SOURCE: SIGNIFICANT CHANGE UP
WBC # BLD: 13.47 K/UL — HIGH (ref 3.8–10.5)
WBC # FLD AUTO: 13.47 K/UL — HIGH (ref 3.8–10.5)

## 2020-01-02 PROCEDURE — 76937 US GUIDE VASCULAR ACCESS: CPT | Mod: 26

## 2020-01-02 PROCEDURE — 99233 SBSQ HOSP IP/OBS HIGH 50: CPT | Mod: GC

## 2020-01-02 PROCEDURE — 99233 SBSQ HOSP IP/OBS HIGH 50: CPT

## 2020-01-02 PROCEDURE — 36556 INSERT NON-TUNNEL CV CATH: CPT

## 2020-01-02 PROCEDURE — 77001 FLUOROGUIDE FOR VEIN DEVICE: CPT | Mod: 26

## 2020-01-02 RX ORDER — ACETAMINOPHEN 500 MG
1000 TABLET ORAL ONCE
Refills: 0 | Status: COMPLETED | OUTPATIENT
Start: 2020-01-02 | End: 2020-01-02

## 2020-01-02 RX ORDER — HYDROMORPHONE HYDROCHLORIDE 2 MG/ML
0.25 INJECTION INTRAMUSCULAR; INTRAVENOUS; SUBCUTANEOUS ONCE
Refills: 0 | Status: DISCONTINUED | OUTPATIENT
Start: 2020-01-02 | End: 2020-01-02

## 2020-01-02 RX ORDER — VANCOMYCIN HCL 1 G
500 VIAL (EA) INTRAVENOUS ONCE
Refills: 0 | Status: COMPLETED | OUTPATIENT
Start: 2020-01-02 | End: 2020-01-03

## 2020-01-02 RX ORDER — CHLORHEXIDINE GLUCONATE 213 G/1000ML
1 SOLUTION TOPICAL
Refills: 0 | Status: DISCONTINUED | OUTPATIENT
Start: 2020-01-02 | End: 2020-01-05

## 2020-01-02 RX ORDER — CARVEDILOL PHOSPHATE 80 MG/1
6.25 CAPSULE, EXTENDED RELEASE ORAL EVERY 12 HOURS
Refills: 0 | Status: DISCONTINUED | OUTPATIENT
Start: 2020-01-03 | End: 2020-01-04

## 2020-01-02 RX ORDER — CARVEDILOL PHOSPHATE 80 MG/1
3.12 CAPSULE, EXTENDED RELEASE ORAL ONCE
Refills: 0 | Status: COMPLETED | OUTPATIENT
Start: 2020-01-02 | End: 2020-01-02

## 2020-01-02 RX ORDER — SODIUM CHLORIDE 9 MG/ML
10 INJECTION INTRAMUSCULAR; INTRAVENOUS; SUBCUTANEOUS
Refills: 0 | Status: DISCONTINUED | OUTPATIENT
Start: 2020-01-02 | End: 2020-01-05

## 2020-01-02 RX ORDER — HYDROCORTISONE 20 MG
25 TABLET ORAL EVERY 8 HOURS
Refills: 0 | Status: DISCONTINUED | OUTPATIENT
Start: 2020-01-02 | End: 2020-01-03

## 2020-01-02 RX ADMIN — Medication 2: at 06:44

## 2020-01-02 RX ADMIN — PANTOPRAZOLE SODIUM 40 MILLIGRAM(S): 20 TABLET, DELAYED RELEASE ORAL at 13:42

## 2020-01-02 RX ADMIN — CHLORHEXIDINE GLUCONATE 1 APPLICATION(S): 213 SOLUTION TOPICAL at 06:44

## 2020-01-02 RX ADMIN — Medication 400 MILLIGRAM(S): at 14:27

## 2020-01-02 RX ADMIN — CARVEDILOL PHOSPHATE 3.12 MILLIGRAM(S): 80 CAPSULE, EXTENDED RELEASE ORAL at 20:13

## 2020-01-02 RX ADMIN — Medication 1: at 10:50

## 2020-01-02 RX ADMIN — Medication 25 MILLIGRAM(S): at 13:42

## 2020-01-02 RX ADMIN — Medication 1000 MILLIGRAM(S): at 14:42

## 2020-01-02 RX ADMIN — MEROPENEM 100 MILLIGRAM(S): 1 INJECTION INTRAVENOUS at 01:16

## 2020-01-02 RX ADMIN — CHLORHEXIDINE GLUCONATE 15 MILLILITER(S): 213 SOLUTION TOPICAL at 06:44

## 2020-01-02 RX ADMIN — ATORVASTATIN CALCIUM 40 MILLIGRAM(S): 80 TABLET, FILM COATED ORAL at 22:52

## 2020-01-02 RX ADMIN — HYDROMORPHONE HYDROCHLORIDE 0.25 MILLIGRAM(S): 2 INJECTION INTRAMUSCULAR; INTRAVENOUS; SUBCUTANEOUS at 06:44

## 2020-01-02 RX ADMIN — Medication 25 MILLIGRAM(S): at 22:54

## 2020-01-02 RX ADMIN — Medication 1 APPLICATION(S): at 06:44

## 2020-01-02 RX ADMIN — HYDROMORPHONE HYDROCHLORIDE 0.25 MILLIGRAM(S): 2 INJECTION INTRAMUSCULAR; INTRAVENOUS; SUBCUTANEOUS at 07:00

## 2020-01-02 RX ADMIN — INSULIN GLARGINE 8 UNIT(S): 100 INJECTION, SOLUTION SUBCUTANEOUS at 22:59

## 2020-01-02 RX ADMIN — Medication 1: at 17:26

## 2020-01-02 RX ADMIN — CHLORHEXIDINE GLUCONATE 15 MILLILITER(S): 213 SOLUTION TOPICAL at 17:27

## 2020-01-02 RX ADMIN — LEVETIRACETAM 400 MILLIGRAM(S): 250 TABLET, FILM COATED ORAL at 18:02

## 2020-01-02 RX ADMIN — Medication 50 MILLIGRAM(S): at 06:44

## 2020-01-02 RX ADMIN — LEVETIRACETAM 400 MILLIGRAM(S): 250 TABLET, FILM COATED ORAL at 06:43

## 2020-01-02 NOTE — PROGRESS NOTE ADULT - SUBJECTIVE AND OBJECTIVE BOX
Patient is a 74y Male whom presented to   pateint seen and examined nad , in am     PAST MEDICAL & SURGICAL HISTORY:  Kidney transplant recipient  Anuria  GERD (gastroesophageal reflux disease)  Kidney transplant failure: dx: 2013  Hepatitis C: dx: 90&#x27;s.  From iv drug abuse  GERD (gastroesophageal reflux disease)  Renal transplant failure and rejection  Rectal abscess:   IV drug abuse: former, cocaine. 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 10 milliGRAM(s) Oral three times a day  mycophenolate mofetil 250 milliGRAM(s) Oral two times a day  predniSONE   Tablet 5 milliGRAM(s) Oral daily  sevelamer carbonate 800 milliGRAM(s) Oral three times a day with meals  sodium bicarbonate 650 milliGRAM(s) Oral two times a day  tamsulosin 0.4 milliGRAM(s) Oral at bedtime      Allergies    No Known Allergies                       SOCIAL HISTORY:  Denies ETOh,Smoking,     FAMILY HISTORY:  Family history of breast cancer in sister (Sibling): living at 94 yo  Family history of prostate cancer in father  Family history of lung cancer  Family history of hypertension in mother  Family history of diabetes mellitus: mother      REVIEW OF SYSTEMS:    unbable to obtained                                                                    8.8    13.47 )-----------( 274      ( 2020 01:19 )             29.6       CBC Full  -  ( 2020 01:19 )  WBC Count : 13.47 K/uL  RBC Count : 3.49 M/uL  Hemoglobin : 8.8 g/dL  Hematocrit : 29.6 %  Platelet Count - Automated : 274 K/uL  Mean Cell Volume : 84.8 fl  Mean Cell Hemoglobin : 25.2 pg  Mean Cell Hemoglobin Concentration : 29.7 gm/dL  Auto Neutrophil # : 11.98 K/uL  Auto Lymphocyte # : 0.87 K/uL  Auto Monocyte # : 0.51 K/uL  Auto Eosinophil # : 0.00 K/uL  Auto Basophil # : 0.01 K/uL  Auto Neutrophil % : 88.9 %  Auto Lymphocyte % : 6.5 %  Auto Monocyte % : 3.8 %  Auto Eosinophil % : 0.0 %  Auto Basophil % : 0.1 %      -02    141  |  95<L>  |  77<H>  ----------------------------<  105<H>  3.9   |  23  |  4.13<H>    Ca    11.6<H>      2020 20:07  Phos  5.8     -  Mg     2.4     -02    TPro  7.9  /  Alb  3.1<L>  /  TBili  0.4  /  DBili  x   /  AST  14  /  ALT  8<L>  /  AlkPhos  106  01-02      CAPILLARY BLOOD GLUCOSE      POCT Blood Glucose.: 178 mg/dL (2020 17:26)  POCT Blood Glucose.: 168 mg/dL (2020 10:48)  POCT Blood Glucose.: 230 mg/dL (2020 06:04)  POCT Blood Glucose.: 152 mg/dL (2020 21:52)      Vital Signs Last 24 Hrs  T(C): 36.4 (2020 19:00), Max: 36.9 (2020 23:00)  T(F): 97.5 (2020 19:00), Max: 98.4 (2020 23:00)  HR: 97 (2020 20:00) (97 - 108)  BP: 128/75 (2020 20:00) (96/61 - 144/80)  BP(mean): 96 (2020 20:00) (74 - 105)  RR: 20 (2020 20:00) (11 - 27)  SpO2: 100% (2020 20:00) (97% - 100%)    Urinalysis Basic - ( 2020 09:35 )    Color: Yellow / Appearance: Slightly Turbid / S.027 / pH: x  Gluc: x / Ketone: Negative  / Bili: Negative / Urobili: Negative   Blood: x / Protein: 300 mg/dL / Nitrite: Negative   Leuk Esterase: Large / RBC: 2 /hpf /  /HPF   Sq Epi: x / Non Sq Epi: 1 /hpf / Bacteria: Negative                                                     HEENT: conjunctive   clear   Neck:  No JVD  Respiratory: decrease bs b/l   Cardiovascular: S1 and S2  Gastrointestinal: BS+, soft, NT/ND  Extremities: No peripheral edema  Skin: dry   Access: pos fistula

## 2020-01-02 NOTE — PROGRESS NOTE ADULT - SUBJECTIVE AND OBJECTIVE BOX
Patient is a 74y old  Male who presents with a chief complaint of ams (02 Jan 2020 07:04)    Being followed by ID for bacteremia, SIRS, leucocytosis,sacral decub    Interval history:awake  answers some queries appropriately   No other acute events      ROS:answers queries but unreliable historian   No cough,SOB,CP  No N/V/D  No abd pain  No urinary complaints  No HA  No joint or limb pain  No other complaints      Antimicrobials:    meropenem  IVPB 500 milliGRAM(s) IV Intermittent every 24 hours  vanco post HD       other medications reviewed    Vital Signs Last 24 Hrs  T(C): 36.5 (01-02-20 @ 11:00), Max: 36.9 (01-01-20 @ 15:00)  T(F): 97.7 (01-02-20 @ 11:00), Max: 98.4 (01-01-20 @ 15:00)  HR: 105 (01-02-20 @ 11:00) (98 - 109)  BP: 123/72 (01-02-20 @ 11:00) (93/60 - 141/77)  BP(mean): 93 (01-02-20 @ 11:00) (71 - 103)  RR: 20 (01-02-20 @ 11:00) (11 - 32)  SpO2: 100% (01-02-20 @ 11:00) (96% - 100%)    Physical Exam:  R Sc HD catheter no erythema o tenderness        Chest Good AE,bibasilar crackles     CVS RRR S1 S2 WNl No murmur or rub or gallop    Abd soft BS normal No tenderness RLQ transplant kidney no tenderness  feeding tube no discharge or erythema     sacral decub stage III some purulence and sup skin necrosis noted     Ext left arm AVF no erythema or tenderness    IV site no erythema tenderness or discharge    Joints no swelling or LOM    CNS as above  Lab Data:                          8.8    13.47 )-----------( 274      ( 02 Jan 2020 01:19 )             29.6     WBC Count: 13.47 (01-02-20 @ 01:19)  WBC Count: 19.76 (01-01-20 @ 01:21)  WBC Count: 29.47 (12-30-19 @ 22:43)  WBC Count: 18.67 (12-30-19 @ 08:43)  WBC Count: 21.12 (12-28-19 @ 11:49)  WBC Count: 15.40 (12-27-19 @ 08:54)    01-02    139  |  93<L>  |  62<H>  ----------------------------<  194<H>  4.1   |  25  |  3.87<H>    Ca    11.2<H>      02 Jan 2020 01:19  Phos  5.2     01-02  Mg     2.3     01-02    TPro  7.9  /  Alb  3.1<L>  /  TBili  0.4  /  DBili  x   /  AST  14  /  ALT  8<L>  /  AlkPhos  106  01-02        Culture - Urine (collected 01 Jan 2020 12:48)  Source: .Urine Clean Catch (Midstream)  Final Report (02 Jan 2020 08:56):    No growth    Culture - Blood (collected 31 Dec 2019 00:40)  Source: .Blood Blood  Preliminary Report (01 Jan 2020 01:02):    No growth to date.      Culture - Blood (12.24.19 @ 14:53)    Gram Stain:   Growth in anaerobic bottle: Gram Negative Rods    -  Multidrug (KPC pos) resistant organism: Nondet    -  Staphylococcus aureus: Nondet    -  Methicillin resistant Staphylococcus aureus (MRSA): Nondet    -  Coagulase negative Staphylococcus: Nondet    -  Enterococcus species: Nondet    -  Vancomycin resistant Enterococcus sp.: Nondet    -  Escherichia coli: Nondet    -  Klebsiella oxytoca: Nondet    -  Klebsiella pneumoniae: Nondet    -  Serratia marcescens: Nondet    -  Haemophilus influenzae: Nondet    -  Listeria monocytogenes: Nondet    -  Neisseria meningitidis: Nondet    -  Pseudomonas aeruginosa: Nondet    -  Acinetobacter baumanii: Nondet    -  Enterobacter cloacae complex: Nondet    -  Streptococcus sp. (Not Grp A, B or S pneumoniae): Nondet    -  Streptococcus agalactiae (Group B): Nondet    -  Streptococcus pyogenes (Group A): Nondet    -  Streptococcus pneumoniae: Nondet    -  Candida albicans: Nondet    -  Candida glabrata: Nondet    -  Candida krusei: Nondet    -  Candida parapsilosis: Nondet    -  Candida tropicalis: Nondet    Specimen Source: .Blood Blood    Organism: Blood Culture PCR    Culture Results:   Growth in anaerobic bottle: Prevotella denticola "Susceptibilities not  performed"  "Due to technical problems, Proteus sp. will Not be reported as part of  the BCID panel until further notice"  ***Blood Panel PCR results on this specimen are available  approximately 3 hours after the Gram stain result.***  Gram stain, PCR, and/or culture results may not always  correspond due to difference in methodologies.  ************************************************************  This PCR assay was performed using [employer].  The following targets are tested for: Enterococcus,  vancomycin resistant enterococci, Listeria monocytogenes,  coagulase negative staphylococci, S. aureus,  methicillin resistant S. aureus, Streptococcus agalactiae  (Group B), S. pneumoniae, S. pyogenes (Group A),  Acinetobacter baumannii, Enterobacter cloacae, E. coli,  Klebsiella oxytoca, K. pneumoniae, Proteus sp.,  Serratia marcescens, Haemophilus influenzae,  Neisseria meningitidis, Pseudomonas aeruginosa, Candida  albicans, C. glabrata, C krusei, C parapsilosis,  C. tropicalis and the KPC resistance gene.    Organism Identification: Blood Culture PCR    Method Type: PCR            Vancomycin Level, Random: 11.6 ug/mL (01-01-20 @ 03:03)      < from: CT Abdomen and Pelvis w/ IV Cont (12.31.19 @ 08:56) >    IMPRESSION:     Persistent small bowel obstruction.    Free air greater than expected for postoperative day 9 is not significantly changed from prior study one day earlier.     Large decubitus ulcer overlying the sacrum and coccyx.    Nonobstructing stone atrophy UPJ of the transplanted kidney. No associated hydronephrosis.    Bibasilar airspace opacities stable to mildly improved.          < end of copied text >      Imaging studies(films) independently reviewed.

## 2020-01-02 NOTE — PROGRESS NOTE ADULT - SUBJECTIVE AND OBJECTIVE BOX
INTERVAL HPI/OVERNIGHT EVENTS:    pt s/e  tube feeds resumed  pt denies abdominal pain, n/v    MEDICATIONS  (STANDING):  atorvastatin 40 milliGRAM(s) Oral at bedtime  chlorhexidine 0.12% Liquid 15 milliLiter(s) Oral Mucosa two times a day  chlorhexidine 4% Liquid 1 Application(s) Topical <User Schedule>  Dakins Solution - 1/4 Strength 1 Application(s) Topical two times a day  dextrose 5%. 1000 milliLiter(s) (50 mL/Hr) IV Continuous <Continuous>  epoetin tan Injectable 40694 Unit(s) IV Push <User Schedule>  hydrocortisone sodium succinate Injectable 25 milliGRAM(s) IV Push every 8 hours  insulin glargine Injectable (LANTUS) 8 Unit(s) SubCutaneous at bedtime  insulin lispro (HumaLOG) corrective regimen sliding scale   SubCutaneous three times a day before meals  levETIRAcetam  IVPB 500 milliGRAM(s) IV Intermittent every 12 hours  meropenem  IVPB 500 milliGRAM(s) IV Intermittent every 24 hours  pantoprazole  Injectable 40 milliGRAM(s) IV Push daily    MEDICATIONS  (PRN):      Allergies    No Known Allergies    Intolerances        Review of Systems:    unable to obtain in completion       Vital Signs Last 24 Hrs  T(C): 36.5 (2020 11:00), Max: 36.9 (2020 15:00)  T(F): 97.7 (2020 11:00), Max: 98.4 (2020 15:00)  HR: 98 (2020 13:00) (98 - 108)  BP: 114/69 (2020 13:00) (93/60 - 141/77)  BP(mean): 87 (2020 13:00) (71 - 103)  RR: 18 (2020 13:00) (11 - 32)  SpO2: 100% (2020 13:00) (96% - 100%)    PHYSICAL EXAM:      GENERAL:  lying in bed  HEENT:  NC/AT  ABDOMEN:  Soft, non-distended, + bowel sounds, + peg dressed, c/d/i  EXTREMITIES:  no cyanosis, clubbing or edema  NEURO:  minimally verbal        LABS:                        8.8    13.47 )-----------( 274      ( 2020 01:19 )             29.6     01-02    139  |  93<L>  |  62<H>  ----------------------------<  194<H>  4.1   |  25  |  3.87<H>    Ca    11.2<H>      2020 01:19  Phos  5.2       Mg     2.3         TPro  7.9  /  Alb  3.1<L>  /  TBili  0.4  /  DBili  x   /  AST  14  /  ALT  8<L>  /  AlkPhos  106        Urinalysis Basic - ( 2020 09:35 )    Color: Yellow / Appearance: Slightly Turbid / S.027 / pH: x  Gluc: x / Ketone: Negative  / Bili: Negative / Urobili: Negative   Blood: x / Protein: 300 mg/dL / Nitrite: Negative   Leuk Esterase: Large / RBC: 2 /hpf /  /HPF   Sq Epi: x / Non Sq Epi: 1 /hpf / Bacteria: Negative        RADIOLOGY & ADDITIONAL TESTS:

## 2020-01-02 NOTE — CHART NOTE - NSCHARTNOTEFT_GEN_A_CORE
Called by nurse at 7:40. Patient with 8 beats of vtach at 19:23. Asymptomatic at this time. Hx of afib. Holding carv Called by nurse at 7:40. Patient with 8 beats of vtach at 19:23. Asymptomatic at this time. Hx of afib. Holding home carvedilol. HD stable HR 92 NSR, /70, SpO2 100%.    Plan:  [ ] repeat electrolytes, replete as necessary   [ ] will start carvedilol 3.125 mg as patient stable off pressors   [ ] CTM BP    Dr. Deborah Catherine, PGY1  Internal Medicine

## 2020-01-02 NOTE — PROGRESS NOTE ADULT - ATTENDING COMMENTS
Agree with above. Patient seen and examined. 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 His course has been complicated by small bowel obstruction and shock state.     1. Shock - likely due to sepsis. Continue antibiotics as per ID. Patient is stable off vasopressors with goal MAP > 65. Shiley catheter currently removed. WIll plan to remove TLC and place IV lines. Wound care followup regarding sacral wound and concern for osteo. Will start Midodrine if necessary. May require further imaging of wound to evaluate for osteo.  2. Small bowel obstruction - noted on CT 12/31. Abdominal exam improved with drainage of PEG to gravity. Surgical followup appreciated and patients feeds resumed.  3. ESRD - on HD as per renal. Patient is scheduled for catheter replacement with IR today (1/2/19). No emergent needs for HD at this time but last session was on 12/31/19.  4. Metabolic encephalopathy - improving.   5. GOC - Palliative care evaluation noted. Patient remains full code.    Patient is medically stable for transfer to the floors for further management.

## 2020-01-02 NOTE — PROCEDURE NOTE - NSSITEPREP_SKIN_A_CORE
chlorhexidine
Adherence to aseptic technique: hand hygiene prior to donning barriers (gown, gloves), don cap and mask, sterile drape over patient/chlorhexidine
chlorhexidine
chlorhexidine

## 2020-01-02 NOTE — PROGRESS NOTE ADULT - SUBJECTIVE AND OBJECTIVE BOX
SUBJECTIVE: Pt seen, chart reviewed.    Pain: [ x] YES [ ] NO  Pain (0-10):    non verbal           Pain Control Adequate: [x ] YES [ ] NO  SOB: [ ]YES [x ] NO  Chest Discomfort: [ ] YES [x ] NO    Nausea: [ ] YES x ] NO           Vomiting: [ ] YES [x ] NO  Flatus: [ ] YES [ x] NO             Bowel Movement: [ ] YES [x ] NO     Void: [ ]YES [x ]No    meropenem  IVPB 500 milliGRAM(s) IV Intermittent every 24 hours      Physical Exam:  Vital Signs Last 24 Hrs  T(C): 36.5 (02 Jan 2020 11:00), Max: 36.9 (01 Jan 2020 15:00)  T(F): 97.7 (02 Jan 2020 11:00), Max: 98.4 (01 Jan 2020 15:00)  HR: 98 (02 Jan 2020 13:00) (98 - 108)  BP: 114/69 (02 Jan 2020 13:00) (93/60 - 141/77)  BP(mean): 87 (02 Jan 2020 13:00) (71 - 103)  RR: 18 (02 Jan 2020 13:00) (11 - 32)  SpO2: 100% (02 Jan 2020 13:00) (96% - 100%)      General Appearance:  cachectic, with tube feeds at   Bed: versa care  Skin:  sacral wound L 11.5cm x W 11.5cm x D 1.5cm circumferential undermining to 1.5 at 12 oclock, foul odor, adherent grey, moist slough, large amount of serosanguinous drainage, crumbling bone in wound, + TTP, No erythema, increased warmth, induration, fluctuance, no pocket of purulence found, necrotic foul smelling wound   Boots: Z flow boots in place  Chest: without SOB  Abd: Thin non-distended, non tender  Vasc: DP/PT, no calf swelling   Musc: FROM no deformity,       LABS:                        8.8    13.47 )-----------( 274                  29.6       RADIOLOGY & ADDITIONAL STUDIES:  CULTURES:    A/P    Sacral Wound  Unstageable, bone exposure  Continue with 1/4 strength Dakin's twice a day cover with a combine and a Tegaderm    con't offloading  con't Nuturition  con't Pericare  Con't per Medicine  F/u as outpatient in Wound Center [address] [facility] [phone]

## 2020-01-02 NOTE — PROGRESS NOTE ADULT - SUBJECTIVE AND OBJECTIVE BOX
Chief complaint  Patient is a 74y old  Male who presents with a chief complaint of ams (02 Jan 2020 14:27)   Review of systems  Patient in bed, looks comfortable, no fever, no hypoglycemia.    Labs and Fingersticks  CAPILLARY BLOOD GLUCOSE      POCT Blood Glucose.: 168 mg/dL (02 Jan 2020 10:48)  POCT Blood Glucose.: 230 mg/dL (02 Jan 2020 06:04)  POCT Blood Glucose.: 152 mg/dL (01 Jan 2020 21:52)  POCT Blood Glucose.: 152 mg/dL (01 Jan 2020 17:11)      Anion Gap, Serum: 21 <H> (01-02 @ 01:19)  Anion Gap, Serum: 21 <H> (01-01 @ 01:21)      Calcium, Total Serum: 11.2 <H> (01-02 @ 01:19)  Calcium, Total Serum: 10.4 (01-01 @ 01:21)  Albumin, Serum: 3.1 <L> (01-02 @ 01:19)  Albumin, Serum: 2.8 <L> (01-01 @ 01:21)    Alanine Aminotransferase (ALT/SGPT): 8 <L> (01-02 @ 01:19)  Alanine Aminotransferase (ALT/SGPT): 8 <L> (01-01 @ 01:21)  Alkaline Phosphatase, Serum: 106 (01-02 @ 01:19)  Alkaline Phosphatase, Serum: 97 (01-01 @ 01:21)  Aspartate Aminotransferase (AST/SGOT): 14 (01-02 @ 01:19)  Aspartate Aminotransferase (AST/SGOT): 15 (01-01 @ 01:21)        01-02    139  |  93<L>  |  62<H>  ----------------------------<  194<H>  4.1   |  25  |  3.87<H>    Ca    11.2<H>      02 Jan 2020 01:19  Phos  5.2     01-02  Mg     2.3     01-02    TPro  7.9  /  Alb  3.1<L>  /  TBili  0.4  /  DBili  x   /  AST  14  /  ALT  8<L>  /  AlkPhos  106  01-02                        8.8    13.47 )-----------( 274      ( 02 Jan 2020 01:19 )             29.6     Medications  MEDICATIONS  (STANDING):  atorvastatin 40 milliGRAM(s) Oral at bedtime  chlorhexidine 0.12% Liquid 15 milliLiter(s) Oral Mucosa two times a day  chlorhexidine 4% Liquid 1 Application(s) Topical <User Schedule>  Dakins Solution - 1/4 Strength 1 Application(s) Topical two times a day  dextrose 5%. 1000 milliLiter(s) (50 mL/Hr) IV Continuous <Continuous>  epoetin tan Injectable 12776 Unit(s) IV Push <User Schedule>  hydrocortisone sodium succinate Injectable 25 milliGRAM(s) IV Push every 8 hours  insulin glargine Injectable (LANTUS) 8 Unit(s) SubCutaneous at bedtime  insulin lispro (HumaLOG) corrective regimen sliding scale   SubCutaneous three times a day before meals  levETIRAcetam  IVPB 500 milliGRAM(s) IV Intermittent every 12 hours  meropenem  IVPB 500 milliGRAM(s) IV Intermittent every 24 hours  pantoprazole  Injectable 40 milliGRAM(s) IV Push daily      Physical Exam  General: Patient comfortable in bed  Vital Signs Last 12 Hrs  T(F): 97.7 (01-02-20 @ 11:00), Max: 97.7 (01-02-20 @ 11:00)  HR: 105 (01-02-20 @ 14:00) (98 - 108)  BP: 111/67 (01-02-20 @ 14:00) (96/61 - 141/77)  BP(mean): 83 (01-02-20 @ 14:00) (74 - 103)  RR: 16 (01-02-20 @ 14:00) (11 - 23)  SpO2: 100% (01-02-20 @ 14:00) (97% - 100%)  Neck: No palpable thyroid nodules.  CVS: S1S2, No murmurs  Respiratory: No wheezing, no crepitations  GI: Abdomen soft, bowel sounds positive  Musculoskeletal:  edema lower extremities.   Skin: No skin rashes, no ecchymosis    Diagnostics Chief complaint  Patient is a 74y old  Male who presents with a chief complaint of ams (02 Jan 2020 14:27)   Review of systems  Patient in bed, looks comfortable, no fever,  no hypoglycemia.    Labs and Fingersticks  CAPILLARY BLOOD GLUCOSE      POCT Blood Glucose.: 168 mg/dL (02 Jan 2020 10:48)  POCT Blood Glucose.: 230 mg/dL (02 Jan 2020 06:04)  POCT Blood Glucose.: 152 mg/dL (01 Jan 2020 21:52)  POCT Blood Glucose.: 152 mg/dL (01 Jan 2020 17:11)      Anion Gap, Serum: 21 <H> (01-02 @ 01:19)  Anion Gap, Serum: 21 <H> (01-01 @ 01:21)      Calcium, Total Serum: 11.2 <H> (01-02 @ 01:19)  Calcium, Total Serum: 10.4 (01-01 @ 01:21)  Albumin, Serum: 3.1 <L> (01-02 @ 01:19)  Albumin, Serum: 2.8 <L> (01-01 @ 01:21)    Alanine Aminotransferase (ALT/SGPT): 8 <L> (01-02 @ 01:19)  Alanine Aminotransferase (ALT/SGPT): 8 <L> (01-01 @ 01:21)  Alkaline Phosphatase, Serum: 106 (01-02 @ 01:19)  Alkaline Phosphatase, Serum: 97 (01-01 @ 01:21)  Aspartate Aminotransferase (AST/SGOT): 14 (01-02 @ 01:19)  Aspartate Aminotransferase (AST/SGOT): 15 (01-01 @ 01:21)        01-02    139  |  93<L>  |  62<H>  ----------------------------<  194<H>  4.1   |  25  |  3.87<H>    Ca    11.2<H>      02 Jan 2020 01:19  Phos  5.2     01-02  Mg     2.3     01-02    TPro  7.9  /  Alb  3.1<L>  /  TBili  0.4  /  DBili  x   /  AST  14  /  ALT  8<L>  /  AlkPhos  106  01-02                        8.8    13.47 )-----------( 274      ( 02 Jan 2020 01:19 )             29.6     Medications  MEDICATIONS  (STANDING):  atorvastatin 40 milliGRAM(s) Oral at bedtime  chlorhexidine 0.12% Liquid 15 milliLiter(s) Oral Mucosa two times a day  chlorhexidine 4% Liquid 1 Application(s) Topical <User Schedule>  Dakins Solution - 1/4 Strength 1 Application(s) Topical two times a day  dextrose 5%. 1000 milliLiter(s) (50 mL/Hr) IV Continuous <Continuous>  epoetin tan Injectable 83791 Unit(s) IV Push <User Schedule>  hydrocortisone sodium succinate Injectable 25 milliGRAM(s) IV Push every 8 hours  insulin glargine Injectable (LANTUS) 8 Unit(s) SubCutaneous at bedtime  insulin lispro (HumaLOG) corrective regimen sliding scale   SubCutaneous three times a day before meals  levETIRAcetam  IVPB 500 milliGRAM(s) IV Intermittent every 12 hours  meropenem  IVPB 500 milliGRAM(s) IV Intermittent every 24 hours  pantoprazole  Injectable 40 milliGRAM(s) IV Push daily      Physical Exam  General: Patient comfortable in bed  Vital Signs Last 12 Hrs  T(F): 97.7 (01-02-20 @ 11:00), Max: 97.7 (01-02-20 @ 11:00)  HR: 105 (01-02-20 @ 14:00) (98 - 108)  BP: 111/67 (01-02-20 @ 14:00) (96/61 - 141/77)  BP(mean): 83 (01-02-20 @ 14:00) (74 - 103)  RR: 16 (01-02-20 @ 14:00) (11 - 23)  SpO2: 100% (01-02-20 @ 14:00) (97% - 100%)  Neck: No palpable thyroid nodules.  CVS: S1S2, No murmurs  Respiratory: No wheezing, no crepitations  GI: Abdomen soft, bowel sounds positive  Musculoskeletal:  edema lower extremities.   Skin: No skin rashes, no ecchymosis    Diagnostics

## 2020-01-02 NOTE — PROGRESS NOTE ADULT - SUBJECTIVE AND OBJECTIVE BOX
*******************************  Deborah Catherine, PGY1  Pager [phone]  *******************************    INTERVAL HPI/OVERNIGHT EVENTS:      SUBJECTIVE: Patient seen and examined at bedside.       OBJECTIVE:    VITAL SIGNS:  ICU Vital Signs Last 24 Hrs  T(C): 36.9 (2020 23:00), Max: 37 (2020 11:00)  T(F): 98.4 (2020 23:00), Max: 98.6 (2020 11:00)  HR: 107 (2020 05:00) (98 - 110)  BP: 141/77 (2020 05:00) (93/60 - 141/77)  BP(mean): 103 (2020 05:00) (71 - 103)  ABP: --  ABP(mean): --  RR: 17 (2020 05:00) (11 - 32)  SpO2: 100% (2020 05:00) (96% - 100%)         @ 07: @ 07:00  --------------------------------------------------------  IN: 1276.8 mL / OUT: 3050 mL / NET: -1773.2 mL     @ 07:01  -02 @ 06:33  --------------------------------------------------------  IN: 530 mL / OUT: 200 mL / NET: 330 mL      CAPILLARY BLOOD GLUCOSE    POCT Blood Glucose.: 230 mg/dL (2020 06:04)    PHYSICAL EXAM:  GENERAL: NAD respiring on RA   HEAD:  Atraumatic, Normocephalic  EYES: EOMI, PERRLA, conjunctiva and sclera clear  NECK: Supple, No JVD  CHEST/LUNG: R basilar crackles   HEART: RRR, holosystolic murmur throughout precordium   ABDOMEN: Soft, Nontender, Nondistended (improved from prior); Bowel sounds present. PEG tube in place with tube feeds on going.   EXTREMITIES:  2+ Peripheral Pulses, No clubbing, cyanosis, or edema  PSYCH: AAOx0  NEUROLOGY: non-focal  SKIN: J tube in place, bilious drainage        MEDICATIONS:  MEDICATIONS  (STANDING):  atorvastatin 40 milliGRAM(s) Oral at bedtime  chlorhexidine 0.12% Liquid 15 milliLiter(s) Oral Mucosa two times a day  chlorhexidine 4% Liquid 1 Application(s) Topical <User Schedule>  Dakins Solution - 1/4 Strength 1 Application(s) Topical two times a day  dextrose 5%. 1000 milliLiter(s) (50 mL/Hr) IV Continuous <Continuous>  epoetin tan Injectable 35481 Unit(s) IV Push <User Schedule>  hydrocortisone sodium succinate Injectable 50 milliGRAM(s) IV Push every 8 hours  HYDROmorphone  Injectable 0.25 milliGRAM(s) IV Push once  insulin glargine Injectable (LANTUS) 8 Unit(s) SubCutaneous at bedtime  insulin lispro (HumaLOG) corrective regimen sliding scale   SubCutaneous three times a day before meals  levETIRAcetam  IVPB 500 milliGRAM(s) IV Intermittent every 12 hours  meropenem  IVPB 500 milliGRAM(s) IV Intermittent every 24 hours  pantoprazole  Injectable 40 milliGRAM(s) IV Push daily    MEDICATIONS  (PRN):      ALLERGIES:  Allergies    No Known Allergies    Intolerances        LABS:                        8.8    13.47 )-----------( 274      ( 2020 01:19 )             29.6     01-02    139  |  93<L>  |  62<H>  ----------------------------<  194<H>  4.1   |  25  |  3.87<H>    Ca    11.2<H>      2020 01:19  Phos  5.2     01-02  Mg     2.3     -    TPro  7.9  /  Alb  3.1<L>  /  TBili  0.4  /  DBili  x   /  AST  14  /  ALT  8<L>  /  AlkPhos  106  01-02      Urinalysis Basic - ( 2020 09:35 )    Color: Yellow / Appearance: Slightly Turbid / S.027 / pH: x  Gluc: x / Ketone: Negative  / Bili: Negative / Urobili: Negative   Blood: x / Protein: 300 mg/dL / Nitrite: Negative   Leuk Esterase: Large / RBC: 2 /hpf /  /HPF   Sq Epi: x / Non Sq Epi: 1 /hpf / Bacteria: Negative        RADIOLOGY & ADDITIONAL TESTS: Reviewed. *******************************  Deborah Catherine, PGY1  Pager [phone]  *******************************    INTERVAL HPI/OVERNIGHT EVENTS:    Central line d/bertin yesterday. ELIOT ON.       SUBJECTIVE:     Patient seen and examined at bedside. A&O x 2. A little more confused since yesterday likely as just waking up. Asking to go home.       OBJECTIVE:    VITAL SIGNS:  ICU Vital Signs Last 24 Hrs  T(C): 36.9 (2020 23:00), Max: 37 (2020 11:00)  T(F): 98.4 (2020 23:00), Max: 98.6 (2020 11:00)  HR: 107 (2020 05:00) (98 - 110)  BP: 141/77 (2020 05:00) (93/60 - 141/77)  BP(mean): 103 (2020 05:00) (71 - 103)  ABP: --  ABP(mean): --  RR: 17 (2020 05:00) (11 - 32)  SpO2: 100% (2020 05:00) (96% - 100%)         @ : @ 07:00  --------------------------------------------------------  IN: 1276.8 mL / OUT: 3050 mL / NET: -1773.2 mL     @ 07:01  -02 @ 06:33  --------------------------------------------------------  IN: 530 mL / OUT: 200 mL / NET: 330 mL      CAPILLARY BLOOD GLUCOSE    POCT Blood Glucose.: 230 mg/dL (2020 06:04)        MEDICATIONS:  MEDICATIONS  (STANDING):  atorvastatin 40 milliGRAM(s) Oral at bedtime  chlorhexidine 0.12% Liquid 15 milliLiter(s) Oral Mucosa two times a day  chlorhexidine 4% Liquid 1 Application(s) Topical <User Schedule>  Dakins Solution - 1/4 Strength 1 Application(s) Topical two times a day  dextrose 5%. 1000 milliLiter(s) (50 mL/Hr) IV Continuous <Continuous>  epoetin tan Injectable 39018 Unit(s) IV Push <User Schedule>  hydrocortisone sodium succinate Injectable 50 milliGRAM(s) IV Push every 8 hours  HYDROmorphone  Injectable 0.25 milliGRAM(s) IV Push once  insulin glargine Injectable (LANTUS) 8 Unit(s) SubCutaneous at bedtime  insulin lispro (HumaLOG) corrective regimen sliding scale   SubCutaneous three times a day before meals  levETIRAcetam  IVPB 500 milliGRAM(s) IV Intermittent every 12 hours  meropenem  IVPB 500 milliGRAM(s) IV Intermittent every 24 hours  pantoprazole  Injectable 40 milliGRAM(s) IV Push daily    MEDICATIONS  (PRN):      ALLERGIES:  Allergies    No Known Allergies    Intolerances        LABS:                        8.8    13.47 )-----------( 274      ( 2020 01:19 )             29.6     01-02    139  |  93<L>  |  62<H>  ----------------------------<  194<H>  4.1   |  25  |  3.87<H>    Ca    11.2<H>      2020 01:19  Phos  5.2     -  Mg     2.3     -    TPro  7.9  /  Alb  3.1<L>  /  TBili  0.4  /  DBili  x   /  AST  14  /  ALT  8<L>  /  AlkPhos  106  01-02      Urinalysis Basic - ( 2020 09:35 )    Color: Yellow / Appearance: Slightly Turbid / S.027 / pH: x  Gluc: x / Ketone: Negative  / Bili: Negative / Urobili: Negative   Blood: x / Protein: 300 mg/dL / Nitrite: Negative   Leuk Esterase: Large / RBC: 2 /hpf /  /HPF   Sq Epi: x / Non Sq Epi: 1 /hpf / Bacteria: Negative        RADIOLOGY & ADDITIONAL TESTS: Reviewed. *******************************  Deborah Catherine, PGY1  Pager [phone]  *******************************    INTERVAL HPI/OVERNIGHT EVENTS:    Central line d/bertin yesterday. Started on tube feeds yesterday. Tolerating well. ELIOT ON.       SUBJECTIVE:     Patient seen and examined at bedside. A&O x 2. A little more confused since yesterday likely as just waking up. Asking to go home.       OBJECTIVE:    VITAL SIGNS:  ICU Vital Signs Last 24 Hrs  T(C): 36.9 (2020 23:00), Max: 37 (2020 11:00)  T(F): 98.4 (2020 23:00), Max: 98.6 (2020 11:00)  HR: 107 (2020 05:00) (98 - 110)  BP: 141/77 (2020 05:00) (93/60 - 141/77)  BP(mean): 103 (2020 05:00) (71 - 103)  ABP: --  ABP(mean): --  RR: 17 (2020 05:00) (11 - 32)  SpO2: 100% (2020 05:00) (96% - 100%)         @ 07: @ 07:00  --------------------------------------------------------  IN: 1276.8 mL / OUT: 3050 mL / NET: -1773.2 mL     @ 07:01  -  -02 @ 06:33  --------------------------------------------------------  IN: 530 mL / OUT: 200 mL / NET: 330 mL      CAPILLARY BLOOD GLUCOSE    POCT Blood Glucose.: 230 mg/dL (2020 06:04)    PHYSICAL EXAM:  GENERAL: NAD respiring on RA   HEAD:  Atraumatic, Normocephalic  EYES: EOMI, PERRLA, conjunctiva and sclera clear  NECK: Supple, No JVD  CHEST/LUNG: R basilar crackles   HEART: RRR, holosystolic murmur throughout precordium   ABDOMEN: Soft, Nontender, Nondistended (improved from prior); Bowel sounds present. PEG tube connected to feeds.  EXTREMITIES:  No clubbing, cyanosis, or edema  PSYCH: AAOx2  NEUROLOGY: non-focal  SKIN: abrasions left ant tibia             MEDICATIONS:  MEDICATIONS  (STANDING):  atorvastatin 40 milliGRAM(s) Oral at bedtime  chlorhexidine 0.12% Liquid 15 milliLiter(s) Oral Mucosa two times a day  chlorhexidine 4% Liquid 1 Application(s) Topical <User Schedule>  Dakins Solution - 1/4 Strength 1 Application(s) Topical two times a day  dextrose 5%. 1000 milliLiter(s) (50 mL/Hr) IV Continuous <Continuous>  epoetin tan Injectable 53615 Unit(s) IV Push <User Schedule>  hydrocortisone sodium succinate Injectable 50 milliGRAM(s) IV Push every 8 hours  HYDROmorphone  Injectable 0.25 milliGRAM(s) IV Push once  insulin glargine Injectable (LANTUS) 8 Unit(s) SubCutaneous at bedtime  insulin lispro (HumaLOG) corrective regimen sliding scale   SubCutaneous three times a day before meals  levETIRAcetam  IVPB 500 milliGRAM(s) IV Intermittent every 12 hours  meropenem  IVPB 500 milliGRAM(s) IV Intermittent every 24 hours  pantoprazole  Injectable 40 milliGRAM(s) IV Push daily    MEDICATIONS  (PRN):      ALLERGIES:  Allergies    No Known Allergies    Intolerances        LABS:                        8.8    13.47 )-----------( 274      ( 2020 01:19 )             29.6     01-02    139  |  93<L>  |  62<H>  ----------------------------<  194<H>  4.1   |  25  |  3.87<H>    Ca    11.2<H>      2020 01:19  Phos  5.2     01-02  Mg     2.3     01-02    TPro  7.9  /  Alb  3.1<L>  /  TBili  0.4  /  DBili  x   /  AST  14  /  ALT  8<L>  /  AlkPhos  106        Urinalysis Basic - ( 2020 09:35 )    Color: Yellow / Appearance: Slightly Turbid / S.027 / pH: x  Gluc: x / Ketone: Negative  / Bili: Negative / Urobili: Negative   Blood: x / Protein: 300 mg/dL / Nitrite: Negative   Leuk Esterase: Large / RBC: 2 /hpf /  /HPF   Sq Epi: x / Non Sq Epi: 1 /hpf / Bacteria: Negative        RADIOLOGY & ADDITIONAL TESTS: Reviewed. *******************************  Deborah Catherine, PGY1  Pager [phone]  *******************************    INTERVAL HPI/OVERNIGHT EVENTS:    Central line d/bertin yesterday. Started on tube feeds yesterday. Tolerating well. ELIOT ON.       SUBJECTIVE:     Patient seen and examined at bedside. A&O x 2. A little more confused since yesterday likely as just waking up. Asking to go home.     As per nursing, noted to have purulent foul smelling dc from sacral wound.       OBJECTIVE:    VITAL SIGNS:  ICU Vital Signs Last 24 Hrs  T(C): 36.9 (2020 23:00), Max: 37 (2020 11:00)  T(F): 98.4 (2020 23:00), Max: 98.6 (2020 11:00)  HR: 107 (2020 05:00) (98 - 110)  BP: 141/77 (2020 05:00) (93/60 - 141/77)  BP(mean): 103 (2020 05:00) (71 - 103)  ABP: --  ABP(mean): --  RR: 17 (2020 05:00) (11 - 32)  SpO2: 100% (2020 05:00) (96% - 100%)         @ 07: @ 07:00  --------------------------------------------------------  IN: 1276.8 mL / OUT: 3050 mL / NET: -1773.2 mL     @ 07:01  02 @ 06:33  --------------------------------------------------------  IN: 530 mL / OUT: 200 mL / NET: 330 mL      CAPILLARY BLOOD GLUCOSE    POCT Blood Glucose.: 230 mg/dL (2020 06:04)    PHYSICAL EXAM:  GENERAL: NAD respiring on RA   HEAD:  Atraumatic, Normocephalic  EYES: EOMI, PERRLA, conjunctiva and sclera clear  NECK: Supple, No JVD  CHEST/LUNG: R basilar crackles   HEART: RRR, holosystolic murmur throughout precordium   ABDOMEN: Soft, Nontender, Nondistended (improved from prior); Bowel sounds present. PEG tube connected to feeds.  EXTREMITIES:  No clubbing, cyanosis, or edema  PSYCH: AAOx2  NEUROLOGY: non-focal  SKIN: abrasions left ant tibia, sacral wound dressed             MEDICATIONS:  MEDICATIONS  (STANDING):  atorvastatin 40 milliGRAM(s) Oral at bedtime  chlorhexidine 0.12% Liquid 15 milliLiter(s) Oral Mucosa two times a day  chlorhexidine 4% Liquid 1 Application(s) Topical <User Schedule>  Dakins Solution - 1/4 Strength 1 Application(s) Topical two times a day  dextrose 5%. 1000 milliLiter(s) (50 mL/Hr) IV Continuous <Continuous>  epoetin tan Injectable 07427 Unit(s) IV Push <User Schedule>  hydrocortisone sodium succinate Injectable 50 milliGRAM(s) IV Push every 8 hours  HYDROmorphone  Injectable 0.25 milliGRAM(s) IV Push once  insulin glargine Injectable (LANTUS) 8 Unit(s) SubCutaneous at bedtime  insulin lispro (HumaLOG) corrective regimen sliding scale   SubCutaneous three times a day before meals  levETIRAcetam  IVPB 500 milliGRAM(s) IV Intermittent every 12 hours  meropenem  IVPB 500 milliGRAM(s) IV Intermittent every 24 hours  pantoprazole  Injectable 40 milliGRAM(s) IV Push daily    MEDICATIONS  (PRN):      ALLERGIES:  Allergies    No Known Allergies    Intolerances        LABS:                        8.8    13.47 )-----------( 274      ( 2020 01:19 )             29.6     01-02    139  |  93<L>  |  62<H>  ----------------------------<  194<H>  4.1   |  25  |  3.87<H>    Ca    11.2<H>      2020 01:19  Phos  5.2     -  Mg     2.3     -    TPro  7.9  /  Alb  3.1<L>  /  TBili  0.4  /  DBili  x   /  AST  14  /  ALT  8<L>  /  AlkPhos  106  -      Urinalysis Basic - ( 2020 09:35 )    Color: Yellow / Appearance: Slightly Turbid / S.027 / pH: x  Gluc: x / Ketone: Negative  / Bili: Negative / Urobili: Negative   Blood: x / Protein: 300 mg/dL / Nitrite: Negative   Leuk Esterase: Large / RBC: 2 /hpf /  /HPF   Sq Epi: x / Non Sq Epi: 1 /hpf / Bacteria: Negative        RADIOLOGY & ADDITIONAL TESTS: Reviewed.

## 2020-01-02 NOTE — PROGRESS NOTE ADULT - ASSESSMENT
74M PMHx of ESRD s/p failed renal transplant x2, HCV, DM, and meningococcal meningitis 2 years ago was sent in to the ED from HD for AMS and hypotension with continued AMS with attempted PEG insertion with both IR and GI with inability to obtain good window for PEG insertion. Patient is s/p open gastrostomy tube placement on 12/21/2019 by ACS. Re-consulted for worsening abdominal distension and bilious fluid suctioned from oral cavity.    - Continue tube feeds as tolerated.   - Will sign off, please do not hesitate to contact for any Surgical questions or concerns.     Please contact Trauma and Acute Care Surgery at #[phone] with any questions or concerns.

## 2020-01-02 NOTE — PROGRESS NOTE ADULT - SUBJECTIVE AND OBJECTIVE BOX
Surgery Progress Note     Subjective/24hour Events:   Patient seen and examined.   No acute events overnight.   Restarted on tube feeds.   No nausea or vomiting.   No abdominal pain.     Vital Signs:  Vital Signs Last 24 Hrs  T(C): 36.9 (01 Jan 2020 23:00), Max: 37 (01 Jan 2020 11:00)  T(F): 98.4 (01 Jan 2020 23:00), Max: 98.6 (01 Jan 2020 11:00)  HR: 98 (02 Jan 2020 07:00) (98 - 109)  BP: 120/66 (02 Jan 2020 07:00) (93/60 - 141/77)  BP(mean): 87 (02 Jan 2020 07:00) (71 - 103)  RR: 21 (02 Jan 2020 07:00) (11 - 32)  SpO2: 98% (02 Jan 2020 07:00) (96% - 100%)    CAPILLARY BLOOD GLUCOSE      POCT Blood Glucose.: 230 mg/dL (02 Jan 2020 06:04)  POCT Blood Glucose.: 152 mg/dL (01 Jan 2020 21:52)  POCT Blood Glucose.: 152 mg/dL (01 Jan 2020 17:11)  POCT Blood Glucose.: 173 mg/dL (01 Jan 2020 11:19)      I&O's Detail    01 Jan 2020 07:01  -  02 Jan 2020 07:00  --------------------------------------------------------  IN:    Nepro with Carb Steady: 320 mL    Solution: 250 mL  Total IN: 570 mL    OUT:    Intermittent Catheterization - Urethral: 200 mL  Total OUT: 200 mL    Total NET: 370 mL          MEDICATIONS  (STANDING):  atorvastatin 40 milliGRAM(s) Oral at bedtime  chlorhexidine 0.12% Liquid 15 milliLiter(s) Oral Mucosa two times a day  chlorhexidine 4% Liquid 1 Application(s) Topical <User Schedule>  Dakins Solution - 1/4 Strength 1 Application(s) Topical two times a day  dextrose 5%. 1000 milliLiter(s) (50 mL/Hr) IV Continuous <Continuous>  epoetin tan Injectable 74989 Unit(s) IV Push <User Schedule>  hydrocortisone sodium succinate Injectable 50 milliGRAM(s) IV Push every 8 hours  insulin glargine Injectable (LANTUS) 8 Unit(s) SubCutaneous at bedtime  insulin lispro (HumaLOG) corrective regimen sliding scale   SubCutaneous three times a day before meals  levETIRAcetam  IVPB 500 milliGRAM(s) IV Intermittent every 12 hours  meropenem  IVPB 500 milliGRAM(s) IV Intermittent every 24 hours  pantoprazole  Injectable 40 milliGRAM(s) IV Push daily    MEDICATIONS  (PRN):      Physical Exam:  Gen: NAD.  Lungs: Non labored breathing.   Ab: Soft, nontender, nondistended.   Ext: Moves all 4 spontaneously.     Labs:    01-02    139  |  93<L>  |  62<H>  ----------------------------<  194<H>  4.1   |  25  |  3.87<H>    Ca    11.2<H>      02 Jan 2020 01:19  Phos  5.2     01-02  Mg     2.3     01-02    TPro  7.9  /  Alb  3.1<L>  /  TBili  0.4  /  DBili  x   /  AST  14  /  ALT  8<L>  /  AlkPhos  106  01-02    LIVER FUNCTIONS - ( 02 Jan 2020 01:19 )  Alb: 3.1 g/dL / Pro: 7.9 g/dL / ALK PHOS: 106 U/L / ALT: 8 U/L / AST: 14 U/L / GGT: x                                 8.8    13.47 )-----------( 274      ( 02 Jan 2020 01:19 )             29.6

## 2020-01-02 NOTE — PROGRESS NOTE ADULT - ASSESSMENT
Sacral Wound  Unstageable, bone exposure  Continue with 1/4 strength Dakin's twice a day cover with a combine and a Tegaderm

## 2020-01-02 NOTE — PROGRESS NOTE ADULT - PROBLEM SELECTOR PLAN 1
Excess fluids and waste products will be removed from your blood; your electrolytes will be balanced; your blood pressure will be controlled.  plan for hd as order , awaiting for  tem catheter placement for hd   hd in am   plan for perm cath in 48 hr if is ok

## 2020-01-02 NOTE — PROGRESS NOTE ADULT - ASSESSMENT
74 m with DM, HTN, IVDA, hep C, ESRD s/p failed renal transplant x2 on HD and meningococcal meningitis 2 years ago, diverticulitis s/p colon resection, decubiti, initially sent in to the ED from HD for AMS and hypotension, had persistent AMS so open gastrostomy was placed 12/21 (failed endoscopic attempt for PEG), also HD cath exchange 12/27  Prevotella denticola bacteremia 12/24, repeat negative 12/27  RRT 12/31 for hypotension, WBC 29, abd CT with persistent SBO and pneumoperitoneum     septic shock, SBO, pneumoperitoneum  Leucocytosis  Sacral decub with some purulence    A) SBO,shock  Resolving  Continue antimicrobials as above  Continue ICU care per MICU team    B)  Sacral decub    Some discharge/necrosis  would recommend wound care follow up ? debridement  Would recommend imaging to r/o OM   Continue antimicrobials as below    C) bacteremia  ?source wound vs abdominal foci  repeat Cx negative  continue meropenem and vanco for now  Duration will depend on results and wound status    D) leucocytosis  Improving  Continue monitoring  Abx plan as above    Will tailor plan for ID issues  per course,results.Will defer to primary team on management of other issues.  case was discussed with MICU team    Will Follow.  Beeper [phone]-kpha/afterhours/No response-[id number]

## 2020-01-02 NOTE — PROGRESS NOTE ADULT - PROBLEM SELECTOR PLAN 1
Will continue current insulin regimen for now.  Will continue monitoring FS, log, and FU. Will continue monitoring FS, log, and FU.

## 2020-01-02 NOTE — PROGRESS NOTE ADULT - ASSESSMENT
Assessment  DM: A1C 5.5%, was on insulin at home, now on insulin, restarted on basal insulin yesterday, FS trending within overall acceptable range, no hypoglycemic episodes. Patient is being monitored in the MICU, appears comfortable and alert though minimally responsive, NPO, restarted on tube feeds.  Sacral Wound: s/p debridement, monitored, FU wound care.  Hypercalcemia: Primary Hyperparathyroidism, hypercalcemia S/P Pamidronate injection, Ca levels improving.   Sepsis: IV ABx for UTI, LP inconsistent with meningitis, on medications, stable, monitored.  HTN: Controlled,  on antihypertensive medications.  ESRD: On hemodialysis, Monitor labs/BMP          Robi Gay MD  Cell: [phone]  Office: [phone] Assessment  DM: A1C 5.5%, was on insulin at home, now on insulin, restarted on basal insulin yesterday, FS trending within overall acceptable range, no hypoglycemic episodes. Patient is being monitored in the MICU,  appears comfortable and alert though minimally responsive, NPO, restarted on tube feeds.  Sacral Wound: s/p debridement, monitored, FU wound care.  Hypercalcemia: Primary Hyperparathyroidism, hypercalcemia S/P Pamidronate injection, Ca levels improving.   Sepsis: IV ABx for UTI, LP inconsistent with meningitis, on medications, stable, monitored.  HTN: Controlled,  on antihypertensive medications.  ESRD: On hemodialysis, Monitor labs/BMP          Robi Gay MD  Cell: [phone]  Office: [phone]

## 2020-01-02 NOTE — PROGRESS NOTE ADULT - ASSESSMENT
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 course c/b hypercalcemia. RRT called 11/26 for AMS and hotn SBP 70s, found to be in septic shock 2/2 UTI s/p intubation and MICU stay (11/26/19-11/30/19). Extubated on 11/29/19, transferred to floors. Patient s/p open feeding gastrostomy tube placement on 12/21/19 with surgery. MICU consulted on 12/30 for AMS. Pt AOx0 and hypotensive to sbp 87. CT abd/pelvis showing new opacities in RLL suspicious for pneumonia. Patient admitted to MICU for pressor support, and treatment of septic shock likely in the setting of PNA vs. UTI vs. from sacral ulcer wounds.     # NEURO  - p/w AMS, metabolic encephalopathy  - A&O x 2 today   - neuro following  - chronic lacunar infarcts; per neuro, AMS also likely related to hypercalcemia, renal dysfunction, poor nutritional intake  - repeat CT head 12/31 neg for acute intracranial process   - c/w keppra 500 BID for hx of seizure disorder  - neuro checks per ICU protocol     # CARDIAC  Septic shock, now resolved   - hypotensive during RRT 12/30; started on levophed, titrated off at 4AM   - on stress dose steroids, 50 mg q8; will transition to 25mg q8 hrs in AM  - holding carvedilol 6.25 BID  - hx of cocaine cardiomyopathy, resolved, normal EF on most recent echo  - c/w statin    Afib on Eliquis   -now off a/c per cards due to bleeding risk    # PULM  -satting well on NA  -CT abdomen revealing for RLL consolidation    # RENAL  - hx of ESRD s/p failed renal transplant x2  - renal following, HD as recommended   - c/w hydrocortisone, epogen   - AAMIR valentin d/bertin 1/1  - patient to undergo IR guided nontunneled cath placement tomorrow    # GI   - PEG tube in draining to gravity, 50 cc ouput in last 24 hrs    - CT abd/pelvis non-con 12/31 revealing for SBO   - NPO  - surgery recs appreciated, will start feeds   - Hep C ab positive, RNA neg      # HEME/ONC  - pt was on eliquis; now held due rto bleeding risk   -HDS  -keep active type and screen     # ID  Septic shock: hypotension likely 2/2 septic shock, likely 2/2 aspiration PNA given RLL consolidation seen on CT abdomen vs other source: UTI, decubitus ulcers  - now resolved   - bcx 12/24 growing prevotella denticola, repeat BCx ngtd   - dental consulted, oral source of infection less likely   - s/p LP, CSF negative  - ESR, CRP elevated though low suspicion for OM given neg CT findings   - Repeat UA+ for LE, neg for bacteria   - vanc low at 11, dosed 500 cc this am   - ID following, recs appreciated   - c/w vanc (dialysis dosing), meropenem   - f/u urine cxs, blood cxs    # ENDO  HbA1c 5.5  - holding lantus as NPO  - will restart tube feeds   - spoke to Dr. 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 course c/b hypercalcemia. RRT called 11/26 for AMS and hotn SBP 70s, found to be in septic shock 2/2 UTI s/p intubation and MICU stay (11/26/19-11/30/19). Extubated on 11/29/19, transferred to floors. Patient s/p open feeding gastrostomy tube placement on 12/21/19 with surgery. MICU consulted on 12/30 for AMS. Pt AOx0 and hypotensive to sbp 87. CT abd/pelvis showing new opacities in RLL suspicious for pneumonia. Patient admitted to MICU for pressor support, and treatment of septic shock likely in the setting of PNA vs. UTI vs. from sacral ulcer wounds.     # NEURO  - p/w AMS, metabolic encephalopathy  - A&O x 2 at bl   - neuro following  - chronic lacunar infarcts; per neuro, AMS also likely related to hypercalcemia, renal dysfunction, poor nutritional intake  - repeat CT head 12/31 neg for acute intracranial process   - c/w keppra 500 BID for hx of seizure disorder  - neuro checks per ICU protocol     # CARDIAC  Septic shock, now resolved   - off levophed since AM 1/1   - hypotensive during RRT 12/30  - on stress dose hydrocortisone, transition to 25mg q8 hrs today   - holding carvedilol 6.25 BID  - hx of cocaine cardiomyopathy, resolved, normal EF on most recent echo  - c/w statin    Afib on Eliquis   -now off a/c per cards due to bleeding risk    # PULM  -satting well on NA  -CT abdomen revealing for RLL consolidation    # RENAL  - hx of ESRD s/p failed renal transplant x2  - renal following, HD as recommended   - c/w hydrocortisone, epogen   - AAMIR valentin d/bertin 1/1  - patient to undergo IR guided nontunneled cath placement today     # GI   - CT abd/pelvis non-con 12/31 revealing for SBO   - PEG tube drainedto gravity with improved sx   - on tube feeds, tolerating well   - Hep C ab positive, RNA neg      # HEME/ONC  - pt was on eliquis; now held due rto bleeding risk   -HDS  -keep active type and screen     # ID  Septic shock: hypotension likely 2/2 septic shock, likely 2/2 aspiration PNA given RLL consolidation seen on CT abdomen vs other source: UTI, decubitus ulcers  - now resolved   - bcx 12/24 growing prevotella denticola, repeat BCx ngtd   - dental consulted, oral source of infection less likely   - s/p LP, CSF negative  - ESR, CRP elevated though low suspicion for OM given neg CT findings   - Repeat UA+ for LE, neg for bacteria   - ID following, recs appreciated   - c/w vanc (dialysis dosing), meropenem   - f/u urine cxs, blood cxs    # ENDO  HbA1c 5.5  - holding lantus as NPO  - will restart tube feeds   - spoke to Dr. 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 course c/b hypercalcemia. RRT called 11/26 for AMS and hotn SBP 70s, found to be in septic shock 2/2 UTI s/p intubation and MICU stay (11/26/19-11/30/19). Extubated on 11/29/19, transferred to floors. Patient s/p open feeding gastrostomy tube placement on 12/21/19 with surgery. MICU consulted on 12/30 for AMS. Pt AOx0 and hypotensive to sbp 87. CT abd/pelvis showing new opacities in RLL suspicious for pneumonia. Patient admitted to MICU for pressor support, and treatment of septic shock likely in the setting of PNA vs. UTI vs. from sacral ulcer wounds.     # NEURO  - p/w AMS, metabolic encephalopathy  - A&O x 2 at bl   - neuro following  - chronic lacunar infarcts; per neuro, AMS also likely related to hypercalcemia, renal dysfunction, poor nutritional intake  - repeat CT head 12/31 neg for acute intracranial process   - c/w keppra 500 BID for hx of seizure disorder  - neuro checks per ICU protocol     # CARDIAC  Septic shock, now resolved   - off levophed since AM 1/1   - hypotensive during RRT 12/30  - on stress dose hydrocortisone, transition to 25mg q8 hrs today   - holding carvedilol 6.25 BID  - hx of cocaine cardiomyopathy, resolved, normal EF on most recent echo  - c/w statin    Afib on Eliquis   -now off a/c per cards due to bleeding risk    # PULM  -satting well on NA  -CT abdomen revealing for RLL consolidation    # RENAL  - hx of ESRD s/p failed renal transplant x2  - renal following, HD as recommended   - c/w hydrocortisone, epogen   - AAMIR valentin d/bertin 1/1  - patient to undergo IR guided nontunneled cath placement today     # GI   - CT abd/pelvis non-con 12/31 revealing for SBO   - PEG tube drained to gravity with improved sx   - on tube feeds, tolerating well   - Hep C ab positive, RNA neg      # HEME/ONC  - pt was on eliquis; now held due rto bleeding risk   -HDS  -keep active type and screen     # ID  Septic shock: hypotension likely 2/2 septic shock, likely 2/2 aspiration PNA given RLL consolidation seen on CT abdomen vs other source: UTI, decubitus ulcers  - now resolved   - bcx 12/24 growing prevotella denticola, repeat BCx ngtd   - dental consulted, oral source of infection less likely   - s/p LP, CSF negative  - ESR, CRP elevated though low suspicion for OM given neg CT findings   - Repeat UA+ for LE, neg for bacteria   - ID following, recs appreciated   - c/w vanc (dialysis dosing), meropenem   - f/u urine cxs, blood cxs     # ENDO  HbA1c 5.5  - c/w lantus 8U and low scale ISS as per conversation with endocrinology   - endo to f/u to adjust as necessary   - hypercalcemic 2/2 primary HPT, s/p pamidronate tx     # DVT  -SCDs     #GOC:  - Pt remains FULL CODE   - Daughter is decision maker if patient is altered Patient is a 75yo male with a PMHx of ESRD s/p failed renal transplant x2, HCV, HTN, T2DM, 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 course c/b hypercalcemia. RRT called 11/26 for AMS and hotn SBP 70s, found to be in septic shock 2/2 UTI s/p intubation and MICU stay (11/26/19-11/30/19). Extubated on 11/29/19, transferred to floors. Patient s/p open feeding gastrostomy tube placement on 12/21/19 with surgery. MICU consulted on 12/30 for AMS. Pt AOx0 and hypotensive to sbp 87. CT abd/pelvis showing new opacities in RLL suspicious for pneumonia. Patient admitted to MICU for pressor support, and treatment of septic shock likely in the setting of PNA vs. UTI vs. from sacral ulcer wounds.     # NEURO  AMS, metabolic encephalopathy on presentation   - at bl A&Ox2  - neuro following  - chronic lacunar infarcts; per neuro, AMS also likely related to hypercalcemia, renal dysfunction, poor nutritional intake  - repeat CT head 12/31 neg for acute intracranial process   - c/w keppra 500 BID for hx of seizure disorder  - neuro checks per ICU protocol     # CARDIAC  Septic shock, now resolved   - off levophed since AM 1/1   - hypotensive during RRT 12/30  - on stress dose hydrocortisone, transition to 25mg q8 hrs today from 50   - holding carvedilol 6.25 BID  - hx of cocaine cardiomyopathy, resolved, normal EF on most recent echo  - c/w statin    Afib on Eliquis   -now off a/c per cards due to bleeding risk    # PULM  -satting well on NA  -CT abdomen revealing for RLL consolidation     # RENAL  - hx of ESRD s/p failed renal transplant x2  - renal following, HD as recommended   - c/w hydrocortisone, epogen   - AAMIR valentin d/bertin 1/1  - patient to undergo IR guided nontunneled cath placement today     # GI   - CT abd/pelvis non-con 12/31 revealing for SBO   - PEG tube drained to gravity with improved sx   - on tube feeds, tolerating well   - Hep C ab positive, RNA neg      # HEME/ONC  - pt was on eliquis; now held due rto bleeding risk   -HDS  -keep active type and screen     # ID  Septic shock: hypotension likely 2/2 septic shock, likely 2/2 aspiration PNA given RLL consolidation seen on CT abdomen vs other source: UTI, decubitus ulcers  - shock now resolved   - bcx 12/24 growing prevotella denticola, repeat BCx ngtd   - dental consulted, oral source of infection less likely   - s/p LP, CSF negative  - ESR, CRP elevated though low suspicion for OM given neg CT findings   - Repeat UA+ for LE, neg for bacteria   - ID following, recs appreciated   - c/w vanc (dialysis dosing), meropenem for now; pending further ID recs   - f/u urine cxs, blood cxs     # ENDO  HbA1c 5.5  - c/w lantus 8U and low scale ISS as per conversation with endocrinology   - endo to f/u to adjust as necessary   - hypercalcemic 2/2 primary HPT, s/p pamidronate tx     # DVT  -SCDs     #GOC:  - Pt remains FULL CODE   - Daughter is decision maker if patient is altered

## 2020-01-02 NOTE — PROGRESS NOTE ADULT - SUBJECTIVE AND OBJECTIVE BOX
74M PMHx of ESRD s/p failed renal transplant x2, HCV, DM with a nonfunctioning HD catheter s/p removal referred for HD catheter placement.     Allergies: No Known Allergies      PAST MEDICAL & SURGICAL HISTORY:  Kidney transplant recipient  Anuria  GERD (gastroesophageal reflux disease)  Kidney transplant failure: dx: 2/2013  Hepatitis C: dx: 90&#x27;s.  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, no known MIs  H/O kidney transplant: may 2015  A-V fistula: Left, implanted (?)  S/p cadaver renal transplant: 2008  S/P partial colectomy: due to diverticulitis        Pertinent labs:                      8.8    13.47 )-----------( 274      ( 02 Jan 2020 01:19 )             29.6   01-02    139  |  93<L>  |  62<H>  ----------------------------<  194<H>  4.1   |  25  |  3.87<H>    Ca    11.2<H>      02 Jan 2020 01:19  Phos  5.2     01-02  Mg     2.3     01-02    TPro  7.9  /  Alb  3.1<L>  /  TBili  0.4  /  DBili  x   /  AST  14  /  ALT  8<L>  /  AlkPhos  106  01-02      Consent: Procedure/risks/ Benefits explained. Informed consent obtained from sister via telephone. Pt's sister verbalizes understanding.

## 2020-01-03 LAB
ALBUMIN SERPL ELPH-MCNC: 2.7 G/DL — LOW (ref 3.3–5)
ALP SERPL-CCNC: 100 U/L — SIGNIFICANT CHANGE UP (ref 40–120)
ALT FLD-CCNC: 6 U/L — LOW (ref 10–45)
ANION GAP SERPL CALC-SCNC: 22 MMOL/L — HIGH (ref 5–17)
AST SERPL-CCNC: 9 U/L — LOW (ref 10–40)
BASOPHILS # BLD AUTO: 0.01 K/UL — SIGNIFICANT CHANGE UP (ref 0–0.2)
BASOPHILS NFR BLD AUTO: 0.1 % — SIGNIFICANT CHANGE UP (ref 0–2)
BILIRUB SERPL-MCNC: 0.3 MG/DL — SIGNIFICANT CHANGE UP (ref 0.2–1.2)
BUN SERPL-MCNC: 88 MG/DL — HIGH (ref 7–23)
CALCIUM SERPL-MCNC: 11.5 MG/DL — HIGH (ref 8.4–10.5)
CHLORIDE SERPL-SCNC: 94 MMOL/L — LOW (ref 96–108)
CO2 SERPL-SCNC: 22 MMOL/L — SIGNIFICANT CHANGE UP (ref 22–31)
CREAT SERPL-MCNC: 4.51 MG/DL — HIGH (ref 0.5–1.3)
EOSINOPHIL # BLD AUTO: 0 K/UL — SIGNIFICANT CHANGE UP (ref 0–0.5)
EOSINOPHIL NFR BLD AUTO: 0 % — SIGNIFICANT CHANGE UP (ref 0–6)
GLUCOSE BLDC GLUCOMTR-MCNC: 131 MG/DL — HIGH (ref 70–99)
GLUCOSE BLDC GLUCOMTR-MCNC: 177 MG/DL — HIGH (ref 70–99)
GLUCOSE BLDC GLUCOMTR-MCNC: 190 MG/DL — HIGH (ref 70–99)
GLUCOSE BLDC GLUCOMTR-MCNC: 219 MG/DL — HIGH (ref 70–99)
GLUCOSE SERPL-MCNC: 187 MG/DL — HIGH (ref 70–99)
HCT VFR BLD CALC: 27.9 % — LOW (ref 39–50)
HGB BLD-MCNC: 8.1 G/DL — LOW (ref 13–17)
IMM GRANULOCYTES NFR BLD AUTO: 0.7 % — SIGNIFICANT CHANGE UP (ref 0–1.5)
LYMPHOCYTES # BLD AUTO: 1.13 K/UL — SIGNIFICANT CHANGE UP (ref 1–3.3)
LYMPHOCYTES # BLD AUTO: 6.2 % — LOW (ref 13–44)
MAGNESIUM SERPL-MCNC: 2.4 MG/DL — SIGNIFICANT CHANGE UP (ref 1.6–2.6)
MCHC RBC-ENTMCNC: 25 PG — LOW (ref 27–34)
MCHC RBC-ENTMCNC: 29 GM/DL — LOW (ref 32–36)
MCV RBC AUTO: 86.1 FL — SIGNIFICANT CHANGE UP (ref 80–100)
MONOCYTES # BLD AUTO: 0.95 K/UL — HIGH (ref 0–0.9)
MONOCYTES NFR BLD AUTO: 5.2 % — SIGNIFICANT CHANGE UP (ref 2–14)
NEUTROPHILS # BLD AUTO: 15.89 K/UL — HIGH (ref 1.8–7.4)
NEUTROPHILS NFR BLD AUTO: 87.8 % — HIGH (ref 43–77)
NRBC # BLD: 0 /100 WBCS — SIGNIFICANT CHANGE UP (ref 0–0)
PHOSPHATE SERPL-MCNC: 5.7 MG/DL — HIGH (ref 2.5–4.5)
PLATELET # BLD AUTO: 310 K/UL — SIGNIFICANT CHANGE UP (ref 150–400)
POTASSIUM SERPL-MCNC: 3.4 MMOL/L — LOW (ref 3.5–5.3)
POTASSIUM SERPL-SCNC: 3.4 MMOL/L — LOW (ref 3.5–5.3)
PROT SERPL-MCNC: 6.6 G/DL — SIGNIFICANT CHANGE UP (ref 6–8.3)
RBC # BLD: 3.24 M/UL — LOW (ref 4.2–5.8)
RBC # FLD: 17.7 % — HIGH (ref 10.3–14.5)
SODIUM SERPL-SCNC: 138 MMOL/L — SIGNIFICANT CHANGE UP (ref 135–145)
VANCOMYCIN FLD-MCNC: 14.8 UG/ML — SIGNIFICANT CHANGE UP
VANCOMYCIN FLD-MCNC: 15 UG/ML — SIGNIFICANT CHANGE UP
WBC # BLD: 18.1 K/UL — HIGH (ref 3.8–10.5)
WBC # FLD AUTO: 18.1 K/UL — HIGH (ref 3.8–10.5)

## 2020-01-03 PROCEDURE — 71045 X-RAY EXAM CHEST 1 VIEW: CPT | Mod: 26

## 2020-01-03 PROCEDURE — 99233 SBSQ HOSP IP/OBS HIGH 50: CPT

## 2020-01-03 PROCEDURE — 99233 SBSQ HOSP IP/OBS HIGH 50: CPT | Mod: GC

## 2020-01-03 RX ORDER — ACETAMINOPHEN 500 MG
650 TABLET ORAL EVERY 6 HOURS
Refills: 0 | Status: DISCONTINUED | OUTPATIENT
Start: 2020-01-03 | End: 2020-01-05

## 2020-01-03 RX ORDER — SODIUM CHLORIDE 9 MG/ML
500 INJECTION, SOLUTION INTRAVENOUS ONCE
Refills: 0 | Status: COMPLETED | OUTPATIENT
Start: 2020-01-03 | End: 2020-01-03

## 2020-01-03 RX ORDER — MIDODRINE HYDROCHLORIDE 2.5 MG/1
10 TABLET ORAL EVERY 8 HOURS
Refills: 0 | Status: DISCONTINUED | OUTPATIENT
Start: 2020-01-03 | End: 2020-01-04

## 2020-01-03 RX ORDER — SODIUM CHLORIDE 9 MG/ML
500 INJECTION, SOLUTION INTRAVENOUS ONCE
Refills: 0 | Status: DISCONTINUED | OUTPATIENT
Start: 2020-01-03 | End: 2020-01-03

## 2020-01-03 RX ORDER — MIDODRINE HYDROCHLORIDE 2.5 MG/1
10 TABLET ORAL ONCE
Refills: 0 | Status: COMPLETED | OUTPATIENT
Start: 2020-01-03 | End: 2020-01-03

## 2020-01-03 RX ORDER — HYDROCORTISONE 20 MG
12 TABLET ORAL EVERY 8 HOURS
Refills: 0 | Status: DISCONTINUED | OUTPATIENT
Start: 2020-01-03 | End: 2020-01-05

## 2020-01-03 RX ADMIN — CHLORHEXIDINE GLUCONATE 15 MILLILITER(S): 213 SOLUTION TOPICAL at 05:17

## 2020-01-03 RX ADMIN — CHLORHEXIDINE GLUCONATE 1 APPLICATION(S): 213 SOLUTION TOPICAL at 05:17

## 2020-01-03 RX ADMIN — MIDODRINE HYDROCHLORIDE 10 MILLIGRAM(S): 2.5 TABLET ORAL at 21:12

## 2020-01-03 RX ADMIN — LEVETIRACETAM 400 MILLIGRAM(S): 250 TABLET, FILM COATED ORAL at 18:08

## 2020-01-03 RX ADMIN — Medication 12 MILLIGRAM(S): at 21:12

## 2020-01-03 RX ADMIN — CHLORHEXIDINE GLUCONATE 15 MILLILITER(S): 213 SOLUTION TOPICAL at 18:09

## 2020-01-03 RX ADMIN — ATORVASTATIN CALCIUM 40 MILLIGRAM(S): 80 TABLET, FILM COATED ORAL at 21:12

## 2020-01-03 RX ADMIN — Medication 2: at 18:09

## 2020-01-03 RX ADMIN — INSULIN GLARGINE 8 UNIT(S): 100 INJECTION, SOLUTION SUBCUTANEOUS at 23:11

## 2020-01-03 RX ADMIN — CARVEDILOL PHOSPHATE 6.25 MILLIGRAM(S): 80 CAPSULE, EXTENDED RELEASE ORAL at 08:50

## 2020-01-03 RX ADMIN — Medication 12 MILLIGRAM(S): at 14:51

## 2020-01-03 RX ADMIN — Medication 1 APPLICATION(S): at 05:17

## 2020-01-03 RX ADMIN — Medication 100 MILLIGRAM(S): at 09:30

## 2020-01-03 RX ADMIN — Medication 650 MILLIGRAM(S): at 21:12

## 2020-01-03 RX ADMIN — LEVETIRACETAM 400 MILLIGRAM(S): 250 TABLET, FILM COATED ORAL at 05:17

## 2020-01-03 RX ADMIN — ERYTHROPOIETIN 10000 UNIT(S): 10000 INJECTION, SOLUTION INTRAVENOUS; SUBCUTANEOUS at 12:58

## 2020-01-03 RX ADMIN — PANTOPRAZOLE SODIUM 40 MILLIGRAM(S): 20 TABLET, DELAYED RELEASE ORAL at 14:48

## 2020-01-03 RX ADMIN — Medication 1: at 10:10

## 2020-01-03 RX ADMIN — MEROPENEM 100 MILLIGRAM(S): 1 INJECTION INTRAVENOUS at 02:15

## 2020-01-03 RX ADMIN — Medication 1 APPLICATION(S): at 18:09

## 2020-01-03 RX ADMIN — MIDODRINE HYDROCHLORIDE 10 MILLIGRAM(S): 2.5 TABLET ORAL at 14:48

## 2020-01-03 RX ADMIN — Medication 25 MILLIGRAM(S): at 05:17

## 2020-01-03 RX ADMIN — Medication 650 MILLIGRAM(S): at 21:42

## 2020-01-03 RX ADMIN — SODIUM CHLORIDE 1000 MILLILITER(S): 9 INJECTION, SOLUTION INTRAVENOUS at 18:10

## 2020-01-03 RX ADMIN — MIDODRINE HYDROCHLORIDE 10 MILLIGRAM(S): 2.5 TABLET ORAL at 17:00

## 2020-01-03 NOTE — PROGRESS NOTE ADULT - ASSESSMENT
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 course c/b hypercalcemia. RRT called 11/26 for AMS and hotn SBP 70s, found to be in septic shock 2/2 UTI s/p intubation and MICU stay (11/26/19-11/30/19). Extubated on 11/29/19, transferred to floors. Patient s/p open feeding gastrostomy tube placement on 12/21/19 with surgery. MICU consulted on 12/30 for AMS. Pt AOx0 and hypotensive to sbp 87. CT abd/pelvis showing new opacities in RLL suspicious for pneumonia. Patient admitted to MICU for pressor support, and treatment of septic shock likely in the setting of PNA vs. UTI vs. from sacral ulcer wounds.     # NEURO  AMS, metabolic encephalopathy on presentation   - at bl A&Ox2  - neuro following  - chronic lacunar infarcts; per neuro, AMS also likely related to hypercalcemia, renal dysfunction, poor nutritional intake  - repeat CT head 12/31 neg for acute intracranial process   - c/w keppra 500 BID for hx of seizure disorder  - neuro checks per ICU protocol     # CARDIAC  Septic shock, now resolved   - off levophed since AM 1/1   - hypotensive during RRT 12/30  - on stress dose hydrocortisone, will transition to 12 mg q8hrs from 25mg q8 hrs  - home carvedilol 6.25 mg held in setting of septic shock  - started on carvedilol 3.125 mg BID ON for 8 beats of vtach, will continue as 6.25mg BID   - hx of cocaine cardiomyopathy, resolved, normal EF on most recent echo  - c/w statin    Afib on Eliquis   -now off a/c per cards due to bleeding risk    # PULM  -satting well on NA  -CT abdomen revealing for RLL consolidation     # RENAL  - hx of ESRD s/p failed renal transplant x2  - renal following, HD as recommended   - c/w hydrocortisone, epogen   - AAMIR valentin d/bertin 1/1 due to clotting, replaced 1/2       # GI   - CT abd/pelvis non-con 12/31 revealing for SBO   - PEG tube was drained to gravity with improved sx   - on tube feeds, tolerating well   - Hep C ab positive, RNA neg      # HEME/ONC  - pt was on eliquis; now held due rto bleeding risk   - HDS  - keep active type and screen     # ID  Septic shock: hypotension likely 2/2 septic shock, likely 2/2 aspiration PNA given RLL consolidation seen on CT abdomen vs other source: UTI, decubitus ulcers  - shock now resolved   - bcx 12/24 growing prevotella denticola, repeat BCx ngtd   - dental consulted, oral source of infection less likely   - s/p LP, CSF negative  - ESR, CRP elevated though low suspicion for OM given neg CT findings   - Repeat UA+ for LE, neg for bacteria   - ID following, recs appreciated   - c/w vanc (dialysis dosing), meropenem for now  - f/u urine cxs, blood cxs     # ENDO  HbA1c 5.5  - c/w lantus 8U and low scale ISS as per conversation with endocrinology   - endo to f/u to adjust as necessary   - hypercalcemic 2/2 primary HPT, s/p pamidronate tx     # DVT  -SCDs     #GOC:  - Pt remains FULL CODE   - Daughter is decision maker if patient is altered Patient is a 75yo male with a PMHx of ESRD s/p failed renal transplant x2, HCV, HTN, T2DM, 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 course c/b hypercalcemia. RRT called 11/26 for AMS and hotn SBP 70s, found to be in septic shock 2/2 UTI s/p intubation and MICU stay (11/26/19-11/30/19). Extubated on 11/29/19, transferred to floors. Patient s/p open feeding gastrostomy tube placement on 12/21/19 with surgery. MICU consulted on 12/30 for AMS. Pt AOx0 and hypotensive to sbp 87. CT abd/pelvis showing new opacities in RLL suspicious for pneumonia. Patient admitted to MICU for pressor support, and treatment of septic shock likely in the setting of PNA vs. UTI vs. from sacral ulcer wounds.     # NEURO  AMS, metabolic encephalopathy on presentation   - at bl A&Ox2  - neuro following  - chronic lacunar infarcts; per neuro, AMS also likely related to hypercalcemia, renal dysfunction, poor nutritional intake  - repeat CT head 12/31 neg for acute intracranial process   - c/w keppra 500 BID for hx of seizure disorder  - neuro checks per ICU protocol     # CARDIAC  Septic shock, now resolved   - off levophed since AM 1/1   - hypotensive during RRT 12/30  - on stress dose hydrocortisone, will transition to 12 mg q8hrs from 25mg q8 hrs  - home carvedilol 6.25 mg held in setting of septic shock  - started on carvedilol 3.125 mg BID ON for 8 beats of vtach, will continue as 6.25mg BID   - c/w statin  Hx of cocaine cardiomyopathy  - normal EF on most recent echo    Afib on Eliquis   -now off a/c per cards due to bleeding risk    # PULM  -satting well on NA  -CT abdomen revealing for RLL consolidation     # RENAL  - hx of ESRD s/p failed renal transplant x2  - renal following, HD as recommended   - c/w hydrocortisone, epogen   - AAMIR valentin d/bertin 1/1 due to clotting, replaced 1/2       # GI   - CT abd/pelvis non-con 12/31 revealing for SBO   - PEG tube was drained to gravity with improved sx   - on tube feeds, tolerating well   - Hep C ab positive, RNA neg      # HEME/ONC  - pt was on eliquis; now held due rto bleeding risk   - HDS  - keep active type and screen     # ID  Septic shock: hypotension likely 2/2 septic shock, likely 2/2 aspiration PNA given RLL consolidation seen on CT abdomen vs other source: UTI, decubitus ulcers  - shock now resolved   - bcx 12/24 growing prevotella denticola, repeat BCx ngtd   - dental consulted, oral source of infection less likely   - s/p LP, CSF negative  - ESR, CRP elevated though low suspicion for OM given neg CT findings   - Repeat UA+ for LE, neg for bacteria   - ID following, recs appreciated   - c/w vanc (dialysis dosing), meropenem for now  - f/u urine cxs, blood cxs     # ENDO  HbA1c 5.5  - c/w lantus 8U and low scale ISS   - endo to f/u to adjust as necessary   - hypercalcemic 2/2 primary HPT, s/p pamidronate tx     # DVT  -SCDs     #GOC:  - Pt remains FULL CODE   - Daughter is decision maker if patient is altered Patient is a 75yo male with a PMHx of ESRD s/p failed renal transplant x2, HCV, HTN, T2DM, 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 course c/b hypercalcemia. RRT called 11/26 for AMS and hotn SBP 70s, found to be in septic shock 2/2 UTI s/p intubation and MICU stay (11/26/19-11/30/19). Extubated on 11/29/19, transferred to floors. Patient s/p open feeding gastrostomy tube placement on 12/21/19 with surgery. MICU consulted on 12/30 for AMS. Pt AOx0 and hypotensive to sbp 87. CT abd/pelvis showing new opacities in RLL suspicious for pneumonia. Patient admitted to MICU for pressor support, and treatment of septic shock likely in the setting of PNA vs.from sacral ulcer wounds vs. UTI.     # NEURO  AMS, metabolic encephalopathy on presentation   - at bl A&Ox2  - neuro following  - chronic lacunar infarcts; per neuro, AMS also likely related to hypercalcemia, renal dysfunction, poor nutritional intake  - repeat CT head 12/31 neg for acute intracranial process   - c/w keppra 500 BID for hx of seizure disorder  - neuro checks per ICU protocol     # CARDIAC  Septic shock, now resolved   - off levophed since AM 1/1   - hypotensive during RRT 12/30  - on stress dose hydrocortisone, will transition to 12 mg q8hrs from 25mg q8 hrs  - home carvedilol 6.25 mg held in setting of septic shock  - started on carvedilol 3.125 mg BID ON for 8 beats of vtach, will continue as 6.25mg BID   - c/w statin  Hx of cocaine cardiomyopathy  - normal EF on most recent echo    Afib on Eliquis   -now off a/c per cards due to bleeding risk    # PULM  -satting well on NA  -CT abdomen revealing for RLL consolidation     # RENAL  - hx of ESRD s/p failed renal transplant x2  - renal following, HD as recommended   - c/w hydrocortisone, epogen   - AAMIR valentin d/bertin 1/1 due to clotting, replaced 1/2       # GI   - CT abd/pelvis non-con 12/31 revealing for SBO   - PEG tube was drained to gravity with improved sx   - on tube feeds, tolerating well   - Hep C ab positive, RNA neg      # HEME/ONC  - pt was on eliquis; now held due rto bleeding risk   - HDS  - keep active type and screen     # ID  Septic shock: hypotension likely 2/2 septic shock, likely 2/2 aspiration PNA given RLL consolidation seen on CT abdomen vs other source: UTI, decubitus ulcers  - shock now resolved   - bcx 12/24 growing prevotella denticola, repeat BCx ngtd   - dental consulted, oral source of infection less likely   - s/p LP, CSF negative  - ESR, CRP elevated though low suspicion for OM given neg CT findings   - Repeat UA+ for LE, neg for bacteria   - ID following, recs appreciated   - c/w vanc (dialysis dosing), meropenem for now  - repeat ucx, bcx NGTD     # ENDO  HbA1c 5.5  - c/w lantus 8U and low scale ISS   - endo to f/u to adjust as necessary   - hypercalcemic 2/2 primary HPT, s/p pamidronate tx     # DVT  -SCDs     #GOC:  - Pt remains FULL CODE   - Daughter is decision maker if patient is altered Patient is a 75yo male with a PMHx of ESRD s/p failed renal transplant x2, HCV, HTN, T2DM, 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 course c/b hypercalcemia. RRT called 11/26 for AMS and hotn SBP 70s, found to be in septic shock 2/2 UTI s/p intubation and MICU stay (11/26/19-11/30/19). Extubated on 11/29/19, transferred to floors. Patient s/p open feeding gastrostomy tube placement on 12/21/19 with surgery. MICU consulted on 12/30 for AMS. Pt AOx0 and hypotensive to sbp 87. CT abd/pelvis showing new opacities in RLL suspicious for pneumonia. Patient admitted to MICU for pressor support, and treatment of septic shock likely in the setting of PNA vs.from sacral ulcer wounds vs. UTI.     # NEURO  AMS, metabolic encephalopathy on presentation   - at bl A&Ox2  - neuro following  - chronic lacunar infarcts; per neuro, AMS also likely related to hypercalcemia, renal dysfunction, poor nutritional intake  - repeat CT head 12/31 neg for acute intracranial process   - c/w keppra 500 BID for hx of seizure disorder  - neuro checks per ICU protocol     # CARDIAC  Septic shock, now resolved   - off levophed since AM 1/1   - hypotensive during RRT 12/30  - on stress dose hydrocortisone, will transition to 12 mg q8hrs from 25mg q8 hrs  - home carvedilol 6.25 mg held in setting of septic shock  - started on carvedilol 3.125 mg BID ON for 8 beats of vtach, will continue as 6.25mg BID   - will start midodrine 10mg q8hrs for pressure support   - c/w statin  Hx of cocaine cardiomyopathy  - normal EF on most recent echo    Afib on Eliquis   -now off a/c per cards due to bleeding risk    # PULM  -satting well on NA  -CT abdomen revealing for RLL consolidation     # RENAL  - hx of ESRD s/p failed renal transplant x2  - renal following, HD as recommended   - c/w hydrocortisone, epogen   - AAMIR valentin d/bertin 1/1 due to clotting, replaced 1/2   - plan for dialysis today       # GI   - CT abd/pelvis non-con 12/31 revealing for SBO   - PEG tube was drained to gravity with improved sx   - on tube feeds, tolerating well   - Hep C ab positive, RNA neg      # HEME/ONC  - pt was on eliquis; now held due rto bleeding risk   - HDS  - keep active type and screen     # ID  Septic shock: hypotension likely 2/2 septic shock, likely 2/2 aspiration PNA given RLL consolidation seen on CT abdomen vs other source vs. OM given sacral wound to bone; dental consulted, oral source of infection less likely  - shock now resolved   - bcx 12/24 growing prevotella denticola, repeat BCx ngtd   - s/p LP, CSF negative  - repeat UCx neg  - ESR, CRP elevated though low suspicion for OM given neg CT findings   - ID following, recs appreciated   - c/w vanc (dialysis dosing), meropenem for now  - f/u random vanc 4hrs following dialysis     OM  -concern for OM rising as previous sacral ulceration now w/ wound to bone, purulent dc as per nursing  -ESR, CRP elevated   -will obtain MRI w/c once cleared by nephro     # ENDO  HbA1c 5.5  - c/w lantus 8U and low scale ISS   - endo to f/u to adjust as necessary   - hypercalcemic 2/2 primary HPT, s/p pamidronate tx     # DVT  -SCDs     #GOC:  - Pt remains FULL CODE   - Daughter is decision maker if patient is altered Patient is a 73yo male with a PMHx of ESRD s/p failed renal transplant x2, HCV, HTN, T2DM, 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 course c/b hypercalcemia. RRT called 11/26 for AMS and hotn SBP 70s, found to be in septic shock 2/2 UTI s/p intubation and MICU stay (11/26/19-11/30/19). Extubated on 11/29/19, transferred to floors. Patient s/p open feeding gastrostomy tube placement on 12/21/19 with surgery. MICU consulted on 12/30 for AMS. Pt AOx0 and hypotensive to sbp 87. CT abd/pelvis showing new opacities in RLL suspicious for pneumonia. Patient admitted to MICU for pressor support, and treatment of septic shock likely in the setting of PNA vs.from sacral ulcer wounds vs. UTI.     # NEURO  AMS, metabolic encephalopathy on presentation   - at bl A&Ox2  - neuro following  - chronic lacunar infarcts; per neuro, AMS also likely related to hypercalcemia, renal dysfunction, poor nutritional intake  - repeat CT head 12/31 neg for acute intracranial process   - c/w keppra 500 BID for hx of seizure disorder  - neuro checks per ICU protocol     # CARDIAC  Septic shock, now resolved   - off levophed since AM 1/1   - hypotensive during RRT 12/30  - on stress dose hydrocortisone, will transition to 12 mg q8hrs from 25mg q8 hrs  - home carvedilol 6.25 mg held in setting of septic shock  - started on carvedilol 3.125 mg BID ON for 8 beats of vtach, will continue as 6.25mg BID   - will start midodrine 10mg q8hrs for pressure support   - c/w statin  Hx of cocaine cardiomyopathy  - normal EF on most recent echo    Afib on Eliquis   -now off a/c per cards due to bleeding risk    # PULM  -satting well on NA  -CT abdomen revealing for RLL consolidation     # RENAL  - hx of ESRD s/p failed renal transplant x2  - renal following, HD as recommended   - c/w hydrocortisone, epogen   - AAMIR valentin d/bertin 1/1 due to clotting, replaced 1/2   - plan for dialysis today       # GI   - CT abd/pelvis non-con 12/31 revealing for SBO   - PEG tube was drained to gravity with improved sx   - on tube feeds, tolerating well   - Hep C ab positive, RNA neg      # HEME/ONC  - pt was on eliquis; now held due rto bleeding risk   - HDS  - keep active type and screen     # ID  Septic shock: hypotension likely 2/2 septic shock, likely 2/2 aspiration PNA given RLL consolidation seen on CT abdomen vs other source vs. OM given sacral wound to bone; dental consulted, oral source of infection less likely  - shock now resolved   - bcx 12/24 growing prevotella denticola, repeat BCx ngtd   - s/p LP, CSF negative  - repeat UCx neg  - ESR, CRP elevated though low suspicion for OM given neg CT findings   - ID following, recs appreciated   - c/w vanc (dialysis dosing), meropenem for now  - f/u random vanc 4hrs following dialysis     OM  -concern for OM rising as previous sacral ulceration now w/ wound to bone, purulent dc as per nursing  -ESR, CRP elevated   -will obtain MRI L/S spine; as per nephrology, patient requires 2 consecutive days of dialysis if planned for MRI (goal MRI in AM followed by repeat dialysis tomorrow    # ENDO  HbA1c 5.5  - c/w lantus 8U and low scale ISS   - endo to f/u to adjust as necessary   - hypercalcemic 2/2 primary HPT, s/p pamidronate tx     # DVT  -SCDs     #GOC:  - Pt remains FULL CODE   - Daughter is decision maker if patient is altered

## 2020-01-03 NOTE — PROGRESS NOTE ADULT - PROBLEM SELECTOR PLAN 1
Excess fluids and waste products will be removed from your blood; your electrolytes will be balanced; your blood pressure will be controlled.  plan for hd as order   hd in am   plan for perm cath in 48 hr if is ok

## 2020-01-03 NOTE — PROGRESS NOTE ADULT - SUBJECTIVE AND OBJECTIVE BOX
Patient is a 74y Male whom presented to   pateint seen and examined nad , in am     PAST MEDICAL & SURGICAL HISTORY:  Kidney transplant recipient  Anuria  GERD (gastroesophageal reflux disease)  Kidney transplant failure: dx: 2/2013  Hepatitis C: dx: 90&#x27;s.  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 10 milliGRAM(s) Oral three times a day  mycophenolate mofetil 250 milliGRAM(s) Oral two times a day  predniSONE   Tablet 5 milliGRAM(s) Oral daily  sevelamer carbonate 800 milliGRAM(s) Oral three times a day with meals  sodium bicarbonate 650 milliGRAM(s) Oral two times a day  tamsulosin 0.4 milliGRAM(s) Oral at bedtime      Allergies    No Known Allergies                       SOCIAL HISTORY:  Denies ETOh,Smoking,     FAMILY HISTORY:  Family history of breast cancer in sister (Sibling): living at 94 yo  Family history of prostate cancer in father  Family history of lung cancer  Family history of hypertension in mother  Family history of diabetes mellitus: mother      REVIEW OF SYSTEMS:    unbable to obtained                           8.1    18.10 )-----------( 310      ( 03 Jan 2020 07:13 )             27.9       CBC Full  -  ( 03 Jan 2020 07:13 )  WBC Count : 18.10 K/uL  RBC Count : 3.24 M/uL  Hemoglobin : 8.1 g/dL  Hematocrit : 27.9 %  Platelet Count - Automated : 310 K/uL  Mean Cell Volume : 86.1 fl  Mean Cell Hemoglobin : 25.0 pg  Mean Cell Hemoglobin Concentration : 29.0 gm/dL  Auto Neutrophil # : 15.89 K/uL  Auto Lymphocyte # : 1.13 K/uL  Auto Monocyte # : 0.95 K/uL  Auto Eosinophil # : 0.00 K/uL  Auto Basophil # : 0.01 K/uL  Auto Neutrophil % : 87.8 %  Auto Lymphocyte % : 6.2 %  Auto Monocyte % : 5.2 %  Auto Eosinophil % : 0.0 %  Auto Basophil % : 0.1 %      01-03    138  |  94<L>  |  88<H>  ----------------------------<  187<H>  3.4<L>   |  22  |  4.51<H>    Ca    11.5<H>      03 Jan 2020 12:13  Phos  5.7     01-03  Mg     2.4     01-03    TPro  6.6  /  Alb  2.7<L>  /  TBili  0.3  /  DBili  x   /  AST  9<L>  /  ALT  6<L>  /  AlkPhos  100  01-03      CAPILLARY BLOOD GLUCOSE      POCT Blood Glucose.: 219 mg/dL (03 Jan 2020 18:03)  POCT Blood Glucose.: 131 mg/dL (03 Jan 2020 14:43)  POCT Blood Glucose.: 190 mg/dL (03 Jan 2020 10:08)  POCT Blood Glucose.: 121 mg/dL (02 Jan 2020 22:50)      Vital Signs Last 24 Hrs  T(C): 36.6 (03 Jan 2020 16:00), Max: 36.6 (03 Jan 2020 00:00)  T(F): 97.9 (03 Jan 2020 16:00), Max: 97.9 (03 Jan 2020 07:00)  HR: 100 (03 Jan 2020 16:00) (91 - 106)  BP: 77/50 (03 Jan 2020 16:00) (77/50 - 128/75)  BP(mean): 58 (03 Jan 2020 16:00) (58 - 96)  RR: 17 (03 Jan 2020 16:00) (13 - 27)  SpO2: 100% (03 Jan 2020 16:00) (96% - 100%)                                                         HEENT: conjunctive   clear   Neck:  No JVD  Respiratory: decrease bs b/l   Cardiovascular: S1 and S2  Gastrointestinal: BS+, soft, NT/ND  Extremities: No peripheral edema  Skin: dry   Access: pos fistula

## 2020-01-03 NOTE — PROGRESS NOTE ADULT - SUBJECTIVE AND OBJECTIVE BOX
INTERVAL HPI/OVERNIGHT EVENTS:    pt s/e  he continues to tolerate tube feeds  pt denies abdominal pain, n/v    MEDICATIONS  (STANDING):  atorvastatin 40 milliGRAM(s) Oral at bedtime  carvedilol 6.25 milliGRAM(s) Oral every 12 hours  chlorhexidine 0.12% Liquid 15 milliLiter(s) Oral Mucosa two times a day  chlorhexidine 4% Liquid 1 Application(s) Topical <User Schedule>  chlorhexidine 4% Liquid 1 Application(s) Topical <User Schedule>  Dakins Solution - 1/4 Strength 1 Application(s) Topical two times a day  dextrose 5%. 1000 milliLiter(s) (50 mL/Hr) IV Continuous <Continuous>  epoetin tan Injectable 43924 Unit(s) IV Push <User Schedule>  hydrocortisone sodium succinate Injectable 12 milliGRAM(s) IV Push every 8 hours  insulin glargine Injectable (LANTUS) 8 Unit(s) SubCutaneous at bedtime  insulin lispro (HumaLOG) corrective regimen sliding scale   SubCutaneous three times a day before meals  levETIRAcetam  IVPB 500 milliGRAM(s) IV Intermittent every 12 hours  meropenem  IVPB 500 milliGRAM(s) IV Intermittent every 24 hours  pantoprazole  Injectable 40 milliGRAM(s) IV Push daily    MEDICATIONS  (PRN):  acetaminophen    Suspension .. 650 milliGRAM(s) Oral every 6 hours PRN Mild Pain (1 - 3)  sodium chloride 0.9% lock flush 10 milliLiter(s) IV Push every 1 hour PRN Pre/post blood products, medications, blood draw, and to maintain line patency      Allergies    No Known Allergies    Intolerances        Review of Systems:    unable to obtain in completion       Vital Signs Last 24 Hrs  T(C): 36.6 (03 Jan 2020 07:00), Max: 36.6 (03 Jan 2020 00:00)  T(F): 97.9 (03 Jan 2020 07:00), Max: 97.9 (03 Jan 2020 07:00)  HR: 104 (03 Jan 2020 09:00) (91 - 107)  BP: 111/67 (03 Jan 2020 09:00) (91/59 - 144/80)  BP(mean): 84 (03 Jan 2020 09:00) (69 - 105)  RR: 23 (03 Jan 2020 09:00) (13 - 27)  SpO2: 100% (03 Jan 2020 09:00) (97% - 100%)    PHYSICAL EXAM:    GENERAL:  lying in bed  HEENT:  NC/AT  ABDOMEN:  Soft, non-distended, + bowel sounds, + peg dressed, c/d/i  EXTREMITIES:  no cyanosis, clubbing or edema  NEURO:  minimally verbal      LABS:                        8.1    18.10 )-----------( 310      ( 03 Jan 2020 07:13 )             27.9     01-02    141  |  95<L>  |  77<H>  ----------------------------<  105<H>  3.9   |  23  |  4.13<H>    Ca    11.6<H>      02 Jan 2020 20:07  Phos  5.8     01-02  Mg     2.4     01-02    TPro  7.9  /  Alb  3.1<L>  /  TBili  0.4  /  DBili  x   /  AST  14  /  ALT  8<L>  /  AlkPhos  106  01-02          RADIOLOGY & ADDITIONAL TESTS:

## 2020-01-03 NOTE — PROGRESS NOTE ADULT - SUBJECTIVE AND OBJECTIVE BOX
Patient is a 74y old  Male who presents with a chief complaint of ams (03 Jan 2020 10:09)    Being followed by ID for bacteremia, sacral decub     Interval history:awake  some back pain  s/p local wound debridement   denies any other complaints   No acute events      ROS:  No cough,SOB,CP  No N/V/D./abd pain  No other complaints      Antimicrobials:    meropenem  IVPB 500 milliGRAM(s) IV Intermittent every 24 hours  vanco post HD     Other medications reviewed    Vital Signs Last 24 Hrs  T(C): 36.5 (01-03-20 @ 11:57), Max: 36.6 (01-03-20 @ 00:00)  T(F): 97.7 (01-03-20 @ 11:57), Max: 97.9 (01-03-20 @ 07:00)  HR: 97 (01-03-20 @ 12:00) (91 - 106)  BP: 89/57 (01-03-20 @ 12:00) (84/51 - 144/80)  BP(mean): 67 (01-03-20 @ 12:00) (63 - 105)  RR: 18 (01-03-20 @ 12:00) (13 - 27)  SpO2: 99% (01-03-20 @ 12:00) (97% - 100%)    Physical Exam:    R Sc HD catheter no erythema o tenderness        Chest Good AE,bibasilar crackles     CVS RRR S1 S2 WNl No murmur or rub or gallop    Abd soft BS normal No tenderness RLQ transplant kidney no tenderness  feeding tube no discharge or erythema     sacral decub stage III some purulence and sup skin necrosis noted     Ext left arm AVF no erythema or tenderness    IV site no erythema tenderness or discharge    Joints no swelling or LOM    CNS AAOX 2 no focal    Lab Data:                          8.1    18.10 )-----------( 310      ( 03 Jan 2020 07:13 )             27.9     WBC Count: 18.10 (01-03-20 @ 07:13)  WBC Count: 13.47 (01-02-20 @ 01:19)  WBC Count: 19.76 (01-01-20 @ 01:21)  WBC Count: 29.47 (12-30-19 @ 22:43)  WBC Count: 18.67 (12-30-19 @ 08:43)  WBC Count: 21.12 (12-28-19 @ 11:49)    01-03    138  |  94<L>  |  88<H>  ----------------------------<  187<H>  3.4<L>   |  22  |  4.51<H>    Ca    11.5<H>      03 Jan 2020 12:13  Phos  5.7     01-03  Mg     2.4     01-03    TPro  6.6  /  Alb  2.7<L>  /  TBili  0.3  /  DBili  x   /  AST  9<L>  /  ALT  6<L>  /  AlkPhos  100  01-03        Culture - Urine (collected 01 Jan 2020 12:48)  Source: .Urine Clean Catch (Midstream)  Final Report (02 Jan 2020 08:56):    No growth    Culture - Blood (collected 31 Dec 2019 00:40)  Source: .Blood Blood  Preliminary Report (01 Jan 2020 01:02):    No growth to date.            Vancomycin Level, Random: 14.8 ug/mL (01-03-20 @ 07:07)      < from: Xray Chest 1 View- PORTABLE-Urgent (01.03.20 @ 11:51) >  IMPRESSION:   Clear lungs.   Right IJ dialysis catheter terminates in the right atrium.            < end of copied text >

## 2020-01-03 NOTE — PROGRESS NOTE ADULT - SUBJECTIVE AND OBJECTIVE BOX
Chief complaint  Patient is a 74y old  Male who presents with a chief complaint of ams (03 Jan 2020 13:54)   Review of systems  Patient in bed on HD, looks comfortable, no fever, no hypoglycemia.    Labs and Fingersticks  CAPILLARY BLOOD GLUCOSE      POCT Blood Glucose.: 190 mg/dL (03 Jan 2020 10:08)  POCT Blood Glucose.: 121 mg/dL (02 Jan 2020 22:50)  POCT Blood Glucose.: 178 mg/dL (02 Jan 2020 17:26)      Anion Gap, Serum: 22 <H> (01-03 @ 12:13)  Anion Gap, Serum: 23 <H> (01-02 @ 20:07)  Anion Gap, Serum: 21 <H> (01-02 @ 01:19)      Calcium, Total Serum: 11.5 <H> (01-03 @ 12:13)  Calcium, Total Serum: 11.6 <H> (01-02 @ 20:07)  Calcium, Total Serum: 11.2 <H> (01-02 @ 01:19)  Albumin, Serum: 2.7 <L> (01-03 @ 12:13)  Albumin, Serum: 3.1 <L> (01-02 @ 01:19)    Alanine Aminotransferase (ALT/SGPT): 6 <L> (01-03 @ 12:13)  Alanine Aminotransferase (ALT/SGPT): 8 <L> (01-02 @ 01:19)  Alkaline Phosphatase, Serum: 100 (01-03 @ 12:13)  Alkaline Phosphatase, Serum: 106 (01-02 @ 01:19)  Aspartate Aminotransferase (AST/SGOT): 9 <L> (01-03 @ 12:13)  Aspartate Aminotransferase (AST/SGOT): 14 (01-02 @ 01:19)        01-03    138  |  94<L>  |  88<H>  ----------------------------<  187<H>  3.4<L>   |  22  |  4.51<H>    Ca    11.5<H>      03 Jan 2020 12:13  Phos  5.7     01-03  Mg     2.4     01-03    TPro  6.6  /  Alb  2.7<L>  /  TBili  0.3  /  DBili  x   /  AST  9<L>  /  ALT  6<L>  /  AlkPhos  100  01-03                        8.1    18.10 )-----------( 310      ( 03 Jan 2020 07:13 )             27.9     Medications  MEDICATIONS  (STANDING):  atorvastatin 40 milliGRAM(s) Oral at bedtime  carvedilol 6.25 milliGRAM(s) Oral every 12 hours  chlorhexidine 0.12% Liquid 15 milliLiter(s) Oral Mucosa two times a day  chlorhexidine 4% Liquid 1 Application(s) Topical <User Schedule>  chlorhexidine 4% Liquid 1 Application(s) Topical <User Schedule>  Dakins Solution - 1/4 Strength 1 Application(s) Topical two times a day  dextrose 5%. 1000 milliLiter(s) (50 mL/Hr) IV Continuous <Continuous>  epoetin tan Injectable 32994 Unit(s) IV Push <User Schedule>  hydrocortisone sodium succinate Injectable 12 milliGRAM(s) IV Push every 8 hours  insulin glargine Injectable (LANTUS) 8 Unit(s) SubCutaneous at bedtime  insulin lispro (HumaLOG) corrective regimen sliding scale   SubCutaneous three times a day before meals  levETIRAcetam  IVPB 500 milliGRAM(s) IV Intermittent every 12 hours  meropenem  IVPB 500 milliGRAM(s) IV Intermittent every 24 hours  midodrine. 10 milliGRAM(s) Oral every 8 hours  pantoprazole  Injectable 40 milliGRAM(s) IV Push daily      Physical Exam  General: Patient comfortable in bed  Vital Signs Last 12 Hrs  T(F): 97.7 (01-03-20 @ 11:57), Max: 97.9 (01-03-20 @ 07:00)  HR: 97 (01-03-20 @ 12:00) (97 - 104)  BP: 89/57 (01-03-20 @ 12:00) (84/51 - 120/69)  BP(mean): 67 (01-03-20 @ 12:00) (63 - 89)  RR: 18 (01-03-20 @ 12:00) (15 - 23)  SpO2: 99% (01-03-20 @ 12:00) (97% - 100%)  Neck: No palpable thyroid nodules.  CVS: S1S2, No murmurs  Respiratory: No wheezing, no crepitations  GI: Abdomen soft, bowel sounds positive  Musculoskeletal:  edema lower extremities.   Skin: No skin rashes, no ecchymosis    Diagnostics Chief complaint  Patient is a 74y old  Male who presents with a chief complaint of ams (03 Jan 2020 13:54)   Review of systems  Patient in bed on HD, looks comfortable, no fever,  no hypoglycemia.    Labs and Fingersticks  CAPILLARY BLOOD GLUCOSE      POCT Blood Glucose.: 190 mg/dL (03 Jan 2020 10:08)  POCT Blood Glucose.: 121 mg/dL (02 Jan 2020 22:50)  POCT Blood Glucose.: 178 mg/dL (02 Jan 2020 17:26)      Anion Gap, Serum: 22 <H> (01-03 @ 12:13)  Anion Gap, Serum: 23 <H> (01-02 @ 20:07)  Anion Gap, Serum: 21 <H> (01-02 @ 01:19)      Calcium, Total Serum: 11.5 <H> (01-03 @ 12:13)  Calcium, Total Serum: 11.6 <H> (01-02 @ 20:07)  Calcium, Total Serum: 11.2 <H> (01-02 @ 01:19)  Albumin, Serum: 2.7 <L> (01-03 @ 12:13)  Albumin, Serum: 3.1 <L> (01-02 @ 01:19)    Alanine Aminotransferase (ALT/SGPT): 6 <L> (01-03 @ 12:13)  Alanine Aminotransferase (ALT/SGPT): 8 <L> (01-02 @ 01:19)  Alkaline Phosphatase, Serum: 100 (01-03 @ 12:13)  Alkaline Phosphatase, Serum: 106 (01-02 @ 01:19)  Aspartate Aminotransferase (AST/SGOT): 9 <L> (01-03 @ 12:13)  Aspartate Aminotransferase (AST/SGOT): 14 (01-02 @ 01:19)        01-03    138  |  94<L>  |  88<H>  ----------------------------<  187<H>  3.4<L>   |  22  |  4.51<H>    Ca    11.5<H>      03 Jan 2020 12:13  Phos  5.7     01-03  Mg     2.4     01-03    TPro  6.6  /  Alb  2.7<L>  /  TBili  0.3  /  DBili  x   /  AST  9<L>  /  ALT  6<L>  /  AlkPhos  100  01-03                        8.1    18.10 )-----------( 310      ( 03 Jan 2020 07:13 )             27.9     Medications  MEDICATIONS  (STANDING):  atorvastatin 40 milliGRAM(s) Oral at bedtime  carvedilol 6.25 milliGRAM(s) Oral every 12 hours  chlorhexidine 0.12% Liquid 15 milliLiter(s) Oral Mucosa two times a day  chlorhexidine 4% Liquid 1 Application(s) Topical <User Schedule>  chlorhexidine 4% Liquid 1 Application(s) Topical <User Schedule>  Dakins Solution - 1/4 Strength 1 Application(s) Topical two times a day  dextrose 5%. 1000 milliLiter(s) (50 mL/Hr) IV Continuous <Continuous>  epoetin tan Injectable 63582 Unit(s) IV Push <User Schedule>  hydrocortisone sodium succinate Injectable 12 milliGRAM(s) IV Push every 8 hours  insulin glargine Injectable (LANTUS) 8 Unit(s) SubCutaneous at bedtime  insulin lispro (HumaLOG) corrective regimen sliding scale   SubCutaneous three times a day before meals  levETIRAcetam  IVPB 500 milliGRAM(s) IV Intermittent every 12 hours  meropenem  IVPB 500 milliGRAM(s) IV Intermittent every 24 hours  midodrine. 10 milliGRAM(s) Oral every 8 hours  pantoprazole  Injectable 40 milliGRAM(s) IV Push daily      Physical Exam  General: Patient comfortable in bed  Vital Signs Last 12 Hrs  T(F): 97.7 (01-03-20 @ 11:57), Max: 97.9 (01-03-20 @ 07:00)  HR: 97 (01-03-20 @ 12:00) (97 - 104)  BP: 89/57 (01-03-20 @ 12:00) (84/51 - 120/69)  BP(mean): 67 (01-03-20 @ 12:00) (63 - 89)  RR: 18 (01-03-20 @ 12:00) (15 - 23)  SpO2: 99% (01-03-20 @ 12:00) (97% - 100%)  Neck: No palpable thyroid nodules.  CVS: S1S2, No murmurs  Respiratory: No wheezing, no crepitations  GI: Abdomen soft, bowel sounds positive  Musculoskeletal:  edema lower extremities.   Skin: No skin rashes, no ecchymosis    Diagnostics

## 2020-01-03 NOTE — PROGRESS NOTE ADULT - ASSESSMENT
74 m with DM, HTN, IVDA, hep C, ESRD s/p failed renal transplant x2 on HD and meningococcal meningitis 2 years ago, diverticulitis s/p colon resection, decubiti, initially sent in to the ED from HD for AMS and hypotension, had persistent AMS so open gastrostomy was placed 12/21 (failed endoscopic attempt for PEG), also HD cath exchange 12/27  Prevotella denticola bacteremia 12/24, repeat negative 12/27  RRT 12/31 for hypotension, WBC 29, abd CT with persistent SBO and pneumoperitoneum     septic shock, SBO, pneumoperitoneum  Leucocytosis  Sacral decub with some purulence    A) SBO,shock  Resolving  Continue antimicrobials as above  Continue ICU care per MICU team    B)  Sacral decub    Some discharge/necrosis  would recommend wound care follow up ? debridement  Would recommend imaging to r/o OM   Continue antimicrobials as below    C) bacteremia  ?source wound vs abdominal foci  repeat Cx negative  continue meropenem and vanco for now  Duration will depend on results and wound status-particulraly with regard of sacral decub     D) leucocytosis  increased  may also be reactive from debridement  continue to monitor-follow Cx    Abx plan as above    Will tailor plan for ID issues  per course,results.Will defer to primary team on management of other issues.  case was discussed with MICU team    Infectious Diseases Service will cover over weekend.  Please call [phone] if issues

## 2020-01-03 NOTE — PROGRESS NOTE ADULT - PROBLEM SELECTOR PLAN 1
Will continue current insulin regimen.  Will continue monitoring FS, log, and FU. Will continue current insulin regimen.

## 2020-01-03 NOTE — PROGRESS NOTE ADULT - SUBJECTIVE AND OBJECTIVE BOX
*******************************  Deborah Catherine, PGY1  Pager [phone]  *******************************    INTERVAL HPI/OVERNIGHT EVENTS:    ON, 8 beats of vtach at 19:23, vitals stable at that time and patient asymptomatic. Electrolytes checked, wnl. Home carvedilol started at 3.125 BID as pressors tolerating.     SUBJECTIVE:    Patient seen and examined at bedside.       OBJECTIVE:    VITAL SIGNS:  ICU Vital Signs Last 24 Hrs  T(C): 36.5 (2020 03:00), Max: 36.6 (2020 00:00)  T(F): 97.7 (2020 03:00), Max: 97.8 (2020 00:00)  HR: 104 (2020 04:00) (91 - 107)  BP: 120/69 (2020 04:00) (97/53 - 144/80)  BP(mean): 89 (2020 04:00) (69 - 105)  ABP: --  ABP(mean): --  RR: 22 (2020 04:00) (11 - 27)  SpO2: 100% (2020 04:00) (97% - 100%)         @ 07:02 @ 07:00  --------------------------------------------------------  IN: 570 mL / OUT: 200 mL / NET: 370 mL     @ 07:  -  03 @ 06:39  --------------------------------------------------------  IN: 590 mL / OUT: 0 mL / NET: 590 mL      CAPILLARY BLOOD GLUCOSE      POCT Blood Glucose.: 121 mg/dL (2020 22:50)        MEDICATIONS:  MEDICATIONS  (STANDING):  atorvastatin 40 milliGRAM(s) Oral at bedtime  carvedilol 6.25 milliGRAM(s) Oral every 12 hours  chlorhexidine 0.12% Liquid 15 milliLiter(s) Oral Mucosa two times a day  chlorhexidine 4% Liquid 1 Application(s) Topical <User Schedule>  chlorhexidine 4% Liquid 1 Application(s) Topical <User Schedule>  Dakins Solution - 1/4 Strength 1 Application(s) Topical two times a day  dextrose 5%. 1000 milliLiter(s) (50 mL/Hr) IV Continuous <Continuous>  epoetin tan Injectable 03797 Unit(s) IV Push <User Schedule>  hydrocortisone sodium succinate Injectable 25 milliGRAM(s) IV Push every 8 hours  insulin glargine Injectable (LANTUS) 8 Unit(s) SubCutaneous at bedtime  insulin lispro (HumaLOG) corrective regimen sliding scale   SubCutaneous three times a day before meals  levETIRAcetam  IVPB 500 milliGRAM(s) IV Intermittent every 12 hours  meropenem  IVPB 500 milliGRAM(s) IV Intermittent every 24 hours  pantoprazole  Injectable 40 milliGRAM(s) IV Push daily  vancomycin  IVPB 500 milliGRAM(s) IV Intermittent once    MEDICATIONS  (PRN):  sodium chloride 0.9% lock flush 10 milliLiter(s) IV Push every 1 hour PRN Pre/post blood products, medications, blood draw, and to maintain line patency      ALLERGIES:  Allergies    No Known Allergies    Intolerances        LABS:                        8.8    13.47 )-----------( 274      ( 2020 01:19 )             29.6     01-02    141  |  95<L>  |  77<H>  ----------------------------<  105<H>  3.9   |  23  |  4.13<H>    Ca    11.6<H>      2020 20:07  Phos  5.8     -  Mg     2.4     -    TPro  7.9  /  Alb  3.1<L>  /  TBili  0.4  /  DBili  x   /  AST  14  /  ALT  8<L>  /  AlkPhos  106  01-02      Urinalysis Basic - ( 2020 09:35 )    Color: Yellow / Appearance: Slightly Turbid / S.027 / pH: x  Gluc: x / Ketone: Negative  / Bili: Negative / Urobili: Negative   Blood: x / Protein: 300 mg/dL / Nitrite: Negative   Leuk Esterase: Large / RBC: 2 /hpf /  /HPF   Sq Epi: x / Non Sq Epi: 1 /hpf / Bacteria: Negative        RADIOLOGY & ADDITIONAL TESTS: Reviewed. *******************************  Deborah Catherine, PGY1  Pager [phone]  *******************************    INTERVAL HPI/OVERNIGHT EVENTS:    ON, 8 beats of vtach at 19:23, vitals stable at that time and patient asymptomatic. Electrolytes checked, wnl. Home carvedilol started at 3.125 BID as pressors tolerating.     SUBJECTIVE:    Patient seen and examined at bedside. A&O x 2. Denies fevers, cp, sob, heart palpitations. c/o back pain.      OBJECTIVE:    VITAL SIGNS:  ICU Vital Signs Last 24 Hrs  T(C): 36.5 (2020 03:00), Max: 36.6 (2020 00:00)  T(F): 97.7 (2020 03:00), Max: 97.8 (2020 00:00)  HR: 104 (2020 04:00) (91 - 107)  BP: 120/69 (2020 04:00) (97/53 - 144/80)  BP(mean): 89 (2020 04:00) (69 - 105)  ABP: --  ABP(mean): --  RR: 22 (2020 04:00) (11 - 27)  SpO2: 100% (2020 04:00) (97% - 100%)         @ : @ 07:00  --------------------------------------------------------  IN: 570 mL / OUT: 200 mL / NET: 370 mL     @ 07:  -  03 @ 06:39  --------------------------------------------------------  IN: 590 mL / OUT: 0 mL / NET: 590 mL    PHYSICAL EXAM:  GENERAL: NAD respiring on RA   HEAD:  Atraumatic, Normocephalic  EYES: EOMI, PERRLA, conjunctiva and sclera clear  NECK: Supple, No JVD  CHEST/LUNG: CTAB   HEART: RRR, holosystolic murmur throughout precordium   ABDOMEN: Soft, Nontender, Nondistended (improved from prior); Bowel sounds present. PEG tube connected to feeds.  EXTREMITIES:  No clubbing, cyanosis, or edema  PSYCH: AAOx2  NEUROLOGY: non-focal  SKIN: abrasions left ant tibia, sacral wound dressed           CAPILLARY BLOOD GLUCOSE      POCT Blood Glucose.: 121 mg/dL (2020 22:50)        MEDICATIONS:  MEDICATIONS  (STANDING):  atorvastatin 40 milliGRAM(s) Oral at bedtime  carvedilol 6.25 milliGRAM(s) Oral every 12 hours  chlorhexidine 0.12% Liquid 15 milliLiter(s) Oral Mucosa two times a day  chlorhexidine 4% Liquid 1 Application(s) Topical <User Schedule>  chlorhexidine 4% Liquid 1 Application(s) Topical <User Schedule>  Dakins Solution - 1/4 Strength 1 Application(s) Topical two times a day  dextrose 5%. 1000 milliLiter(s) (50 mL/Hr) IV Continuous <Continuous>  epoetin tan Injectable 52479 Unit(s) IV Push <User Schedule>  hydrocortisone sodium succinate Injectable 25 milliGRAM(s) IV Push every 8 hours  insulin glargine Injectable (LANTUS) 8 Unit(s) SubCutaneous at bedtime  insulin lispro (HumaLOG) corrective regimen sliding scale   SubCutaneous three times a day before meals  levETIRAcetam  IVPB 500 milliGRAM(s) IV Intermittent every 12 hours  meropenem  IVPB 500 milliGRAM(s) IV Intermittent every 24 hours  pantoprazole  Injectable 40 milliGRAM(s) IV Push daily  vancomycin  IVPB 500 milliGRAM(s) IV Intermittent once    MEDICATIONS  (PRN):  sodium chloride 0.9% lock flush 10 milliLiter(s) IV Push every 1 hour PRN Pre/post blood products, medications, blood draw, and to maintain line patency      ALLERGIES:  Allergies    No Known Allergies    Intolerances        LABS:                        8.8    13.47 )-----------( 274      ( 2020 01:19 )             29.6     01-02    141  |  95<L>  |  77<H>  ----------------------------<  105<H>  3.9   |  23  |  4.13<H>    Ca    11.6<H>      2020 20:07  Phos  5.8       Mg     2.4         TPro  7.9  /  Alb  3.1<L>  /  TBili  0.4  /  DBili  x   /  AST  14  /  ALT  8<L>  /  AlkPhos  106        Urinalysis Basic - ( 2020 09:35 )    Color: Yellow / Appearance: Slightly Turbid / S.027 / pH: x  Gluc: x / Ketone: Negative  / Bili: Negative / Urobili: Negative   Blood: x / Protein: 300 mg/dL / Nitrite: Negative   Leuk Esterase: Large / RBC: 2 /hpf /  /HPF   Sq Epi: x / Non Sq Epi: 1 /hpf / Bacteria: Negative        RADIOLOGY & ADDITIONAL TESTS: Reviewed. *******************************  Deborah Catherine, PGY1  Pager [phone]  *******************************    INTERVAL HPI/OVERNIGHT EVENTS:    ON, 8 beats of vtach at 19:23, vitals stable at that time and patient asymptomatic. Electrolytes checked, wnl. Home carvedilol started at 3.125 BID as pressors tolerating.     SUBJECTIVE:    Patient seen and examined at bedside. A&O x 2. Denies fevers, cp, sob, heart palpitations. c/o back pain. Tele reviewed. PVCs, no further vtach.      OBJECTIVE:    VITAL SIGNS:  ICU Vital Signs Last 24 Hrs  T(C): 36.5 (2020 03:00), Max: 36.6 (2020 00:00)  T(F): 97.7 (2020 03:00), Max: 97.8 (2020 00:00)  HR: 104 (2020 04:00) (91 - 107)  BP: 120/69 (2020 04:00) (97/53 - 144/80)  BP(mean): 89 (2020 04:00) (69 - 105)  ABP: --  ABP(mean): --  RR: 22 (2020 04:00) (11 - 27)  SpO2: 100% (2020 04:00) (97% - 100%)         @ : @ 07:00  --------------------------------------------------------  IN: 570 mL / OUT: 200 mL / NET: 370 mL     @ 07: @ 06:39  --------------------------------------------------------  IN: 590 mL / OUT: 0 mL / NET: 590 mL    PHYSICAL EXAM:  GENERAL: NAD respiring on RA   HEAD:  Atraumatic, Normocephalic  EYES: EOMI, PERRLA, conjunctiva and sclera clear  NECK: Supple, No JVD  CHEST/LUNG: CTAB   HEART: RRR, holosystolic murmur throughout precordium   ABDOMEN: Soft, Nontender, Nondistended (improved from prior); Bowel sounds present. PEG tube connected to feeds.  EXTREMITIES:  No clubbing, cyanosis, or edema  PSYCH: AAOx2  NEUROLOGY: non-focal  SKIN: abrasions left ant tibia, sacral wound dressed           CAPILLARY BLOOD GLUCOSE      POCT Blood Glucose.: 121 mg/dL (2020 22:50)        MEDICATIONS:  MEDICATIONS  (STANDING):  atorvastatin 40 milliGRAM(s) Oral at bedtime  carvedilol 6.25 milliGRAM(s) Oral every 12 hours  chlorhexidine 0.12% Liquid 15 milliLiter(s) Oral Mucosa two times a day  chlorhexidine 4% Liquid 1 Application(s) Topical <User Schedule>  chlorhexidine 4% Liquid 1 Application(s) Topical <User Schedule>  Dakins Solution - 1/4 Strength 1 Application(s) Topical two times a day  dextrose 5%. 1000 milliLiter(s) (50 mL/Hr) IV Continuous <Continuous>  epoetin tan Injectable 80802 Unit(s) IV Push <User Schedule>  hydrocortisone sodium succinate Injectable 25 milliGRAM(s) IV Push every 8 hours  insulin glargine Injectable (LANTUS) 8 Unit(s) SubCutaneous at bedtime  insulin lispro (HumaLOG) corrective regimen sliding scale   SubCutaneous three times a day before meals  levETIRAcetam  IVPB 500 milliGRAM(s) IV Intermittent every 12 hours  meropenem  IVPB 500 milliGRAM(s) IV Intermittent every 24 hours  pantoprazole  Injectable 40 milliGRAM(s) IV Push daily  vancomycin  IVPB 500 milliGRAM(s) IV Intermittent once    MEDICATIONS  (PRN):  sodium chloride 0.9% lock flush 10 milliLiter(s) IV Push every 1 hour PRN Pre/post blood products, medications, blood draw, and to maintain line patency      ALLERGIES:  Allergies    No Known Allergies    Intolerances        LABS:                        8.8    13.47 )-----------( 274      ( 2020 01:19 )             29.6     01-    141  |  95<L>  |  77<H>  ----------------------------<  105<H>  3.9   |  23  |  4.13<H>    Ca    11.6<H>      2020 20:07  Phos  5.8     -  Mg     2.4         TPro  7.9  /  Alb  3.1<L>  /  TBili  0.4  /  DBili  x   /  AST  14  /  ALT  8<L>  /  AlkPhos  106        Urinalysis Basic - ( 2020 09:35 )    Color: Yellow / Appearance: Slightly Turbid / S.027 / pH: x  Gluc: x / Ketone: Negative  / Bili: Negative / Urobili: Negative   Blood: x / Protein: 300 mg/dL / Nitrite: Negative   Leuk Esterase: Large / RBC: 2 /hpf /  /HPF   Sq Epi: x / Non Sq Epi: 1 /hpf / Bacteria: Negative        RADIOLOGY & ADDITIONAL TESTS: Reviewed. *******************************  Deborah Catherine, PGY1  Pager [phone]  *******************************    INTERVAL HPI/OVERNIGHT EVENTS:    ON, 8 beats of vtach at 19:23, vitals stable at that time and patient asymptomatic. Electrolytes checked, wnl. Home carvedilol started at 3.125 BID as pressures tolerating.     SUBJECTIVE:    Patient seen and examined at bedside. A&O x 2. Denies fevers, cp, sob, heart palpitations. c/o back pain. Tele reviewed. PVCs, no further vtach.      OBJECTIVE:    VITAL SIGNS:  ICU Vital Signs Last 24 Hrs  T(C): 36.5 (2020 03:00), Max: 36.6 (2020 00:00)  T(F): 97.7 (2020 03:00), Max: 97.8 (2020 00:00)  HR: 104 (2020 04:00) (91 - 107)  BP: 120/69 (2020 04:00) (97/53 - 144/80)  BP(mean): 89 (2020 04:00) (69 - 105)  ABP: --  ABP(mean): --  RR: 22 (2020 04:00) (11 - 27)  SpO2: 100% (2020 04:00) (97% - 100%)         @ : @ 07:00  --------------------------------------------------------  IN: 570 mL / OUT: 200 mL / NET: 370 mL     @ 07: @ 06:39  --------------------------------------------------------  IN: 590 mL / OUT: 0 mL / NET: 590 mL    PHYSICAL EXAM:  GENERAL: NAD respiring on RA   HEAD:  Atraumatic, Normocephalic  EYES: EOMI, PERRLA, conjunctiva and sclera clear  NECK: Supple, No JVD  CHEST/LUNG: CTAB   HEART: RRR, holosystolic murmur throughout precordium   ABDOMEN: Soft, Nontender, Nondistended (improved from prior); Bowel sounds present. PEG tube connected to feeds.  EXTREMITIES:  No clubbing, cyanosis, or edema  PSYCH: AAOx2  NEUROLOGY: non-focal  SKIN: abrasions left ant tibia, sacral wound to bone, dressed           CAPILLARY BLOOD GLUCOSE      POCT Blood Glucose.: 121 mg/dL (2020 22:50)        MEDICATIONS:  MEDICATIONS  (STANDING):  atorvastatin 40 milliGRAM(s) Oral at bedtime  carvedilol 6.25 milliGRAM(s) Oral every 12 hours  chlorhexidine 0.12% Liquid 15 milliLiter(s) Oral Mucosa two times a day  chlorhexidine 4% Liquid 1 Application(s) Topical <User Schedule>  chlorhexidine 4% Liquid 1 Application(s) Topical <User Schedule>  Dakins Solution - 1/4 Strength 1 Application(s) Topical two times a day  dextrose 5%. 1000 milliLiter(s) (50 mL/Hr) IV Continuous <Continuous>  epoetin tan Injectable 88483 Unit(s) IV Push <User Schedule>  hydrocortisone sodium succinate Injectable 25 milliGRAM(s) IV Push every 8 hours  insulin glargine Injectable (LANTUS) 8 Unit(s) SubCutaneous at bedtime  insulin lispro (HumaLOG) corrective regimen sliding scale   SubCutaneous three times a day before meals  levETIRAcetam  IVPB 500 milliGRAM(s) IV Intermittent every 12 hours  meropenem  IVPB 500 milliGRAM(s) IV Intermittent every 24 hours  pantoprazole  Injectable 40 milliGRAM(s) IV Push daily  vancomycin  IVPB 500 milliGRAM(s) IV Intermittent once    MEDICATIONS  (PRN):  sodium chloride 0.9% lock flush 10 milliLiter(s) IV Push every 1 hour PRN Pre/post blood products, medications, blood draw, and to maintain line patency      ALLERGIES:  Allergies    No Known Allergies    Intolerances        LABS:                        8.8    13.47 )-----------( 274      ( 2020 01:19 )             29.6     01-    141  |  95<L>  |  77<H>  ----------------------------<  105<H>  3.9   |  23  |  4.13<H>    Ca    11.6<H>      2020 20:07  Phos  5.8     -  Mg     2.4         TPro  7.9  /  Alb  3.1<L>  /  TBili  0.4  /  DBili  x   /  AST  14  /  ALT  8<L>  /  AlkPhos  106        Urinalysis Basic - ( 2020 09:35 )    Color: Yellow / Appearance: Slightly Turbid / S.027 / pH: x  Gluc: x / Ketone: Negative  / Bili: Negative / Urobili: Negative   Blood: x / Protein: 300 mg/dL / Nitrite: Negative   Leuk Esterase: Large / RBC: 2 /hpf /  /HPF   Sq Epi: x / Non Sq Epi: 1 /hpf / Bacteria: Negative        RADIOLOGY & ADDITIONAL TESTS: Reviewed.

## 2020-01-03 NOTE — PROGRESS NOTE ADULT - ATTENDING COMMENTS
Agree with above. Patient seen and examined. 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 His course has been complicated by small bowel obstruction and shock state.     1. Shock - likely due to sepsis. Continue antibiotics as per ID. Patient is stable off vasopressors with goal MAP > 65. Shiley catheter currently removed. Wound care followup regarding sacral wound and concern for osteo. Plan for MRI L-S spine. Will start Midodrine if necessary.   2. Small bowel obstruction - noted on CT 12/31. Abdominal exam improved with drainage of PEG to gravity. Surgical followup appreciated and patients feeds resumed.  3. ESRD - on HD as per renal. Shiley catheter placement by IR on 1/2/19. Plan for HD today.  4. Metabolic encephalopathy - improving.   5. GOC - Palliative care evaluation noted. Patient remains full code.    Patient is medically stable for transfer to the floors for further management. Will transfer to telemetry given 8 beats of WCT overnight. Coreg was added back and no significant electrolyte abnormalities noted.

## 2020-01-03 NOTE — PROGRESS NOTE ADULT - ASSESSMENT
Assessment  DM: A1C 5.5%, was on insulin at home, now on insulin, FS trending within overall acceptable range, no hypoglycemic episodes. Patient is being monitored in the MICU, appears sleepy, NPO on tube feeds, seen during HD.  Sacral Wound: s/p debridement, monitored, FU wound care.  Hypercalcemia: Primary Hyperparathyroidism, hypercalcemia S/P Pamidronate injection, Ca levels fluctuating, trending up.  Sepsis: IV ABx for UTI, LP inconsistent with meningitis, on medications, stable, monitored.  HTN: Controlled,  on antihypertensive medications.  ESRD: On hemodialysis, Monitor labs/BMP          Robi Gay MD  Cell: [phone]  Office: [phone] Assessment  DM: A1C 5.5%, was on insulin at home, now on insulin, FS trending within overall acceptable range, no hypoglycemic episodes. Patient is being monitored in the MICU,  appears sleepy, NPO on tube feeds, seen during HD.  Sacral Wound: s/p debridement, monitored, FU wound care.  Hypercalcemia: Primary Hyperparathyroidism, hypercalcemia S/P Pamidronate injection, Ca levels fluctuating, trending up.  Sepsis: IV ABx for UTI, LP inconsistent with meningitis, on medications, stable, monitored.  HTN: Controlled,  on antihypertensive medications.  ESRD: On hemodialysis, Monitor labs/BMP          Robi Gay MD  Cell: [phone]  Office: [phone]

## 2020-01-04 LAB
ALBUMIN SERPL ELPH-MCNC: 2.4 G/DL — LOW (ref 3.3–5)
ALBUMIN SERPL ELPH-MCNC: 2.6 G/DL — LOW (ref 3.3–5)
ALP SERPL-CCNC: 103 U/L — SIGNIFICANT CHANGE UP (ref 40–120)
ALP SERPL-CCNC: 106 U/L — SIGNIFICANT CHANGE UP (ref 40–120)
ALT FLD-CCNC: 7 U/L — LOW (ref 10–45)
ALT FLD-CCNC: 8 U/L — LOW (ref 10–45)
ANION GAP SERPL CALC-SCNC: 19 MMOL/L — HIGH (ref 5–17)
ANION GAP SERPL CALC-SCNC: 19 MMOL/L — HIGH (ref 5–17)
APTT BLD: 25.2 SEC — LOW (ref 27.5–36.3)
AST SERPL-CCNC: 14 U/L — SIGNIFICANT CHANGE UP (ref 10–40)
AST SERPL-CCNC: 15 U/L — SIGNIFICANT CHANGE UP (ref 10–40)
BASE EXCESS BLDV CALC-SCNC: 0.3 MMOL/L — SIGNIFICANT CHANGE UP (ref -2–2)
BASOPHILS # BLD AUTO: 0.03 K/UL — SIGNIFICANT CHANGE UP (ref 0–0.2)
BASOPHILS NFR BLD AUTO: 0.1 % — SIGNIFICANT CHANGE UP (ref 0–2)
BILIRUB SERPL-MCNC: 0.3 MG/DL — SIGNIFICANT CHANGE UP (ref 0.2–1.2)
BILIRUB SERPL-MCNC: 0.4 MG/DL — SIGNIFICANT CHANGE UP (ref 0.2–1.2)
BUN SERPL-MCNC: 42 MG/DL — HIGH (ref 7–23)
BUN SERPL-MCNC: 54 MG/DL — HIGH (ref 7–23)
CA-I SERPL-SCNC: 1.39 MMOL/L — HIGH (ref 1.12–1.3)
CALCIUM SERPL-MCNC: 10.5 MG/DL — SIGNIFICANT CHANGE UP (ref 8.4–10.5)
CALCIUM SERPL-MCNC: 10.5 MG/DL — SIGNIFICANT CHANGE UP (ref 8.4–10.5)
CHLORIDE BLDV-SCNC: 101 MMOL/L — SIGNIFICANT CHANGE UP (ref 96–108)
CHLORIDE SERPL-SCNC: 93 MMOL/L — LOW (ref 96–108)
CHLORIDE SERPL-SCNC: 94 MMOL/L — LOW (ref 96–108)
CK MB BLD-MCNC: 8.9 % — HIGH (ref 0–3.5)
CK MB CFR SERPL CALC: 2.4 NG/ML — SIGNIFICANT CHANGE UP (ref 0–6.7)
CK SERPL-CCNC: 27 U/L — LOW (ref 30–200)
CO2 BLDV-SCNC: 26 MMOL/L — SIGNIFICANT CHANGE UP (ref 22–30)
CO2 SERPL-SCNC: 21 MMOL/L — LOW (ref 22–31)
CO2 SERPL-SCNC: 23 MMOL/L — SIGNIFICANT CHANGE UP (ref 22–31)
CREAT SERPL-MCNC: 2.48 MG/DL — HIGH (ref 0.5–1.3)
CREAT SERPL-MCNC: 3.13 MG/DL — HIGH (ref 0.5–1.3)
EOSINOPHIL # BLD AUTO: 0 K/UL — SIGNIFICANT CHANGE UP (ref 0–0.5)
EOSINOPHIL NFR BLD AUTO: 0 % — SIGNIFICANT CHANGE UP (ref 0–6)
GAS PNL BLDA: SIGNIFICANT CHANGE UP
GAS PNL BLDA: SIGNIFICANT CHANGE UP
GAS PNL BLDV: 133 MMOL/L — LOW (ref 135–145)
GAS PNL BLDV: SIGNIFICANT CHANGE UP
GLUCOSE BLDC GLUCOMTR-MCNC: 186 MG/DL — HIGH (ref 70–99)
GLUCOSE BLDC GLUCOMTR-MCNC: 206 MG/DL — HIGH (ref 70–99)
GLUCOSE BLDC GLUCOMTR-MCNC: 212 MG/DL — HIGH (ref 70–99)
GLUCOSE BLDC GLUCOMTR-MCNC: 47 MG/DL — LOW (ref 70–99)
GLUCOSE BLDC GLUCOMTR-MCNC: 72 MG/DL — SIGNIFICANT CHANGE UP (ref 70–99)
GLUCOSE BLDV-MCNC: 96 MG/DL — SIGNIFICANT CHANGE UP (ref 70–99)
GLUCOSE SERPL-MCNC: 148 MG/DL — HIGH (ref 70–99)
GLUCOSE SERPL-MCNC: 99 MG/DL — SIGNIFICANT CHANGE UP (ref 70–99)
HCO3 BLDV-SCNC: 25 MMOL/L — SIGNIFICANT CHANGE UP (ref 21–29)
HCT VFR BLD CALC: 26.1 % — LOW (ref 39–50)
HCT VFR BLD CALC: 31.5 % — LOW (ref 39–50)
HCT VFR BLDA CALC: 30 % — LOW (ref 39–50)
HGB BLD CALC-MCNC: 9.6 G/DL — LOW (ref 13–17)
HGB BLD-MCNC: 7.8 G/DL — LOW (ref 13–17)
HGB BLD-MCNC: 9.2 G/DL — LOW (ref 13–17)
HOROWITZ INDEX BLDV+IHG-RTO: 28 — SIGNIFICANT CHANGE UP
IMM GRANULOCYTES NFR BLD AUTO: 0.9 % — SIGNIFICANT CHANGE UP (ref 0–1.5)
INR BLD: 1.2 RATIO — HIGH (ref 0.88–1.16)
LACTATE BLDV-MCNC: 3.8 MMOL/L — HIGH (ref 0.7–2)
LYMPHOCYTES # BLD AUTO: 1.52 K/UL — SIGNIFICANT CHANGE UP (ref 1–3.3)
LYMPHOCYTES # BLD AUTO: 7.3 % — LOW (ref 13–44)
MAGNESIUM SERPL-MCNC: 1.7 MG/DL — SIGNIFICANT CHANGE UP (ref 1.6–2.6)
MAGNESIUM SERPL-MCNC: 1.9 MG/DL — SIGNIFICANT CHANGE UP (ref 1.6–2.6)
MCHC RBC-ENTMCNC: 24.9 PG — LOW (ref 27–34)
MCHC RBC-ENTMCNC: 25.3 PG — LOW (ref 27–34)
MCHC RBC-ENTMCNC: 29.2 GM/DL — LOW (ref 32–36)
MCHC RBC-ENTMCNC: 29.9 GM/DL — LOW (ref 32–36)
MCV RBC AUTO: 84.7 FL — SIGNIFICANT CHANGE UP (ref 80–100)
MCV RBC AUTO: 85.4 FL — SIGNIFICANT CHANGE UP (ref 80–100)
MONOCYTES # BLD AUTO: 1.28 K/UL — HIGH (ref 0–0.9)
MONOCYTES NFR BLD AUTO: 6.2 % — SIGNIFICANT CHANGE UP (ref 2–14)
NEUTROPHILS # BLD AUTO: 17.67 K/UL — HIGH (ref 1.8–7.4)
NEUTROPHILS NFR BLD AUTO: 85.5 % — HIGH (ref 43–77)
NRBC # BLD: 0 /100 WBCS — SIGNIFICANT CHANGE UP (ref 0–0)
NRBC # BLD: 0 /100 WBCS — SIGNIFICANT CHANGE UP (ref 0–0)
OTHER CELLS CSF MANUAL: 6 ML/DL — LOW (ref 18–22)
PCO2 BLDV: 43 MMHG — SIGNIFICANT CHANGE UP (ref 35–50)
PH BLDV: 7.38 — SIGNIFICANT CHANGE UP (ref 7.35–7.45)
PHOSPHATE SERPL-MCNC: 3.1 MG/DL — SIGNIFICANT CHANGE UP (ref 2.5–4.5)
PHOSPHATE SERPL-MCNC: 3.4 MG/DL — SIGNIFICANT CHANGE UP (ref 2.5–4.5)
PLATELET # BLD AUTO: 227 K/UL — SIGNIFICANT CHANGE UP (ref 150–400)
PLATELET # BLD AUTO: 401 K/UL — HIGH (ref 150–400)
PO2 BLDV: 30 MMHG — SIGNIFICANT CHANGE UP (ref 25–45)
POTASSIUM BLDV-SCNC: 2.8 MMOL/L — CRITICAL LOW (ref 3.5–5.3)
POTASSIUM SERPL-MCNC: 3.1 MMOL/L — LOW (ref 3.5–5.3)
POTASSIUM SERPL-MCNC: 3.3 MMOL/L — LOW (ref 3.5–5.3)
POTASSIUM SERPL-SCNC: 3.1 MMOL/L — LOW (ref 3.5–5.3)
POTASSIUM SERPL-SCNC: 3.3 MMOL/L — LOW (ref 3.5–5.3)
PROT SERPL-MCNC: 5.9 G/DL — LOW (ref 6–8.3)
PROT SERPL-MCNC: 6.3 G/DL — SIGNIFICANT CHANGE UP (ref 6–8.3)
PROTHROM AB SERPL-ACNC: 13.7 SEC — HIGH (ref 10–12.9)
RBC # BLD: 3.08 M/UL — LOW (ref 4.2–5.8)
RBC # BLD: 3.69 M/UL — LOW (ref 4.2–5.8)
RBC # FLD: 18.1 % — HIGH (ref 10.3–14.5)
RBC # FLD: 18.7 % — HIGH (ref 10.3–14.5)
SAO2 % BLDV: 44 % — LOW (ref 67–88)
SODIUM SERPL-SCNC: 134 MMOL/L — LOW (ref 135–145)
SODIUM SERPL-SCNC: 135 MMOL/L — SIGNIFICANT CHANGE UP (ref 135–145)
TROPONIN T, HIGH SENSITIVITY RESULT: 171 NG/L — HIGH (ref 0–51)
WBC # BLD: 10.93 K/UL — HIGH (ref 3.8–10.5)
WBC # BLD: 20.69 K/UL — HIGH (ref 3.8–10.5)
WBC # FLD AUTO: 10.93 K/UL — HIGH (ref 3.8–10.5)
WBC # FLD AUTO: 20.69 K/UL — HIGH (ref 3.8–10.5)

## 2020-01-04 PROCEDURE — 36556 INSERT NON-TUNNEL CV CATH: CPT | Mod: GC,59

## 2020-01-04 PROCEDURE — 93010 ELECTROCARDIOGRAM REPORT: CPT

## 2020-01-04 PROCEDURE — 71045 X-RAY EXAM CHEST 1 VIEW: CPT | Mod: 26

## 2020-01-04 PROCEDURE — 31500 INSERT EMERGENCY AIRWAY: CPT | Mod: GC,59

## 2020-01-04 RX ORDER — INSULIN LISPRO 100/ML
VIAL (ML) SUBCUTANEOUS
Refills: 0 | Status: DISCONTINUED | OUTPATIENT
Start: 2020-01-04 | End: 2020-01-05

## 2020-01-04 RX ORDER — SODIUM CHLORIDE 9 MG/ML
1500 INJECTION, SOLUTION INTRAVENOUS ONCE
Refills: 0 | Status: COMPLETED | OUTPATIENT
Start: 2020-01-04 | End: 2020-01-04

## 2020-01-04 RX ORDER — NOREPINEPHRINE BITARTRATE/D5W 8 MG/250ML
1.5 PLASTIC BAG, INJECTION (ML) INTRAVENOUS
Qty: 8 | Refills: 0 | Status: DISCONTINUED | OUTPATIENT
Start: 2020-01-04 | End: 2020-01-05

## 2020-01-04 RX ORDER — VANCOMYCIN HCL 1 G
125 VIAL (EA) INTRAVENOUS EVERY 6 HOURS
Refills: 0 | Status: DISCONTINUED | OUTPATIENT
Start: 2020-01-04 | End: 2020-01-04

## 2020-01-04 RX ORDER — PROPOFOL 10 MG/ML
30 INJECTION, EMULSION INTRAVENOUS
Qty: 1000 | Refills: 0 | Status: DISCONTINUED | OUTPATIENT
Start: 2020-01-04 | End: 2020-01-05

## 2020-01-04 RX ORDER — MIDODRINE HYDROCHLORIDE 2.5 MG/1
20 TABLET ORAL THREE TIMES A DAY
Refills: 0 | Status: DISCONTINUED | OUTPATIENT
Start: 2020-01-04 | End: 2020-01-05

## 2020-01-04 RX ORDER — PHENYLEPHRINE HYDROCHLORIDE 10 MG/ML
0.2 INJECTION INTRAVENOUS
Qty: 40 | Refills: 0 | Status: DISCONTINUED | OUTPATIENT
Start: 2020-01-04 | End: 2020-01-05

## 2020-01-04 RX ORDER — POTASSIUM CHLORIDE 20 MEQ
10 PACKET (EA) ORAL
Refills: 0 | Status: COMPLETED | OUTPATIENT
Start: 2020-01-04 | End: 2020-01-04

## 2020-01-04 RX ORDER — NITROGLYCERIN 6.5 MG
1 CAPSULE, EXTENDED RELEASE ORAL ONCE
Refills: 0 | Status: COMPLETED | OUTPATIENT
Start: 2020-01-04 | End: 2020-01-04

## 2020-01-04 RX ORDER — CHLORHEXIDINE GLUCONATE 213 G/1000ML
15 SOLUTION TOPICAL EVERY 12 HOURS
Refills: 0 | Status: DISCONTINUED | OUTPATIENT
Start: 2020-01-04 | End: 2020-01-05

## 2020-01-04 RX ORDER — VANCOMYCIN HCL 1 G
125 VIAL (EA) INTRAVENOUS EVERY 6 HOURS
Refills: 0 | Status: DISCONTINUED | OUTPATIENT
Start: 2020-01-04 | End: 2020-01-05

## 2020-01-04 RX ORDER — SODIUM CHLORIDE 9 MG/ML
500 INJECTION, SOLUTION INTRAVENOUS ONCE
Refills: 0 | Status: COMPLETED | OUTPATIENT
Start: 2020-01-04 | End: 2020-01-04

## 2020-01-04 RX ORDER — SODIUM CHLORIDE 9 MG/ML
1000 INJECTION, SOLUTION INTRAVENOUS ONCE
Refills: 0 | Status: COMPLETED | OUTPATIENT
Start: 2020-01-04 | End: 2020-01-04

## 2020-01-04 RX ORDER — MIDAZOLAM HYDROCHLORIDE 1 MG/ML
4 INJECTION, SOLUTION INTRAMUSCULAR; INTRAVENOUS ONCE
Refills: 0 | Status: DISCONTINUED | OUTPATIENT
Start: 2020-01-04 | End: 2020-01-04

## 2020-01-04 RX ADMIN — Medication 2: at 08:11

## 2020-01-04 RX ADMIN — Medication 4: at 15:49

## 2020-01-04 RX ADMIN — SODIUM CHLORIDE 1000 MILLILITER(S): 9 INJECTION, SOLUTION INTRAVENOUS at 19:24

## 2020-01-04 RX ADMIN — LEVETIRACETAM 400 MILLIGRAM(S): 250 TABLET, FILM COATED ORAL at 18:55

## 2020-01-04 RX ADMIN — Medication 1 APPLICATION(S): at 19:23

## 2020-01-04 RX ADMIN — Medication 100 MILLIEQUIVALENT(S): at 21:29

## 2020-01-04 RX ADMIN — Medication 12 MILLIGRAM(S): at 21:07

## 2020-01-04 RX ADMIN — MIDODRINE HYDROCHLORIDE 20 MILLIGRAM(S): 2.5 TABLET ORAL at 12:12

## 2020-01-04 RX ADMIN — CHLORHEXIDINE GLUCONATE 15 MILLILITER(S): 213 SOLUTION TOPICAL at 05:13

## 2020-01-04 RX ADMIN — Medication 650 MILLIGRAM(S): at 13:12

## 2020-01-04 RX ADMIN — MIDODRINE HYDROCHLORIDE 10 MILLIGRAM(S): 2.5 TABLET ORAL at 05:13

## 2020-01-04 RX ADMIN — MIDODRINE HYDROCHLORIDE 20 MILLIGRAM(S): 2.5 TABLET ORAL at 18:25

## 2020-01-04 RX ADMIN — ATORVASTATIN CALCIUM 40 MILLIGRAM(S): 80 TABLET, FILM COATED ORAL at 21:07

## 2020-01-04 RX ADMIN — SODIUM CHLORIDE 1000 MILLILITER(S): 9 INJECTION, SOLUTION INTRAVENOUS at 23:45

## 2020-01-04 RX ADMIN — Medication 12 MILLIGRAM(S): at 05:13

## 2020-01-04 RX ADMIN — Medication 1: at 11:01

## 2020-01-04 RX ADMIN — PANTOPRAZOLE SODIUM 40 MILLIGRAM(S): 20 TABLET, DELAYED RELEASE ORAL at 11:01

## 2020-01-04 RX ADMIN — Medication 1 APPLICATION(S): at 05:13

## 2020-01-04 RX ADMIN — Medication 1 MILLIGRAM(S): at 19:21

## 2020-01-04 RX ADMIN — Medication 12 MILLIGRAM(S): at 13:48

## 2020-01-04 RX ADMIN — LEVETIRACETAM 400 MILLIGRAM(S): 250 TABLET, FILM COATED ORAL at 05:21

## 2020-01-04 RX ADMIN — Medication 100 MILLIEQUIVALENT(S): at 23:43

## 2020-01-04 RX ADMIN — SODIUM CHLORIDE 750 MILLILITER(S): 9 INJECTION, SOLUTION INTRAVENOUS at 19:57

## 2020-01-04 RX ADMIN — Medication 650 MILLIGRAM(S): at 12:11

## 2020-01-04 RX ADMIN — CHLORHEXIDINE GLUCONATE 1 APPLICATION(S): 213 SOLUTION TOPICAL at 05:13

## 2020-01-04 RX ADMIN — Medication 1 INCH(S): at 20:58

## 2020-01-04 RX ADMIN — MEROPENEM 100 MILLIGRAM(S): 1 INJECTION INTRAVENOUS at 02:06

## 2020-01-04 RX ADMIN — Medication 100 MILLIEQUIVALENT(S): at 20:58

## 2020-01-04 RX ADMIN — CHLORHEXIDINE GLUCONATE 15 MILLILITER(S): 213 SOLUTION TOPICAL at 19:24

## 2020-01-04 NOTE — PROGRESS NOTE ADULT - SUBJECTIVE AND OBJECTIVE BOX
SUBJECTIVE / OVERNIGHT EVENTS: Patient seen and examined at bedside this morning. ON, 8 beats of vtach in the AM,      ROS: [ - ] Fever [ - ] Chills [ - ] Nausea/Vomiting [ - ] Chest Pain [ - ] Shortness of breath    Patient seen and examined at bedside. A&O x 2. Denies fevers, cp, sob, heart palpitations. c/o back pain. Tele reviewed. PVCs, no further vtach.    OBJECTIVE:  ICU Vital Signs Last 24 Hrs  T(C): 36.8 (04 Jan 2020 11:00), Max: 36.8 (04 Jan 2020 07:00)  T(F): 98.2 (04 Jan 2020 11:00), Max: 98.2 (04 Jan 2020 07:00)  HR: 105 (04 Jan 2020 12:00) (91 - 108)  BP: 111/55 (04 Jan 2020 12:00) (77/50 - 146/63)  BP(mean): 78 (04 Jan 2020 12:00) (57 - 91)  ABP: --  ABP(mean): --  RR: 15 (04 Jan 2020 12:00) (13 - 27)  SpO2: 97% (04 Jan 2020 12:00) (93% - 100%)        01-03 @ 07:01 - 01-04 @ 07:00  --------------------------------------------------------  IN: 2100 mL / OUT: 400 mL / NET: 1700 mL    01-04 @ 07:01 - 01-04 @ 12:38  --------------------------------------------------------  IN: 250 mL / OUT: 0 mL / NET: 250 mL      CAPILLARY BLOOD GLUCOSE      POCT Blood Glucose.: 186 mg/dL (04 Jan 2020 10:59)      PHYSICAL EXAM:    GENERAL: NAD, lying comfortably in bed   HEAD:  Atraumatic, Normocephalic  EYES: EOMI, PERRLA, conjunctiva and sclera clear  NECK: Supple, No JVD  CHEST/LUNG: Clear to auscultation bilaterally; No wheeze  HEART: Regular rate and rhythm; No murmurs, rubs, or gallops  ABDOMEN: Soft, Nontender, Nondistended; Bowel sounds present  EXTREMITIES:  2+ Peripheral Pulses, No clubbing, cyanosis, or edema  NEURO: AAOx3, non-focal  SKIN: No rashes or lesions      LINES:    HOSPITAL MEDICATIONS:  Standing Meds:  atorvastatin 40 milliGRAM(s) Oral at bedtime  chlorhexidine 0.12% Liquid 15 milliLiter(s) Oral Mucosa two times a day  chlorhexidine 4% Liquid 1 Application(s) Topical <User Schedule>  chlorhexidine 4% Liquid 1 Application(s) Topical <User Schedule>  Dakins Solution - 1/4 Strength 1 Application(s) Topical two times a day  dextrose 5%. 1000 milliLiter(s) IV Continuous <Continuous>  epoetin atn Injectable 03305 Unit(s) IV Push <User Schedule>  hydrocortisone sodium succinate Injectable 12 milliGRAM(s) IV Push every 8 hours  insulin glargine Injectable (LANTUS) 8 Unit(s) SubCutaneous at bedtime  insulin lispro (HumaLOG) corrective regimen sliding scale   SubCutaneous three times a day before meals  levETIRAcetam  IVPB 500 milliGRAM(s) IV Intermittent every 12 hours  meropenem  IVPB 500 milliGRAM(s) IV Intermittent every 24 hours  midodrine. 20 milliGRAM(s) Oral three times a day  pantoprazole  Injectable 40 milliGRAM(s) IV Push daily      PRN Meds:  acetaminophen    Suspension .. 650 milliGRAM(s) Oral every 6 hours PRN  sodium chloride 0.9% lock flush 10 milliLiter(s) IV Push every 1 hour PRN      LABS:                        7.8    20.69 )-----------( 227      ( 04 Jan 2020 01:54 )             26.1     Hgb Trend: 7.8<--, 8.1<--, 8.8<--, 7.7<--, 8.4<--  01-04    135  |  93<L>  |  42<H>  ----------------------------<  148<H>  3.3<L>   |  23  |  2.48<H>    Ca    10.5      04 Jan 2020 01:54  Phos  3.1     01-04  Mg     1.9     01-04    TPro  6.3  /  Alb  2.6<L>  /  TBili  0.3  /  DBili  x   /  AST  14  /  ALT  8<L>  /  AlkPhos  103  01-04    Creatinine Trend: 2.48<--, 4.51<--, 4.13<--, 3.87<--, 3.00<--, 3.38<--  PT/INR - ( 04 Jan 2020 02:04 )   PT: 13.7 sec;   INR: 1.20 ratio         PTT - ( 04 Jan 2020 02:04 )  PTT:25.2 sec    Arterial Blood Gas:  01-04 @ 01:55  7.46/41/48/28/83/4.6  ABG lactate: --        MICROBIOLOGY:     RADIOLOGY:  [ ] Reviewed and interpreted by me SUBJECTIVE / OVERNIGHT EVENTS: Patient seen and examined at bedside this morning. ON, 8 beats of vtach in the AM, self-resolved.       ROS: [ - ] Fever [ - ] Chills [ - ] Nausea/Vomiting [ - ] Chest Pain [ - ] Shortness of breath    Patient seen and examined at bedside. A&O x 2. Denies fevers, cp, sob, heart palpitations. c/o back pain.       OBJECTIVE:  ICU Vital Signs Last 24 Hrs  T(C): 36.8 (04 Jan 2020 11:00), Max: 36.8 (04 Jan 2020 07:00)  T(F): 98.2 (04 Jan 2020 11:00), Max: 98.2 (04 Jan 2020 07:00)  HR: 105 (04 Jan 2020 12:00) (91 - 108)  BP: 111/55 (04 Jan 2020 12:00) (77/50 - 146/63)  BP(mean): 78 (04 Jan 2020 12:00) (57 - 91)  ABP: --  ABP(mean): --  RR: 15 (04 Jan 2020 12:00) (13 - 27)  SpO2: 97% (04 Jan 2020 12:00) (93% - 100%)        01-03 @ 07:01 - 01-04 @ 07:00  --------------------------------------------------------  IN: 2100 mL / OUT: 400 mL / NET: 1700 mL    01-04 @ 07:01 - 01-04 @ 12:38  --------------------------------------------------------  IN: 250 mL / OUT: 0 mL / NET: 250 mL      CAPILLARY BLOOD GLUCOSE      POCT Blood Glucose.: 186 mg/dL (04 Jan 2020 10:59)      PHYSICAL EXAM:    GENERAL: NAD respiring on RA   HEAD:  Atraumatic, Normocephalic  EYES: EOMI, PERRLA, conjunctiva and sclera clear  NECK: Supple, No JVD  CHEST/LUNG: CTAB   HEART: RRR, holosystolic murmur throughout precordium   ABDOMEN: Soft, Nontender, Nondistended (improved from prior); Bowel sounds present. PEG tube connected to feeds.  EXTREMITIES:  No clubbing, cyanosis, or edema  PSYCH: AAOx2  NEUROLOGY: non-focal  SKIN: abrasions left ant tibia, sacral wound to bone, dressed       LINES:    HOSPITAL MEDICATIONS:  Standing Meds:  atorvastatin 40 milliGRAM(s) Oral at bedtime  chlorhexidine 0.12% Liquid 15 milliLiter(s) Oral Mucosa two times a day  chlorhexidine 4% Liquid 1 Application(s) Topical <User Schedule>  chlorhexidine 4% Liquid 1 Application(s) Topical <User Schedule>  Dakins Solution - 1/4 Strength 1 Application(s) Topical two times a day  dextrose 5%. 1000 milliLiter(s) IV Continuous <Continuous>  epoetin tan Injectable 99500 Unit(s) IV Push <User Schedule>  hydrocortisone sodium succinate Injectable 12 milliGRAM(s) IV Push every 8 hours  insulin glargine Injectable (LANTUS) 8 Unit(s) SubCutaneous at bedtime  insulin lispro (HumaLOG) corrective regimen sliding scale   SubCutaneous three times a day before meals  levETIRAcetam  IVPB 500 milliGRAM(s) IV Intermittent every 12 hours  meropenem  IVPB 500 milliGRAM(s) IV Intermittent every 24 hours  midodrine. 20 milliGRAM(s) Oral three times a day  pantoprazole  Injectable 40 milliGRAM(s) IV Push daily      PRN Meds:  acetaminophen    Suspension .. 650 milliGRAM(s) Oral every 6 hours PRN  sodium chloride 0.9% lock flush 10 milliLiter(s) IV Push every 1 hour PRN      LABS:                        7.8    20.69 )-----------( 227      ( 04 Jan 2020 01:54 )             26.1     Hgb Trend: 7.8<--, 8.1<--, 8.8<--, 7.7<--, 8.4<--  01-04    135  |  93<L>  |  42<H>  ----------------------------<  148<H>  3.3<L>   |  23  |  2.48<H>    Ca    10.5      04 Jan 2020 01:54  Phos  3.1     01-04  Mg     1.9     01-04    TPro  6.3  /  Alb  2.6<L>  /  TBili  0.3  /  DBili  x   /  AST  14  /  ALT  8<L>  /  AlkPhos  103  01-04    Creatinine Trend: 2.48<--, 4.51<--, 4.13<--, 3.87<--, 3.00<--, 3.38<--  PT/INR - ( 04 Jan 2020 02:04 )   PT: 13.7 sec;   INR: 1.20 ratio         PTT - ( 04 Jan 2020 02:04 )  PTT:25.2 sec    Arterial Blood Gas:  01-04 @ 01:55  7.46/41/48/28/83/4.6  ABG lactate: --        MICROBIOLOGY:     RADIOLOGY:  [ ] Reviewed and interpreted by me
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
## 2020-01-04 NOTE — PROGRESS NOTE ADULT - ATTENDING COMMENTS
Agree with above.  Wound care for sacral decubitus  Episode of 8 beats NSVT    On bedboard for telemetry.

## 2020-01-04 NOTE — PROGRESS NOTE ADULT - SUBJECTIVE AND OBJECTIVE BOX
INTERVAL HPI/OVERNIGHT EVENTS:  No new overnight event.  No N/V/D.  Tolerating diet via peg  Allergies    No Known Allergies    Intolerances    General:  No wt loss, fevers, chills, night sweats, fatigue,   Eyes:  Good vision, no reported pain  ENT:  No sore throat, pain, runny nose, dysphagia  CV:  No pain, palpitations, hypo/hypertension  Resp:  No dyspnea, cough, tachypnea, wheezing  GI:  No pain, No nausea, No vomiting, No diarrhea, No constipation, No weight loss, No fever, No pruritis, No rectal bleeding, No tarry stools, No dysphagia,  :  No pain, bleeding, incontinence, nocturia  Muscle:  No pain, weakness  Neuro:  No weakness, tingling, memory problems  Psych:  No fatigue, insomnia, mood problems, depression  Endocrine:  No polyuria, polydipsia, cold/heat intolerance  Heme:  No petechiae, ecchymosis, easy bruisability  Skin:  No rash, tattoos, scars, edema      PHYSICAL EXAM:   Vital Signs:  Vital Signs Last 24 Hrs  T(C): 36.8 (2020 11:00), Max: 36.8 (2020 07:00)  T(F): 98.2 (2020 11:00), Max: 98.2 (2020 07:00)  HR: 105 (2020 12:00) (91 - 108)  BP: 111/55 (2020 12:00) (77/50 - 146/63)  BP(mean): 78 (2020 12:00) (57 - 91)  RR: 15 (2020 12:00) (13 - 27)  SpO2: 97% (2020 12:00) (93% - 100%)  Daily     Daily Weight in k.1 (2020 00:00)I&O's Summary    2020 07:01  -  2020 07:00  --------------------------------------------------------  IN: 2100 mL / OUT: 400 mL / NET: 1700 mL    2020 07:01  -  2020 12:44  --------------------------------------------------------  IN: 250 mL / OUT: 0 mL / NET: 250 mL        GENERAL:  Appears stated age, well-groomed, well-nourished, no distress  HEENT:  NC/AT,  conjunctivae clear and pink, no thyromegaly, nodules, adenopathy, no JVD, sclera -anicteric  CHEST:  Full & symmetric excursion, no increased effort, breath sounds clear  HEART:  Regular rhythm, S1, S2, no murmur/rub/S3/S4, no abdominal bruit, no edema  ABDOMEN:  Soft, non-tender, non-distended, normoactive bowel sounds,  no masses ,no hepato-splenomegaly, no signs of chronic liver disease  EXTEREMITIES:  no cyanosis,clubbing or edema  SKIN:  No rash/erythema/ecchymoses/petechiae/wounds/abscess/warm/dry  NEURO:  Alert, oriented, no asterixis, no tremor, no encephalopathy      LABS:                        7.8    20.69 )-----------( 227      ( 2020 01:54 )             26.1     01-04    135  |  93<L>  |  42<H>  ----------------------------<  148<H>  3.3<L>   |  23  |  2.48<H>    Ca    10.5      2020 01:54  Phos  3.1     -04  Mg     1.9     -04    TPro  6.3  /  Alb  2.6<L>  /  TBili  0.3  /  DBili  x   /  AST  14  /  ALT  8<L>  /  AlkPhos  103  01-04    PT/INR - ( 2020 02:04 )   PT: 13.7 sec;   INR: 1.20 ratio         PTT - ( 2020 02:04 )  PTT:25.2 sec    amylase   lipase  RADIOLOGY & ADDITIONAL TESTS:

## 2020-01-04 NOTE — PROGRESS NOTE ADULT - ASSESSMENT
74M PMHx of ESRD s/p failed renal transplant x2, HCV, DM, and meningococcal meningitis 2 years ago was sent in to the ED from HD for AMS and low BPs. Admitted with concern for CVA, neurology following. Patient with RRT overnight 11/27 resulting in transfer to MICU for respiratory failure concern for aspiration and sepsis. GI consulted for dysphagia.      continue g tube feeding   in and out  ppi once a day

## 2020-01-04 NOTE — PROVIDER CONTACT NOTE (CRITICAL VALUE NOTIFICATION) - RECOMMENDATIONS
provider to assess
Continue plan of care.
GIL Feldman made aware
Repeat CBC stat.
pt has a hx of Hep C as per patients chart
pt in dialysis when critical value called from lab - NP Myra Myers notified and T&C sent stat , 1 unit pRBC ordered
repeat CBC
transfuse 1 unit PRBC

## 2020-01-04 NOTE — PROGRESS NOTE ADULT - SUBJECTIVE AND OBJECTIVE BOX
Chief complaint  Patient is a 74y old  Male who presents with a chief complaint of ams (04 Jan 2020 12:44)   Review of systems  Patient in bed, looks comfortable, no fever, no hypoglycemia.    Labs and Fingersticks  CAPILLARY BLOOD GLUCOSE      POCT Blood Glucose.: 212 mg/dL (04 Jan 2020 15:29)  POCT Blood Glucose.: 186 mg/dL (04 Jan 2020 10:59)  POCT Blood Glucose.: 206 mg/dL (04 Jan 2020 08:09)  POCT Blood Glucose.: 177 mg/dL (03 Jan 2020 23:09)      Anion Gap, Serum: 19 <H> (01-04 @ 01:54)  Anion Gap, Serum: 22 <H> (01-03 @ 12:13)  Anion Gap, Serum: 23 <H> (01-02 @ 20:07)      Calcium, Total Serum: 10.5 (01-04 @ 01:54)  Calcium, Total Serum: 11.5 <H> (01-03 @ 12:13)  Calcium, Total Serum: 11.6 <H> (01-02 @ 20:07)  Albumin, Serum: 2.6 <L> (01-04 @ 01:54)  Albumin, Serum: 2.7 <L> (01-03 @ 12:13)    Alanine Aminotransferase (ALT/SGPT): 8 <L> (01-04 @ 01:54)  Alanine Aminotransferase (ALT/SGPT): 6 <L> (01-03 @ 12:13)  Alkaline Phosphatase, Serum: 103 (01-04 @ 01:54)  Alkaline Phosphatase, Serum: 100 (01-03 @ 12:13)  Aspartate Aminotransferase (AST/SGOT): 14 (01-04 @ 01:54)  Aspartate Aminotransferase (AST/SGOT): 9 <L> (01-03 @ 12:13)        01-04    135  |  93<L>  |  42<H>  ----------------------------<  148<H>  3.3<L>   |  23  |  2.48<H>    Ca    10.5      04 Jan 2020 01:54  Phos  3.1     01-04  Mg     1.9     01-04    TPro  6.3  /  Alb  2.6<L>  /  TBili  0.3  /  DBili  x   /  AST  14  /  ALT  8<L>  /  AlkPhos  103  01-04                        7.8    20.69 )-----------( 227      ( 04 Jan 2020 01:54 )             26.1     Medications  MEDICATIONS  (STANDING):  atorvastatin 40 milliGRAM(s) Oral at bedtime  chlorhexidine 0.12% Liquid 15 milliLiter(s) Oral Mucosa two times a day  chlorhexidine 4% Liquid 1 Application(s) Topical <User Schedule>  chlorhexidine 4% Liquid 1 Application(s) Topical <User Schedule>  Dakins Solution - 1/4 Strength 1 Application(s) Topical two times a day  dextrose 5%. 1000 milliLiter(s) (50 mL/Hr) IV Continuous <Continuous>  epoetin tan Injectable 78500 Unit(s) IV Push <User Schedule>  hydrocortisone sodium succinate Injectable 12 milliGRAM(s) IV Push every 8 hours  insulin glargine Injectable (LANTUS) 8 Unit(s) SubCutaneous at bedtime  insulin lispro (HumaLOG) corrective regimen sliding scale   SubCutaneous three times a day before meals  lactated ringers Bolus 500 milliLiter(s) IV Bolus once  levETIRAcetam  IVPB 500 milliGRAM(s) IV Intermittent every 12 hours  meropenem  IVPB 500 milliGRAM(s) IV Intermittent every 24 hours  midodrine. 20 milliGRAM(s) Oral three times a day  nitroglycerin    2% Ointment 1 Inch(s) Transdermal once  pantoprazole  Injectable 40 milliGRAM(s) IV Push daily  phenylephrine    Infusion 0.2 MICROgram(s)/kG/Min (4.74 mL/Hr) IV Continuous <Continuous>  terbutaline  Injectable 1 milliGRAM(s) SubCutaneous once      Physical Exam  General: Patient comfortable in bed  Vital Signs Last 12 Hrs  T(F): 98.4 (01-04-20 @ 15:00), Max: 98.4 (01-04-20 @ 15:00)  HR: 102 (01-04-20 @ 18:15) (98 - 106)  BP: 101/50 (01-04-20 @ 18:15) (69/47 - 122/66)  BP(mean): 72 (01-04-20 @ 18:15) (53 - 91)  RR: 29 (01-04-20 @ 18:15) (10 - 31)  SpO2: 100% (01-04-20 @ 18:15) (91% - 100%)  Neck: No palpable thyroid nodules.  CVS: S1S2, No murmurs  Respiratory: No wheezing, no crepitations  GI: Abdomen soft, bowel sounds positive  Musculoskeletal:  edema lower extremities.   Skin: No skin rashes, no ecchymosis    Diagnostics    Thyroid Stimulating Hormone, Serum: AM Sched. Collection: 17-Dec-2019 06:00 (12-16 @ 12:58)  Free Thyroxine, Serum: AM Sched. Collection: 17-Dec-2019 06:00 (12-16 @ 12:58)

## 2020-01-04 NOTE — PROGRESS NOTE ADULT - ASSESSMENT
Assessment  DM: A1C 5.5%, was on insulin at home, now on insulin, Blood sugars fluctuating, no hypoglycemic episodes. Patient is being monitored in the MICU,  appears sleepy, NPO on tube feeds, seen during HD.  Sacral Wound: s/p debridement, monitored, FU wound care.  Hypercalcemia: Primary Hyperparathyroidism, hypercalcemia S/P Pamidronate injection, Ca levels fluctuating, trending up.  Sepsis: IV ABx for UTI, LP inconsistent with meningitis, on medications, stable, monitored.  HTN: Controlled,  on antihypertensive medications.  ESRD: On hemodialysis, Monitor labs/BMP          Robi Gay MD  Cell: [phone]  Office: [phone]

## 2020-01-04 NOTE — PROGRESS NOTE ADULT - SUBJECTIVE AND OBJECTIVE BOX
Patient is a 74y Male whom presented to   pateint seen and examined nad , in am     PAST MEDICAL & SURGICAL HISTORY:  Kidney transplant recipient  Anuria  GERD (gastroesophageal reflux disease)  Kidney transplant failure: dx: 2/2013  Hepatitis C: dx: 90&#x27;s.  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 10 milliGRAM(s) Oral three times a day  mycophenolate mofetil 250 milliGRAM(s) Oral two times a day  predniSONE   Tablet 5 milliGRAM(s) Oral daily  sevelamer carbonate 800 milliGRAM(s) Oral three times a day with meals  sodium bicarbonate 650 milliGRAM(s) Oral two times a day  tamsulosin 0.4 milliGRAM(s) Oral at bedtime      Allergies    No Known Allergies                       SOCIAL HISTORY:  Denies ETOh,Smoking,     FAMILY HISTORY:  Family history of breast cancer in sister (Sibling): living at 92 yo  Family history of prostate cancer in father  Family history of lung cancer  Family history of hypertension in mother  Family history of diabetes mellitus: mother      REVIEW OF SYSTEMS:    unbable to obtained                                              9.2    10.93 )-----------( 401      ( 04 Jan 2020 19:12 )             31.5       CBC Full  -  ( 04 Jan 2020 19:12 )  WBC Count : 10.93 K/uL  RBC Count : 3.69 M/uL  Hemoglobin : 9.2 g/dL  Hematocrit : 31.5 %  Platelet Count - Automated : 401 K/uL  Mean Cell Volume : 85.4 fl  Mean Cell Hemoglobin : 24.9 pg  Mean Cell Hemoglobin Concentration : 29.2 gm/dL  Auto Neutrophil # : x  Auto Lymphocyte # : x  Auto Monocyte # : x  Auto Eosinophil # : x  Auto Basophil # : x  Auto Neutrophil % : x  Auto Lymphocyte % : x  Auto Monocyte % : x  Auto Eosinophil % : x  Auto Basophil % : x      01-04    135  |  93<L>  |  42<H>  ----------------------------<  148<H>  3.3<L>   |  23  |  2.48<H>    Ca    10.5      04 Jan 2020 01:54  Phos  3.1     01-04  Mg     1.9     01-04    TPro  6.3  /  Alb  2.6<L>  /  TBili  0.3  /  DBili  x   /  AST  14  /  ALT  8<L>  /  AlkPhos  103  01-04      CAPILLARY BLOOD GLUCOSE      POCT Blood Glucose.: 212 mg/dL (04 Jan 2020 15:29)  POCT Blood Glucose.: 186 mg/dL (04 Jan 2020 10:59)  POCT Blood Glucose.: 206 mg/dL (04 Jan 2020 08:09)  POCT Blood Glucose.: 177 mg/dL (03 Jan 2020 23:09)      Vital Signs Last 24 Hrs  T(C): 36.9 (04 Jan 2020 15:00), Max: 36.9 (04 Jan 2020 15:00)  T(F): 98.4 (04 Jan 2020 15:00), Max: 98.4 (04 Jan 2020 15:00)  HR: 111 (04 Jan 2020 20:00) (91 - 112)  BP: 91/53 (04 Jan 2020 20:00) (68/41 - 146/63)  BP(mean): 69 (04 Jan 2020 20:00) (48 - 91)  RR: 31 (04 Jan 2020 20:00) (10 - 39)  SpO2: 99% (04 Jan 2020 20:00) (85% - 100%)        PT/INR - ( 04 Jan 2020 02:04 )   PT: 13.7 sec;   INR: 1.20 ratio         PTT - ( 04 Jan 2020 02:04 )  PTT:25.2 sec                                                     HEENT: conjunctive   clear   Neck:  No JVD  Respiratory: decrease bs b/l   Cardiovascular: S1 and S2  Gastrointestinal: BS+, soft, NT/ND  Extremities: No peripheral edema  Skin: dry   Access: pos fistula

## 2020-01-05 LAB
ALBUMIN SERPL ELPH-MCNC: 2.2 G/DL — LOW (ref 3.3–5)
ALP SERPL-CCNC: 110 U/L — SIGNIFICANT CHANGE UP (ref 40–120)
ALT FLD-CCNC: 5 U/L — LOW (ref 10–45)
ANION GAP SERPL CALC-SCNC: 17 MMOL/L — SIGNIFICANT CHANGE UP (ref 5–17)
APTT BLD: 26.8 SEC — LOW (ref 27.5–36.3)
AST SERPL-CCNC: 19 U/L — SIGNIFICANT CHANGE UP (ref 10–40)
BILIRUB SERPL-MCNC: 0.4 MG/DL — SIGNIFICANT CHANGE UP (ref 0.2–1.2)
BUN SERPL-MCNC: 60 MG/DL — HIGH (ref 7–23)
C DIFF BY PCR RESULT: SIGNIFICANT CHANGE UP
C DIFF GDH STL QL: SIGNIFICANT CHANGE UP
C DIFF GDH STL QL: SIGNIFICANT CHANGE UP
C DIFF TOX GENS STL QL NAA+PROBE: SIGNIFICANT CHANGE UP
CALCIUM SERPL-MCNC: 10.6 MG/DL — HIGH (ref 8.4–10.5)
CHLORIDE SERPL-SCNC: 95 MMOL/L — LOW (ref 96–108)
CK MB CFR SERPL CALC: 3 NG/ML — SIGNIFICANT CHANGE UP (ref 0–6.7)
CK SERPL-CCNC: 36 U/L — SIGNIFICANT CHANGE UP (ref 30–200)
CO2 SERPL-SCNC: 20 MMOL/L — LOW (ref 22–31)
CREAT SERPL-MCNC: 3.09 MG/DL — HIGH (ref 0.5–1.3)
CULTURE RESULTS: SIGNIFICANT CHANGE UP
GAS PNL BLDA: SIGNIFICANT CHANGE UP
GAS PNL BLDA: SIGNIFICANT CHANGE UP
GLUCOSE BLDC GLUCOMTR-MCNC: 166 MG/DL — HIGH (ref 70–99)
GLUCOSE BLDC GLUCOMTR-MCNC: 201 MG/DL — HIGH (ref 70–99)
GLUCOSE BLDC GLUCOMTR-MCNC: 30 MG/DL — CRITICAL LOW (ref 70–99)
GLUCOSE BLDC GLUCOMTR-MCNC: 365 MG/DL — HIGH (ref 70–99)
GLUCOSE SERPL-MCNC: 108 MG/DL — HIGH (ref 70–99)
GRAM STN FLD: SIGNIFICANT CHANGE UP
HCT VFR BLD CALC: 34.3 % — LOW (ref 39–50)
HGB BLD-MCNC: 9.8 G/DL — LOW (ref 13–17)
MAGNESIUM SERPL-MCNC: 1.8 MG/DL — SIGNIFICANT CHANGE UP (ref 1.6–2.6)
MCHC RBC-ENTMCNC: 25.1 PG — LOW (ref 27–34)
MCHC RBC-ENTMCNC: 28.6 GM/DL — LOW (ref 32–36)
MCV RBC AUTO: 87.9 FL — SIGNIFICANT CHANGE UP (ref 80–100)
NRBC # BLD: 0 /100 WBCS — SIGNIFICANT CHANGE UP (ref 0–0)
PHOSPHATE SERPL-MCNC: 4.8 MG/DL — HIGH (ref 2.5–4.5)
PLATELET # BLD AUTO: 335 K/UL — SIGNIFICANT CHANGE UP (ref 150–400)
POTASSIUM SERPL-MCNC: 4 MMOL/L — SIGNIFICANT CHANGE UP (ref 3.5–5.3)
POTASSIUM SERPL-SCNC: 4 MMOL/L — SIGNIFICANT CHANGE UP (ref 3.5–5.3)
PROT SERPL-MCNC: 5.7 G/DL — LOW (ref 6–8.3)
RBC # BLD: 3.9 M/UL — LOW (ref 4.2–5.8)
RBC # FLD: 18.7 % — HIGH (ref 10.3–14.5)
SODIUM SERPL-SCNC: 132 MMOL/L — LOW (ref 135–145)
SPECIMEN SOURCE: SIGNIFICANT CHANGE UP
SPECIMEN SOURCE: SIGNIFICANT CHANGE UP
TROPONIN T, HIGH SENSITIVITY RESULT: 175 NG/L — HIGH (ref 0–51)
VANCOMYCIN TROUGH SERPL-MCNC: 12.7 UG/ML — SIGNIFICANT CHANGE UP (ref 10–20)
WBC # BLD: 31.23 K/UL — HIGH (ref 3.8–10.5)
WBC # FLD AUTO: 31.23 K/UL — HIGH (ref 3.8–10.5)

## 2020-01-05 PROCEDURE — 82553 CREATINE MB FRACTION: CPT

## 2020-01-05 PROCEDURE — C1752: CPT

## 2020-01-05 PROCEDURE — 82310 ASSAY OF CALCIUM: CPT

## 2020-01-05 PROCEDURE — 76937 US GUIDE VASCULAR ACCESS: CPT

## 2020-01-05 PROCEDURE — 87102 FUNGUS ISOLATION CULTURE: CPT

## 2020-01-05 PROCEDURE — 86704 HEP B CORE ANTIBODY TOTAL: CPT

## 2020-01-05 PROCEDURE — 87324 CLOSTRIDIUM AG IA: CPT

## 2020-01-05 PROCEDURE — 83036 HEMOGLOBIN GLYCOSYLATED A1C: CPT

## 2020-01-05 PROCEDURE — 87086 URINE CULTURE/COLONY COUNT: CPT

## 2020-01-05 PROCEDURE — 84100 ASSAY OF PHOSPHORUS: CPT

## 2020-01-05 PROCEDURE — 83605 ASSAY OF LACTIC ACID: CPT

## 2020-01-05 PROCEDURE — 99233 SBSQ HOSP IP/OBS HIGH 50: CPT

## 2020-01-05 PROCEDURE — 95816 EEG AWAKE AND DROWSY: CPT

## 2020-01-05 PROCEDURE — 62270 DX LMBR SPI PNXR: CPT

## 2020-01-05 PROCEDURE — 82803 BLOOD GASES ANY COMBINATION: CPT

## 2020-01-05 PROCEDURE — 84439 ASSAY OF FREE THYROXINE: CPT

## 2020-01-05 PROCEDURE — 71260 CT THORAX DX C+: CPT | Mod: 26

## 2020-01-05 PROCEDURE — 87150 DNA/RNA AMPLIFIED PROBE: CPT

## 2020-01-05 PROCEDURE — 83615 LACTATE (LD) (LDH) ENZYME: CPT

## 2020-01-05 PROCEDURE — 86803 HEPATITIS C AB TEST: CPT

## 2020-01-05 PROCEDURE — 94799 UNLISTED PULMONARY SVC/PX: CPT

## 2020-01-05 PROCEDURE — 80202 ASSAY OF VANCOMYCIN: CPT

## 2020-01-05 PROCEDURE — 84132 ASSAY OF SERUM POTASSIUM: CPT

## 2020-01-05 PROCEDURE — 87046 STOOL CULTR AEROBIC BACT EA: CPT

## 2020-01-05 PROCEDURE — 84145 PROCALCITONIN (PCT): CPT

## 2020-01-05 PROCEDURE — 87798 DETECT AGENT NOS DNA AMP: CPT

## 2020-01-05 PROCEDURE — 94003 VENT MGMT INPAT SUBQ DAY: CPT

## 2020-01-05 PROCEDURE — 82947 ASSAY GLUCOSE BLOOD QUANT: CPT

## 2020-01-05 PROCEDURE — 87581 M.PNEUMON DNA AMP PROBE: CPT

## 2020-01-05 PROCEDURE — C1769: CPT

## 2020-01-05 PROCEDURE — 93010 ELECTROCARDIOGRAM REPORT: CPT

## 2020-01-05 PROCEDURE — 87205 SMEAR GRAM STAIN: CPT

## 2020-01-05 PROCEDURE — 88108 CYTOPATH CONCENTRATE TECH: CPT

## 2020-01-05 PROCEDURE — 88304 TISSUE EXAM BY PATHOLOGIST: CPT

## 2020-01-05 PROCEDURE — 80048 BASIC METABOLIC PNL TOTAL CA: CPT

## 2020-01-05 PROCEDURE — 82272 OCCULT BLD FECES 1-3 TESTS: CPT

## 2020-01-05 PROCEDURE — 87521 HEPATITIS C PROBE&RVRS TRNSC: CPT

## 2020-01-05 PROCEDURE — 85730 THROMBOPLASTIN TIME PARTIAL: CPT

## 2020-01-05 PROCEDURE — 85652 RBC SED RATE AUTOMATED: CPT

## 2020-01-05 PROCEDURE — 89051 BODY FLUID CELL COUNT: CPT

## 2020-01-05 PROCEDURE — 92610 EVALUATE SWALLOWING FUNCTION: CPT

## 2020-01-05 PROCEDURE — 99285 EMERGENCY DEPT VISIT HI MDM: CPT | Mod: 25

## 2020-01-05 PROCEDURE — 71260 CT THORAX DX C+: CPT

## 2020-01-05 PROCEDURE — 87633 RESP VIRUS 12-25 TARGETS: CPT

## 2020-01-05 PROCEDURE — 82533 TOTAL CORTISOL: CPT

## 2020-01-05 PROCEDURE — 87493 C DIFF AMPLIFIED PROBE: CPT

## 2020-01-05 PROCEDURE — 99261: CPT

## 2020-01-05 PROCEDURE — 87486 CHLMYD PNEUM DNA AMP PROBE: CPT

## 2020-01-05 PROCEDURE — 71045 X-RAY EXAM CHEST 1 VIEW: CPT

## 2020-01-05 PROCEDURE — 82330 ASSAY OF CALCIUM: CPT

## 2020-01-05 PROCEDURE — 70450 CT HEAD/BRAIN W/O DYE: CPT

## 2020-01-05 PROCEDURE — 82945 GLUCOSE OTHER FLUID: CPT

## 2020-01-05 PROCEDURE — 83735 ASSAY OF MAGNESIUM: CPT

## 2020-01-05 PROCEDURE — 94002 VENT MGMT INPAT INIT DAY: CPT

## 2020-01-05 PROCEDURE — 86922 COMPATIBILITY TEST ANTIGLOB: CPT

## 2020-01-05 PROCEDURE — 77003 FLUOROGUIDE FOR SPINE INJECT: CPT

## 2020-01-05 PROCEDURE — 97162 PT EVAL MOD COMPLEX 30 MIN: CPT

## 2020-01-05 PROCEDURE — 87045 FECES CULTURE AEROBIC BACT: CPT

## 2020-01-05 PROCEDURE — 36556 INSERT NON-TUNNEL CV CATH: CPT

## 2020-01-05 PROCEDURE — 85014 HEMATOCRIT: CPT

## 2020-01-05 PROCEDURE — 74177 CT ABD & PELVIS W/CONTRAST: CPT | Mod: 26

## 2020-01-05 PROCEDURE — 85610 PROTHROMBIN TIME: CPT

## 2020-01-05 PROCEDURE — 85027 COMPLETE CBC AUTOMATED: CPT

## 2020-01-05 PROCEDURE — 86901 BLOOD TYPING SEROLOGIC RH(D): CPT

## 2020-01-05 PROCEDURE — 87449 NOS EACH ORGANISM AG IA: CPT

## 2020-01-05 PROCEDURE — 87340 HEPATITIS B SURFACE AG IA: CPT

## 2020-01-05 PROCEDURE — 86706 HEP B SURFACE ANTIBODY: CPT

## 2020-01-05 PROCEDURE — 36430 TRANSFUSION BLD/BLD COMPNT: CPT

## 2020-01-05 PROCEDURE — 97110 THERAPEUTIC EXERCISES: CPT

## 2020-01-05 PROCEDURE — 82435 ASSAY OF BLOOD CHLORIDE: CPT

## 2020-01-05 PROCEDURE — 87507 IADNA-DNA/RNA PROBE TQ 12-25: CPT

## 2020-01-05 PROCEDURE — P9045: CPT

## 2020-01-05 PROCEDURE — 82550 ASSAY OF CK (CPK): CPT

## 2020-01-05 PROCEDURE — 87070 CULTURE OTHR SPECIMN AEROBIC: CPT

## 2020-01-05 PROCEDURE — 84443 ASSAY THYROID STIM HORMONE: CPT

## 2020-01-05 PROCEDURE — 86850 RBC ANTIBODY SCREEN: CPT

## 2020-01-05 PROCEDURE — 82962 GLUCOSE BLOOD TEST: CPT

## 2020-01-05 PROCEDURE — 74018 RADEX ABDOMEN 1 VIEW: CPT

## 2020-01-05 PROCEDURE — 84484 ASSAY OF TROPONIN QUANT: CPT

## 2020-01-05 PROCEDURE — 81001 URINALYSIS AUTO W/SCOPE: CPT

## 2020-01-05 PROCEDURE — 87040 BLOOD CULTURE FOR BACTERIA: CPT

## 2020-01-05 PROCEDURE — 77001 FLUOROGUIDE FOR VEIN DEVICE: CPT

## 2020-01-05 PROCEDURE — 74177 CT ABD & PELVIS W/CONTRAST: CPT

## 2020-01-05 PROCEDURE — C8929: CPT

## 2020-01-05 PROCEDURE — 84295 ASSAY OF SERUM SODIUM: CPT

## 2020-01-05 PROCEDURE — L8699: CPT

## 2020-01-05 PROCEDURE — 87483 CNS DNA AMP PROBE TYPE 12-25: CPT

## 2020-01-05 PROCEDURE — C1894: CPT

## 2020-01-05 PROCEDURE — 96374 THER/PROPH/DIAG INJ IV PUSH: CPT

## 2020-01-05 PROCEDURE — 86900 BLOOD TYPING SEROLOGIC ABO: CPT

## 2020-01-05 PROCEDURE — 80053 COMPREHEN METABOLIC PANEL: CPT

## 2020-01-05 PROCEDURE — 74176 CT ABD & PELVIS W/O CONTRAST: CPT

## 2020-01-05 PROCEDURE — P9016: CPT

## 2020-01-05 PROCEDURE — 86140 C-REACTIVE PROTEIN: CPT

## 2020-01-05 PROCEDURE — 86403 PARTICLE AGGLUT ANTBDY SCRN: CPT

## 2020-01-05 PROCEDURE — 93005 ELECTROCARDIOGRAM TRACING: CPT

## 2020-01-05 PROCEDURE — 99291 CRITICAL CARE FIRST HOUR: CPT

## 2020-01-05 PROCEDURE — 83970 ASSAY OF PARATHORMONE: CPT

## 2020-01-05 PROCEDURE — 87186 SC STD MICRODIL/AGAR DIL: CPT

## 2020-01-05 PROCEDURE — 83010 ASSAY OF HAPTOGLOBIN QUANT: CPT

## 2020-01-05 PROCEDURE — 36580 REPLACE CVAD CATH: CPT

## 2020-01-05 RX ORDER — DEXTROSE 50 % IN WATER 50 %
50 SYRINGE (ML) INTRAVENOUS ONCE
Refills: 0 | Status: COMPLETED | OUTPATIENT
Start: 2020-01-05 | End: 2020-01-05

## 2020-01-05 RX ORDER — VANCOMYCIN HCL 1 G
1000 VIAL (EA) INTRAVENOUS ONCE
Refills: 0 | Status: COMPLETED | OUTPATIENT
Start: 2020-01-05 | End: 2020-01-05

## 2020-01-05 RX ORDER — SODIUM BICARBONATE 1 MEQ/ML
50 SYRINGE (ML) INTRAVENOUS ONCE
Refills: 0 | Status: COMPLETED | OUTPATIENT
Start: 2020-01-05 | End: 2020-01-05

## 2020-01-05 RX ORDER — SODIUM CHLORIDE 9 MG/ML
1000 INJECTION, SOLUTION INTRAVENOUS
Refills: 0 | Status: DISCONTINUED | OUTPATIENT
Start: 2020-01-05 | End: 2020-01-05

## 2020-01-05 RX ORDER — NYSTATIN 500MM UNIT
500000 POWDER (EA) MISCELLANEOUS
Refills: 0 | Status: DISCONTINUED | OUTPATIENT
Start: 2020-01-05 | End: 2020-01-05

## 2020-01-05 RX ORDER — DEXTROSE 10 % IN WATER 10 %
1000 INTRAVENOUS SOLUTION INTRAVENOUS
Refills: 0 | Status: DISCONTINUED | OUTPATIENT
Start: 2020-01-05 | End: 2020-01-05

## 2020-01-05 RX ORDER — HYDROCORTISONE 20 MG
100 TABLET ORAL EVERY 8 HOURS
Refills: 0 | Status: DISCONTINUED | OUTPATIENT
Start: 2020-01-05 | End: 2020-01-05

## 2020-01-05 RX ORDER — NOREPINEPHRINE BITARTRATE/D5W 8 MG/250ML
0.05 PLASTIC BAG, INJECTION (ML) INTRAVENOUS
Qty: 16 | Refills: 0 | Status: DISCONTINUED | OUTPATIENT
Start: 2020-01-05 | End: 2020-01-05

## 2020-01-05 RX ORDER — INSULIN LISPRO 100/ML
VIAL (ML) SUBCUTANEOUS EVERY 6 HOURS
Refills: 0 | Status: DISCONTINUED | OUTPATIENT
Start: 2020-01-05 | End: 2020-01-05

## 2020-01-05 RX ADMIN — Medication 50 MILLILITER(S): at 07:00

## 2020-01-05 RX ADMIN — Medication 125 MILLIGRAM(S): at 01:32

## 2020-01-05 RX ADMIN — Medication 50 MILLIEQUIVALENT(S): at 05:16

## 2020-01-05 RX ADMIN — PROPOFOL 11.38 MICROGRAM(S)/KG/MIN: 10 INJECTION, EMULSION INTRAVENOUS at 07:00

## 2020-01-05 RX ADMIN — LEVETIRACETAM 400 MILLIGRAM(S): 250 TABLET, FILM COATED ORAL at 06:19

## 2020-01-05 RX ADMIN — Medication 2.96 MICROGRAM(S)/KG/MIN: at 09:05

## 2020-01-05 RX ADMIN — Medication 1 APPLICATION(S): at 05:16

## 2020-01-05 RX ADMIN — Medication 500000 UNIT(S): at 05:17

## 2020-01-05 RX ADMIN — Medication 50 MILLILITER(S): at 05:16

## 2020-01-05 RX ADMIN — MEROPENEM 100 MILLIGRAM(S): 1 INJECTION INTRAVENOUS at 00:22

## 2020-01-05 RX ADMIN — Medication 50 MILLIEQUIVALENT(S): at 05:15

## 2020-01-05 RX ADMIN — MIDODRINE HYDROCHLORIDE 20 MILLIGRAM(S): 2.5 TABLET ORAL at 11:37

## 2020-01-05 RX ADMIN — MIDAZOLAM HYDROCHLORIDE 4 MILLIGRAM(S): 1 INJECTION, SOLUTION INTRAMUSCULAR; INTRAVENOUS at 00:01

## 2020-01-05 RX ADMIN — CHLORHEXIDINE GLUCONATE 15 MILLILITER(S): 213 SOLUTION TOPICAL at 05:18

## 2020-01-05 RX ADMIN — Medication 500000 UNIT(S): at 11:37

## 2020-01-05 RX ADMIN — Medication 1 INCH(S): at 09:01

## 2020-01-05 RX ADMIN — Medication 2.96 MICROGRAM(S)/KG/MIN: at 12:10

## 2020-01-05 RX ADMIN — PROPOFOL 11.38 MICROGRAM(S)/KG/MIN: 10 INJECTION, EMULSION INTRAVENOUS at 12:10

## 2020-01-05 RX ADMIN — Medication 125 MILLIGRAM(S): at 05:15

## 2020-01-05 RX ADMIN — Medication 100 MILLIGRAM(S): at 05:52

## 2020-01-05 RX ADMIN — Medication 250 MILLIGRAM(S): at 05:52

## 2020-01-05 RX ADMIN — Medication 10: at 11:38

## 2020-01-05 RX ADMIN — Medication 50 MILLILITER(S): at 05:17

## 2020-01-05 RX ADMIN — PANTOPRAZOLE SODIUM 40 MILLIGRAM(S): 20 TABLET, DELAYED RELEASE ORAL at 11:36

## 2020-01-05 RX ADMIN — PHENYLEPHRINE HYDROCHLORIDE 4.74 MICROGRAM(S)/KG/MIN: 10 INJECTION INTRAVENOUS at 07:00

## 2020-01-05 RX ADMIN — Medication 100 MILLIGRAM(S): at 13:49

## 2020-01-05 RX ADMIN — Medication 2.96 MICROGRAM(S)/KG/MIN: at 01:32

## 2020-01-05 RX ADMIN — Medication 125 MILLIGRAM(S): at 12:16

## 2020-01-05 RX ADMIN — SODIUM CHLORIDE 1000 MILLILITER(S): 9 INJECTION, SOLUTION INTRAVENOUS at 10:02

## 2020-01-05 NOTE — PROGRESS NOTE ADULT - SUBJECTIVE AND OBJECTIVE BOX
CC: F/U leukocytosis    Saw/spoke to patient. Patient ill appearing. Intubated. S/p repeat CT scan. On pressors.    Allergies  No Known Allergies    ANTIMICROBIALS:  meropenem  IVPB 500 every 24 hours  nystatin    Suspension 210369 four times a day  vancomycin    Solution 125 every 6 hours    PE:    Vital Signs Last 24 Hrs  T(C): 37.4 (05 Jan 2020 07:00), Max: 37.4 (05 Jan 2020 07:00)  T(F): 99.3 (05 Jan 2020 07:00), Max: 99.3 (05 Jan 2020 07:00)  HR: 102 (05 Jan 2020 09:06) (76 - 135)  BP: 119/59 (05 Jan 2020 09:00) (67/40 - 195/89)  BP(mean): 82 (05 Jan 2020 09:00) (44 - 128)  RR: 45 (05 Jan 2020 09:00) (10 - 59)  SpO2: 98% (05 Jan 2020 09:06) (69% - 100%)    Gen: AOx0, sedated  CV: S1+S2 normal, tachycardic  Resp: Clear bilat, no resp distress, no crackles/wheezes, intubated  Abd: Soft, nontender, +BS, distended  Ext: No LE edema    LABS:                        9.8    31.23 )-----------( 335      ( 05 Jan 2020 01:12 )             34.3     01-05    132<L>  |  95<L>  |  60<H>  ----------------------------<  108<H>  4.0   |  20<L>  |  3.09<H>    Ca    10.6<H>      05 Jan 2020 01:12  Phos  4.8     01-05  Mg     1.8     01-05    TPro  5.7<L>  /  Alb  2.2<L>  /  TBili  0.4  /  DBili  x   /  AST  19  /  ALT  5<L>  /  AlkPhos  110  01-05    MICROBIOLOGY:  Vancomycin Level, Trough: 12.7 ug/mL (01-05-20 @ 02:26)    .Urine Clean Catch (Midstream)  01-01-20   No growth    .Blood Blood  12-31-19   No growth at 5 days.     .Blood Blood  12-24-19   Growth in anaerobic bottle: Prevotella denticola "Susceptibilities not  performed"    .Stool Feces  12-14-19   GI PCR Results: NOT detected    .Stool Feces  12-10-19   GI PCR Results: NOT detected    CMV PCR Detection: NotDetec IU/mL (09-05-19 @ 21:02)    Clostridium difficile GDH Toxins A&amp;B, EIA:   Indeterminate (01-05-20 @ 01:12)    RADIOLOGY:    1/3 CXR:    FINDINGS:  Right IJ dialysis catheter terminates in the right atrium.  The lungs are clear.  There are no pleural effusions or pneumothorax.  The cardiomediastinal silhouette is within normal limits.    IMPRESSION:   Clear lungs.   Right IJ dialysis catheter terminates in the right atrium.

## 2020-01-05 NOTE — PROGRESS NOTE ADULT - PROBLEM SELECTOR PROBLEM 3
ACP (advance care planning)
ACP (advance care planning)
Anemia
Diarrhea
ESRD on dialysis
Hyperparathyroidism
Kidney transplant recipient
Sacral decubitus ulcer, stage III
Sepsis
Kidney transplant recipient
ESRD on dialysis
Kidney transplant recipient
Sepsis
Kidney transplant recipient

## 2020-01-05 NOTE — GOALS OF CARE CONVERSATION - ADVANCED CARE PLANNING - CONVERSATION DETAILS
Discussed patient's poor prognosis and whether or not the family would like to pursue resuscitative measures were the patient to go into cardiac arrest. Patient's sisters decided to make patient DNR. Statement Selected

## 2020-01-05 NOTE — PROVIDER CONTACT NOTE (OTHER) - RECOMMENDATIONS
rectal tube for wound healing
Follow provider orders
GIL Feldman made aware
Place a  HD catheter.
ekg? labs?
intubate patient for airway protection
n/a
notify NP
patient needs a central line
rectal tube
start daisy?
ultrasound ABG

## 2020-01-05 NOTE — PROGRESS NOTE ADULT - PROBLEM SELECTOR PROBLEM 5
Sepsis
ESRD on dialysis
Sepsis
ESRD on dialysis
HTN (hypertension)
Palliative care encounter
Sepsis
Palliative care encounter

## 2020-01-05 NOTE — PROGRESS NOTE ADULT - SUBJECTIVE AND OBJECTIVE BOX
Interval events: Patient with acutely altered mental status yesterday, intubated for protection of airway. Hypotensive requiring pressor support. Began having profuse diarrhea, C. diff indeterminate, PO vancomycin initiated. CT obtained showing intraabdominal free air, bowel edema, splenic infarcts. Surgery consulted.    S: Patient intubated, sedated, nonresponsive.    O: Vital Signs  T(C): 36.8 (01-05 @ 11:00), Max: 37.4 (01-05 @ 07:00)  HR: 93 (01-05 @ 15:30) (76 - 135)  BP: 88/49 (01-05 @ 15:15) (67/40 - 195/89)  RR: 86 (01-05 @ 15:30) (7 - 86)  SpO2: 86% (01-05 @ 15:30) (69% - 100%)  01-04-20 @ 07:01  -  01-05-20 @ 07:00  --------------------------------------------------------  IN: 7218.2 mL / OUT: 405 mL / NET: 6813.2 mL    01-05-20 @ 07:01  -  01-05-20 @ 16:57  --------------------------------------------------------  IN: 1825.1 mL / OUT: 0 mL / NET: 1825.1 mL      General: intubated, sedated, nonresponsive  Resp: mechanical ventilation  CVS: regular rate and rhythm  Abdomen: soft, distended, Gtube in place dressing c/d/i no surrounding erythema/induration/drainage  Extremities: no edema  Skin: warm, dry, appropriate color                          9.8    31.23 )-----------( 335      ( 05 Jan 2020 01:12 )             34.3   01-05    132<L>  |  95<L>  |  60<H>  ----------------------------<  108<H>  4.0   |  20<L>  |  3.09<H>    Ca    10.6<H>      05 Jan 2020 01:12  Phos  4.8     01-05  Mg     1.8     01-05    TPro  5.7<L>  /  Alb  2.2<L>  /  TBili  0.4  /  DBili  x   /  AST  19  /  ALT  5<L>  /  AlkPhos  110  01-05

## 2020-01-05 NOTE — PROGRESS NOTE ADULT - PROBLEM/PLAN-5
DISPLAY PLAN FREE TEXT

## 2020-01-05 NOTE — PROCEDURE NOTE - NSINDICATIONS_GEN_A_CORE
dialysis/CRRT
airway protection/mental status change
dialysis/CRRT
emergency venous access/critical illness
respiratory failure
emergency venous access/critical illness

## 2020-01-05 NOTE — PROGRESS NOTE ADULT - SUBJECTIVE AND OBJECTIVE BOX
INTERVAL HPI/OVERNIGHT EVENTS:  No new overnight event.  No N/V/D.  events noted    Allergies    No Known Allergies    Intolerances    General:  No wt loss, fevers, chills, night sweats, fatigue,   Eyes:  Good vision, no reported pain  ENT:  No sore throat, pain, runny nose, dysphagia  CV:  No pain, palpitations, hypo/hypertension  Resp:  No dyspnea, cough, tachypnea, wheezing  GI:  No pain, No nausea, No vomiting, No diarrhea, No constipation, No weight loss, No fever, No pruritis, No rectal bleeding, No tarry stools, No dysphagia,  :  No pain, bleeding, incontinence, nocturia  Muscle:  No pain, weakness  Neuro:  No weakness, tingling, memory problems  Psych:  No fatigue, insomnia, mood problems, depression  Endocrine:  No polyuria, polydipsia, cold/heat intolerance  Heme:  No petechiae, ecchymosis, easy bruisability  Skin:  No rash, tattoos, scars, edema      PHYSICAL EXAM:   Vital Signs:  Vital Signs Last 24 Hrs  T(C): 36.8 (2020 11:00), Max: 37.4 (2020 07:00)  T(F): 98.2 (2020 11:00), Max: 99.3 (2020 07:00)  HR: 93 (2020 15:30) (76 - 135)  BP: 88/49 (2020 15:15) (67/40 - 195/89)  BP(mean): 65 (2020 15:15) (44 - 128)  RR: 86 (2020 15:30) (7 - 86)  SpO2: 86% (2020 15:30) (69% - 100%)  Daily     Daily Weight in k.7 (2020 00:33)I&O's Summary    2020 07:01  -  2020 07:00  --------------------------------------------------------  IN: 7218.2 mL / OUT: 405 mL / NET: 6813.2 mL    2020 07:01  -  2020 16:02  --------------------------------------------------------  IN: 1825.1 mL / OUT: 0 mL / NET: 1825.1 mL        GENERAL:  Appears stated age, well-groomed, well-nourished, no distress  HEENT:  NC/AT,  conjunctivae clear and pink, no thyromegaly, nodules, adenopathy, no JVD, sclera -anicteric  CHEST:  Full & symmetric excursion, no increased effort, breath sounds clear  HEART:  Regular rhythm, S1, S2, no murmur/rub/S3/S4, no abdominal bruit, no edema  ABDOMEN:  Soft, non-tender, non-distended, normoactive bowel sounds,  no masses ,no hepato-splenomegaly, no signs of chronic liver disease  EXTEREMITIES:  no cyanosis,clubbing or edema  SKIN:  No rash/erythema/ecchymoses/petechiae/wounds/abscess/warm/dry  NEURO:  Alert, oriented, no asterixis, no tremor, no encephalopathy      LABS:                        9.8    31.23 )-----------( 335      ( 2020 01:12 )             34.3     -05    132<L>  |  95<L>  |  60<H>  ----------------------------<  108<H>  4.0   |  20<L>  |  3.09<H>    Ca    10.6<H>      2020 01:12  Phos  4.8     -05  Mg     1.8     -05    TPro  5.7<L>  /  Alb  2.2<L>  /  TBili  0.4  /  DBili  x   /  AST  19  /  ALT  5<L>  /  AlkPhos  110  -05    PT/INR - ( 2020 02:04 )   PT: 13.7 sec;   INR: 1.20 ratio         PTT - ( 2020 01:12 )  PTT:26.8 sec    amylase   lipase  RADIOLOGY & ADDITIONAL TESTS:

## 2020-01-05 NOTE — PROVIDER CONTACT NOTE (OTHER) - ACTION/TREATMENT ORDERED:
IVF stopped and NP Brooks aware.
NP Cristobal aware, ok by NP to draw during hemodialysis.
NP Made aware. Will continue to monitor and treat as ordered.
feeds stopped, NP aware,
Dr. Fields assessed for rectal tone. Not enough rectal tone for rectal tube. Apply triad cream to sacrum and coccyx area BID and after stooling.
"rounding on patients upstairs soon, will come to bedside"
Dr. Palacios were notified and Velia HERRERA (Ext. [phone]) from [address] [place] [address]. NP will coordinate with IR for HD catheter placement.
GIL Feldman made aware no new orders received, will monitor
MD Martinez at bedside placing central line
Provider came to bedside to assess. Attempted to cap feeding port to PEG but unsuccessful. Told RN to contact educator but educator not on duty tonight. RN was able to find temporary device to cap PEG
Sally Chiu NP made aware. will recheck temp and continue to monitor pt.
draw VBG and possible CT of C/A/P & head
no action ordered
no action ordered tell MRI tech
rectal tube placed
start daisy

## 2020-01-05 NOTE — PROGRESS NOTE ADULT - ASSESSMENT
74 m with DM, HTN, IVDA, hep C, ESRD s/p failed renal transplant x2 on HD and meningococcal meningitis 2 years ago, diverticulitis s/p colon resection, decubiti, initially sent in to the ED from HD for AMS and hypotension, had persistent AMS so open gastrostomy was placed 12/21 (failed endoscopic attempt for PEG), also HD cath exchange 12/27  Prevotella denticola bacteremia 12/24, repeat negative 12/27  RRT 12/31 for hypotension, WBC 29, abd CT with persistent SBO and pneumoperitoneum  Overnight new rising leukocytosis to 31, afeb, intubated  Abd still distended, sent for CT A/P--read pending  Critically ill, now intubated, on pressors, worsening  Overall,  1) Prevotella Bacteremia  - Meropenem 500mg q 24  2) Rising leukocytosis  - Follow up pending C diff PCR  - F/U pending BCXs  - F/U CT read  - Trend WBC, monitor for other potential sources of infection  3) C diff/diarrhea  - Empiric Vanco 125mg po q 6  - Consider addition Flagyl 500mg q 8 IV, depending on CT findings  - Close monitoring for any evidence toxic megacolon    Poor prognosis, continue GOC discussions    Deniz Barron MD  Pager [phone]  After 5pm and on weekends call [phone]

## 2020-01-05 NOTE — PROGRESS NOTE ADULT - PROBLEM SELECTOR PROBLEM 1
Bacteremia
Diabetes mellitus
Diarrhea, unspecified type
Dysphagia, unspecified type
ESRD on dialysis
Functional quadriplegia
ESRD on dialysis
Functional quadriplegia
ESRD on dialysis
Diabetes mellitus
ESRD on dialysis

## 2020-01-05 NOTE — PROCEDURE NOTE - NSINFORMCONSENT_GEN_A_CORE
Benefits, risks, and possible complications of procedure explained to patient/caregiver who verbalized understanding and gave written consent.
This was an emergent procedure.
This was an emergent procedure.
Benefits, risks, and possible complications of procedure explained to patient/caregiver who verbalized understanding and gave verbal consent.
This was an emergent procedure.
This was an emergent procedure.

## 2020-01-05 NOTE — CHART NOTE - NSCHARTNOTEFT_GEN_A_CORE
Spoke to patient's sister around 23:15 on 1/4 regarding goals of care. Stated she would like to keep the patient full code. Agreed to intubation if needed

## 2020-01-05 NOTE — PROGRESS NOTE ADULT - SUBJECTIVE AND OBJECTIVE BOX
Patient is a 74y Male whom presented to   Patient seen and examined at bedside. Intubated and sedated on propofol. Ros not assessed.       PAST MEDICAL & SURGICAL HISTORY:  Kidney transplant recipient  Anuria  GERD (gastroesophageal reflux disease)  Kidney transplant failure: dx: 2/2013  Hepatitis C: dx: 90&#x27;s.  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 10 milliGRAM(s) Oral three times a day  mycophenolate mofetil 250 milliGRAM(s) Oral two times a day  predniSONE   Tablet 5 milliGRAM(s) Oral daily  sevelamer carbonate 800 milliGRAM(s) Oral three times a day with meals  sodium bicarbonate 650 milliGRAM(s) Oral two times a day  tamsulosin 0.4 milliGRAM(s) Oral at bedtime      Allergies    No Known Allergies                       SOCIAL HISTORY:  Denies ETOh,Smoking,     FAMILY HISTORY:  Family history of breast cancer in sister (Sibling): living at 94 yo  Family history of prostate cancer in father  Family history of lung cancer  Family history of hypertension in mother  Family history of diabetes mellitus: mother      REVIEW OF SYSTEMS:    unbable to obtained                                                       HEENT: conjunctive   clear   Neck:  No JVD  Respiratory: decrease bs b/l   Cardiovascular: S1 and S2  Gastrointestinal: BS+, soft, NT/ND  Extremities: No peripheral edema  Skin: dry   Access: pos fistula

## 2020-01-05 NOTE — CHART NOTE - NSCHARTNOTEFT_GEN_A_CORE
Dr. Deborah Rivera, PGY1  Internal Medicine    Death Note:    Called by nurse to asses  patient.     Patient had vtach arrest at 15:30. Patient was coded with ROSC with unstable cardiac rhythm and HR on max dose amiodarone and epi gtt.     On physical exam, patient did not respond to verbal or noxious stimuli.  No spontaneous respirations.  Absent heart and breath sounds.  Absent radial and carotid pulses.   Pupils are fixed and dilated, no corneal reflex.  EKG rhythm strip shows asystole.   Patient pronounced dead at 4:27  Attending notified.  Family declined autopsy. Patient ineligible for organ donation.    Dr. Deborah Catherine, PGY1  [facility]
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
## 2020-01-05 NOTE — PROGRESS NOTE ADULT - ASSESSMENT
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 course c/b hypercalcemia. RRT called 11/26 for AMS and hotn SBP 70s, found to be in septic shock 2/2 UTI s/p intubation and MICU stay (11/26/19-11/30/19). Extubated on 11/29/19, transferred to floors. Patient s/p open feeding gastrostomy tube placement on 12/21/19 with surgery. MICU consulted on 12/30 for AMS. Pt AOx0 and hypotensive to sbp 87. CT abd/pelvis showing new opacities in RLL suspicious for pneumonia. Patient admitted to MICU for pressor support, and treatment of septic shock likely in the setting of PNA vs.from sacral ulcer wounds vs. UTI.     # NEURO  - intubated and sedated for airway protection ON 1/5     AMS, metabolic encephalopathy on presentation   - at bl A&Ox2  - neuro following  - chronic lacunar infarcts; per neuro, AMS also likely related to hypercalcemia, renal dysfunction, poor nutritional intake  - repeat CT head 12/31 neg for acute intracranial process   - c/w keppra 500 BID for hx of seizure disorder  - neuro checks per ICU protocol     # CARDIAC  Septic shock  - hypotensive during RRT 12/30 transferred to MICU for septic shock, levophed off as of AM 1/1 but requiring midodrine 1/3  - ON 1/4, started on 3 pressors, now on levophed and daisy   - c/w stress dose steroids hydrocortisone 100mg q8hrs   - home carvedilol 6.25 mg held in setting of septic shock  - c/w midodrine 20mg q8hrs for pressure support   - c/w statin  Hx of cocaine cardiomyopathy  - normal EF on most recent echo    Afib on Eliquis   -now off a/c per cards due to bleeding risk    # PULM  -intubated ON 1/5 for airway protection   -CT abdomen revealing for RLL consolidation     # RENAL  - hx of ESRD s/p failed renal transplant x2  - renal following, HD as recommended   - c/w hydrocortisone, epogen   - AAMIR valentin d/bertin 1/1 due to clotting, replaced 1/2       # GI   - CT abd/pelvis non-con 12/31 revealing for SBO   - PEG tube was drained to gravity with improved sx   - on tube feeds, tolerating well   - Hep C ab positive, RNA neg      # HEME/ONC  - pt was on eliquis; now held due rto bleeding risk   - HDS  - keep active type and screen     # ID  Septic shock: hypotension likely 2/2 septic shock, likely 2/2 aspiration PNA given RLL consolidation seen on CT abdomen vs other source vs. OM given sacral wound to bone; dental consulted, oral source of infection less likely  - shock now resolved   - bcx 12/24 growing prevotella denticola, repeat BCx ngtd   - s/p LP, CSF negative  - repeat UCx neg  - ESR, CRP elevated though low suspicion for OM given neg CT findings   - ID following, recs appreciated   - c/w vanc (dialysis dosing), meropenem for now  - f/u random vanc 4hrs following dialysis     OM  -concern for OM rising as previous sacral ulceration now w/ wound to bone, purulent dc as per nursing  -ESR, CRP elevated   -metallic fragments visualized on CT head non-con. MRI L/S spine cancelled. HD per nephrology.      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 course c/b hypercalcemia. RRT called 11/26 for AMS and hotn SBP 70s, found to be in septic shock 2/2 UTI s/p intubation and MICU stay (11/26/19-11/30/19). Extubated on 11/29/19, transferred to floors. Patient s/p open feeding gastrostomy tube placement on 12/21/19 with surgery. MICU consulted on 12/30 for AMS. Pt AOx0 and hypotensive to sbp 87. CT abd/pelvis showing new opacities in RLL suspicious for pneumonia. Patient admitted to MICU for pressor support, and treatment of septic shock likely in the setting of PNA vs.from sacral ulcer wounds vs. UTI.     # NEURO  - intubated and sedated for airway protection ON 1/5   - on propofol 39     AMS, metabolic encephalopathy on presentation   - at bl A&OxO  - neuro following  - chronic lacunar infarcts; per neuro, AMS also likely related to hypercalcemia, renal dysfunction, poor nutritional intake  - repeat CT head 12/31 neg for acute intracranial process   - c/w keppra 500 BID for hx of seizure disorder  - neuro checks per ICU protocol     # CARDIAC  Septic shock  - hypotensive during RRT 12/30 transferred to MICU for septic shock, levophed off as of AM 1/1 but requiring midodrine 1/3  - ON 1/4, started on levo and daisy  - continue with levo, wean daisy to map of 65  - c/w stress dose steroids hydrocortisone 100mg q8hrs   - home carvedilol 6.25 mg held in setting of septic shock  - c/w midodrine 20mg q8hrs for pressure support   - c/w statin  Hx of cocaine cardiomyopathy  - normal EF on most recent echo    Afib on Eliquis   -now off a/c per cards due to bleeding risk    # PULM  -intubated ON 1/5 for airway protection   -CT abdomen 12/30 revealing for RLL consolidation     # RENAL  - hx of ESRD s/p failed renal transplant x2  - renal following, HD as recommended   - c/w hydrocortisone, midodrine, epogen   - AAMIR valentin d/bertin 1/1 due to clotting, replaced 1/2       # GI   Cdiff   - c/w PO vanc 125mg q6hrs   - f/u CTAP, r/o toxic megacolon    SBO  - CT abd/pelvis non-con 12/31 revealing for SBO   - PEG tube was drained to gravity with improved sx     Hep C  -Hep C ab positive, RNA neg    # HEME/ONC  - pt was on eliquis; now held due rto bleeding risk   - HDS  - keep active type and screen     # ID  Septic shock: likely 2/2 likely c diff colitis vs. OM given sacral wound to bone vs. asp PNA given RLL consolidation seen on CT abdomen 12/30; dental source unlikely as no infection ided on oral exam by dental   - again requiring pressors ON 12/5  - ID following, recs appreciated   - c/w vanc (dialysis dosing), meropenem 500 q24hrs, po vancomycin  - f/u repeat bcx, ucx, stool cx from 1/4  - f/u c diff PCR     OM  -concern for OM rising as previous sacral ulceration now w/ wound to bone, purulent dc as per nursing  -ESR, CRP elevated   -metallic fragments visualized on CT head non-con. MRI L/S spine cancelled. HD per nephrology.      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 course c/b hypercalcemia. RRT called 11/26 for AMS and hotn SBP 70s, found to be in septic shock 2/2 UTI s/p intubation and MICU stay (11/26/19-11/30/19). Extubated on 11/29/19, transferred to floors. Patient s/p open feeding gastrostomy tube placement on 12/21/19 with surgery. MICU consulted on 12/30 for AMS. Pt AOx0 and hypotensive to sbp 87. CT abd/pelvis showing new opacities in RLL suspicious for pneumonia. Patient admitted to MICU for pressor support, and treatment of septic shock likely in the setting of PNA vs.from sacral ulcer wounds vs. UTI.     # NEURO  - intubated and sedated for airway protection ON 1/5   - on propofol 39     AMS, metabolic encephalopathy on presentation   - at bl A&OxO  - neuro following  - chronic lacunar infarcts; per neuro, AMS also likely related to hypercalcemia, renal dysfunction, poor nutritional intake  - repeat CT head 12/31 neg for acute intracranial process   - c/w keppra 500 BID for hx of seizure disorder  - neuro checks per ICU protocol     # CARDIAC  Septic shock  - hypotensive during RRT 12/30 transferred to MICU for septic shock, levophed off as of AM 1/1 but requiring midodrine 1/3  - ON 1/4, started on levo and daisy  - continue with levo, wean daisy to map of 65  - c/w stress dose steroids hydrocortisone 100mg q8hrs   - home carvedilol 6.25 mg held in setting of septic shock  - c/w midodrine 20mg q8hrs for pressure support   - c/w statin  Hx of cocaine cardiomyopathy  - normal EF on most recent echo    Afib on Eliquis   -now off a/c per cards due to bleeding risk    # PULM  -intubated ON 1/5 for airway protection   -CT abdomen 12/30 revealing for RLL consolidation     # RENAL  - hx of ESRD s/p failed renal transplant x2  - renal following, HD as recommended   - c/w hydrocortisone, midodrine, epogen   - AAMIR valentin d/bertin 1/1 due to clotting, replaced 1/2       # GI   Cdiff   - c/w PO vanc 125mg q6hrs   - CTAP 1/5 prelim read     SBO  - CT abd/pelvis non-con 12/31 revealing for SBO   - PEG tube was drained to gravity with improved sx     Hep C  -Hep C ab positive, RNA neg    # HEME/ONC  - pt was on eliquis; now held due rto bleeding risk   - HDS  - keep active type and screen     # ID  Septic shock: likely 2/2 likely c diff colitis vs. OM given sacral wound to bone vs. asp PNA given RLL consolidation seen on CT abdomen 12/30; dental source unlikely as no infection ided on oral exam by dental   - again requiring pressors ON 12/5  - ID following, recs appreciated   - c/w vanc (dialysis dosing), meropenem 500 q24hrs, po vancomycin  - f/u repeat bcx, ucx, stool cx from 1/4  - f/u c diff PCR     OM  -concern for OM rising as previous sacral ulceration now w/ wound to bone, purulent dc as per nursing  -ESR, CRP elevated   -metallic fragments visualized on CT head non-con. MRI L/S spine cancelled. HD per nephrology.      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 course c/b hypercalcemia. RRT called 11/26 for AMS and hotn SBP 70s, found to be in septic shock 2/2 UTI s/p intubation and MICU stay (11/26/19-11/30/19). Extubated on 11/29/19, transferred to floors. Patient s/p open feeding gastrostomy tube placement on 12/21/19 with surgery. MICU consulted on 12/30 for AMS. Pt AOx0 and hypotensive to sbp 87. CT abd/pelvis showing new opacities in RLL suspicious for pneumonia. Patient admitted to MICU for pressor support, and treatment of septic shock likely in the setting of PNA vs.from sacral ulcer wounds vs. UTI.     # NEURO  - intubated and sedated for airway protection ON 1/5   - on propofol 30    AMS, metabolic encephalopathy on presentation   - A&OxO, bl A&O x 2  - neuro following  - chronic lacunar infarcts; per neuro, AMS also likely related to hypercalcemia, renal dysfunction, poor nutritional intake  - repeat CT head 12/31 neg for acute intracranial process   - c/w keppra 500 BID for hx of seizure disorder  - neuro checks per ICU protocol     # CARDIAC  Septic shock  - hypotensive during RRT 12/30 transferred to MICU for septic shock, levophed off as of AM 1/1 but requiring midodrine 1/3  - ON 1/4, started on levo and daisy  - continue levo, wean daisy to map of 65  - c/w stress dose steroids hydrocortisone 100mg q8hrs   - home carvedilol 6.25 mg held in setting of septic shock  - c/w midodrine 20mg q8hrs for pressure support   - c/w statin    Hx of cocaine cardiomyopathy  - normal EF on most recent echo    Afib on Eliquis   -now off a/c per cards due to bleeding risk    # PULM  -intubated ON 1/5 for airway protection   -CT abdomen 12/30 revealing for RLL consolidation     # RENAL  - hx of ESRD s/p failed renal transplant x2  - renal following, HD as recommended   - c/w hydrocortisone, midodrine, epogen   - RIJ shiley d/bertin 1/1 due to clotting, replaced 1/2   - CT chest 1/5 prelim showing large catheter associated thrombus RIJ      # GI   Cdiff   - c/w PO vanc 125mg q6hrs     Bowel edema:  -CTAP 1/5 with prelim read suggestive of area of small bowel edema c/f bowel ischemia  - ACS aware, pending recs     SBO  - CT abd/pelvis non-con 12/31 revealing for SBO   - PEG tube was drained to gravity with improved sx   - CT abd/pelvis w/c 1/5 showing smaller SBO   Hep C  -Hep C ab positive, RNA neg    # HEME/ONC  - pt was on eliquis; now held due rto bleeding risk   - HDS  - keep active type and screen     # ID  Septic shock: likely 2/2 likely c diff colitis vs. OM given sacral wound to bone vs. asp PNA given RLL consolidation seen on CT abdomen 12/30; dental source unlikely as no infection ided on oral exam by dental   - again requiring pressors ON 12/5  - ID following, recs appreciated   - c/w vanc (dialysis dosing), meropenem 500 q24hrs, po vancomycin  - f/u repeat bcx, ucx, stool cx from 1/4  - f/u c diff PCR     OM  -concern for OM rising as previous sacral ulceration now w/ wound to bone, purulent dc as per nursing  -ESR, CRP elevated   -metallic fragments visualized on CT head non-con. MRI L/S spine cancelled. HD per nephrology.      Bacteremia  - bcx 12/24 growing prevotella denticola  - repeat ngtd     # ENDO  HbA1c 5.5  - hypoglycemic ON, started on D10  - now hyperglycemic to 365 s/p 10U humalog  - d/bertin D10  - c/w lantus 8U and low scale ISS   - hypercalcemic 2/2 primary HPT, s/p pamidronate tx     # DVT  -SCDs     #GOC:  - Pt remains FULL CODE   - Daughter Crystal ([phone]) is surrogate decision maker if patient is altered

## 2020-01-05 NOTE — PROGRESS NOTE ADULT - PROBLEM SELECTOR PROBLEM 2
ESRD on dialysis
ESRD on dialysis
Diarrhea
Dysphagia, unspecified type
Hypercalcemia
Hyperparathyroidism
Leukocytosis, unspecified type
Sacral decubitus ulcer, stage III
Hypercalcemia
Dysphagia, unspecified type
Respiratory failure requiring intubation
Hypercalcemia
Hyperparathyroidism
Hypercalcemia

## 2020-01-05 NOTE — PROGRESS NOTE ADULT - PROBLEM SELECTOR PROBLEM 4
HTN (hypertension)
Diabetes mellitus
Advance care planning
Anemia
Cerebrovascular accident (CVA), unspecified mechanism
Diabetes mellitus
ESRD on dialysis
HTN (hypertension)
Sepsis
Diabetes mellitus
Advance care planning
Diabetes mellitus

## 2020-01-05 NOTE — PROCEDURE NOTE - NSPROCNAME_GEN_A_CORE
Central Line Insertion
Tracheal Intubation
Tracheal Intubation
Central Line Insertion

## 2020-01-05 NOTE — PROGRESS NOTE ADULT - I WAS PHYSICALLY PRESENT FOR THE KEY PORTIONS OF THE EVALUATION AND MANAGEMENT (E/M) SERVICE PROVIDED.  I AGREE WITH THE ABOVE HISTORY, PHYSICAL, AND PLAN WHICH I HAVE REVIEWED AND EDITED WHERE APPROPRIATE

## 2020-01-05 NOTE — GOALS OF CARE CONVERSATION - ADVANCED CARE PLANNING - NS PRO AD NO ADVANCE DIRECTIVE
Unable to complete HCP at this time, AOx1-2/No Unable to complete HCP at this time, patient intubated/sedated/No

## 2020-01-05 NOTE — PROCEDURE NOTE - NSPROCDETAILS_GEN_ALL_CORE
ultrasound guidance/sterile dressing applied/guidewire recovered/sterile technique, catheter placed/lumen(s) aspirated and flushed
patient pre-oxygenated, tube inserted, placement confirmed
patient pre-oxygenated, tube inserted, placement confirmed
sterile dressing applied/guidewire recovered/lumen(s) aspirated and flushed/sterile technique, catheter placed/ultrasound guidance
ultrasound guidance/sterile technique, catheter placed/guidewire recovered/lumen(s) aspirated and flushed/sterile dressing applied
lumen(s) aspirated and flushed/ultrasound guidance/sterile technique, catheter placed/sterile dressing applied/guidewire recovered

## 2020-01-05 NOTE — PROGRESS NOTE ADULT - SUBJECTIVE AND OBJECTIVE BOX
Chief complaint  Patient is a 74y old  Male who presents with a chief complaint of ams (05 Jan 2020 09:09)   Review of systems  Patient in bed, intubated, no fever, had hypoglycemia.    Labs and Fingersticks  CAPILLARY BLOOD GLUCOSE      POCT Blood Glucose.: 365 mg/dL (05 Jan 2020 11:35)  POCT Blood Glucose.: 201 mg/dL (05 Jan 2020 08:48)  POCT Blood Glucose.: 166 mg/dL (05 Jan 2020 05:14)  POCT Blood Glucose.: 30 mg/dL (05 Jan 2020 04:15)  POCT Blood Glucose.: 72 mg/dL (04 Jan 2020 21:11)  POCT Blood Glucose.: 47 mg/dL (04 Jan 2020 21:10)      Anion Gap, Serum: 17 (01-05 @ 01:12)  Anion Gap, Serum: 19 <H> (01-04 @ 21:04)  Anion Gap, Serum: 19 <H> (01-04 @ 01:54)      Calcium, Total Serum: 10.6 <H> (01-05 @ 01:12)  Calcium, Total Serum: 10.5 (01-04 @ 21:04)  Calcium, Total Serum: 10.5 (01-04 @ 01:54)  Albumin, Serum: 2.2 <L> (01-05 @ 01:12)  Albumin, Serum: 2.4 <L> (01-04 @ 21:04)  Albumin, Serum: 2.6 <L> (01-04 @ 01:54)    Alanine Aminotransferase (ALT/SGPT): 5 <L> (01-05 @ 01:12)  Alanine Aminotransferase (ALT/SGPT): 7 <L> (01-04 @ 21:04)  Alanine Aminotransferase (ALT/SGPT): 8 <L> (01-04 @ 01:54)  Alkaline Phosphatase, Serum: 110 (01-05 @ 01:12)  Alkaline Phosphatase, Serum: 106 (01-04 @ 21:04)  Alkaline Phosphatase, Serum: 103 (01-04 @ 01:54)  Aspartate Aminotransferase (AST/SGOT): 19 (01-05 @ 01:12)  Aspartate Aminotransferase (AST/SGOT): 15 (01-04 @ 21:04)  Aspartate Aminotransferase (AST/SGOT): 14 (01-04 @ 01:54)        01-05    132<L>  |  95<L>  |  60<H>  ----------------------------<  108<H>  4.0   |  20<L>  |  3.09<H>    Ca    10.6<H>      05 Jan 2020 01:12  Phos  4.8     01-05  Mg     1.8     01-05    TPro  5.7<L>  /  Alb  2.2<L>  /  TBili  0.4  /  DBili  x   /  AST  19  /  ALT  5<L>  /  AlkPhos  110  01-05                        9.8    31.23 )-----------( 335      ( 05 Jan 2020 01:12 )             34.3     Medications  MEDICATIONS  (STANDING):  atorvastatin 40 milliGRAM(s) Oral at bedtime  chlorhexidine 0.12% Liquid 15 milliLiter(s) Oral Mucosa every 12 hours  chlorhexidine 0.12% Liquid 15 milliLiter(s) Oral Mucosa two times a day  chlorhexidine 4% Liquid 1 Application(s) Topical <User Schedule>  chlorhexidine 4% Liquid 1 Application(s) Topical <User Schedule>  Dakins Solution - 1/4 Strength 1 Application(s) Topical two times a day  dextrose 5%. 1000 milliLiter(s) (50 mL/Hr) IV Continuous <Continuous>  epoetin tan Injectable 10999 Unit(s) IV Push <User Schedule>  hydrocortisone sodium succinate Injectable 100 milliGRAM(s) IV Push every 8 hours  insulin glargine Injectable (LANTUS) 8 Unit(s) SubCutaneous at bedtime  insulin lispro (HumaLOG) corrective regimen sliding scale   SubCutaneous every 6 hours  lactated ringers. 1000 milliLiter(s) (1000 mL/Hr) IV Continuous <Continuous>  levETIRAcetam  IVPB 500 milliGRAM(s) IV Intermittent every 12 hours  meropenem  IVPB 500 milliGRAM(s) IV Intermittent every 24 hours  midodrine. 20 milliGRAM(s) Oral three times a day  norepinephrine Infusion 0.05 MICROgram(s)/kG/Min (2.963 mL/Hr) IV Continuous <Continuous>  nystatin    Suspension 012099 Unit(s) Oral four times a day  pantoprazole  Injectable 40 milliGRAM(s) IV Push daily  phenylephrine    Infusion 0.2 MICROgram(s)/kG/Min (4.74 mL/Hr) IV Continuous <Continuous>  propofol Infusion 30 MICROgram(s)/kG/Min (11.376 mL/Hr) IV Continuous <Continuous>  vancomycin    Solution 125 milliGRAM(s) Enteral Tube every 6 hours      Physical Exam  Culture - Sputum (collected 01-05-20 @ 07:09)  Source: .Sputum Sputum  Gram Stain (01-05-20 @ 14:03):    Rare polymorphonuclear leukocytes per low power field    Moderate Squamous epithelial cells per low power field    Numerous Gram positive cocci in pairs, chains and clusters per oil power    field    Few Yeast like cells per oil power field    Results consistent with oropharyngeal contamination      General: Patient comfortable in bed  Vital Signs Last 12 Hrs  T(F): 98.2 (01-05-20 @ 11:00), Max: 99.3 (01-05-20 @ 07:00)  HR: 101 (01-05-20 @ 14:45) (100 - 121)  BP: 106/56 (01-05-20 @ 14:45) (81/46 - 125/57)  BP(mean): 74 (01-05-20 @ 14:45) (59 - 89)  RR: 28 (01-05-20 @ 14:45) (7 - 51)  SpO2: 99% (01-05-20 @ 14:45) (97% - 100%)  Neck: No palpable thyroid nodules.  CVS: S1S2, No murmurs  Respiratory: No wheezing, no crepitations  GI: Abdomen soft, bowel sounds positive  Musculoskeletal:  edema lower extremities.   Skin: No skin rashes, no ecchymosis    Diagnostics

## 2020-01-05 NOTE — PROGRESS NOTE ADULT - PROBLEM SELECTOR PLAN 1
Blood sugars fluctuating and not at target; Suggest to keep patient on IV insulin for now. Will continue monitoring FS and FU.  Serum cortisol 18.2 R/O Adrenal Insufficiency; However, agree that patient may benefit from stress-dose steroids given patient's hypotensive episodes. Suggest to continue hydrocortisone and continue monitoring.

## 2020-01-05 NOTE — PROGRESS NOTE ADULT - ATTENDING COMMENTS
Agree with above.  Acute events overnights: watery diarrhea and hypotension, intubated for shock.  CT abd/pelvis with splenic infarcts and possible free air.  On pressors.  In afternoon, patient had asystolic arrest. Was coded several times.  Daughter was at bedside.  Patient became progressively bradycardic on max dose epi and Levophed.  Eventually, daughter stated that we should "just make him comfortable," and the code was stopped for futility.  We provided comfort and support to daughter, who remained at bedside during and after the code.    I have personally provided 60 minutes of critical care time.

## 2020-01-05 NOTE — PROCEDURE NOTE - NSTRACHPOSTINTU_RESP_A_CORE
Breath sounds bilateral/Appropriate capnography/Chest X-Ray/Positive end tidal Co2 noted/Breath sounds equal
Breath sounds bilateral/Breath sounds equal/Chest excursion noted/Chest X-Ray/Appropriate capnography/Positive end tidal Co2 noted

## 2020-01-05 NOTE — PROGRESS NOTE ADULT - REASON FOR ADMISSION
ams
Altered Mental Status
ams

## 2020-01-05 NOTE — PROCEDURE NOTE - PROCEDURE DATE TIME, MLM
02-Jan-2020 16:39
26-Nov-2019 16:09
26-Nov-2019 20:06
04-Jan-2020 23:30
21-Nov-2019 19:16
31-Dec-2019 00:10

## 2020-01-05 NOTE — PROGRESS NOTE ADULT - ASSESSMENT
Assessment  DM: A1C 5.5%, was on insulin at home, now on insulin, blood sugars fluctuating, patient had hypoglycemic episodes, now blood sugars running high and not at target. Patient had hypotensive episodes overnight requiring pressors and stress-dose steroids, now intubated in the MICU.  Sacral Wound: s/p debridement, monitored, FU wound care.  Hypercalcemia: Primary Hyperparathyroidism, hypercalcemia S/P Pamidronate injection, Ca levels fluctuating, trending up.  Sepsis: IV ABx for UTI, LP inconsistent with meningitis, on medications, stable, monitored.  HTN: Controlled,  on antihypertensive medications.  ESRD: On hemodialysis, Monitor labs/BMP          Robi Gay MD  Cell: [phone]  Office: [phone]

## 2020-01-05 NOTE — PROGRESS NOTE ADULT - SUBJECTIVE AND OBJECTIVE BOX
*******************************  Deborah Catherine, PGY1  Pager [phone]  *******************************    INTERVAL HPI/OVERNIGHT EVENTS:    ON, intubated, started on 3 pressors, central line placed.     SUBJECTIVE:     Patient seen and examined at bedside. Intubated and sedated.       OBJECTIVE:    VITAL SIGNS:  ICU Vital Signs Last 24 Hrs  T(C): 36.6 (05 Jan 2020 03:00), Max: 36.9 (04 Jan 2020 15:00)  T(F): 97.9 (05 Jan 2020 03:00), Max: 98.4 (04 Jan 2020 15:00)  HR: 120 (05 Jan 2020 06:30) (76 - 135)  BP: 107/61 (05 Jan 2020 06:30) (67/40 - 195/89)  BP(mean): 78 (05 Jan 2020 06:30) (44 - 128)  ABP: --  ABP(mean): --  RR: 49 (05 Jan 2020 06:30) (10 - 59)  SpO2: 100% (05 Jan 2020 06:30) (69% - 100%)    Mode: AC/ CMV (Assist Control/ Continuous Mandatory Ventilation), RR (machine): 16, TV (machine): 500, FiO2: 40, ITime: 1, PIP: 17    01-03 @ 07:01 - 01-04 @ 07:00  --------------------------------------------------------  IN: 2100 mL / OUT: 400 mL / NET: 1700 mL    01-04 @ 07:01 - 01-05 @ 06:53  --------------------------------------------------------  IN: 7218.2 mL / OUT: 305 mL / NET: 6913.2 mL    CAPILLARY BLOOD GLUCOSE      POCT Blood Glucose.: 166 mg/dL (05 Jan 2020 05:14)              MEDICATIONS:  MEDICATIONS  (STANDING):  atorvastatin 40 milliGRAM(s) Oral at bedtime  chlorhexidine 0.12% Liquid 15 milliLiter(s) Oral Mucosa every 12 hours  chlorhexidine 0.12% Liquid 15 milliLiter(s) Oral Mucosa two times a day  chlorhexidine 4% Liquid 1 Application(s) Topical <User Schedule>  chlorhexidine 4% Liquid 1 Application(s) Topical <User Schedule>  Dakins Solution - 1/4 Strength 1 Application(s) Topical two times a day  dextrose 10%. 1000 milliLiter(s) (50 mL/Hr) IV Continuous <Continuous>  dextrose 5%. 1000 milliLiter(s) (50 mL/Hr) IV Continuous <Continuous>  epoetin tan Injectable 81907 Unit(s) IV Push <User Schedule>  hydrocortisone sodium succinate Injectable 100 milliGRAM(s) IV Push every 8 hours  insulin glargine Injectable (LANTUS) 8 Unit(s) SubCutaneous at bedtime  insulin lispro (HumaLOG) corrective regimen sliding scale   SubCutaneous three times a day before meals  levETIRAcetam  IVPB 500 milliGRAM(s) IV Intermittent every 12 hours  meropenem  IVPB 500 milliGRAM(s) IV Intermittent every 24 hours  midodrine. 20 milliGRAM(s) Oral three times a day  norepinephrine Infusion 0.05 MICROgram(s)/kG/Min (2.963 mL/Hr) IV Continuous <Continuous>  nystatin    Suspension 868603 Unit(s) Oral four times a day  pantoprazole  Injectable 40 milliGRAM(s) IV Push daily  phenylephrine    Infusion 0.2 MICROgram(s)/kG/Min (4.74 mL/Hr) IV Continuous <Continuous>  propofol Infusion 30 MICROgram(s)/kG/Min (11.376 mL/Hr) IV Continuous <Continuous>  vancomycin    Solution 125 milliGRAM(s) Enteral Tube every 6 hours    MEDICATIONS  (PRN):  acetaminophen    Suspension .. 650 milliGRAM(s) Oral every 6 hours PRN Mild Pain (1 - 3)  sodium chloride 0.9% lock flush 10 milliLiter(s) IV Push every 1 hour PRN Pre/post blood products, medications, blood draw, and to maintain line patency      ALLERGIES:  Allergies    No Known Allergies    Intolerances        LABS:                        9.8    31.23 )-----------( 335      ( 05 Jan 2020 01:12 )             34.3     01-05    132<L>  |  95<L>  |  60<H>  ----------------------------<  108<H>  4.0   |  20<L>  |  3.09<H>    Ca    10.6<H>      05 Jan 2020 01:12  Phos  4.8     01-05  Mg     1.8     01-05    TPro  5.7<L>  /  Alb  2.2<L>  /  TBili  0.4  /  DBili  x   /  AST  19  /  ALT  5<L>  /  AlkPhos  110  01-05    PT/INR - ( 04 Jan 2020 02:04 )   PT: 13.7 sec;   INR: 1.20 ratio         PTT - ( 05 Jan 2020 01:12 )  PTT:26.8 sec      RADIOLOGY & ADDITIONAL TESTS: Reviewed. *******************************  Deborah Catherine, PGY1  Pager [phone]  *******************************    INTERVAL HPI/OVERNIGHT EVENTS:    ON, intubated, started on 3 pressors, central line placed.     SUBJECTIVE:     Patient seen and examined at bedside. Intubated and sedated.       OBJECTIVE:    VITAL SIGNS:  ICU Vital Signs Last 24 Hrs  T(C): 36.6 (05 Jan 2020 03:00), Max: 36.9 (04 Jan 2020 15:00)  T(F): 97.9 (05 Jan 2020 03:00), Max: 98.4 (04 Jan 2020 15:00)  HR: 120 (05 Jan 2020 06:30) (76 - 135)  BP: 107/61 (05 Jan 2020 06:30) (67/40 - 195/89)  BP(mean): 78 (05 Jan 2020 06:30) (44 - 128)  ABP: --  ABP(mean): --  RR: 49 (05 Jan 2020 06:30) (10 - 59)  SpO2: 100% (05 Jan 2020 06:30) (69% - 100%)    Mode: AC/ CMV (Assist Control/ Continuous Mandatory Ventilation), RR (machine): 16, TV (machine): 500, FiO2: 40, ITime: 1, PIP: 17    01-03 @ 07:01 - 01-04 @ 07:00  --------------------------------------------------------  IN: 2100 mL / OUT: 400 mL / NET: 1700 mL    01-04 @ 07:01 - 01-05 @ 06:53  --------------------------------------------------------  IN: 7218.2 mL / OUT: 305 mL / NET: 6913.2 mL    CAPILLARY BLOOD GLUCOSE      POCT Blood Glucose.: 166 mg/dL (05 Jan 2020 05:14)              MEDICATIONS:  MEDICATIONS  (STANDING):  atorvastatin 40 milliGRAM(s) Oral at bedtime  chlorhexidine 0.12% Liquid 15 milliLiter(s) Oral Mucosa every 12 hours  chlorhexidine 0.12% Liquid 15 milliLiter(s) Oral Mucosa two times a day  chlorhexidine 4% Liquid 1 Application(s) Topical <User Schedule>  chlorhexidine 4% Liquid 1 Application(s) Topical <User Schedule>  Dakins Solution - 1/4 Strength 1 Application(s) Topical two times a day  dextrose 10%. 1000 milliLiter(s) (50 mL/Hr) IV Continuous <Continuous>  dextrose 5%. 1000 milliLiter(s) (50 mL/Hr) IV Continuous <Continuous>  epoetin tan Injectable 15946 Unit(s) IV Push <User Schedule>  hydrocortisone sodium succinate Injectable 100 milliGRAM(s) IV Push every 8 hours  insulin glargine Injectable (LANTUS) 8 Unit(s) SubCutaneous at bedtime  insulin lispro (HumaLOG) corrective regimen sliding scale   SubCutaneous three times a day before meals  levETIRAcetam  IVPB 500 milliGRAM(s) IV Intermittent every 12 hours  meropenem  IVPB 500 milliGRAM(s) IV Intermittent every 24 hours  midodrine. 20 milliGRAM(s) Oral three times a day  norepinephrine Infusion 0.05 MICROgram(s)/kG/Min (2.963 mL/Hr) IV Continuous <Continuous>  nystatin    Suspension 928310 Unit(s) Oral four times a day  pantoprazole  Injectable 40 milliGRAM(s) IV Push daily  phenylephrine    Infusion 0.2 MICROgram(s)/kG/Min (4.74 mL/Hr) IV Continuous <Continuous>  propofol Infusion 30 MICROgram(s)/kG/Min (11.376 mL/Hr) IV Continuous <Continuous>  vancomycin    Solution 125 milliGRAM(s) Enteral Tube every 6 hours    MEDICATIONS  (PRN):  acetaminophen    Suspension .. 650 milliGRAM(s) Oral every 6 hours PRN Mild Pain (1 - 3)  sodium chloride 0.9% lock flush 10 milliLiter(s) IV Push every 1 hour PRN Pre/post blood products, medications, blood draw, and to maintain line patency      ALLERGIES:  Allergies    No Known Allergies    Intolerances        LABS:                        9.8    31.23 )-----------( 335      ( 05 Jan 2020 01:12 )             34.3     01-05    132<L>  |  95<L>  |  60<H>  ----------------------------<  108<H>  4.0   |  20<L>  |  3.09<H>    Ca    10.6<H>      05 Jan 2020 01:12  Phos  4.8     01-05  Mg     1.8     01-05    TPro  5.7<L>  /  Alb  2.2<L>  /  TBili  0.4  /  DBili  x   /  AST  19  /  ALT  5<L>  /  AlkPhos  110  01-05    PT/INR - ( 04 Jan 2020 02:04 )   PT: 13.7 sec;   INR: 1.20 ratio         PTT - ( 05 Jan 2020 01:12 )  PTT:26.8 sec      RADIOLOGY & ADDITIONAL TESTS: Reviewed.    Clostridium difficile GDH Interpretation: This specimen is positive for C. Difficile glutamate dehydrogenase (GDH)  antigen and negative for C. Difficile Toxins A & B, by EIA.  GDH is a  highly sensitive screening marker for C. Difficile that is produced in  large amounts by all C. Difficilestrains, both toxigenic and  nontoxigenic.  Specimens that are GDH positive and toxin negative will be  tested further by PCR to detect toxin gene sequences associated with  toxin producing C. Difficle. (01.05.20 @ 01:12) *******************************  Deborah Catherine, PGY1  Pager [phone]  *******************************    INTERVAL HPI/OVERNIGHT EVENTS:    ON, AMS and hypotensive. Intubated, started on levo and daisy via central line.    SUBJECTIVE:     Patient seen and examined at bedside. Intubated and sedated on propofol. Ros not assessed.     Family at bedside.       OBJECTIVE:    VITAL SIGNS:  ICU Vital Signs Last 24 Hrs  T(C): 36.6 (05 Jan 2020 03:00), Max: 36.9 (04 Jan 2020 15:00)  T(F): 97.9 (05 Jan 2020 03:00), Max: 98.4 (04 Jan 2020 15:00)  HR: 120 (05 Jan 2020 06:30) (76 - 135)  BP: 107/61 (05 Jan 2020 06:30) (67/40 - 195/89)  BP(mean): 78 (05 Jan 2020 06:30) (44 - 128)  ABP: --  ABP(mean): --  RR: 49 (05 Jan 2020 06:30) (10 - 59)  SpO2: 100% (05 Jan 2020 06:30) (69% - 100%)    Mode: AC/ CMV (Assist Control/ Continuous Mandatory Ventilation), RR (machine): 16, TV (machine): 500, FiO2: 40, ITime: 1, PIP: 17    01-03 @ 07:01  -  01-04 @ 07:00  --------------------------------------------------------  IN: 2100 mL / OUT: 400 mL / NET: 1700 mL    01-04 @ 07:01 - 01-05 @ 06:53  --------------------------------------------------------  IN: 7218.2 mL / OUT: 305 mL / NET: 6913.2 mL    CAPILLARY BLOOD GLUCOSE    PHYSICAL EXAM:  GENERAL: intubated and sedated   HEAD:  Atraumatic, Normocephalic  EYES: pinpt but reactive to light   NECK: Supple, No JVD  CHEST/LUNG: CTAB   HEART: RRR, holosystolic murmur throughout precordium   ABDOMEN: Soft, Nontender, Nondistended (improved from prior); Bowel sounds present. PEG tube connected to feeds.  EXTREMITIES:  No clubbing, cyanosis, or edema  PSYCH: AAOx2  NEUROLOGY: non-focal  SKIN: abrasions left ant tibia, sacral wound to bone, dressed         POCT Blood Glucose.: 166 mg/dL (05 Jan 2020 05:14)              MEDICATIONS:  MEDICATIONS  (STANDING):  atorvastatin 40 milliGRAM(s) Oral at bedtime  chlorhexidine 0.12% Liquid 15 milliLiter(s) Oral Mucosa every 12 hours  chlorhexidine 0.12% Liquid 15 milliLiter(s) Oral Mucosa two times a day  chlorhexidine 4% Liquid 1 Application(s) Topical <User Schedule>  chlorhexidine 4% Liquid 1 Application(s) Topical <User Schedule>  Dakins Solution - 1/4 Strength 1 Application(s) Topical two times a day  dextrose 10%. 1000 milliLiter(s) (50 mL/Hr) IV Continuous <Continuous>  dextrose 5%. 1000 milliLiter(s) (50 mL/Hr) IV Continuous <Continuous>  epoetin tan Injectable 07426 Unit(s) IV Push <User Schedule>  hydrocortisone sodium succinate Injectable 100 milliGRAM(s) IV Push every 8 hours  insulin glargine Injectable (LANTUS) 8 Unit(s) SubCutaneous at bedtime  insulin lispro (HumaLOG) corrective regimen sliding scale   SubCutaneous three times a day before meals  levETIRAcetam  IVPB 500 milliGRAM(s) IV Intermittent every 12 hours  meropenem  IVPB 500 milliGRAM(s) IV Intermittent every 24 hours  midodrine. 20 milliGRAM(s) Oral three times a day  norepinephrine Infusion 0.05 MICROgram(s)/kG/Min (2.963 mL/Hr) IV Continuous <Continuous>  nystatin    Suspension 331192 Unit(s) Oral four times a day  pantoprazole  Injectable 40 milliGRAM(s) IV Push daily  phenylephrine    Infusion 0.2 MICROgram(s)/kG/Min (4.74 mL/Hr) IV Continuous <Continuous>  propofol Infusion 30 MICROgram(s)/kG/Min (11.376 mL/Hr) IV Continuous <Continuous>  vancomycin    Solution 125 milliGRAM(s) Enteral Tube every 6 hours    MEDICATIONS  (PRN):  acetaminophen    Suspension .. 650 milliGRAM(s) Oral every 6 hours PRN Mild Pain (1 - 3)  sodium chloride 0.9% lock flush 10 milliLiter(s) IV Push every 1 hour PRN Pre/post blood products, medications, blood draw, and to maintain line patency      ALLERGIES:  Allergies    No Known Allergies    Intolerances        LABS:                        9.8    31.23 )-----------( 335      ( 05 Jan 2020 01:12 )             34.3     01-05    132<L>  |  95<L>  |  60<H>  ----------------------------<  108<H>  4.0   |  20<L>  |  3.09<H>    Ca    10.6<H>      05 Jan 2020 01:12  Phos  4.8     01-05  Mg     1.8     01-05    TPro  5.7<L>  /  Alb  2.2<L>  /  TBili  0.4  /  DBili  x   /  AST  19  /  ALT  5<L>  /  AlkPhos  110  01-05    PT/INR - ( 04 Jan 2020 02:04 )   PT: 13.7 sec;   INR: 1.20 ratio         PTT - ( 05 Jan 2020 01:12 )  PTT:26.8 sec      RADIOLOGY & ADDITIONAL TESTS: Reviewed.    Clostridium difficile GDH Interpretation: This specimen is positive for C. Difficile glutamate dehydrogenase (GDH)  antigen and negative for C. Difficile Toxins A & B, by EIA.  GDH is a  highly sensitive screening marker for C. Difficile that is produced in  large amounts by all C. Difficilestrains, both toxigenic and  nontoxigenic.  Specimens that are GDH positive and toxin negative will be  tested further by PCR to detect toxin gene sequences associated with  toxin producing C. Difficle. (01.05.20 @ 01:12) *******************************  Deborah Catherine, PGY1  Pager [phone]  *******************************    INTERVAL HPI/OVERNIGHT EVENTS:    ON, AMS and hypotensive. Intubated, started on levo and daisy via central line. Perfuse watery diarrhea ON, rectal tube placed. Cdiff indt. PO vanc started.     SUBJECTIVE:     Patient seen and examined at bedside. Intubated and sedated on propofol. Ros not assessed. Cont to have diarrhea. CT/C/AP w/ IV contrast pending.     Family at bedside.       OBJECTIVE:    VITAL SIGNS:  ICU Vital Signs Last 24 Hrs  T(C): 36.6 (05 Jan 2020 03:00), Max: 36.9 (04 Jan 2020 15:00)  T(F): 97.9 (05 Jan 2020 03:00), Max: 98.4 (04 Jan 2020 15:00)  HR: 120 (05 Jan 2020 06:30) (76 - 135)  BP: 107/61 (05 Jan 2020 06:30) (67/40 - 195/89)  BP(mean): 78 (05 Jan 2020 06:30) (44 - 128)  ABP: --  ABP(mean): --  RR: 49 (05 Jan 2020 06:30) (10 - 59)  SpO2: 100% (05 Jan 2020 06:30) (69% - 100%)    Mode: AC/ CMV (Assist Control/ Continuous Mandatory Ventilation), RR (machine): 16, TV (machine): 500, FiO2: 40, ITime: 1, PIP: 17    01-03 @ 07:01  -  01-04 @ 07:00  --------------------------------------------------------  IN: 2100 mL / OUT: 400 mL / NET: 1700 mL    01-04 @ 07:01 - 01-05 @ 06:53  --------------------------------------------------------  IN: 7218.2 mL / OUT: 305 mL / NET: 6913.2 mL    CAPILLARY BLOOD GLUCOSE    PHYSICAL EXAM:  GENERAL: intubated and sedated   HEAD:  Atraumatic, Normocephalic  EYES: pinpt but reactive to light   NECK: Supple, No JVD  CHEST/LUNG: CTAB   HEART: RRR, holosystolic murmur throughout precordium   ABDOMEN: Soft, Nontender, Nondistended, normoactive, no rigidity, PEG tube in place off of feeds   EXTREMITIES:  No clubbing, cyanosis, or edema  PSYCH: AAOxO  NEUROLOGY: non-focal  : rectal tube in place   SKIN: abrasions left ant tibia, sacral wound to bone, dressed         POCT Blood Glucose.: 166 mg/dL (05 Jan 2020 05:14  MEDICATIONS:  MEDICATIONS  (STANDING):  atorvastatin 40 milliGRAM(s) Oral at bedtime  chlorhexidine 0.12% Liquid 15 milliLiter(s) Oral Mucosa every 12 hours  chlorhexidine 0.12% Liquid 15 milliLiter(s) Oral Mucosa two times a day  chlorhexidine 4% Liquid 1 Application(s) Topical <User Schedule>  chlorhexidine 4% Liquid 1 Application(s) Topical <User Schedule>  Dakins Solution - 1/4 Strength 1 Application(s) Topical two times a day  dextrose 10%. 1000 milliLiter(s) (50 mL/Hr) IV Continuous <Continuous>  dextrose 5%. 1000 milliLiter(s) (50 mL/Hr) IV Continuous <Continuous>  epoetin tan Injectable 90484 Unit(s) IV Push <User Schedule>  hydrocortisone sodium succinate Injectable 100 milliGRAM(s) IV Push every 8 hours  insulin glargine Injectable (LANTUS) 8 Unit(s) SubCutaneous at bedtime  insulin lispro (HumaLOG) corrective regimen sliding scale   SubCutaneous three times a day before meals  levETIRAcetam  IVPB 500 milliGRAM(s) IV Intermittent every 12 hours  meropenem  IVPB 500 milliGRAM(s) IV Intermittent every 24 hours  midodrine. 20 milliGRAM(s) Oral three times a day  norepinephrine Infusion 0.05 MICROgram(s)/kG/Min (2.963 mL/Hr) IV Continuous <Continuous>  nystatin    Suspension 608586 Unit(s) Oral four times a day  pantoprazole  Injectable 40 milliGRAM(s) IV Push daily  phenylephrine    Infusion 0.2 MICROgram(s)/kG/Min (4.74 mL/Hr) IV Continuous <Continuous>  propofol Infusion 30 MICROgram(s)/kG/Min (11.376 mL/Hr) IV Continuous <Continuous>  vancomycin    Solution 125 milliGRAM(s) Enteral Tube every 6 hours    MEDICATIONS  (PRN):  acetaminophen    Suspension .. 650 milliGRAM(s) Oral every 6 hours PRN Mild Pain (1 - 3)  sodium chloride 0.9% lock flush 10 milliLiter(s) IV Push every 1 hour PRN Pre/post blood products, medications, blood draw, and to maintain line patency      ALLERGIES:  Allergies    No Known Allergies    Intolerances        LABS:                        9.8    31.23 )-----------( 335      ( 05 Jan 2020 01:12 )             34.3     01-05    132<L>  |  95<L>  |  60<H>  ----------------------------<  108<H>  4.0   |  20<L>  |  3.09<H>    Ca    10.6<H>      05 Jan 2020 01:12  Phos  4.8     01-05  Mg     1.8     01-05    TPro  5.7<L>  /  Alb  2.2<L>  /  TBili  0.4  /  DBili  x   /  AST  19  /  ALT  5<L>  /  AlkPhos  110  01-05    PT/INR - ( 04 Jan 2020 02:04 )   PT: 13.7 sec;   INR: 1.20 ratio         PTT - ( 05 Jan 2020 01:12 )  PTT:26.8 sec      RADIOLOGY & ADDITIONAL TESTS: Reviewed.    Clostridium difficile GDH Interpretation: This specimen is positive for C. Difficile glutamate dehydrogenase (GDH)  antigen and negative for C. Difficile Toxins A & B, by EIA.  GDH is a  highly sensitive screening marker for C. Difficile that is produced in  large amounts by all C. Difficilestrains, both toxigenic and  nontoxigenic.  Specimens that are GDH positive and toxin negative will be  tested further by PCR to detect toxin gene sequences associated with  toxin producing C. Difficle. (01.05.20 @ 01:12)

## 2020-01-05 NOTE — CHART NOTE - NSCHARTNOTEFT_GEN_A_CORE
Asked by nurse manager to clarify in 2nd page of [employer] phone. Called patient's daughter Roro ([phone]). Daughter Roro endorsed speaking to a resident ON about code status. Daughter currently would like to make patient full code. She understands the risks of resuscitation in the critically ill patient but states that she is confused about his code status and it is a difficult question to answer but for now she would like him to be both resuscitated and intubated, without any limitations in medical therapy.     Dr. Deborah Catherine, PGY1  MICU Asked by nurse manager to clarify in 2nd page of [facility] form. Called patient's daughter Roro ([phone]). Daughter Roro endorsed speaking to a resident ON about code status. Daughter currently would like to make patient full code. She understands the risks of resuscitation in the critically ill patient but states that she is confused about his code status and it is a difficult question to answer but for now she would like him to be both resuscitated and intubated, without any limitations in medical therapy.     Dr. Deborah Catherine, PGY1  MICU

## 2020-01-05 NOTE — DISCHARGE NOTE FOR THE EXPIRED PATIENT - HOSPITAL COURSE
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 course c/b hypercalcemia. RRT called 11/26 for AMS and hotn SBP 70s, found to be in septic shock 2/2 UTI s/p intubation and MICU stay (11/26/19-11/30/19). Extubated on 11/29/19, transferred to floors. Patient s/p open feeding gastrostomy tube placement on 12/21/19 with surgery. MICU consulted on 12/30 for AMS. Pt AOx0 and hypotensive to sbp 87. CT abd/pelvis showing new opacities in RLL suspicious for pneumonia. Patient admitted to MICU for pressor support, and treatment of septic shock likely in the setting of PNA vs.from sacral ulcer wounds vs. UTI on meropenem and vancomycin     ON 1/4, patient with altered mentation, low map, restarted on levophed and daisy, intubated for airway protection.     Patient had vtach arrest at 15:30. Patient was coded with ROSC with unstable cardiac rhythm and HR on max dose amiodarone and epi gtt. Daughter Roro at bedside wished to make patient DNR. Called by nurse to assess patient at 4:27. On physical exam, patient did not respond to verbal or noxious stimuli.  No spontaneous respirations.  Absent heart and breath sounds.  Absent radial and carotid pulses. Pupils are fixed and dilated, no corneal reflex. EKG rhythm strip shows asystole.  Patient pronounced dead at 4:27. Attending notified. Family declined autopsy. Patient ineligible for organ donation.    Dr. Deborah Catherine, PGY1  MICU

## 2020-01-05 NOTE — PROGRESS NOTE ADULT - PROBLEM/PLAN-1
DISPLAY PLAN FREE TEXT

## 2020-01-05 NOTE — PROGRESS NOTE ADULT - ASSESSMENT
74M PMHx of ESRD s/p failed renal transplant x2, HCV, DM, and meningococcal meningitis 2 years ago was sent in to the ED from HD for AMS and hypotension with continued AMS with attempted PEG insertion with both IR and GI with inability to obtain good window for PEG insertion. Patient is s/p open gastrostomy tube placement on 12/21/2019 by ACS. Re-consulted for intraabdominal free air and bowel edema on CT in setting of septic shock.  Possible sources of sepsis include loculated pneumonia or sacral wound.      Discussed with family the possibility of bowel perforation 2/2 low flow state in setting of septic shock. Family would not like to pursue any operative intervention at this time given the patient's current clinical status.  Rest of care per MICU  Will follow    ATP [id number] neck pain

## 2020-01-07 LAB
-  AMPICILLIN/SULBACTAM: SIGNIFICANT CHANGE UP
-  CANDIDA ALBICANS: SIGNIFICANT CHANGE UP
-  CANDIDA GLABRATA: SIGNIFICANT CHANGE UP
-  CANDIDA KRUSEI: SIGNIFICANT CHANGE UP
-  CANDIDA PARAPSILOSIS: SIGNIFICANT CHANGE UP
-  CANDIDA TROPICALIS: SIGNIFICANT CHANGE UP
-  CEFAZOLIN: SIGNIFICANT CHANGE UP
-  CLINDAMYCIN: SIGNIFICANT CHANGE UP
-  COAGULASE NEGATIVE STAPHYLOCOCCUS: SIGNIFICANT CHANGE UP
-  ERYTHROMYCIN: SIGNIFICANT CHANGE UP
-  GENTAMICIN: SIGNIFICANT CHANGE UP
-  K. PNEUMONIAE GROUP: SIGNIFICANT CHANGE UP
-  KPC RESISTANCE GENE: SIGNIFICANT CHANGE UP
-  LINEZOLID: SIGNIFICANT CHANGE UP
-  OXACILLIN: SIGNIFICANT CHANGE UP
-  PENICILLIN: SIGNIFICANT CHANGE UP
-  RIFAMPIN: SIGNIFICANT CHANGE UP
-  STREPTOCOCCUS SP. (NOT GRP A, B OR S PNEUMONIAE): SIGNIFICANT CHANGE UP
-  TETRACYCLINE: SIGNIFICANT CHANGE UP
-  TRIMETHOPRIM/SULFAMETHOXAZOLE: SIGNIFICANT CHANGE UP
-  VANCOMYCIN: SIGNIFICANT CHANGE UP
A BAUMANNII DNA SPEC QL NAA+PROBE: SIGNIFICANT CHANGE UP
E CLOAC COMP DNA BLD POS QL NAA+PROBE: SIGNIFICANT CHANGE UP
E COLI DNA BLD POS QL NAA+NON-PROBE: SIGNIFICANT CHANGE UP
ENTEROCOC DNA BLD POS QL NAA+NON-PROBE: SIGNIFICANT CHANGE UP
ENTEROCOC DNA BLD POS QL NAA+NON-PROBE: SIGNIFICANT CHANGE UP
GP B STREP DNA BLD POS QL NAA+NON-PROBE: SIGNIFICANT CHANGE UP
GRAM STN FLD: SIGNIFICANT CHANGE UP
HAEM INFLU DNA BLD POS QL NAA+NON-PROBE: SIGNIFICANT CHANGE UP
K OXYTOCA DNA BLD POS QL NAA+NON-PROBE: SIGNIFICANT CHANGE UP
L MONOCYTOG DNA BLD POS QL NAA+NON-PROBE: SIGNIFICANT CHANGE UP
METHOD TYPE: SIGNIFICANT CHANGE UP
METHOD TYPE: SIGNIFICANT CHANGE UP
MRSA SPEC QL CULT: SIGNIFICANT CHANGE UP
MSSA DNA SPEC QL NAA+PROBE: SIGNIFICANT CHANGE UP
N MEN ISLT CULT: SIGNIFICANT CHANGE UP
P AERUGINOSA DNA BLD POS NAA+NON-PROBE: SIGNIFICANT CHANGE UP
S MARCESCENS DNA BLD POS NAA+NON-PROBE: SIGNIFICANT CHANGE UP
S PNEUM DNA BLD POS QL NAA+NON-PROBE: SIGNIFICANT CHANGE UP
S PYO DNA BLD POS QL NAA+NON-PROBE: SIGNIFICANT CHANGE UP

## 2020-01-08 LAB
CULTURE RESULTS: SIGNIFICANT CHANGE UP
ORGANISM # SPEC MICROSCOPIC CNT: SIGNIFICANT CHANGE UP
SPECIMEN SOURCE: SIGNIFICANT CHANGE UP

## 2020-07-07 NOTE — PROGRESS NOTE ADULT - PROBLEM SELECTOR PLAN 3
7/7/2020       RE: Gloria Guzman  [address] [facility] [address]     Dear Colleague,    Thank you for referring your patient, Gloria Guzman, to the [facility] D[facility]MAT[facility] at [facility]. Please see a copy of my visit note below.    [facility]  Video: ( Invitation sent by:  Rashaun and text to cell phone: [phone] )      Impression and Recommendations (Patient Counseled on the Following):    1. Brown plaque on the R temporal scalp. Ddx inflamed SK, irritated nevus, versus other. Given family history of skin cancer, history of indoor tanning, and occupational exposure to UV radiation, recommended in-person evaluation for possible biopsy versus cryotherapy and full body skin examination.     Follow-up:   Follow-up with dermatology in approximately 1 week. Earlier for new or changing lesions or rash.      Staff only:    Dimitrios Chiu MD  Pronouns: he/him/his    Department of Dermatology  [facility]: Phone: [phone], Fax:[phone]  [facility]: Phone: [phone] Fax: [phone]      _____________________________________________________________________________    Dermatology Problem List:  1. NUB, R temporal scalp. Likely SK.     Encounter Date: Jul 7, 2020    CC:   Chief Complaint   Patient presents with     Derm Problem     Gloria has a concernig lump on her scalp that she's had for 6 months.     History of Present Illness:  I have reviewed the teledermatology information and the nursing intake corresponding to this issue. Gloria Guzman is a 58 year old female who presents via teledermatology for spot on the right temporal scalp.     She states she developed a brown spot on the right scalp about 10 years ago. Since then, the lesion has been rapidly growing in size, nearly doubling in the last 6 months. Lesion is itchy,  but no pain or bleeding. She is concerned it might be a skin cancer.     The patient otherwise denies any new or concerning lesions. No bleeding, painful, pruritic, or changing lesions. They report no personal history of skin cancer. There is a family history of skin cancer, mother and aunt, likely NMSC though unsure. No history of immunosuppression. There is a history of indoor tanning in her 20s and 30s, 100 times in lifetime. They do use sunscreen and protective clothing when outdoors for sun protection. There is some occupational exposure to ultraviolet light or other forms of radiation. Patient is a retired . Health otherwise stable. No other skin concerns.     ROS: Patient is generally feeling well today.    Physical Examination:  General: Well-appearing female, appropriately-developed individual.  Skin: Focused examination within the teledermatology photograph(s) including scalp was performed.   - Dillard Type II  - ~1.5-2.0 cm well-demarcated brown plaque on the right temporal scalp.     Labs:  None.     Past Medical History:   Patient Active Problem List   Diagnosis     Bipolar affective disorder (H)     Chronic obstructive pulmonary disease (H)     Hypertension     Obstructive sleep apnea syndrome     Arthralgia of shoulder     Injury of kidney     Cardiac pacemaker in situ     Automatic implantable cardioverter-defibrillator in situ     Blood glucose elevated     Nonischemic cardiomyopathy (H)     Chronic systolic CHF (congestive heart failure) (H)     CHF (congestive heart failure) (H)     Chronic pain     Chemical dependency (H)     Hypertensive urgency     Anemia     Obesity (BMI 35.0-39.9) with comorbidity (H)     DDD (degenerative disc disease), lumbar     Chronic midline low back pain with left-sided sciatica     Heart failure (H)     CKD (chronic kidney disease) stage 3, GFR 30-59 ml/min (H)     Past Medical History:   Diagnosis Date     Anemia      Arthritis      Bipolar  affective disorder, current episode moderate (H)      Chronic back pain      Chronic systolic CHF (congestive heart failure) (H)      COPD (chronic obstructive pulmonary disease) (H)      COPD (chronic obstructive pulmonary disease) (H)      ETOH abuse      GERD (gastroesophageal reflux disease)      H/O heart failure      History of posttraumatic stress disorder (PTSD)      Homeless      HTN (hypertension)      Marijuana abuse      Nonischemic cardiomyopathy (H)     EF 19% by CMRI     Obesity      Polysubstance abuse (H)     tobacco, previous cocaine, meth, and alcohol, and marijuana     Sleep apnea      Tobacco abuse      Past Surgical History:   Procedure Laterality Date     BACK SURGERY       CARDIAC SURGERY      AICD placement       SECTION       GALLBLADDER SURGERY       HERNIA REPAIR       JOINT REPLACEMTN, KNEE RT/LT  11/10    Joint Replacement knee LT     SURGICAL PATHOLOGY EXAM       TONSILLECTOMY         Social History:  Patient reports that she has been smoking cigarettes. She has been smoking about 0.50 packs per day. She has never used smokeless tobacco. She reports current drug use. Drug: Marijuana. She reports that she does not drink alcohol.    Family History:  Family History   Problem Relation Age of Onset     Breast Cancer Maternal Grandmother      Bipolar Disorder Maternal Grandmother      Substance Abuse Maternal Grandmother      Breast Cancer Maternal Aunt      Cancer Father 42        lung      Substance Abuse Father      Cancer Maternal Grandfather         lung      Bipolar Disorder Maternal Grandfather      Substance Abuse Maternal Grandfather      Cancer Other         maternal cousin lung      Gallbladder Disease Mother      Depression Mother      Bipolar Disorder Mother      Substance Abuse Mother      Skin Cancer Mother      Gallbladder Disease Sister      Substance Abuse Sister      Substance Abuse Brother      Melanoma No family hx of        Medications:  Current Outpatient  Medications   Medication     acetaminophen (TYLENOL) 500 MG tablet     bumetanide (BUMEX) 2 MG tablet     hydrocortisone (CORTAID) 1 % external cream     lisinopril (ZESTRIL) 20 MG tablet     metolazone (ZAROXOLYN) 2.5 MG tablet     metolazone 2.5 MG PO tablet     metoprolol succinate ER (TOPROL-XL) 25 MG 24 hr tablet     ondansetron (ZOFRAN-ODT) 4 MG ODT tab     oxyCODONE IR 15 MG PO tablet     potassium chloride ER (MICRO-K) 10 MEQ CR capsule     PROAIR  (90 Base) MCG/ACT inhaler     sodium chloride (OCEAN) 0.65 % nasal spray     spironolactone (ALDACTONE) 25 MG tablet     No current facility-administered medications for this visit.           Allergies   Allergen Reactions     Cefaclor Rash     Other reaction(s): *Unknown     Morphine Hives and Itching     Ibuprofen      Morphine Sulfate Itching     Mushrooms [Mushroom] Hives     Olanzapine Other (See Comments)     Varenicline Other (See Comments)     _____________________________________________________________________________    Teledermatology information:  - Location of patient: [place]  - Patient presented as: self referral  - Location of teledermatologist:  [facility] )  - Reason teledermatology is appropriate:  of National Emergency Regarding Coronavirus disease (COVID 19) Outbreak  - Image quality and interpretability: acceptable  - Physician has received verbal consent for a Video/Photos Visit from the patient? Yes  - In-person dermatology visit recommendation: yes - for physician visit  - Date of images:  7/7/2020  - Service start time:1:17 PM  - Service end time: 1:28 PM  - Date of report: 7/7/2020       Again, thank you for allowing me to participate in the care of your patient.      Sincerely,    Dimitrios Chiu MD       Overall improved   For now cont with current meds   Low salt diet

## 2020-09-04 NOTE — DIETITIAN INITIAL EVALUATION ADULT. - RD TO REMAIN AVAILABLE
Epidural is scheduled 9/22/2020 per pt.  Reviewed medication hold and resumption instructions with pt who verbalized understanding.    yes

## 2020-09-15 NOTE — PROVIDER CONTACT NOTE (OTHER) - BACKGROUND
LEft message DR PARK has paper work to complete Admitted Dx: acute renal failure. HX: DM, hep C, Afib, IV drug abuse.
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
## 2020-12-31 NOTE — PROGRESS NOTE ADULT - SUBJECTIVE AND OBJECTIVE BOX
Message to provider:  [id number] - [facility] [place] [place] - [address] [phone] sent a fax stating per insurance will only cover a 90 day. Please resend.     [] Writer advised caller of callback from clinic within 24-72 hours   Subjective: Patient seen and examined. No new events except as noted.   In HD     REVIEW OF SYSTEMS:    CONSTITUTIONAL: + weakness, fevers or chills  EYES/ENT: No visual changes;  No vertigo or throat pain   NECK: No pain or stiffness  RESPIRATORY: No cough, wheezing, hemoptysis; No shortness of breath  CARDIOVASCULAR: No chest pain or palpitations  GASTROINTESTINAL: No abdominal or epigastric pain. No nausea, vomiting, or hematemesis; No diarrhea or constipation. No melena or hematochezia.  GENITOURINARY: No dysuria, frequency or hematuria  NEUROLOGICAL: No numbness or weakness  SKIN: No itching, burning, rashes, or lesions   All other review of systems is negative unless indicated above.    MEDICATIONS:  MEDICATIONS  (STANDING):  allopurinol 100 milliGRAM(s) Oral daily  atorvastatin 40 milliGRAM(s) Oral at bedtime  chlorhexidine 2% Cloths 1 Application(s) Topical daily  cinacalcet 30 milliGRAM(s) Oral daily  cyanocobalamin Injectable 1000 MICROGram(s) IntraMuscular <User Schedule>  darbepoetin Injectable ViaL 60 MICROGram(s) IV Push every week  dextrose 5%. 1000 milliLiter(s) (50 mL/Hr) IV Continuous <Continuous>  dextrose 50% Injectable 12.5 Gram(s) IV Push once  dextrose 50% Injectable 25 Gram(s) IV Push once  dextrose 50% Injectable 25 Gram(s) IV Push once  ergocalciferol 30188 Unit(s) Oral every week  folic acid 1 milliGRAM(s) Oral daily  furosemide   Injectable 60 milliGRAM(s) IV Push two times a day  heparin  Infusion.  Unit(s)/Hr (19 mL/Hr) IV Continuous <Continuous>  hydrALAZINE 100 milliGRAM(s) Oral three times a day  hydrocortisone hemorrhoidal Suppository 1 Suppository(s) Rectal at bedtime  insulin glargine Injectable (LANTUS) 10 Unit(s) SubCutaneous at bedtime  insulin lispro (HumaLOG) corrective regimen sliding scale   SubCutaneous every 6 hours  labetalol 300 milliGRAM(s) Oral two times a day  lactobacillus acidophilus 1 Tablet(s) Oral two times a day  levETIRAcetam 1000 milliGRAM(s) Oral two times a day  mycophenolate mofetil 250 milliGRAM(s) Oral two times a day  predniSONE   Tablet 5 milliGRAM(s) Oral daily  sodium bicarbonate 650 milliGRAM(s) Oral two times a day  tamsulosin 0.4 milliGRAM(s) Oral at bedtime  thiamine 100 milliGRAM(s) Oral daily      PHYSICAL EXAM:  T(C): 36.7 (09-13-19 @ 09:35), Max: 36.9 (09-12-19 @ 14:14)  HR: 75 (09-13-19 @ 09:35) (72 - 78)  BP: 132/71 (09-13-19 @ 09:35) (132/71 - 157/72)  RR: 18 (09-13-19 @ 09:35) (18 - 18)  SpO2: 97% (09-13-19 @ 09:35) (96% - 99%)  Wt(kg): --  I&O's Summary    12 Sep 2019 07:01  -  13 Sep 2019 07:00  --------------------------------------------------------  IN: 1209 mL / OUT: 300 mL / NET: 909 mL        Appearance: NAD sleepy   HEENT:  dry oral mucosa, PERRL, EOMI	  Lymphatic: No lymphadenopathy  Cardiovascular: Normal S1 S2, No JVD, No murmurs , Peripheral pulses palpable 2+ bilaterally  Respiratory: Lungs clear to auscultation, normal effort 	  Gastrointestinal:  Soft, Non-tender, + BS	  Skin: No rashes, No ecchymoses, No cyanosis, warm to touch  Musculoskeletal: Normal range of motion, normal strength  Psychiatry:  at times confused   Ext: trace  edema    LABS:    CARDIAC MARKERS:                                8.4    8.91  )-----------( 187      ( 13 Sep 2019 10:18 )             27.5     09-13    133<L>  |  92<L>  |  30<H>  ----------------------------<  109<H>  3.4<L>   |  24  |  6.00<H>    Ca    8.8      13 Sep 2019 06:48      proBNP:   Lipid Profile:   HgA1c:   TSH:             TELEMETRY: 	    ECG:  	  RADIOLOGY:   DIAGNOSTIC TESTING:  [ ] Echocardiogram:  [ ]  Catheterization:  [ ] Stress Test:    OTHER:

## 2021-08-21 NOTE — PROGRESS NOTE ADULT - ASSESSMENT
74M PMHx of ESRD s/p failed renal transplant x2, HCV, DM, and meningococcal meningitis 2 years ago was sent in to the ED from HD for AMS and low BPs. Admitted with concern for CVA, neurology following. Patient with RRT overnight 11/27 resulting in transfer to MICU for respiratory failure concern for aspiration and sepsis. GI consulted for dysphagia. 95

## 2021-09-09 NOTE — PATIENT PROFILE ADULT - NSPROEDAREADYLEARN_GEN_A_NUR
Chief Complaint  Shortness of Breath and Diarrhea    Subjective          Fabio Juárez presents to [facility]  --PATIENT IS S/P GALLBLADDER REMOVAL A FEW YEARS AGO AND HAS HAD DIARRHEA OFF AND ON SINCE THEN BUT THIS TIME HE HAS HAD IT FOR TWO WEEKS.  ALSO HE WAS EXPOSED TO COVID FOUR DAYS AGO AND BEGAN HAVING BODY ACHES YESTERDAY.          No Known Allergies     Health Maintenance Due   Topic Date Due   • Pneumococcal Vaccine 0-64 (1 of 2 - PPSV23) 02/22/1970   • Hepatitis B (1 of 3 - Risk 3-dose series) Never done   • ZOSTER VACCINE (1 of 2) Never done   • HEPATITIS C SCREENING  Never done   • DIABETIC FOOT EXAM  02/24/2021   • DIABETIC EYE EXAM  Never done   • LIPID PANEL  06/16/2021   • HEMOGLOBIN A1C  07/27/2021   • URINE MICROALBUMIN  08/01/2021        Current Outpatient Medications on File Prior to Visit   Medication Sig   • atorvastatin (LIPITOR) 80 MG tablet TAKE ONE TABLET BY MOUTH EVERY NIGHT AT BEDTIME   • baclofen (LIORESAL) 10 MG tablet Take 1 tablet by mouth Daily for 90 days.   • cetirizine (zyrTEC) 5 MG tablet Take 5 mg by mouth Daily.   • gabapentin (NEURONTIN) 300 MG capsule gabapentin 300 mg oral capsule take 1 capsule (300 mg) by oral route 3 times per day   Suspended   • glimepiride (AMARYL) 2 MG tablet glimepiride 2 mg oral tablet take 1 tablet by oral route daily for 2 weeks then increase to 2 tablets daily   Active   • hydroCHLOROthiazide (HYDRODIURIL) 12.5 MG tablet Take 12.5 mg by mouth Daily.   • lisinopril (PRINIVIL,ZESTRIL) 10 MG tablet TAKE ONE TABLET BY MOUTH EVERY MORNING   • oxyCODONE (ROXICODONE) 5 MG immediate release tablet    • Semaglutide, 1 MG/DOSE, (Ozempic, 1 MG/DOSE,) 4 MG/3ML solution pen-injector Inject 1 mg under the skin into the appropriate area as directed 1 (One) Time Per Week for 90 days.   • traZODone (DESYREL) 50 MG tablet Take 1 tablet by mouth Every Night for 90 days.     No current facility-administered medications on file  "prior to visit.       Immunization History   Administered Date(s) Administered   • COVID-19 (PFIZER) 02/24/2021, 03/17/2021   • Influenza, Unspecified 01/20/2021   • Pneumococcal, Unspecified 08/08/2016   • Td 03/26/2019       Review of Systems   Constitutional: Positive for fatigue. Negative for activity change, appetite change, chills and fever.   HENT: Negative for ear pain, rhinorrhea and sore throat.    Eyes: Negative for blurred vision and discharge.   Respiratory: Negative for cough and shortness of breath.    Cardiovascular: Negative for chest pain, palpitations and leg swelling.   Gastrointestinal: Negative for abdominal pain, nausea and vomiting.   Genitourinary: Negative for dysuria and hematuria.   Musculoskeletal: Negative for arthralgias and myalgias.   Neurological: Negative for headache.        Objective     /85   Pulse 92   Temp 97.7 °F (36.5 °C) (Oral)   Ht 165.1 cm (65\")   Wt 119 kg (262 lb 3.2 oz)   SpO2 97%   BMI 43.63 kg/m²       Physical Exam  Vitals and nursing note reviewed.   Constitutional:       General: He is not in acute distress.     Appearance: Normal appearance.   HENT:      Right Ear: Tympanic membrane normal.      Left Ear: Tympanic membrane normal.      Mouth/Throat:      Pharynx: Oropharynx is clear.   Eyes:      Conjunctiva/sclera: Conjunctivae normal.   Cardiovascular:      Rate and Rhythm: Normal rate and regular rhythm.      Heart sounds: No murmur heard.     Pulmonary:      Effort: Pulmonary effort is normal.      Breath sounds: Normal breath sounds.   Abdominal:      General: Bowel sounds are normal.      Palpations: Abdomen is soft.      Tenderness: There is no abdominal tenderness.   Musculoskeletal:         General: No swelling.      Cervical back: Neck supple.   Lymphadenopathy:      Cervical: No cervical adenopathy.   Neurological:      General: No focal deficit present.      Mental Status: He is alert.      Cranial Nerves: No cranial nerve deficit.      " Coordination: Coordination normal.      Gait: Gait normal.   Psychiatric:         Mood and Affect: Mood normal.         Behavior: Behavior normal.         Result Review :                                   Assessment and Plan      Diagnoses and all orders for this visit:    1. Dyspnea, unspecified type (Primary)  -     POCT SARS-CoV-2 Antigen ALESSANDRA + Flu  -     COVID-19,CEPHEID/KEVIN/BDMAX,COR/VU/PAD/CHAIM IN-HOUSE(OR EMERGENT/ADD-ON),NP SWAB IN TRANSPORT MEDIA 3-4 HR TAT, RT-PCR - Swab, Nasopharynx; Future  -     COVID-19,CEPHEID/KEVIN/BDMAX,COR/VU/PAD/CHAIM IN-HOUSE(OR EMERGENT/ADD-ON),NP SWAB IN TRANSPORT MEDIA 3-4 HR TAT, RT-PCR - Swab, Nasopharynx    2. Diarrhea of presumed infectious origin  Assessment & Plan:  POSSIBLY VIRAL, POSSIBLY RELATED TO HIS PRIOR GALLBLADDER REMOVAL.  OTC MEDS AND FLUIDS FOR NOW BUT ALSO WILL CHECK A PCR DUE TO HIS BODY ACHES AND RECENT COVID EXPOSURE.  MAY NEED FURTHER WORKUP OR A GASTRO EVAL IF THE DIARRHEA PERSISTS OR WORSENS.              Follow Up     Return if symptoms worsen or fail to improve.    Patient was given instructions and counseling regarding his condition or for health maintenance advice. Please see specific information pulled into the AVS if appropriate.        none

## 2021-09-14 NOTE — DIETITIAN INITIAL EVALUATION ADULT. - OTHER INFO
Pt unable to participate with assessment/interview and unable to assign caregiver due to lethargy and confusion per chart. Limited information available PTA. Pt is currently NPO x5 days due to inability to consume food per RN. Per NP, medical team awaiting MD order for possible NGT. NKFA reported. Pt had BM earlier today (11/25/19).     Weight hx: 160.4lbs (11/25/19), 157.6lbs (11/22/19), 158.1lbs (11/21/19). No significant wt change noted since admission.
reactions to medicines

## 2022-02-07 NOTE — PHYSICAL THERAPY INITIAL EVALUATION ADULT - RISK REDUCTION/PREVENTION, PT EVAL
Uche Penn Patient Age: 73 year old  MESSAGE:   Patient asking if there are any coupons available for Xarelto to assist with pricing, please advise      WEIGHT AND HEIGHT:   Wt Readings from Last 1 Encounters:   01/17/22 96.6 kg (213 lb)     Ht Readings from Last 1 Encounters:   01/17/22 6' (1.829 m)     BMI Readings from Last 1 Encounters:   01/17/22 28.89 kg/m²       ALLERGIES:  Tramadol and Aspirin  Current Outpatient Medications   Medication   • atorvastatin (LIPITOR) 10 MG tablet   • carvedilol (COREG) 3.125 MG tablet   • aspirin 325 MG tablet   • sildenafil (Viagra) 100 MG tablet   • Multiple Vitamins-Minerals (MULTIVITAMIN ADULT EXTRA C PO)   • Probiotic Product (PROBIOTIC DAILY PO)   • latanoprost (XALATAN) 0.005 % ophthalmic solution   • dorzolamide-timolol (COSOPT) 22.3-6.8 MG/ML ophthalmic solution     No current facility-administered medications for this visit.     PHARMACY to use: please ask          Pharmacy preference(s) on file:   [id number] - [place], [place] - [address] AT [facility] [address]  [address]  [facility] [address]  Phone: [phone] Fax: [phone]      CALL BACK INFO: Ok to leave response (including medical information) with family member or on answering machine  ROUTING: Patient's physician/staff        PCP: Samantha Archuleta MD         INS: Payor: MEDICARE / Plan: PARTA AND B / Product Type: MEDICARE   PATIENT ADDRESS:  [address] [place] [id number]   secondary impairments

## 2022-05-11 NOTE — BRIEF OPERATIVE NOTE - ESTIMATED BLOOD LOSS
NEUROLOGY NEW PATIENT OFFICE CONSULTATION      5/11/2022    RE: Verna Bajwa.         1945      REFERRED BY:  Lazaro Gibson MD        CHIEF COMPLAINT:  This is Verna Bajwa. is a 78 y.o. male right handed Retired  who had no chief complaint listed for this encounter. HPI:     For the past 1 yr, patient noted memory issues described as short term memory issues, does not know where he is, forgetting conversations, wife takes care of finances and bills. (+) visual hallucinations -seeing people that he knows    Gait imbalance - Uses walker for 2 months - getting physical therapy    Was admitted at [facility] for CHF and sepsis 2-3 months ago due to infection of defibrillator    Ex-smoker 2019  Quit drinking March 2022 - 5-6 shots of whiskey and 8-9 beers/ week. Also has numbness and restlessness of both feet for 10 yrs. ROS   (-) fever  (-) rash  All other systems reviewed and are negative    Past Medical Hx  Past Medical History:   Diagnosis Date    Asthma     Heart failure (HCC)     Hyperlipidemia     Hypertension     Sleep apnea     sleeps with CPAP   COPD    Social Hx  Social History     Socioeconomic History    Marital status:    Tobacco Use    Smoking status: Current Every Day Smoker    Smokeless tobacco: Never Used    Tobacco comment: 3 cigars a day    Substance and Sexual Activity    Alcohol use: Yes     Alcohol/week: 24.0 standard drinks     Types: 20 Cans of beer, 4 Shots of liquor per week    Drug use: Never       Family Hx  No family history on file. ALLERGIES  No Known Allergies    CURRENT MEDS  Current Outpatient Medications   Medication Sig Dispense Refill    rosuvastatin (CRESTOR) 20 mg tablet Take 20 mg by mouth nightly.  losartan (COZAAR) 100 mg tablet Take 100 mg by mouth daily.  escitalopram oxalate (LEXAPRO) 10 mg tablet Take 10 mg by mouth daily.       pantoprazole (PROTONIX) 40 mg tablet Take 40 mg by mouth daily.      carvedilol (COREG) 25 mg tablet Take 25 mg by mouth two (2) times daily (with meals).  ferrous fumarate/vit Bcomp,C (SUPER B COMPLEX PO) Take  by mouth daily.  multivitamin (ONE A DAY) tablet Take 1 Tab by mouth daily.  aspirin delayed-release 81 mg tablet Take 81 mg by mouth daily.  acetaminophen/diphenhydramine (TYLENOL PM PO) Take  by mouth daily. Indications: 2 pills per night      acetaminophen (TYLENOL EXTRA STRENGTH) 500 mg tablet Take 500 mg by mouth every six (6) hours as needed for Pain. Indications: 2 pills      pramipexole (MIRAPEX) 0.5 mg tablet Take 0.5 mg by mouth daily.  hydroCHLOROthiazide (HYDRODIURIL) 25 mg tablet Take 25 mg by mouth daily.  pramipexole (MIRAPEX) 0.25 mg tablet Take 0.25 mg by mouth daily. PREVIOUS WORKUP: (reviewed)  IMAGING:    CT Results (recent):  No results found for this or any previous visit. MRI Results (recent):  No results found for this or any previous visit. IR Results (recent):  No results found for this or any previous visit. VAS/US Results (recent):  No results found for this or any previous visit. LABS (reviewed)  No results found for this or any previous visit. Physical Exam:     Visit Vitals  /84 (BP 1 Location: Left arm, BP Patient Position: Sitting, BP Cuff Size: Adult)   Pulse 68   Resp 17   SpO2 97%     General:  Alert, cooperative, no distress. Head:  Normocephalic, without obvious abnormality, atraumatic. Eyes:  Conjunctivae/corneas clear. Lungs:  Heart:   Non labored breathing  Regular rate and rhythm, no carotid bruits   Abdomen:   Soft, non-distended   Extremities: Extremities normal, atraumatic, no cyanosis or edema. Pulses: 2+ and symmetric all extremities. Skin: Skin color, texture, turgor normal. No rashes or lesions.   Neurologic Exam     Gen: MMSE 26/30 Attention normal             Language: naming, repetition, fluency normal             Memory: intact recent and remote memory  Problem with spelling and copying  Cranial Nerves:  I: smell Not tested   II: visual fields Full to confrontation   II: pupils Equal, round, reactive to light   II: optic disc No papilledema   III,VII: ptosis none   III,IV,VI: extraocular muscles  Full ROM   V: mastication normal   V: facial light touch sensation  normal   VII: facial muscle function   symmetric   VIII: hearing symmetric   IX: soft palate elevation  normal   XI: trapezius strength  5/5   XI: sternocleidomastoid strength 5/5   XI: neck flexion strength  5/5   XII: tongue  midline     Motor: normal bulk and tone, no tremor              Strength: 5/5 all four extremities  Sensory: intact to LT, PP, vibration, and JPS  Reflexes: 2+ UE 1+ knees and ankles throughout; Down going toes  Coordination: Good FTN and HTS  Gait: wide based ataxic gait         Impression:     Marla Hassan is a 78 y.o. male ex smoker 2019 who  has a past medical history of Asthma, Heart failure ([facility] [address] Hyperlipidemia, Hypertension, and Sleep apnea. He has no past medical history of Chronic obstructive pulmonary disease ([address] or Diabetes ([facility] [address] who for the past 1 yr, patient noted memory issues described as short term memory issues, does not know where he is, forgetting conversations, wife takes care of finances and bills. (+) visual hallucinations -seeing people that he knows. Also has gait imbalance - Uses walker for 2 months - getting physical therapy. Was admitted at [facility] for CHF and sepsis 2-3 months ago due to infection of defibrillator Quit drinking March 2022 - 5-6 shots of whiskey and 8-9 beers/ week. Also has numbness and restlessness of both feet for 10 yrs. MMSE 26/30. Considerations include cognitive impairment and gait disturbance (ataxia) due to chronic alcohol use (I.e. Wernicke encephalopathy). Patient should be evaluated for stroke.  His recent sepsis and history of COPD and CHF may have played a 20 role.    RECOMMENDATIONS  1. I had a long discussion with patient and wife. Discussed diagnosis, prognosis, pathophysiology and available treatment. Reviewed test results. All questions were answered. 2. Will do MRI Brain to look for stroke (smoker, CHF)  3. Blood test for TSH, Vit B12 and Folate  4. Advise to start Thiamine 100 mg every day   5. Already getting physical therapy for gait  6. Already on ASA 81 every day  7. Follow up with cardiologist regarding CHF and pulmonologist regarding COPD    Follow-up and Dispositions    · Return in about 1 month (around 6/11/2022).             Thank you for the consultation      Ritu Meneses MD  Diplomate, American Board of Psychiatry and Neurology  Diplomate, Neuromuscular Medicine  Diplomate, American Board of Electrodiagnostic Medicine        CC: Lauren Almaraz MD  Fax: [phone]

## 2022-05-20 NOTE — ED PROVIDER NOTE - PSH
----- Message from Horace Grissom MD sent at 5/20/2022  7:33 AM CDT -----  PSA result was reviewed.  It is still slightly elevated, at this point 4.9.  Reassuring that we are not seeing any rapid increase in PSA.  I would still recommend consider seeing a urologist for evaluation at this point given his young age would want to make sure no further prostate related issue is being missed with his lightly raised PSA level.      Lab Services on 05/19/2022   Component Date Value Ref Range Status   • Prostate Specific Antigen 05/19/2022 4.94 (A) <=3.50 ng/mL Final    [facility] Chemiluminescence     
Spoke with patient, sent Urologist information through patient portal. Patient had no further questions at this time.   
A-V fistula  Left, implanted (?)  H/O kidney transplant  may 2015  S/p cadaver renal transplant  2008  S/P partial colectomy  due to diverticulitis

## 2022-08-23 NOTE — PROGRESS NOTE ADULT - PROBLEM SELECTOR PLAN 5
From: Ronda King  To: Charli Benjamin  Sent: 8/23/2022 2:16 PM CDT  Subject: Additional glasses, prescription    Ronda had a recent appointment at which you told her, and my brother Diallo, that new glasses would not help her vision. Diallo did not mention that we think she should have an additional pair of glasses, she misplaces hers all of the time. When I called your optical department they said her prescription is more than two years old and she would need a new prescription or the old prescription extended. What do you suggest?  Thank you,  Herbie King (son of Ronda)   Suggest to continue medications, monitoring, FU primary team recommendations.

## 2022-10-09 NOTE — PROGRESS NOTE ADULT - PROBLEM SELECTOR PLAN 1
Will continue current insulin regimen for now.  Will monitoring FS, log, will Follow up. Hyperlipidemia Will monitoring FS, log, will Follow up.

## 2023-01-01 NOTE — PROGRESS NOTE ADULT - SUBJECTIVE AND OBJECTIVE BOX
Subjective: Patient seen and examined. No new events except as noted.     SUBJECTIVE/ROS:        MEDICATIONS:  MEDICATIONS  (STANDING):  alteplase for catheter clearance 2 milliGRAM(s) Catheter once  alteplase for catheter clearance 2 milliGRAM(s) Catheter once  atorvastatin 40 milliGRAM(s) Oral at bedtime  carvedilol 3.125 milliGRAM(s) Oral every 12 hours  chlorhexidine 4% Liquid 1 Application(s) Topical <User Schedule>  cinacalcet 30 milliGRAM(s) Oral daily  dextrose 5%. 1000 milliLiter(s) (50 mL/Hr) IV Continuous <Continuous>  dextrose 50% Injectable 12.5 Gram(s) IV Push once  dextrose 50% Injectable 25 Gram(s) IV Push once  dextrose 50% Injectable 25 Gram(s) IV Push once  epoetin tan Injectable 25607 Unit(s) IV Push <User Schedule>  heparin  Injectable 5000 Unit(s) SubCutaneous every 8 hours  insulin lispro (HumaLOG) corrective regimen sliding scale   SubCutaneous every 6 hours  levETIRAcetam  Solution 1000 milliGRAM(s) Oral at bedtime  lidocaine   Patch 1 Patch Transdermal daily  midodrine. 10 milliGRAM(s) Oral three times a day  pantoprazole  Injectable 20 milliGRAM(s) IV Push daily  predniSONE   Tablet 5 milliGRAM(s) Oral daily      PHYSICAL EXAM:  T(C): 37.3 (12-11-19 @ 08:21), Max: 37.8 (12-10-19 @ 22:15)  HR: 93 (12-11-19 @ 08:21) (93 - 101)  BP: 114/64 (12-11-19 @ 08:21) (112/63 - 149/70)  RR: 18 (12-11-19 @ 08:21) (18 - 20)  SpO2: 98% (12-11-19 @ 08:21) (95% - 99%)  Wt(kg): --  I&O's Summary    10 Dec 2019 07:01  -  11 Dec 2019 07:00  --------------------------------------------------------  IN: 530 mL / OUT: 1001 mL / NET: -471 mL            JVP: Normal  Neck: supple  Lung: clear   CV: S1 S2 , Murmur:  Abd: soft  Ext: No edema  neuro: Awake   Psych: flat affect  Skin: normal``    LABS/DATA:    CARDIAC MARKERS:                                6.9    14.83 )-----------( 257      ( 11 Dec 2019 08:57 )             23.9     12-11    137  |  95<L>  |  32<H>  ----------------------------<  126<H>  3.5   |  29  |  3.21<H>    Ca    10.3      11 Dec 2019 07:15      proBNP:   Lipid Profile:   HgA1c:   TSH:     TELE:  EKG: CCHD Screen [07-28]: Initial  Pre-Ductal SpO2(%): 99  Post-Ductal SpO2(%): 99  SpO2 Difference(Pre MINUS Post): 0  Extremities Used: Right Hand, Left Foot  Result: Passed  Follow up: Normal Screen- (No follow-up needed)

## 2023-01-18 NOTE — PROGRESS NOTE ADULT - SUBJECTIVE AND OBJECTIVE BOX
Subjective: Patient seen and examined. No new events except as noted.     REVIEW OF SYSTEMS:    CONSTITUTIONAL: + weakness, fevers or chills  EYES/ENT: No visual changes;  No vertigo or throat pain   NECK: No pain or stiffness  RESPIRATORY: No cough, wheezing, hemoptysis; No shortness of breath  CARDIOVASCULAR: No chest pain or palpitations  GASTROINTESTINAL: No abdominal or epigastric pain. No nausea, vomiting, or hematemesis; No diarrhea or constipation. No melena or hematochezia.  GENITOURINARY: No dysuria, frequency or hematuria  NEUROLOGICAL: No numbness or weakness  SKIN: No itching, burning, rashes, or lesions   All other review of systems is negative unless indicated above.    MEDICATIONS:  MEDICATIONS  (STANDING):  allopurinol 100 milliGRAM(s) Oral daily  apixaban 2.5 milliGRAM(s) Oral two times a day  atorvastatin 40 milliGRAM(s) Oral at bedtime  carvedilol 6.25 milliGRAM(s) Oral every 12 hours  chlorhexidine 2% Cloths 1 Application(s) Topical daily  cinacalcet 30 milliGRAM(s) Oral daily  cyanocobalamin Injectable 1000 MICROGram(s) IntraMuscular <User Schedule>  darbepoetin Injectable ViaL 60 MICROGram(s) IV Push every week  dextrose 5%. 1000 milliLiter(s) (50 mL/Hr) IV Continuous <Continuous>  dextrose 50% Injectable 12.5 Gram(s) IV Push once  dextrose 50% Injectable 25 Gram(s) IV Push once  dextrose 50% Injectable 25 Gram(s) IV Push once  ergocalciferol 83371 Unit(s) Oral every week  folic acid 1 milliGRAM(s) Oral daily  furosemide    Tablet 60 milliGRAM(s) Oral two times a day  hydrALAZINE 100 milliGRAM(s) Oral three times a day  hydrocortisone hemorrhoidal Suppository 1 Suppository(s) Rectal at bedtime  insulin glargine Injectable (LANTUS) 10 Unit(s) SubCutaneous at bedtime  insulin lispro (HumaLOG) corrective regimen sliding scale   SubCutaneous three times a day with meals  insulin lispro (HumaLOG) corrective regimen sliding scale   SubCutaneous at bedtime  labetalol 300 milliGRAM(s) Oral two times a day  lactobacillus acidophilus 1 Tablet(s) Oral two times a day  levETIRAcetam 1000 milliGRAM(s) Oral two times a day  mycophenolate mofetil 250 milliGRAM(s) Oral two times a day  predniSONE   Tablet 5 milliGRAM(s) Oral daily  sodium bicarbonate 650 milliGRAM(s) Oral two times a day  tamsulosin 0.4 milliGRAM(s) Oral at bedtime  thiamine 100 milliGRAM(s) Oral daily      PHYSICAL EXAM:  T(C): 37 (09-18-19 @ 20:05), Max: 37.2 (09-17-19 @ 21:03)  HR: 73 (09-18-19 @ 20:05) (72 - 82)  BP: 142/71 (09-18-19 @ 20:05) (125/64 - 148/72)  RR: 18 (09-18-19 @ 20:05) (18 - 18)  SpO2: 97% (09-18-19 @ 20:05) (94% - 99%)  Wt(kg): --  I&O's Summary    17 Sep 2019 07:01  -  18 Sep 2019 07:00  --------------------------------------------------------  IN: 60 mL / OUT: 0 mL / NET: 60 mL    18 Sep 2019 07:01  -  18 Sep 2019 20:15  --------------------------------------------------------  IN: 1380 mL / OUT: 2060 mL / NET: -680 mL          Appearance: Normal	  HEENT:   Normal oral mucosa, PERRL, EOMI	  Lymphatic: No lymphadenopathy , no edema  Cardiovascular: Normal S1 S2, No JVD, No murmurs , Peripheral pulses palpable 2+ bilaterally  Respiratory: Lungs clear to auscultation, normal effort 	  Gastrointestinal:  Soft, Non-tender, + BS	  Skin: No rashes, No ecchymoses, No cyanosis, warm to touch  Musculoskeletal: Normal range of motion, normal strength  Psychiatry:  Mood & affect appropriate  Ext: No edema      LABS:    CARDIAC MARKERS:                                9.0    6.1   )-----------( 178      ( 18 Sep 2019 10:13 )             28.8     09-18    138  |  96  |  26<H>  ----------------------------<  161<H>  3.9   |  27  |  5.94<H>    Ca    9.1      18 Sep 2019 10:13  Mg     1.8     09-17      proBNP:   Lipid Profile:   HgA1c:   TSH:             TELEMETRY: 	SR    ECG:  	  RADIOLOGY:   DIAGNOSTIC TESTING:  [ ] Echocardiogram:  [ ]  Catheterization:  [ ] Stress Test:    OTHER: Partially impaired: cannot see medication labels or newsprint, but can see obstacles in path, and the surrounding layout; can count fingers at arm's length

## 2023-07-28 NOTE — CHART NOTE - NSCHARTNOTESELECT_GEN_ALL_CORE
RRT/Event Note Thalidomide Counseling: I discussed with the patient the risks of thalidomide including but not limited to birth defects, anxiety, weakness, chest pain, dizziness, cough and severe allergy.

## 2023-08-23 NOTE — PROGRESS NOTE ADULT - ASSESSMENT
Tried to contact pt, unable to leave vm.   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       NEURO:  - now intubated, presented with AMS  - metabolic encephalopathy, meningitis r/o  - neuro following  - chronic lacunar infarcts  - c/w keppra for ?hx of seizure disorder  -AMS likely 2/2 hyperCa, renal dysfunction, poor PO intake  - thiamine IVPB daily x 3 days    CV:  - hx of afib was on eliquis, now off a/c per cards due to bleeding risk  - holding carvedilol 6.25 BID  - hx of cocaine cardiomyopathy, resolved, normal EF on most recent echo  - c/w statin  - trop 134 -- trended down.   - hypotensive during RRT 11/26; started levo gtt, c/w midodrine, try to wean off levo.  - echo ordered    PULM:  - now intubated and sedated  - monitor for improvement for eventual extubation    GI:  - NG tube in place  - tube feeds ordered      RENAL:  - hx of ESRD s/p failed renal transplant x2  - renal following, HD as recommended.  - c/w cellcept, predisone, sevelamer   - has dialysis catheter in place    ENDO:  #DM2: HbA1c 5.5  - Start Insulin Sliding Scale  - endocrine following  - presented hypercalcemic, downtrending. hold pamidronate tomorrow    METABOLIC:  #lyes WNL; monitor and f/u with HD schedule  - initially hyperCa, improved. hold pamidronate for now, follow up with endocrine    HEMATOLOGIC:  - hgb 8-9. Monitor  - pt was on eliquis; has been held.       INFECTIOUS:  - WBC uptrending during the hospital stay. ID following  - RRT likely 2/2 septic shock, possible aspiration, UTI, has decubiti ulcers as well.  - Urine culture growing E.coli; blood culture negative  - was given empiric CNS infx coverage - vanc, manoj, acyclovir, LP CSF negative  - per ID, resart meropenem 500 IV q24, Vanc x1, will dose vanc by level w/ HD.  - f/u sputum culture    PPX: HSQ     GOC:  - palliative consult.  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       NEURO:  - now intubated, presented with AMS  - metabolic encephalopathy, meningitis r/o  - neuro following  - chronic lacunar infarcts  - c/w keppra for ?hx of seizure disorder  -AMS likely 2/2 hyperCa, renal dysfunction, poor PO intake  - thiamine IVPB daily x 3 days    CV:  - hx of afib was on eliquis, now off a/c per cards due to bleeding risk  - holding carvedilol 6.25 BID  - hx of cocaine cardiomyopathy, resolved, normal EF on most recent echo  - c/w statin  - trop 134 -- trended down.   - hypotensive during RRT 11/26; started levo gtt, c/w midodrine, try to wean off levo.  - echo repeat EF70, LVH, hyperdynamic LCF, normal RV sys fxn    PULM:  - now intubated and sedated  - monitor for improvement for eventual extubation    GI:  - NG tube in place  - tube feeds ordered      RENAL:  - hx of ESRD s/p failed renal transplant x2  - renal following, HD as recommended.  - c/w predisone, sevelamer. hold cellcept for now given likely septic state; monitor and d/w renal when to restart cellcept  - has dialysis catheter in place; HD 11/28    ENDO:  #DM2: HbA1c 5.5  - Start Insulin Sliding Scale  - endocrine following  - presented hypercalcemic, downtrending. hold pamidronate for now. f/u with endo    METABOLIC:  #lyes WNL; monitor and f/u with HD schedule  - initially hyperCa, improved. hold pamidronate for now, follow up with endocrine    HEMATOLOGIC:  - hgb 8-9. Monitor  - pt was on eliquis; has been held.       INFECTIOUS:  - WBC downtrending. ID following  - RRT likely 2/2 septic shock, possible aspiration, UTI, has decubiti ulcers as well.  - Urine culture growing E.coli; blood culture negative  - was given empiric CNS infx coverage - vanc, manoj, acyclovir, LP CSF negative  - per ID, resart meropenem 500 IV q24, Vanc x1, will dose vanc by level w/ HD.  - f/u sputum culture  - wound care consulted    PPX: HSQ     GOC:  - palliative consult.  - discussed w/ sister who is [employer] employee. Made aware of status and will update

## 2023-12-15 NOTE — PROGRESS NOTE ADULT - SUBJECTIVE AND OBJECTIVE BOX
8 [facility] DIVISION OF KIDNEY DISEASES AND HYPERTENSION -- FOLLOW UP NOTE  --------------------------------------------------------------------------------  Chief Complaint:  ESRD    24 hour events/subjective:        PAST HISTORY  --------------------------------------------------------------------------------  No significant changes to PMH, PSH, FHx, SHx, unless otherwise noted    ALLERGIES & MEDICATIONS  --------------------------------------------------------------------------------  Allergies    No Known Allergies    Intolerances      Standing Inpatient Medications  allopurinol 100 milliGRAM(s) Oral daily  atorvastatin 40 milliGRAM(s) Oral at bedtime  cefTRIAXone   IVPB 1000 milliGRAM(s) IV Intermittent every 24 hours  chlorhexidine 2% Cloths 1 Application(s) Topical daily  cinacalcet 30 milliGRAM(s) Oral daily  cyanocobalamin Injectable 1000 MICROGram(s) IntraMuscular <User Schedule>  darbepoetin Injectable ViaL 60 MICROGram(s) IV Push every week  dextrose 5%. 1000 milliLiter(s) IV Continuous <Continuous>  dextrose 50% Injectable 12.5 Gram(s) IV Push once  dextrose 50% Injectable 25 Gram(s) IV Push once  dextrose 50% Injectable 25 Gram(s) IV Push once  ergocalciferol 81337 Unit(s) Oral every week  folic acid 1 milliGRAM(s) Oral daily  furosemide   Injectable 60 milliGRAM(s) IV Push two times a day  heparin  Infusion.  Unit(s)/Hr IV Continuous <Continuous>  hydrALAZINE 100 milliGRAM(s) Oral three times a day  hydrocortisone hemorrhoidal Suppository 1 Suppository(s) Rectal at bedtime  insulin glargine Injectable (LANTUS) 10 Unit(s) SubCutaneous at bedtime  insulin lispro (HumaLOG) corrective regimen sliding scale   SubCutaneous every 6 hours  labetalol 300 milliGRAM(s) Oral two times a day  levETIRAcetam 1000 milliGRAM(s) Oral two times a day  mycophenolate mofetil 250 milliGRAM(s) Oral two times a day  predniSONE   Tablet 5 milliGRAM(s) Oral daily  sodium bicarbonate 650 milliGRAM(s) Oral two times a day  tamsulosin 0.4 milliGRAM(s) Oral at bedtime  thiamine 100 milliGRAM(s) Oral daily    PRN Inpatient Medications  acetaminophen   Tablet .. 650 milliGRAM(s) Oral every 6 hours PRN  aluminum hydroxide/magnesium hydroxide/simethicone Suspension 30 milliLiter(s) Oral every 6 hours PRN  dextrose 40% Gel 15 Gram(s) Oral once PRN  glucagon  Injectable 1 milliGRAM(s) IntraMuscular once PRN    Gen: No weight changes, fatigue, fevers/chills, weakness  Head/Eyes/Ears/Mouth: No headache; Normal hearing; Normal vision    Respiratory: No dyspnea, cough, wheezing, hemoptysis  CV: No chest pain, PND, orthopnea  GI: No abdominal pain, diarrhea, constipation, nausea, vomiting, melena, hematochezia  : No increased frequency, dysuria, hematuria, nocturia  MSK: No joint pain/swelling; no back pain; no edema   Heme: No easy bruising or bleeding  All other systems were reviewed and are negative, except as noted.      VITALS/PHYSICAL EXAM  --------------------------------------------------------------------------------  T(C): 36.9 (09-12-19 @ 04:58), Max: 37.1 (09-11-19 @ 13:58)  HR: 77 (09-12-19 @ 04:58) (76 - 85)  BP: 152/62 (09-12-19 @ 04:58) (118/61 - 153/64)  RR: 18 (09-12-19 @ 04:58) (18 - 18)  SpO2: 97% (09-12-19 @ 04:58) (95% - 98%)  Wt(kg): --        09-11-19 @ 07:01  -  09-12-19 @ 07:00  --------------------------------------------------------  IN: 200 mL / OUT: 1270 mL / NET: -1070 mL    09-12-19 @ 07:01  -  09-12-19 @ 12:00  --------------------------------------------------------  IN: 84 mL / OUT: 0 mL / NET: 84 mL      PHYSICAL EXAM: vital signs as above  in no apparent distress  Neck: Supple, no JVD,    Lungs: no rhonchi, no wheeze, no crackles  CVS: S1 S2 no M/R/G  Abdomen: no tenderness, no organomegaly, BS present  Neuro: Grossly intact  Skin: warm, dry  Ext: no cyanosis or clubbing, no edema  Access: AVF + thrill    LABS/STUDIES  --------------------------------------------------------------------------------              8.9    8.9   >-----------<  149      [09-12-19 @ 01:54]              27.7     137  |  95  |  23  ----------------------------<  106      [09-12-19 @ 08:46]  3.8   |  25  |  4.78        Ca     8.6     [09-12-19 @ 08:46]      PT/INR: PT 14.8 , INR 1.29       [09-11-19 @ 09:20]  PTT: 48.0       [09-12-19 @ 01:54]      Creatinine Trend:  SCr 4.78 [09-12 @ 08:46]  SCr 5.76 [09-11 @ 09:20]  SCr 4.32 [09-10 @ 07:10]  SCr 4.10 [09-10 @ 01:11]  SCr 5.75 [09-08 @ 07:01]    Urinalysis - [09-10-19 @ 10:27]      Color Light Yellow / Appearance Slightly Turbid / SG 1.012 / pH 8.0      Gluc 100 mg/dL / Ketone Negative  / Bili Negative / Urobili <2 mg/dL       Blood Trace / Protein 300 mg/dL / Leuk Est Moderate / Nitrite Negative      RBC 2 / WBC 77 / Hyaline 0 / Gran  / Sq Epi  / Non Sq Epi 2 / Bacteria Negative      Iron 45, TIBC 159, %sat 28      [09-02-19 @ 17:29]  Ferritin 260      [09-02-19 @ 17:29]  PTH -- (Ca 9.7)      [09-02-19 @ 17:29]   1231  PTH -- (Ca 9.1)      [08-27-19 @ 15:07]   1896  Vitamin D (25OH) <4.0      [09-02-19 @ 17:28]  HbA1c 5.6      [08-27-19 @ 09:03]  TSH 5.45      [08-30-19 @ 09:41]    HBsAb 19.4      [08-27-19 @ 15:26]  HBsAg Nonreact      [08-27-19 @ 15:26]  HCV 10.44, Reactive      [08-27-19 @ 08:29]    Free Light Chains: kappa 5.95, lambda 16.19, ratio = 0.37      [09-04 @ 23:44]  Immunofixation Serum: IgG Lambda Band Identified    Reference Range: None Detected      [09-04-19 @ 23:44]  SPEP Interpretation: Gamma Migrating Paraprotein Identified      [09-04-19 @ 23:44]

## 2024-03-20 NOTE — SWALLOW BEDSIDE ASSESSMENT ADULT - SWALLOW EVAL: BUCCAL STRENGTH AND MOBILITY
intact ROM intact/normal gait/strength 5/5 bilateral upper extremities/strength 5/5 bilateral lower extremities negative

## 2024-07-29 NOTE — PROGRESS NOTE ADULT - SUBJECTIVE AND OBJECTIVE BOX
[facility] Division of Kidney Diseases & Hypertension  FOLLOW UP NOTE  [phone]--------------------------------------------------------------------------------  Chief Complaint:Acute renal failure      24 hour events/subjective:        PAST HISTORY  --------------------------------------------------------------------------------  No significant changes to PMH, PSH, FHx, SHx, unless otherwise noted    ALLERGIES & MEDICATIONS  --------------------------------------------------------------------------------  Allergies    No Known Allergies    Intolerances      Standing Inpatient Medications  allopurinol 100 milliGRAM(s) Oral daily  atorvastatin 40 milliGRAM(s) Oral at bedtime  cefTRIAXone   IVPB 1000 milliGRAM(s) IV Intermittent every 24 hours  chlorhexidine 2% Cloths 1 Application(s) Topical daily  cinacalcet 30 milliGRAM(s) Oral daily  darbepoetin Injectable ViaL 40 MICROGram(s) IV Push every week  dextrose 5%. 1000 milliLiter(s) IV Continuous <Continuous>  dextrose 5%. 1000 milliLiter(s) IV Continuous <Continuous>  dextrose 50% Injectable 12.5 Gram(s) IV Push once  dextrose 50% Injectable 25 Gram(s) IV Push once  dextrose 50% Injectable 25 Gram(s) IV Push once  ergocalciferol 97881 Unit(s) Oral every week  folic acid 1 milliGRAM(s) Oral daily  furosemide   Injectable 60 milliGRAM(s) IV Push two times a day  hydrocortisone hemorrhoidal Suppository 1 Suppository(s) Rectal at bedtime  hydrocortisone sodium succinate Injectable 10 milliGRAM(s) IV Push every 12 hours  insulin glargine Injectable (LANTUS) 10 Unit(s) SubCutaneous at bedtime  insulin lispro (HumaLOG) corrective regimen sliding scale   SubCutaneous every 6 hours  labetalol Injectable 20 milliGRAM(s) IV Push every 6 hours  levETIRAcetam  IVPB 500 milliGRAM(s) IV Intermittent two times a day  mycophenolate mofetil IVPB 250 milliGRAM(s) IV Intermittent every 12 hours  sodium bicarbonate 650 milliGRAM(s) Oral two times a day  tamsulosin 0.4 milliGRAM(s) Oral at bedtime  thiamine Injectable 100 milliGRAM(s) IV Push daily    PRN Inpatient Medications  acetaminophen   Tablet .. 650 milliGRAM(s) Oral every 6 hours PRN  aluminum hydroxide/magnesium hydroxide/simethicone Suspension 30 milliLiter(s) Oral every 6 hours PRN  dextrose 40% Gel 15 Gram(s) Oral once PRN  glucagon  Injectable 1 milliGRAM(s) IntraMuscular once PRN  hydrALAZINE Injectable 10 milliGRAM(s) IV Push every 4 hours PRN      REVIEW OF SYSTEMS  --------------------------------------------------------------------------------  Gen: No  fevers/chills  Skin: No rashes  Head/Eyes/Ears/Mouth: No headache; Normal hearing; Normal vision w/o blurriness  Respiratory: No dyspnea, cough, wheezing, hemoptysis  CV: No chest pain, PND, orthopnea  GI: No abdominal pain, diarrhea, constipation, nausea, vomiting  : No increased frequency, dysuria, hematuria, nocturia  MSK: No joint pain/swelling; no back pain; no edema  Neuro: No dizziness/lightheadedness, weakness, seizures, numbness, tingling      All other systems were reviewed and are negative, except as noted.    VITALS/PHYSICAL EXAM  --------------------------------------------------------------------------------  T(C): 36.7 (09-06-19 @ 09:15), Max: 37.8 (09-05-19 @ 22:21)  HR: 88 (09-06-19 @ 09:15) (85 - 91)  BP: 147/73 (09-06-19 @ 09:15) (147/73 - 172/73)  RR: 20 (09-06-19 @ 09:15) (18 - 20)  SpO2: 97% (09-06-19 @ 09:15) (93% - 100%)  Wt(kg): --        09-05-19 @ 07:01  -  09-06-19 @ 07:00  --------------------------------------------------------  IN: 700 mL / OUT: 0 mL / NET: 700 mL      Physical Exam:  	Gen: NAD, well-appearing  	HEENT: PERRL, supple neck, clear oropharynx  	Pulm: CTA B/L  	CV: RRR, S1S2;  	Back: No spinal or CVA tenderness  	Abd: +BS, soft, nontender/nondistended  	: No suprapubic tenderness                      Extremities: no bilateral LE edema noted.                       Neuro: No focal deficits, intact gait  	Skin: Warm, without rashes  	Vascular access:    LABS/STUDIES  --------------------------------------------------------------------------------              7.6    7.0   >-----------<  125      [09-06-19 @ 10:00]              24.2     135  |  94  |  52  ----------------------------<  124      [09-06-19 @ 05:13]  3.7   |  23  |  6.77        Ca     10.3     [09-06-19 @ 05:13]    TPro  6.1  /  Alb  2.7  /  TBili  x   /  DBili  x   /  AST  x   /  ALT  x   /  AlkPhos  x   [09-04-19 @ 23:44]    PT/INR: PT 13.1 , INR 1.15       [09-06-19 @ 10:03]  PTT: 27.7       [09-06-19 @ 10:03]      Creatinine Trend:  SCr 6.77 [09-06 @ 05:13]  SCr 5.38 [09-05 @ 05:50]  SCr 6.63 [09-04 @ 04:16]  SCr 5.30 [09-03 @ 06:53]  SCr 6.69 [09-02 @ 07:02]    Urinalysis - [09-02-19 @ 23:57]      Color Orange / Appearance Turbid / SG 1.014 / pH 8.0      Gluc Negative / Ketone Negative  / Bili Negative / Urobili <2 mg/dL       Blood Moderate / Protein >600 mg/dL / Leuk Est Large / Nitrite Negative       / WBC >720 / Hyaline 3 / Gran  / Sq Epi  / Non Sq Epi 10 / Bacteria TNTC      Iron 45, TIBC 159, %sat 28      [09-02-19 @ 17:29]  Ferritin 260      [09-02-19 @ 17:29]  PTH -- (Ca 9.7)      [09-02-19 @ 17:29]   1231  Vitamin D (25OH) <4.0      [09-02-19 @ 17:28]      Free Light Chains: kappa 5.95, lambda 16.19, ratio = 0.37      [09-04 @ 23:44]  Immunofixation Serum: IgG Lambda Band Identified    Reference Range: None Detected      [09-04-19 @ 23:44]  SPEP Interpretation: Gamma Migrating Paraprotein Identified      [09-04-19 @ 23:44] [facility] Division of Kidney Diseases & Hypertension  FOLLOW UP NOTE  [phone]--------------------------------------------------------------------------------  Chief Complaint:Acute renal failure      24 hour events/subjective: Patient continues to be lethargic. Seen in dialysis and is intermittently interactive. LP shows elevated protein. Labs and vital signs reviewed. Vital signs stable and patient is afebrile. Labs grossly stable as well.        PAST HISTORY  --------------------------------------------------------------------------------  No significant changes to PMH, PSH, FHx, SHx, unless otherwise noted    ALLERGIES & MEDICATIONS  --------------------------------------------------------------------------------  Allergies    No Known Allergies    Intolerances      Standing Inpatient Medications  allopurinol 100 milliGRAM(s) Oral daily  atorvastatin 40 milliGRAM(s) Oral at bedtime  cefTRIAXone   IVPB 1000 milliGRAM(s) IV Intermittent every 24 hours  chlorhexidine 2% Cloths 1 Application(s) Topical daily  cinacalcet 30 milliGRAM(s) Oral daily  darbepoetin Injectable ViaL 40 MICROGram(s) IV Push every week  ergocalciferol 55552 Unit(s) Oral every week  folic acid 1 milliGRAM(s) Oral daily  furosemide   Injectable 60 milliGRAM(s) IV Push two times a day  hydrocortisone hemorrhoidal Suppository 1 Suppository(s) Rectal at bedtime  hydrocortisone sodium succinate Injectable 10 milliGRAM(s) IV Push every 12 hours  insulin glargine Injectable (LANTUS) 10 Unit(s) SubCutaneous at bedtime  insulin lispro (HumaLOG) corrective regimen sliding scale   SubCutaneous every 6 hours  labetalol Injectable 20 milliGRAM(s) IV Push every 6 hours  levETIRAcetam  IVPB 500 milliGRAM(s) IV Intermittent two times a day  mycophenolate mofetil IVPB 250 milliGRAM(s) IV Intermittent every 12 hours  sodium bicarbonate 650 milliGRAM(s) Oral two times a day  tamsulosin 0.4 milliGRAM(s) Oral at bedtime  thiamine Injectable 100 milliGRAM(s) IV Push daily    PRN Inpatient Medications  acetaminophen   Tablet .. 650 milliGRAM(s) Oral every 6 hours PRN  aluminum hydroxide/magnesium hydroxide/simethicone Suspension 30 milliLiter(s) Oral every 6 hours PRN  dextrose 40% Gel 15 Gram(s) Oral once PRN  glucagon  Injectable 1 milliGRAM(s) IntraMuscular once PRN  hydrALAZINE Injectable 10 milliGRAM(s) IV Push every 4 hours PRN      REVIEW OF SYSTEMS: Unable to obtain 2/2 clinical condition.      VITALS/PHYSICAL EXAM  --------------------------------------------------------------------------------  T(C): 36.7 (09-06-19 @ 09:15), Max: 37.8 (09-05-19 @ 22:21)  HR: 88 (09-06-19 @ 09:15) (85 - 91)  BP: 147/73 (09-06-19 @ 09:15) (147/73 - 172/73)  RR: 20 (09-06-19 @ 09:15) (18 - 20)  SpO2: 97% (09-06-19 @ 09:15) (93% - 100%)  Wt(kg): --        09-05-19 @ 07:01  -  09-06-19 @ 07:00  --------------------------------------------------------  IN: 700 mL / OUT: 0 mL / NET: 700 mL      Physical Exam:  General: No acute distress, seen in dialysis  Neck: Supple, no JVD,    Lungs: no rhonchi, no wheeze, no crackles  CVS: S1 S2 no M/R/G  Abdomen: distended  Neuro: intermittently following commands  Skin: warm, dry  Ext: trace edema noted  LABS/STUDIES  --------------------------------------------------------------------------------              7.6    7.0   >-----------<  125      [09-06-19 @ 10:00]              24.2     135  |  94  |  52  ----------------------------<  124      [09-06-19 @ 05:13]  3.7   |  23  |  6.77        Ca     10.3     [09-06-19 @ 05:13]    TPro  6.1  /  Alb  2.7  /  TBili  x   /  DBili  x   /  AST  x   /  ALT  x   /  AlkPhos  x   [09-04-19 @ 23:44]    PT/INR: PT 13.1 , INR 1.15       [09-06-19 @ 10:03]  PTT: 27.7       [09-06-19 @ 10:03]      Creatinine Trend:  SCr 6.77 [09-06 @ 05:13]  SCr 5.38 [09-05 @ 05:50]  SCr 6.63 [09-04 @ 04:16]  SCr 5.30 [09-03 @ 06:53]  SCr 6.69 [09-02 @ 07:02]    Urinalysis - [09-02-19 @ 23:57]      Color Orange / Appearance Turbid / SG 1.014 / pH 8.0      Gluc Negative / Ketone Negative  / Bili Negative / Urobili <2 mg/dL       Blood Moderate / Protein >600 mg/dL / Leuk Est Large / Nitrite Negative       / WBC >720 / Hyaline 3 / Gran  / Sq Epi  / Non Sq Epi 10 / Bacteria TNTC      Iron 45, TIBC 159, %sat 28      [09-02-19 @ 17:29]  Ferritin 260      [09-02-19 @ 17:29]  PTH -- (Ca 9.7)      [09-02-19 @ 17:29]   1231  Vitamin D (25OH) <4.0      [09-02-19 @ 17:28]      Free Light Chains: kappa 5.95, lambda 16.19, ratio = 0.37      [09-04 @ 23:44]  Immunofixation Serum: IgG Lambda Band Identified    Reference Range: None Detected      [09-04-19 @ 23:44]  SPEP Interpretation: Gamma Migrating Paraprotein Identified      [09-04-19 @ 23:44] Referring Provider (Optional): Falguni Alatorre PA-C Anesthesia Volume In Cc: 0 Did You Provide Opioid Counseling: No Repair Type: Complex Repair Which Eyelid Repair Cpt Are You Using?: 93567 Suturegard Retention Suture: 2-0 Nylon Retention Suture Bite Size: 3 mm Length To Time In Minutes Device Was In Place: 10 Number Of Hemigard Strips Per Side: 1 Simple / Intermediate / Complex Repair - Final Wound Length In Cm: 6 Complex Requirements: Extensive Undermining Performed?: Yes Width Of Defect Perpendicular To Closure In Cm (Required): 3.1 Distance Of Undermining In Cm (Required): 3.3 Undermining Type: Entire Wound Debridement Text: The wound edges were debrided prior to proceeding with the closure to facilitate wound healing. Helical Rim Text: The closure involved the helical rim. Vermilion Border Text: The closure involved the vermilion border. Nostril Rim Text: The closure involved the nostril rim. Retention Suture Text: Retention sutures were placed to support the closure and prevent dehiscence. Primary Defect Length (In Cm): 3.2 Primary Defect Width (In Cm): 3 Skin Substitute: EpiFix Micronized Suture Removal: 14 days Type Of Previous Surgery (Optional- Ie Mohs Surgery): Moh’s Date Of Previous Surgery (Optional): 7/29/24 Pre-Op Size Of Lesion Removed (Optional): 2.4 Mohs Case Number (Optional): [id number] Number Of Stages Required To Clear Tumor (Optional): 2 Date Of Previous Biopsy (Optional): 6/25/24 Previous Accession (Optional): [id number] Detail Level: Detailed Anesthesia Type: 1% lidocaine with epinephrine and a 1:10 solution of 8.4% sodium bicarbonate Hemostasis: Electrocautery Estimated Blood Loss (Cc): minimal Brow Lift Text: A midfrontal incision was made medially to the defect to allow access to the tissues just superior to the left eyebrow. Following careful dissection inferiorly in a supraperiosteal plane to the level of the left eyebrow, several 3-0 monocryl sutures were used to resuspend the eyebrow orbicularis oculi muscular unit to the superior frontal bone periosteum. This resulted in an appropriate reapproximation of static eyebrow symmetry and correction of the left brow ptosis. Deep Sutures: 4-0 Vicryl Epidermal Sutures: 5-0 Ethilon Epidermal Closure: simple interrupted Wound Care: Petrolatum Dressing: dry sterile dressing Unna Boot Text: An Unna boot was placed to help immobilize the limb and facilitate more rapid healing. Suturegard Intro: Intraoperative tissue expansion was performed, utilizing the SUTUREGARD device, in order to reduce wound tension. Suturegard Body: The suture ends were repeatedly re-tightened and re-clamped to achieve the desired tissue expansion. Hemigard Intro: Due to skin fragility and wound tension, it was decided to use HEMIGARD adhesive retention suture devices to permit a linear closure. The skin was cleaned and dried for a 6cm distance away from the wound. Excessive hair, if present, was removed to allow for adhesion. Hemigard Postcare Instructions: The HEMIGARD strips are to remain completely dry for at least 5-7 days. Graft Donor Site Bandage (Optional-Leave Blank If You Don't Want In Note): Steri-strips and a pressure bandage were applied to the donor site. Closure 2 Information: This tab is for additional flaps and grafts, including complex repair and grafts and complex repair and flaps. You can also specify a different location for the additional defect, if the location is the same you do not need to select a new one. We will insert the automated text for the repair you select below just as we do for solitary flaps and grafts. Please note that at this time if you select a location with a different insurance zone you will need to override the ICD10 and CPT if appropriate. Closure 3 Information: This tab is for additional flaps and grafts above and beyond our usual structured repairs.  Please note if you enter information here it will not currently bill and you will need to add the billing information manually. Complex Repair Preamble Text (Leave Blank If You Do Not Want): Extensive wide undermining was performed. Intermediate Repair Preamble Text (Leave Blank If You Do Not Want): Undermining was performed with blunt dissection. Flap Thinning Complex Repair Preamble Text (Leave Blank If You Do Not Want): An incision was made along the previous flap suture line. Undermining was performed beneath the flap and redundant tissue was removed to restore the normal contour of the skin. Non-Graft Cartilage Fenestration Text: The cartilage was fenestrated with a 2mm punch biopsy to help facilitate healing. Graft Cartilage Fenestration Text: The cartilage was fenestrated with a 2mm punch biopsy to help facilitate graft survival and healing. Preparation Of Recipient Site - Flap: The eschar was removed surgically with sharp dissection to facilitate appropriate wound healing of the following adjacent tissue rearrangement. Preparation Of Recipient Site - Graft: The eschar was removed surgically with sharp dissection to facilitate appropriate survival of the following graft. Preparation Of Recipient Site - Flap Takedown: The eschar and granulation tissue was removed surgically with sharp dissection to facilitate appropriate healing after division and inset of the proximal and distal interpolation flap. Secondary Intention Text (Leave Blank If You Do Not Want): The defect will heal with secondary intention. No Repair - Repaired With Adjacent Surgical Defect Text (Leave Blank If You Do Not Want): After obtaining clear surgical margins the defect was repaired concurrently with another surgical defect which was in close approximation. Referred To Oculoplastics For Closure Text (Leave Blank If You Do Not Want): After obtaining clear surgical margins the patient was sent to oculoplastics for surgical repair.  The patient understands they will receive post-surgical care and follow-up from the referring physician's office. Referred To Otolaryngology For Closure Text (Leave Blank If You Do Not Want): After obtaining clear surgical margins the patient was sent to otolaryngology for surgical repair.  The patient understands they will receive post-surgical care and follow-up from the referring physician's office. Referred To Plastics For Closure Text (Leave Blank If You Do Not Want): After obtaining clear surgical margins the patient was sent to plastics for surgical repair.  The patient understands they will receive post-surgical care and follow-up from the referring physician's office. Referred To Asc For Closure Text (Leave Blank If You Do Not Want): After obtaining clear surgical margins the patient was sent to an ASC for surgical repair.  The patient understands they will receive post-surgical care and follow-up from the ASC physician. Referred To Mid-Level For Closure Text (Leave Blank If You Do Not Want): After obtaining clear surgical margins the patient was sent to a mid-level provider for surgical repair.  The patient understands they will receive post-surgical care and follow-up from the mid-level provider. Repair Performed By Another Provider Text (Leave Blank If You Do Not Want): After obtaining clear surgical margins the defect was repaired by another provider. Adjacent Tissue Transfer Text: The defect edges were debeveled with a #15 scalpel blade. Given the location of the defect and the proximity to free margins an adjacent tissue transfer was deemed most appropriate. Using a sterile surgical marker, an appropriate flap was drawn incorporating the defect and placing the expected incisions within the relaxed skin tension lines where possible. The area thus outlined was incised deep to adipose tissue with a #15 scalpel blade. The skin margins were undermined to an appropriate distance in all directions utilizing iris scissors and carried over to close the primary defect. Advancement Flap (Single) Text: The defect edges were debeveled with a #15 scalpel blade. Given the location of the defect and the proximity to free margins a single advancement flap was deemed most appropriate. Using a sterile surgical marker, an appropriate advancement flap was drawn incorporating the defect and placing the expected incisions within the relaxed skin tension lines where possible. The area thus outlined was incised deep to adipose tissue with a #15 scalpel blade. The skin margins were undermined to an appropriate distance in all directions utilizing iris scissors. Following this, the designed flap was advanced and carried over into the primary defect and sutured into place. Advancement Flap (Double) Text: The defect edges were debeveled with a #15 scalpel blade. Given the location of the defect and the proximity to free margins a double advancement flap was deemed most appropriate. Using a sterile surgical marker, the appropriate advancement flaps were drawn incorporating the defect and placing the expected incisions within the relaxed skin tension lines where possible. The area thus outlined was incised deep to adipose tissue with a #15 scalpel blade. The skin margins were undermined to an appropriate distance in all directions utilizing iris scissors. Following this, the designed flaps were advanced and carried over into the primary defect and sutured into place. Advancement-Rotation Flap Text: The defect edges were debeveled with a #15 scalpel blade. Given the location of the defect, shape of the defect and the proximity to free margins an advancement-rotation flap was deemed most appropriate. Using a sterile surgical marker, an appropriate flap was drawn incorporating the defect and placing the expected incisions within the relaxed skin tension lines where possible. The area thus outlined was incised deep to adipose tissue with a #15 scalpel blade. The skin margins were undermined to an appropriate distance in all directions utilizing iris scissors. Following this, the designed flap was carried over into the primary defect and sutured into place. Alar Island Pedicle Flap Text: The defect edges were debeveled with a #15 scalpel blade. Given the location of the defect, shape of the defect and the proximity to the alar rim an island pedicle advancement flap was deemed most appropriate. Using a sterile surgical marker, an appropriate advancement flap was drawn incorporating the defect, outlining the appropriate donor tissue and placing the expected incisions within the nasal ala running parallel to the alar rim. The area thus outlined was incised with a #15 scalpel blade. The skin margins were undermined minimally to an appropriate distance in all directions around the primary defect and laterally outward around the island pedicle utilizing iris scissors.  There was minimal undermining beneath the pedicle flap. Following this, the designed flap was carried over into the primary defect and sutured into place. A-T Advancement Flap Text: The defect edges were debeveled with a #15 scalpel blade. Given the location of the defect, shape of the defect and the proximity to free margins an A-T advancement flap was deemed most appropriate. Using a sterile surgical marker, an appropriate advancement flap was drawn incorporating the defect and placing the expected incisions within the relaxed skin tension lines where possible. The area thus outlined was incised deep to adipose tissue with a #15 scalpel blade. The skin margins were undermined to an appropriate distance in all directions utilizing iris scissors. Following this, the designed flap was advanced and carried over into the primary defect and sutured into place. Banner Transposition Flap Text: The defect edges were debeveled with a #15 scalpel blade. Given the location of the defect and the proximity to free margins a Banner transposition flap was deemed most appropriate. Using a sterile surgical marker, an appropriate flap was drawn around the defect. The area thus outlined was incised deep to adipose tissue with a #15 scalpel blade. The skin margins were undermined to an appropriate distance in all directions utilizing iris scissors. Following this, the designed flap was carried into the primary defect and sutured into place. Bilateral Helical Rim Advancement Flap Text: The defect edges were debeveled with a #15 blade scalpel.  Given the location of the defect and the proximity to free margins (helical rim) a bilateral helical rim advancement flap was deemed most appropriate. Using a sterile surgical marker, the appropriate advancement flaps were drawn incorporating the defect and placing the expected incisions between the helical rim and antihelix where possible.  The area thus outlined was incised through and through with a #15 scalpel blade.  With a skin hook and iris scissors, the flaps were gently and sharply undermined and freed up. Following this, the designed flaps were placed into the primary defect and sutured into place. Bilateral Rotation Flap Text: The defect edges were debeveled with a #15 scalpel blade. Given the location of the defect, shape of the defect and the proximity to free margins a bilateral rotation flap was deemed most appropriate. Using a sterile surgical marker, an appropriate rotation flap was drawn incorporating the defect and placing the expected incisions within the relaxed skin tension lines where possible. The area thus outlined was incised deep to adipose tissue with a #15 scalpel blade. The skin margins were undermined to an appropriate distance in all directions utilizing iris scissors. Following this, the designed flap was carried over into the primary defect and sutured into place. Bilobed Flap Text: The defect edges were debeveled with a #15 scalpel blade. Given the location of the defect and the proximity to free margins a bilobe flap was deemed most appropriate. Using a sterile surgical marker, an appropriate bilobe flap drawn around the defect. The area thus outlined was incised deep to adipose tissue with a #15 scalpel blade. The skin margins were undermined to an appropriate distance in all directions utilizing iris scissors. Following this, the designed flap was carried over into the primary defect and sutured into place. Bilobed Transposition Flap Text: The defect edges were debeveled with a #15 scalpel blade. Given the location of the defect and the proximity to free margins a bilobed transposition flap was deemed most appropriate. Using a sterile surgical marker, an appropriate bilobe flap drawn around the defect. The area thus outlined was incised deep to adipose tissue with a #15 scalpel blade. The skin margins were undermined to an appropriate distance in all directions utilizing iris scissors. Following this, the designed flap was carried over into the primary defect and sutured into place. Bi-Rhombic Flap Text: The defect edges were debeveled with a #15 scalpel blade. Given the location of the defect and the proximity to free margins a bi-rhombic flap was deemed most appropriate. Using a sterile surgical marker, an appropriate rhombic flap was drawn incorporating the defect. The area thus outlined was incised deep to adipose tissue with a #15 scalpel blade. The skin margins were undermined to an appropriate distance in all directions utilizing iris scissors. Following this, the designed flap was carried over into the primary defect and sutured into place. Burow's Advancement Flap Text: The defect edges were debeveled with a #15 scalpel blade. Given the location of the defect and the proximity to free margins a Burow's advancement flap was deemed most appropriate. Using a sterile surgical marker, the appropriate advancement flap was drawn incorporating the defect and placing the expected incisions within the relaxed skin tension lines where possible. The area thus outlined was incised deep to adipose tissue with a #15 scalpel blade. The skin margins were undermined to an appropriate distance in all directions utilizing iris scissors. Following this, the designed flap was advanced and carried over into the primary defect and sutured into place. Chonodrocutaneous Helical Advancement Flap Text: The defect edges were debeveled with a #15 scalpel blade. Given the location of the defect and the proximity to free margins a chondrocutaneous helical advancement flap was deemed most appropriate. Using a sterile surgical marker, the appropriate advancement flap was drawn incorporating the defect and placing the expected incisions within the relaxed skin tension lines where possible. The area thus outlined was incised deep to adipose tissue with a #15 scalpel blade. The skin margins were undermined to an appropriate distance in all directions utilizing iris scissors. Following this, the designed flap was advanced and carried over into the primary defect and sutured into place. Crescentic Advancement Flap Text: The defect edges were debeveled with a #15 scalpel blade. Given the location of the defect and the proximity to free margins a crescentic advancement flap was deemed most appropriate. Using a sterile surgical marker, the appropriate advancement flap was drawn incorporating the defect and placing the expected incisions within the relaxed skin tension lines where possible. The area thus outlined was incised deep to adipose tissue with a #15 scalpel blade. The skin margins were undermined to an appropriate distance in all directions utilizing iris scissors. Following this, the designed flap was advanced and carried over into the primary defect and sutured into place. Dorsal Nasal Flap Text: The defect edges were debeveled with a #15 scalpel blade. Given the location of the defect and the proximity to free margins a dorsal nasal flap was deemed most appropriate. Using a sterile surgical marker, an appropriate dorsal nasal flap was drawn around the defect. The area thus outlined was incised deep to adipose tissue with a #15 scalpel blade. The skin margins were undermined to an appropriate distance in all directions utilizing iris scissors. Following this, the designed flap was carried into the primary defect and sutured into place. Double Island Pedicle Flap Text: The defect edges were debeveled with a #15 scalpel blade. Given the location of the defect, shape of the defect and the proximity to free margins a double island pedicle advancement flap was deemed most appropriate. Using a sterile surgical marker, an appropriate advancement flap was drawn incorporating the defect, outlining the appropriate donor tissue and placing the expected incisions within the relaxed skin tension lines where possible. The area thus outlined was incised deep to adipose tissue with a #15 scalpel blade. The skin margins were undermined to an appropriate distance in all directions around the primary defect and laterally outward around the island pedicle utilizing iris scissors.  There was minimal undermining beneath the pedicle flap. Following this, the flap was carried over into the primary defect and sutured into place. Double O-Z Flap Text: The defect edges were debeveled with a #15 scalpel blade. Given the location of the defect, shape of the defect and the proximity to free margins a Double O-Z flap was deemed most appropriate. Using a sterile surgical marker, an appropriate transposition flap was drawn incorporating the defect and placing the expected incisions within the relaxed skin tension lines where possible. The area thus outlined was incised deep to adipose tissue with a #15 scalpel blade. The skin margins were undermined to an appropriate distance in all directions utilizing iris scissors. Following this, the designed flap was carried over into the primary defect and sutured into place. Double O-Z Plasty Text: The defect edges were debeveled with a #15 scalpel blade. Given the location of the defect, shape of the defect and the proximity to free margins a Double O-Z plasty (double transposition flap) was deemed most appropriate. Using a sterile surgical marker, the appropriate transposition flaps were drawn incorporating the defect and placing the expected incisions within the relaxed skin tension lines where possible. The area thus outlined was incised deep to adipose tissue with a #15 scalpel blade. The skin margins were undermined to an appropriate distance in all directions utilizing iris scissors. Hemostasis was achieved with electrocautery. The flaps were then transposed and carried over into place, one clockwise and the other counterclockwise, and anchored with interrupted buried subcutaneous sutures. Double Z Plasty Text: The lesion was extirpated to the level of the fat with a #15 scalpel blade. Given the location of the defect, shape of the defect and the proximity to free margins a double Z-plasty was deemed most appropriate for repair. Using a sterile surgical marker, the appropriate transposition arms of the double Z-plasty were drawn incorporating the defect and placing the expected incisions within the relaxed skin tension lines where possible. The area thus outlined was incised deep to adipose tissue with a #15 scalpel blade. The skin margins were undermined to an appropriate distance in all directions utilizing iris scissors. The opposing transposition arms were then transposed and carried over into place in opposite direction and anchored with interrupted buried subcutaneous sutures. Ear Star Wedge Flap Text: The defect edges were debeveled with a #15 blade scalpel.  Given the location of the defect and the proximity to free margins (helical rim) an ear star wedge flap was deemed most appropriate. Using a sterile surgical marker, the appropriate flap was drawn incorporating the defect and placing the expected incisions between the helical rim and antihelix where possible.  The area thus outlined was incised through and through with a #15 scalpel blade. Following this, the designed flap was carried over into the primary defect and sutured into place. Flip-Flop Flap Text: The defect edges were debeveled with a #15 blade scalpel.  Given the location of the defect and the proximity to free margins a flip-flop flap was deemed most appropriate. Using a sterile surgical marker, the appropriate flap was drawn incorporating the defect and placing the expected incisions between the helical rim and antihelix where possible.  The area thus outlined was incised through and through with a #15 scalpel blade. Following this, the designed flap was carried over into the primary defect and sutured into place. Hatchet Flap Text: The defect edges were debeveled with a #15 scalpel blade. Given the location of the defect, shape of the defect and the proximity to free margins a hatchet flap was deemed most appropriate. Using a sterile surgical marker, an appropriate hatchet flap was drawn incorporating the defect and placing the expected incisions within the relaxed skin tension lines where possible. The area thus outlined was incised deep to adipose tissue with a #15 scalpel blade. The skin margins were undermined to an appropriate distance in all directions utilizing iris scissors. Following this, the designed flap was carried over into the primary defect and sutured into place. Helical Rim Advancement Flap Text: The defect edges were debeveled with a #15 blade scalpel.  Given the location of the defect and the proximity to free margins (helical rim) a double helical rim advancement flap was deemed most appropriate. Using a sterile surgical marker, the appropriate advancement flaps were drawn incorporating the defect and placing the expected incisions between the helical rim and antihelix where possible.  The area thus outlined was incised through and through with a #15 scalpel blade.  With a skin hook and iris scissors, the flaps were gently and sharply undermined and freed up. Folllowing this, the designed flaps were carried over into the primary defect and sutured into place. H Plasty Text: Given the location of the defect, shape of the defect and the proximity to free margins a H-plasty was deemed most appropriate for repair. Using a sterile surgical marker, the appropriate advancement arms of the H-plasty were drawn incorporating the defect and placing the expected incisions within the relaxed skin tension lines where possible. The area thus outlined was incised deep to adipose tissue with a #15 scalpel blade. The skin margins were undermined to an appropriate distance in all directions utilizing iris scissors.  The opposing advancement arms were then advanced and carried over into place in opposite direction and anchored with interrupted buried subcutaneous sutures. Island Pedicle Flap Text: The defect edges were debeveled with a #15 scalpel blade. Given the location of the defect, shape of the defect and the proximity to free margins an island pedicle advancement flap was deemed most appropriate. Using a sterile surgical marker, an appropriate advancement flap was drawn incorporating the defect, outlining the appropriate donor tissue and placing the expected incisions within the relaxed skin tension lines where possible. The area thus outlined was incised deep to adipose tissue with a #15 scalpel blade. The skin margins were undermined to an appropriate distance in all directions around the primary defect and laterally outward around the island pedicle utilizing iris scissors.  There was minimal undermining beneath the pedicle flap. Following this, the flap was carried over into the primary defect and sutured into place. Island Pedicle Flap With Canthal Suspension Text: The defect edges were debeveled with a #15 scalpel blade. Given the location of the defect, shape of the defect and the proximity to free margins an island pedicle advancement flap was deemed most appropriate. Using a sterile surgical marker, an appropriate advancement flap was drawn incorporating the defect, outlining the appropriate donor tissue and placing the expected incisions within the relaxed skin tension lines where possible. The area thus outlined was incised deep to adipose tissue with a #15 scalpel blade. The skin margins were undermined to an appropriate distance in all directions around the primary defect and laterally outward around the island pedicle utilizing iris scissors.  There was minimal undermining beneath the pedicle flap. A suspension suture was placed in the canthal tendon to prevent tension and prevent ectropion. Following this, the designed flap was placed into the primary defect and sutured into place. Island Pedicle Flap-Requiring Vessel Identification Text: The defect edges were debeveled with a #15 scalpel blade. Given the location of the defect, shape of the defect and the proximity to free margins an island pedicle advancement flap was deemed most appropriate. Using a sterile surgical marker, an appropriate advancement flap was drawn, based on the axial vessel mentioned above, incorporating the defect, outlining the appropriate donor tissue and placing the expected incisions within the relaxed skin tension lines where possible. The area thus outlined was incised deep to adipose tissue with a #15 scalpel blade. The skin margins were undermined to an appropriate distance in all directions around the primary defect and laterally outward around the island pedicle utilizing iris scissors.  There was minimal undermining beneath the pedicle flap. Following this, the designed flap was carried over into the primary defect and sutured into place. Keystone Flap Text: The defect edges were debeveled with a #15 scalpel blade. Given the location of the defect, shape of the defect a keystone flap was deemed most appropriate. Using a sterile surgical marker, an appropriate keystone flap was drawn incorporating the defect, outlining the appropriate donor tissue and placing the expected incisions within the relaxed skin tension lines where possible. The area thus outlined was incised deep to adipose tissue with a #15 scalpel blade. The skin margins were undermined to an appropriate distance in all directions around the primary defect and laterally outward around the flap utilizing iris scissors. Following this, the designed flap was carried into the primary defect and sutured into place. Melolabial Transposition Flap Text: The defect edges were debeveled with a #15 scalpel blade. Given the location of the defect and the proximity to free margins a melolabial flap was deemed most appropriate. Using a sterile surgical marker, an appropriate melolabial transposition flap was drawn incorporating the defect. The area thus outlined was incised deep to adipose tissue with a #15 scalpel blade. The skin margins were undermined to an appropriate distance in all directions utilizing iris scissors. Following this, the designed flap was carried over into the primary defect and sutured into place. Mercedes Flap Text: The defect edges were debeveled with a #15 scalpel blade. Given the location of the defect, shape of the defect and the proximity to free margins a Mercedes flap was deemed most appropriate. Using a sterile surgical marker, an appropriate advancement flap was drawn incorporating the defect and placing the expected incisions within the relaxed skin tension lines where possible. The area thus outlined was incised deep to adipose tissue with a #15 scalpel blade. The skin margins were undermined to an appropriate distance in all directions utilizing iris scissors. Following this, the designed flap was advanced and carried over into the primary defect and sutured into place. Modified Advancement Flap Text: The defect edges were debeveled with a #15 scalpel blade. Given the location of the defect, shape of the defect and the proximity to free margins a modified advancement flap was deemed most appropriate. Using a sterile surgical marker, an appropriate advancement flap was drawn incorporating the defect and placing the expected incisions within the relaxed skin tension lines where possible. The area thus outlined was incised deep to adipose tissue with a #15 scalpel blade. The skin margins were undermined to an appropriate distance in all directions utilizing iris scissors. Following this, the designed flap was advanced and carried over into the primary defect and sutured into place. Mucosal Advancement Flap Text: Given the location of the defect, shape of the defect and the proximity to free margins a mucosal advancement flap was deemed most appropriate. Incisions were made with a 15 blade scalpel in the appropriate fashion along the cutaneous vermilion border and the mucosal lip. The remaining actinically damaged mucosal tissue was excised.  The mucosal advancement flap was then elevated to the gingival sulcus with care taken to preserve the neurovascular structures and advanced into the primary defect. Care was taken to ensure that precise realignment of the vermilion border was achieved. Muscle Hinge Flap Text: The defect edges were debeveled with a #15 scalpel blade.  Given the size, depth and location of the defect and the proximity to free margins a muscle hinge flap was deemed most appropriate. Using a sterile surgical marker, an appropriate hinge flap was drawn incorporating the defect. The area thus outlined was incised with a #15 scalpel blade. The skin margins were undermined to an appropriate distance in all directions utilizing iris scissors. Following this, the designed flap was carried into the primary defect and sutured into place. Mustarde Flap Text: The defect edges were debeveled with a #15 scalpel blade.  Given the size, depth and location of the defect and the proximity to free margins a Mustarde flap was deemed most appropriate. Using a sterile surgical marker, an appropriate flap was drawn incorporating the defect. The area thus outlined was incised with a #15 scalpel blade. The skin margins were undermined to an appropriate distance in all directions utilizing iris scissors. Following this, the designed flap was carried into the primary defect and sutured into place. Nasal Turnover Hinge Flap Text: The defect edges were debeveled with a #15 scalpel blade.  Given the size, depth, location of the defect and the defect being full thickness a nasal turnover hinge flap was deemed most appropriate. Using a sterile surgical marker, an appropriate hinge flap was drawn incorporating the defect. The area thus outlined was incised with a #15 scalpel blade. The flap was designed to recreate the nasal mucosal lining and the alar rim. The skin margins were undermined to an appropriate distance in all directions utilizing iris scissors. Following this, the designed flap was carried over into the primary defect and sutured into place Nasalis-Muscle-Based Myocutaneous Island Pedicle Flap Text: Using a #15 blade, an incision was made around the donor flap to the level of the nasalis muscle. Wide lateral undermining was then performed in both the subcutaneous plane above the nasalis muscle, and in a submuscular plane just above periosteum. This allowed the formation of a free nasalis muscle axial pedicle (based on the angular artery) which was still attached to the actual cutaneous flap, increasing its mobility and vascular viability. Hemostasis was obtained with pinpoint electrocoagulation. The flap was mobilized into position and the pivotal anchor points positioned and stabilized with buried interrupted sutures. Subcutaneous and dermal tissues were closed in a multilayered fashion with sutures. Tissue redundancies were excised, and the epidermal edges were apposed without significant tension and sutured with sutures. Nasalis Myocutaneous Flap Text: Using a #15 blade, an incision was made around the donor flap to the level of the nasalis muscle. Wide lateral undermining was then performed in both the subcutaneous plane above the nasalis muscle, and in a submuscular plane just above periosteum. This allowed the formation of a free nasalis muscle axial pedicle which was still attached to the actual cutaneous flap, increasing its mobility and vascular viability. Hemostasis was obtained with pinpoint electrocoagulation. The flap was mobilized into position and the pivotal anchor points positioned and stabilized with buried interrupted sutures. Subcutaneous and dermal tissues were closed in a multilayered fashion with sutures. Tissue redundancies were excised, and the epidermal edges were apposed without significant tension and sutured with sutures. Nasolabial Transposition Flap Text: The defect edges were debeveled with a #15 scalpel blade.  Given the size, depth and location of the defect and the proximity to free margins a nasolabial transposition flap was deemed most appropriate. Using a sterile surgical marker, an appropriate flap was drawn incorporating the defect. The area thus outlined was incised with a #15 scalpel blade. The skin margins were undermined to an appropriate distance in all directions utilizing iris scissors. Following this, the designed flap was carried into the primary defect and sutured into place. Orbicularis Oris Muscle Flap Text: The defect edges were debeveled with a #15 scalpel blade.  Given that the defect affected the competency of the oral sphincter an orbicularis oris muscle flap was deemed most appropriate to restore this competency and normal muscle function.  Using a sterile surgical marker, an appropriate flap was drawn incorporating the defect. The area thus outlined was incised with a #15 scalpel blade. Following this, the designed flap was carried over into the primary defect and sutured into place. O-T Advancement Flap Text: The defect edges were debeveled with a #15 scalpel blade. Given the location of the defect, shape of the defect and the proximity to free margins an O-T advancement flap was deemed most appropriate. Using a sterile surgical marker, an appropriate advancement flap was drawn incorporating the defect and placing the expected incisions within the relaxed skin tension lines where possible. The area thus outlined was incised deep to adipose tissue with a #15 scalpel blade. The skin margins were undermined to an appropriate distance in all directions utilizing iris scissors. Following this, the designed flap was advanced and carried over into the primary defect and sutured into place. O-T Plasty Text: The defect edges were debeveled with a #15 scalpel blade. Given the location of the defect, shape of the defect and the proximity to free margins an O-T plasty was deemed most appropriate. Using a sterile surgical marker, an appropriate O-T plasty was drawn incorporating the defect and placing the expected incisions within the relaxed skin tension lines where possible. The area thus outlined was incised deep to adipose tissue with a #15 scalpel blade. The skin margins were undermined to an appropriate distance in all directions utilizing iris scissors. Following this, the designed flap was carried over into the primary defect and sutured into place. O-L Flap Text: The defect edges were debeveled with a #15 scalpel blade. Given the location of the defect, shape of the defect and the proximity to free margins an O-L flap was deemed most appropriate. Using a sterile surgical marker, an appropriate advancement flap was drawn incorporating the defect and placing the expected incisions within the relaxed skin tension lines where possible. The area thus outlined was incised deep to adipose tissue with a #15 scalpel blade. The skin margins were undermined to an appropriate distance in all directions utilizing iris scissors. Following this, the designed flap was advanced and carried over into the primary defect and sutured into place. O-Z Flap Text: The defect edges were debeveled with a #15 scalpel blade. Given the location of the defect, shape of the defect and the proximity to free margins an O-Z flap was deemed most appropriate. Using a sterile surgical marker, an appropriate transposition flap was drawn incorporating the defect and placing the expected incisions within the relaxed skin tension lines where possible. The area thus outlined was incised deep to adipose tissue with a #15 scalpel blade. The skin margins were undermined to an appropriate distance in all directions utilizing iris scissors. Following this, the designed flap was carried over into the primary defect and sutured into place. O-Z Plasty Text: The defect edges were debeveled with a #15 scalpel blade. Given the location of the defect, shape of the defect and the proximity to free margins an O-Z plasty (double transposition flap) was deemed most appropriate. Using a sterile surgical marker, the appropriate transposition flaps were drawn incorporating the defect and placing the expected incisions within the relaxed skin tension lines where possible. The area thus outlined was incised deep to adipose tissue with a #15 scalpel blade. The skin margins were undermined to an appropriate distance in all directions utilizing iris scissors. Hemostasis was achieved with electrocautery. The flaps were then transposed and carried over into place, one clockwise and the other counterclockwise, and anchored with interrupted buried subcutaneous sutures. Peng Advancement Flap Text: The defect edges were debeveled with a #15 scalpel blade. Given the location of the defect, shape of the defect and the proximity to free margins a Peng advancement flap was deemed most appropriate. Using a sterile surgical marker, an appropriate advancement flap was drawn incorporating the defect and placing the expected incisions within the relaxed skin tension lines where possible. The area thus outlined was incised deep to adipose tissue with a #15 scalpel blade. The skin margins were undermined to an appropriate distance in all directions utilizing iris scissors. Following this, the designed flap was advanced and carried over into the primary defect and sutured into place. Rectangular Flap Text: The defect edges were debeveled with a #15 scalpel blade. Given the location of the defect and the proximity to free margins a rectangular flap was deemed most appropriate. Using a sterile surgical marker, an appropriate rectangular flap was drawn incorporating the defect. The area thus outlined was incised deep to adipose tissue with a #15 scalpel blade. The skin margins were undermined to an appropriate distance in all directions utilizing iris scissors. Following this, the designed flap was carried over into the primary defect and sutured into place. Rhombic Flap Text: The defect edges were debeveled with a #15 scalpel blade. Given the location of the defect and the proximity to free margins a rhombic flap was deemed most appropriate. Using a sterile surgical marker, an appropriate rhombic flap was drawn incorporating the defect. The area thus outlined was incised deep to adipose tissue with a #15 scalpel blade. The skin margins were undermined to an appropriate distance in all directions utilizing iris scissors. Following this, the designed flap was carried over into the primary defect and sutured into place. Rhomboid Transposition Flap Text: The defect edges were debeveled with a #15 scalpel blade. Given the location of the defect and the proximity to free margins a rhomboid transposition flap was deemed most appropriate. Using a sterile surgical marker, an appropriate rhomboid flap was drawn incorporating the defect. The area thus outlined was incised deep to adipose tissue with a #15 scalpel blade. The skin margins were undermined to an appropriate distance in all directions utilizing iris scissors. Following this, the designed flap was carried over into the primary defect and sutured into place. Rotation Flap Text: The defect edges were debeveled with a #15 scalpel blade. Given the location of the defect, shape of the defect and the proximity to free margins a rotation flap was deemed most appropriate. Using a sterile surgical marker, an appropriate rotation flap was drawn incorporating the defect and placing the expected incisions within the relaxed skin tension lines where possible. The area thus outlined was incised deep to adipose tissue with a #15 scalpel blade. The skin margins were undermined to an appropriate distance in all directions utilizing iris scissors. Following this, the designed flap was carried over into the primary defect and sutured into place. Spiral Flap Text: The defect edges were debeveled with a #15 scalpel blade. Given the location of the defect, shape of the defect and the proximity to free margins a spiral flap was deemed most appropriate. Using a sterile surgical marker, an appropriate rotation flap was drawn incorporating the defect and placing the expected incisions within the relaxed skin tension lines where possible. The area thus outlined was incised deep to adipose tissue with a #15 scalpel blade. The skin margins were undermined to an appropriate distance in all directions utilizing iris scissors. Following this, the designed flap was carried over into the primary defect and sutured into place. Staged Advancement Flap Text: The defect edges were debeveled with a #15 scalpel blade. Given the location of the defect, shape of the defect and the proximity to free margins a staged advancement flap was deemed most appropriate. Using a sterile surgical marker, an appropriate advancement flap was drawn incorporating the defect and placing the expected incisions within the relaxed skin tension lines where possible. The area thus outlined was incised deep to adipose tissue with a #15 scalpel blade. The skin margins were undermined to an appropriate distance in all directions utilizing iris scissors. Following this, the designed flap was carried over into the primary defect and sutured into place. Star Wedge Flap Text: The defect edges were debeveled with a #15 scalpel blade. Given the location of the defect, shape of the defect and the proximity to free margins a star wedge flap was deemed most appropriate. Using a sterile surgical marker, an appropriate rotation flap was drawn incorporating the defect and placing the expected incisions within the relaxed skin tension lines where possible. The area thus outlined was incised deep to adipose tissue with a #15 scalpel blade. The skin margins were undermined to an appropriate distance in all directions utilizing iris scissors. Following this, the designed flap was carried over into the primary defect and sutured into place. Transposition Flap Text: The defect edges were debeveled with a #15 scalpel blade. Given the location of the defect and the proximity to free margins a transposition flap was deemed most appropriate. Using a sterile surgical marker, an appropriate transposition flap was drawn incorporating the defect. The area thus outlined was incised deep to adipose tissue with a #15 scalpel blade. The skin margins were undermined to an appropriate distance in all directions utilizing iris scissors. Following this, the designed flap was carried over into the primary defect and sutured into place. Trilobed Flap Text: The defect edges were debeveled with a #15 scalpel blade. Given the location of the defect and the proximity to free margins a trilobed flap was deemed most appropriate. Using a sterile surgical marker, an appropriate trilobed flap was drawn around the defect. The area thus outlined was incised deep to adipose tissue with a #15 scalpel blade. The skin margins were undermined to an appropriate distance in all directions utilizing iris scissors. Following this, the designed flap was carried into the primary defect and sutured into place. V-Y Flap Text: The defect edges were debeveled with a #15 scalpel blade. Given the location of the defect, shape of the defect and the proximity to free margins a V-Y flap was deemed most appropriate. Using a sterile surgical marker, an appropriate advancement flap was drawn incorporating the defect and placing the expected incisions within the relaxed skin tension lines where possible. The area thus outlined was incised deep to adipose tissue with a #15 scalpel blade. The skin margins were undermined to an appropriate distance in all directions utilizing iris scissors. Following this, the designed flap was advanced and carried over into the primary defect and sutured into place. V-Y Plasty Text: The defect edges were debeveled with a #15 scalpel blade. Given the location of the defect, shape of the defect and the proximity to free margins an V-Y advancement flap was deemed most appropriate. Using a sterile surgical marker, an appropriate advancement flap was drawn incorporating the defect and placing the expected incisions within the relaxed skin tension lines where possible. The area thus outlined was incised deep to adipose tissue with a #15 scalpel blade. The skin margins were undermined to an appropriate distance in all directions utilizing iris scissors. Following this, the designed flap was advanced and carried over into the primary defect and sutured into place. W Plasty Text: The lesion was extirpated to the level of the fat with a #15 scalpel blade. Given the location of the defect, shape of the defect and the proximity to free margins a W-plasty was deemed most appropriate for repair. Using a sterile surgical marker, the appropriate transposition arms of the W-plasty were drawn incorporating the defect and placing the expected incisions within the relaxed skin tension lines where possible. The area thus outlined was incised deep to adipose tissue with a #15 scalpel blade. The skin margins were undermined to an appropriate distance in all directions utilizing iris scissors. The opposing transposition arms were then transposed and carried over into place in opposite direction and anchored with interrupted buried subcutaneous sutures. Z Plasty Text: The lesion was extirpated to the level of the fat with a #15 scalpel blade. Given the location of the defect, shape of the defect and the proximity to free margins a Z-plasty was deemed most appropriate for repair. Using a sterile surgical marker, the appropriate transposition arms of the Z-plasty were drawn incorporating the defect and placing the expected incisions within the relaxed skin tension lines where possible. The area thus outlined was incised deep to adipose tissue with a #15 scalpel blade. The skin margins were undermined to an appropriate distance in all directions utilizing iris scissors. The opposing transposition arms were then transposed and carried over into place in opposite direction and anchored with interrupted buried subcutaneous sutures. Zygomaticofacial Flap Text: Given the location of the defect, shape of the defect and the proximity to free margins a zygomaticofacial flap was deemed most appropriate for repair. Using a sterile surgical marker, the appropriate flap was drawn incorporating the defect and placing the expected incisions within the relaxed skin tension lines where possible. The area thus outlined was incised deep to adipose tissue with a #15 scalpel blade with preservation of a vascular pedicle.  The skin margins were undermined to an appropriate distance in all directions utilizing iris scissors. The flap was then carried over into the defect and anchored with interrupted buried subcutaneous sutures. Abbe Flap (Lower To Upper Lip) Text: The defect of the upper lip was assessed and measured.  Given the location and size of the defect, an Abbe flap was deemed most appropriate. Using a sterile surgical marker, an appropriate Abbe flap was measured and drawn on the lower lip. Local anesthesia was then infiltrated. A scalpel was then used to incise the upper lip through and through the skin, vermilion, muscle and mucosa, leaving the flap pedicled on the opposite side.  The flap was then rotated and transferred to the lower lip defect.  The flap was then sutured into place with a three layer technique, closing the orbicularis oris muscle layer with subcutaneous buried sutures, followed by a mucosal layer and an epidermal layer. Abbe Flap (Upper To Lower Lip) Text: The defect of the lower lip was assessed and measured.  Given the location and size of the defect, an Abbe flap was deemed most appropriate. Using a sterile surgical marker, an appropriate Abbe flap was measured and drawn on the upper lip. Local anesthesia was then infiltrated.  A scalpel was then used to incise the upper lip through and through the skin, vermilion, muscle and mucosa, leaving the flap pedicled on the opposite side.  The flap was then rotated and transferred to the lower lip defect.  The flap was then sutured into place with a three layer technique, closing the orbicularis oris muscle layer with subcutaneous buried sutures, followed by a mucosal layer and an epidermal layer. Cheek Interpolation Flap Text: A decision was made to reconstruct the defect utilizing an interpolation axial flap and a staged reconstruction.  A telfa template was made of the defect.  This telfa template was then used to outline the Cheek Interpolation flap.  The donor area for the pedicle flap was then injected with anesthesia.  The flap was excised through the skin and subcutaneous tissue down to the layer of the underlying musculature.  The interpolation flap was carefully excised within this deep plane to maintain its blood supply.  The edges of the donor site were undermined.   The donor site was closed in a primary fashion.  The pedicle was then rotated into position and sutured.  Once the tube was sutured into place, adequate blood supply was confirmed with blanching and refill.  The pedicle was then wrapped with xeroform gauze and dressed appropriately with a telfa and gauze bandage to ensure continued blood supply and protect the attached pedicle. Cheek-To-Nose Interpolation Flap Text: A decision was made to reconstruct the defect utilizing an interpolation axial flap and a staged reconstruction.  A telfa template was made of the defect.  This telfa template was then used to outline the Cheek-To-Nose Interpolation flap.  The donor area for the pedicle flap was then injected with anesthesia.  The flap was excised through the skin and subcutaneous tissue down to the layer of the underlying musculature.  The interpolation flap was carefully excised within this deep plane to maintain its blood supply.  The edges of the donor site were undermined.   The donor site was closed in a primary fashion.  The pedicle was then rotated into position and sutured.  Once the tube was sutured into place, adequate blood supply was confirmed with blanching and refill.  The pedicle was then wrapped with xeroform gauze and dressed appropriately with a telfa and gauze bandage to ensure continued blood supply and protect the attached pedicle. Estlander Flap (Lower To Upper Lip) Text: The defect of the lower lip was assessed and measured.  Given the location and size of the defect, an Estlander flap was deemed most appropriate. Using a sterile surgical marker, an appropriate Estlander flap was measured and drawn on the upper lip. Local anesthesia was then infiltrated. A scalpel was then used to incise the lateral aspect of the flap, through skin, muscle and mucosa, leaving the flap pedicled medially.  The flap was then rotated and positioned to fill the lower lip defect.  The flap was then sutured into place with a three layer technique, closing the orbicularis oris muscle layer with subcutaneous buried sutures, followed by a mucosal layer and an epidermal layer. Interpolation Flap Text: A decision was made to reconstruct the defect utilizing an interpolation axial flap and a staged reconstruction.  A telfa template was made of the defect.  This telfa template was then used to outline the interpolation flap.  The donor area for the pedicle flap was then injected with anesthesia.  The flap was excised through the skin and subcutaneous tissue down to the layer of the underlying musculature.  The interpolation flap was carefully excised within this deep plane to maintain its blood supply.  The edges of the donor site were undermined.   The donor site was closed in a primary fashion.  The pedicle was then rotated into position and sutured.  Once the tube was sutured into place, adequate blood supply was confirmed with blanching and refill.  The pedicle was then wrapped with xeroform gauze and dressed appropriately with a telfa and gauze bandage to ensure continued blood supply and protect the attached pedicle. Melolabial Interpolation Flap Text: A decision was made to reconstruct the defect utilizing an interpolation axial flap and a staged reconstruction.  A telfa template was made of the defect.  This telfa template was then used to outline the melolabial interpolation flap.  The donor area for the pedicle flap was then injected with anesthesia.  The flap was excised through the skin and subcutaneous tissue down to the layer of the underlying musculature.  The pedicle flap was carefully excised within this deep plane to maintain its blood supply.  The edges of the donor site were undermined.   The donor site was closed in a primary fashion.  The pedicle was then rotated into position and sutured.  Once the tube was sutured into place, adequate blood supply was confirmed with blanching and refill.  The pedicle was then wrapped with xeroform gauze and dressed appropriately with a telfa and gauze bandage to ensure continued blood supply and protect the attached pedicle. Mastoid Interpolation Flap Text: A decision was made to reconstruct the defect utilizing an interpolation axial flap and a staged reconstruction.  A telfa template was made of the defect.  This telfa template was then used to outline the mastoid interpolation flap.  The donor area for the pedicle flap was then injected with anesthesia.  The flap was excised through the skin and subcutaneous tissue down to the layer of the underlying musculature.  The pedicle flap was carefully excised within this deep plane to maintain its blood supply.  The edges of the donor site were undermined.   The donor site was closed in a primary fashion.  The pedicle was then rotated into position and sutured.  Once the tube was sutured into place, adequate blood supply was confirmed with blanching and refill.  The pedicle was then wrapped with xeroform gauze and dressed appropriately with a telfa and gauze bandage to ensure continued blood supply and protect the attached pedicle. Paramedian Forehead Flap Text: A decision was made to reconstruct the defect utilizing an interpolation axial flap and a staged reconstruction.  A telfa template was made of the defect.  This telfa template was then used to outline the paramedian forehead pedicle flap.  The donor area for the pedicle flap was then injected with anesthesia.  The flap was excised through the skin and subcutaneous tissue down to the layer of the underlying musculature.  The pedicle flap was carefully excised within this deep plane to maintain its blood supply.  The edges of the donor site were undermined.   The donor site was closed in a primary fashion.  The pedicle was then rotated into position and sutured.  Once the tube was sutured into place, adequate blood supply was confirmed with blanching and refill.  The pedicle was then wrapped with xeroform gauze and dressed appropriately with a telfa and gauze bandage to ensure continued blood supply and protect the attached pedicle. Posterior Auricular Interpolation Flap Text: A decision was made to reconstruct the defect utilizing an interpolation axial flap and a staged reconstruction.  A telfa template was made of the defect.  This telfa template was then used to outline the posterior auricular interpolation flap.  The donor area for the pedicle flap was then injected with anesthesia.  The flap was excised through the skin and subcutaneous tissue down to the layer of the underlying musculature.  The pedicle flap was carefully excised within this deep plane to maintain its blood supply.  The edges of the donor site were undermined.   The donor site was closed in a primary fashion.  The pedicle was then rotated into position and sutured.  Once the tube was sutured into place, adequate blood supply was confirmed with blanching and refill.  The pedicle was then wrapped with xeroform gauze and dressed appropriately with a telfa and gauze bandage to ensure continued blood supply and protect the attached pedicle. Cheiloplasty (Less Than 50%) Text: A decision was made to reconstruct the defect with a  cheiloplasty.  The defect was undermined extensively.  Additional orbicularis oris muscle was excised with a 15 blade scalpel.  The defect was converted into a full thickness wedge, of less than 50% of the vertical height of the lip, to facilite a better cosmetic result.  Small vessels were then tied off with 5-0 monocyrl. The orbicularis oris, superficial fascia, adipose and dermis were then reapproximated.  After the deeper layers were approximated the epidermis was reapproximated with particular care given to realign the vermilion border. Cheiloplasty (Complex) Text: A decision was made to reconstruct the defect with a  cheiloplasty.  The defect was undermined extensively.  Additional orbicularis oris muscle was excised with a 15 blade scalpel.  The defect was converted into a full thickness wedge to facilite a better cosmetic result.  Small vessels were then tied off with 5-0 monocyrl. The orbicularis oris, superficial fascia, adipose and dermis were then reapproximated.  After the deeper layers were approximated the epidermis was reapproximated with particular care given to realign the vermilion border. Ear Wedge Repair Text: A wedge excision was completed by carrying down an excision through the full thickness of the ear and cartilage with an inward facing Burow's triangle. The wound was then closed in a layered fashion. Full Thickness Lip Wedge Repair (Flap) Text: Given the location of the defect and the proximity to free margins a full thickness wedge repair was deemed most appropriate. Using a sterile surgical marker, the appropriate repair was drawn incorporating the defect and placing the expected incisions perpendicular to the vermilion border.  The vermilion border was also meticulously outlined to ensure appropriate reapproximation during the repair.  The area thus outlined was incised through and through with a #15 scalpel blade.  The muscularis and dermis were reaproximated with deep sutures following hemostasis. Care was taken to realign the vermilion border before proceeding with the superficial closure.  Once the vermilion was realigned the superfical and mucosal closure was finished. Ftsg Text: The defect edges were debeveled with a #15 scalpel blade. Given the location of the defect, shape of the defect and the proximity to free margins a full thickness skin graft was deemed most appropriate. Using a sterile surgical marker, the primary defect shape was transferred to the donor site. The area thus outlined was incised deep to adipose tissue with a #15 scalpel blade.  The harvested graft was then trimmed of adipose tissue until only dermis and epidermis was left.  The skin graft was then placed in the primary defect and oriented appropriately. Split-Thickness Skin Graft Text: The defect edges were debeveled with a #15 scalpel blade. Given the location of the defect, shape of the defect and the proximity to free margins a split thickness skin graft was deemed most appropriate. Using a sterile surgical marker, the primary defect shape was transferred to the donor site. The split thickness graft was then harvested.  The skin graft was then placed in the primary defect and oriented appropriately. Pinch Graft Text: The defect edges were debeveled with a #15 scalpel blade. Given the location of the defect, shape of the defect and the proximity to free margins a pinch graft was deemed most appropriate. Using a sterile surgical marker, the primary defect shape was transferred to the donor site. The area thus outlined was incised deep to adipose tissue with a #15 scalpel blade.  The harvested graft was then trimmed of adipose tissue until only dermis and epidermis was left. The skin graft was then placed in the primary defect and oriented appropriately. Burow's Graft Text: The defect edges were debeveled with a #15 scalpel blade. Given the location of the defect, shape of the defect, the proximity to free margins and the presence of a standing cone deformity a Burow's skin graft was deemed most appropriate. The standing cone was removed and this tissue was then trimmed to the shape of the primary defect. The adipose tissue was also removed until only dermis and epidermis were left.  The skin graft was then placed in the primary defect and oriented appropriately. Cartilage Graft Text: The defect edges were debeveled with a #15 scalpel blade. Given the location of the defect, shape of the defect, the fact the defect involved a full thickness cartilage defect a cartilage graft was deemed most appropriate.  An appropriate donor site was identified, cleansed, and anesthetized. The cartilage graft was then harvested and transferred to the recipient site, oriented appropriately and then sutured into place.  The secondary defect was then repaired using a primary closure. Composite Graft Text: The defect edges were debeveled with a #15 scalpel blade. Given the location of the defect, shape of the defect, the proximity to free margins and the fact the defect was full thickness a composite graft was deemed most appropriate.  The defect was outline and then transferred to the donor site.  A full thickness graft was then excised from the donor site. The graft was then placed in the primary defect, oriented appropriately and then sutured into place.  The secondary defect was then repaired using a primary closure. Epidermal Autograft Text: The defect edges were debeveled with a #15 scalpel blade. Given the location of the defect, shape of the defect and the proximity to free margins an epidermal autograft was deemed most appropriate. Using a sterile surgical marker, the primary defect shape was transferred to the donor site. The epidermal graft was then harvested.  The skin graft was then placed in the primary defect and oriented appropriately. Dermal Autograft Text: The defect edges were debeveled with a #15 scalpel blade. Given the location of the defect, shape of the defect and the proximity to free margins a dermal autograft was deemed most appropriate. Using a sterile surgical marker, the primary defect shape was transferred to the donor site. The area thus outlined was incised deep to adipose tissue with a #15 scalpel blade.  The harvested graft was then trimmed of adipose and epidermal tissue until only dermis was left.  The skin graft was then placed in the primary defect and oriented appropriately. Skin Substitute Text: The defect edges were debeveled with a #15 scalpel blade. Given the location of the defect, shape of the defect and the proximity to free margins a skin substitute graft was deemed most appropriate.  The graft material was trimmed to fit the size of the defect. The graft was then placed in the primary defect and oriented appropriately. Skin Substitute Paste Text: The defect edges were debeveled with a #15 scalpel blade. Given the location of the defect, shape of the defect and the proximity to free margins a skin substitute micronized graft was deemed most appropriate.  The entire vial contents were admixed with 0.5ccs of sterile saline, formed into a paste and then evenly spread over the entire wound bed. Skin Substitute Injection Text: The defect edges were debeveled with a #15 scalpel blade. Given the location of the defect, shape of the defect and the proximity to free margins a skin substitute micronized graft was deemed most appropriate.  The entire vial contents were admixed with 3.0ccs of sterile saline and then injected subcutaneously throughout the entire wound bed. Tissue Cultured Epidermal Autograft Text: The defect edges were debeveled with a #15 scalpel blade. Given the location of the defect, shape of the defect and the proximity to free margins a tissue cultured epidermal autograft was deemed most appropriate.  The graft was then trimmed to fit the size of the defect.  The graft was then placed in the primary defect and oriented appropriately. Xenograft Text: The defect edges were debeveled with a #15 scalpel blade. Given the location of the defect, shape of the defect and the proximity to free margins a xenograft was deemed most appropriate.  The graft was then trimmed to fit the size of the defect.  The graft was then placed in the primary defect and oriented appropriately. Purse String (Simple) Text: Given the location of the defect and the characteristics of the surrounding skin a purse string closure was deemed most appropriate.  Undermining was performed circumferentially around the surgical defect.  A purse string suture was then placed and tightened. Purse String (Intermediate) Text: Given the location of the defect and the characteristics of the surrounding skin a purse string intermediate closure was deemed most appropriate.  Undermining was performed circumferentially around the surgical defect.  A purse string suture was then placed and tightened. Partial Purse String (Simple) Text: Given the location of the defect and the characteristics of the surrounding skin a simple purse string closure was deemed most appropriate.  Undermining was performed circumferentially around the surgical defect.  A purse string suture was then placed and tightened. Wound tension only allowed a partial closure of the circular defect. Partial Purse String (Intermediate) Text: Given the location of the defect and the characteristics of the surrounding skin an intermediate purse string closure was deemed most appropriate.  Undermining was performed circumferentially around the surgical defect.  A purse string suture was then placed and tightened. Wound tension only allowed a partial closure of the circular defect. Localized Dermabrasion With Wire Brush Text: The patient was draped in routine manner.  Localized dermabrasion using 3 x 17 mm wire brush was performed in routine manner to papillary dermis. This spot dermabrasion is being performed to complete skin cancer reconstruction. It also will eliminate the other sun damaged precancerous cells that are known to be part of the regional effect of a lifetime's worth of sun exposure. This localized dermabrasion is therapeutic and should not be considered cosmetic in any regard. Localized Dermabrasion With Sand Papertext: The patient was draped in routine manner.  Localized dermabrasion using sterile sand paper was performed in routine manner to papillary dermis. This spot dermabrasion is being performed to complete skin cancer reconstruction. It also will eliminate the other sun damaged precancerous cells that are known to be part of the regional effect of a lifetime's worth of sun exposure. This localized dermabrasion is therapeutic and should not be considered cosmetic in any regard. Localized Dermabrasion With 15 Blade Text: The patient was draped in routine manner.  Localized dermabrasion using a 15 blade was performed in routine manner to papillary dermis. This spot dermabrasion is being performed to complete skin cancer reconstruction. It also will eliminate the other sun damaged precancerous cells that are known to be part of the regional effect of a lifetime's worth of sun exposure. This localized dermabrasion is therapeutic and should not be considered cosmetic in any regard. Tarsorrhaphy Text: A tarsorrhaphy was performed using Frost sutures. Intermediate Repair And Flap Additional Text (Will Appearing After The Standard Complex Repair Text): The intermediate repair was not sufficient to completely close the primary defect. The remaining additional defect was repaired with the flap mentioned below. Intermediate Repair And Graft Additional Text (Will Appearing After The Standard Complex Repair Text): The intermediate repair was not sufficient to completely close the primary defect. The remaining additional defect was repaired with the graft mentioned below. Complex Repair And Flap Additional Text (Will Appearing After The Standard Complex Repair Text): The complex repair was not sufficient to completely close the primary defect. The remaining additional defect was repaired with the flap mentioned below. Complex Repair And Graft Additional Text (Will Appearing After The Standard Complex Repair Text): The complex repair was not sufficient to completely close the primary defect. The remaining additional defect was repaired with the graft mentioned below. Eyelid Full Thickness Repair - 49753: The eyelid defect was full thickness which required a wedge repair of the eyelid. Special care was taken to ensure that the eyelid margin was realligned when placing sutures. Eyelid Partial Thickness Repair - 39167: The eyelid defect was partial thickness which required a wedge repair of the eyelid. Special care was taken to ensure that the eyelid margin was realligned when placing sutures. Cheek Interpolation Flap Division And Inset Text: Division and inset of the cheek interpolation flap was performed to achieve optimal aesthetic result, restore normal anatomic appearance and avoid distortion of normal anatomy, expedite and facilitate wound healing, achieve optimal functional result and because linear closure either not possible or would produce suboptimal result. The patient was prepped and draped in the usual manner. The pedicle was infiltrated with local anesthesia. The pedicle was sectioned with a #15 blade. The pedicle was de-bulked and trimmed to match the shape of the defect. Hemostasis was achieved. The flap donor site and free margin of the flap were secured with deep buried sutures and the wound edges were re-approximated. Cheek To Nose Interpolation Flap Division And Inset Text: Division and inset of the cheek to nose interpolation flap was performed to achieve optimal aesthetic result, restore normal anatomic appearance and avoid distortion of normal anatomy, expedite and facilitate wound healing, achieve optimal functional result and because linear closure either not possible or would produce suboptimal result. The patient was prepped and draped in the usual manner. The pedicle was infiltrated with local anesthesia. The pedicle was sectioned with a #15 blade. The pedicle was de-bulked and trimmed to match the shape of the defect. Hemostasis was achieved. The flap donor site and free margin of the flap were secured with deep buried sutures and the wound edges were re-approximated. Melolabial Interpolation Flap Division And Inset Text: Division and inset of the melolabial interpolation flap was performed to achieve optimal aesthetic result, restore normal anatomic appearance and avoid distortion of normal anatomy, expedite and facilitate wound healing, achieve optimal functional result and because linear closure either not possible or would produce suboptimal result. The patient was prepped and draped in the usual manner. The pedicle was infiltrated with local anesthesia. The pedicle was sectioned with a #15 blade. The pedicle was de-bulked and trimmed to match the shape of the defect. Hemostasis was achieved. The flap donor site and free margin of the flap were secured with deep buried sutures and the wound edges were re-approximated. Mastoid Interpolation Flap Division And Inset Text: Division and inset of the mastoid interpolation flap was performed to achieve optimal aesthetic result, restore normal anatomic appearance and avoid distortion of normal anatomy, expedite and facilitate wound healing, achieve optimal functional result and because linear closure either not possible or would produce suboptimal result. The patient was prepped and draped in the usual manner. The pedicle was infiltrated with local anesthesia. The pedicle was sectioned with a #15 blade. The pedicle was de-bulked and trimmed to match the shape of the defect. Hemostasis was achieved. The flap donor site and free margin of the flap were secured with deep buried sutures and the wound edges were re-approximated. Paramedian Forehead Flap Division And Inset Text: Division and inset of the paramedian forehead flap was performed to achieve optimal aesthetic result, restore normal anatomic appearance and avoid distortion of normal anatomy, expedite and facilitate wound healing, achieve optimal functional result and because linear closure either not possible or would produce suboptimal result. The patient was prepped and draped in the usual manner. The pedicle was infiltrated with local anesthesia. The pedicle was sectioned with a #15 blade. The pedicle was de-bulked and trimmed to match the shape of the defect. Hemostasis was achieved. The flap donor site and free margin of the flap were secured with deep buried sutures and the wound edges were re-approximated. Posterior Auricular Interpolation Flap Division And Inset Text: Division and inset of the posterior auricular interpolation flap was performed to achieve optimal aesthetic result, restore normal anatomic appearance and avoid distortion of normal anatomy, expedite and facilitate wound healing, achieve optimal functional result and because linear closure either not possible or would produce suboptimal result. The patient was prepped and draped in the usual manner. The pedicle was infiltrated with local anesthesia. The pedicle was sectioned with a #15 blade. The pedicle was de-bulked and trimmed to match the shape of the defect. Hemostasis was achieved. The flap donor site and free margin of the flap were secured with deep buried sutures and the wound edges were re-approximated. Manual Repair Warning Statement: We plan on removing the manually selected variable below in favor of our much easier automatic structured text blocks found in the previous tab. We decided to do this to help make the flow better and give you the full power of structured data. Manual selection is never going to be ideal in our platform and I would encourage you to avoid using manual selection from this point on, especially since I will be sunsetting this feature. It is important that you do one of two things with the customized text below. First, you can save all of the text in a word file so you can have it for future reference. Second, transfer the text to the appropriate area in the Library tab. Lastly, if there is a flap or graft type which we do not have you need to let us know right away so I can add it in before the variable is hidden. No need to panic, we plan to give you roughly 6 months to make the change. Consent: The rationale for the repair was explained to the patient and consent was obtained. The risks, benefits and alternatives to therapy were discussed in detail. Specifically, the risks of infection, scarring, bleeding, prolonged wound healing, incomplete removal, allergy to anesthesia, nerve injury and recurrence were addressed. Prior to the procedure, the treatment site was clearly identified and confirmed by the patient. All components of Universal Protocol/PAUSE Rule completed. Post-Care Instructions: I reviewed with the patient in detail post-care instructions. Patient is not to engage in any heavy lifting, exercise, or swimming for the next 14 days. Should the patient develop any fevers, chills, bleeding, severe pain patient will contact the office immediately. Pain Refusal Text: I offered to prescribe pain medication but the patient refused to take this medication. Where Do You Want The Question To Include Opioid Counseling Located?: Case Summary Tab Information: Selecting Yes will display possible errors in your note based on the variables you have selected. This validation is only offered as a suggestion for you. PLEASE NOTE THAT THE VALIDATION TEXT WILL BE REMOVED WHEN YOU FINALIZE YOUR NOTE. IF YOU WANT TO FAX A PRELIMINARY NOTE YOU WILL NEED TO TOGGLE THIS TO 'NO' IF YOU DO NOT WANT IT IN YOUR FAXED NOTE.

## 2024-08-30 NOTE — PROGRESS NOTE ADULT - ASSESSMENT
History of cocaine induced CM  normal EF on last echo  cont BB     PAF  off a/c high bleed risk     tachycardia  obtain 12 lead EKG No

## 2024-09-13 NOTE — PROGRESS NOTE ADULT - PROBLEM SELECTOR PLAN 4
Physical Therapy Evaluation Note  DATE:  2024  NAME:  Mary Lira  :  1971  (52 y.o.,female)  MRN:  [id number]    HEIGHT:  Height: 160 cm (5' 3\")  WEIGHT:  Weight - Scale: 118 kg (260 lb 2.3 oz)    PATIENT DIAGNOSIS(ES):    Diagnosis: acute ischemic stroke, R side weakness, dysarthria, leukocytosis, PNA    Additional Pertinent Hx: pneumonia, lipoprotein metabolism disorder unspecified, DM 2, anxiety disorder, obesity, obstructive sleep apnea, heart failure  RESTRICTIONS/PRECAUTIONS:    Restrictions/Precautions  Restrictions/Precautions: Fall Risk     OVERALL  ORIENTATION STATUS:  Overall Orientation Status: Within Functional Limits  PAIN:  Pain Level: 5       Pain Location: Neck     Pain Orientation: Right, Left      GROSS ASSESSMENT        POSTURE/BALANCE          ACTIVITY TOLERANCE         BED MOBILITY  Bed mobility  Rolling to Left: Supervision  Rolling to Right: Supervision  Supine to Sit: Minimal assistance  Sit to Supine: Minimal assistance        TRANSFERS  Transfers  Sit to Stand: Contact guard assistance  Stand to Sit: Contact guard assistance  Bed to Chair: Contact guard assistance       AMBULATION 1  Ambulation  Surface: Level tile  Device: Rolling Walker  Assistance: Contact guard assistance  Quality of Gait: step to pattern trendelenburg to R side  Gait Deviations: Slow Mae, Increased GURWINDER, Decreased step length, Decreased step height, Decreased arm swing  Distance: 50 ft 2 turns    WHEELCHAIR PROPULSION 1  Propulsion 1  Propulsion: Manual  Level: Level Tile  Method: THEE RUIZ  Level of Assistance: Supervision  Distance: 20 ft, 2 turns  WHEELCHAIR PROPULSION 2       GOALS:  Short Term Goals  Time Frame for Short Term Goals: 1 wk  Short Term Goal 1: supervision bed mobiliyt  Short Term Goal 2: supervision transfers  Short Term Goal 3: supervision amb with AD 50ft 2 turns  Short Term Goal 4: supervision car transfer  Short Term Goal 5: min A 4 steps with railing    Long Term Goals  Time  medical condition or safety concerns  CARE Score: 88  Discharge Goal: Not Attempted    Wheel 50 Feet with Two Turns  Reason if not Attempted: Not attempted due to medical condition or safety concerns  CARE Score: 88  Discharge Goal: Supervision or touching assistance    Wheel 150 Feet  Reason if not Attempted: Not attempted due to medical condition or safety concerns  CARE Score: 88  Discharge Goal: Supervision or touching assistance      LAST TREATMENT TIME  PT Individual Minutes  Time In: 1104  Time Out: 1202  Minutes: 58           Electronically signed by YURY Becerril on 9/13/2024 at 3:55 PM    fs coverage.

## 2024-11-15 NOTE — PROGRESS NOTE ADULT - SUBJECTIVE AND OBJECTIVE BOX
Anesthesia Post Evaluation    Patient: Tania Shane    Procedure(s) Performed: Procedure(s) (LRB):  ROBOTIC CHOLECYSTECTOMY (N/A)    Final Anesthesia Type: general      Patient location during evaluation: PACU  Patient participation: Yes- Able to Participate  Level of consciousness: awake and alert and oriented  Post-procedure vital signs: reviewed and stable  Pain management: adequate  Airway patency: patent    PONV status at discharge: No PONV  Anesthetic complications: no      Cardiovascular status: hemodynamically stable and blood pressure returned to baseline  Respiratory status: spontaneous ventilation, room air and unassisted  Hydration status: euvolemic  Follow-up not needed.              Vitals Value Taken Time   /65 11/15/24 1057   Temp 36.9 °C (98.4 °F) 11/15/24 1057   Pulse 101 11/15/24 1057   Resp 20 11/15/24 1057   SpO2 96 % 11/15/24 1057         Event Time   Out of Recovery 17:01:00         Pain/Clari Score: Pain Rating Prior to Med Admin: 3 (11/15/2024 11:10 AM)  Pain Rating Post Med Admin: 0 (11/15/2024  7:27 AM)  Clari Score: 10 (11/14/2024  4:45 PM)           Bone marrow aspiration and biopsy procedure    9/14/19- 10:00 AM    Indication: MGUS    Performed by Lj Moon BVD          - Time-out was called to confirm: patient’s name and date of birth, procedure, side and site of biopsy, safety procedures followed.      - Performed by: self.      -Nurse at bedside to assist    - Informed consent: signed by patient.      - Aspiration and biopsy site: [left] superior posterior iliac crest.     - Patient position: [prone]      - Preparation and technique: sterile preparation of site with Betadyne, Chloraprep, draped to expose aspirate/biopsy area, local anesthesia with 1% lidocaine (approximately 10ml), frequent pressure application on incision to maintain hemostasis.      - Tissue obtained: bone marrow biopsy were successfully obtained in sterile manner.    - Toleration of procedure and any complications: slight localized bleeding (<1ml). Patient tolerated procedure well with minimal pain.     Thank you for the courtesy of this consultation and we will continue to follow.    Lj Moon MD  [facility]  Cell: [phone]

## 2024-12-09 NOTE — PROGRESS NOTE ADULT - SUBJECTIVE AND OBJECTIVE BOX
[facility]    Hospitalist Progress Note    Interval History   Patient awake and alert.  Significantly worse with respiratory status overnight where she suddenly got short of breath.  Currently on oxy mask to 9 L.  Very dyspneic.  Yesterday had chest physiotherapy that she did not tolerate well and got very anxious.  Wants to try chest physiotherapy again.    -Data reviewed today: I reviewed all new labs and imaging results over the last 24 hours. I personally reviewed the chest CT image(s) showing small bilateral pleural effusions.  Tiny left basilar pneumothorax related to recent thoracentesis.  Near complete atelectasis of the right lower lobe has worsened and dense atelectasis in the left lower lobe has improved since 11/21/2024.  Previously groundglass opacities in the right middle lobe have resolved.  Mild pulmonary edema. .    Physical Exam   Temp: 98  F (36.7  C) Temp src: Oral BP: (!) 158/61 Pulse: 58   Resp: 16 SpO2: 95 % O2 Device: Oxymizer cannula Oxygen Delivery: 12 LPM  Vitals:    12/05/24 0654 12/06/24 0500 12/06/24 1300   Weight: 55.5 kg (122 lb 5.7 oz) 55.8 kg (123 lb 0.3 oz) 50 kg (110 lb 3.7 oz)     Vital Signs with Ranges  Temp:  [97.5  F (36.4  C)-98  F (36.7  C)] 98  F (36.7  C)  Pulse:  [53-79] 58  Resp:  [16-24] 16  BP: (147-169)/(57-62) 158/61  SpO2:  [89 %-95 %] 95 %  I/O last 3 completed shifts:  In: 900 [P.O.:900]  Out: 1100 [Urine:1100]    Physical Exam  Constitutional:       Appearance: She is ill-appearing.   Cardiovascular:      Rate and Rhythm: Normal rate and regular rhythm.      Pulses: Normal pulses.      Heart sounds: Normal heart sounds.   Pulmonary:      Effort: Respiratory distress present.      Breath sounds: Normal breath sounds.      Comments: Bilateral coarse breath sounds.  Reduced breath sounds throughout the right side and on the left base very tachypneic  Abdominal:      General: Abdomen is flat. Bowel sounds are normal. There is no distension.       Tenderness: There is no abdominal tenderness. There is no guarding.   Musculoskeletal:      Comments: Right above-knee amputation   Skin:     General: Skin is warm and dry.   Neurological:      General: No focal deficit present.           Medications   Current Facility-Administered Medications   Medication Dose Route Frequency Provider Last Rate Last Admin    No lozenges or gum should be given while patient on BIPAP/AVAPS/AVAPS AE   Does not apply Continuous PRN Ricardo Wheeler MD        Patient may continue current oral medications   Does not apply Continuous PRN Ricardo Wheeler MD         Current Facility-Administered Medications   Medication Dose Route Frequency Provider Last Rate Last Admin    acetylcysteine (MUCOMYST) 20 % nebulizer solution 4 mL  4 mL Nebulization 4x daily Ricardo Wheeler MD   4 mL at 12/08/24 0746    amLODIPine (NORVASC) tablet 10 mg  10 mg Oral Daily Yoli Huizar MD   10 mg at 12/08/24 0851    apixaban ANTICOAGULANT (ELIQUIS) tablet 2.5 mg  2.5 mg Oral BID Yoli Huizar MD   2.5 mg at 12/08/24 1814    carvedilol (COREG) tablet 12.5 mg  12.5 mg Oral BID w/meals Yoli Huizar MD   12.5 mg at 12/08/24 1813    clopidogrel (PLAVIX) tablet 75 mg  75 mg Oral Daily Marcin White MD   75 mg at 12/08/24 1305    fluticasone (FLONASE) 50 MCG/ACT spray 1 spray  1 spray Both Nostrils Daily Liane Huertas MD   1 spray at 12/06/24 0836    fluticasone-vilanterol (BREO ELLIPTA) 200-25 MCG/ACT inhaler 1 puff  1 puff Inhalation Daily Marcin White MD   1 puff at 12/07/24 0836    gabapentin (NEURONTIN) capsule 200 mg  200 mg Oral At Bedtime Marcin White MD   200 mg at 12/07/24 2041    ipratropium - albuterol 0.5 mg/2.5 mg/3 mL (DUONEB) neb solution 3 mL  3 mL Nebulization 4x daily Ricardo Wheeler MD   3 mL at 12/08/24 0746    isosorbide mononitrate (IMDUR) 24 hr tablet 120 mg  120 mg Oral Daily Yoli Huizar MD    CC: f/u for fever and change in mental status    Patient reports: he is lethargic, moves extremities, slow to respond, does not know where he is.    REVIEW OF SYSTEMS:  All other review of systems negative (Comprehensive ROS)    Antimicrobials Day #  day 2, s/p vanco:  cefepime   IVPB      cefepime   IVPB 1000 milliGRAM(s) IV Intermittent every 24 hours    Other Medications Reviewed  MEDICATIONS  (STANDING):  allopurinol 100 milliGRAM(s) Oral daily  atorvastatin 40 milliGRAM(s) Oral at bedtime  carvedilol 25 milliGRAM(s) Oral every 12 hours  cefepime   IVPB      cefepime   IVPB 1000 milliGRAM(s) IV Intermittent every 24 hours  chlorhexidine 2% Cloths 1 Application(s) Topical daily  cinacalcet 30 milliGRAM(s) Oral daily  darbepoetin Injectable ViaL 40 MICROGram(s) IV Push every week  dextrose 5%. 1000 milliLiter(s) (50 mL/Hr) IV Continuous <Continuous>  dextrose 50% Injectable 12.5 Gram(s) IV Push once  dextrose 50% Injectable 25 Gram(s) IV Push once  dextrose 50% Injectable 25 Gram(s) IV Push once  ergocalciferol 84940 Unit(s) Oral every week  folic acid 1 milliGRAM(s) Oral daily  furosemide   Injectable 60 milliGRAM(s) IV Push two times a day  gabapentin 100 milliGRAM(s) Oral two times a day  hydrALAZINE 100 milliGRAM(s) Oral three times a day  hydrocortisone hemorrhoidal Suppository 1 Suppository(s) Rectal at bedtime  insulin glargine Injectable (LANTUS) 10 Unit(s) SubCutaneous at bedtime  insulin lispro (HumaLOG) corrective regimen sliding scale   SubCutaneous three times a day before meals  insulin lispro (HumaLOG) corrective regimen sliding scale   SubCutaneous at bedtime  labetalol 300 milliGRAM(s) Oral two times a day  levETIRAcetam  IVPB 500 milliGRAM(s) IV Intermittent two times a day  mycophenolic acid  milliGRAM(s) Oral two times a day  predniSONE   Tablet 5 milliGRAM(s) Oral daily  sodium bicarbonate 650 milliGRAM(s) Oral two times a day  tamsulosin 0.4 milliGRAM(s) Oral at bedtime  thiamine Injectable 100 milliGRAM(s) IV Push daily    T(F): 99.4 (19 @ 06:37), Max: 102 (19 @ 21:29)  HR: 103 (19 @ 04:38)  BP: 144/66 (19 @ 04:38)  RR: 18 (19 @ 04:38)  SpO2: 93% (19 @ 04:38)  Wt(kg): --    PHYSICAL EXAM:  General: lethargic, no acute distress  Eyes:  anicteric, no conjunctival injection, no discharge  Oropharynx: no lesions or injection 	  Neck: supple, without adenopathy  Lungs: clear to auscultation, poor inspiratory effert  Heart: regular rate and rhythm; no murmur, rubs or gallops  Abdomen: soft, nondistended, nontender, without mass or organomegaly  Skin: a  few well healed scabs on legs  Extremities: no clubbing, cyanosis, ++ edema  Neurologic: lethargic, poorly interactive    LAB RESULTS:                        8.5    9.02  )-----------( 148      ( 04 Sep 2019 09:36 )             27.9         134<L>  |  96  |  47<H>  ----------------------------<  116<H>  4.2   |  21<L>  |  6.63<H>    Ca    9.1      04 Sep 2019 04:16        Urinalysis Basic - ( 02 Sep 2019 23:57 )    Color: Orange / Appearance: Turbid / S.014 / pH: x  Gluc: x / Ketone: Negative  / Bili: Negative / Urobili: <2 mg/dL   Blood: x / Protein: >600 mg/dL / Nitrite: Negative   Leuk Esterase: Large / RBC: 277 /HPF / WBC >720 /HPF   Sq Epi: x / Non Sq Epi: 10 /HPF / Bacteria: TNTC      MICROBIOLOGY:  RECENT CULTURES:   @ 10:14 .Blood Blood Culture PCR    Growth in aerobic bottle: Gram Negative Rods        Growth in aerobic bottle: Gram Negative Rods        RADIOLOGY REVIEWED:  < from: Xray Chest 1 View- PORTABLE-Urgent (19 @ 12:36) >  IMPRESSION:  Clear lungs.    < end of copied text >  < from: CT Head No Cont (19 @ 12:01) >  IMPRESSION:  No change 2019. Involutional change and microvascular   ischemic disease    < end of copied text > 120 mg at 12/08/24 0851    nicotine (NICODERM CQ) 21 MG/24HR 24 hr patch 1 patch  1 patch Transdermal Daily Carlos Hunter MD   1 patch at 12/08/24 0852    Followed by    [START ON 1/2/2025] nicotine (NICODERM CQ) 14 MG/24HR 24 hr patch 1 patch  1 patch Transdermal Daily Carlos Hunter MD        Followed by    [START ON 1/30/2025] nicotine (NICODERM CQ) 7 MG/24HR 24 hr patch 1 patch  1 patch Transdermal Daily Carlos Hunter MD        polyethylene glycol (MIRALAX) Packet 17 g  17 g Oral Daily Yoli Huizar MD   17 g at 12/08/24 0851    rosuvastatin (CRESTOR) tablet 10 mg  10 mg Oral At Bedtime Marcin White MD   10 mg at 12/07/24 2041    sodium chloride (PF) 0.9% PF flush 3 mL  3 mL Intracatheter Q8H Marcin White MD   3 mL at 12/08/24 1814    torsemide (DEMADEX) tablet 20 mg  20 mg Oral BID Nereida Vaz MD   20 mg at 12/08/24 1814    [Held by provider] valsartan (DIOVAN) tablet 40 mg  40 mg Oral Daily Lisa Zeng PA-C   40 mg at 11/28/24 0828       Data   Recent Labs   Lab 12/08/24  0548 12/07/24  0546 12/06/24  1347 12/06/24  0756 12/06/24  0705 12/05/24  0713 12/04/24  1356   WBC 5.1 5.4  --   --  5.6   < >  --    HGB 8.1* 8.0*  --   --  9.2*   < >  --    MCV 83 82  --   --  82   < >  --    * 133*  --   --  143*   < > 146*   INR  --   --   --   --   --   --  1.24*   * 129*  --   --  127*   < >  --    POTASSIUM 4.0 3.9  --   --  4.2   < >  --    CHLORIDE 93* 93*  --   --  91*   < >  --    CO2 25 24  --   --  23   < >  --    .3* 120.5*  --   --  111.8*   < >  --    CR 1.76* 1.95*  --   --  2.06*   < >  --    ANIONGAP 13 12  --   --  13   < >  --    SONIA 8.0* 7.7*  --   --  7.9*   < >  --    GLC 81 82 89   < > 79   < >  --     < > = values in this interval not displayed.       Recent Results (from the past 24 hours)   CT Chest w/o Contrast    Narrative    EXAM: CT CHEST W/O CONTRAST  LOCATION: [facility]  DATE: 12/8/2024    INDICATION:  ?worsening atelectasis versus effusion  COMPARISON: CT chest 12/05/2024.  TECHNIQUE: CT chest without IV contrast. Multiplanar reformats were obtained. Dose reduction techniques were used.  CONTRAST: None.    FINDINGS:   LUNGS AND PLEURA: Increased size of large right and moderate size left pleural effusions with worsening atelectasis. There is now complete collapse of the bilateral lower lobes and right middle lobe. Interlobular septal thickening. New left upper lobe   groundglass opacity. No pneumothorax.    MEDIASTINUM/AXILLAE: Similar multinodular thyroid gland. Stable borderline enlarged mediastinal lymph nodes which are nonspecific but favored reactive. Trace pericardial effusion. Low attenuation of the blood pool which can be seen with anemia. Moderate   calcification of the thoracic aorta which is not aneurysmal. Calcifications of the aortic valve.    CORONARY ARTERY CALCIFICATION: Severe.    UPPER ABDOMEN: Heavy vascular calcifications.    MUSCULOSKELETAL: Worsening diffuse body wall edema. No acute osseous findings.      Impression    IMPRESSION:   1.  Increased size of the large right and moderate size left pleural effusions with worsening atelectasis. There is now complete collapse of the bilateral lower lobes and right middle lobe.  2.  Interstitial pulmonary edema. New left upper lobe groundglass opacity which may represent a focus of alveolar edema versus an infectious/inflammatory focus.  3.  Worsening anasarca.  4.  Severe coronary atherosclerosis.  5.  Low attenuation of the blood pool which can be seen with anemia.     US Thoracentesis    Narrative    ULTRASOUND GUIDED THORACENTESIS  12/8/2024 12:07 PM     HISTORY: recurrent pleural effusion with worsening hypoxia and dyspnea    FINDINGS: Limited ultrasound was performed to evaluate for the  presence and best approach for drainage of a pleural effusion. An  image is archived. Written and oral informed consent was obtained. A  pause for the cause  procedure to verify the correct patient and  correct procedure.     The skin overlying the right chest posteriorly was prepped and draped  in the usual sterile fashion. The subcutaneous tissues were  anesthetized with 1% lidocaine. Under direct ultrasound guidance a  catheter was advanced into the pleural space and 1500 mL of  dario  colored fluid was drained. The catheter was removed and a sterile  dressing was applied.     Patient was monitored by nurse under my direct supervision throughout  the exam. Ultrasound images were permanently stored.  There were no  immediate complications. Patient left the ultrasound suite in  satisfactory condition.      Impression    IMPRESSION: Technically successful thoracentesis without immediate  complications.    JACKIE LA MD         SYSTEM ID:  [id number]           Assessment & Plan     Shirley Hendricks is a 68 year old female, on Plavix, with a history of COPD, hypertension, CHF, peripheral artery disease, NSTEMI, stage 4 CKD, and tobacco use who is being admitted for evaluation of shortness of breath.      Acute hypoxic respiratory failure-multifactorial.  Acute exacerbation of heart failure with preserved EF.  Moderate to severe left pleural effusion status post thoracentesis.  -Presents with 2-day history of increasing shortness of breath and a cough.  On admission chest x-ray shows a large left pleural effusion with compressive atelectasis.  There is also diffuse interstitial opacities consistent with pulmonary edema.    -Labs pertinent for a proBNP [place] 37[address]   -Patient underwent bilateral thoracentesis initially on 11/20 and the following day with good improvement in her symptoms.  -Fluid consistent with transudate effusion. Cultures negative. Cytology negative for malignancy  -Completed 5 days of corticosteroid  -TTE done during this hospitalization on 11/22/2024 showed LVEF normal at 55 to 60%.  RV function normal.  Mild 1+ MR, 1+ TR.  Mild pulmonary  hypertension.  Compared to prior study there is no significant change  -Completed treatment for community-acquired pneumonia with cefuroxime and azithromycin  -Right heart catheterization done on 11/27/24 showed normal pressures, however this was after several  doses of diuretics.  Weight at that time was close to 104 pounds.  -Patient's oxygenation initially improved with aggressive diuretics and thoracentesis, however i patient started getting more hypoxic again on 12/3/2024 to a point of needing up to 5 L nasal cannula.  -Repeat chest x-ray performed on 12/3/2024 shows persistent pulmonary edema with ongoing recurrent bilateral pleural effusion.  Weight has gone up to 122 pounds on 12/5/2024  -Discussed with pulmonary and nephrology .  Received one-time dose of Bumex 2 mg on 12/4/2024  -Underwent repeat right-sided thoracentesis with removal of 1.4 L on 12/4/2024 and left-sided thoracentesis on 12/5/2024 with removal of 1.1 L fluid.  -CT chest without contrast was obtained right after left-sided thoracentesis on 12/5/2024 that continued to show significant atelectasis of the right and left lung bases.  There is concern for trapped lung causing recurrent pleural fluid reaccumulation into the empty space.  CT chest also showed a tiny pneumothorax.  Repeat chest x-ray on 12/6/2024 continues to show bibasilar atelectasis.  Likely secondary to thickened mucus  -Aggressive airway clearance with DuoNebs and Mucomyst ordered.  -Incentive spirometry and Aerobika 10 times an hour while awake.  -Pulmonary ordered for BiPAP twice a day to try and recruit the lung and prevent further pleural effusions.  -Tried chest physiotherapy but did not tolerate well.  Will discontinue for now.  -12/8-significant worsening hypoxia and is currently at 10 L oxy mask.  Repeated chest CT that shows recurrent large bilateral pleural effusions with right greater than left.  Repeat ultrasound right thoracentesis for therapeutic purposes was  done patient did feel improved after this.  Unfortunately this has been a recurrent problem despite aggressive mucus clearance mechanisms and intermittent BiPAP.  -Will discuss with pulmonary tomorrow on possibility of bronc.     Community Acquired Pnuemonia  COPD with possible acute exacerbation  -Patient initially presented with acute hypoxic respiratory failure, initially felt to be due to acute exacerbation of heart failure with preserved EF  -Initially she was diuresed, also received thoracentesis with improvement in oxygenation however now continues to require oxygen at 2-3 L  -Right heart cath showed normal pressures, cardiology feels that there is a significant component of COPD which is contributing to her symptoms and hypoxia  -Already completed 5 days steroid course and a round of antibiotics  -As mentioned above pulmonary following  -Most likely her hypoxia at this point is multifactorial with definite component of heart failure.  COPD is definitely contributing.  -Post repeat bilateral thoracentesis.  Continue to monitor for now.     Hypertensive urgency  New onset A-fib with RVR  Hyperlipidemia  History of coronary artery disease  Type II NSTEMI likely due to demand due to heart failure exacerbation.  -Initially found to be in A-fib, now converted to normal sinus rhythm  -Continue apixaban, currently on 2.5 mg twice a day based on body weight and renal function  -Prior to admission atenolol changed to carvedilol due to refractory hypertension  -Continue amlodipine 10 mg daily  -Continue Plavix which she takes for both history of coronary artery disease and peripheral arterial disease  -Isosorbide mononitrate increased to 120 mg daily  -Continue Crestor     Hyponatremia  -Sodium decreased from 133 on presentation to 121   -Nephrology following  -Did require 2% saline initially, currently at 131  -Continue 2 L fluid restriction  -Nephrology following  -Daily BMP next     Acute kidney injury on CKD stage  III  Suspected ATN secondary to hypotension and ARB + diuretic  Borderline hyperkalemia  -Creatinine on admission of 1.71, which worsened with diuresis and relative hypotension.  -Creatinine peaked at 3.78 and now downtrending, good urine output  -Creatinine at 2.41 on 12/4/2024.  Received a diuretic challenge with Bumex 2 mg IV once.  And started on torsemide 20 mg daily which was eventually uptitrated to twice daily.  -Creatinine slowly improving 2.94--141--2.08--2.06-1.95--1.76.  BUN has been uptrending.  Nephrology following along.  Appreciate recommendations.     GERD  -Continue with PTA famotidine     Anemia of Chronic Disease  Baseline hemoglobin has been between 8-9 over the past few months  -Hemoglobin overall stable between 9-10     Peripheral vascular disease with history of right AKA in April 2024  -Continue Plavix and statin  -Apixaban added for A-fib     History of thrombocytopenia  -Platelet count normal on admission, slightly at the moment but stable at 146.     Tobacco use  - Continue with nicotine patch  - Counselled on cessation          Clinically Significant Risk Factors      # Hyponatremia: Lowest Na = 129 mmol/L in last 2 days, will monitor as appropriate  # Hypochloremia: Lowest Cl = 93 mmol/L in last 2 days, will monitor as appropriate      # Hypoalbuminemia: Lowest albumin = 3 g/dL at 11/21/2024  4:42 AM, will monitor as appropriate       # Hypertension: Noted on problem list             # Moderate Malnutrition: based on nutrition assessment    # Financial/Environmental Concerns: none          DVT Prophylaxis: DOAC  Code Status: Full Code  Medically Ready for Discharge: Anticipated in 2-4 Days    Greater than 50-minute spent on documentation review, lab review, imaging review, examining the patient and discussing care with radiology team and coordinating thoracentesis    Magaly Frye MD, MD  [phone](p)

## 2025-02-17 NOTE — PROVIDER CONTACT NOTE (CRITICAL VALUE NOTIFICATION) - DATE AND TIME:
Wound Clinic Physician Orders and Discharge Instructions  [facility]  [address], [address]  [place] [address]  Telephone: [phone]     FAX [phone]    NAME:  Lex Naidu  YOB: 1943  MEDICAL RECORD NUMBER:  [id number]  DATE:  2/17/2025      Return Appointment:    [] Dressing Supply Provider:   [x] Home Healthcare:  Rena SINGH   [x] Return Appointment: 1 Week(s)   [] Nurse Visit:     [] Discharge from [facility]: [] Healed        [] Refer to Provider:         [] Consult    Follow-up Information:    [] Ordered Tests:  [] Vascular/Arterial Testing [] Imaging (X-ray, MRI, or CT)   [] Please call [phone] to schedule any testing    [x] Referrals:    [] Infectious Disease  [] Vascular  [x] Other: Dermatology 4/7/25    [] Rx to pharmacy:   [] Would Culture obtained in clinic:   [] Other:     Wound Cleansing:   Do not scrub or use excessive force.  Cleanse wound prior to applying a clean dressing with:    [x] Normal Saline   [] Mild Soap & Water     [] Keep Wound Dry in Shower  [] Wear a cast cover to shower  [] Other:       Topical Treatments:  Do not apply lotions, creams, or ointments to wound bed unless directed.     [] Apply moisturizing lotion to skin surrounding the wound prior to dressing change.  [] Apply antifungal ointment to skin surrounding the wound prior to dressing change.  [] Apply thin film of moisture barrier ointment (Zinc) to skin immediately around wound.  [] Apply Betadine to skin immediately around wound   [] Apply Skin Prep to skin immediately around wound  [] Other:      Dressings:           Wound Location Right Foot (until vac arrives)   [x] Apply Primary Dressing:       [] MediHoney Gel [] MediHoney Alginate  [] Calcium Alginate with Silver   [] Calcium Alginate without silver   [] Collagen with silver [] Collagen without Silver    [] Santyl with moist saline gauze     [] Hydrofera Blue (cut to size and moistened with normal  04-Jan-2020 21:50 04-Jan-2020 21:51

## 2025-04-24 NOTE — DIETITIAN INITIAL EVALUATION ADULT. - PERTINENT LABORATORY DATA
See Flowsheet for Immunotherapy administration.   Patient waited in clinic 30 mins for observation.  Patient had Epi Pen on hand.   11/25/19 - Sodium 133(L),BUN 24(H), eGFR 17(L), Creatinine 3.88(L), Glucose 178(H), calcium 11.1(H)  11/19/19 - A1C 5.5 (WDL)